# Patient Record
Sex: FEMALE | Race: WHITE | NOT HISPANIC OR LATINO | Employment: FULL TIME | URBAN - METROPOLITAN AREA
[De-identification: names, ages, dates, MRNs, and addresses within clinical notes are randomized per-mention and may not be internally consistent; named-entity substitution may affect disease eponyms.]

---

## 2017-12-19 ENCOUNTER — HOSPITAL ENCOUNTER (INPATIENT)
Facility: HOSPITAL | Age: 47
LOS: 5 days | Discharge: HOME | DRG: 101 | End: 2017-12-24
Attending: EMERGENCY MEDICINE | Admitting: EMERGENCY MEDICINE
Payer: COMMERCIAL

## 2017-12-19 DIAGNOSIS — R56.9 CONVULSIONS, UNSPECIFIED CONVULSION TYPE: ICD-10-CM

## 2017-12-19 DIAGNOSIS — G40.109 TEMPORAL LOBE EPILEPSY: Primary | ICD-10-CM

## 2017-12-19 DIAGNOSIS — G40.119 TEMPORAL LOBE EPILEPSY, INTRACTABLE: ICD-10-CM

## 2017-12-19 DIAGNOSIS — R56.9 SEIZURES: ICD-10-CM

## 2017-12-19 LAB
ALBUMIN SERPL BCP-MCNC: 3.4 G/DL
ALP SERPL-CCNC: 122 U/L
ALT SERPL W/O P-5'-P-CCNC: 34 U/L
AMPHET+METHAMPHET UR QL: NEGATIVE
ANION GAP SERPL CALC-SCNC: 7 MMOL/L
AST SERPL-CCNC: 28 U/L
BACTERIA #/AREA URNS AUTO: NORMAL /HPF
BARBITURATES UR QL SCN>200 NG/ML: NEGATIVE
BASOPHILS # BLD AUTO: 0.02 K/UL
BASOPHILS NFR BLD: 0.3 %
BENZODIAZ UR QL SCN>200 NG/ML: NEGATIVE
BILIRUB SERPL-MCNC: 0.3 MG/DL
BILIRUB UR QL STRIP: NEGATIVE
BUN SERPL-MCNC: 15 MG/DL
BZE UR QL SCN: NEGATIVE
CALCIUM SERPL-MCNC: 9.4 MG/DL
CANNABINOIDS UR QL SCN: NEGATIVE
CHLORIDE SERPL-SCNC: 106 MMOL/L
CLARITY UR REFRACT.AUTO: CLEAR
CO2 SERPL-SCNC: 27 MMOL/L
COLOR UR AUTO: YELLOW
CREAT SERPL-MCNC: 0.9 MG/DL
CREAT UR-MCNC: 110 MG/DL
DIFFERENTIAL METHOD: ABNORMAL
EOSINOPHIL # BLD AUTO: 0.1 K/UL
EOSINOPHIL NFR BLD: 1 %
ERYTHROCYTE [DISTWIDTH] IN BLOOD BY AUTOMATED COUNT: 14.6 %
EST. GFR  (AFRICAN AMERICAN): >60 ML/MIN/1.73 M^2
EST. GFR  (NON AFRICAN AMERICAN): >60 ML/MIN/1.73 M^2
GLUCOSE SERPL-MCNC: 87 MG/DL
GLUCOSE UR QL STRIP: NEGATIVE
HCT VFR BLD AUTO: 34.9 %
HGB BLD-MCNC: 11.3 G/DL
HGB UR QL STRIP: NEGATIVE
IMM GRANULOCYTES # BLD AUTO: 0.02 K/UL
IMM GRANULOCYTES NFR BLD AUTO: 0.3 %
KETONES UR QL STRIP: NEGATIVE
LEUKOCYTE ESTERASE UR QL STRIP: ABNORMAL
LYMPHOCYTES # BLD AUTO: 2.4 K/UL
LYMPHOCYTES NFR BLD: 35.1 %
MCH RBC QN AUTO: 25.1 PG
MCHC RBC AUTO-ENTMCNC: 32.4 G/DL
MCV RBC AUTO: 77 FL
METHADONE UR QL SCN>300 NG/ML: NEGATIVE
MICROSCOPIC COMMENT: NORMAL
MONOCYTES # BLD AUTO: 0.4 K/UL
MONOCYTES NFR BLD: 6 %
NEUTROPHILS # BLD AUTO: 3.9 K/UL
NEUTROPHILS NFR BLD: 57.3 %
NITRITE UR QL STRIP: NEGATIVE
NRBC BLD-RTO: 0 /100 WBC
OPIATES UR QL SCN: NEGATIVE
PCP UR QL SCN>25 NG/ML: NEGATIVE
PH UR STRIP: 7 [PH] (ref 5–8)
PLATELET # BLD AUTO: 206 K/UL
PMV BLD AUTO: 9.3 FL
POTASSIUM SERPL-SCNC: 4.4 MMOL/L
PROT SERPL-MCNC: 7.2 G/DL
PROT UR QL STRIP: NEGATIVE
RBC # BLD AUTO: 4.51 M/UL
RBC #/AREA URNS AUTO: 1 /HPF (ref 0–4)
SODIUM SERPL-SCNC: 140 MMOL/L
SP GR UR STRIP: 1.01 (ref 1–1.03)
SQUAMOUS #/AREA URNS AUTO: 3 /HPF
TOXICOLOGY INFORMATION: NORMAL
URN SPEC COLLECT METH UR: ABNORMAL
UROBILINOGEN UR STRIP-ACNC: NEGATIVE EU/DL
WBC # BLD AUTO: 6.72 K/UL
WBC #/AREA URNS AUTO: 4 /HPF (ref 0–5)

## 2017-12-19 PROCEDURE — 63600175 PHARM REV CODE 636 W HCPCS: Performed by: PHYSICIAN ASSISTANT

## 2017-12-19 PROCEDURE — 80175 DRUG SCREEN QUAN LAMOTRIGINE: CPT

## 2017-12-19 PROCEDURE — 81001 URINALYSIS AUTO W/SCOPE: CPT

## 2017-12-19 PROCEDURE — 85025 COMPLETE CBC W/AUTO DIFF WBC: CPT

## 2017-12-19 PROCEDURE — 96372 THER/PROPH/DIAG INJ SC/IM: CPT

## 2017-12-19 PROCEDURE — 95951 PR EEG MONITORING/VIDEORECORD: CPT | Mod: 26,,, | Performed by: PSYCHIATRY & NEUROLOGY

## 2017-12-19 PROCEDURE — 93010 ELECTROCARDIOGRAM REPORT: CPT | Mod: ,,, | Performed by: INTERNAL MEDICINE

## 2017-12-19 PROCEDURE — 36000 PLACE NEEDLE IN VEIN: CPT

## 2017-12-19 PROCEDURE — 99285 EMERGENCY DEPT VISIT HI MDM: CPT | Mod: ,,, | Performed by: EMERGENCY MEDICINE

## 2017-12-19 PROCEDURE — 99223 1ST HOSP IP/OBS HIGH 75: CPT | Mod: ,,, | Performed by: PSYCHIATRY & NEUROLOGY

## 2017-12-19 PROCEDURE — 95951 HC EEG MONITORING/VIDEO RECORD: CPT

## 2017-12-19 PROCEDURE — 93010 ELECTROCARDIOGRAM REPORT: CPT | Mod: 77,,, | Performed by: INTERNAL MEDICINE

## 2017-12-19 PROCEDURE — 93005 ELECTROCARDIOGRAM TRACING: CPT

## 2017-12-19 PROCEDURE — 20600001 HC STEP DOWN PRIVATE ROOM

## 2017-12-19 PROCEDURE — 80307 DRUG TEST PRSMV CHEM ANLYZR: CPT

## 2017-12-19 PROCEDURE — 80053 COMPREHEN METABOLIC PANEL: CPT

## 2017-12-19 PROCEDURE — 99285 EMERGENCY DEPT VISIT HI MDM: CPT | Mod: 25

## 2017-12-19 RX ORDER — DOCUSATE SODIUM 100 MG/1
100 CAPSULE, LIQUID FILLED ORAL 2 TIMES DAILY PRN
Status: DISCONTINUED | OUTPATIENT
Start: 2017-12-19 | End: 2017-12-24 | Stop reason: HOSPADM

## 2017-12-19 RX ORDER — ONDANSETRON 2 MG/ML
4 INJECTION INTRAMUSCULAR; INTRAVENOUS EVERY 8 HOURS PRN
Status: DISCONTINUED | OUTPATIENT
Start: 2017-12-19 | End: 2017-12-24 | Stop reason: HOSPADM

## 2017-12-19 RX ORDER — ACETAMINOPHEN 325 MG/1
650 TABLET ORAL EVERY 4 HOURS PRN
Status: DISCONTINUED | OUTPATIENT
Start: 2017-12-19 | End: 2017-12-24 | Stop reason: HOSPADM

## 2017-12-19 RX ORDER — SODIUM CHLORIDE 0.9 % (FLUSH) 0.9 %
3 SYRINGE (ML) INJECTION
Status: DISCONTINUED | OUTPATIENT
Start: 2017-12-19 | End: 2017-12-24 | Stop reason: HOSPADM

## 2017-12-19 RX ORDER — ONDANSETRON 8 MG/1
8 TABLET, ORALLY DISINTEGRATING ORAL EVERY 8 HOURS PRN
Status: DISCONTINUED | OUTPATIENT
Start: 2017-12-19 | End: 2017-12-24 | Stop reason: HOSPADM

## 2017-12-19 RX ORDER — HEPARIN SODIUM 5000 [USP'U]/ML
5000 INJECTION, SOLUTION INTRAVENOUS; SUBCUTANEOUS EVERY 8 HOURS
Status: DISCONTINUED | OUTPATIENT
Start: 2017-12-19 | End: 2017-12-24 | Stop reason: HOSPADM

## 2017-12-19 RX ADMIN — HEPARIN SODIUM 5000 UNITS: 5000 INJECTION, SOLUTION INTRAVENOUS; SUBCUTANEOUS at 02:12

## 2017-12-19 RX ADMIN — HEPARIN SODIUM 5000 UNITS: 5000 INJECTION, SOLUTION INTRAVENOUS; SUBCUTANEOUS at 10:12

## 2017-12-19 NOTE — ED NOTES
Patient changed into hospital gown, patient connected to cardiac monitor, pulse ox and automatic blood pressure cuff.

## 2017-12-19 NOTE — ASSESSMENT & PLAN NOTE
46 yo female with 30+ year history of seizures who presented to ED for evaluation for increased frequency of staring spells since started on Breviact as outpatient ~1 month ago. Prior EMU admission did not capture typical events, but did show some L>R temporal sharps.     - Start VEEG  - Hold lamictal and breviact  - Seizure precautions  - Activation procedures per protocol - none currently  - IV Ativan PRN for GTC greater than 5 min - hospital medicine to call epilepsy on call before administering

## 2017-12-19 NOTE — ED NOTES
Patient identifiers verified and correct for Liza Arrieta.    LOC: The patient is awake, alert and aware of environment with an appropriate affect, the patient is oriented x 3 and speaking appropriately.  APPEARANCE: Patient resting comfortably and in no acute distress, patient is clean and well groomed, patient's clothing is properly fastened.  SKIN: The skin is warm and dry, color consistent with ethnicity, patient has normal skin turgor and moist mucus membranes, skin intact, no breakdown or bruising noted.  MUSCULOSKELETAL: Patient moving all extremities spontaneously, no obvious swelling or deformities noted.  RESPIRATORY: Airway is open and patent, respirations are spontaneous, patient has a normal effort and rate, no accessory muscle use noted, clear bilateral breath sounds noted through out the chest  CARDIAC: Patient has a normal rate and regular rhythm, no periphreal edema noted, capillary refill < 3 seconds.  ABDOMEN: Soft and non tender to palpation, no distention noted, normoactive bowel sounds present in all four quadrants.  NEUROLOGIC:  eyes open spontaneously, behavior appropriate to situation, follows commands, facial expression symmetrical, bilateral hand grasp equal and even, purposeful motor response noted, normal sensation in all extremities when touched with a finger.

## 2017-12-19 NOTE — SUBJECTIVE & OBJECTIVE
Past Medical History:   Diagnosis Date    Seizures        Past Surgical History:   Procedure Laterality Date    APPENDECTOMY       SECTION      CHOLECYSTECTOMY      HYSTERECTOMY         Review of patient's allergies indicates:  No Known Allergies    No current facility-administered medications on file prior to encounter.      Current Outpatient Prescriptions on File Prior to Encounter   Medication Sig    lamotrigine (LAMICTAL) 200 MG tablet TAKE 1 AND 1/2 TABLETS BY MOUTH TWICE DAILY    lorazepam (ATIVAN) 1 MG tablet Take 1 tablet (1 mg total) by mouth once daily. And prn     Continuous Infusions:    Family History     None        Social History Main Topics    Smoking status: Never Smoker    Smokeless tobacco: Never Used    Alcohol use No    Drug use: No    Sexual activity: Not on file     Review of Systems   Constitutional: Negative for chills, fatigue and fever.   HENT: Negative for congestion, rhinorrhea and sore throat.    Respiratory: Negative for cough and shortness of breath.    Cardiovascular: Negative for chest pain, palpitations and leg swelling.   Gastrointestinal: Negative for abdominal pain, diarrhea, nausea and vomiting.   Genitourinary: Negative for dysuria and hematuria.   Musculoskeletal: Negative for back pain and gait problem.   Skin: Negative for pallor, rash and wound.   Neurological: Positive for seizures. Negative for dizziness, weakness, numbness and headaches.   Psychiatric/Behavioral: Negative for agitation and confusion.     Objective:     Vital Signs (Most Recent):  Temp: 98.8 °F (37.1 °C) (17 1457)  Pulse: 72 (17 1710)  Resp: 18 (17 171)  BP: (!) 107/55 (17)  SpO2: 98 % (17) Vital Signs (24h Range):  Temp:  [98.6 °F (37 °C)-98.8 °F (37.1 °C)] 98.8 °F (37.1 °C)  Pulse:  [57-77] 72  Resp:  [13-18] 18  SpO2:  [98 %-99 %] 98 %  BP: (107-148)/(55-73) 107/55     Weight: 105.2 kg (232 lb)  Body mass index is 37.45 kg/m².    Physical  Exam   Constitutional: She is oriented to person, place, and time. She appears well-developed and well-nourished. No distress.   HENT:   Head: Normocephalic and atraumatic.   Right Ear: External ear normal.   Left Ear: External ear normal.   Nose: Nose normal.   Mouth/Throat: Oropharynx is clear and moist.   Eyes: Conjunctivae and EOM are normal. Pupils are equal, round, and reactive to light.   Pulmonary/Chest: Effort normal. No respiratory distress.   Neurological: She is alert and oriented to person, place, and time. She has normal strength. She has a normal Finger-Nose-Finger Test.   Reflex Scores:       Brachioradialis reflexes are 2+ on the right side and 2+ on the left side.       Patellar reflexes are 2+ on the right side and 2+ on the left side.  Skin: Skin is warm and dry. She is not diaphoretic. No erythema.   Psychiatric: She has a normal mood and affect. Her speech is normal and behavior is normal. Judgment and thought content normal.       NEUROLOGICAL EXAMINATION:     MENTAL STATUS   Oriented to person, place, and time.   Attention: normal. Concentration: normal.   Speech: speech is normal   Level of consciousness: alert  Knowledge: good.     CRANIAL NERVES     CN III, IV, VI   Pupils are equal, round, and reactive to light.  Extraocular motions are normal.     CN V   Facial sensation intact.     CN VII   Facial expression full, symmetric.     CN VIII   CN VIII normal.     CN IX, X   CN IX normal.   CN X normal.     CN XI   CN XI normal.     CN XII   CN XII normal.     MOTOR EXAM   Muscle bulk: normal  Overall muscle tone: normal    Strength   Strength 5/5 throughout.     REFLEXES     Reflexes   Right brachioradialis: 2+  Left brachioradialis: 2+  Right patellar: 2+  Left patellar: 2+    SENSORY EXAM   Light touch normal.   Proprioception normal.     GAIT AND COORDINATION      Coordination   Finger to nose coordination: normal    Tremor   Resting tremor: absent  Intention tremor:  absent      Significant Labs: All pertinent lab results from the past 24 hours have been reviewed.    Significant Studies: I have reviewed all pertinent imaging results/findings within the past 24 hours.

## 2017-12-19 NOTE — ED PROVIDER NOTES
"Encounter Date: 2017    SCRIBE #1 NOTE: I, Senait Lozada, am scribing for, and in the presence of,  Dr. Donovan. I have scribed the entire note.       History     Chief Complaint   Patient presents with    Seizures     has been having more seizures, meds were changed but continues to have focal seizures., States she never has grand mal     The history is provided by the patient and medical records.      Time patient was seen by the provider: 11:46 AM      The patient is a 47 y.o. female with hx of: seizures that presents to the ED with a complaint of chronic seizures. The patient reports that she has been having more seizures than usual where she would have 2-3 seizures a week and now up to 7-10 a week. The patient describes her seizures as a "staring spell". She recently was prescribed a new medication by her neurologist and believes that this is causing the increase frequency of the staring spells. The patient denies headache, weakness, numbness, tingling in extremities, fevers, chills, neck stiffness, abdominal pain, nausea, vomiting, dysuria or flank pain.       Review of patient's allergies indicates:  No Known Allergies  Past Medical History:   Diagnosis Date    Seizures      Past Surgical History:   Procedure Laterality Date    APPENDECTOMY       SECTION      CHOLECYSTECTOMY      HYSTERECTOMY       No family history on file.  Social History   Substance Use Topics    Smoking status: Never Smoker    Smokeless tobacco: Never Used    Alcohol use No     Review of Systems   Constitutional: Negative for chills and fever.   Gastrointestinal: Negative for abdominal pain, nausea and vomiting.   Genitourinary: Negative for dysuria and flank pain.   Musculoskeletal: Negative for neck stiffness.   Neurological: Positive for seizures (staring spells). Negative for weakness, numbness and headaches.   All other systems reviewed and are negative.      Physical Exam     Initial Vitals [17 1042] "   BP Pulse Resp Temp SpO2   (!) 148/73 77 16 98.6 °F (37 °C) 99 %      MAP       98         Physical Exam   Gen/Constitutional: Interactive. No acute distress  Head: Normocephalic, Atraumatic  Neck: supple, no masses or LAD  Eyes: PERRLA, conjunctiva clear  Ears, Nose and Throat: No rhinorrhea or stridor.  Cardiac: Reg Rhythm, No murmur  Pulmonary: CTA Bilat, no wheezes, rhonchi, rales.  GI: Abdomen soft, non-tender, non-distended; no rebound or guarding  : No CVA tenderness.  Musculoskeletal: Extremities warm, well perfused, no erythema, no edema  Skin: No rashes  Neuro: Alert and Oriented x 3; No focal motor or sensory deficits.  No pronator drift in upper or lower extremities, no face droop, no dysarthria.  Normal finger to nose and heel to shin.  No ataxia.  Psych: Normal affect      ED Course   Procedures  Labs Reviewed   CBC W/ AUTO DIFFERENTIAL   COMPREHENSIVE METABOLIC PANEL   LAMOTRIGINE LEVEL     EKG Readings: (Independently Interpreted)   EKG: NSR, no SHERITA's or STD's, twave inversion in lead III and AVF, V3- V5 present on prior, no STEMI          Medical Decision Making:   History:   Old Medical Records: I decided to obtain old medical records.  Initial Assessment:        47 y.o. Female patient with chronic seizure disorder presents with increasing frequency of seizure-like activity that she describes as staring spells. She was recently placed on new medication by outpatient neurologist and she is still taking Lamictal. I plan to check labs and Lamictal levels.Will touch base with epilepsy regarding disposition.     12:05 PM  Spoke with Dr Manzo with epilepsy who states he will discuss with Dr. Ybarra and will get back to me with final disposition recommendations.    12:17 PM  Dr. Elam recommended admittance to the EMU for EEG monitoring and possible medication adjustment. Dr. Elam will be the admitting attending.         Clinical Tests:  Labs Test(s) were ordered and reviewed by me.  Medical  test(s) were ordered and reviewed by me.             Scribe Attestation:   Scribe #1: I performed the above scribed service and the documentation accurately describes the services I performed. I attest to the accuracy of the note.    I, Dr. Gt Donovan, personally performed the services described in this documentation. All medical record entries made by the scribe were at my direction and in my presence.  I have reviewed the chart and agree that the record reflects my personal performance and is accurate and complete. Gt Donovan MD.  1:14 PM 12/19/2017            ED Course      Clinical Impression:   The encounter diagnosis was Seizures.                           Gt Donovan MD  12/19/17 5126

## 2017-12-19 NOTE — H&P
"[facility]  Neurology-Epilepsy  History & Physical    Patient Name: Liza Arrieta  MRN: [id number]   Admission Date: 12/19/2017  Code Status: Full Code   Attending Provider: Gt Donovan MD   Primary Care Physician: Rach Nuñez PA-C  Principal Problem:Seizures    Subjective:     Chief Complaint:  seizures     HPI:   48 yo female with significant past medical history of seizure disorder presented to ED today for evaluation of increasing seizure frequency. Patient reports seizures initally diagnosed at age 19, at which time she had 3 GTC. She states she was well controlled on dilantin for approximately 15 years after that, but that events returned as staring spells and have persisted despite multiple medication trials. She endorses an aura of "not feeling good" prior to some of these events, and states the staring spell generally lasts 30 seconds to 1 minute. She reports that looking back on her childhood, she believes she had staring spells at that time as well which were never addressed. She has been following as an outpatient most recently with Dr. Huerta at [facility], and states that since she was started on Breviact 50 mg daily ~1 month ago she has noticed an increased frequency of her staring spells to 7-10 times per week from 2-3 times per week. She reports stress and lack of sleep as possible triggers. Neurology Epilepsy accepted patient for admission to EMU for further characterization of seizures and medication/treatment optimization.    Last clinic visit: Dr. Elam 3/1/16  INTERVAL HISTORY  SINCE EMU discharge last month     Doing better since D/C from EMU.  Feels much better in terms of everyday.    Still having sporadic brief dialeptic type seizures, but less than before.  She does not have calendar.  thinks 1-2 / week. Maybe a little more during periods.  Much less "drugged" feeling off PHT     11/17/2015  New Patient  Pt has been seeing Dr Huerta at [facility] for " ""complex partial sezures." First sz, in school, age 19. Was put on Dilantin and she did well for approx 15 years, until the seizures became active again. Thinks she may have had 2 classic "Grand Mal" seizures in her 20's, but since then, seizure types are those described below.     Currently, she is experiencing more than one event per week. Event type is described below. She has been failing her current treatment regimen as described below. May have tried other meds, but can't remember them now. Did not bring records yet.           Seizure Seminology  Seizure Type 1   Classification: Pass-out  Aura - Occasional auditory мария vu  Pt loses consciousness and falls or loses tone 1-2 in  Post-ictal  Brief  Age of onset 19  Current Seizure Frequency - Several per week        Seizure Type 2  Classification: Complex Partial  Wanders around. Doesn't remember after.   Post-ictal  Brief  Current Seizure Frequency - Several per week           Seizure Triggers  Sleep Deprivation - None  Other medications - None  Psych/stress - None  Photic stimulation - None  Hyperventilation - None  Medical Problems - None  Menses - 3 days before period they get worse  Sensory Stimulation (light, sound, etc) - None  Missed dose of meds - None        AED Treatments  Present regimen   mg BID  Breviact 50 mg BID     Prior treatments  VPA  Aptiom   BID  TPM 10ID     Not tried  acetazolamide (Diamox, AZM)  amantadine  carbamazepine (Tegretol, CBZ)  clobezam (Onfi or Frizium, CLB)  ethosuximide (Zarontin, ESM)  eslicarbazine (Aptiom, ESL)  felbamate (felbatol, FBM)  gabapentin (Neurontin, GPN)  lacosamide (Vimpat, LCS)   levetiracetam (Keppra, LEV)  methsuximide (Celontin, MSM)  methyphenytoin (Mesantion, MHT)  oxcarbazepine (Trileptal OXC)  perampanel (Fycompa, FCP)   phenobarbital (Pb)  pregabalin (Lyrica, PGB)  primidone (Mysoline, PRM)  retigabine (Potiga, RTG)  rufinamide (Banzel, RUF)  tiagabine (Gabatril, TGB)  viagabatrin, " (Sabril, VGB)  vagal nerve stimulator (VNS)  valproic acid (Depakote, VPA)  zonisamide (Zonegran, ZNA)  Benzodiazepines  diazepam - rectal (Diastatl)  diazepam - oral (Valium, DZ)  clonazepam (Klonopin, CZP)  clorazepate (Tranxene, CLZ)  Ativan  Brain Stimulation  Vagal Nerve Stimulation-n/a  DBS- n/a     Compliance method  Memory - yes  Mom or Spouse - Yes  Pill Box - no  Dewayne calendar - no  Turn over medication bottle - no  Phone alarm - no     Seizure Evaluation  EEG Routine - Dont have  MRI/MRA - In past- she doesn't know results  CT/CTA Scan -   PET Scan -   Neuropsychological evaluation -   DEXA Scan     Potential Epilepsy Risk Factors:   Pregnancy/Labor/Delivery - full term uncomplicated pregnancy labor and vaginal delivery  Febrile seizures - none  Head injury - none  CNS infection - none   Stroke - none  Family Hx of Sz - none       Past Medical History:   Diagnosis Date    Seizures        Past Surgical History:   Procedure Laterality Date    APPENDECTOMY       SECTION      CHOLECYSTECTOMY      HYSTERECTOMY         Review of patient's allergies indicates:  No Known Allergies    No current facility-administered medications on file prior to encounter.      Current Outpatient Prescriptions on File Prior to Encounter   Medication Sig    lamotrigine (LAMICTAL) 200 MG tablet TAKE 1 AND 1/2 TABLETS BY MOUTH TWICE DAILY    lorazepam (ATIVAN) 1 MG tablet Take 1 tablet (1 mg total) by mouth once daily. And prn     Continuous Infusions:    Family History     None        Social History Main Topics    Smoking status: Never Smoker    Smokeless tobacco: Never Used    Alcohol use No    Drug use: No    Sexual activity: Not on file     Review of Systems   Constitutional: Negative for chills, fatigue and fever.   HENT: Negative for congestion, rhinorrhea and sore throat.    Respiratory: Negative for cough and shortness of breath.    Cardiovascular: Negative for chest pain, palpitations and leg swelling.    Gastrointestinal: Negative for abdominal pain, diarrhea, nausea and vomiting.   Genitourinary: Negative for dysuria and hematuria.   Musculoskeletal: Negative for back pain and gait problem.   Skin: Negative for pallor, rash and wound.   Neurological: Positive for seizures. Negative for dizziness, weakness, numbness and headaches.   Psychiatric/Behavioral: Negative for agitation and confusion.     Objective:     Vital Signs (Most Recent):  Temp: 98.8 °F (37.1 °C) (12/19/17 1457)  Pulse: 72 (12/19/17 1710)  Resp: 18 (12/19/17 1710)  BP: (!) 107/55 (12/19/17 1710)  SpO2: 98 % (12/19/17 1710) Vital Signs (24h Range):  Temp:  [98.6 °F (37 °C)-98.8 °F (37.1 °C)] 98.8 °F (37.1 °C)  Pulse:  [57-77] 72  Resp:  [13-18] 18  SpO2:  [98 %-99 %] 98 %  BP: (107-148)/(55-73) 107/55     Weight: 105.2 kg (232 lb)  Body mass index is 37.45 kg/m².    Physical Exam   Constitutional: She is oriented to person, place, and time. She appears well-developed and well-nourished. No distress.   HENT:   Head: Normocephalic and atraumatic.   Right Ear: External ear normal.   Left Ear: External ear normal.   Nose: Nose normal.   Mouth/Throat: Oropharynx is clear and moist.   Eyes: Conjunctivae and EOM are normal. Pupils are equal, round, and reactive to light.   Pulmonary/Chest: Effort normal. No respiratory distress.   Neurological: She is alert and oriented to person, place, and time. She has normal strength. She has a normal Finger-Nose-Finger Test.   Reflex Scores:       Brachioradialis reflexes are 2+ on the right side and 2+ on the left side.       Patellar reflexes are 2+ on the right side and 2+ on the left side.  Skin: Skin is warm and dry. She is not diaphoretic. No erythema.   Psychiatric: She has a normal mood and affect. Her speech is normal and behavior is normal. Judgment and thought content normal.       NEUROLOGICAL EXAMINATION:     MENTAL STATUS   Oriented to person, place, and time.   Attention: normal. Concentration: normal.    Speech: speech is normal   Level of consciousness: alert  Knowledge: good.     CRANIAL NERVES     CN III, IV, VI   Pupils are equal, round, and reactive to light.  Extraocular motions are normal.     CN V   Facial sensation intact.     CN VII   Facial expression full, symmetric.     CN VIII   CN VIII normal.     CN IX, X   CN IX normal.   CN X normal.     CN XI   CN XI normal.     CN XII   CN XII normal.     MOTOR EXAM   Muscle bulk: normal  Overall muscle tone: normal    Strength   Strength 5/5 throughout.     REFLEXES     Reflexes   Right brachioradialis: 2+  Left brachioradialis: 2+  Right patellar: 2+  Left patellar: 2+    SENSORY EXAM   Light touch normal.   Proprioception normal.     GAIT AND COORDINATION      Coordination   Finger to nose coordination: normal    Tremor   Resting tremor: absent  Intention tremor: absent      Significant Labs: All pertinent lab results from the past 24 hours have been reviewed.    Significant Studies: I have reviewed all pertinent imaging results/findings within the past 24 hours.    Assessment and Plan:     * Seizures    48 yo female with 30+ year history of seizures who presented to ED for evaluation for increased frequency of staring spells since started on Breviact as outpatient ~1 month ago. Prior EMU admission did not capture typical events, but did show some L>R temporal sharps.     - Start VEEG  - Hold lamictal and breviact  - Seizure precautions  - Activation procedures per protocol - none currently  - IV Ativan PRN for GTC greater than 5 min - hospital medicine to call epilepsy on call before administering              VTE Risk Mitigation         Ordered     heparin (porcine) injection 5,000 Units  Every 8 hours     Route:  Subcutaneous        12/19/17 1340     Medium Risk of VTE  Once      12/19/17 1340          Philly Molina PA-C  Neurology-Epilepsy  [facility]  Staff: Dr. Elam

## 2017-12-19 NOTE — ED NOTES
Patient states that she is currently having a seizure. Patient is oriented and able to follow commands. Patient states that her seizure usually last 30 seconds to a minute.

## 2017-12-19 NOTE — HPI
"46 yo female with significant past medical history of seizure disorder presented to ED today for evaluation of increasing seizure frequency. Patient reports seizures initally diagnosed at age 19, at which time she had 3 GTC. She states she was well controlled on dilantin for approximately 15 years after that, but that events returned as staring spells and have persisted despite multiple medication trials. She endorses an aura of "not feeling good" prior to some of these events, and states the staring spell generally lasts 30 seconds to 1 minute. She reports that looking back on her childhood, she believes she had staring spells at that time as well which were never addressed. She has been following as an outpatient most recently with Dr. Huerta at [facility], and states that since she was started on Breviact 50 mg daily ~1 month ago she has noticed an increased frequency of her staring spells to 7-10 times per week from 2-3 times per week. She reports stress and lack of sleep as possible triggers. Neurology Epilepsy accepted patient for admission to EMU for further characterization of seizures and medication/treatment optimization.    Last clinic visit: Dr. Elam 3/1/16  INTERVAL HISTORY  SINCE EMU discharge last month     Doing better since D/C from EMU.  Feels much better in terms of everyday.    Still having sporadic brief dialeptic type seizures, but less than before.  She does not have calendar.  thinks 1-2 / week. Maybe a little more during periods.  Much less "drugged" feeling off PHT     11/17/2015  New Patient  Pt has been seeing Dr Huerta at [facility] for "complex partial sezures." First sz, in school, age 19. Was put on Dilantin and she did well for approx 15 years, until the seizures became active again. Thinks she may have had 2 classic "Grand Mal" seizures in her 20's, but since then, seizure types are those described below.     Currently, she is experiencing more than one event per week. Event type " is described below. She has been failing her current treatment regimen as described below. May have tried other meds, but can't remember them now. Did not bring records yet.           Seizure Seminology  Seizure Type 1   Classification: Pass-out  Aura - Occasional auditory мария vu  Pt loses consciousness and falls or loses tone 1-2 in  Post-ictal  Brief  Age of onset 19  Current Seizure Frequency - Several per week        Seizure Type 2  Classification: Complex Partial  Wanders around. Doesn't remember after.   Post-ictal  Brief  Current Seizure Frequency - Several per week           Seizure Triggers  Sleep Deprivation - None  Other medications - None  Psych/stress - None  Photic stimulation - None  Hyperventilation - None  Medical Problems - None  Menses - 3 days before period they get worse  Sensory Stimulation (light, sound, etc) - None  Missed dose of meds - None        AED Treatments  Present regimen   mg BID  Breviact 50 mg BID     Prior treatments  VPA  Aptiom   BID  TPM 10ID     Not tried  acetazolamide (Diamox, AZM)  amantadine  carbamazepine (Tegretol, CBZ)  clobezam (Onfi or Frizium, CLB)  ethosuximide (Zarontin, ESM)  eslicarbazine (Aptiom, ESL)  felbamate (felbatol, FBM)  gabapentin (Neurontin, GPN)  lacosamide (Vimpat, LCS)   levetiracetam (Keppra, LEV)  methsuximide (Celontin, MSM)  methyphenytoin (Mesantion, MHT)  oxcarbazepine (Trileptal OXC)  perampanel (Fycompa, FCP)   phenobarbital (Pb)  pregabalin (Lyrica, PGB)  primidone (Mysoline, PRM)  retigabine (Potiga, RTG)  rufinamide (Banzel, RUF)  tiagabine (Gabatril, TGB)  viagabatrin, (Sabril, VGB)  vagal nerve stimulator (VNS)  valproic acid (Depakote, VPA)  zonisamide (Zonegran, ZNA)  Benzodiazepines  diazepam - rectal (Diastatl)  diazepam - oral (Valium, DZ)  clonazepam (Klonopin, CZP)  clorazepate (Tranxene, CLZ)  Ativan  Brain Stimulation  Vagal Nerve Stimulation-n/a  DBS- n/a     Compliance method  Memory - yes  Mom or Spouse -  Yes  Pill Box - no  [place]ne calendar - no  Turn over medication bottle - no  Phone alarm - no     Seizure Evaluation  EEG Routine - Dont have  MRI/MRA - In past- she doesn't know results  CT/CTA Scan -   PET Scan -   Neuropsychological evaluation -   DEXA Scan     Potential Epilepsy Risk Factors:   Pregnancy/Labor/Delivery - full term uncomplicated pregnancy labor and vaginal delivery  Febrile seizures - none  Head injury - none  CNS infection - none   Stroke - none  Family Hx of Sz - none

## 2017-12-20 PROCEDURE — 95951 PR EEG MONITORING/VIDEORECORD: CPT | Mod: 26,,, | Performed by: PSYCHIATRY & NEUROLOGY

## 2017-12-20 PROCEDURE — 95951 HC EEG MONITORING/VIDEO RECORD: CPT

## 2017-12-20 PROCEDURE — 20600001 HC STEP DOWN PRIVATE ROOM

## 2017-12-20 PROCEDURE — 99233 SBSQ HOSP IP/OBS HIGH 50: CPT | Mod: ,,, | Performed by: PSYCHIATRY & NEUROLOGY

## 2017-12-20 NOTE — PROGRESS NOTES
"EEG tech entered room during hyperventilation to find pt staring and unresponsive to questions. Nurse to room. Oriented to self, time, and situation, but could not name [facility] as the place where she was located. This episode and these symptoms continued for 2-3 minutes. Pulse ox = 98%. At the end of episode, pt  Oriented X 4, never lost consciousness, and states that she is "tired".   "

## 2017-12-20 NOTE — ASSESSMENT & PLAN NOTE
48 yo female with 30+ year history of seizures who presented to ED for evaluation for increased frequency of staring spells since started on Breviact as outpatient ~1 month ago. Prior EMU admission did not capture typical events, but did show some L>R temporal sharps now with L anterior temporal spikes, most recorded at F7. None on other side.     - Start VEEG  - Hold lamictal and breviact  - Will require a 3T MRI once discharged.   - Seizure precautions  - Activation procedures per protocol - none currently  - IV Ativan PRN for GTC greater than 5 min - hospital medicine to call epilepsy on call before administering

## 2017-12-20 NOTE — HOSPITAL COURSE
12/19-12/20- Patient with no events overnight. EEG shows L anterior temporal spikes, most recorded at F7. None on the other side. At times, almost continuous L temporal interictal discharges at times.   12/20- 11:56:23- clinical seizure, starting from L side on EEG. No epileptiform discharges noted. L temporal slowing and spikes noted. Consisted of brief moment of unresponsiveness.   12/21- EEG showing L interical anterior temporal slowing with L temporal spikes. No clinical seizures since yesterday.   12/21-12/22- Event on 12/21 at 18:55 showing patient talking on the phone and suddenly stopping, unable to speak and confused. EEG shows there is a rhythmic buildup in the L temporal chains. Patient is confused and is drowsy following event. No tonic/clonic activity present.   12/22-12/23- Patient with numerous button pushes at night for jerking prior to falling asleep. EEG did not show any epileptic activity during these times. No additional events captured.   12/23-12/24- Patient with none of her typical events. Has some jerking prior to falling asleep. EEG continues to show L temporal spikes.

## 2017-12-20 NOTE — SUBJECTIVE & OBJECTIVE
Interval History: Patient denies any acute events overnight.      Current Facility-Administered Medications   Medication Dose Route Frequency Provider Last Rate Last Dose    acetaminophen tablet 650 mg  650 mg Oral Q4H PRN Philly Molina, PA-C        docusate sodium capsule 100 mg  100 mg Oral BID PRN Philly Barmaryanse, PA-C        heparin (porcine) injection 5,000 Units  5,000 Units Subcutaneous Q8H Philly Tylorse, PA-C   5,000 Units at 12/19/17 2216    ondansetron disintegrating tablet 8 mg  8 mg Oral Q8H PRN Philly Barmaryanse, PA-C        ondansetron injection 4 mg  4 mg Intravenous Q8H PRN Philly Barmaryanse, PA-C        sodium chloride 0.9% flush 3 mL  3 mL Intravenous PRN Philly Sniderse, PA-C         Continuous Infusions:    Review of Systems   Constitutional: Negative for chills, fatigue and fever.   HENT: Negative for congestion, rhinorrhea and sore throat.    Respiratory: Negative for cough and shortness of breath.    Cardiovascular: Negative for chest pain, palpitations and leg swelling.   Gastrointestinal: Negative for abdominal pain, diarrhea, nausea and vomiting.   Genitourinary: Negative for dysuria and hematuria.   Musculoskeletal: Negative for back pain and gait problem.   Skin: Negative for pallor, rash and wound.   Neurological: Positive for seizures. Negative for dizziness, weakness, numbness and headaches.   Psychiatric/Behavioral: Negative for agitation and confusion.     Objective:     Vital Signs (Most Recent):  Temp: 98.5 °F (36.9 °C) (12/20/17 1207)  Pulse: 68 (12/20/17 1207)  Resp: 18 (12/20/17 1207)  BP: 125/66 (12/20/17 1207)  SpO2: 98 % (12/20/17 1207) Vital Signs (24h Range):  Temp:  [98 °F (36.7 °C)-98.8 °F (37.1 °C)] 98.5 °F (36.9 °C)  Pulse:  [57-78] 68  Resp:  [13-20] 18  SpO2:  [93 %-99 %] 98 %  BP: (107-130)/(55-75) 125/66     Weight: 108.3 kg (238 lb 12.1 oz)  Body mass index is 38.54 kg/m².    Physical Exam        Physical Exam   Constitutional: She is oriented to person,  place, and time. She appears well-developed and well-nourished. No distress.   HENT:   Head: Normocephalic and atraumatic.   Right Ear: External ear normal.   Left Ear: External ear normal.   Nose: Nose normal.   Mouth/Throat: Oropharynx is clear and moist.   Eyes: Conjunctivae and EOM are normal. Pupils are equal, round, and reactive to light.   Pulmonary/Chest: Effort normal. No respiratory distress.   Neurological: She is alert and oriented to person, place, and time. She has normal strength. She has a normal Finger-Nose-Finger Test.   Reflex Scores:       Brachioradialis reflexes are 2+ on the right side and 2+ on the left side.       Patellar reflexes are 2+ on the right side and 2+ on the left side.  Skin: Skin is warm and dry. She is not diaphoretic. No erythema.   Psychiatric: She has a normal mood and affect. Her speech is normal and behavior is normal. Judgment and thought content normal.       NEUROLOGICAL EXAMINATION:     MENTAL STATUS   Oriented to person, place, and time.   Attention: normal. Concentration: normal.   Speech: speech is normal   Level of consciousness: alert  Knowledge: good.     CRANIAL NERVES     CN III, IV, VI   Pupils are equal, round, and reactive to light.  Extraocular motions are normal.     CN V   Facial sensation intact.     CN VII   Facial expression full, symmetric.     CN VIII   CN VIII normal.     CN IX, X   CN IX normal.   CN X normal.     CN XI   CN XI normal.     CN XII   CN XII normal.     MOTOR EXAM   Muscle bulk: normal  Overall muscle tone: normal    Strength   Strength 5/5 throughout.     REFLEXES     Reflexes   Right brachioradialis: 2+  Left brachioradialis: 2+  Right patellar: 2+  Left patellar: 2+    SENSORY EXAM   Light touch normal.   Proprioception normal.     GAIT AND COORDINATION      Coordination   Finger to nose coordination: normal    Tremor   Resting tremor: absent    Significant Labs:   Recent Lab Results     None        All pertinent lab results from  the past 24 hours have been reviewed.    Significant Studies: I have reviewed all pertinent imaging results/findings within the past 24 hours.

## 2017-12-20 NOTE — PROGRESS NOTES
[facility]  Neurology-Epilepsy  Progress Note    Patient Name: Liza Arrieta  MRN: [id number]  Admission Date: 12/19/2017  Hospital Length of Stay: 1 days  Code Status: Full Code   Attending Provider: LISA Elam MD  Primary Care Physician: Rach Nuñez PA-C   Principal Problem:Seizures    Subjective:     Hospital Course:   12/19-12/20- Patient with no events overnight. EEG shows L anterior temporal spikes, most recorded at F7. None on the other side. At times, almost continuous L temporal interictal discharges at times.       Interval History: Patient denies any acute events overnight.      Current Facility-Administered Medications   Medication Dose Route Frequency Provider Last Rate Last Dose    acetaminophen tablet 650 mg  650 mg Oral Q4H PRN Philly Barousse, PA-C        docusate sodium capsule 100 mg  100 mg Oral BID PRN Philly Barousse, PA-C        heparin (porcine) injection 5,000 Units  5,000 Units Subcutaneous Q8H Philly Barousse, PA-C   5,000 Units at 12/19/17 2216    ondansetron disintegrating tablet 8 mg  8 mg Oral Q8H PRN Philly Barousse, PA-C        ondansetron injection 4 mg  4 mg Intravenous Q8H PRN Philly Barousse, PA-C        sodium chloride 0.9% flush 3 mL  3 mL Intravenous PRN Philly Barousse, PA-C         Continuous Infusions:    Review of Systems   Constitutional: Negative for chills, fatigue and fever.   HENT: Negative for congestion, rhinorrhea and sore throat.    Respiratory: Negative for cough and shortness of breath.    Cardiovascular: Negative for chest pain, palpitations and leg swelling.   Gastrointestinal: Negative for abdominal pain, diarrhea, nausea and vomiting.   Genitourinary: Negative for dysuria and hematuria.   Musculoskeletal: Negative for back pain and gait problem.   Skin: Negative for pallor, rash and wound.   Neurological: Positive for seizures. Negative for dizziness, weakness, numbness and headaches.   Psychiatric/Behavioral:  Negative for agitation and confusion.     Objective:     Vital Signs (Most Recent):  Temp: 98.5 °F (36.9 °C) (12/20/17 1207)  Pulse: 68 (12/20/17 1207)  Resp: 18 (12/20/17 1207)  BP: 125/66 (12/20/17 1207)  SpO2: 98 % (12/20/17 1207) Vital Signs (24h Range):  Temp:  [98 °F (36.7 °C)-98.8 °F (37.1 °C)] 98.5 °F (36.9 °C)  Pulse:  [57-78] 68  Resp:  [13-20] 18  SpO2:  [93 %-99 %] 98 %  BP: (107-130)/(55-75) 125/66     Weight: 108.3 kg (238 lb 12.1 oz)  Body mass index is 38.54 kg/m².    Physical Exam        Physical Exam   Constitutional: She is oriented to person, place, and time. She appears well-developed and well-nourished. No distress.   HENT:   Head: Normocephalic and atraumatic.   Right Ear: External ear normal.   Left Ear: External ear normal.   Nose: Nose normal.   Mouth/Throat: Oropharynx is clear and moist.   Eyes: Conjunctivae and EOM are normal. Pupils are equal, round, and reactive to light.   Pulmonary/Chest: Effort normal. No respiratory distress.   Neurological: She is alert and oriented to person, place, and time. She has normal strength. She has a normal Finger-Nose-Finger Test.   Reflex Scores:       Brachioradialis reflexes are 2+ on the right side and 2+ on the left side.       Patellar reflexes are 2+ on the right side and 2+ on the left side.  Skin: Skin is warm and dry. She is not diaphoretic. No erythema.   Psychiatric: She has a normal mood and affect. Her speech is normal and behavior is normal. Judgment and thought content normal.       NEUROLOGICAL EXAMINATION:     MENTAL STATUS   Oriented to person, place, and time.   Attention: normal. Concentration: normal.   Speech: speech is normal   Level of consciousness: alert  Knowledge: good.     CRANIAL NERVES     CN III, IV, VI   Pupils are equal, round, and reactive to light.  Extraocular motions are normal.     CN V   Facial sensation intact.     CN VII   Facial expression full, symmetric.     CN VIII   CN VIII normal.     CN IX, X   CN IX  normal.   CN X normal.     CN XI   CN XI normal.     CN XII   CN XII normal.     MOTOR EXAM   Muscle bulk: normal  Overall muscle tone: normal    Strength   Strength 5/5 throughout.     REFLEXES     Reflexes   Right brachioradialis: 2+  Left brachioradialis: 2+  Right patellar: 2+  Left patellar: 2+    SENSORY EXAM   Light touch normal.   Proprioception normal.     GAIT AND COORDINATION      Coordination   Finger to nose coordination: normal    Tremor   Resting tremor: absent    Significant Labs:   Recent Lab Results     None        All pertinent lab results from the past 24 hours have been reviewed.    Significant Studies: I have reviewed all pertinent imaging results/findings within the past 24 hours.    Assessment and Plan:     * Seizures    46 yo female with 30+ year history of seizures who presented to ED for evaluation for increased frequency of staring spells since started on Breviact as outpatient ~1 month ago. Prior EMU admission did not capture typical events, but did show some L>R temporal sharps now with L anterior temporal spikes, most recorded at F7. None on other side.     - Start VEEG  - Hold lamictal and breviact  - Will require a 3T MRI once discharged.   - Seizure precautions  - Activation procedures per protocol - none currently  - IV Ativan PRN for GTC greater than 5 min - hospital medicine to call epilepsy on call before administering              VTE Risk Mitigation         Ordered     heparin (porcine) injection 5,000 Units  Every 8 hours     Route:  Subcutaneous        12/19/17 1340     Medium Risk of VTE  Once      12/19/17 1340          Sudha Fuentes MD  Neurology-Epilepsy  [facility]

## 2017-12-20 NOTE — PLAN OF CARE
Problem: Patient Care Overview  Goal: Plan of Care Review  Outcome: Ongoing (interventions implemented as appropriate)  NADN. At 2213, pt pushed event button, upon arriving to room pt states she had an episode of staring that lasted only a few seconds per pt. VSS. Pt remained alert oriented x4. No neuro changes. No other events through the night. Pt remained free from fall/injury.

## 2017-12-20 NOTE — PROGRESS NOTES
"Patient's family member pressed event monitor button. Nurse to room. Family member states that the "episode" is over, however, patient had a staring episode that lasted approximately 30 seconds. She was able to converse throughout the episode, was alert and oriented X 4. However, patient was confused and pulled out IV during episode. Nurse remained in room. Patient continued to be alert and oriented X 4, however she was confused as to how her IV had been removed. Pulse ox = 98%  "

## 2017-12-20 NOTE — PLAN OF CARE
PCP: patient has no PCP right now her former PCP passed away    Extended Emergency Contact Information  Primary Emergency Contact: Neymar Arrieta   [facility]  Home Phone: [phone]  Relation: Spouse    [facility] [id number] - [place] - [address]  [address]  [place] [address]  Phone: [phone] Fax: [phone]    Payor: [employer] / Plan: [facility] CHOICE PLUS / Product Type: Commercial /     Patient was independent living with her four kids (ages 22, 18, 15 and 12) and  in a house prior to hospitalization. Patient has no discharge needs at this time. Cm will continue to monitor.     12/20/17 0820   Discharge Assessment   Assessment Type Discharge Planning Assessment   Confirmed/corrected address and phone number on facesheet? Yes   Assessment information obtained from? Patient   Expected Length of Stay (days) 3   Communicated expected length of stay with patient/caregiver yes   Prior to hospitilization cognitive status: Alert/Oriented   Prior to hospitalization functional status: Independent   Current cognitive status: Alert/Oriented   Current Functional Status: Independent   Facility Arrived From: Home   Able to Return to Prior Arrangements yes   Is patient able to care for self after discharge? Yes   Who are your caregiver(s) and their phone number(s)? Neymar Arrieta () [phone]   Patient's perception of discharge disposition home or selfcare   Readmission Within The Last 30 Days no previous admission in last 30 days   Patient currently being followed by outpatient case management? No   Patient currently receives any other outside agency services? No   Equipment Currently Used at Home none   Do you have any problems affording any of your prescribed medications? No   Is the patient taking medications as prescribed? yes   Does the patient have transportation home? Yes   Transportation  Available car;family or friend will provide   Does the patient receive services at the [facility]? No   Discharge Plan A Home with family   Discharge Plan B Home with family   Patient/Family In Agreement With Plan yes

## 2017-12-21 LAB — LAMOTRIGINE SERPL-MCNC: 10.5 UG/ML (ref 2–15)

## 2017-12-21 PROCEDURE — 20600001 HC STEP DOWN PRIVATE ROOM

## 2017-12-21 PROCEDURE — 99233 SBSQ HOSP IP/OBS HIGH 50: CPT | Mod: ,,, | Performed by: PSYCHIATRY & NEUROLOGY

## 2017-12-21 PROCEDURE — 95951 HC EEG MONITORING/VIDEO RECORD: CPT

## 2017-12-21 PROCEDURE — 95951 PR EEG MONITORING/VIDEORECORD: CPT | Mod: 26,,, | Performed by: PSYCHIATRY & NEUROLOGY

## 2017-12-21 NOTE — SUBJECTIVE & OBJECTIVE
Interval History: Patient denies any acute events overnight.      Current Facility-Administered Medications   Medication Dose Route Frequency Provider Last Rate Last Dose    acetaminophen tablet 650 mg  650 mg Oral Q4H PRN Philly Molina, PA-C        docusate sodium capsule 100 mg  100 mg Oral BID PRN Philly Barmaryanse, PA-C        heparin (porcine) injection 5,000 Units  5,000 Units Subcutaneous Q8H Philly Tylorse, PA-C   5,000 Units at 12/19/17 2216    ondansetron disintegrating tablet 8 mg  8 mg Oral Q8H PRN Philly Barmaryanse, PA-C        ondansetron injection 4 mg  4 mg Intravenous Q8H PRN Philly Barmaryanse, PA-C        sodium chloride 0.9% flush 3 mL  3 mL Intravenous PRN Philly Barmaryanse, PA-C         Continuous Infusions:    Review of Systems   Constitutional: Negative for chills, fatigue and fever.   HENT: Negative for congestion, rhinorrhea and sore throat.    Respiratory: Negative for cough and shortness of breath.    Cardiovascular: Negative for chest pain, palpitations and leg swelling.   Gastrointestinal: Negative for abdominal pain, diarrhea, nausea and vomiting.   Genitourinary: Negative for dysuria and hematuria.   Musculoskeletal: Negative for back pain and gait problem.   Skin: Negative for pallor, rash and wound.   Neurological: Positive for seizures. Negative for dizziness, weakness, numbness and headaches.   Psychiatric/Behavioral: Negative for agitation and confusion.     Objective:     Vital Signs (Most Recent):  Temp: 98.2 °F (36.8 °C) (12/21/17 1558)  Pulse: 76 (12/21/17 1558)  Resp: 18 (12/21/17 1558)  BP: 122/69 (12/21/17 1558)  SpO2: (!) 91 % (12/21/17 1558) Vital Signs (24h Range):  Temp:  [98 °F (36.7 °C)-98.6 °F (37 °C)] 98.2 °F (36.8 °C)  Pulse:  [52-79] 76  Resp:  [16-18] 18  SpO2:  [91 %-95 %] 91 %  BP: (122-144)/(59-82) 122/69     Weight: 108.3 kg (238 lb 12.1 oz)  Body mass index is 38.54 kg/m².    Physical Exam        Physical Exam   Constitutional: She is oriented to person,  place, and time. She appears well-developed and well-nourished. No distress.   HENT:   Head: Normocephalic and atraumatic.   Right Ear: External ear normal.   Left Ear: External ear normal.   Nose: Nose normal.   Mouth/Throat: Oropharynx is clear and moist.   Eyes: Conjunctivae and EOM are normal. Pupils are equal, round, and reactive to light.   Pulmonary/Chest: Effort normal. No respiratory distress.   Neurological: She is alert and oriented to person, place, and time. She has normal strength. She has a normal Finger-Nose-Finger Test.   Reflex Scores:       Brachioradialis reflexes are 2+ on the right side and 2+ on the left side.       Patellar reflexes are 2+ on the right side and 2+ on the left side.  Skin: Skin is warm and dry. She is not diaphoretic. No erythema.   Psychiatric: She has a normal mood and affect. Her speech is normal and behavior is normal. Judgment and thought content normal.       NEUROLOGICAL EXAMINATION:     MENTAL STATUS   Oriented to person, place, and time.   Attention: normal. Concentration: normal.   Speech: speech is normal   Level of consciousness: alert  Knowledge: good.     CRANIAL NERVES     CN III, IV, VI   Pupils are equal, round, and reactive to light.  Extraocular motions are normal.     CN V   Facial sensation intact.     CN VII   Facial expression full, symmetric.     CN VIII   CN VIII normal.     CN IX, X   CN IX normal.   CN X normal.     CN XI   CN XI normal.     CN XII   CN XII normal.     MOTOR EXAM   Muscle bulk: normal  Overall muscle tone: normal    Strength   Strength 5/5 throughout.     REFLEXES     Reflexes   Right brachioradialis: 2+  Left brachioradialis: 2+  Right patellar: 2+  Left patellar: 2+    SENSORY EXAM   Light touch normal.   Proprioception normal.     GAIT AND COORDINATION      Coordination   Finger to nose coordination: normal    Tremor   Resting tremor: absent        Significant Labs:   Recent Lab Results     None        All pertinent lab results  from the past 24 hours have been reviewed.    Significant Studies: I have reviewed all pertinent imaging results/findings within the past 24 hours.

## 2017-12-21 NOTE — ASSESSMENT & PLAN NOTE
46 yo female with 30+ year history of seizures who presented to ED for evaluation for increased frequency of staring spells since started on Breviact as outpatient ~1 month ago. Prior EMU admission did not capture typical events, but did show some L>R temporal sharps now with L anterior temporal spikes, most recorded at F7. None on other side.     - Start VEEG  - Hold lamictal and breviact  - Will require a 3T MRI, PET scan and Neuropsych testing once discharged.   - Seizure precautions  - Activation procedures per protocol - none currently  - Sleep deprive tonight  - IV Ativan PRN for GTC greater than 5 min - hospital medicine to call epilepsy on call before administering

## 2017-12-21 NOTE — PLAN OF CARE
Problem: Patient Care Overview  Goal: Plan of Care Review  Outcome: Ongoing (interventions implemented as appropriate)  POC reviewed with pt, verbalizes understanding. NADN. VSS. No events this shift.

## 2017-12-21 NOTE — PLAN OF CARE
Problem: Patient Care Overview  Goal: Plan of Care Review  Outcome: Ongoing (interventions implemented as appropriate)  POC reviewed with pt. Pt remained free from falls, skin breakdown, and injury. Call light remained within reach and side rails up x3. Neuro checks and vital signs done every 4 hours. Refer to flowsheets for full assessment and VS info. Seizure precautions maintained. EEG monitoring in place. Pt with some jerking leg movements this shift-event button pushed. Pt to be sleep deprived until 2AM. NAD. Will continue to monitor.

## 2017-12-21 NOTE — PROGRESS NOTES
[facility]  Neurology-Epilepsy  Progress Note    Patient Name: Liza Arrieta  MRN: [id number]  Admission Date: 12/19/2017  Hospital Length of Stay: 2 days  Code Status: Full Code   Attending Provider: LISA Elam MD  Primary Care Physician: Rach Nuñez PA-C   Principal Problem:Seizures    Subjective:     Hospital Course:   12/19-12/20- Patient with no events overnight. EEG shows L anterior temporal spikes, most recorded at F7. None on the other side. At times, almost continuous L temporal interictal discharges at times.   12/20- 11:56:23- clinical seizure, starting from L side on EEG. No epileptiform discharges noted. L temporal slowing and spikes noted. Consisted of brief moment of unresponsiveness.   12/21- EEG showing L interical anterior temporal slowing with L temporal spikes. No clinical seizures since yesterday.     Interval History: Patient denies any acute events overnight.      Current Facility-Administered Medications   Medication Dose Route Frequency Provider Last Rate Last Dose    acetaminophen tablet 650 mg  650 mg Oral Q4H PRN Philly Barousse, PA-C        docusate sodium capsule 100 mg  100 mg Oral BID PRN Philly Barmaryanse, PA-C        heparin (porcine) injection 5,000 Units  5,000 Units Subcutaneous Q8H Philly Barousse, PA-C   5,000 Units at 12/19/17 2216    ondansetron disintegrating tablet 8 mg  8 mg Oral Q8H PRN Philly Barousse, PA-C        ondansetron injection 4 mg  4 mg Intravenous Q8H PRN Philly Barmaryanse, PA-C        sodium chloride 0.9% flush 3 mL  3 mL Intravenous PRN Philly Barmaryanse, PA-C         Continuous Infusions:    Review of Systems   Constitutional: Negative for chills, fatigue and fever.   HENT: Negative for congestion, rhinorrhea and sore throat.    Respiratory: Negative for cough and shortness of breath.    Cardiovascular: Negative for chest pain, palpitations and leg swelling.   Gastrointestinal: Negative for abdominal pain, diarrhea, nausea  and vomiting.   Genitourinary: Negative for dysuria and hematuria.   Musculoskeletal: Negative for back pain and gait problem.   Skin: Negative for pallor, rash and wound.   Neurological: Positive for seizures. Negative for dizziness, weakness, numbness and headaches.   Psychiatric/Behavioral: Negative for agitation and confusion.     Objective:     Vital Signs (Most Recent):  Temp: 98.2 °F (36.8 °C) (12/21/17 1558)  Pulse: 76 (12/21/17 1558)  Resp: 18 (12/21/17 1558)  BP: 122/69 (12/21/17 1558)  SpO2: (!) 91 % (12/21/17 1558) Vital Signs (24h Range):  Temp:  [98 °F (36.7 °C)-98.6 °F (37 °C)] 98.2 °F (36.8 °C)  Pulse:  [52-79] 76  Resp:  [16-18] 18  SpO2:  [91 %-95 %] 91 %  BP: (122-144)/(59-82) 122/69     Weight: 108.3 kg (238 lb 12.1 oz)  Body mass index is 38.54 kg/m².    Physical Exam        Physical Exam   Constitutional: She is oriented to person, place, and time. She appears well-developed and well-nourished. No distress.   HENT:   Head: Normocephalic and atraumatic.   Right Ear: External ear normal.   Left Ear: External ear normal.   Nose: Nose normal.   Mouth/Throat: Oropharynx is clear and moist.   Eyes: Conjunctivae and EOM are normal. Pupils are equal, round, and reactive to light.   Pulmonary/Chest: Effort normal. No respiratory distress.   Neurological: She is alert and oriented to person, place, and time. She has normal strength. She has a normal Finger-Nose-Finger Test.   Reflex Scores:       Brachioradialis reflexes are 2+ on the right side and 2+ on the left side.       Patellar reflexes are 2+ on the right side and 2+ on the left side.  Skin: Skin is warm and dry. She is not diaphoretic. No erythema.   Psychiatric: She has a normal mood and affect. Her speech is normal and behavior is normal. Judgment and thought content normal.       NEUROLOGICAL EXAMINATION:     MENTAL STATUS   Oriented to person, place, and time.   Attention: normal. Concentration: normal.   Speech: speech is normal   Level of  consciousness: alert  Knowledge: good.     CRANIAL NERVES     CN III, IV, VI   Pupils are equal, round, and reactive to light.  Extraocular motions are normal.     CN V   Facial sensation intact.     CN VII   Facial expression full, symmetric.     CN VIII   CN VIII normal.     CN IX, X   CN IX normal.   CN X normal.     CN XI   CN XI normal.     CN XII   CN XII normal.     MOTOR EXAM   Muscle bulk: normal  Overall muscle tone: normal    Strength   Strength 5/5 throughout.     REFLEXES     Reflexes   Right brachioradialis: 2+  Left brachioradialis: 2+  Right patellar: 2+  Left patellar: 2+    SENSORY EXAM   Light touch normal.   Proprioception normal.     GAIT AND COORDINATION      Coordination   Finger to nose coordination: normal    Tremor   Resting tremor: absent        Significant Labs:   Recent Lab Results     None        All pertinent lab results from the past 24 hours have been reviewed.    Significant Studies: I have reviewed all pertinent imaging results/findings within the past 24 hours.    Assessment and Plan:     * Seizures    46 yo female with 30+ year history of seizures who presented to ED for evaluation for increased frequency of staring spells since started on Breviact as outpatient ~1 month ago. Prior EMU admission did not capture typical events, but did show some L>R temporal sharps now with L anterior temporal spikes, most recorded at F7. None on other side.     - Start VEEG  - Hold lamictal and breviact  - Will require a 3T MRI, PET scan and Neuropsych testing once discharged.   - Seizure precautions  - Activation procedures per protocol - none currently  - Sleep deprive tonight  - IV Ativan PRN for GTC greater than 5 min - hospital medicine to call epilepsy on call before administering              VTE Risk Mitigation         Ordered     heparin (porcine) injection 5,000 Units  Every 8 hours     Route:  Subcutaneous        12/19/17 1340     Medium Risk of VTE  Once      12/19/17 1340           Sudha Fuentes MD  Neurology-Epilepsy  [facility]

## 2017-12-21 NOTE — PROGRESS NOTES
"Pt pressed event monitor. When this RN entered pt rm, pt was AAOx4. Said she had a "twitch" and this is why she pressed the button. VSS. NONA  "

## 2017-12-22 PROCEDURE — 95951 HC EEG MONITORING/VIDEO RECORD: CPT

## 2017-12-22 PROCEDURE — 95951 PR EEG MONITORING/VIDEORECORD: CPT | Mod: 26,,, | Performed by: PSYCHIATRY & NEUROLOGY

## 2017-12-22 PROCEDURE — 20600001 HC STEP DOWN PRIVATE ROOM

## 2017-12-22 PROCEDURE — 99233 SBSQ HOSP IP/OBS HIGH 50: CPT | Mod: ,,, | Performed by: PSYCHIATRY & NEUROLOGY

## 2017-12-22 NOTE — PROCEDURES
DATE OF PROCEDURE:  12/19/2017     EEG #:  [id number] and [id number].    REQUESTING PHYSICIAN:  Dr. Donovan.    LOCATION OF SERVICE:  [place] [address].    EPILEPSY MONITORING UNIT  EEG/VIDEO TELEMETRY REPORT      METHODOLOGY:  Electroencephalographic (EEG) is recorded with electrodes placed   according to the International 10-20 placement system.  Thirty Two (32) channels   of digital signal, including T1 and T2 electrodes, are simultaneously recorded   from the scalp and may also include EKG, EMG and/or eye movement monitors.    Recording band pass was 0.1 to 512 Hz.  Digital video recording of the patient   is simultaneously recorded with the EEG.  The patient is instructed to report   clinical symptoms which may occur during the recording session.  EEG and video   recording are stored and archived in digital format. Activation procedures,   which include photic stimulation, hyperventilation and instructing patients to   perform simple tasks, are done in selected patients.   The EEG is displayed on a monitor screen and can be reformatted into different   montages for evaluation.  The entire recoding is submitted for computer-assisted   analysis to detect spike and electrographic seizure activity.  The entire   recording is visually reviewed, and the times identified by computer analysis as   being spikes or seizures are reviewed again.  Compressesed spectral analysis   (CSA) is also performed on the activity recorded from each individual channel.    This is displayed as a power display of frequencies from 0 to 30 Hz over time.     The CSA analysis is done and displayed continuously.  This is reviewed for   asymmetries in power between homologous areas of the scalp and for presence of   changes in power which can be seen when seizures occur.  Sections of suspected   abnormalities on the CSA are then compared with the original EEG recording.     Data Stream CBOT software was also utilized in the review of this study.  This software    suite analyzes the EEG recording in multiple domains.  Coherence and rhythmicity   are computed to identify EEG sections which may contain organized seizures.    Each channel undergoes analysis to detect presence of spike and sharp waves   which have special and morphological characteristics of epileptic activity.  The   routine EEG recording is converted from special into frequency domain.  This is   then displayed comparing homologous areas to identify areas of significant   asymmetry.  Algorithm to identify non-cortically generated artifact is used to   separate artifact from the EEG.      RECORDING TIMES:  Start on 12/19/2017 at 15:38.  End on 12/20/2017 at 07:00.    A total of 14 hours and 40 minutes of EEG monitoring was obtained.    SEIZURES/EVENTS RECORDED:  Seizures:  None.    EVENTS:  None.    EEG FINDINGS:  Interictal:  In the waking state, the background was usually a low-voltage   irregular beta seen diffusely.  Intermittently, a low voltage posterior dominant   rhythm of 11 Hz was seen in the occipital, parietal and posterior temporal   regions bilaterally.  Intermittently, irregular theta was noted over the left   temporal region.  This is punctuated at times by sharp wave and spike wave   activity over the left temporal area with the electrodes with maximum amplitude   being the T1 and T3 electrodes.  With sleep, the expected stages and cycles were   noted.  Morphology of the sleep activity was unremarkable.  Throughout the   sleep, particularly in lighter stages, there was a persistence of the sharp wave   and spike wave activity over the left temporal area.    Activation procedures:  Not performed.    Ictal:  There were no clinical behavioral changes nor electrographic buildups   were recorded to indicate the presence of either clinical or electrographic   seizures.    Abnormal EEG.  There are normal background rhythms, but there is fairly frequent   slowing along with sharp waves and spikes  recorded from the left temporal area   with maximal area of involvement of the spikes being the T1 and T3 electrodes.      RR/HN  dd: 12/22/2017 12:06:29 (CST)  td: 12/22/2017 12:41:40 (CST)  Doc ID   #[id number]  Job ID #[id number]    CC:

## 2017-12-22 NOTE — PROCEDURES
DATE OF PROCEDURE:  12/20/2017    EEG NUMBER:  [id number].    EPILEPSY MONITORING UNIT  EEG AND VIDEO TELEMETRY REPORT    METHODOLOGY:  Electroencephalographic (EEG) is recorded with electrodes placed   according to the International 10-20 placement system.  Thirty Two (32) channels   of digital signal, including T1 and T2 electrodes, are simultaneously recorded   from the scalp and may also include EKG, EMG and/or eye movement monitors.    Recording band pass was 0.1 to 512 Hz.  Digital video recording of the patient   is simultaneously recorded with the EEG.  The patient is instructed to report   clinical symptoms which may occur during the recording session.  EEG and video   recording are stored and archived in digital format.  Activation procedures,   which include photic stimulation, hyperventilation and instructing patients to   perform simple tasks, are done in selected patients.    The EEG is displayed on a monitor screen and can be reformatted into different   montages for evaluation.  The entire recoding is submitted for computer-assisted   analysis to detect spike and electrographic seizure activity.  The entire   recording is visually reviewed, and the times identified by computer analysis as   being spikes or seizures are reviewed again.    Compressed spectral analysis (CSA) is also performed on the activity recorded   from each individual channel.  This is displayed as a power display of   frequencies from 0 to 30 Hz over time.  The CSA analysis is done and displayed   continuously.  This is reviewed for asymmetries in power between homologous   areas of the scalp and for presence of changes in power which can be seen when   seizures occur.  Sections of suspected abnormalities on the CSA are then   compared with the original EEG recording.    Abcam software was also utilized in the review of this study.  This software   suite analyzes the EEG recording in multiple domains.  Coherence and  rhythmicity   are computed to identify EEG sections which may contain organized seizures.    Each channel undergoes analysis to detect presence of spike and sharp waves   which have special and morphological characteristics of epileptic activity.  The   routine EEG recording is converted from special into frequency domain.  This is   then displayed comparing homologous areas to identify areas of significant   asymmetry.  Algorithm to identify non-cortically generated artifact is used to   separate artifact from the EEG.    RECORDING TIMES:  Start on 12/20/2017, at 07:00.  End on 12/21/2017, at 07:00.  A total of 24 hours of EEG monitoring was obtained.    SEIZURES AND EVENTS RECORDED:  Seizures:  Seizure 1 started on 12/20/2017, at 11:56:23 and ended at 11:57:42.  Events:  No nonepileptic behavioral events were recorded.    EEG FINDINGS:  Interictal:  In the waking state, the background consisted of a low voltage   mixture of irregular beta frequencies.  Occasionally, a rhythmic low amplitude   11 Hz was noted in the occipital region with some spread in the parietal   posterior temporal region.  The posterior dominant rhythm reacted to eye opening   and eye closure.  Intermittently, slowing was noted over the left   frontotemporal area.  This was punctuated at times with sharp waves as well as   spike wave activity in the same area.  There was also an occasional run of 3 Hz   to 4 Hz theta, which was notched and sharply contoured, also maximal in the   anterior temporal region.  During sleep, the expected stages and cycles were   noted.    ACTIVATION PROCEDURES:  Intermittent photic stimulation resulted in a mild   driving response without provoking pathological discharges.    Hyperventilation was conducted and 45 seconds into the procedure, the patient   had a lapse of awareness and an electrographic seizure was recorded.    Ictal:  A clinical and electrographic seizure was recorded, which began in the   early  portions of performing hyperventilation.  A rhythmic 4 Hz to 5 Hz theta   was seen initially in the left temporal leads with spread soon into the   parasagittal area.  The electrodes of maximal involvement were the T1 and T3   electrodes.  Then, the background flattened followed by a buildup of irregular   theta-delta frequencies seen bilaterally.  After the clinical seizure ceased and   the patient became aware, there persisted diffuse theta-delta over both   hemispheres for several minutes.    CLINICAL DESCRIPTION:  The patient was sitting in bed and was starting to   perform the hyperventilation activation procedure.  She ceased hyperventilating   and after several seconds raised both her hands and gently padded her tummy and   bringing her hands back to the side and then back on her stomach again.    Technologist entered the room and the patient was asked her name and to follow   simple instructions such as to touch her nose and the patient did not respond.    FINAL IMPRESSION:  Classification:  Complex partial seizure.  Lateralization:  Left.  Localization:  Anterior temporal.      RR/IN  dd: 12/22/2017 12:23:20 (CST)  td: 12/22/2017 13:02:12 (CST)  Doc ID   #[id number]  Job ID #[id number]    CC:

## 2017-12-22 NOTE — PROGRESS NOTES
[facility]  Neurology-Epilepsy  Progress Note    Patient Name: Liza Arrieta  MRN: [id number]  Admission Date: 12/19/2017  Hospital Length of Stay: 3 days  Code Status: Full Code   Attending Provider: LISA Elam MD  Primary Care Physician: Rach Nuñez PA-C   Principal Problem:Seizures    Subjective:     Hospital Course:   12/19-12/20- Patient with no events overnight. EEG shows L anterior temporal spikes, most recorded at F7. None on the other side. At times, almost continuous L temporal interictal discharges at times.   12/20- 11:56:23- clinical seizure, starting from L side on EEG. No epileptiform discharges noted. L temporal slowing and spikes noted. Consisted of brief moment of unresponsiveness.   12/21- EEG showing L interical anterior temporal slowing with L temporal spikes. No clinical seizures since yesterday.   12/21-12/22- Event on 12/21 at 18:55 showing patient talking on the phone and suddenly stopping, unable to speak and confused. EEG shows there is a rhythmic buildup in the L temporal chains. Patient is confused and is drowsy following event. No tonic/clonic activity present.     Interval History: 1 clinical seizure last night with EEG correlation localizing to L temporal chains. Patient was sleep deprived overnight.     Current Facility-Administered Medications   Medication Dose Route Frequency Provider Last Rate Last Dose    acetaminophen tablet 650 mg  650 mg Oral Q4H PRN MARCIO Martines-C        docusate sodium capsule 100 mg  100 mg Oral BID PRN MARCIO Martines-KEISHA        heparin (porcine) injection 5,000 Units  5,000 Units Subcutaneous Q8H MARCIO Martines-C   5,000 Units at 12/19/17 2216    ondansetron disintegrating tablet 8 mg  8 mg Oral Q8H PRN Philly Molina, PA-KEISHA        ondansetron injection 4 mg  4 mg Intravenous Q8H PRN Philly Molina, PA-C        sodium chloride 0.9% flush 3 mL  3 mL Intravenous PRN Philly Molina PA-C          Continuous Infusions:    Review of Systems   Constitutional: Negative for chills, fatigue and fever.   HENT: Negative for congestion, rhinorrhea and sore throat.    Respiratory: Negative for cough and shortness of breath.    Cardiovascular: Negative for chest pain, palpitations and leg swelling.   Gastrointestinal: Negative for abdominal pain, diarrhea, nausea and vomiting.   Genitourinary: Negative for dysuria and hematuria.   Musculoskeletal: Negative for back pain and gait problem.   Skin: Negative for pallor, rash and wound.   Neurological: Positive for seizures. Negative for dizziness, weakness, numbness and headaches.   Psychiatric/Behavioral: Negative for agitation and confusion.     Objective:     Vital Signs (Most Recent):  Temp: 97.7 °F (36.5 °C) (12/22/17 1151)  Pulse: 68 (12/22/17 1151)  Resp: 18 (12/22/17 1151)  BP: 126/72 (12/22/17 1151)  SpO2: (!) 94 % (12/22/17 1151) Vital Signs (24h Range):  Temp:  [97 °F (36.1 °C)-98.2 °F (36.8 °C)] 97.7 °F (36.5 °C)  Pulse:  [57-86] 68  Resp:  [16-18] 18  SpO2:  [91 %-95 %] 94 %  BP: (120-144)/(59-84) 126/72     Weight: 108.3 kg (238 lb 12.1 oz)  Body mass index is 38.54 kg/m².    Physical Exam  Constitutional  Well-developed, well-nourished, appears stated age   Ophthalmoscopic  No papilledema with no hemorrhages or exudates bilaterally   Cardiovascular  Radial pulses 2+ and symmetric, no LE edema bilaterally   Neurological    * Mental status      - Orientation  Oriented to person, place, time, and situation     - Memory   Intact recent and remote     - Attention/concentration  Attentive, vigilant during exam     - Language  Naming & repetition intact, +2-step commands     - Fund of knowledge  Aware of current events     - Executive  Well-organized thoughts     - Other     * Cranial nerves       - CN II  PERRL, visual fields full to confrontation     - CN III, IV, VI  Extraocular movements full, normal pursuits and saccades     - CN V  Sensation V1 - V3  intact     - CN VII  Face strong and symmetric bilaterally     - CN VIII  Hearing intact bilaterally     - CN IX, X  Palate raises midline and symmetric     - CN XI  SCM and trapezius 5/5 bilaterally     - CN XII  Tongue midline   * Motor  Muscle bulk normal, strength 5/5 throughout   * Sensory   Intact to temperature and vibration throughout   * Coordination  No dysmetria with finger-to-nose or heel-to-shin   * Gait  See below.   * Deep tendon reflexes  2+ and symmetric throughout   Babinski downgoing bilaterally            Significant Labs:   Recent Lab Results     None        All pertinent lab results from the past 24 hours have been reviewed.    Significant Studies: I have reviewed all pertinent imaging results/findings within the past 24 hours.    Assessment and Plan:     * Seizures    46 yo female with 30+ year history of seizures who presented to ED for evaluation for increased frequency of staring spells since started on Breviact as outpatient ~1 month ago. Prior EMU admission did not capture typical events, but did show some L>R temporal sharps now with L anterior temporal spikes, most recorded at F7. None on other side.     - Start VEEG  - Hold lamictal and breviact  - Will require a 3T MRI, PET scan and Neuropsych testing once discharged.   - Seizure precautions  - Activation procedures per protocol - none currently  - Sleep deprive tonight  - IV Ativan PRN for GTC greater than 5 min - hospital medicine to call epilepsy on call before administering              VTE Risk Mitigation         Ordered     heparin (porcine) injection 5,000 Units  Every 8 hours     Route:  Subcutaneous        12/19/17 1340     Medium Risk of VTE  Once      12/19/17 1340          Sudha Fuentes MD  Neurology-Epilepsy  [facility]

## 2017-12-22 NOTE — SUBJECTIVE & OBJECTIVE
Interval History: 1 clinical seizure last night with EEG correlation localizing to L temporal chains. Patient was sleep deprived overnight.     Current Facility-Administered Medications   Medication Dose Route Frequency Provider Last Rate Last Dose    acetaminophen tablet 650 mg  650 mg Oral Q4H PRN Philly Barousse, PA-C        docusate sodium capsule 100 mg  100 mg Oral BID PRN Philly Barousse, PA-C        heparin (porcine) injection 5,000 Units  5,000 Units Subcutaneous Q8H Philly Barousse, PA-C   5,000 Units at 12/19/17 2216    ondansetron disintegrating tablet 8 mg  8 mg Oral Q8H PRN Philly Barousse, PA-C        ondansetron injection 4 mg  4 mg Intravenous Q8H PRN Philly Barousse, PA-C        sodium chloride 0.9% flush 3 mL  3 mL Intravenous PRN Philly Barousse, PA-C         Continuous Infusions:    Review of Systems   Constitutional: Negative for chills, fatigue and fever.   HENT: Negative for congestion, rhinorrhea and sore throat.    Respiratory: Negative for cough and shortness of breath.    Cardiovascular: Negative for chest pain, palpitations and leg swelling.   Gastrointestinal: Negative for abdominal pain, diarrhea, nausea and vomiting.   Genitourinary: Negative for dysuria and hematuria.   Musculoskeletal: Negative for back pain and gait problem.   Skin: Negative for pallor, rash and wound.   Neurological: Positive for seizures. Negative for dizziness, weakness, numbness and headaches.   Psychiatric/Behavioral: Negative for agitation and confusion.     Objective:     Vital Signs (Most Recent):  Temp: 97.7 °F (36.5 °C) (12/22/17 1151)  Pulse: 68 (12/22/17 1151)  Resp: 18 (12/22/17 1151)  BP: 126/72 (12/22/17 1151)  SpO2: (!) 94 % (12/22/17 1151) Vital Signs (24h Range):  Temp:  [97 °F (36.1 °C)-98.2 °F (36.8 °C)] 97.7 °F (36.5 °C)  Pulse:  [57-86] 68  Resp:  [16-18] 18  SpO2:  [91 %-95 %] 94 %  BP: (120-144)/(59-84) 126/72     Weight: 108.3 kg (238 lb 12.1 oz)  Body mass index is 38.54  kg/m².    Physical Exam  Constitutional  Well-developed, well-nourished, appears stated age   Ophthalmoscopic  No papilledema with no hemorrhages or exudates bilaterally   Cardiovascular  Radial pulses 2+ and symmetric, no LE edema bilaterally   Neurological    * Mental status      - Orientation  Oriented to person, place, time, and situation     - Memory   Intact recent and remote     - Attention/concentration  Attentive, vigilant during exam     - Language  Naming & repetition intact, +2-step commands     - Fund of knowledge  Aware of current events     - Executive  Well-organized thoughts     - Other     * Cranial nerves       - CN II  PERRL, visual fields full to confrontation     - CN III, IV, VI  Extraocular movements full, normal pursuits and saccades     - CN V  Sensation V1 - V3 intact     - CN VII  Face strong and symmetric bilaterally     - CN VIII  Hearing intact bilaterally     - CN IX, X  Palate raises midline and symmetric     - CN XI  SCM and trapezius 5/5 bilaterally     - CN XII  Tongue midline   * Motor  Muscle bulk normal, strength 5/5 throughout   * Sensory   Intact to temperature and vibration throughout   * Coordination  No dysmetria with finger-to-nose or heel-to-shin   * Gait  See below.   * Deep tendon reflexes  2+ and symmetric throughout   Babinski downgoing bilaterally            Significant Labs:   Recent Lab Results     None        All pertinent lab results from the past 24 hours have been reviewed.    Significant Studies: I have reviewed all pertinent imaging results/findings within the past 24 hours.

## 2017-12-22 NOTE — PROCEDURES
DATE OF PROCEDURE:  12/21/2017    EEG #:  [id number].    LOCATION OF SERVICE:  [place] [address].    REQUESTING PHYSICIAN:  Dr. Donovan.    EPILEPSY MONITORING UNIT  EEG/VIDEO TELEMETRY REPORT      METHODOLOGY:   Electroencephalographic (EEG) is recorded with electrodes placed   according to the International 10-20 placement system.  Thirty Two (32) channels   of digital signal, including T1 and T2 electrodes, are simultaneously recorded   from the scalp and may also include EKG, EMG and/or eye movement monitors.    Recording band pass was 0.1 to 512 Hz.  Digital video recording of the patient   is simultaneously recorded with the EEG.  The patient is instructed to report   clinical symptoms which may occur during the recording session.  EEG and video   recording are stored and archived in digital format. Activation procedures,   which include photic stimulation, hyperventilation and instructing patients to   perform simple tasks, are done in selected patients.   The EEG is displayed on a monitor screen and can be reformatted into different   montages for evaluation.  The entire recoding is submitted for computer-assisted   analysis to detect spike and electrographic seizure activity.  The entire   recording is visually reviewed, and the times identified by computer analysis as   being spikes or seizures are reviewed again.  Compressesed spectral analysis   (CSA) is also performed on the activity recorded from each individual channel.    This is displayed as a power display of frequencies from 0 to 30 Hz over time.     The CSA analysis is done and displayed continuously.  This is reviewed for   asymmetries in power between homologous areas of the scalp and for presence of   changes in power which can be seen when seizures occur.  Sections of suspected   abnormalities on the CSA are then compared with the original EEG recording.     [employer] software was also utilized in the review of this study.  This software   suite analyzes  the EEG recording in multiple domains.  Coherence and rhythmicity   are computed to identify EEG sections which may contain organized seizures.    Each channel undergoes analysis to detect presence of spike and sharp waves   which have special and morphological characteristics of epileptic activity.  The   routine EEG recording is converted from special into frequency domain.  This is   then displayed comparing homologous areas to identify areas of significant   asymmetry.  Algorithm to identify non-cortically generated artifact is used to   separate artifact from the EEG.      RECORDING TIMES:  Start on 12/21/2017 at 07:00.  End on 12/22/2017 at 07:00.    A total of 24 hours of EEG monitoring was obtained.    SEIZURES/EVENTS RECORDED:  Seizure 1 on 12/21/2017, started at 18:55:00 and end at 18:58:36.    EEG FINDINGS:  Interictal:  In the waking state, the background in general consisted of a   low-voltage irregular beta mixture.  This was punctuated occasionally by a   fairly rhythmic 11 Hz posterior dominant rhythm seen in the occipital leads with   occasional spread into the parietal posterior temporal area.  Frequently   slowing into the theta and at times delta range was noted over the left temporal   region.  Runs of fairly rhythmic but notched 3 to 4 Hz theta were noted and   this was intermixed at times with well-formed spikes originating from the   anterior to mid temporal area.  During sleep, the expected stages and cycles   were noted.  The biology and cycles of sleep were unremarkable.  There are focal   changes noted in the left temporal region similar to what was seen on the   waking state.    ACTIVATION PROCEDURES:  Intermittent photic stimulation was carried out, which   did not significantly alter the recording.    HYPERVENTILATION:  3 minutes of hyperventilation was performed, which produced   some mild disorganization and slowing, which is little more evident over the   left hemisphere and this  normalized soon after overbreathing ceased.    Ictal:  One clinical and electrographic seizure was recorded.  This began with a   rhythmic buildup of 4 to 5 Hz theta over the left anterior to mid temporal   region.  It became higher amplitude and slowed and then the spread to involve   the entire hemisphere and then crossed over to the other side.  After the   clinical seizure ceased, there continued to be some generalized slowing, which   lasted for several minutes.    CLINICAL DESCRIPTION:  At the onset of the clinical seizure, the patient had   been talking to family and working on her phone.  She became silent and had a   motionless stare.  She apparently had been calling someone who then answered the   phone.  Her family said mom answered the phone, but the patient remained   motionless.  She did look around slightly to the left and then back to mid   position, but did not respond the family's questions and instructions.    FINAL IMPRESSION:  Classification:  Complex partial seizure.  Lateralization:  Left.  Localization:  Anterior temporal.      RR/HN  dd: 12/22/2017 12:57:24 (CST)  td: 12/22/2017 13:39:46 (CST)  Doc ID   #[id number]  Job ID #[id number]    CC:

## 2017-12-23 PROCEDURE — 20600001 HC STEP DOWN PRIVATE ROOM

## 2017-12-23 PROCEDURE — 95951 HC EEG MONITORING/VIDEO RECORD: CPT

## 2017-12-23 PROCEDURE — 95951 PR EEG MONITORING/VIDEORECORD: CPT | Mod: 26,,, | Performed by: PSYCHIATRY & NEUROLOGY

## 2017-12-23 PROCEDURE — 99233 SBSQ HOSP IP/OBS HIGH 50: CPT | Mod: ,,, | Performed by: PSYCHIATRY & NEUROLOGY

## 2017-12-23 NOTE — PLAN OF CARE
Problem: Patient Care Overview  Goal: Plan of Care Review  Outcome: Ongoing (interventions implemented as appropriate)  Plan of care reviewed with patient and family.  Verbalized understanding.  Patient is alert and oriented time 4.  No acute neuro changes noticed.  No episode of seizure.  VSS. Resting comfortably in bed.  Will continue to monitor.

## 2017-12-23 NOTE — PROGRESS NOTES
[facility]  Neurology-Epilepsy  Progress Note    Patient Name: Liza Arrieta  MRN: [id number]  Admission Date: 12/19/2017  Hospital Length of Stay: 4 days  Code Status: Full Code   Attending Provider: LISA Elam MD  Primary Care Physician: Rach Nuñez PA-C   Principal Problem:Seizures    Subjective:     Hospital Course:   12/19-12/20- Patient with no events overnight. EEG shows L anterior temporal spikes, most recorded at F7. None on the other side. At times, almost continuous L temporal interictal discharges at times.   12/20- 11:56:23- clinical seizure, starting from L side on EEG. No epileptiform discharges noted. L temporal slowing and spikes noted. Consisted of brief moment of unresponsiveness.   12/21- EEG showing L interical anterior temporal slowing with L temporal spikes. No clinical seizures since yesterday.   12/21-12/22- Event on 12/21 at 18:55 showing patient talking on the phone and suddenly stopping, unable to speak and confused. EEG shows there is a rhythmic buildup in the L temporal chains. Patient is confused and is drowsy following event. No tonic/clonic activity present.   12/22-12/23- Patient with numerous button pushes at night for jerking prior to falling asleep. EEG did not show any epileptic activity during these times. No additional events captured.     Interval History: Patient denies any acute events overnight.      Current Facility-Administered Medications   Medication Dose Route Frequency Provider Last Rate Last Dose    acetaminophen tablet 650 mg  650 mg Oral Q4H PRN Philly Molina PA-C        docusate sodium capsule 100 mg  100 mg Oral BID PRN Philly Molina PA-C        heparin (porcine) injection 5,000 Units  5,000 Units Subcutaneous Q8H Philly Molina PA-C   5,000 Units at 12/19/17 2216    ondansetron disintegrating tablet 8 mg  8 mg Oral Q8H PRN Philly Molina PA-C        ondansetron injection 4 mg  4 mg Intravenous Q8H PRN Philly  CHACHO Molina        sodium chloride 0.9% flush 3 mL  3 mL Intravenous PRN Philly CHACHO Molina         Continuous Infusions:    Review of Systems   Constitutional: Negative for chills, fatigue and fever.   HENT: Negative for congestion, rhinorrhea and sore throat.    Respiratory: Negative for cough and shortness of breath.    Cardiovascular: Negative for chest pain, palpitations and leg swelling.   Gastrointestinal: Negative for abdominal pain, diarrhea, nausea and vomiting.   Genitourinary: Negative for dysuria and hematuria.   Musculoskeletal: Negative for back pain and gait problem.   Skin: Negative for pallor, rash and wound.   Neurological: Positive for seizures. Negative for dizziness, weakness, numbness and headaches.   Psychiatric/Behavioral: Negative for agitation and confusion.     Objective:     Vital Signs (Most Recent):  Temp: 98.8 °F (37.1 °C) (12/23/17 1129)  Pulse: 69 (12/23/17 1500)  Resp: 18 (12/23/17 1129)  BP: 111/66 (12/23/17 1129)  SpO2: 98 % (12/23/17 1129) Vital Signs (24h Range):  Temp:  [97.2 °F (36.2 °C)-98.8 °F (37.1 °C)] 98.8 °F (37.1 °C)  Pulse:  [56-79] 69  Resp:  [16-20] 18  SpO2:  [94 %-98 %] 98 %  BP: (111-131)/(58-71) 111/66     Weight: 108.3 kg (238 lb 12.1 oz)  Body mass index is 38.54 kg/m².    Physical Exam        Physical Exam  Constitutional  Well-developed, well-nourished, appears stated age   Ophthalmoscopic  No papilledema with no hemorrhages or exudates bilaterally   Cardiovascular  Radial pulses 2+ and symmetric, no LE edema bilaterally   Neurological    * Mental status      - Orientation  Oriented to person, place, time, and situation     - Memory   Intact recent and remote     - Attention/concentration  Attentive, vigilant during exam     - Language  Naming & repetition intact, +2-step commands     - Fund of knowledge  Aware of current events     - Executive  Well-organized thoughts     - Other     * Cranial nerves       - CN II  PERRL, visual fields full to  confrontation     - CN III, IV, VI  Extraocular movements full, normal pursuits and saccades     - CN V  Sensation V1 - V3 intact     - CN VII  Face strong and symmetric bilaterally     - CN VIII  Hearing intact bilaterally     - CN IX, X  Palate raises midline and symmetric     - CN XI  SCM and trapezius 5/5 bilaterally     - CN XII  Tongue midline   * Motor  Muscle bulk normal, strength 5/5 throughout   * Sensory   Intact to temperature and vibration throughout   * Coordination  No dysmetria with finger-to-nose or heel-to-shin   * Gait  See below.   * Deep tendon reflexes  2+ and symmetric throughout   Babinski downgoing bilaterally         Significant Labs:   Recent Lab Results     None        All pertinent lab results from the past 24 hours have been reviewed.    Significant Studies: I have reviewed all pertinent imaging results/findings within the past 24 hours.    Assessment and Plan:     * Seizures    46 yo female with 30+ year history of seizures who presented to ED for evaluation for increased frequency of staring spells since started on Breviact as outpatient ~1 month ago. Prior EMU admission did not capture typical events, but did show some L>R temporal sharps now with L anterior temporal spikes, most recorded at F7. None on other side.     - Start VEEG  - Hold lamictal and breviact  - Will require a 3T MRI, PET scan and Neuropsych testing once discharged.   - Seizure precautions  - Activation procedures per protocol - none currently  - Starting Aptiom 1200 mg daily- bedside supply for 2 weeks delivered to patient. Patient to take 1.5 pills daily.   - Likely discharge tomorrow.   - IV Ativan PRN for GTC greater than 5 min - hospital medicine to call epilepsy on call before administering              VTE Risk Mitigation         Ordered     heparin (porcine) injection 5,000 Units  Every 8 hours     Route:  Subcutaneous        12/19/17 1340     Medium Risk of VTE  Once      12/19/17 1340          Sudha SIDDIQUI  MD Gina  Neurology-Epilepsy  [facility]

## 2017-12-23 NOTE — ASSESSMENT & PLAN NOTE
46 yo female with 30+ year history of seizures who presented to ED for evaluation for increased frequency of staring spells since started on Breviact as outpatient ~1 month ago. Prior EMU admission did not capture typical events, but did show some L>R temporal sharps now with L anterior temporal spikes, most recorded at F7. None on other side.     - Start VEEG  - Hold lamictal and breviact  - Will require a 3T MRI, PET scan and Neuropsych testing once discharged.   - Seizure precautions  - Activation procedures per protocol - none currently  - Starting Aptiom 1200 mg daily- bedside supply for 2 weeks delivered to patient. Patient to take 1.5 pills daily.   - Likely discharge tomorrow.   - IV Ativan PRN for GTC greater than 5 min - hospital medicine to call epilepsy on call before administering

## 2017-12-23 NOTE — SUBJECTIVE & OBJECTIVE
Interval History: Patient denies any acute events overnight.      Current Facility-Administered Medications   Medication Dose Route Frequency Provider Last Rate Last Dose    acetaminophen tablet 650 mg  650 mg Oral Q4H PRN Philly Molina, PA-C        docusate sodium capsule 100 mg  100 mg Oral BID PRN Philly Barmaryanse, PA-C        heparin (porcine) injection 5,000 Units  5,000 Units Subcutaneous Q8H Philly Tylorse, PA-C   5,000 Units at 12/19/17 2216    ondansetron disintegrating tablet 8 mg  8 mg Oral Q8H PRN Philly Barmaryanse, PA-C        ondansetron injection 4 mg  4 mg Intravenous Q8H PRN Philly Barmaryanse, PA-C        sodium chloride 0.9% flush 3 mL  3 mL Intravenous PRN Philly Sniderse, PA-C         Continuous Infusions:    Review of Systems   Constitutional: Negative for chills, fatigue and fever.   HENT: Negative for congestion, rhinorrhea and sore throat.    Respiratory: Negative for cough and shortness of breath.    Cardiovascular: Negative for chest pain, palpitations and leg swelling.   Gastrointestinal: Negative for abdominal pain, diarrhea, nausea and vomiting.   Genitourinary: Negative for dysuria and hematuria.   Musculoskeletal: Negative for back pain and gait problem.   Skin: Negative for pallor, rash and wound.   Neurological: Positive for seizures. Negative for dizziness, weakness, numbness and headaches.   Psychiatric/Behavioral: Negative for agitation and confusion.     Objective:     Vital Signs (Most Recent):  Temp: 98.8 °F (37.1 °C) (12/23/17 1129)  Pulse: 69 (12/23/17 1500)  Resp: 18 (12/23/17 1129)  BP: 111/66 (12/23/17 1129)  SpO2: 98 % (12/23/17 1129) Vital Signs (24h Range):  Temp:  [97.2 °F (36.2 °C)-98.8 °F (37.1 °C)] 98.8 °F (37.1 °C)  Pulse:  [56-79] 69  Resp:  [16-20] 18  SpO2:  [94 %-98 %] 98 %  BP: (111-131)/(58-71) 111/66     Weight: 108.3 kg (238 lb 12.1 oz)  Body mass index is 38.54 kg/m².    Physical Exam        Physical Exam  Constitutional  Well-developed,  well-nourished, appears stated age   Ophthalmoscopic  No papilledema with no hemorrhages or exudates bilaterally   Cardiovascular  Radial pulses 2+ and symmetric, no LE edema bilaterally   Neurological    * Mental status      - Orientation  Oriented to person, place, time, and situation     - Memory   Intact recent and remote     - Attention/concentration  Attentive, vigilant during exam     - Language  Naming & repetition intact, +2-step commands     - Fund of knowledge  Aware of current events     - Executive  Well-organized thoughts     - Other     * Cranial nerves       - CN II  PERRL, visual fields full to confrontation     - CN III, IV, VI  Extraocular movements full, normal pursuits and saccades     - CN V  Sensation V1 - V3 intact     - CN VII  Face strong and symmetric bilaterally     - CN VIII  Hearing intact bilaterally     - CN IX, X  Palate raises midline and symmetric     - CN XI  SCM and trapezius 5/5 bilaterally     - CN XII  Tongue midline   * Motor  Muscle bulk normal, strength 5/5 throughout   * Sensory   Intact to temperature and vibration throughout   * Coordination  No dysmetria with finger-to-nose or heel-to-shin   * Gait  See below.   * Deep tendon reflexes  2+ and symmetric throughout   Babinski downgoing bilaterally         Significant Labs:   Recent Lab Results     None        All pertinent lab results from the past 24 hours have been reviewed.    Significant Studies: I have reviewed all pertinent imaging results/findings within the past 24 hours.

## 2017-12-24 VITALS
OXYGEN SATURATION: 93 % | WEIGHT: 238.75 LBS | TEMPERATURE: 97 F | SYSTOLIC BLOOD PRESSURE: 122 MMHG | HEIGHT: 66 IN | DIASTOLIC BLOOD PRESSURE: 62 MMHG | BODY MASS INDEX: 38.37 KG/M2 | RESPIRATION RATE: 16 BRPM | HEART RATE: 71 BPM

## 2017-12-24 PROBLEM — G40.109 TEMPORAL LOBE EPILEPSY: Status: ACTIVE | Noted: 2017-12-19

## 2017-12-24 PROCEDURE — 95951 PR EEG MONITORING/VIDEORECORD: CPT | Mod: 26,,, | Performed by: PSYCHIATRY & NEUROLOGY

## 2017-12-24 PROCEDURE — 99233 SBSQ HOSP IP/OBS HIGH 50: CPT | Mod: ,,, | Performed by: PSYCHIATRY & NEUROLOGY

## 2017-12-24 PROCEDURE — 25000003 PHARM REV CODE 250: Performed by: PSYCHIATRY & NEUROLOGY

## 2017-12-24 RX ORDER — LAMOTRIGINE 150 MG/1
300 TABLET ORAL 2 TIMES DAILY
Status: DISCONTINUED | OUTPATIENT
Start: 2017-12-24 | End: 2017-12-24 | Stop reason: HOSPADM

## 2017-12-24 RX ORDER — LAMOTRIGINE 150 MG/1
300 TABLET ORAL 2 TIMES DAILY
Status: DISCONTINUED | OUTPATIENT
Start: 2017-12-24 | End: 2017-12-24

## 2017-12-24 RX ORDER — LAMOTRIGINE 200 MG/1
300 TABLET ORAL 2 TIMES DAILY
Qty: 270 TABLET | Refills: 11 | Status: SHIPPED | OUTPATIENT
Start: 2017-12-24 | End: 2018-01-29 | Stop reason: SDUPTHER

## 2017-12-24 RX ADMIN — LAMOTRIGINE 300 MG: 150 TABLET ORAL at 12:12

## 2017-12-24 NOTE — DISCHARGE SUMMARY
"[facility]  Neurology-Epilepsy  Discharge Summary      Patient Name: Liza Arrieta  MRN: [id number]  Admission Date: 12/19/2017  Hospital Length of Stay: 5 days  Discharge Date and Time:  12/24/2017 12:05 PM  Attending Physician: LISA Elam MD   Discharging Provider: Sudha Fuentes MD  Primary Care Physician: Rach Nuñez PA-C    HPI:   48 yo female with significant past medical history of seizure disorder presented to ED today for evaluation of increasing seizure frequency. Patient reports seizures initally diagnosed at age 19, at which time she had 3 GTC. She states she was well controlled on dilantin for approximately 15 years after that, but that events returned as staring spells and have persisted despite multiple medication trials. She endorses an aura of "not feeling good" prior to some of these events, and states the staring spell generally lasts 30 seconds to 1 minute. She reports that looking back on her childhood, she believes she had staring spells at that time as well which were never addressed. She has been following as an outpatient most recently with Dr. Huerta at [facility], and states that since she was started on Breviact 50 mg daily ~1 month ago she has noticed an increased frequency of her staring spells to 7-10 times per week from 2-3 times per week. She reports stress and lack of sleep as possible triggers. Neurology Epilepsy accepted patient for admission to EMU for further characterization of seizures and medication/treatment optimization.    Last clinic visit: Dr. Elam 3/1/16  INTERVAL HISTORY  SINCE EMU discharge last month     Doing better since D/C from EMU.  Feels much better in terms of everyday.    Still having sporadic brief dialeptic type seizures, but less than before.  She does not have calendar.  thinks 1-2 / week. Maybe a little more during periods.  Much less "drugged" feeling off PHT     11/17/2015  New Patient  Pt has been seeing " "Dr Huerta at [facility] for "complex partial sezures." First sz, in school, age 19. Was put on Dilantin and she did well for approx 15 years, until the seizures became active again. Thinks she may have had 2 classic "Grand Mal" seizures in her 20's, but since then, seizure types are those described below.     Currently, she is experiencing more than one event per week. Event type is described below. She has been failing her current treatment regimen as described below. May have tried other meds, but can't remember them now. Did not bring records yet.           Seizure Seminology  Seizure Type 1   Classification: Pass-out  Aura - Occasional auditory мария vu  Pt loses consciousness and falls or loses tone 1-2 in  Post-ictal  Brief  Age of onset 19  Current Seizure Frequency - Several per week        Seizure Type 2  Classification: Complex Partial  Wanders around. Doesn't remember after.   Post-ictal  Brief  Current Seizure Frequency - Several per week           Seizure Triggers  Sleep Deprivation - None  Other medications - None  Psych/stress - None  Photic stimulation - None  Hyperventilation - None  Medical Problems - None  Menses - 3 days before period they get worse  Sensory Stimulation (light, sound, etc) - None  Missed dose of meds - None        AED Treatments  Present regimen   mg BID  Breviact 50 mg BID     Prior treatments  VPA  Aptiom   BID  TPM 10ID     Not tried  acetazolamide (Diamox, AZM)  amantadine  carbamazepine (Tegretol, CBZ)  clobezam (Onfi or Frizium, CLB)  ethosuximide (Zarontin, ESM)  eslicarbazine (Aptiom, ESL)  felbamate (felbatol, FBM)  gabapentin (Neurontin, GPN)  lacosamide (Vimpat, LCS)   levetiracetam (Keppra, LEV)  methsuximide (Celontin, MSM)  methyphenytoin (Mesantion, MHT)  oxcarbazepine (Trileptal OXC)  perampanel (Fycompa, FCP)   phenobarbital (Pb)  pregabalin (Lyrica, PGB)  primidone (Mysoline, PRM)  retigabine (Potiga, RTG)  rufinamide (Banzel, RUF)  tiagabine " (Gabatril, TGB)  viagabatrin, (Sabril, VGB)  vagal nerve stimulator (VNS)  valproic acid (Depakote, VPA)  zonisamide (Zonegran, ZNA)  Benzodiazepines  diazepam - rectal (Diastatl)  diazepam - oral (Valium, DZ)  clonazepam (Klonopin, CZP)  clorazepate (Tranxene, CLZ)  Ativan  Brain Stimulation  Vagal Nerve Stimulation-n/a  DBS- n/a     Compliance method  Memory - yes  Mom or Spouse - Yes  Pill Box - no  [place]ne calendar - no  Turn over medication bottle - no  Phone alarm - no     Seizure Evaluation  EEG Routine - Dont have  MRI/MRA - In past- she doesn't know results  CT/CTA Scan -   PET Scan -   Neuropsychological evaluation -   DEXA Scan     Potential Epilepsy Risk Factors:   Pregnancy/Labor/Delivery - full term uncomplicated pregnancy labor and vaginal delivery  Febrile seizures - none  Head injury - none  CNS infection - none   Stroke - none  Family Hx of Sz - none       * No surgery found *     Indwelling Lines/Drains at time of discharge:   Lines/Drains/Airways          No matching active lines, drains, or airways        Hospital Course:   12/19-12/20- Patient with no events overnight. EEG shows L anterior temporal spikes, most recorded at F7. None on the other side. At times, almost continuous L temporal interictal discharges at times.   12/20- 11:56:23- clinical seizure, starting from L side on EEG. No epileptiform discharges noted. L temporal slowing and spikes noted. Consisted of brief moment of unresponsiveness.   12/21- EEG showing L interical anterior temporal slowing with L temporal spikes. No clinical seizures since yesterday.   12/21-12/22- Event on 12/21 at 18:55 showing patient talking on the phone and suddenly stopping, unable to speak and confused. EEG shows there is a rhythmic buildup in the L temporal chains. Patient is confused and is drowsy following event. No tonic/clonic activity present.   12/22-12/23- Patient with numerous button pushes at night for jerking prior to falling asleep. EEG did  not show any epileptic activity during these times. No additional events captured.   12/23-12/24- Patient with none of her typical events. Has some jerking prior to falling asleep. EEG continues to show L temporal spikes.     Consults:     Significant Labs:   Recent Lab Results     None        All pertinent lab results from the past 24 hours have been reviewed.    Significant Studies: I have reviewed all pertinent imaging results/findings within the past 24 hours.    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  Service: Epilepsy      Discharged Condition: good    Disposition: Home or Self Care    Follow Up:  Follow-up Information     R Luis Elam MD. Schedule an appointment as soon as possible for a visit in 4 weeks.    Specialty:  Neurology  Contact information:  [address] [place] [address] [id number] [phone]                 Patient Instructions:     Activity as tolerated     No driving until:   Order Comments: No driving until cleared by neurology         Medications:  Reconciled Home Medications:   Current Discharge Medication List      START taking these medications    Details   eslicarbazepine 800 mg Tab Take 1,600 mg by mouth once daily.  Qty: 60 tablet, Refills: 6         CONTINUE these medications which have NOT CHANGED    Details   lamotrigine (LAMICTAL) 200 MG tablet TAKE 1 AND 1/2 TABLETS BY MOUTH TWICE DAILY  Qty: 270 tablet, Refills: 11    Comments: **Patient requests 90 days supply**    Patient will take 1.5 pills (1 pill in AM and 1/2 pill in PM)- will leave room to titrate up.          STOP taking these medications       lorazepam (ATIVAN) 1 MG tablet Comments:    Reason for Stopping:             Time spent on the discharge of patient: 45 minutes    Sudha Fuentes MD  Neurology-Epilepsy  [facility]

## 2017-12-24 NOTE — PLAN OF CARE
"Problem: Patient Care Overview  Goal: Plan of Care Review  Outcome: Ongoing (interventions implemented as appropriate)  POC reviewed with patient. Patient began to have "twitching" as she was falling asleep, and would press the event button accordingly. Patient stated that this is normal for her and that it mainly happens when she begins to relax and fall asleep. EEG monitor alarmed during the night while the patient was asleep. AAOx4. VSS.       "

## 2017-12-24 NOTE — PROGRESS NOTES
[facility]  Neurology-Epilepsy  Progress Note    Patient Name: Liza Arrieta  MRN: [id number]  Admission Date: 12/19/2017  Hospital Length of Stay: 5 days  Code Status: Full Code   Attending Provider: LISA Elam MD  Primary Care Physician: Rach Nuñez PA-C   Principal Problem:Temporal lobe epilepsy    Subjective:     Hospital Course:   12/19-12/20- Patient with no events overnight. EEG shows L anterior temporal spikes, most recorded at F7. None on the other side. At times, almost continuous L temporal interictal discharges at times.   12/20- 11:56:23- clinical seizure, starting from L side on EEG. No epileptiform discharges noted. L temporal slowing and spikes noted. Consisted of brief moment of unresponsiveness.   12/21- EEG showing L interical anterior temporal slowing with L temporal spikes. No clinical seizures since yesterday.   12/21-12/22- Event on 12/21 at 18:55 showing patient talking on the phone and suddenly stopping, unable to speak and confused. EEG shows there is a rhythmic buildup in the L temporal chains. Patient is confused and is drowsy following event. No tonic/clonic activity present.   12/22-12/23- Patient with numerous button pushes at night for jerking prior to falling asleep. EEG did not show any epileptic activity during these times. No additional events captured.   12/23-12/24- Patient with none of her typical events. Has some jerking prior to falling asleep. EEG continues to show L temporal spikes.     Interval History: Patient denies any acute events overnight.      Current Facility-Administered Medications   Medication Dose Route Frequency Provider Last Rate Last Dose    acetaminophen tablet 650 mg  650 mg Oral Q4H PRN Philly Molina PA-C        docusate sodium capsule 100 mg  100 mg Oral BID PRN Philly Molina PA-C        heparin (porcine) injection 5,000 Units  5,000 Units Subcutaneous Q8H Philly Molina PA-C   5,000 Units at 12/19/17 4040     lamoTRIgine tablet 300 mg  300 mg Oral BID Sudha Fuentes MD        ondansetron disintegrating tablet 8 mg  8 mg Oral Q8H PRN Philly Molina PA-C        ondansetron injection 4 mg  4 mg Intravenous Q8H PRN Philly Molina PA-C        sodium chloride 0.9% flush 3 mL  3 mL Intravenous PRN Philly Molina PA-C         Continuous Infusions:    Review of Systems   Constitutional: Negative for chills, fatigue and fever.   HENT: Negative for congestion, rhinorrhea and sore throat.    Respiratory: Negative for cough and shortness of breath.    Cardiovascular: Negative for chest pain, palpitations and leg swelling.   Gastrointestinal: Negative for abdominal pain, diarrhea, nausea and vomiting.   Genitourinary: Negative for dysuria and hematuria.   Musculoskeletal: Negative for back pain and gait problem.   Skin: Negative for pallor, rash and wound.   Neurological: Positive for seizures. Negative for dizziness, weakness, numbness and headaches.   Psychiatric/Behavioral: Negative for agitation and confusion.     Objective:     Vital Signs (Most Recent):  Temp: 97.2 °F (36.2 °C) (12/24/17 1147)  Pulse: 71 (12/24/17 1147)  Resp: 16 (12/24/17 1147)  BP: 122/62 (12/24/17 1147)  SpO2: (!) 93 % (12/24/17 1147) Vital Signs (24h Range):  Temp:  [97 °F (36.1 °C)-98.1 °F (36.7 °C)] 97.2 °F (36.2 °C)  Pulse:  [60-83] 71  Resp:  [16-18] 16  SpO2:  [93 %-98 %] 93 %  BP: (112-124)/(62-73) 122/62     Weight: 108.3 kg (238 lb 12.1 oz)  Body mass index is 38.54 kg/m².    Physical Exam    Physical Exam  Constitutional  Well-developed, well-nourished, appears stated age   Ophthalmoscopic  No papilledema with no hemorrhages or exudates bilaterally   Cardiovascular  Radial pulses 2+ and symmetric, no LE edema bilaterally   Neurological    * Mental status      - Orientation  Oriented to person, place, time, and situation     - Memory   Intact recent and remote     - Attention/concentration  Attentive, vigilant during exam     -  Language  Naming & repetition intact, +2-step commands     - Fund of knowledge  Aware of current events     - Executive  Well-organized thoughts     - Other     * Cranial nerves       - CN II  PERRL, visual fields full to confrontation     - CN III, IV, VI  Extraocular movements full, normal pursuits and saccades     - CN V  Sensation V1 - V3 intact     - CN VII  Face strong and symmetric bilaterally     - CN VIII  Hearing intact bilaterally     - CN IX, X  Palate raises midline and symmetric     - CN XI  SCM and trapezius 5/5 bilaterally     - CN XII  Tongue midline   * Motor  Muscle bulk normal, strength 5/5 throughout   * Sensory   Intact to temperature and vibration throughout   * Coordination  No dysmetria with finger-to-nose or heel-to-shin   * Gait  See below.   * Deep tendon reflexes  2+ and symmetric throughout   Babinski downgoing bilaterally            Significant Labs:   Recent Lab Results     None        All pertinent lab results from the past 24 hours have been reviewed.    Significant Studies: I have reviewed all pertinent imaging results/findings within the past 24 hours.    Assessment and Plan:     * Temporal lobe epilepsy    48 yo female with 30+ year history of seizures who presented to ED for evaluation for increased frequency of staring spells since started on Breviact as outpatient ~1 month ago. Prior EMU admission did not capture typical events, but did show some L>R temporal sharps now with L anterior temporal spikes, most recorded at F7. None on other side. Medically refractory temporal lobe epilepsy.     - d/c EEG  - Will require a 3T MRI, PET scan and Neuropsych testing once discharged.   - Seizure precautions  - Activation procedures per protocol - none currently  - Starting Aptiom 1200 mg daily- bedside supply for 2 weeks delivered to patient. Patient to take 1.5 pills daily.   - aptiom 1200 mg (1.5 pills) right now prior to discharge.  - restart lamictal at home dose              VTE  Risk Mitigation         Ordered     heparin (porcine) injection 5,000 Units  Every 8 hours     Route:  Subcutaneous        12/19/17 1340     Medium Risk of VTE  Once      12/19/17 1340          Sudha Fuentes MD  Neurology-Epilepsy  [facility]

## 2017-12-24 NOTE — ASSESSMENT & PLAN NOTE
46 yo female with 30+ year history of seizures who presented to ED for evaluation for increased frequency of staring spells since started on Breviact as outpatient ~1 month ago. Prior EMU admission did not capture typical events, but did show some L>R temporal sharps now with L anterior temporal spikes, most recorded at F7. None on other side. Medically refractory temporal lobe epilepsy.     - d/c EEG  - Will require a 3T MRI, PET scan and Neuropsych testing once discharged.   - Seizure precautions  - Activation procedures per protocol - none currently  - Starting Aptiom 1200 mg daily- bedside supply for 2 weeks delivered to patient. Patient to take 1.5 pills daily.   - aptiom 1200 mg (1.5 pills) right now prior to discharge.  - restart lamictal at home dose

## 2017-12-24 NOTE — SUBJECTIVE & OBJECTIVE
Interval History: Patient denies any acute events overnight.      Current Facility-Administered Medications   Medication Dose Route Frequency Provider Last Rate Last Dose    acetaminophen tablet 650 mg  650 mg Oral Q4H PRN Philly Barousse, PA-C        docusate sodium capsule 100 mg  100 mg Oral BID PRN Philly Barousse, PA-C        heparin (porcine) injection 5,000 Units  5,000 Units Subcutaneous Q8H Philly Barousse, PA-C   5,000 Units at 12/19/17 2216    lamoTRIgine tablet 300 mg  300 mg Oral BID Sudha Fuentes MD        ondansetron disintegrating tablet 8 mg  8 mg Oral Q8H PRN Philly Barousse, PA-C        ondansetron injection 4 mg  4 mg Intravenous Q8H PRN Philly Barousse, PA-C        sodium chloride 0.9% flush 3 mL  3 mL Intravenous PRN Philly Barousse, PA-C         Continuous Infusions:    Review of Systems   Constitutional: Negative for chills, fatigue and fever.   HENT: Negative for congestion, rhinorrhea and sore throat.    Respiratory: Negative for cough and shortness of breath.    Cardiovascular: Negative for chest pain, palpitations and leg swelling.   Gastrointestinal: Negative for abdominal pain, diarrhea, nausea and vomiting.   Genitourinary: Negative for dysuria and hematuria.   Musculoskeletal: Negative for back pain and gait problem.   Skin: Negative for pallor, rash and wound.   Neurological: Positive for seizures. Negative for dizziness, weakness, numbness and headaches.   Psychiatric/Behavioral: Negative for agitation and confusion.     Objective:     Vital Signs (Most Recent):  Temp: 97.2 °F (36.2 °C) (12/24/17 1147)  Pulse: 71 (12/24/17 1147)  Resp: 16 (12/24/17 1147)  BP: 122/62 (12/24/17 1147)  SpO2: (!) 93 % (12/24/17 1147) Vital Signs (24h Range):  Temp:  [97 °F (36.1 °C)-98.1 °F (36.7 °C)] 97.2 °F (36.2 °C)  Pulse:  [60-83] 71  Resp:  [16-18] 16  SpO2:  [93 %-98 %] 93 %  BP: (112-124)/(62-73) 122/62     Weight: 108.3 kg (238 lb 12.1 oz)  Body mass index is 38.54  kg/m².    Physical Exam    Physical Exam  Constitutional  Well-developed, well-nourished, appears stated age   Ophthalmoscopic  No papilledema with no hemorrhages or exudates bilaterally   Cardiovascular  Radial pulses 2+ and symmetric, no LE edema bilaterally   Neurological    * Mental status      - Orientation  Oriented to person, place, time, and situation     - Memory   Intact recent and remote     - Attention/concentration  Attentive, vigilant during exam     - Language  Naming & repetition intact, +2-step commands     - Fund of knowledge  Aware of current events     - Executive  Well-organized thoughts     - Other     * Cranial nerves       - CN II  PERRL, visual fields full to confrontation     - CN III, IV, VI  Extraocular movements full, normal pursuits and saccades     - CN V  Sensation V1 - V3 intact     - CN VII  Face strong and symmetric bilaterally     - CN VIII  Hearing intact bilaterally     - CN IX, X  Palate raises midline and symmetric     - CN XI  SCM and trapezius 5/5 bilaterally     - CN XII  Tongue midline   * Motor  Muscle bulk normal, strength 5/5 throughout   * Sensory   Intact to temperature and vibration throughout   * Coordination  No dysmetria with finger-to-nose or heel-to-shin   * Gait  See below.   * Deep tendon reflexes  2+ and symmetric throughout   Babinski downgoing bilaterally            Significant Labs:   Recent Lab Results     None        All pertinent lab results from the past 24 hours have been reviewed.    Significant Studies: I have reviewed all pertinent imaging results/findings within the past 24 hours.

## 2017-12-26 ENCOUNTER — TELEPHONE (OUTPATIENT)
Dept: NEUROLOGY | Facility: CLINIC | Age: 47
End: 2017-12-26

## 2017-12-26 NOTE — PLAN OF CARE
12/26/17 0702   Final Note   Assessment Type Final Discharge Note   Discharge Disposition Home     Patient is discharged to home. Patient's family will provide transportation home. No discharge needs.

## 2017-12-26 NOTE — TELEPHONE ENCOUNTER
"----- Message from Sydney Norris sent at 12/26/2017 12:42 PM CST -----  Contact: PT  Pt called regarding reaction to med eslicarbazepine 800 mg Tab  Has problem urinating,upset stomach, unable to focus, pt stated feel "intoxicated"+  Call back [phone]  "

## 2017-12-26 NOTE — TELEPHONE ENCOUNTER
----- Message from [place] sent at 12/26/2017  9:04 AM CST -----  Contact: Patient @ [phone]  Caller is requesting a return call about a reaction to the medication (APTIOM ), she's experiencing  headaches, dizziness not focusing as normal, upset stomach, changes in urinating, pls call

## 2017-12-29 ENCOUNTER — TELEPHONE (OUTPATIENT)
Dept: PHARMACY | Facility: CLINIC | Age: 47
End: 2017-12-29

## 2018-01-29 ENCOUNTER — OFFICE VISIT (OUTPATIENT)
Dept: NEUROLOGY | Facility: CLINIC | Age: 48
End: 2018-01-29
Payer: COMMERCIAL

## 2018-01-29 ENCOUNTER — TELEPHONE (OUTPATIENT)
Dept: NEUROLOGY | Facility: CLINIC | Age: 48
End: 2018-01-29

## 2018-01-29 VITALS
SYSTOLIC BLOOD PRESSURE: 124 MMHG | HEART RATE: 76 BPM | HEIGHT: 66 IN | BODY MASS INDEX: 38.58 KG/M2 | DIASTOLIC BLOOD PRESSURE: 73 MMHG | WEIGHT: 240.06 LBS

## 2018-01-29 DIAGNOSIS — G40.109 TEMPORAL LOBE EPILEPSY: Primary | ICD-10-CM

## 2018-01-29 DIAGNOSIS — R56.9 SEIZURES: Primary | ICD-10-CM

## 2018-01-29 PROBLEM — G40.119 TEMPORAL LOBE EPILEPSY, INTRACTABLE: Status: ACTIVE | Noted: 2017-12-19

## 2018-01-29 PROCEDURE — 99999 PR PBB SHADOW E&M-EST. PATIENT-LVL II: CPT | Mod: PBBFAC,,, | Performed by: PSYCHIATRY & NEUROLOGY

## 2018-01-29 PROCEDURE — 99214 OFFICE O/P EST MOD 30 MIN: CPT | Mod: S$GLB,,, | Performed by: PSYCHIATRY & NEUROLOGY

## 2018-01-29 RX ORDER — LAMOTRIGINE 200 MG/1
300 TABLET ORAL 2 TIMES DAILY
Qty: 270 TABLET | Refills: 11 | Status: SHIPPED | OUTPATIENT
Start: 2018-01-29 | End: 2019-01-14 | Stop reason: SDUPTHER

## 2018-01-29 NOTE — PROGRESS NOTES
"Name: Liza Arrieta  MRN: [id number]   CSN: [id number]      Date: 01/29/2018    HISTORY OF PRESENT ILLNESS (HPI)  11/17/2015  The patient is a 47 y.o. yo RHWM   The patient was initially referred for consultation by Dr. Huerta.   The patient was unaccompanied today.     Clinic Visits  Interim History  2018/01/29  EMU evaluation was completed in Dec and L temporal interictal spikes were recorded and one electrographic seizure was recorded arising from the L anterior temporal area.  Besides frequent seizures her major complaint is progressive memory loss.      HISTORY OF PRESENT ILLNESS (HPI)  Date: The   New Patient  Pt has been seeing Dr Huerta at [facility] for "complex partial sezures." First sz, in school, age 19. Was put on Dilantin and she did well for approx 15 years, until the seizures became active again. Thinks she may have had 2 classic "Grand Mal" seizures in her 20's, but since then, seizure types are those described below.    Currently, she is experiencing more than one event per week. Event type is described below. She has been failing her current treatment regimen as described below. May have tried other meds, but can't remember them now. Did not bring records yet.        Seizure Seminology  Seizure Type 1   Classification: Pass-out  Aura - Occasional auditory мария vu  Pt loses consciousness and falls or loses tone 1-2 in  Post-ictal  Brief  Age of onset 19  Current Seizure Frequency - Several per week      Seizure Type 2  Classification: Complex Partial  Wanders around. Doesn't remember after.   Post-ictal  Brief  Current Seizure Frequency - Several per week    Seizure Triggers  Sleep Deprivation - None  Other medications - None  Psych/stress - None  Photic stimulation - None  Hyperventilation - None  Medical Problems - None  Menses - 3 days before period they get worse  Sensory Stimulation (light, sound, etc) - None  Missed dose of meds - None    AED Treatments  Present regimen   tabs 1 in AM and " 1½ in PM     Prior treatments  VPA  eslicarbazine (Aptiom, ESL) - seizure intensity worsened after 2 weeks Rx   BID  TPM 10ID    Not tried  acetazolamide (Diamox, AZM)  amantadine  carbamazepine (Tegretol, CBZ)  clobezam (Onfi or Frizium, CLB)  ethosuximide (Zarontin, ESM)  felbamate (felbatol, FBM)  gabapentin (Neurontin, GPN)  lacosamide (Vimpat, LCS)   levetiracetam (Keppra, LEV)  methsuximide (Celontin, MSM)  methyphenytoin (Mesantion, MHT)  oxcarbazepine (Trileptal OXC)  perampanel (Fycompa, FCP)   phenobarbital (Pb)  pregabalin (Lyrica, PGB)  primidone (Mysoline, PRM)  retigabine (Potiga, RTG)  rufinamide (Banzel, RUF)  tiagabine (Gabatril, TGB)  viagabatrin, (Sabril, VGB)  vagal nerve stimulator (VNS)  valproic acid (Depakote, VPA)  zonisamide (Zonegran, ZNA)  Benzodiazepines  diazepam - rectal (Diastatl)  diazepam - oral (Valium, DZ)  clonazepam (Klonopin, CZP)  clorazepate (Tranxene, CLZ)  Ativan  Brain Stimulation  Vagal Nerve Stimulation-n/a  DBS- n/a    Compliance method  Memory - yes  Mom or Spouse - Yes  Pill Box - no  Dewayne calendar - no  Turn over medication bottle - no  Phone alarm - no    Seizure Evaluation  EEG Routine - Dont have  MRI/MRA - In past- she doesn't know results  [facility] eval  2017/12/19-12/20- Patient with no events overnight. EEG shows L anterior temporal spikes, most recorded at F7. None on the other side. At times, almost continuous L temporal interictal discharges at times.   2017/12/20- 11:56:23- clinical seizure, starting from L side on EEG. No epileptiform discharges noted. L temporal slowing and spikes noted. Consisted of brief moment of unresponsiveness.   2017/12/21- EEG showing L interical anterior temporal slowing with L temporal spikes. No clinical seizures since yesterday.   2017/12/21-12/22- Event on 12/21 at 18:55 showing patient talking on the phone and suddenly stopping, unable to speak and confused. EEG shows there is a rhythmic buildup in the L temporal chains.  Patient is confused and is drowsy following event. No tonic/clonic activity present.     CT/CTA Scan -   PET Scan -   Neuropsychological evaluation -   DEXA Scan    Potential Epilepsy Risk Factors:   Pregnancy/Labor/Delivery - full term uncomplicated pregnancy labor and vaginal delivery  Febrile seizures - none  Head injury - none  CNS infection - none   Stroke - none  Family Hx of Sz - none    PAST MEDICAL HISTORY:   Active Ambulatory Problems     Diagnosis  Date Noted      No Active Ambulatory Problems    Resolved Ambulatory Problems     Diagnosis  Date Noted      No Resolved Ambulatory Problems    No Additional Past Medical History         PAST SURGICAL HISTORY: No past surgical history on file.     FAMILY HISTORY: No family history on file.      SOCIAL HISTORY:    Social History    History    Social History      Marital Status:        Spouse Name:  N/A      Number of Children:  N/A      Years of Education:  N/A    Occupational History      Not on file.    Social History Main Topics      Smoking status:  Never Smoker      Smokeless tobacco:  Not on file      Alcohol Use:  No      Drug Use:  No      Sexual Activity:  Not on file    Other Topics  Concern      Not on file    Social History Narrative      No narrative on file            SUBSTANCE USE:  Social History    Social History Main Topics      Smoking status:  Never Smoker      Smokeless tobacco:  Not on file      Alcohol Use:  No      Drug Use:  No      Sexual Activity:  Not on file       History    Substance Use Topics      Smoking status:  Never Smoker      Smokeless tobacco:  Not on file      Alcohol Use:  No         ALLERGIES: Review of patient's allergies indicates no known allergies.       Review of Systems   Constitutional: Negative for fever, chills, weight loss, malaise/fatigue and diaphoresis.   HENT: Negative for ear pain, hearing loss, nosebleeds and tinnitus.   Eyes: Negative for blurred vision, double vision,  "photophobia and pain.   Respiratory: Negative for cough, hemoptysis and shortness of breath.   Cardiovascular: Negative for chest pain, palpitations, orthopnea and leg swelling.   Gastrointestinal: Negative for heartburn, nausea, vomiting, abdominal pain, diarrhea, constipation and blood in stool.   Genitourinary: Negative for dysuria and hematuria.   Musculoskeletal: Negative for myalgias, joint pain and falls.   Skin: Negative for itching and rash.   Neurological: Positive for tingling, sensory change and seizures. Negative for dizziness, tremors, speech change, focal weakness, loss of consciousness, weakness and headaches.   Endo/Heme/Allergies: Negative for environmental allergies. Does not bruise/bleed easily.   Psychiatric/Behavioral: Positive for memory loss. Negative for depression, hallucinations and substance abuse. The patient has insomnia. The patient is not nervous/anxious.       /80 mmHg  Pulse 69  Ht 5' 6" (1.676 m)  Wt 110.1 kg (242 lb 11.6 oz)  BMI 39.20 kg/m2      Neurologic Exam      Higher Cortical Function:   Patient is a well developed, pleasant, well groomed individual appearing their stated age  Oriented - intact to person, place and time and followed two step instruction correctly.   Fund of knowledge was appropriate.   Language - Speech was fluent without evidence for an aphasia.    Throat: no aphthous ulcers noted in mouth, no erythema or enlargement of tonsils, pharynx is clear without inflammation   Lymph nodes: No enlargement of lymph nodes    Cranial Nerves II - XII:   EOMs were intact with normal smooth and no nystagmus.   PERRLA. D/C Funduscopic exam - disc were flat with normal A/V ratio and no exudates or hemorrhages. Visual fields were full to confrontation.   Motor - facial movement was symmetrical and normal.   Facial sensory - Light touch and pin prick sensations were normal.   Hearing was normal to finger rub.  Palate moved well and was symmetrical with normal " "palatal and oral sensation.   Tongue movement was full & the patient could say "la la la" and "Ka Ka Ka" without  difficulty. Patient repeated Protestant and Protestant without difficulty. Normal power and bulk was found in the massiter and rotator muscles of the neck.  Motor: Power, bulk and tone were normal in all extremities.  Sensory: Light touch, pin prick, vibration and position senses were normal in all extremities.   Coordination:   Rapid alternating movements and rapid finger tapping - normal.   Finger to nose - nl.   Arm roll - symmetrical.   Gait: Station, gait and tandem walking were done without difficulty and Romberg was negative.    Deep tendon reflexes:   Reflex  L  R    Bicpets  2+  2+    Tricepts  2+  2+    Brachio-radialis  2+  2+    Knee  2+  2+    Ankle  2+  2+    Babinski  No  No      Tremor: resting, postural, intentional - none    Pulses   Peripheral - strong and symmetrical   IMPRESSION  1.  Uncontrolled spells. Possibly complex partial seizures with secondary generalization versus non-epileptic phenomena.   2.  She has failed multiple medications and may be a candidate for epilepsy surgery if a focal onset is discovered.  3. Recent vEEG study revealed Left spikes but we were unable to record typical seizures  4. She feels better overall in terms of daily life and seizures are slightly less on less meds (Off PHT now)      DISPOSITION:   1. Continue LTG to 200mg 1 am and 1½ in pm  2.   MRI and PETT have been ordered.  They will be scheduled  3. Neuropsychological testing.    4. Start Zonisamide 100 mg and taper up to 500 mg QD   5. Readmit to EMU to record further seizures.     "

## 2018-01-30 ENCOUNTER — PATIENT MESSAGE (OUTPATIENT)
Dept: NEUROLOGY | Facility: CLINIC | Age: 48
End: 2018-01-30

## 2018-01-31 NOTE — PROCEDURES
DATE OF PROCEDURE:  12/23/2017    EEG #:  [id number].    REQUESTING PHYSICIAN:  Dr. Donovan.    LOCATION OF SERVICE:  [place] [address].    EPILEPSY MONITORING UNIT  EEG/VIDEO TELEMETRY REPORT      METHODOLOGY:   Electroencephalographic (EEG) is recorded with electrodes placed   according to the International 10-20 placement system.  Thirty Two (32) channels   of digital signal, including T1 and T2 electrodes, are simultaneously recorded   from the scalp and may also include EKG, EMG and/or eye movement monitors.    Recording band pass was 0.1 to 512 Hz.  Digital video recording of the patient   is simultaneously recorded with the EEG.  The patient is instructed to report   clinical symptoms which may occur during the recording session.  EEG and video   recording are stored and archived in digital format. Activation procedures,   which include photic stimulation, hyperventilation and instructing patients to   perform simple tasks, are done in selected patients.   The EEG is displayed on a monitor screen and can be reformatted into different   montages for evaluation.  The entire recoding is submitted for computer-assisted   analysis to detect spike and electrographic seizure activity.  The entire   recording is visually reviewed, and the times identified by computer analysis as   being spikes or seizures are reviewed again.  Compressesed spectral analysis   (CSA) is also performed on the activity recorded from each individual channel.    This is displayed as a power display of frequencies from 0 to 30 Hz over time.     The CSA analysis is done and displayed continuously.  This is reviewed for   asymmetries in power between homologous areas of the scalp and for presence of   changes in power which can be seen when seizures occur.  Sections of suspected   abnormalities on the CSA are then compared with the original EEG recording.     [employer] software was also utilized in the review of this study.  This software   suite  analyzes the EEG recording in multiple domains.  Coherence and rhythmicity   are computed to identify EEG sections which may contain organized seizures.    Each channel undergoes analysis to detect presence of spike and sharp waves   which have special and morphological characteristics of epileptic activity.  The   routine EEG recording is converted from special into frequency domain.  This is   then displayed comparing homologous areas to identify areas of significant   asymmetry.  Algorithm to identify non-cortically generated artifact is used to   separate artifact from the EEG.      RECORDING TIMES:  Start on 12/23/2017 at 07:00.  End on 12/24/2017 at 07:00.    A total of 24 hours of EEG monitoring was obtained.    EVENTS/SEIZURES RECORDED:  Seizures:  None.    Events:  None.    EEG FINDINGS:  Interictal:  In the waking state, there is low amplitude posterior dominant   rhythm of 9 to 10 Hz seen in the occipital, parietal and posterior temporal   regions bilaterally.  Irregular beta was present diffusely.  There was frequent   slowing predominantly into the mid theta range noted over the left   frontotemporal area.  There were runs of 2 to 5 seconds of notched 4 Hz theta   with the sharp component maximally in the anterior to mid temporal region.  At   other times, sharp waves or spikes were noted in the same distribution.  With   sleep, expected stages and cycles were noted.  The runs of notched theta along   with sharp waves were noted in the same region during sleep.  No rhythmic   buildups were recorded to indicate subclinical or clinical seizure.    ACTIVATION PROCEDURES:  1.  Intermittent photic stimulation:  Not performed.  2.  Hyperventilation:  Not performed.  3.  Ictal:  There were no behavioral or clinical symptoms to suggest the   occurrence of a clinical seizure.    FINAL IMPRESSION:  Posterior dominant rhythm is normal, but there is frequent   slowing noted over the left temporal region with the  sharp waves, spikes and   runs of notched theta noted in the same region.  This is only recorded on the   left and none from the right.  This indicates the location of an irritative   process in that area.      RR/HN  dd: 01/31/2018 14:33:22 (CST)  td: 01/31/2018 14:51:26 (CST)  Doc ID   #[id number]  Job ID #[id number]    CC:

## 2018-01-31 NOTE — PROCEDURES
DATE OF PROCEDURE:  12/24/2017     EEG #:  [id number].    REQUESTING PHYSICIAN:  Dr. Donovan.    LOCATION OF SERVICE:  [place] [address].    EPILEPSY MONITORING UNIT  EEG/VIDEO TELEMETRY REPORT      METHODOLOGY:   Electroencephalographic (EEG) is recorded with electrodes placed   according to the International 10-20 placement system.  Thirty Two (32) channels   of digital signal, including T1 and T2 electrodes, are simultaneously recorded   from the scalp and may also include EKG, EMG and/or eye movement monitors.    Recording band pass was 0.1 to 512 Hz.  Digital video recording of the patient   is simultaneously recorded with the EEG.  The patient is instructed to report   clinical symptoms which may occur during the recording session.  EEG and video   recording are stored and archived in digital format. Activation procedures,   which include photic stimulation, hyperventilation and instructing patients to   perform simple tasks, are done in selected patients.   The EEG is displayed on a monitor screen and can be reformatted into different   montages for evaluation.  The entire recoding is submitted for computer-assisted   analysis to detect spike and electrographic seizure activity.  The entire   recording is visually reviewed, and the times identified by computer analysis as   being spikes or seizures are reviewed again.  Compressesed spectral analysis   (CSA) is also performed on the activity recorded from each individual channel.    This is displayed as a power display of frequencies from 0 to 30 Hz over time.     The CSA analysis is done and displayed continuously.  This is reviewed for   asymmetries in power between homologous areas of the scalp and for presence of   changes in power which can be seen when seizures occur.  Sections of suspected   abnormalities on the CSA are then compared with the original EEG recording.     [employer] software was also utilized in the review of this study.  This software   suite  analyzes the EEG recording in multiple domains.  Coherence and rhythmicity   are computed to identify EEG sections which may contain organized seizures.    Each channel undergoes analysis to detect presence of spike and sharp waves   which have special and morphological characteristics of epileptic activity.  The   routine EEG recording is converted from special into frequency domain.  This is   then displayed comparing homologous areas to identify areas of significant   asymmetry.  Algorithm to identify non-cortically generated artifact is used to   separate artifact from the EEG.      RECORDING TIMES:  Start on 12/24/2017 at 07:00.  End on 12/24/2017 at 09:35.    A total of 2 hours and 35 minutes of EEG monitoring was obtained.    SEIZURES/EVENTS RECORDED:  None.    EEG FINDINGS:  Interictal:  The patient was asleep during the recording.  Background was a   diffuse mixture of theta and beta frequencies.  It was punctuated by occasional   bursts of higher amplitude vertex maximum delta.  It was also frequently   punctuated by runs of notched 4 to 5 Hz theta, which at times also had some 1 to   2 Hz delta intermixed and this was localized to the left hemisphere, maximum in   the anterior to mid temporal region.  Occasional sharp wave or spike was seen   in the same location.    ACTIVATION PROCEDURES:  1.  Hyperventilation:  Not performed.  2.  Intermittent photic stimulation:  Not performed.  3.  Ictal:  No clinical or electrographic changes were noted suggesting   recurrence of seizures.    IMPRESSION:  Abnormal EEG with normal background with slowing over the left   temporal region with intermixed runs of notched 4 Hz theta, sharp waves and   occasional spike wave indicative of a very active irritative process in the left   temporal region.      RR/HN  dd: 01/31/2018 14:43:59 (CST)  td: 01/31/2018 15:02:27 (CST)  Doc ID   #[id number]  Job ID #[id number]    CC:

## 2018-01-31 NOTE — PROCEDURES
DATE OF PROCEDURE:  12/22/2017    EEG #:  [id number]    REQUESTING PHYSICIAN:  Dr. Donovan    LOCATION OF SERVICE:  [place] [address]    EPILEPSY MONITORING UNIT  EEG/VIDEO TELEMETRY REPORT      METHODOLOGY:   Electroencephalographic (EEG) is recorded with electrodes placed   according to the International 10-20 placement system.  Thirty Two (32) channels   of digital signal, including T1 and T2 electrodes, are simultaneously recorded   from the scalp and may also include EKG, EMG and/or eye movement monitors.    Recording band pass was 0.1 to 512 Hz.  Digital video recording of the patient   is simultaneously recorded with the EEG.  The patient is instructed to report   clinical symptoms which may occur during the recording session.  EEG and video   recording are stored and archived in digital format. Activation procedures,   which include photic stimulation, hyperventilation and instructing patients to   perform simple tasks, are done in selected patients.   The EEG is displayed on a monitor screen and can be reformatted into different   montages for evaluation.  The entire recoding is submitted for computer-assisted   analysis to detect spike and electrographic seizure activity.  The entire   recording is visually reviewed, and the times identified by computer analysis as   being spikes or seizures are reviewed again.  Compressesed spectral analysis   (CSA) is also performed on the activity recorded from each individual channel.    This is displayed as a power display of frequencies from 0 to 30 Hz over time.     The CSA analysis is done and displayed continuously.  This is reviewed for   asymmetries in power between homologous areas of the scalp and for presence of   changes in power which can be seen when seizures occur.  Sections of suspected   abnormalities on the CSA are then compared with the original EEG recording.     [employer] software was also utilized in the review of this study.  This software   suite analyzes  the EEG recording in multiple domains.  Coherence and rhythmicity   are computed to identify EEG sections which may contain organized seizures.    Each channel undergoes analysis to detect presence of spike and sharp waves   which have special and morphological characteristics of epileptic activity.  The   routine EEG recording is converted from special into frequency domain.  This is   then displayed comparing homologous areas to identify areas of significant   asymmetry.  Algorithm to identify non-cortically generated artifact is used to   separate artifact from the EEG.      RECORDING TIMES:  Start on 01/22/2017 at 07:00.  End on 01/23/2017 at 07:00.    A total of 24 hours of EEG monitoring was obtained.    SEIZURES/EVENTS RECORDED:  Seizure 1:  On 12/22/2017, start at 10:39:47 and end at 10:40:29.    Events:  None.    EEG FINDINGS:  Interictal:  In the waking state, a fairly rhythmic, but low-amplitude 10 to 11   Hz posterior dominant rhythm was seen in the occipital, parietal and posterior   temporal regions bilaterally.  Irregular low-voltage beta was present diffusely.    A fairly frequently slowing in the mid theta range was noted in the left   frontal temporal area.  This was intermixed at times with sharp waves and   spikes, which were maximal in the anterior temporal region.  During sleep, the   expected stages and cycles were noted and the morphology of the sleep activity   was normal.  There was, however, some lateralized slowing noted in the left   frontotemporal area during light sleep.  Sharp waves and spikes are continued to   be recorded from the same region during sleep.    ACTIVATION PROCEDURES:  1.  Three minutes of hyperventilation was carried out, which did not   significantly alter the tracing.  2.  Intermittent photic stimulation  was carried out and produced a moderate   driving response without provoking pathological discharges.  3.  Ictal:  There is minimal electrographic changes noted  when the patient   appeared to be symptomatic.  There is a slight increase in the theta frequencies   noted over the left anterior temporal region.  There are no clear rhythmic   buildups or spread to other cortical areas.    CLINICAL MANIFESTATIONS:  The patient was talking to the EEG technologist.  She   began to have trouble forming her words and appeared to not understand what the   technologist was saying to her.  At the end of the clinical seizure, she then began responding normally.    IMPRESSION:  Abnormal EEG with interictal spikes and slowing noted in the left   anterior temporal area.  One episode of possible seizure in which there are   intermittent minimal electrographic changes with the patient having word-finding   difficulties.      RR/HN  dd: 01/31/2018 13:46:30 (CST)  td: 01/31/2018 14:31:32 (CST)  Doc ID   #[id number]  Job ID #[id number]    CC:

## 2018-02-02 ENCOUNTER — HOSPITAL ENCOUNTER (OUTPATIENT)
Dept: RADIOLOGY | Facility: HOSPITAL | Age: 48
Discharge: HOME | End: 2018-02-02
Attending: PSYCHIATRY & NEUROLOGY
Payer: COMMERCIAL

## 2018-02-02 DIAGNOSIS — G40.109 TEMPORAL LOBE EPILEPSY: ICD-10-CM

## 2018-02-02 PROCEDURE — 25500020 PHARM REV CODE 255: Performed by: PSYCHIATRY & NEUROLOGY

## 2018-02-02 PROCEDURE — 70553 MRI BRAIN STEM W/O & W/DYE: CPT | Mod: 26,,, | Performed by: RADIOLOGY

## 2018-02-02 PROCEDURE — 70553 MRI BRAIN STEM W/O & W/DYE: CPT | Mod: TC

## 2018-02-02 PROCEDURE — A9585 GADOBUTROL INJECTION: HCPCS | Performed by: PSYCHIATRY & NEUROLOGY

## 2018-02-02 RX ORDER — GADOBUTROL 604.72 MG/ML
10 INJECTION INTRAVENOUS
Status: COMPLETED | OUTPATIENT
Start: 2018-02-02 | End: 2018-02-02

## 2018-02-02 RX ADMIN — GADOBUTROL 10 ML: 604.72 INJECTION INTRAVENOUS at 08:02

## 2018-02-06 ENCOUNTER — HOSPITAL ENCOUNTER (OUTPATIENT)
Dept: RADIOLOGY | Facility: HOSPITAL | Age: 48
Discharge: HOME | DRG: 101 | End: 2018-02-06
Attending: PSYCHIATRY & NEUROLOGY
Payer: COMMERCIAL

## 2018-02-06 ENCOUNTER — PATIENT MESSAGE (OUTPATIENT)
Dept: NEUROLOGY | Facility: CLINIC | Age: 48
End: 2018-02-06

## 2018-02-06 DIAGNOSIS — G40.109 TEMPORAL LOBE EPILEPSY: ICD-10-CM

## 2018-02-06 LAB — POCT GLUCOSE: 90 MG/DL (ref 70–110)

## 2018-02-06 PROCEDURE — 78608 BRAIN IMAGING (PET): CPT | Mod: TC

## 2018-02-06 PROCEDURE — 78608 BRAIN IMAGING (PET): CPT | Mod: 26,PI,, | Performed by: RADIOLOGY

## 2018-02-08 ENCOUNTER — TELEPHONE (OUTPATIENT)
Dept: NEUROLOGY | Facility: CLINIC | Age: 48
End: 2018-02-08

## 2018-02-08 ENCOUNTER — HOSPITAL ENCOUNTER (INPATIENT)
Facility: HOSPITAL | Age: 48
LOS: 4 days | Discharge: HOME | DRG: 101 | End: 2018-02-12
Attending: PSYCHIATRY & NEUROLOGY | Admitting: PSYCHIATRY & NEUROLOGY
Payer: COMMERCIAL

## 2018-02-08 DIAGNOSIS — R56.9 SEIZURES: ICD-10-CM

## 2018-02-08 DIAGNOSIS — G40.119 TEMPORAL LOBE EPILEPSY, INTRACTABLE: Primary | ICD-10-CM

## 2018-02-08 DIAGNOSIS — R56.9 CONVULSIONS, UNSPECIFIED CONVULSION TYPE: ICD-10-CM

## 2018-02-08 DIAGNOSIS — G40.219 COMPLEX PARTIAL EPILEPSY WITH GENERALIZATION AND WITH INTRACTABLE EPILEPSY: ICD-10-CM

## 2018-02-08 LAB
ALBUMIN SERPL BCP-MCNC: 3.4 G/DL
ALP SERPL-CCNC: 129 U/L
ALT SERPL W/O P-5'-P-CCNC: 31 U/L
AMPHET+METHAMPHET UR QL: NEGATIVE
ANION GAP SERPL CALC-SCNC: 8 MMOL/L
AST SERPL-CCNC: 28 U/L
BACTERIA #/AREA URNS AUTO: ABNORMAL /HPF
BARBITURATES UR QL SCN>200 NG/ML: NEGATIVE
BASOPHILS # BLD AUTO: 0.01 K/UL
BASOPHILS NFR BLD: 0.1 %
BENZODIAZ UR QL SCN>200 NG/ML: NEGATIVE
BILIRUB SERPL-MCNC: 0.3 MG/DL
BILIRUB UR QL STRIP: NEGATIVE
BUN SERPL-MCNC: 12 MG/DL
BZE UR QL SCN: NEGATIVE
CALCIUM SERPL-MCNC: 9.7 MG/DL
CANNABINOIDS UR QL SCN: NEGATIVE
CHLORIDE SERPL-SCNC: 106 MMOL/L
CLARITY UR REFRACT.AUTO: ABNORMAL
CO2 SERPL-SCNC: 27 MMOL/L
COLOR UR AUTO: YELLOW
CREAT SERPL-MCNC: 0.9 MG/DL
CREAT UR-MCNC: 85 MG/DL
DIFFERENTIAL METHOD: ABNORMAL
EOSINOPHIL # BLD AUTO: 0.1 K/UL
EOSINOPHIL NFR BLD: 1.5 %
ERYTHROCYTE [DISTWIDTH] IN BLOOD BY AUTOMATED COUNT: 14.3 %
EST. GFR  (AFRICAN AMERICAN): >60 ML/MIN/1.73 M^2
EST. GFR  (NON AFRICAN AMERICAN): >60 ML/MIN/1.73 M^2
GLUCOSE SERPL-MCNC: 102 MG/DL
GLUCOSE UR QL STRIP: NEGATIVE
HCT VFR BLD AUTO: 37.2 %
HGB BLD-MCNC: 12 G/DL
HGB UR QL STRIP: NEGATIVE
IMM GRANULOCYTES # BLD AUTO: 0.02 K/UL
IMM GRANULOCYTES NFR BLD AUTO: 0.3 %
KETONES UR QL STRIP: NEGATIVE
LEUKOCYTE ESTERASE UR QL STRIP: ABNORMAL
LYMPHOCYTES # BLD AUTO: 2.1 K/UL
LYMPHOCYTES NFR BLD: 31.3 %
MCH RBC QN AUTO: 24.8 PG
MCHC RBC AUTO-ENTMCNC: 32.3 G/DL
MCV RBC AUTO: 77 FL
METHADONE UR QL SCN>300 NG/ML: NEGATIVE
MICROSCOPIC COMMENT: ABNORMAL
MONOCYTES # BLD AUTO: 0.4 K/UL
MONOCYTES NFR BLD: 6.4 %
NEUTROPHILS # BLD AUTO: 4.1 K/UL
NEUTROPHILS NFR BLD: 60.4 %
NITRITE UR QL STRIP: NEGATIVE
NRBC BLD-RTO: 0 /100 WBC
OPIATES UR QL SCN: NEGATIVE
PCP UR QL SCN>25 NG/ML: NEGATIVE
PH UR STRIP: 7 [PH] (ref 5–8)
PLATELET # BLD AUTO: 252 K/UL
PMV BLD AUTO: 9.3 FL
POTASSIUM SERPL-SCNC: 4.3 MMOL/L
PROT SERPL-MCNC: 7.5 G/DL
PROT UR QL STRIP: NEGATIVE
RBC # BLD AUTO: 4.84 M/UL
RBC #/AREA URNS AUTO: 1 /HPF (ref 0–4)
SODIUM SERPL-SCNC: 141 MMOL/L
SP GR UR STRIP: 1.01 (ref 1–1.03)
SQUAMOUS #/AREA URNS AUTO: 2 /HPF
TOXICOLOGY INFORMATION: NORMAL
URN SPEC COLLECT METH UR: ABNORMAL
UROBILINOGEN UR STRIP-ACNC: NEGATIVE EU/DL
WBC # BLD AUTO: 6.74 K/UL
WBC #/AREA URNS AUTO: 7 /HPF (ref 0–5)

## 2018-02-08 PROCEDURE — 80053 COMPREHEN METABOLIC PANEL: CPT

## 2018-02-08 PROCEDURE — 20600001 HC STEP DOWN PRIVATE ROOM

## 2018-02-08 PROCEDURE — 36415 COLL VENOUS BLD VENIPUNCTURE: CPT

## 2018-02-08 PROCEDURE — 99223 1ST HOSP IP/OBS HIGH 75: CPT | Mod: ,,, | Performed by: PSYCHIATRY & NEUROLOGY

## 2018-02-08 PROCEDURE — 80175 DRUG SCREEN QUAN LAMOTRIGINE: CPT

## 2018-02-08 PROCEDURE — 85025 COMPLETE CBC W/AUTO DIFF WBC: CPT

## 2018-02-08 PROCEDURE — 95951 HC EEG MONITORING/VIDEO RECORD: CPT

## 2018-02-08 PROCEDURE — 81001 URINALYSIS AUTO W/SCOPE: CPT

## 2018-02-08 PROCEDURE — 80307 DRUG TEST PRSMV CHEM ANLYZR: CPT

## 2018-02-08 RX ORDER — DIAZEPAM 5 MG/ML
5 INJECTION, SOLUTION INTRAMUSCULAR; INTRAVENOUS
Status: DISCONTINUED | OUTPATIENT
Start: 2018-02-08 | End: 2018-02-12 | Stop reason: HOSPADM

## 2018-02-08 RX ORDER — ONDANSETRON 8 MG/1
8 TABLET, ORALLY DISINTEGRATING ORAL EVERY 8 HOURS PRN
Status: DISCONTINUED | OUTPATIENT
Start: 2018-02-08 | End: 2018-02-12 | Stop reason: HOSPADM

## 2018-02-08 RX ORDER — DOCUSATE SODIUM 100 MG/1
100 CAPSULE, LIQUID FILLED ORAL 2 TIMES DAILY
Status: DISCONTINUED | OUTPATIENT
Start: 2018-02-08 | End: 2018-02-08

## 2018-02-08 RX ORDER — ENOXAPARIN SODIUM 100 MG/ML
40 INJECTION SUBCUTANEOUS EVERY 24 HOURS
Status: DISCONTINUED | OUTPATIENT
Start: 2018-02-09 | End: 2018-02-12 | Stop reason: HOSPADM

## 2018-02-08 RX ORDER — SODIUM CHLORIDE 0.9 % (FLUSH) 0.9 %
3 SYRINGE (ML) INJECTION
Status: DISCONTINUED | OUTPATIENT
Start: 2018-02-08 | End: 2018-02-12 | Stop reason: HOSPADM

## 2018-02-08 RX ORDER — ACETAMINOPHEN 325 MG/1
650 TABLET ORAL EVERY 4 HOURS PRN
Status: DISCONTINUED | OUTPATIENT
Start: 2018-02-08 | End: 2018-02-12 | Stop reason: HOSPADM

## 2018-02-08 NOTE — HOSPITAL COURSE
2/8/2018: EMU admission   2/9/2018: No events since admission. EEG showed TIRDA and left temporal sharps at multiple occasions   2/10/2018:  3 L temporal seizures, bilateral temporal sharps  2/11/2018:  6 L temporal seizures, bilateral temporal sharps  2/11/2018:  She reported 3-4 events since yesterday. On EEG: bilateral temporal sharps predominantly on left side.

## 2018-02-08 NOTE — TELEPHONE ENCOUNTER
I received a message from patient asking how much longer the bed wait would be. She messaged that she is having seizures. I called her and she was totally coherent, and she said there is no need to go to ER at this time. She said her  is with her and she has the mild seizures very often. I explained that there is a bed available but the current patient was getting discharged and the bed would need to be cleaned, taking at least another hour. She said she is comfortable in the lobby for now and her  would make sure she goes to ER if her episodes became a concern.

## 2018-02-08 NOTE — TELEPHONE ENCOUNTER
Pt is in clinic lobby with  and 2 children. He states she is having one of her typical staring and confusion seizures. She is standing at the registration desk. She was able to walk to chair and sit down. She again does not wish to go to ER for admit. Respirations are regular and gait is steady. EEG monitor tech took her to room for EEG placment. Moisés-charge nurse was notified of her location. Patient and family updated that bed would be available within 30 minutes.

## 2018-02-08 NOTE — ASSESSMENT & PLAN NOTE
Assessment   1.         Uncontrolled spells. Possibly complex partial seizures with secondary generalization versus non-epileptic phenomena.   2.         She has failed multiple medications and may be a candidate for epilepsy surgery if a focal onset is discovered.  3. EMU admission for seizure characterization     Plan  - Start VEEG  - Hold AEDs  - Activation procedures per protocol daily   - IV Valium PRN for GTC greater than 5 min - hospital medicine to call epilepsy on call before administering    - Continue home medications   - Correct lytes as needed / control infection / avoid hypoxia or hypercapnia   - Case discussed with Dr Prince

## 2018-02-08 NOTE — SUBJECTIVE & OBJECTIVE
Past Medical History:   Diagnosis Date    Seizures        Past Surgical History:   Procedure Laterality Date    APPENDECTOMY       SECTION      CHOLECYSTECTOMY      HYSTERECTOMY       Review of patient's allergies indicates:  No Known Allergies    No current facility-administered medications on file prior to encounter.      Current Outpatient Prescriptions on File Prior to Encounter   Medication Sig    lamoTRIgine (LAMICTAL) 200 MG tablet Take 1.5 tablets (300 mg total) by mouth 2 (two) times daily.     Continuous Infusions:    Family History     None        Social History Main Topics    Smoking status: Never Smoker    Smokeless tobacco: Never Used    Alcohol use No    Drug use: No    Sexual activity: Not on file     Review of Systems   Constitutional: Negative for activity change, appetite change, chills, fatigue and fever.   HENT: Negative for postnasal drip, sinus pain, sinus pressure and trouble swallowing.    Eyes: Negative for discharge and itching.   Respiratory: Negative for apnea, cough and shortness of breath.    Cardiovascular: Negative for chest pain and palpitations.   Gastrointestinal: Negative for abdominal pain, constipation, diarrhea and nausea.   Endocrine: Negative for cold intolerance and heat intolerance.   Genitourinary: Negative for flank pain and frequency.   Musculoskeletal: Negative for arthralgias, back pain, gait problem and neck pain.   Skin: Negative for color change and pallor.   Neurological: Positive for seizures. Negative for dizziness, facial asymmetry, speech difficulty, weakness and headaches.   Psychiatric/Behavioral: Negative for agitation, behavioral problems and confusion.     Objective:     Vital Signs (Most Recent):    Vital Signs (24h Range):        Weight: 108.9 kg (240 lb 1.3 oz)  Body mass index is 38.75 kg/m².    Physical Exam   Constitutional: She is oriented to person, place, and time. She appears well-developed and well-nourished.   HENT:   Head:  Normocephalic and atraumatic.   Eyes: EOM are normal. Pupils are equal, round, and reactive to light.   Neck: Normal range of motion. Neck supple.   Cardiovascular: Normal rate, regular rhythm and normal heart sounds.    Pulmonary/Chest: Effort normal and breath sounds normal.   Abdominal: Soft. Bowel sounds are normal.   Musculoskeletal: Normal range of motion.   Neurological: She is oriented to person, place, and time. She has a normal Finger-Nose-Finger Test and a normal Heel to Shin Test. Gait normal.   Reflex Scores:       Tricep reflexes are 2+ on the right side and 2+ on the left side.       Bicep reflexes are 2+ on the right side and 2+ on the left side.       Brachioradialis reflexes are 2+ on the right side and 2+ on the left side.       Patellar reflexes are 2+ on the right side and 2+ on the left side.       Achilles reflexes are 2+ on the right side and 2+ on the left side.  Skin: Skin is warm.   Psychiatric: She has a normal mood and affect. Her speech is normal and behavior is normal.       NEUROLOGICAL EXAMINATION:     MENTAL STATUS   Oriented to person, place, and time.   Attention: normal.   Speech: speech is normal   Level of consciousness: alert  Knowledge: good.     CRANIAL NERVES     CN II   Visual fields full to confrontation.     CN III, IV, VI   Pupils are equal, round, and reactive to light.  Extraocular motions are normal.   CN III: no CN III palsy  CN VI: no CN VI palsy  Nystagmus: none   Diplopia: none  Ophthalmoparesis: none    CN V   Facial sensation intact.     CN VII   Facial expression full, symmetric.     CN VIII   CN VIII normal.     CN IX, X   CN IX normal.   CN X normal.     CN XI   CN XI normal.     CN XII   CN XII normal.     MOTOR EXAM   Muscle bulk: normal  Overall muscle tone: normal    Strength   Right neck flexion: 5/5  Left neck flexion: 5/5  Right neck extension: 5/5  Left neck extension: 5/5  Right deltoid: 5/5  Left deltoid: 5/5  Right biceps: 5/5  Left biceps:  5/5  Right triceps: 5/5  Left triceps: 5/5  Right wrist flexion: 5/5  Left wrist flexion: 5/5  Right wrist extension: 5  Left wrist extension:   Right interossei:   Left interossei:   Right abdominals: 55  Left abdominals:   Right iliopsoas:   Left iliopsoas:   Right quadriceps:   Left quadriceps:   Right hamstrin/5  Left hamstrin/5  Right glutei:   Left glutei:   Right anterior tibial:   Left anterior tibial:   Right posterior tibial:   Left posterior tibial:   Right peroneal:   Left peroneal:   Right gastroc:   Left gastroc:     REFLEXES     Reflexes   Right brachioradialis: 2+  Left brachioradialis: 2+  Right biceps: 2+  Left biceps: 2+  Right triceps: 2+  Left triceps: 2+  Right patellar: 2+  Left patellar: 2+  Right achilles: 2+  Left achilles: 2+  Right plantar: normal  Left plantar: normal    SENSORY EXAM   Light touch normal.   Proprioception normal.     GAIT AND COORDINATION     Gait  Gait: normal     Coordination   Finger to nose coordination: normal  Heel to shin coordination: normal    Tremor   Resting tremor: absent      Significant Labs: CBC: No results for input(s): WBC, HGB, HCT, PLT in the last 48 hours.  CMP: No results for input(s): GLU, NA, K, CL, CO2, BUN, CREATININE, CALCIUM, MG, PROT, ALBUMIN, BILITOT, ALKPHOS, AST, ALT, ANIONGAP, EGFRNONAA in the last 48 hours.    Significant Studies: I have reviewed and interpreted all pertinent imaging results/findings within the past 24 hours.

## 2018-02-08 NOTE — HPI
"Interval History:   She reported having 2-4 events every day. She had one event while sitting in the lobby for EMU admission.    HISTORY OF PRESENT ILLNESS (HPI)  11/17/2015  The patient is a 47 y.o. yo RHWM   The patient was initially referred for consultation by Dr. Huerta.   The patient was unaccompanied today.      Clinic Visits  Interim History  2018/01/29  EMU evaluation was completed in Dec and L temporal interictal spikes were recorded and one electrographic seizure was recorded arising from the L anterior temporal area.  Besides frequent seizures her major complaint is progressive memory loss.       HISTORY OF PRESENT ILLNESS (HPI)  Date: The   New Patient  Pt has been seeing Dr Huerta at [facility] for "complex partial sezures." First sz, in school, age 19. Was put on Dilantin and she did well for approx 15 years, until the seizures became active again. Thinks she may have had 2 classic "Grand Mal" seizures in her 20's, but since then, seizure types are those described below.     Currently, she is experiencing more than one event per week. Event type is described below. She has been failing her current treatment regimen as described below. May have tried other meds, but can't remember them now. Did not bring records yet.           Seizure Seminology  Seizure Type 1   Classification: Pass-out  Aura - Occasional auditory мария vu  Pt loses consciousness and falls or loses tone 1-2 in  Post-ictal  Brief  Age of onset 19  Current Seizure Frequency - Several per week        Seizure Type 2  Classification: Complex Partial  Wanders around. Doesn't remember after.   Post-ictal  Brief  Current Seizure Frequency - Several per week     Seizure Triggers  Sleep Deprivation - None  Other medications - None  Psych/stress - None  Photic stimulation - None  Hyperventilation - None  Medical Problems - None  Menses - 3 days before period they get worse  Sensory Stimulation (light, sound, etc) - None  Missed dose of meds - " None     AED Treatments  Present regimen   tabs 1 in AM and 1½ in PM      Prior treatments  VPA  eslicarbazine (Aptiom, ESL) - seizure intensity worsened after 2 weeks Rx   BID  TPM 10ID     Not tried  acetazolamide (Diamox, AZM)  amantadine  carbamazepine (Tegretol, CBZ)  clobezam (Onfi or Frizium, CLB)  ethosuximide (Zarontin, ESM)  felbamate (felbatol, FBM)  gabapentin (Neurontin, GPN)  lacosamide (Vimpat, LCS)   levetiracetam (Keppra, LEV)  methsuximide (Celontin, MSM)  methyphenytoin (Mesantion, MHT)  oxcarbazepine (Trileptal OXC)  perampanel (Fycompa, FCP)   phenobarbital (Pb)  pregabalin (Lyrica, PGB)  primidone (Mysoline, PRM)  retigabine (Potiga, RTG)  rufinamide (Banzel, RUF)  tiagabine (Gabatril, TGB)  viagabatrin, (Sabril, VGB)  vagal nerve stimulator (VNS)  valproic acid (Depakote, VPA)  zonisamide (Zonegran, ZNA)  Benzodiazepines  diazepam - rectal (Diastatl)  diazepam - oral (Valium, DZ)  clonazepam (Klonopin, CZP)  clorazepate (Tranxene, CLZ)  Ativan  Brain Stimulation  Vagal Nerve Stimulation-n/a  DBS- n/a     Compliance method  Memory - yes  Mom or Spouse - Yes  Pill Box - no  Dewayne calendar - no  Turn over medication bottle - no  Phone alarm - no     Seizure Evaluation  EEG Routine - Dont have  MRI/MRA - In past- she doesn't know results  [facility]  2017/12/19-12/20- Patient with no events overnight. EEG shows L anterior temporal spikes, most recorded at F7. None on the other side. At times, almost continuous L temporal interictal discharges at times.   2017/12/20- 11:56:23- clinical seizure, starting from L side on EEG. No epileptiform discharges noted. L temporal slowing and spikes noted. Consisted of brief moment of unresponsiveness.   2017/12/21- EEG showing L interical anterior temporal slowing with L temporal spikes. No clinical seizures since yesterday.   2017/12/21-12/22- Event on 12/21 at 18:55 showing patient talking on the phone and suddenly stopping, unable to speak and  confused. EEG shows there is a rhythmic buildup in the L temporal chains. Patient is confused and is drowsy following event. No tonic/clonic activity present.      CT/CTA Scan -   PET Scan -   Neuropsychological evaluation -   DEXA Scan     Potential Epilepsy Risk Factors:   Pregnancy/Labor/Delivery - full term uncomplicated pregnancy labor and vaginal delivery  Febrile seizures - none  Head injury - none  CNS infection - none   Stroke - none  Family Hx of Sz - none

## 2018-02-08 NOTE — H&P
"[facility]  Neurology-Epilepsy  History & Physical    Patient Name: Liza Arrieta  MRN: [id number]   Admission Date: (Not on file)  Code Status: Full Code   Attending Provider: Scar Prince MD   Primary Care Physician: Primary Doctor No  Principal Problem:<principal problem not specified>    Subjective:     Chief Complaint:  Seizure      Interval History:   She reported having 2-4 events every day. She had one event while sitting in the lobby for EMU admission.    HISTORY OF PRESENT ILLNESS (HPI)  11/17/2015  The patient is a 47 y.o. yo RHWM   The patient was initially referred for consultation by Dr. Huerta.   The patient was unaccompanied today.      Clinic Visits  Interim History  2018/01/29  EMU evaluation was completed in Dec and L temporal interictal spikes were recorded and one electrographic seizure was recorded arising from the L anterior temporal area.  Besides frequent seizures her major complaint is progressive memory loss.       HISTORY OF PRESENT ILLNESS (HPI)  Date: The   New Patient  Pt has been seeing Dr Huerta at [facility] for "complex partial sezures." First sz, in school, age 19. Was put on Dilantin and she did well for approx 15 years, until the seizures became active again. Thinks she may have had 2 classic "Grand Mal" seizures in her 20's, but since then, seizure types are those described below.     Currently, she is experiencing more than one event per week. Event type is described below. She has been failing her current treatment regimen as described below. May have tried other meds, but can't remember them now. Did not bring records yet.           Seizure Seminology  Seizure Type 1   Classification: Pass-out  Aura - Occasional auditory мария vu  Pt loses consciousness and falls or loses tone 1-2 in  Post-ictal  Brief  Age of onset 19  Current Seizure Frequency - Several per week        Seizure Type 2  Classification: Complex Partial  Wanders around. Doesn't remember " after.   Post-ictal  Brief  Current Seizure Frequency - Several per week     Seizure Triggers  Sleep Deprivation - None  Other medications - None  Psych/stress - None  Photic stimulation - None  Hyperventilation - None  Medical Problems - None  Menses - 3 days before period they get worse  Sensory Stimulation (light, sound, etc) - None  Missed dose of meds - None     AED Treatments  Present regimen   tabs 1 in AM and 1½ in PM      Prior treatments  VPA  eslicarbazine (Aptiom, ESL) - seizure intensity worsened after 2 weeks Rx   BID  TPM 10ID     Not tried  acetazolamide (Diamox, AZM)  amantadine  carbamazepine (Tegretol, CBZ)  clobezam (Onfi or Frizium, CLB)  ethosuximide (Zarontin, ESM)  felbamate (felbatol, FBM)  gabapentin (Neurontin, GPN)  lacosamide (Vimpat, LCS)   levetiracetam (Keppra, LEV)  methsuximide (Celontin, MSM)  methyphenytoin (Mesantion, MHT)  oxcarbazepine (Trileptal OXC)  perampanel (Fycompa, FCP)   phenobarbital (Pb)  pregabalin (Lyrica, PGB)  primidone (Mysoline, PRM)  retigabine (Potiga, RTG)  rufinamide (Banzel, RUF)  tiagabine (Gabatril, TGB)  viagabatrin, (Sabril, VGB)  vagal nerve stimulator (VNS)  valproic acid (Depakote, VPA)  zonisamide (Zonegran, ZNA)  Benzodiazepines  diazepam - rectal (Diastatl)  diazepam - oral (Valium, DZ)  clonazepam (Klonopin, CZP)  clorazepate (Tranxene, CLZ)  Ativan  Brain Stimulation  Vagal Nerve Stimulation-n/a  DBS- n/a     Compliance method  Memory - yes  Mom or Spouse - Yes  Pill Box - no  Dewayne calendar - no  Turn over medication bottle - no  Phone alarm - no     Seizure Evaluation  EEG Routine - Dont have  MRI/MRA - In past- she doesn't know results  [facility]  2017/12/19-12/20- Patient with no events overnight. EEG shows L anterior temporal spikes, most recorded at F7. None on the other side. At times, almost continuous L temporal interictal discharges at times.   2017/12/20- 11:56:23- clinical seizure, starting from L side on EEG. No  epileptiform discharges noted. L temporal slowing and spikes noted. Consisted of brief moment of unresponsiveness.   - EEG showing L interical anterior temporal slowing with L temporal spikes. No clinical seizures since yesterday.   -- Event on  at 18:55 showing patient talking on the phone and suddenly stopping, unable to speak and confused. EEG shows there is a rhythmic buildup in the L temporal chains. Patient is confused and is drowsy following event. No tonic/clonic activity present.      CT/CTA Scan -   PET Scan -   Neuropsychological evaluation -   DEXA Scan     Potential Epilepsy Risk Factors:   Pregnancy/Labor/Delivery - full term uncomplicated pregnancy labor and vaginal delivery  Febrile seizures - none  Head injury - none  CNS infection - none   Stroke - none  Family Hx of Sz - none    Past Medical History:   Diagnosis Date    Seizures        Past Surgical History:   Procedure Laterality Date    APPENDECTOMY       SECTION      CHOLECYSTECTOMY      HYSTERECTOMY       Review of patient's allergies indicates:  No Known Allergies    No current facility-administered medications on file prior to encounter.      Current Outpatient Prescriptions on File Prior to Encounter   Medication Sig    lamoTRIgine (LAMICTAL) 200 MG tablet Take 1.5 tablets (300 mg total) by mouth 2 (two) times daily.     Continuous Infusions:    Family History     None        Social History Main Topics    Smoking status: Never Smoker    Smokeless tobacco: Never Used    Alcohol use No    Drug use: No    Sexual activity: Not on file     Review of Systems   Constitutional: Negative for activity change, appetite change, chills, fatigue and fever.   HENT: Negative for postnasal drip, sinus pain, sinus pressure and trouble swallowing.    Eyes: Negative for discharge and itching.   Respiratory: Negative for apnea, cough and shortness of breath.    Cardiovascular: Negative for chest pain and  palpitations.   Gastrointestinal: Negative for abdominal pain, constipation, diarrhea and nausea.   Endocrine: Negative for cold intolerance and heat intolerance.   Genitourinary: Negative for flank pain and frequency.   Musculoskeletal: Negative for arthralgias, back pain, gait problem and neck pain.   Skin: Negative for color change and pallor.   Neurological: Positive for seizures. Negative for dizziness, facial asymmetry, speech difficulty, weakness and headaches.   Psychiatric/Behavioral: Negative for agitation, behavioral problems and confusion.     Objective:     Vital Signs (Most Recent):    Vital Signs (24h Range):        Weight: 108.9 kg (240 lb 1.3 oz)  Body mass index is 38.75 kg/m².    Physical Exam   Constitutional: She is oriented to person, place, and time. She appears well-developed and well-nourished.   HENT:   Head: Normocephalic and atraumatic.   Eyes: EOM are normal. Pupils are equal, round, and reactive to light.   Neck: Normal range of motion. Neck supple.   Cardiovascular: Normal rate, regular rhythm and normal heart sounds.    Pulmonary/Chest: Effort normal and breath sounds normal.   Abdominal: Soft. Bowel sounds are normal.   Musculoskeletal: Normal range of motion.   Neurological: She is oriented to person, place, and time. She has a normal Finger-Nose-Finger Test and a normal Heel to Shin Test. Gait normal.   Reflex Scores:       Tricep reflexes are 2+ on the right side and 2+ on the left side.       Bicep reflexes are 2+ on the right side and 2+ on the left side.       Brachioradialis reflexes are 2+ on the right side and 2+ on the left side.       Patellar reflexes are 2+ on the right side and 2+ on the left side.       Achilles reflexes are 2+ on the right side and 2+ on the left side.  Skin: Skin is warm.   Psychiatric: She has a normal mood and affect. Her speech is normal and behavior is normal.       NEUROLOGICAL EXAMINATION:     MENTAL STATUS   Oriented to person, place, and  time.   Attention: normal.   Speech: speech is normal   Level of consciousness: alert  Knowledge: good.     CRANIAL NERVES     CN II   Visual fields full to confrontation.     CN III, IV, VI   Pupils are equal, round, and reactive to light.  Extraocular motions are normal.   CN III: no CN III palsy  CN VI: no CN VI palsy  Nystagmus: none   Diplopia: none  Ophthalmoparesis: none    CN V   Facial sensation intact.     CN VII   Facial expression full, symmetric.     CN VIII   CN VIII normal.     CN IX, X   CN IX normal.   CN X normal.     CN XI   CN XI normal.     CN XII   CN XII normal.     MOTOR EXAM   Muscle bulk: normal  Overall muscle tone: normal    Strength   Right neck flexion: 5/5  Left neck flexion: 5/5  Right neck extension: 5/5  Left neck extension: 5/5  Right deltoid: 5/5  Left deltoid: 5/5  Right biceps: 5/5  Left biceps: 5/5  Right triceps: 5/5  Left triceps: 5/5  Right wrist flexion: 5/5  Left wrist flexion: 5/5  Right wrist extension: 5/5  Left wrist extension: 5/5  Right interossei: 5/5  Left interossei: 5/5  Right abdominals: 5/5  Left abdominals: 5/5  Right iliopsoas: 5/5  Left iliopsoas: 5/5  Right quadriceps: 5/5  Left quadriceps: 5/5  Right hamstrin/5  Left hamstrin/5  Right glutei: 5/5  Left glutei: 5/5  Right anterior tibial: 5/5  Left anterior tibial: 5/5  Right posterior tibial: 5/5  Left posterior tibial: 5/5  Right peroneal: 5/5  Left peroneal: 5/5  Right gastroc: 5/5  Left gastroc: 5/5    REFLEXES     Reflexes   Right brachioradialis: 2+  Left brachioradialis: 2+  Right biceps: 2+  Left biceps: 2+  Right triceps: 2+  Left triceps: 2+  Right patellar: 2+  Left patellar: 2+  Right achilles: 2+  Left achilles: 2+  Right plantar: normal  Left plantar: normal    SENSORY EXAM   Light touch normal.   Proprioception normal.     GAIT AND COORDINATION     Gait  Gait: normal     Coordination   Finger to nose coordination: normal  Heel to shin coordination: normal    Tremor   Resting tremor:  absent      Significant Labs: CBC: No results for input(s): WBC, HGB, HCT, PLT in the last 48 hours.  CMP: No results for input(s): GLU, NA, K, CL, CO2, BUN, CREATININE, CALCIUM, MG, PROT, ALBUMIN, BILITOT, ALKPHOS, AST, ALT, ANIONGAP, EGFRNONAA in the last 48 hours.    Significant Studies: I have reviewed and interpreted all pertinent imaging results/findings within the past 24 hours.    Assessment and Plan:     Temporal lobe epilepsy    Assessment   1.         Uncontrolled spells. Possibly complex partial seizures with secondary generalization versus non-epileptic phenomena.   2.         She has failed multiple medications and may be a candidate for epilepsy surgery if a focal onset is discovered.  3. EMU admission for seizure characterization     Plan  - Start VEEG  - Hold AEDs  - Activation procedures per protocol daily   - IV Valium PRN for GTC greater than 5 min - hospital medicine to call epilepsy on call before administering    - Continue home medications   - Correct lytes as needed / control infection / avoid hypoxia or hypercapnia   - Case discussed with Dr Prince             VTE Risk Mitigation         Ordered     enoxaparin injection 40 mg  Daily     Route:  Subcutaneous        02/08/18 1641     Medium Risk of VTE  Once      02/08/18 1641          Sundar Denise II, MD  Neurology-Epilepsy  [facility]

## 2018-02-09 PROCEDURE — 95951 HC EEG MONITORING/VIDEO RECORD: CPT

## 2018-02-09 PROCEDURE — 20600001 HC STEP DOWN PRIVATE ROOM

## 2018-02-09 PROCEDURE — 99233 SBSQ HOSP IP/OBS HIGH 50: CPT | Mod: ,,, | Performed by: PSYCHIATRY & NEUROLOGY

## 2018-02-09 RX ORDER — TRAMADOL HYDROCHLORIDE 50 MG/1
50 TABLET ORAL ONCE
Status: COMPLETED | OUTPATIENT
Start: 2018-02-10 | End: 2018-02-10

## 2018-02-09 RX ORDER — DIPHENHYDRAMINE HCL 50 MG
50 CAPSULE ORAL ONCE
Status: COMPLETED | OUTPATIENT
Start: 2018-02-10 | End: 2018-02-10

## 2018-02-09 NOTE — PLAN OF CARE
Pt. admitted for seizure work up. Lives with spouse and children. Independent and active prior to admit. D/C plan: home with family support. No needs identified.   Payor: [employer] / Plan: [employer] CHOICE / Product Type: Commercial /      Primary Doctor No       [facility] - [place] - [address] AT [facility] [address]  [address]  [place] [address]  Phone: [phone] Fax: [phone]    [facility] - [place] - [address] AT [facility] [address]  [address]  [place] [address]  Phone: [phone] Fax: [phone]    [facility] - [place], [place] - [id number] [facility] [address]  Phone: [phone] Fax: [phone]      Extended Emergency Contact Information  Primary Emergency Contact: Neymar Arrieta   [facility]  Home Phone: [phone]  Relation: Spouse       02/09/18 0847   Discharge Assessment   Assessment Type Discharge Planning Assessment   Confirmed/corrected address and phone number on facesheet? Yes   Assessment information obtained from? Patient   Expected Length of Stay (days) 2   Communicated expected length of stay with patient/caregiver yes   Prior to hospitalization functional status: Independent   Current cognitive status: Alert/Oriented   Current Functional Status: Independent   Lives With child(duncan), dependent;spouse   Able to Return to Prior Arrangements yes   Is patient able to care for self after discharge? Yes   Who are your caregiver(s) and their phone number(s)? Spouse Neymar [phone]   Patient's perception of discharge disposition home or selfcare   Readmission Within The Last 30 Days no previous admission in last 30 days   Patient currently being followed by outpatient case management? No   Patient currently receives any other outside agency services? No    Equipment Currently Used at Home none   Is the patient taking medications as prescribed? yes   Does the patient have transportation home? Yes   Transportation Available family or friend will provide   Discharge Plan A Home;Home with family   Discharge Plan B Home   Patient/Family In Agreement With Plan yes

## 2018-02-09 NOTE — PLAN OF CARE
Problem: Patient Care Overview  Goal: Plan of Care Review  Outcome: Ongoing (interventions implemented as appropriate)  Plan of care discussed with pt. No distress noted at present time. Continuous EEG monitoring in place. One episode reported with pt being alert and oriented upon entering room Denies any pain. Instructed to call for standby assistance. Will cont to monitor.

## 2018-02-09 NOTE — SUBJECTIVE & OBJECTIVE
Past Medical History:   Diagnosis Date    Seizures        Past Surgical History:   Procedure Laterality Date    APPENDECTOMY       SECTION      CHOLECYSTECTOMY      HYSTERECTOMY       Review of patient's allergies indicates:  No Known Allergies    No current facility-administered medications on file prior to encounter.      Current Outpatient Prescriptions on File Prior to Encounter   Medication Sig    lamoTRIgine (LAMICTAL) 200 MG tablet Take 1.5 tablets (300 mg total) by mouth 2 (two) times daily.     Continuous Infusions:    Family History     None        Social History Main Topics    Smoking status: Never Smoker    Smokeless tobacco: Never Used    Alcohol use No    Drug use: No    Sexual activity: Not on file     Review of Systems   Constitutional: Negative for activity change, appetite change, chills, fatigue and fever.   HENT: Negative for postnasal drip, sinus pain, sinus pressure and trouble swallowing.    Eyes: Negative for discharge and itching.   Respiratory: Negative for apnea, cough and shortness of breath.    Cardiovascular: Negative for chest pain and palpitations.   Gastrointestinal: Negative for abdominal pain, constipation, diarrhea and nausea.   Endocrine: Negative for cold intolerance and heat intolerance.   Genitourinary: Negative for flank pain and frequency.   Musculoskeletal: Negative for arthralgias, back pain, gait problem and neck pain.   Skin: Negative for color change and pallor.   Neurological: Positive for seizures. Negative for dizziness, facial asymmetry, speech difficulty, weakness and headaches.   Psychiatric/Behavioral: Negative for agitation, behavioral problems and confusion.     Objective:     Vital Signs (Most Recent):  Temp: 98.1 °F (36.7 °C) (18 1300)  Pulse: 70 (18 1300)  Resp: 17 (18 1300)  BP: 125/70 (18 1300)  SpO2: 98 % (18 1300) Vital Signs (24h Range):  Temp:  [97.6 °F (36.4 °C)-98.6 °F (37 °C)] 98.1 °F (36.7  °C)  Pulse:  [53-77] 70  Resp:  [17-18] 17  SpO2:  [95 %-98 %] 98 %  BP: ()/(60-75) 125/70     Weight: 108 kg (238 lb)  Body mass index is 38.41 kg/m².    Physical Exam   Constitutional: She is oriented to person, place, and time. She appears well-developed and well-nourished.   HENT:   Head: Normocephalic and atraumatic.   Eyes: EOM are normal. Pupils are equal, round, and reactive to light.   Neck: Normal range of motion. Neck supple.   Cardiovascular: Normal rate, regular rhythm and normal heart sounds.    Pulmonary/Chest: Effort normal and breath sounds normal.   Abdominal: Soft. Bowel sounds are normal.   Musculoskeletal: Normal range of motion.   Neurological: She is oriented to person, place, and time. She has a normal Finger-Nose-Finger Test and a normal Heel to Shin Test. Gait normal.   Reflex Scores:       Tricep reflexes are 2+ on the right side and 2+ on the left side.       Bicep reflexes are 2+ on the right side and 2+ on the left side.       Brachioradialis reflexes are 2+ on the right side and 2+ on the left side.       Patellar reflexes are 2+ on the right side and 2+ on the left side.       Achilles reflexes are 2+ on the right side and 2+ on the left side.  Skin: Skin is warm.   Psychiatric: She has a normal mood and affect. Her speech is normal and behavior is normal.       NEUROLOGICAL EXAMINATION:     MENTAL STATUS   Oriented to person, place, and time.   Attention: normal.   Speech: speech is normal   Level of consciousness: alert  Knowledge: good.     CRANIAL NERVES     CN II   Visual fields full to confrontation.     CN III, IV, VI   Pupils are equal, round, and reactive to light.  Extraocular motions are normal.   CN III: no CN III palsy  CN VI: no CN VI palsy  Nystagmus: none   Diplopia: none  Ophthalmoparesis: none    CN V   Facial sensation intact.     CN VII   Facial expression full, symmetric.     CN VIII   CN VIII normal.     CN IX, X   CN IX normal.   CN X normal.     CN XI    CN XI normal.     CN XII   CN XII normal.     MOTOR EXAM   Muscle bulk: normal  Overall muscle tone: normal    Strength   Right neck flexion: 5/5  Left neck flexion: 5/5  Right neck extension: 5/5  Left neck extension: 5/5  Right deltoid: 5/5  Left deltoid: 5/5  Right biceps: 5/5  Left biceps: 5/5  Right triceps: 5/5  Left triceps: 5/5  Right wrist flexion: 5/5  Left wrist flexion: 5/5  Right wrist extension: 5/5  Left wrist extension: 5/5  Right interossei: 5/5  Left interossei: 5/5  Right abdominals: 5/5  Left abdominals: 5/5  Right iliopsoas: 5/5  Left iliopsoas: 5/5  Right quadriceps: 5/5  Left quadriceps: 5/5  Right hamstrin/5  Left hamstrin/5  Right glutei: 5/5  Left glutei: 5/5  Right anterior tibial: 55  Left anterior tibial: 5/5  Right posterior tibial: 5/5  Left posterior tibial: 5/5  Right peroneal: 5/5  Left peroneal: 5/5  Right gastroc: 5/5  Left gastroc: 5/5    REFLEXES     Reflexes   Right brachioradialis: 2+  Left brachioradialis: 2+  Right biceps: 2+  Left biceps: 2+  Right triceps: 2+  Left triceps: 2+  Right patellar: 2+  Left patellar: 2+  Right achilles: 2+  Left achilles: 2+  Right plantar: normal  Left plantar: normal    SENSORY EXAM   Light touch normal.   Proprioception normal.     GAIT AND COORDINATION     Gait  Gait: normal     Coordination   Finger to nose coordination: normal  Heel to shin coordination: normal    Tremor   Resting tremor: absent      Significant Labs: CBC:     Recent Labs  Lab 18  1812   WBC 6.74   HGB 12.0   HCT 37.2        CMP:     Recent Labs  Lab 18  1812         K 4.3      CO2 27   BUN 12   CREATININE 0.9   CALCIUM 9.7   PROT 7.5   ALBUMIN 3.4*   BILITOT 0.3   ALKPHOS 129   AST 28   ALT 31   ANIONGAP 8   EGFRNONAA >60.0       Significant Studies: I have reviewed and interpreted all pertinent imaging results/findings within the past 24 hours.

## 2018-02-09 NOTE — PROGRESS NOTES
[facility]  Neurology-Epilepsy  Progress Note    Patient Name: Liza Arrieta  MRN: [id number]  Admission Date: 2018  Hospital Length of Stay: 1 days  Code Status: Prior   Attending Provider: Scar Prince MD  Primary Care Physician: Primary Doctor No   Principal Problem:<principal problem not specified>    Subjective:     Hospital Course:   2018: EMU admission   2018: No events since admission. EEG showed TRIDA and left temporal sharps at multiple occasions     Past Medical History:   Diagnosis Date    Seizures        Past Surgical History:   Procedure Laterality Date    APPENDECTOMY       SECTION      CHOLECYSTECTOMY      HYSTERECTOMY       Review of patient's allergies indicates:  No Known Allergies    No current facility-administered medications on file prior to encounter.      Current Outpatient Prescriptions on File Prior to Encounter   Medication Sig    lamoTRIgine (LAMICTAL) 200 MG tablet Take 1.5 tablets (300 mg total) by mouth 2 (two) times daily.     Continuous Infusions:    Family History     None        Social History Main Topics    Smoking status: Never Smoker    Smokeless tobacco: Never Used    Alcohol use No    Drug use: No    Sexual activity: Not on file     Review of Systems   Constitutional: Negative for activity change, appetite change, chills, fatigue and fever.   HENT: Negative for postnasal drip, sinus pain, sinus pressure and trouble swallowing.    Eyes: Negative for discharge and itching.   Respiratory: Negative for apnea, cough and shortness of breath.    Cardiovascular: Negative for chest pain and palpitations.   Gastrointestinal: Negative for abdominal pain, constipation, diarrhea and nausea.   Endocrine: Negative for cold intolerance and heat intolerance.   Genitourinary: Negative for flank pain and frequency.   Musculoskeletal: Negative for arthralgias, back pain, gait problem and neck pain.   Skin: Negative for color change and  pallor.   Neurological: Positive for seizures. Negative for dizziness, facial asymmetry, speech difficulty, weakness and headaches.   Psychiatric/Behavioral: Negative for agitation, behavioral problems and confusion.     Objective:     Vital Signs (Most Recent):  Temp: 98.1 °F (36.7 °C) (02/09/18 1300)  Pulse: 70 (02/09/18 1300)  Resp: 17 (02/09/18 1300)  BP: 125/70 (02/09/18 1300)  SpO2: 98 % (02/09/18 1300) Vital Signs (24h Range):  Temp:  [97.6 °F (36.4 °C)-98.6 °F (37 °C)] 98.1 °F (36.7 °C)  Pulse:  [53-77] 70  Resp:  [17-18] 17  SpO2:  [95 %-98 %] 98 %  BP: ()/(60-75) 125/70     Weight: 108 kg (238 lb)  Body mass index is 38.41 kg/m².    Physical Exam   Constitutional: She is oriented to person, place, and time. She appears well-developed and well-nourished.   HENT:   Head: Normocephalic and atraumatic.   Eyes: EOM are normal. Pupils are equal, round, and reactive to light.   Neck: Normal range of motion. Neck supple.   Cardiovascular: Normal rate, regular rhythm and normal heart sounds.    Pulmonary/Chest: Effort normal and breath sounds normal.   Abdominal: Soft. Bowel sounds are normal.   Musculoskeletal: Normal range of motion.   Neurological: She is oriented to person, place, and time. She has a normal Finger-Nose-Finger Test and a normal Heel to Shin Test. Gait normal.   Reflex Scores:       Tricep reflexes are 2+ on the right side and 2+ on the left side.       Bicep reflexes are 2+ on the right side and 2+ on the left side.       Brachioradialis reflexes are 2+ on the right side and 2+ on the left side.       Patellar reflexes are 2+ on the right side and 2+ on the left side.       Achilles reflexes are 2+ on the right side and 2+ on the left side.  Skin: Skin is warm.   Psychiatric: She has a normal mood and affect. Her speech is normal and behavior is normal.       NEUROLOGICAL EXAMINATION:     MENTAL STATUS   Oriented to person, place, and time.   Attention: normal.   Speech: speech is normal    Level of consciousness: alert  Knowledge: good.     CRANIAL NERVES     CN II   Visual fields full to confrontation.     CN III, IV, VI   Pupils are equal, round, and reactive to light.  Extraocular motions are normal.   CN III: no CN III palsy  CN VI: no CN VI palsy  Nystagmus: none   Diplopia: none  Ophthalmoparesis: none    CN V   Facial sensation intact.     CN VII   Facial expression full, symmetric.     CN VIII   CN VIII normal.     CN IX, X   CN IX normal.   CN X normal.     CN XI   CN XI normal.     CN XII   CN XII normal.     MOTOR EXAM   Muscle bulk: normal  Overall muscle tone: normal    Strength   Right neck flexion: 5/5  Left neck flexion: 5/5  Right neck extension: 5/5  Left neck extension: 5/5  Right deltoid: 5/5  Left deltoid: 5/5  Right biceps: 5/5  Left biceps: 5/5  Right triceps: 5/5  Left triceps: 5/5  Right wrist flexion: 5/5  Left wrist flexion: 5/5  Right wrist extension: 5/5  Left wrist extension: 5/5  Right interossei: 5/5  Left interossei: 5/5  Right abdominals: 5/5  Left abdominals: 5/5  Right iliopsoas: 5/5  Left iliopsoas: 5/5  Right quadriceps: 5/5  Left quadriceps: 5/5  Right hamstrin/5  Left hamstrin/5  Right glutei: 5/5  Left glutei: 5/5  Right anterior tibial: 5/5  Left anterior tibial: 5/5  Right posterior tibial: 5/5  Left posterior tibial: 5/5  Right peroneal: 5/5  Left peroneal: 5/5  Right gastroc: 5/5  Left gastroc: 5/5    REFLEXES     Reflexes   Right brachioradialis: 2+  Left brachioradialis: 2+  Right biceps: 2+  Left biceps: 2+  Right triceps: 2+  Left triceps: 2+  Right patellar: 2+  Left patellar: 2+  Right achilles: 2+  Left achilles: 2+  Right plantar: normal  Left plantar: normal    SENSORY EXAM   Light touch normal.   Proprioception normal.     GAIT AND COORDINATION     Gait  Gait: normal     Coordination   Finger to nose coordination: normal  Heel to shin coordination: normal    Tremor   Resting tremor: absent      Significant Labs: CBC:     Recent Labs  Lab  02/08/18  1812   WBC 6.74   HGB 12.0   HCT 37.2        CMP:     Recent Labs  Lab 02/08/18  1812         K 4.3      CO2 27   BUN 12   CREATININE 0.9   CALCIUM 9.7   PROT 7.5   ALBUMIN 3.4*   BILITOT 0.3   ALKPHOS 129   AST 28   ALT 31   ANIONGAP 8   EGFRNONAA >60.0       Significant Studies: I have reviewed and interpreted all pertinent imaging results/findings within the past 24 hours.    Assessment and Plan:     Temporal lobe epilepsy, intractable    Assessment   1.         Uncontrolled spells. Possibly complex partial seizures with secondary generalization versus non-epileptic phenomena.   2.         She has failed multiple medications and may be a candidate for epilepsy surgery if a focal onset is discovered.  3. EMU admission for seizure characterization     Plan  - Start VEEG  - Hold AEDs  - Activation procedures per protocol daily   - IV Valium PRN for GTC greater than 5 min - hospital medicine to call epilepsy on call before administering    - Continue home medications   - Correct lytes as needed / control infection / avoid hypoxia or hypercapnia   - Case discussed with Dr Prince             VTE Risk Mitigation         Ordered     enoxaparin injection 40 mg  Daily     Route:  Subcutaneous        02/08/18 1641     Medium Risk of VTE  Once      02/08/18 1641          Sundar Denise II, MD  Neurology-Epilepsy  [facility]

## 2018-02-10 PROCEDURE — 20600001 HC STEP DOWN PRIVATE ROOM

## 2018-02-10 PROCEDURE — 25000003 PHARM REV CODE 250: Performed by: PSYCHIATRY & NEUROLOGY

## 2018-02-10 PROCEDURE — 99233 SBSQ HOSP IP/OBS HIGH 50: CPT | Mod: ,,, | Performed by: PSYCHIATRY & NEUROLOGY

## 2018-02-10 PROCEDURE — 95951 HC EEG MONITORING/VIDEO RECORD: CPT

## 2018-02-10 RX ADMIN — TRAMADOL HYDROCHLORIDE 50 MG: 50 TABLET, FILM COATED ORAL at 06:02

## 2018-02-10 RX ADMIN — DIPHENHYDRAMINE HYDROCHLORIDE 50 MG: 50 CAPSULE ORAL at 06:02

## 2018-02-10 NOTE — SUBJECTIVE & OBJECTIVE
Past Medical History:   Diagnosis Date    Seizures        Past Surgical History:   Procedure Laterality Date    APPENDECTOMY       SECTION      CHOLECYSTECTOMY      HYSTERECTOMY       Review of patient's allergies indicates:  No Known Allergies    No current facility-administered medications on file prior to encounter.      Current Outpatient Prescriptions on File Prior to Encounter   Medication Sig    lamoTRIgine (LAMICTAL) 200 MG tablet Take 1.5 tablets (300 mg total) by mouth 2 (two) times daily.     Continuous Infusions:    Family History     None        Social History Main Topics    Smoking status: Never Smoker    Smokeless tobacco: Never Used    Alcohol use No    Drug use: No    Sexual activity: Not on file     Review of Systems   Constitutional: Negative for activity change, appetite change, chills, fatigue and fever.   HENT: Negative for postnasal drip, sinus pain, sinus pressure and trouble swallowing.    Eyes: Negative for discharge and itching.   Respiratory: Negative for apnea, cough and shortness of breath.    Cardiovascular: Negative for chest pain and palpitations.   Gastrointestinal: Negative for abdominal pain, constipation, diarrhea and nausea.   Endocrine: Negative for cold intolerance and heat intolerance.   Genitourinary: Negative for flank pain and frequency.   Musculoskeletal: Negative for arthralgias, back pain, gait problem and neck pain.   Skin: Negative for color change and pallor.   Neurological: Positive for seizures. Negative for dizziness, facial asymmetry, speech difficulty, weakness and headaches.   Psychiatric/Behavioral: Negative for agitation, behavioral problems and confusion.     Objective:     Vital Signs (Most Recent):  Temp: 98.2 °F (36.8 °C) (02/10/18 0820)  Pulse: 74 (02/10/18 1100)  Resp: 18 (02/10/18 0820)  BP: 138/70 (02/10/18 0820)  SpO2: 96 % (02/10/18 08) Vital Signs (24h Range):  Temp:  [97.9 °F (36.6 °C)-99 °F (37.2 °C)] 98.2 °F (36.8  °C)  Pulse:  [58-77] 74  Resp:  [17-18] 18  SpO2:  [92 %-98 %] 96 %  BP: (119-138)/(61-81) 138/70     Weight: 108 kg (238 lb)  Body mass index is 38.41 kg/m².    Physical Exam   Constitutional: She is oriented to person, place, and time. She appears well-developed and well-nourished.   HENT:   Head: Normocephalic and atraumatic.   Eyes: EOM are normal. Pupils are equal, round, and reactive to light.   Neck: Normal range of motion. Neck supple.   Cardiovascular: Normal rate, regular rhythm and normal heart sounds.    Pulmonary/Chest: Effort normal and breath sounds normal.   Abdominal: Soft. Bowel sounds are normal.   Musculoskeletal: Normal range of motion.   Neurological: She is oriented to person, place, and time. She has a normal Finger-Nose-Finger Test and a normal Heel to Shin Test. Gait normal.   Reflex Scores:       Tricep reflexes are 2+ on the right side and 2+ on the left side.       Bicep reflexes are 2+ on the right side and 2+ on the left side.       Brachioradialis reflexes are 2+ on the right side and 2+ on the left side.       Patellar reflexes are 2+ on the right side and 2+ on the left side.       Achilles reflexes are 2+ on the right side and 2+ on the left side.  Skin: Skin is warm.   Psychiatric: She has a normal mood and affect. Her speech is normal and behavior is normal.       NEUROLOGICAL EXAMINATION:     MENTAL STATUS   Oriented to person, place, and time.   Attention: normal.   Speech: speech is normal   Level of consciousness: alert  Knowledge: good.     CRANIAL NERVES     CN II   Visual fields full to confrontation.     CN III, IV, VI   Pupils are equal, round, and reactive to light.  Extraocular motions are normal.   CN III: no CN III palsy  CN VI: no CN VI palsy  Nystagmus: none   Diplopia: none  Ophthalmoparesis: none    CN V   Facial sensation intact.     CN VII   Facial expression full, symmetric.     CN VIII   CN VIII normal.     CN IX, X   CN IX normal.   CN X normal.     CN XI    CN XI normal.     CN XII   CN XII normal.     MOTOR EXAM   Muscle bulk: normal  Overall muscle tone: normal    Strength   Right neck flexion: 5/5  Left neck flexion: 5/5  Right neck extension: 5/5  Left neck extension: 5/5  Right deltoid: 5/5  Left deltoid: 5/5  Right biceps: 5/5  Left biceps: 5/5  Right triceps: 5/5  Left triceps: 5/5  Right wrist flexion: 5/5  Left wrist flexion: 5/5  Right wrist extension: 5/5  Left wrist extension: 5/5  Right interossei: 5/5  Left interossei: 5/5  Right abdominals: 5/5  Left abdominals: 5/5  Right iliopsoas: 5/5  Left iliopsoas: 5/5  Right quadriceps: 5/5  Left quadriceps: 5/5  Right hamstrin/5  Left hamstrin/5  Right glutei: 5/5  Left glutei: 5/5  Right anterior tibial: 5/5  Left anterior tibial: 5/5  Right posterior tibial: 5/5  Left posterior tibial: 5/5  Right peroneal: 5/5  Left peroneal: 5/5  Right gastroc: 5/5  Left gastroc: 5/5    REFLEXES     Reflexes   Right brachioradialis: 2+  Left brachioradialis: 2+  Right biceps: 2+  Left biceps: 2+  Right triceps: 2+  Left triceps: 2+  Right patellar: 2+  Left patellar: 2+  Right achilles: 2+  Left achilles: 2+  Right plantar: normal  Left plantar: normal    SENSORY EXAM   Light touch normal.   Proprioception normal.     GAIT AND COORDINATION     Gait  Gait: normal     Coordination   Finger to nose coordination: normal  Heel to shin coordination: normal    Tremor   Resting tremor: absent      Significant Labs: CBC:     Recent Labs  Lab 18  1812   WBC 6.74   HGB 12.0   HCT 37.2        CMP:     Recent Labs  Lab 18  1812         K 4.3      CO2 27   BUN 12   CREATININE 0.9   CALCIUM 9.7   PROT 7.5   ALBUMIN 3.4*   BILITOT 0.3   ALKPHOS 129   AST 28   ALT 31   ANIONGAP 8   EGFRNONAA >60.0       Significant Studies: I have reviewed and interpreted all pertinent imaging results/findings within the past 24 hours.     INTERVAL HX:  3 seizures, no complaints

## 2018-02-10 NOTE — ASSESSMENT & PLAN NOTE
Assessment   1.         Uncontrolled spells. Possibly complex partial seizures with secondary generalization versus non-epileptic phenomena.   2.         She has failed multiple medications and may be a candidate for epilepsy surgery if a focal onset is discovered.  3.  EMU admission for seizure characterization     Plan  - Start VEEG  - Hold AEDs  - Activation procedures per protocol daily   - IV Valium PRN for GTC greater than 5 min - hospital medicine to call epilepsy on call before administering    - Continue home medications   - Correct lytes as needed / control infection / avoid hypoxia or hypercapnia

## 2018-02-10 NOTE — PROGRESS NOTES
[facility]  Neurology-Epilepsy  Progress Note    Patient Name: Liza Arrieta  MRN: [id number]  Admission Date: 2018  Hospital Length of Stay: 2 days  Code Status: Prior   Attending Provider: Scar Prince MD  Primary Care Physician: Primary Doctor No   Principal Problem:<principal problem not specified>    Subjective:     Hospital Course:   2018: EMU admission   2018: No events since admission. EEG showed TIRDA and left temporal sharps at multiple occasions   2/10/2018:  3 L temporal seizures, bilateral temporal sharps    Past Medical History:   Diagnosis Date    Seizures        Past Surgical History:   Procedure Laterality Date    APPENDECTOMY       SECTION      CHOLECYSTECTOMY      HYSTERECTOMY       Review of patient's allergies indicates:  No Known Allergies    No current facility-administered medications on file prior to encounter.      Current Outpatient Prescriptions on File Prior to Encounter   Medication Sig    lamoTRIgine (LAMICTAL) 200 MG tablet Take 1.5 tablets (300 mg total) by mouth 2 (two) times daily.     Continuous Infusions:    Family History     None        Social History Main Topics    Smoking status: Never Smoker    Smokeless tobacco: Never Used    Alcohol use No    Drug use: No    Sexual activity: Not on file     Review of Systems   Constitutional: Negative for activity change, appetite change, chills, fatigue and fever.   HENT: Negative for postnasal drip, sinus pain, sinus pressure and trouble swallowing.    Eyes: Negative for discharge and itching.   Respiratory: Negative for apnea, cough and shortness of breath.    Cardiovascular: Negative for chest pain and palpitations.   Gastrointestinal: Negative for abdominal pain, constipation, diarrhea and nausea.   Endocrine: Negative for cold intolerance and heat intolerance.   Genitourinary: Negative for flank pain and frequency.   Musculoskeletal: Negative for arthralgias, back pain, gait  problem and neck pain.   Skin: Negative for color change and pallor.   Neurological: Positive for seizures. Negative for dizziness, facial asymmetry, speech difficulty, weakness and headaches.   Psychiatric/Behavioral: Negative for agitation, behavioral problems and confusion.     Objective:     Vital Signs (Most Recent):  Temp: 98.2 °F (36.8 °C) (02/10/18 0820)  Pulse: 74 (02/10/18 1100)  Resp: 18 (02/10/18 0820)  BP: 138/70 (02/10/18 0820)  SpO2: 96 % (02/10/18 0820) Vital Signs (24h Range):  Temp:  [97.9 °F (36.6 °C)-99 °F (37.2 °C)] 98.2 °F (36.8 °C)  Pulse:  [58-77] 74  Resp:  [17-18] 18  SpO2:  [92 %-98 %] 96 %  BP: (119-138)/(61-81) 138/70     Weight: 108 kg (238 lb)  Body mass index is 38.41 kg/m².    Physical Exam   Constitutional: She is oriented to person, place, and time. She appears well-developed and well-nourished.   HENT:   Head: Normocephalic and atraumatic.   Eyes: EOM are normal. Pupils are equal, round, and reactive to light.   Neck: Normal range of motion. Neck supple.   Cardiovascular: Normal rate, regular rhythm and normal heart sounds.    Pulmonary/Chest: Effort normal and breath sounds normal.   Abdominal: Soft. Bowel sounds are normal.   Musculoskeletal: Normal range of motion.   Neurological: She is oriented to person, place, and time. She has a normal Finger-Nose-Finger Test and a normal Heel to Shin Test. Gait normal.   Reflex Scores:       Tricep reflexes are 2+ on the right side and 2+ on the left side.       Bicep reflexes are 2+ on the right side and 2+ on the left side.       Brachioradialis reflexes are 2+ on the right side and 2+ on the left side.       Patellar reflexes are 2+ on the right side and 2+ on the left side.       Achilles reflexes are 2+ on the right side and 2+ on the left side.  Skin: Skin is warm.   Psychiatric: She has a normal mood and affect. Her speech is normal and behavior is normal.       NEUROLOGICAL EXAMINATION:     MENTAL STATUS   Oriented to person,  place, and time.   Attention: normal.   Speech: speech is normal   Level of consciousness: alert  Knowledge: good.     CRANIAL NERVES     CN II   Visual fields full to confrontation.     CN III, IV, VI   Pupils are equal, round, and reactive to light.  Extraocular motions are normal.   CN III: no CN III palsy  CN VI: no CN VI palsy  Nystagmus: none   Diplopia: none  Ophthalmoparesis: none    CN V   Facial sensation intact.     CN VII   Facial expression full, symmetric.     CN VIII   CN VIII normal.     CN IX, X   CN IX normal.   CN X normal.     CN XI   CN XI normal.     CN XII   CN XII normal.     MOTOR EXAM   Muscle bulk: normal  Overall muscle tone: normal    Strength   Right neck flexion: 5/5  Left neck flexion: 5/5  Right neck extension: 5/5  Left neck extension: 5/5  Right deltoid: 5/5  Left deltoid: 5/5  Right biceps: 5/5  Left biceps: 5/5  Right triceps: 5/5  Left triceps: 5/5  Right wrist flexion: 5/5  Left wrist flexion: 5/5  Right wrist extension: 5/5  Left wrist extension: 5/5  Right interossei: 5/5  Left interossei: 5/5  Right abdominals: 5/5  Left abdominals: 5/5  Right iliopsoas: 5/5  Left iliopsoas: 5/5  Right quadriceps: 5/5  Left quadriceps: 5/5  Right hamstrin/5  Left hamstrin/5  Right glutei: 5/5  Left glutei: 5/5  Right anterior tibial: 5/5  Left anterior tibial: 5/5  Right posterior tibial: 5/5  Left posterior tibial: 5/5  Right peroneal: 5/5  Left peroneal: 5/5  Right gastroc: 5/5  Left gastroc: 5/5    REFLEXES     Reflexes   Right brachioradialis: 2+  Left brachioradialis: 2+  Right biceps: 2+  Left biceps: 2+  Right triceps: 2+  Left triceps: 2+  Right patellar: 2+  Left patellar: 2+  Right achilles: 2+  Left achilles: 2+  Right plantar: normal  Left plantar: normal    SENSORY EXAM   Light touch normal.   Proprioception normal.     GAIT AND COORDINATION     Gait  Gait: normal     Coordination   Finger to nose coordination: normal  Heel to shin coordination: normal    Tremor    Resting tremor: absent      Significant Labs: CBC:     Recent Labs  Lab 02/08/18  1812   WBC 6.74   HGB 12.0   HCT 37.2        CMP:     Recent Labs  Lab 02/08/18  1812         K 4.3      CO2 27   BUN 12   CREATININE 0.9   CALCIUM 9.7   PROT 7.5   ALBUMIN 3.4*   BILITOT 0.3   ALKPHOS 129   AST 28   ALT 31   ANIONGAP 8   EGFRNONAA >60.0       Significant Studies: I have reviewed and interpreted all pertinent imaging results/findings within the past 24 hours.     INTERVAL HX:  3 seizures, no complaints    Assessment and Plan:     Temporal lobe epilepsy, intractable    Assessment   1.         Uncontrolled spells. Possibly complex partial seizures with secondary generalization versus non-epileptic phenomena.   2.         She has failed multiple medications and may be a candidate for epilepsy surgery if a focal onset is discovered.  3.  EMU admission for seizure characterization     Plan  - Start VEEG  - Hold AEDs  - Activation procedures per protocol daily   - IV Valium PRN for GTC greater than 5 min - hospital medicine to call epilepsy on call before administering    - Continue home medications   - Correct lytes as needed / control infection / avoid hypoxia or hypercapnia             VTE Risk Mitigation         Ordered     enoxaparin injection 40 mg  Daily     Route:  Subcutaneous        02/08/18 1641     Medium Risk of VTE  Once      02/08/18 1641          Scar Prince MD  Neurology-Epilepsy  [facility]

## 2018-02-10 NOTE — PLAN OF CARE
Problem: Patient Care Overview  Goal: Plan of Care Review  Outcome: Ongoing (interventions implemented as appropriate)  Plan of care discussed with pt. No distress noted at present time. Continuous EEG monitoring in place. One seizure episode occurred this shift with event button pushed.  Denies any pain. Instructed to call for standby assistance. Will cont to monitor. Call light in reach.

## 2018-02-10 NOTE — NURSING
"In pt room talking to pt since 0045. Pt awake and alert for sleep deprivation. Approximately 0126, pt stop talking. Observed pt stiffen up with a blank stare for a few seconds then pt started blinking and pulling at leads and unable to respond to simple commands. V/S 148/84, hr 76, Oxygen sat 95. Approx 0129 pt AAOx4 states that she is "really tired". V? 166/77, hr 68, oxygen sat 97%. Pushed event button to domenico event occurrence.                   "

## 2018-02-10 NOTE — NURSING
Upon making rounds with day shift nurse, pt states having seizure activity. States unsure of time but between 1815 and 1915 she believes that it occurred because she does not remember just felt really tired during this time. Pt states that she did press the event button. At this time, pt is AAOx4. No distress noted. Speech clear. Pt is sleep deprived until 0400 2/10/2018. Will cont to monitor.

## 2018-02-11 PROCEDURE — 95951 HC EEG MONITORING/VIDEO RECORD: CPT

## 2018-02-11 PROCEDURE — 99233 SBSQ HOSP IP/OBS HIGH 50: CPT | Mod: ,,, | Performed by: PSYCHIATRY & NEUROLOGY

## 2018-02-11 PROCEDURE — 20600001 HC STEP DOWN PRIVATE ROOM

## 2018-02-11 PROCEDURE — 95951 PR EEG MONITORING/VIDEORECORD: CPT | Mod: 26,,, | Performed by: PSYCHIATRY & NEUROLOGY

## 2018-02-11 NOTE — PROCEDURES
EPILEPSY MONITORING UNIT  EEG/VIDEO TELEMETRY REPORT  DATE OF SERVICE: 2/8-11/2018  EEG NUMBER: EMU -1,2,3,4  REQUESTED BY:   LOCATION OF SERVICE:     METHODOLOGY      Electroencephalographic (EEG) is recorded with electrodes placed according to the International 10-20 placement system.  Thirty Two (32) channels of digital signal including the T1 and T2 electrodes are simultaneously recorded from the scalp and also including EKG, EMG  and/or eye movement monitors.  Recording band pass was 0.1 to 512 hz.  Digital video recording of the patient is simultaneously recorded with the EEG.  The patient is instructed report clinical symptoms which may occur during the recording session.  EEG and video recording is stored and archived in digital format. Activation procedures which include photic stimulation, hyperventilation and instructing patients to perform simple task are done in selected patients.       The EEG is displayed on a monitor screen and can be reformatted into different montages for evaluation.  The entire recoding is submitted for computer assisted analysis to detect spike and electrographic seizure activity.  The entire recording is visually reviewed and the times identified by computer analysis as being spikes or seizures are reviewed again.  Compresses spectral analysis (CSA) is also performed on the activity recorded from each individual channel.  This is displayed as a power display of frequencies from 0 to 30 Hz over time.   The CSA analysis is done and displayed continuously.  This is reviewed for asymmetries in power between homologous areas of the scalp and for presence of changes in power which can be seen when seizures occur.  Sections of suspected abnormalities on the CSA is then compared with the original EEG recording.      [employer] software was also utilized in the review of this study.  This software suite analyzes the EEG recording in multiple domains.  Coherence and rhythmicity is  computed to identify EEG sections which may contain organized seizures.  Each channel undergoes analysis to detect presence of spike and sharp waves which have special and morphological characteristic of epileptic activity.  The routine EEG recording is converted from spacial into frequency domain.  This is then displayed comparing homologous areas to identify areas of significant asymmetry.  Algorithm to identify non-cortically generated artifact is used to separate eye movement, EMG and other artifact from the EEG.      Recording Times  Start on 2/8/2018 at 17:06:25 stop on 2/8/2018 at 17:27:26  Start on 2/8/2018 at 17:51:37 stop on 2/9/2018 at 6:59:59  Start on 2/9/2018 at 07:00:28 stop on 2/10/2018 at 6:59:59  Start on 2/10/2018 at 07:00:37 stop on 2/11/2018 at 7:00:01    A total of 61:00:14 hours of EEG/Video telemetry was recorded.    Events/Seizures recorded  Seizure 1 - on 2/9/2018 start at 18:23:08 stopped 18:23:27  Seizure 2 - on 2/9/2018 start at 21:44:27 stopped 21:44:58  Seizure 3 - on 2/10/2018 start at 01:25:13 stopped 01:26:11  Seizure 4 - on 2/10/2018 start at 08:09:45 stopped 08:10:20  Seizure 5 - on 2/10/2018 start at 09:20:21 stopped 09:21:03  Seizure 6 - on 2/10/2018 start at 09:55:04 stopped 09:56:05  Seizure 7 - on 2/10/2018 start at 10:37:05 stopped 10:38:03  Seizure 8 - on 2/10/2018 start at 11:07:34 stopped 11:08:48  Seizure 9 - on 2/10/2018 start at 17:18:17 stopped 17:19:17    ELECTROENCEPHALOGRAM  INTERICTAL:  Background activity:   The background rhythm was characterized by alpha and anterior dominant beta activity with a 10-11 Hz posterior dominant alpha rhythm at 30-70 microvolts.     Symmetry and continuity: there is an intermittent increase in slower frequencies in the left temporal chains.     Sleep:   Normal sleep transients including sleep spindles, K complexes, vertex waves and POSTS were seen.    Activation procedures:   Hyperventilation was performed with no abnormalities  seen  Photic stimulation was performed with no abnormalities seen    Abnormal activity:   There are frequent bursts of left TIRDA as well as left anterior temporal sharp waves maximal at T1>F7 as well as rare left frontal sharp waves at Fp1.  There are occasional right anterior temporal sharp waves at T2>T8 as well as occasional right frontal sharp waves maximal at Fp2 and right frontotemporal polyspikes.        ICTAL:  Seizure 1-9:  There is a build up of rhythmic theta activity in the left temporal chains maximal at T1>F7, T3.  Discharge gradually loses organization and is followed by increased left temporal slowing.    CLINICAL DESCRIPTION OF EVENTS/SEIZURES:  Seizure 1:  At onset patient is talking on the phone with phone held in her left hand when she moves it away from her ear and looks around the room.  Several seconds after the discharge ends, she sets it down on the bedside table and closes her eyes.    Seizure 2:  At onset patient is lying in bed reading and begins looking around the room and turning the book around in her hands then goes back to reading at offset.  Seizure 3:  Patient is awake and talking at onset when she makes brief kicking movements with bilateral lower extremities then looks around the room and is mute and unresponsive to voice.    Seizure 4:  Patient awakens from sleep and looks around the room while rocking her legs side to side.  At offset she begins to  objects on the bedside table.  Seizure 5-7:   Patient opens her eyes from sleep and looks around the room without any associated movements of the extremities.  Seizure 8:  During hyperventilation, patient abruptly stops deep breathing and raises right then left arm and is unresponsive to voice.  She is able to speak in full sentences less than one minute after the end of the discharge.  Seizure 9:  Patient stops wiping her hands with a paper towel at onset and looks around the room with some manipulative behavior of the left  hand toward the end of the discharge.      FINAL SUMMARY  This abnormal three day video EEG recorded nine of the patients typical seizures consisting solely of starting with impaired responsiveness  All seizures originated in the left temporal area, however, there was also interictal activity in the right temporal and left frontal regions which, while rare, increased in frequency over the course of the study raising the possibility of additional epileptogenic foci.  For the continuation of this study, please see the report by Dr. Watts.    CLINICAL SEIZURE/EVENT   Classification:  Epileptic   Localization:  Temporal    Lateralization:  Left    Scar Prince M.D.

## 2018-02-11 NOTE — PROGRESS NOTES
[facility]  Neurology-Epilepsy  Progress Note    Patient Name: Liza Arrieta  MRN: [id number]  Admission Date: 2018  Hospital Length of Stay: 3 days  Code Status: Prior   Attending Provider: Scar Prince MD  Primary Care Physician: Primary Doctor No   Principal Problem:<principal problem not specified>    Subjective:     Hospital Course:   2018: EMU admission   2018: No events since admission. EEG showed TIRDA and left temporal sharps at multiple occasions   2/10/2018:  3 L temporal seizures, bilateral temporal sharps  2018:  6 L temporal seizures, bilateral temporal sharps    Past Medical History:   Diagnosis Date    Seizures        Past Surgical History:   Procedure Laterality Date    APPENDECTOMY       SECTION      CHOLECYSTECTOMY      HYSTERECTOMY       Review of patient's allergies indicates:  No Known Allergies    No current facility-administered medications on file prior to encounter.      Current Outpatient Prescriptions on File Prior to Encounter   Medication Sig    lamoTRIgine (LAMICTAL) 200 MG tablet Take 1.5 tablets (300 mg total) by mouth 2 (two) times daily.     Continuous Infusions:    Family History     None        Social History Main Topics    Smoking status: Never Smoker    Smokeless tobacco: Never Used    Alcohol use No    Drug use: No    Sexual activity: Not on file     Review of Systems   Constitutional: Negative for activity change, appetite change, chills, fatigue and fever.   HENT: Negative for postnasal drip, sinus pain, sinus pressure and trouble swallowing.    Eyes: Negative for discharge and itching.   Respiratory: Negative for apnea, cough and shortness of breath.    Cardiovascular: Negative for chest pain and palpitations.   Gastrointestinal: Negative for abdominal pain, constipation, diarrhea and nausea.   Endocrine: Negative for cold intolerance and heat intolerance.   Genitourinary: Negative for flank pain and frequency.    Musculoskeletal: Negative for arthralgias, back pain, gait problem and neck pain.   Skin: Negative for color change and pallor.   Neurological: Positive for seizures. Negative for dizziness, facial asymmetry, speech difficulty, weakness and headaches.   Psychiatric/Behavioral: Negative for agitation, behavioral problems and confusion.     Objective:     Vital Signs (Most Recent):  Temp: 98.5 °F (36.9 °C) (02/11/18 1108)  Pulse: 66 (02/11/18 1108)  Resp: 18 (02/11/18 1108)  BP: 137/68 (02/11/18 1108)  SpO2: 95 % (02/11/18 1108) Vital Signs (24h Range):  Temp:  [98 °F (36.7 °C)-98.7 °F (37.1 °C)] 98.5 °F (36.9 °C)  Pulse:  [52-93] 66  Resp:  [18] 18  SpO2:  [93 %-97 %] 95 %  BP: (100-137)/(54-90) 137/68     Weight: 108 kg (238 lb)  Body mass index is 38.41 kg/m².    Physical Exam   Constitutional: She is oriented to person, place, and time. She appears well-developed and well-nourished.   HENT:   Head: Normocephalic and atraumatic.   Eyes: EOM are normal. Pupils are equal, round, and reactive to light.   Neck: Normal range of motion. Neck supple.   Cardiovascular: Normal rate, regular rhythm and normal heart sounds.    Pulmonary/Chest: Effort normal and breath sounds normal.   Abdominal: Soft. Bowel sounds are normal.   Musculoskeletal: Normal range of motion.   Neurological: She is oriented to person, place, and time. She has a normal Finger-Nose-Finger Test and a normal Heel to Shin Test. Gait normal.   Reflex Scores:       Tricep reflexes are 2+ on the right side and 2+ on the left side.       Bicep reflexes are 2+ on the right side and 2+ on the left side.       Brachioradialis reflexes are 2+ on the right side and 2+ on the left side.       Patellar reflexes are 2+ on the right side and 2+ on the left side.       Achilles reflexes are 2+ on the right side and 2+ on the left side.  Skin: Skin is warm.   Psychiatric: She has a normal mood and affect. Her speech is normal and behavior is normal.       NEUROLOGICAL  EXAMINATION:     MENTAL STATUS   Oriented to person, place, and time.   Attention: normal.   Speech: speech is normal   Level of consciousness: alert  Knowledge: good.     CRANIAL NERVES     CN II   Visual fields full to confrontation.     CN III, IV, VI   Pupils are equal, round, and reactive to light.  Extraocular motions are normal.   CN III: no CN III palsy  CN VI: no CN VI palsy  Nystagmus: none   Diplopia: none  Ophthalmoparesis: none    CN V   Facial sensation intact.     CN VII   Facial expression full, symmetric.     CN VIII   CN VIII normal.     CN IX, X   CN IX normal.   CN X normal.     CN XI   CN XI normal.     CN XII   CN XII normal.     MOTOR EXAM   Muscle bulk: normal  Overall muscle tone: normal    Strength   Right neck flexion: 5/5  Left neck flexion: 5/5  Right neck extension: 5/5  Left neck extension: 5/5  Right deltoid: 5/5  Left deltoid: 5/5  Right biceps: 5/5  Left biceps: 5/5  Right triceps: 5/5  Left triceps: 5/5  Right wrist flexion: 5/5  Left wrist flexion: 5/5  Right wrist extension: 5/5  Left wrist extension: 5/5  Right interossei: 5/5  Left interossei: 5/5  Right abdominals: 5/5  Left abdominals: 5/5  Right iliopsoas: 5/5  Left iliopsoas: 5/5  Right quadriceps: 5/5  Left quadriceps: 5/5  Right hamstrin/5  Left hamstrin/5  Right glutei: 5/5  Left glutei: 5/5  Right anterior tibial: 5/5  Left anterior tibial: 5/5  Right posterior tibial: 5/5  Left posterior tibial: 5/5  Right peroneal: 5/5  Left peroneal: 5/5  Right gastroc: 5/5  Left gastroc: 5/5    REFLEXES     Reflexes   Right brachioradialis: 2+  Left brachioradialis: 2+  Right biceps: 2+  Left biceps: 2+  Right triceps: 2+  Left triceps: 2+  Right patellar: 2+  Left patellar: 2+  Right achilles: 2+  Left achilles: 2+  Right plantar: normal  Left plantar: normal    SENSORY EXAM   Light touch normal.   Proprioception normal.     GAIT AND COORDINATION     Gait  Gait: normal     Coordination   Finger to nose coordination:  normal  Heel to shin coordination: normal    Tremor   Resting tremor: absent      Significant Labs: CBC:   No results for input(s): WBC, HGB, HCT, PLT in the last 48 hours.  CMP:   No results for input(s): GLU, NA, K, CL, CO2, BUN, CREATININE, CALCIUM, MG, PROT, ALBUMIN, BILITOT, ALKPHOS, AST, ALT, ANIONGAP, EGFRNONAA in the last 48 hours.    Significant Studies: I have reviewed and interpreted all pertinent imaging results/findings within the past 24 hours.     INTERVAL HX:  6 seizures, no complaints, reports numerous episodes of 1-2 seconds loss of awareness    Assessment and Plan:     Temporal lobe epilepsy, intractable    Assessment   1.         Uncontrolled spells. Possibly complex partial seizures with secondary generalization versus non-epileptic phenomena.   2.         She has failed multiple medications and may be a candidate for epilepsy surgery if a focal onset is discovered.  3.  EMU admission for seizure characterization     Plan  - Start VEEG  - Hold AEDs  - Activation procedures per protocol daily   - IV Valium PRN for GTC greater than 5 min - hospital medicine to call epilepsy on call before administering    - Continue home medications   - Correct lytes as needed / control infection / avoid hypoxia or hypercapnia             VTE Risk Mitigation         Ordered     enoxaparin injection 40 mg  Daily     Route:  Subcutaneous        02/08/18 1641     Medium Risk of VTE  Once      02/08/18 1641          Scar Prince MD  Neurology-Epilepsy  [facility]

## 2018-02-11 NOTE — PLAN OF CARE
Problem: Patient Care Overview  Goal: Plan of Care Review  Outcome: Ongoing (interventions implemented as appropriate)  Plan of care discussed with pt. Questions and concerns addressed. Daughter at bedside.  No distress noted at present time. Continuous EEG monitoring in place. Denies any pain. Instructed to call for standby assistance. Will cont to monitor. Call light in reach.

## 2018-02-11 NOTE — SUBJECTIVE & OBJECTIVE
Past Medical History:   Diagnosis Date    Seizures        Past Surgical History:   Procedure Laterality Date    APPENDECTOMY       SECTION      CHOLECYSTECTOMY      HYSTERECTOMY       Review of patient's allergies indicates:  No Known Allergies    No current facility-administered medications on file prior to encounter.      Current Outpatient Prescriptions on File Prior to Encounter   Medication Sig    lamoTRIgine (LAMICTAL) 200 MG tablet Take 1.5 tablets (300 mg total) by mouth 2 (two) times daily.     Continuous Infusions:    Family History     None        Social History Main Topics    Smoking status: Never Smoker    Smokeless tobacco: Never Used    Alcohol use No    Drug use: No    Sexual activity: Not on file     Review of Systems   Constitutional: Negative for activity change, appetite change, chills, fatigue and fever.   HENT: Negative for postnasal drip, sinus pain, sinus pressure and trouble swallowing.    Eyes: Negative for discharge and itching.   Respiratory: Negative for apnea, cough and shortness of breath.    Cardiovascular: Negative for chest pain and palpitations.   Gastrointestinal: Negative for abdominal pain, constipation, diarrhea and nausea.   Endocrine: Negative for cold intolerance and heat intolerance.   Genitourinary: Negative for flank pain and frequency.   Musculoskeletal: Negative for arthralgias, back pain, gait problem and neck pain.   Skin: Negative for color change and pallor.   Neurological: Positive for seizures. Negative for dizziness, facial asymmetry, speech difficulty, weakness and headaches.   Psychiatric/Behavioral: Negative for agitation, behavioral problems and confusion.     Objective:     Vital Signs (Most Recent):  Temp: 98.5 °F (36.9 °C) (18 1108)  Pulse: 66 (18 1108)  Resp: 18 (18 1108)  BP: 137/68 (18 1108)  SpO2: 95 % (18 1108) Vital Signs (24h Range):  Temp:  [98 °F (36.7 °C)-98.7 °F (37.1 °C)] 98.5 °F (36.9  °C)  Pulse:  [52-93] 66  Resp:  [18] 18  SpO2:  [93 %-97 %] 95 %  BP: (100-137)/(54-90) 137/68     Weight: 108 kg (238 lb)  Body mass index is 38.41 kg/m².    Physical Exam   Constitutional: She is oriented to person, place, and time. She appears well-developed and well-nourished.   HENT:   Head: Normocephalic and atraumatic.   Eyes: EOM are normal. Pupils are equal, round, and reactive to light.   Neck: Normal range of motion. Neck supple.   Cardiovascular: Normal rate, regular rhythm and normal heart sounds.    Pulmonary/Chest: Effort normal and breath sounds normal.   Abdominal: Soft. Bowel sounds are normal.   Musculoskeletal: Normal range of motion.   Neurological: She is oriented to person, place, and time. She has a normal Finger-Nose-Finger Test and a normal Heel to Shin Test. Gait normal.   Reflex Scores:       Tricep reflexes are 2+ on the right side and 2+ on the left side.       Bicep reflexes are 2+ on the right side and 2+ on the left side.       Brachioradialis reflexes are 2+ on the right side and 2+ on the left side.       Patellar reflexes are 2+ on the right side and 2+ on the left side.       Achilles reflexes are 2+ on the right side and 2+ on the left side.  Skin: Skin is warm.   Psychiatric: She has a normal mood and affect. Her speech is normal and behavior is normal.       NEUROLOGICAL EXAMINATION:     MENTAL STATUS   Oriented to person, place, and time.   Attention: normal.   Speech: speech is normal   Level of consciousness: alert  Knowledge: good.     CRANIAL NERVES     CN II   Visual fields full to confrontation.     CN III, IV, VI   Pupils are equal, round, and reactive to light.  Extraocular motions are normal.   CN III: no CN III palsy  CN VI: no CN VI palsy  Nystagmus: none   Diplopia: none  Ophthalmoparesis: none    CN V   Facial sensation intact.     CN VII   Facial expression full, symmetric.     CN VIII   CN VIII normal.     CN IX, X   CN IX normal.   CN X normal.     CN XI   CN  XI normal.     CN XII   CN XII normal.     MOTOR EXAM   Muscle bulk: normal  Overall muscle tone: normal    Strength   Right neck flexion: 5/5  Left neck flexion: 5/5  Right neck extension: 5/5  Left neck extension: 5/5  Right deltoid: 5/5  Left deltoid: 5/5  Right biceps: 5/5  Left biceps: 5/5  Right triceps: 5/5  Left triceps: 5/5  Right wrist flexion: 5/5  Left wrist flexion: 5/5  Right wrist extension: 5/5  Left wrist extension: 5/5  Right interossei: 5/5  Left interossei: 5/5  Right abdominals: 5/5  Left abdominals: 5/5  Right iliopsoas: 5/5  Left iliopsoas: 5/5  Right quadriceps: 5/5  Left quadriceps: 5/5  Right hamstrin/5  Left hamstrin/5  Right glutei: 5/5  Left glutei: 5/5  Right anterior tibial: 5/5  Left anterior tibial: 5/5  Right posterior tibial: 5/5  Left posterior tibial: 5/5  Right peroneal: 5/5  Left peroneal: 5/5  Right gastroc: 5/5  Left gastroc: 5/5    REFLEXES     Reflexes   Right brachioradialis: 2+  Left brachioradialis: 2+  Right biceps: 2+  Left biceps: 2+  Right triceps: 2+  Left triceps: 2+  Right patellar: 2+  Left patellar: 2+  Right achilles: 2+  Left achilles: 2+  Right plantar: normal  Left plantar: normal    SENSORY EXAM   Light touch normal.   Proprioception normal.     GAIT AND COORDINATION     Gait  Gait: normal     Coordination   Finger to nose coordination: normal  Heel to shin coordination: normal    Tremor   Resting tremor: absent      Significant Labs: CBC:   No results for input(s): WBC, HGB, HCT, PLT in the last 48 hours.  CMP:   No results for input(s): GLU, NA, K, CL, CO2, BUN, CREATININE, CALCIUM, MG, PROT, ALBUMIN, BILITOT, ALKPHOS, AST, ALT, ANIONGAP, EGFRNONAA in the last 48 hours.    Significant Studies: I have reviewed and interpreted all pertinent imaging results/findings within the past 24 hours.     INTERVAL HX:  6 seizures, no complaints, reports numerous episodes of 1-2 seconds loss of awareness

## 2018-02-12 VITALS
SYSTOLIC BLOOD PRESSURE: 119 MMHG | HEIGHT: 66 IN | DIASTOLIC BLOOD PRESSURE: 71 MMHG | WEIGHT: 238 LBS | TEMPERATURE: 99 F | HEART RATE: 69 BPM | RESPIRATION RATE: 18 BRPM | BODY MASS INDEX: 38.25 KG/M2 | OXYGEN SATURATION: 95 %

## 2018-02-12 LAB — LAMOTRIGINE SERPL-MCNC: 7.8 UG/ML (ref 2–15)

## 2018-02-12 PROCEDURE — 99233 SBSQ HOSP IP/OBS HIGH 50: CPT | Mod: ,,, | Performed by: PSYCHIATRY & NEUROLOGY

## 2018-02-12 PROCEDURE — 25000003 PHARM REV CODE 250: Performed by: PSYCHIATRY & NEUROLOGY

## 2018-02-12 PROCEDURE — 95813 EEG EXTND MNTR 61-119 MIN: CPT | Mod: 26,,, | Performed by: PSYCHIATRY & NEUROLOGY

## 2018-02-12 RX ORDER — ZONISAMIDE 100 MG/1
100 CAPSULE ORAL ONCE
Status: COMPLETED | OUTPATIENT
Start: 2018-02-12 | End: 2018-02-12

## 2018-02-12 RX ORDER — ZONISAMIDE 100 MG/1
100 CAPSULE ORAL DAILY
Qty: 90 CAPSULE | Refills: 3 | Status: SHIPPED | OUTPATIENT
Start: 2018-02-12 | End: 2018-04-09

## 2018-02-12 RX ORDER — ZONISAMIDE 100 MG/1
100 CAPSULE ORAL DAILY
Qty: 90 CAPSULE | Refills: 3 | OUTPATIENT
Start: 2018-02-12 | End: 2018-02-12

## 2018-02-12 RX ADMIN — ZONISAMIDE 100 MG: 100 CAPSULE ORAL at 12:02

## 2018-02-12 NOTE — PROCEDURES
DATE OF PROCEDURE:  02/11/2018    EEG #:  EMU--5, XUK--6.    REFERRING PHYSICIAN:  Dr. Prince.    This EEG was performed to characterize the patient's events.    EPILEPSY MONITORING UNIT  EEG/VIDEO TELEMETRY REPORT      METHODOLOGY:   Electroencephalographic (EEG) is recorded with electrodes placed   according to the International 10-20 placement system.  Thirty Two (32) channels   of digital signal, including T1 and T2 electrodes, are simultaneously recorded   from the scalp and may also include EKG, EMG and/or eye movement monitors.    Recording band pass was 0.1 to 512 Hz.  Digital video recording of the patient   is simultaneously recorded with the EEG.  The patient is instructed to report   clinical symptoms which may occur during the recording session.  EEG and video   recording are stored and archived in digital format. Activation procedures,   which include photic stimulation, hyperventilation and instructing patients to   perform simple tasks, are done in selected patients.   The EEG is displayed on a monitor screen and can be reformatted into different   montages for evaluation.  The entire recoding is submitted for computer-assisted   analysis to detect spike and electrographic seizure activity.  The entire   recording is visually reviewed, and the times identified by computer analysis as   being spikes or seizures are reviewed again.  Compressesed spectral analysis   (CSA) is also performed on the activity recorded from each individual channel.    This is displayed as a power display of frequencies from 0 to 30 Hz over time.     The CSA analysis is done and displayed continuously.  This is reviewed for   asymmetries in power between homologous areas of the scalp and for presence of   changes in power which can be seen when seizures occur.  Sections of suspected   abnormalities on the CSA are then compared with the original EEG recording.     [facility] software was also utilized in the review  of this study.  This software   suite analyzes the EEG recording in multiple domains.  Coherence and rhythmicity   are computed to identify EEG sections which may contain organized seizures.    Each channel undergoes analysis to detect presence of spike and sharp waves   which have special and morphological characteristics of epileptic activity.  The   routine EEG recording is converted from special into frequency domain.  This is   then displayed comparing homologous areas to identify areas of significant   asymmetry.  Algorithm to identify non-cortically generated artifact is used to   separate artifact from the EEG.      RECORDING TIMES:  Start on 02/11/2018 at hour 7 minute 0 second 53.  End on 02/12/2018 at hour 6 minute 59 second 59.    Restart on 02/12/2018 at hour 7 minute 0 second 44.  End on 02/12/2018 at hour 11 minute 1 second 46.    Total time of video EEG recording for this study was 27 hours and 54 minutes.    EVENTS RECORDED:  During this study, none of the patient's typical events were   recorded.    ELECTROENCEPHALOGRAM:  Interictal:  The recording was obtained with a number of standard bipolar and   referential montages during wakefulness, drowsiness and sleep.  In the alert   state, the posterior background rhythm was a symmetric, well-modulated 10 to 11   Hz alpha rhythm, which reacted symmetrically to eye opening.  Activation   procedures were not performed.  During drowsiness, the background rhythm waxed   and waned and there were periods of slowing.  During stage II sleep, symmetric V   waves, K complexes and sleep spindles were noted.  Very frequent left   frontotemporal focal polymorphic delta and theta range slowing was noted.  Runs   of rhythmical delta range slowing were also noted in this region intermixed with   pseudoperiodic sharp waves, maximal at F7 and T3.  During sleep, high amplitude   pseudoperiodic sharp waves were also noted at F7, T3.  Rarely, these sharp   waves were  associated with a large field involving the right frontotemporal   region.  No evolving electrographic seizures were visualized.    The EKG channel revealed sinus rhythm.    Ictal:  During this recording, none of the patient's typical events were   recorded.    FINAL SUMMARY:    ELECTROENCEPHALOGRAM:  Interictal:  This is an abnormal EEG during wakefulness, drowsiness and sleep.    Left temporal intermittent focal slowing was noted.  Left temporal sharp waves   were noted maximally at F7, T3.  Rare field defect was noted involving the right   frontotemporal region.    Ictal:  During this study, none of the patient's typical seizures were recorded.    CLINICAL SEIZURE:  Not applicable.    CLINICAL CORRELATION:  The patient is a 47-year-old female with a history of   epilepsy whose antiepileptic medication was held during this study.  The patient   underwent activation procedures including photic stimulation, hyperventilation   and sleep deprivation followed by pharmacological activation with tramadol and   Benadryl.  This is an abnormal EEG during wakefulness, drowsiness and sleep.    The presence of focal slowing in the left frontotemporal region is suggestive of   focal neuronal dysfunction in this region.  The presence of focal sharp waves   in the left temporal region conferred an increased risk of focal seizures from   this region.  During this study, no seizures were recorded.      FAK/HAILEE  dd: 02/12/2018 13:54:34 (CST)  td: 02/12/2018 14:35:41 (CST)  Doc ID   #[id number]  Job ID #[id number]    CC:

## 2018-02-12 NOTE — DISCHARGE SUMMARY
"[facility]  Neurology-Epilepsy  Discharge Summary      Patient Name: Liza Arrieta  MRN: [id number]  Admission Date: 2/8/2018  Hospital Length of Stay: 4 days  Discharge Date and Time:  02/12/2018 10:26 AM  Attending Physician: Scar Prince MD   Discharging Provider: Sundar Denise II, MD  Primary Care Physician: Primary Doctor No    HPI:   Interval History:   She reported having 2-4 events every day. She had one event while sitting in the lobby for EMU admission.    HISTORY OF PRESENT ILLNESS (HPI)  11/17/2015  The patient is a 47 y.o. yo RHWM   The patient was initially referred for consultation by Dr. Huerta.   The patient was unaccompanied today.      Clinic Visits  Interim History  2018/01/29  EMU evaluation was completed in Dec and L temporal interictal spikes were recorded and one electrographic seizure was recorded arising from the L anterior temporal area.  Besides frequent seizures her major complaint is progressive memory loss.       HISTORY OF PRESENT ILLNESS (HPI)  Date: The   New Patient  Pt has been seeing Dr Huerta at [facility] for "complex partial sezures." First sz, in school, age 19. Was put on Dilantin and she did well for approx 15 years, until the seizures became active again. Thinks she may have had 2 classic "Grand Mal" seizures in her 20's, but since then, seizure types are those described below.     Currently, she is experiencing more than one event per week. Event type is described below. She has been failing her current treatment regimen as described below. May have tried other meds, but can't remember them now. Did not bring records yet.           Seizure Seminology  Seizure Type 1   Classification: Pass-out  Aura - Occasional auditory мария vu  Pt loses consciousness and falls or loses tone 1-2 in  Post-ictal  Brief  Age of onset 19  Current Seizure Frequency - Several per week        Seizure Type 2  Classification: Complex Partial  Wanders around. Doesn't " remember after.   Post-ictal  Brief  Current Seizure Frequency - Several per week     Seizure Triggers  Sleep Deprivation - None  Other medications - None  Psych/stress - None  Photic stimulation - None  Hyperventilation - None  Medical Problems - None  Menses - 3 days before period they get worse  Sensory Stimulation (light, sound, etc) - None  Missed dose of meds - None     AED Treatments  Present regimen   tabs 1 in AM and 1½ in PM      Prior treatments  VPA  eslicarbazine (Aptiom, ESL) - seizure intensity worsened after 2 weeks Rx   BID  TPM 10ID     Not tried  acetazolamide (Diamox, AZM)  amantadine  carbamazepine (Tegretol, CBZ)  clobezam (Onfi or Frizium, CLB)  ethosuximide (Zarontin, ESM)  felbamate (felbatol, FBM)  gabapentin (Neurontin, GPN)  lacosamide (Vimpat, LCS)   levetiracetam (Keppra, LEV)  methsuximide (Celontin, MSM)  methyphenytoin (Mesantion, MHT)  oxcarbazepine (Trileptal OXC)  perampanel (Fycompa, FCP)   phenobarbital (Pb)  pregabalin (Lyrica, PGB)  primidone (Mysoline, PRM)  retigabine (Potiga, RTG)  rufinamide (Banzel, RUF)  tiagabine (Gabatril, TGB)  viagabatrin, (Sabril, VGB)  vagal nerve stimulator (VNS)  valproic acid (Depakote, VPA)  zonisamide (Zonegran, ZNA)  Benzodiazepines  diazepam - rectal (Diastatl)  diazepam - oral (Valium, DZ)  clonazepam (Klonopin, CZP)  clorazepate (Tranxene, CLZ)  Ativan  Brain Stimulation  Vagal Nerve Stimulation-n/a  DBS- n/a     Compliance method  Memory - yes  Mom or Spouse - Yes  Pill Box - no  [place]ne calendar - no  Turn over medication bottle - no  Phone alarm - no     Seizure Evaluation  EEG Routine - Dont have  MRI/MRA - In past- she doesn't know results  [facility], [facility]  2017/12/19-12/20- Patient with no events overnight. EEG shows L anterior temporal spikes, most recorded at F7. None on the other side. At times, almost continuous L temporal interictal discharges at times.   2017/12/20- 11:56:23- clinical seizure, starting from L side on EEG.  No epileptiform discharges noted. L temporal slowing and spikes noted. Consisted of brief moment of unresponsiveness.   2017/12/21- EEG showing L interical anterior temporal slowing with L temporal spikes. No clinical seizures since yesterday.   2017/12/21-12/22- Event on 12/21 at 18:55 showing patient talking on the phone and suddenly stopping, unable to speak and confused. EEG shows there is a rhythmic buildup in the L temporal chains. Patient is confused and is drowsy following event. No tonic/clonic activity present.      CT/CTA Scan -   PET Scan -   Neuropsychological evaluation -   DEXA Scan     Potential Epilepsy Risk Factors:   Pregnancy/Labor/Delivery - full term uncomplicated pregnancy labor and vaginal delivery  Febrile seizures - none  Head injury - none  CNS infection - none   Stroke - none  Family Hx of Sz - none    * No surgery found *     Indwelling Lines/Drains at time of discharge:   Lines/Drains/Airways          No matching active lines, drains, or airways        Hospital Course:   2/8/2018: EMU admission   2/9/2018: No events since admission. EEG showed TIRDA and left temporal sharps at multiple occasions   2/10/2018:  3 L temporal seizures, bilateral temporal sharps  2/11/2018:  6 L temporal seizures, bilateral temporal sharps  2/11/2018:  She reported 3-4 events since yesterday. On EEG: bilateral temporal sharps predominantly on left side.     Events/Seizures recorded  Seizure 1 - on 2/9/2018 start at 18:23:08 stopped 18:23:27  Seizure 2 - on 2/9/2018 start at 21:44:27 stopped 21:44:58  Seizure 3 - on 2/10/2018 start at 01:25:13 stopped 01:26:11  Seizure 4 - on 2/10/2018 start at 08:09:45 stopped 08:10:20  Seizure 5 - on 2/10/2018 start at 09:20:21 stopped 09:21:03  Seizure 6 - on 2/10/2018 start at 09:55:04 stopped 09:56:05  Seizure 7 - on 2/10/2018 start at 10:37:05 stopped 10:38:03  Seizure 8 - on 2/10/2018 start at 11:07:34 stopped 11:08:48  Seizure 9 - on 2/10/2018 start at 17:18:17  stopped 17:19:17     Consults:     Significant Labs: CBC: No results for input(s): WBC, HGB, HCT, PLT in the last 48 hours.  CMP: No results for input(s): GLU, NA, K, CL, CO2, BUN, CREATININE, CALCIUM, MG, PROT, ALBUMIN, BILITOT, ALKPHOS, AST, ALT, ANIONGAP, EGFRNONAA in the last 48 hours.    Significant Studies: I have reviewed and interpreted all pertinent imaging results/findings within the past 24 hours.    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         Uncontrolled spells. Possibly complex partial seizures with secondary generalization versus non-epileptic phenomena.   2.         She has failed multiple medications and may be a candidate for epilepsy surgery if a focal onset is discovered.  3.         EMU admission for seizure characterization   4.         During this hospitalization captured multiple events and EEG showed epileptiform activity.      Plan  - Dc VEEG  - Restart home AEDs  - Will start on Zonisamide 100 mg daily for 2 weeks and titrate up to 400 mg daily   · Week 1: 1 cap daily (Total of 100 mg)  · Week 2: 2 cap daily (Total of 200 mg)  · Week 3: 3 cap daily (Total of 300 mg)  · Week 4: 4 cap daily (Total of 400 mg)  - Discharge today   - Case discussed with Dr Mark and Dr Ybarra      The following issues were   all discussed in detail with the patient and family/caregiver(s):     1. Diagnosis, plans, prognosis, medications and possible side-effects, risks and benefits of treatment, other alternatives to AEDs.  2. Risks related to continued seizures, status epilepticus, SUDEP, driving restrictions and seizure precautions ( no baths but showers are ok, no swimming unsupervised, no use of heavy machinery, no use of sharp moving objects, avoid heights).   3. Issues related to pregnancy, OCP and breast feeding as it relates to epilepsy.  4. The potential of teratogenicity and suicidal risks of anti-epileptic medications.  5.Avoid any activity that compromise patient safety related to seizures.      Questions and concerns raised by the patient and family/care-giver(s) were addressed and they indicated understanding of everything discussed and agreed to plans as above.          Discharged Condition: good    Disposition: Home or Self Care    Follow Up:  Follow-up Information     LISA Elam MD In 6 weeks.    Specialty:  Neurology  Contact information:  [address] [place] [id number] [phone]                 Patient Instructions:     Diet Adult Regular     Activity as tolerated         Medications:  Reconciled Home Medications:   Current Discharge Medication List      START taking these medications    Details   zonisamide (ZONEGRAN) 100 MG Cap Take 1 capsule (100 mg total) by mouth once daily. Week 1: 1 cap daily (Total of 100 mg)  Week 2: 2 cap daily (Total of 200 mg)  Week 3: 3 cap daily (Total of 300 mg)  Week 4: 4 cap daily (Total of 400 mg)  Qty: 90 capsule, Refills: 3         CONTINUE these medications which have NOT CHANGED    Details   lamoTRIgine (LAMICTAL) 200 MG tablet Take 1.5 tablets (300 mg total) by mouth 2 (two) times daily.  Qty: 270 tablet, Refills: 11    Comments: **Patient requests 90 days supply**           Time spent on the discharge of patient: 45 minutes    Sundar Denise II,  MD  Neurology-Epilepsy  [facility]

## 2018-02-12 NOTE — PROGRESS NOTES
Saline lock and telemetry monitor were dc'd. One time dose of Zonisamide was given per MD order.Pt was informed to stay 1 hour after dose was given.Pt awake,alert,verbally responsive.No distress noted,breathing unlabored.Denies any pain or discomfort at this time.Discharge instructions and new prescriptions were given to pt with understanding verbalized.Will continue to monitor.

## 2018-02-12 NOTE — ASSESSMENT & PLAN NOTE
Assessment   1.         Uncontrolled spells. Possibly complex partial seizures with secondary generalization versus non-epileptic phenomena.   2.         She has failed multiple medications and may be a candidate for epilepsy surgery if a focal onset is discovered.  3.  EMU admission for seizure characterization   4.  During this hospitalization captured multiple events and EEG showed epileptiform activity.     Plan  - Dc VEEG  - Restart home AEDs  - Will start on Zonisamide 100 mg daily for 2 weeks and titrate up to 400 mg daily   - Discharge today   - Case discussed with Dr Mark and Dr Ybarra     The following issues were  all discussed in detail with the patient and family/caregiver(s):    1. Diagnosis, plans, prognosis, medications and possible side-effects, risks and benefits of treatment, other alternatives to AEDs.  2. Risks related to continued seizures, status epilepticus, SUDEP, driving restrictions and seizure precautions ( no baths but showers are ok, no swimming unsupervised, no use of heavy machinery, no use of sharp moving objects, avoid heights).   3. Issues related to pregnancy, OCP and breast feeding as it relates to epilepsy.  4. The potential of teratogenicity and suicidal risks of anti-epileptic medications.  5.Avoid any activity that compromise patient safety related to seizures.     Questions and concerns raised by the patient and family/care-giver(s) were addressed and they indicated understanding of everything discussed and agreed to plans as above.

## 2018-02-12 NOTE — PROGRESS NOTES
[facility]  Neurology-Epilepsy  Progress Note    Patient Name: Liza Arrieta  MRN: [id number]  Admission Date: 2018  Hospital Length of Stay: 4 days  Code Status: Prior   Attending Provider: Scar Prince MD  Primary Care Physician: Primary Doctor No   Principal Problem:<principal problem not specified>    Subjective:     Hospital Course:   2018: EMU admission   2018: No events since admission. EEG showed TIRDA and left temporal sharps at multiple occasions   2/10/2018:  3 L temporal seizures, bilateral temporal sharps  2018:  6 L temporal seizures, bilateral temporal sharps  2018:  She reported 3-4 events since yesterday. On EEG: bilateral temporal sharps predominantly on left side.     Past Medical History:   Diagnosis Date    Seizures        Past Surgical History:   Procedure Laterality Date    APPENDECTOMY       SECTION      CHOLECYSTECTOMY      HYSTERECTOMY       Review of patient's allergies indicates:  No Known Allergies    No current facility-administered medications on file prior to encounter.      Current Outpatient Prescriptions on File Prior to Encounter   Medication Sig    lamoTRIgine (LAMICTAL) 200 MG tablet Take 1.5 tablets (300 mg total) by mouth 2 (two) times daily.     Continuous Infusions:    Family History     None        Social History Main Topics    Smoking status: Never Smoker    Smokeless tobacco: Never Used    Alcohol use No    Drug use: No    Sexual activity: Not on file     Review of Systems   Constitutional: Negative for activity change, appetite change, chills, fatigue and fever.   HENT: Negative for postnasal drip, sinus pain, sinus pressure and trouble swallowing.    Eyes: Negative for discharge and itching.   Respiratory: Negative for apnea, cough and shortness of breath.    Cardiovascular: Negative for chest pain and palpitations.   Gastrointestinal: Negative for abdominal pain, constipation, diarrhea and nausea.    Endocrine: Negative for cold intolerance and heat intolerance.   Genitourinary: Negative for flank pain and frequency.   Musculoskeletal: Negative for arthralgias, back pain, gait problem and neck pain.   Skin: Negative for color change and pallor.   Neurological: Positive for seizures. Negative for dizziness, facial asymmetry, speech difficulty, weakness and headaches.   Psychiatric/Behavioral: Negative for agitation, behavioral problems and confusion.     Objective:     Vital Signs (Most Recent):  Temp: 98.4 °F (36.9 °C) (02/12/18 0349)  Pulse: 65 (02/12/18 0700)  Resp: 18 (02/11/18 1927)  BP: 118/69 (02/12/18 0349)  SpO2: (!) 94 % (02/12/18 0349) Vital Signs (24h Range):  Temp:  [98.4 °F (36.9 °C)-98.8 °F (37.1 °C)] 98.4 °F (36.9 °C)  Pulse:  [61-70] 65  Resp:  [18] 18  SpO2:  [94 %-96 %] 94 %  BP: (118-143)/(68-86) 118/69     Weight: 108 kg (238 lb)  Body mass index is 38.41 kg/m².    Physical Exam   Constitutional: She is oriented to person, place, and time. She appears well-developed and well-nourished.   HENT:   Head: Normocephalic and atraumatic.   Eyes: EOM are normal. Pupils are equal, round, and reactive to light.   Neck: Normal range of motion. Neck supple.   Cardiovascular: Normal rate, regular rhythm and normal heart sounds.    Pulmonary/Chest: Effort normal and breath sounds normal.   Abdominal: Soft. Bowel sounds are normal.   Musculoskeletal: Normal range of motion.   Neurological: She is oriented to person, place, and time. She has a normal Finger-Nose-Finger Test and a normal Heel to Shin Test. Gait normal.   Reflex Scores:       Tricep reflexes are 2+ on the right side and 2+ on the left side.       Bicep reflexes are 2+ on the right side and 2+ on the left side.       Brachioradialis reflexes are 2+ on the right side and 2+ on the left side.       Patellar reflexes are 2+ on the right side and 2+ on the left side.       Achilles reflexes are 2+ on the right side and 2+ on the left side.  Skin:  Skin is warm.   Psychiatric: She has a normal mood and affect. Her speech is normal and behavior is normal.       NEUROLOGICAL EXAMINATION:     MENTAL STATUS   Oriented to person, place, and time.   Attention: normal.   Speech: speech is normal   Level of consciousness: alert  Knowledge: good.     CRANIAL NERVES     CN II   Visual fields full to confrontation.     CN III, IV, VI   Pupils are equal, round, and reactive to light.  Extraocular motions are normal.   CN III: no CN III palsy  CN VI: no CN VI palsy  Nystagmus: none   Diplopia: none  Ophthalmoparesis: none    CN V   Facial sensation intact.     CN VII   Facial expression full, symmetric.     CN VIII   CN VIII normal.     CN IX, X   CN IX normal.   CN X normal.     CN XI   CN XI normal.     CN XII   CN XII normal.     MOTOR EXAM   Muscle bulk: normal  Overall muscle tone: normal    Strength   Right neck flexion: 5/5  Left neck flexion: 5/5  Right neck extension: 5/5  Left neck extension: 5/5  Right deltoid: 5/5  Left deltoid: 5/5  Right biceps: 5/5  Left biceps: 5/5  Right triceps: 5/5  Left triceps: 5/5  Right wrist flexion: 5/5  Left wrist flexion: 5/5  Right wrist extension: 5/5  Left wrist extension: 5/5  Right interossei: 5/5  Left interossei: 5/5  Right abdominals: 5/5  Left abdominals: 5/5  Right iliopsoas: 5/5  Left iliopsoas: 5/5  Right quadriceps: 5/5  Left quadriceps: 5/5  Right hamstrin/5  Left hamstrin/5  Right glutei: 5/5  Left glutei: 5/5  Right anterior tibial: 5/5  Left anterior tibial: 5/5  Right posterior tibial: 5/5  Left posterior tibial: 5/5  Right peroneal: 5/5  Left peroneal: 5/5  Right gastroc: 5/5  Left gastroc: 5/5    REFLEXES     Reflexes   Right brachioradialis: 2+  Left brachioradialis: 2+  Right biceps: 2+  Left biceps: 2+  Right triceps: 2+  Left triceps: 2+  Right patellar: 2+  Left patellar: 2+  Right achilles: 2+  Left achilles: 2+  Right plantar: normal  Left plantar: normal    SENSORY EXAM   Light touch normal.    Proprioception normal.     GAIT AND COORDINATION     Gait  Gait: normal     Coordination   Finger to nose coordination: normal  Heel to shin coordination: normal    Tremor   Resting tremor: absent      Significant Labs: CBC:   No results for input(s): WBC, HGB, HCT, PLT in the last 48 hours.  CMP:   No results for input(s): GLU, NA, K, CL, CO2, BUN, CREATININE, CALCIUM, MG, PROT, ALBUMIN, BILITOT, ALKPHOS, AST, ALT, ANIONGAP, EGFRNONAA in the last 48 hours.    Significant Studies: I have reviewed and interpreted all pertinent imaging results/findings within the past 24 hours.       Assessment and Plan:     Temporal lobe epilepsy, intractable    Assessment   1.         Uncontrolled spells. Possibly complex partial seizures with secondary generalization versus non-epileptic phenomena.   2.         She has failed multiple medications and may be a candidate for epilepsy surgery if a focal onset is discovered.  3.  EMU admission for seizure characterization   4.  During this hospitalization captured multiple events and EEG showed epileptiform activity.     Plan  - Dc VEEG  - Restart home AEDs  - Will start on Zonisamide 100 mg daily for 2 weeks and titrate up to 400 mg daily   · Week 1: 1 cap daily (Total of 100 mg)  · Week 2: 2 cap daily (Total of 200 mg)  · Week 3: 3 cap daily (Total of 300 mg)  · Week 4: 4 cap daily (Total of 400 mg)  - Discharge today   - Case discussed with Dr Mark and Dr Ybarra     The following issues were  all discussed in detail with the patient and family/caregiver(s):    1. Diagnosis, plans, prognosis, medications and possible side-effects, risks and benefits of treatment, other alternatives to AEDs.  2. Risks related to continued seizures, status epilepticus, SUDEP, driving restrictions and seizure precautions ( no baths but showers are ok, no swimming unsupervised, no use of heavy machinery, no use of sharp moving objects, avoid heights).   3. Issues related to pregnancy, OCP and breast  feeding as it relates to epilepsy.  4. The potential of teratogenicity and suicidal risks of anti-epileptic medications.  5.Avoid any activity that compromise patient safety related to seizures.     Questions and concerns raised by the patient and family/care-giver(s) were addressed and they indicated understanding of everything discussed and agreed to plans as above.            VTE Risk Mitigation         Ordered     enoxaparin injection 40 mg  Daily     Route:  Subcutaneous        02/08/18 1641     Medium Risk of VTE  Once      02/08/18 1641          Sundar Denise II, MD  Neurology-Epilepsy  [facility]

## 2018-02-12 NOTE — SUBJECTIVE & OBJECTIVE
Past Medical History:   Diagnosis Date    Seizures        Past Surgical History:   Procedure Laterality Date    APPENDECTOMY       SECTION      CHOLECYSTECTOMY      HYSTERECTOMY       Review of patient's allergies indicates:  No Known Allergies    No current facility-administered medications on file prior to encounter.      Current Outpatient Prescriptions on File Prior to Encounter   Medication Sig    lamoTRIgine (LAMICTAL) 200 MG tablet Take 1.5 tablets (300 mg total) by mouth 2 (two) times daily.     Continuous Infusions:    Family History     None        Social History Main Topics    Smoking status: Never Smoker    Smokeless tobacco: Never Used    Alcohol use No    Drug use: No    Sexual activity: Not on file     Review of Systems   Constitutional: Negative for activity change, appetite change, chills, fatigue and fever.   HENT: Negative for postnasal drip, sinus pain, sinus pressure and trouble swallowing.    Eyes: Negative for discharge and itching.   Respiratory: Negative for apnea, cough and shortness of breath.    Cardiovascular: Negative for chest pain and palpitations.   Gastrointestinal: Negative for abdominal pain, constipation, diarrhea and nausea.   Endocrine: Negative for cold intolerance and heat intolerance.   Genitourinary: Negative for flank pain and frequency.   Musculoskeletal: Negative for arthralgias, back pain, gait problem and neck pain.   Skin: Negative for color change and pallor.   Neurological: Positive for seizures. Negative for dizziness, facial asymmetry, speech difficulty, weakness and headaches.   Psychiatric/Behavioral: Negative for agitation, behavioral problems and confusion.     Objective:     Vital Signs (Most Recent):  Temp: 98.4 °F (36.9 °C) (18)  Pulse: 65 (18 0700)  Resp: 18 (18 1927)  BP: 118/69 (18)  SpO2: (!) 94 % (18) Vital Signs (24h Range):  Temp:  [98.4 °F (36.9 °C)-98.8 °F (37.1 °C)] 98.4 °F (36.9  °C)  Pulse:  [61-70] 65  Resp:  [18] 18  SpO2:  [94 %-96 %] 94 %  BP: (118-143)/(68-86) 118/69     Weight: 108 kg (238 lb)  Body mass index is 38.41 kg/m².    Physical Exam   Constitutional: She is oriented to person, place, and time. She appears well-developed and well-nourished.   HENT:   Head: Normocephalic and atraumatic.   Eyes: EOM are normal. Pupils are equal, round, and reactive to light.   Neck: Normal range of motion. Neck supple.   Cardiovascular: Normal rate, regular rhythm and normal heart sounds.    Pulmonary/Chest: Effort normal and breath sounds normal.   Abdominal: Soft. Bowel sounds are normal.   Musculoskeletal: Normal range of motion.   Neurological: She is oriented to person, place, and time. She has a normal Finger-Nose-Finger Test and a normal Heel to Shin Test. Gait normal.   Reflex Scores:       Tricep reflexes are 2+ on the right side and 2+ on the left side.       Bicep reflexes are 2+ on the right side and 2+ on the left side.       Brachioradialis reflexes are 2+ on the right side and 2+ on the left side.       Patellar reflexes are 2+ on the right side and 2+ on the left side.       Achilles reflexes are 2+ on the right side and 2+ on the left side.  Skin: Skin is warm.   Psychiatric: She has a normal mood and affect. Her speech is normal and behavior is normal.       NEUROLOGICAL EXAMINATION:     MENTAL STATUS   Oriented to person, place, and time.   Attention: normal.   Speech: speech is normal   Level of consciousness: alert  Knowledge: good.     CRANIAL NERVES     CN II   Visual fields full to confrontation.     CN III, IV, VI   Pupils are equal, round, and reactive to light.  Extraocular motions are normal.   CN III: no CN III palsy  CN VI: no CN VI palsy  Nystagmus: none   Diplopia: none  Ophthalmoparesis: none    CN V   Facial sensation intact.     CN VII   Facial expression full, symmetric.     CN VIII   CN VIII normal.     CN IX, X   CN IX normal.   CN X normal.     CN XI   CN  XI normal.     CN XII   CN XII normal.     MOTOR EXAM   Muscle bulk: normal  Overall muscle tone: normal    Strength   Right neck flexion: 5/5  Left neck flexion: 5/5  Right neck extension: 5/5  Left neck extension: 5/5  Right deltoid: 5/5  Left deltoid: 5/5  Right biceps: 5/5  Left biceps: 5/5  Right triceps: 5/5  Left triceps: 5/5  Right wrist flexion: 5/5  Left wrist flexion: 5/5  Right wrist extension: 5/5  Left wrist extension: 5/5  Right interossei: 5/5  Left interossei: 5/5  Right abdominals: 5/5  Left abdominals: 5/5  Right iliopsoas: 5/5  Left iliopsoas: 5/5  Right quadriceps: 5/5  Left quadriceps: 5/5  Right hamstrin/5  Left hamstrin/5  Right glutei: 5/5  Left glutei: 5/5  Right anterior tibial: 5/5  Left anterior tibial: 5/5  Right posterior tibial: 5/5  Left posterior tibial: 5/5  Right peroneal: 5/5  Left peroneal: 5/5  Right gastroc: 5/5  Left gastroc: 5/5    REFLEXES     Reflexes   Right brachioradialis: 2+  Left brachioradialis: 2+  Right biceps: 2+  Left biceps: 2+  Right triceps: 2+  Left triceps: 2+  Right patellar: 2+  Left patellar: 2+  Right achilles: 2+  Left achilles: 2+  Right plantar: normal  Left plantar: normal    SENSORY EXAM   Light touch normal.   Proprioception normal.     GAIT AND COORDINATION     Gait  Gait: normal     Coordination   Finger to nose coordination: normal  Heel to shin coordination: normal    Tremor   Resting tremor: absent      Significant Labs: CBC:   No results for input(s): WBC, HGB, HCT, PLT in the last 48 hours.  CMP:   No results for input(s): GLU, NA, K, CL, CO2, BUN, CREATININE, CALCIUM, MG, PROT, ALBUMIN, BILITOT, ALKPHOS, AST, ALT, ANIONGAP, EGFRNONAA in the last 48 hours.    Significant Studies: I have reviewed and interpreted all pertinent imaging results/findings within the past 24 hours.

## 2018-02-12 NOTE — PLAN OF CARE
02/12/18 1124   Final Note   Assessment Type Final Discharge Note   Discharge Disposition Home     Patient is discharged to home today. Patient has no discharge needs. Patient's family will provide transportation home.

## 2018-04-09 ENCOUNTER — OFFICE VISIT (OUTPATIENT)
Dept: NEUROLOGY | Facility: CLINIC | Age: 48
End: 2018-04-09
Payer: COMMERCIAL

## 2018-04-09 DIAGNOSIS — G40.219 COMPLEX PARTIAL EPILEPSY WITH GENERALIZATION AND WITH INTRACTABLE EPILEPSY: ICD-10-CM

## 2018-04-09 DIAGNOSIS — G40.119 TEMPORAL LOBE EPILEPSY, INTRACTABLE: Primary | ICD-10-CM

## 2018-04-09 DIAGNOSIS — R56.9 SEIZURES: Primary | ICD-10-CM

## 2018-04-09 PROCEDURE — 99214 OFFICE O/P EST MOD 30 MIN: CPT | Mod: S$GLB,,, | Performed by: PSYCHIATRY & NEUROLOGY

## 2018-04-09 PROCEDURE — 99999 PR PBB SHADOW E&M-EST. PATIENT-LVL I: CPT | Mod: PBBFAC,,, | Performed by: PSYCHIATRY & NEUROLOGY

## 2018-04-09 RX ORDER — CLOBAZAM 20 MG/1
20 TABLET ORAL 2 TIMES DAILY
Qty: 60 TABLET | Refills: 5 | Status: SHIPPED | OUTPATIENT
Start: 2018-04-09 | End: 2019-01-14

## 2018-04-09 NOTE — PROGRESS NOTES
"Name: Gloria Gurrola  MRN: [id number]   CSN: [id number]      Date: 04/09/2018    HISTORY OF PRESENT ILLNESS (HPI)  11/17/2015  The patient is a 47 y.o. yo RHWM   The patient was initially referred for consultation by Dr. Huerta.   The patient was unaccompanied today.     Clinic Visits  Interim History  2018/04/09  The patient had 9 seizures recorded in last EMU visit all coming from L anterior temporal area.   MRI was normal but PET showed L mesial hypometabolism.  She is currently have almost daily seizures.  She has failed four AEDs is on Lamictal monotherapy.  She reports her verbal memory is terrible.  Review of labs show she was B12 deficient 4 yrs ago and she does not take any supplements.    Results for GLORIA GURROLA (MRN [id number]) as of 4/9/2018 16:23   Ref. Range 3/1/2016 14:45 12/19/2017 12:56 2/8/2018 18:12   Lamotrigine Lvl Latest Ref Range: 2.0 - 15.0 ug/mL 12.0 10.5 7.8     2018/01/29  EMU evaluation was completed in Dec and L temporal interictal spikes were recorded and one electrographic seizure was recorded arising from the L anterior temporal area.  Besides frequent seizures her major complaint is progressive memory loss.      HISTORY OF PRESENT ILLNESS (HPI)  Date: The   New Patient  Pt has been seeing Dr Huerta at [facility] for "complex partial sezures." First sz, in school, age 19. Was put on Dilantin and she did well for approx 15 years, until the seizures became active again. Thinks she may have had 2 classic "Grand Mal" seizures in her 20's, but since then, seizure types are those described below.    Currently, she is experiencing more than one event per week. Event type is described below. She has been failing her current treatment regimen as described below. May have tried other meds, but can't remember them now. Did not bring records yet.        Seizure Seminology  Seizure Type 1   Classification: Pass-out  Aura - Occasional auditory мария vu  Pt loses consciousness and falls or loses tone 1-2 " in  Post-ictal  Brief  Age of onset 19  Current Seizure Frequency - Several per week      Seizure Type 2  Classification: Complex Partial  Wanders around. Doesn't remember after.   Post-ictal  Brief  Current Seizure Frequency - Several per week    Seizure Triggers  Sleep Deprivation - None  Other medications - None  Psych/stress - None  Photic stimulation - None  Hyperventilation - None  Medical Problems - None  Menses - 3 days before period they get worse  Sensory Stimulation (light, sound, etc) - None  Missed dose of meds - None    AED Treatments  Present regimen   tabs 1 in AM and 1½ in PM     Prior treatments  VPA  eslicarbazine (Aptiom, ESL) - seizure intensity worsened after 2 weeks Rx   BID  TPM 10ID  zonisamide (Zonegran, ZNA)    Not tried  acetazolamide (Diamox, AZM)  amantadine  carbamazepine (Tegretol, CBZ)  clobezam (Onfi or Frizium, CLB)  ethosuximide (Zarontin, ESM)  felbamate (felbatol, FBM)  gabapentin (Neurontin, GPN)  lacosamide (Vimpat, LCS)   levetiracetam (Keppra, LEV)  methsuximide (Celontin, MSM)  methyphenytoin (Mesantion, MHT)  oxcarbazepine (Trileptal OXC)  perampanel (Fycompa, FCP)   phenobarbital (Pb)  pregabalin (Lyrica, PGB)  primidone (Mysoline, PRM)  retigabine (Potiga, RTG)  rufinamide (Banzel, RUF)  tiagabine (Gabatril, TGB)  viagabatrin, (Sabril, VGB)  vagal nerve stimulator (VNS)  valproic acid (Depakote, VPA)    Benzodiazepines  diazepam - rectal (Diastatl)  diazepam - oral (Valium, DZ)  clonazepam (Klonopin, CZP)  clorazepate (Tranxene, CLZ)  Ativan  Brain Stimulation  Vagal Nerve Stimulation-n/a  DBS- n/a    Compliance method  Memory - yes  Mom or Spouse - Yes  Pill Box - no  Dewayne calendar - no  Turn over medication bottle - no  Phone alarm - no    Seizure Evaluation  EEG Routine - Dont have  MRI/MRA - In past- she doesn't know results  [facility] geovanni  2017/12/19-12/20- Patient with no events overnight. EEG shows L anterior temporal spikes, most recorded at F7. None on  the other side. At times, almost continuous L temporal interictal discharges at times.   2017/12/20- 11:56:23- clinical seizure, starting from L side on EEG. No epileptiform discharges noted. L temporal slowing and spikes noted. Consisted of brief moment of unresponsiveness.   2017/12/21- EEG showing L interical anterior temporal slowing with L temporal spikes. No clinical seizures since yesterday.   2017/12/21-12/22- Event on 12/21 at 18:55 showing patient talking on the phone and suddenly stopping, unable to speak and confused. EEG shows there is a rhythmic buildup in the L temporal chains. Patient is confused and is drowsy following event. No tonic/clonic activity present.     CT/CTA Scan -   PET Scan -   Neuropsychological evaluation -   DEXA Scan    Potential Epilepsy Risk Factors:   Pregnancy/Labor/Delivery - full term uncomplicated pregnancy labor and vaginal delivery  Febrile seizures - none  Head injury - none  CNS infection - none   Stroke - none  Family Hx of Sz - none    PAST MEDICAL HISTORY:   Active Ambulatory Problems     Diagnosis  Date Noted      No Active Ambulatory Problems    Resolved Ambulatory Problems     Diagnosis  Date Noted      No Resolved Ambulatory Problems    No Additional Past Medical History         PAST SURGICAL HISTORY: No past surgical history on file.     FAMILY HISTORY: No family history on file.      SOCIAL HISTORY:    Social History    History    Social History      Marital Status:        Spouse Name:  N/A      Number of Children:  N/A      Years of Education:  N/A    Occupational History      Not on file.    Social History Main Topics      Smoking status:  Never Smoker      Smokeless tobacco:  Not on file      Alcohol Use:  No      Drug Use:  No      Sexual Activity:  Not on file    Other Topics  Concern      Not on file    Social History Narrative      No narrative on file            SUBSTANCE USE:  Social History    Social History Main Topics      Smoking  "status:  Never Smoker      Smokeless tobacco:  Not on file      Alcohol Use:  No      Drug Use:  No      Sexual Activity:  Not on file       History    Substance Use Topics      Smoking status:  Never Smoker      Smokeless tobacco:  Not on file      Alcohol Use:  No         ALLERGIES: Review of patient's allergies indicates no known allergies.       Review of Systems   Constitutional: Negative for fever, chills, weight loss, malaise/fatigue and diaphoresis.   HENT: Negative for ear pain, hearing loss, nosebleeds and tinnitus.   Eyes: Negative for blurred vision, double vision, photophobia and pain.   Respiratory: Negative for cough, hemoptysis and shortness of breath.   Cardiovascular: Negative for chest pain, palpitations, orthopnea and leg swelling.   Gastrointestinal: Negative for heartburn, nausea, vomiting, abdominal pain, diarrhea, constipation and blood in stool.   Genitourinary: Negative for dysuria and hematuria.   Musculoskeletal: Negative for myalgias, joint pain and falls.   Skin: Negative for itching and rash.   Neurological: Positive for tingling, sensory change and seizures. Negative for dizziness, tremors, speech change, focal weakness, loss of consciousness, weakness and headaches.   Endo/Heme/Allergies: Negative for environmental allergies. Does not bruise/bleed easily.   Psychiatric/Behavioral: Positive for memory loss. Negative for depression, hallucinations and substance abuse. The patient has insomnia. The patient is not nervous/anxious.       /80 mmHg  Pulse 69  Ht 5' 6" (1.676 m)  Wt 110.1 kg (242 lb 11.6 oz)  BMI 39.20 kg/m2      Neurologic Exam      Higher Cortical Function:   Patient is a well developed, pleasant, well groomed individual appearing their stated age  Oriented - intact to person, place and time and followed two step instruction correctly.   Fund of knowledge was appropriate.   Language - Speech was fluent without evidence for an aphasia.    Throat: no " "aphthous ulcers noted in mouth, no erythema or enlargement of tonsils, pharynx is clear without inflammation   Lymph nodes: No enlargement of lymph nodes    Cranial Nerves II - XII:   EOMs were intact with normal smooth and no nystagmus.   PERRLA. D/C Funduscopic exam - disc were flat with normal A/V ratio and no exudates or hemorrhages. Visual fields were full to confrontation.   Motor - facial movement was symmetrical and normal.   Facial sensory - Light touch and pin prick sensations were normal.   Hearing was normal to finger rub.  Palate moved well and was symmetrical with normal palatal and oral sensation.   Tongue movement was full & the patient could say "la la la" and "Ka Ka Ka" without  difficulty. Patient repeated Judaism and Confucianism without difficulty. Normal power and bulk was found in the massiter and rotator muscles of the neck.  Motor: Power, bulk and tone were normal in all extremities.  Sensory: Light touch, pin prick, vibration and position senses were normal in all extremities.   Coordination:   Rapid alternating movements and rapid finger tapping - normal.   Finger to nose - nl.   Arm roll - symmetrical.   Gait: Station, gait and tandem walking were done without difficulty and Romberg was negative.    Deep tendon reflexes:   Reflex  L  R    Bicpets  2+  2+    Tricepts  2+  2+    Brachio-radialis  2+  2+    Knee  2+  2+    Ankle  2+  2+    Babinski  No  No      Tremor: resting, postural, intentional - none    Pulses   Peripheral - strong and symmetrical   IMPRESSION  1.  Uncontrolled spells. Possibly complex partial seizures with secondary generalization versus non-epileptic phenomena.   2.  She has failed multiple medications and may be a candidate for epilepsy surgery if a focal onset is discovered.  3. Recent vEEG study revealed Left spikes but we were unable to record typical seizures  4. She feels better overall in terms of daily life and seizures are slightly less on less meds (Off PHT " now)      DISPOSITION:   1. Continue LTG to 200mg 1 am and 1½ in pm  2.   Start clobazam 20 mg tabs 2 tabs QD  3. Neuropsychological testing.    4. Schedule epilepsy surgery conference.

## 2018-04-10 ENCOUNTER — TELEPHONE (OUTPATIENT)
Dept: NEUROLOGY | Facility: CLINIC | Age: 48
End: 2018-04-10

## 2018-04-10 NOTE — TELEPHONE ENCOUNTER
Pt verbalized her appt date and time       ---- Message from Rena Jimenez RN sent at 4/9/2018  5:15 PM CDT -----  Hey, girly. Got another surgery candidate. Thanks!

## 2018-04-26 ENCOUNTER — INITIAL CONSULT (OUTPATIENT)
Dept: NEUROLOGY | Facility: CLINIC | Age: 48
End: 2018-04-26
Payer: COMMERCIAL

## 2018-04-26 DIAGNOSIS — G31.84 MILD NEUROCOGNITIVE DISORDER: ICD-10-CM

## 2018-04-26 DIAGNOSIS — G40.119 TEMPORAL LOBE EPILEPSY, INTRACTABLE: Primary | ICD-10-CM

## 2018-04-26 PROCEDURE — 90791 PSYCH DIAGNOSTIC EVALUATION: CPT | Mod: S$GLB,,, | Performed by: CLINICAL NEUROPSYCHOLOGIST

## 2018-04-26 PROCEDURE — 99499 UNLISTED E&M SERVICE: CPT | Mod: S$GLB,,, | Performed by: CLINICAL NEUROPSYCHOLOGIST

## 2018-04-26 PROCEDURE — 96118 PR NEUROPSYCH TESTING BY PSYCH/PHYS: CPT | Mod: S$GLB,,, | Performed by: CLINICAL NEUROPSYCHOLOGIST

## 2018-04-26 PROCEDURE — 96119 PR NEUROPSYCH TESTING BY TECHNICIAN: CPT | Mod: 59,S$GLB,, | Performed by: CLINICAL NEUROPSYCHOLOGIST

## 2018-04-26 NOTE — PROGRESS NOTES
Outpatient Neuropsychological Evaluation    Referral Information  Name: Liza Arrieta  MRN: [id number]  GILL: 2018  : 1970  Age: 47 y.o.  Handedness: Right  Race: White  Gender: female  Referring Provider: LISA Elam Md  [id number]  [place] [address]  Referral Reason/Medical Necessity: Cognitive difficulties that have worsened over time and current work up for possible epilepsy surgery evaluation. Neuropsychological evaluation ordered by Neurology to characterize cognitive status, differential diagnosis, and updated treatment recommendations.   Billing:Total licensed neuropsychologists professional time includes: clinical interview (43571: 60-minutes), test administration and interpretation of tests administered by the billing neuropsychologist, integration of test results and other clinical data, preparing the final report, and personally reporting results to the patient (66550)= 3 hours. Total technician time (62556) = 3 hours   Consent: The patient expressed an understanding of the purpose of the evaluation and consented to all procedures.    HPI   Current Symptoms  Cognitive Sxs:  · Attention:   · Per Pt: No major difficulties  · Per : Agreed with above  · Mental Speed: No reported difficulties  · Memory:   · Per Pt: No trouble with long term memory. However, she reports that short-term memory is more problematic for her. Examples: she forgets passwords frequently, needs to write information down more now, has trouble remembering non-routine/non-repetitive tasks, and has a much harder time remembering names of people when compared to the past. Onset was 20 years ago in the context of her seizure disorder. Course has been a progressive worsening over time.   · Per : Agreed with above  · Language:  · Per Pt: She has word finding difficulty and occasionally says the wrong word in conversation.   · Per : Agreed with above  · Visuospatial/Perceptual: No reported  difficulties  · Executive Functioning: No reported difficulties    [Onset/Course]: Onset of cognitive symptoms was 20 years ago in the context of her seizure disorder. Course has been a progressive worsening over time. No major factors worsen/improve cognition day to day. She is using compensatory strategies which is variably helpful.     Neuropsychiatric Sxs:  · Mood:   · Depression: Seizures have taken a toll on quality of life and life-role expectations that impact her mood.  describes mood as up/down.   · Anxiety: Denied  · Other:  Denied  · Neurovegetative:  · Sleep: Falls asleep fine, but wakes up 1-2x weekly if she does not exercise that day.  · Appetite: Adequate and trying to lose weight  · Energy: Good  · Behavioral Concerns: None  · Delusions/Hallucinations: None  · SI/HI: None    Physical Sxs:  · Motor: No symptoms reported  · Sensory: No symptoms reported  · Pain: No symptoms reported    Current Functioning (I/ADLs):  · ADLs: Independent  · IADLs: Independent    Family Neurologic History: Alzheimer's (Grandfather in his 70s)  Family Psychiatric History: Negative for heritable risk factors    Developmental/Academic Hx:  Developmental: No gestational or later developmental concerns.  Academic:  · Learning Difficulties: None  · Attention/Behavioral Difficulties: None  · Educational Attainment:  + [employer] (BA in [employer]) + Always a good student    Social/Occupational Hx:  Social:  · Current Relationship/Family Status:  for 23 years + 4 children + some marital and family stress often associated with limitations related to to epilepsy.   · Primary Source of Support:   · Current Hobbies: Enjoys exercising but time is spent raising her 4 children  · Stressors: Yes  · Epilepsy is taking a significant toll on her quality of life.     Occupational Hx:  · Occupational Status: Full Time  · Primary Occupation(s):  for [employer] and [employer]  HISTORY  Patient Active Problem List   Diagnosis    Convulsions/seizures    Convulsions    Temporal lobe epilepsy, intractable    Complex partial epilepsy with generalization and with intractable epilepsy     Past Medical History:   Diagnosis Date    Seizures      Past Surgical History:   Procedure Laterality Date    APPENDECTOMY       SECTION      CHOLECYSTECTOMY      HYSTERECTOMY       Neurologic History  · TBI: None  · Seizures: Yes  · Onset: 18yo    · Type: Described as left anterior temporal with generalization in medical record  · Tx: Fairly successful treatment with Dilantin for 15 years when diagnosed. Variable success afterward with a recent increase in the past 2+ years and more noticeable worsening in frequency in the past year. She has had EMU evaluations and the treatment team is considering surgery at the current time.    · Stroke: None  · Movement Disorder: None    Lab Results   Component Value Date    [id number] 345 2015     Lab Results   Component Value Date    RPR Non-Reactive 2004     No results found for: FOLATE  Lab Results   Component Value Date    TSH 1.657 2016     No results found for: LABA1C, HGBA1C  No results found for: HIV1X2, [id number]    Neurodiagnostics  MRI on 18  Minimal T2/flair hyperintensity in posterior periventricular distribution, nonspecific, may reflect chronic microvascular ischemic changes or less likely  demyelinating process.      No evident abnormality identified in the temporal lobes. No abnormal enhancing lesions.    PET on 18  Decreased activity medial left temporal lobe suggesting mesial temporal sclerosis.    vEEG  2018: No events since admission. EEG showed TIRDA and left temporal sharps at multiple occasions   2/10/2018:  3 L temporal seizures, bilateral temporal sharps  2018:  6 L temporal seizures, bilateral temporal sharps  2018:  She reported 3-4 events since yesterday. On EEG: bilateral temporal  "sharps predominantly on left side.     Current Outpatient Prescriptions:     cloBAZam (ONFI) 20 mg Tab, Take 1 tablet (20 mg total) by mouth 2 (two) times daily., Disp: 60 tablet, Rfl: 5    lamoTRIgine (LAMICTAL) 200 MG tablet, Take 1.5 tablets (300 mg total) by mouth 2 (two) times daily., Disp: 270 tablet, Rfl: 11    Psychiatric Hx:  · Childhood: None  · Adult: Yes  · Post-partum depression and treated by primary care for a period of time post-pregnancy. No recent treatment for depression.   · Reports family difficulties (sibling conflict, parent difficulties) and is interested in family therapy  · Substance Use: None    Social History     Social History Main Topics    Smoking status: Never Smoker    Smokeless tobacco: Never Used    Alcohol use No    Drug use: No    Sexual activity: Not on file       MENTAL STATUS AND OBSERVATIONS:  APPEARANCE: Casually dressed and adequate grooming/hygiene.   ALERTNESS/ORIENTATION: Attentive and alert. Fully oriented (x5) to time and place  GAIT: Unremarkable  MOTOR MOVEMENTS/MANNERISMS: Unremarkable  SPEECH/LANGUAGE: Normal in rate, rhythm, tone, and volume. No significant word finding difficulty noted. Expressive and receptive language was normal.  STATED MOOD/AFFECT: The patients stated mood was "up/down" Affect was dysphoric/flat mostly with some periods of affective brightening.   INTERPERSONAL BEHAVIOR: Rapport was quickly and easily established   SUICIDALITY/HOMICIDALITY: Denied  HALLUCINATIONS/DELUSIONS: None evidenced or endorsed  THOUGHT PROCESSES: Thoughts seemed logical and goal-directed.   TEST TAKING BEHAVIOR and VALIDITY: Freestanding and embedded performance validity measures and observation of effort were suggestive of adequate engagement. The current results, therefore, are likely a valid reflection of the patient's current functioning.     PROCEDURES/TESTS ADMINISTERED:  In addition to performing a review of pertinent medical records, reviewing limits to " confidentiality, conducting a clinical interview, and explaining procedures, the following measures were administered:   [employer] (ACS) Test of Pre-Morbid Functioning (TOPF), Green's MSVT, Wechsler Adult Intelligence Scale-Fourth Edition (WAIS-IV Core Subtests), Trail Making Test (TMT-A&B; Param et al., 2004), Verbal Fluency Test(FAS/Animals; Param et al., 2004), [place] Naming Test (BNT; Param et al., 2004), Adiel Complex Figure Copy Trial(Adiel CFT), [place] Verbal Learning Test-Second Edition (CVLT-2), Wechsler Memory Scale-Fourth Edition (WMS-IV) Logical Memory and Visual Reproduction subtests, Grooved Pegboard (GPT; Param, et al., 2004), and the GIANNA. Manual norms were used unless otherwise indicated.      TEST RESULTS  PERFORMANCE VALIDITY  GMSVT  IR..................................100 / Valid  DR..................................100 / Valid  CS..................................100 / Valid  PA..................................100 /   FR..................................80 /     RDS...................................8 / Valid  CVLT-FC..............................16 / Valid          Percentile Interpretation:        </=3rd......................................Abnormal        4th-9th.....................................Borderline Abnormal        10th-24th...................................Low Average        25th-74th...................................Average        75th-90th...................................High Average        91st-97th...................................Superior        >/=97th.....................................Very Superior           ESTIMATED FSIQ and READING LEVEL:      ACS-TOPF (Raw/SS/%ile)...............31 / 89 / 23rd  WAIS-IV Information (SS/%ile)........5 / 5th      INTELLECTUAL FUNCTIONING:    WAIS-IV (scaled score/percentile):    Similarities........................10 / 50th  Vocabulary..........................11 / 63rd  Information..........................5  / 5th  Block Design.........................6 / 9th  Matrix Reasoning.....................6 / 9th  Visual Puzzles.......................7 / 16th  Digit Span (6F, 4B, 5S)..............8 / 25th  Arithmetic...........................8 / 25th  Symbol Search.......................10 / 50th  Digit Symbol-Coding.................10 / 50th          Verbal Comprehension Index..........93 / 32nd  Perceptual Reasoning Index..........79 / 8th  Working Memory Index................89 / 23rd  Processing Speed Index.............100 / 50th  Full Scale IQ.......................86 / 18th  General Ability Index...............83 / 13th      AUDITORY ATTENTION AND WORKING MEMORY    HXHW-DB-Veihp Span        Forward raw..........................8 / 16th      Forward span.........................6 /       Backward raw.........................8 / 37th      Backward span........................4 /       Sequencing raw.......................7 / 25th      Sequencing span......................5 /       Overall (SS/percentile)..............8 / 25th          WAIS-IV (scaled score/percentile):    Arithmetic...........................8 / 25th  Working Memory Index................89 / 23rd    CVLT-2-Trial 1.......................5 / 16th       MOTOR AND ORAL PROCESSING SPEED     Trail Making Test (sec. to completion/percentile):        Part A .....................................27 / 42nd          Errors..................................0 /         MOTOR FUNCTIONS    Grooved Pegboard (sec. to completion/%ile)        Dominant (Right) Hand.......................130 / <1st      Non-dominant (Left) Hand....................107 / 1st      LANGUAGE FUNCTIONING    WORD PRODUCTIVITY    Verbal Fluency Tests (raw/percentiles):        FAS.........................................33 / 10th      Animals.....................................19 / 27th      CONFRONTATION NAMING    [place] Naming Test (raw/percentile)        Total Spontaneous (max. = 60)...............39  / <1st        CONSTRUCTIONAL PRAXIS     Adiel Complex Figure (raw score/percentile):        Copy (max. = 36)............................19 / <1st    NONVERBAL LEARNING/MEMORY        WMS-IV Visual Reproduction (SS/%ile)        VR-I.........................................6 / 9th      VR-II........................................5 / 5th      VR-Recognition...............................5 / 26-50th      VR-Copy.....................................42 / 26-50th        VERBAL LEARNING AND MEMORY OF A WORDLIST WITH INTERFERENCE    CVLT-2 (raw score/percentile):        Total Recall (5, 9, 10, 16, 16)..............56 / 79th      Short Delay Free Recall......................16 / 98th      Short Delay Cued Recall......................15 / 84th      Long Delay Free Recall ......................14 / 84th      Long Delay Cued Recall.......................15 / 84th      Recall from Primacy..........................30% / 69th      Recall from Middle...........................36% / 7th      Recall from Recency..........................34% / 84th      Sharp 1-5....................................2.9 / >99th       Sharp 1-2......................................4 / 69th      Sharp 2-5....................................2.7 / 98th       Recognition:             Hits.....................................16 / 69th          False-Positives..........................3 /           Total Recognition Discriminability.......3.2 / 69th      VERBAL LEARNING AND MEMORY OF PROSE PASSAGES    WMS-IV (raw score/percentile):        Logical Memory I.............................25 / 50th      Logical Memory II............................19 / 37th      Recognition..................................27 / >75th        EXECUTIVE FUNCTIONING          Trail Making Test (sec. to completion/%ile):        Part B ......................................274 / <1st          Errors...................................5 /   See WAIS-IV Above and other measures  above    SELF-REPORTED MOOD  GIANNA         T-Score / %ile  ICN............................................52 / 58th  INF............................................47 / 38th  NIM............................................59 / 82nd  PIM............................................59 / 82nd  ISABEL............................................59 / 82nd  ANX............................................46 / 34th  NEAL............................................49 / 46th  DEP............................................54 / 66th  MAN............................................35 / 7th  PAR............................................46 / 34th  SCZ............................................46 / 34th  BOR............................................45 / 31st  ANT............................................38 / 12th  ALC............................................47 / 38th  DRG............................................54 / 66th  AGG............................................48 / 42nd  GLADYS............................................43 / 24th  STR............................................46 / 34th  NON............................................53 / 62nd  RXR............................................55 / 69th  DOM............................................40 / 16th  [facility]............................................38 / 12th    OVERALL SUMMARY  Ms. Arrieta has active problems noted above in particular chronic epilepsy with likely left anterior temporal onset with secondary generalization. Her seizures have been increasingly difficult to control and she is referred for indication noted above. The patient's baseline or pre-morbid intellectual functioning was estimated to be in the low average to average range based on educational/occupational history and performance on a word reading measure. Results should be interpreted in that context.    Cognitive (Localization/Lateralization):   · Attention:  Basic attention and working memory  are normal. More complex attention (shifting attention) appears well below expectations. This may be a key  for her verbal memory symptoms in everyday life.   · Mental Speed: Normal across multiple measures  · Motor Speed: Fine motor speed was slow bilaterally, but much slower in her dominant right hand. This may suggest a trend toward greater left hemisphere involvement.   · Language:  Basic expressive/receptive language is normal. Object naming was impaired. Phonemic fluency was also borderline impaired relative to semantic fluency (low average).   · Visuocontruction: Basic construction/perception is normal range. More complex construction revealed significant trouble with visuo-motor integration, planning, and organization when copying a complex figure.  · Memory:  Verbal memory was average to superior across two measures. Verbal memory improved from average to above average when she had multiple repetition opportunities. Visual memory (partially, but not purely lateralized to the right hemisphere) was much lower in the borderline range. Recognition memory was normal regardless of visual/verbal modality.   · Executive Functions: These were variable. Verbal reasoning was normal. Visual-spatial reasoning was much lower in the borderline/low average range. Phonemic fluency was borderline impaired. Organization/planning was below expectations on a complex figure copy.     Overall, Ms. Wiley has  Mild Neurocognitive Disorder. Specific areas of weakness include: language (word finding, naming), visuospatial reasoning, visual memory, motor speed, and aspects of executive function (complex shifting attention, organization/planning). Assessment did not show a clear lateralizing profile. Aspects lateralized to the left hemisphere (greater than expected language dysfunction, much slower right hand dexterity), but other aspects (excellent verbal memory relative to lower visual memory and greater than expected  visuospatial deficits) suggested no definite lateralizing findings. Targeted compensatory strategies will be noted below and discussed in feedback. In particular, I will discuss how to enhance attention/organization to improve perceived memory difficulties.     Psychiatric: No significant mood disorder and no current evidence of PNEE. She does have some family difficulties and is interested in family therapy. Her quality of life is substantially reduced due to ongoing seizures.     Understanding of Procedure: Not assessed as she reported no current explanations from the treatment team.     Pre-surgical Considerations: There are several that warrant discussion:   · Ms. Arrieta is significantly low quality of life due to ongoing seizures and related limitations on her functioning (driving, functioning as a parent, socializing with friends, burden on her family). As a result, she is very motivated to have surgery hoping to stop seizure frequency. Given such significant quality of life issues and hope for seizure reduction, the treatment team should spend extra time (above and beyond normal discussions) discussing risks/benefits and likelihood of success to minimize any significant disappointment should surgery be stopped or not successful.  · Ms. Arrieta has excellent verbal memory. While there continues to be quite a bit of variability in studies regarding likelihood of memory/lanugage decline following anterior temporal lobectomy, Ms. Arrieta does have two important risk factors placing her at greater risk for memory decline post-surgery.This includes: older age of seizure onset and good pre-surgery memory functioning on testing.      Other Recommendations: None    Referral Dx:  R56.9 (ICD-10-CM) - Seizures     Consult Dx:  Mild Neurocognitive Disorder    NICK Giron II, Ph.D., ABPP-CN  Board Certified Clinical Neuropsychologist  Co-Director, Cognitive Disorders and Brain Health Program  Department of Neurology  and Neurosciences  [facility]    RECOMMENDATIONS/TREATMENT PLAN  Follow Up Recommendations:  · Neurology Follow-up: Continued Neurology follow-up as recommended by Ms. Franco neurologist.  · Mental Health Follow-up:   · Psychology/Therapy: Consultation with family therapy as recommended above.   · Neuropsychology: Neuropsychological reevaluation is recommended in 3-6 months following proposed surgery.     Recommendations for Ms. Arrieta and Caregivers/Family:   · Brain Health: Will provide our lengthy handout on improving vascular health and brain health. This includes recommendations for physical activity, healthy diet (e.g., Mediterranean Style Diet), social activity, and mental activity.   · Attention: Remember that inattention and lack of focus are major culprits to forgetting information so be sure and practice paying attention for adequate learning of information. If you rely on passive attention to remembering something (e.g., yeah, uh-huh approach), youll find you cannot recall it later. I recommend the following to improve attention, which may aid in later recall:   · Reduce distractions in the area as much as possible.  · Look at the person as they are speaking to you.   · Paraphrase as they are speaking  · Write down important pieces of information   · Ask them to repeat if you zone out.   · Have them simplify and reduce information that you need to attend to during conversation.   · Have visual cues to remind you if you need to do something later.  · Processing Speed:   · Using multiple modalities (e.g., listening, writing notes, asking questions, recording) to learn new information is likely to allow additional time for processing, thus improving memory for the material.   · Allowing sufficient time to complete tasks will reduce frustration and help to ensure completion.  · Executive Functioning:  · Dont attempt to multi-task.  Separate tasks so that each can be completed one at a  time.  · Consider using a calendar/day planner, as that may be effective to help you plan and stay on track.  Color-coding specific tasks by importance may add additional benefit to your planner.  · Break down large projects into smaller tasks and write down the steps to completing the task.  Taking notes while reading can help with recall.  · Storing Information: Use the below strategies to help you further enhance how information is stored.  · Rehearse - Immediately after seeing/hearing something, try to recall it.  Wait a few minutes, then check again.  Gradually lengthen the intervals between rehearsals.  · Repetition of learned material is critical to ensure storage of information to be learned. Self-test at home to ensure learning.  · Write down important information to improve your attention and focus and to have something to look back on when you need to recall it.  · Make sure the person doesnt rattle off, but presents in a clear, logical, and unhurried manner.   · Recalling Information:  · Jog your memory - Lose something?  Think back to when you last had it.  What did you do next?  And after that?  Mentally walk yourself through each activity that followed.  Prodding your memory this way may enable you to recall the location of the missing item.  · Use a cue - Symbolic reminders (the proverbial string around the finger) are helpful.  So too are memos, timers, calendar notes, etc.--keep them in visible, appropriate places.  · Get organized - Have fixed locations for all important papers, key phone numbers, medications, keys, wallet, glasses, tools, etc.  · Develop routines - Routines can anchor memories so they do not drift away.    · Practice good cognitive hygiene:  · Engage in regular exercise, which increases alertness and arousal and can improve attention and focus.  Consider lower impact exercises, such as yoga or light walking.  · Get a good nights sleep, as this can enhance alertness and  cognition.  · Eat healthy foods and balanced meals. It is notable that research indicates certain nutrients may aid in brain function, such as B vitamins (especially B6, B12, and folic acid), antioxidants (such as vitamins C and E, and beta carotene), and Omega-3 fatty acids. Talk with your physician or nutritionist about whats right for you.   · Keep your brain active. Find activities to stay mentally active, such as reading, games (cards, checkers), puzzles (crosswords, Sudoku, jig saw), crafts (models, woodworking), gardening, or participating in activities in the community.  · Stay socially engaged. Continue staying active with your family and friends.

## 2018-05-09 ENCOUNTER — PATIENT MESSAGE (OUTPATIENT)
Dept: NEUROLOGY | Facility: CLINIC | Age: 48
End: 2018-05-09

## 2018-05-09 ENCOUNTER — HOSPITAL ENCOUNTER (EMERGENCY)
Facility: HOSPITAL | Age: 48
Discharge: HOME | End: 2018-05-09
Attending: EMERGENCY MEDICINE
Payer: COMMERCIAL

## 2018-05-09 VITALS
TEMPERATURE: 99 F | RESPIRATION RATE: 16 BRPM | HEART RATE: 70 BPM | BODY MASS INDEX: 37.93 KG/M2 | WEIGHT: 236 LBS | OXYGEN SATURATION: 99 % | SYSTOLIC BLOOD PRESSURE: 120 MMHG | HEIGHT: 66 IN | DIASTOLIC BLOOD PRESSURE: 70 MMHG

## 2018-05-09 DIAGNOSIS — G40.109 TEMPORAL LOBE SEIZURE: Primary | ICD-10-CM

## 2018-05-09 DIAGNOSIS — R56.9 SEIZURE: ICD-10-CM

## 2018-05-09 LAB
ALBUMIN SERPL BCP-MCNC: 3.6 G/DL
ALP SERPL-CCNC: 126 U/L
ALT SERPL W/O P-5'-P-CCNC: 27 U/L
AMPHET+METHAMPHET UR QL: NEGATIVE
ANION GAP SERPL CALC-SCNC: 8 MMOL/L
AST SERPL-CCNC: 27 U/L
B-HCG UR QL: NEGATIVE
BARBITURATES UR QL SCN>200 NG/ML: NEGATIVE
BASOPHILS # BLD AUTO: 0.02 K/UL
BASOPHILS NFR BLD: 0.3 %
BENZODIAZ UR QL SCN>200 NG/ML: NORMAL
BILIRUB SERPL-MCNC: 0.2 MG/DL
BUN SERPL-MCNC: 13 MG/DL
BZE UR QL SCN: NEGATIVE
CALCIUM SERPL-MCNC: 9.2 MG/DL
CANNABINOIDS UR QL SCN: NEGATIVE
CHLORIDE SERPL-SCNC: 107 MMOL/L
CO2 SERPL-SCNC: 27 MMOL/L
CREAT SERPL-MCNC: 1 MG/DL
CREAT UR-MCNC: 164 MG/DL
CTP QC/QA: YES
DIFFERENTIAL METHOD: ABNORMAL
EOSINOPHIL # BLD AUTO: 0.1 K/UL
EOSINOPHIL NFR BLD: 1.3 %
ERYTHROCYTE [DISTWIDTH] IN BLOOD BY AUTOMATED COUNT: 16 %
EST. GFR  (AFRICAN AMERICAN): >60 ML/MIN/1.73 M^2
EST. GFR  (NON AFRICAN AMERICAN): >60 ML/MIN/1.73 M^2
GLUCOSE SERPL-MCNC: 100 MG/DL
HCT VFR BLD AUTO: 36.9 %
HGB BLD-MCNC: 11.5 G/DL
IMM GRANULOCYTES # BLD AUTO: 0.02 K/UL
IMM GRANULOCYTES NFR BLD AUTO: 0.3 %
LYMPHOCYTES # BLD AUTO: 2.2 K/UL
LYMPHOCYTES NFR BLD: 32.6 %
MCH RBC QN AUTO: 24.7 PG
MCHC RBC AUTO-ENTMCNC: 31.2 G/DL
MCV RBC AUTO: 79 FL
METHADONE UR QL SCN>300 NG/ML: NEGATIVE
MONOCYTES # BLD AUTO: 0.4 K/UL
MONOCYTES NFR BLD: 6.1 %
NEUTROPHILS # BLD AUTO: 4.1 K/UL
NEUTROPHILS NFR BLD: 59.4 %
NRBC BLD-RTO: 0 /100 WBC
OPIATES UR QL SCN: NEGATIVE
PCP UR QL SCN>25 NG/ML: NEGATIVE
PLATELET # BLD AUTO: 255 K/UL
PMV BLD AUTO: 9.7 FL
POCT GLUCOSE: 99 MG/DL (ref 70–110)
POTASSIUM SERPL-SCNC: 4 MMOL/L
PROT SERPL-MCNC: 7.5 G/DL
RBC # BLD AUTO: 4.65 M/UL
SODIUM SERPL-SCNC: 142 MMOL/L
TOXICOLOGY INFORMATION: NORMAL
WBC # BLD AUTO: 6.88 K/UL

## 2018-05-09 PROCEDURE — 95951 HC EEG MONITORING/VIDEO RECORD: CPT

## 2018-05-09 PROCEDURE — 80307 DRUG TEST PRSMV CHEM ANLYZR: CPT

## 2018-05-09 PROCEDURE — 85025 COMPLETE CBC W/AUTO DIFF WBC: CPT

## 2018-05-09 PROCEDURE — 82962 GLUCOSE BLOOD TEST: CPT

## 2018-05-09 PROCEDURE — 96366 THER/PROPH/DIAG IV INF ADDON: CPT

## 2018-05-09 PROCEDURE — 80053 COMPREHEN METABOLIC PANEL: CPT

## 2018-05-09 PROCEDURE — 99284 EMERGENCY DEPT VISIT MOD MDM: CPT | Mod: 25

## 2018-05-09 PROCEDURE — 81025 URINE PREGNANCY TEST: CPT | Performed by: NURSE PRACTITIONER

## 2018-05-09 PROCEDURE — 25000003 PHARM REV CODE 250: Performed by: NURSE PRACTITIONER

## 2018-05-09 PROCEDURE — 95813 EEG EXTND MNTR 61-119 MIN: CPT | Mod: 26,,, | Performed by: PSYCHIATRY & NEUROLOGY

## 2018-05-09 PROCEDURE — 80175 DRUG SCREEN QUAN LAMOTRIGINE: CPT

## 2018-05-09 PROCEDURE — 93005 ELECTROCARDIOGRAM TRACING: CPT

## 2018-05-09 PROCEDURE — 63600175 PHARM REV CODE 636 W HCPCS: Performed by: NURSE PRACTITIONER

## 2018-05-09 PROCEDURE — 93010 ELECTROCARDIOGRAM REPORT: CPT | Mod: ,,, | Performed by: INTERNAL MEDICINE

## 2018-05-09 PROCEDURE — 99285 EMERGENCY DEPT VISIT HI MDM: CPT | Mod: ,,, | Performed by: NURSE PRACTITIONER

## 2018-05-09 PROCEDURE — 96365 THER/PROPH/DIAG IV INF INIT: CPT

## 2018-05-09 PROCEDURE — C9254 INJECTION, LACOSAMIDE: HCPCS | Performed by: NURSE PRACTITIONER

## 2018-05-09 RX ORDER — LACOSAMIDE 200 MG/1
200 TABLET ORAL EVERY 12 HOURS
Qty: 60 TABLET | Refills: 11 | Status: SHIPPED | OUTPATIENT
Start: 2018-05-09 | End: 2018-10-22

## 2018-05-09 RX ADMIN — SODIUM CHLORIDE 600 MG: 9 INJECTION, SOLUTION INTRAVENOUS at 06:05

## 2018-05-09 NOTE — ED NOTES
Cat, PA requesting ED bed for pt. Pt currently having multiple staring sz. Charge nurse, Annia, notified.

## 2018-05-09 NOTE — ED TRIAGE NOTES
"Patient states she is having multiple seizures. States she was started on new medication 2-3 weeks ago, Onfi. States she was at work, had 3 seizures. Last seizure in December. While talking to patient she said " I am having a seizure right now" States her seizures involve staring, starting in left temporal lobe then moves to right temporal lobe and she becomes unconscious. While patient talking, states she is having mult seizures.   "

## 2018-05-09 NOTE — ED NOTES
"Pt AAO x 4. Resps appear even and unlabored. Pt speaking in full sentences. Pt states "I'm having a seizure right now."   "

## 2018-05-09 NOTE — ED NOTES
Patient identifiers verified and correct for MS Arrieta  C/C: Seizure activity  APPEARANCE: awake and alert in NAD.Patient staring, alert oriented, talking without difficulty.   SKIN: warm, dry and intact. No breakdown or bruising.  MUSCULOSKELETAL: Patient moving all extremities spontaneously, no obvious swelling or deformities noted. Ambulates independently.  RESPIRATORY: Denies shortness of breath.Respirations unlabored.   CARDIAC: Denies CP, 2+ distal pulses; no peripheral edema  ABDOMEN: S/ND/NT, Denies nausea  : voids spontaneously, denies difficulty  Neurologic: AAO x 4; follows commands equal strength in all extremities; denies numbness/tingling. Denies dizziness Denies weakness PERRL 4 mm

## 2018-05-09 NOTE — PROVIDER PROGRESS NOTES - EMERGENCY DEPT.
Encounter Date: 5/9/2018    ED Physician Progress Notes             Normal sinus rhythm.  Poor R-wave progression.  No acute findings

## 2018-05-09 NOTE — PROCEDURES
[facility]     PRELIMINARY C-EEG REPORT:  Review: 5/9/18, 16:48 (start) - 18:09     Symmetric, low amplitude alpha-beta background with posterior dominant rhythm of 10 Hz seen.   No epileptiform activity, electrographic seizures or push button events seen during the period of review.    Full report to follow.  Will continue to monitor.     Thank you for involving us in the care of this patient.    Catarina Watts MD, CAT(), FACNS, FARODRIGUEZ.  Neurology-Epilepsy.  [facility].

## 2018-05-09 NOTE — ED PROVIDER NOTES
"Encounter Date: 2018    SCRIBE #1 NOTE: I, Alina Ryan, am scribing for, and in the presence of,  Dr. Aguiar. I have scribed the following portions of the note - the APC attestation.       History     Chief Complaint   Patient presents with    "Staring Seizures"     Pt states "I'm having seizures right now. I can't stop staring."      Patient is a 47-year-old female with medical history of temporal lobe epilepsy presenting to the ED for seizure-like activity and 1400.  Patient has a he started staring and unable to stop which is a sign of her seizure activity.  Patient states she has been taking her Onfi as scheduled.  Pt denies any chest pain, abdominal pain, N/V/D, fever or chills.              Review of patient's allergies indicates:  No Known Allergies  Past Medical History:   Diagnosis Date    Convulsions     Epilepsy     Seizures      Past Surgical History:   Procedure Laterality Date    APPENDECTOMY       SECTION      CHOLECYSTECTOMY      HYSTERECTOMY       History reviewed. No pertinent family history.  Social History   Substance Use Topics    Smoking status: Never Smoker    Smokeless tobacco: Never Used    Alcohol use No     Review of Systems   Constitutional: Negative for activity change, appetite change, chills, fatigue and fever.   HENT: Negative for congestion, ear discharge, facial swelling, sinus pain, sinus pressure, sore throat and trouble swallowing.         Uncontrollable staring started at 1400.  Eyes watering.    Eyes: Negative for photophobia, pain and discharge.   Respiratory: Negative for apnea, cough, choking, chest tightness, shortness of breath, wheezing and stridor.    Cardiovascular: Negative for chest pain, palpitations and leg swelling.   Gastrointestinal: Negative for abdominal distention, abdominal pain, constipation, diarrhea, nausea and vomiting.   Endocrine: Negative.    Genitourinary: Negative for difficulty urinating, dysuria, frequency and urgency. "   Musculoskeletal: Negative for arthralgias, back pain, gait problem, joint swelling, myalgias, neck pain and neck stiffness.   Skin: Negative for pallor, rash and wound.   Allergic/Immunologic: Negative.    Neurological: Negative for dizziness, tremors, syncope, weakness, numbness and headaches.   Hematological: Negative for adenopathy.   Psychiatric/Behavioral: Negative.        Physical Exam     Initial Vitals [05/09/18 1506]   BP Pulse Resp Temp SpO2   (!) 165/78 69 16 98.6 °F (37 °C) 98 %      MAP       107         Physical Exam    Nursing note and vitals reviewed.  Constitutional: Vital signs are normal. She appears well-developed and well-nourished. She is cooperative. She is easily aroused.   HENT:   Head: Normocephalic and atraumatic.   Mouth/Throat: Uvula is midline, oropharynx is clear and moist and mucous membranes are normal.   Eyes: EOM and lids are normal. Pupils are equal, round, and reactive to light. Right eye exhibits no chemosis, no discharge, no exudate and no hordeolum. No foreign body present in the right eye. Left eye exhibits no chemosis, no discharge, no exudate and no hordeolum. No foreign body present in the left eye. Right conjunctiva is injected. Left conjunctiva is injected. No scleral icterus.   Pupils 4-3mm ERR.     Neck: Normal range of motion.   Cardiovascular: Normal rate, regular rhythm, normal heart sounds and intact distal pulses.   Pulses:       Radial pulses are 2+ on the right side, and 2+ on the left side.        Dorsalis pedis pulses are 2+ on the right side, and 2+ on the left side.   Pulmonary/Chest: Effort normal and breath sounds normal.   Abdominal: Soft. Normal appearance and bowel sounds are normal. There is no tenderness.   Musculoskeletal: Normal range of motion.   Neurological: She is alert, oriented to person, place, and time and easily aroused. She has normal strength and normal reflexes. She displays no tremor. No cranial nerve deficit or sensory deficit. She  "displays seizure activity ( as per pt record). GCS eye subscore is 4. GCS verbal subscore is 5. GCS motor subscore is 6.   Skin: Skin is warm, dry and intact. Capillary refill takes less than 2 seconds. No abrasion and no rash noted. No cyanosis. Nails show no clubbing.   Psychiatric: She has a normal mood and affect. Her speech is normal and behavior is normal. Judgment and thought content normal. Cognition and memory are normal.         ED Course   Procedures  Labs Reviewed   CBC W/ AUTO DIFFERENTIAL - Abnormal; Notable for the following:        Result Value    Hemoglobin 11.5 (*)     Hematocrit 36.9 (*)     MCV 79 (*)     MCH 24.7 (*)     MCHC 31.2 (*)     RDW 16.0 (*)     All other components within normal limits   COMPREHENSIVE METABOLIC PANEL   DRUG SCREEN PANEL, URINE EMERGENCY   LAMOTRIGINE LEVEL   LAMOTRIGINE LEVEL   POCT URINE PREGNANCY   POCT GLUCOSE             Medical Decision Making:   History:   Old Medical Records: I decided to obtain old medical records.  Clinical Tests:   Lab Tests: Ordered and Reviewed  Medical Tests: Ordered and Reviewed  Other:   I have discussed this case with another health care provider.       <> Summary of the Discussion: Neurology Epilepsy       APC / Resident Notes:   Emergent evaluation of a 46 yo female patient presenting to the ER with chief complaint of seizure like activity since 1400.  Pt states her seizures present as "staring uncontrollably."  Pt states she had an episode similiar in December that she was admitted to the hospital for.  Pt states that she recently saw her neurologist and is awaiting epilepsy surgery at [facility]. On exam, pt pupils 4-3mm ERR.  Pt A&Ox3.  I will get labs, EEG, contact Neurology and reassess.  Differential diagnoses include but are not limited to Epileptic seizure, Status epilepticus, Temporal lobe epilepsy, Intracranial hemorrhage, Intracranial mass. I discussed the care of this patient with my Supervising Physician.    1545- Spoke to " Neurology.  EEG ordered.  Will await results.   1615- vd call from Dr. Reyes team.  Pt not tolerating current medication.  Pt to be changed to Vimpat.  Loading dose ordered in ED.  Will change medication to 200mg BID as per team.  Awaiting EEG results.   1815- Pt refusing Vimpat.  At bedside to speak to pt about medication.  Pt concerned for side effects.  Advised pt that Neuro team is recommending change in medication due to not tolerating Onfi.  Advised pt of possible admission.  Pt states she is no longer seizing and would like to be discharged home.  Pt states that she has follow up scheduled with Neuro.  Advised pt we will wait for EEG results and discuss discharge with Neurology team.  Pt verbalized understand and will take medication.    2030- Reviewed the note from EEG.  No seizure activity seen.  Pt requesting to be discharged.  As per earlier recomendations, pt to be discharged on Vimpat.  Pt to follow up with Dr. Ybarra this week.  Reviewed strict return to ED precautions.       Patient is hemodynamically stable, vital signs are normal. Discharge instructions given. Prescription for Vimpat given and explained. Return to ED precautions discussed. Follow up as directed. Pt verbalized understanding of this plan. Pt is stable for discharge.              Scribe Attestation:   Scribe #1: I performed the above scribed service and the documentation accurately describes the services I performed. I attest to the accuracy of the note.    Attending Attestation:     Physician Attestation Statement for NP/PA:   I discussed this assessment and plan of this patient with the NP/PA, but I did not personally examine the patient. The face to face encounter was performed by the NP/PA.                     Clinical Impression:   The primary encounter diagnosis was Temporal lobe seizure. A diagnosis of Seizure was also pertinent to this visit.                           Tracie Miles NP  05/09/18 2041

## 2018-05-10 NOTE — PROCEDURES
EXTENDED  ELECTROENCEPHALOGRAM  REPORT    Liza Arrieta  [id number]  1970    DATE OF SERVICE: 5/9/18    DATE OF ADMISSION: 5/9/2018  3:14 PM    ADMITTING/REQUESTING PROVIDER: Tracie Miles NP    REASON FOR CONSULT: 46yo F with hx of TLE, presenting with seizures.    MEDICATIONS:   No current facility-administered medications for this encounter.      Current Outpatient Prescriptions   Medication Sig    cloBAZam (ONFI) 20 mg Tab Take 1 tablet (20 mg total) by mouth 2 (two) times daily.    lamoTRIgine (LAMICTAL) 200 MG tablet Take 1.5 tablets (300 mg total) by mouth 2 (two) times daily. (Patient taking differently: Take 200 mg by mouth 2 (two) times daily. Take 1 am, 11/2 at night)    lacosamide (VIMPAT) 200 mg Tab tablet Take 1 tablet (200 mg total) by mouth every 12 (twelve) hours.     METHODOLOGY   Electroencephalographic (EEG) recording is with electrodes placed according to the International 10-20 placement system.  Thirty two (32) channels of digital signal (sampling rate of 512/sec) including T1 and T2 was simultaneously recorded from the scalp and may include  EKG, EMG, and/or eye monitors.  Recording band pass was 0.1 to 512 hz.  Digital video recording of the patient is simultaneously recorded with the EEG.  The patient is instructed report clinical symptoms which may occur during the recording session.  EEG and video recording is stored and archived in digital format.  Activation procedures which include photic stimulation, hyperventilation and instructing patients to perform simple task are done in selected patients.   The EEG is displayed on a monitor screen and can be reviewed using different montages.  Computer assisted analysis is employed to detect spike and electrographic seizure activity.   The entire record is submitted for computer analysis.  The entire recording is visually reviewed and the times identified by computer analysis as being spikes or seizures are reviewed again.  Marilee  spectral analysis (CSA) is also performed on the activity recorded from each individual channel.  This is displayed as a power display of frequencies from 0 to 30 Hz over time.   The CSA is reviewed looking for asymmetries in power between homologous areas of the scalp and then compared with the original EEG recording.     [employer] software was also utilized in the review of this study.  This software suite analyzes the EEG recording in multiple domains.  Coherence and rhythmicity is computed to identify EEG sections which may contain organized seizures.  Each channel undergoes analysis to detect presence of spike and sharp waves which have special and morphological characteristic of epileptic activity.  The routine EEG recording is converted from spacial into frequency domain.  This is then displayed comparing homologous areas to identify areas of significant asymmetry.  Algorithm to identify non-cortically generated artifact is used to separate eye movement, EMG and other artifact from the EEG.      RECORDING TIMES  Start on 5/9/18, 16:48  Stop on 5/9/18, 20:38    A total of 3 hours and 50 minutes of EEG recording was obtained.    EEG FINGINGS  Background activity:   The background rhythm was characterized by alpha (8-10 Hz) activity with a 11 Hz posterior dominant alpha rhythm at 30-70 microvolts.   Symmetry and continuity: the background was continuous and symmetric    Sleep:   Normal sleep transients including sleep spindles, K complexes, vertex waves and POSTS were seen.    Activation procedures:   Hyperventilation and photic stimulation were not performed.  Responsive to verbal stimulation    Abnormal activity:   Occasional left sided, maximal temporal (T1) sharp waves are seen during sleep.              No periodic discharges, lateralized rhythmic delta activity or electrographic seizures were seen.    IMPRESSION:   This is an abnormal extended EEG due to the occasional epilepiform activity seen as described,  suggestive of underlying epileptiform potential.  No electrographic seizures seen.    CLINICAL CORRELATION IS RECOMMENDED    Catarina Watts MD, CAT(), MJ BURKS.  Neurology-Epilepsy.  [facility].

## 2018-05-11 LAB — LAMOTRIGINE SERPL-MCNC: 10.5 UG/ML (ref 2–15)

## 2018-05-15 ENCOUNTER — TELEPHONE (OUTPATIENT)
Dept: NEUROLOGY | Facility: CLINIC | Age: 48
End: 2018-05-15

## 2018-05-15 ENCOUNTER — PATIENT MESSAGE (OUTPATIENT)
Dept: NEUROLOGY | Facility: CLINIC | Age: 48
End: 2018-05-15

## 2018-05-16 ENCOUNTER — PATIENT MESSAGE (OUTPATIENT)
Dept: NEUROLOGY | Facility: CLINIC | Age: 48
End: 2018-05-16

## 2018-05-18 ENCOUNTER — OFFICE VISIT (OUTPATIENT)
Dept: NEUROLOGY | Facility: CLINIC | Age: 48
End: 2018-05-18
Payer: COMMERCIAL

## 2018-05-18 DIAGNOSIS — G31.84 MILD NEUROCOGNITIVE DISORDER: Primary | ICD-10-CM

## 2018-05-18 PROCEDURE — 99499 UNLISTED E&M SERVICE: CPT | Mod: S$GLB,,, | Performed by: CLINICAL NEUROPSYCHOLOGIST

## 2018-05-18 NOTE — PROGRESS NOTES
Neuropsychology Feedback Note    Date of Session: 05/18/2018  Session Content: Results and recommendations were discussed for 47-minutes. Review Neuropsychology Consult dated for details. No further neuropsychology feedback needed.

## 2018-05-29 ENCOUNTER — OFFICE VISIT (OUTPATIENT)
Dept: NEUROLOGY | Facility: CLINIC | Age: 48
End: 2018-05-29
Payer: COMMERCIAL

## 2018-05-29 VITALS
SYSTOLIC BLOOD PRESSURE: 125 MMHG | HEIGHT: 66 IN | WEIGHT: 238.13 LBS | DIASTOLIC BLOOD PRESSURE: 80 MMHG | HEART RATE: 70 BPM | BODY MASS INDEX: 38.27 KG/M2

## 2018-05-29 DIAGNOSIS — G40.219 COMPLEX PARTIAL EPILEPSY WITH GENERALIZATION AND WITH INTRACTABLE EPILEPSY: Primary | ICD-10-CM

## 2018-05-29 DIAGNOSIS — G40.119 TEMPORAL LOBE EPILEPSY, INTRACTABLE: ICD-10-CM

## 2018-05-29 DIAGNOSIS — G31.84 MILD NEUROCOGNITIVE DISORDER: ICD-10-CM

## 2018-05-29 PROCEDURE — 3008F BODY MASS INDEX DOCD: CPT | Mod: CPTII,S$GLB,, | Performed by: PSYCHIATRY & NEUROLOGY

## 2018-05-29 PROCEDURE — 99999 PR PBB SHADOW E&M-EST. PATIENT-LVL II: CPT | Mod: PBBFAC,,, | Performed by: PSYCHIATRY & NEUROLOGY

## 2018-05-29 PROCEDURE — 99214 OFFICE O/P EST MOD 30 MIN: CPT | Mod: S$GLB,,, | Performed by: PSYCHIATRY & NEUROLOGY

## 2018-05-29 NOTE — PROGRESS NOTES
"Name: Gloria Gurrola  MRN: [id number]   CSN: [id number]      Date: 05/29/2018    HISTORY OF PRESENT ILLNESS (HPI)  11/17/2015  The patient is a 47 y.o. yo RHWM   The patient was initially referred for consultation by Dr. Huerta.   The patient was unaccompanied today.     Clinic Visits  Interim History  2018/05/29  Patient had another day of almost continuous seizures which was on May 09,2018.  She has failed several AEDs mainly due to side effects.  She experienced pharmacodynamic interaction and toxicity with Lamictal - Lacosamide combo.  She is tolerating the lamictal well       2018/04/09  The patient had 9 seizures recorded in last EMU visit all coming from L anterior temporal area.   MRI was normal but PET showed L mesial hypometabolism.  She is currently have almost daily seizures.  She has failed four AEDs is on Lamictal monotherapy.  She reports her verbal memory is terrible.  Review of labs show she was B12 deficient 4 yrs ago and she does not take any supplements.    Results for GLORIA GURROLA (MRN [id number]) as of 4/9/2018 16:23   Ref. Range 3/1/2016 14:45 12/19/2017 12:56 2/8/2018 18:12   Lamotrigine Lvl Latest Ref Range: 2.0 - 15.0 ug/mL 12.0 10.5 7.8     2018/01/29  EMU evaluation was completed in Dec and L temporal interictal spikes were recorded and one electrographic seizure was recorded arising from the L anterior temporal area.  Besides frequent seizures her major complaint is progressive memory loss.      HISTORY OF PRESENT ILLNESS (HPI)  Date: The   New Patient  Pt has been seeing Dr Huerta at [facility] for "complex partial sezures." First sz, in school, age 19. Was put on Dilantin and she did well for approx 15 years, until the seizures became active again. Thinks she may have had 2 classic "Grand Mal" seizures in her 20's, but since then, seizure types are those described below.    Currently, she is experiencing more than one event per week. Event type is described below. She has been failing her " current treatment regimen as described below. May have tried other meds, but can't remember them now. Did not bring records yet.        Seizure Seminology  Seizure Type 1   Classification: Pass-out  Aura - Occasional auditory мария vu  Pt loses consciousness and falls or loses tone 1-2 in  Post-ictal  Brief  Age of onset 19  Current Seizure Frequency - Several per week      Seizure Type 2  Classification: Complex Partial  Wanders around. Doesn't remember after.   Post-ictal  Brief  Current Seizure Frequency - Several per week    Seizure Triggers  Sleep Deprivation - None  Other medications - None  Psych/stress - None  Photic stimulation - None  Hyperventilation - None  Medical Problems - None  Menses - 3 days before period they get worse  Sensory Stimulation (light, sound, etc) - None  Missed dose of meds - None    AED Treatments  Present regimen   tabs 1 in AM and 1½ in PM   perampanel (Fycompa, FCP)    Prior treatments  eslicarbazine (Aptiom, ESL) - seizure intensity worsened after 2 weeks Rx  lacosamide (Vimpat, LCS)   oxcarbazepine (Trileptal OXC)   BID  TPM 10ID  valproic acid (Depakote, VPA)   zonisamide (Zonegran, ZNA)    Not tried  acetazolamide (Diamox, AZM)  amantadine  carbamazepine (Tegretol, CBZ)  clobazam (Onfi or Frizium, CLB)  ethosuximide (Zarontin, ESM)  felbamate (felbatol, FBM)  gabapentin (Neurontin, GPN)  levetiracetam (Keppra, LEV)  methsuximide (Celontin, MSM)  methyphenytoin (Mesantion, MHT)  perampanel (Fycompa, FCP)    phenobarbital (Pb)  pregabalin (Lyrica, PGB)  primidone (Mysoline, PRM)  retigabine (Potiga, RTG)  rufinamide (Banzel, RUF)  tiagabine (Gabatril, TGB)  viagabatrin, (Sabril, VGB)  vagal nerve stimulator (VNS)    Benzodiazepines  diazepam - rectal (Diastatl)  diazepam - oral (Valium, DZ)  clonazepam (Klonopin, CZP)  clorazepate (Tranxene, CLZ)  Ativan  Brain Stimulation  Vagal Nerve Stimulation-n/a  DBS- n/a    Compliance method  Memory - yes  Mom or Spouse -  Yes  Pill Box - no  Dewayne calendar - no  Turn over medication bottle - no  Phone alarm - no    Seizure Evaluation  EEG Routine - Dont have  MRI/MRA - In past- she doesn't know results  EMU eval  2017/12/19-12/20- Patient with no events overnight. EEG shows L anterior temporal spikes, most recorded at F7. None on the other side. At times, almost continuous L temporal interictal discharges at times.   2017/12/20- 11:56:23- clinical seizure, starting from L side on EEG. No epileptiform discharges noted. L temporal slowing and spikes noted. Consisted of brief moment of unresponsiveness.   2017/12/21- EEG showing L interical anterior temporal slowing with L temporal spikes. No clinical seizures since yesterday.   2017/12/21-12/22- Event on 12/21 at 18:55 showing patient talking on the phone and suddenly stopping, unable to speak and confused. EEG shows there is a rhythmic buildup in the L temporal chains. Patient is confused and is drowsy following event. No tonic/clonic activity present.     CT/CTA Scan -   PET Scan -   Neuropsychological evaluation -   DEXA Scan    Potential Epilepsy Risk Factors:   Pregnancy/Labor/Delivery - full term uncomplicated pregnancy labor and vaginal delivery  Febrile seizures - none  Head injury - none  CNS infection - none   Stroke - none  Family Hx of Sz - none    PAST MEDICAL HISTORY:   Active Ambulatory Problems     Diagnosis  Date Noted      No Active Ambulatory Problems    Resolved Ambulatory Problems     Diagnosis  Date Noted      No Resolved Ambulatory Problems    No Additional Past Medical History         PAST SURGICAL HISTORY: No past surgical history on file.     FAMILY HISTORY: No family history on file.      SOCIAL HISTORY:    Social History    History    Social History      Marital Status:        Spouse Name:  N/A      Number of Children:  N/A      Years of Education:  N/A    Occupational History      Not on file.    Social History Main Topics      Smoking status:   "Never Smoker      Smokeless tobacco:  Not on file      Alcohol Use:  No      Drug Use:  No      Sexual Activity:  Not on file    Other Topics  Concern      Not on file    Social History Narrative      No narrative on file            SUBSTANCE USE:  Social History    Social History Main Topics      Smoking status:  Never Smoker      Smokeless tobacco:  Not on file      Alcohol Use:  No      Drug Use:  No      Sexual Activity:  Not on file       History    Substance Use Topics      Smoking status:  Never Smoker      Smokeless tobacco:  Not on file      Alcohol Use:  No         ALLERGIES: Review of patient's allergies indicates no known allergies.       Review of Systems   Constitutional: Negative for fever, chills, weight loss, malaise/fatigue and diaphoresis.   HENT: Negative for ear pain, hearing loss, nosebleeds and tinnitus.   Eyes: Negative for blurred vision, double vision, photophobia and pain.   Respiratory: Negative for cough, hemoptysis and shortness of breath.   Cardiovascular: Negative for chest pain, palpitations, orthopnea and leg swelling.   Gastrointestinal: Negative for heartburn, nausea, vomiting, abdominal pain, diarrhea, constipation and blood in stool.   Genitourinary: Negative for dysuria and hematuria.   Musculoskeletal: Negative for myalgias, joint pain and falls.   Skin: Negative for itching and rash.   Neurological: Positive for tingling, sensory change and seizures. Negative for dizziness, tremors, speech change, focal weakness, loss of consciousness, weakness and headaches.   Endo/Heme/Allergies: Negative for environmental allergies. Does not bruise/bleed easily.   Psychiatric/Behavioral: Positive for memory loss. Negative for depression, hallucinations and substance abuse. The patient has insomnia. The patient is not nervous/anxious.       /80 mmHg  Pulse 69  Ht 5' 6" (1.676 m)  Wt 110.1 kg (242 lb 11.6 oz)  BMI 39.20 kg/m2      Neurologic Exam      Higher Cortical " "Function:   Patient is a well developed, pleasant, well groomed individual appearing their stated age  Oriented - intact to person, place and time and followed two step instruction correctly.   Fund of knowledge was appropriate.   Language - Speech was fluent without evidence for an aphasia.    Throat: no aphthous ulcers noted in mouth, no erythema or enlargement of tonsils, pharynx is clear without inflammation   Lymph nodes: No enlargement of lymph nodes    Cranial Nerves II - XII:   EOMs were intact with normal smooth and no nystagmus.   PERRLA. D/C Funduscopic exam - disc were flat with normal A/V ratio and no exudates or hemorrhages. Visual fields were full to confrontation.   Motor - facial movement was symmetrical and normal.   Facial sensory - Light touch and pin prick sensations were normal.   Hearing was normal to finger rub.  Palate moved well and was symmetrical with normal palatal and oral sensation.   Tongue movement was full & the patient could say "la la la" and "Ka Ka Ka" without  difficulty. Patient repeated Presybeterian and Buddhism without difficulty. Normal power and bulk was found in the massiter and rotator muscles of the neck.  Motor: Power, bulk and tone were normal in all extremities.  Sensory: Light touch, pin prick, vibration and position senses were normal in all extremities.   Coordination:   Rapid alternating movements and rapid finger tapping - normal.   Finger to nose - nl.   Arm roll - symmetrical.   Gait: Station, gait and tandem walking were done without difficulty and Romberg was negative.    Deep tendon reflexes:   Reflex  L  R    Bicpets  2+  2+    Tricepts  2+  2+    Brachio-radialis  2+  2+    Knee  2+  2+    Ankle  2+  2+    Babinski  No  No      Tremor: resting, postural, intentional - none    Pulses   Peripheral - strong and symmetrical   IMPRESSION  1.  Uncontrolled spells. Possibly complex partial seizures with secondary generalization versus non-epileptic phenomena.   2. "  She has failed multiple medications and may be a candidate for epilepsy surgery if a focal onset is discovered.  3.  Recent vEEG study revealed Left spikes but we were unable to record typical seizures  4.  She feels better overall in terms of daily life and seizures are slightly less on less meds (Off PHT now)      DISPOSITION:   1. Continue LTG to 200mg 1 am and 1½ in pm  2.   Start perampanel 2 mg QD  3. Schedule patient for DAWOOD  4. Schedule epilepsy surgery conference.

## 2018-05-30 ENCOUNTER — TELEPHONE (OUTPATIENT)
Dept: PHARMACY | Facility: CLINIC | Age: 48
End: 2018-05-30

## 2018-06-19 ENCOUNTER — PATIENT MESSAGE (OUTPATIENT)
Dept: NEUROLOGY | Facility: CLINIC | Age: 48
End: 2018-06-19

## 2018-08-15 ENCOUNTER — PATIENT MESSAGE (OUTPATIENT)
Dept: NEUROLOGY | Facility: CLINIC | Age: 48
End: 2018-08-15

## 2018-08-15 ENCOUNTER — TELEPHONE (OUTPATIENT)
Dept: NEUROLOGY | Facility: CLINIC | Age: 48
End: 2018-08-15

## 2018-09-25 ENCOUNTER — OFFICE VISIT (OUTPATIENT)
Dept: NEUROSURGERY | Facility: CLINIC | Age: 48
End: 2018-09-25
Payer: COMMERCIAL

## 2018-09-25 VITALS
WEIGHT: 239.38 LBS | DIASTOLIC BLOOD PRESSURE: 64 MMHG | BODY MASS INDEX: 38.47 KG/M2 | SYSTOLIC BLOOD PRESSURE: 115 MMHG | HEIGHT: 66 IN | TEMPERATURE: 98 F | HEART RATE: 64 BPM

## 2018-09-25 DIAGNOSIS — R56.9 SEIZURE: Primary | ICD-10-CM

## 2018-09-25 DIAGNOSIS — G40.119 TEMPORAL LOBE EPILEPSY, INTRACTABLE: ICD-10-CM

## 2018-09-25 PROCEDURE — 99999 PR PBB SHADOW E&M-EST. PATIENT-LVL III: CPT | Mod: PBBFAC,,, | Performed by: NEUROLOGICAL SURGERY

## 2018-09-25 PROCEDURE — 99244 OFF/OP CNSLTJ NEW/EST MOD 40: CPT | Mod: S$GLB,,, | Performed by: NEUROLOGICAL SURGERY

## 2018-09-25 RX ORDER — CLONIDINE HYDROCHLORIDE 0.1 MG/1
TABLET ORAL
Refills: 0 | COMMUNITY
Start: 2018-07-09 | End: 2018-10-22

## 2018-09-25 RX ORDER — FLUCONAZOLE 150 MG/1
TABLET ORAL
COMMUNITY
Start: 2018-07-25 | End: 2018-10-22

## 2018-09-25 RX ORDER — CLOTRIMAZOLE AND BETAMETHASONE DIPROPIONATE 10; .64 MG/G; MG/G
CREAM TOPICAL
COMMUNITY
Start: 2018-07-25 | End: 2018-10-22

## 2018-09-25 NOTE — PROGRESS NOTES
"Subjective:    I, Lakshmi Alvarez, attest that this documentation has been prepared under the direction and in the presence of SUMIT Senior MD.     Patient ID: Liza Arrieta is a 48 y.o. female.    Chief Complaint: Consult    HPI   Pt is a 48 y.o. female who presents per referral by Dr. LISA Elam and the epilepsy team for evaluation for epilepsy surgery. Pt has history of partial intractable epilepsy and has failed at least 10 previous medications. Currently on dual therapy. EMU workup done in February localized pathology to left temporal lobe, with PET scan showing hypometabolism of mesial temporal lobe. DAWOOD scan also showed localization to left mesial temporal lobe. Pt states that she has endured seizures since 19 y.o. Pt currently on Lamictal. Pt states that she endures about 5 absence episodes per day with intermittent LOC. Pt notes one incident of "rolling" seizures for which she was brought to the ED.      Review of Systems   Constitutional: Negative for chills, fatigue and fever.   HENT: Negative for sinus pressure and trouble swallowing.    Eyes: Negative.  Negative for visual disturbance.   Respiratory: Negative.    Cardiovascular: Negative.    Gastrointestinal: Negative.  Negative for nausea and vomiting.   Endocrine: Negative.    Genitourinary: Negative.    Musculoskeletal: Negative.    Neurological: Positive for seizures. Negative for dizziness, syncope, speech difficulty, weakness and numbness.    Patient also endorsed intermittent word finding difficulty and problems with memory   Objective:      Physical Exam:  Nursing note and vitals reviewed.    Constitutional: She appears well-developed.     Eyes: Pupils are equal, round, and reactive to light. Conjunctivae and EOM are normal.     Cardiovascular: Normal rate, regular rhythm, normal pulses and intact distal pulses.     Abdominal: Soft.     Psych/Behavior: She is alert. She is oriented to person, place, and time. She has a normal mood and affect. "     Musculoskeletal: Gait is normal.        Neck: Range of motion is full. There is no tenderness. Muscle strength is 5/5. Tone is normal.        Back: Range of motion is full. There is no tenderness. Muscle strength is 5/5. Tone is normal.        Right Upper Extremities: Range of motion is full. There is no tenderness. Muscle strength is 5/5. Tone is normal.        Left Upper Extremities: Range of motion is full. There is no tenderness. Muscle strength is 5/5. Tone is normal.       Right Lower Extremities: Range of motion is full. There is no tenderness. Muscle strength is 5/5. Tone is normal.        Left Lower Extremities: Range of motion is full. There is no tenderness. Muscle strength is 5/5. Tone is normal.     Neurological:        Coordination: She has a normal Romberg Test, normal finger to nose coordination, normal heel to shin coordination and normal tandem walking coordination.        DTRs: DTRs are normal. Tricep reflexes are 2+ on the right side and 2+ on the left side. Bicep reflexes are 2+ on the right side and 2+ on the left side. Brachioradialis reflexes are 2+ on the right side and 2+ on the left side. Patellar reflexes are 2+ on the right side and 2+ on the left side. Achilles reflexes are 2+ on the right side and 2+ on the left side.        Cranial nerves: Cranial nerve(s) II, III, IV, V, VI, VII, VIII, IX, X, XI and XII are intact.       Pt has a non focal exam.   Currently no cranial nerve deficits.     Imaging:    PET scan, dated 2/6/2018, showed hypometabolism of mesial temporal lobe.     DAWOOD scan also showed localized to mesial temporal lobe.      NM PET Brain, 2/6/2018, shows hypometabolism of mesial temporal lobe.     DAWOOD scan also showed localization to left mesial temporal lobe.      SUMIT TIMMONS MD, personally reviewed the imaging and interpreted independent of the radiology report.    Assessment/Plan:   Pt with history of intractable partial epilepsy with secondary generalization that has  failed over 10 anticonvulsants. Has DAWOOD and EEG localization to temporal lobe with PET showing hypometabolism of left temporal lobe. Relatively normal MRI. I would agree with the epilepsy team that pt is a good surgical candidate. Since she is non-lesional we would plan for 2 stage operation with subdural grids and strip placement and placement of depth electrode along the length of the hipocampus. We will need updated MRI scan with 3T and neuro navigation.     I have discussed the risks/benefits, indications, and alternatives for the proposed procedure in detail. I have answered all of their questions and patient wishes to proceed with surgery. We will schedule patient.     I, SUMIT Senior MD, personally performed the services described in this documentation. All medical record entries made by the scribe, Lakshmi Alvarez, were at my direction and in my presence.  I have reviewed the chart and agree that the record reflects my personal performance and is accurate and complete.

## 2018-09-25 NOTE — LETTER
September 25, 2018      LISA Elam MD  [address]  [facility] [address]           [facility] - Neurosurgery 7th Fl  [address]  [facility] [address]  Phone: [phone]          Patient: Liza Arrieta   MR Number: [id number]   YOB: 1970   Date of Visit: 9/25/2018       Dear Dr. LISA Elam:    Thank you for referring Liza Arrieta to me for evaluation. Attached you will find relevant portions of my assessment and plan of care.    If you have questions, please do not hesitate to call me. I look forward to following Liza Arrieta along with you.    Sincerely,    Ruben Senior MD    Enclosure  CC:  No Recipients    If you would like to receive this communication electronically, please contact [email or web address] or [phone] to request more information on [employer] Link access.    For providers and/or their staff who would like to refer a patient to [facility], please contact us through our one-stop-shop provider referral line, [facility], at [phone].    If you feel you have received this communication in error or would no longer like to receive these types of communications, please e-mail [email or web address]

## 2018-09-26 ENCOUNTER — TELEPHONE (OUTPATIENT)
Dept: NEUROSURGERY | Facility: CLINIC | Age: 48
End: 2018-09-26

## 2018-09-26 DIAGNOSIS — G40.119 TEMPORAL LOBE EPILEPSY, INTRACTABLE: Primary | ICD-10-CM

## 2018-09-27 ENCOUNTER — TELEPHONE (OUTPATIENT)
Dept: PREADMISSION TESTING | Facility: HOSPITAL | Age: 48
End: 2018-09-27

## 2018-09-27 ENCOUNTER — ANESTHESIA EVENT (OUTPATIENT)
Dept: SURGERY | Facility: HOSPITAL | Age: 48
DRG: 027 | End: 2018-09-27
Payer: COMMERCIAL

## 2018-09-27 DIAGNOSIS — Z01.818 PREOPERATIVE TESTING: Primary | ICD-10-CM

## 2018-09-27 NOTE — ANESTHESIA PREPROCEDURE EVALUATION
[facility]  Anesthesia Pre-Operative Evaluation         Patient Name: Liza Arrieta  YOB: 1970  MRN: [id number]    SUBJECTIVE:     Pre-operative evaluation for Procedure(s) (LRB):  CRANIOTOMY for strip and grid (N/A)     2018    Liza Arrieta is a 48 y.o. female w/ a significant PMHx of intractable epilepsy-PET scan with localized pathology to left temporal lobe    Patient now presents for the above procedure(s).    Prev airway: None documented.      Patient Active Problem List   Diagnosis    Convulsions/seizures    Seizure    Temporal lobe epilepsy, intractable    Complex partial epilepsy with generalization and with intractable epilepsy    Mild neurocognitive disorder    Class 2 obesity with body mass index (BMI) of 38.0 to 38.9 in adult    Snoring    Abnormal EKG    Iron deficiency anemia       Review of patient's allergies indicates:  No Known Allergies    Current Inpatient Medications:      No current facility-administered medications on file prior to encounter.      Current Outpatient Medications on File Prior to Encounter   Medication Sig Dispense Refill    cloBAZam (ONFI) 20 mg Tab Take 1 tablet (20 mg total) by mouth 2 (two) times daily. 60 tablet 5    lamoTRIgine (LAMICTAL) 200 MG tablet Take 1.5 tablets (300 mg total) by mouth 2 (two) times daily. (Patient taking differently: Take 200 mg by mouth 2 (two) times daily. Take 1 in the morning and one and half  at night) 270 tablet 11       Past Surgical History:   Procedure Laterality Date    APPENDECTOMY       SECTION      4    CHOLECYSTECTOMY      HYSTERECTOMY      around 2016 for pain ful periods        Social History     Socioeconomic History    Marital status:      Spouse name: Not on file    Number of children: Not on file    Years of education: Not on file    Highest education level: Not on file   Social Needs    Financial resource strain: Not on file    Food insecurity - worry:  Not on file    Food insecurity - inability: Not on file    Transportation needs - medical: Not on file    Transportation needs - non-medical: Not on file   Occupational History    Not on file   Tobacco Use    Smoking status: Never Smoker    Smokeless tobacco: Never Used   Substance and Sexual Activity    Alcohol use: No     Alcohol/week: 0.0 oz    Drug use: No    Sexual activity: No   Other Topics Concern    Not on file   Social History Narrative    Not on file       OBJECTIVE:     Vital Signs Range (Last 24H):         Significant Labs:  Lab Results   Component Value Date    WBC 8.39 10/22/2018    HGB 11.9 (L) 10/22/2018    HCT 39.5 10/22/2018     10/22/2018    ALT 23 10/22/2018    AST 19 10/22/2018     10/22/2018    K 4.3 10/22/2018     10/22/2018    CREATININE 0.9 10/22/2018    BUN 13 10/22/2018    CO2 27 10/22/2018    TSH 1.657 01/14/2016    INR 1.0 10/22/2018    HGBA1C 5.3 10/22/2018       Diagnostic Studies: No relevant studies.    EKG:   EKG- I had independently reviewed the EKG from--5/9/2018   It was reported to be showing      Normal sinus rhythm  Cannot rule out Anterior infarct ,age undetermined  Nonspecific T wave abnormality  When compared with ECG of 19-DEC-2017 14:01,  No significant change was found        2D ECHO:  No results found for this or any previous visit.      ASSESSMENT/PLAN:                 Anesthesia Assessment: Preoperative EQUATION     Planned Procedure: Procedure(s) (LRB):  CRANIOTOMY for strip and grid (N/A)  Requested Anesthesia Type:General  Surgeon: Ruben Senior MD  Service: Neurosurgery  Known or anticipated Date of Surgery:11/5/2018     Surgeon notes: reviewed     Electronic QUestionnaire Assessment completed via nurse interview with patient.      NO AQ  Triage considerations:         Previous anesthesia records:No problems and Not available     Last PCP note: DOES NOT HAVE A PCP  Subspecialty notes: Neurology, Psychiatry, NEUROSURGERY     Other  important co-morbidities: PER Epic: SEIZURES(DAILY)     Tests already available:  Available tests,  3-6 months ago , within [facility] .5/9/2018 EKG                            Instructions given. (See in Nurse's note)     Optimization:  Anesthesia Preop Clinic Assessment  Indicated    Medical Opinion Indicated                   Plan:              Testing:  CBC, CMP, PT/INR and T&S   Pre-anesthesia  visit                                        Visit focus: concerns in complex and/or prolonged anesthesia                           Consultation:IM Perioperative Hospitalist                           Patient  has previously scheduled Medical Appointment:NONE     Navigation: Tests Scheduled.TBD                         Consults scheduled.TBD                        Results will be tracked by Preop Clinic.                                    Electronically signed by Marleny Lewis RN at 9/27/2018 12:10 PM       Pre-admit on 11/5/2018            Detailed Report                                                                                                              09/27/2018  Liza Arrieta is a 48 y.o., female.    Anesthesia Evaluation    I have reviewed the Patient Summary Reports.     I have reviewed the Medications.     Review of Systems  Anesthesia Hx:  No problems with previous Anesthesia  History of prior surgery of interest to airway management or planning: Previous anesthesia: General ANGEL: 2 years ago with general anesthesia.  Denies Family Hx of Anesthesia complications.   Denies Personal Hx of Anesthesia complications.   Social:  Patient's occupation is [employer]. Denies Tobacco Use. Denies Alcohol Use.   Hematology/Oncology:  Hematology Normal   Oncology Normal     EENT/Dental:EENT/Dental Normal   Cardiovascular:  Cardiovascular Normal Exercise tolerance: good   Functional Capacity good / => 4 METS, treadmill 2-3x weekly/weight/housework: denies CP/SOB  Denies Coronary Artery Disease.  Denies Deep Venous Thrombosis  (DVT)   Denies Hypertension.    Pulmonary:  Pulmonary Normal  Denies COPD.  Denies Asthma.  Denies Sleep Apnea.  Denies Asthma.  Denies Chronic Obstructive Pulmonary Disease (COPD).  Possible Obstructive Sleep Apnea , (STOP/BANG) Symptoms S - Snoring (loud)    Renal/:  Renal/ Normal     Hepatic/GI:  Hepatic/GI Normal    Musculoskeletal:  Musculoskeletal Normal    Neurological:   Denies CVA. Seizures  Seizure Disorder, Absence ([place]) (staring) , Most recent seizure occurred < 1 week ago , frequency is daily last seizure: 1 day ago; usually gets 4-5 a day with intermittent LOC.    Temporal lobe , intractable Denies CVA - Cerebrovasular Accident  Denies TIA - Transient Ischemic Attack    Endocrine:  Denies Diabetes  Denies Thyroid Disease  Metabolic Disorders, Obesity / BMI > 30      Physical Exam  General:  Obesity    Airway/Jaw/Neck:  Airway Findings: Mouth Opening: Normal General Airway Assessment: Adult  Mallampati: III  Improves to II with phonation.  TM Distance: Normal, at least 6 cm  Jaw/Neck Findings:  Neck ROM: Normal ROM      Dental:  Dental Findings: In tact        Mental Status:  Mental Status Findings:  Cooperative, Alert and Oriented         Anesthesia Plan  Type of Anesthesia, risks & benefits discussed:  Anesthesia Type:  general  Patient's Preference:   Intra-op Monitoring Plan: arterial line and standard ASA monitors  Intra-op Monitoring Plan Comments:   Post Op Pain Control Plan: per primary service following discharge from PACU and IV/PO Opioids PRN  Post Op Pain Control Plan Comments:   Induction:   IV  Beta Blocker:  Patient is not currently on a Beta-Blocker (No further documentation required).       Informed Consent: Patient understands risks and agrees with Anesthesia plan.  Questions answered. Anesthesia consent signed with patient.  ASA Score: 2     Day of Surgery Review of History & Physical:    H&P update referred to the surgeon.         Ready For Surgery From Anesthesia  Perspective.     The patient was seen by Perioperative Internal Medicine physician Dr. Dinero on 10/22/18 , please see recommendations.        Melvin Cordero RN

## 2018-09-27 NOTE — PRE-PROCEDURE INSTRUCTIONS
Patient stated has not had problems with anesthesia in the past. Will need medical clearance for this surgery. She does not have a PCP. She is willing to see  for optimization. Also will need poc appt, and labs.  Our  will call to set up these appts. Preop instructions given. Hold asa, asa containing products, nsaids, vitamins and supplements one week prior to surgery. Verbalizes understanding.

## 2018-09-27 NOTE — PRE ADMISSION SCREENING
Anesthesia Assessment: Preoperative EQUATION    Planned Procedure: Procedure(s) (LRB):  CRANIOTOMY for strip and grid (N/A)  Requested Anesthesia Type:General  Surgeon: Ruben Senior MD  Service: Neurosurgery  Known or anticipated Date of Surgery:11/5/2018    Surgeon notes: reviewed    Electronic QUestionnaire Assessment completed via nurse interview with patient.      NO AQ  Triage considerations:       Previous anesthesia records:No problems and Not available    Last PCP note: DOES NOT HAVE A PCP  Subspecialty notes: Neurology, Psychiatry, NEUROSURGERY    Other important co-morbidities: PER Epic: SEIZURES(DAILY)     Tests already available:  Available tests,  3-6 months ago , within [facility] .5/9/2018 EKG            Instructions given. (See in Nurse's note)    Optimization:  Anesthesia Preop Clinic Assessment  Indicated    Medical Opinion Indicated       Plan:    Testing:  CBC, CMP, PT/INR and T&S   Pre-anesthesia  visit       Visit focus: concerns in complex and/or prolonged anesthesia     Consultation:IM Perioperative Hospitalist     Patient  has previously scheduled Medical Appointment:NONE    Navigation: Tests Scheduled.TBD              Consults scheduled.TBD             Results will be tracked by Preop Clinic.

## 2018-10-04 ENCOUNTER — PATIENT MESSAGE (OUTPATIENT)
Dept: SURGERY | Facility: HOSPITAL | Age: 48
End: 2018-10-04

## 2018-10-21 NOTE — ASSESSMENT & PLAN NOTE
Pt has history of partial intractable epilepsy and has failed multiple medications.   seizures since age  19 y.o.  localization to  left temporal lobe.
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
## 2018-10-22 ENCOUNTER — HOSPITAL ENCOUNTER (OUTPATIENT)
Dept: PREADMISSION TESTING | Facility: HOSPITAL | Age: 48
Discharge: HOME | End: 2018-10-22
Attending: ANESTHESIOLOGY
Payer: COMMERCIAL

## 2018-10-22 ENCOUNTER — INITIAL CONSULT (OUTPATIENT)
Dept: INTERNAL MEDICINE | Facility: CLINIC | Age: 48
End: 2018-10-22
Payer: COMMERCIAL

## 2018-10-22 ENCOUNTER — PATIENT MESSAGE (OUTPATIENT)
Dept: SURGERY | Facility: HOSPITAL | Age: 48
End: 2018-10-22

## 2018-10-22 ENCOUNTER — HOSPITAL ENCOUNTER (OUTPATIENT)
Dept: RADIOLOGY | Facility: HOSPITAL | Age: 48
Discharge: HOME | End: 2018-10-22
Attending: NEUROLOGICAL SURGERY
Payer: COMMERCIAL

## 2018-10-22 VITALS
HEART RATE: 58 BPM | RESPIRATION RATE: 16 BRPM | BODY MASS INDEX: 39.87 KG/M2 | OXYGEN SATURATION: 98 % | TEMPERATURE: 98 F | HEIGHT: 65 IN | DIASTOLIC BLOOD PRESSURE: 70 MMHG | WEIGHT: 239.31 LBS | SYSTOLIC BLOOD PRESSURE: 108 MMHG

## 2018-10-22 DIAGNOSIS — D50.9 IRON DEFICIENCY ANEMIA, UNSPECIFIED IRON DEFICIENCY ANEMIA TYPE: ICD-10-CM

## 2018-10-22 DIAGNOSIS — R56.9 SEIZURE: ICD-10-CM

## 2018-10-22 DIAGNOSIS — G40.119 TEMPORAL LOBE EPILEPSY, INTRACTABLE: ICD-10-CM

## 2018-10-22 DIAGNOSIS — Z01.818 PREOP EXAMINATION: Primary | ICD-10-CM

## 2018-10-22 DIAGNOSIS — G31.84 MILD NEUROCOGNITIVE DISORDER: ICD-10-CM

## 2018-10-22 DIAGNOSIS — E66.9 CLASS 2 OBESITY WITH BODY MASS INDEX (BMI) OF 38.0 TO 38.9 IN ADULT, UNSPECIFIED OBESITY TYPE, UNSPECIFIED WHETHER SERIOUS COMORBIDITY PRESENT: ICD-10-CM

## 2018-10-22 DIAGNOSIS — R94.31 ABNORMAL EKG: ICD-10-CM

## 2018-10-22 DIAGNOSIS — R06.83 SNORING: ICD-10-CM

## 2018-10-22 PROBLEM — E66.812 CLASS 2 OBESITY WITH BODY MASS INDEX (BMI) OF 38.0 TO 38.9 IN ADULT: Status: ACTIVE | Noted: 2018-10-22

## 2018-10-22 PROCEDURE — A9585 GADOBUTROL INJECTION: HCPCS | Performed by: NEUROLOGICAL SURGERY

## 2018-10-22 PROCEDURE — 70553 MRI BRAIN STEM W/O & W/DYE: CPT | Mod: 26,,, | Performed by: RADIOLOGY

## 2018-10-22 PROCEDURE — 99999 PR PBB SHADOW E&M-EST. PATIENT-LVL I: CPT | Mod: PBBFAC,,, | Performed by: HOSPITALIST

## 2018-10-22 PROCEDURE — 99244 OFF/OP CNSLTJ NEW/EST MOD 40: CPT | Mod: S$GLB,,, | Performed by: HOSPITALIST

## 2018-10-22 PROCEDURE — 25500020 PHARM REV CODE 255: Performed by: NEUROLOGICAL SURGERY

## 2018-10-22 PROCEDURE — 70553 MRI BRAIN STEM W/O & W/DYE: CPT | Mod: TC

## 2018-10-22 RX ORDER — GADOBUTROL 604.72 MG/ML
10 INJECTION INTRAVENOUS
Status: COMPLETED | OUTPATIENT
Start: 2018-10-22 | End: 2018-10-22

## 2018-10-22 RX ADMIN — GADOBUTROL 10 ML: 604.72 INJECTION INTRAVENOUS at 12:10

## 2018-10-22 NOTE — ASSESSMENT & PLAN NOTE
Requested GI evaluation for evaluation of cause of Iron deficiency     GI evaluation scheduled for 11/29/2018

## 2018-10-22 NOTE — DISCHARGE INSTRUCTIONS
Anesthesia: General Anesthesia     You are watched continuously during your procedure by your anesthesia provider.     Youre due to have surgery. During surgery, youll be given medicine called anesthesia or anesthetic. This will keep you comfortable and pain-free. Your anesthesia provider will use general anesthesia.  What is general anesthesia?  General anesthesia puts you into a state like deep sleep. It goes into the bloodstream (IV anesthetics), into the lungs (gas anesthetics), or both. You feel nothing during the procedure. You will not remember it. During the procedure, the anesthesia provider monitors you continuously. He or she checks your heart rate and rhythm, blood pressure, breathing, and blood oxygen.  · IV anesthetics. IV anesthetics are given through an IV line in your arm. Theyre often given first. This is so you are asleep before a gas anesthetic is started. Some kinds of IV anesthetics relieve pain. Others relax you. Your doctor will decide which kind is best in your case.  · Gas anesthetics. Gas anesthetics are breathed into the lungs. They are often used to keep you asleep. They can be given through a facemask or a tube placed in your larynx or trachea (breathing tube).  ? If you have a facemask, your anesthesia provider will most likely place it over your nose and mouth while youre still awake. Youll breathe oxygen through the mask as your IV anesthetic is started. Gas anesthetic may be added through the mask.  ? If you have a tube in the larynx or trachea, it will be inserted into your throat after youre asleep.  Anesthesia tools and medicines  You will likely have:  · IV anesthetics. These are put into an IV line into your bloodstream.  · Gas anesthetics. You breathe these anesthetics into your lungs, where they pass into your bloodstream.  · Pulse oximeter. This is a small clip that is attached to the end of your finger. This measures your blood oxygen level.  · Electrocardiography  leads (electrodes). These are small sticky pads that are placed on your chest. They record your heart rate and rhythm.  · Blood pressure cuff. This reads your blood pressure.  Risks and possible complications  General anesthesia has some risks. These include:  · Breathing problems  · Nausea and vomiting  · Sore throat or hoarseness (usually temporary)  · Allergic reaction to the anesthetic  · Irregular heartbeat (rare)  · Cardiac arrest (rare)   Anesthesia safety  · Follow all instructions you are given for how long not to eat or drink before your procedure.  · Be sure your doctor knows what medicines and drugs you take. This includes over-the-counter medicines, herbs, supplements, alcohol or other drugs. You will be asked when those were last taken.  · Have an adult family member or friend drive you home after the procedure.  · For the first 24 hours after your surgery:  ? Do not drive or use heavy equipment.  ? Do not make important decisions or sign legal documents. If important decisions or signing legal documents is necessary during the first 24 hours after surgery, have a trusted family member or spouse act on your behalf.  ? Avoid alcohol.  ? Have a responsible adult stay with you. He or she can watch for problems and help keep you safe.  Date Last Reviewed: 12/1/2016  © 2920-8063 [employer]. [address] [place] [address], [place] [address]. All rights reserved. This information is not intended as a substitute for professional medical care. Always follow your healthcare professional's instructions

## 2018-10-22 NOTE — ASSESSMENT & PLAN NOTE
No suggestion of CAD with good functional capacity ( goes to gym, stays active )   Offered Echo/ Cardiology evaluation that she deferred at  this time , which is reasonable , given her functional  capacity

## 2018-10-22 NOTE — ASSESSMENT & PLAN NOTE
Not known to  have sleep apnea , diabetes   Reports loud snoring , but no apnea  Encouraged weight loss, sleep study
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
## 2018-10-22 NOTE — OUTPATIENT SUBJECTIVE & OBJECTIVE
Outpatient Subjective & Objective     Chief complaint-Preoperative evaluation, Perioperative Medical management, complication reduction plan     Active cardiac conditions- none    Revised cardiac risk index predictors- none    Functional capacity -Examples of physical activity, has 4 children , works out 2-3 times a week doing treadmill 1-2 miles , does weights ,   house work and can take 1 flight of stairs, works at a desk , , takes multiple flight of stairs ----- She can undertake all the above activities without  chest pain,chest tightness, Shortness of breath ,dizziness,lightheadedness making her exercise tolerance more,   than 4 Mets.       Review of Systems   Constitutional: Negative for chills and fever.        No unusual weight changes       HENT:        STOPBANG score 4 / 8    Loud Snoring   BMI> 35   Age over 50   Neck size over 40 CM     Eyes:        No new visual changes   Respiratory:        No cough , phlegm    No Hemoptysis   Cardiovascular:        As noted   Gastrointestinal:        No overt GI/ blood losses  Bowel movements- Regular    Endocrine:        Prednisone use > 20 mg daily for 3 weeks- None   Genitourinary: Negative for dysuria.        No urinary hesitancy    Musculoskeletal:          No unusual, muscle, joint pains   Skin: Negative for rash.   Neurological:        No unilateral weakness   Hematological:        Current use of Anticoagulants  Current use of Antiplatelet agents  None   Psychiatric/Behavioral:        No Depression,Anxiety       No vascular stenting   No past medical history pertinent negatives.  No family history on file.  Past Surgical History:   Procedure Laterality Date    APPENDECTOMY       SECTION      CHOLECYSTECTOMY      HYSTERECTOMY         No anesthesia, bleeding, cardiac , PONV problems with previous surgeries/procedures.  Medications and Allergies reviewed in epic.   FH- No anesthesia,bleeding / venous thrombosis ,  in family   Lives with  family , who can help     Physical Exam      Physical Exam  Constitutional- Vitals - There is no height or weight on file to calculate BMI., There were no vitals filed for this visit.  General appearance-Conscious,Coherent  Eyes- No conjunctival icterus,pupils contyact lenses ,  round  and reactive to light   ENT-Oral cavity- moist  , Hearing grossly normal   Neck- No thyromegaly ,Trachea -central, No jugular venous distension,   No Carotid Bruit   Cardiovascular -Heart Sounds- Normal  and  no murmur   , No gallop rhythm   Respiratory - Normal Respiratory Effort, Normal breath sounds,  no wheeze  and  no forced expiratory wheeze    Peripheral pitting pedal edema-- none , no calf pain   Gastrointestinal -Soft abdomen, No palpable masses, Non Tender,Liver,Spleen not palpable. No-- free fluid and shifting dullness  Musculoskeletal- No finger Clubbing. Strength grossly normal   Lymphatic-No Palpable cervical, axillary,Inguinal lymphadenopathy   Psychiatric - normal effect,Orientation  Rt Dorsalis pedis pulses-palpable    Lt Dorsalis pedis pulses- palpable   Rt Posterior tibial pulses -palpable   Left posterior tibial pulses -palpable   Miscellaneous -  no renal bruit    Investigations  Lab and Imaging have been reviewed in epic.    Review of Medicine tests    EKG- I had independently reviewed the EKG from--5/9/2018   It was reported to be showing     Normal sinus rhythm  Cannot rule out Anterior infarct ,age undetermined  Nonspecific T wave abnormality  When compared with ECG of 19-DEC-2017 14:01,  No significant change was found    Review of clinical lab tests:  Lab Results   Component Value Date    CREATININE 1.0 05/09/2018    HGB 11.5 (L) 05/09/2018     05/09/2018           Review of old records- Was done and information gathered regards to events leading to surgery and health conditions of significance in the perioperative period.    Outpatient Subjective & Objective

## 2018-10-22 NOTE — ASSESSMENT & PLAN NOTE
Possible sleep apnea- I suggest a sleep study and suggest caution with usage of medication that can cause respiratory suppression in the perioperative period  potential ramifications of untreated sleep apnea, which could include daytime sleepiness, hypertension, heart disease and stroke were discussed    Avoid supine sleep ( If able to ) , weight gain and alcoholic beverages discussed since all of these can worsen PEDRO

## 2018-10-22 NOTE — LETTER
October 22, 2018      Ruben Senior MD  [address]  [place] [address]           [facility] - Pre [facility] Consult  [address] [facility] [address]  Phone: [phone]          Patient: Liza Arrieta   MR Number: [id number]   YOB: 1970   Date of Visit: 10/22/2018       Dear Dr. Selene Delgado:    Thank you for referring Liza Arrieta to me for evaluation. Attached you will find relevant portions of my assessment and plan of care.    If you have questions, please do not hesitate to call me. I look forward to following Liza Arrieta along with you.    Sincerely,    Nathalia Dinero MD    Enclosure  CC:  Selene Delgado MD  R Luis Ealm MD    If you would like to receive this communication electronically, please contact [email or web address] or [phone] to request more information on American Board of Addiction [employer] (ABAM) Link access.    For providers and/or their staff who would like to refer a patient to [facility], please contact us through our one-stop-shop provider referral line, [facility], at [phone].    If you feel you have received this communication in error or would no longer like to receive these types of communications, please e-mail [email or web address]

## 2018-10-24 RX ORDER — FERROUS SULFATE 325(65) MG
325 TABLET ORAL 2 TIMES DAILY
Qty: 120 TABLET | Refills: 0 | Status: SHIPPED | OUTPATIENT
Start: 2018-10-24 | End: 2018-12-23

## 2018-10-25 ENCOUNTER — TELEPHONE (OUTPATIENT)
Dept: PREADMISSION TESTING | Facility: HOSPITAL | Age: 48
End: 2018-10-25

## 2018-10-27 ENCOUNTER — PATIENT MESSAGE (OUTPATIENT)
Dept: INTERNAL MEDICINE | Facility: CLINIC | Age: 48
End: 2018-10-27

## 2018-10-30 ENCOUNTER — TELEPHONE (OUTPATIENT)
Dept: NEUROLOGY | Facility: CLINIC | Age: 48
End: 2018-10-30

## 2018-10-30 NOTE — TELEPHONE ENCOUNTER
Called to verify the current medications of the patient per request of Dr. Elam. Lamictal 200 mg p.o in am, 300 mg p.o in pm. Dr. Elam aware.

## 2018-11-03 ENCOUNTER — PATIENT MESSAGE (OUTPATIENT)
Dept: SURGERY | Facility: HOSPITAL | Age: 48
End: 2018-11-03

## 2018-11-05 ENCOUNTER — HOSPITAL ENCOUNTER (INPATIENT)
Facility: HOSPITAL | Age: 48
LOS: 18 days | Discharge: HOME | DRG: 027 | End: 2018-11-23
Attending: NEUROLOGICAL SURGERY | Admitting: NEUROLOGICAL SURGERY
Payer: COMMERCIAL

## 2018-11-05 ENCOUNTER — ANESTHESIA (OUTPATIENT)
Dept: SURGERY | Facility: HOSPITAL | Age: 48
DRG: 027 | End: 2018-11-05
Payer: COMMERCIAL

## 2018-11-05 DIAGNOSIS — G40.119 TEMPORAL LOBE EPILEPSY, INTRACTABLE: Primary | ICD-10-CM

## 2018-11-05 DIAGNOSIS — G40.009 PARTIAL IDIOPATHIC EPILEPSY WITH SEIZURES OF LOCALIZED ONSET, NOT INTRACTABLE, WITHOUT STATUS EPILEPTICUS: ICD-10-CM

## 2018-11-05 DIAGNOSIS — R27.0 ATAXIA WITH OCULOMOTOR APRAXIA: ICD-10-CM

## 2018-11-05 DIAGNOSIS — H51.8 ATAXIA WITH OCULOMOTOR APRAXIA: ICD-10-CM

## 2018-11-05 DIAGNOSIS — G40.909 EPILEPSY: ICD-10-CM

## 2018-11-05 PROBLEM — I10 ESSENTIAL HYPERTENSION: Status: ACTIVE | Noted: 2018-11-05

## 2018-11-05 LAB
ABO + RH BLD: NORMAL
ANION GAP SERPL CALC-SCNC: 9 MMOL/L
BASOPHILS # BLD AUTO: 0.01 K/UL
BASOPHILS NFR BLD: 0.1 %
BLD GP AB SCN CELLS X3 SERPL QL: NORMAL
BUN SERPL-MCNC: 11 MG/DL
CALCIUM SERPL-MCNC: 8.6 MG/DL
CHLORIDE SERPL-SCNC: 109 MMOL/L
CHOLEST SERPL-MCNC: 182 MG/DL
CHOLEST/HDLC SERPL: 4.7 {RATIO}
CO2 SERPL-SCNC: 22 MMOL/L
CREAT SERPL-MCNC: 0.8 MG/DL
DIFFERENTIAL METHOD: ABNORMAL
EOSINOPHIL # BLD AUTO: 0 K/UL
EOSINOPHIL NFR BLD: 0.2 %
ERYTHROCYTE [DISTWIDTH] IN BLOOD BY AUTOMATED COUNT: 15.8 %
EST. GFR  (AFRICAN AMERICAN): >60 ML/MIN/1.73 M^2
EST. GFR  (NON AFRICAN AMERICAN): >60 ML/MIN/1.73 M^2
GLUCOSE SERPL-MCNC: 169 MG/DL
HCT VFR BLD AUTO: 33.2 %
HDLC SERPL-MCNC: 39 MG/DL
HDLC SERPL: 21.4 %
HGB BLD-MCNC: 10.6 G/DL
IMM GRANULOCYTES # BLD AUTO: 0.12 K/UL
IMM GRANULOCYTES NFR BLD AUTO: 1.2 %
LDLC SERPL CALC-MCNC: 120.2 MG/DL
LYMPHOCYTES # BLD AUTO: 1 K/UL
LYMPHOCYTES NFR BLD: 10 %
MCH RBC QN AUTO: 24.5 PG
MCHC RBC AUTO-ENTMCNC: 31.9 G/DL
MCV RBC AUTO: 77 FL
MONOCYTES # BLD AUTO: 0.1 K/UL
MONOCYTES NFR BLD: 1.1 %
NEUTROPHILS # BLD AUTO: 8.5 K/UL
NEUTROPHILS NFR BLD: 87.4 %
NONHDLC SERPL-MCNC: 143 MG/DL
NRBC BLD-RTO: 0 /100 WBC
PLATELET # BLD AUTO: 235 K/UL
PMV BLD AUTO: 9.6 FL
POTASSIUM SERPL-SCNC: 4.1 MMOL/L
RBC # BLD AUTO: 4.33 M/UL
SODIUM SERPL-SCNC: 140 MMOL/L
TRIGL SERPL-MCNC: 114 MG/DL
WBC # BLD AUTO: 9.78 K/UL

## 2018-11-05 PROCEDURE — 63600175 PHARM REV CODE 636 W HCPCS: Performed by: NEUROLOGICAL SURGERY

## 2018-11-05 PROCEDURE — 63600175 PHARM REV CODE 636 W HCPCS: Performed by: STUDENT IN AN ORGANIZED HEALTH CARE EDUCATION/TRAINING PROGRAM

## 2018-11-05 PROCEDURE — 86901 BLOOD TYPING SEROLOGIC RH(D): CPT

## 2018-11-05 PROCEDURE — 94761 N-INVAS EAR/PLS OXIMETRY MLT: CPT

## 2018-11-05 PROCEDURE — 25000003 PHARM REV CODE 250

## 2018-11-05 PROCEDURE — 99223 1ST HOSP IP/OBS HIGH 75: CPT | Mod: ,,, | Performed by: PSYCHIATRY & NEUROLOGY

## 2018-11-05 PROCEDURE — 25000003 PHARM REV CODE 250: Performed by: STUDENT IN AN ORGANIZED HEALTH CARE EDUCATION/TRAINING PROGRAM

## 2018-11-05 PROCEDURE — 61781 SCAN PROC CRANIAL INTRA: CPT | Mod: ,,, | Performed by: NEUROLOGICAL SURGERY

## 2018-11-05 PROCEDURE — 80048 BASIC METABOLIC PNL TOTAL CA: CPT

## 2018-11-05 PROCEDURE — 71000033 HC RECOVERY, INTIAL HOUR: Performed by: NEUROLOGICAL SURGERY

## 2018-11-05 PROCEDURE — 37000009 HC ANESTHESIA EA ADD 15 MINS: Performed by: NEUROLOGICAL SURGERY

## 2018-11-05 PROCEDURE — 27201423 OPTIME MED/SURG SUP & DEVICES STERILE SUPPLY: Performed by: NEUROLOGICAL SURGERY

## 2018-11-05 PROCEDURE — 25000003 PHARM REV CODE 250: Performed by: NURSE PRACTITIONER

## 2018-11-05 PROCEDURE — 37000008 HC ANESTHESIA 1ST 15 MINUTES: Performed by: NEUROLOGICAL SURGERY

## 2018-11-05 PROCEDURE — 27201037 HC PRESSURE MONITORING SET UP

## 2018-11-05 PROCEDURE — C9113 INJ PANTOPRAZOLE SODIUM, VIA: HCPCS | Performed by: NEUROLOGICAL SURGERY

## 2018-11-05 PROCEDURE — 93010 ELECTROCARDIOGRAM REPORT: CPT | Mod: ,,, | Performed by: INTERNAL MEDICINE

## 2018-11-05 PROCEDURE — C1729 CATH, DRAINAGE: HCPCS | Performed by: NEUROLOGICAL SURGERY

## 2018-11-05 PROCEDURE — 36620 INSERTION CATHETER ARTERY: CPT | Mod: 59,,, | Performed by: ANESTHESIOLOGY

## 2018-11-05 PROCEDURE — 95951 HC EEG MONITORING/VIDEO RECORD: CPT

## 2018-11-05 PROCEDURE — 20000000 HC ICU ROOM

## 2018-11-05 PROCEDURE — 25000003 PHARM REV CODE 250: Performed by: NEUROLOGICAL SURGERY

## 2018-11-05 PROCEDURE — 36000710: Performed by: NEUROLOGICAL SURGERY

## 2018-11-05 PROCEDURE — 86920 COMPATIBILITY TEST SPIN: CPT

## 2018-11-05 PROCEDURE — D9220A PRA ANESTHESIA: Mod: ,,, | Performed by: ANESTHESIOLOGY

## 2018-11-05 PROCEDURE — C1713 ANCHOR/SCREW BN/BN,TIS/BN: HCPCS | Performed by: NEUROLOGICAL SURGERY

## 2018-11-05 PROCEDURE — 27000221 HC OXYGEN, UP TO 24 HOURS

## 2018-11-05 PROCEDURE — 36415 COLL VENOUS BLD VENIPUNCTURE: CPT

## 2018-11-05 PROCEDURE — 71000039 HC RECOVERY, EACH ADD'L HOUR: Performed by: NEUROLOGICAL SURGERY

## 2018-11-05 PROCEDURE — 27800903 OPTIME MED/SURG SUP & DEVICES OTHER IMPLANTS: Performed by: NEUROLOGICAL SURGERY

## 2018-11-05 PROCEDURE — 95812 EEG 41-60 MINUTES: CPT | Mod: 26,,, | Performed by: PSYCHIATRY & NEUROLOGY

## 2018-11-05 PROCEDURE — 85025 COMPLETE CBC W/AUTO DIFF WBC: CPT

## 2018-11-05 PROCEDURE — 93005 ELECTROCARDIOGRAM TRACING: CPT

## 2018-11-05 PROCEDURE — 95951 PR EEG MONITORING/VIDEORECORD: CPT | Mod: 26,,, | Performed by: PSYCHIATRY & NEUROLOGY

## 2018-11-05 PROCEDURE — 80061 LIPID PANEL: CPT

## 2018-11-05 PROCEDURE — L8680 IMPLT NEUROSTIM ELCTR EACH: HCPCS | Performed by: NEUROLOGICAL SURGERY

## 2018-11-05 PROCEDURE — 99291 CRITICAL CARE FIRST HOUR: CPT | Mod: ,,, | Performed by: NURSE PRACTITIONER

## 2018-11-05 PROCEDURE — 8E09XBZ COMPUTER ASSISTED PROCEDURE OF HEAD AND NECK REGION: ICD-10-PCS | Performed by: NEUROLOGICAL SURGERY

## 2018-11-05 PROCEDURE — 61533 IMPLANT BRAIN ELECTRODES: CPT | Mod: ,,, | Performed by: NEUROLOGICAL SURGERY

## 2018-11-05 PROCEDURE — 00K70ZZ MAP CEREBRAL HEMISPHERE, OPEN APPROACH: ICD-10-PCS | Performed by: NEUROLOGICAL SURGERY

## 2018-11-05 PROCEDURE — 36000711: Performed by: NEUROLOGICAL SURGERY

## 2018-11-05 DEVICE — PLATE BONE 2X2 HOLE SM BOX: Type: IMPLANTABLE DEVICE | Site: CRANIAL | Status: FUNCTIONAL

## 2018-11-05 DEVICE — DURAMATRIX ONLAY PLUS 4X5: Type: IMPLANTABLE DEVICE | Site: CRANIAL | Status: FUNCTIONAL

## 2018-11-05 RX ORDER — HEPARIN SODIUM 5000 [USP'U]/ML
5000 INJECTION, SOLUTION INTRAVENOUS; SUBCUTANEOUS EVERY 8 HOURS
Status: DISPENSED | OUTPATIENT
Start: 2018-11-06 | End: 2018-11-18

## 2018-11-05 RX ORDER — ACETAMINOPHEN 10 MG/ML
INJECTION, SOLUTION INTRAVENOUS
Status: DISCONTINUED | OUTPATIENT
Start: 2018-11-05 | End: 2018-11-05

## 2018-11-05 RX ORDER — FENTANYL CITRATE 50 UG/ML
INJECTION, SOLUTION INTRAMUSCULAR; INTRAVENOUS
Status: DISCONTINUED | OUTPATIENT
Start: 2018-11-05 | End: 2018-11-05

## 2018-11-05 RX ORDER — SODIUM CHLORIDE 0.9 % (FLUSH) 0.9 %
3 SYRINGE (ML) INJECTION
Status: DISCONTINUED | OUTPATIENT
Start: 2018-11-05 | End: 2018-11-23 | Stop reason: HOSPADM

## 2018-11-05 RX ORDER — ONDANSETRON 2 MG/ML
4 INJECTION INTRAMUSCULAR; INTRAVENOUS ONCE AS NEEDED
Status: DISCONTINUED | OUTPATIENT
Start: 2018-11-05 | End: 2018-11-05 | Stop reason: HOSPADM

## 2018-11-05 RX ORDER — ACETAMINOPHEN 650 MG/1
650 SUPPOSITORY RECTAL EVERY 6 HOURS PRN
Status: DISCONTINUED | OUTPATIENT
Start: 2018-11-05 | End: 2018-11-10

## 2018-11-05 RX ORDER — SODIUM CHLORIDE 9 MG/ML
INJECTION, SOLUTION INTRAVENOUS CONTINUOUS
Status: DISCONTINUED | OUTPATIENT
Start: 2018-11-05 | End: 2018-11-06

## 2018-11-05 RX ORDER — VANCOMYCIN HYDROCHLORIDE 1 G/20ML
INJECTION, POWDER, LYOPHILIZED, FOR SOLUTION INTRAVENOUS
Status: DISCONTINUED | OUTPATIENT
Start: 2018-11-05 | End: 2018-11-05 | Stop reason: HOSPADM

## 2018-11-05 RX ORDER — GLUCAGON 1 MG
1 KIT INJECTION
Status: DISCONTINUED | OUTPATIENT
Start: 2018-11-05 | End: 2018-11-08

## 2018-11-05 RX ORDER — HYDROCODONE BITARTRATE AND ACETAMINOPHEN 5; 325 MG/1; MG/1
TABLET ORAL
Status: COMPLETED
Start: 2018-11-05 | End: 2018-11-05

## 2018-11-05 RX ORDER — ACETAMINOPHEN 325 MG/1
650 TABLET ORAL EVERY 6 HOURS PRN
Status: DISCONTINUED | OUTPATIENT
Start: 2018-11-05 | End: 2018-11-05

## 2018-11-05 RX ORDER — HYDROMORPHONE HYDROCHLORIDE 1 MG/ML
0.2 INJECTION, SOLUTION INTRAMUSCULAR; INTRAVENOUS; SUBCUTANEOUS EVERY 5 MIN PRN
Status: DISCONTINUED | OUTPATIENT
Start: 2018-11-05 | End: 2018-11-05 | Stop reason: HOSPADM

## 2018-11-05 RX ORDER — MUPIROCIN 20 MG/G
1 OINTMENT TOPICAL
Status: DISCONTINUED | OUTPATIENT
Start: 2018-11-05 | End: 2018-11-05 | Stop reason: HOSPADM

## 2018-11-05 RX ORDER — ACETAMINOPHEN 325 MG/1
650 TABLET ORAL EVERY 4 HOURS PRN
Status: DISCONTINUED | OUTPATIENT
Start: 2018-11-05 | End: 2018-11-05

## 2018-11-05 RX ORDER — MUPIROCIN 20 MG/G
1 OINTMENT TOPICAL 2 TIMES DAILY
Status: DISCONTINUED | OUTPATIENT
Start: 2018-11-05 | End: 2018-11-09

## 2018-11-05 RX ORDER — EPHEDRINE SULFATE 50 MG/ML
INJECTION, SOLUTION INTRAVENOUS
Status: DISCONTINUED | OUTPATIENT
Start: 2018-11-05 | End: 2018-11-05

## 2018-11-05 RX ORDER — VANCOMYCIN HCL IN 5 % DEXTROSE 1G/250ML
1000 PLASTIC BAG, INJECTION (ML) INTRAVENOUS
Status: DISCONTINUED | OUTPATIENT
Start: 2018-11-05 | End: 2018-11-06

## 2018-11-05 RX ORDER — KETAMINE HCL IN 0.9 % NACL 50 MG/5 ML
SYRINGE (ML) INTRAVENOUS
Status: DISCONTINUED | OUTPATIENT
Start: 2018-11-05 | End: 2018-11-05

## 2018-11-05 RX ORDER — HYDROMORPHONE HYDROCHLORIDE 1 MG/ML
0.5 INJECTION, SOLUTION INTRAMUSCULAR; INTRAVENOUS; SUBCUTANEOUS
Status: DISCONTINUED | OUTPATIENT
Start: 2018-11-05 | End: 2018-11-12

## 2018-11-05 RX ORDER — GLYCOPYRROLATE 0.2 MG/ML
INJECTION INTRAMUSCULAR; INTRAVENOUS
Status: DISCONTINUED | OUTPATIENT
Start: 2018-11-05 | End: 2018-11-05

## 2018-11-05 RX ORDER — LORAZEPAM 2 MG/ML
2 INJECTION INTRAMUSCULAR ONCE AS NEEDED
Status: ACTIVE | OUTPATIENT
Start: 2018-11-05 | End: 2018-11-05

## 2018-11-05 RX ORDER — INSULIN ASPART 100 [IU]/ML
1-10 INJECTION, SOLUTION INTRAVENOUS; SUBCUTANEOUS EVERY 6 HOURS PRN
Status: DISCONTINUED | OUTPATIENT
Start: 2018-11-05 | End: 2018-11-08

## 2018-11-05 RX ORDER — CEFAZOLIN SODIUM 1 G/3ML
2 INJECTION, POWDER, FOR SOLUTION INTRAMUSCULAR; INTRAVENOUS
Status: COMPLETED | OUTPATIENT
Start: 2018-11-05 | End: 2018-11-05

## 2018-11-05 RX ORDER — FENTANYL CITRATE 50 UG/ML
25 INJECTION, SOLUTION INTRAMUSCULAR; INTRAVENOUS EVERY 5 MIN PRN
Status: DISCONTINUED | OUTPATIENT
Start: 2018-11-05 | End: 2018-11-05 | Stop reason: HOSPADM

## 2018-11-05 RX ORDER — CEFAZOLIN SODIUM 1 G/3ML
INJECTION, POWDER, FOR SOLUTION INTRAMUSCULAR; INTRAVENOUS
Status: DISCONTINUED | OUTPATIENT
Start: 2018-11-05 | End: 2018-11-05

## 2018-11-05 RX ORDER — HYDROCODONE BITARTRATE AND ACETAMINOPHEN 5; 325 MG/1; MG/1
1 TABLET ORAL EVERY 4 HOURS PRN
Status: DISCONTINUED | OUTPATIENT
Start: 2018-11-05 | End: 2018-11-10

## 2018-11-05 RX ORDER — ROCURONIUM BROMIDE 10 MG/ML
INJECTION, SOLUTION INTRAVENOUS
Status: DISCONTINUED | OUTPATIENT
Start: 2018-11-05 | End: 2018-11-05

## 2018-11-05 RX ORDER — LIDOCAINE HYDROCHLORIDE AND EPINEPHRINE 10; 10 MG/ML; UG/ML
INJECTION, SOLUTION INFILTRATION; PERINEURAL
Status: DISCONTINUED | OUTPATIENT
Start: 2018-11-05 | End: 2018-11-05 | Stop reason: HOSPADM

## 2018-11-05 RX ORDER — BACITRACIN 50000 [IU]/1
INJECTION, POWDER, FOR SOLUTION INTRAMUSCULAR
Status: DISCONTINUED | OUTPATIENT
Start: 2018-11-05 | End: 2018-11-05 | Stop reason: HOSPADM

## 2018-11-05 RX ORDER — ONDANSETRON 2 MG/ML
4 INJECTION INTRAMUSCULAR; INTRAVENOUS DAILY PRN
Status: DISCONTINUED | OUTPATIENT
Start: 2018-11-05 | End: 2018-11-05 | Stop reason: HOSPADM

## 2018-11-05 RX ORDER — MUPIROCIN 20 MG/G
OINTMENT TOPICAL
Status: DISCONTINUED | OUTPATIENT
Start: 2018-11-05 | End: 2018-11-05 | Stop reason: HOSPADM

## 2018-11-05 RX ORDER — SODIUM CHLORIDE AND POTASSIUM CHLORIDE 150; 900 MG/100ML; MG/100ML
INJECTION, SOLUTION INTRAVENOUS CONTINUOUS
Status: DISCONTINUED | OUTPATIENT
Start: 2018-11-05 | End: 2018-11-05

## 2018-11-05 RX ORDER — PANTOPRAZOLE SODIUM 40 MG/10ML
40 INJECTION, POWDER, LYOPHILIZED, FOR SOLUTION INTRAVENOUS DAILY
Status: DISCONTINUED | OUTPATIENT
Start: 2018-11-05 | End: 2018-11-06

## 2018-11-05 RX ORDER — LAMOTRIGINE 25 MG/1
100 TABLET ORAL 2 TIMES DAILY
Status: DISCONTINUED | OUTPATIENT
Start: 2018-11-05 | End: 2018-11-10

## 2018-11-05 RX ORDER — ONDANSETRON 8 MG/1
8 TABLET, ORALLY DISINTEGRATING ORAL EVERY 6 HOURS PRN
Status: DISCONTINUED | OUTPATIENT
Start: 2018-11-05 | End: 2018-11-23 | Stop reason: HOSPADM

## 2018-11-05 RX ORDER — ACETAMINOPHEN 325 MG/1
650 TABLET ORAL EVERY 6 HOURS PRN
Status: DISCONTINUED | OUTPATIENT
Start: 2018-11-05 | End: 2018-11-10

## 2018-11-05 RX ORDER — ONDANSETRON 2 MG/ML
INJECTION INTRAMUSCULAR; INTRAVENOUS
Status: DISCONTINUED | OUTPATIENT
Start: 2018-11-05 | End: 2018-11-05

## 2018-11-05 RX ORDER — DEXAMETHASONE SODIUM PHOSPHATE 4 MG/ML
INJECTION, SOLUTION INTRA-ARTICULAR; INTRALESIONAL; INTRAMUSCULAR; INTRAVENOUS; SOFT TISSUE
Status: DISCONTINUED | OUTPATIENT
Start: 2018-11-05 | End: 2018-11-05

## 2018-11-05 RX ORDER — SODIUM CHLORIDE 9 MG/ML
INJECTION, SOLUTION INTRAVENOUS CONTINUOUS PRN
Status: DISCONTINUED | OUTPATIENT
Start: 2018-11-05 | End: 2018-11-05

## 2018-11-05 RX ORDER — NICARDIPINE HYDROCHLORIDE 0.2 MG/ML
2.5 INJECTION INTRAVENOUS CONTINUOUS
Status: DISCONTINUED | OUTPATIENT
Start: 2018-11-05 | End: 2018-11-06

## 2018-11-05 RX ORDER — NEOSTIGMINE METHYLSULFATE 1 MG/ML
INJECTION, SOLUTION INTRAVENOUS
Status: DISCONTINUED | OUTPATIENT
Start: 2018-11-05 | End: 2018-11-05

## 2018-11-05 RX ORDER — PHENYLEPHRINE HYDROCHLORIDE 10 MG/ML
INJECTION INTRAVENOUS
Status: DISCONTINUED | OUTPATIENT
Start: 2018-11-05 | End: 2018-11-05

## 2018-11-05 RX ORDER — PROPOFOL 10 MG/ML
VIAL (ML) INTRAVENOUS
Status: DISCONTINUED | OUTPATIENT
Start: 2018-11-05 | End: 2018-11-05

## 2018-11-05 RX ORDER — LABETALOL HYDROCHLORIDE 5 MG/ML
INJECTION, SOLUTION INTRAVENOUS
Status: DISCONTINUED | OUTPATIENT
Start: 2018-11-05 | End: 2018-11-05

## 2018-11-05 RX ORDER — SODIUM CHLORIDE 0.9 % (FLUSH) 0.9 %
3 SYRINGE (ML) INJECTION
Status: DISCONTINUED | OUTPATIENT
Start: 2018-11-05 | End: 2018-11-05 | Stop reason: HOSPADM

## 2018-11-05 RX ORDER — METOCLOPRAMIDE HYDROCHLORIDE 5 MG/ML
5 INJECTION INTRAMUSCULAR; INTRAVENOUS EVERY 6 HOURS PRN
Status: DISCONTINUED | OUTPATIENT
Start: 2018-11-05 | End: 2018-11-23 | Stop reason: HOSPADM

## 2018-11-05 RX ADMIN — SODIUM CHLORIDE 0.25 MCG/KG/MIN: 9 INJECTION, SOLUTION INTRAVENOUS at 09:11

## 2018-11-05 RX ADMIN — PROPOFOL 40 MG: 10 INJECTION, EMULSION INTRAVENOUS at 12:11

## 2018-11-05 RX ADMIN — SODIUM CHLORIDE: 0.9 INJECTION, SOLUTION INTRAVENOUS at 07:11

## 2018-11-05 RX ADMIN — ACETAMINOPHEN 1000 MG: 10 INJECTION, SOLUTION INTRAVENOUS at 08:11

## 2018-11-05 RX ADMIN — VANCOMYCIN HYDROCHLORIDE 1000 MG: 1 INJECTION, POWDER, LYOPHILIZED, FOR SOLUTION INTRAVENOUS at 11:11

## 2018-11-05 RX ADMIN — GLYCOPYRROLATE 0.6 MG: 0.2 INJECTION, SOLUTION INTRAMUSCULAR; INTRAVENOUS at 12:11

## 2018-11-05 RX ADMIN — ROCURONIUM BROMIDE 20 MG: 10 INJECTION, SOLUTION INTRAVENOUS at 10:11

## 2018-11-05 RX ADMIN — Medication 10 MG: at 09:11

## 2018-11-05 RX ADMIN — PHENYLEPHRINE HYDROCHLORIDE 100 MCG: 10 INJECTION INTRAVENOUS at 08:11

## 2018-11-05 RX ADMIN — HYDROCODONE BITARTRATE AND ACETAMINOPHEN 1 TABLET: 5; 325 TABLET ORAL at 01:11

## 2018-11-05 RX ADMIN — ONDANSETRON 4 MG: 2 INJECTION INTRAMUSCULAR; INTRAVENOUS at 11:11

## 2018-11-05 RX ADMIN — PROPOFOL 160 MG: 10 INJECTION, EMULSION INTRAVENOUS at 08:11

## 2018-11-05 RX ADMIN — ACETAMINOPHEN 650 MG: 325 TABLET ORAL at 03:11

## 2018-11-05 RX ADMIN — FENTANYL CITRATE 100 MCG: 50 INJECTION, SOLUTION INTRAMUSCULAR; INTRAVENOUS at 08:11

## 2018-11-05 RX ADMIN — POTASSIUM CHLORIDE AND SODIUM CHLORIDE: 900; 150 INJECTION, SOLUTION INTRAVENOUS at 01:11

## 2018-11-05 RX ADMIN — EPHEDRINE SULFATE 5 MG: 50 INJECTION, SOLUTION INTRAMUSCULAR; INTRAVENOUS; SUBCUTANEOUS at 09:11

## 2018-11-05 RX ADMIN — ONDANSETRON 4 MG: 2 INJECTION INTRAMUSCULAR; INTRAVENOUS at 01:11

## 2018-11-05 RX ADMIN — GLYCOPYRROLATE 0.2 MG: 0.2 INJECTION, SOLUTION INTRAMUSCULAR; INTRAVENOUS at 08:11

## 2018-11-05 RX ADMIN — DEXAMETHASONE SODIUM PHOSPHATE 8 MG: 4 INJECTION, SOLUTION INTRAMUSCULAR; INTRAVENOUS at 08:11

## 2018-11-05 RX ADMIN — SODIUM CHLORIDE, SODIUM GLUCONATE, SODIUM ACETATE, POTASSIUM CHLORIDE, MAGNESIUM CHLORIDE, SODIUM PHOSPHATE, DIBASIC, AND POTASSIUM PHOSPHATE: .53; .5; .37; .037; .03; .012; .00082 INJECTION, SOLUTION INTRAVENOUS at 08:11

## 2018-11-05 RX ADMIN — HYDROMORPHONE HYDROCHLORIDE 0.2 MG: 1 INJECTION, SOLUTION INTRAMUSCULAR; INTRAVENOUS; SUBCUTANEOUS at 01:11

## 2018-11-05 RX ADMIN — LABETALOL HYDROCHLORIDE 10 MG: 5 INJECTION, SOLUTION INTRAVENOUS at 12:11

## 2018-11-05 RX ADMIN — MUPIROCIN: 20 OINTMENT TOPICAL at 07:11

## 2018-11-05 RX ADMIN — PHENYLEPHRINE HYDROCHLORIDE 100 MCG: 10 INJECTION INTRAVENOUS at 09:11

## 2018-11-05 RX ADMIN — LAMOTRIGINE 100 MG: 25 TABLET ORAL at 09:11

## 2018-11-05 RX ADMIN — CEFAZOLIN 2 G: 330 INJECTION, POWDER, FOR SOLUTION INTRAMUSCULAR; INTRAVENOUS at 09:11

## 2018-11-05 RX ADMIN — ROCURONIUM BROMIDE 50 MG: 10 INJECTION, SOLUTION INTRAVENOUS at 08:11

## 2018-11-05 RX ADMIN — PROPOFOL 40 MG: 10 INJECTION, EMULSION INTRAVENOUS at 09:11

## 2018-11-05 RX ADMIN — REMIFENTANIL HYDROCHLORIDE 0.2 MCG/KG/MIN: 1 INJECTION, POWDER, LYOPHILIZED, FOR SOLUTION INTRAVENOUS at 08:11

## 2018-11-05 RX ADMIN — Medication 40 MG: at 08:11

## 2018-11-05 RX ADMIN — HYDROCODONE BITARTRATE AND ACETAMINOPHEN 1 TABLET: 5; 325 TABLET ORAL at 09:11

## 2018-11-05 RX ADMIN — ACETAMINOPHEN 650 MG: 325 TABLET ORAL at 01:11

## 2018-11-05 RX ADMIN — CEFTRIAXONE 2 G: 2 INJECTION, SOLUTION INTRAVENOUS at 03:11

## 2018-11-05 RX ADMIN — CEFAZOLIN 2 G: 1 INJECTION, POWDER, FOR SOLUTION INTRAMUSCULAR; INTRAVENOUS; PARENTERAL at 01:11

## 2018-11-05 RX ADMIN — PANTOPRAZOLE SODIUM 40 MG: 40 INJECTION, POWDER, FOR SOLUTION INTRAVENOUS at 01:11

## 2018-11-05 RX ADMIN — NEOSTIGMINE METHYLSULFATE 4 MG: 1 INJECTION INTRAVENOUS at 12:11

## 2018-11-05 RX ADMIN — MUPIROCIN 1 G: 20 OINTMENT TOPICAL at 09:11

## 2018-11-05 NOTE — SUBJECTIVE & OBJECTIVE
Past Medical History:   Diagnosis Date    Convulsions     Epilepsy     Seizures      Past Surgical History:   Procedure Laterality Date    APPENDECTOMY       SECTION      4    CHOLECYSTECTOMY      HYSTERECTOMY      around 2016 for pain ful periods       No current facility-administered medications on file prior to encounter.      Current Outpatient Medications on File Prior to Encounter   Medication Sig Dispense Refill    lamoTRIgine (LAMICTAL) 200 MG tablet Take 1.5 tablets (300 mg total) by mouth 2 (two) times daily. (Patient taking differently: Take 200 mg by mouth 2 (two) times daily. Take 1 in the morning and one and half  at night) 270 tablet 11    cloBAZam (ONFI) 20 mg Tab Take 1 tablet (20 mg total) by mouth 2 (two) times daily. 60 tablet 5      Allergies: Patient has no known allergies.    Family History   Problem Relation Age of Onset    Heart disease Father         over 50 years     Social History     Tobacco Use    Smoking status: Never Smoker    Smokeless tobacco: Never Used   Substance Use Topics    Alcohol use: No     Alcohol/week: 0.0 oz    Drug use: No     Review of Systems   Constitutional: Denies fevers, weight loss, chills, or weakness.  Eyes: Denies changes in vision.  ENT: Denies dysphagia, nasal discharge, ear pain or discharge.  Cardiovascular: Denies chest pain, palpitations, orthopnea, or claudication.  Respiratory: Denies shortness of breath, cough, hemoptysis, or wheezing.  GI: Denies nausea/vomitting, hematochezia, melena, abd pain, or changes in appetite.  : Denies dysuria, incontinence, or hematuria.  Musculoskeletal: Denies joint pain or myalgias.  Skin/breast: Denies rashes, lumps, lesions, or discharge.  Neurologic: Denies , dizziness, vertigo, or paresthesias. Complains of headache  Psychiatric: Denies changes in mood or hallucinations.  Endocrine: Denies polyuria, polydipsia, heat/cold intolerance.  Hematologic/Lymph: Denies lymphadenopathy, easy bruising  or easy bleeding.  Allergic/Immunologic: Denies rash, rhinitis.   Objective:     Vitals:    Temp: 98.4 °F (36.9 °C)  Pulse: (!) 59  Rhythm: sinus bradycardia  BP: 128/64  MAP (mmHg): 91  Resp: 13  SpO2: 96 %  O2 Device (Oxygen Therapy): nasal cannula    Temp  Min: 98.1 °F (36.7 °C)  Max: 98.5 °F (36.9 °C)  Pulse  Min: 50  Max: 71  BP  Min: 111/56  Max: 147/80  MAP (mmHg)  Min: 80  Max: 97  Resp  Min: 11  Max: 17  SpO2  Min: 96 %  Max: 100 %    No intake/output data recorded.           Physical Exam  GA: Alert,  no acute distress.   HEENT: No scleral icterus or JVD.   Pulmonary: Clear to auscultation A/L.   Cardiac: RRR S1 & S2 w/o rubs/murmurs/gallops.   Abdominal: Bowel sounds present x 4. No appreciable hepatosplenomegaly.  Skin: No jaundice, rashes, or visible lesions.  Neuro:  --GCS: E3 V5 M6  --Mental Status:  Sedated post op, opens eyes to voice and touch, orientedX4, follows commands,   --CN II-XII grossly intact.   --Pupils 2mm, PERRL.   --Corneal reflex, gag, cough intact.  --PUGA spontaneously  --Sensation to light touch intact    Today I personally reviewed pertinent medications, lines/drains/airways, imaging, laboratory results,

## 2018-11-05 NOTE — SUBJECTIVE & OBJECTIVE
Past Medical History:   Diagnosis Date    Convulsions     Epilepsy     Seizures        Past Surgical History:   Procedure Laterality Date    APPENDECTOMY       SECTION      4    CHOLECYSTECTOMY      HYSTERECTOMY      around 2016 for pain ful periods        Review of patient's allergies indicates:  No Known Allergies    No current facility-administered medications on file prior to encounter.      Current Outpatient Medications on File Prior to Encounter   Medication Sig    lamoTRIgine (LAMICTAL) 200 MG tablet Take 1.5 tablets (300 mg total) by mouth 2 (two) times daily. (Patient taking differently: Take 200 mg by mouth 2 (two) times daily. Take 1 in the morning and one and half  at night)    cloBAZam (ONFI) 20 mg Tab Take 1 tablet (20 mg total) by mouth 2 (two) times daily.     Continuous Infusions:   sodium chloride 0.9%      niCARdipine Stopped (18)       Family History     Problem Relation (Age of Onset)    Heart disease Father        Tobacco Use    Smoking status: Never Smoker    Smokeless tobacco: Never Used   Substance and Sexual Activity    Alcohol use: No     Alcohol/week: 0.0 oz    Drug use: No    Sexual activity: No     Review of Systems   Constitutional: Negative for chills and fever.   Eyes: Positive for visual disturbance (not wearing her glasses).   Respiratory: Negative for cough and shortness of breath.    Gastrointestinal: Negative for nausea and vomiting.   Skin: Negative for pallor and rash.   Neurological: Positive for seizures and headaches. Negative for facial asymmetry and speech difficulty.   Psychiatric/Behavioral: Negative for agitation and confusion.     Objective:     Vital Signs (Most Recent):  Temp: 98.3 °F (36.8 °C) (18 1345)  Pulse: (!) 58 (18 1600)  Resp: 14 (18)  BP: 121/60 (18 1600)  SpO2: 100 % (18) Vital Signs (24h Range):  Temp:  [98.1 °F (36.7 °C)-98.5 °F (36.9 °C)] 98.3 °F (36.8 °C)  Pulse:  [50-71]  58  Resp:  [11-17] 14  SpO2:  [96 %-100 %] 100 %  BP: (111-147)/(56-80) 121/60  Arterial Line BP: (124-129)/(57-59) 129/59     Weight: 106.6 kg (235 lb)  Body mass index is 39.11 kg/m².    Physical Exam   Constitutional: She is oriented to person, place, and time. She appears well-developed and well-nourished. No distress.   HENT:   Right Ear: External ear normal.   Left Ear: External ear normal.   S/p L temporal crani   Eyes: Conjunctivae and EOM are normal. Pupils are equal, round, and reactive to light.   Neck: Normal range of motion.   Pulmonary/Chest: Effort normal. No respiratory distress.   Musculoskeletal: Normal range of motion. She exhibits no deformity.   Neurological: She is alert and oriented to person, place, and time. No cranial nerve deficit.   Skin: Skin is warm and dry. She is not diaphoretic. No erythema.   Psychiatric: She has a normal mood and affect. Her speech is normal and behavior is normal. Judgment and thought content normal.       NEUROLOGICAL EXAMINATION:     MENTAL STATUS   Oriented to person, place, and time.   Attention: normal. Concentration: normal.   Speech: speech is normal   Level of consciousness: alert    CRANIAL NERVES     CN III, IV, VI   Pupils are equal, round, and reactive to light.  Extraocular motions are normal.     CN VII   Facial expression full, symmetric.     CN VIII   CN VIII normal.     MOTOR EXAM        Moves all extremities spontaneously, no focal deficit noted       Significant Labs: All pertinent lab results from the past 24 hours have been reviewed.    Significant Studies: I have reviewed all pertinent imaging results/findings within the past 24 hours.

## 2018-11-05 NOTE — ASSESSMENT & PLAN NOTE
-- POD0  Craniotomy for strip and grid placement  -- continous EEG  -- continue lamotrigine 100mg BID per epilepsy reccs  -- Neuro checks q 1 hr  -- appreciate epilepsy reccs  -- Neuro checks q 1 hour  --Seizure precautions  -- Ativan 2 mg PRN for seizures lasting>5 min

## 2018-11-05 NOTE — HPI
Liza Arrieta is a 47 yo female with PMHx of partial intractable epilepsy who is being admitted to [facility] after L crani with subdural grid placement. Per chart review, she had her first seizure at age 19. At that time, she was treated with dilantin and she did well for approximately 15 years. Reports that she typically has absence seizures (5-6/day), but states she may have had two grand mal seizures decades ago.  The patient was recently admitted to EMU on 2/9/2018 where seizures were captured on EEG.  She has failed multiple medications and is now being admitted for post op management, continuous EEG.

## 2018-11-05 NOTE — CONSULTS
[facility]  Neurology-Epilepsy  Consult Note    Patient Name: Gloria Gurrola  MRN: [id number]  Admission Date: 11/5/2018  Hospital Length of Stay: 0 days  Code Status: Full Code   Attending Provider: Ruben Senior MD   Consulting Provider: Philly Foy PA-C  Primary Care Physician: Primary Doctor No  Principal Problem:<principal problem not specified>    Consults   Subjective:     HPI:   Ms. Gurrola is a 49 yo female with history of partial intractable epilepsy who is admitted to NICU after L crani with subdural grid placement. She is currently on Lamotrigine 200 mg qAM and 300 mg qPM as outpatient. Recent EMU admission in February 2018 captured 9 seizures, each of which had localization on EEG to left temporal lobe. Rare interictal activity was noted as well in right temporal and left frontal regions. PET scan completed showing hypometabolism of mesial temporal lobe, DAWOOD scan also showed localization to left mesial temporal lobe. Patient reports she has had multiple seizures per day for the past 2 weeks, and believes most recent event was on day of admission while talking to nursing.     Epilepsy History  2018/05/29  Patient had another day of almost continuous seizures which was on May 09,2018.  She has failed several AEDs mainly due to side effects.  She experienced pharmacodynamic interaction and toxicity with Lamictal - Lacosamide combo.  She is tolerating the lamictal well      2018/04/09  The patient had 9 seizures recorded in last EMU visit all coming from L anterior temporal area.   MRI was normal but PET showed L mesial hypometabolism.  She is currently have almost daily seizures.  She has failed four AEDs is on Lamictal monotherapy.  She reports her verbal memory is terrible.  Review of labs show she was B12 deficient 4 yrs ago and she does not take any supplements.     Results for GLORIA GURROLA (MRN [id number]) as of 4/9/2018 16:23    Ref. Range 3/1/2016 14:45 12/19/2017 12:56 2/8/2018  "18:12   Lamotrigine Lvl Latest Ref Range: 2.0 - 15.0 ug/mL 12.0 10.5 7.8      2018/01/29  EMU evaluation was completed in Dec and L temporal interictal spikes were recorded and one electrographic seizure was recorded arising from the L anterior temporal area.  Besides frequent seizures her major complaint is progressive memory loss.       HISTORY OF PRESENT ILLNESS (HPI)  Date: The   New Patient  Pt has been seeing Dr Huerta at [facility] for "complex partial sezures." First sz, in school, age 19. Was put on Dilantin and she did well for approx 15 years, until the seizures became active again. Thinks she may have had 2 classic "Grand Mal" seizures in her 20's, but since then, seizure types are those described below.     Currently, she is experiencing more than one event per week. Event type is described below. She has been failing her current treatment regimen as described below. May have tried other meds, but can't remember them now. Did not bring records yet.           Seizure Seminology  Seizure Type 1   Classification: Pass-out  Aura - Occasional auditory мария vu  Pt loses consciousness and falls or loses tone 1-2 in  Post-ictal  Brief  Age of onset 19  Current Seizure Frequency - Several per week        Seizure Type 2  Classification: Complex Partial  Wanders around. Doesn't remember after.   Post-ictal  Brief  Current Seizure Frequency - Several per week     Seizure Triggers  Sleep Deprivation - None  Other medications - None  Psych/stress - None  Photic stimulation - None  Hyperventilation - None  Medical Problems - None  Menses - 3 days before period they get worse  Sensory Stimulation (light, sound, etc) - None  Missed dose of meds - None     AED Treatments  Present regimen   tabs 1 in AM and 1½ in PM   perampanel (Fycompa, FCP)     Prior treatments  eslicarbazine (Aptiom, ESL) - seizure intensity worsened after 2 weeks Rx  lacosamide (Vimpat, LCS)   oxcarbazepine (Trileptal OXC)   BID  TPM " 10ID  valproic acid (Depakote, VPA)   zonisamide (Zonegran, ZNA)     Not tried  acetazolamide (Diamox, AZM)  amantadine  carbamazepine (Tegretol, CBZ)  clobazam (Onfi or Frizium, CLB)  ethosuximide (Zarontin, ESM)  felbamate (felbatol, FBM)  gabapentin (Neurontin, GPN)  levetiracetam (Keppra, LEV)  methsuximide (Celontin, MSM)  methyphenytoin (Mesantion, MHT)  perampanel (Fycompa, FCP)    phenobarbital (Pb)  pregabalin (Lyrica, PGB)  primidone (Mysoline, PRM)  retigabine (Potiga, RTG)  rufinamide (Banzel, RUF)  tiagabine (Gabatril, TGB)  viagabatrin, (Sabril, VGB)  vagal nerve stimulator (VNS)     Benzodiazepines  diazepam - rectal (Diastatl)  diazepam - oral (Valium, DZ)  clonazepam (Klonopin, CZP)  clorazepate (Tranxene, CLZ)  Ativan  Brain Stimulation  Vagal Nerve Stimulation-n/a  DBS- n/a     Compliance method  Memory - yes  Mom or Spouse - Yes  Pill Box - no  Dewayne calendar - no  Turn over medication bottle - no  Phone alarm - no     Seizure Evaluation  EEG Routine - Dont have  MRI/MRA - In past- she doesn't know results  [facility]  2017/12/19-12/20- Patient with no events overnight. EEG shows L anterior temporal spikes, most recorded at F7. None on the other side. At times, almost continuous L temporal interictal discharges at times.   2017/12/20- 11:56:23- clinical seizure, starting from L side on EEG. No epileptiform discharges noted. L temporal slowing and spikes noted. Consisted of brief moment of unresponsiveness.   2017/12/21- EEG showing L interical anterior temporal slowing with L temporal spikes. No clinical seizures since yesterday.   2017/12/21-12/22- Event on 12/21 at 18:55 showing patient talking on the phone and suddenly stopping, unable to speak and confused. EEG shows there is a rhythmic buildup in the L temporal chains. Patient is confused and is drowsy following event. No tonic/clonic activity present.      CT/CTA Scan -   PET Scan -   Neuropsychological evaluation -   DEXA Scan     Potential  Epilepsy Risk Factors:   Pregnancy/Labor/Delivery - full term uncomplicated pregnancy labor and vaginal delivery  Febrile seizures - none  Head injury - none  CNS infection - none   Stroke - none  Family Hx of Sz - none    Hospital Course:   No notes on file     Past Medical History:   Diagnosis Date    Convulsions     Epilepsy     Seizures        Past Surgical History:   Procedure Laterality Date    APPENDECTOMY       SECTION      4    CHOLECYSTECTOMY      HYSTERECTOMY      around 2016 for pain ful periods        Review of patient's allergies indicates:  No Known Allergies    No current facility-administered medications on file prior to encounter.      Current Outpatient Medications on File Prior to Encounter   Medication Sig    lamoTRIgine (LAMICTAL) 200 MG tablet Take 1.5 tablets (300 mg total) by mouth 2 (two) times daily. (Patient taking differently: Take 200 mg by mouth 2 (two) times daily. Take 1 in the morning and one and half  at night)    cloBAZam (ONFI) 20 mg Tab Take 1 tablet (20 mg total) by mouth 2 (two) times daily.     Continuous Infusions:   sodium chloride 0.9%      niCARdipine Stopped (18 1345)       Family History     Problem Relation (Age of Onset)    Heart disease Father        Tobacco Use    Smoking status: Never Smoker    Smokeless tobacco: Never Used   Substance and Sexual Activity    Alcohol use: No     Alcohol/week: 0.0 oz    Drug use: No    Sexual activity: No     Review of Systems   Constitutional: Negative for chills and fever.   Eyes: Positive for visual disturbance (not wearing her glasses).   Respiratory: Negative for cough and shortness of breath.    Gastrointestinal: Negative for nausea and vomiting.   Skin: Negative for pallor and rash.   Neurological: Positive for seizures and headaches. Negative for facial asymmetry and speech difficulty.   Psychiatric/Behavioral: Negative for agitation and confusion.     Objective:     Vital Signs (Most  Recent):  Temp: 98.3 °F (36.8 °C) (11/05/18 1345)  Pulse: (!) 58 (11/05/18 1600)  Resp: 14 (11/05/18 1600)  BP: 121/60 (11/05/18 1600)  SpO2: 100 % (11/05/18 1600) Vital Signs (24h Range):  Temp:  [98.1 °F (36.7 °C)-98.5 °F (36.9 °C)] 98.3 °F (36.8 °C)  Pulse:  [50-71] 58  Resp:  [11-17] 14  SpO2:  [96 %-100 %] 100 %  BP: (111-147)/(56-80) 121/60  Arterial Line BP: (124-129)/(57-59) 129/59     Weight: 106.6 kg (235 lb)  Body mass index is 39.11 kg/m².    Physical Exam   Constitutional: She is oriented to person, place, and time. She appears well-developed and well-nourished. No distress.   HENT:   Right Ear: External ear normal.   Left Ear: External ear normal.   S/p L temporal crani   Eyes: Conjunctivae and EOM are normal. Pupils are equal, round, and reactive to light.   Neck: Normal range of motion.   Pulmonary/Chest: Effort normal. No respiratory distress.   Musculoskeletal: Normal range of motion. She exhibits no deformity.   Neurological: She is alert and oriented to person, place, and time. No cranial nerve deficit.   Skin: Skin is warm and dry. She is not diaphoretic. No erythema.   Psychiatric: She has a normal mood and affect. Her speech is normal and behavior is normal. Judgment and thought content normal.       NEUROLOGICAL EXAMINATION:     MENTAL STATUS   Oriented to person, place, and time.   Attention: normal. Concentration: normal.   Speech: speech is normal   Level of consciousness: alert    CRANIAL NERVES     CN III, IV, VI   Pupils are equal, round, and reactive to light.  Extraocular motions are normal.     CN VII   Facial expression full, symmetric.     CN VIII   CN VIII normal.     MOTOR EXAM        Moves all extremities spontaneously, no focal deficit noted       Significant Labs: All pertinent lab results from the past 24 hours have been reviewed.    Significant Studies: I have reviewed all pertinent imaging results/findings within the past 24 hours.    Assessment and Plan:     Temporal lobe  epilepsy, intractable    47 yo female with history of seizures since age 19, who presents to NICU s/p L temporal crani with subdural grid placement for further localization and possible resection.    Recommendations:  - Continuous vEEG monitoring with subdural grid electrodes in place  - Reduce Lamictal to 100 mg BID  - For any seizures: please push event button on EEG and call epileptologist on call prior to administration of abortive medication  - Recommend short acting opioids instead of long acting as needed for pain control  - Pain management per [facility]         VTE Risk Mitigation (From admission, onward)        Ordered     heparin (porcine) injection 5,000 Units  Every 8 hours      11/05/18 1238     Place sequential compression device  Until discontinued      11/05/18 1238     Place LAURA hose  Until discontinued      11/05/18 1238     IP VTE HIGH RISK PATIENT  Once      11/05/18 1238     Place sequential compression device  Until discontinued      11/05/18 0614     Place LAURA hose  Until discontinued      11/05/18 0614          Thank you for your consult. I will follow-up with patient. Please contact us if you have any additional questions.    Philly Foy PA-C  Neurology-Epilepsy  [facility]  Staff: Dr. Mark

## 2018-11-05 NOTE — ANESTHESIA POSTPROCEDURE EVALUATION
"Anesthesia Post Evaluation    Patient: Liza Arrieta    Procedure(s) Performed: Procedure(s) (LRB):  CRANIOTOMY for strip and grid (N/A)    Final Anesthesia Type: general  Patient location during evaluation: PACU  Patient participation: Yes- Able to Participate  Level of consciousness: awake and alert  Post-procedure vital signs: reviewed and stable  Pain management: adequate  Airway patency: patent  PONV status at discharge: No PONV  Anesthetic complications: no      Cardiovascular status: hemodynamically stable  Respiratory status: unassisted  Hydration status: euvolemic  Follow-up not needed.        Visit Vitals  /65   Pulse (!) 51   Temp 36.8 °C (98.3 °F) (Oral)   Resp 12   Ht 5' 5" (1.651 m)   Wt 106.6 kg (235 lb)   SpO2 100%   Breastfeeding? No   BMI 39.11 kg/m²       Pain/Allie Score: Pain Assessment Performed: Yes (11/5/2018  2:00 PM)  Presence of Pain: complains of pain/discomfort (11/5/2018  2:00 PM)  Pain Rating Prior to Med Admin: 6 (11/5/2018  1:30 PM)  Pain Rating Post Med Admin: 2 (11/5/2018  2:00 PM)  Allie Score: 9 (11/5/2018  2:00 PM)        "

## 2018-11-05 NOTE — ANESTHESIA PROCEDURE NOTES
Arterial    Diagnosis: Epilepsy    Patient location during procedure: done in OR  Procedure start time: 11/5/2018 8:15 AM  Timeout: 11/5/2018 8:15 AM  Procedure end time: 11/5/2018 8:18 AM  Staffing  Anesthesiologist: Nando Weeks MD  Resident/CRNA: Ifeanyi Sanchez MD  Performed: resident/CRNA   Anesthesiologist was present at the time of the procedure.  Preanesthetic Checklist  Completed: patient identified, site marked, surgical consent, pre-op evaluation, timeout performed, IV checked, risks and benefits discussed, monitors and equipment checked and anesthesia consent givenArterial  Skin Prep: chlorhexidine gluconate  Orientation: left  Location: radial  Catheter Size: 20 GInsertion Attempts: 1

## 2018-11-05 NOTE — TRANSFER OF CARE
"Anesthesia Transfer of Care Note    Patient: Liza Arrieta    Procedure(s) Performed: Procedure(s) (LRB):  CRANIOTOMY for strip and grid (N/A)    Patient location: PACU    Anesthesia Type: general    Transport from OR: Transported from OR on 2-3 L/min O2 by NC with adequate spontaneous ventilation    Post pain: adequate analgesia    Post assessment: no apparent anesthetic complications    Post vital signs: stable    Level of consciousness: awake and alert    Nausea/Vomiting: no nausea/vomiting    Complications: none    Transfer of care protocol was followed      Last vitals:   Visit Vitals  BP (!) 147/80 (BP Location: Left arm, Patient Position: Lying)   Pulse 71   Temp 36.9 °C (98.5 °F) (Oral)   Resp 16   Ht 5' 5" (1.651 m)   Wt 106.6 kg (235 lb)   SpO2 99%   Breastfeeding? No   BMI 39.11 kg/m²     "

## 2018-11-05 NOTE — HPI
"Ms. Gurrola is a 47 yo female with history of partial intractable epilepsy who is admitted to NICU after L crani with subdural grid placement. She is currently on Lamotrigine 200 mg qAM and 300 mg qPM. Recent EMU admission suggestive of left temporal lobe epilepsy. PET scan completed showing hypometabolism of mesial temporal lobe, DAWOOD scan also showed localization to left mesial temporal lobe. Patient reports she has had multiple seizures per day for the past 2 weeks, and believes most recent event was on day of admission while talking to nursing.     Epilepsy History  2018/05/29  Patient had another day of almost continuous seizures which was on May 09,2018.  She has failed several AEDs mainly due to side effects.  She experienced pharmacodynamic interaction and toxicity with Lamictal - Lacosamide combo.  She is tolerating the lamictal well      2018/04/09  The patient had 9 seizures recorded in last EMU visit all coming from L anterior temporal area.   MRI was normal but PET showed L mesial hypometabolism.  She is currently have almost daily seizures.  She has failed four AEDs is on Lamictal monotherapy.  She reports her verbal memory is terrible.  Review of labs show she was B12 deficient 4 yrs ago and she does not take any supplements.     Results for GLORIA GURROLA (MRN [id number]) as of 4/9/2018 16:23    Ref. Range 3/1/2016 14:45 12/19/2017 12:56 2/8/2018 18:12   Lamotrigine Lvl Latest Ref Range: 2.0 - 15.0 ug/mL 12.0 10.5 7.8      2018/01/29  EMU evaluation was completed in Dec and L temporal interictal spikes were recorded and one electrographic seizure was recorded arising from the L anterior temporal area.  Besides frequent seizures her major complaint is progressive memory loss.       HISTORY OF PRESENT ILLNESS (HPI)  Date: The   New Patient  Pt has been seeing Dr Huerta at [facility] for "complex partial sezures." First sz, in school, age 19. Was put on Dilantin and she did well for approx 15 years, until the " "seizures became active again. Thinks she may have had 2 classic "Grand Mal" seizures in her 20's, but since then, seizure types are those described below.     Currently, she is experiencing more than one event per week. Event type is described below. She has been failing her current treatment regimen as described below. May have tried other meds, but can't remember them now. Did not bring records yet.           Seizure Seminology  Seizure Type 1   Classification: Pass-out  Aura - Occasional auditory мария vu  Pt loses consciousness and falls or loses tone 1-2 in  Post-ictal  Brief  Age of onset 19  Current Seizure Frequency - Several per week        Seizure Type 2  Classification: Complex Partial  Wanders around. Doesn't remember after.   Post-ictal  Brief  Current Seizure Frequency - Several per week     Seizure Triggers  Sleep Deprivation - None  Other medications - None  Psych/stress - None  Photic stimulation - None  Hyperventilation - None  Medical Problems - None  Menses - 3 days before period they get worse  Sensory Stimulation (light, sound, etc) - None  Missed dose of meds - None     AED Treatments  Present regimen   tabs 1 in AM and 1½ in PM   perampanel (Fycompa, FCP)     Prior treatments  eslicarbazine (Aptiom, ESL) - seizure intensity worsened after 2 weeks Rx  lacosamide (Vimpat, LCS)   oxcarbazepine (Trileptal OXC)   BID  TPM 10ID  valproic acid (Depakote, VPA)   zonisamide (Zonegran, ZNA)     Not tried  acetazolamide (Diamox, AZM)  amantadine  carbamazepine (Tegretol, CBZ)  clobazam (Onfi or Frizium, CLB)  ethosuximide (Zarontin, ESM)  felbamate (felbatol, FBM)  gabapentin (Neurontin, GPN)  levetiracetam (Keppra, LEV)  methsuximide (Celontin, MSM)  methyphenytoin (Mesantion, MHT)  perampanel (Fycompa, FCP)    phenobarbital (Pb)  pregabalin (Lyrica, PGB)  primidone (Mysoline, PRM)  retigabine (Potiga, RTG)  rufinamide (Banzel, RUF)  tiagabine (Gabatril, TGB)  viagabatrin, (Sabril, " VGB)  vagal nerve stimulator (VNS)     Benzodiazepines  diazepam - rectal (Diastatl)  diazepam - oral (Valium, DZ)  clonazepam (Klonopin, CZP)  clorazepate (Tranxene, CLZ)  Ativan  Brain Stimulation  Vagal Nerve Stimulation-n/a  DBS- n/a     Compliance method  Memory - yes  Mom or Spouse - Yes  Pill Box - no  Dewayne calendar - no  Turn over medication bottle - no  Phone alarm - no     Seizure Evaluation  EEG Routine - Dont have  MRI/MRA - In past- she doesn't know results  EMU eval  2017/12/19-12/20- Patient with no events overnight. EEG shows L anterior temporal spikes, most recorded at F7. None on the other side. At times, almost continuous L temporal interictal discharges at times.   2017/12/20- 11:56:23- clinical seizure, starting from L side on EEG. No epileptiform discharges noted. L temporal slowing and spikes noted. Consisted of brief moment of unresponsiveness.   2017/12/21- EEG showing L interical anterior temporal slowing with L temporal spikes. No clinical seizures since yesterday.   2017/12/21-12/22- Event on 12/21 at 18:55 showing patient talking on the phone and suddenly stopping, unable to speak and confused. EEG shows there is a rhythmic buildup in the L temporal chains. Patient is confused and is drowsy following event. No tonic/clonic activity present.      CT/CTA Scan -   PET Scan -   Neuropsychological evaluation -   DEXA Scan     Potential Epilepsy Risk Factors:   Pregnancy/Labor/Delivery - full term uncomplicated pregnancy labor and vaginal delivery  Febrile seizures - none  Head injury - none  CNS infection - none   Stroke - none  Family Hx of Sz - none

## 2018-11-05 NOTE — H&P
"[facility]  Neurocritical Care  History & Physical    Admit Date: 2018  Service Date: 2018  Length of Stay: 0    Subjective:     Chief Complaint: Temporal lobe epilepsy, intractable    History of Present Illness: The patient is  a 49 yo female with PMHx of partial intractable epilepsy who is admitted to [facility] after L crani with subdural grid placement. Per chart review, she had her first seizure, in school at  age 19. Was put on Dilantin and she did well for approx 15 years, until the seizures became active again. Thinks she may have had 2 classic "Grand Mal" seizures in her 20's. The patient was recently admitted to EMU on 2018 where seizures were captured on EEG.She has failed multiple medications. Patient reports she has had multiple seizures per day for the past 2 weeks, and believes most recent event was on day of admission while talking to nursing. Patient  admitted to [facility] post-op for close monitoring and higher level of care.           Past Medical History:   Diagnosis Date    Convulsions     Epilepsy     Seizures      Past Surgical History:   Procedure Laterality Date    APPENDECTOMY       SECTION      4    CHOLECYSTECTOMY      HYSTERECTOMY      around 2016 for pain ful periods       No current facility-administered medications on file prior to encounter.      Current Outpatient Medications on File Prior to Encounter   Medication Sig Dispense Refill    lamoTRIgine (LAMICTAL) 200 MG tablet Take 1.5 tablets (300 mg total) by mouth 2 (two) times daily. (Patient taking differently: Take 200 mg by mouth 2 (two) times daily. Take 1 in the morning and one and half  at night) 270 tablet 11    cloBAZam (ONFI) 20 mg Tab Take 1 tablet (20 mg total) by mouth 2 (two) times daily. 60 tablet 5      Allergies: Patient has no known allergies.    Family History   Problem Relation Age of Onset    Heart disease Father         over 50 years     Social History     Tobacco Use    " Smoking status: Never Smoker    Smokeless tobacco: Never Used   Substance Use Topics    Alcohol use: No     Alcohol/week: 0.0 oz    Drug use: No     Review of Systems   Constitutional: Denies fevers, weight loss, chills, or weakness.  Eyes: Denies changes in vision.  ENT: Denies dysphagia, nasal discharge, ear pain or discharge.  Cardiovascular: Denies chest pain, palpitations, orthopnea, or claudication.  Respiratory: Denies shortness of breath, cough, hemoptysis, or wheezing.  GI: Denies nausea/vomitting, hematochezia, melena, abd pain, or changes in appetite.  : Denies dysuria, incontinence, or hematuria.  Musculoskeletal: Denies joint pain or myalgias.  Skin/breast: Denies rashes, lumps, lesions, or discharge.  Neurologic: Denies , dizziness, vertigo, or paresthesias. Complains of headache  Psychiatric: Denies changes in mood or hallucinations.  Endocrine: Denies polyuria, polydipsia, heat/cold intolerance.  Hematologic/Lymph: Denies lymphadenopathy, easy bruising or easy bleeding.  Allergic/Immunologic: Denies rash, rhinitis.   Objective:     Vitals:    Temp: 98.4 °F (36.9 °C)  Pulse: (!) 59  Rhythm: sinus bradycardia  BP: 128/64  MAP (mmHg): 91  Resp: 13  SpO2: 96 %  O2 Device (Oxygen Therapy): nasal cannula    Temp  Min: 98.1 °F (36.7 °C)  Max: 98.5 °F (36.9 °C)  Pulse  Min: 50  Max: 71  BP  Min: 111/56  Max: 147/80  MAP (mmHg)  Min: 80  Max: 97  Resp  Min: 11  Max: 17  SpO2  Min: 96 %  Max: 100 %    No intake/output data recorded.           Physical Exam  GA: Alert,  no acute distress.   HEENT: No scleral icterus or JVD.   Pulmonary: Clear to auscultation A/L.   Cardiac: RRR S1 & S2 w/o rubs/murmurs/gallops.   Abdominal: Bowel sounds present x 4. No appreciable hepatosplenomegaly.  Skin: No jaundice, rashes, or visible lesions.  Neuro:  --GCS: E3 V5 M6  --Mental Status:  Sedated post op, opens eyes to voice and touch, orientedX4, follows commands,   --CN II-XII grossly intact.   --Pupils 2mm, PERRL.    --Corneal reflex, gag, cough intact.  --PUGA spontaneously  --Sensation to light touch intact    Today I personally reviewed pertinent medications, lines/drains/airways, imaging, laboratory results,         Assessment/Plan:     Neuro   * Temporal lobe epilepsy, intractable    -- POD0 L Craniotomy for strip and grid placement  -- continous EEG  -- continue lamotrigine 100mg BID per epilepsy reccs  -- Neuro checks q 1 hr  -- appreciate epilepsy reccs  -- Neuro checks q 1 hour  --Seizure precautions  -- Ativan 2 mg PRN for seizures lasting>5 min  --NSGY following           Cardiac/Vascular   Essential hypertension    --SBP goal <140  --cardene gtt  --PRN hydralazyne  --Pending EKG  --Closely monitor BP and HR     Endocrine   Class 2 obesity with body mass index (BMI) of 38.0 to 38.9 in adult    Body mass index is 39.11 kg/m².             The patient is being Prophylaxed for:  Venous Thromboembolism with: Mechanical  Stress Ulcer with: Not Applicable   Ventilator Pneumonia with: not applicable    Full Code    Lisset Arboleda NP  Neurocritical Care  [facility]    Critical care time >35 min.

## 2018-11-05 NOTE — HOSPITAL COURSE
11/5: Pt admitted to [facility] s/p L crani with subdural grid placement, continuous EEG  11/6: cEEG  11/7: Pulled one of her leads today. Sent for stat CTH showed electrodes have been repositioned. posterior infratemporal strips are no longer in place  11/9: s/p repeat crani for grid placement.  One abscence seizure lasting 5 seconds. Epilepsy plans to due cortical mapping this afternoon.  11/12: Continuing to hold AEDs. PRNs per epilepsy.  11/13: Plan for cortical mapping on 11/14 11/14  One seizure overnight and 2 seizures noted today. remains on EEG. Epilepsy to do cortical mapping this aftrnoon  11/15 24h EEG continues no seizures over night and so far today. Cortical mapping today  11/16: PT/OT, restart home AED meds  11/17: CTH, cEEG  11/18: Plan for OR tomorrow  11/19: Or today(Grid and strip), CTH  11/20: Mri   11/21 No significant events over night. Hemodynamically stable. Will step down  To NSGY with epilepsy following

## 2018-11-05 NOTE — PROGRESS NOTES
NCC charge nurse notified that patient is in PACU. NCC charge nurse will contact all appropriate teams needed to come to PACU to set up EEG.  Pt resting comfortably. Vital signs stable. AAOx4. Will continue to monitor.

## 2018-11-05 NOTE — BRIEF OP NOTE
[facility]  Neurosurgery  Operative Note    SUMMARY      Date of Procedure: 11/5/2018     Procedure: Procedure(s) (LRB):  CRANIOTOMY for strip and grid (N/A)     Surgeon(s) and Role:     * Ruben Senior MD - Primary    Assisting Surgeon: Duane Joiner MD    Pre-Operative Diagnosis: Temporal lobe epilepsy, intractable [G40.219]    Post-Operative Diagnosis: Post-Op Diagnosis Codes:     * Temporal lobe epilepsy, intractable [G40.219]    Anesthesia: General    Technical Procedures Used: L crani subdural grid placement    Description of the Findings of the Procedure: see full op n ote    Complications: No    Estimated Blood Loss (EBL): 150 mL           Specimens:   Specimen (12h ago, onward)    None           Implants:   Implant Name Type Inv. Item Serial No.  Lot No. LRB No. Used   Auragen Grid Electrode     INTEGRA [id number] N/A 1   AURAGEN GRID ELECTRODE 2X4    INTEGRA [id number] N/A 1   AURAGEN STRIP ECLECTRODE 1X4    INTEGRA [id number] N/A 2   AURAGEN GRID ELECTRODE 8X8    INTEGRA [id number] N/A 1   DURAMATRIX ONLAY PLUS 4X5 - [id number]  DURAMATRIX ONLAY PLUS 4X5  [employer] [id number] N/A 1   1.5mm self drilling screw    KARIME NEURO  N/A 12   PLATE BONE 2X2 HOLE SM BOX - [id number]  PLATE BONE 2X2 HOLE SM BOX  KARIMEPatent Safari JEAN.  N/A 3              Condition: Good    Disposition: ICU - extubated and stable.

## 2018-11-05 NOTE — PROGRESS NOTES
Dr. Valdez with epilepsy paged about setting up EEG. MD states someone will be by shortly to set up EEG

## 2018-11-05 NOTE — ASSESSMENT & PLAN NOTE
49 yo female with history of seizures since age 19, who presents to NICU s/p L temporal crani with subdural grid placement for further localization and possible resection.    Recommendations:  - Continuous vEEG monitoring with subdural grid electrodes in place  - Reduce Lamictal to 100 mg BID  - For any seizures: please push event button on EEG and call epileptologist on call prior to administration of abortive medication  - Recommend short acting opioids instead of long acting as needed for pain control  - Pain management per NCC

## 2018-11-06 LAB
ALBUMIN SERPL BCP-MCNC: 2.9 G/DL
ALP SERPL-CCNC: 112 U/L
ALT SERPL W/O P-5'-P-CCNC: 19 U/L
ANION GAP SERPL CALC-SCNC: 10 MMOL/L
AST SERPL-CCNC: 17 U/L
BASOPHILS # BLD AUTO: 0.01 K/UL
BASOPHILS NFR BLD: 0.1 %
BILIRUB SERPL-MCNC: 0.2 MG/DL
BUN SERPL-MCNC: 10 MG/DL
CALCIUM SERPL-MCNC: 8.9 MG/DL
CHLORIDE SERPL-SCNC: 110 MMOL/L
CO2 SERPL-SCNC: 21 MMOL/L
CREAT SERPL-MCNC: 0.8 MG/DL
DIFFERENTIAL METHOD: ABNORMAL
EOSINOPHIL # BLD AUTO: 0 K/UL
EOSINOPHIL NFR BLD: 0 %
ERYTHROCYTE [DISTWIDTH] IN BLOOD BY AUTOMATED COUNT: 15.9 %
EST. GFR  (AFRICAN AMERICAN): >60 ML/MIN/1.73 M^2
EST. GFR  (NON AFRICAN AMERICAN): >60 ML/MIN/1.73 M^2
GLUCOSE SERPL-MCNC: 129 MG/DL
HCT VFR BLD AUTO: 33.1 %
HGB BLD-MCNC: 10.8 G/DL
IMM GRANULOCYTES # BLD AUTO: 0.06 K/UL
IMM GRANULOCYTES NFR BLD AUTO: 0.4 %
LYMPHOCYTES # BLD AUTO: 1.4 K/UL
LYMPHOCYTES NFR BLD: 9.2 %
MAGNESIUM SERPL-MCNC: 2 MG/DL
MCH RBC QN AUTO: 24.5 PG
MCHC RBC AUTO-ENTMCNC: 32.6 G/DL
MCV RBC AUTO: 75 FL
MONOCYTES # BLD AUTO: 0.6 K/UL
MONOCYTES NFR BLD: 4.1 %
NEUTROPHILS # BLD AUTO: 13.2 K/UL
NEUTROPHILS NFR BLD: 86.2 %
NRBC BLD-RTO: 0 /100 WBC
PHOSPHATE SERPL-MCNC: 2.7 MG/DL
PLATELET # BLD AUTO: 251 K/UL
PMV BLD AUTO: 9.5 FL
POCT GLUCOSE: 104 MG/DL (ref 70–110)
POCT GLUCOSE: 122 MG/DL (ref 70–110)
POCT GLUCOSE: 141 MG/DL (ref 70–110)
POTASSIUM SERPL-SCNC: 4.4 MMOL/L
PROT SERPL-MCNC: 6.4 G/DL
RBC # BLD AUTO: 4.4 M/UL
SODIUM SERPL-SCNC: 141 MMOL/L
WBC # BLD AUTO: 15.26 K/UL

## 2018-11-06 PROCEDURE — 25000003 PHARM REV CODE 250: Performed by: NEUROLOGICAL SURGERY

## 2018-11-06 PROCEDURE — C9113 INJ PANTOPRAZOLE SODIUM, VIA: HCPCS | Performed by: NEUROLOGICAL SURGERY

## 2018-11-06 PROCEDURE — 25000003 PHARM REV CODE 250: Performed by: NURSE PRACTITIONER

## 2018-11-06 PROCEDURE — 99232 SBSQ HOSP IP/OBS MODERATE 35: CPT | Mod: ,,, | Performed by: PSYCHIATRY & NEUROLOGY

## 2018-11-06 PROCEDURE — 83735 ASSAY OF MAGNESIUM: CPT

## 2018-11-06 PROCEDURE — 63600175 PHARM REV CODE 636 W HCPCS: Performed by: NEUROLOGICAL SURGERY

## 2018-11-06 PROCEDURE — 25000003 PHARM REV CODE 250: Performed by: PHYSICIAN ASSISTANT

## 2018-11-06 PROCEDURE — 95961 ELECTRODE STIMULATION BRAIN: CPT | Mod: 26,,, | Performed by: PSYCHIATRY & NEUROLOGY

## 2018-11-06 PROCEDURE — 92610 EVALUATE SWALLOWING FUNCTION: CPT

## 2018-11-06 PROCEDURE — 99291 CRITICAL CARE FIRST HOUR: CPT | Mod: ,,, | Performed by: PSYCHIATRY & NEUROLOGY

## 2018-11-06 PROCEDURE — 80053 COMPREHEN METABOLIC PANEL: CPT

## 2018-11-06 PROCEDURE — 20000000 HC ICU ROOM

## 2018-11-06 PROCEDURE — 85025 COMPLETE CBC W/AUTO DIFF WBC: CPT

## 2018-11-06 PROCEDURE — 63600175 PHARM REV CODE 636 W HCPCS: Performed by: PHYSICIAN ASSISTANT

## 2018-11-06 PROCEDURE — 84100 ASSAY OF PHOSPHORUS: CPT

## 2018-11-06 PROCEDURE — 95951 HC EEG MONITORING/VIDEO RECORD: CPT

## 2018-11-06 PROCEDURE — 95951 PR EEG MONITORING/VIDEORECORD: CPT | Mod: 26,,, | Performed by: PSYCHIATRY & NEUROLOGY

## 2018-11-06 RX ORDER — LORAZEPAM 2 MG/ML
INJECTION INTRAMUSCULAR
Status: DISPENSED
Start: 2018-11-06 | End: 2018-11-06

## 2018-11-06 RX ORDER — CEFAZOLIN SODIUM 1 G/3ML
2 INJECTION, POWDER, FOR SOLUTION INTRAMUSCULAR; INTRAVENOUS
Status: DISCONTINUED | OUTPATIENT
Start: 2018-11-06 | End: 2018-11-12

## 2018-11-06 RX ORDER — AMOXICILLIN 250 MG
1 CAPSULE ORAL DAILY
Status: DISCONTINUED | OUTPATIENT
Start: 2018-11-06 | End: 2018-11-12

## 2018-11-06 RX ADMIN — LAMOTRIGINE 100 MG: 25 TABLET ORAL at 09:11

## 2018-11-06 RX ADMIN — CEFTRIAXONE 2 G: 2 INJECTION, SOLUTION INTRAVENOUS at 03:11

## 2018-11-06 RX ADMIN — HYDROCODONE BITARTRATE AND ACETAMINOPHEN 1 TABLET: 5; 325 TABLET ORAL at 09:11

## 2018-11-06 RX ADMIN — PANTOPRAZOLE SODIUM 40 MG: 40 INJECTION, POWDER, FOR SOLUTION INTRAVENOUS at 08:11

## 2018-11-06 RX ADMIN — CEFAZOLIN 2 G: 1 INJECTION, POWDER, FOR SOLUTION INTRAMUSCULAR; INTRAVENOUS at 02:11

## 2018-11-06 RX ADMIN — LAMOTRIGINE 100 MG: 25 TABLET ORAL at 08:11

## 2018-11-06 RX ADMIN — HEPARIN SODIUM 5000 UNITS: 5000 INJECTION, SOLUTION INTRAVENOUS; SUBCUTANEOUS at 09:11

## 2018-11-06 RX ADMIN — HEPARIN SODIUM 5000 UNITS: 5000 INJECTION, SOLUTION INTRAVENOUS; SUBCUTANEOUS at 02:11

## 2018-11-06 RX ADMIN — ACETAMINOPHEN 650 MG: 325 TABLET ORAL at 07:11

## 2018-11-06 RX ADMIN — HYDROCODONE BITARTRATE AND ACETAMINOPHEN 1 TABLET: 5; 325 TABLET ORAL at 02:11

## 2018-11-06 RX ADMIN — HEPARIN SODIUM 5000 UNITS: 5000 INJECTION, SOLUTION INTRAVENOUS; SUBCUTANEOUS at 05:11

## 2018-11-06 RX ADMIN — MUPIROCIN 1 G: 20 OINTMENT TOPICAL at 08:11

## 2018-11-06 RX ADMIN — MUPIROCIN 1 G: 20 OINTMENT TOPICAL at 09:11

## 2018-11-06 RX ADMIN — CEFAZOLIN 2 G: 1 INJECTION, POWDER, FOR SOLUTION INTRAMUSCULAR; INTRAVENOUS at 10:11

## 2018-11-06 RX ADMIN — SENNOSIDES AND DOCUSATE SODIUM 1 TABLET: 8.6; 5 TABLET ORAL at 02:11

## 2018-11-06 NOTE — PLAN OF CARE
11/06/18 1527   Discharge Assessment   Assessment Type Discharge Planning Assessment   Confirmed/corrected address and phone number on facesheet? Yes   Assessment information obtained from? Patient   Expected Length of Stay (days) 5   Communicated expected length of stay with patient/caregiver yes   Prior to hospitilization cognitive status: Alert/Oriented   Prior to hospitalization functional status: Independent   Current cognitive status: Alert/Oriented   Current Functional Status: Independent   Facility Arrived From: home   Lives With spouse;child(duncan), dependent   Able to Return to Prior Arrangements yes   Is patient able to care for self after discharge? Yes   Who are your caregiver(s) and their phone number(s)? Neymar Arrieta ()  [phone]   Patient's perception of discharge disposition home or selfcare   Readmission Within The Last 30 Days no previous admission in last 30 days   Patient currently being followed by outpatient case management? No   Patient currently receives any other outside agency services? No   Equipment Currently Used at Home none   Do you have any problems affording any of your prescribed medications? No   Is the patient taking medications as prescribed? yes   Does the patient have transportation home? Yes   Transportation Available family or friend will provide   Does the patient receive services at the [facility]? No   Discharge Plan A Home with family   Discharge Plan B Home with family;Home Health   Patient/Family In Agreement With Plan yes   Does the patient have transportation to healthcare appointments? Yes         Discharge/ My Health Packet Folder Given to patient/family:      Yes        PCP:  LISA Elam MD        Pharmacy:    [facility] [address] [place] [address] AT [facility]  [address] [facility] [address]  Phone: [phone] Fax: [phone]    [facility]  [address] [address]  Phone: [phone] Fax: [phone]        Emergency Contacts:    Extended Emergency Contact Information  Primary Emergency Contact: Neymar Arrieta   [facility] Phone: [phone]  Relation: Spouse    Insurance:  Payor: UNITED HE[employer] / Plan: UNITED HE[employer] CHOICE / Product Type: Commercial /     Kenna Fong RN, CCRN-K, [facility]  Neuro-Critical Care   X [phone]

## 2018-11-06 NOTE — ASSESSMENT & PLAN NOTE
-POD 1 s/p craniotomy for strip and grid placement  -cEEG  -continue lamotrigine 100 mg BID   -ativan 2 mg for seizures lasting greater than 5 minutes  -notify epilepsy of clinical seizures > 5 minutes and administration of ativan

## 2018-11-06 NOTE — ASSESSMENT & PLAN NOTE
49 yo female with history of seizures since age 19, who presents to NICU s/p L temporal crani with subdural grid placement for further localization and possible resection.    Recommendations:  - Continuous vEEG monitoring with subdural grid electrodes in place  - Continue Lamictal to 100 mg BID  - Cortical mapping session completed today, plan for additional sessions this week  - For any seizures: please push event button on EEG and call epileptologist on call prior to administration of abortive medication  - Recommend short acting opioids instead of long acting as needed for pain control  - Pain management per NCC    Plan of care discussed with NCC team, family at bedside. Will continue to follow.

## 2018-11-06 NOTE — CARE UPDATE
Patient arrived to [facility] from PACU via stretcher with RN X2    Type of stroke/diagnosis:  Seizures/grid placement    Current symptoms: headache    Skin assessment done: Y    Wounds noted: incision to head    NCC notified: Rena GUTIERRES

## 2018-11-06 NOTE — PLAN OF CARE
Problem: Patient Care Overview  Goal: Plan of Care Review  Outcome: Ongoing (interventions implemented as appropriate)  POC reviewed with pt at 2100. Pt verbalized understanding. Questions and concerns addressed. No acute events overnight. Pt progressing toward goals. Will continue to monitor. See flowsheets for full assessment and VS info

## 2018-11-06 NOTE — PROGRESS NOTES
[facility]  Neurocritical Care  Progress Note    Admit Date: 11/5/2018  Service Date: 11/06/2018  Length of Stay: 1    Subjective:     Chief Complaint: Temporal lobe epilepsy, intractable    History of Present Illness: Liza Arrieta is a 47 yo female with PMHx of partial intractable epilepsy who is being admitted to [facility] after L crani with subdural grid placement. Per chart review, she had her first seizure at age 19. At that time, she was treated with dilantin and she did well for approximately 15 years. Reports that she typically has absence seizures (5-6/day), but states she may have had two grand mal seizures decades ago.  The patient was recently admitted to EMU on 2/9/2018 where seizures were captured on EEG.  She has failed multiple medications and is now being admitted for post op management, continuous EEG.             Hospital Course: 11/5: Pt admitted to [facility] s/p L crani with subdural grid placement, continuous EEG  11/6: cEEG      Interval History: Continue current plan.  Discussed with epilepsy team.  CTH pending.     Review of Systems: Review of Systems   Constitutional: Negative for chills and fever.   Respiratory: Negative for shortness of breath.    Cardiovascular: Negative for chest pain and palpitations.   Neurological: Positive for seizures. Negative for dizziness, focal weakness, loss of consciousness and headaches.         Vitals:   Temp: 98.6 °F (37 °C)  Pulse: (!) 52  Rhythm: normal sinus rhythm  BP: 126/67  MAP (mmHg): 92  Resp: 12  SpO2: 98 %  O2 Device (Oxygen Therapy): room air    Temp  Min: 98.4 °F (36.9 °C)  Max: 99.1 °F (37.3 °C)  Pulse  Min: 52  Max: 76  BP  Min: 101/56  Max: 146/85  MAP (mmHg)  Min: 76  Max: 108  Resp  Min: 12  Max: 21  SpO2  Min: 95 %  Max: 100 %    11/05 0701 - 11/06 0700  In: 2225 [I.V.:1925]  Out: 3595 [Urine:3275; Drains:170]         Examination:   Constitutional: Well-nourished and -developed. No apparent distress.   Eyes: Conjunctiva clear,  anicteric. Lids no lesions.  Head/Ears/Nose/Mouth/Throat/Neck: Moist mucous membranes. External ears, nose atraumatic.   Cardiovascular: Regular rhythm. No murmurs. No leg edema.  Respiratory: Comfortable respirations. Clear to auscultation.  Gastrointestinal: No hernia. Soft, nondistended, nontender. + bowel sounds.    Neurologic:  -GCS E4V5M6  -Alert. Oriented to person, place, and time. Speech fluent. Follows commands.  -Cranial nerves grossly intact   -Motor PUGA spontaneously, follows commands   -Sensation intact       Medications:   Continuous Scheduled  ceFAZolin (ANCEF) IVPB 2 g Q8H   heparin (porcine) 5,000 Units Q8H   lamoTRIgine 100 mg BID   lorazepam     mupirocin 1 g BID   senna-docusate 8.6-50 mg 1 tablet Daily   PRN  acetaminophen 650 mg Q6H PRN   acetaminophen 650 mg Q6H PRN   dextrose 50% 12.5 g PRN   glucagon (human recombinant) 1 mg PRN   HYDROcodone-acetaminophen 1 tablet Q4H PRN   HYDROmorphone 0.5 mg Q1H PRN   insulin aspart U-100 1-10 Units Q6H PRN   metoclopramide HCl 5 mg Q6H PRN   ondansetron 8 mg Q6H PRN   sodium chloride 0.9% 3 mL PRN      Today I independently reviewed pertinent medications, lines/drains/airways, imaging, cardiology results, laboratory results,     ISTAT: No results for input(s): PH, PCO2, PO2, POCSATURATED, HCO3, BE, POCNA, POCK, POCTCO2, POCGLU, POCICA, POCLAC, SAMPLE in the last 24 hours.   Chem: Recent Labs   Lab 11/06/18  0143      K 4.4      CO2 21*   *   BUN 10   CREATININE 0.8   ESTGFRAFRICA >60.0   EGFRNONAA >60.0   CALCIUM 8.9   MG 2.0   PHOS 2.7   ANIONGAP 10   PROT 6.4   ALBUMIN 2.9*   BILITOT 0.2   ALKPHOS 112   AST 17   ALT 19     Heme: Recent Labs   Lab 11/06/18  0143   WBC 15.26*   HGB 10.8*   HCT 33.1*        Endo:   Recent Labs   Lab 11/06/18  0136 11/06/18  1157   POCTGLUCOSE 141* 104          Assessment/Plan:     Neuro   * Temporal lobe epilepsy, intractable    -POD 1 s/p craniotomy for strip and grid  placement  -cEEG  -continue lamotrigine 100 mg BID   -ativan 2 mg for seizures lasting greater than 5 minutes  -notify epilepsy of clinical seizures > 5 minutes and administration of ativan                  The patient is being Prophylaxed for:  Venous Thromboembolism with: chemical   Stress Ulcer with: Not Applicable   Ventilator Pneumonia with: not applicable    Activity Orders          None        Full Code    Keysha Osobrne PA-C  Neurocritical Care  [facility]

## 2018-11-06 NOTE — HOSPITAL COURSE
Patient admitted to [facility] after subdural grid placement/L crani on 11/5. Home Lamotrigine decreased to 100 mg BID.  11/6: No seizures since admission. Cortical mapping session completed.  11/7: Cortical mapping during am, multiple clinical seizures during session. Clinical and electrographic seizure 11/7 afternoon, during which patient had automatisms of hands, confusion, pulled at EEG leads disrupting connection.  11/8: No additional seizures overnight. Back to OR today for replacement of intracranial grid/leads.  11/9: No seizures after revision of intracranial grids in OR yesterday.   11/10: No electrographic seizures, cortical mapping  11/11: No electrographic seizures  11/12: No electrographic seizures  11/13: Plan for benadryl x1 today, sleep deprive tonight and benadryl again tomorrow am  11/14: Episode of brief staring with retained consciousness, no EEG correlate  11/15: Cortical mapping with assistance of neuropsychology for language function  11/16: No seizures  11/17: No seizures  11/18: No seizures  11/19: No seizures. To OR today for grid removal and focectomy.   11/20: Off EEG, no reported seizures. POD#1 s/p grid/strip removal and focectomy. Add Ativan 1 mg BID as bridge while Lamictal level builds back up.  11/20-21: Off EEG; doing well; drain in-situ; on Lorazepam PRN    11/22/2018 Doing well, no acute events overnight. Drain in-situ  11/23/2018 Doing well, no acute events overnight. Drain removed yesterday

## 2018-11-06 NOTE — SUBJECTIVE & OBJECTIVE
Interval History: POD 1 s/p craniotomy for grids/strips placement . Stable overnight    Medications:  Continuous Infusions:  Scheduled Meds:   ceFAZolin (ANCEF) IVPB  2 g Intravenous Q8H    heparin (porcine)  5,000 Units Subcutaneous Q8H    lamoTRIgine  100 mg Oral BID    lorazepam        mupirocin  1 g Nasal BID    senna-docusate 8.6-50 mg  1 tablet Oral Daily     PRN Meds:acetaminophen, acetaminophen, dextrose 50%, glucagon (human recombinant), HYDROcodone-acetaminophen, HYDROmorphone, insulin aspart U-100, metoclopramide HCl, ondansetron, sodium chloride 0.9%     Review of Systems  Objective:     Weight: 106.6 kg (235 lb)  Body mass index is 39.11 kg/m².  Vital Signs (Most Recent):  Temp: 98.3 °F (36.8 °C) (11/06/18 1505)  Pulse: (!) 57 (11/06/18 1605)  Resp: 12 (11/06/18 1605)  BP: (!) 152/67 (11/06/18 1605)  SpO2: 98 % (11/06/18 1605) Vital Signs (24h Range):  Temp:  [98.3 °F (36.8 °C)-99.1 °F (37.3 °C)] 98.3 °F (36.8 °C)  Pulse:  [52-76] 57  Resp:  [12-21] 12  SpO2:  [95 %-99 %] 98 %  BP: (101-152)/(56-85) 152/67  Arterial Line BP: ()/(57-74) 92/70     Date 11/06/18 0700 - 11/07/18 0659   Shift 3849-3782 4713-3538 5693-1751 24 Hour Total   INTAKE   P.O. 200   200   I.V.(mL/kg) 436.3(4.1)   436.3(4.1)   Shift Total(mL/kg) 636.3(6)   636.3(6)   OUTPUT   Urine(mL/kg/hr) 1100(1.3) 175  1275   Drains 175   175   Shift Total(mL/kg) 1275(12) 175(1.6)  1450(13.6)   Weight (kg) 106.6 106.6 106.6 106.6                        Closed/Suction Drain 11/05/18 1128 Left Scalp Accordion 10 Fr. (Active)   Site Description Unable to view 11/6/2018  3:05 PM   Dressing Type Gauze 11/6/2018  3:05 PM   Dressing Status Clean;Dry;Intact 11/6/2018  3:05 PM   Status To bulb suction 11/6/2018  3:05 PM   Output (mL) 85 mL 11/6/2018  1:05 PM       Neurosurgery Physical Exam  -Alert and oriented x4  -PERRL, EOMI, face symmetrical, tongue midline, facial sensation symmetric, shoulder shrug full strength and symmetric  -Motor: 5/5  throughout the upper and lower extremites bilaterally  -sensation intact to light touch throughout        Significant Labs:  Recent Labs   Lab 11/05/18  1245 11/06/18  0143   * 129*    141   K 4.1 4.4    110   CO2 22* 21*   BUN 11 10   CREATININE 0.8 0.8   CALCIUM 8.6* 8.9   MG  --  2.0     Recent Labs   Lab 11/05/18  1245 11/06/18  0143   WBC 9.78 15.26*   HGB 10.6* 10.8*   HCT 33.2* 33.1*    251     No results for input(s): LABPT, INR, APTT in the last 48 hours.  Microbiology Results (last 7 days)     ** No results found for the last 168 hours. **        All pertinent labs from the last 24 hours have been reviewed.    Significant Diagnostics:  I have reviewed all pertinent imaging results/findings within the past 24 hours.

## 2018-11-06 NOTE — HOSPITAL COURSE
11/6: POD 1 s/p craniotomy for grids placement  11/7: NAEON, no seizures,   11/9: stable exam  11/10: NAEON  11/11: NAEON, stable exam overnight  11/13: leaking around a lead, will suture today. Plan for cortical mapping on 11/14 11/14  One seizure overnight and 2 seizures noted today. remains on EEG. Epilepsy to do cortical mapping this aftrnoon  11/15 24h EEG continues no seizures over night and so far today. Cortical mapping today  11/16: PT/OT, restart home AED meds  11/17: CTH, cEEG  11/18: Plan for OR tomorrow  11/19: Or today(Grid and strip), CTH  11/20: Post op Mri stable  11/21 No significant events overnight. Hemodynamically stable. Will step down  To NSGY with epilepsy following  11/23: Pt continues to improve post op.  No further seizure activity.  Tolerating diet.  Voiding.  Mild incisional pain. Cleared by PT for home. Neurologically stable. VSS.  DC home today.  F/u in 2 weeks for wound check.

## 2018-11-06 NOTE — HPI
"Pt is a 48 y.o. female who presents per referral by Dr. LISA Elam and the epilepsy team for evaluation for epilepsy surgery. Pt has history of partial intractable epilepsy and has failed at least 10 previous medications. Currently on dual therapy. EMU workup done in February localized pathology to left temporal lobe, with PET scan showing hypometabolism of mesial temporal lobe. DAWOOD scan also showed localization to left mesial temporal lobe. Pt states that she has endured seizures since 19 y.o. Pt currently on Lamictal. Pt states that she endures about 5 absence episodes per day with intermittent LOC. Pt notes one incident of "rolling" seizures for which she was brought to the ED.        "

## 2018-11-06 NOTE — PLAN OF CARE
Problem: SLP Goal  Goal: SLP Goal  Bedside swallow study completed. Recommend regular diet/thin liquids at this time  Sydney Quintana CCC-SLP  11/6/2018

## 2018-11-06 NOTE — NURSING TRANSFER
Nursing Transfer Note      11/5/2018     Transfer To: [id number]    Transfer via stretcher    Transfer with cardiac monitoring    Transported by RN x2    Medicines sent: IV fluids and EEG    Chart send with patient: Yes    Notified: spouse    Patient reassessed at: 11/5/18 se flowsheets

## 2018-11-06 NOTE — PROGRESS NOTES
Pt with planned CTH and grid in place. Spoke with CT tech who stated pt could not be scanned with grid in place. Dr Valdez with epilepsy notified and stated to hold off on CT for now. Would address in rounds later in morning. Will continue to monitor pt closely

## 2018-11-06 NOTE — PT/OT/SLP EVAL
Speech Language Pathology Evaluation  Bedside Swallow  Discharge    Patient Name:  Liza Arrieta   MRN:  [id number]  Admitting Diagnosis: Temporal lobe epilepsy, intractable    Recommendations:                 General Recommendations:  Follow-up not indicated  Diet recommendations:  Regular, Thin   Aspiration Precautions: Standard aspiration precautions   General Precautions: Standard, aspiration, fall, seizure  Communication strategies:  none    History:     Past Medical History:   Diagnosis Date    Convulsions     Epilepsy     Seizures        Past Surgical History:   Procedure Laterality Date    APPENDECTOMY       SECTION      4    CHOLECYSTECTOMY      HYSTERECTOMY      around 2016 for pain ful periods        Social History: Patient lives with spouse and children.    Chest X-Rays:  FINDINGS:  X-ray beam attenuation and scatter occur in generous overlying soft tissues.    Study is centered over the left hilum accounting for the asymmetric appearance of the chanda thoraces.  There is modest patchy heterogeneous opacity in the lower lung zones, left greater than right, compatible with atelectasis although aspiration or pneumonia could present in a similar fashion.    The lungs are otherwise clear.  I detect no pleural fluid, pneumothorax, pneumomediastinum, pneumoperitoneum, cardiac decompensation or significant osseous abnormality.    Prior diet: Regular/thin.      Subjective     Awake/alert    Pain/Comfort:  · Pain Rating 1: 0/10  · Pain Rating Post-Intervention 1: 0/10    Objective:     Oral Musculature Evaluation  · Oral Musculature: WFL  · Dentition: present and adequate  · Mucosal Quality: good  · Oral Labial Strength and Mobility: WFL  · Lingual Strength and Mobility: WFL    Bedside Swallow Eval:   Consistencies Assessed:  · Thin liquids x4 oz straw  · Puree x3   · Solids x1/2 adria cracker     Oral Phase:   · WFL     Pharyngeal Phase:   · no overt clinical signs/symptoms of  aspiration    Treatment: SLP reviewed swallow precautions and diet recs with pt and family    Assessment:     Liza Arrieta is a 48 y.o. female with oral/pharyngeal phases of swallow deemed WFL. Recommend regular diet/thin liquids minding seizure precautions.     Goals:   Multidisciplinary Problems     SLP Goals        Problem: SLP Goal    Goal Priority Disciplines Outcome   SLP Goal     SLP                    Plan:     · Plan of Care reviewed with:  patient   · SLP Follow-Up:  No       Discharge recommendations:    No further ST      Time Tracking:     SLP Treatment Date:   11/06/18  Speech Start Time:  0928  Speech Stop Time:  0936     Speech Total Time (min):  8 min    Billable Minutes: Eval Swallow and Oral Function 8    Sydney Quintana CCC-SLP  11/06/2018

## 2018-11-06 NOTE — PROGRESS NOTES
[facility]  Neurology-Epilepsy  Progress Note    Patient Name: Liza Arrieta  MRN: [id number]  Admission Date: 2018  Hospital Length of Stay: 1 days  Code Status: Full Code   Attending Provider: Ruben Senior MD  Primary Care Physician: Primary Doctor No   Principal Problem:Temporal lobe epilepsy, intractable    Subjective:   Interval History:  No acute events overnight, no seizures, reports headache fairly well controlled today. No focal weakness or deficits noted. Vision improved with glasses in place.    Hospital Course:   Patient admitted to [facility] after subdural grid placement/L crani on . Home Lamotrigine decreased to 100 mg BID.  : No seizures since admission. Cortical mapping session completed.    Past Medical History:   Diagnosis Date    Convulsions     Epilepsy     Seizures        Past Surgical History:   Procedure Laterality Date    APPENDECTOMY       SECTION      4    CHOLECYSTECTOMY      HYSTERECTOMY      around  for pain ful periods        Review of patient's allergies indicates:  No Known Allergies    No current facility-administered medications on file prior to encounter.      Current Outpatient Medications on File Prior to Encounter   Medication Sig    lamoTRIgine (LAMICTAL) 200 MG tablet Take 1.5 tablets (300 mg total) by mouth 2 (two) times daily. (Patient taking differently: Take 200 mg by mouth 2 (two) times daily. Take 1 in the morning and one and half  at night)    cloBAZam (ONFI) 20 mg Tab Take 1 tablet (20 mg total) by mouth 2 (two) times daily.     Continuous Infusions:      Family History     Problem Relation (Age of Onset)    Heart disease Father        Tobacco Use    Smoking status: Never Smoker    Smokeless tobacco: Never Used   Substance and Sexual Activity    Alcohol use: No     Alcohol/week: 0.0 oz    Drug use: No    Sexual activity: No     Review of Systems   Constitutional: Negative for chills and fever.   Eyes: Negative for  visual disturbance.   Respiratory: Negative for cough and shortness of breath.    Cardiovascular: Negative for chest pain and leg swelling.   Gastrointestinal: Negative for nausea and vomiting.   Musculoskeletal: Negative for back pain and joint swelling.   Skin: Negative for pallor and rash.   Neurological: Positive for seizures (none since admission) and headaches (improving). Negative for facial asymmetry, speech difficulty and weakness.   Psychiatric/Behavioral: Negative for agitation and confusion.     Objective:     Vital Signs (Most Recent):  Temp: 98.6 °F (37 °C) (11/06/18 1105)  Pulse: 60 (11/06/18 1205)  Resp: 19 (11/06/18 1205)  BP: (!) 146/85 (11/06/18 1205)  SpO2: 98 % (11/06/18 1205) Vital Signs (24h Range):  Temp:  [98.3 °F (36.8 °C)-99.1 °F (37.3 °C)] 98.6 °F (37 °C)  Pulse:  [50-76] 60  Resp:  [11-21] 19  SpO2:  [95 %-100 %] 98 %  BP: (101-146)/(56-85) 146/85  Arterial Line BP: (101-151)/(57-74) 134/64     Weight: 106.6 kg (235 lb)  Body mass index is 39.11 kg/m².    Physical Exam   Constitutional: She is oriented to person, place, and time. She appears well-developed and well-nourished. No distress.   HENT:   Right Ear: External ear normal.   Left Ear: External ear normal.   S/p L temporal crani   Eyes: Conjunctivae and EOM are normal. Pupils are equal, round, and reactive to light.   Neck: Normal range of motion.   Pulmonary/Chest: Effort normal. No respiratory distress.   Musculoskeletal: Normal range of motion. She exhibits no deformity.   Neurological: She is alert and oriented to person, place, and time. No cranial nerve deficit.   Skin: Skin is warm and dry. She is not diaphoretic. No erythema.   Psychiatric: She has a normal mood and affect. Her speech is normal and behavior is normal. Judgment and thought content normal.       NEUROLOGICAL EXAMINATION:     MENTAL STATUS   Oriented to person, place, and time.   Attention: normal. Concentration: normal.   Speech: speech is normal   Level of  consciousness: alert    CRANIAL NERVES     CN III, IV, VI   Pupils are equal, round, and reactive to light.  Extraocular motions are normal.     CN VII   Facial expression full, symmetric.     CN VIII   CN VIII normal.     CN IX, X   CN IX normal.   CN X normal.     CN XI   CN XI normal.     CN XII   CN XII normal.     MOTOR EXAM   Muscle bulk: normal  Overall muscle tone: normal       Moves all extremities spontaneously, no focal deficit noted       Significant Labs: All pertinent lab results from the past 24 hours have been reviewed.    Significant Studies: I have reviewed all pertinent imaging results/findings within the past 24 hours.    Assessment and Plan:     * Temporal lobe epilepsy, intractable    47 yo female with history of seizures since age 19, who presents to NICU s/p L temporal crani with subdural grid placement for further localization and possible resection.    Recommendations:  - Continuous vEEG monitoring with subdural grid electrodes in place  - Continue Lamictal to 100 mg BID  - Cortical mapping session completed today, plan for additional sessions this week  - For any seizures: please push event button on EEG and call epileptologist on call prior to administration of abortive medication  - Recommend short acting opioids instead of long acting as needed for pain control  - Pain management per NCC    Plan of care discussed with NCC team, family at bedside. Will continue to follow.      Essential hypertension    - Goal SBP <140  - Management per NCC          VTE Risk Mitigation (From admission, onward)        Ordered     heparin (porcine) injection 5,000 Units  Every 8 hours      11/05/18 1238     Place sequential compression device  Until discontinued      11/05/18 1238     Place LAURA hose  Until discontinued      11/05/18 1238     IP VTE HIGH RISK PATIENT  Once      11/05/18 1238     Place sequential compression device  Until discontinued      11/05/18 0614     Place LAURA hose  Until discontinued       11/05/18 0614          Philly Foy PA-C  Neurology-Epilepsy  [facility]  Staff: Dr. Mark

## 2018-11-06 NOTE — SUBJECTIVE & OBJECTIVE
Past Medical History:   Diagnosis Date    Convulsions     Epilepsy     Seizures        Past Surgical History:   Procedure Laterality Date    APPENDECTOMY       SECTION      4    CHOLECYSTECTOMY      HYSTERECTOMY      around 2016 for pain ful periods        Review of patient's allergies indicates:  No Known Allergies    No current facility-administered medications on file prior to encounter.      Current Outpatient Medications on File Prior to Encounter   Medication Sig    lamoTRIgine (LAMICTAL) 200 MG tablet Take 1.5 tablets (300 mg total) by mouth 2 (two) times daily. (Patient taking differently: Take 200 mg by mouth 2 (two) times daily. Take 1 in the morning and one and half  at night)    cloBAZam (ONFI) 20 mg Tab Take 1 tablet (20 mg total) by mouth 2 (two) times daily.     Continuous Infusions:      Family History     Problem Relation (Age of Onset)    Heart disease Father        Tobacco Use    Smoking status: Never Smoker    Smokeless tobacco: Never Used   Substance and Sexual Activity    Alcohol use: No     Alcohol/week: 0.0 oz    Drug use: No    Sexual activity: No     Review of Systems   Constitutional: Negative for chills and fever.   Eyes: Negative for visual disturbance.   Respiratory: Negative for cough and shortness of breath.    Cardiovascular: Negative for chest pain and leg swelling.   Gastrointestinal: Negative for nausea and vomiting.   Musculoskeletal: Negative for back pain and joint swelling.   Skin: Negative for pallor and rash.   Neurological: Positive for seizures (none since admission) and headaches (improving). Negative for facial asymmetry, speech difficulty and weakness.   Psychiatric/Behavioral: Negative for agitation and confusion.     Objective:     Vital Signs (Most Recent):  Temp: 98.6 °F (37 °C) (18 1105)  Pulse: 60 (18 1205)  Resp: 19 (18 1205)  BP: (!) 146/85 (18 1205)  SpO2: 98 % (18 1205) Vital Signs (24h Range):  Temp:  [98.3  °F (36.8 °C)-99.1 °F (37.3 °C)] 98.6 °F (37 °C)  Pulse:  [50-76] 60  Resp:  [11-21] 19  SpO2:  [95 %-100 %] 98 %  BP: (101-146)/(56-85) 146/85  Arterial Line BP: (101-151)/(57-74) 134/64     Weight: 106.6 kg (235 lb)  Body mass index is 39.11 kg/m².    Physical Exam   Constitutional: She is oriented to person, place, and time. She appears well-developed and well-nourished. No distress.   HENT:   Right Ear: External ear normal.   Left Ear: External ear normal.   S/p L temporal crani   Eyes: Conjunctivae and EOM are normal. Pupils are equal, round, and reactive to light.   Neck: Normal range of motion.   Pulmonary/Chest: Effort normal. No respiratory distress.   Musculoskeletal: Normal range of motion. She exhibits no deformity.   Neurological: She is alert and oriented to person, place, and time. No cranial nerve deficit.   Skin: Skin is warm and dry. She is not diaphoretic. No erythema.   Psychiatric: She has a normal mood and affect. Her speech is normal and behavior is normal. Judgment and thought content normal.       NEUROLOGICAL EXAMINATION:     MENTAL STATUS   Oriented to person, place, and time.   Attention: normal. Concentration: normal.   Speech: speech is normal   Level of consciousness: alert    CRANIAL NERVES     CN III, IV, VI   Pupils are equal, round, and reactive to light.  Extraocular motions are normal.     CN VII   Facial expression full, symmetric.     CN VIII   CN VIII normal.     CN IX, X   CN IX normal.   CN X normal.     CN XI   CN XI normal.     CN XII   CN XII normal.     MOTOR EXAM   Muscle bulk: normal  Overall muscle tone: normal       Moves all extremities spontaneously, no focal deficit noted       Significant Labs: All pertinent lab results from the past 24 hours have been reviewed.    Significant Studies: I have reviewed all pertinent imaging results/findings within the past 24 hours.

## 2018-11-07 ENCOUNTER — ANESTHESIA EVENT (OUTPATIENT)
Dept: SURGERY | Facility: HOSPITAL | Age: 48
DRG: 027 | End: 2018-11-07
Payer: COMMERCIAL

## 2018-11-07 LAB
ALBUMIN SERPL BCP-MCNC: 3.2 G/DL
ALP SERPL-CCNC: 100 U/L
ALT SERPL W/O P-5'-P-CCNC: 15 U/L
ANION GAP SERPL CALC-SCNC: 10 MMOL/L
AST SERPL-CCNC: 16 U/L
BASOPHILS # BLD AUTO: 0.02 K/UL
BASOPHILS NFR BLD: 0.2 %
BILIRUB SERPL-MCNC: 0.3 MG/DL
BUN SERPL-MCNC: 11 MG/DL
CALCIUM SERPL-MCNC: 8.9 MG/DL
CHLORIDE SERPL-SCNC: 106 MMOL/L
CO2 SERPL-SCNC: 24 MMOL/L
CREAT SERPL-MCNC: 0.8 MG/DL
DIFFERENTIAL METHOD: ABNORMAL
EOSINOPHIL # BLD AUTO: 0 K/UL
EOSINOPHIL NFR BLD: 0.4 %
ERYTHROCYTE [DISTWIDTH] IN BLOOD BY AUTOMATED COUNT: 16.1 %
EST. GFR  (AFRICAN AMERICAN): >60 ML/MIN/1.73 M^2
EST. GFR  (NON AFRICAN AMERICAN): >60 ML/MIN/1.73 M^2
GLUCOSE SERPL-MCNC: 106 MG/DL
HCT VFR BLD AUTO: 35.4 %
HGB BLD-MCNC: 10.7 G/DL
IMM GRANULOCYTES # BLD AUTO: 0.04 K/UL
IMM GRANULOCYTES NFR BLD AUTO: 0.4 %
LYMPHOCYTES # BLD AUTO: 3.5 K/UL
LYMPHOCYTES NFR BLD: 32.1 %
MAGNESIUM SERPL-MCNC: 1.8 MG/DL
MCH RBC QN AUTO: 23.8 PG
MCHC RBC AUTO-ENTMCNC: 30.2 G/DL
MCV RBC AUTO: 79 FL
MONOCYTES # BLD AUTO: 0.6 K/UL
MONOCYTES NFR BLD: 5.8 %
NEUTROPHILS # BLD AUTO: 6.6 K/UL
NEUTROPHILS NFR BLD: 61.1 %
NRBC BLD-RTO: 0 /100 WBC
PHOSPHATE SERPL-MCNC: 3.1 MG/DL
PLATELET # BLD AUTO: 242 K/UL
PMV BLD AUTO: 9.3 FL
POCT GLUCOSE: 131 MG/DL (ref 70–110)
POCT GLUCOSE: 145 MG/DL (ref 70–110)
POTASSIUM SERPL-SCNC: 4 MMOL/L
PROT SERPL-MCNC: 6.9 G/DL
RBC # BLD AUTO: 4.49 M/UL
SODIUM SERPL-SCNC: 140 MMOL/L
WBC # BLD AUTO: 10.77 K/UL

## 2018-11-07 PROCEDURE — 95961 ELECTRODE STIMULATION BRAIN: CPT | Mod: 26,,, | Performed by: PSYCHIATRY & NEUROLOGY

## 2018-11-07 PROCEDURE — 95951 HC EEG MONITORING/VIDEO RECORD: CPT

## 2018-11-07 PROCEDURE — 25000003 PHARM REV CODE 250: Performed by: PHYSICIAN ASSISTANT

## 2018-11-07 PROCEDURE — 63600175 PHARM REV CODE 636 W HCPCS: Performed by: NEUROLOGICAL SURGERY

## 2018-11-07 PROCEDURE — 99291 CRITICAL CARE FIRST HOUR: CPT | Mod: ,,, | Performed by: PSYCHIATRY & NEUROLOGY

## 2018-11-07 PROCEDURE — 94761 N-INVAS EAR/PLS OXIMETRY MLT: CPT

## 2018-11-07 PROCEDURE — 99232 SBSQ HOSP IP/OBS MODERATE 35: CPT | Mod: ,,, | Performed by: PSYCHIATRY & NEUROLOGY

## 2018-11-07 PROCEDURE — 25000003 PHARM REV CODE 250: Performed by: NURSE PRACTITIONER

## 2018-11-07 PROCEDURE — 83735 ASSAY OF MAGNESIUM: CPT

## 2018-11-07 PROCEDURE — 20000000 HC ICU ROOM

## 2018-11-07 PROCEDURE — 84100 ASSAY OF PHOSPHORUS: CPT

## 2018-11-07 PROCEDURE — 85025 COMPLETE CBC W/AUTO DIFF WBC: CPT

## 2018-11-07 PROCEDURE — 95951 PR EEG MONITORING/VIDEORECORD: CPT | Mod: 26,,, | Performed by: PSYCHIATRY & NEUROLOGY

## 2018-11-07 PROCEDURE — 80053 COMPREHEN METABOLIC PANEL: CPT

## 2018-11-07 PROCEDURE — 25000003 PHARM REV CODE 250: Performed by: NEUROLOGICAL SURGERY

## 2018-11-07 PROCEDURE — 63600175 PHARM REV CODE 636 W HCPCS: Performed by: PHYSICIAN ASSISTANT

## 2018-11-07 RX ORDER — LORAZEPAM 2 MG/ML
INJECTION INTRAMUSCULAR
Status: DISPENSED
Start: 2018-11-07 | End: 2018-11-07

## 2018-11-07 RX ADMIN — MUPIROCIN 1 G: 20 OINTMENT TOPICAL at 08:11

## 2018-11-07 RX ADMIN — LAMOTRIGINE 100 MG: 25 TABLET ORAL at 08:11

## 2018-11-07 RX ADMIN — CEFAZOLIN 2 G: 1 INJECTION, POWDER, FOR SOLUTION INTRAMUSCULAR; INTRAVENOUS at 06:11

## 2018-11-07 RX ADMIN — HYDROCODONE BITARTRATE AND ACETAMINOPHEN 1 TABLET: 5; 325 TABLET ORAL at 03:11

## 2018-11-07 RX ADMIN — HEPARIN SODIUM 5000 UNITS: 5000 INJECTION, SOLUTION INTRAVENOUS; SUBCUTANEOUS at 05:11

## 2018-11-07 RX ADMIN — HEPARIN SODIUM 5000 UNITS: 5000 INJECTION, SOLUTION INTRAVENOUS; SUBCUTANEOUS at 09:11

## 2018-11-07 RX ADMIN — SENNOSIDES AND DOCUSATE SODIUM 1 TABLET: 8.6; 5 TABLET ORAL at 09:11

## 2018-11-07 RX ADMIN — ONDANSETRON 8 MG: 8 TABLET, ORALLY DISINTEGRATING ORAL at 07:11

## 2018-11-07 RX ADMIN — LAMOTRIGINE 100 MG: 25 TABLET ORAL at 09:11

## 2018-11-07 RX ADMIN — ACETAMINOPHEN 650 MG: 325 TABLET ORAL at 06:11

## 2018-11-07 RX ADMIN — CEFAZOLIN 2 G: 1 INJECTION, POWDER, FOR SOLUTION INTRAMUSCULAR; INTRAVENOUS at 02:11

## 2018-11-07 RX ADMIN — HEPARIN SODIUM 5000 UNITS: 5000 INJECTION, SOLUTION INTRAVENOUS; SUBCUTANEOUS at 02:11

## 2018-11-07 RX ADMIN — HYDROCODONE BITARTRATE AND ACETAMINOPHEN 1 TABLET: 5; 325 TABLET ORAL at 07:11

## 2018-11-07 RX ADMIN — MUPIROCIN 1 G: 20 OINTMENT TOPICAL at 09:11

## 2018-11-07 RX ADMIN — HYDROMORPHONE HYDROCHLORIDE 0.5 MG: 1 INJECTION, SOLUTION INTRAMUSCULAR; INTRAVENOUS; SUBCUTANEOUS at 08:11

## 2018-11-07 RX ADMIN — CEFAZOLIN 2 G: 1 INJECTION, POWDER, FOR SOLUTION INTRAMUSCULAR; INTRAVENOUS at 10:11

## 2018-11-07 NOTE — PROGRESS NOTES
[facility]  Neurology-Epilepsy  Progress Note    Patient Name: Liza Arrieta  MRN: [id number]  Admission Date: 11/5/2018  Hospital Length of Stay: 2 days  Code Status: Full Code   Attending Provider: Ruben Senior MD  Primary Care Physician: LISA Elam MD   Principal Problem:Temporal lobe epilepsy, intractable    Subjective:     Hospital Course:   Patient admitted to [facility] after subdural grid placement/L crani on 11/5. Home Lamotrigine decreased to 100 mg BID.  11/6: No seizures since admission. Cortical mapping session completed.  11/7: Cortical mapping during am, multiple clinical seizures during session. Clinical and electrographic seizure 11/7 afternoon, during which patient had automatisms of hands, confusion, pulled at EEG leads disrupting connection.    Interval History: No acute events overnight. Multiple clinical events during cortical mapping today. Additional clinical and electrographic events this afternoon, after one of which patient pulled on electrode wires.     Current Facility-Administered Medications   Medication Dose Route Frequency Provider Last Rate Last Dose    acetaminophen suppository 650 mg  650 mg Rectal Q6H PRN Duane Joiner MD        acetaminophen tablet 650 mg  650 mg Oral Q6H PRN Lisset Arboleda NP   650 mg at 11/07/18 0628    ceFAZolin injection 2 g  2 g Intravenous Q8H Keysha Osborne PA-C   2 g at 11/07/18 1421    dextrose 50% injection 12.5 g  12.5 g Intravenous PRN Lisset Arboleda NP        glucagon (human recombinant) injection 1 mg  1 mg Intramuscular PRN Lisset Arboleda NP        heparin (porcine) injection 5,000 Units  5,000 Units Subcutaneous Q8H Duane Joiner MD   5,000 Units at 11/07/18 1421    HYDROcodone-acetaminophen 5-325 mg per tablet 1 tablet  1 tablet Oral Q4H PRN Duane Joiner MD   1 tablet at 11/07/18 0310    HYDROmorphone injection 0.5 mg  0.5 mg Intravenous Q1H PRN Duane Joiner MD        insulin aspart U-100 pen 1-10 Units  1-10 Units  Subcutaneous Q6H PRN Lissetchavez ArboledaBHAVKI        lamoTRIgine tablet 100 mg  100 mg Oral BID Lisset Arboleda NP   100 mg at 11/07/18 0918    lorazepam (ATIVAN) 2 mg/mL injection             metoclopramide HCl injection 5 mg  5 mg Intravenous Q6H PRN Duane Joiner MD        mupirocin 2 % ointment 1 g  1 g Nasal BID Duane Joiner MD   1 g at 11/07/18 0900    ondansetron disintegrating tablet 8 mg  8 mg Oral Q6H PRN Duane Joiner MD   8 mg at 11/07/18 0703    senna-docusate 8.6-50 mg per tablet 1 tablet  1 tablet Oral Daily Keysha Osborne PA-C   1 tablet at 11/07/18 0919    sodium chloride 0.9% flush 3 mL  3 mL Intravenous PRN Ifeanyi Sanchez MD         Continuous Infusions:    Review of Systems   Constitutional: Negative for chills and fever.   Eyes: Negative for visual disturbance.   Respiratory: Negative for cough and shortness of breath.    Cardiovascular: Negative for chest pain and leg swelling.   Gastrointestinal: Negative for nausea and vomiting.   Musculoskeletal: Negative for back pain and joint swelling.   Skin: Negative for pallor and rash.   Neurological: Positive for seizures and headaches (improving). Negative for facial asymmetry, speech difficulty and weakness.   Psychiatric/Behavioral: Negative for agitation and confusion.     Objective:     Vital Signs (Most Recent):  Temp: 98.4 °F (36.9 °C) (11/07/18 1105)  Pulse: 68 (11/07/18 1405)  Resp: 14 (11/07/18 1405)  BP: (!) 161/76 (11/07/18 1405)  SpO2: (!) 93 % (11/07/18 1405) Vital Signs (24h Range):  Temp:  [97.8 °F (36.6 °C)-98.4 °F (36.9 °C)] 98.4 °F (36.9 °C)  Pulse:  [56-75] 68  Resp:  [11-26] 14  SpO2:  [93 %-98 %] 93 %  BP: (131-166)/(62-77) 161/76     Weight: 106.6 kg (235 lb)  Body mass index is 39.11 kg/m².    Physical Exam   Constitutional: She is oriented to person, place, and time. She appears well-developed and well-nourished. No distress.   HENT:   Right Ear: External ear normal.   Left Ear: External ear normal.   S/p L  temporal crani   Eyes: Conjunctivae and EOM are normal. Pupils are equal, round, and reactive to light.   Neck: Normal range of motion.   Pulmonary/Chest: Effort normal. No respiratory distress.   Musculoskeletal: Normal range of motion. She exhibits no deformity.   Neurological: She is alert and oriented to person, place, and time. No cranial nerve deficit.   Skin: Skin is warm and dry. She is not diaphoretic. No erythema.   Psychiatric: She has a normal mood and affect. Her speech is normal and behavior is normal. Judgment and thought content normal.       NEUROLOGICAL EXAMINATION:     MENTAL STATUS   Oriented to person, place, and time.   Attention: normal. Concentration: normal.   Speech: speech is normal   Level of consciousness: alert    CRANIAL NERVES     CN III, IV, VI   Pupils are equal, round, and reactive to light.  Extraocular motions are normal.     CN VII   Facial expression full, symmetric.     CN VIII   CN VIII normal.     CN IX, X   CN IX normal.   CN X normal.     CN XI   CN XI normal.     CN XII   CN XII normal.     MOTOR EXAM   Muscle bulk: normal  Overall muscle tone: normal       Moves all extremities spontaneously, no focal deficit noted       Significant Labs: All pertinent lab results from the past 24 hours have been reviewed.    Significant Studies: I have reviewed all pertinent imaging results/findings within the past 24 hours.    Assessment and Plan:     * Temporal lobe epilepsy, intractable    47 yo female with history of seizures since age 19, who presents to NICU s/p L temporal crani with subdural grid placement for further localization and possible resection.    Recommendations:  - Continuous vEEG monitoring with subdural grid electrodes in place  - Scalp EEG hooked up 11/7 afternoon for additional monitoring  - Continue Lamictal to 100 mg BID  - Cortical mapping session completed again today, plan for additional sessions this week  - To OR tomorrow for replacement of strip/grid  - CT  pending per NSGY  - For any seizures: please push event button on EEG and call epileptologist on call prior to administration of abortive medication  - Recommend short acting opioids instead of long acting as needed for pain control  - Pain management per [facility]    Plan of care discussed with NCC team, family at bedside. Will continue to follow.      Essential hypertension    - Goal SBP <140  - Management per [facility]          VTE Risk Mitigation (From admission, onward)        Ordered     heparin (porcine) injection 5,000 Units  Every 8 hours      11/05/18 1238     Place sequential compression device  Until discontinued      11/05/18 1238     IP VTE HIGH RISK PATIENT  Once      11/05/18 1238          Philly Foy PA-C  Neurology-Epilepsy  [facility]  Staff: Dr. Mark

## 2018-11-07 NOTE — PROGRESS NOTES
1635- Pt brought to CT via bed, portable monitor, and ambubag. VSS. Pt returned to room 9080 @ 1705, attached to monitor, bed in lowest pos, wheels locked, call light in reach. Will continue to monitor.

## 2018-11-07 NOTE — SUBJECTIVE & OBJECTIVE
Interval History: NAEON, no seizures,    Medications:  Continuous Infusions:  Scheduled Meds:   ceFAZolin (ANCEF) IVPB  2 g Intravenous Q8H    heparin (porcine)  5,000 Units Subcutaneous Q8H    lamoTRIgine  100 mg Oral BID    lorazepam        mupirocin  1 g Nasal BID    senna-docusate 8.6-50 mg  1 tablet Oral Daily     PRN Meds:acetaminophen, acetaminophen, dextrose 50%, glucagon (human recombinant), HYDROcodone-acetaminophen, HYDROmorphone, insulin aspart U-100, metoclopramide HCl, ondansetron, sodium chloride 0.9%     Review of Systems    Objective:     Weight: 106.6 kg (235 lb)  Body mass index is 39.11 kg/m².  Vital Signs (Most Recent):  Temp: 98.4 °F (36.9 °C) (11/07/18 1105)  Pulse: 63 (11/07/18 1205)  Resp: 13 (11/07/18 1205)  BP: (!) 145/67 (11/07/18 1205)  SpO2: 96 % (11/07/18 1205) Vital Signs (24h Range):  Temp:  [97.8 °F (36.6 °C)-98.4 °F (36.9 °C)] 98.4 °F (36.9 °C)  Pulse:  [52-75] 63  Resp:  [11-26] 13  SpO2:  [94 %-99 %] 96 %  BP: (126-166)/(62-77) 145/67  Arterial Line BP: (92)/(70) 92/70     Date 11/07/18 0700 - 11/08/18 0659   Shift 9323-5011 5682-0601 5466-6072 24 Hour Total   INTAKE   P.O. 210   210   Shift Total(mL/kg) 210(2)   210(2)   OUTPUT   Urine(mL/kg/hr) 750   750   Drains 60   60   Shift Total(mL/kg) 810(7.6)   810(7.6)   Weight (kg) 106.6 106.6 106.6 106.6                        Closed/Suction Drain 11/05/18 1128 Left Scalp Accordion 10 Fr. (Active)   Site Description Unable to view 11/6/2018  3:05 PM   Dressing Type Gauze 11/6/2018  3:05 PM   Dressing Status Clean;Dry;Intact 11/6/2018  3:05 PM   Status To bulb suction 11/6/2018  3:05 PM   Output (mL) 85 mL 11/6/2018  1:05 PM       Neurosurgery Physical Exam    -Alert and oriented x4  -PERRL, EOMI, face symmetrical, tongue midline, facial sensation symmetric, shoulder shrug full strength and symmetric  -Motor: 5/5 throughout the upper and lower extremites bilaterally  -sensation intact to light touch throughout        Significant  Labs:  Recent Labs   Lab 11/06/18  0143 11/07/18  0303   * 106    140   K 4.4 4.0    106   CO2 21* 24   BUN 10 11   CREATININE 0.8 0.8   CALCIUM 8.9 8.9   MG 2.0 1.8     Recent Labs   Lab 11/06/18  0143 11/07/18  0303   WBC 15.26* 10.77   HGB 10.8* 10.7*   HCT 33.1* 35.4*    242     No results for input(s): LABPT, INR, APTT in the last 48 hours.  Microbiology Results (last 7 days)     ** No results found for the last 168 hours. **        All pertinent labs from the last 24 hours have been reviewed.    Significant Diagnostics:  I have reviewed all pertinent imaging results/findings within the past 24 hours.

## 2018-11-07 NOTE — PROGRESS NOTES
RN in pt room between 2321-0137 giving medications, pt alert and oriented at the time. Shortly after pt found to be post ictal and confused, pulled grid leads. Dr Mark at bedside, neurosurg notified, [facility] notified. All teams at bedside to make plan. Pt had another seizure while team at bedside. Neurosurg placed new dressings on pt head. EEG tech placed scalp electrodes to monitor cEEG. STAT CT ordered.

## 2018-11-07 NOTE — ASSESSMENT & PLAN NOTE
49 yo female with history of seizures since age 19, now s/p  crani with subdural grid placement for further localization.    -continue EEG monitoring  -monitoring/seizure management per epilepsy team  -will continue to follow closely  -skull Xrays and CTH satisfactory

## 2018-11-07 NOTE — SUBJECTIVE & OBJECTIVE
Interval History: No acute events overnight. Multiple clinical events during cortical mapping today. Additional clinical and electrographic events this afternoon, after one of which patient pulled on electrode wires.     Current Facility-Administered Medications   Medication Dose Route Frequency Provider Last Rate Last Dose    acetaminophen suppository 650 mg  650 mg Rectal Q6H PRN Duane Joiner MD        acetaminophen tablet 650 mg  650 mg Oral Q6H PRN Lisset Miinea, NP   650 mg at 11/07/18 0628    ceFAZolin injection 2 g  2 g Intravenous Q8H Keysha Osborne PA-C   2 g at 11/07/18 1421    dextrose 50% injection 12.5 g  12.5 g Intravenous PRN Lissetchavez Arboleda, NP        glucagon (human recombinant) injection 1 mg  1 mg Intramuscular PRN Lisset Arboleda NP        heparin (porcine) injection 5,000 Units  5,000 Units Subcutaneous Q8H Duane Joiner MD   5,000 Units at 11/07/18 1421    HYDROcodone-acetaminophen 5-325 mg per tablet 1 tablet  1 tablet Oral Q4H PRN Duane Joiner MD   1 tablet at 11/07/18 0310    HYDROmorphone injection 0.5 mg  0.5 mg Intravenous Q1H PRN Duane Joiner MD        insulin aspart U-100 pen 1-10 Units  1-10 Units Subcutaneous Q6H PRN Lisset Miinea, NP        lamoTRIgine tablet 100 mg  100 mg Oral BID Lisset Miinea, NP   100 mg at 11/07/18 0918    lorazepam (ATIVAN) 2 mg/mL injection             metoclopramide HCl injection 5 mg  5 mg Intravenous Q6H PRN Duane Joiner MD        mupirocin 2 % ointment 1 g  1 g Nasal BID Duane Joiner MD   1 g at 11/07/18 0900    ondansetron disintegrating tablet 8 mg  8 mg Oral Q6H PRN Duane Joiner MD   8 mg at 11/07/18 0703    senna-docusate 8.6-50 mg per tablet 1 tablet  1 tablet Oral Daily Keysha Osborne PA-C   1 tablet at 11/07/18 0919    sodium chloride 0.9% flush 3 mL  3 mL Intravenous PRN Ifeanyi Sanchez MD         Continuous Infusions:    Review of Systems   Constitutional: Negative for chills and fever.   Eyes: Negative for visual  disturbance.   Respiratory: Negative for cough and shortness of breath.    Cardiovascular: Negative for chest pain and leg swelling.   Gastrointestinal: Negative for nausea and vomiting.   Musculoskeletal: Negative for back pain and joint swelling.   Skin: Negative for pallor and rash.   Neurological: Positive for seizures and headaches (improving). Negative for facial asymmetry, speech difficulty and weakness.   Psychiatric/Behavioral: Negative for agitation and confusion.     Objective:     Vital Signs (Most Recent):  Temp: 98.4 °F (36.9 °C) (11/07/18 1105)  Pulse: 68 (11/07/18 1405)  Resp: 14 (11/07/18 1405)  BP: (!) 161/76 (11/07/18 1405)  SpO2: (!) 93 % (11/07/18 1405) Vital Signs (24h Range):  Temp:  [97.8 °F (36.6 °C)-98.4 °F (36.9 °C)] 98.4 °F (36.9 °C)  Pulse:  [56-75] 68  Resp:  [11-26] 14  SpO2:  [93 %-98 %] 93 %  BP: (131-166)/(62-77) 161/76     Weight: 106.6 kg (235 lb)  Body mass index is 39.11 kg/m².    Physical Exam   Constitutional: She is oriented to person, place, and time. She appears well-developed and well-nourished. No distress.   HENT:   Right Ear: External ear normal.   Left Ear: External ear normal.   S/p L temporal crani   Eyes: Conjunctivae and EOM are normal. Pupils are equal, round, and reactive to light.   Neck: Normal range of motion.   Pulmonary/Chest: Effort normal. No respiratory distress.   Musculoskeletal: Normal range of motion. She exhibits no deformity.   Neurological: She is alert and oriented to person, place, and time. No cranial nerve deficit.   Skin: Skin is warm and dry. She is not diaphoretic. No erythema.   Psychiatric: She has a normal mood and affect. Her speech is normal and behavior is normal. Judgment and thought content normal.       NEUROLOGICAL EXAMINATION:     MENTAL STATUS   Oriented to person, place, and time.   Attention: normal. Concentration: normal.   Speech: speech is normal   Level of consciousness: alert    CRANIAL NERVES     CN III, IV, VI   Pupils  are equal, round, and reactive to light.  Extraocular motions are normal.     CN VII   Facial expression full, symmetric.     CN VIII   CN VIII normal.     CN IX, X   CN IX normal.   CN X normal.     CN XI   CN XI normal.     CN XII   CN XII normal.     MOTOR EXAM   Muscle bulk: normal  Overall muscle tone: normal       Moves all extremities spontaneously, no focal deficit noted       Significant Labs: All pertinent lab results from the past 24 hours have been reviewed.    Significant Studies: I have reviewed all pertinent imaging results/findings within the past 24 hours.

## 2018-11-07 NOTE — PROGRESS NOTES
"[facility]  Neurosurgery  Progress Note    Subjective:     History of Present Illness: Pt is a 48 y.o. female who presents per referral by Dr. LISA Elam and the epilepsy team for evaluation for epilepsy surgery. Pt has history of partial intractable epilepsy and has failed at least 10 previous medications. Currently on dual therapy. EMU workup done in February localized pathology to left temporal lobe, with PET scan showing hypometabolism of mesial temporal lobe. DAWOOD scan also showed localization to left mesial temporal lobe. Pt states that she has endured seizures since 19 y.o. Pt currently on Lamictal. Pt states that she endures about 5 absence episodes per day with intermittent LOC. Pt notes one incident of "rolling" seizures for which she was brought to the ED.          Post-Op Info:  Procedure(s) (LRB):  CRANIOTOMY for strip and grid (N/A)   2 Days Post-Op     Interval History: NAEON, no seizures,    Medications:  Continuous Infusions:  Scheduled Meds:   ceFAZolin (ANCEF) IVPB  2 g Intravenous Q8H    heparin (porcine)  5,000 Units Subcutaneous Q8H    lamoTRIgine  100 mg Oral BID    lorazepam        mupirocin  1 g Nasal BID    senna-docusate 8.6-50 mg  1 tablet Oral Daily     PRN Meds:acetaminophen, acetaminophen, dextrose 50%, glucagon (human recombinant), HYDROcodone-acetaminophen, HYDROmorphone, insulin aspart U-100, metoclopramide HCl, ondansetron, sodium chloride 0.9%     Review of Systems    Objective:     Weight: 106.6 kg (235 lb)  Body mass index is 39.11 kg/m².  Vital Signs (Most Recent):  Temp: 98.4 °F (36.9 °C) (11/07/18 1105)  Pulse: 63 (11/07/18 1205)  Resp: 13 (11/07/18 1205)  BP: (!) 145/67 (11/07/18 1205)  SpO2: 96 % (11/07/18 1205) Vital Signs (24h Range):  Temp:  [97.8 °F (36.6 °C)-98.4 °F (36.9 °C)] 98.4 °F (36.9 °C)  Pulse:  [52-75] 63  Resp:  [11-26] 13  SpO2:  [94 %-99 %] 96 %  BP: (126-166)/(62-77) 145/67  Arterial Line BP: (92)/(70) 92/70     Date 11/07/18 " 0700 - 11/08/18 0659   Shift 2761-6784 7861-5178 4591-3981 24 Hour Total   INTAKE   P.O. 210   210   Shift Total(mL/kg) 210(2)   210(2)   OUTPUT   Urine(mL/kg/hr) 750   750   Drains 60   60   Shift Total(mL/kg) 810(7.6)   810(7.6)   Weight (kg) 106.6 106.6 106.6 106.6                        Closed/Suction Drain 11/05/18 1128 Left Scalp Accordion 10 Fr. (Active)   Site Description Unable to view 11/6/2018  3:05 PM   Dressing Type Gauze 11/6/2018  3:05 PM   Dressing Status Clean;Dry;Intact 11/6/2018  3:05 PM   Status To bulb suction 11/6/2018  3:05 PM   Output (mL) 85 mL 11/6/2018  1:05 PM       Neurosurgery Physical Exam    -Alert and oriented x4  -PERRL, EOMI, face symmetrical, tongue midline, facial sensation symmetric, shoulder shrug full strength and symmetric  -Motor: 5/5 throughout the upper and lower extremites bilaterally  -sensation intact to light touch throughout        Significant Labs:  Recent Labs   Lab 11/06/18  0143 11/07/18  0303   * 106    140   K 4.4 4.0    106   CO2 21* 24   BUN 10 11   CREATININE 0.8 0.8   CALCIUM 8.9 8.9   MG 2.0 1.8     Recent Labs   Lab 11/06/18  0143 11/07/18  0303   WBC 15.26* 10.77   HGB 10.8* 10.7*   HCT 33.1* 35.4*    242     No results for input(s): LABPT, INR, APTT in the last 48 hours.  Microbiology Results (last 7 days)     ** No results found for the last 168 hours. **        All pertinent labs from the last 24 hours have been reviewed.    Significant Diagnostics:  I have reviewed all pertinent imaging results/findings within the past 24 hours.    Assessment/Plan:     * Temporal lobe epilepsy, intractable    47 yo female with history of seizures since age 19, now s/p  crani with subdural grid placement for further localization.    -continue EEG monitoring  -monitoring/seizure management per epilepsy team  -will continue to follow closely  -skull Xrays and CTH satisfactory         Denis Covarrubias MD  Neurosurgery  [facility]  [place]

## 2018-11-07 NOTE — ASSESSMENT & PLAN NOTE
47 yo female with history of seizures since age 19, who presents to NICU s/p L temporal crani with subdural grid placement for further localization and possible resection.    Recommendations:  - Continuous vEEG monitoring with subdural grid electrodes in place  - Continue Lamictal to 100 mg BID  - Cortical mapping session completed again today, plan for additional sessions this week  - To OR tomorrow for replacement of strip/grid  - CT pending per NSGY  - For any seizures: please push event button on EEG and call epileptologist on call prior to administration of abortive medication  - Recommend short acting opioids instead of long acting as needed for pain control  - Pain management per NCC    Plan of care discussed with NCC team, family at bedside. Will continue to follow.

## 2018-11-07 NOTE — PLAN OF CARE
Problem: Patient Care Overview  Goal: Plan of Care Review  Outcome: Ongoing (interventions implemented as appropriate)  POC reviewed with pt at 1700. Pt verbalized understanding. Questions and concerns addressed.  Pt to be NPO at midnight for surgery in AM. Will continue to monitor. See flowsheets for full assessment and VS info.

## 2018-11-07 NOTE — PROGRESS NOTES
1140- Pt brought to CT via bed, portable monitor, ambu bag. Returned to room 9080 at 1155. VSS. Notified EEG tech of return to room. cEEG reattached. Will continue to monitor.

## 2018-11-07 NOTE — ANESTHESIA PREPROCEDURE EVALUATION
[facility]  Anesthesia Pre-Operative Evaluation         Patient Name: Liza Arrieta  YOB: 1970  MRN: [id number]    SUBJECTIVE:     Pre-operative evaluation for Procedure(s) (LRB):  CRANIOTOMY for strip and grid (N/A)     11/07/2018    Liza Arrieta is a 48 y.o. female w/ a significant PMHx of intractable epilepsy-PET scan with localized pathology to left temporal lobe. Now s/p  crani with subdural grid placement for further localization. Still seizing, CTH being repeated today. Patient dislocated leads and now repeat crani for grid placement planned.    Patient now presents for the above procedure(s).    Prev airway:   11/5/2018  Easy mask  Grade I view  7.0 ETT  1 attempt      Patient Active Problem List   Diagnosis    Convulsions/seizures    Seizure    Temporal lobe epilepsy, intractable    Complex partial epilepsy with generalization and with intractable epilepsy    Mild neurocognitive disorder    Class 2 obesity with body mass index (BMI) of 38.0 to 38.9 in adult    Snoring    Abnormal EKG    Iron deficiency anemia    Epilepsy    Essential hypertension       Review of patient's allergies indicates:  No Known Allergies    Current Inpatient Medications:      Current Facility-Administered Medications on File Prior to Visit   Medication Dose Route Frequency Provider Last Rate Last Dose    acetaminophen suppository 650 mg  650 mg Rectal Q6H PRN Duane Joiner MD        acetaminophen tablet 650 mg  650 mg Oral Q6H PRN Lisste Arboleda NP   650 mg at 11/07/18 0628    ceFAZolin injection 2 g  2 g Intravenous Q8H Keysha Osborne PA-C   2 g at 11/07/18 1421    dextrose 50% injection 12.5 g  12.5 g Intravenous PRN Lisset Arboleda NP        glucagon (human recombinant) injection 1 mg  1 mg Intramuscular PRN Lisset Arboleda NP        heparin (porcine) injection 5,000 Units  5,000 Units Subcutaneous Q8H Duane Joiner MD   5,000 Units at 11/07/18 1421    HYDROcodone-acetaminophen  5-325 mg per tablet 1 tablet  1 tablet Oral Q4H PRN Duane Joiner MD   1 tablet at 18 0310    HYDROmorphone injection 0.5 mg  0.5 mg Intravenous Q1H PRN Duane Joiner MD        insulin aspart U-100 pen 1-10 Units  1-10 Units Subcutaneous Q6H PRN Lisset Migarrett, NP        lamoTRIgine tablet 100 mg  100 mg Oral BID Lisset Miinea, NP   100 mg at 18 0918    lorazepam (ATIVAN) 2 mg/mL injection             metoclopramide HCl injection 5 mg  5 mg Intravenous Q6H PRN Duane Joiner MD        mupirocin 2 % ointment 1 g  1 g Nasal BID Duane Joiner MD   1 g at 18 0900    ondansetron disintegrating tablet 8 mg  8 mg Oral Q6H PRN Duane Joiner MD   8 mg at 18 0703    senna-docusate 8.6-50 mg per tablet 1 tablet  1 tablet Oral Daily Keysha Osborne PA-C   1 tablet at 18 0919    sodium chloride 0.9% flush 3 mL  3 mL Intravenous PRN Ifeanyi Sanchez MD         Current Outpatient Medications on File Prior to Visit   Medication Sig Dispense Refill    cloBAZam (ONFI) 20 mg Tab Take 1 tablet (20 mg total) by mouth 2 (two) times daily. 60 tablet 5    ferrous sulfate (FEOSOL) 325 mg (65 mg iron) Tab tablet Take 1 tablet (325 mg total) by mouth 2 (two) times daily. 120 tablet 0    lamoTRIgine (LAMICTAL) 200 MG tablet Take 1.5 tablets (300 mg total) by mouth 2 (two) times daily. (Patient taking differently: Take 200 mg by mouth 2 (two) times daily. Take 1 in the morning and one and half  at night) 270 tablet 11       Past Surgical History:   Procedure Laterality Date    APPENDECTOMY       SECTION      4    CHOLECYSTECTOMY      CRANIOTOMY N/A 2018    Procedure: CRANIOTOMY for strip and grid;  Surgeon: Ruben Senior MD;  Location: [facility] OR [address] [place] [address];  Service: Neurosurgery;  Laterality: N/A;  toronto II, asa 2, type and screen, regular bed, [place], supine     CRANIOTOMY for strip and grid N/A 2018    Performed by Ruben Senior MD at [facility] OR 2ND FLR    HYSTERECTOMY       around 2016 for pain ful periods        Social History     Socioeconomic History    Marital status:      Spouse name: Not on file    Number of children: Not on file    Years of education: Not on file    Highest education level: Not on file   Social Needs    Financial resource strain: Not on file    Food insecurity - worry: Not on file    Food insecurity - inability: Not on file    Transportation needs - medical: Not on file    Transportation needs - non-medical: Not on file   Occupational History    Not on file   Tobacco Use    Smoking status: Never Smoker    Smokeless tobacco: Never Used   Substance and Sexual Activity    Alcohol use: No     Alcohol/week: 0.0 oz    Drug use: No    Sexual activity: No   Other Topics Concern    Not on file   Social History Narrative    Not on file       OBJECTIVE:     Vital Signs Range (Last 24H):  Temp:  [36.6 °C (97.8 °F)-36.9 °C (98.4 °F)]   Pulse:  [56-75]   Resp:  [11-26]   BP: (131-166)/(62-77)   SpO2:  [93 %-98 %]       Significant Labs:  Lab Results   Component Value Date    WBC 10.77 11/07/2018    HGB 10.7 (L) 11/07/2018    HCT 35.4 (L) 11/07/2018     11/07/2018    CHOL 182 11/05/2018    TRIG 114 11/05/2018    HDL 39 (L) 11/05/2018    ALT 15 11/07/2018    AST 16 11/07/2018     11/07/2018    K 4.0 11/07/2018     11/07/2018    CREATININE 0.8 11/07/2018    BUN 11 11/07/2018    CO2 24 11/07/2018    TSH 1.657 01/14/2016    INR 1.0 10/22/2018    HGBA1C 5.3 10/22/2018       Diagnostic Studies: No relevant studies.    EKG:   Vent. Rate : 070 BPM     Atrial Rate : 070 BPM     P-R Int : 144 ms          QRS Dur : 084 ms      QT Int : 408 ms       P-R-T Axes : 027 000 -17 degrees     QTc Int : 440 ms    Normal sinus rhythm  T wave abnormality, consider anterior ischemia  Abnormal ECG  When compared with ECG of 09-MAY-2018 15:29,  No significant change was found  Confirmed by Amaya Raman MD ([id number]) on 11/6/2018 11:43:25 AM    2D ECHO:  No  results found for this or any previous visit.      ASSESSMENT/PLAN:                 Anesthesia Assessment: Preoperative EQUATION     Planned Procedure: Procedure(s) (LRB):  CRANIOTOMY for strip and grid (N/A)  Requested Anesthesia Type:General  Surgeon: Ruben Senior MD  Service: Neurosurgery  Known or anticipated Date of Surgery:11/5/2018     Surgeon notes: reviewed     Electronic QUestionnaire Assessment completed via nurse interview with patient.      NO AQ  Triage considerations:         Previous anesthesia records:No problems and Not available     Last PCP note: DOES NOT HAVE A PCP  Subspecialty notes: Neurology, Psychiatry, NEUROSURGERY     Other important co-morbidities: PER Epic: SEIZURES(DAILY)     Tests already available:  Available tests,  3-6 months ago , within [facility] .5/9/2018 EKG                            Instructions given. (See in Nurse's note)     Optimization:  Anesthesia Preop Clinic Assessment  Indicated    Medical Opinion Indicated                   Plan:              Testing:  CBC, CMP, PT/INR and T&S   Pre-anesthesia  visit                                        Visit focus: concerns in complex and/or prolonged anesthesia                           Consultation:IM Perioperative Hospitalist                           Patient  has previously scheduled Medical Appointment:NONE     Navigation: Tests Scheduled.TBD                         Consults scheduled.TBD                        Results will be tracked by Preop Clinic.                                    Electronically signed by Marleny Lewis RN at 9/27/2018 12:10 PM       Pre-admit on 11/5/2018            Detailed Report                                                                                                              11/07/2018  Lzia Arrieta is a 48 y.o., female.    Anesthesia Evaluation    I have reviewed the Patient Summary Reports.    I have reviewed the Nursing Notes.   I have reviewed the Medications.     Review of  Systems  Anesthesia Hx:  No problems with previous Anesthesia  History of prior surgery of interest to airway management or planning: Previous anesthesia: General ANGEL: 2 years ago with general anesthesia.  Denies Family Hx of Anesthesia complications.   Denies Personal Hx of Anesthesia complications.   Social:  Patient's occupation is . Denies Tobacco Use. Denies Alcohol Use.   Hematology/Oncology:  Hematology Normal   Oncology Normal     EENT/Dental:EENT/Dental Normal   Cardiovascular:   Exercise tolerance: good Hypertension  Functional Capacity good / => 4 METS, treadmill 2-3x weekly/weight/housework: denies CP/SOB  Denies Coronary Artery Disease.  Denies Deep Venous Thrombosis (DVT)   Denies Hypertension.    Pulmonary:  Pulmonary Normal  Denies COPD.  Denies Asthma.  Denies Sleep Apnea.  Denies Asthma.  Denies Chronic Obstructive Pulmonary Disease (COPD).  Possible Obstructive Sleep Apnea , (STOP/BANG) Symptoms S - Snoring (loud)    Renal/:  Renal/ Normal     Hepatic/GI:  Hepatic/GI Normal    Musculoskeletal:  Musculoskeletal Normal    Neurological:   Denies CVA. Seizures  Seizure Disorder, Absence ([place]) (staring) , Most recent seizure occurred < 1 week ago , frequency is daily last seizure: 1 day ago; usually gets 4-5 a day with intermittent LOC.    Temporal lobe , intractable Denies CVA - Cerebrovasular Accident  Denies TIA - Transient Ischemic Attack    Endocrine:  Denies Diabetes  Denies Thyroid Disease  Metabolic Disorders, Obesity / BMI > 30      Physical Exam  General:  Obesity    Airway/Jaw/Neck:  Airway Findings: Mouth Opening: Normal General Airway Assessment: Adult  Mallampati: III  Improves to II with phonation.  TM Distance: Normal, at least 6 cm  Jaw/Neck Findings:  Neck ROM: Normal ROM      Dental:  Dental Findings: In tact   Chest/Lungs:  Chest/Lungs Findings: Clear to auscultation     Heart/Vascular:  Heart Findings: Rate: Normal  Rhythm: Regular Rhythm       Skin:  Skin  Findings: Normal    Mental Status:  Mental Status Findings:  Cooperative, Alert and Oriented         Anesthesia Plan  Type of Anesthesia, risks & benefits discussed:  Anesthesia Type:  general  Patient's Preference:   Intra-op Monitoring Plan: arterial line and standard ASA monitors  Intra-op Monitoring Plan Comments:   Post Op Pain Control Plan: per primary service following discharge from PACU and IV/PO Opioids PRN  Post Op Pain Control Plan Comments:   Induction:   IV  Beta Blocker:  Patient is not currently on a Beta-Blocker (No further documentation required).       Informed Consent: Patient understands risks and agrees with Anesthesia plan.  Questions answered. Anesthesia consent signed with patient.  ASA Score: 2     Day of Surgery Review of History & Physical:    H&P update referred to the surgeon.         Ready For Surgery From Anesthesia Perspective.     The patient was seen by Perioperative Internal Medicine physician Dr. Dinero on 10/22/18 , please see recommendations.

## 2018-11-07 NOTE — PROGRESS NOTES
"[facility]  Neurosurgery  Progress Note    Subjective:     History of Present Illness: Pt is a 48 y.o. female who presents per referral by Dr. LISA Elam and the epilepsy team for evaluation for epilepsy surgery. Pt has history of partial intractable epilepsy and has failed at least 10 previous medications. Currently on dual therapy. EMU workup done in February localized pathology to left temporal lobe, with PET scan showing hypometabolism of mesial temporal lobe. DAWOOD scan also showed localization to left mesial temporal lobe. Pt states that she has endured seizures since 19 y.o. Pt currently on Lamictal. Pt states that she endures about 5 absence episodes per day with intermittent LOC. Pt notes one incident of "rolling" seizures for which she was brought to the ED.          Post-Op Info:  Procedure(s) (LRB):  CRANIOTOMY for strip and grid (N/A)   2 Days Post-Op     Interval History: POD 1 s/p craniotomy for grids/strips placement . Stable overnight    Medications:  Continuous Infusions:  Scheduled Meds:   ceFAZolin (ANCEF) IVPB  2 g Intravenous Q8H    heparin (porcine)  5,000 Units Subcutaneous Q8H    lamoTRIgine  100 mg Oral BID    lorazepam        mupirocin  1 g Nasal BID    senna-docusate 8.6-50 mg  1 tablet Oral Daily     PRN Meds:acetaminophen, acetaminophen, dextrose 50%, glucagon (human recombinant), HYDROcodone-acetaminophen, HYDROmorphone, insulin aspart U-100, metoclopramide HCl, ondansetron, sodium chloride 0.9%     Review of Systems  Objective:     Weight: 106.6 kg (235 lb)  Body mass index is 39.11 kg/m².  Vital Signs (Most Recent):  Temp: 98.3 °F (36.8 °C) (11/06/18 1505)  Pulse: (!) 57 (11/06/18 1605)  Resp: 12 (11/06/18 1605)  BP: (!) 152/67 (11/06/18 1605)  SpO2: 98 % (11/06/18 1605) Vital Signs (24h Range):  Temp:  [98.3 °F (36.8 °C)-99.1 °F (37.3 °C)] 98.3 °F (36.8 °C)  Pulse:  [52-76] 57  Resp:  [12-21] 12  SpO2:  [95 %-99 %] 98 %  BP: (101-152)/(56-85) " 152/67  Arterial Line BP: ()/(57-74) 92/70     Date 11/06/18 0700 - 11/07/18 0659   Shift 7049-6739 7758-7913 1165-6042 24 Hour Total   INTAKE   P.O. 200   200   I.V.(mL/kg) 436.3(4.1)   436.3(4.1)   Shift Total(mL/kg) 636.3(6)   636.3(6)   OUTPUT   Urine(mL/kg/hr) 1100(1.3) 175  1275   Drains 175   175   Shift Total(mL/kg) 1275(12) 175(1.6)  1450(13.6)   Weight (kg) 106.6 106.6 106.6 106.6                        Closed/Suction Drain 11/05/18 1128 Left Scalp Accordion 10 Fr. (Active)   Site Description Unable to view 11/6/2018  3:05 PM   Dressing Type Gauze 11/6/2018  3:05 PM   Dressing Status Clean;Dry;Intact 11/6/2018  3:05 PM   Status To bulb suction 11/6/2018  3:05 PM   Output (mL) 85 mL 11/6/2018  1:05 PM       Neurosurgery Physical Exam  -Alert and oriented x4  -PERRL, EOMI, face symmetrical, tongue midline, facial sensation symmetric, shoulder shrug full strength and symmetric  -Motor: 5/5 throughout the upper and lower extremites bilaterally  -sensation intact to light touch throughout        Significant Labs:  Recent Labs   Lab 11/05/18  1245 11/06/18  0143   * 129*    141   K 4.1 4.4    110   CO2 22* 21*   BUN 11 10   CREATININE 0.8 0.8   CALCIUM 8.6* 8.9   MG  --  2.0     Recent Labs   Lab 11/05/18  1245 11/06/18  0143   WBC 9.78 15.26*   HGB 10.6* 10.8*   HCT 33.2* 33.1*    251     No results for input(s): LABPT, INR, APTT in the last 48 hours.  Microbiology Results (last 7 days)     ** No results found for the last 168 hours. **        All pertinent labs from the last 24 hours have been reviewed.    Significant Diagnostics:  I have reviewed all pertinent imaging results/findings within the past 24 hours.    Assessment/Plan:     * Temporal lobe epilepsy, intractable    49 yo female with history of seizures since age 19, now s/p  crani with subdural grid placement for further localization.    -continue EEG monitoring  -monitoring/seizure management per epilepsy team  -will  continue to follow closely  -f/u skull Xrays         Denis Covarrubias MD  Neurosurgery  [facility]

## 2018-11-07 NOTE — ASSESSMENT & PLAN NOTE
49 yo female with history of seizures since age 19, now s/p  crani with subdural grid placement for further localization.    -continue EEG monitoring  -monitoring/seizure management per epilepsy team  -will continue to follow closely  -f/u skull Xrays

## 2018-11-07 NOTE — PLAN OF CARE
Problem: Patient Care Overview  Goal: Plan of Care Review  Outcome: Ongoing (interventions implemented as appropriate)  POC reviewed with pt at 0400. Pt verbalized understanding. Questions and concerns addressed. No acute events overnight. Pt complained of HA's throughout night, prn pain medication provided moderate relief. Per NCC, pt allowed to use bedside commode. Pt progressing toward goals. Will continue to monitor. See flowsheets for full assessment and VS info

## 2018-11-08 ENCOUNTER — ANESTHESIA (OUTPATIENT)
Dept: SURGERY | Facility: HOSPITAL | Age: 48
DRG: 027 | End: 2018-11-08
Payer: COMMERCIAL

## 2018-11-08 LAB
ABO + RH BLD: NORMAL
ALBUMIN SERPL BCP-MCNC: 3.1 G/DL
ALP SERPL-CCNC: 100 U/L
ALT SERPL W/O P-5'-P-CCNC: 11 U/L
ANION GAP SERPL CALC-SCNC: 10 MMOL/L
ANION GAP SERPL CALC-SCNC: 11 MMOL/L
APTT BLDCRRT: 22 SEC
AST SERPL-CCNC: 14 U/L
BASOPHILS # BLD AUTO: 0.01 K/UL
BASOPHILS # BLD AUTO: 0.03 K/UL
BASOPHILS NFR BLD: 0.1 %
BASOPHILS NFR BLD: 0.3 %
BILIRUB SERPL-MCNC: 0.3 MG/DL
BLD GP AB SCN CELLS X3 SERPL QL: NORMAL
BUN SERPL-MCNC: 10 MG/DL
BUN SERPL-MCNC: 10 MG/DL
CALCIUM SERPL-MCNC: 8.9 MG/DL
CALCIUM SERPL-MCNC: 9.2 MG/DL
CHLORIDE SERPL-SCNC: 105 MMOL/L
CHLORIDE SERPL-SCNC: 105 MMOL/L
CO2 SERPL-SCNC: 25 MMOL/L
CO2 SERPL-SCNC: 25 MMOL/L
CREAT SERPL-MCNC: 0.8 MG/DL
CREAT SERPL-MCNC: 0.8 MG/DL
DIFFERENTIAL METHOD: ABNORMAL
DIFFERENTIAL METHOD: ABNORMAL
EOSINOPHIL # BLD AUTO: 0.1 K/UL
EOSINOPHIL # BLD AUTO: 0.1 K/UL
EOSINOPHIL NFR BLD: 0.6 %
EOSINOPHIL NFR BLD: 0.8 %
ERYTHROCYTE [DISTWIDTH] IN BLOOD BY AUTOMATED COUNT: 15.6 %
ERYTHROCYTE [DISTWIDTH] IN BLOOD BY AUTOMATED COUNT: 15.7 %
EST. GFR  (AFRICAN AMERICAN): >60 ML/MIN/1.73 M^2
EST. GFR  (AFRICAN AMERICAN): >60 ML/MIN/1.73 M^2
EST. GFR  (NON AFRICAN AMERICAN): >60 ML/MIN/1.73 M^2
EST. GFR  (NON AFRICAN AMERICAN): >60 ML/MIN/1.73 M^2
GLUCOSE SERPL-MCNC: 108 MG/DL
GLUCOSE SERPL-MCNC: 125 MG/DL
HCT VFR BLD AUTO: 34.9 %
HCT VFR BLD AUTO: 35 %
HGB BLD-MCNC: 11.1 G/DL
HGB BLD-MCNC: 11.1 G/DL
IMM GRANULOCYTES # BLD AUTO: 0.04 K/UL
IMM GRANULOCYTES # BLD AUTO: 0.04 K/UL
IMM GRANULOCYTES NFR BLD AUTO: 0.3 %
IMM GRANULOCYTES NFR BLD AUTO: 0.4 %
INR PPP: 0.9
LYMPHOCYTES # BLD AUTO: 2.7 K/UL
LYMPHOCYTES # BLD AUTO: 2.9 K/UL
LYMPHOCYTES NFR BLD: 23.1 %
LYMPHOCYTES NFR BLD: 29 %
MAGNESIUM SERPL-MCNC: 1.8 MG/DL
MCH RBC QN AUTO: 24.4 PG
MCH RBC QN AUTO: 24.8 PG
MCHC RBC AUTO-ENTMCNC: 31.7 G/DL
MCHC RBC AUTO-ENTMCNC: 31.8 G/DL
MCV RBC AUTO: 77 FL
MCV RBC AUTO: 78 FL
MONOCYTES # BLD AUTO: 0.7 K/UL
MONOCYTES # BLD AUTO: 0.7 K/UL
MONOCYTES NFR BLD: 6.4 %
MONOCYTES NFR BLD: 7 %
NEUTROPHILS # BLD AUTO: 6.3 K/UL
NEUTROPHILS # BLD AUTO: 8 K/UL
NEUTROPHILS NFR BLD: 62.9 %
NEUTROPHILS NFR BLD: 69.1 %
NRBC BLD-RTO: 0 /100 WBC
NRBC BLD-RTO: 0 /100 WBC
PHOSPHATE SERPL-MCNC: 4.3 MG/DL
PLATELET # BLD AUTO: 236 K/UL
PLATELET # BLD AUTO: 236 K/UL
PMV BLD AUTO: 9.1 FL
PMV BLD AUTO: 9.3 FL
POCT GLUCOSE: 105 MG/DL (ref 70–110)
POCT GLUCOSE: 117 MG/DL (ref 70–110)
POTASSIUM SERPL-SCNC: 3.8 MMOL/L
POTASSIUM SERPL-SCNC: 4.1 MMOL/L
PROT SERPL-MCNC: 6.9 G/DL
PROTHROMBIN TIME: 9.9 SEC
RBC # BLD AUTO: 4.48 M/UL
RBC # BLD AUTO: 4.54 M/UL
SODIUM SERPL-SCNC: 140 MMOL/L
SODIUM SERPL-SCNC: 141 MMOL/L
WBC # BLD AUTO: 11.62 K/UL
WBC # BLD AUTO: 9.97 K/UL

## 2018-11-08 PROCEDURE — L8680 IMPLT NEUROSTIM ELCTR EACH: HCPCS | Performed by: NEUROLOGICAL SURGERY

## 2018-11-08 PROCEDURE — 61533 IMPLANT BRAIN ELECTRODES: CPT | Mod: 78,,, | Performed by: NEUROLOGICAL SURGERY

## 2018-11-08 PROCEDURE — D9220A PRA ANESTHESIA: Mod: CRNA,,, | Performed by: NURSE ANESTHETIST, CERTIFIED REGISTERED

## 2018-11-08 PROCEDURE — 63600175 PHARM REV CODE 636 W HCPCS: Performed by: NURSE ANESTHETIST, CERTIFIED REGISTERED

## 2018-11-08 PROCEDURE — 63600175 PHARM REV CODE 636 W HCPCS: Performed by: NEUROLOGICAL SURGERY

## 2018-11-08 PROCEDURE — D9220A PRA ANESTHESIA: Mod: ANES,,, | Performed by: ANESTHESIOLOGY

## 2018-11-08 PROCEDURE — 20000000 HC ICU ROOM

## 2018-11-08 PROCEDURE — 85025 COMPLETE CBC W/AUTO DIFF WBC: CPT

## 2018-11-08 PROCEDURE — 25000003 PHARM REV CODE 250: Performed by: STUDENT IN AN ORGANIZED HEALTH CARE EDUCATION/TRAINING PROGRAM

## 2018-11-08 PROCEDURE — 36620 INSERTION CATHETER ARTERY: CPT | Mod: 59,,, | Performed by: ANESTHESIOLOGY

## 2018-11-08 PROCEDURE — 61533 IMPLANT BRAIN ELECTRODES: CPT | Mod: 80,78,, | Performed by: NEUROLOGICAL SURGERY

## 2018-11-08 PROCEDURE — 86901 BLOOD TYPING SEROLOGIC RH(D): CPT

## 2018-11-08 PROCEDURE — 36415 COLL VENOUS BLD VENIPUNCTURE: CPT

## 2018-11-08 PROCEDURE — 25000003 PHARM REV CODE 250: Performed by: NEUROLOGICAL SURGERY

## 2018-11-08 PROCEDURE — 25000003 PHARM REV CODE 250: Performed by: NURSE PRACTITIONER

## 2018-11-08 PROCEDURE — 63600175 PHARM REV CODE 636 W HCPCS: Performed by: PHYSICIAN ASSISTANT

## 2018-11-08 PROCEDURE — 00P00MZ REMOVAL OF NEUROSTIMULATOR LEAD FROM BRAIN, OPEN APPROACH: ICD-10-PCS | Performed by: NEUROLOGICAL SURGERY

## 2018-11-08 PROCEDURE — 84100 ASSAY OF PHOSPHORUS: CPT

## 2018-11-08 PROCEDURE — C1713 ANCHOR/SCREW BN/BN,TIS/BN: HCPCS | Performed by: NEUROLOGICAL SURGERY

## 2018-11-08 PROCEDURE — 27201423 OPTIME MED/SURG SUP & DEVICES STERILE SUPPLY: Performed by: NEUROLOGICAL SURGERY

## 2018-11-08 PROCEDURE — 80048 BASIC METABOLIC PNL TOTAL CA: CPT

## 2018-11-08 PROCEDURE — 83735 ASSAY OF MAGNESIUM: CPT

## 2018-11-08 PROCEDURE — 25000003 PHARM REV CODE 250: Performed by: NURSE ANESTHETIST, CERTIFIED REGISTERED

## 2018-11-08 PROCEDURE — 36000711: Performed by: NEUROLOGICAL SURGERY

## 2018-11-08 PROCEDURE — 00K70ZZ MAP CEREBRAL HEMISPHERE, OPEN APPROACH: ICD-10-PCS | Performed by: NEUROLOGICAL SURGERY

## 2018-11-08 PROCEDURE — 36000710: Performed by: NEUROLOGICAL SURGERY

## 2018-11-08 PROCEDURE — 94761 N-INVAS EAR/PLS OXIMETRY MLT: CPT

## 2018-11-08 PROCEDURE — 27800903 OPTIME MED/SURG SUP & DEVICES OTHER IMPLANTS: Performed by: NEUROLOGICAL SURGERY

## 2018-11-08 PROCEDURE — 85610 PROTHROMBIN TIME: CPT

## 2018-11-08 PROCEDURE — 95951 HC EEG MONITORING/VIDEO RECORD: CPT

## 2018-11-08 PROCEDURE — 85730 THROMBOPLASTIN TIME PARTIAL: CPT

## 2018-11-08 PROCEDURE — 37000008 HC ANESTHESIA 1ST 15 MINUTES: Performed by: NEUROLOGICAL SURGERY

## 2018-11-08 PROCEDURE — 25000003 PHARM REV CODE 250: Performed by: PHYSICIAN ASSISTANT

## 2018-11-08 PROCEDURE — 27201037 HC PRESSURE MONITORING SET UP

## 2018-11-08 PROCEDURE — 99291 CRITICAL CARE FIRST HOUR: CPT | Mod: ,,, | Performed by: PSYCHIATRY & NEUROLOGY

## 2018-11-08 PROCEDURE — 99232 SBSQ HOSP IP/OBS MODERATE 35: CPT | Mod: ,,, | Performed by: PSYCHIATRY & NEUROLOGY

## 2018-11-08 PROCEDURE — 80053 COMPREHEN METABOLIC PANEL: CPT

## 2018-11-08 PROCEDURE — 37000009 HC ANESTHESIA EA ADD 15 MINS: Performed by: NEUROLOGICAL SURGERY

## 2018-11-08 PROCEDURE — 95951 PR EEG MONITORING/VIDEORECORD: CPT | Mod: 26,,, | Performed by: PSYCHIATRY & NEUROLOGY

## 2018-11-08 RX ORDER — LIDOCAINE HCL/PF 100 MG/5ML
SYRINGE (ML) INTRAVENOUS
Status: DISCONTINUED | OUTPATIENT
Start: 2018-11-08 | End: 2018-11-08

## 2018-11-08 RX ORDER — NEOSTIGMINE METHYLSULFATE 1 MG/ML
INJECTION, SOLUTION INTRAVENOUS
Status: DISCONTINUED | OUTPATIENT
Start: 2018-11-08 | End: 2018-11-08

## 2018-11-08 RX ORDER — FENTANYL CITRATE 50 UG/ML
25 INJECTION, SOLUTION INTRAMUSCULAR; INTRAVENOUS EVERY 5 MIN PRN
Status: CANCELLED | OUTPATIENT
Start: 2018-11-08

## 2018-11-08 RX ORDER — PHENYLEPHRINE HYDROCHLORIDE 10 MG/ML
INJECTION INTRAVENOUS
Status: DISCONTINUED | OUTPATIENT
Start: 2018-11-08 | End: 2018-11-08

## 2018-11-08 RX ORDER — VANCOMYCIN HYDROCHLORIDE 1 G/20ML
INJECTION, POWDER, LYOPHILIZED, FOR SOLUTION INTRAVENOUS
Status: DISCONTINUED | OUTPATIENT
Start: 2018-11-08 | End: 2018-11-08 | Stop reason: HOSPADM

## 2018-11-08 RX ORDER — ACETAMINOPHEN 325 MG/1
650 TABLET ORAL EVERY 6 HOURS PRN
Status: DISCONTINUED | OUTPATIENT
Start: 2018-11-08 | End: 2018-11-23 | Stop reason: HOSPADM

## 2018-11-08 RX ORDER — PROPOFOL 10 MG/ML
VIAL (ML) INTRAVENOUS
Status: DISCONTINUED | OUTPATIENT
Start: 2018-11-08 | End: 2018-11-08

## 2018-11-08 RX ORDER — GLYCOPYRROLATE 0.2 MG/ML
INJECTION INTRAMUSCULAR; INTRAVENOUS
Status: DISCONTINUED | OUTPATIENT
Start: 2018-11-08 | End: 2018-11-08

## 2018-11-08 RX ORDER — ACETAMINOPHEN 650 MG/1
650 SUPPOSITORY RECTAL EVERY 6 HOURS PRN
Status: DISCONTINUED | OUTPATIENT
Start: 2018-11-08 | End: 2018-11-23 | Stop reason: HOSPADM

## 2018-11-08 RX ORDER — HYDROMORPHONE HYDROCHLORIDE 1 MG/ML
0.2 INJECTION, SOLUTION INTRAMUSCULAR; INTRAVENOUS; SUBCUTANEOUS EVERY 5 MIN PRN
Status: CANCELLED | OUTPATIENT
Start: 2018-11-08

## 2018-11-08 RX ORDER — SODIUM CHLORIDE 9 MG/ML
INJECTION, SOLUTION INTRAVENOUS CONTINUOUS
Status: DISCONTINUED | OUTPATIENT
Start: 2018-11-08 | End: 2018-11-09

## 2018-11-08 RX ORDER — SODIUM CHLORIDE 0.9 % (FLUSH) 0.9 %
3 SYRINGE (ML) INJECTION
Status: CANCELLED | OUTPATIENT
Start: 2018-11-08

## 2018-11-08 RX ORDER — FENTANYL CITRATE 50 UG/ML
INJECTION, SOLUTION INTRAMUSCULAR; INTRAVENOUS
Status: DISCONTINUED | OUTPATIENT
Start: 2018-11-08 | End: 2018-11-08

## 2018-11-08 RX ORDER — MUPIROCIN 20 MG/G
1 OINTMENT TOPICAL 2 TIMES DAILY
Status: DISCONTINUED | OUTPATIENT
Start: 2018-11-08 | End: 2018-11-13

## 2018-11-08 RX ORDER — ROCURONIUM BROMIDE 10 MG/ML
INJECTION, SOLUTION INTRAVENOUS
Status: DISCONTINUED | OUTPATIENT
Start: 2018-11-08 | End: 2018-11-08

## 2018-11-08 RX ORDER — HYDROCODONE BITARTRATE AND ACETAMINOPHEN 5; 325 MG/1; MG/1
1 TABLET ORAL EVERY 4 HOURS PRN
Status: DISCONTINUED | OUTPATIENT
Start: 2018-11-08 | End: 2018-11-23 | Stop reason: HOSPADM

## 2018-11-08 RX ORDER — BACITRACIN 50000 [IU]/1
INJECTION, POWDER, FOR SOLUTION INTRAMUSCULAR
Status: DISCONTINUED | OUTPATIENT
Start: 2018-11-08 | End: 2018-11-08 | Stop reason: HOSPADM

## 2018-11-08 RX ORDER — PANTOPRAZOLE SODIUM 40 MG/10ML
40 INJECTION, POWDER, LYOPHILIZED, FOR SOLUTION INTRAVENOUS DAILY
Status: DISCONTINUED | OUTPATIENT
Start: 2018-11-09 | End: 2018-11-10

## 2018-11-08 RX ADMIN — SODIUM CHLORIDE, SODIUM GLUCONATE, SODIUM ACETATE, POTASSIUM CHLORIDE, MAGNESIUM CHLORIDE, SODIUM PHOSPHATE, DIBASIC, AND POTASSIUM PHOSPHATE: .53; .5; .37; .037; .03; .012; .00082 INJECTION, SOLUTION INTRAVENOUS at 07:11

## 2018-11-08 RX ADMIN — PHENYLEPHRINE HYDROCHLORIDE 100 MCG: 10 INJECTION INTRAVENOUS at 07:11

## 2018-11-08 RX ADMIN — LAMOTRIGINE 100 MG: 25 TABLET ORAL at 10:11

## 2018-11-08 RX ADMIN — HYDROMORPHONE HYDROCHLORIDE 0.5 MG: 1 INJECTION, SOLUTION INTRAMUSCULAR; INTRAVENOUS; SUBCUTANEOUS at 09:11

## 2018-11-08 RX ADMIN — HYDROCODONE BITARTRATE AND ACETAMINOPHEN 1 TABLET: 5; 325 TABLET ORAL at 01:11

## 2018-11-08 RX ADMIN — PROPOFOL 150 MG: 10 INJECTION, EMULSION INTRAVENOUS at 06:11

## 2018-11-08 RX ADMIN — CEFAZOLIN 2 G: 1 INJECTION, POWDER, FOR SOLUTION INTRAMUSCULAR; INTRAVENOUS at 10:11

## 2018-11-08 RX ADMIN — MUPIROCIN 1 G: 20 OINTMENT TOPICAL at 08:11

## 2018-11-08 RX ADMIN — GLYCOPYRROLATE 0.4 MG: 0.2 INJECTION, SOLUTION INTRAMUSCULAR; INTRAVENOUS at 08:11

## 2018-11-08 RX ADMIN — CEFAZOLIN 2 G: 1 INJECTION, POWDER, FOR SOLUTION INTRAMUSCULAR; INTRAVENOUS at 06:11

## 2018-11-08 RX ADMIN — SODIUM CHLORIDE: 0.9 INJECTION, SOLUTION INTRAVENOUS at 11:11

## 2018-11-08 RX ADMIN — PROPOFOL 50 MG: 10 INJECTION, EMULSION INTRAVENOUS at 06:11

## 2018-11-08 RX ADMIN — NEOSTIGMINE METHYLSULFATE 3 MG: 1 INJECTION INTRAVENOUS at 08:11

## 2018-11-08 RX ADMIN — FENTANYL CITRATE 50 MCG: 50 INJECTION, SOLUTION INTRAMUSCULAR; INTRAVENOUS at 07:11

## 2018-11-08 RX ADMIN — HYDROCODONE BITARTRATE AND ACETAMINOPHEN 1 TABLET: 5; 325 TABLET ORAL at 10:11

## 2018-11-08 RX ADMIN — HEPARIN SODIUM 5000 UNITS: 5000 INJECTION, SOLUTION INTRAVENOUS; SUBCUTANEOUS at 10:11

## 2018-11-08 RX ADMIN — LIDOCAINE HYDROCHLORIDE 100 MG: 20 INJECTION, SOLUTION INTRAVENOUS at 06:11

## 2018-11-08 RX ADMIN — FENTANYL CITRATE 50 MCG: 50 INJECTION, SOLUTION INTRAMUSCULAR; INTRAVENOUS at 06:11

## 2018-11-08 RX ADMIN — FENTANYL CITRATE 100 MCG: 50 INJECTION, SOLUTION INTRAMUSCULAR; INTRAVENOUS at 06:11

## 2018-11-08 RX ADMIN — SENNOSIDES AND DOCUSATE SODIUM 1 TABLET: 8.6; 5 TABLET ORAL at 08:11

## 2018-11-08 RX ADMIN — SODIUM CHLORIDE, SODIUM GLUCONATE, SODIUM ACETATE, POTASSIUM CHLORIDE, MAGNESIUM CHLORIDE, SODIUM PHOSPHATE, DIBASIC, AND POTASSIUM PHOSPHATE: .53; .5; .37; .037; .03; .012; .00082 INJECTION, SOLUTION INTRAVENOUS at 06:11

## 2018-11-08 RX ADMIN — ROCURONIUM BROMIDE 50 MG: 10 INJECTION, SOLUTION INTRAVENOUS at 06:11

## 2018-11-08 RX ADMIN — LAMOTRIGINE 100 MG: 25 TABLET ORAL at 08:11

## 2018-11-08 RX ADMIN — ROCURONIUM BROMIDE 30 MG: 10 INJECTION, SOLUTION INTRAVENOUS at 07:11

## 2018-11-08 RX ADMIN — MUPIROCIN 1 G: 20 OINTMENT TOPICAL at 09:11

## 2018-11-08 RX ADMIN — CEFAZOLIN 2 G: 1 INJECTION, POWDER, FOR SOLUTION INTRAMUSCULAR; INTRAVENOUS at 03:11

## 2018-11-08 RX ADMIN — ACETAMINOPHEN 650 MG: 325 TABLET ORAL at 12:11

## 2018-11-08 NOTE — ASSESSMENT & PLAN NOTE
-POD 2 s/p craniotomy for strip and grid placement  -cEEG  -continue lamotrigine 100 mg BID   -ativan 2 mg for seizures lasting greater than 5 minutes  -notify epilepsy of clinical seizures > 5 minutes and administration of ativan

## 2018-11-08 NOTE — NURSING
The dressing on the left side of patient's head is moist, light yellow in color. Amount of drainage is not enough to check for glucose. Patient asymptomatic.[facility] was notified and came to the bedside to assess the patient. No new orders at this time. Will continue to monitor.

## 2018-11-08 NOTE — PLAN OF CARE
11/08/18 1608   Discharge Reassessment   Assessment Type Discharge Planning Reassessment   Provided patient/caregiver education on the expected discharge date and the discharge plan No   Do you have any problems affording any of your prescribed medications? No   Discharge Plan A Home with family   Discharge Plan B Home with family;Home Health   Patient choice form signed by patient/caregiver N/A   Anticipated Discharge Disposition Home   Can the patient answer the patient profile reliably? Yes, cognitively intact   How does the patient rate their overall health at the present time? Good   Describe the patient's ability to walk at the present time. Minor restrictions or changes   How often would a person be available to care for the patient? Often   Number of comorbid conditions (as recorded on the chart) One   During the past month, has the patient often been bothered by feeling down, depressed or hopeless? No   During the past month, has the patient often been bothered by little interest or pleasure in doing things? No       Patient not medically stable for discharge.     Kenna Fong RN, CCRN-K, [facility]  Neuro-Critical Care   [phone]

## 2018-11-08 NOTE — PROGRESS NOTES
[facility]  Neurology-Epilepsy  Progress Note    Patient Name: Liza Arrieta  MRN: [id number]  Admission Date: 11/5/2018  Hospital Length of Stay: 3 days  Code Status: Full Code   Attending Provider: Any Ruiz MD  Primary Care Physician: LISA Elam MD   Principal Problem:Temporal lobe epilepsy, intractable    Subjective:     Hospital Course:   Patient admitted to [facility] after subdural grid placement/L crani on 11/5. Home Lamotrigine decreased to 100 mg BID.  11/6: No seizures since admission. Cortical mapping session completed.  11/7: Cortical mapping during am, multiple clinical seizures during session. Clinical and electrographic seizure 11/7 afternoon, during which patient had automatisms of hands, confusion, pulled at EEG leads disrupting connection.  11/8: No additional seizures overnight. Back to OR today for replacement of intracranial grid/leads.    Interval History: No further seizures overnight. Plan for OR this afternoon.    Current Facility-Administered Medications   Medication Dose Route Frequency Provider Last Rate Last Dose    0.9%  NaCl infusion   Intravenous Continuous Elsa George NP 50 mL/hr at 11/08/18 1201      acetaminophen suppository 650 mg  650 mg Rectal Q6H PRN Duane Joiner MD        acetaminophen tablet 650 mg  650 mg Oral Q6H PRN Lisset Arboleda NP   650 mg at 11/08/18 1246    ceFAZolin injection 2 g  2 g Intravenous Q8H Keysha Osborne PA-C   2 g at 11/08/18 0615    heparin (porcine) injection 5,000 Units  5,000 Units Subcutaneous Q8H Duane Joiner MD   Stopped at 11/08/18 0600    HYDROcodone-acetaminophen 5-325 mg per tablet 1 tablet  1 tablet Oral Q4H PRN Duane Joiner MD   1 tablet at 11/08/18 0159    HYDROmorphone injection 0.5 mg  0.5 mg Intravenous Q1H PRN Duane Joiner MD   0.5 mg at 11/07/18 2035    lamoTRIgine tablet 100 mg  100 mg Oral BID Lisset Miinea, NP   100 mg at 11/08/18 0830    metoclopramide HCl injection 5 mg  5 mg Intravenous  Q6H PRN Duane Joiner MD        mupirocin 2 % ointment 1 g  1 g Nasal BID Duane Joiner MD   1 g at 11/08/18 0830    ondansetron disintegrating tablet 8 mg  8 mg Oral Q6H PRN Duane Joiner MD   8 mg at 11/07/18 0703    senna-docusate 8.6-50 mg per tablet 1 tablet  1 tablet Oral Daily Keysha Osborne PA-C   1 tablet at 11/08/18 0830    sodium chloride 0.9% flush 3 mL  3 mL Intravenous PRN Ifeanyi Sanchez MD         Continuous Infusions:   sodium chloride 0.9% 50 mL/hr at 11/08/18 1201       Review of Systems   Constitutional: Negative for chills and fever.   Eyes: Negative for visual disturbance.   Respiratory: Negative for cough and shortness of breath.    Cardiovascular: Negative for chest pain and leg swelling.   Gastrointestinal: Negative for nausea and vomiting.   Musculoskeletal: Negative for back pain and joint swelling.   Skin: Negative for pallor and rash.   Neurological: Positive for seizures and headaches (improving). Negative for facial asymmetry, speech difficulty and weakness.   Psychiatric/Behavioral: Negative for agitation and confusion.     Objective:     Vital Signs (Most Recent):  Temp: 99.4 °F (37.4 °C) (11/08/18 1101)  Pulse: 74 (11/08/18 1201)  Resp: (!) 29 (11/08/18 1201)  BP: 123/81 (11/08/18 1201)  SpO2: (!) 93 % (11/08/18 1201) Vital Signs (24h Range):  Temp:  [98.7 °F (37.1 °C)-100 °F (37.8 °C)] 99.4 °F (37.4 °C)  Pulse:  [63-85] 74  Resp:  [10-29] 29  SpO2:  [91 %-96 %] 93 %  BP: (123-159)/(63-81) 123/81     Weight: 106.6 kg (235 lb)  Body mass index is 39.11 kg/m².    Physical Exam   Constitutional: She is oriented to person, place, and time. She appears well-developed and well-nourished. No distress.   HENT:   Right Ear: External ear normal.   Left Ear: External ear normal.   S/p L temporal crani   Eyes: Conjunctivae and EOM are normal. Pupils are equal, round, and reactive to light.   Neck: Normal range of motion.   Pulmonary/Chest: Effort normal. No respiratory  distress.   Musculoskeletal: Normal range of motion. She exhibits no deformity.   Neurological: She is alert and oriented to person, place, and time. No cranial nerve deficit.   Skin: Skin is warm and dry. She is not diaphoretic. No erythema.   Psychiatric: She has a normal mood and affect. Her speech is normal and behavior is normal. Judgment and thought content normal.       NEUROLOGICAL EXAMINATION:     MENTAL STATUS   Oriented to person, place, and time.   Attention: normal. Concentration: normal.   Speech: speech is normal   Level of consciousness: alert    CRANIAL NERVES     CN III, IV, VI   Pupils are equal, round, and reactive to light.  Extraocular motions are normal.     CN VII   Facial expression full, symmetric.     CN VIII   CN VIII normal.     CN IX, X   CN IX normal.   CN X normal.     CN XI   CN XI normal.     CN XII   CN XII normal.     MOTOR EXAM   Muscle bulk: normal  Overall muscle tone: normal       Moves all extremities spontaneously, no focal deficit noted       Significant Labs: All pertinent lab results from the past 24 hours have been reviewed.    Significant Studies: I have reviewed all pertinent imaging results/findings within the past 24 hours.    Assessment and Plan:     * Temporal lobe epilepsy, intractable    49 yo female with history of seizures since age 19, who presents to NICU s/p L temporal crani with subdural grid placement for further localization and possible resection.    Recommendations:  - Continuous vEEG monitoring with subdural grid electrodes in place  - Continue Lamictal to 100 mg BID  - To OR this afternoon for replacement of strip/grid  - For any seizures: please push event button on EEG and call epileptologist on call prior to administration of abortive medication  - Recommend short acting opioids instead of long acting as needed for pain control  - Pain management per NCC    Plan of care discussed with NCC team, family at bedside. Will continue to follow.       Essential hypertension    - Goal SBP <140  - Management per NCC          VTE Risk Mitigation (From admission, onward)        Ordered     heparin (porcine) injection 5,000 Units  Every 8 hours      11/05/18 1238     Place sequential compression device  Until discontinued      11/05/18 1238     IP VTE HIGH RISK PATIENT  Once      11/05/18 1238          Philly Foy PA-C  Neurology-Epilepsy  [facility]  Staff: Dr. Mark

## 2018-11-08 NOTE — SUBJECTIVE & OBJECTIVE
Interval History: No further seizures overnight. Plan for OR this afternoon.    Current Facility-Administered Medications   Medication Dose Route Frequency Provider Last Rate Last Dose    0.9%  NaCl infusion   Intravenous Continuous Elsa George NP 50 mL/hr at 11/08/18 1201      acetaminophen suppository 650 mg  650 mg Rectal Q6H PRN Duane Joiner MD        acetaminophen tablet 650 mg  650 mg Oral Q6H PRN Lisset Nkechi, NP   650 mg at 11/08/18 1246    ceFAZolin injection 2 g  2 g Intravenous Q8H Keysha Osborne PA-C   2 g at 11/08/18 0615    heparin (porcine) injection 5,000 Units  5,000 Units Subcutaneous Q8H Duane Joiner MD   Stopped at 11/08/18 0600    HYDROcodone-acetaminophen 5-325 mg per tablet 1 tablet  1 tablet Oral Q4H PRN Duane Joiner MD   1 tablet at 11/08/18 0159    HYDROmorphone injection 0.5 mg  0.5 mg Intravenous Q1H PRN Duane Joiner MD   0.5 mg at 11/07/18 2035    lamoTRIgine tablet 100 mg  100 mg Oral BID Lisset Nkechi, NP   100 mg at 11/08/18 0830    metoclopramide HCl injection 5 mg  5 mg Intravenous Q6H PRN Duane Joiner MD        mupirocin 2 % ointment 1 g  1 g Nasal BID Duane Joiner MD   1 g at 11/08/18 0830    ondansetron disintegrating tablet 8 mg  8 mg Oral Q6H PRN Duane Joiner MD   8 mg at 11/07/18 0703    senna-docusate 8.6-50 mg per tablet 1 tablet  1 tablet Oral Daily Keysha Osborne PA-C   1 tablet at 11/08/18 0830    sodium chloride 0.9% flush 3 mL  3 mL Intravenous PRN Ifeanyi Sanchez MD         Continuous Infusions:   sodium chloride 0.9% 50 mL/hr at 11/08/18 1201       Review of Systems   Constitutional: Negative for chills and fever.   Eyes: Negative for visual disturbance.   Respiratory: Negative for cough and shortness of breath.    Cardiovascular: Negative for chest pain and leg swelling.   Gastrointestinal: Negative for nausea and vomiting.   Musculoskeletal: Negative for back pain and joint swelling.   Skin: Negative for pallor and rash.    Neurological: Positive for seizures and headaches (improving). Negative for facial asymmetry, speech difficulty and weakness.   Psychiatric/Behavioral: Negative for agitation and confusion.     Objective:     Vital Signs (Most Recent):  Temp: 99.4 °F (37.4 °C) (11/08/18 1101)  Pulse: 74 (11/08/18 1201)  Resp: (!) 29 (11/08/18 1201)  BP: 123/81 (11/08/18 1201)  SpO2: (!) 93 % (11/08/18 1201) Vital Signs (24h Range):  Temp:  [98.7 °F (37.1 °C)-100 °F (37.8 °C)] 99.4 °F (37.4 °C)  Pulse:  [63-85] 74  Resp:  [10-29] 29  SpO2:  [91 %-96 %] 93 %  BP: (123-159)/(63-81) 123/81     Weight: 106.6 kg (235 lb)  Body mass index is 39.11 kg/m².    Physical Exam   Constitutional: She is oriented to person, place, and time. She appears well-developed and well-nourished. No distress.   HENT:   Right Ear: External ear normal.   Left Ear: External ear normal.   S/p L temporal crani   Eyes: Conjunctivae and EOM are normal. Pupils are equal, round, and reactive to light.   Neck: Normal range of motion.   Pulmonary/Chest: Effort normal. No respiratory distress.   Musculoskeletal: Normal range of motion. She exhibits no deformity.   Neurological: She is alert and oriented to person, place, and time. No cranial nerve deficit.   Skin: Skin is warm and dry. She is not diaphoretic. No erythema.   Psychiatric: She has a normal mood and affect. Her speech is normal and behavior is normal. Judgment and thought content normal.       NEUROLOGICAL EXAMINATION:     MENTAL STATUS   Oriented to person, place, and time.   Attention: normal. Concentration: normal.   Speech: speech is normal   Level of consciousness: alert    CRANIAL NERVES     CN III, IV, VI   Pupils are equal, round, and reactive to light.  Extraocular motions are normal.     CN VII   Facial expression full, symmetric.     CN VIII   CN VIII normal.     CN IX, X   CN IX normal.   CN X normal.     CN XI   CN XI normal.     CN XII   CN XII normal.     MOTOR EXAM   Muscle bulk:  normal  Overall muscle tone: normal       Moves all extremities spontaneously, no focal deficit noted       Significant Labs: All pertinent lab results from the past 24 hours have been reviewed.    Significant Studies: I have reviewed all pertinent imaging results/findings within the past 24 hours.

## 2018-11-08 NOTE — ASSESSMENT & PLAN NOTE
47 yo female with history of seizures since age 19, who presents to NICU s/p L temporal crani with subdural grid placement for further localization and possible resection.    Recommendations:  - Continuous vEEG monitoring with subdural grid electrodes in place  - Continue Lamictal to 100 mg BID  - To OR this afternoon for replacement of strip/grid  - For any seizures: please push event button on EEG and call epileptologist on call prior to administration of abortive medication  - Recommend short acting opioids instead of long acting as needed for pain control  - Pain management per NCC    Plan of care discussed with NCC team, family at bedside. Will continue to follow.

## 2018-11-08 NOTE — PROGRESS NOTES
[facility]  Neurocritical Care  Progress Note    Admit Date: 11/5/2018  Service Date: 11/07/2018  Length of Stay: 2    Subjective:     Chief Complaint: Temporal lobe epilepsy, intractable    History of Present Illness: Liza Arrieta is a 47 yo female with PMHx of partial intractable epilepsy who is being admitted to [facility] after L crani with subdural grid placement. Per chart review, she had her first seizure at age 19. At that time, she was treated with dilantin and she did well for approximately 15 years. Reports that she typically has absence seizures (5-6/day), but states she may have had two grand mal seizures decades ago.  The patient was recently admitted to EMU on 2/9/2018 where seizures were captured on EEG.  She has failed multiple medications and is now being admitted for post op management, continuous EEG.             Hospital Course: 11/5: Pt admitted to [facility] s/p L crani with subdural grid placement, continuous EEG  11/6: cEEG  11/7 cortical mapping per epilepsy. Seizures x3 non induced. Pulled one of her leads today. Sent for stat CTH showed electrodes have been repositioned. posterior infratemporal strips are no longer in place    Interval History:  cortical mapping per epilepsy. Seizures x3 non induced. Pulled one of her leads today. Sent for stat CTH showed electrodes have been repositioned. posterior infratemporal strips are no longer in place      Review of Systems:   Constitutional: Denies fevers or chills.  Pulmonary: Denies shortness of breath or cough.  Cardiology: Denies chest pain or palpitations.  GI: Denies abdominal pain or constipation.  Neurologic: Denies new weakness,  headache, or paresthesias.    Vitals:   Temp: 98.9 °F (37.2 °C)  Pulse: 79  Rhythm: normal sinus rhythm  BP: (!) 146/63  MAP (mmHg): 90  Resp: 20  SpO2: 96 %  O2 Device (Oxygen Therapy): room air    Temp  Min: 97.8 °F (36.6 °C)  Max: 98.9 °F (37.2 °C)  Pulse  Min: 56  Max: 85  BP  Min: 131/62  Max:  166/74  MAP (mmHg)  Min: 89  Max: 110  Resp  Min: 11  Max: 24  SpO2  Min: 91 %  Max: 98 %    11/06 0701 - 11/07 0700  In: 976.3 [P.O.:540; I.V.:436.3]  Out: 2420 [Urine:2125; Drains:295]   Unmeasured Output  Urine Occurrence: 1  Stool Occurrence: 0     Examination:   Constitutional: Well-nourished and -developed. No apparent distress.   Eyes: Conjunctiva clear, anicteric. Lids no lesions.  Head/Ears/Nose/Mouth/Throat/Neck: Moist mucous membranes. External ears, nose atraumatic.   Cardiovascular: Regular rhythm. No murmurs. No leg edema.  Respiratory: Comfortable respirations. Clear to auscultation.  Gastrointestinal: No hernia. Soft, nondistended, nontender. + bowel sounds.    Neurologic:  -GCS E4V6  -Alert. Oriented to person, place, and time. Speech fluent. Follows commands.  -Cranial nerves grossly intact   -Motor PUGA spontaneously 5/5 strength  -Sensation bilat intact    Medications:   Continuous Scheduled  ceFAZolin (ANCEF) IVPB 2 g Q8H   heparin (porcine) 5,000 Units Q8H   lamoTRIgine 100 mg BID   lorazepam     mupirocin 1 g BID   senna-docusate 8.6-50 mg 1 tablet Daily   PRN  acetaminophen 650 mg Q6H PRN   acetaminophen 650 mg Q6H PRN   dextrose 50% 12.5 g PRN   glucagon (human recombinant) 1 mg PRN   HYDROcodone-acetaminophen 1 tablet Q4H PRN   HYDROmorphone 0.5 mg Q1H PRN   insulin aspart U-100 1-10 Units Q6H PRN   metoclopramide HCl 5 mg Q6H PRN   ondansetron 8 mg Q6H PRN   sodium chloride 0.9% 3 mL PRN      Today I independently reviewed pertinent medications, lines/drains/airways, imaging, laboratory results, microbiology results, notably:     ISTAT: No results for input(s): PH, PCO2, PO2, POCSATURATED, HCO3, BE, POCNA, POCK, POCTCO2, POCGLU, POCICA, POCLAC, SAMPLE in the last 24 hours.   Chem: Recent Labs   Lab 11/07/18  0303      K 4.0      CO2 24      BUN 11   CREATININE 0.8   ESTGFRAFRICA >60.0   EGFRNONAA >60.0   CALCIUM 8.9   MG 1.8   PHOS 3.1   ANIONGAP 10   PROT 6.9   ALBUMIN  3.2*   BILITOT 0.3   ALKPHOS 100   AST 16   ALT 15     Heme: Recent Labs   Lab 11/07/18  0303   WBC 10.77   HGB 10.7*   HCT 35.4*        Endo:   Recent Labs   Lab 11/07/18  0635 11/07/18  1727   POCTGLUCOSE 131* 145*      Assessment/Plan:     Neuro   * Temporal lobe epilepsy, intractable    -POD 2 s/p craniotomy for strip and grid placement  -cEEG  -continue lamotrigine 100 mg BID   -ativan 2 mg for seizures lasting greater than 5 minutes  -notify epilepsy of clinical seizures > 5 minutes and administration of ativan            Cardiac/Vascular   Essential hypertension    --SBP goal <180   EKG  SR  --Closely monitor BP and HR     Endocrine   Class 2 obesity with body mass index (BMI) of 38.0 to 38.9 in adult    Body mass index is 39.11 kg/m².             The patient is being Prophylaxed for:  Venous Thromboembolism with: Mechanical or Chemical  Stress Ulcer with: Not Applicable   Ventilator Pneumonia with: not applicable    Activity Orders          None        Full Code     I have spent 35 min with this patient, with over 50% of this time spent coordinating care and speaking with the family    Elsa George NP  Neurocritical Care  [facility]

## 2018-11-08 NOTE — PLAN OF CARE
Problem: Patient Care Overview  Goal: Plan of Care Review  Outcome: Ongoing (interventions implemented as appropriate)  POC reviewed with pt at 0500. Pt verbalized understanding. Questions and concerns addressed. No acute events overnight. Pt progressing toward goals. Will continue to monitor. See flowsheets for full assessment and VS info     No events over night.

## 2018-11-09 LAB
ANION GAP SERPL CALC-SCNC: 8 MMOL/L
BASOPHILS # BLD AUTO: 0.01 K/UL
BASOPHILS NFR BLD: 0.1 %
BLD PROD TYP BPU: NORMAL
BLD PROD TYP BPU: NORMAL
BLOOD UNIT EXPIRATION DATE: NORMAL
BLOOD UNIT EXPIRATION DATE: NORMAL
BLOOD UNIT TYPE CODE: 9500
BLOOD UNIT TYPE CODE: 9500
BLOOD UNIT TYPE: NORMAL
BLOOD UNIT TYPE: NORMAL
BUN SERPL-MCNC: 10 MG/DL
CALCIUM SERPL-MCNC: 9.2 MG/DL
CHLORIDE SERPL-SCNC: 103 MMOL/L
CO2 SERPL-SCNC: 26 MMOL/L
CODING SYSTEM: NORMAL
CODING SYSTEM: NORMAL
CREAT SERPL-MCNC: 0.7 MG/DL
DIFFERENTIAL METHOD: ABNORMAL
DISPENSE STATUS: NORMAL
DISPENSE STATUS: NORMAL
EOSINOPHIL # BLD AUTO: 0 K/UL
EOSINOPHIL NFR BLD: 0.1 %
ERYTHROCYTE [DISTWIDTH] IN BLOOD BY AUTOMATED COUNT: 15.3 %
EST. GFR  (AFRICAN AMERICAN): >60 ML/MIN/1.73 M^2
EST. GFR  (NON AFRICAN AMERICAN): >60 ML/MIN/1.73 M^2
GLUCOSE SERPL-MCNC: 148 MG/DL
HCT VFR BLD AUTO: 34.7 %
HGB BLD-MCNC: 10.7 G/DL
IMM GRANULOCYTES # BLD AUTO: 0.03 K/UL
IMM GRANULOCYTES NFR BLD AUTO: 0.2 %
LYMPHOCYTES # BLD AUTO: 1.6 K/UL
LYMPHOCYTES NFR BLD: 13 %
MAGNESIUM SERPL-MCNC: 1.8 MG/DL
MCH RBC QN AUTO: 23.8 PG
MCHC RBC AUTO-ENTMCNC: 30.8 G/DL
MCV RBC AUTO: 77 FL
MONOCYTES # BLD AUTO: 0.5 K/UL
MONOCYTES NFR BLD: 4.1 %
NEUTROPHILS # BLD AUTO: 10.2 K/UL
NEUTROPHILS NFR BLD: 82.5 %
NRBC BLD-RTO: 0 /100 WBC
PHOSPHATE SERPL-MCNC: 3.4 MG/DL
PLATELET # BLD AUTO: 258 K/UL
PMV BLD AUTO: 9.2 FL
POTASSIUM SERPL-SCNC: 3.9 MMOL/L
RBC # BLD AUTO: 4.49 M/UL
SODIUM SERPL-SCNC: 137 MMOL/L
TRANS ERYTHROCYTES VOL PATIENT: NORMAL ML
TRANS ERYTHROCYTES VOL PATIENT: NORMAL ML
WBC # BLD AUTO: 12.34 K/UL

## 2018-11-09 PROCEDURE — 84100 ASSAY OF PHOSPHORUS: CPT

## 2018-11-09 PROCEDURE — 25000003 PHARM REV CODE 250: Performed by: NEUROLOGICAL SURGERY

## 2018-11-09 PROCEDURE — 20000000 HC ICU ROOM

## 2018-11-09 PROCEDURE — 95819 EEG AWAKE AND ASLEEP: CPT | Mod: 26,,, | Performed by: PSYCHIATRY & NEUROLOGY

## 2018-11-09 PROCEDURE — 95951 HC EEG MONITORING/VIDEO RECORD: CPT

## 2018-11-09 PROCEDURE — 99233 SBSQ HOSP IP/OBS HIGH 50: CPT | Mod: ,,, | Performed by: NURSE PRACTITIONER

## 2018-11-09 PROCEDURE — 63600175 PHARM REV CODE 636 W HCPCS: Performed by: NEUROLOGICAL SURGERY

## 2018-11-09 PROCEDURE — 80048 BASIC METABOLIC PNL TOTAL CA: CPT

## 2018-11-09 PROCEDURE — C9113 INJ PANTOPRAZOLE SODIUM, VIA: HCPCS | Performed by: STUDENT IN AN ORGANIZED HEALTH CARE EDUCATION/TRAINING PROGRAM

## 2018-11-09 PROCEDURE — 25000003 PHARM REV CODE 250: Performed by: PHYSICIAN ASSISTANT

## 2018-11-09 PROCEDURE — 83735 ASSAY OF MAGNESIUM: CPT

## 2018-11-09 PROCEDURE — 99232 SBSQ HOSP IP/OBS MODERATE 35: CPT | Mod: ,,, | Performed by: PSYCHIATRY & NEUROLOGY

## 2018-11-09 PROCEDURE — 99291 CRITICAL CARE FIRST HOUR: CPT | Mod: ,,, | Performed by: PSYCHIATRY & NEUROLOGY

## 2018-11-09 PROCEDURE — 63600175 PHARM REV CODE 636 W HCPCS: Performed by: STUDENT IN AN ORGANIZED HEALTH CARE EDUCATION/TRAINING PROGRAM

## 2018-11-09 PROCEDURE — 85025 COMPLETE CBC W/AUTO DIFF WBC: CPT

## 2018-11-09 PROCEDURE — 63600175 PHARM REV CODE 636 W HCPCS: Performed by: PHYSICIAN ASSISTANT

## 2018-11-09 PROCEDURE — 94761 N-INVAS EAR/PLS OXIMETRY MLT: CPT

## 2018-11-09 PROCEDURE — 95951 PR EEG MONITORING/VIDEORECORD: CPT | Mod: 26,,, | Performed by: PSYCHIATRY & NEUROLOGY

## 2018-11-09 PROCEDURE — 25000003 PHARM REV CODE 250: Performed by: NURSE PRACTITIONER

## 2018-11-09 RX ADMIN — CEFAZOLIN 2 G: 1 INJECTION, POWDER, FOR SOLUTION INTRAMUSCULAR; INTRAVENOUS at 02:11

## 2018-11-09 RX ADMIN — SENNOSIDES AND DOCUSATE SODIUM 1 TABLET: 8.6; 5 TABLET ORAL at 09:11

## 2018-11-09 RX ADMIN — ACETAMINOPHEN 650 MG: 325 TABLET ORAL at 11:11

## 2018-11-09 RX ADMIN — MUPIROCIN 1 G: 20 OINTMENT TOPICAL at 09:11

## 2018-11-09 RX ADMIN — HEPARIN SODIUM 5000 UNITS: 5000 INJECTION, SOLUTION INTRAVENOUS; SUBCUTANEOUS at 02:11

## 2018-11-09 RX ADMIN — CEFAZOLIN 2 G: 1 INJECTION, POWDER, FOR SOLUTION INTRAMUSCULAR; INTRAVENOUS at 06:11

## 2018-11-09 RX ADMIN — HEPARIN SODIUM 5000 UNITS: 5000 INJECTION, SOLUTION INTRAVENOUS; SUBCUTANEOUS at 09:11

## 2018-11-09 RX ADMIN — LAMOTRIGINE 100 MG: 25 TABLET ORAL at 09:11

## 2018-11-09 RX ADMIN — CEFAZOLIN 2 G: 1 INJECTION, POWDER, FOR SOLUTION INTRAMUSCULAR; INTRAVENOUS at 10:11

## 2018-11-09 RX ADMIN — ACETAMINOPHEN 650 MG: 325 TABLET ORAL at 10:11

## 2018-11-09 RX ADMIN — HEPARIN SODIUM 5000 UNITS: 5000 INJECTION, SOLUTION INTRAVENOUS; SUBCUTANEOUS at 06:11

## 2018-11-09 RX ADMIN — HYDROCODONE BITARTRATE AND ACETAMINOPHEN 1 TABLET: 5; 325 TABLET ORAL at 04:11

## 2018-11-09 RX ADMIN — PANTOPRAZOLE SODIUM 40 MG: 40 INJECTION, POWDER, FOR SOLUTION INTRAVENOUS at 09:11

## 2018-11-09 NOTE — ANESTHESIA POSTPROCEDURE EVALUATION
"Anesthesia Post Evaluation    Patient: Liza Arrieta    Procedure(s) Performed: Procedure(s) (LRB):  CRANIOTOMY for grids and strips (Left)    Final Anesthesia Type: general  Patient location during evaluation: PACU  Patient participation: Yes- Able to Participate  Level of consciousness: awake and alert and oriented  Post-procedure vital signs: reviewed and stable  Pain management: adequate  Airway patency: patent  PONV status at discharge: No PONV  Anesthetic complications: no      Cardiovascular status: blood pressure returned to baseline  Respiratory status: unassisted, room air and spontaneous ventilation  Hydration status: euvolemic  Follow-up not needed.        Visit Vitals  BP (!) 150/75   Pulse 62   Temp 36.7 °C (98.1 °F)   Resp 19   Ht 5' 5" (1.651 m)   Wt 106.6 kg (235 lb)   SpO2 100%   Breastfeeding? No   BMI 39.11 kg/m²       Pain/Allie Score: Pain Assessment Performed: Yes (11/8/2018  9:00 PM)  Presence of Pain: complains of pain/discomfort (11/8/2018  9:00 PM)  Pain Rating Prior to Med Admin: 6 (11/8/2018 10:23 PM)  Pain Rating Post Med Admin: 5 (11/8/2018  9:55 PM)        "

## 2018-11-09 NOTE — PROGRESS NOTES
[facility]  Neurocritical Care  Progress Note    Admit Date: 11/5/2018  Service Date: 11/08/2018  Length of Stay: 3    Subjective:     Chief Complaint: Temporal lobe epilepsy, intractable    History of Present Illness: Liza Arrieta is a 47 yo female with PMHx of partial intractable epilepsy who is being admitted to [facility] after L crani with subdural grid placement. Per chart review, she had her first seizure at age 19. At that time, she was treated with dilantin and she did well for approximately 15 years. Reports that she typically has absence seizures (5-6/day), but states she may have had two grand mal seizures decades ago.  The patient was recently admitted to EMU on 2/9/2018 where seizures were captured on EEG.  She has failed multiple medications and is now being admitted for post op management, continuous EEG.             Hospital Course: 11/5: Pt admitted to [facility] s/p L crani with subdural grid placement, continuous EEG  11/6: cEEG  11/7 cortical mapping per epilepsy. Seizures x3 non induced. Pulled one of her leads today. Sent for stat CTH showed electrodes have been repositioned. posterior infratemporal strips are no longer in place  11/8 NPO. Waiting to go to OR for repeat grid placement. No seizures over night    Interval History: NPO. Waiting to go to OR for repeat grid placement. No seizures over night  Review of Systems:   Constitutional: Denies fevers or chills.  Pulmonary: Denies shortness of breath or cough.  Cardiology: Denies chest pain or palpitations.  GI: Denies abdominal pain or constipation.  Neurologic: Denies new weakness,  headache, or paresthesias        Vitals:   Temp: 99 °F (37.2 °C)  Pulse: 73  Rhythm: normal sinus rhythm  BP: (!) 146/67  MAP (mmHg): 96  Resp: 14  SpO2: 97 %  O2 Device (Oxygen Therapy): room air    Temp  Min: 98.7 °F (37.1 °C)  Max: 100 °F (37.8 °C)  Pulse  Min: 63  Max: 82  BP  Min: 116/63  Max: 148/68  MAP (mmHg)  Min: 84  Max: 104  Resp  Min: 10   Max: 29  SpO2  Min: 79 %  Max: 98 %    11/07 0701 - 11/08 0700  In: 960 [P.O.:960]  Out: 2060 [Urine:2000; Drains:60]   Unmeasured Output  Urine Occurrence: 1  Stool Occurrence: 0     Examination:   Constitutional: Well-nourished and -developed. No apparent distress.   Eyes: Conjunctiva clear, anicteric. Lids no lesions.  Head/Ears/Nose/Mouth/Throat/Neck: Moist mucous membranes. External ears, nose atraumatic.   Cardiovascular: Regular rhythm. No murmurs. No leg edema.  Respiratory: Comfortable respirations. Clear to auscultation.  Gastrointestinal: No hernia. Soft, nondistended, nontender. + bowel sounds.     Neurologic:  -GCS E4V6  -Alert. Oriented to person, place, and time. Speech fluent. Follows commands.  -Cranial nerves grossly intact   -Motor PUGA spontaneously 5/5 strength  -Sensation bilat intact      Medications:   Continuous  sodium chloride 0.9% Last Rate: 50 mL/hr at 11/08/18 1801   Scheduled  ceFAZolin (ANCEF) IVPB 2 g Q8H   heparin (porcine) 5,000 Units Q8H   lamoTRIgine 100 mg BID   mupirocin 1 g BID   senna-docusate 8.6-50 mg 1 tablet Daily   PRN  acetaminophen 650 mg Q6H PRN   acetaminophen 650 mg Q6H PRN   bacitracin  PRN   HYDROcodone-acetaminophen 1 tablet Q4H PRN   HYDROmorphone 0.5 mg Q1H PRN   metoclopramide HCl 5 mg Q6H PRN   ondansetron 8 mg Q6H PRN   sodium chloride 0.9% 3 mL PRN   thrombin (bovine)  PRN   vancomycin  PRN      Today I independently reviewed pertinent medications, lines/drains/airways, imaging, laboratory results, microbiology results, notably:     ISTAT: No results for input(s): PH, PCO2, PO2, POCSATURATED, HCO3, BE, POCNA, POCK, POCTCO2, POCGLU, POCICA, POCLAC, SAMPLE in the last 24 hours.   Chem: Recent Labs   Lab 11/08/18  0150      K 4.1      CO2 25      BUN 10   CREATININE 0.8   ESTGFRAFRICA >60.0   EGFRNONAA >60.0   CALCIUM 9.2   MG 1.8   PHOS 4.3   ANIONGAP 11   PROT 6.9   ALBUMIN 3.1*   BILITOT 0.3   ALKPHOS 100   AST 14   ALT 11     Heme: Recent  Labs   Lab 11/08/18  0150 11/08/18  1744   WBC 9.97  --    HGB 11.1*  --    HCT 35.0*  --      --    INR  --  0.9     Endo:   Recent Labs   Lab 11/08/18  0123 11/08/18  0614   POCTGLUCOSE 105 117*      Assessment/Plan:     Neuro   * Temporal lobe epilepsy, intractable    -POD 2 s/p craniotomy for strip and grid placement  -cEEG  -continue lamotrigine 100 mg BID   -ativan 2 mg for seizures lasting greater than 5 minutes  -notify epilepsy of clinical seizures > 5 minutes and administration of ativan   - to OR for repeat grid replacement           Cardiac/Vascular   Essential hypertension    --SBP goal <180   EKG  SR  --Closely monitor BP and HR     Endocrine   Class 2 obesity with body mass index (BMI) of 38.0 to 38.9 in adult    Body mass index is 39.11 kg/m².             The patient is being Prophylaxed for:  Venous Thromboembolism with: Mechanical or Chemical  Stress Ulcer with: Not Applicable   Ventilator Pneumonia with: not applicable    Activity Orders          None        Full Code     I have spent 35 min with this patient, with over 50% of this time spent coordinating care and speaking with the family    Elsa George NP  Neurocritical Care  [facility]

## 2018-11-09 NOTE — PLAN OF CARE
Problem: Patient Care Overview  Goal: Plan of Care Review  Outcome: Ongoing (interventions implemented as appropriate)  POC reviewed with pt at 1500. Pt verbalized understanding. Questions and concerns addressed. No acute events today. Pt waiting to be transferred to OR for craniotomy to have grid put back in. Pt on NS at 50ml/hr and RA and tolerating well. Pt progressing toward goals. Will continue to monitor. See flowsheets for full assessment and VS info.

## 2018-11-09 NOTE — PROGRESS NOTES
"[facility]  Neurosurgery  Progress Note    Subjective:     History of Present Illness: Pt is a 48 y.o. female who presents per referral by Dr. LISA Elam and the epilepsy team for evaluation for epilepsy surgery. Pt has history of partial intractable epilepsy and has failed at least 10 previous medications. Currently on dual therapy. EMU workup done in February localized pathology to left temporal lobe, with PET scan showing hypometabolism of mesial temporal lobe. DAWOOD scan also showed localization to left mesial temporal lobe. Pt states that she has endured seizures since 19 y.o. Pt currently on Lamictal. Pt states that she endures about 5 absence episodes per day with intermittent LOC. Pt notes one incident of "rolling" seizures for which she was brought to the ED.          Post-Op Info:  Procedure(s) (LRB):  CRANIOTOMY for grids and strips (Left)   1 Day Post-Op     Interval History: NAEON, To OR yesterday for revision of grids/strips    Medications:  Continuous Infusions:   sodium chloride 0.9% 50 mL/hr at 11/09/18 0901     Scheduled Meds:   ceFAZolin (ANCEF) IVPB  2 g Intravenous Q8H    heparin (porcine)  5,000 Units Subcutaneous Q8H    lamoTRIgine  100 mg Oral BID    mupirocin  1 g Nasal BID    mupirocin  1 g Nasal BID    pantoprazole  40 mg Intravenous Daily    senna-docusate 8.6-50 mg  1 tablet Oral Daily     PRN Meds:acetaminophen, acetaminophen, acetaminophen, acetaminophen, HYDROcodone-acetaminophen, HYDROcodone-acetaminophen, HYDROmorphone, metoclopramide HCl, ondansetron, sodium chloride 0.9%     Review of Systems    Objective:     Weight: 106.6 kg (235 lb)  Body mass index is 39.11 kg/m².  Vital Signs (Most Recent):  Temp: 99.4 °F (37.4 °C) (11/09/18 0701)  Pulse: 63 (11/09/18 1001)  Resp: 18 (11/09/18 1001)  BP: (!) 144/66 (11/09/18 1001)  SpO2: 97 % (11/09/18 1001) Vital Signs (24h Range):  Temp:  [98.1 °F (36.7 °C)-99.4 °F (37.4 °C)] 99.4 °F (37.4 °C)  Pulse:  [62-90] " 63  Resp:  [13-29] 18  SpO2:  [79 %-100 %] 97 %  BP: ()/(58-81) 144/66  Arterial Line BP: (123-160)/(59-76) 137/61     Date 11/09/18 0700 - 11/10/18 0659   Shift 5368-1926 6851-9073 3126-4296 24 Hour Total   INTAKE   I.V.(mL/kg) 150(1.4)   150(1.4)   Shift Total(mL/kg) 150(1.4)   150(1.4)   OUTPUT   Urine(mL/kg/hr) 500   500   Shift Total(mL/kg) 500(4.7)   500(4.7)   Weight (kg) 106.6 106.6 106.6 106.6                        Closed/Suction Drain 11/05/18 1128 Left Scalp Accordion 10 Fr. (Active)   Site Description Unable to view 11/6/2018  3:05 PM   Dressing Type Gauze 11/6/2018  3:05 PM   Dressing Status Clean;Dry;Intact 11/6/2018  3:05 PM   Status To bulb suction 11/6/2018  3:05 PM   Output (mL) 85 mL 11/6/2018  1:05 PM       Neurosurgery Physical Exam    -Alert and oriented x4  -PERRL, EOMI, face symmetrical, tongue midline, facial sensation symmetric, shoulder shrug full strength and symmetric  -Motor: 5/5 throughout the upper and lower extremites bilaterally  -sensation intact to light touch throughout        Significant Labs:  Recent Labs   Lab 11/08/18 0150 11/08/18 2143 11/09/18  0304    125* 148*    140 137   K 4.1 3.8 3.9    105 103   CO2 25 25 26   BUN 10 10 10   CREATININE 0.8 0.8 0.7   CALCIUM 9.2 8.9 9.2   MG 1.8  --  1.8     Recent Labs   Lab 11/08/18  0150 11/08/18 2143 11/09/18  0304   WBC 9.97 11.62 12.34   HGB 11.1* 11.1* 10.7*   HCT 35.0* 34.9* 34.7*    236 258     Recent Labs   Lab 11/08/18  1744   INR 0.9   APTT 22.0     Microbiology Results (last 7 days)     ** No results found for the last 168 hours. **        All pertinent labs from the last 24 hours have been reviewed.    Significant Diagnostics:  I have reviewed all pertinent imaging results/findings within the past 24 hours.    Assessment/Plan:     * Temporal lobe epilepsy, intractable    49 yo female with history of seizures since age 19, now s/p  crani with subdural grid placement for further  localization.    -continue EEG monitoring  -continue HV drain  -monitoring/seizure management per epilepsy team  -will continue to follow closely           Denis Covarrubias MD  Neurosurgery  [facility]

## 2018-11-09 NOTE — PROGRESS NOTES
Pt had an absence seizure lasting 5-10 seconds; pt was able to report having the seizure at the time. Alarm on EEG machine pressed. Pt alert and oriented and followed commands; VSS. Will continue to closely monitor pt.

## 2018-11-09 NOTE — ASSESSMENT & PLAN NOTE
49 yo female with history of seizures since age 19, now s/p  crani with subdural grid placement for further localization.    -continue EEG monitoring  -continue HV drain  -monitoring/seizure management per epilepsy team  -will continue to follow closely

## 2018-11-09 NOTE — TRANSFER OF CARE
"Anesthesia Transfer of Care Note    Patient: Liza Arrieta    Procedure(s) Performed: Procedure(s) (LRB):  CRANIOTOMY for grids and strips (Left)    Patient location: ICU    Anesthesia Type: general    Transport from OR: Transported from OR on 6-10 L/min O2 by face mask with adequate spontaneous ventilation. Continuous ECG monitoring in transport. Continuous SpO2 monitoring in transport. Continuos invasive BP monitoring in transport    Post pain: adequate analgesia    Post assessment: no apparent anesthetic complications and tolerated procedure well    Post vital signs: stable    Level of consciousness: awake    Nausea/Vomiting: no nausea/vomiting    Complications: none    Transfer of care protocol was followed      Last vitals:   Visit Vitals  BP (!) 146/67 (BP Location: Left arm, Patient Position: Lying)   Pulse 73   Temp 37.2 °C (99 °F) (Oral)   Resp 14   Ht 5' 5" (1.651 m)   Wt 106.6 kg (235 lb)   SpO2 97%   Breastfeeding? No   BMI 39.11 kg/m²     "

## 2018-11-09 NOTE — SUBJECTIVE & OBJECTIVE
Interval History: NAEON, To OR yesterday for revision of grids/strips    Medications:  Continuous Infusions:   sodium chloride 0.9% 50 mL/hr at 11/09/18 0901     Scheduled Meds:   ceFAZolin (ANCEF) IVPB  2 g Intravenous Q8H    heparin (porcine)  5,000 Units Subcutaneous Q8H    lamoTRIgine  100 mg Oral BID    mupirocin  1 g Nasal BID    mupirocin  1 g Nasal BID    pantoprazole  40 mg Intravenous Daily    senna-docusate 8.6-50 mg  1 tablet Oral Daily     PRN Meds:acetaminophen, acetaminophen, acetaminophen, acetaminophen, HYDROcodone-acetaminophen, HYDROcodone-acetaminophen, HYDROmorphone, metoclopramide HCl, ondansetron, sodium chloride 0.9%     Review of Systems    Objective:     Weight: 106.6 kg (235 lb)  Body mass index is 39.11 kg/m².  Vital Signs (Most Recent):  Temp: 99.4 °F (37.4 °C) (11/09/18 0701)  Pulse: 63 (11/09/18 1001)  Resp: 18 (11/09/18 1001)  BP: (!) 144/66 (11/09/18 1001)  SpO2: 97 % (11/09/18 1001) Vital Signs (24h Range):  Temp:  [98.1 °F (36.7 °C)-99.4 °F (37.4 °C)] 99.4 °F (37.4 °C)  Pulse:  [62-90] 63  Resp:  [13-29] 18  SpO2:  [79 %-100 %] 97 %  BP: ()/(58-81) 144/66  Arterial Line BP: (123-160)/(59-76) 137/61     Date 11/09/18 0700 - 11/10/18 0659   Shift 5899-3010 1611-1070 2783-2368 24 Hour Total   INTAKE   I.V.(mL/kg) 150(1.4)   150(1.4)   Shift Total(mL/kg) 150(1.4)   150(1.4)   OUTPUT   Urine(mL/kg/hr) 500   500   Shift Total(mL/kg) 500(4.7)   500(4.7)   Weight (kg) 106.6 106.6 106.6 106.6                        Closed/Suction Drain 11/05/18 1128 Left Scalp Accordion 10 Fr. (Active)   Site Description Unable to view 11/6/2018  3:05 PM   Dressing Type Gauze 11/6/2018  3:05 PM   Dressing Status Clean;Dry;Intact 11/6/2018  3:05 PM   Status To bulb suction 11/6/2018  3:05 PM   Output (mL) 85 mL 11/6/2018  1:05 PM       Neurosurgery Physical Exam    -Alert and oriented x4  -PERRL, EOMI, face symmetrical, tongue midline, facial sensation symmetric, shoulder shrug full strength  and symmetric  -Motor: 5/5 throughout the upper and lower extremites bilaterally  -sensation intact to light touch throughout        Significant Labs:  Recent Labs   Lab 11/08/18  0150 11/08/18 2143 11/09/18  0304    125* 148*    140 137   K 4.1 3.8 3.9    105 103   CO2 25 25 26   BUN 10 10 10   CREATININE 0.8 0.8 0.7   CALCIUM 9.2 8.9 9.2   MG 1.8  --  1.8     Recent Labs   Lab 11/08/18  0150 11/08/18 2143 11/09/18  0304   WBC 9.97 11.62 12.34   HGB 11.1* 11.1* 10.7*   HCT 35.0* 34.9* 34.7*    236 258     Recent Labs   Lab 11/08/18  1744   INR 0.9   APTT 22.0     Microbiology Results (last 7 days)     ** No results found for the last 168 hours. **        All pertinent labs from the last 24 hours have been reviewed.    Significant Diagnostics:  I have reviewed all pertinent imaging results/findings within the past 24 hours.

## 2018-11-09 NOTE — ASSESSMENT & PLAN NOTE
47 yo female with history of seizures since age 19, who presents to NICU s/p L temporal crani with subdural grid placement for further localization and possible resection.    Recommendations:  - Continuous vEEG monitoring with subdural grid electrodes in place  - Continue Lamictal to 100 mg BID  - For any seizures: please push event button on EEG and call epileptologist on call prior to administration of abortive medication  - Recommend short acting opioids instead of long acting as needed for pain control  - Pain management per NCC    Plan of care discussed with NCC team, family at bedside. Will continue to follow.

## 2018-11-09 NOTE — SUBJECTIVE & OBJECTIVE
Interval History: Revision of grid/strip placement in OR yesterday, no seizures overnight. Ambulated with nurse to bedside commode. Intermittent headache, well controlled with medication.    Current Facility-Administered Medications   Medication Dose Route Frequency Provider Last Rate Last Dose    acetaminophen suppository 650 mg  650 mg Rectal Q6H PRN Duane Joiner MD        acetaminophen suppository 650 mg  650 mg Rectal Q6H PRN Marc Garrett MD        acetaminophen tablet 650 mg  650 mg Oral Q6H PRN Lisset Arboleda, NP   650 mg at 11/09/18 1126    acetaminophen tablet 650 mg  650 mg Oral Q6H PRN Marc Garrett MD        ceFAZolin injection 2 g  2 g Intravenous Q8H Keysha Osborne PA-C   2 g at 11/09/18 0605    heparin (porcine) injection 5,000 Units  5,000 Units Subcutaneous Q8H Duane Joiner MD   5,000 Units at 11/09/18 0605    HYDROcodone-acetaminophen 5-325 mg per tablet 1 tablet  1 tablet Oral Q4H PRN Duane Joiner MD   1 tablet at 11/09/18 0443    HYDROcodone-acetaminophen 5-325 mg per tablet 1 tablet  1 tablet Oral Q4H PRN Marc Garrett MD        HYDROmorphone injection 0.5 mg  0.5 mg Intravenous Q1H PRN Duane Joiner MD   0.5 mg at 11/08/18 2125    lamoTRIgine tablet 100 mg  100 mg Oral BID Lisset Migarrett, NP   100 mg at 11/09/18 0903    metoclopramide HCl injection 5 mg  5 mg Intravenous Q6H PRN Duane Joiner MD        mupirocin 2 % ointment 1 g  1 g Nasal BID Duane Joiner MD   1 g at 11/09/18 0904    mupirocin 2 % ointment 1 g  1 g Nasal BID Marc Garrett MD   1 g at 11/09/18 0904    ondansetron disintegrating tablet 8 mg  8 mg Oral Q6H PRN Duane Joiner MD   8 mg at 11/07/18 0703    pantoprazole injection 40 mg  40 mg Intravenous Daily Marc Garrett MD   40 mg at 11/09/18 0903    senna-docusate 8.6-50 mg per tablet 1 tablet  1 tablet Oral Daily Keysha Osborne PA-C   1 tablet at 11/09/18 0903    sodium chloride 0.9% flush 3 mL  3 mL Intravenous PRN Ifeanyi Sanchez MD          Continuous Infusions:    Review of Systems   Constitutional: Positive for fatigue. Negative for chills and fever.   Respiratory: Negative for cough and shortness of breath.    Cardiovascular: Negative for chest pain and leg swelling.   Gastrointestinal: Negative for nausea and vomiting.   Musculoskeletal: Negative for back pain and joint swelling.   Skin: Negative for pallor and rash.   Neurological: Positive for seizures and headaches (improving). Negative for facial asymmetry, speech difficulty and weakness.   Psychiatric/Behavioral: Negative for agitation and confusion.     Objective:     Vital Signs (Most Recent):  Temp: 98.7 °F (37.1 °C) (11/09/18 1126)  Pulse: 63 (11/09/18 1201)  Resp: 18 (11/09/18 1201)  BP: 131/66 (11/09/18 1201)  SpO2: 97 % (11/09/18 1201) Vital Signs (24h Range):  Temp:  [98.1 °F (36.7 °C)-99.4 °F (37.4 °C)] 98.7 °F (37.1 °C)  Pulse:  [62-90] 63  Resp:  [13-28] 18  SpO2:  [79 %-100 %] 97 %  BP: ()/(58-75) 131/66  Arterial Line BP: (123-160)/(59-83) 138/62     Weight: 106.6 kg (235 lb)  Body mass index is 39.11 kg/m².    Physical Exam   Constitutional: She is oriented to person, place, and time. She appears well-developed and well-nourished. No distress.   HENT:   Right Ear: External ear normal.   Left Ear: External ear normal.   S/p L temporal crani   Eyes: Conjunctivae and EOM are normal. Pupils are equal, round, and reactive to light.   Neck: Normal range of motion.   Pulmonary/Chest: Effort normal. No respiratory distress.   Musculoskeletal: Normal range of motion. She exhibits no deformity.   Neurological: She is alert and oriented to person, place, and time. No cranial nerve deficit.   Skin: Skin is warm and dry. She is not diaphoretic. No erythema.   Psychiatric: She has a normal mood and affect. Her speech is normal and behavior is normal. Judgment and thought content normal.       NEUROLOGICAL EXAMINATION:     MENTAL STATUS   Oriented to person, place, and time.    Attention: normal. Concentration: normal.   Speech: speech is normal   Level of consciousness: drowsy ,  arousable by verbal stimuli    CRANIAL NERVES     CN III, IV, VI   Pupils are equal, round, and reactive to light.  Extraocular motions are normal.     CN VII   Facial expression full, symmetric.     CN VIII   CN VIII normal.     CN IX, X   CN IX normal.   CN X normal.     CN XI   CN XI normal.     CN XII   CN XII normal.     MOTOR EXAM   Muscle bulk: normal  Overall muscle tone: normal       Moves all extremities spontaneously, no focal deficit noted       Significant Labs: All pertinent lab results from the past 24 hours have been reviewed.    Significant Studies: I have reviewed all pertinent imaging results/findings within the past 24 hours.

## 2018-11-09 NOTE — PROGRESS NOTES
Pt taken to 2nd Floor OR for craniotomy to have grid put back in. Pt was taken on a bed with a portable monitor; VSS.

## 2018-11-09 NOTE — ASSESSMENT & PLAN NOTE
-POD 2 s/p craniotomy for strip and grid placement  -cEEG  -continue lamotrigine 100 mg BID   -ativan 2 mg for seizures lasting greater than 5 minutes  -notify epilepsy of clinical seizures > 5 minutes and administration of ativan   - to OR for repeat grid replacement

## 2018-11-09 NOTE — ANESTHESIA PROCEDURE NOTES
Arterial    Diagnosis: seizures    Patient location during procedure: done in OR  Procedure start time: 11/8/2018 6:42 PM  Timeout: 11/8/2018 6:42 PM  Procedure end time: 11/8/2018 6:45 PM  Staffing  Anesthesiologist: Leno Spain MD  Performed: anesthesiologist   Anesthesiologist was present at the time of the procedure.  Preanesthetic Checklist  Completed: patient identified, site marked, surgical consent, pre-op evaluation, timeout performed, IV checked, risks and benefits discussed, monitors and equipment checked and anesthesia consent givenArterial  Skin Prep: chlorhexidine gluconate  Local Infiltration: none  Orientation: right  Location: radial  Catheter Size: 20 G  Catheter placement by Anatomical landmarks. Heme positive aspiration all ports.Insertion Attempts: 1  Assessment  Dressing: secured with tape and tegaderm  Patient: Tolerated well

## 2018-11-09 NOTE — PLAN OF CARE
Problem: Patient Care Overview  Goal: Plan of Care Review  Outcome: Ongoing (interventions implemented as appropriate)  POC reviewed with pt at 0500. Pt verbalized understanding. Questions and concerns addressed. No acute events overnight. Pt progressing toward goals. Will continue to monitor. Pt only complaint of headache overnight. See flowsheets for full assessment and VS info

## 2018-11-09 NOTE — PROGRESS NOTES
[facility]  Neurology-Epilepsy  Progress Note    Patient Name: Liza Arrieta  MRN: [id number]  Admission Date: 11/5/2018  Hospital Length of Stay: 4 days  Code Status: Full Code   Attending Provider: Any Ruiz MD  Primary Care Physician: LISA Elam MD   Principal Problem:Temporal lobe epilepsy, intractable    Subjective:     Hospital Course:   Patient admitted to [facility] after subdural grid placement/L crani on 11/5. Home Lamotrigine decreased to 100 mg BID.  11/6: No seizures since admission. Cortical mapping session completed.  11/7: Cortical mapping during am, multiple clinical seizures during session. Clinical and electrographic seizure 11/7 afternoon, during which patient had automatisms of hands, confusion, pulled at EEG leads disrupting connection.  11/8: No additional seizures overnight. Back to OR today for replacement of intracranial grid/leads.  11/9: No seizures after revision of intracranial grids in OR yesterday.     Interval History: Revision of grid/strip placement in OR yesterday, no seizures overnight. Ambulated with nurse to bedside commode. Intermittent headache, well controlled with medication.    Current Facility-Administered Medications   Medication Dose Route Frequency Provider Last Rate Last Dose    acetaminophen suppository 650 mg  650 mg Rectal Q6H PRN Duane Joiner MD        acetaminophen suppository 650 mg  650 mg Rectal Q6H PRN Marc Garrett MD        acetaminophen tablet 650 mg  650 mg Oral Q6H PRN Lisset Arboleda NP   650 mg at 11/09/18 1126    acetaminophen tablet 650 mg  650 mg Oral Q6H PRN Marc Garrett MD        ceFAZolin injection 2 g  2 g Intravenous Q8H Keysha Osborne PA-C   2 g at 11/09/18 0605    heparin (porcine) injection 5,000 Units  5,000 Units Subcutaneous Q8H Duane Joiner MD   5,000 Units at 11/09/18 0605    HYDROcodone-acetaminophen 5-325 mg per tablet 1 tablet  1 tablet Oral Q4H PRN Duane Joiner MD   1 tablet at 11/09/18 5468     HYDROcodone-acetaminophen 5-325 mg per tablet 1 tablet  1 tablet Oral Q4H PRN Marc Garrett MD        HYDROmorphone injection 0.5 mg  0.5 mg Intravenous Q1H PRN Duane Joiner MD   0.5 mg at 11/08/18 2125    lamoTRIgine tablet 100 mg  100 mg Oral BID Lisset Migarrett NP   100 mg at 11/09/18 0903    metoclopramide HCl injection 5 mg  5 mg Intravenous Q6H PRN Duane Joiner MD        mupirocin 2 % ointment 1 g  1 g Nasal BID Duane Joiner MD   1 g at 11/09/18 0904    mupirocin 2 % ointment 1 g  1 g Nasal BID Marc Garrett MD   1 g at 11/09/18 0904    ondansetron disintegrating tablet 8 mg  8 mg Oral Q6H PRN Duane Joiner MD   8 mg at 11/07/18 0703    pantoprazole injection 40 mg  40 mg Intravenous Daily Marc Garrett MD   40 mg at 11/09/18 0903    senna-docusate 8.6-50 mg per tablet 1 tablet  1 tablet Oral Daily Keysha Osborne PA-C   1 tablet at 11/09/18 0903    sodium chloride 0.9% flush 3 mL  3 mL Intravenous PRN Ifeanyi Sanchez MD         Continuous Infusions:    Review of Systems   Constitutional: Positive for fatigue. Negative for chills and fever.   Respiratory: Negative for cough and shortness of breath.    Cardiovascular: Negative for chest pain and leg swelling.   Gastrointestinal: Negative for nausea and vomiting.   Musculoskeletal: Negative for back pain and joint swelling.   Skin: Negative for pallor and rash.   Neurological: Positive for seizures and headaches (improving). Negative for facial asymmetry, speech difficulty and weakness.   Psychiatric/Behavioral: Negative for agitation and confusion.     Objective:     Vital Signs (Most Recent):  Temp: 98.7 °F (37.1 °C) (11/09/18 1126)  Pulse: 63 (11/09/18 1201)  Resp: 18 (11/09/18 1201)  BP: 131/66 (11/09/18 1201)  SpO2: 97 % (11/09/18 1201) Vital Signs (24h Range):  Temp:  [98.1 °F (36.7 °C)-99.4 °F (37.4 °C)] 98.7 °F (37.1 °C)  Pulse:  [62-90] 63  Resp:  [13-28] 18  SpO2:  [79 %-100 %] 97 %  BP: ()/(58-75) 131/66  Arterial Line  BP: (123-160)/(59-83) 138/62     Weight: 106.6 kg (235 lb)  Body mass index is 39.11 kg/m².    Physical Exam   Constitutional: She is oriented to person, place, and time. She appears well-developed and well-nourished. No distress.   HENT:   Right Ear: External ear normal.   Left Ear: External ear normal.   S/p L temporal crani   Eyes: Conjunctivae and EOM are normal. Pupils are equal, round, and reactive to light.   Neck: Normal range of motion.   Pulmonary/Chest: Effort normal. No respiratory distress.   Musculoskeletal: Normal range of motion. She exhibits no deformity.   Neurological: She is alert and oriented to person, place, and time. No cranial nerve deficit.   Skin: Skin is warm and dry. She is not diaphoretic. No erythema.   Psychiatric: She has a normal mood and affect. Her speech is normal and behavior is normal. Judgment and thought content normal.       NEUROLOGICAL EXAMINATION:     MENTAL STATUS   Oriented to person, place, and time.   Attention: normal. Concentration: normal.   Speech: speech is normal   Level of consciousness: drowsy ,  arousable by verbal stimuli    CRANIAL NERVES     CN III, IV, VI   Pupils are equal, round, and reactive to light.  Extraocular motions are normal.     CN VII   Facial expression full, symmetric.     CN VIII   CN VIII normal.     CN IX, X   CN IX normal.   CN X normal.     CN XI   CN XI normal.     CN XII   CN XII normal.     MOTOR EXAM   Muscle bulk: normal  Overall muscle tone: normal       Moves all extremities spontaneously, no focal deficit noted       Significant Labs: All pertinent lab results from the past 24 hours have been reviewed.    Significant Studies: I have reviewed all pertinent imaging results/findings within the past 24 hours.    Assessment and Plan:     * Temporal lobe epilepsy, intractable    47 yo female with history of seizures since age 19, who presents to NICU s/p L temporal crani with subdural grid placement for further localization and  possible resection.    Recommendations:  - Continuous vEEG monitoring with subdural grid electrodes in place  - Continue Lamictal to 100 mg BID  - For any seizures: please push event button on EEG and call epileptologist on call prior to administration of abortive medication  - Recommend short acting opioids instead of long acting as needed for pain control  - Pain management per [facility]    Plan of care discussed with [facility] team, family at bedside. Will continue to follow.      Essential hypertension    - Goal SBP <140  - Management per [facility]          VTE Risk Mitigation (From admission, onward)        Ordered     heparin (porcine) injection 5,000 Units  Every 8 hours      11/05/18 1238     Place sequential compression device  Until discontinued      11/05/18 1238     IP VTE HIGH RISK PATIENT  Once      11/05/18 1238          Philly Foy PA-C  Neurology-Epilepsy  [facility]  Staff: Dr. Mark

## 2018-11-09 NOTE — ASSESSMENT & PLAN NOTE
-POD# 1Repeat s/p craniotomy for strip and grid placement  -cEEG  -continue lamotrigine 100 mg BID   -ativan 2 mg for seizures lasting greater than 5 minutes  -notify epilepsy of clinical seizures > 5 minutes and administration of ativan

## 2018-11-09 NOTE — PROGRESS NOTES
Patient arrived back to [facility] room 9080 from OR. EEG techs at bedside setting up. Patient on 4L face mask, following commands. Patient still groggy from anesthesia, complaining of headache.     Type of stroke/diagnosis:  Replacement of grid    Skin assessment done: Y    Wounds noted: incision to left side of head    NCC notified: Frankie Georges NP

## 2018-11-10 LAB
ANION GAP SERPL CALC-SCNC: 10 MMOL/L
BUN SERPL-MCNC: 9 MG/DL
CALCIUM SERPL-MCNC: 9 MG/DL
CHLORIDE SERPL-SCNC: 108 MMOL/L
CO2 SERPL-SCNC: 23 MMOL/L
CREAT SERPL-MCNC: 0.7 MG/DL
EST. GFR  (AFRICAN AMERICAN): >60 ML/MIN/1.73 M^2
EST. GFR  (NON AFRICAN AMERICAN): >60 ML/MIN/1.73 M^2
GLUCOSE SERPL-MCNC: 113 MG/DL
MAGNESIUM SERPL-MCNC: 1.8 MG/DL
PHOSPHATE SERPL-MCNC: 2.9 MG/DL
POTASSIUM SERPL-SCNC: 4 MMOL/L
SODIUM SERPL-SCNC: 141 MMOL/L

## 2018-11-10 PROCEDURE — 25000003 PHARM REV CODE 250: Performed by: STUDENT IN AN ORGANIZED HEALTH CARE EDUCATION/TRAINING PROGRAM

## 2018-11-10 PROCEDURE — 84100 ASSAY OF PHOSPHORUS: CPT

## 2018-11-10 PROCEDURE — 63600175 PHARM REV CODE 636 W HCPCS: Performed by: PHYSICIAN ASSISTANT

## 2018-11-10 PROCEDURE — 99291 CRITICAL CARE FIRST HOUR: CPT | Mod: ,,, | Performed by: PSYCHIATRY & NEUROLOGY

## 2018-11-10 PROCEDURE — 63600175 PHARM REV CODE 636 W HCPCS: Performed by: NEUROLOGICAL SURGERY

## 2018-11-10 PROCEDURE — 80048 BASIC METABOLIC PNL TOTAL CA: CPT

## 2018-11-10 PROCEDURE — 95961 ELECTRODE STIMULATION BRAIN: CPT | Mod: 26,,, | Performed by: PSYCHIATRY & NEUROLOGY

## 2018-11-10 PROCEDURE — 95951 HC EEG MONITORING/VIDEO RECORD: CPT

## 2018-11-10 PROCEDURE — 20000000 HC ICU ROOM

## 2018-11-10 PROCEDURE — 94761 N-INVAS EAR/PLS OXIMETRY MLT: CPT

## 2018-11-10 PROCEDURE — 63600175 PHARM REV CODE 636 W HCPCS: Performed by: STUDENT IN AN ORGANIZED HEALTH CARE EDUCATION/TRAINING PROGRAM

## 2018-11-10 PROCEDURE — 83735 ASSAY OF MAGNESIUM: CPT

## 2018-11-10 PROCEDURE — 25000003 PHARM REV CODE 250: Performed by: NURSE PRACTITIONER

## 2018-11-10 PROCEDURE — 36415 COLL VENOUS BLD VENIPUNCTURE: CPT

## 2018-11-10 PROCEDURE — C9113 INJ PANTOPRAZOLE SODIUM, VIA: HCPCS | Performed by: STUDENT IN AN ORGANIZED HEALTH CARE EDUCATION/TRAINING PROGRAM

## 2018-11-10 PROCEDURE — 25000003 PHARM REV CODE 250: Performed by: PHYSICIAN ASSISTANT

## 2018-11-10 PROCEDURE — 95951 PR EEG MONITORING/VIDEORECORD: CPT | Mod: 26,,, | Performed by: PSYCHIATRY & NEUROLOGY

## 2018-11-10 RX ORDER — MIDAZOLAM HYDROCHLORIDE 1 MG/ML
INJECTION INTRAMUSCULAR; INTRAVENOUS
Status: DISCONTINUED
Start: 2018-11-10 | End: 2018-11-10 | Stop reason: WASHOUT

## 2018-11-10 RX ORDER — DIAZEPAM 5 MG/ML
5 INJECTION, SOLUTION INTRAMUSCULAR; INTRAVENOUS ONCE
Status: DISCONTINUED | OUTPATIENT
Start: 2018-11-10 | End: 2018-11-10

## 2018-11-10 RX ORDER — DIAZEPAM 5 MG/ML
5 INJECTION, SOLUTION INTRAMUSCULAR; INTRAVENOUS EVERY 4 HOURS PRN
Status: DISCONTINUED | OUTPATIENT
Start: 2018-11-10 | End: 2018-11-12

## 2018-11-10 RX ORDER — MIDAZOLAM HYDROCHLORIDE 1 MG/ML
2 INJECTION INTRAMUSCULAR; INTRAVENOUS EVERY 5 MIN PRN
Status: DISCONTINUED | OUTPATIENT
Start: 2018-11-10 | End: 2018-11-23 | Stop reason: HOSPADM

## 2018-11-10 RX ADMIN — HEPARIN SODIUM 5000 UNITS: 5000 INJECTION, SOLUTION INTRAVENOUS; SUBCUTANEOUS at 02:11

## 2018-11-10 RX ADMIN — MUPIROCIN 1 G: 20 OINTMENT TOPICAL at 09:11

## 2018-11-10 RX ADMIN — SENNOSIDES AND DOCUSATE SODIUM 1 TABLET: 8.6; 5 TABLET ORAL at 09:11

## 2018-11-10 RX ADMIN — ACETAMINOPHEN 650 MG: 325 TABLET, FILM COATED ORAL at 08:11

## 2018-11-10 RX ADMIN — CEFAZOLIN 2 G: 1 INJECTION, POWDER, FOR SOLUTION INTRAMUSCULAR; INTRAVENOUS at 06:11

## 2018-11-10 RX ADMIN — HEPARIN SODIUM 5000 UNITS: 5000 INJECTION, SOLUTION INTRAVENOUS; SUBCUTANEOUS at 09:11

## 2018-11-10 RX ADMIN — LAMOTRIGINE 100 MG: 25 TABLET ORAL at 09:11

## 2018-11-10 RX ADMIN — CEFAZOLIN 2 G: 1 INJECTION, POWDER, FOR SOLUTION INTRAMUSCULAR; INTRAVENOUS at 02:11

## 2018-11-10 RX ADMIN — PANTOPRAZOLE SODIUM 40 MG: 40 INJECTION, POWDER, FOR SOLUTION INTRAVENOUS at 09:11

## 2018-11-10 RX ADMIN — ACETAMINOPHEN 650 MG: 325 TABLET ORAL at 04:11

## 2018-11-10 RX ADMIN — HEPARIN SODIUM 5000 UNITS: 5000 INJECTION, SOLUTION INTRAVENOUS; SUBCUTANEOUS at 06:11

## 2018-11-10 RX ADMIN — HYDROCODONE BITARTRATE AND ACETAMINOPHEN 1 TABLET: 5; 325 TABLET ORAL at 02:11

## 2018-11-10 RX ADMIN — CEFAZOLIN 2 G: 1 INJECTION, POWDER, FOR SOLUTION INTRAMUSCULAR; INTRAVENOUS at 11:11

## 2018-11-10 NOTE — PLAN OF CARE
Problem: Patient Care Overview  Goal: Plan of Care Review  Outcome: Outcome(s) achieved Date Met: 11/10/18  POC reviewed with pt at 0500. Pt verbalized understanding. Questions and concerns addressed. No acute events overnight. Pt progressing toward goals. Will continue to monitor. See flowsheets for full assessment and VS info

## 2018-11-10 NOTE — PROGRESS NOTES
Patient reported to RN that she was having a seizure. RN pressed the recording button on the EEG. RN notified MD Courtney. Epilepsy service to come to bedside to assess. RN will continue to monitor.

## 2018-11-10 NOTE — PLAN OF CARE
Problem: Patient Care Overview  Goal: Plan of Care Review  Outcome: Ongoing (interventions implemented as appropriate)  POC reviewed with patient and family at 1330. Patient verbalized understanding. Questions and concerns addressed with the patient and family. Patient reported one seizure during the shift and RN notified Epilepsy and pressed the recording button on the EEG per orders. Epilepsy at bedside to perform mapping. Patient up to bedside commode with assist x 1. RN will continue to monitor. See flowsheets for full assessment and VS info.

## 2018-11-11 LAB
ANION GAP SERPL CALC-SCNC: 9 MMOL/L
BUN SERPL-MCNC: 10 MG/DL
CALCIUM SERPL-MCNC: 9 MG/DL
CHLORIDE SERPL-SCNC: 108 MMOL/L
CO2 SERPL-SCNC: 22 MMOL/L
CREAT SERPL-MCNC: 0.7 MG/DL
EST. GFR  (AFRICAN AMERICAN): >60 ML/MIN/1.73 M^2
EST. GFR  (NON AFRICAN AMERICAN): >60 ML/MIN/1.73 M^2
GLUCOSE SERPL-MCNC: 119 MG/DL
MAGNESIUM SERPL-MCNC: 2.1 MG/DL
PHOSPHATE SERPL-MCNC: 3.4 MG/DL
POTASSIUM SERPL-SCNC: 4.4 MMOL/L
SODIUM SERPL-SCNC: 139 MMOL/L

## 2018-11-11 PROCEDURE — 83735 ASSAY OF MAGNESIUM: CPT

## 2018-11-11 PROCEDURE — 36415 COLL VENOUS BLD VENIPUNCTURE: CPT

## 2018-11-11 PROCEDURE — 95951 PR EEG MONITORING/VIDEORECORD: CPT | Mod: 26,,, | Performed by: PSYCHIATRY & NEUROLOGY

## 2018-11-11 PROCEDURE — 63600175 PHARM REV CODE 636 W HCPCS: Performed by: NEUROLOGICAL SURGERY

## 2018-11-11 PROCEDURE — 63600175 PHARM REV CODE 636 W HCPCS: Performed by: PHYSICIAN ASSISTANT

## 2018-11-11 PROCEDURE — 95951 HC EEG MONITORING/VIDEO RECORD: CPT

## 2018-11-11 PROCEDURE — 94761 N-INVAS EAR/PLS OXIMETRY MLT: CPT

## 2018-11-11 PROCEDURE — 25000003 PHARM REV CODE 250: Performed by: PHYSICIAN ASSISTANT

## 2018-11-11 PROCEDURE — 84100 ASSAY OF PHOSPHORUS: CPT

## 2018-11-11 PROCEDURE — 25000003 PHARM REV CODE 250: Performed by: STUDENT IN AN ORGANIZED HEALTH CARE EDUCATION/TRAINING PROGRAM

## 2018-11-11 PROCEDURE — 20000000 HC ICU ROOM

## 2018-11-11 PROCEDURE — 99291 CRITICAL CARE FIRST HOUR: CPT | Mod: ,,, | Performed by: PSYCHIATRY & NEUROLOGY

## 2018-11-11 PROCEDURE — 80048 BASIC METABOLIC PNL TOTAL CA: CPT

## 2018-11-11 RX ADMIN — HEPARIN SODIUM 5000 UNITS: 5000 INJECTION, SOLUTION INTRAVENOUS; SUBCUTANEOUS at 06:11

## 2018-11-11 RX ADMIN — ACETAMINOPHEN 650 MG: 325 TABLET, FILM COATED ORAL at 09:11

## 2018-11-11 RX ADMIN — MUPIROCIN 1 G: 20 OINTMENT TOPICAL at 09:11

## 2018-11-11 RX ADMIN — HEPARIN SODIUM 5000 UNITS: 5000 INJECTION, SOLUTION INTRAVENOUS; SUBCUTANEOUS at 09:11

## 2018-11-11 RX ADMIN — SENNOSIDES AND DOCUSATE SODIUM 1 TABLET: 8.6; 5 TABLET ORAL at 08:11

## 2018-11-11 RX ADMIN — CEFAZOLIN 2 G: 1 INJECTION, POWDER, FOR SOLUTION INTRAMUSCULAR; INTRAVENOUS at 02:11

## 2018-11-11 RX ADMIN — CEFAZOLIN 2 G: 1 INJECTION, POWDER, FOR SOLUTION INTRAMUSCULAR; INTRAVENOUS at 06:11

## 2018-11-11 RX ADMIN — HEPARIN SODIUM 5000 UNITS: 5000 INJECTION, SOLUTION INTRAVENOUS; SUBCUTANEOUS at 02:11

## 2018-11-11 RX ADMIN — MUPIROCIN 1 G: 20 OINTMENT TOPICAL at 08:11

## 2018-11-11 NOTE — SUBJECTIVE & OBJECTIVE
Interval History: NAEON, stable since revision.     Medications:  Continuous Infusions:    Scheduled Meds:   ceFAZolin (ANCEF) IVPB  2 g Intravenous Q8H    heparin (porcine)  5,000 Units Subcutaneous Q8H    mupirocin  1 g Nasal BID    senna-docusate 8.6-50 mg  1 tablet Oral Daily     PRN Meds:acetaminophen, acetaminophen, diazePAM, HYDROcodone-acetaminophen, HYDROmorphone, metoclopramide HCl, midazolam, ondansetron, sodium chloride 0.9%     Review of Systems    Objective:     Weight: 106.6 kg (235 lb)  Body mass index is 39.11 kg/m².  Vital Signs (Most Recent):  Temp: 99 °F (37.2 °C) (11/10/18 1505)  Pulse: 67 (11/10/18 1805)  Resp: 13 (11/10/18 1805)  BP: 126/64 (11/10/18 1805)  SpO2: 95 % (11/10/18 1805) Vital Signs (24h Range):  Temp:  [97.9 °F (36.6 °C)-99 °F (37.2 °C)] 99 °F (37.2 °C)  Pulse:  [61-79] 67  Resp:  [11-22] 13  SpO2:  [94 %-98 %] 95 %  BP: (122-158)/(62-76) 126/64  Arterial Line BP: ()/(62-79) 90/63     Date 11/10/18 0700 - 11/11/18 0659   Shift 1615-8714 5262-1716 4276-9859 24 Hour Total   INTAKE   P.O. 60   60   Shift Total(mL/kg) 60(0.6)   60(0.6)   OUTPUT   Urine(mL/kg/hr) 50(0.1) 300  350   Drains 0 0  0   Shift Total(mL/kg) 50(0.5) 300(2.8)  350(3.3)   Weight (kg) 106.6 106.6 106.6 106.6                        Closed/Suction Drain 11/05/18 1128 Left Scalp Accordion 10 Fr. (Active)   Site Description Unable to view 11/6/2018  3:05 PM   Dressing Type Gauze 11/6/2018  3:05 PM   Dressing Status Clean;Dry;Intact 11/6/2018  3:05 PM   Status To bulb suction 11/6/2018  3:05 PM   Output (mL) 85 mL 11/6/2018  1:05 PM       Neurosurgery Physical Exam    -Alert and oriented x4  -PERRL, EOMI, face symmetrical, tongue midline, facial sensation symmetric, shoulder shrug full strength and symmetric  -Motor: 5/5 throughout the upper and lower extremites bilaterally  -sensation intact to light touch throughout        Significant Labs:  Recent Labs   Lab 11/08/18  2143 11/09/18  0304 11/10/18  0336    * 148* 113*    137 141   K 3.8 3.9 4.0    103 108   CO2 25 26 23   BUN 10 10 9   CREATININE 0.8 0.7 0.7   CALCIUM 8.9 9.2 9.0   MG  --  1.8 1.8     Recent Labs   Lab 11/08/18  2143 11/09/18  0304   WBC 11.62 12.34   HGB 11.1* 10.7*   HCT 34.9* 34.7*    258     No results for input(s): LABPT, INR, APTT in the last 48 hours.  Microbiology Results (last 7 days)     ** No results found for the last 168 hours. **        All pertinent labs from the last 24 hours have been reviewed.    Significant Diagnostics:  I have reviewed all pertinent imaging results/findings within the past 24 hours.

## 2018-11-11 NOTE — PROGRESS NOTES
[facility]  Neurocritical Care  Progress Note    Admit Date: 11/5/2018  Service Date: 11/11/2018  Length of Stay: 6    Subjective:     Chief Complaint: Temporal lobe epilepsy, intractable    History of Present Illness: Liza Arrieta is a 49 yo female with PMHx of partial intractable epilepsy who is being admitted to [facility] after L crani with subdural grid placement. Per chart review, she had her first seizure at age 19. At that time, she was treated with dilantin and she did well for approximately 15 years. Reports that she typically has absence seizures (5-6/day), but states she may have had two grand mal seizures decades ago.  The patient was recently admitted to EMU on 2/9/2018 where seizures were captured on EEG.  She has failed multiple medications and is now being admitted for post op management, continuous EEG.             Hospital Course: 11/5: Pt admitted to [facility] s/p L crani with subdural grid placement, continuous EEG  11/6: cEEG  11/7:  Pulled one of her leads today. Sent for stat CTH showed electrodes have been repositioned. posterior infratemporal strips are no longer in place  11/9: s/p repeat crani for grid placement.  One abscence seizure lasting 5 seconds. Epilepsy plans to due cortical mapping this afternoon.    Interval History: NAEON.  Continue current plan per epilepsy service.     Review of Systems: Review of Systems   Neurological: Positive for seizures. Negative for dizziness, sensory change, speech change, focal weakness, loss of consciousness and headaches.         Vitals:   Temp: 98.9 °F (37.2 °C)  Pulse: 74  Rhythm: normal sinus rhythm  BP: (!) 148/67  MAP (mmHg): 96  Resp: 13  SpO2: 97 %  O2 Device (Oxygen Therapy): room air    Temp  Min: 98.3 °F (36.8 °C)  Max: 99 °F (37.2 °C)  Pulse  Min: 61  Max: 78  BP  Min: 115/63  Max: 148/67  MAP (mmHg)  Min: 82  Max: 100  Resp  Min: 11  Max: 27  SpO2  Min: 93 %  Max: 98 %    11/10 0701 - 11/11 0700  In: 180 [P.O.:180]  Out: 855  [Urine:850; Drains:5]   Unmeasured Output  Urine Occurrence: 1  Stool Occurrence: 0     Examination:   Constitutional: Well-nourished and -developed. No apparent distress.   Eyes: Conjunctiva clear, anicteric. Lids no lesions.  Head/Ears/Nose/Mouth/Throat/Neck: Moist mucous membranes. External ears, nose atraumatic.   Cardiovascular: Regular rhythm. No murmurs. No leg edema.  Respiratory: Comfortable respirations. Clear to auscultation.  Gastrointestinal: No hernia. Soft, nondistended, nontender. + bowel sounds.    Neurologic:  -GCS [id number]  -Alert. Oriented to person, place, and time. Speech fluent. Follows commands.  -Cranial nerves grossly intact  -Motor PUGA   -Sensation intact  -PERRL    Medications:   Continuous Scheduled  ceFAZolin (ANCEF) IVPB 2 g Q8H   heparin (porcine) 5,000 Units Q8H   mupirocin 1 g BID   senna-docusate 8.6-50 mg 1 tablet Daily   PRN  acetaminophen 650 mg Q6H PRN   acetaminophen 650 mg Q6H PRN   diazePAM 5 mg Q4H PRN   HYDROcodone-acetaminophen 1 tablet Q4H PRN   HYDROmorphone 0.5 mg Q1H PRN   metoclopramide HCl 5 mg Q6H PRN   midazolam 2 mg Q5 Min PRN   ondansetron 8 mg Q6H PRN   sodium chloride 0.9% 3 mL PRN      Today I independently reviewed pertinent medications, lines/drains/airways, imaging, cardiology results, laboratory results, microbiology results,      ISTAT: No results for input(s): PH, PCO2, PO2, POCSATURATED, HCO3, BE, POCNA, POCK, POCTCO2, POCGLU, POCICA, POCLAC, SAMPLE in the last 24 hours.   Chem: Recent Labs   Lab 11/11/18  0307      K 4.4      CO2 22*   *   BUN 10   CREATININE 0.7   ESTGFRAFRICA >60.0   EGFRNONAA >60.0   CALCIUM 9.0   MG 2.1   PHOS 3.4   ANIONGAP 9     Heme: No results for input(s): WBC, HGB, HCT, PLT, PROCAL, PT, INR, PTT, FIBRINOGEN in the last 24 hours.  Endo: No results for input(s): POCTGLUCOSE in the last 24 hours.         Assessment/Plan:     Neuro   * Temporal lobe epilepsy, intractable    -s/p craniotomy  x2 for strip and grid  placement  -cEEG  -continue lamotrigine 100 mg BID   -ativan 2 mg for seizures lasting greater than 5 minutes  -notify epilepsy of clinical seizures > 5 minutes and administration of ativan                    The patient is being Prophylaxed for:  Venous Thromboembolism with: Chemical  Stress Ulcer with: Not Applicable   Ventilator Pneumonia with: not applicable    Activity Orders          None        Full Code    Keysha Osborne PA-C  Neurocritical Care  [facility]

## 2018-11-11 NOTE — ASSESSMENT & PLAN NOTE
-s/p craniotomy  x2 for strip and grid placement  -cEEG  -lamotrigine discontinued   -ativan 2 mg for seizures lasting greater than 5 minutes  -notify epilepsy of clinical seizures > 5 minutes and administration of ativan

## 2018-11-11 NOTE — PROGRESS NOTES
"[facility]  Neurosurgery  Progress Note    Subjective:     History of Present Illness: Pt is a 48 y.o. female who presents per referral by Dr. LISA Elam and the epilepsy team for evaluation for epilepsy surgery. Pt has history of partial intractable epilepsy and has failed at least 10 previous medications. Currently on dual therapy. EMU workup done in February localized pathology to left temporal lobe, with PET scan showing hypometabolism of mesial temporal lobe. DAWOOD scan also showed localization to left mesial temporal lobe. Pt states that she has endured seizures since 19 y.o. Pt currently on Lamictal. Pt states that she endures about 5 absence episodes per day with intermittent LOC. Pt notes one incident of "rolling" seizures for which she was brought to the ED.          Post-Op Info:  Procedure(s) (LRB):  CRANIOTOMY for grids and strips (Left)   2 Days Post-Op     Interval History: NAEON, stable since revision.     Medications:  Continuous Infusions:    Scheduled Meds:   ceFAZolin (ANCEF) IVPB  2 g Intravenous Q8H    heparin (porcine)  5,000 Units Subcutaneous Q8H    mupirocin  1 g Nasal BID    senna-docusate 8.6-50 mg  1 tablet Oral Daily     PRN Meds:acetaminophen, acetaminophen, diazePAM, HYDROcodone-acetaminophen, HYDROmorphone, metoclopramide HCl, midazolam, ondansetron, sodium chloride 0.9%     Review of Systems    Objective:     Weight: 106.6 kg (235 lb)  Body mass index is 39.11 kg/m².  Vital Signs (Most Recent):  Temp: 99 °F (37.2 °C) (11/10/18 1505)  Pulse: 67 (11/10/18 1805)  Resp: 13 (11/10/18 1805)  BP: 126/64 (11/10/18 1805)  SpO2: 95 % (11/10/18 1805) Vital Signs (24h Range):  Temp:  [97.9 °F (36.6 °C)-99 °F (37.2 °C)] 99 °F (37.2 °C)  Pulse:  [61-79] 67  Resp:  [11-22] 13  SpO2:  [94 %-98 %] 95 %  BP: (122-158)/(62-76) 126/64  Arterial Line BP: ()/(62-79) 90/63     Date 11/10/18 0700 - 11/11/18 0659   Shift 4341-9426 6257-8634 7226-7842 24 Hour Total   INTAKE "   P.O. 60   60   Shift Total(mL/kg) 60(0.6)   60(0.6)   OUTPUT   Urine(mL/kg/hr) 50(0.1) 300  350   Drains 0 0  0   Shift Total(mL/kg) 50(0.5) 300(2.8)  350(3.3)   Weight (kg) 106.6 106.6 106.6 106.6                        Closed/Suction Drain 11/05/18 1128 Left Scalp Accordion 10 Fr. (Active)   Site Description Unable to view 11/6/2018  3:05 PM   Dressing Type Gauze 11/6/2018  3:05 PM   Dressing Status Clean;Dry;Intact 11/6/2018  3:05 PM   Status To bulb suction 11/6/2018  3:05 PM   Output (mL) 85 mL 11/6/2018  1:05 PM       Neurosurgery Physical Exam    -Alert and oriented x4  -PERRL, EOMI, face symmetrical, tongue midline, facial sensation symmetric, shoulder shrug full strength and symmetric  -Motor: 5/5 throughout the upper and lower extremites bilaterally  -sensation intact to light touch throughout        Significant Labs:  Recent Labs   Lab 11/08/18 2143 11/09/18  0304 11/10/18  0336   * 148* 113*    137 141   K 3.8 3.9 4.0    103 108   CO2 25 26 23   BUN 10 10 9   CREATININE 0.8 0.7 0.7   CALCIUM 8.9 9.2 9.0   MG  --  1.8 1.8     Recent Labs   Lab 11/08/18 2143 11/09/18  0304   WBC 11.62 12.34   HGB 11.1* 10.7*   HCT 34.9* 34.7*    258     No results for input(s): LABPT, INR, APTT in the last 48 hours.  Microbiology Results (last 7 days)     ** No results found for the last 168 hours. **        All pertinent labs from the last 24 hours have been reviewed.    Significant Diagnostics:  I have reviewed all pertinent imaging results/findings within the past 24 hours.    Assessment/Plan:     * Temporal lobe epilepsy, intractable    47 yo female with history of seizures since age 19, now s/p  crani with subdural grid placement for further localization.    -continue EEG monitoring  -continue HV drain  -monitoring/seizure management per epilepsy team  -will continue to follow closely           Denis Covarrubias MD  Neurosurgery  [facility]

## 2018-11-11 NOTE — SUBJECTIVE & OBJECTIVE
Interval History:    naeon    Objective:     Vitals:  Temp: 99 °F (37.2 °C)  Pulse: 67  Rhythm: normal sinus rhythm  BP: 126/64  MAP (mmHg): 89  Resp: 13  SpO2: 95 %  O2 Device (Oxygen Therapy): room air    Temp  Min: 97.9 °F (36.6 °C)  Max: 99 °F (37.2 °C)  Pulse  Min: 61  Max: 79  BP  Min: 122/62  Max: 158/76  MAP (mmHg)  Min: 86  Max: 106  Resp  Min: 11  Max: 22  SpO2  Min: 94 %  Max: 98 %    11/09 0701 - 11/10 0700  In: 320 [P.O.:120; I.V.:200]  Out: 1965 [Urine:1950; Drains:15]   Unmeasured Output  Urine Occurrence: 0  Stool Occurrence: 0       Vital signs, lab studies, and imaging reviewed by me  Alert, oriented x3, cranial II-XII intact, sensation full, moves all extremities with full strength, no dysmetria  Opeartive site c/d/i  Warm and well perfused, regular rate and rhythm, no murmurs  No dependent edema  Breathing comfortably, breath sounds clear  Belly soft, nontender, no hepatosplenomegaly    Medications:  Continuous Scheduled  ceFAZolin (ANCEF) IVPB 2 g Q8H   heparin (porcine) 5,000 Units Q8H   mupirocin 1 g BID   senna-docusate 8.6-50 mg 1 tablet Daily   PRN  acetaminophen 650 mg Q6H PRN   acetaminophen 650 mg Q6H PRN   diazePAM 5 mg Q4H PRN   HYDROcodone-acetaminophen 1 tablet Q4H PRN   HYDROmorphone 0.5 mg Q1H PRN   metoclopramide HCl 5 mg Q6H PRN   midazolam 2 mg Q5 Min PRN   ondansetron 8 mg Q6H PRN   sodium chloride 0.9% 3 mL PRN     Diet  Diet Adult Regular (IDDSI Level 7)  Diet Adult Regular (IDDSI Level 7)    A/P  49 yo w intractable epilepsy admitted for invasive monitoring  Per epilepsy dc lamotrigine today  Versed for motor seizures lasting more than 5 min  Plan for additional mapping tomorrow

## 2018-11-11 NOTE — SUBJECTIVE & OBJECTIVE
Interval History: NAEON, stable exam    Medications:  Continuous Infusions:    Scheduled Meds:   ceFAZolin (ANCEF) IVPB  2 g Intravenous Q8H    heparin (porcine)  5,000 Units Subcutaneous Q8H    mupirocin  1 g Nasal BID    senna-docusate 8.6-50 mg  1 tablet Oral Daily     PRN Meds:acetaminophen, acetaminophen, diazePAM, HYDROcodone-acetaminophen, HYDROmorphone, metoclopramide HCl, midazolam, ondansetron, sodium chloride 0.9%     Review of Systems    Objective:     Weight: 106.6 kg (235 lb)  Body mass index is 39.11 kg/m².  Vital Signs (Most Recent):  Temp: 99 °F (37.2 °C) (11/11/18 1505)  Pulse: 84 (11/11/18 1605)  Resp: 20 (11/11/18 1605)  BP: 121/78 (11/11/18 1605)  SpO2: 99 % (11/11/18 1605) Vital Signs (24h Range):  Temp:  [98.3 °F (36.8 °C)-99 °F (37.2 °C)] 99 °F (37.2 °C)  Pulse:  [61-84] 84  Resp:  [11-27] 20  SpO2:  [93 %-99 %] 99 %  BP: (115-148)/(58-79) 121/78  Arterial Line BP: ()/(57-82) 118/70     Date 11/11/18 0700 - 11/12/18 0659   Shift 9642-2550 2915-5277 0045-6505 24 Hour Total   INTAKE   P.O. 50   50   Shift Total(mL/kg) 50(0.5)   50(0.5)   OUTPUT   Urine(mL/kg/hr) 750(0.9)   750   Shift Total(mL/kg) 750(7)   750(7)   Weight (kg) 106.6 106.6 106.6 106.6                        Closed/Suction Drain 11/05/18 1128 Left Scalp Accordion 10 Fr. (Active)   Site Description Unable to view 11/6/2018  3:05 PM   Dressing Type Gauze 11/6/2018  3:05 PM   Dressing Status Clean;Dry;Intact 11/6/2018  3:05 PM   Status To bulb suction 11/6/2018  3:05 PM   Output (mL) 85 mL 11/6/2018  1:05 PM       Neurosurgery Physical Exam    -Alert and oriented x4  -PERRL, EOMI, face symmetrical, tongue midline, facial sensation symmetric, shoulder shrug full strength and symmetric  -Motor: 5/5 throughout the upper and lower extremites bilaterally  -sensation intact to light touch throughout        Significant Labs:  Recent Labs   Lab 11/10/18  0336 11/11/18  0307   * 119*    139   K 4.0 4.4    108    CO2 23 22*   BUN 9 10   CREATININE 0.7 0.7   CALCIUM 9.0 9.0   MG 1.8 2.1     No results for input(s): WBC, HGB, HCT, PLT in the last 48 hours.  No results for input(s): LABPT, INR, APTT in the last 48 hours.  Microbiology Results (last 7 days)     ** No results found for the last 168 hours. **        All pertinent labs from the last 24 hours have been reviewed.    Significant Diagnostics:  I have reviewed all pertinent imaging results/findings within the past 24 hours.

## 2018-11-11 NOTE — PROGRESS NOTES
"[facility]  Neurosurgery  Progress Note    Subjective:     History of Present Illness: Pt is a 48 y.o. female who presents per referral by Dr. LISA Elam and the epilepsy team for evaluation for epilepsy surgery. Pt has history of partial intractable epilepsy and has failed at least 10 previous medications. Currently on dual therapy. EMU workup done in February localized pathology to left temporal lobe, with PET scan showing hypometabolism of mesial temporal lobe. DAWOOD scan also showed localization to left mesial temporal lobe. Pt states that she has endured seizures since 19 y.o. Pt currently on Lamictal. Pt states that she endures about 5 absence episodes per day with intermittent LOC. Pt notes one incident of "rolling" seizures for which she was brought to the ED.          Post-Op Info:  Procedure(s) (LRB):  CRANIOTOMY for grids and strips (Left)   3 Days Post-Op     Interval History: NAEON, stable exam    Medications:  Continuous Infusions:    Scheduled Meds:   ceFAZolin (ANCEF) IVPB  2 g Intravenous Q8H    heparin (porcine)  5,000 Units Subcutaneous Q8H    mupirocin  1 g Nasal BID    senna-docusate 8.6-50 mg  1 tablet Oral Daily     PRN Meds:acetaminophen, acetaminophen, diazePAM, HYDROcodone-acetaminophen, HYDROmorphone, metoclopramide HCl, midazolam, ondansetron, sodium chloride 0.9%     Review of Systems    Objective:     Weight: 106.6 kg (235 lb)  Body mass index is 39.11 kg/m².  Vital Signs (Most Recent):  Temp: 99 °F (37.2 °C) (11/11/18 1505)  Pulse: 84 (11/11/18 1605)  Resp: 20 (11/11/18 1605)  BP: 121/78 (11/11/18 1605)  SpO2: 99 % (11/11/18 1605) Vital Signs (24h Range):  Temp:  [98.3 °F (36.8 °C)-99 °F (37.2 °C)] 99 °F (37.2 °C)  Pulse:  [61-84] 84  Resp:  [11-27] 20  SpO2:  [93 %-99 %] 99 %  BP: (115-148)/(58-79) 121/78  Arterial Line BP: ()/(57-82) 118/70     Date 11/11/18 0700 - 11/12/18 0659   Shift 4390-9882 7812-5657 4157-3892 24 Hour Total   INTAKE   P.O. 50   " 50   Shift Total(mL/kg) 50(0.5)   50(0.5)   OUTPUT   Urine(mL/kg/hr) 750(0.9)   750   Shift Total(mL/kg) 750(7)   750(7)   Weight (kg) 106.6 106.6 106.6 106.6                        Closed/Suction Drain 11/05/18 1128 Left Scalp Accordion 10 Fr. (Active)   Site Description Unable to view 11/6/2018  3:05 PM   Dressing Type Gauze 11/6/2018  3:05 PM   Dressing Status Clean;Dry;Intact 11/6/2018  3:05 PM   Status To bulb suction 11/6/2018  3:05 PM   Output (mL) 85 mL 11/6/2018  1:05 PM       Neurosurgery Physical Exam    -Alert and oriented x4  -PERRL, EOMI, face symmetrical, tongue midline, facial sensation symmetric, shoulder shrug full strength and symmetric  -Motor: 5/5 throughout the upper and lower extremites bilaterally  -sensation intact to light touch throughout        Significant Labs:  Recent Labs   Lab 11/10/18  0336 11/11/18  0307   * 119*    139   K 4.0 4.4    108   CO2 23 22*   BUN 9 10   CREATININE 0.7 0.7   CALCIUM 9.0 9.0   MG 1.8 2.1     No results for input(s): WBC, HGB, HCT, PLT in the last 48 hours.  No results for input(s): LABPT, INR, APTT in the last 48 hours.  Microbiology Results (last 7 days)     ** No results found for the last 168 hours. **        All pertinent labs from the last 24 hours have been reviewed.    Significant Diagnostics:  I have reviewed all pertinent imaging results/findings within the past 24 hours.    Assessment/Plan:     * Temporal lobe epilepsy, intractable    49 yo female with history of seizures since age 19, now s/p  crani with subdural grid placement for further localization.    -continue EEG monitoring  -pulled HV  -monitoring/seizure management per epilepsy team  -will continue to follow closely           Denis Covarrubias MD  Neurosurgery  [facility]

## 2018-11-11 NOTE — ASSESSMENT & PLAN NOTE
47 yo female with history of seizures since age 19, now s/p  crani with subdural grid placement for further localization.    -continue EEG monitoring  -pulled HV  -monitoring/seizure management per epilepsy team  -will continue to follow closely

## 2018-11-12 LAB
ALBUMIN SERPL BCP-MCNC: 2.8 G/DL
ALP SERPL-CCNC: 100 U/L
ALT SERPL W/O P-5'-P-CCNC: 8 U/L
ANION GAP SERPL CALC-SCNC: 11 MMOL/L
AST SERPL-CCNC: 13 U/L
BASOPHILS # BLD AUTO: 0.02 K/UL
BASOPHILS NFR BLD: 0.2 %
BILIRUB SERPL-MCNC: 0.3 MG/DL
BUN SERPL-MCNC: 10 MG/DL
CALCIUM SERPL-MCNC: 9 MG/DL
CHLORIDE SERPL-SCNC: 108 MMOL/L
CO2 SERPL-SCNC: 21 MMOL/L
CREAT SERPL-MCNC: 0.7 MG/DL
DIFFERENTIAL METHOD: ABNORMAL
EOSINOPHIL # BLD AUTO: 0.2 K/UL
EOSINOPHIL NFR BLD: 1.8 %
ERYTHROCYTE [DISTWIDTH] IN BLOOD BY AUTOMATED COUNT: 15.9 %
EST. GFR  (AFRICAN AMERICAN): >60 ML/MIN/1.73 M^2
EST. GFR  (NON AFRICAN AMERICAN): >60 ML/MIN/1.73 M^2
GLUCOSE SERPL-MCNC: 117 MG/DL
HCT VFR BLD AUTO: 32.9 %
HGB BLD-MCNC: 10.6 G/DL
IMM GRANULOCYTES # BLD AUTO: 0.03 K/UL
IMM GRANULOCYTES NFR BLD AUTO: 0.3 %
LYMPHOCYTES # BLD AUTO: 2.8 K/UL
LYMPHOCYTES NFR BLD: 29.5 %
MAGNESIUM SERPL-MCNC: 1.8 MG/DL
MCH RBC QN AUTO: 24.4 PG
MCHC RBC AUTO-ENTMCNC: 32.2 G/DL
MCV RBC AUTO: 76 FL
MONOCYTES # BLD AUTO: 0.7 K/UL
MONOCYTES NFR BLD: 7.6 %
NEUTROPHILS # BLD AUTO: 5.8 K/UL
NEUTROPHILS NFR BLD: 60.6 %
NRBC BLD-RTO: 0 /100 WBC
PHOSPHATE SERPL-MCNC: 3.6 MG/DL
PLATELET # BLD AUTO: 256 K/UL
PMV BLD AUTO: 9.1 FL
POTASSIUM SERPL-SCNC: 4 MMOL/L
PROT SERPL-MCNC: 6.6 G/DL
RBC # BLD AUTO: 4.34 M/UL
SODIUM SERPL-SCNC: 140 MMOL/L
WBC # BLD AUTO: 9.63 K/UL

## 2018-11-12 PROCEDURE — 25000003 PHARM REV CODE 250: Performed by: STUDENT IN AN ORGANIZED HEALTH CARE EDUCATION/TRAINING PROGRAM

## 2018-11-12 PROCEDURE — 99233 SBSQ HOSP IP/OBS HIGH 50: CPT | Mod: ,,, | Performed by: PSYCHIATRY & NEUROLOGY

## 2018-11-12 PROCEDURE — 83735 ASSAY OF MAGNESIUM: CPT

## 2018-11-12 PROCEDURE — 95951 PR EEG MONITORING/VIDEORECORD: CPT | Mod: 26,,, | Performed by: PSYCHIATRY & NEUROLOGY

## 2018-11-12 PROCEDURE — 63600175 PHARM REV CODE 636 W HCPCS: Performed by: NEUROLOGICAL SURGERY

## 2018-11-12 PROCEDURE — 20000000 HC ICU ROOM

## 2018-11-12 PROCEDURE — 95951 HC EEG MONITORING/VIDEO RECORD: CPT

## 2018-11-12 PROCEDURE — 80053 COMPREHEN METABOLIC PANEL: CPT

## 2018-11-12 PROCEDURE — 25000003 PHARM REV CODE 250: Performed by: PHYSICIAN ASSISTANT

## 2018-11-12 PROCEDURE — 85025 COMPLETE CBC W/AUTO DIFF WBC: CPT

## 2018-11-12 PROCEDURE — 94761 N-INVAS EAR/PLS OXIMETRY MLT: CPT

## 2018-11-12 PROCEDURE — 63600175 PHARM REV CODE 636 W HCPCS: Performed by: NURSE PRACTITIONER

## 2018-11-12 PROCEDURE — 84100 ASSAY OF PHOSPHORUS: CPT

## 2018-11-12 PROCEDURE — 63600175 PHARM REV CODE 636 W HCPCS: Performed by: PHYSICIAN ASSISTANT

## 2018-11-12 RX ORDER — POLYETHYLENE GLYCOL 3350 17 G/17G
17 POWDER, FOR SOLUTION ORAL DAILY
Status: DISCONTINUED | OUTPATIENT
Start: 2018-11-12 | End: 2018-11-23 | Stop reason: HOSPADM

## 2018-11-12 RX ORDER — HYDROMORPHONE HYDROCHLORIDE 1 MG/ML
0.5 INJECTION, SOLUTION INTRAMUSCULAR; INTRAVENOUS; SUBCUTANEOUS EVERY 6 HOURS PRN
Status: DISCONTINUED | OUTPATIENT
Start: 2018-11-12 | End: 2018-11-23 | Stop reason: HOSPADM

## 2018-11-12 RX ORDER — AMOXICILLIN 250 MG
1 CAPSULE ORAL 2 TIMES DAILY
Status: DISCONTINUED | OUTPATIENT
Start: 2018-11-12 | End: 2018-11-23 | Stop reason: HOSPADM

## 2018-11-12 RX ORDER — CEFAZOLIN SODIUM 2 G/50ML
2 SOLUTION INTRAVENOUS
Status: DISCONTINUED | OUTPATIENT
Start: 2018-11-12 | End: 2018-11-13

## 2018-11-12 RX ADMIN — HYDROCODONE BITARTRATE AND ACETAMINOPHEN 1 TABLET: 5; 325 TABLET ORAL at 02:11

## 2018-11-12 RX ADMIN — CEFAZOLIN SODIUM 2 G: 2 SOLUTION INTRAVENOUS at 02:11

## 2018-11-12 RX ADMIN — SENNOSIDES AND DOCUSATE SODIUM 1 TABLET: 8.6; 5 TABLET ORAL at 08:11

## 2018-11-12 RX ADMIN — CEFAZOLIN SODIUM 2 G: 2 SOLUTION INTRAVENOUS at 10:11

## 2018-11-12 RX ADMIN — CEFAZOLIN SODIUM 2 G: 2 SOLUTION INTRAVENOUS at 05:11

## 2018-11-12 RX ADMIN — POLYETHYLENE GLYCOL 3350 17 G: 17 POWDER, FOR SOLUTION ORAL at 10:11

## 2018-11-12 RX ADMIN — HEPARIN SODIUM 5000 UNITS: 5000 INJECTION, SOLUTION INTRAVENOUS; SUBCUTANEOUS at 10:11

## 2018-11-12 RX ADMIN — MUPIROCIN 1 G: 20 OINTMENT TOPICAL at 08:11

## 2018-11-12 RX ADMIN — ACETAMINOPHEN 650 MG: 325 TABLET, FILM COATED ORAL at 08:11

## 2018-11-12 RX ADMIN — HEPARIN SODIUM 5000 UNITS: 5000 INJECTION, SOLUTION INTRAVENOUS; SUBCUTANEOUS at 01:11

## 2018-11-12 RX ADMIN — HEPARIN SODIUM 5000 UNITS: 5000 INJECTION, SOLUTION INTRAVENOUS; SUBCUTANEOUS at 06:11

## 2018-11-12 NOTE — PLAN OF CARE
Grid placement for epilepsy localization.   D/w epilepsy and neurosurgery  AEDs on hold  Bowel regimen  Afebrile, normal wbc  Tolerating po  SCD for DVT prophylaxis.

## 2018-11-12 NOTE — PLAN OF CARE
11/12/18 1600   Discharge Reassessment   Assessment Type Discharge Planning Reassessment   Provided patient/caregiver education on the expected discharge date and the discharge plan No   Do you have any problems affording any of your prescribed medications? No   Discharge Plan A Home with family   Discharge Plan B Home with family;Home Health   Patient choice form signed by patient/caregiver N/A   Anticipated Discharge Disposition Home   Can the patient answer the patient profile reliably? Yes, cognitively intact   How does the patient rate their overall health at the present time? Good   Describe the patient's ability to walk at the present time. Minor restrictions or changes   How often would a person be available to care for the patient? Often   Number of comorbid conditions (as recorded on the chart) One   During the past month, has the patient often been bothered by feeling down, depressed or hopeless? No   During the past month, has the patient often been bothered by little interest or pleasure in doing things? No       Patient not medically stable for discharge.     Kenna Fong RN, CCRN-K, [facility]  Neuro-Critical Care   [phone]

## 2018-11-12 NOTE — PROGRESS NOTES
"0715- pt AAOx4 follows commands, moves all extremities spontaneously, denies numbness and tingling in all extremities,remembers code word, no episodes of staring. will continue to monitor closely    0930- paged NSGY for head open to air, no drainage noted Verónica mann'alonso nursing to wrap head with kerlix, will carry out     0945- pt is agitated and does not want head wrap, pt states" my head has been open for 4 days, let me speak to a doctor". Pt educated about the safety of head wrap, and infection prevention and wrap is replaced every 72 hrs, Dr. Tate long and ok'alonso for head to remain unwrap, will notify NCC team and EEG techs on head wrap status.     1015- NSGY paged to wrap pt's head per protocol    1100- Verónica BUCKLEY nsgy returned call and stated someone from nsgy team will be at bedside to clean wires and rewrap pt's head. Will continue to monitor    1305- yellow drainage noted on blue pad under pt's head, pt denies headaches and blurred vision at this time, pt remains AAO X4, and moves all extremities, NSGY paged will continue to monitor closely     1332- Verónica BUCKLEY will be at bedside to assess pt, no new orders    14:21:18- pt event, pt states" I think I am about to have a seizure", pt staring for 5 seconds, remains oriented X4 and follows commands, denies numbness and tingling,full ROM in all extremities    14:21:41pt event, pt states" I think I am about to have a seizure", pt staring for 3 seconds, remains oriented X4 and follows commands, no numbness or tingling, full ROM and all extremities, pt given word "blue wall" to remember, will reassess memory    1423 Verónica BUCKLEY at bedside to assess dressing/drainage, staple placed X2 to incision for drainage, dressing changed    1437- pt does not remember word given after event. Will continue to monitor closely  "

## 2018-11-12 NOTE — PROGRESS NOTES
No events overnight    Vital signs, lab studies, and imaging reviewed by me  Alert, oriented x3, cranial II-XII intact, sensation full, moves all extremities with full strength, no dysmetria  Lead sites c/d/i, drain with serosanguinous output minimal  Warm and well perfused, regular rate and rhythm, no murmurs  No dependent edema  Breathing comfortably, breath sounds clear  Belly soft, nontender, no hepatosplenomegaly  Oleary in place with yellow urine    A/p  49 yo w intractable epilepsy s/p invasive monitor placement    Dc lamotrigine  Versed prn for motor sz lasting greater than 5 min  Mapping planned for later today  Monitor in icu  Mitts/restratins at all times    I spent 30 min of uninterrupted critical care time caring for this post operative patient at high risk of neurologic decline exclusive of procedures and teaching.

## 2018-11-12 NOTE — PROGRESS NOTES
[facility]  Neurology-Epilepsy  Progress Note    Patient Name: Liza Arrieta  MRN: [id number]  Admission Date: 11/5/2018  Hospital Length of Stay: 7 days  Code Status: Full Code   Attending Provider: Any Ruiz MD  Primary Care Physician: LISA Elam MD   Principal Problem:Temporal lobe epilepsy, intractable    Subjective:     Hospital Course:   Patient admitted to [facility] after subdural grid placement/L crani on 11/5. Home Lamotrigine decreased to 100 mg BID.  11/6: No seizures since admission. Cortical mapping session completed.  11/7: Cortical mapping during am, multiple clinical seizures during session. Clinical and electrographic seizure 11/7 afternoon, during which patient had automatisms of hands, confusion, pulled at EEG leads disrupting connection.  11/8: No additional seizures overnight. Back to OR today for replacement of intracranial grid/leads.  11/9: No seizures after revision of intracranial grids in OR yesterday.   11/10: No electrographic seizures, cortical mapping  11/11: No electrographic seizures  11/12: No electrographic seizures    Interval History: No acute events overnight. Had two very brief episodes today of staring without loss of consciousness, no electrographic correlate. Plan for sleep deprivation tonight, additional cortical mapping tomorrow.    Current Facility-Administered Medications   Medication Dose Route Frequency Provider Last Rate Last Dose    acetaminophen suppository 650 mg  650 mg Rectal Q6H PRN Marc Garrett MD        acetaminophen tablet 650 mg  650 mg Oral Q6H PRN Marc Garrett MD   650 mg at 11/11/18 2125    cefazolin (ANCEF) 2 gram in dextrose 5% 50 mL IVPB (premix)  2 g Intravenous Q8H Jayson Cordero  mL/hr at 11/12/18 1046 2 g at 11/12/18 1046    heparin (porcine) injection 5,000 Units  5,000 Units Subcutaneous Q8H Duane Joiner MD   5,000 Units at 11/12/18 1358    HYDROcodone-acetaminophen 5-325 mg per tablet 1 tablet  1 tablet  Oral Q4H PRN Marc Garrett MD   1 tablet at 11/12/18 0213    HYDROmorphone injection 0.5 mg  0.5 mg Intravenous Q1H PRN Duane Joiner MD   0.5 mg at 11/08/18 2125    metoclopramide HCl injection 5 mg  5 mg Intravenous Q6H PRN Duane Joiner MD        midazolam (VERSED) 1 mg/mL injection 2 mg  2 mg Intravenous Q5 Min PRN Frankie Dumont MD        mupirocin 2 % ointment 1 g  1 g Nasal BID Marc Garrett MD   1 g at 11/12/18 0828    ondansetron disintegrating tablet 8 mg  8 mg Oral Q6H PRN Duane Joiner MD   8 mg at 11/07/18 0703    polyethylene glycol packet 17 g  17 g Oral Daily Thomas Plummer MD   17 g at 11/12/18 1048    senna-docusate 8.6-50 mg per tablet 1 tablet  1 tablet Oral BID Thomas Plummer MD        sodium chloride 0.9% flush 3 mL  3 mL Intravenous PRN Ifeanyi Sanchez MD         Continuous Infusions:    Review of Systems   Constitutional: Positive for fatigue. Negative for chills and fever.   Respiratory: Negative for cough and shortness of breath.    Cardiovascular: Negative for chest pain and leg swelling.   Gastrointestinal: Negative for nausea and vomiting.   Musculoskeletal: Negative for back pain and joint swelling.   Skin: Negative for pallor and rash.   Neurological: Positive for seizures and headaches (improving). Negative for facial asymmetry, speech difficulty and weakness.   Psychiatric/Behavioral: Negative for agitation and confusion.     Objective:     Vital Signs (Most Recent):  Temp: 98.6 °F (37 °C) (11/12/18 1505)  Pulse: 79 (11/12/18 1605)  Resp: 17 (11/12/18 1605)  BP: 129/72 (11/12/18 1605)  SpO2: 96 % (11/12/18 1605) Vital Signs (24h Range):  Temp:  [98.6 °F (37 °C)-99.2 °F (37.3 °C)] 98.6 °F (37 °C)  Pulse:  [62-91] 79  Resp:  [11-37] 17  SpO2:  [94 %-99 %] 96 %  BP: (112-139)/(58-81) 129/72  Arterial Line BP: ()/(57-87) 114/69     Weight: 106.6 kg (235 lb)  Body mass index is 39.11 kg/m².    Physical Exam   Constitutional: She is oriented to  person, place, and time. She appears well-developed and well-nourished. No distress.   HENT:   Right Ear: External ear normal.   Left Ear: External ear normal.   S/p L temporal crani   Eyes: Conjunctivae and EOM are normal. Pupils are equal, round, and reactive to light.   Neck: Normal range of motion.   Pulmonary/Chest: Effort normal. No respiratory distress.   Musculoskeletal: Normal range of motion. She exhibits no deformity.   Neurological: She is alert and oriented to person, place, and time. No cranial nerve deficit.   Skin: Skin is warm and dry. She is not diaphoretic. No erythema.   Psychiatric: She has a normal mood and affect. Her speech is normal and behavior is normal. Judgment and thought content normal.       NEUROLOGICAL EXAMINATION:     MENTAL STATUS   Oriented to person, place, and time.   Attention: normal. Concentration: normal.   Speech: speech is normal   Level of consciousness: drowsy ,  arousable by verbal stimuli    CRANIAL NERVES     CN III, IV, VI   Pupils are equal, round, and reactive to light.  Extraocular motions are normal.     CN VII   Facial expression full, symmetric.     CN VIII   CN VIII normal.     CN IX, X   CN IX normal.   CN X normal.     CN XI   CN XI normal.     CN XII   CN XII normal.     MOTOR EXAM   Muscle bulk: normal  Overall muscle tone: normal       Moves all extremities spontaneously, no focal deficit noted  Assist in transfer to bedside commode without issue       Significant Labs: All pertinent lab results from the past 24 hours have been reviewed.    Significant Studies: I have reviewed all pertinent imaging results/findings within the past 24 hours.    Assessment and Plan:     * Temporal lobe epilepsy, intractable    47 yo female with history of seizures since age 19, who presents to NICU s/p L temporal crani with subdural grid placement for further localization and possible resection.    Recommendations:  - Continuous vEEG monitoring with subdural grid  electrodes in place  - Holding home Lamictal since 11/10 pm dose  - For any seizures: please push event button on EEG and call epileptologist on call prior to administration of abortive medication - Versed 2 mg IV PRN  - Recommend short acting opioids instead of long acting as needed for pain control  - Pain management per [facility]  - Sleep deprive patient until 0400 11/13  - Cortical mapping tomorrow    Plan of care discussed with [facility] team, family at bedside. Will continue to follow.      Essential hypertension    - Goal SBP <140  - Management per [facility]          VTE Risk Mitigation (From admission, onward)        Ordered     heparin (porcine) injection 5,000 Units  Every 8 hours      11/05/18 1238     Place sequential compression device  Until discontinued      11/05/18 1238     IP VTE HIGH RISK PATIENT  Once      11/05/18 1238          Philly Foy PA-C  Neurology-Epilepsy  [facility]  Staff: Dr. Elam

## 2018-11-12 NOTE — OP NOTE
DATE OF PROCEDURE:  11/05/2018.    PREOPERATIVE DIAGNOSIS:  Left temporal epilepsy.    POSTOPERATIVE DIAGNOSIS:  Left temporal epilepsy.    OPERATIVE PROCEDURE UNDERGONE:  Left frontotemporal craniotomy for placement of   subdural grids and strips electrode with intraoperative recording with use of   neuronavigation.    SURGEON:  Ruben Senior M.D.    :  Duane Joiner M.D. (RES).    ANESTHESIA:  General.    INDICATIONS FOR PROCEDURE:  This is a 48-year-old female with epilepsy,   questionable etiology and EMU recording all localized to the left; however, the   patient was non-lesional.  No radiographic evidence of mesial temporal   sclerosis.  Therefore, we felt the patient needed subdural recording.    OPERATIVE NOTE:  The patient was taken to Operating Room, anesthetized and   intubated by Anesthesia.  Preop antibiotics administered.  The patient was   placed in the supine position in a Smith head pin, head turned to the right.    Navigation system was registered using facial registration.  Once that was   done, we shaved the hair, prepped and draped the area, first I marked a   pterional skin incision, took down to temporalis as one myocutaneous flap,   exposed the keyhole, made a bur hole at the keyhole in the floor of temporal   fossa, turned a frontotemporal craniotomy.  With the drill down the sphenoid   wedge, flattened down the bone to the temporal floor.  The dura was very   adherent to the skull and was torn on the opening way in.  We extended the dural   opening flap down toward the sphenoid wing, sutured it out of the way, then we   placed the 2 x 4 electrodes subdurally into the left temporal tip.  We placed a   2 x 6 strip in the lateral temporal area around 4 cm back, prepped it underneath   ____ on the medial side.  Then we placed a 1 x 4 in between the 2 x 4 and 2 x   6.  Then we placed a flexible 8 x 8 to cover the rest of the exposed area.  We   placed a 1 x 4 ground electrode  on the skull.  Once we had all the ____ we   checked the recording, did intraoperative recording and found all the leads were   in good position.  We did cut out numbers 17 and 25 on the 8 x 8 grid.  The   grid leads were passed out laterally through the scalp.  Then DuraGen was laid   on top of the open leads.  The bone flap was refixated using titanium plates and   screws.  All the leads were hooked up to a permanent recording leads.  All the   numbers were recorded, colors were recorded, correct orientation was recorded   with Epilepsy Team.  Then we placed a subgaleal drain, closed the rest of wound   in layers, secured all of the leads at the scalp.  Sterile dressing placed.  The   patient was extubated and brought to ICU without any complication.  EBL was   around 250 mL.  No specimens were sent.      KENYATTA  dd: 11/11/2018 20:02:37 (CST)  td: 11/11/2018 23:49:31 (CST)  Doc ID   #[id number]  Job ID #[id number]    CC:

## 2018-11-12 NOTE — SUBJECTIVE & OBJECTIVE
Interval History:  No acute events overnight.  Pt asking about plan for mapping per NSGY.    Review of Systems   Constitutional: Positive for fatigue. Negative for chills and fever.   Respiratory: Negative for cough and shortness of breath.    Cardiovascular: Negative for chest pain and leg swelling.   Gastrointestinal: Negative for nausea and vomiting.   Musculoskeletal: Negative for back pain and joint swelling.   Skin: Negative for pallor and rash.   Neurological: Positive for seizures and headaches (improving). Negative for facial asymmetry, speech difficulty and weakness.   Psychiatric/Behavioral: Negative for agitation and confusion.     Objective:     Vitals:  Temp: 98.6 °F (37 °C)  Pulse: 79  Rhythm: normal sinus rhythm  BP: 129/72  MAP (mmHg): 93  Resp: 17  SpO2: 96 %  O2 Device (Oxygen Therapy): room air    Temp  Min: 98.6 °F (37 °C)  Max: 99.2 °F (37.3 °C)  Pulse  Min: 62  Max: 91  BP  Min: 112/69  Max: 139/81  MAP (mmHg)  Min: 83  Max: 101  Resp  Min: 11  Max: 37  SpO2  Min: 94 %  Max: 99 %    11/11 0701 - 11/12 0700  In: 100 [P.O.:50]  Out: 1300 [Urine:1300]   Unmeasured Output  Urine Occurrence: 1  Stool Occurrence: 1       Physical Exam   Constitutional: She is oriented to person, place, and time. She appears well-developed and well-nourished. No distress.   HENT:   Right Ear: External ear normal.   Left Ear: External ear normal.   S/p L temporal crani   Eyes: Conjunctivae and EOM are normal. Pupils are equal, round, and reactive to light.   Neck: Normal range of motion.   Pulmonary/Chest: Effort normal. No respiratory distress.   Musculoskeletal: Normal range of motion. She exhibits no deformity.   Neurological: She is alert and oriented to person, place, and time. No cranial nerve deficit.   Skin: Skin is warm and dry. She is not diaphoretic. No erythema.   Psychiatric: She has a normal mood and affect. Her speech is normal and behavior is normal. Judgment and thought content normal.        Medications:  Continuous Scheduled  ceFAZolin 2 g/50mL Dextrose IVPB 2 g Q8H   heparin (porcine) 5,000 Units Q8H   mupirocin 1 g BID   polyethylene glycol 17 g Daily   senna-docusate 8.6-50 mg 1 tablet BID   PRN  acetaminophen 650 mg Q6H PRN   acetaminophen 650 mg Q6H PRN   HYDROcodone-acetaminophen 1 tablet Q4H PRN   HYDROmorphone 0.5 mg Q6H PRN   metoclopramide HCl 5 mg Q6H PRN   midazolam 2 mg Q5 Min PRN   ondansetron 8 mg Q6H PRN   sodium chloride 0.9% 3 mL PRN     Today I personally reviewed pertinent medications, lines/drains/airways, laboratory results, notably:    Recent Labs   Lab 11/12/18  0203   WBC 9.63   RBC 4.34   HGB 10.6*   HCT 32.9*      MCV 76*   MCH 24.4*   MCHC 32.2     Recent Labs   Lab 11/12/18  0203   CALCIUM 9.0   PROT 6.6      K 4.0   CO2 21*      BUN 10   CREATININE 0.7   ALKPHOS 100   ALT 8*   AST 13   BILITOT 0.3     Diet  Diet Adult Regular (IDDSI Level 7)  Diet Adult Regular (IDDSI Level 7)

## 2018-11-12 NOTE — ASSESSMENT & PLAN NOTE
49 yo female with history of seizures since age 19, now s/p  crani with subdural grid placement for further localization (11/5) and repositioning following  Pt induced malpositioning (11/8).    --Continue care per primary team.  --Continue EEG monitoring per epilepsy.  --We will continue to follow closely, please contact us with any questions or concerns.

## 2018-11-12 NOTE — SUBJECTIVE & OBJECTIVE
Interval History: No acute events overnight. Had two very brief episodes today of staring without loss of consciousness, no electrographic correlate. Plan for sleep deprivation tonight, additional cortical mapping tomorrow.    Current Facility-Administered Medications   Medication Dose Route Frequency Provider Last Rate Last Dose    acetaminophen suppository 650 mg  650 mg Rectal Q6H PRN Marc Garrett MD        acetaminophen tablet 650 mg  650 mg Oral Q6H PRN Marc Garrett MD   650 mg at 11/11/18 2125    cefazolin (ANCEF) 2 gram in dextrose 5% 50 mL IVPB (premix)  2 g Intravenous Q8H Jayson Cordero  mL/hr at 11/12/18 1046 2 g at 11/12/18 1046    heparin (porcine) injection 5,000 Units  5,000 Units Subcutaneous Q8H Duane Joiner MD   5,000 Units at 11/12/18 1358    HYDROcodone-acetaminophen 5-325 mg per tablet 1 tablet  1 tablet Oral Q4H PRN Marc Garrett MD   1 tablet at 11/12/18 0213    HYDROmorphone injection 0.5 mg  0.5 mg Intravenous Q1H PRN Duane Joiner MD   0.5 mg at 11/08/18 2125    metoclopramide HCl injection 5 mg  5 mg Intravenous Q6H PRN Duane Joiner MD        midazolam (VERSED) 1 mg/mL injection 2 mg  2 mg Intravenous Q5 Min PRN Frankie Dumont MD        mupirocin 2 % ointment 1 g  1 g Nasal BID Marc Garrett MD   1 g at 11/12/18 0828    ondansetron disintegrating tablet 8 mg  8 mg Oral Q6H PRN Duane Joiner MD   8 mg at 11/07/18 0703    polyethylene glycol packet 17 g  17 g Oral Daily Thomas Plummer MD   17 g at 11/12/18 1048    senna-docusate 8.6-50 mg per tablet 1 tablet  1 tablet Oral BID Thomas Plummer MD        sodium chloride 0.9% flush 3 mL  3 mL Intravenous PRN Ifeanyi Sanchez MD         Continuous Infusions:    Review of Systems   Constitutional: Positive for fatigue. Negative for chills and fever.   Respiratory: Negative for cough and shortness of breath.    Cardiovascular: Negative for chest pain and leg swelling.   Gastrointestinal:  Negative for nausea and vomiting.   Musculoskeletal: Negative for back pain and joint swelling.   Skin: Negative for pallor and rash.   Neurological: Positive for seizures and headaches (improving). Negative for facial asymmetry, speech difficulty and weakness.   Psychiatric/Behavioral: Negative for agitation and confusion.     Objective:     Vital Signs (Most Recent):  Temp: 98.6 °F (37 °C) (11/12/18 1505)  Pulse: 79 (11/12/18 1605)  Resp: 17 (11/12/18 1605)  BP: 129/72 (11/12/18 1605)  SpO2: 96 % (11/12/18 1605) Vital Signs (24h Range):  Temp:  [98.6 °F (37 °C)-99.2 °F (37.3 °C)] 98.6 °F (37 °C)  Pulse:  [62-91] 79  Resp:  [11-37] 17  SpO2:  [94 %-99 %] 96 %  BP: (112-139)/(58-81) 129/72  Arterial Line BP: ()/(57-87) 114/69     Weight: 106.6 kg (235 lb)  Body mass index is 39.11 kg/m².    Physical Exam   Constitutional: She is oriented to person, place, and time. She appears well-developed and well-nourished. No distress.   HENT:   Right Ear: External ear normal.   Left Ear: External ear normal.   S/p L temporal crani   Eyes: Conjunctivae and EOM are normal. Pupils are equal, round, and reactive to light.   Neck: Normal range of motion.   Pulmonary/Chest: Effort normal. No respiratory distress.   Musculoskeletal: Normal range of motion. She exhibits no deformity.   Neurological: She is alert and oriented to person, place, and time. No cranial nerve deficit.   Skin: Skin is warm and dry. She is not diaphoretic. No erythema.   Psychiatric: She has a normal mood and affect. Her speech is normal and behavior is normal. Judgment and thought content normal.       NEUROLOGICAL EXAMINATION:     MENTAL STATUS   Oriented to person, place, and time.   Attention: normal. Concentration: normal.   Speech: speech is normal   Level of consciousness: drowsy ,  arousable by verbal stimuli    CRANIAL NERVES     CN III, IV, VI   Pupils are equal, round, and reactive to light.  Extraocular motions are normal.     CN VII   Facial  expression full, symmetric.     CN VIII   CN VIII normal.     CN IX, X   CN IX normal.   CN X normal.     CN XI   CN XI normal.     CN XII   CN XII normal.     MOTOR EXAM   Muscle bulk: normal  Overall muscle tone: normal       Moves all extremities spontaneously, no focal deficit noted  Assist in transfer to bedside commode without issue       Significant Labs: All pertinent lab results from the past 24 hours have been reviewed.    Significant Studies: I have reviewed all pertinent imaging results/findings within the past 24 hours.

## 2018-11-12 NOTE — PLAN OF CARE
Problem: Patient Care Overview  Goal: Plan of Care Review  Outcome: Ongoing (interventions implemented as appropriate)  POC reviewed with pt at 0200. Pt verbalized understanding. Questions and concerns addressed. Mild agitation overnight and trouble with medication compliance.  Patient complaints of pain addressed with team. Neuro status remained the same overnight with no witnessed seizures. See notes.  Pt progressing toward goals. Will continue to monitor. See flowsheets for full assessment and VS info.

## 2018-11-12 NOTE — PLAN OF CARE
Problem: Patient Care Overview  Goal: Plan of Care Review  Outcome: Ongoing (interventions implemented as appropriate)  POC reviewed with pt and family at 1400. Pt verbalized understanding. Questions and concerns addressed. No acute events today. Pt  Remains aao X4 throughout shift, eeg monitoring, grid wires remain intact, 100% meals consumed, dressing applied to head by NSGY, Pt progressing toward goals. Will continue to monitor. See flowsheets for full assessment and VS info.

## 2018-11-12 NOTE — ASSESSMENT & PLAN NOTE
49 yo female with history of seizures since age 19, who presents to NICU s/p L temporal crani with subdural grid placement for further localization and possible resection.    Recommendations:  - Continuous vEEG monitoring with subdural grid electrodes in place  - Holding home Lamictal  - For any seizures: please push event button on EEG and call epileptologist on call prior to administration of abortive medication - Versed 2 mg IV PRN  - Recommend short acting opioids instead of long acting as needed for pain control  - Pain management per NCC  - Sleep deprive patient until 0400 11/13  - Cortical mapping tomorrow    Plan of care discussed with NCC team, family at bedside. Will continue to follow.

## 2018-11-12 NOTE — CARE UPDATE
Patient complaints of pain when flushing both IVs and refusing IV antibiotic medication.  RN started New IV, and tried to push antibiotic through it and patient still complaining and refusing medication.  Patient agitated.  Notified NCC. NCC at bedside, spoke to pharmacy to order IVPB of antibiotic.  Patient agreed to IVPB drip instead of push.

## 2018-11-12 NOTE — PROGRESS NOTES
"[facility]  Neuorsurgery  Progress Note        Subjective:         HPI:  Pt is a 48 y.o. female who presents per referral by Dr. LISA Elam and the epilepsy team for evaluation for epilepsy surgery. Pt has history of partial intractable epilepsy and has failed at least 10 previous medications. Currently on dual therapy. EMU workup done in February localized pathology to left temporal lobe, with PET scan showing hypometabolism of mesial temporal lobe. DAWOOD scan also showed localization to left mesial temporal lobe. Pt states that she has endured seizures since 19 y.o. Pt currently on Lamictal. Pt states that she endures about 5 absence episodes per day with intermittent LOC. Pt notes one incident of "rolling" seizures for which she was brought to the ED.              Medications:  Continuous Infusions:    Scheduled Meds:   ceFAZolin 2 g/50mL Dextrose IVPB  2 g Intravenous Q8H    heparin (porcine)  5,000 Units Subcutaneous Q8H    mupirocin  1 g Nasal BID    senna-docusate 8.6-50 mg  1 tablet Oral Daily     PRN Meds:acetaminophen, acetaminophen, diazePAM, HYDROcodone-acetaminophen, HYDROmorphone, metoclopramide HCl, midazolam, ondansetron, sodium chloride 0.9%     Review of Systems    Objective:     Weight: 106.6 kg (235 lb)  Body mass index is 39.11 kg/m².  Vital Signs (Most Recent):  Temp: 98.7 °F (37.1 °C) (11/12/18 0705)  Pulse: 62 (11/12/18 0705)  Resp: 14 (11/12/18 0705)  BP: 128/63 (11/12/18 0705)  SpO2: 95 % (11/12/18 0705) Vital Signs (24h Range):  Temp:  [98.7 °F (37.1 °C)-99.2 °F (37.3 °C)] 98.7 °F (37.1 °C)  Pulse:  [62-91] 62  Resp:  [11-37] 14  SpO2:  [93 %-99 %] 95 %  BP: (114-148)/(58-79) 128/63  Arterial Line BP: ()/(57-87) 105/68                           Closed/Suction Drain 11/05/18 1128 Left Scalp Accordion 10 Fr. (Active)   Site Description Unable to view 11/6/2018  3:05 PM   Dressing Type Gauze 11/6/2018  3:05 PM   Dressing Status Clean;Dry;Intact 11/6/2018  3:05 " PM   Status To bulb suction 11/6/2018  3:05 PM   Output (mL) 85 mL 11/6/2018  1:05 PM       Neurosurgery Physical Exam    -Alert and oriented x4  -PERRL, EOMI, face symmetrical, tongue midline, facial sensation symmetric, shoulder shrug full strength and symmetric  -Motor: 5/5 throughout the upper and lower extremites bilaterally  -sensation intact to light touch throughout        Significant Labs:  Recent Labs   Lab 11/11/18  0307 11/12/18  0203   * 117*    140   K 4.4 4.0    108   CO2 22* 21*   BUN 10 10   CREATININE 0.7 0.7   CALCIUM 9.0 9.0   MG 2.1 1.8     Recent Labs   Lab 11/12/18  0203   WBC 9.63   HGB 10.6*   HCT 32.9*        No results for input(s): LABPT, INR, APTT in the last 48 hours.  Microbiology Results (last 7 days)     ** No results found for the last 168 hours. **        All pertinent labs from the last 24 hours have been reviewed.    Significant Diagnostics:  I have reviewed all pertinent imaging results/findings within the past 24 hours.    Assessment and Plan:     * Temporal lobe epilepsy, intractable    49 yo female with history of seizures since age 19, now s/p  crani with subdural grid placement for further localization (11/5) and repositioning following  Pt induced malpositioning (11/8).    --Continue care per primary team.  --Continue EEG monitoring per epilepsy.  --We will continue to follow closely, please contact us with any questions or concerns.           Marc Garrett MD  Neurosurgery  [facility]

## 2018-11-12 NOTE — SUBJECTIVE & OBJECTIVE
Medications:  Continuous Infusions:    Scheduled Meds:   ceFAZolin 2 g/50mL Dextrose IVPB  2 g Intravenous Q8H    heparin (porcine)  5,000 Units Subcutaneous Q8H    mupirocin  1 g Nasal BID    senna-docusate 8.6-50 mg  1 tablet Oral Daily     PRN Meds:acetaminophen, acetaminophen, diazePAM, HYDROcodone-acetaminophen, HYDROmorphone, metoclopramide HCl, midazolam, ondansetron, sodium chloride 0.9%     Review of Systems    Objective:     Weight: 106.6 kg (235 lb)  Body mass index is 39.11 kg/m².  Vital Signs (Most Recent):  Temp: 98.7 °F (37.1 °C) (11/12/18 0705)  Pulse: 62 (11/12/18 0705)  Resp: 14 (11/12/18 0705)  BP: 128/63 (11/12/18 0705)  SpO2: 95 % (11/12/18 0705) Vital Signs (24h Range):  Temp:  [98.7 °F (37.1 °C)-99.2 °F (37.3 °C)] 98.7 °F (37.1 °C)  Pulse:  [62-91] 62  Resp:  [11-37] 14  SpO2:  [93 %-99 %] 95 %  BP: (114-148)/(58-79) 128/63  Arterial Line BP: ()/(57-87) 105/68                           Closed/Suction Drain 11/05/18 1128 Left Scalp Accordion 10 Fr. (Active)   Site Description Unable to view 11/6/2018  3:05 PM   Dressing Type Gauze 11/6/2018  3:05 PM   Dressing Status Clean;Dry;Intact 11/6/2018  3:05 PM   Status To bulb suction 11/6/2018  3:05 PM   Output (mL) 85 mL 11/6/2018  1:05 PM       Neurosurgery Physical Exam    -Alert and oriented x4  -PERRL, EOMI, face symmetrical, tongue midline, facial sensation symmetric, shoulder shrug full strength and symmetric  -Motor: 5/5 throughout the upper and lower extremites bilaterally  -sensation intact to light touch throughout        Significant Labs:  Recent Labs   Lab 11/11/18  0307 11/12/18  0203   * 117*    140   K 4.4 4.0    108   CO2 22* 21*   BUN 10 10   CREATININE 0.7 0.7   CALCIUM 9.0 9.0   MG 2.1 1.8     Recent Labs   Lab 11/12/18  0203   WBC 9.63   HGB 10.6*   HCT 32.9*        No results for input(s): LABPT, INR, APTT in the last 48 hours.  Microbiology Results (last 7 days)     ** No results found for  the last 168 hours. **        All pertinent labs from the last 24 hours have been reviewed.    Significant Diagnostics:  I have reviewed all pertinent imaging results/findings within the past 24 hours.

## 2018-11-12 NOTE — PROGRESS NOTES
[facility]  Neurocritical Care  Progress Note    Admit Date: 11/5/2018  Service Date: 11/12/2018  Length of Stay: 7    Subjective:     Chief Complaint: Temporal lobe epilepsy, intractable    History of Present Illness: Liza Arrieta is a 49 yo female with PMHx of partial intractable epilepsy who is being admitted to [facility] after L crani with subdural grid placement. Per chart review, she had her first seizure at age 19. At that time, she was treated with dilantin and she did well for approximately 15 years. Reports that she typically has absence seizures (5-6/day), but states she may have had two grand mal seizures decades ago.  The patient was recently admitted to EMU on 2/9/2018 where seizures were captured on EEG.  She has failed multiple medications and is now being admitted for post op management, continuous EEG.             Hospital Course: 11/5: Pt admitted to [facility] s/p L crani with subdural grid placement, continuous EEG  11/6: cEEG  11/7: Pulled one of her leads today. Sent for stat CTH showed electrodes have been repositioned. posterior infratemporal strips are no longer in place  11/9: s/p repeat crani for grid placement.  One abscence seizure lasting 5 seconds. Epilepsy plans to due cortical mapping this afternoon.  11/12: Continuing to hold AEDs. PRNs per epilepsy.    Interval History:  No acute events overnight.  Pt asking about plan for mapping per NSGY.    Review of Systems   Constitutional: Positive for fatigue. Negative for chills and fever.   Respiratory: Negative for cough and shortness of breath.    Cardiovascular: Negative for chest pain and leg swelling.   Gastrointestinal: Negative for nausea and vomiting.   Musculoskeletal: Negative for back pain and joint swelling.   Skin: Negative for pallor and rash.   Neurological: Positive for seizures and headaches (improving). Negative for facial asymmetry, speech difficulty and weakness.   Psychiatric/Behavioral: Negative for agitation  and confusion.     Objective:     Vitals:  Temp: 98.6 °F (37 °C)  Pulse: 79  Rhythm: normal sinus rhythm  BP: 129/72  MAP (mmHg): 93  Resp: 17  SpO2: 96 %  O2 Device (Oxygen Therapy): room air    Temp  Min: 98.6 °F (37 °C)  Max: 99.2 °F (37.3 °C)  Pulse  Min: 62  Max: 91  BP  Min: 112/69  Max: 139/81  MAP (mmHg)  Min: 83  Max: 101  Resp  Min: 11  Max: 37  SpO2  Min: 94 %  Max: 99 %    11/11 0701 - 11/12 0700  In: 100 [P.O.:50]  Out: 1300 [Urine:1300]   Unmeasured Output  Urine Occurrence: 1  Stool Occurrence: 1       Physical Exam   Constitutional: She is oriented to person, place, and time. She appears well-developed and well-nourished. No distress.   HENT:   Right Ear: External ear normal.   Left Ear: External ear normal.   S/p L temporal crani   Eyes: Conjunctivae and EOM are normal. Pupils are equal, round, and reactive to light.   Neck: Normal range of motion.   Pulmonary/Chest: Effort normal. No respiratory distress.   Musculoskeletal: Normal range of motion. She exhibits no deformity.   Neurological: She is alert and oriented to person, place, and time. No cranial nerve deficit.   Skin: Skin is warm and dry. She is not diaphoretic. No erythema.   Psychiatric: She has a normal mood and affect. Her speech is normal and behavior is normal. Judgment and thought content normal.       Medications:  Continuous Scheduled  ceFAZolin 2 g/50mL Dextrose IVPB 2 g Q8H   heparin (porcine) 5,000 Units Q8H   mupirocin 1 g BID   polyethylene glycol 17 g Daily   senna-docusate 8.6-50 mg 1 tablet BID   PRN  acetaminophen 650 mg Q6H PRN   acetaminophen 650 mg Q6H PRN   HYDROcodone-acetaminophen 1 tablet Q4H PRN   HYDROmorphone 0.5 mg Q6H PRN   metoclopramide HCl 5 mg Q6H PRN   midazolam 2 mg Q5 Min PRN   ondansetron 8 mg Q6H PRN   sodium chloride 0.9% 3 mL PRN     Today I personally reviewed pertinent medications, lines/drains/airways, laboratory results, notably:    Recent Labs   Lab 11/12/18  0203   WBC 9.63   RBC 4.34   HGB  10.6*   HCT 32.9*      MCV 76*   MCH 24.4*   MCHC 32.2     Recent Labs   Lab 11/12/18  0203   CALCIUM 9.0   PROT 6.6      K 4.0   CO2 21*      BUN 10   CREATININE 0.7   ALKPHOS 100   ALT 8*   AST 13   BILITOT 0.3     Diet  Diet Adult Regular (IDDSI Level 7)  Diet Adult Regular (IDDSI Level 7)        Assessment/Plan:     Neuro   * Temporal lobe epilepsy, intractable    - s/p craniotomy x2 for strip and grid placement  - Continuous vEEG monitoring with subdural grid electrodes in place  - Home lamotrigine discontinued per Epilepsy  - For any seizures: please push event button on EEG and call epileptologist on call prior to administration of abortive medication  - Continue midazolam 2mg IV q5min for motor seizures lasting greater than 5 minutes  - Continue Ancef 2g IV q8h  - Sleep deprive patient until 0400 11/13  - Cortical mapping tomorrow  - NPO at midnight             Cardiac/Vascular   Essential hypertension    - EKG NSR  - SBP goal <140           The patient is being Prophylaxed for:  Venous Thromboembolism with: Chemical  Stress Ulcer with: Not Applicable   Ventilator Pneumonia with: not applicable    Activity Orders          None        Full Code    Thomas Plummer MD  Neurocritical Care  [facility]

## 2018-11-12 NOTE — ASSESSMENT & PLAN NOTE
- s/p craniotomy x2 for strip and grid placement  - Continuous vEEG monitoring with subdural grid electrodes in place  - Home lamotrigine discontinued per Epilepsy  - For any seizures: please push event button on EEG and call epileptologist on call prior to administration of abortive medication  - Continue midazolam 2mg IV q5min for motor seizures lasting greater than 5 minutes  - Continue Ancef 2g IV q8h  - Sleep deprive patient until 0400 11/13  - Cortical mapping tomorrow  - NPO at midnight

## 2018-11-13 LAB
ALBUMIN SERPL BCP-MCNC: 2.8 G/DL
ALP SERPL-CCNC: 106 U/L
ALT SERPL W/O P-5'-P-CCNC: 9 U/L
ANION GAP SERPL CALC-SCNC: 9 MMOL/L
AST SERPL-CCNC: 18 U/L
BASOPHILS # BLD AUTO: 0.02 K/UL
BASOPHILS NFR BLD: 0.2 %
BILIRUB SERPL-MCNC: 0.2 MG/DL
BUN SERPL-MCNC: 11 MG/DL
CALCIUM SERPL-MCNC: 9.2 MG/DL
CHLORIDE SERPL-SCNC: 107 MMOL/L
CO2 SERPL-SCNC: 23 MMOL/L
CREAT SERPL-MCNC: 0.7 MG/DL
DIFFERENTIAL METHOD: ABNORMAL
EOSINOPHIL # BLD AUTO: 0.2 K/UL
EOSINOPHIL NFR BLD: 1.8 %
ERYTHROCYTE [DISTWIDTH] IN BLOOD BY AUTOMATED COUNT: 16 %
EST. GFR  (AFRICAN AMERICAN): >60 ML/MIN/1.73 M^2
EST. GFR  (NON AFRICAN AMERICAN): >60 ML/MIN/1.73 M^2
GLUCOSE SERPL-MCNC: 120 MG/DL
HCT VFR BLD AUTO: 34 %
HGB BLD-MCNC: 10.8 G/DL
IMM GRANULOCYTES # BLD AUTO: 0.03 K/UL
IMM GRANULOCYTES NFR BLD AUTO: 0.3 %
LYMPHOCYTES # BLD AUTO: 3.1 K/UL
LYMPHOCYTES NFR BLD: 33.7 %
MAGNESIUM SERPL-MCNC: 2.1 MG/DL
MCH RBC QN AUTO: 25.1 PG
MCHC RBC AUTO-ENTMCNC: 31.8 G/DL
MCV RBC AUTO: 79 FL
MONOCYTES # BLD AUTO: 0.5 K/UL
MONOCYTES NFR BLD: 5.9 %
NEUTROPHILS # BLD AUTO: 5.3 K/UL
NEUTROPHILS NFR BLD: 58.1 %
NRBC BLD-RTO: 0 /100 WBC
PHOSPHATE SERPL-MCNC: 3.6 MG/DL
PLATELET # BLD AUTO: 277 K/UL
PMV BLD AUTO: 9.4 FL
POTASSIUM SERPL-SCNC: 4.3 MMOL/L
PROT SERPL-MCNC: 6.8 G/DL
RBC # BLD AUTO: 4.31 M/UL
SODIUM SERPL-SCNC: 139 MMOL/L
WBC # BLD AUTO: 9.13 K/UL

## 2018-11-13 PROCEDURE — 25000003 PHARM REV CODE 250: Performed by: STUDENT IN AN ORGANIZED HEALTH CARE EDUCATION/TRAINING PROGRAM

## 2018-11-13 PROCEDURE — 63600175 PHARM REV CODE 636 W HCPCS: Performed by: NEUROLOGICAL SURGERY

## 2018-11-13 PROCEDURE — 95951 PR EEG MONITORING/VIDEORECORD: CPT | Mod: 26,,, | Performed by: PSYCHIATRY & NEUROLOGY

## 2018-11-13 PROCEDURE — 83735 ASSAY OF MAGNESIUM: CPT

## 2018-11-13 PROCEDURE — 99233 SBSQ HOSP IP/OBS HIGH 50: CPT | Mod: ,,, | Performed by: PSYCHIATRY & NEUROLOGY

## 2018-11-13 PROCEDURE — 95951 HC EEG MONITORING/VIDEO RECORD: CPT

## 2018-11-13 PROCEDURE — 63600175 PHARM REV CODE 636 W HCPCS: Performed by: NURSE PRACTITIONER

## 2018-11-13 PROCEDURE — 25000003 PHARM REV CODE 250: Performed by: PSYCHIATRY & NEUROLOGY

## 2018-11-13 PROCEDURE — 80053 COMPREHEN METABOLIC PANEL: CPT

## 2018-11-13 PROCEDURE — 85025 COMPLETE CBC W/AUTO DIFF WBC: CPT

## 2018-11-13 PROCEDURE — 94761 N-INVAS EAR/PLS OXIMETRY MLT: CPT

## 2018-11-13 PROCEDURE — 84100 ASSAY OF PHOSPHORUS: CPT

## 2018-11-13 PROCEDURE — 63600175 PHARM REV CODE 636 W HCPCS: Performed by: STUDENT IN AN ORGANIZED HEALTH CARE EDUCATION/TRAINING PROGRAM

## 2018-11-13 PROCEDURE — 20000000 HC ICU ROOM

## 2018-11-13 RX ORDER — CEFAZOLIN SODIUM 1 G/3ML
2 INJECTION, POWDER, FOR SOLUTION INTRAMUSCULAR; INTRAVENOUS
Status: DISCONTINUED | OUTPATIENT
Start: 2018-11-13 | End: 2018-11-20

## 2018-11-13 RX ORDER — LIDOCAINE HYDROCHLORIDE 10 MG/ML
10 INJECTION INFILTRATION; PERINEURAL ONCE
Status: DISCONTINUED | OUTPATIENT
Start: 2018-11-13 | End: 2018-11-13

## 2018-11-13 RX ORDER — LIDOCAINE HYDROCHLORIDE 10 MG/ML
1 INJECTION, SOLUTION EPIDURAL; INFILTRATION; INTRACAUDAL; PERINEURAL ONCE
Status: COMPLETED | OUTPATIENT
Start: 2018-11-13 | End: 2018-11-13

## 2018-11-13 RX ORDER — DIPHENHYDRAMINE HYDROCHLORIDE 50 MG/ML
50 INJECTION INTRAMUSCULAR; INTRAVENOUS ONCE
Status: DISCONTINUED | OUTPATIENT
Start: 2018-11-13 | End: 2018-11-13

## 2018-11-13 RX ORDER — DIPHENHYDRAMINE HCL 25 MG
50 CAPSULE ORAL ONCE
Status: COMPLETED | OUTPATIENT
Start: 2018-11-13 | End: 2018-11-13

## 2018-11-13 RX ADMIN — LIDOCAINE HYDROCHLORIDE 10 MG: 10 INJECTION, SOLUTION EPIDURAL; INFILTRATION; INTRACAUDAL; PERINEURAL at 03:11

## 2018-11-13 RX ADMIN — HEPARIN SODIUM 5000 UNITS: 5000 INJECTION, SOLUTION INTRAVENOUS; SUBCUTANEOUS at 06:11

## 2018-11-13 RX ADMIN — DIPHENHYDRAMINE HYDROCHLORIDE 50 MG: 25 CAPSULE ORAL at 06:11

## 2018-11-13 RX ADMIN — HEPARIN SODIUM 5000 UNITS: 5000 INJECTION, SOLUTION INTRAVENOUS; SUBCUTANEOUS at 09:11

## 2018-11-13 RX ADMIN — HEPARIN SODIUM 5000 UNITS: 5000 INJECTION, SOLUTION INTRAVENOUS; SUBCUTANEOUS at 02:11

## 2018-11-13 RX ADMIN — CEFAZOLIN SODIUM 2 G: 2 SOLUTION INTRAVENOUS at 10:11

## 2018-11-13 RX ADMIN — SENNOSIDES AND DOCUSATE SODIUM 1 TABLET: 8.6; 5 TABLET ORAL at 09:11

## 2018-11-13 RX ADMIN — CEFAZOLIN SODIUM 2 G: 2 SOLUTION INTRAVENOUS at 02:11

## 2018-11-13 RX ADMIN — CEFAZOLIN 2 G: 1 INJECTION, POWDER, FOR SOLUTION INTRAMUSCULAR; INTRAVENOUS at 06:11

## 2018-11-13 RX ADMIN — ACETAMINOPHEN 650 MG: 325 TABLET, FILM COATED ORAL at 07:11

## 2018-11-13 NOTE — PROGRESS NOTES
Pt called RN into room with complaints of feeling wetness behind head. Upon assessment one grid insertion site noted to be leaking clear fluid. Neurosurgery Tami long MD gave orders to place dressing over site and he will be at bedside to assess. Dressing applied at 1945. Pt denies HA or dizziness. Will continue to monitor closely.

## 2018-11-13 NOTE — PROGRESS NOTES
MARCIO Stack with Neurosurgery notified of patient's dressing on grid sliding off of head. Informed that previous MD had ap[plied pressure dressing but now grid leads are beginning to show. PA states will come to bedside. Will continue to monitor.

## 2018-11-13 NOTE — PLAN OF CARE
Problem: Patient Care Overview  Goal: Plan of Care Review  Outcome: Ongoing (interventions implemented as appropriate)  POC reviewed with pt at 0500. Pt verbalized understanding. Questions and concerns addressed. Pt to be awake until 0400 for stress to induce seizures. No clinical seizure activity noted. Pt progressing toward goals. Will continue to monitor. See flowsheets for full assessment and VS info

## 2018-11-13 NOTE — PROGRESS NOTES
Neurosurgery paged to beside for drainage coming from grid insertion site. MD Tami states he will be at bedside to assess and redress. Pt neuro stable and remains alert and oriented. Will continue to monitor.

## 2018-11-13 NOTE — PROGRESS NOTES
MD Tami at bedside to assess grid site. States it does not look like it needs staples or a suture. MD to apply pressure dressing to site and reapply head wrap. Will continue top monitor closely.

## 2018-11-13 NOTE — PROGRESS NOTES
Neurosurgery paged to bedside for grid insertion site drainage. MD Tami states he will be at bedside to assess and redress. States he is finishing up in OR. Pt alert and oriented x4. Will continue to monitor.

## 2018-11-13 NOTE — PLAN OF CARE
No seizures overnight  Continue current care strategy  Bowel regimen  Afebrile, normal wbc  Npo for mapping today  SCD for DVT prophylaxis.

## 2018-11-13 NOTE — PROGRESS NOTES
"Neurosurgery paged multiple times and RN was told MD Tami to be at bedside multiple times and assess. Pt has not been seen at this time. Escalated to charge RN and charge RN paged second on call with Neurosurgery, MD Emily. [facility], MD Tato contacted MD Tami to come to bedside and he states "he is on his way and is 20 minutes away". Pt remains AOx4. Will continue to monitor.   "

## 2018-11-13 NOTE — PROGRESS NOTES
"[facility]  Neurosurgery  Progress Note    Subjective:     History of Present Illness: Pt is a 48 y.o. female who presents per referral by Dr. LISA Elam and the epilepsy team for evaluation for epilepsy surgery. Pt has history of partial intractable epilepsy and has failed at least 10 previous medications. Currently on dual therapy. EMU workup done in February localized pathology to left temporal lobe, with PET scan showing hypometabolism of mesial temporal lobe. DAWOOD scan also showed localization to left mesial temporal lobe. Pt states that she has endured seizures since 19 y.o. Pt currently on Lamictal. Pt states that she endures about 5 absence episodes per day with intermittent LOC. Pt notes one incident of "rolling" seizures for which she was brought to the ED.          Post-Op Info:  Procedure(s) (LRB):  CRANIOTOMY for grids and strips (Left)   5 Days Post-Op     Interval History: leaking around lead site, will suture today    Medications:  Continuous Infusions:    Scheduled Meds:   ceFAZolin (ANCEF) IVPB  2 g Intravenous Q8H    heparin (porcine)  5,000 Units Subcutaneous Q8H    lidocaine (PF) 10 mg/ml (1%)  1 mL Other Once    polyethylene glycol  17 g Oral Daily    senna-docusate 8.6-50 mg  1 tablet Oral BID     PRN Meds:acetaminophen, acetaminophen, HYDROcodone-acetaminophen, HYDROmorphone, metoclopramide HCl, midazolam, ondansetron, sodium chloride 0.9%     Review of Systems    Objective:     Weight: 106.6 kg (235 lb)  Body mass index is 39.11 kg/m².  Vital Signs (Most Recent):  Temp: 98.2 °F (36.8 °C) (11/13/18 1300)  Pulse: 75 (11/13/18 1500)  Resp: 17 (11/13/18 1500)  BP: 128/85 (11/13/18 1500)  SpO2: 97 % (11/13/18 1500) Vital Signs (24h Range):  Temp:  [98.2 °F (36.8 °C)-98.9 °F (37.2 °C)] 98.2 °F (36.8 °C)  Pulse:  [60-85] 75  Resp:  [12-26] 17  SpO2:  [94 %-98 %] 97 %  BP: (124-155)/(61-97) 128/85  Arterial Line BP: (131)/(83) 131/83     Date 11/13/18 0700 - 11/14/18 " 0659   Shift [id number] 8715-1383 24 Hour Total   INTAKE   P.O. 120   120   I.V.(mL/kg) 40(0.4) 5(0)  45(0.4)   IV Piggyback 50   50   Shift Total(mL/kg) 210(2) 5(0)  215(2)   OUTPUT   Urine(mL/kg/hr) 750(0.9)   750   Shift Total(mL/kg) 750(7)   750(7)   Weight (kg) 106.6 106.6 106.6 106.6                        Closed/Suction Drain 11/05/18 1128 Left Scalp Accordion 10 Fr. (Active)   Site Description Unable to view 11/6/2018  3:05 PM   Dressing Type Gauze 11/6/2018  3:05 PM   Dressing Status Clean;Dry;Intact 11/6/2018  3:05 PM   Status To bulb suction 11/6/2018  3:05 PM   Output (mL) 85 mL 11/6/2018  1:05 PM       Neurosurgery Physical Exam    -Alert and oriented x4  -PERRL, EOMI, face symmetrical, tongue midline, facial sensation symmetric, shoulder shrug full strength and symmetric  -Motor: 5/5 throughout the upper and lower extremites bilaterally  -sensation intact to light touch throughout        Significant Labs:  Recent Labs   Lab 11/12/18  0203 11/13/18  0212   * 120*    139   K 4.0 4.3    107   CO2 21* 23   BUN 10 11   CREATININE 0.7 0.7   CALCIUM 9.0 9.2   MG 1.8 2.1     Recent Labs   Lab 11/12/18  0203 11/13/18  0212   WBC 9.63 9.13   HGB 10.6* 10.8*   HCT 32.9* 34.0*    277     No results for input(s): LABPT, INR, APTT in the last 48 hours.  Microbiology Results (last 7 days)     ** No results found for the last 168 hours. **        All pertinent labs from the last 24 hours have been reviewed.    Significant Diagnostics:  I have reviewed all pertinent imaging results/findings within the past 24 hours.    Assessment/Plan:     * Temporal lobe epilepsy, intractable    49 yo female with history of seizures since age 19, now s/p  crani with subdural grid placement for further localization (11/5) and repositioning following  Pt induced malpositioning (11/8).    --Continue care per primary team.  --Will tentatively schedule removal on Monday 11/19  --Continue EEG monitoring  per epilepsy.  --We will continue to follow closely, please contact us with any questions or concerns.           Denis Covarrubias MD  Neurosurgery  [facility]

## 2018-11-13 NOTE — ASSESSMENT & PLAN NOTE
47 yo female with history of seizures since age 19, who presents to NICU s/p L temporal crani with subdural grid placement for further localization and possible resection.    Recommendations:  - Continuous vEEG monitoring with subdural grid electrodes in place  - Holding home Lamictal  - Benadryl x1 now, plan for sleep deprivation until 4 am with additional benadryl at 7 am tomorrow  - For any seizures: please push event button on EEG and call epileptologist on call prior to administration of abortive medication - Versed 2 mg IV PRN  - Recommend short acting opioids instead of long acting as needed for pain control  - Pain management per NCC  - Sleep deprive patient until 0400 11/13  - Cortical mapping tomorrow    Plan of care discussed with NCC team, family at bedside. Will continue to follow.

## 2018-11-13 NOTE — PLAN OF CARE
Problem: Patient Care Overview  Goal: Plan of Care Review  Outcome: Revised  POC reviewed with pt and family at 1400. Pt verbalized understanding. Questions and concerns addressed. No acute events today. Will given benadryl tonight and keep awake until 4am. Cortical mapping to occur tomorrow. Pt progressing toward goals. Will continue to monitor. See flowsheets for full assessment and VS info.

## 2018-11-13 NOTE — PROGRESS NOTES
Neurosurgery paged for leakage from grid insertion site. MD Tami states he is on the way to see patient. Supplies at bedside. Will continue to monitor.

## 2018-11-13 NOTE — ASSESSMENT & PLAN NOTE
- s/p craniotomy x2 for strip and grid placement  - Continuous vEEG monitoring with subdural grid electrodes in place  - Home lamotrigine discontinued per Epilepsy  - For any seizures: please push event button on EEG and call epileptologist on call prior to administration of abortive medication  - Continue midazolam 2mg IV q5min for motor seizures lasting greater than 5 minutes  - Continue Ancef 2g IV q8h  - Sleep deprive patient until 0400 11/14  - Benadryl 50mg po x1 this evening  - Cortical mapping tomorrow  - NPO at midnight

## 2018-11-13 NOTE — PROGRESS NOTES
[facility]  Neurocritical Care  Progress Note    Admit Date: 11/5/2018  Service Date: 11/13/2018  Length of Stay: 8    Subjective:     Chief Complaint: Temporal lobe epilepsy, intractable    History of Present Illness: Liza Arrieta is a 49 yo female with PMHx of partial intractable epilepsy who is being admitted to [facility] after L crani with subdural grid placement. Per chart review, she had her first seizure at age 19. At that time, she was treated with dilantin and she did well for approximately 15 years. Reports that she typically has absence seizures (5-6/day), but states she may have had two grand mal seizures decades ago.  The patient was recently admitted to EMU on 2/9/2018 where seizures were captured on EEG.  She has failed multiple medications and is now being admitted for post op management, continuous EEG.           Hospital Course: 11/5: Pt admitted to [facility] s/p L crani with subdural grid placement, continuous EEG  11/6: cEEG  11/7: Pulled one of her leads today. Sent for stat CTH showed electrodes have been repositioned. posterior infratemporal strips are no longer in place  11/9: s/p repeat crani for grid placement.  One abscence seizure lasting 5 seconds. Epilepsy plans to due cortical mapping this afternoon.  11/12: Continuing to hold AEDs. PRNs per epilepsy.  11/13: Plan for cortical mapping on 11/14    Interval History:  No acute events overnight.  Plan per epilepsy for cortical mapping tomorrow.      Review of Systems   Constitutional: Positive for fatigue. Negative for chills and fever.   Respiratory: Negative for cough and shortness of breath.    Cardiovascular: Negative for chest pain and leg swelling.   Gastrointestinal: Negative for nausea and vomiting.   Musculoskeletal: Negative for back pain and joint swelling.   Skin: Negative for pallor and rash.   Neurological: Positive for seizures and headaches (improving). Negative for facial asymmetry, speech difficulty and weakness.    Psychiatric/Behavioral: Negative for agitation and confusion.     Objective:     Vitals:  Temp: 98.2 °F (36.8 °C)  Pulse: 75  Rhythm: normal sinus rhythm  BP: 128/85  MAP (mmHg): 102  Resp: 17  SpO2: 97 %    Temp  Min: 98.2 °F (36.8 °C)  Max: 98.9 °F (37.2 °C)  Pulse  Min: 60  Max: 85  BP  Min: 124/97  Max: 155/67  MAP (mmHg)  Min: 88  Max: 112  Resp  Min: 12  Max: 26  SpO2  Min: 94 %  Max: 98 %    11/12 0701 - 11/13 0700  In: 660 [P.O.:510]  Out: 1450 [Urine:1450]   Unmeasured Output  Urine Occurrence: 1  Stool Occurrence: 0       Physical Exam   Constitutional: She is oriented to person, place, and time. She appears well-developed and well-nourished. No distress.   HENT:   S/p L temporal crani   Eyes: Conjunctivae and EOM are normal. Pupils are equal, round, and reactive to light.   Neck: Normal range of motion.   Cardiovascular: Normal rate, regular rhythm and normal heart sounds.   Pulmonary/Chest: Effort normal. No respiratory distress.   Abdominal: Soft. Bowel sounds are normal. There is no tenderness.   Musculoskeletal: Normal range of motion. She exhibits no deformity.   Neurological: She is alert and oriented to person, place, and time. No cranial nerve deficit.   Skin: Skin is warm and dry. She is not diaphoretic. No erythema.   Psychiatric: She has a normal mood and affect. Her speech is normal and behavior is normal. Judgment and thought content normal.       Medications:  Continuous Scheduled  ceFAZolin (ANCEF) IVPB 2 g Q8H   diphenhydrAMINE 50 mg Once   heparin (porcine) 5,000 Units Q8H   lidocaine (PF) 10 mg/ml (1%) 1 mL Once   polyethylene glycol 17 g Daily   senna-docusate 8.6-50 mg 1 tablet BID   PRN  acetaminophen 650 mg Q6H PRN   acetaminophen 650 mg Q6H PRN   HYDROcodone-acetaminophen 1 tablet Q4H PRN   HYDROmorphone 0.5 mg Q6H PRN   metoclopramide HCl 5 mg Q6H PRN   midazolam 2 mg Q5 Min PRN   ondansetron 8 mg Q6H PRN   sodium chloride 0.9% 3 mL PRN     Today I personally reviewed pertinent  medications, lines/drains/airways, laboratory results, notably:    Recent Labs   Lab 11/13/18  0212   WBC 9.13   RBC 4.31   HGB 10.8*   HCT 34.0*      MCV 79*   MCH 25.1*   MCHC 31.8*     Recent Labs   Lab 11/13/18  0212   CALCIUM 9.2   PROT 6.8      K 4.3   CO2 23      BUN 11   CREATININE 0.7   ALKPHOS 106   ALT 9*   AST 18   BILITOT 0.2       Diet  Diet Adult Regular (IDDSI Level 7)  Diet NPO Except for: Sips with Medication  Diet Adult Regular (IDDSI Level 7)  Diet NPO Except for: Sips with Medication          Assessment/Plan:     Neuro   * Temporal lobe epilepsy, intractable    - s/p craniotomy x2 for strip and grid placement  - Continuous vEEG monitoring with subdural grid electrodes in place  - Home lamotrigine discontinued per Epilepsy  - For any seizures: please push event button on EEG and call epileptologist on call prior to administration of abortive medication  - Continue midazolam 2mg IV q5min for motor seizures lasting greater than 5 minutes  - Continue Ancef 2g IV q8h  - Sleep deprive patient until 0400 11/14  - Benadryl 50mg po x1 this evening  - Cortical mapping tomorrow  - NPO at midnight             Cardiac/Vascular   Essential hypertension    - EKG NSR  - SBP goal <140           The patient is being Prophylaxed for:  Venous Thromboembolism with: Chemical  Stress Ulcer with: Not Applicable   Ventilator Pneumonia with: not applicable    Activity Orders          None        Full Code    Thomas Plummer MD  Neurocritical Care  [facility]

## 2018-11-13 NOTE — ASSESSMENT & PLAN NOTE
47 yo female with history of seizures since age 19, now s/p  crani with subdural grid placement for further localization (11/5) and repositioning following  Pt induced malpositioning (11/8).    --Continue care per primary team.  --Will tentatively schedule removal on Monday 11/19  --Continue EEG monitoring per epilepsy.  --We will continue to follow closely, please contact us with any questions or concerns.

## 2018-11-13 NOTE — SUBJECTIVE & OBJECTIVE
Interval History:  No acute events overnight.  Plan per epilepsy for cortical mapping tomorrow.      Review of Systems   Constitutional: Positive for fatigue. Negative for chills and fever.   Respiratory: Negative for cough and shortness of breath.    Cardiovascular: Negative for chest pain and leg swelling.   Gastrointestinal: Negative for nausea and vomiting.   Musculoskeletal: Negative for back pain and joint swelling.   Skin: Negative for pallor and rash.   Neurological: Positive for seizures and headaches (improving). Negative for facial asymmetry, speech difficulty and weakness.   Psychiatric/Behavioral: Negative for agitation and confusion.     Objective:     Vitals:  Temp: 98.2 °F (36.8 °C)  Pulse: 75  Rhythm: normal sinus rhythm  BP: 128/85  MAP (mmHg): 102  Resp: 17  SpO2: 97 %    Temp  Min: 98.2 °F (36.8 °C)  Max: 98.9 °F (37.2 °C)  Pulse  Min: 60  Max: 85  BP  Min: 124/97  Max: 155/67  MAP (mmHg)  Min: 88  Max: 112  Resp  Min: 12  Max: 26  SpO2  Min: 94 %  Max: 98 %    11/12 0701 - 11/13 0700  In: 660 [P.O.:510]  Out: 1450 [Urine:1450]   Unmeasured Output  Urine Occurrence: 1  Stool Occurrence: 0       Physical Exam   Constitutional: She is oriented to person, place, and time. She appears well-developed and well-nourished. No distress.   HENT:   S/p L temporal crani   Eyes: Conjunctivae and EOM are normal. Pupils are equal, round, and reactive to light.   Neck: Normal range of motion.   Cardiovascular: Normal rate, regular rhythm and normal heart sounds.   Pulmonary/Chest: Effort normal. No respiratory distress.   Abdominal: Soft. Bowel sounds are normal. There is no tenderness.   Musculoskeletal: Normal range of motion. She exhibits no deformity.   Neurological: She is alert and oriented to person, place, and time. No cranial nerve deficit.   Skin: Skin is warm and dry. She is not diaphoretic. No erythema.   Psychiatric: She has a normal mood and affect. Her speech is normal and behavior is normal.  Judgment and thought content normal.       Medications:  Continuous Scheduled  ceFAZolin (ANCEF) IVPB 2 g Q8H   diphenhydrAMINE 50 mg Once   heparin (porcine) 5,000 Units Q8H   lidocaine (PF) 10 mg/ml (1%) 1 mL Once   polyethylene glycol 17 g Daily   senna-docusate 8.6-50 mg 1 tablet BID   PRN  acetaminophen 650 mg Q6H PRN   acetaminophen 650 mg Q6H PRN   HYDROcodone-acetaminophen 1 tablet Q4H PRN   HYDROmorphone 0.5 mg Q6H PRN   metoclopramide HCl 5 mg Q6H PRN   midazolam 2 mg Q5 Min PRN   ondansetron 8 mg Q6H PRN   sodium chloride 0.9% 3 mL PRN     Today I personally reviewed pertinent medications, lines/drains/airways, laboratory results, notably:    Recent Labs   Lab 11/13/18 0212   WBC 9.13   RBC 4.31   HGB 10.8*   HCT 34.0*      MCV 79*   MCH 25.1*   MCHC 31.8*     Recent Labs   Lab 11/13/18 0212   CALCIUM 9.2   PROT 6.8      K 4.3   CO2 23      BUN 11   CREATININE 0.7   ALKPHOS 106   ALT 9*   AST 18   BILITOT 0.2       Diet  Diet Adult Regular (IDDSI Level 7)  Diet NPO Except for: Sips with Medication  Diet Adult Regular (IDDSI Level 7)  Diet NPO Except for: Sips with Medication

## 2018-11-13 NOTE — SUBJECTIVE & OBJECTIVE
Interval History: leaking around lead site, will suture today    Medications:  Continuous Infusions:    Scheduled Meds:   ceFAZolin (ANCEF) IVPB  2 g Intravenous Q8H    heparin (porcine)  5,000 Units Subcutaneous Q8H    lidocaine (PF) 10 mg/ml (1%)  1 mL Other Once    polyethylene glycol  17 g Oral Daily    senna-docusate 8.6-50 mg  1 tablet Oral BID     PRN Meds:acetaminophen, acetaminophen, HYDROcodone-acetaminophen, HYDROmorphone, metoclopramide HCl, midazolam, ondansetron, sodium chloride 0.9%     Review of Systems    Objective:     Weight: 106.6 kg (235 lb)  Body mass index is 39.11 kg/m².  Vital Signs (Most Recent):  Temp: 98.2 °F (36.8 °C) (11/13/18 1300)  Pulse: 75 (11/13/18 1500)  Resp: 17 (11/13/18 1500)  BP: 128/85 (11/13/18 1500)  SpO2: 97 % (11/13/18 1500) Vital Signs (24h Range):  Temp:  [98.2 °F (36.8 °C)-98.9 °F (37.2 °C)] 98.2 °F (36.8 °C)  Pulse:  [60-85] 75  Resp:  [12-26] 17  SpO2:  [94 %-98 %] 97 %  BP: (124-155)/(61-97) 128/85  Arterial Line BP: (131)/(83) 131/83     Date 11/13/18 0700 - 11/14/18 0659   Shift 1997-1689 [id number] 24 Hour Total   INTAKE   P.O. 120   120   I.V.(mL/kg) 40(0.4) 5(0)  45(0.4)   IV Piggyback 50   50   Shift Total(mL/kg) 210(2) 5(0)  215(2)   OUTPUT   Urine(mL/kg/hr) 750(0.9)   750   Shift Total(mL/kg) 750(7)   750(7)   Weight (kg) 106.6 106.6 106.6 106.6                        Closed/Suction Drain 11/05/18 1128 Left Scalp Accordion 10 Fr. (Active)   Site Description Unable to view 11/6/2018  3:05 PM   Dressing Type Gauze 11/6/2018  3:05 PM   Dressing Status Clean;Dry;Intact 11/6/2018  3:05 PM   Status To bulb suction 11/6/2018  3:05 PM   Output (mL) 85 mL 11/6/2018  1:05 PM       Neurosurgery Physical Exam    -Alert and oriented x4  -PERRL, EOMI, face symmetrical, tongue midline, facial sensation symmetric, shoulder shrug full strength and symmetric  -Motor: 5/5 throughout the upper and lower extremites bilaterally  -sensation intact to light touch  throughout        Significant Labs:  Recent Labs   Lab 11/12/18  0203 11/13/18  0212   * 120*    139   K 4.0 4.3    107   CO2 21* 23   BUN 10 11   CREATININE 0.7 0.7   CALCIUM 9.0 9.2   MG 1.8 2.1     Recent Labs   Lab 11/12/18  0203 11/13/18  0212   WBC 9.63 9.13   HGB 10.6* 10.8*   HCT 32.9* 34.0*    277     No results for input(s): LABPT, INR, APTT in the last 48 hours.  Microbiology Results (last 7 days)     ** No results found for the last 168 hours. **        All pertinent labs from the last 24 hours have been reviewed.    Significant Diagnostics:  I have reviewed all pertinent imaging results/findings within the past 24 hours.

## 2018-11-13 NOTE — PROGRESS NOTES
[facility]  Neurology-Epilepsy  Progress Note    Patient Name: Liza Arrieta  MRN: [id number]  Admission Date: 11/5/2018  Hospital Length of Stay: 8 days  Code Status: Full Code   Attending Provider: Any Ruiz MD  Primary Care Physician: LISA Elam MD   Principal Problem:Temporal lobe epilepsy, intractable    Subjective:     Hospital Course:   Patient admitted to [facility] after subdural grid placement/L crani on 11/5. Home Lamotrigine decreased to 100 mg BID.  11/6: No seizures since admission. Cortical mapping session completed.  11/7: Cortical mapping during am, multiple clinical seizures during session. Clinical and electrographic seizure 11/7 afternoon, during which patient had automatisms of hands, confusion, pulled at EEG leads disrupting connection.  11/8: No additional seizures overnight. Back to OR today for replacement of intracranial grid/leads.  11/9: No seizures after revision of intracranial grids in OR yesterday.   11/10: No electrographic seizures, cortical mapping  11/11: No electrographic seizures  11/12: No electrographic seizures  11/13: Plan for benadryl x1 today, sleep deprive tonight and benadryl again tomorrow am    Interval History: No clinical events overnight. Has not had seizure in several days. Plan for sleep deprivation + benadryl tonight, cortical mapping tomorrow. Leaking around lead site, neurosurgery planning to suture.    Current Facility-Administered Medications   Medication Dose Route Frequency Provider Last Rate Last Dose    acetaminophen suppository 650 mg  650 mg Rectal Q6H PRN Marc Garrett MD        acetaminophen tablet 650 mg  650 mg Oral Q6H PRN Marc Garrett MD   650 mg at 11/12/18 2041    ceFAZolin injection 2 g  2 g Intravenous Q8H Thomas Plummer MD        diphenhydrAMINE capsule 50 mg  50 mg Oral Once Magnolia Balderas MD        heparin (porcine) injection 5,000 Units  5,000 Units Subcutaneous Q8H Duane Joiner MD   5,000  Units at 11/13/18 1443    HYDROcodone-acetaminophen 5-325 mg per tablet 1 tablet  1 tablet Oral Q4H PRN Marc Garrett MD   1 tablet at 11/12/18 0213    HYDROmorphone injection 0.5 mg  0.5 mg Intravenous Q6H PRN Thomas Plummer MD        lidocaine (PF) 10 mg/ml (1%) injection 10 mg  1 mL Other Once Any Ruiz MD        metoclopramide HCl injection 5 mg  5 mg Intravenous Q6H PRN Duane Joiner MD        midazolam (VERSED) 1 mg/mL injection 2 mg  2 mg Intravenous Q5 Min PRN Frankie Dumont MD        ondansetron disintegrating tablet 8 mg  8 mg Oral Q6H PRN Duane Joiner MD   8 mg at 11/07/18 0703    polyethylene glycol packet 17 g  17 g Oral Daily Thomas Plummer MD   17 g at 11/12/18 1048    senna-docusate 8.6-50 mg per tablet 1 tablet  1 tablet Oral BID Thomas Plummer MD   1 tablet at 11/12/18 2042    sodium chloride 0.9% flush 3 mL  3 mL Intravenous PRN Ifeanyi Sanchez MD         Continuous Infusions:    Review of Systems   Constitutional: Positive for fatigue. Negative for chills and fever.   Respiratory: Negative for cough and shortness of breath.    Cardiovascular: Negative for chest pain and leg swelling.   Gastrointestinal: Negative for nausea and vomiting.   Musculoskeletal: Negative for back pain and joint swelling.   Skin: Negative for pallor and rash.   Neurological: Positive for seizures (none in several days). Negative for facial asymmetry, speech difficulty, weakness and headaches (improving).   Psychiatric/Behavioral: Negative for agitation and confusion.     Objective:     Vital Signs (Most Recent):  Temp: 98.2 °F (36.8 °C) (11/13/18 1300)  Pulse: 76 (11/13/18 1600)  Resp: (!) 25 (11/13/18 1600)  BP: (!) 150/76 (11/13/18 1600)  SpO2: 97 % (11/13/18 1600) Vital Signs (24h Range):  Temp:  [98.2 °F (36.8 °C)-98.9 °F (37.2 °C)] 98.2 °F (36.8 °C)  Pulse:  [60-85] 76  Resp:  [12-26] 25  SpO2:  [94 %-98 %] 97 %  BP: (124-155)/(61-97) 150/76  Arterial Line BP:  (131)/(83) 131/83     Weight: 106.6 kg (235 lb)  Body mass index is 39.11 kg/m².    Physical Exam   Constitutional: She is oriented to person, place, and time. She appears well-developed and well-nourished. No distress.   HENT:   Right Ear: External ear normal.   Left Ear: External ear normal.   S/p L temporal crani   Eyes: Conjunctivae and EOM are normal. Pupils are equal, round, and reactive to light.   Neck: Normal range of motion.   Pulmonary/Chest: Effort normal. No respiratory distress.   Musculoskeletal: Normal range of motion. She exhibits no deformity.   Neurological: She is alert and oriented to person, place, and time. No cranial nerve deficit.   Skin: Skin is warm and dry. She is not diaphoretic. No erythema.   Psychiatric: She has a normal mood and affect. Her speech is normal and behavior is normal. Judgment and thought content normal.       NEUROLOGICAL EXAMINATION:     MENTAL STATUS   Oriented to person, place, and time.   Attention: normal. Concentration: normal.   Speech: speech is normal   Level of consciousness: drowsy ,  arousable by verbal stimuli    CRANIAL NERVES     CN III, IV, VI   Pupils are equal, round, and reactive to light.  Extraocular motions are normal.     CN VII   Facial expression full, symmetric.     CN VIII   CN VIII normal.     CN IX, X   CN IX normal.   CN X normal.     CN XI   CN XI normal.     CN XII   CN XII normal.     MOTOR EXAM   Muscle bulk: normal  Overall muscle tone: normal       Moves all extremities spontaneously, no focal deficit noted  Assist in transfer to bedside commode without issue       Significant Labs: All pertinent lab results from the past 24 hours have been reviewed.    Significant Studies: I have reviewed all pertinent imaging results/findings within the past 24 hours.    Assessment and Plan:     * Temporal lobe epilepsy, intractable    47 yo female with history of seizures since age 19, who presents to NICU s/p L temporal crani with subdural grid  placement for further localization and possible resection.    Recommendations:  - Continuous vEEG monitoring with subdural grid electrodes in place  - Holding home Lamictal  - Benadryl x1 now, plan for sleep deprivation until 4 am with additional benadryl at 7 am tomorrow  - For any seizures: please push event button on EEG and call epileptologist on call prior to administration of abortive medication - Versed 2 mg IV PRN  - Recommend short acting opioids instead of long acting as needed for pain control  - Pain management per [facility]  - Sleep deprive patient until 0400 11/13  - Cortical mapping tomorrow    Plan of care discussed with NCC team, family at bedside. Will continue to follow.      Essential hypertension    - Goal SBP <140  - Management per [facility]          VTE Risk Mitigation (From admission, onward)        Ordered     heparin (porcine) injection 5,000 Units  Every 8 hours      11/05/18 1238     Place sequential compression device  Until discontinued      11/05/18 1238     IP VTE HIGH RISK PATIENT  Once      11/05/18 1238          Philly Foy PA-C  Neurology-Epilepsy  [facility]  Staff: Dr. Elam

## 2018-11-13 NOTE — SUBJECTIVE & OBJECTIVE
Interval History: No clinical events overnight. Has not had seizure in several days. Plan for sleep deprivation + benadryl tonight, cortical mapping tomorrow.    Current Facility-Administered Medications   Medication Dose Route Frequency Provider Last Rate Last Dose    acetaminophen suppository 650 mg  650 mg Rectal Q6H PRN Marc Garrett MD        acetaminophen tablet 650 mg  650 mg Oral Q6H PRN Marc Garrett MD   650 mg at 11/12/18 2041    ceFAZolin injection 2 g  2 g Intravenous Q8H Thomas Plummer MD        diphenhydrAMINE capsule 50 mg  50 mg Oral Once Magnolia Norma Balderas MD        heparin (porcine) injection 5,000 Units  5,000 Units Subcutaneous Q8H Duane Joiner MD   5,000 Units at 11/13/18 1443    HYDROcodone-acetaminophen 5-325 mg per tablet 1 tablet  1 tablet Oral Q4H PRN Marc Garrett MD   1 tablet at 11/12/18 0213    HYDROmorphone injection 0.5 mg  0.5 mg Intravenous Q6H PRN Thomas Plummer MD        lidocaine (PF) 10 mg/ml (1%) injection 10 mg  1 mL Other Once Any Ruiz MD        metoclopramide HCl injection 5 mg  5 mg Intravenous Q6H PRN Duane Joiner MD        midazolam (VERSED) 1 mg/mL injection 2 mg  2 mg Intravenous Q5 Min PRN Frankie Dumont MD        ondansetron disintegrating tablet 8 mg  8 mg Oral Q6H PRN Duane Joiner MD   8 mg at 11/07/18 0703    polyethylene glycol packet 17 g  17 g Oral Daily Thomas Plummer MD   17 g at 11/12/18 1048    senna-docusate 8.6-50 mg per tablet 1 tablet  1 tablet Oral BID Tohmas Plummer MD   1 tablet at 11/12/18 2042    sodium chloride 0.9% flush 3 mL  3 mL Intravenous PRN Ifeanyi Sanchez MD         Continuous Infusions:    Review of Systems   Constitutional: Positive for fatigue. Negative for chills and fever.   Respiratory: Negative for cough and shortness of breath.    Cardiovascular: Negative for chest pain and leg swelling.   Gastrointestinal: Negative for nausea and vomiting.   Musculoskeletal:  Negative for back pain and joint swelling.   Skin: Negative for pallor and rash.   Neurological: Positive for seizures (none in several days). Negative for facial asymmetry, speech difficulty, weakness and headaches (improving).   Psychiatric/Behavioral: Negative for agitation and confusion.     Objective:     Vital Signs (Most Recent):  Temp: 98.2 °F (36.8 °C) (11/13/18 1300)  Pulse: 76 (11/13/18 1600)  Resp: (!) 25 (11/13/18 1600)  BP: (!) 150/76 (11/13/18 1600)  SpO2: 97 % (11/13/18 1600) Vital Signs (24h Range):  Temp:  [98.2 °F (36.8 °C)-98.9 °F (37.2 °C)] 98.2 °F (36.8 °C)  Pulse:  [60-85] 76  Resp:  [12-26] 25  SpO2:  [94 %-98 %] 97 %  BP: (124-155)/(61-97) 150/76  Arterial Line BP: (131)/(83) 131/83     Weight: 106.6 kg (235 lb)  Body mass index is 39.11 kg/m².    Physical Exam   Constitutional: She is oriented to person, place, and time. She appears well-developed and well-nourished. No distress.   HENT:   Right Ear: External ear normal.   Left Ear: External ear normal.   S/p L temporal crani   Eyes: Conjunctivae and EOM are normal. Pupils are equal, round, and reactive to light.   Neck: Normal range of motion.   Pulmonary/Chest: Effort normal. No respiratory distress.   Musculoskeletal: Normal range of motion. She exhibits no deformity.   Neurological: She is alert and oriented to person, place, and time. No cranial nerve deficit.   Skin: Skin is warm and dry. She is not diaphoretic. No erythema.   Psychiatric: She has a normal mood and affect. Her speech is normal and behavior is normal. Judgment and thought content normal.       NEUROLOGICAL EXAMINATION:     MENTAL STATUS   Oriented to person, place, and time.   Attention: normal. Concentration: normal.   Speech: speech is normal   Level of consciousness: drowsy ,  arousable by verbal stimuli    CRANIAL NERVES     CN III, IV, VI   Pupils are equal, round, and reactive to light.  Extraocular motions are normal.     CN VII   Facial expression full,  symmetric.     CN VIII   CN VIII normal.     CN IX, X   CN IX normal.   CN X normal.     CN XI   CN XI normal.     CN XII   CN XII normal.     MOTOR EXAM   Muscle bulk: normal  Overall muscle tone: normal       Moves all extremities spontaneously, no focal deficit noted  Assist in transfer to bedside commode without issue       Significant Labs: All pertinent lab results from the past 24 hours have been reviewed.    Significant Studies: I have reviewed all pertinent imaging results/findings within the past 24 hours.

## 2018-11-14 LAB
ALBUMIN SERPL BCP-MCNC: 2.9 G/DL
ALP SERPL-CCNC: 111 U/L
ALT SERPL W/O P-5'-P-CCNC: 9 U/L
ANION GAP SERPL CALC-SCNC: 11 MMOL/L
AST SERPL-CCNC: 15 U/L
BASOPHILS # BLD AUTO: 0.02 K/UL
BASOPHILS NFR BLD: 0.2 %
BILIRUB SERPL-MCNC: 0.3 MG/DL
BUN SERPL-MCNC: 11 MG/DL
CALCIUM SERPL-MCNC: 9.6 MG/DL
CHLORIDE SERPL-SCNC: 105 MMOL/L
CO2 SERPL-SCNC: 23 MMOL/L
CREAT SERPL-MCNC: 0.7 MG/DL
DIFFERENTIAL METHOD: ABNORMAL
EOSINOPHIL # BLD AUTO: 0.1 K/UL
EOSINOPHIL NFR BLD: 1.4 %
ERYTHROCYTE [DISTWIDTH] IN BLOOD BY AUTOMATED COUNT: 15.9 %
EST. GFR  (AFRICAN AMERICAN): >60 ML/MIN/1.73 M^2
EST. GFR  (NON AFRICAN AMERICAN): >60 ML/MIN/1.73 M^2
GLUCOSE SERPL-MCNC: 112 MG/DL
HCT VFR BLD AUTO: 33.3 %
HGB BLD-MCNC: 10.7 G/DL
IMM GRANULOCYTES # BLD AUTO: 0.04 K/UL
IMM GRANULOCYTES NFR BLD AUTO: 0.4 %
LYMPHOCYTES # BLD AUTO: 2.9 K/UL
LYMPHOCYTES NFR BLD: 31.3 %
MAGNESIUM SERPL-MCNC: 2 MG/DL
MCH RBC QN AUTO: 24.7 PG
MCHC RBC AUTO-ENTMCNC: 32.1 G/DL
MCV RBC AUTO: 77 FL
MONOCYTES # BLD AUTO: 0.7 K/UL
MONOCYTES NFR BLD: 7.9 %
NEUTROPHILS # BLD AUTO: 5.4 K/UL
NEUTROPHILS NFR BLD: 58.8 %
NRBC BLD-RTO: 0 /100 WBC
PHOSPHATE SERPL-MCNC: 3.8 MG/DL
PLATELET # BLD AUTO: 285 K/UL
PMV BLD AUTO: 9.1 FL
POTASSIUM SERPL-SCNC: 4 MMOL/L
PROT SERPL-MCNC: 6.9 G/DL
RBC # BLD AUTO: 4.33 M/UL
SODIUM SERPL-SCNC: 139 MMOL/L
WBC # BLD AUTO: 9.2 K/UL

## 2018-11-14 PROCEDURE — 84100 ASSAY OF PHOSPHORUS: CPT

## 2018-11-14 PROCEDURE — 99233 SBSQ HOSP IP/OBS HIGH 50: CPT | Mod: ,,, | Performed by: PSYCHIATRY & NEUROLOGY

## 2018-11-14 PROCEDURE — 80053 COMPREHEN METABOLIC PANEL: CPT

## 2018-11-14 PROCEDURE — 25000003 PHARM REV CODE 250: Performed by: NURSE PRACTITIONER

## 2018-11-14 PROCEDURE — 25000003 PHARM REV CODE 250: Performed by: STUDENT IN AN ORGANIZED HEALTH CARE EDUCATION/TRAINING PROGRAM

## 2018-11-14 PROCEDURE — 63600175 PHARM REV CODE 636 W HCPCS: Performed by: STUDENT IN AN ORGANIZED HEALTH CARE EDUCATION/TRAINING PROGRAM

## 2018-11-14 PROCEDURE — 99233 SBSQ HOSP IP/OBS HIGH 50: CPT | Mod: ,,, | Performed by: NURSE PRACTITIONER

## 2018-11-14 PROCEDURE — C1751 CATH, INF, PER/CENT/MIDLINE: HCPCS

## 2018-11-14 PROCEDURE — 63600175 PHARM REV CODE 636 W HCPCS: Performed by: NEUROLOGICAL SURGERY

## 2018-11-14 PROCEDURE — 95951 PR EEG MONITORING/VIDEORECORD: CPT | Mod: 26,,, | Performed by: PSYCHIATRY & NEUROLOGY

## 2018-11-14 PROCEDURE — 94761 N-INVAS EAR/PLS OXIMETRY MLT: CPT

## 2018-11-14 PROCEDURE — 85025 COMPLETE CBC W/AUTO DIFF WBC: CPT

## 2018-11-14 PROCEDURE — 20000000 HC ICU ROOM

## 2018-11-14 PROCEDURE — 83735 ASSAY OF MAGNESIUM: CPT

## 2018-11-14 PROCEDURE — 76937 US GUIDE VASCULAR ACCESS: CPT

## 2018-11-14 PROCEDURE — 95951 HC EEG MONITORING/VIDEO RECORD: CPT

## 2018-11-14 PROCEDURE — 36569 INSJ PICC 5 YR+ W/O IMAGING: CPT

## 2018-11-14 RX ORDER — NICARDIPINE HYDROCHLORIDE 0.2 MG/ML
INJECTION INTRAVENOUS
Status: DISPENSED
Start: 2018-11-14 | End: 2018-11-15

## 2018-11-14 RX ORDER — DIPHENHYDRAMINE HCL 25 MG
50 CAPSULE ORAL ONCE
Status: COMPLETED | OUTPATIENT
Start: 2018-11-14 | End: 2018-11-14

## 2018-11-14 RX ADMIN — CEFAZOLIN 2 G: 1 INJECTION, POWDER, FOR SOLUTION INTRAMUSCULAR; INTRAVENOUS at 05:11

## 2018-11-14 RX ADMIN — CEFAZOLIN 2 G: 1 INJECTION, POWDER, FOR SOLUTION INTRAMUSCULAR; INTRAVENOUS at 01:11

## 2018-11-14 RX ADMIN — HEPARIN SODIUM 5000 UNITS: 5000 INJECTION, SOLUTION INTRAVENOUS; SUBCUTANEOUS at 09:11

## 2018-11-14 RX ADMIN — SENNOSIDES AND DOCUSATE SODIUM 1 TABLET: 8.6; 5 TABLET ORAL at 09:11

## 2018-11-14 RX ADMIN — DIPHENHYDRAMINE HYDROCHLORIDE 50 MG: 25 CAPSULE ORAL at 10:11

## 2018-11-14 RX ADMIN — ACETAMINOPHEN 650 MG: 325 TABLET, FILM COATED ORAL at 01:11

## 2018-11-14 RX ADMIN — HEPARIN SODIUM 5000 UNITS: 5000 INJECTION, SOLUTION INTRAVENOUS; SUBCUTANEOUS at 01:11

## 2018-11-14 RX ADMIN — CEFAZOLIN 2 G: 1 INJECTION, POWDER, FOR SOLUTION INTRAMUSCULAR; INTRAVENOUS at 09:11

## 2018-11-14 RX ADMIN — HEPARIN SODIUM 5000 UNITS: 5000 INJECTION, SOLUTION INTRAVENOUS; SUBCUTANEOUS at 06:11

## 2018-11-14 NOTE — PROGRESS NOTES
[facility]  Neurology-Epilepsy  Progress Note    Patient Name: Liza Arrieta  MRN: [id number]  Admission Date: 11/5/2018  Hospital Length of Stay: 9 days  Code Status: Full Code   Attending Provider: Yohannes De La Rosa MD  Primary Care Physician: LISA Elam MD   Principal Problem:Temporal lobe epilepsy, intractable    Subjective:     Hospital Course:   Patient admitted to [facility] after subdural grid placement/L crani on 11/5. Home Lamotrigine decreased to 100 mg BID.  11/6: No seizures since admission. Cortical mapping session completed.  11/7: Cortical mapping during am, multiple clinical seizures during session. Clinical and electrographic seizure 11/7 afternoon, during which patient had automatisms of hands, confusion, pulled at EEG leads disrupting connection.  11/8: No additional seizures overnight. Back to OR today for replacement of intracranial grid/leads.  11/9: No seizures after revision of intracranial grids in OR yesterday.   11/10: No electrographic seizures, cortical mapping  11/11: No electrographic seizures  11/12: No electrographic seizures  11/13: Plan for benadryl x1 today, sleep deprive tonight and benadryl again tomorrow am  11/14: Episode of brief staring with retained consciousness, no EEG correlate    Interval History: No acute events overnight. Plan for cortical/language mapping tomorrow.    Current Facility-Administered Medications   Medication Dose Route Frequency Provider Last Rate Last Dose    acetaminophen suppository 650 mg  650 mg Rectal Q6H PRN Marc Garrett MD        acetaminophen tablet 650 mg  650 mg Oral Q6H PRN Marc Garrett MD   650 mg at 11/14/18 0130    ceFAZolin injection 2 g  2 g Intravenous Q8H Thomas Plummer MD   2 g at 11/14/18 0911    heparin (porcine) injection 5,000 Units  5,000 Units Subcutaneous Q8H Duane Joiner MD   5,000 Units at 11/14/18 1334    HYDROcodone-acetaminophen 5-325 mg per tablet 1 tablet  1 tablet Oral Q4H PRN Marc  MD Tami   1 tablet at 11/12/18 0213    HYDROmorphone injection 0.5 mg  0.5 mg Intravenous Q6H PRN Thomas Plummer MD        metoclopramide HCl injection 5 mg  5 mg Intravenous Q6H PRN Duane Joiner MD        midazolam (VERSED) 1 mg/mL injection 2 mg  2 mg Intravenous Q5 Min PRN Frankie Dumont MD        ondansetron disintegrating tablet 8 mg  8 mg Oral Q6H PRN Duane Joiner MD   8 mg at 11/07/18 0703    polyethylene glycol packet 17 g  17 g Oral Daily Thomas Plummer MD   17 g at 11/12/18 1048    senna-docusate 8.6-50 mg per tablet 1 tablet  1 tablet Oral BID Thomas Plummer MD   1 tablet at 11/13/18 2129    sodium chloride 0.9% flush 3 mL  3 mL Intravenous PRN Ifeanyi Sanchez MD         Continuous Infusions:    Review of Systems   Constitutional: Positive for fatigue. Negative for chills and fever.   Respiratory: Negative for cough and shortness of breath.    Cardiovascular: Negative for chest pain and leg swelling.   Gastrointestinal: Negative for nausea and vomiting.   Musculoskeletal: Negative for back pain and joint swelling.   Skin: Negative for pallor and rash.   Neurological: Positive for seizures (none in several days). Negative for facial asymmetry, speech difficulty, weakness and headaches (improving).   Psychiatric/Behavioral: Negative for agitation and confusion.     Objective:     Vital Signs (Most Recent):  Temp: 99 °F (37.2 °C) (11/14/18 1500)  Pulse: 79 (11/14/18 1500)  Resp: 17 (11/14/18 1500)  BP: 114/69 (11/14/18 1500)  SpO2: 97 % (11/14/18 1500) Vital Signs (24h Range):  Temp:  [98.5 °F (36.9 °C)-99 °F (37.2 °C)] 99 °F (37.2 °C)  Pulse:  [60-95] 79  Resp:  [11-25] 17  SpO2:  [95 %-99 %] 97 %  BP: (112-154)/(61-92) 114/69  Arterial Line BP: ()/() 131/129     Weight: 106.6 kg (235 lb)  Body mass index is 39.11 kg/m².    Physical Exam   Constitutional: She is oriented to person, place, and time. She appears well-developed and well-nourished. No  distress.   HENT:   Right Ear: External ear normal.   Left Ear: External ear normal.   S/p L temporal crani   Eyes: Conjunctivae and EOM are normal. Pupils are equal, round, and reactive to light.   Neck: Normal range of motion.   Pulmonary/Chest: Effort normal. No respiratory distress.   Musculoskeletal: Normal range of motion. She exhibits no deformity.   Neurological: She is alert and oriented to person, place, and time. No cranial nerve deficit.   Skin: Skin is warm and dry. She is not diaphoretic. No erythema.   Psychiatric: She has a normal mood and affect. Her speech is normal and behavior is normal. Judgment and thought content normal.       NEUROLOGICAL EXAMINATION:     MENTAL STATUS   Oriented to person, place, and time.   Attention: normal. Concentration: normal.   Speech: speech is normal   Level of consciousness: drowsy ,  arousable by verbal stimuli    CRANIAL NERVES     CN III, IV, VI   Pupils are equal, round, and reactive to light.  Extraocular motions are normal.     CN VII   Facial expression full, symmetric.     CN VIII   CN VIII normal.     CN IX, X   CN IX normal.   CN X normal.     CN XI   CN XI normal.     CN XII   CN XII normal.     MOTOR EXAM   Muscle bulk: normal  Overall muscle tone: normal       Moves all extremities spontaneously, no focal deficit noted  Assist in transfer to bedside commode without issue       Significant Labs: All pertinent lab results from the past 24 hours have been reviewed.    Significant Studies: I have reviewed all pertinent imaging results/findings within the past 24 hours.    Assessment and Plan:     * Temporal lobe epilepsy, intractable    49 yo female with history of seizures since age 19, who presents to NICU s/p L temporal crani with subdural grid placement for further localization and possible resection.    Recommendations:  - Continuous vEEG monitoring with subdural grid electrodes in place  - Holding home Lamictal  - Sleep deprived last night, benadryl  x2. No noted seizures.   - For any seizures: please push event button on EEG and call epileptologist on call prior to administration of abortive medication - Versed 2 mg IV PRN  - Recommend short acting opioids instead of long acting as needed for pain control  - Pain management per NCC  - Cortical mapping with language delayed until tomorrow    Plan of care discussed with NCC team, family at bedside. Will continue to follow.      Essential hypertension    - Goal SBP <140  - Management per [facility]          VTE Risk Mitigation (From admission, onward)        Ordered     heparin (porcine) injection 5,000 Units  Every 8 hours      11/05/18 1238     Place sequential compression device  Until discontinued      11/05/18 1238     IP VTE HIGH RISK PATIENT  Once      11/05/18 1238          Philly Foy PA-C  Neurology-Epilepsy  [facility]  Staff: Dr. Elam

## 2018-11-14 NOTE — PROCEDURES
EPILEPSY MONITORING UNIT  EEG/VIDEO TELEMETRY REPORT    Liza Arrieta  [id number]  1970    DATE OF SERVICE: 11/8-9/18    DATE OF ADMISSION: 11/5/2018  6:04 AM    METHODOLOGY  Electroencephalographic (EEG) is recorded with intra-cranial electrodes placed according to the map below:    Old placement:        New placement on 11/8/18      The patient has had an 8 x 8 grid placed over the lateral surface of the left frontotemporal lobe along with a 2 x 4 strip curved anterior to the grid around the anterior temporal tip and a 1 x 4 strip placed anterior lateral to the grid and a 2 x 6 strip placed laterally to the grid and positioned underneath the inferior surface of the temporal lobe    86 channels of digital signal are simultaneously recorded and also including EKG, EMG  and/or eye movement monitors.  Recording band pass was 0.1 to 512 hz.  Digital video recording of the patient is simultaneously recorded with the EEG.  The patient is instructed report clinical symptoms which may occur during the recording session.  EEG and video recording is stored and archived in digital format. Activation procedures which include photic stimulation, hyperventilation and instructing patients to perform simple task are done in selected patients.         The EEG is displayed on a monitor screen and can be reformatted into different montages for evaluation.  The entire recoding is submitted for computer assisted analysis to detect spike and electrographic seizure activity.  The entire recording is visually reviewed and the times identified by computer analysis as being spikes or seizures are reviewed again.  Compresses spectral analysis (CSA) is also performed on the activity recorded from each individual channel.  This is displayed as a power display of frequencies from 0 to 30 Hz over time.   The CSA analysis is done and displayed continuously.  This is reviewed for asymmetries in power between homologous areas of the scalp and for  presence of changes in power which canbe seen when seizures occur.  Sections of suspected abnormalities on the CSA is then compared with the original EEG recording.      [employer] software was also utilized in the review of this study.  This software suite analyzes the EEG recording in multiple domains.  Coherence and rhythmicity is computed to identify EEG sections which may contain organized seizures.  Each channel undergoes analysis to detect presence of spike and sharp waves which have special and morphological characteristic of epileptic activity.  The routine EEG recording is converted from spacial into frequency domain.  This is then displayed comparing homologous areas to identify areas of significant asymmetry.  Algorithm to identify non-cortically generated artifact is used to separate eye movement, EMG and other artifact from the EEG.      ELECTROENCEPHALOGRAM  Recording Times  Start on 11/8/18, 07:00  Break: 13:19 - 20:59 = 7:40  Stop on 11/9/18, 07:00    A total of 16 hours and 20 minutes of EEG/Video telemetry was recorded.    FINDINGS:  Several leads were not recording during 11/8/18, 07:00 - 13:19 resulting from patient dislodging the electrodes during post-ictal phase.  Electrodes C33-48, 52-56 as well as 73-88 - of the old placement - were not recording during this period     The following are with new electrode placement:  INTERICTAL:  Frequent inter-ictal spikes were seen maximally in C19,20,21 as well as C10,11 and 12. In addition, frequent spikes were also noted in C36, 37 and C44,45. These were noted to be prolonged at times, lasting > 30 seconds at times.      Frequent to abundant spikes were also seen in C74,75,76 and 84-85.                 ICTAL: none    CLINICAL DESCRIPTION OF EVENTS/SEIZURES:  One push button event (?accidental) at 08:01:46 was not associated with any electrographic or clinical correlate. The patient appeared to be resting with her eyes closed.    FINAL  SUMMARY:  ELECTROENCEPHALOGRAM   Interictal: frequent to abundant spikes arising from the lateral and inferior temporal regions   Ictal: none    CLINICAL SEIZURE/EVENT: n/a     Final Impression:   This intra-cranial EEG recording during 11/8-9/18 revealed frequent to abundant inter-ictal activity arising from the left lateral and inferior temporal regions.  No seizures were recorded during this period.     Catarina Watts MD, CAT(), MJ BURKS.  Neurology-Epilepsy.  [facility].

## 2018-11-14 NOTE — SUBJECTIVE & OBJECTIVE
Medications:  Continuous Infusions:    Scheduled Meds:   ceFAZolin (ANCEF) IVPB  2 g Intravenous Q8H    heparin (porcine)  5,000 Units Subcutaneous Q8H    polyethylene glycol  17 g Oral Daily    senna-docusate 8.6-50 mg  1 tablet Oral BID     PRN Meds:acetaminophen, acetaminophen, HYDROcodone-acetaminophen, HYDROmorphone, metoclopramide HCl, midazolam, ondansetron, sodium chloride 0.9%     Review of Systems    Objective:     Weight: 106.6 kg (235 lb)  Body mass index is 39.11 kg/m².  Vital Signs (Most Recent):  Temp: 98.7 °F (37.1 °C) (11/14/18 1100)  Pulse: 66 (11/14/18 1100)  Resp: 19 (11/14/18 1100)  BP: 131/69 (11/14/18 1100)  SpO2: 98 % (11/14/18 1100) Vital Signs (24h Range):  Temp:  [98.2 °F (36.8 °C)-99 °F (37.2 °C)] 98.7 °F (37.1 °C)  Pulse:  [60-95] 66  Resp:  [11-25] 19  SpO2:  [95 %-99 %] 98 %  BP: (112-154)/(61-97) 131/69  Arterial Line BP: ()/(58-81) 122/76     Date 11/14/18 0700 - 11/15/18 0659   Shift 4103-9307 6024-1079 6856-6134 24 Hour Total   INTAKE   P.O. 200   200   Shift Total(mL/kg) 200(1.9)   200(1.9)   OUTPUT   Urine(mL/kg/hr) 375   375   Shift Total(mL/kg) 375(3.5)   375(3.5)   Weight (kg) 106.6 106.6 106.6 106.6              Resp Rate Total:  [11 br/min-20 br/min] 19 br/min         Closed/Suction Drain 11/05/18 1128 Left Scalp Accordion 10 Fr. (Active)   Site Description Unable to view 11/6/2018  3:05 PM   Dressing Type Gauze 11/6/2018  3:05 PM   Dressing Status Clean;Dry;Intact 11/6/2018  3:05 PM   Status To bulb suction 11/6/2018  3:05 PM   Output (mL) 85 mL 11/6/2018  1:05 PM       Neurosurgery Physical Exam    -Alert and oriented x4  -PERRL, EOMI, face symmetrical, tongue midline, facial sensation symmetric, shoulder shrug full strength and symmetric  -Motor: 5/5 throughout the upper and lower extremites bilaterally  -sensation intact to light touch throughout        Significant Labs:  Recent Labs   Lab 11/13/18  0212 11/14/18  0236   * 112*    139   K 4.3  4.0    105   CO2 23 23   BUN 11 11   CREATININE 0.7 0.7   CALCIUM 9.2 9.6   MG 2.1 2.0     Recent Labs   Lab 11/13/18  0212 11/14/18  0236   WBC 9.13 9.20   HGB 10.8* 10.7*   HCT 34.0* 33.3*    285     No results for input(s): LABPT, INR, APTT in the last 48 hours.  Microbiology Results (last 7 days)     ** No results found for the last 168 hours. **        All pertinent labs from the last 24 hours have been reviewed.    Significant Diagnostics:  I have reviewed all pertinent imaging results/findings within the past 24 hours.

## 2018-11-14 NOTE — PLAN OF CARE
"Problem: Patient Care Overview  Goal: Plan of Care Review  Outcome: Ongoing (interventions implemented as appropriate)  VS and assessment per flow sheet. Pt had multiple "spells" (3 witnessed) of staring off that would last approx 15-30 seconds. Event button pressed each time. Per MD Tate mapping planned for tomorrow morning. Per MD pt allowed to sleep regularly tonight and can remain on regular diet as ordered. Pt remains free from injury. Patient progressing towards goals as tolerated. Plan of care reviewed with patient and family. All questions and concerns addressed. Will continue to monitor.         "

## 2018-11-14 NOTE — PROGRESS NOTES
"[facility]  Neurosurgery  Progress Note    Subjective:     History of Present Illness: Pt is a 48 y.o. female who presents per referral by Dr. LISA Elam and the epilepsy team for evaluation for epilepsy surgery. Pt has history of partial intractable epilepsy and has failed at least 10 previous medications. Currently on dual therapy. EMU workup done in February localized pathology to left temporal lobe, with PET scan showing hypometabolism of mesial temporal lobe. DAWOOD scan also showed localization to left mesial temporal lobe. Pt states that she has endured seizures since 19 y.o. Pt currently on Lamictal. Pt states that she endures about 5 absence episodes per day with intermittent LOC. Pt notes one incident of "rolling" seizures for which she was brought to the ED.          Post-Op Info:  Procedure(s) (LRB):  CRANIOTOMY for grids and strips (Left)   6 Days Post-Op         Medications:  Continuous Infusions:    Scheduled Meds:   ceFAZolin (ANCEF) IVPB  2 g Intravenous Q8H    heparin (porcine)  5,000 Units Subcutaneous Q8H    polyethylene glycol  17 g Oral Daily    senna-docusate 8.6-50 mg  1 tablet Oral BID     PRN Meds:acetaminophen, acetaminophen, HYDROcodone-acetaminophen, HYDROmorphone, metoclopramide HCl, midazolam, ondansetron, sodium chloride 0.9%     Review of Systems    Objective:     Weight: 106.6 kg (235 lb)  Body mass index is 39.11 kg/m².  Vital Signs (Most Recent):  Temp: 98.7 °F (37.1 °C) (11/14/18 1100)  Pulse: 66 (11/14/18 1100)  Resp: 19 (11/14/18 1100)  BP: 131/69 (11/14/18 1100)  SpO2: 98 % (11/14/18 1100) Vital Signs (24h Range):  Temp:  [98.2 °F (36.8 °C)-99 °F (37.2 °C)] 98.7 °F (37.1 °C)  Pulse:  [60-95] 66  Resp:  [11-25] 19  SpO2:  [95 %-99 %] 98 %  BP: (112-154)/(61-97) 131/69  Arterial Line BP: ()/(58-81) 122/76     Date 11/14/18 0700 - 11/15/18 0659   Shift 8581-1200 5843-4607 7098-6482 24 Hour Total   INTAKE   P.O. 200   200   Shift Total(mL/kg) " 200(1.9)   200(1.9)   OUTPUT   Urine(mL/kg/hr) 375   375   Shift Total(mL/kg) 375(3.5)   375(3.5)   Weight (kg) 106.6 106.6 106.6 106.6              Resp Rate Total:  [11 br/min-20 br/min] 19 br/min         Closed/Suction Drain 11/05/18 1128 Left Scalp Accordion 10 Fr. (Active)   Site Description Unable to view 11/6/2018  3:05 PM   Dressing Type Gauze 11/6/2018  3:05 PM   Dressing Status Clean;Dry;Intact 11/6/2018  3:05 PM   Status To bulb suction 11/6/2018  3:05 PM   Output (mL) 85 mL 11/6/2018  1:05 PM       Neurosurgery Physical Exam    -Alert and oriented x4  -PERRL, EOMI, face symmetrical, tongue midline, facial sensation symmetric, shoulder shrug full strength and symmetric  -Motor: 5/5 throughout the upper and lower extremites bilaterally  -sensation intact to light touch throughout        Significant Labs:  Recent Labs   Lab 11/13/18 0212 11/14/18  0236   * 112*    139   K 4.3 4.0    105   CO2 23 23   BUN 11 11   CREATININE 0.7 0.7   CALCIUM 9.2 9.6   MG 2.1 2.0     Recent Labs   Lab 11/13/18 0212 11/14/18  0236   WBC 9.13 9.20   HGB 10.8* 10.7*   HCT 34.0* 33.3*    285     No results for input(s): LABPT, INR, APTT in the last 48 hours.  Microbiology Results (last 7 days)     ** No results found for the last 168 hours. **        All pertinent labs from the last 24 hours have been reviewed.    Significant Diagnostics:  I have reviewed all pertinent imaging results/findings within the past 24 hours.    Assessment/Plan:     * Temporal lobe epilepsy, intractable    47 yo female with history of seizures since age 19, now s/p  crani with subdural grid placement for further localization (11/5) and repositioning following pt induced malpositioning (11/8).    No acute events overnight.    --Continue care per primary team.  --Continue continuous subdural electrocorticography per epilepsy.  --Continue prophylactic antibiotics while subdural grids in place.  --Pt is tentatively booked for  removal of grids this Monday (11/19).  --Please keep pt NPO midnight Sunday night / Monday morning.  --Please hold chemical DVT prophylaxis Jass night / Monday morning.  --Will consent pt prior to procedure.  --We will continue to follow closely, please contact us with any questions or concerns.           Marc Garrett MD  Neurosurgery  [facility]

## 2018-11-14 NOTE — ASSESSMENT & PLAN NOTE
49 yo female with history of seizures since age 19, who presents to NICU s/p L temporal crani with subdural grid placement for further localization and possible resection.    Recommendations:  - Continuous vEEG monitoring with subdural grid electrodes in place  - Holding home Lamictal  - Sleep deprived last night, benadryl x2. No noted seizures.   - For any seizures: please push event button on EEG and call epileptologist on call prior to administration of abortive medication - Versed 2 mg IV PRN  - Recommend short acting opioids instead of long acting as needed for pain control  - Pain management per NCC  - Cortical mapping with language delayed until tomorrow    Plan of care discussed with NCC team, family at bedside. Will continue to follow.

## 2018-11-14 NOTE — ASSESSMENT & PLAN NOTE
- s/p craniotomy x2 for strip and grid placement  - Continuous vEEG monitoring with subdural grid electrodes in place  - Home lamotrigine discontinued per Epilepsy  - For any seizures: please push event button on EEG and call epileptologist on call prior to administration of abortive medication  - Continue midazolam 2mg IV q5min for motor seizures lasting greater than 5 minutes  - Continue Ancef 2g IV q8h  - Sleep deprive patient until 0400 11/14  - Benadryl 50mg po x1 this evening and in am 11/14  - Cortical mapping tomorrow  - Ok for patient to eat.

## 2018-11-14 NOTE — PROGRESS NOTES
Pt states she is having a seizure, RN observes fixed, blank stare only. Pt is still verbal and able to converse during episode. Red button on cEEG pressed, NCC team aware, lasting around 30 seconds. VSS. Will continue to monitor closely.

## 2018-11-14 NOTE — PROGRESS NOTES
"Pt had "spell" of starting off for 5-10 seconds at 1005. VSS throughout event. Event button pressed. MARCIO Fraga with Epilepsy aware of pt episode.  "

## 2018-11-14 NOTE — PROCEDURES
DATE OF PROCEDURE:  11/10/2018    EEG #:  [id number], [id number], [id number], and [id number].    REQUESTING PHYSICIAN:  Dr. Senior.    LOCATION OF SERVICE:  [facility].    EPILEPSY MONITORING UNIT  EEG/VIDEO TELEMETRY REPORT      METHODOLOGY:   Electroencephalographic (EEG) is recorded with electrodes placed   according to the International 10-20 placement system.  Thirty Two (32) channels   of digital signal, including T1 and T2 electrodes, are simultaneously recorded   from the scalp and may also include EKG, EMG and/or eye movement monitors.    Recording band pass was 0.1 to 512 Hz.  Digital video recording of the patient   is simultaneously recorded with the EEG.  The patient is instructed to report   clinical symptoms which may occur during the recording session.  EEG and video   recording are stored and archived in digital format. Activation procedures,   which include photic stimulation, hyperventilation and instructing patients to   perform simple tasks, are done in selected patients.   The EEG is displayed on a monitor screen and can be reformatted into different   montages for evaluation.  The entire recoding is submitted for computer-assisted   analysis to detect spike and electrographic seizure activity.  The entire   recording is visually reviewed, and the times identified by computer analysis as   being spikes or seizures are reviewed again.  Compressesed spectral analysis   (CSA) is also performed on the activity recorded from each individual channel.    This is displayed as a power display of frequencies from 0 to 30 Hz over time.     The CSA analysis is done and displayed continuously.  This is reviewed for   asymmetries in power between homologous areas of the scalp and for presence of   changes in power which can be seen when seizures occur.  Sections of suspected   abnormalities on the CSA are then compared with the original EEG recording.     [employer] software was also utilized in the review of this  study.  This software   suite analyzes the EEG recording in multiple domains.  Coherence and rhythmicity   are computed to identify EEG sections which may contain organized seizures.    Each channel undergoes analysis to detect presence of spike and sharp waves   which have special and morphological characteristics of epileptic activity.  The   routine EEG recording is converted from special into frequency domain.  This is   then displayed comparing homologous areas to identify areas of significant   asymmetry.  Algorithm to identify non-cortically generated artifact is used to   separate artifact from the EEG.      RECORDING TIMES:  Start on 11/10/2018 at 07:00.  End on 11/14/2018 at 07:00.    A total of 96 hours of EEG monitoring was obtained.    SEIZURES/EVENTS RECORDED:  None.    EEG FINDINGS:  Recording was obtained while the patient was in the Neuro-ICU.    She had implanted grids and strip electrodes.  A similar pattern of findings   over the four days of recording.  From the grid, there is predominantly a   mixture of fast and midrange beta activity seen in rows 1, 2, 3, 4 and 5.  No   epileptic activity was recorded from this area.  No electroclinical seizures   were recorded.  Epileptic activity was recorded throughout the recording.  There   was a pattern of polyspike/wave and was most prominent was from the 1 x 8 strip and the 2 x 4   strip electrode.  There were frequent bursts of polyspike slow wave noted   synchronously in those two electrodes.  Separate from that, there was an   active focus noted involving the 6, 7 and at times the 8 row in electrodes 17,   18, 9 and 10, 1 and 2.  At other times, there were low amplitude runs of spikes   from the inferior row of grid involving electrodes 7.  Occasionally, spike wave   burst involving the strips of preceding  run of spikes from the inferior portion   of the grid.  At other times, a spike wave discharge was noted from the   anterior by 2 x 4 strip  involving contact 69.  Synchronous with the   polyspikes and slow wave bursts were noted in the 1 x 8 strip and the 2 x 4   infratemporal strip.  Bursts of polyspikes were seen somewhat delayed and   present in the posterior portion of the 6, 7 and 8 of the grid.    IMPRESSION:  Markedly abnormal EEG with frequent discharges noted, most   prominently from the inferior temporal electrodes along with the 1 x 8 strip.  From the strips, 74, 75, 76 were most active and was adjacent to the 2 x 4 inferior temporal strip.  Sharp wave activity was also noted in the anterior   edge of the grid and rows 6, 7 and 8 as well in the posterior inferior portion   of the grid.  No ictal activity was recorded.        /ls [id number] blank(s)        RR/HN  dd: 11/14/2018 14:01:21 (CST)  td: 11/14/2018 14:54:11 (CST)  Doc ID   #[id number]  Job ID #[id number]    CC:

## 2018-11-14 NOTE — PLAN OF CARE
"Problem: Patient Care Overview  Goal: Plan of Care Review  Outcome: Ongoing (interventions implemented as appropriate)  POC reviewed with pt at 0500. Pt verbalized understanding. Questions and concerns addressed. No acute events overnight. Pt progressing toward goals. Patient remained sleep deprived until 0400. Patient had one "spell" of staring off for 15 seconds, event button pressed. Will continue to monitor. See flowsheets for full assessment and VS info       "

## 2018-11-14 NOTE — PROGRESS NOTES
[facility]  Neurocritical Care  Progress Note    Admit Date: 11/5/2018  Service Date: 11/14/2018  Length of Stay: 9    Subjective:     Chief Complaint: Temporal lobe epilepsy, intractable    History of Present Illness: Liza Arrieta is a 49 yo female with PMHx of partial intractable epilepsy who is being admitted to [facility] after L crani with subdural grid placement. Per chart review, she had her first seizure at age 19. At that time, she was treated with dilantin and she did well for approximately 15 years. Reports that she typically has absence seizures (5-6/day), but states she may have had two grand mal seizures decades ago.  The patient was recently admitted to EMU on 2/9/2018 where seizures were captured on EEG.  She has failed multiple medications and is now being admitted for post op management, continuous EEG.             Hospital Course: 11/5: Pt admitted to [facility] s/p L crani with subdural grid placement, continuous EEG  11/6: cEEG  11/7: Pulled one of her leads today. Sent for stat CTH showed electrodes have been repositioned. posterior infratemporal strips are no longer in place  11/9: s/p repeat crani for grid placement.  One abscence seizure lasting 5 seconds. Epilepsy plans to due cortical mapping this afternoon.  11/12: Continuing to hold AEDs. PRNs per epilepsy.  11/13: Plan for cortical mapping on 11/14 11/14  One seizure overnight and 2 seizures noted today. remains on EEG. Epilepsy to do cortical mapping this aftrnoon    Interval History: One seizure overnight and 2 seizures noted today. remains on EEG. Epilepsy to do cortical mapping this aftrnoon      Review of Systems: + absence seizures  Constitutional: Denies fevers or chills.  Pulmonary: Denies shortness of breath or cough.  Cardiology: Denies chest pain or palpitations.  GI: Denies abdominal pain or constipation.  Neurologic: Denies new weakness,  headache, or paresthesias.      Vitals:   Temp: 98.7 °F (37.1 °C)  Pulse:  72  Rhythm: normal sinus rhythm  BP: 117/65  MAP (mmHg): 86  Resp: 20  SpO2: 97 %  O2 Device (Oxygen Therapy): room air    Temp  Min: 98.5 °F (36.9 °C)  Max: 99 °F (37.2 °C)  Pulse  Min: 60  Max: 95  BP  Min: 112/63  Max: 154/92  MAP (mmHg)  Min: 82  Max: 114  Resp  Min: 11  Max: 25  SpO2  Min: 95 %  Max: 99 %    11/13 0701 - 11/14 0700  In: 495 [P.O.:360; I.V.:85]  Out: 1700 [Urine:1700]   Unmeasured Output  Urine Occurrence: 1  Stool Occurrence: 1     Examination:   Constitutional: Well-nourished and -developed. No apparent distress.   Eyes: Conjunctiva clear, anicteric. Lids no lesions.  Head/Ears/Nose/Mouth/Throat/Neck: Moist mucous membranes. External ears, nose atraumatic.   Cardiovascular: Regular rhythm. No murmurs. No leg edema.  Respiratory: Comfortable respirations. Clear to auscultation.  Gastrointestinal: No hernia. Soft, nondistended, nontender. + bowel sounds.    Neurologic:  -GCS [id number]  -Alert. Oriented to person, place, and time. Speech fluent. Follows commands.  -Cranial nerves II-XII intact PERRL 3+ EOMI.+ shoulder shrug. Facial symmetry. + + cough,gag  -Motor 5/5 all extremities moves spontaneously and to command  -Sensation bilat intact to all extremities      Medications:   Continuous Scheduled  ceFAZolin (ANCEF) IVPB 2 g Q8H   heparin (porcine) 5,000 Units Q8H   polyethylene glycol 17 g Daily   senna-docusate 8.6-50 mg 1 tablet BID   PRN  acetaminophen 650 mg Q6H PRN   acetaminophen 650 mg Q6H PRN   HYDROcodone-acetaminophen 1 tablet Q4H PRN   HYDROmorphone 0.5 mg Q6H PRN   metoclopramide HCl 5 mg Q6H PRN   midazolam 2 mg Q5 Min PRN   ondansetron 8 mg Q6H PRN   sodium chloride 0.9% 3 mL PRN      Today I independently reviewed pertinent medications, lines/drains/airways, imaging, laboratory results, microbiology results, notably:     ISTAT: No results for input(s): PH, PCO2, PO2, POCSATURATED, HCO3, BE, POCNA, POCK, POCTCO2, POCGLU, POCICA, POCLAC, SAMPLE in the last 24 hours.   Chem: Recent  Labs   Lab 11/14/18  0236      K 4.0      CO2 23   *   BUN 11   CREATININE 0.7   ESTGFRAFRICA >60.0   EGFRNONAA >60.0   CALCIUM 9.6   MG 2.0   PHOS 3.8   ANIONGAP 11   PROT 6.9   ALBUMIN 2.9*   BILITOT 0.3   ALKPHOS 111   AST 15   ALT 9*     Heme: Recent Labs   Lab 11/14/18  0236   WBC 9.20   HGB 10.7*   HCT 33.3*        Endo: No results for input(s): POCTGLUCOSE in the last 24 hours.   Assessment/Plan:     Neuro   * Temporal lobe epilepsy, intractable    - s/p craniotomy x2 for strip and grid placement  - Continuous vEEG monitoring with subdural grid electrodes in place  - Home lamotrigine discontinued per Epilepsy  - For any seizures: please push event button on EEG and call epileptologist on call prior to administration of abortive medication  - Continue midazolam 2mg IV q5min for motor seizures lasting greater than 5 minutes  - Continue Ancef 2g IV q8h  - Sleep deprive patient until 0400 11/14  - Benadryl 50mg po x1 this evening and in am 11/14  - Cortical mapping tomorrow  - Ok for patient to eat.             Cardiac/Vascular   Essential hypertension    - EKG NSR  - SBP goal <140       Endocrine   Class 2 obesity with body mass index (BMI) of 38.0 to 38.9 in adult    Body mass index is 39.11 kg/m².             The patient is being Prophylaxed for:  Venous Thromboembolism with: Mechanical or Chemical  Stress Ulcer with: None  Ventilator Pneumonia with: not applicable    Activity Orders          None        Full Code   I have spent 35 min with this patient, with over 50% of this time spent coordinating care and speaking with the family    Elsa George NP  Neurocritical Care  [facility]

## 2018-11-14 NOTE — PROCEDURES
EPILEPSY MONITORING UNIT  EEG/VIDEO TELEMETRY REPORT    Liza Arrieta  [id number]  1970    DATE OF SERVICE: 11/9-10/18    DATE OF ADMISSION: 11/5/2018  6:04 AM    METHODOLOGY  Electroencephalographic (EEG) is recorded with intra-cranial electrodes placed according to the map below:    Old placement:        New placement on 11/8/18      The patient has had an 8 x 8 grid placed over the lateral surface of the left frontotemporal lobe along with a 2 x 4 strip curved anterior to the grid around the anterior temporal tip and a 1 x 4 strip placed anterior lateral to the grid and a 2 x 6 strip placed laterally to the grid and positioned underneath the inferior surface of the temporal lobe    86 channels of digital signal are simultaneously recorded and also including EKG, EMG  and/or eye movement monitors.  Recording band pass was 0.1 to 512 hz.  Digital video recording of the patient is simultaneously recorded with the EEG.  The patient is instructed report clinical symptoms which may occur during the recording session.  EEG and video recording is stored and archived in digital format. Activation procedures which include photic stimulation, hyperventilation and instructing patients to perform simple task are done in selected patients.         The EEG is displayed on a monitor screen and can be reformatted into different montages for evaluation.  The entire recoding is submitted for computer assisted analysis to detect spike and electrographic seizure activity.  The entire recording is visually reviewed and the times identified by computer analysis as being spikes or seizures are reviewed again.  Compresses spectral analysis (CSA) is also performed on the activity recorded from each individual channel.  This is displayed as a power display of frequencies from 0 to 30 Hz over time.   The CSA analysis is done and displayed continuously.  This is reviewed for asymmetries in power between homologous areas of the scalp and  for presence of changes in power which canbe seen when seizures occur.  Sections of suspected abnormalities on the CSA is then compared with the original EEG recording.      [employer] software was also utilized in the review of this study.  This software suite analyzes the EEG recording in multiple domains.  Coherence and rhythmicity is computed to identify EEG sections which may contain organized seizures.  Each channel undergoes analysis to detect presence of spike and sharp waves which have special and morphological characteristic of epileptic activity.  The routine EEG recording is converted from spacial into frequency domain.  This is then displayed comparing homologous areas to identify areas of significant asymmetry.  Algorithm to identify non-cortically generated artifact is used to separate eye movement, EMG and other artifact from the EEG.      ELECTROENCEPHALOGRAM  Recording Times  Start on 11/9/18, 07:00  Stop on 11/10/18, 07:00    A total of 24 hours of EEG/Video telemetry was recorded.    FINDINGS:  INTERICTAL:  Frequent inter-ictal spikes were seen maximally in C19,20,21 as well as C10,11 and 12. In addition, frequent spikes were also noted in C36, 37 and C44,45. These were noted to be prolonged at times, lasting > 30 seconds at times.      Frequent to abundant spikes were also seen in C74,75,76 and 84-85.                    ICTAL: none    CLINICAL DESCRIPTION OF EVENTS/SEIZURES:  One push button event (?accidental) at 14:22:54 was not associated with any electrographic or clinical correlate. The patient appeared to be interacting appropriately with the nurse.    FINAL SUMMARY:  ELECTROENCEPHALOGRAM   Interictal: frequent spikes arising from the lateral and inferior temporal regions   Ictal: none    CLINICAL SEIZURE/EVENT: n/a     Final Impression:   This intra-cranial EEG recording during 11/9-10/18 revealed frequent inter-ictal activity arising from the left lateral and inferior temporal regions.  No  seizures were recorded during this period.     Catarina Watts MD, CAT(), MJ BURKS.  Neurology-Epilepsy.  [facility].

## 2018-11-14 NOTE — CONSULTS
"Placed 18g X 10cm midline in right basilic vein of RUE using realtime ultrasound guidance. Lot#[id number]. Remove on or before 12/13/2018. 20g 1 3/4" PIV placed to RFA using ultrasound guidance.  "

## 2018-11-14 NOTE — PROGRESS NOTES
MARCIO Pisano with neurosurgery notified of patient's dressing on grid coming off of pt head. Stated that they would address during morning rounds and reapply new dressing. Leads remain intact.

## 2018-11-14 NOTE — SUBJECTIVE & OBJECTIVE
Interval History: No acute events overnight. Plan for cortical/language mapping tomorrow.    Current Facility-Administered Medications   Medication Dose Route Frequency Provider Last Rate Last Dose    acetaminophen suppository 650 mg  650 mg Rectal Q6H PRN Marc Garrett MD        acetaminophen tablet 650 mg  650 mg Oral Q6H PRN Marc Garrett MD   650 mg at 11/14/18 0130    ceFAZolin injection 2 g  2 g Intravenous Q8H Thomas Plummer MD   2 g at 11/14/18 0911    heparin (porcine) injection 5,000 Units  5,000 Units Subcutaneous Q8H Duane Joiner MD   5,000 Units at 11/14/18 1334    HYDROcodone-acetaminophen 5-325 mg per tablet 1 tablet  1 tablet Oral Q4H PRN Marc Garrett MD   1 tablet at 11/12/18 0213    HYDROmorphone injection 0.5 mg  0.5 mg Intravenous Q6H PRN Thomas Plummer MD        metoclopramide HCl injection 5 mg  5 mg Intravenous Q6H PRN Duane Joiner MD        midazolam (VERSED) 1 mg/mL injection 2 mg  2 mg Intravenous Q5 Min PRN Frankie Dumont MD        ondansetron disintegrating tablet 8 mg  8 mg Oral Q6H PRN Duane Joiner MD   8 mg at 11/07/18 0703    polyethylene glycol packet 17 g  17 g Oral Daily Thomas Plummer MD   17 g at 11/12/18 1048    senna-docusate 8.6-50 mg per tablet 1 tablet  1 tablet Oral BID Thomas Plummer MD   1 tablet at 11/13/18 2129    sodium chloride 0.9% flush 3 mL  3 mL Intravenous PRN Ifeanyi Sanchez MD         Continuous Infusions:    Review of Systems   Constitutional: Positive for fatigue. Negative for chills and fever.   Respiratory: Negative for cough and shortness of breath.    Cardiovascular: Negative for chest pain and leg swelling.   Gastrointestinal: Negative for nausea and vomiting.   Musculoskeletal: Negative for back pain and joint swelling.   Skin: Negative for pallor and rash.   Neurological: Positive for seizures (none in several days). Negative for facial asymmetry, speech difficulty, weakness and headaches  (improving).   Psychiatric/Behavioral: Negative for agitation and confusion.     Objective:     Vital Signs (Most Recent):  Temp: 99 °F (37.2 °C) (11/14/18 1500)  Pulse: 79 (11/14/18 1500)  Resp: 17 (11/14/18 1500)  BP: 114/69 (11/14/18 1500)  SpO2: 97 % (11/14/18 1500) Vital Signs (24h Range):  Temp:  [98.5 °F (36.9 °C)-99 °F (37.2 °C)] 99 °F (37.2 °C)  Pulse:  [60-95] 79  Resp:  [11-25] 17  SpO2:  [95 %-99 %] 97 %  BP: (112-154)/(61-92) 114/69  Arterial Line BP: ()/() 131/129     Weight: 106.6 kg (235 lb)  Body mass index is 39.11 kg/m².    Physical Exam   Constitutional: She is oriented to person, place, and time. She appears well-developed and well-nourished. No distress.   HENT:   Right Ear: External ear normal.   Left Ear: External ear normal.   S/p L temporal crani   Eyes: Conjunctivae and EOM are normal. Pupils are equal, round, and reactive to light.   Neck: Normal range of motion.   Pulmonary/Chest: Effort normal. No respiratory distress.   Musculoskeletal: Normal range of motion. She exhibits no deformity.   Neurological: She is alert and oriented to person, place, and time. No cranial nerve deficit.   Skin: Skin is warm and dry. She is not diaphoretic. No erythema.   Psychiatric: She has a normal mood and affect. Her speech is normal and behavior is normal. Judgment and thought content normal.       NEUROLOGICAL EXAMINATION:     MENTAL STATUS   Oriented to person, place, and time.   Attention: normal. Concentration: normal.   Speech: speech is normal   Level of consciousness: drowsy ,  arousable by verbal stimuli    CRANIAL NERVES     CN III, IV, VI   Pupils are equal, round, and reactive to light.  Extraocular motions are normal.     CN VII   Facial expression full, symmetric.     CN VIII   CN VIII normal.     CN IX, X   CN IX normal.   CN X normal.     CN XI   CN XI normal.     CN XII   CN XII normal.     MOTOR EXAM   Muscle bulk: normal  Overall muscle tone: normal       Moves all  extremities spontaneously, no focal deficit noted  Assist in transfer to bedside commode without issue       Significant Labs: All pertinent lab results from the past 24 hours have been reviewed.    Significant Studies: I have reviewed all pertinent imaging results/findings within the past 24 hours.

## 2018-11-14 NOTE — PROCEDURES
DATE OF SERVICE:  11/05/2018    EEG NUMBER:  [id number].    LOCATION OF SERVICE:  OR-1    REQUESTING PHYSICIAN:  Ruben Senior M.D.    ICU EEG/VIDEO MONITORING REPORT    METHODOLOGY:  Electroencephalographic (EEG) is recorded with electrodes placed   according to the International 10-20 placement system.  Thirty two (32) channels   of digital signal (sampling rate of 512/sec), including T1 and T2, were   simultaneously recorded from the scalp and may include EKG, EMG, and/or eye   monitors.  Recording band pass was 0.1 to 512 Hz.  Digital video recording of   the patient is simultaneously recorded with the EEG.  The patient is instructed   to report clinical symptoms which may occur during the recording session.  EEG   and video recording are stored and archived in digital format.  Activation   procedures, which include photic stimulation, hyperventilation and instructing   patients to perform simple tasks, are done in selected patients.    The EEG is displayed on a monitor screen and can be reviewed using different   montages.  Computer-assisted analysis is employed to detect spike and   electrographic seizure activity.  The entire record is submitted for computer   analysis.  The entire recording is visually reviewed, and the times identified   by computer analysis as being spikes or seizures are reviewed again.    Compressed spectral analysis (CSA) is also performed on the activity recorded   from each individual channel.  This is displayed as a power display of   frequencies from 0 to 30 Hz over time.  The CSA is reviewed looking for   asymmetries in power between homologous areas of the scalp, then compared with   the original EEG recording.    [employer] software was also utilized in the review of this study.  This software   suite analyzes the EEG recording in multiple domains.  Coherence and rhythmicity   are computed to identify EEG sections which may contain organized seizures.    Each channel undergoes  analysis to detect the presence of spike and sharp waves   which have special and morphological characteristics of epileptic activity.  The   routine EEG recording is converted from special into frequency domain.  This is   then displayed comparing homologous areas to identify areas of significant   asymmetry.  Algorithm to identify non-cortically generated artifact is used to   separate artifact from the EEG.    RECORDING TIMES:  Start on 11/05/2018 at 11:14 and end at 11:57.  A total of 43 minutes of EEG recording was obtained.    EEG FINDINGS:  Recording was obtained in the Operating Room while the patient   was undergoing implantation of the grid and strip electrodes.  Through a   left-sided craniotomy, an 8 x 8 grid along with a 1 x 4 strip and a 2 x 6 strip   were implanted over the left hemisphere with the strips positioned over and   under the left temporal lobe.  The ground electrode was placed between the skull   and the scalp.  Montage was then created to record from all the electrodes   present.  The recording of good quality was finally attained.  In general, the   mid and upper range beta activity was seen diffusely and initially no epileptic   activity was recorded.  Recording was discontinued to subsequently transport the   patient to the recovery room.    IMPRESSION:  Grid and strip electrodes were positioned over the left hemisphere   in the integrity of the recording was established.      RR/IN  dd: 11/14/2018 15:01:44 (CST)  td: 11/14/2018 15:33:12 (CST)  Doc ID   #[id number]  Job ID #[id number]    CC:

## 2018-11-14 NOTE — PROCEDURES
DATE OF SERVICE:  11/07/2018    EEG #:  [id number]    REQUESTED BY:  Dr. Senior.    LOCATION OF SERVICE:  9080.    EPILEPSY MONITORING UNIT  EEG/VIDEO TELEMETRY REPORT    METHODOLOGY:  Electroencephalographic (EEG) is recorded with electrodes placed   according to the International 10-20 placement system.  Thirty Two (32) channels   of digital signal, including T1 and T2 electrodes, are simultaneously recorded   from the scalp and may also include EKG, EMG and/or eye movement monitors.    Recording band pass was 0.1 to 512 Hz.  Digital video recording of the patient   is simultaneously recorded with the EEG.  The patient is instructed to report   clinical symptoms which may occur during the recording session.  EEG and video   recording are stored and archived in digital format. Activation procedures,   which include photic stimulation, hyperventilation and instructing patients to   perform simple tasks, are done in selected patients.     The EEG is displayed on a monitor screen and can be reformatted into different   montages for evaluation.  The entire recoding is submitted for computer-assisted   analysis to detect spike and electrographic seizure activity.  The entire   recording is visually reviewed, and the times identified by computer analysis as   being spikes or seizures are reviewed again.  Compressed spectral analysis   (CSA) is also performed on the activity recorded from each individual channel.    This is displayed as a power display of frequencies from 0 to 30 Hz over time.     The CSA analysis is done and displayed continuously.  This is reviewed for   asymmetries in power between homologous areas of the scalp and for presence of   changes in power which can be seen when seizures occur.  Sections of suspected   abnormalities on the CSA are then compared with the original EEG recording.       [employer] software was also utilized in the review of this study.  This software   suite analyzes the EEG recording  in multiple domains.  Coherence and rhythmicity   are computed to identify EEG sections which may contain organized seizures.    Each channel undergoes analysis to detect presence of spike and sharp waves   which have special and morphological characteristics of epileptic activity.  The   routine EEG recording is converted from special into frequency domain.  This is   then displayed comparing homologous areas to identify areas of significant   asymmetry.  Algorithm to identify non-cortically generated artifact is used to   separate artifact from the EEG.      RECORDING TIMES:  Start on 11/07/2018 at 07:00.  End on 11/08/2018 at 07:00.  A total of 24 hours of EEG monitoring was obtained.    SEIZURES RECORDED:  Seizure #1:  Start on 11/07/2018 at 10:36:00 and end at 10:36:52.  Seizure #2:  On 11/07/2018, start at 13:09:16 and end at 13:10:12.  Seizure #3:  On 11/07/2018, start at 13:54:42 and end at 13:55:38.  Seizure #4:  On 07/11/2018, start at 14:34:25 and end at 14:35:26.  Seizure #5:  On 11/07/2018, start at 15:02:32 and end at 15:03:41.  Seizure #6:  On 11/07/2018, start at 15:28:49 and end at 15:30:08.    EEG FINDINGS:  Recording was obtained in the ICU.  The patient has had an 8 x 8   grid placed over the lateral surface of the left frontotemporal lobe along with   a 2 x 4 strip curved anterior to the grid around the anterior temporal tip and a   1 x 4 strip placed anterior lateral to the grid and a 2 x 6 strip placed   laterally to the grid and positioned underneath the inferior surface of the   temporal lobe.  Functional cortical mapping was performed during this session   and the results of which will be reported separately.  Intraictal activity was   recorded, but none from the top five rows of the grid.  Spikes were noted to   occur in runs involving the 7th and 8th row on the grid in positions 12, 13, 14,   4, 5 and 6.  There were also noted arising from the anterior 2 x 4 strips from   the contacts 1,  2, 5 and 6.  In sync, spikes were also recorded from the lateral   strip contacts 3, 4, 9 and 10.    The fix electrographic seizures had a similar onset.  They began with rhythmic   spiking seen in the 2 x 6 strip in contacts 78 and 79 along with beta was seen   from contacts 25, 26 and 27.  The best frequency buzz was also noted from the   grids originating from contact #6.  The activity lasted 4 seconds and subsided   and then rhythmic buildup of spike slow wave was noted from the three set of   electrode strips.  There was also a buildup noted over the lateral temporal   surface involving rows 7 and 8 and later spreading to row 6.  The patient was   lying quietly in bed and was awake.  She slowly started to look around as the   electrographic buildup started.  She started rubbing her thumb to the finger on   her left hand.  She continued to look around with a lack of emotional   expression.  She then started tumbling with both hands on the electrode cables   for the implanted electrodes.  Second, third and fourth seizure began while the   patient appeared to be asleep.  At the onset, she opened her eyes, looked   slightly to the left and then had some repetitive movements of her left hand.    We then started looking at her hands, looking from the front to the back and   then to the front again.  After the seizure was over, she removed the oxygen   saturation probe from her left hand and then started to manipulate the electrode   wires and started to pull on.  To help her, care providers were at the bedside   when seizure #5 started.  The patient was looking around and did not respond to   their inquiries and instructions.  Few minutes later, the patient had a 6th   seizure and again did not follow the instructions of the health care providers   who were at her bedside.    IMPRESSION:  Clinical and electrographic seizures were recorded beginning from   the lateral surface of the left temporal lobe.  The patient  had motionless   facial expression and tumbled with her left hand, typical of a temporal lobe   complex partial seizure.  The interictal and ictal activity was clustered around   the anterior lateral surface of the temporal lobe with activity also recorded   from the inferior surface.      RR/HN  dd: 11/14/2018 13:26:49 (CST)  td: 11/14/2018 14:03:12 (CST)  Doc ID   #[id number]  Job ID #[id number]    CC:

## 2018-11-14 NOTE — ASSESSMENT & PLAN NOTE
49 yo female with history of seizures since age 19, now s/p  crani with subdural grid placement for further localization (11/5) and repositioning following pt induced malpositioning (11/8).    No acute events overnight.    --Continue care per primary team.  --Continue continuous subdural electrocorticography per epilepsy.  --Continue prophylactic antibiotics while subdural grids in place.  --Pt is tentatively booked for removal of grids this Monday (11/19).  --Please keep pt NPO midnight Sunday night / Monday morning.  --Please hold chemical DVT prophylaxis Jass night / Monday morning.  --Will consent pt prior to procedure.  --We will continue to follow closely, please contact us with any questions or concerns.

## 2018-11-15 LAB
ALBUMIN SERPL BCP-MCNC: 3 G/DL
ALP SERPL-CCNC: 106 U/L
ALT SERPL W/O P-5'-P-CCNC: 8 U/L
ANION GAP SERPL CALC-SCNC: 9 MMOL/L
AST SERPL-CCNC: 13 U/L
BASOPHILS # BLD AUTO: 0.03 K/UL
BASOPHILS NFR BLD: 0.3 %
BILIRUB SERPL-MCNC: 0.3 MG/DL
BUN SERPL-MCNC: 14 MG/DL
CALCIUM SERPL-MCNC: 9.3 MG/DL
CHLORIDE SERPL-SCNC: 105 MMOL/L
CO2 SERPL-SCNC: 26 MMOL/L
CREAT SERPL-MCNC: 0.8 MG/DL
DIFFERENTIAL METHOD: ABNORMAL
EOSINOPHIL # BLD AUTO: 0.1 K/UL
EOSINOPHIL NFR BLD: 1.1 %
ERYTHROCYTE [DISTWIDTH] IN BLOOD BY AUTOMATED COUNT: 15.9 %
EST. GFR  (AFRICAN AMERICAN): >60 ML/MIN/1.73 M^2
EST. GFR  (NON AFRICAN AMERICAN): >60 ML/MIN/1.73 M^2
GLUCOSE SERPL-MCNC: 125 MG/DL
HCT VFR BLD AUTO: 35.5 %
HGB BLD-MCNC: 10.9 G/DL
IMM GRANULOCYTES # BLD AUTO: 0.06 K/UL
IMM GRANULOCYTES NFR BLD AUTO: 0.6 %
LYMPHOCYTES # BLD AUTO: 3.3 K/UL
LYMPHOCYTES NFR BLD: 30.6 %
MAGNESIUM SERPL-MCNC: 2.1 MG/DL
MCH RBC QN AUTO: 23.9 PG
MCHC RBC AUTO-ENTMCNC: 30.7 G/DL
MCV RBC AUTO: 78 FL
MONOCYTES # BLD AUTO: 0.7 K/UL
MONOCYTES NFR BLD: 6.8 %
NEUTROPHILS # BLD AUTO: 6.5 K/UL
NEUTROPHILS NFR BLD: 60.6 %
NRBC BLD-RTO: 0 /100 WBC
PHOSPHATE SERPL-MCNC: 3.7 MG/DL
PLATELET # BLD AUTO: 324 K/UL
PMV BLD AUTO: 9.5 FL
POTASSIUM SERPL-SCNC: 4.4 MMOL/L
PROT SERPL-MCNC: 7.1 G/DL
RBC # BLD AUTO: 4.57 M/UL
SODIUM SERPL-SCNC: 140 MMOL/L
WBC # BLD AUTO: 10.76 K/UL

## 2018-11-15 PROCEDURE — 94761 N-INVAS EAR/PLS OXIMETRY MLT: CPT

## 2018-11-15 PROCEDURE — 20000000 HC ICU ROOM

## 2018-11-15 PROCEDURE — 83735 ASSAY OF MAGNESIUM: CPT

## 2018-11-15 PROCEDURE — 95961 ELECTRODE STIMULATION BRAIN: CPT | Mod: 26,,, | Performed by: PSYCHIATRY & NEUROLOGY

## 2018-11-15 PROCEDURE — 85025 COMPLETE CBC W/AUTO DIFF WBC: CPT

## 2018-11-15 PROCEDURE — 99233 SBSQ HOSP IP/OBS HIGH 50: CPT | Mod: ,,, | Performed by: PSYCHIATRY & NEUROLOGY

## 2018-11-15 PROCEDURE — 80053 COMPREHEN METABOLIC PANEL: CPT

## 2018-11-15 PROCEDURE — 95951 PR EEG MONITORING/VIDEORECORD: CPT | Mod: 26,,, | Performed by: PSYCHIATRY & NEUROLOGY

## 2018-11-15 PROCEDURE — 25000003 PHARM REV CODE 250: Performed by: STUDENT IN AN ORGANIZED HEALTH CARE EDUCATION/TRAINING PROGRAM

## 2018-11-15 PROCEDURE — 95951 HC EEG MONITORING/VIDEO RECORD: CPT

## 2018-11-15 PROCEDURE — 63600175 PHARM REV CODE 636 W HCPCS: Performed by: STUDENT IN AN ORGANIZED HEALTH CARE EDUCATION/TRAINING PROGRAM

## 2018-11-15 PROCEDURE — 84100 ASSAY OF PHOSPHORUS: CPT

## 2018-11-15 PROCEDURE — 63600175 PHARM REV CODE 636 W HCPCS: Performed by: NEUROLOGICAL SURGERY

## 2018-11-15 PROCEDURE — 99233 SBSQ HOSP IP/OBS HIGH 50: CPT | Mod: ,,, | Performed by: NURSE PRACTITIONER

## 2018-11-15 RX ADMIN — HEPARIN SODIUM 5000 UNITS: 5000 INJECTION, SOLUTION INTRAVENOUS; SUBCUTANEOUS at 01:11

## 2018-11-15 RX ADMIN — CEFAZOLIN 2 G: 1 INJECTION, POWDER, FOR SOLUTION INTRAMUSCULAR; INTRAVENOUS at 05:11

## 2018-11-15 RX ADMIN — SENNOSIDES AND DOCUSATE SODIUM 1 TABLET: 8.6; 5 TABLET ORAL at 08:11

## 2018-11-15 RX ADMIN — HEPARIN SODIUM 5000 UNITS: 5000 INJECTION, SOLUTION INTRAVENOUS; SUBCUTANEOUS at 06:11

## 2018-11-15 RX ADMIN — CEFAZOLIN 2 G: 1 INJECTION, POWDER, FOR SOLUTION INTRAMUSCULAR; INTRAVENOUS at 02:11

## 2018-11-15 RX ADMIN — SENNOSIDES AND DOCUSATE SODIUM 1 TABLET: 8.6; 5 TABLET ORAL at 09:11

## 2018-11-15 RX ADMIN — CEFAZOLIN 2 G: 1 INJECTION, POWDER, FOR SOLUTION INTRAMUSCULAR; INTRAVENOUS at 09:11

## 2018-11-15 RX ADMIN — HEPARIN SODIUM 5000 UNITS: 5000 INJECTION, SOLUTION INTRAVENOUS; SUBCUTANEOUS at 09:11

## 2018-11-15 NOTE — PLAN OF CARE
Problem: Patient Care Overview  Goal: Plan of Care Review  VS and assessment per flow sheet. Cortical mapping (language) completed today per epilepsy team. Pt remains free from injury. Patient progressing towards goals as tolerated. Plan of care reviewed with patient. No family present. All patient questions and concerns addressed. Will continue to monitor.

## 2018-11-15 NOTE — PROGRESS NOTES
"1300- Upon 1300 assessment, moist yellow drainage noted to pt head incision site. Unable to determine amount of drainage due to dressing falling off over past few days (neurosurgery aware) and there already being dried yellow drainage present. Pt also states that their hair feels "moist" when it previously did not. Neurosurgery notified immediately. Spoke with MARCIO Stack with neurosurgery. Stated that she would report to bedside to examine pt. Will continue to monitor closely.    1335- MD Marci at bedside examining pt. Location of drainage unknown. New dressing placed. Dressing clean, dry and intact. Will continue to monitor.  "

## 2018-11-15 NOTE — PROGRESS NOTES
" [facility]  Adult Nutrition  Progress Note    SUMMARY       Recommendations    Recommendation/Intervention:   Continue Regular diet. Boost ONS not indicated at this time - pt consuming 100% of meals.     RD to monitor.    Goals: Pt to continue tolerating >75% of meals.  Nutrition Goal Status: new  Communication of RD Recs: reviewed with RN    Reason for Assessment    Reason for Assessment: length of stay  Diagnosis: seizures  Relevant Medical History: epilepsy  Interdisciplinary Rounds: attended  General Information Comments: Pt consuming 100% of meals. Pt's weight has been stable between 235-240 for multiple years. NFPE not indicated at this time- pt appears nourished.   Nutrition Discharge Planning: adequate po intake for optimal nutrition    Nutrition Risk Screen    Nutrition Risk Screen: no indicators present    Nutrition/Diet History    Do you have any cultural, spiritual, Yarsani conflicts, given your current situation?: none  Factors Affecting Nutritional Intake: None identified at this time    Anthropometrics    Temp: 99 °F (37.2 °C)  Height Method: Stated  Height: 5' 5" (165.1 cm)  Height (inches): 65 in  Weight Method: Bed Scale  Weight: 106.6 kg (235 lb)  Weight (lb): 235 lb  Ideal Body Weight (IBW), Female: 125 lb  % Ideal Body Weight, Female (lb): 188 lb  BMI (Calculated): 39.2  BMI Grade: 35 - 39.9 - obesity - grade II       Lab/Procedures/Meds    Pertinent Labs Reviewed: reviewed  Pertinent Medications Reviewed: reviewed    Physical Findings/Assessment    Overall Physical Appearance: nourished, obese  Skin: incision(s)    Estimated/Assessed Needs    Weight Used For Calorie Calculations: 106.6 kg (235 lb 0.2 oz)  Energy Calorie Requirements (kcal): 2107  Energy Need Method: Shaggy[place] Soto(PAL 1.25)  Protein Requirements: 107-128g(1.0-1.2g/kg)  Weight Used For Protein Calculations: 106.6 kg (235 lb 0.2 oz)  Fluid Requirements (mL): 1mL/kcal or per MD     RDA Method (mL): 2107   "       Nutrition Prescription Ordered    Current Diet Order: Regular    Evaluation of Received Nutrient/Fluid Intake    I/O: -I/O, good UOP, LBM 11/14  % Intake of Estimated Energy Needs: 75 - 100 %  % Meal Intake: 75 - 100 %    Nutrition Risk    Level of Risk/Frequency of Follow-up: (f/u 1x/week)     Assessment and Plan    No nutrition diagnosis at this time.    Monitor and Evaluation    Food and Nutrient Intake: energy intake, food and beverage intake  Food and Nutrient Adminstration: diet order  Anthropometric Measurements: weight, weight change, body mass index  Biochemical Data, Medical Tests and Procedures: electrolyte and renal panel, gastrointestinal profile, glucose/endocrine profile, inflammatory profile, lipid profile  Nutrition-Focused Physical Findings: overall appearance     Nutrition Follow-Up    RD Follow-up?: Yes

## 2018-11-15 NOTE — OP NOTE
DATE OF PROCEDURE:  11/08/2018    PREOPERATIVE DIAGNOSIS:  Intractable epilepsy with broken subdural electrodes.    POSTOPERATIVE DIAGNOSIS:  Intractable epilepsy with broken subdural electrodes.    OPERATIVE PROCEDURE UNDERGONE:  Redo left-sided craniotomy for replacement of   the subdural grids for epilepsy recording and monitoring.    SURGEON:  Ruben Senior M.D.    ASSISTANT:  Lynsey Mir M.D.  No resident physician was available to assist in   the operation.    ANESTHESIA:  General.    INDICATIONS FOR PROCEDURE:  This is a patient whom on 11/07/2018, we had done a   left-sided frontotemporal craniotomy for the placement of subdural electrode   grids and strips.  Unfortunately, the patient had a seizure, pulled on the grids   and strips, tore and ripped the leads on several of the grids and strips as   well as moved the leads themselves.  We felt the patient needed revision and   replacement of subdural grids and strips.    OPERATIVE NOTE IN DETAIL:  The patient was taken to the Operating Room,   anesthetized and intubated by Anesthesia.  Preoperative antibiotics were   administered.  The patient was placed in a supine position and the head turned   to the right.  The head was prepped and draped in a sterile fashion.  We cut all   the intact leads at the level of the skin.  Several leads were already   shredded.  We reopened the previous staples.  We opened the flap, took the flap   down and held it in place with fish hooks, took out the ground lead, removed the   bone flap using titanium plates and screws and placed the bone flap into   Betadine solution, removed the Duragen over the flap of the dura, reopened up   the dura, removed the 8 x 8 and 2 x 6s, 1 x 4, 2 x 6 was removed, all of them,   then irrigated underneath, took out any residual subdural hematoma.  We then   placed a new 2 x 6 curved T wrapped underneath the temporal lobe to try and get   an absolute medial edge.  We placed another 2 x 4 around  the temporal tip and   another 1 x 6 between these 2 leads, then placed a flexible 8 x 8 having to cut   out numbers 17 to 28.  We then re-supplemented the dural closure with Duragen.    We re-fixated the bone flap using titanium plates and screws, thoroughly   irrigated out the subgaleal space and brought out through small stab incisions   with a 14-gauge angiocatheter.  We then placed a subgaleal drain in place and   irrigated and closed the wound layers.  A sterile dressing was placed.  The   patient was taken back to the ICU without any problems or complications.  EBL   was 100 mL.  Specimen sent was old grids and strips.  There were no other   complications.      LYN/IN  dd: 11/15/2018 16:21:06 (CST)  td: 11/15/2018 17:42:35 (CST)  Doc ID   #[id number]  Job ID #[id number]    CC:

## 2018-11-15 NOTE — SUBJECTIVE & OBJECTIVE
No AE. AFVSS. No seizures identified on EEG overnight.     Medications:  Continuous Infusions:    Scheduled Meds:   ceFAZolin (ANCEF) IVPB  2 g Intravenous Q8H    heparin (porcine)  5,000 Units Subcutaneous Q8H    polyethylene glycol  17 g Oral Daily    senna-docusate 8.6-50 mg  1 tablet Oral BID     PRN Meds:acetaminophen, acetaminophen, HYDROcodone-acetaminophen, HYDROmorphone, metoclopramide HCl, midazolam, ondansetron, sodium chloride 0.9%       Objective:     Weight: 106.6 kg (235 lb)  Body mass index is 39.11 kg/m².  Vital Signs (Most Recent):  Temp: 99 °F (37.2 °C) (11/15/18 0700)  Pulse: 73 (11/15/18 0900)  Resp: 12 (11/15/18 0900)  BP: 129/67 (11/15/18 0900)  SpO2: 96 % (11/15/18 0900) Vital Signs (24h Range):  Temp:  [98.7 °F (37.1 °C)-99.5 °F (37.5 °C)] 99 °F (37.2 °C)  Pulse:  [66-97] 73  Resp:  [12-29] 12  SpO2:  [93 %-98 %] 96 %  BP: (106-138)/(61-75) 129/67  Arterial Line BP: (100-165)/() 108/65     Date 11/15/18 0700 - 11/16/18 0659   Shift 0992-8970 2304-4111 0158-2342 24 Hour Total   INTAKE   P.O. 200   200   Shift Total(mL/kg) 200(1.9)   200(1.9)   OUTPUT   Urine(mL/kg/hr) 150   150   Shift Total(mL/kg) 150(1.4)   150(1.4)   Weight (kg) 106.6 106.6 106.6 106.6              Resp Rate Total:  [12 br/min-20 br/min] 19 br/min         Closed/Suction Drain 11/05/18 1128 Left Scalp Accordion 10 Fr. (Active)   Site Description Unable to view 11/6/2018  3:05 PM   Dressing Type Gauze 11/6/2018  3:05 PM   Dressing Status Clean;Dry;Intact 11/6/2018  3:05 PM   Status To bulb suction 11/6/2018  3:05 PM   Output (mL) 85 mL 11/6/2018  1:05 PM       Physical Exam:  Nursing note and vitals reviewed.    Constitutional: She appears well-developed and well-nourished.     Abdominal: Soft.     -Alert and oriented x4  -PERRL, EOMI, face symmetrical, tongue midline, facial sensation symmetric, shoulder shrug full strength and symmetric  -Motor: 5/5 throughout the upper and lower extremites  bilaterally  -sensation intact to light touch throughout        Significant Labs:  Recent Labs   Lab 11/14/18  0236 11/15/18  0254   * 125*    140   K 4.0 4.4    105   CO2 23 26   BUN 11 14   CREATININE 0.7 0.8   CALCIUM 9.6 9.3   MG 2.0 2.1     Recent Labs   Lab 11/14/18  0236 11/15/18  0254   WBC 9.20 10.76   HGB 10.7* 10.9*   HCT 33.3* 35.5*    324     No results for input(s): LABPT, INR, APTT in the last 48 hours.  Microbiology Results (last 7 days)     ** No results found for the last 168 hours. **        All pertinent labs from the last 24 hours have been reviewed.    Significant Diagnostics:  I have reviewed all pertinent imaging results/findings within the past 24 hours.

## 2018-11-15 NOTE — PROGRESS NOTES
[facility]  Neurocritical Care  Progress Note    Admit Date: 11/5/2018  Service Date: 11/15/2018  Length of Stay: 10    Subjective:     Chief Complaint: Temporal lobe epilepsy, intractable    History of Present Illness: Liza Arrieta is a 47 yo female with PMHx of partial intractable epilepsy who is being admitted to [facility] after L crani with subdural grid placement. Per chart review, she had her first seizure at age 19. At that time, she was treated with dilantin and she did well for approximately 15 years. Reports that she typically has absence seizures (5-6/day), but states she may have had two grand mal seizures decades ago.  The patient was recently admitted to EMU on 2/9/2018 where seizures were captured on EEG.  She has failed multiple medications and is now being admitted for post op management, continuous EEG.             Hospital Course: 11/5: Pt admitted to [facility] s/p L crani with subdural grid placement, continuous EEG  11/6: cEEG  11/7: Pulled one of her leads today. Sent for stat CTH showed electrodes have been repositioned. posterior infratemporal strips are no longer in place  11/9: s/p repeat crani for grid placement.  One abscence seizure lasting 5 seconds. Epilepsy plans to due cortical mapping this afternoon.  11/12: Continuing to hold AEDs. PRNs per epilepsy.  11/13: Plan for cortical mapping on 11/14 11/14  One seizure overnight and 2 seizures noted today. remains on EEG. Epilepsy to do cortical mapping this aftrnoon  11/15 24h EEG continues no seizures over night and so far today. Cortical mapping today    Interval History: 24h EEG continues no seizures over night and so far today. Cortical mapping today    Review of Systems:   Constitutional: Denies fevers, weight loss, chills, or weakness.  Eyes: Denies changes in vision.  ENT: Denies dysphagia, nasal discharge, ear pain or discharge.  Cardiovascular: Denies chest pain, palpitations, orthopnea, or claudication.  Respiratory:  Denies shortness of breath, cough, hemoptysis, or wheezing.  GI: Denies nausea/vomitting, hematochezia, melena, abd pain, or changes in appetite.  : Denies dysuria, incontinence, or hematuria.  Musculoskeletal: Denies joint pain or myalgias.  Skin/breast: Denies rashes, lumps, lesions, or discharge.  Neurologic: + intermittent  10 sec abscence seizures Denies headache, dizziness, vertigo, or paresthesias.  Psychiatric: Denies changes in mood or hallucinations.  Endocrine: Denies polyuria, polydipsia, heat/cold intolerance.  Hematologic/Lymph: Denies lymphadenopathy, easy bruising or easy bleeding.  Allergic/Immunologic: Denies rash, rhinitis.     Vitals:   Temp: 98.9 °F (37.2 °C)  Pulse: 69  Rhythm: normal sinus rhythm  BP: 115/65  MAP (mmHg): 83  Resp: 13  SpO2: 96 %  O2 Device (Oxygen Therapy): room air    Temp  Min: 98.9 °F (37.2 °C)  Max: 99.5 °F (37.5 °C)  Pulse  Min: 68  Max: 97  BP  Min: 106/70  Max: 138/62  MAP (mmHg)  Min: 78  Max: 100  Resp  Min: 11  Max: 29  SpO2  Min: 93 %  Max: 98 %    11/14 0701 - 11/15 0700  In: 650 [P.O.:650]  Out: 1225 [Urine:1225]   Unmeasured Output  Urine Occurrence: 1  Stool Occurrence: 0     Examination:   Constitutional: AAOx3 obese. Well-nourished and -developed. No apparent distress.   Eyes: Conjunctiva clear, anicteric. Lids no lesions.  Head/Ears/Nose/Mouth/Throat/Neck: Moist mucous membranes. External ears, nose atraumatic.   Cardiovascular: Regular rhythm. No murmurs. No leg edema.  Respiratory: Comfortable respirations. Clear to auscultation.  Gastrointestinal: No hernia. Soft, nondistended, nontender. + bowel sounds.    Neurologic:  -GCS [id number]  -Alert. Oriented to person, place, and time. Speech fluent. Follows commands.  -Cranial nerves II-XII intact PERRL 3+ EOMI.+ shoulder shrug. Facial symmetry.  +cough,gag  -Motor 5/5 all extremities moves spontaneously and to command  -Sensation bilat intact to all extremities        Medications:   Continuous  Scheduled  ceFAZolin (ANCEF) IVPB 2 g Q8H   heparin (porcine) 5,000 Units Q8H   polyethylene glycol 17 g Daily   senna-docusate 8.6-50 mg 1 tablet BID   PRN  acetaminophen 650 mg Q6H PRN   acetaminophen 650 mg Q6H PRN   HYDROcodone-acetaminophen 1 tablet Q4H PRN   HYDROmorphone 0.5 mg Q6H PRN   metoclopramide HCl 5 mg Q6H PRN   midazolam 2 mg Q5 Min PRN   ondansetron 8 mg Q6H PRN   sodium chloride 0.9% 3 mL PRN      Today I independently reviewed pertinent medications, lines/drains/airways, imaging, laboratory results, microbiology results, notably:     ISTAT: No results for input(s): PH, PCO2, PO2, POCSATURATED, HCO3, BE, POCNA, POCK, POCTCO2, POCGLU, POCICA, POCLAC, SAMPLE in the last 24 hours.   Chem: Recent Labs   Lab 11/15/18  0254      K 4.4      CO2 26   *   BUN 14   CREATININE 0.8   ESTGFRAFRICA >60.0   EGFRNONAA >60.0   CALCIUM 9.3   MG 2.1   PHOS 3.7   ANIONGAP 9   PROT 7.1   ALBUMIN 3.0*   BILITOT 0.3   ALKPHOS 106   AST 13   ALT 8*     Heme: Recent Labs   Lab 11/15/18  0254   WBC 10.76   HGB 10.9*   HCT 35.5*        Endo: No results for input(s): POCTGLUCOSE in the last 24 hours.   Assessment/Plan:     Neuro   * Temporal lobe epilepsy, intractable    - s/p craniotomy x2 for strip and grid placement  - Continuous vEEG monitoring with subdural grid electrodes in place  - Home lamotrigine discontinued per Epilepsy  - For any seizures: please push event button on EEG and call epileptologist on call prior to administration of abortive medication  - Continue midazolam 2mg IV q5min for motor seizures lasting greater than 5 minutes  - Continue Ancef 2g IV q8h  - Sleep deprive patient until 0400 11/14  - Benadryl 50mg po x1 this evening and in am 11/14  - Cortical mapping today  - Ok for patient to eat.             Cardiac/Vascular   Essential hypertension    - EKG NSR  - SBP goal <140       Endocrine   Class 2 obesity with body mass index (BMI) of 38.0 to 38.9 in adult    Body mass  index is 39.11 kg/m².             The patient is being Prophylaxed for:  Venous Thromboembolism with: Mechanical or Chemical  Stress Ulcer with: None  Ventilator Pneumonia with: not applicable    Activity Orders          None        Full Code    Elsa George NP  Neurocritical Care  [facility]

## 2018-11-15 NOTE — PLAN OF CARE
Problem: Patient Care Overview  Goal: Plan of Care Review  Outcome: Ongoing (interventions implemented as appropriate)  Nutrition assessment completed. Please see RD note for details.    Recommendation/Intervention:   Continue Regular diet. Boost ONS not indicated at this time - pt consuming 100% of meals.     RD to monitor.    Goals: Pt to continue tolerating >75% of meals.  Nutrition Goal Status: new  Communication of RD Recs: reviewed with RN

## 2018-11-15 NOTE — PLAN OF CARE
11/15/18 1612   Discharge Reassessment   Assessment Type Discharge Planning Reassessment   Provided patient/caregiver education on the expected discharge date and the discharge plan No   Do you have any problems affording any of your prescribed medications? No   Discharge Plan A Home with family   Discharge Plan B Home with family;Home Health   Patient choice form signed by patient/caregiver N/A   Anticipated Discharge Disposition Home   Can the patient answer the patient profile reliably? Yes, cognitively intact   How does the patient rate their overall health at the present time? Good   Describe the patient's ability to walk at the present time. Minor restrictions or changes   How often would a person be available to care for the patient? Often   Number of comorbid conditions (as recorded on the chart) One   During the past month, has the patient often been bothered by feeling down, depressed or hopeless? No   During the past month, has the patient often been bothered by little interest or pleasure in doing things? No       Patient not medically stable for discharge.   Continuous vEEG monitoring with subdural grid electrodes in place    Kenna Fong RN, CCRN-K, [facility]  Neuro-Critical Care   [phone]

## 2018-11-15 NOTE — ASSESSMENT & PLAN NOTE
- s/p craniotomy x2 for strip and grid placement  - Continuous vEEG monitoring with subdural grid electrodes in place  - Home lamotrigine discontinued per Epilepsy  - For any seizures: please push event button on EEG and call epileptologist on call prior to administration of abortive medication  - Continue midazolam 2mg IV q5min for motor seizures lasting greater than 5 minutes  - Continue Ancef 2g IV q8h  - Sleep deprive patient until 0400 11/14  - Benadryl 50mg po x1 this evening and in am 11/14  - Cortical mapping today  - Ok for patient to eat.

## 2018-11-15 NOTE — ASSESSMENT & PLAN NOTE
49 yo female with history of seizures since age 19, who presents to NICU s/p L temporal crani with subdural grid placement for further localization and possible resection.    Recommendations:  - Continuous vEEG monitoring with subdural grid electrodes in place  - Holding home Lamictal  - Cortical mapping again 11/15  - For any seizures: please push event button on EEG and call epileptologist on call prior to administration of abortive medication - Versed 2 mg IV PRN  - Recommend short acting opioids instead of long acting as needed for pain control  - Pain management per NCC  - Tentatively scheduled for OR 11/19    Plan of care discussed with NCC team, family at bedside. Will continue to follow.

## 2018-11-15 NOTE — PROGRESS NOTES
"[facility]  Neurosurgery  Progress Note    Subjective:     History of Present Illness: Pt is a 48 y.o. female who presents per referral by Dr. LISA Elam and the epilepsy team for evaluation for epilepsy surgery. Pt has history of partial intractable epilepsy and has failed at least 10 previous medications. Currently on dual therapy. EMU workup done in February localized pathology to left temporal lobe, with PET scan showing hypometabolism of mesial temporal lobe. DAWOOD scan also showed localization to left mesial temporal lobe. Pt states that she has endured seizures since 19 y.o. Pt currently on Lamictal. Pt states that she endures about 5 absence episodes per day with intermittent LOC. Pt notes one incident of "rolling" seizures for which she was brought to the ED.          Post-Op Info:  Procedure(s) (LRB):  CRANIOTOMY for grids and strips (Left)   7 Days Post-Op     No AE. AFVSS. No seizures identified on EEG overnight.     Medications:  Continuous Infusions:    Scheduled Meds:   ceFAZolin (ANCEF) IVPB  2 g Intravenous Q8H    heparin (porcine)  5,000 Units Subcutaneous Q8H    polyethylene glycol  17 g Oral Daily    senna-docusate 8.6-50 mg  1 tablet Oral BID     PRN Meds:acetaminophen, acetaminophen, HYDROcodone-acetaminophen, HYDROmorphone, metoclopramide HCl, midazolam, ondansetron, sodium chloride 0.9%       Objective:     Weight: 106.6 kg (235 lb)  Body mass index is 39.11 kg/m².  Vital Signs (Most Recent):  Temp: 99 °F (37.2 °C) (11/15/18 0700)  Pulse: 73 (11/15/18 0900)  Resp: 12 (11/15/18 0900)  BP: 129/67 (11/15/18 0900)  SpO2: 96 % (11/15/18 0900) Vital Signs (24h Range):  Temp:  [98.7 °F (37.1 °C)-99.5 °F (37.5 °C)] 99 °F (37.2 °C)  Pulse:  [66-97] 73  Resp:  [12-29] 12  SpO2:  [93 %-98 %] 96 %  BP: (106-138)/(61-75) 129/67  Arterial Line BP: (100-165)/() 108/65     Date 11/15/18 0700 - 11/16/18 0659   Shift 4137-7373 6020-2205 9876-6519 24 Hour Total   INTAKE   P.O. " 200   200   Shift Total(mL/kg) 200(1.9)   200(1.9)   OUTPUT   Urine(mL/kg/hr) 150   150   Shift Total(mL/kg) 150(1.4)   150(1.4)   Weight (kg) 106.6 106.6 106.6 106.6              Resp Rate Total:  [12 br/min-20 br/min] 19 br/min         Closed/Suction Drain 11/05/18 1128 Left Scalp Accordion 10 Fr. (Active)   Site Description Unable to view 11/6/2018  3:05 PM   Dressing Type Gauze 11/6/2018  3:05 PM   Dressing Status Clean;Dry;Intact 11/6/2018  3:05 PM   Status To bulb suction 11/6/2018  3:05 PM   Output (mL) 85 mL 11/6/2018  1:05 PM       Physical Exam:  Nursing note and vitals reviewed.    Constitutional: She appears well-developed and well-nourished.     Abdominal: Soft.     -Alert and oriented x4  -PERRL, EOMI, face symmetrical, tongue midline, facial sensation symmetric, shoulder shrug full strength and symmetric  -Motor: 5/5 throughout the upper and lower extremites bilaterally  -sensation intact to light touch throughout        Significant Labs:  Recent Labs   Lab 11/14/18  0236 11/15/18  0254   * 125*    140   K 4.0 4.4    105   CO2 23 26   BUN 11 14   CREATININE 0.7 0.8   CALCIUM 9.6 9.3   MG 2.0 2.1     Recent Labs   Lab 11/14/18  0236 11/15/18  0254   WBC 9.20 10.76   HGB 10.7* 10.9*   HCT 33.3* 35.5*    324     No results for input(s): LABPT, INR, APTT in the last 48 hours.  Microbiology Results (last 7 days)     ** No results found for the last 168 hours. **        All pertinent labs from the last 24 hours have been reviewed.    Significant Diagnostics:  I have reviewed all pertinent imaging results/findings within the past 24 hours.    Assessment/Plan:     * Temporal lobe epilepsy, intractable    47 yo female with history of seizures since age 19, now s/p  crani with subdural grid placement for further localization (11/5) and repositioning following pt induced malpositioning (11/8).    No acute events overnight.    --Continue care per primary team.  --Continue continuous  subdural electrocorticography per epilepsy.  --Continue prophylactic antibiotics while subdural grids in place.  --Pt is tentatively booked for removal of grids this Monday (11/19).  To undergo mapping today.   --Please keep pt NPO midnight Sunday night / Monday morning.  --Please hold chemical DVT prophylaxis Jass night / Monday morning.  --Will consent pt prior to procedure.  --We will continue to follow closely, please contact us with any questions or concerns.  dispo- cont ICU care.          Duane Joiner MD  Neurosurgery  [facility]

## 2018-11-15 NOTE — PROGRESS NOTES
[facility]  Neurology-Epilepsy  Progress Note    Patient Name: Liza Arrieta  MRN: [id number]  Admission Date: 11/5/2018  Hospital Length of Stay: 10 days  Code Status: Full Code   Attending Provider: Yohannes De La Rosa MD  Primary Care Physician: LISA Elam MD   Principal Problem:Temporal lobe epilepsy, intractable    Subjective:     Hospital Course:   Patient admitted to [facility] after subdural grid placement/L crani on 11/5. Home Lamotrigine decreased to 100 mg BID.  11/6: No seizures since admission. Cortical mapping session completed.  11/7: Cortical mapping during am, multiple clinical seizures during session. Clinical and electrographic seizure 11/7 afternoon, during which patient had automatisms of hands, confusion, pulled at EEG leads disrupting connection.  11/8: No additional seizures overnight. Back to OR today for replacement of intracranial grid/leads.  11/9: No seizures after revision of intracranial grids in OR yesterday.   11/10: No electrographic seizures, cortical mapping  11/11: No electrographic seizures  11/12: No electrographic seizures  11/13: Plan for benadryl x1 today, sleep deprive tonight and benadryl again tomorrow am  11/14: Episode of brief staring with retained consciousness, no EEG correlate  11/15: Cortical mapping with assistance of neuropsychology for language function    Interval History: No acute events overnight. Cortical mapping with neuropsychology today.     Current Facility-Administered Medications   Medication Dose Route Frequency Provider Last Rate Last Dose    acetaminophen suppository 650 mg  650 mg Rectal Q6H PRN Marc Garrett MD        acetaminophen tablet 650 mg  650 mg Oral Q6H PRN Marc Garrett MD   650 mg at 11/14/18 0130    ceFAZolin injection 2 g  2 g Intravenous Q8H Thomas Plummer MD   2 g at 11/15/18 0909    heparin (porcine) injection 5,000 Units  5,000 Units Subcutaneous Q8H Duane Joiner MD   5,000 Units at 11/15/18 0631     HYDROcodone-acetaminophen 5-325 mg per tablet 1 tablet  1 tablet Oral Q4H PRN Marc Garrett MD   1 tablet at 11/12/18 0213    HYDROmorphone injection 0.5 mg  0.5 mg Intravenous Q6H PRN Thomas Plummer MD        metoclopramide HCl injection 5 mg  5 mg Intravenous Q6H PRN Duane Joiner MD        midazolam (VERSED) 1 mg/mL injection 2 mg  2 mg Intravenous Q5 Min PRN Frankie Dumont MD        ondansetron disintegrating tablet 8 mg  8 mg Oral Q6H PRN Duane Joiner MD   8 mg at 11/07/18 0703    polyethylene glycol packet 17 g  17 g Oral Daily Thomas Plummer MD   17 g at 11/12/18 1048    senna-docusate 8.6-50 mg per tablet 1 tablet  1 tablet Oral BID Thomas Plummer MD   1 tablet at 11/15/18 0836    sodium chloride 0.9% flush 3 mL  3 mL Intravenous PRN Ifeanyi Sanchez MD         Continuous Infusions:    Review of Systems   Constitutional: Positive for fatigue. Negative for chills and fever.   Respiratory: Negative for cough and shortness of breath.    Cardiovascular: Negative for chest pain and leg swelling.   Gastrointestinal: Negative for nausea and vomiting.   Musculoskeletal: Negative for back pain and joint swelling.   Skin: Negative for pallor and rash.   Neurological: Positive for seizures (none in several days). Negative for facial asymmetry, speech difficulty, weakness and headaches (improving).   Psychiatric/Behavioral: Negative for agitation and confusion.     Objective:     Vital Signs (Most Recent):  Temp: 98.9 °F (37.2 °C) (11/15/18 1100)  Pulse: 72 (11/15/18 1200)  Resp: 15 (11/15/18 1200)  BP: 114/66 (11/15/18 1200)  SpO2: 95 % (11/15/18 1200) Vital Signs (24h Range):  Temp:  [98.9 °F (37.2 °C)-99.5 °F (37.5 °C)] 98.9 °F (37.2 °C)  Pulse:  [68-97] 72  Resp:  [12-29] 15  SpO2:  [93 %-98 %] 95 %  BP: (106-138)/(61-77) 114/66  Arterial Line BP: (100-165)/() 106/72     Weight: 106.6 kg (235 lb)  Body mass index is 39.11 kg/m².    Physical Exam   Constitutional: She is  oriented to person, place, and time. She appears well-developed and well-nourished. No distress.   HENT:   Right Ear: External ear normal.   Left Ear: External ear normal.   S/p L temporal crani   Eyes: Conjunctivae and EOM are normal. Pupils are equal, round, and reactive to light.   Neck: Normal range of motion.   Pulmonary/Chest: Effort normal. No respiratory distress.   Musculoskeletal: Normal range of motion. She exhibits no deformity.   Neurological: She is alert and oriented to person, place, and time. No cranial nerve deficit.   Skin: Skin is warm and dry. She is not diaphoretic. No erythema.   Psychiatric: She has a normal mood and affect. Her speech is normal and behavior is normal. Judgment and thought content normal.       NEUROLOGICAL EXAMINATION:     MENTAL STATUS   Oriented to person, place, and time.   Attention: normal. Concentration: normal.   Speech: speech is normal   Level of consciousness: alert    CRANIAL NERVES     CN III, IV, VI   Pupils are equal, round, and reactive to light.  Extraocular motions are normal.     CN VII   Facial expression full, symmetric.     CN VIII   CN VIII normal.     CN IX, X   CN IX normal.   CN X normal.     CN XI   CN XI normal.     CN XII   CN XII normal.     MOTOR EXAM   Muscle bulk: normal  Overall muscle tone: normal       Moves all extremities spontaneously, no focal deficit noted  Assist in transfer to bedside commode without issue       Significant Labs: All pertinent lab results from the past 24 hours have been reviewed.    Significant Studies: I have reviewed all pertinent imaging results/findings within the past 24 hours.    Assessment and Plan:     * Temporal lobe epilepsy, intractable    49 yo female with history of seizures since age 19, who presents to NICU s/p L temporal crani with subdural grid placement for further localization and possible resection.    Recommendations:  - Continuous vEEG monitoring with subdural grid electrodes in place  -  Holding home Lamictal  - Cortical mapping again 11/15  - For any seizures: please push event button on EEG and call epileptologist on call prior to administration of abortive medication - Versed 2 mg IV PRN  - Recommend short acting opioids instead of long acting as needed for pain control  - Pain management per [facility]  - Tentatively scheduled for OR 11/19    Plan of care discussed with NCC team, family at bedside. Will continue to follow.      Essential hypertension    - Goal SBP <140  - Management per [facility]          VTE Risk Mitigation (From admission, onward)        Ordered     heparin (porcine) injection 5,000 Units  Every 8 hours      11/05/18 1238     Place sequential compression device  Until discontinued      11/05/18 1238     IP VTE HIGH RISK PATIENT  Once      11/05/18 1238          Philly Foy PA-C  Neurology-Epilepsy  [facility]

## 2018-11-15 NOTE — ASSESSMENT & PLAN NOTE
47 yo female with history of seizures since age 19, now s/p  crani with subdural grid placement for further localization (11/5) and repositioning following pt induced malpositioning (11/8).    No acute events overnight.    --Continue care per primary team.  --Continue continuous subdural electrocorticography per epilepsy.  --Continue prophylactic antibiotics while subdural grids in place.  --Pt is tentatively booked for removal of grids this Monday (11/19).  To undergo mapping today.   --Please keep pt NPO midnight Sunday night / Monday morning.  --Please hold chemical DVT prophylaxis Jass night / Monday morning.  --Will consent pt prior to procedure.  --We will continue to follow closely, please contact us with any questions or concerns.  dispo- cont ICU care.

## 2018-11-15 NOTE — SUBJECTIVE & OBJECTIVE
Interval History: No acute events overnight. Cortical mapping with neuropsychology today.     Current Facility-Administered Medications   Medication Dose Route Frequency Provider Last Rate Last Dose    acetaminophen suppository 650 mg  650 mg Rectal Q6H PRN Marc Garrett MD        acetaminophen tablet 650 mg  650 mg Oral Q6H PRN Marc Garrett MD   650 mg at 11/14/18 0130    ceFAZolin injection 2 g  2 g Intravenous Q8H Thomas Plummer MD   2 g at 11/15/18 0909    heparin (porcine) injection 5,000 Units  5,000 Units Subcutaneous Q8H Duane Joiner MD   5,000 Units at 11/15/18 0631    HYDROcodone-acetaminophen 5-325 mg per tablet 1 tablet  1 tablet Oral Q4H PRN Marc Garrett MD   1 tablet at 11/12/18 0213    HYDROmorphone injection 0.5 mg  0.5 mg Intravenous Q6H PRN Thomas Plummer MD        metoclopramide HCl injection 5 mg  5 mg Intravenous Q6H PRN Duane Joiner MD        midazolam (VERSED) 1 mg/mL injection 2 mg  2 mg Intravenous Q5 Min PRN Frankie Dumont MD        ondansetron disintegrating tablet 8 mg  8 mg Oral Q6H PRN Duane Joiner MD   8 mg at 11/07/18 0703    polyethylene glycol packet 17 g  17 g Oral Daily Thomas Plummer MD   17 g at 11/12/18 1048    senna-docusate 8.6-50 mg per tablet 1 tablet  1 tablet Oral BID Thomas Plummer MD   1 tablet at 11/15/18 0836    sodium chloride 0.9% flush 3 mL  3 mL Intravenous PRN Ifeanyi Sanchez MD         Continuous Infusions:    Review of Systems   Constitutional: Positive for fatigue. Negative for chills and fever.   Respiratory: Negative for cough and shortness of breath.    Cardiovascular: Negative for chest pain and leg swelling.   Gastrointestinal: Negative for nausea and vomiting.   Musculoskeletal: Negative for back pain and joint swelling.   Skin: Negative for pallor and rash.   Neurological: Positive for seizures (none in several days). Negative for facial asymmetry, speech difficulty, weakness and headaches  (improving).   Psychiatric/Behavioral: Negative for agitation and confusion.     Objective:     Vital Signs (Most Recent):  Temp: 98.9 °F (37.2 °C) (11/15/18 1100)  Pulse: 72 (11/15/18 1200)  Resp: 15 (11/15/18 1200)  BP: 114/66 (11/15/18 1200)  SpO2: 95 % (11/15/18 1200) Vital Signs (24h Range):  Temp:  [98.9 °F (37.2 °C)-99.5 °F (37.5 °C)] 98.9 °F (37.2 °C)  Pulse:  [68-97] 72  Resp:  [12-29] 15  SpO2:  [93 %-98 %] 95 %  BP: (106-138)/(61-77) 114/66  Arterial Line BP: (100-165)/() 106/72     Weight: 106.6 kg (235 lb)  Body mass index is 39.11 kg/m².    Physical Exam   Constitutional: She is oriented to person, place, and time. She appears well-developed and well-nourished. No distress.   HENT:   Right Ear: External ear normal.   Left Ear: External ear normal.   S/p L temporal crani   Eyes: Conjunctivae and EOM are normal. Pupils are equal, round, and reactive to light.   Neck: Normal range of motion.   Pulmonary/Chest: Effort normal. No respiratory distress.   Musculoskeletal: Normal range of motion. She exhibits no deformity.   Neurological: She is alert and oriented to person, place, and time. No cranial nerve deficit.   Skin: Skin is warm and dry. She is not diaphoretic. No erythema.   Psychiatric: She has a normal mood and affect. Her speech is normal and behavior is normal. Judgment and thought content normal.       NEUROLOGICAL EXAMINATION:     MENTAL STATUS   Oriented to person, place, and time.   Attention: normal. Concentration: normal.   Speech: speech is normal   Level of consciousness: alert    CRANIAL NERVES     CN III, IV, VI   Pupils are equal, round, and reactive to light.  Extraocular motions are normal.     CN VII   Facial expression full, symmetric.     CN VIII   CN VIII normal.     CN IX, X   CN IX normal.   CN X normal.     CN XI   CN XI normal.     CN XII   CN XII normal.     MOTOR EXAM   Muscle bulk: normal  Overall muscle tone: normal       Moves all extremities spontaneously, no  focal deficit noted  Assist in transfer to bedside commode without issue       Significant Labs: All pertinent lab results from the past 24 hours have been reviewed.    Significant Studies: I have reviewed all pertinent imaging results/findings within the past 24 hours.

## 2018-11-16 ENCOUNTER — ANESTHESIA EVENT (OUTPATIENT)
Dept: SURGERY | Facility: HOSPITAL | Age: 48
DRG: 027 | End: 2018-11-16
Payer: COMMERCIAL

## 2018-11-16 LAB
ALBUMIN SERPL BCP-MCNC: 3.1 G/DL
ALP SERPL-CCNC: 118 U/L
ALT SERPL W/O P-5'-P-CCNC: 9 U/L
ANION GAP SERPL CALC-SCNC: 11 MMOL/L
AST SERPL-CCNC: 16 U/L
BASOPHILS # BLD AUTO: 0.02 K/UL
BASOPHILS NFR BLD: 0.2 %
BILIRUB SERPL-MCNC: 0.2 MG/DL
BUN SERPL-MCNC: 15 MG/DL
CALCIUM SERPL-MCNC: 9.4 MG/DL
CHLORIDE SERPL-SCNC: 108 MMOL/L
CO2 SERPL-SCNC: 21 MMOL/L
CREAT SERPL-MCNC: 0.7 MG/DL
DIFFERENTIAL METHOD: ABNORMAL
EOSINOPHIL # BLD AUTO: 0.2 K/UL
EOSINOPHIL NFR BLD: 1.2 %
ERYTHROCYTE [DISTWIDTH] IN BLOOD BY AUTOMATED COUNT: 15.9 %
EST. GFR  (AFRICAN AMERICAN): >60 ML/MIN/1.73 M^2
EST. GFR  (NON AFRICAN AMERICAN): >60 ML/MIN/1.73 M^2
GLUCOSE SERPL-MCNC: 123 MG/DL
HCT VFR BLD AUTO: 38.3 %
HGB BLD-MCNC: 11.9 G/DL
IMM GRANULOCYTES # BLD AUTO: 0.05 K/UL
IMM GRANULOCYTES NFR BLD AUTO: 0.4 %
LYMPHOCYTES # BLD AUTO: 3.8 K/UL
LYMPHOCYTES NFR BLD: 30.1 %
MAGNESIUM SERPL-MCNC: 2.1 MG/DL
MAGNESIUM SERPL-MCNC: 2.1 MG/DL
MCH RBC QN AUTO: 24.4 PG
MCHC RBC AUTO-ENTMCNC: 31.1 G/DL
MCV RBC AUTO: 79 FL
MONOCYTES # BLD AUTO: 0.9 K/UL
MONOCYTES NFR BLD: 7.1 %
NEUTROPHILS # BLD AUTO: 7.6 K/UL
NEUTROPHILS NFR BLD: 61 %
NRBC BLD-RTO: 0 /100 WBC
PHOSPHATE SERPL-MCNC: 3.6 MG/DL
PHOSPHATE SERPL-MCNC: 3.6 MG/DL
PLATELET # BLD AUTO: 366 K/UL
PMV BLD AUTO: 9.4 FL
POTASSIUM SERPL-SCNC: 4.2 MMOL/L
PROT SERPL-MCNC: 7.4 G/DL
RBC # BLD AUTO: 4.88 M/UL
SODIUM SERPL-SCNC: 140 MMOL/L
WBC # BLD AUTO: 12.46 K/UL

## 2018-11-16 PROCEDURE — 80053 COMPREHEN METABOLIC PANEL: CPT

## 2018-11-16 PROCEDURE — 99233 SBSQ HOSP IP/OBS HIGH 50: CPT | Mod: ,,, | Performed by: PSYCHIATRY & NEUROLOGY

## 2018-11-16 PROCEDURE — 94761 N-INVAS EAR/PLS OXIMETRY MLT: CPT

## 2018-11-16 PROCEDURE — 84100 ASSAY OF PHOSPHORUS: CPT

## 2018-11-16 PROCEDURE — 63600175 PHARM REV CODE 636 W HCPCS: Performed by: STUDENT IN AN ORGANIZED HEALTH CARE EDUCATION/TRAINING PROGRAM

## 2018-11-16 PROCEDURE — 85025 COMPLETE CBC W/AUTO DIFF WBC: CPT

## 2018-11-16 PROCEDURE — 95951 PR EEG MONITORING/VIDEORECORD: CPT | Mod: 26,,, | Performed by: PSYCHIATRY & NEUROLOGY

## 2018-11-16 PROCEDURE — 99233 SBSQ HOSP IP/OBS HIGH 50: CPT | Mod: ,,, | Performed by: PHYSICIAN ASSISTANT

## 2018-11-16 PROCEDURE — 25000003 PHARM REV CODE 250: Performed by: STUDENT IN AN ORGANIZED HEALTH CARE EDUCATION/TRAINING PROGRAM

## 2018-11-16 PROCEDURE — 95951 HC EEG MONITORING/VIDEO RECORD: CPT

## 2018-11-16 PROCEDURE — 83735 ASSAY OF MAGNESIUM: CPT

## 2018-11-16 PROCEDURE — 63600175 PHARM REV CODE 636 W HCPCS: Performed by: NEUROLOGICAL SURGERY

## 2018-11-16 PROCEDURE — 20000000 HC ICU ROOM

## 2018-11-16 RX ADMIN — SENNOSIDES AND DOCUSATE SODIUM 1 TABLET: 8.6; 5 TABLET ORAL at 09:11

## 2018-11-16 RX ADMIN — CEFAZOLIN 2 G: 1 INJECTION, POWDER, FOR SOLUTION INTRAMUSCULAR; INTRAVENOUS at 01:11

## 2018-11-16 RX ADMIN — HEPARIN SODIUM 5000 UNITS: 5000 INJECTION, SOLUTION INTRAVENOUS; SUBCUTANEOUS at 05:11

## 2018-11-16 RX ADMIN — HEPARIN SODIUM 5000 UNITS: 5000 INJECTION, SOLUTION INTRAVENOUS; SUBCUTANEOUS at 01:11

## 2018-11-16 RX ADMIN — CEFAZOLIN 2 G: 1 INJECTION, POWDER, FOR SOLUTION INTRAMUSCULAR; INTRAVENOUS at 09:11

## 2018-11-16 RX ADMIN — CEFAZOLIN 2 G: 1 INJECTION, POWDER, FOR SOLUTION INTRAMUSCULAR; INTRAVENOUS at 05:11

## 2018-11-16 RX ADMIN — POLYETHYLENE GLYCOL 3350 17 G: 17 POWDER, FOR SOLUTION ORAL at 09:11

## 2018-11-16 RX ADMIN — HEPARIN SODIUM 5000 UNITS: 5000 INJECTION, SOLUTION INTRAVENOUS; SUBCUTANEOUS at 09:11

## 2018-11-16 NOTE — PROGRESS NOTES
[facility]  Neurocritical Care  Progress Note    Admit Date: 11/5/2018  Service Date: 11/16/2018  Length of Stay: 11    Subjective:     Chief Complaint: Temporal lobe epilepsy, intractable    History of Present Illness: Liza Arrieta is a 49 yo female with PMHx of partial intractable epilepsy who is being admitted to [facility] after L crani with subdural grid placement. Per chart review, she had her first seizure at age 19. At that time, she was treated with dilantin and she did well for approximately 15 years. Reports that she typically has absence seizures (5-6/day), but states she may have had two grand mal seizures decades ago.  The patient was recently admitted to EMU on 2/9/2018 where seizures were captured on EEG.  She has failed multiple medications and is now being admitted for post op management, continuous EEG.             Hospital Course: 11/5: Pt admitted to [facility] s/p L crani with subdural grid placement, continuous EEG  11/6: cEEG  11/7: Pulled one of her leads today. Sent for stat CTH showed electrodes have been repositioned. posterior infratemporal strips are no longer in place  11/9: s/p repeat crani for grid placement.  One abscence seizure lasting 5 seconds. Epilepsy plans to due cortical mapping this afternoon.  11/12: Continuing to hold AEDs. PRNs per epilepsy.  11/13: Plan for cortical mapping on 11/14 11/14  One seizure overnight and 2 seizures noted today. remains on EEG. Epilepsy to do cortical mapping this aftrnoon  11/15 24h EEG continues no seizures over night and so far today. Cortical mapping today  11/16: PT/OT, restart home AED meds    Interval History:  PT/OT, restart home AED meds    Review of Systems    Review of symptoms:   Constitutional: Denies fevers or chills.  Pulmonary: Denies shortness of breath or cough.  Cardiology: Denies chest pain or palpitations.  GI: Denies abdominal pain or constipation.  Neurologic: Denies new weakness,  headache, or  paresthesias.    Objective:     Vitals:  Temp: 98.9 °F (37.2 °C)  Pulse: 75  Rhythm: normal sinus rhythm  BP: 139/89  MAP (mmHg): 108  Resp: 15  SpO2: 97 %  O2 Device (Oxygen Therapy): room air    Temp  Min: 98.7 °F (37.1 °C)  Max: 99.7 °F (37.6 °C)  Pulse  Min: 66  Max: 91  BP  Min: 109/61  Max: 139/89  MAP (mmHg)  Min: 78  Max: 108  Resp  Min: 11  Max: 26  SpO2  Min: 95 %  Max: 98 %    11/15 0701 - 11/16 0700  In: 450 [P.O.:450]  Out: 950 [Urine:950]   Unmeasured Output  Urine Occurrence: 1  Stool Occurrence: 1       Physical Exam    Physical Exam:  GA: Alert, comfortable, no acute distress.   HEENT: No scleral icterus or JVD.   Pulmonary: Clear to auscultation Anteriorly. No wheezing, crackles, or rhonchi.  Cardiac: RRR S1 & S2 w/o rubs/murmurs/gallops.   Abdominal: Bowel sounds present x 4. No appreciable hepatosplenomegaly.  Skin: No jaundice, rashes, or visible lesions.  Neuro:  --GCS: E4 V5 M6  --Mental Status:  Awake, alert, oriented x4, follows commands  --Pupils 4mm, PERRL.   --moves all extremities spontaneously  Unable to test gait     Medications:  Continuous Scheduled  ceFAZolin (ANCEF) IVPB 2 g Q8H   heparin (porcine) 5,000 Units Q8H   polyethylene glycol 17 g Daily   senna-docusate 8.6-50 mg 1 tablet BID   PRN  acetaminophen 650 mg Q6H PRN   acetaminophen 650 mg Q6H PRN   HYDROcodone-acetaminophen 1 tablet Q4H PRN   HYDROmorphone 0.5 mg Q6H PRN   metoclopramide HCl 5 mg Q6H PRN   midazolam 2 mg Q5 Min PRN   ondansetron 8 mg Q6H PRN   sodium chloride 0.9% 3 mL PRN     Today I personally reviewed pertinent medications, lines/drains/airways, imaging, cardiology results, laboratory results, microbiology results,     Diet  Diet Adult Regular (IDDSI Level 7)  Diet Adult Regular (IDDSI Level 7)        Assessment/Plan:     Neuro   * Temporal lobe epilepsy, intractable    - s/p craniotomy x2 for strip and grid placement  - tentatively booked for OR on Monday for removal of grids and potential focectomy  -  keep NPO and hold SQH Sunday night  - Continuous vEEG monitoring with subdural grid electrodes in place  - Home lamotrigine and onfi restarted per Epilepsy  - For any seizures: please push event button on EEG and call epileptologist on call prior to administration of abortive medication  - Continue midazolam 2mg IV q5min for motor seizures lasting greater than 5 minutes  - Continue Ancef 2g IV q8h  - Sleep deprive patient until 0400 11/14  - Benadryl 50mg po x1 this evening and in am 11/14  - Cortical mapping today  - Ok for patient to eat.     Cardiac/Vascular   Essential hypertension    - EKG NSR  - SBP goal <160       Endocrine   Class 2 obesity with body mass index (BMI) of 38.0 to 38.9 in adult    Body mass index is 39.11 kg/m².             The patient is being Prophylaxed for:  Venous Thromboembolism with: Mechanical or Chemical  Stress Ulcer with: None  Ventilator Pneumonia with: not applicable    Activity Orders          None        Full Code    Rubi Levin PA-C  Neurocritical Care  [facility]

## 2018-11-16 NOTE — ANESTHESIA PREPROCEDURE EVALUATION
[facility]  Anesthesia Pre-Operative Evaluation         Patient Name: Liza Arrieta  YOB: 1970  MRN: [id number]    SUBJECTIVE:     Pre-operative evaluation for Procedure(s) (LRB):  CRANIOTOMY, FOR SUBDURAL GRID AND STRIP ELECTRODE LEAD Removal. (Left)     11/16/2018    Liza Arrieta is a 48 y.o. female w/ a significant PMHx of partial intractable epilepsy now s/p L crani with subdural grid placement on 11/05 and again on 11/08 after pt dislocated the leads, possible PEDRO (4/8 STOP-BANG criteria), and obesity.       Patient now presents for the above procedure(s).      LDA:        Peripheral IV - Single Lumen 11/14/18 1301 Right Forearm (Active)   Site Assessment Clean;Dry;Intact;No redness;No swelling 11/16/2018  7:01 AM   Line Status Flushed;Saline locked 11/16/2018  7:01 AM   Dressing Status Clean;Dry;Intact 11/16/2018  7:01 AM   Dressing Intervention Dressing reinforced 11/16/2018  7:01 AM   Dressing Change Due 11/18/18 11/16/2018  7:01 AM   Site Change Due 11/18/18 11/16/2018  7:01 AM   Reason Not Rotated Not due 11/16/2018  7:01 AM   Number of days: 1            Midline Catheter Insertion/Assessment  - Single Lumen 11/14/18 1320 Right basilic vein (medial side of arm) 18g x 10cm (Active)   Site Assessment Clean;Dry;Intact;No redness;No swelling 11/16/2018  7:01 AM   IV Device Securement catheter securement device 11/16/2018  7:01 AM   Line Status Blood return noted;Flushed;Saline locked 11/16/2018  7:01 AM   Dressing Status Biopatch in place;Clean;Dry;Intact 11/16/2018  7:01 AM   Dressing Intervention Dressing reinforced 11/16/2018  7:01 AM   Dressing Change Due 11/21/18 11/16/2018  7:01 AM   Site Change Due 12/13/18 11/16/2018  7:01 AM   Reason Not Rotated Not due 11/16/2018  7:01 AM   Number of days: 1       Prev airway:   Easy mask  DL with Moyer #2  Grade I view  7.0 ETT  1 attempt        Drips: None documented.      Patient Active Problem List   Diagnosis     Convulsions/seizures    Seizure    Temporal lobe epilepsy, intractable    Complex partial epilepsy with generalization and with intractable epilepsy    Mild neurocognitive disorder    Class 2 obesity with body mass index (BMI) of 38.0 to 38.9 in adult    Snoring    Abnormal EKG    Iron deficiency anemia    Epilepsy    Essential hypertension       Review of patient's allergies indicates:  No Known Allergies    Current Inpatient Medications:   ceFAZolin (ANCEF) IVPB  2 g Intravenous Q8H    heparin (porcine)  5,000 Units Subcutaneous Q8H    polyethylene glycol  17 g Oral Daily    senna-docusate 8.6-50 mg  1 tablet Oral BID       No current facility-administered medications on file prior to encounter.      Current Outpatient Medications on File Prior to Encounter   Medication Sig Dispense Refill    lamoTRIgine (LAMICTAL) 200 MG tablet Take 1.5 tablets (300 mg total) by mouth 2 (two) times daily. (Patient taking differently: Take 200 mg by mouth 2 (two) times daily. Take 1 in the morning and one and half  at night) 270 tablet 11    cloBAZam (ONFI) 20 mg Tab Take 1 tablet (20 mg total) by mouth 2 (two) times daily. 60 tablet 5       Past Surgical History:   Procedure Laterality Date    APPENDECTOMY       SECTION      4    CHOLECYSTECTOMY      CRANIOTOMY N/A 2018    Procedure: CRANIOTOMY for strip and grid;  Surgeon: Ruben Senior MD;  Location: [facility] OR [address] [place] [address];  Service: Neurosurgery;  Laterality: N/A;  toronto II, asa 2, type and screen, regular bed, [place], supine     CRANIOTOMY for strip and grid N/A 2018    Performed by Ruben Senior MD at [facility] OR [address] [place] [address]    HYSTERECTOMY      around  for pain ful periods        Social History     Socioeconomic History    Marital status:      Spouse name: Not on file    Number of children: Not on file    Years of education: Not on file    Highest education level: Not on file   Social Needs    Financial resource strain: Not on  file    Food insecurity - worry: Not on file    Food insecurity - inability: Not on file    Transportation needs - medical: Not on file    Transportation needs - non-medical: Not on file   Occupational History    Not on file   Tobacco Use    Smoking status: Never Smoker    Smokeless tobacco: Never Used   Substance and Sexual Activity    Alcohol use: No     Alcohol/week: 0.0 oz    Drug use: No    Sexual activity: No   Other Topics Concern    Not on file   Social History Narrative    Not on file       OBJECTIVE:     Vital Signs Range (Last 24H):  Temp:  [37.1 °C (98.8 °F)-37.6 °C (99.7 °F)]   Pulse:  [66-91]   Resp:  [11-26]   BP: (109-131)/(59-78)   SpO2:  [95 %-98 %]       Significant Labs:  Lab Results   Component Value Date    WBC 12.46 11/16/2018    HGB 11.9 (L) 11/16/2018    HCT 38.3 11/16/2018     (H) 11/16/2018    CHOL 182 11/05/2018    TRIG 114 11/05/2018    HDL 39 (L) 11/05/2018    ALT 9 (L) 11/16/2018    AST 16 11/16/2018     11/16/2018    K 4.2 11/16/2018     11/16/2018    CREATININE 0.7 11/16/2018    BUN 15 11/16/2018    CO2 21 (L) 11/16/2018    TSH 1.657 01/14/2016    INR 0.9 11/08/2018    HGBA1C 5.3 10/22/2018       Diagnostic Studies: No relevant studies.    EKG:   Vent. Rate : 070 BPM     Atrial Rate : 070 BPM     P-R Int : 144 ms          QRS Dur : 084 ms      QT Int : 408 ms       P-R-T Axes : 027 000 -17 degrees     QTc Int : 440 ms    Normal sinus rhythm  T wave abnormality, consider anterior ischemia  Abnormal ECG  When compared with ECG of 09-MAY-2018 15:29,  No significant change was found  Confirmed by Amaya Raman MD ([id number]) on 11/6/2018 11:43:25 AM      2D ECHO:  No results found for this or any previous visit.      ASSESSMENT/PLAN:         Anesthesia Evaluation    I have reviewed the Patient Summary Reports.     I have reviewed the Medications.     Review of Systems  Anesthesia Hx:  History of prior surgery of interest to airway management or planning: Previous  anesthesia: General   Neurological:   Seizures        Physical Exam  General:  Well nourished, Obesity    Airway/Jaw/Neck:  Airway Findings: Mouth Opening: Normal Tongue: Normal  General Airway Assessment: Adult  Mallampati: II  TM Distance: Normal, at least 6 cm  Jaw/Neck Findings:  Neck ROM: Normal ROM  Neck Findings: Normal    Eyes/Ears/Nose:  EYES/EARS/NOSE FINDINGS: Normal    Chest/Lungs:  Chest/Lungs Findings: Normal Respiratory Rate     Heart/Vascular:  Heart Findings: Rate: Normal  Rhythm: Regular Rhythm        Mental Status:  Mental Status Findings: Normal        Anesthesia Plan  Type of Anesthesia, risks & benefits discussed:  Anesthesia Type:  general  Patient's Preference:   Intra-op Monitoring Plan: standard ASA monitors  Intra-op Monitoring Plan Comments:   Post Op Pain Control Plan: multimodal analgesia, IV/PO Opioids PRN and per primary service following discharge from PACU  Post Op Pain Control Plan Comments:   Induction:   IV  Beta Blocker:  Patient is not currently on a Beta-Blocker (No further documentation required).       Informed Consent: Patient understands risks and agrees with Anesthesia plan.  Questions answered. Anesthesia consent signed with patient.  ASA Score: 3     Day of Surgery Review of History & Physical:    H&P update referred to the surgeon.         Ready For Surgery From Anesthesia Perspective.

## 2018-11-16 NOTE — PROGRESS NOTES
Notified MD Jn with neurosurgery of pt having increased yellow drainage to head dressing. Area marked. No new orders. Instructed to call if drainage increases.

## 2018-11-16 NOTE — PLAN OF CARE
Problem: Patient Care Overview  Goal: Plan of Care Review  Outcome: Ongoing (interventions implemented as appropriate)  POC reviewed with pt at 0500. Pt verbalized understanding. Questions and concerns addressed. No acute events overnight. NSGY aware of pt head dressing not intact. No new orders. Pt in no acute distress. No seizure activity noted.  Pt progressing toward goals. Will continue to monitor. See flowsheets for full assessment and VS info

## 2018-11-16 NOTE — PROGRESS NOTES
[facility]  Neurology-Epilepsy  Progress Note    Patient Name: Liza Arrieta  MRN: [id number]  Admission Date: 11/5/2018  Hospital Length of Stay: 11 days  Code Status: Full Code   Attending Provider: Yohannes De La Rosa MD  Primary Care Physician: LISA Elam MD   Principal Problem:Temporal lobe epilepsy, intractable    Subjective:     Hospital Course:   Patient admitted to [facility] after subdural grid placement/L crani on 11/5. Home Lamotrigine decreased to 100 mg BID.  11/6: No seizures since admission. Cortical mapping session completed.  11/7: Cortical mapping during am, multiple clinical seizures during session. Clinical and electrographic seizure 11/7 afternoon, during which patient had automatisms of hands, confusion, pulled at EEG leads disrupting connection.  11/8: No additional seizures overnight. Back to OR today for replacement of intracranial grid/leads.  11/9: No seizures after revision of intracranial grids in OR yesterday.   11/10: No electrographic seizures, cortical mapping  11/11: No electrographic seizures  11/12: No electrographic seizures  11/13: Plan for benadryl x1 today, sleep deprive tonight and benadryl again tomorrow am  11/14: Episode of brief staring with retained consciousness, no EEG correlate  11/15: Cortical mapping with assistance of neuropsychology for language function  11/16: No seizures    Interval History: No acute events overnight. Likely to OR Monday.    Current Facility-Administered Medications   Medication Dose Route Frequency Provider Last Rate Last Dose    acetaminophen suppository 650 mg  650 mg Rectal Q6H PRN Marc Garrett MD        acetaminophen tablet 650 mg  650 mg Oral Q6H PRN Marc Garrett MD   650 mg at 11/14/18 0130    ceFAZolin injection 2 g  2 g Intravenous Q8H Thomas Plummer MD   2 g at 11/16/18 0906    heparin (porcine) injection 5,000 Units  5,000 Units Subcutaneous Q8H Duane Joiner MD   5,000 Units at 11/16/18 1307     HYDROcodone-acetaminophen 5-325 mg per tablet 1 tablet  1 tablet Oral Q4H PRN Marc Garrett MD   1 tablet at 11/12/18 0213    HYDROmorphone injection 0.5 mg  0.5 mg Intravenous Q6H PRN Thomas Plummer MD        metoclopramide HCl injection 5 mg  5 mg Intravenous Q6H PRN Duane Joiner MD        midazolam (VERSED) 1 mg/mL injection 2 mg  2 mg Intravenous Q5 Min PRN Frankie Dumont MD        ondansetron disintegrating tablet 8 mg  8 mg Oral Q6H PRN Duane Joiner MD   8 mg at 11/07/18 0703    polyethylene glycol packet 17 g  17 g Oral Daily Thomas Plummer MD   17 g at 11/16/18 0905    senna-docusate 8.6-50 mg per tablet 1 tablet  1 tablet Oral BID Thomas Plummer MD   1 tablet at 11/16/18 0905    sodium chloride 0.9% flush 3 mL  3 mL Intravenous PRN fIeanyi Sanchez MD         Continuous Infusions:    Review of Systems   Constitutional: Negative for chills and fever.   Respiratory: Negative for cough and shortness of breath.    Cardiovascular: Negative for chest pain and leg swelling.   Gastrointestinal: Negative for nausea and vomiting.   Musculoskeletal: Negative for back pain and joint swelling.   Skin: Negative for pallor and rash.   Neurological: Positive for seizures (none in several days). Negative for facial asymmetry, speech difficulty, weakness and headaches.   Psychiatric/Behavioral: Negative for agitation and confusion.     Objective:     Vital Signs (Most Recent):  Temp: 98.9 °F (37.2 °C) (11/16/18 1500)  Pulse: 75 (11/16/18 1600)  Resp: 15 (11/16/18 1600)  BP: 139/89 (11/16/18 1600)  SpO2: 97 % (11/16/18 1600) Vital Signs (24h Range):  Temp:  [98.7 °F (37.1 °C)-99.7 °F (37.6 °C)] 98.9 °F (37.2 °C)  Pulse:  [66-91] 75  Resp:  [11-26] 15  SpO2:  [95 %-98 %] 97 %  BP: (109-139)/(59-89) 139/89     Weight: 106.6 kg (235 lb)  Body mass index is 39.11 kg/m².    Physical Exam   Constitutional: She is oriented to person, place, and time. She appears well-developed and  well-nourished. No distress.   HENT:   Right Ear: External ear normal.   Left Ear: External ear normal.   S/p L temporal crani   Eyes: Conjunctivae and EOM are normal. Pupils are equal, round, and reactive to light.   Neck: Normal range of motion.   Pulmonary/Chest: Effort normal. No respiratory distress.   Musculoskeletal: Normal range of motion. She exhibits no deformity.   Neurological: She is alert and oriented to person, place, and time. No cranial nerve deficit.   Skin: Skin is warm and dry. She is not diaphoretic. No erythema.   Psychiatric: She has a normal mood and affect. Her speech is normal and behavior is normal. Judgment and thought content normal.       NEUROLOGICAL EXAMINATION:     MENTAL STATUS   Oriented to person, place, and time.   Attention: normal. Concentration: normal.   Speech: speech is normal   Level of consciousness: alert    CRANIAL NERVES     CN III, IV, VI   Pupils are equal, round, and reactive to light.  Extraocular motions are normal.     CN VII   Facial expression full, symmetric.     CN VIII   CN VIII normal.     CN IX, X   CN IX normal.   CN X normal.     CN XI   CN XI normal.     CN XII   CN XII normal.     MOTOR EXAM   Muscle bulk: normal  Overall muscle tone: normal       Moves all extremities spontaneously, no focal deficit noted  Assist in transfer to bedside commode without issue       Significant Labs: All pertinent lab results from the past 24 hours have been reviewed.    Significant Studies: I have reviewed all pertinent imaging results/findings within the past 24 hours.    Assessment and Plan:     * Temporal lobe epilepsy, intractable    49 yo female with history of seizures since age 19, who presents to NICU s/p L temporal crani with subdural grid placement for further localization and possible resection.    Recommendations:  - Continuous vEEG monitoring with subdural grid electrodes in place  - Restart Lamictal 25 mg daily  - For any seizures: please push event button  on EEG and call epileptologist on call prior to administration of abortive medication - Versed 2 mg IV PRN  - Non urgent CT head ordered for tomorrow for potential 3D lab use  - Tentatively scheduled for OR 11/19    Plan of care discussed with [facility] team, family at bedside. Will continue to follow.      Essential hypertension    - Goal SBP <140  - Management per [facility]          VTE Risk Mitigation (From admission, onward)        Ordered     heparin (porcine) injection 5,000 Units  Every 8 hours      11/05/18 1238     Place sequential compression device  Until discontinued      11/05/18 1238     IP VTE HIGH RISK PATIENT  Once      11/05/18 1238          Philly Foy PA-C  Neurology-Epilepsy  [facility]  Staff: Dr. Elam

## 2018-11-16 NOTE — PLAN OF CARE
Problem: Patient Care Overview  Goal: Plan of Care Review  Outcome: Ongoing (interventions implemented as appropriate)  VS and assessment per flow sheet. CT head without contrast scheduled for tomorrow. Pt remains free from injury. Patient progressing towards goals as tolerated. Plan of care reviewed with patient. All questions and concerns addressed. Will continue to monitor.

## 2018-11-16 NOTE — SUBJECTIVE & OBJECTIVE
Interval History:  PT/OT, restart home AED meds    Review of Systems    Review of symptoms:   Constitutional: Denies fevers or chills.  Pulmonary: Denies shortness of breath or cough.  Cardiology: Denies chest pain or palpitations.  GI: Denies abdominal pain or constipation.  Neurologic: Denies new weakness,  headache, or paresthesias.    Objective:     Vitals:  Temp: 98.9 °F (37.2 °C)  Pulse: 75  Rhythm: normal sinus rhythm  BP: 139/89  MAP (mmHg): 108  Resp: 15  SpO2: 97 %  O2 Device (Oxygen Therapy): room air    Temp  Min: 98.7 °F (37.1 °C)  Max: 99.7 °F (37.6 °C)  Pulse  Min: 66  Max: 91  BP  Min: 109/61  Max: 139/89  MAP (mmHg)  Min: 78  Max: 108  Resp  Min: 11  Max: 26  SpO2  Min: 95 %  Max: 98 %    11/15 0701 - 11/16 0700  In: 450 [P.O.:450]  Out: 950 [Urine:950]   Unmeasured Output  Urine Occurrence: 1  Stool Occurrence: 1       Physical Exam    Physical Exam:  GA: Alert, comfortable, no acute distress.   HEENT: No scleral icterus or JVD.   Pulmonary: Clear to auscultation Anteriorly. No wheezing, crackles, or rhonchi.  Cardiac: RRR S1 & S2 w/o rubs/murmurs/gallops.   Abdominal: Bowel sounds present x 4. No appreciable hepatosplenomegaly.  Skin: No jaundice, rashes, or visible lesions.  Neuro:  --GCS: E4 V5 M6  --Mental Status:  Awake, alert, oriented x4, follows commands  --Pupils 4mm, PERRL.   --moves all extremities spontaneously  Unable to test gait     Medications:  Continuous Scheduled  ceFAZolin (ANCEF) IVPB 2 g Q8H   heparin (porcine) 5,000 Units Q8H   polyethylene glycol 17 g Daily   senna-docusate 8.6-50 mg 1 tablet BID   PRN  acetaminophen 650 mg Q6H PRN   acetaminophen 650 mg Q6H PRN   HYDROcodone-acetaminophen 1 tablet Q4H PRN   HYDROmorphone 0.5 mg Q6H PRN   metoclopramide HCl 5 mg Q6H PRN   midazolam 2 mg Q5 Min PRN   ondansetron 8 mg Q6H PRN   sodium chloride 0.9% 3 mL PRN     Today I personally reviewed pertinent medications, lines/drains/airways, imaging, cardiology results, laboratory  results, microbiology results,     Diet  Diet Adult Regular (IDDSI Level 7)  Diet Adult Regular (IDDSI Level 7)

## 2018-11-16 NOTE — SUBJECTIVE & OBJECTIVE
Interval History: No acute events overnight. Likely to OR Monday.    Current Facility-Administered Medications   Medication Dose Route Frequency Provider Last Rate Last Dose    acetaminophen suppository 650 mg  650 mg Rectal Q6H PRN Marc Garrett MD        acetaminophen tablet 650 mg  650 mg Oral Q6H PRN Marc Garrett MD   650 mg at 11/14/18 0130    ceFAZolin injection 2 g  2 g Intravenous Q8H Thomas Plummer MD   2 g at 11/16/18 0906    heparin (porcine) injection 5,000 Units  5,000 Units Subcutaneous Q8H Duane Joiner MD   5,000 Units at 11/16/18 1307    HYDROcodone-acetaminophen 5-325 mg per tablet 1 tablet  1 tablet Oral Q4H PRN Marc Garrett MD   1 tablet at 11/12/18 0213    HYDROmorphone injection 0.5 mg  0.5 mg Intravenous Q6H PRN Thomas Plummer MD        metoclopramide HCl injection 5 mg  5 mg Intravenous Q6H PRN Duane Joiner MD        midazolam (VERSED) 1 mg/mL injection 2 mg  2 mg Intravenous Q5 Min PRN Frankie Dumont MD        ondansetron disintegrating tablet 8 mg  8 mg Oral Q6H PRN Duane Joiner MD   8 mg at 11/07/18 0703    polyethylene glycol packet 17 g  17 g Oral Daily Thomas Plummer MD   17 g at 11/16/18 0905    senna-docusate 8.6-50 mg per tablet 1 tablet  1 tablet Oral BID Thomas Plummer MD   1 tablet at 11/16/18 0905    sodium chloride 0.9% flush 3 mL  3 mL Intravenous PRN Ifeanyi Sanchez MD         Continuous Infusions:    Review of Systems   Constitutional: Negative for chills and fever.   Respiratory: Negative for cough and shortness of breath.    Cardiovascular: Negative for chest pain and leg swelling.   Gastrointestinal: Negative for nausea and vomiting.   Musculoskeletal: Negative for back pain and joint swelling.   Skin: Negative for pallor and rash.   Neurological: Positive for seizures (none in several days). Negative for facial asymmetry, speech difficulty, weakness and headaches.   Psychiatric/Behavioral: Negative for agitation and  confusion.     Objective:     Vital Signs (Most Recent):  Temp: 98.9 °F (37.2 °C) (11/16/18 1500)  Pulse: 75 (11/16/18 1600)  Resp: 15 (11/16/18 1600)  BP: 139/89 (11/16/18 1600)  SpO2: 97 % (11/16/18 1600) Vital Signs (24h Range):  Temp:  [98.7 °F (37.1 °C)-99.7 °F (37.6 °C)] 98.9 °F (37.2 °C)  Pulse:  [66-91] 75  Resp:  [11-26] 15  SpO2:  [95 %-98 %] 97 %  BP: (109-139)/(59-89) 139/89     Weight: 106.6 kg (235 lb)  Body mass index is 39.11 kg/m².    Physical Exam   Constitutional: She is oriented to person, place, and time. She appears well-developed and well-nourished. No distress.   HENT:   Right Ear: External ear normal.   Left Ear: External ear normal.   S/p L temporal crani   Eyes: Conjunctivae and EOM are normal. Pupils are equal, round, and reactive to light.   Neck: Normal range of motion.   Pulmonary/Chest: Effort normal. No respiratory distress.   Musculoskeletal: Normal range of motion. She exhibits no deformity.   Neurological: She is alert and oriented to person, place, and time. No cranial nerve deficit.   Skin: Skin is warm and dry. She is not diaphoretic. No erythema.   Psychiatric: She has a normal mood and affect. Her speech is normal and behavior is normal. Judgment and thought content normal.       NEUROLOGICAL EXAMINATION:     MENTAL STATUS   Oriented to person, place, and time.   Attention: normal. Concentration: normal.   Speech: speech is normal   Level of consciousness: alert    CRANIAL NERVES     CN III, IV, VI   Pupils are equal, round, and reactive to light.  Extraocular motions are normal.     CN VII   Facial expression full, symmetric.     CN VIII   CN VIII normal.     CN IX, X   CN IX normal.   CN X normal.     CN XI   CN XI normal.     CN XII   CN XII normal.     MOTOR EXAM   Muscle bulk: normal  Overall muscle tone: normal       Moves all extremities spontaneously, no focal deficit noted  Assist in transfer to bedside commode without issue       Significant Labs: All pertinent lab  results from the past 24 hours have been reviewed.    Significant Studies: I have reviewed all pertinent imaging results/findings within the past 24 hours.

## 2018-11-16 NOTE — ASSESSMENT & PLAN NOTE
49 yo female with history of seizures since age 19, who presents to NICU s/p L temporal crani with subdural grid placement for further localization and possible resection.    Recommendations:  - Continuous vEEG monitoring with subdural grid electrodes in place  - Restart Lamictal 25 mg daily  - For any seizures: please push event button on EEG and call epileptologist on call prior to administration of abortive medication - Versed 2 mg IV PRN  - Non urgent CT head ordered for tomorrow for 3D mapping  - Tentatively scheduled for OR 11/19    Plan of care discussed with [facility] team, family at bedside. Will continue to follow.

## 2018-11-16 NOTE — PROGRESS NOTES
Pt head dressing not in place due to pt movement, no new neuro changes. No seizure-like activity noted. Neurosurgery resident on call notified and no new orders. Will continue to monitor.

## 2018-11-16 NOTE — SUBJECTIVE & OBJECTIVE
Medications:  Continuous Infusions:    Scheduled Meds:   ceFAZolin (ANCEF) IVPB  2 g Intravenous Q8H    heparin (porcine)  5,000 Units Subcutaneous Q8H    polyethylene glycol  17 g Oral Daily    senna-docusate 8.6-50 mg  1 tablet Oral BID     PRN Meds:acetaminophen, acetaminophen, HYDROcodone-acetaminophen, HYDROmorphone, metoclopramide HCl, midazolam, ondansetron, sodium chloride 0.9%       Objective:     Weight: 106.6 kg (235 lb)  Body mass index is 39.11 kg/m².  Vital Signs (Most Recent):  Temp: 98.7 °F (37.1 °C) (11/16/18 1100)  Pulse: 77 (11/16/18 1100)  Resp: 12 (11/16/18 1100)  BP: 121/81 (11/16/18 1100)  SpO2: 98 % (11/16/18 1100) Vital Signs (24h Range):  Temp:  [98.7 °F (37.1 °C)-99.7 °F (37.6 °C)] 98.7 °F (37.1 °C)  Pulse:  [66-91] 77  Resp:  [11-26] 12  SpO2:  [95 %-98 %] 98 %  BP: (109-131)/(59-81) 121/81     Date 11/16/18 0700 - 11/17/18 0659   Shift 4698-1751 3241-2490 2608-2271 24 Hour Total   INTAKE   P.O. 100   100   Shift Total(mL/kg) 100(0.9)   100(0.9)   OUTPUT   Urine(mL/kg/hr) 250   250   Shift Total(mL/kg) 250(2.3)   250(2.3)   Weight (kg) 106.6 106.6 106.6 106.6                        Closed/Suction Drain 11/05/18 1128 Left Scalp Accordion 10 Fr. (Active)   Site Description Unable to view 11/6/2018  3:05 PM   Dressing Type Gauze 11/6/2018  3:05 PM   Dressing Status Clean;Dry;Intact 11/6/2018  3:05 PM   Status To bulb suction 11/6/2018  3:05 PM   Output (mL) 85 mL 11/6/2018  1:05 PM       Physical Exam:  Nursing note and vitals reviewed.    Constitutional: She appears well-developed and well-nourished.     Abdominal: Soft.     -Alert and oriented x4  -PERRL, EOMI, face symmetrical, tongue midline, facial sensation symmetric, shoulder shrug full strength and symmetric  -Motor: 5/5 throughout the upper and lower extremites bilaterally  -sensation intact to light touch throughout        Significant Labs:  Recent Labs   Lab 11/15/18  0254 11/16/18  0203   * 123*    140   K  4.4 4.2    108   CO2 26 21*   BUN 14 15   CREATININE 0.8 0.7   CALCIUM 9.3 9.4   MG 2.1 2.1  2.1     Recent Labs   Lab 11/15/18  0254 11/16/18  0203   WBC 10.76 12.46   HGB 10.9* 11.9*   HCT 35.5* 38.3    366*     No results for input(s): LABPT, INR, APTT in the last 48 hours.  Microbiology Results (last 7 days)     ** No results found for the last 168 hours. **        All pertinent labs from the last 24 hours have been reviewed.    Significant Diagnostics:  I have reviewed all pertinent imaging results/findings within the past 24 hours.    Review of Systems

## 2018-11-16 NOTE — ASSESSMENT & PLAN NOTE
49 yo female with history of seizures since age 19, now s/p  crani with subdural grid placement for further localization (11/5) and repositioning following pt induced malpositioning (11/8).    No acute events overnight. Head wrap reapplied this morning with sterile technique.    --Continue care per primary team.  --Continue continuous subdural electrocorticography per epilepsy.  --Continue prophylactic antibiotics while subdural grids in place.  --Pt is tentatively booked for removal of grids and potential focectomy this Monday (11/19).  --Please keep pt NPO midnight Sunday night / Monday morning.  --Please hold chemical DVT prophylaxis Jass night / Monday morning.  --Will consent pt prior to procedure.  --We will continue to follow closely, please contact us with any questions or concerns.

## 2018-11-16 NOTE — PROGRESS NOTES
1524- Spoke with MARCIO Burrell with epilepsy regarding scheduled CT scan. RN informed that EEG tech must be present to disconnect grid. Per PA was informed that she would speak with EEG coordinator/techs to determine if pt can obtain CT today or if it needs to be completed tomorrow instead. PA stated that she would notify me if scan is able to be completed today. Will continue to monitor.    1545- Per MARCIO Foy was informed that EEG tech unavailable to disconnect pt today. Tech will be available tomorrow morning. CT to be completed tomorrow. Will pass on to next RN that EEG must be present to disconnect.

## 2018-11-16 NOTE — PROGRESS NOTES
"[facility]  Neurosurgery  Progress Note    Subjective:     History of Present Illness: Pt is a 48 y.o. female who presents per referral by Dr. LISA Elam and the epilepsy team for evaluation for epilepsy surgery. Pt has history of partial intractable epilepsy and has failed at least 10 previous medications. Currently on dual therapy. EMU workup done in February localized pathology to left temporal lobe, with PET scan showing hypometabolism of mesial temporal lobe. DAWOOD scan also showed localization to left mesial temporal lobe. Pt states that she has endured seizures since 19 y.o. Pt currently on Lamictal. Pt states that she endures about 5 absence episodes per day with intermittent LOC. Pt notes one incident of "rolling" seizures for which she was brought to the ED.          Post-Op Info:  Procedure(s) (LRB):  CRANIOTOMY for grids and strips (Left)   8 Days Post-Op         Medications:  Continuous Infusions:    Scheduled Meds:   ceFAZolin (ANCEF) IVPB  2 g Intravenous Q8H    heparin (porcine)  5,000 Units Subcutaneous Q8H    polyethylene glycol  17 g Oral Daily    senna-docusate 8.6-50 mg  1 tablet Oral BID     PRN Meds:acetaminophen, acetaminophen, HYDROcodone-acetaminophen, HYDROmorphone, metoclopramide HCl, midazolam, ondansetron, sodium chloride 0.9%       Objective:     Weight: 106.6 kg (235 lb)  Body mass index is 39.11 kg/m².  Vital Signs (Most Recent):  Temp: 98.7 °F (37.1 °C) (11/16/18 1100)  Pulse: 77 (11/16/18 1100)  Resp: 12 (11/16/18 1100)  BP: 121/81 (11/16/18 1100)  SpO2: 98 % (11/16/18 1100) Vital Signs (24h Range):  Temp:  [98.7 °F (37.1 °C)-99.7 °F (37.6 °C)] 98.7 °F (37.1 °C)  Pulse:  [66-91] 77  Resp:  [11-26] 12  SpO2:  [95 %-98 %] 98 %  BP: (109-131)/(59-81) 121/81     Date 11/16/18 0700 - 11/17/18 0659   Shift 8231-2206 1884-0257 1507-8030 24 Hour Total   INTAKE   P.O. 100   100   Shift Total(mL/kg) 100(0.9)   100(0.9)   OUTPUT   Urine(mL/kg/hr) 250   250   Shift " Total(mL/kg) 250(2.3)   250(2.3)   Weight (kg) 106.6 106.6 106.6 106.6                        Closed/Suction Drain 11/05/18 1128 Left Scalp Accordion 10 Fr. (Active)   Site Description Unable to view 11/6/2018  3:05 PM   Dressing Type Gauze 11/6/2018  3:05 PM   Dressing Status Clean;Dry;Intact 11/6/2018  3:05 PM   Status To bulb suction 11/6/2018  3:05 PM   Output (mL) 85 mL 11/6/2018  1:05 PM       Physical Exam:  Nursing note and vitals reviewed.    Constitutional: She appears well-developed and well-nourished.     Abdominal: Soft.     -Alert and oriented x4  -PERRL, EOMI, face symmetrical, tongue midline, facial sensation symmetric, shoulder shrug full strength and symmetric  -Motor: 5/5 throughout the upper and lower extremites bilaterally  -sensation intact to light touch throughout        Significant Labs:  Recent Labs   Lab 11/15/18  0254 11/16/18  0203   * 123*    140   K 4.4 4.2    108   CO2 26 21*   BUN 14 15   CREATININE 0.8 0.7   CALCIUM 9.3 9.4   MG 2.1 2.1  2.1     Recent Labs   Lab 11/15/18  0254 11/16/18  0203   WBC 10.76 12.46   HGB 10.9* 11.9*   HCT 35.5* 38.3    366*     No results for input(s): LABPT, INR, APTT in the last 48 hours.  Microbiology Results (last 7 days)     ** No results found for the last 168 hours. **        All pertinent labs from the last 24 hours have been reviewed.    Significant Diagnostics:  I have reviewed all pertinent imaging results/findings within the past 24 hours.    Review of Systems        Assessment/Plan:     * Temporal lobe epilepsy, intractable    49 yo female with history of seizures since age 19, now s/p  crani with subdural grid placement for further localization (11/5) and repositioning following pt induced malpositioning (11/8).    No acute events overnight. Head wrap reapplied this morning with sterile technique.    --Continue care per primary team.  --Continue continuous subdural electrocorticography per epilepsy.  --Continue  prophylactic antibiotics while subdural grids in place.  --Pt is tentatively booked for removal of grids and potential focectomy this Monday (11/19).  --Please keep pt NPO midnight Sunday night / Monday morning.  --Please hold chemical DVT prophylaxis Jass night / Monday morning.  --Will consent pt prior to procedure.  --We will continue to follow closely, please contact us with any questions or concerns.            Marc Garrett MD  Neurosurgery  [facility]

## 2018-11-17 LAB
ALBUMIN SERPL BCP-MCNC: 2.9 G/DL
ALP SERPL-CCNC: 107 U/L
ALT SERPL W/O P-5'-P-CCNC: 8 U/L
ANION GAP SERPL CALC-SCNC: 11 MMOL/L
AST SERPL-CCNC: 11 U/L
BASOPHILS # BLD AUTO: 0.02 K/UL
BASOPHILS NFR BLD: 0.2 %
BILIRUB SERPL-MCNC: 0.2 MG/DL
BUN SERPL-MCNC: 14 MG/DL
CALCIUM SERPL-MCNC: 9.1 MG/DL
CHLORIDE SERPL-SCNC: 104 MMOL/L
CO2 SERPL-SCNC: 25 MMOL/L
CREAT SERPL-MCNC: 1.4 MG/DL
DIFFERENTIAL METHOD: ABNORMAL
EOSINOPHIL # BLD AUTO: 0.2 K/UL
EOSINOPHIL NFR BLD: 2.3 %
ERYTHROCYTE [DISTWIDTH] IN BLOOD BY AUTOMATED COUNT: 15.9 %
EST. GFR  (AFRICAN AMERICAN): 50.5 ML/MIN/1.73 M^2
EST. GFR  (NON AFRICAN AMERICAN): 43.8 ML/MIN/1.73 M^2
GLUCOSE SERPL-MCNC: 113 MG/DL
HCT VFR BLD AUTO: 34.3 %
HGB BLD-MCNC: 10.9 G/DL
IMM GRANULOCYTES # BLD AUTO: 0.03 K/UL
IMM GRANULOCYTES NFR BLD AUTO: 0.3 %
LYMPHOCYTES # BLD AUTO: 3.1 K/UL
LYMPHOCYTES NFR BLD: 33.3 %
MAGNESIUM SERPL-MCNC: 2 MG/DL
MCH RBC QN AUTO: 24.7 PG
MCHC RBC AUTO-ENTMCNC: 31.8 G/DL
MCV RBC AUTO: 78 FL
MONOCYTES # BLD AUTO: 0.6 K/UL
MONOCYTES NFR BLD: 6.1 %
NEUTROPHILS # BLD AUTO: 5.4 K/UL
NEUTROPHILS NFR BLD: 57.8 %
NRBC BLD-RTO: 0 /100 WBC
PHOSPHATE SERPL-MCNC: 4.1 MG/DL
PLATELET # BLD AUTO: 301 K/UL
PMV BLD AUTO: 9.1 FL
POTASSIUM SERPL-SCNC: 4.1 MMOL/L
PROT SERPL-MCNC: 6.9 G/DL
RBC # BLD AUTO: 4.42 M/UL
SODIUM SERPL-SCNC: 140 MMOL/L
WBC # BLD AUTO: 9.3 K/UL

## 2018-11-17 PROCEDURE — 95951 HC EEG MONITORING/VIDEO RECORD: CPT

## 2018-11-17 PROCEDURE — 36415 COLL VENOUS BLD VENIPUNCTURE: CPT

## 2018-11-17 PROCEDURE — 63600175 PHARM REV CODE 636 W HCPCS: Performed by: STUDENT IN AN ORGANIZED HEALTH CARE EDUCATION/TRAINING PROGRAM

## 2018-11-17 PROCEDURE — 63600175 PHARM REV CODE 636 W HCPCS: Performed by: NEUROLOGICAL SURGERY

## 2018-11-17 PROCEDURE — 25000003 PHARM REV CODE 250: Performed by: STUDENT IN AN ORGANIZED HEALTH CARE EDUCATION/TRAINING PROGRAM

## 2018-11-17 PROCEDURE — 84100 ASSAY OF PHOSPHORUS: CPT

## 2018-11-17 PROCEDURE — 99233 SBSQ HOSP IP/OBS HIGH 50: CPT | Mod: ,,, | Performed by: NURSE PRACTITIONER

## 2018-11-17 PROCEDURE — 99233 SBSQ HOSP IP/OBS HIGH 50: CPT | Mod: ,,, | Performed by: PSYCHIATRY & NEUROLOGY

## 2018-11-17 PROCEDURE — 80053 COMPREHEN METABOLIC PANEL: CPT

## 2018-11-17 PROCEDURE — 20000000 HC ICU ROOM

## 2018-11-17 PROCEDURE — 95951 PR EEG MONITORING/VIDEORECORD: CPT | Mod: 26,,, | Performed by: PSYCHIATRY & NEUROLOGY

## 2018-11-17 PROCEDURE — 85025 COMPLETE CBC W/AUTO DIFF WBC: CPT

## 2018-11-17 PROCEDURE — 83735 ASSAY OF MAGNESIUM: CPT

## 2018-11-17 RX ADMIN — SENNOSIDES AND DOCUSATE SODIUM 1 TABLET: 8.6; 5 TABLET ORAL at 09:11

## 2018-11-17 RX ADMIN — POLYETHYLENE GLYCOL 3350 17 G: 17 POWDER, FOR SOLUTION ORAL at 09:11

## 2018-11-17 RX ADMIN — HEPARIN SODIUM 5000 UNITS: 5000 INJECTION, SOLUTION INTRAVENOUS; SUBCUTANEOUS at 09:11

## 2018-11-17 RX ADMIN — CEFAZOLIN 2 G: 1 INJECTION, POWDER, FOR SOLUTION INTRAMUSCULAR; INTRAVENOUS at 09:11

## 2018-11-17 RX ADMIN — CEFAZOLIN 2 G: 1 INJECTION, POWDER, FOR SOLUTION INTRAMUSCULAR; INTRAVENOUS at 02:11

## 2018-11-17 RX ADMIN — HEPARIN SODIUM 5000 UNITS: 5000 INJECTION, SOLUTION INTRAVENOUS; SUBCUTANEOUS at 01:11

## 2018-11-17 RX ADMIN — HEPARIN SODIUM 5000 UNITS: 5000 INJECTION, SOLUTION INTRAVENOUS; SUBCUTANEOUS at 06:11

## 2018-11-17 RX ADMIN — CEFAZOLIN 2 G: 1 INJECTION, POWDER, FOR SOLUTION INTRAMUSCULAR; INTRAVENOUS at 06:11

## 2018-11-17 NOTE — PROGRESS NOTES
Patient transported to CT via bed on portable CM. EGG was disconnected before the transfer. VSS and no signs of distress noted. When arriving back to room, Page with EEG came to reconnected patient to EEG monitor. Will continue to monitor patient.

## 2018-11-17 NOTE — ASSESSMENT & PLAN NOTE
47 yo female with history of seizures since age 19, who presents to NICU s/p L temporal crani with subdural grid placement for further localization and possible resection.    Recommendations:  - Continuous vEEG monitoring with subdural grid electrodes in place  - Restart Lamictal 25 mg daily  - For any seizures: please push event button on EEG and call epileptologist on call prior to administration of abortive medication - Versed 2 mg IV PRN  - Non urgent CT head ordered for today for 3D mapping  - Tentatively scheduled for OR 11/19    Plan of care discussed with NCC team, family at bedside. Will continue to follow.

## 2018-11-17 NOTE — PROGRESS NOTES
[facility]  Neurology-Epilepsy  Progress Note    Patient Name: Liza Arrieta  MRN: [id number]  Admission Date: 11/5/2018  Hospital Length of Stay: 12 days  Code Status: Full Code   Attending Provider: Yohannes De La Rosa MD  Primary Care Physician: LISA Elam MD   Principal Problem:Temporal lobe epilepsy, intractable    Subjective:     Hospital Course:   Patient admitted to [facility] after subdural grid placement/L crani on 11/5. Home Lamotrigine decreased to 100 mg BID.  11/6: No seizures since admission. Cortical mapping session completed.  11/7: Cortical mapping during am, multiple clinical seizures during session. Clinical and electrographic seizure 11/7 afternoon, during which patient had automatisms of hands, confusion, pulled at EEG leads disrupting connection.  11/8: No additional seizures overnight. Back to OR today for replacement of intracranial grid/leads.  11/9: No seizures after revision of intracranial grids in OR yesterday.   11/10: No electrographic seizures, cortical mapping  11/11: No electrographic seizures  11/12: No electrographic seizures  11/13: Plan for benadryl x1 today, sleep deprive tonight and benadryl again tomorrow am  11/14: Episode of brief staring with retained consciousness, no EEG correlate  11/15: Cortical mapping with assistance of neuropsychology for language function  11/16: No seizures  11/17: No seizures    Interval History: No acute events overnight. Likely to OR Monday.    Current Facility-Administered Medications   Medication Dose Route Frequency Provider Last Rate Last Dose    acetaminophen suppository 650 mg  650 mg Rectal Q6H PRN Marc Garrett MD        acetaminophen tablet 650 mg  650 mg Oral Q6H PRN Marc Garrett MD   650 mg at 11/14/18 0130    ceFAZolin injection 2 g  2 g Intravenous Q8H Thomas Plummer MD   2 g at 11/17/18 0908    heparin (porcine) injection 5,000 Units  5,000 Units Subcutaneous Q8H Duane Joiner MD   5,000 Units at  11/17/18 1330    HYDROcodone-acetaminophen 5-325 mg per tablet 1 tablet  1 tablet Oral Q4H PRN Marc Garrett MD   1 tablet at 11/12/18 0213    HYDROmorphone injection 0.5 mg  0.5 mg Intravenous Q6H PRN Thomas Plummer MD        metoclopramide HCl injection 5 mg  5 mg Intravenous Q6H PRN Duane Joiner MD        midazolam (VERSED) 1 mg/mL injection 2 mg  2 mg Intravenous Q5 Min PRN Frankie Dumont MD        ondansetron disintegrating tablet 8 mg  8 mg Oral Q6H PRN Duane Joiner MD   8 mg at 11/07/18 0703    polyethylene glycol packet 17 g  17 g Oral Daily Thomas Plummer MD   17 g at 11/17/18 0908    senna-docusate 8.6-50 mg per tablet 1 tablet  1 tablet Oral BID Thomas Plummer MD   1 tablet at 11/17/18 0908    sodium chloride 0.9% flush 3 mL  3 mL Intravenous PRN Ifeanyi Sanchez MD         Continuous Infusions:    Review of Systems   Constitutional: Negative for chills and fever.   Respiratory: Negative for cough and shortness of breath.    Cardiovascular: Negative for chest pain and leg swelling.   Gastrointestinal: Negative for nausea and vomiting.   Musculoskeletal: Negative for back pain and joint swelling.   Skin: Negative for pallor and rash.   Neurological: Positive for seizures (none in several days). Negative for facial asymmetry, speech difficulty, weakness and headaches.   Psychiatric/Behavioral: Negative for agitation and confusion.     Objective:     Vital Signs (Most Recent):  Temp: 98.8 °F (37.1 °C) (11/17/18 1100)  Pulse: 71 (11/17/18 1400)  Resp: 11 (11/17/18 1400)  BP: 97/63 (11/17/18 1400)  SpO2: 97 % (11/17/18 1400) Vital Signs (24h Range):  Temp:  [98.1 °F (36.7 °C)-98.8 °F (37.1 °C)] 98.8 °F (37.1 °C)  Pulse:  [60-80] 71  Resp:  [11-20] 11  SpO2:  [94 %-98 %] 97 %  BP: ()/(55-89) 97/63     Weight: 106.6 kg (235 lb)  Body mass index is 39.11 kg/m².    Physical Exam   Constitutional: She is oriented to person, place, and time. She appears well-developed  and well-nourished. No distress.   HENT:   Right Ear: External ear normal.   Left Ear: External ear normal.   S/p L temporal crani   Eyes: Conjunctivae and EOM are normal. Pupils are equal, round, and reactive to light.   Neck: Normal range of motion.   Pulmonary/Chest: Effort normal. No respiratory distress.   Musculoskeletal: Normal range of motion. She exhibits no deformity.   Neurological: She is alert and oriented to person, place, and time. No cranial nerve deficit.   Skin: Skin is warm and dry. She is not diaphoretic. No erythema.   Psychiatric: She has a normal mood and affect. Her speech is normal and behavior is normal. Judgment and thought content normal.       NEUROLOGICAL EXAMINATION:     MENTAL STATUS   Oriented to person, place, and time.   Attention: normal. Concentration: normal.   Speech: speech is normal   Level of consciousness: alert    CRANIAL NERVES     CN III, IV, VI   Pupils are equal, round, and reactive to light.  Extraocular motions are normal.     CN VII   Facial expression full, symmetric.     CN VIII   CN VIII normal.     CN IX, X   CN IX normal.   CN X normal.     CN XI   CN XI normal.     CN XII   CN XII normal.     MOTOR EXAM   Muscle bulk: normal  Overall muscle tone: normal       Moves all extremities spontaneously, no focal deficit noted  Assist in transfer to bedside commode without issue       Significant Labs: All pertinent lab results from the past 24 hours have been reviewed.    Significant Studies: I have reviewed all pertinent imaging results/findings within the past 24 hours.    Assessment and Plan:     * Temporal lobe epilepsy, intractable    49 yo female with history of seizures since age 19, who presents to NICU s/p L temporal crani with subdural grid placement for further localization and possible resection.    Recommendations:  - Continuous vEEG monitoring with subdural grid electrodes in place  - Restart Lamictal 25 mg daily  - For any seizures: please push event  button on EEG and call epileptologist on call prior to administration of abortive medication - Versed 2 mg IV PRN  - Non urgent CT head ordered for today for 3D mapping  - Tentatively scheduled for OR 11/19    Plan of care discussed with [facility] team, family at bedside. Will continue to follow.      Essential hypertension    - Goal SBP <140  - Management per [facility]          VTE Risk Mitigation (From admission, onward)        Ordered     heparin (porcine) injection 5,000 Units  Every 8 hours      11/05/18 1238     Place sequential compression device  Until discontinued      11/05/18 1238     IP VTE HIGH RISK PATIENT  Once      11/05/18 1238          Gumaro Manzo MD  Neurology-Epilepsy  [facility] [address]

## 2018-11-17 NOTE — ASSESSMENT & PLAN NOTE
- s/p craniotomy x2 for strip and grid placement  - tentatively booked for OR on Monday for removal of grids and potential focectomy  - keep NPO and hold SQH Sunday night  - Continuous vEEG monitoring with subdural grid electrodes in place  - Home lamotrigine and onfi restarted per Epilepsy  - For any seizures: please push event button on EEG and call epileptologist on call prior to administration of abortive medication  - Continue midazolam 2mg IV q5min for motor seizures lasting greater than 5 minutes  - Continue Ancef 2g IV q8h  - Sleep deprive patient until 0400 11/14  - Benadryl 50mg po x1 this evening and in am 11/14  - Cortical mapping today  - Ok for patient to eat.  - [facility]

## 2018-11-17 NOTE — PROGRESS NOTES
"[facility]  Neurosurgery  Progress Note    Subjective:     History of Present Illness: Pt is a 48 y.o. female who presents per referral by Dr. LISA Elam and the epilepsy team for evaluation for epilepsy surgery. Pt has history of partial intractable epilepsy and has failed at least 10 previous medications. Currently on dual therapy. EMU workup done in February localized pathology to left temporal lobe, with PET scan showing hypometabolism of mesial temporal lobe. DAWOOD scan also showed localization to left mesial temporal lobe. Pt states that she has endured seizures since 19 y.o. Pt currently on Lamictal. Pt states that she endures about 5 absence episodes per day with intermittent LOC. Pt notes one incident of "rolling" seizures for which she was brought to the ED.          Post-Op Info:  Procedure(s) (LRB):  CRANIOTOMY for grids and strips (Left)   9 Days Post-Op         Medications:  Continuous Infusions:    Scheduled Meds:   ceFAZolin (ANCEF) IVPB  2 g Intravenous Q8H    heparin (porcine)  5,000 Units Subcutaneous Q8H    polyethylene glycol  17 g Oral Daily    senna-docusate 8.6-50 mg  1 tablet Oral BID     PRN Meds:acetaminophen, acetaminophen, HYDROcodone-acetaminophen, HYDROmorphone, metoclopramide HCl, midazolam, ondansetron, sodium chloride 0.9%       Objective:     Weight: 106.6 kg (235 lb)  Body mass index is 39.11 kg/m².  Vital Signs (Most Recent):  Temp: 98.7 °F (37.1 °C) (11/17/18 0300)  Pulse: 67 (11/17/18 0300)  Resp: 14 (11/17/18 0300)  BP: (!) 101/58 (11/17/18 0300)  SpO2: 95 % (11/17/18 0300) Vital Signs (24h Range):  Temp:  [98.3 °F (36.8 °C)-98.9 °F (37.2 °C)] 98.7 °F (37.1 °C)  Pulse:  [66-81] 67  Resp:  [11-19] 14  SpO2:  [95 %-98 %] 95 %  BP: (101-139)/(58-89) 101/58            Closed/Suction Drain 11/05/18 1128 Left Scalp Accordion 10 Fr. (Active)   Site Description Unable to view 11/6/2018  3:05 PM   Dressing Type Gauze 11/6/2018  3:05 PM   Dressing Status " Clean;Dry;Intact 11/6/2018  3:05 PM   Status To bulb suction 11/6/2018  3:05 PM   Output (mL) 85 mL 11/6/2018  1:05 PM       Physical Exam:  Nursing note and vitals reviewed.    Constitutional: She appears well-developed and well-nourished.     Abdominal: Soft.     -Alert and oriented x4  -PERRL, EOMI, face symmetrical, tongue midline, facial sensation symmetric, shoulder shrug full strength and symmetric  -Motor: 5/5 throughout the upper and lower extremites bilaterally  -sensation intact to light touch throughout    Significant Labs:  Recent Labs   Lab 11/16/18  0203   *      K 4.2      CO2 21*   BUN 15   CREATININE 0.7   CALCIUM 9.4   MG 2.1  2.1     Recent Labs   Lab 11/16/18  0203   WBC 12.46   HGB 11.9*   HCT 38.3   *     No results for input(s): LABPT, INR, APTT in the last 48 hours.  Microbiology Results (last 7 days)     ** No results found for the last 168 hours. **        All pertinent labs from the last 24 hours have been reviewed.    Significant Diagnostics:  I have reviewed all pertinent imaging results/findings within the past 24 hours.    Review of Systems        Assessment/Plan:     * Temporal lobe epilepsy, intractable    47 yo female with history of seizures since age 19, now s/p  crani with subdural grid placement for further localization (11/5) and repositioning following pt induced malpositioning (11/8).    No acute events overnight.     --Continue care per primary team.  --Continue continuous subdural electrocorticography per epilepsy.  --Continue prophylactic antibiotics while subdural grids in place.  --Pt is tentatively booked for removal of grids and potential focectomy this Monday (11/19).  --Please keep pt NPO midnight Sunday night / Monday morning.  --Please hold chemical DVT prophylaxis Jass night / Monday morning.  --Will consent pt prior to procedure.  --We will continue to follow closely, please contact us with any questions or concerns.            Miller  MD Jn  Neurosurgery  [facility]

## 2018-11-17 NOTE — PLAN OF CARE
Problem: Patient Care Overview  Goal: Plan of Care Review  Outcome: Ongoing (interventions implemented as appropriate)  POC reviewed with patient and family at 1400. Plan for surgery on Monday. No seizures noted today. VSS throughout the shift. Patient verbalized understanding. Questions and concerns addressed. No acute events today. Progressing toward goals. Will continue to monitor. See flowsheets for full assessment and VS info.

## 2018-11-17 NOTE — SUBJECTIVE & OBJECTIVE
Interval History: No acute events overnight. Likely to OR Monday.    Current Facility-Administered Medications   Medication Dose Route Frequency Provider Last Rate Last Dose    acetaminophen suppository 650 mg  650 mg Rectal Q6H PRN Marc Garrett MD        acetaminophen tablet 650 mg  650 mg Oral Q6H PRN Marc Garrett MD   650 mg at 11/14/18 0130    ceFAZolin injection 2 g  2 g Intravenous Q8H Thomas Plummer MD   2 g at 11/17/18 0908    heparin (porcine) injection 5,000 Units  5,000 Units Subcutaneous Q8H Duane Joiner MD   5,000 Units at 11/17/18 1330    HYDROcodone-acetaminophen 5-325 mg per tablet 1 tablet  1 tablet Oral Q4H PRN Marc Garrett MD   1 tablet at 11/12/18 0213    HYDROmorphone injection 0.5 mg  0.5 mg Intravenous Q6H PRN Thomas Plummer MD        metoclopramide HCl injection 5 mg  5 mg Intravenous Q6H PRN Duane Joiner MD        midazolam (VERSED) 1 mg/mL injection 2 mg  2 mg Intravenous Q5 Min PRN Frankie Dumont MD        ondansetron disintegrating tablet 8 mg  8 mg Oral Q6H PRN Duane Joiner MD   8 mg at 11/07/18 0703    polyethylene glycol packet 17 g  17 g Oral Daily Thomas Plummer MD   17 g at 11/17/18 0908    senna-docusate 8.6-50 mg per tablet 1 tablet  1 tablet Oral BID Thomas Plummer MD   1 tablet at 11/17/18 0908    sodium chloride 0.9% flush 3 mL  3 mL Intravenous PRN Ifeanyi Sanchez MD         Continuous Infusions:    Review of Systems   Constitutional: Negative for chills and fever.   Respiratory: Negative for cough and shortness of breath.    Cardiovascular: Negative for chest pain and leg swelling.   Gastrointestinal: Negative for nausea and vomiting.   Musculoskeletal: Negative for back pain and joint swelling.   Skin: Negative for pallor and rash.   Neurological: Positive for seizures (none in several days). Negative for facial asymmetry, speech difficulty, weakness and headaches.   Psychiatric/Behavioral: Negative for agitation and  confusion.     Objective:     Vital Signs (Most Recent):  Temp: 98.8 °F (37.1 °C) (11/17/18 1100)  Pulse: 71 (11/17/18 1400)  Resp: 11 (11/17/18 1400)  BP: 97/63 (11/17/18 1400)  SpO2: 97 % (11/17/18 1400) Vital Signs (24h Range):  Temp:  [98.1 °F (36.7 °C)-98.8 °F (37.1 °C)] 98.8 °F (37.1 °C)  Pulse:  [60-80] 71  Resp:  [11-20] 11  SpO2:  [94 %-98 %] 97 %  BP: ()/(55-89) 97/63     Weight: 106.6 kg (235 lb)  Body mass index is 39.11 kg/m².    Physical Exam   Constitutional: She is oriented to person, place, and time. She appears well-developed and well-nourished. No distress.   HENT:   Right Ear: External ear normal.   Left Ear: External ear normal.   S/p L temporal crani   Eyes: Conjunctivae and EOM are normal. Pupils are equal, round, and reactive to light.   Neck: Normal range of motion.   Pulmonary/Chest: Effort normal. No respiratory distress.   Musculoskeletal: Normal range of motion. She exhibits no deformity.   Neurological: She is alert and oriented to person, place, and time. No cranial nerve deficit.   Skin: Skin is warm and dry. She is not diaphoretic. No erythema.   Psychiatric: She has a normal mood and affect. Her speech is normal and behavior is normal. Judgment and thought content normal.       NEUROLOGICAL EXAMINATION:     MENTAL STATUS   Oriented to person, place, and time.   Attention: normal. Concentration: normal.   Speech: speech is normal   Level of consciousness: alert    CRANIAL NERVES     CN III, IV, VI   Pupils are equal, round, and reactive to light.  Extraocular motions are normal.     CN VII   Facial expression full, symmetric.     CN VIII   CN VIII normal.     CN IX, X   CN IX normal.   CN X normal.     CN XI   CN XI normal.     CN XII   CN XII normal.     MOTOR EXAM   Muscle bulk: normal  Overall muscle tone: normal       Moves all extremities spontaneously, no focal deficit noted  Assist in transfer to bedside commode without issue       Significant Labs: All pertinent lab  results from the past 24 hours have been reviewed.    Significant Studies: I have reviewed all pertinent imaging results/findings within the past 24 hours.

## 2018-11-17 NOTE — ASSESSMENT & PLAN NOTE
47 yo female with history of seizures since age 19, now s/p  crani with subdural grid placement for further localization (11/5) and repositioning following pt induced malpositioning (11/8).    No acute events overnight.     --Continue care per primary team.  --Continue continuous subdural electrocorticography per epilepsy.  --Continue prophylactic antibiotics while subdural grids in place.  --Pt is tentatively booked for removal of grids and potential focectomy this Monday (11/19).  --Please keep pt NPO midnight Sunday night / Monday morning.  --Please hold chemical DVT prophylaxis Jass night / Monday morning.  --Will consent pt prior to procedure.  --We will continue to follow closely, please contact us with any questions or concerns.

## 2018-11-17 NOTE — PROGRESS NOTES
Called CT to see when patient could go down. Angel said she would call when they were ready. Will continue to monitor.

## 2018-11-17 NOTE — SUBJECTIVE & OBJECTIVE
Interval History:  PT/OT    Review of Systems      Review of symptoms:   Constitutional: Denies fevers or chills.  Pulmonary: Denies shortness of breath or cough.  Cardiology: Denies chest pain or palpitations.  GI: Denies abdominal pain or constipation.  Neurologic: Denies new weakness,  headache, or paresthesias.    Objective:     Vitals:  Temp: 98.1 °F (36.7 °C)  Pulse: 71  Rhythm: normal sinus rhythm  BP: 109/65  MAP (mmHg): 83  Resp: 18  SpO2: 98 %  O2 Device (Oxygen Therapy): room air    Temp  Min: 98.1 °F (36.7 °C)  Max: 98.9 °F (37.2 °C)  Pulse  Min: 60  Max: 81  BP  Min: 97/55  Max: 139/89  MAP (mmHg)  Min: 71  Max: 108  Resp  Min: 12  Max: 19  SpO2  Min: 94 %  Max: 98 %    11/16 0701 - 11/17 0700  In: 300 [P.O.:300]  Out: 425 [Urine:425]   Unmeasured Output  Urine Occurrence: 1  Stool Occurrence: 1       Physical Exam      Physical Exam:  GA: Alert, comfortable, no acute distress.   HEENT: No scleral icterus or JVD.   Pulmonary: Clear to auscultation Anteriorly. No wheezing, crackles, or rhonchi.  Cardiac: RRR S1 & S2 w/o rubs/murmurs/gallops.   Abdominal: Bowel sounds present x 4. No appreciable hepatosplenomegaly.  Skin: No jaundice, rashes, or visible lesions.  Neuro:  --GCS: E4 V5 M6  --Mental Status:  Awake, alert, oriented x4, follows commands  --Pupils 4mm, PERRL.   --moves all extremities spontaneously  Unable to test gait     Medications:  Continuous Scheduled    ceFAZolin (ANCEF) IVPB 2 g Q8H   heparin (porcine) 5,000 Units Q8H   polyethylene glycol 17 g Daily   senna-docusate 8.6-50 mg 1 tablet BID   PRN    acetaminophen 650 mg Q6H PRN   acetaminophen 650 mg Q6H PRN   HYDROcodone-acetaminophen 1 tablet Q4H PRN   HYDROmorphone 0.5 mg Q6H PRN   metoclopramide HCl 5 mg Q6H PRN   midazolam 2 mg Q5 Min PRN   ondansetron 8 mg Q6H PRN   sodium chloride 0.9% 3 mL PRN     Today I personally reviewed pertinent medications, lines/drains/airways, imaging, cardiology results, laboratory results, microbiology  results,     Diet  Diet Adult Regular (IDDSI Level 7)  Diet Adult Regular (IDDSI Level 7)

## 2018-11-17 NOTE — SUBJECTIVE & OBJECTIVE
Medications:  Continuous Infusions:    Scheduled Meds:   ceFAZolin (ANCEF) IVPB  2 g Intravenous Q8H    heparin (porcine)  5,000 Units Subcutaneous Q8H    polyethylene glycol  17 g Oral Daily    senna-docusate 8.6-50 mg  1 tablet Oral BID     PRN Meds:acetaminophen, acetaminophen, HYDROcodone-acetaminophen, HYDROmorphone, metoclopramide HCl, midazolam, ondansetron, sodium chloride 0.9%       Objective:     Weight: 106.6 kg (235 lb)  Body mass index is 39.11 kg/m².  Vital Signs (Most Recent):  Temp: 98.1 °F (36.7 °C) (11/17/18 0701)  Pulse: 63 (11/17/18 0900)  Resp: 14 (11/17/18 0900)  BP: 101/62 (11/17/18 0900)  SpO2: 96 % (11/17/18 0900) Vital Signs (24h Range):  Temp:  [98.1 °F (36.7 °C)-98.9 °F (37.2 °C)] 98.1 °F (36.7 °C)  Pulse:  [60-81] 63  Resp:  [11-19] 14  SpO2:  [94 %-98 %] 96 %  BP: ()/(55-89) 101/62     Date 11/17/18 0700 - 11/18/18 0659   Shift 1625-6725 9875-0710 5317-1454 24 Hour Total   INTAKE   Shift Total(mL/kg)       OUTPUT   Urine(mL/kg/hr) 375   375   Shift Total(mL/kg) 375(3.5)   375(3.5)   Weight (kg) 106.6 106.6 106.6 106.6                        Closed/Suction Drain 11/05/18 1128 Left Scalp Accordion 10 Fr. (Active)   Site Description Unable to view 11/6/2018  3:05 PM   Dressing Type Gauze 11/6/2018  3:05 PM   Dressing Status Clean;Dry;Intact 11/6/2018  3:05 PM   Status To bulb suction 11/6/2018  3:05 PM   Output (mL) 85 mL 11/6/2018  1:05 PM       Physical Exam:  Nursing note and vitals reviewed.    Constitutional: She appears well-developed and well-nourished.     Abdominal: Soft.     -Alert and oriented x4  -PERRL, EOMI, face symmetrical, tongue midline, facial sensation symmetric, shoulder shrug full strength and symmetric  -Motor: 5/5 throughout the upper and lower extremites bilaterally  -sensation intact to light touch throughout    Significant Labs:  Recent Labs   Lab 11/16/18  0203 11/17/18  0426   * 113*    140   K 4.2 4.1    104   CO2 21* 25   BUN  15 14   CREATININE 0.7 1.4   CALCIUM 9.4 9.1   MG 2.1  2.1 2.0     Recent Labs   Lab 11/16/18  0203 11/17/18  0426   WBC 12.46 9.30   HGB 11.9* 10.9*   HCT 38.3 34.3*   * 301     No results for input(s): LABPT, INR, APTT in the last 48 hours.  Microbiology Results (last 7 days)     ** No results found for the last 168 hours. **        All pertinent labs from the last 24 hours have been reviewed.    Significant Diagnostics:  I have reviewed all pertinent imaging results/findings within the past 24 hours.    Review of Systems        Medications:  Continuous Infusions:    Scheduled Meds:   ceFAZolin (ANCEF) IVPB  2 g Intravenous Q8H    heparin (porcine)  5,000 Units Subcutaneous Q8H    polyethylene glycol  17 g Oral Daily    senna-docusate 8.6-50 mg  1 tablet Oral BID     PRN Meds:acetaminophen, acetaminophen, HYDROcodone-acetaminophen, HYDROmorphone, metoclopramide HCl, midazolam, ondansetron, sodium chloride 0.9%       Objective:     Weight: 106.6 kg (235 lb)  Body mass index is 39.11 kg/m².  Vital Signs (Most Recent):  Temp: 98.7 °F (37.1 °C) (11/17/18 0300)  Pulse: 67 (11/17/18 0300)  Resp: 14 (11/17/18 0300)  BP: (!) 101/58 (11/17/18 0300)  SpO2: 95 % (11/17/18 0300) Vital Signs (24h Range):  Temp:  [98.3 °F (36.8 °C)-98.9 °F (37.2 °C)] 98.7 °F (37.1 °C)  Pulse:  [66-81] 67  Resp:  [11-19] 14  SpO2:  [95 %-98 %] 95 %  BP: (101-139)/(58-89) 101/58            Closed/Suction Drain 11/05/18 1128 Left Scalp Accordion 10 Fr. (Active)   Site Description Unable to view 11/6/2018  3:05 PM   Dressing Type Gauze 11/6/2018  3:05 PM   Dressing Status Clean;Dry;Intact 11/6/2018  3:05 PM   Status To bulb suction 11/6/2018  3:05 PM   Output (mL) 85 mL 11/6/2018  1:05 PM       Physical Exam:  Nursing note and vitals reviewed.    Constitutional: She appears well-developed and well-nourished.     Abdominal: Soft.     -Alert and oriented x4  -PERRL, EOMI, face symmetrical, tongue midline, facial sensation symmetric,  shoulder shrug full strength and symmetric  -Motor: 5/5 throughout the upper and lower extremites bilaterally  -sensation intact to light touch throughout    Significant Labs:  Recent Labs   Lab 11/16/18  0203   *      K 4.2      CO2 21*   BUN 15   CREATININE 0.7   CALCIUM 9.4   MG 2.1  2.1     Recent Labs   Lab 11/16/18  0203   WBC 12.46   HGB 11.9*   HCT 38.3   *     No results for input(s): LABPT, INR, APTT in the last 48 hours.  Microbiology Results (last 7 days)     ** No results found for the last 168 hours. **        All pertinent labs from the last 24 hours have been reviewed.    Significant Diagnostics:  I have reviewed all pertinent imaging results/findings within the past 24 hours.    Review of Systems

## 2018-11-17 NOTE — PROGRESS NOTES
[facility]  Neurocritical Care  Progress Note    Admit Date: 11/5/2018  Service Date: 11/17/2018  Length of Stay: 12    Subjective:     Chief Complaint: Temporal lobe epilepsy, intractable    History of Present Illness: Liza Arrieta is a 47 yo female with PMHx of partial intractable epilepsy who is being admitted to [facility] after L crani with subdural grid placement. Per chart review, she had her first seizure at age 19. At that time, she was treated with dilantin and she did well for approximately 15 years. Reports that she typically has absence seizures (5-6/day), but states she may have had two grand mal seizures decades ago.  The patient was recently admitted to EMU on 2/9/2018 where seizures were captured on EEG.  She has failed multiple medications and is now being admitted for post op management, continuous EEG.             Hospital Course: 11/5: Pt admitted to [facility] s/p L crani with subdural grid placement, continuous EEG  11/6: cEEG  11/7: Pulled one of her leads today. Sent for stat CTH showed electrodes have been repositioned. posterior infratemporal strips are no longer in place  11/9: s/p repeat crani for grid placement.  One abscence seizure lasting 5 seconds. Epilepsy plans to due cortical mapping this afternoon.  11/12: Continuing to hold AEDs. PRNs per epilepsy.  11/13: Plan for cortical mapping on 11/14 11/14  One seizure overnight and 2 seizures noted today. remains on EEG. Epilepsy to do cortical mapping this aftrnoon  11/15 24h EEG continues no seizures over night and so far today. Cortical mapping today  11/16: PT/OT, restart home AED meds  11/17: CTH, cEEG    Interval History:  PT/OT    Review of Systems      Review of symptoms:   Constitutional: Denies fevers or chills.  Pulmonary: Denies shortness of breath or cough.  Cardiology: Denies chest pain or palpitations.  GI: Denies abdominal pain or constipation.  Neurologic: Denies new weakness,  headache, or  paresthesias.    Objective:     Vitals:  Temp: 98.1 °F (36.7 °C)  Pulse: 71  Rhythm: normal sinus rhythm  BP: 109/65  MAP (mmHg): 83  Resp: 18  SpO2: 98 %  O2 Device (Oxygen Therapy): room air    Temp  Min: 98.1 °F (36.7 °C)  Max: 98.9 °F (37.2 °C)  Pulse  Min: 60  Max: 81  BP  Min: 97/55  Max: 139/89  MAP (mmHg)  Min: 71  Max: 108  Resp  Min: 12  Max: 19  SpO2  Min: 94 %  Max: 98 %    11/16 0701 - 11/17 0700  In: 300 [P.O.:300]  Out: 425 [Urine:425]   Unmeasured Output  Urine Occurrence: 1  Stool Occurrence: 1       Physical Exam      Physical Exam:  GA: Alert, comfortable, no acute distress.   HEENT: No scleral icterus or JVD.   Pulmonary: Clear to auscultation Anteriorly. No wheezing, crackles, or rhonchi.  Cardiac: RRR S1 & S2 w/o rubs/murmurs/gallops.   Abdominal: Bowel sounds present x 4. No appreciable hepatosplenomegaly.  Skin: No jaundice, rashes, or visible lesions.  Neuro:  --GCS: E4 V5 M6  --Mental Status:  Awake, alert, oriented x4, follows commands  --Pupils 4mm, PERRL.   --moves all extremities spontaneously  Unable to test gait     Medications:  Continuous Scheduled    ceFAZolin (ANCEF) IVPB 2 g Q8H   heparin (porcine) 5,000 Units Q8H   polyethylene glycol 17 g Daily   senna-docusate 8.6-50 mg 1 tablet BID   PRN    acetaminophen 650 mg Q6H PRN   acetaminophen 650 mg Q6H PRN   HYDROcodone-acetaminophen 1 tablet Q4H PRN   HYDROmorphone 0.5 mg Q6H PRN   metoclopramide HCl 5 mg Q6H PRN   midazolam 2 mg Q5 Min PRN   ondansetron 8 mg Q6H PRN   sodium chloride 0.9% 3 mL PRN     Today I personally reviewed pertinent medications, lines/drains/airways, imaging, cardiology results, laboratory results, microbiology results,     Diet  Diet Adult Regular (IDDSI Level 7)  Diet Adult Regular (IDDSI Level 7)        Assessment/Plan:     Neuro   * Temporal lobe epilepsy, intractable    - s/p craniotomy x2 for strip and grid placement  - tentatively booked for OR on Monday for removal of grids and potential focectomy  -  keep NPO and hold SQH Sunday night  - Continuous vEEG monitoring with subdural grid electrodes in place  - Home lamotrigine and onfi restarted per Epilepsy  - For any seizures: please push event button on EEG and call epileptologist on call prior to administration of abortive medication  - Continue midazolam 2mg IV q5min for motor seizures lasting greater than 5 minutes  - Continue Ancef 2g IV q8h  - Sleep deprive patient until 0400 11/14  - Benadryl 50mg po x1 this evening and in am 11/14  - Cortical mapping today  - Ok for patient to eat.  - CTH     Cardiac/Vascular   Essential hypertension    - EKG NSR  - SBP goal <160       Endocrine   Class 2 obesity with body mass index (BMI) of 38.0 to 38.9 in adult    Body mass index is 39.11 kg/m².             Activity Orders          None        Full Code    Isael South, BHAVIK  Neurocritical Care  [facility]

## 2018-11-18 LAB
ALBUMIN SERPL BCP-MCNC: 2.9 G/DL
ALP SERPL-CCNC: 113 U/L
ALT SERPL W/O P-5'-P-CCNC: 10 U/L
ANION GAP SERPL CALC-SCNC: 11 MMOL/L
AST SERPL-CCNC: 13 U/L
BASOPHILS # BLD AUTO: 0.02 K/UL
BASOPHILS NFR BLD: 0.2 %
BILIRUB SERPL-MCNC: 0.3 MG/DL
BUN SERPL-MCNC: 14 MG/DL
CALCIUM SERPL-MCNC: 9.3 MG/DL
CHLORIDE SERPL-SCNC: 105 MMOL/L
CO2 SERPL-SCNC: 23 MMOL/L
CREAT SERPL-MCNC: 0.7 MG/DL
DIFFERENTIAL METHOD: ABNORMAL
EOSINOPHIL # BLD AUTO: 0.2 K/UL
EOSINOPHIL NFR BLD: 1.9 %
ERYTHROCYTE [DISTWIDTH] IN BLOOD BY AUTOMATED COUNT: 15.4 %
EST. GFR  (AFRICAN AMERICAN): >60 ML/MIN/1.73 M^2
EST. GFR  (NON AFRICAN AMERICAN): >60 ML/MIN/1.73 M^2
GLUCOSE SERPL-MCNC: 124 MG/DL
HCT VFR BLD AUTO: 34.3 %
HGB BLD-MCNC: 11 G/DL
IMM GRANULOCYTES # BLD AUTO: 0.04 K/UL
IMM GRANULOCYTES NFR BLD AUTO: 0.4 %
LYMPHOCYTES # BLD AUTO: 2.6 K/UL
LYMPHOCYTES NFR BLD: 26.2 %
MAGNESIUM SERPL-MCNC: 1.8 MG/DL
MCH RBC QN AUTO: 24.2 PG
MCHC RBC AUTO-ENTMCNC: 32.1 G/DL
MCV RBC AUTO: 76 FL
MONOCYTES # BLD AUTO: 0.6 K/UL
MONOCYTES NFR BLD: 5.9 %
NEUTROPHILS # BLD AUTO: 6.4 K/UL
NEUTROPHILS NFR BLD: 65.4 %
NRBC BLD-RTO: 0 /100 WBC
PHOSPHATE SERPL-MCNC: 4.2 MG/DL
PLATELET # BLD AUTO: 290 K/UL
PMV BLD AUTO: 9 FL
POTASSIUM SERPL-SCNC: 4 MMOL/L
PROT SERPL-MCNC: 6.7 G/DL
RBC # BLD AUTO: 4.54 M/UL
SODIUM SERPL-SCNC: 139 MMOL/L
WBC # BLD AUTO: 9.84 K/UL

## 2018-11-18 PROCEDURE — 63600175 PHARM REV CODE 636 W HCPCS: Performed by: NEUROLOGICAL SURGERY

## 2018-11-18 PROCEDURE — 25000003 PHARM REV CODE 250: Performed by: STUDENT IN AN ORGANIZED HEALTH CARE EDUCATION/TRAINING PROGRAM

## 2018-11-18 PROCEDURE — 85025 COMPLETE CBC W/AUTO DIFF WBC: CPT

## 2018-11-18 PROCEDURE — 94761 N-INVAS EAR/PLS OXIMETRY MLT: CPT

## 2018-11-18 PROCEDURE — 99233 SBSQ HOSP IP/OBS HIGH 50: CPT | Mod: ,,, | Performed by: PSYCHIATRY & NEUROLOGY

## 2018-11-18 PROCEDURE — 95951 PR EEG MONITORING/VIDEORECORD: CPT | Mod: 26,,, | Performed by: PSYCHIATRY & NEUROLOGY

## 2018-11-18 PROCEDURE — 95951 HC EEG MONITORING/VIDEO RECORD: CPT

## 2018-11-18 PROCEDURE — 63600175 PHARM REV CODE 636 W HCPCS: Performed by: STUDENT IN AN ORGANIZED HEALTH CARE EDUCATION/TRAINING PROGRAM

## 2018-11-18 PROCEDURE — G8988 SELF CARE GOAL STATUS: HCPCS | Mod: CI

## 2018-11-18 PROCEDURE — 84100 ASSAY OF PHOSPHORUS: CPT

## 2018-11-18 PROCEDURE — 63600175 PHARM REV CODE 636 W HCPCS: Performed by: PHYSICIAN ASSISTANT

## 2018-11-18 PROCEDURE — 80053 COMPREHEN METABOLIC PANEL: CPT

## 2018-11-18 PROCEDURE — 97165 OT EVAL LOW COMPLEX 30 MIN: CPT

## 2018-11-18 PROCEDURE — 83735 ASSAY OF MAGNESIUM: CPT

## 2018-11-18 PROCEDURE — G8987 SELF CARE CURRENT STATUS: HCPCS | Mod: CL

## 2018-11-18 PROCEDURE — 20000000 HC ICU ROOM

## 2018-11-18 RX ORDER — LANOLIN ALCOHOL/MO/W.PET/CERES
800 CREAM (GRAM) TOPICAL
Status: DISCONTINUED | OUTPATIENT
Start: 2018-11-18 | End: 2018-11-23 | Stop reason: HOSPADM

## 2018-11-18 RX ADMIN — CEFAZOLIN 2 G: 1 INJECTION, POWDER, FOR SOLUTION INTRAMUSCULAR; INTRAVENOUS at 05:11

## 2018-11-18 RX ADMIN — HEPARIN SODIUM 5000 UNITS: 5000 INJECTION, SOLUTION INTRAVENOUS; SUBCUTANEOUS at 09:11

## 2018-11-18 RX ADMIN — HYDROCODONE BITARTRATE AND ACETAMINOPHEN 1 TABLET: 5; 325 TABLET ORAL at 11:11

## 2018-11-18 RX ADMIN — CEFAZOLIN 2 G: 1 INJECTION, POWDER, FOR SOLUTION INTRAMUSCULAR; INTRAVENOUS at 01:11

## 2018-11-18 RX ADMIN — MAGNESIUM OXIDE TAB 400 MG (241.3 MG ELEMENTAL MG) 800 MG: 400 (241.3 MG) TAB at 06:11

## 2018-11-18 RX ADMIN — CEFAZOLIN 2 G: 1 INJECTION, POWDER, FOR SOLUTION INTRAMUSCULAR; INTRAVENOUS at 09:11

## 2018-11-18 RX ADMIN — HEPARIN SODIUM 5000 UNITS: 5000 INJECTION, SOLUTION INTRAVENOUS; SUBCUTANEOUS at 01:11

## 2018-11-18 RX ADMIN — MAGNESIUM OXIDE TAB 400 MG (241.3 MG ELEMENTAL MG) 800 MG: 400 (241.3 MG) TAB at 01:11

## 2018-11-18 RX ADMIN — HEPARIN SODIUM 5000 UNITS: 5000 INJECTION, SOLUTION INTRAVENOUS; SUBCUTANEOUS at 06:11

## 2018-11-18 RX ADMIN — SENNOSIDES AND DOCUSATE SODIUM 1 TABLET: 8.6; 5 TABLET ORAL at 09:11

## 2018-11-18 NOTE — PLAN OF CARE
Problem: Occupational Therapy Goal  Goal: Occupational Therapy Goal  Goals to be met by: 11/28     Patient will increase functional independence with ADLs by performing:    Upper extremity exercise program x15 reps per handout, with independence.    Outcome: Ongoing (interventions implemented as appropriate)  OT eval completed.

## 2018-11-18 NOTE — PT/OT/SLP EVAL
Occupational Therapy   Evaluation    Name: Liza Arrieta  MRN: [id number]  Admitting Diagnosis:  Temporal lobe epilepsy, intractable 10 Days Post-Op    Recommendations:     Discharge Recommendations: (TBD)  Discharge Equipment Recommendations:  (TBD)  Barriers to discharge:  None    History:     Occupational Profile:  Living Environment: Pt resides with spouse & children ranging from 12-22 years old in [place] in 1 story house with no steps.  Pt was independent with all aDL's & ambulation.  Pt has a bathtub for bathing.  Pt does not drive.  Pt is a  & enjoys reading & making bracelets.  Equipment Used at Home:  none      Past Medical History:   Diagnosis Date    Convulsions     Epilepsy     Seizures        Past Surgical History:   Procedure Laterality Date    APPENDECTOMY       SECTION      4    CHOLECYSTECTOMY      CRANIOTOMY N/A 2018    Procedure: CRANIOTOMY for strip and grid;  Surgeon: Ruben Senior MD;  Location: [facility] OR [address] [place] [address];  Service: Neurosurgery;  Laterality: N/A;  toronto II, asa 2, type and screen, regular bed, [place], supine     CRANIOTOMY for strip and grid N/A 2018    Performed by Ruben Senior MD at [facility] OR [address] [place] [address]    HYSTERECTOMY      around 2016 for pain ful periods        Subjective     Chief Complaint: being in bed & getting weaker  Patient/Family Comments/goals: to get out of bed    Pain/Comfort:  · Pain Rating 1: 0/10  · Pain Rating Post-Intervention 1: 0/10    Patients cultural, spiritual, Quaker conflicts given the current situation: none stated    Objective:     Communicated with: RN prior to session.  Patient found with: family and pulse ox (continuous), telemetry, peripheral IV, blood pressure cuff(brain grids/strips connected to EEG) upon OT entry to room.    General Precautions: Standard, seizure(no OOB per MD until after grids removed however OK for in bed therex)     Occupational Performance:    Bed Mobility:        NT due to restrictions per  "MD for in bed only at this time.    Activities of Daily Living:  · Grooming: set-up (A) while supine      Cognitive/Visual Perceptual:  Cognitive/Psychosocial Skills:     -       Oriented to: Person, Place, Time and Situation   -       Follows Commands/attention:Follows multistep  commands  -       Communication: clear/fluent  -       Memory: No Deficits noted  -       Safety awareness/insight to disability: intact   -       Mood/Affect/Coping skills/emotional control: Appropriate to situation    Physical Exam:  Sensation:    -       Intact  Dominant hand:    -       right  Upper Extremity Range of Motion:     -       Right Upper Extremity: WFL  -       Left Upper Extremity: WFL  Upper Extremity Strength:    -       Right Upper Extremity: WFL  -       Left Upper Extremity: WFL   Strength:    -       Right Upper Extremity: WFL  -       Left Upper Extremity: WFL    AMPAC 6 Click ADL:  AMPAC Total Score: 12    Treatment & Education:  Provided education regarding role of OT, POC, & discharge recommendations with pt verbalizing understanding.  Provided education on therex (via verbal & demonstrated instructions) for BUE & BLE that pt could perform while supine with pt verbaling understanding. Pt had no further questions & when asked whether there were any concerns pt reported none.      Education:    Patient left supine with all lines intact, call button in reach, RN notified, family present and white board updated.    Assessment:     Liza Arrieta is a 48 y.o. female with a medical diagnosis of Temporal lobe epilepsy, intractable.  She presents with the following performance deficits affecting function: weakness.      Rehab Prognosis: Fair; patient would benefit from acute skilled OT services to address these deficits and reach maximum level of function.         Clinical Decision Makin.  OT Low:  "Pt evaluation falls under low complexity for evaluation coding due to performance deficits noted in 1-3 areas as " "stated above and 0 co-morbities affecting current functional status. Data obtained from problem focused assessments. No modifications or assistance was required for completion of evaluation. Only brief occupational profile and history review completed."     Plan:     Patient to be seen 2 x/week to address the above listed problems via therapeutic exercises  · Plan of Care Expires: 12/18/18  · Plan of Care Reviewed with: patient    This Plan of care has been discussed with the patient who was involved in its development and understands and is in agreement with the identified goals and treatment plan    GOALS:   Multidisciplinary Problems     Occupational Therapy Goals        Problem: Occupational Therapy Goal    Goal Priority Disciplines Outcome Interventions   Occupational Therapy Goal     OT, PT/OT Ongoing (interventions implemented as appropriate)    Description:  Goals to be met by: 11/28     Patient will increase functional independence with ADLs by performing:    Upper extremity exercise program x15 reps per handout, with independence.                      Time Tracking:     OT Date of Treatment: 11/18/18  OT Start Time: 1059  OT Stop Time: 1111  OT Total Time (min): 12 min    Billable Minutes:Evaluation 12    MARCIE Estrada  11/18/2018    "

## 2018-11-18 NOTE — SUBJECTIVE & OBJECTIVE
Interval History: NAEON. No seizures overnight.    Medications:  Continuous Infusions:  Scheduled Meds:   ceFAZolin (ANCEF) IVPB  2 g Intravenous Q8H    heparin (porcine)  5,000 Units Subcutaneous Q8H    polyethylene glycol  17 g Oral Daily    senna-docusate 8.6-50 mg  1 tablet Oral BID     PRN Meds:acetaminophen, acetaminophen, HYDROcodone-acetaminophen, HYDROmorphone, magnesium oxide, magnesium oxide, metoclopramide HCl, midazolam, ondansetron, sodium chloride 0.9%     Review of Systems  Objective:     Weight: 106.6 kg (235 lb)  Body mass index is 39.11 kg/m².  Vital Signs (Most Recent):  Temp: 98.7 °F (37.1 °C) (11/18/18 0701)  Pulse: (!) 59 (11/18/18 0701)  Resp: 10 (11/18/18 0701)  BP: 108/70 (11/18/18 0701)  SpO2: 96 % (11/18/18 0701) Vital Signs (24h Range):  Temp:  [98.5 °F (36.9 °C)-98.9 °F (37.2 °C)] 98.7 °F (37.1 °C)  Pulse:  [59-95] 59  Resp:  [10-20] 10  SpO2:  [95 %-99 %] 96 %  BP: ()/(55-75) 108/70     Neurosurgery Physical Exam    AAOx3, PERRL, EOMI, FS, TM, 5/5 throughout, SILT.    Significant Labs:  Recent Labs   Lab 11/17/18 0426 11/18/18  0135   * 124*    139   K 4.1 4.0    105   CO2 25 23   BUN 14 14   CREATININE 1.4 0.7   CALCIUM 9.1 9.3   MG 2.0 1.8     Recent Labs   Lab 11/17/18 0426 11/18/18  0135   WBC 9.30 9.84   HGB 10.9* 11.0*   HCT 34.3* 34.3*    290     No results for input(s): LABPT, INR, APTT in the last 48 hours.  Microbiology Results (last 7 days)     ** No results found for the last 168 hours. **        All pertinent labs from the last 24 hours have been reviewed.    Significant Diagnostics:  I have reviewed all pertinent imaging results/findings within the past 24 hours.

## 2018-11-18 NOTE — SUBJECTIVE & OBJECTIVE
Interval History: No acute events overnight. Likely to OR Monday.    Current Facility-Administered Medications   Medication Dose Route Frequency Provider Last Rate Last Dose    acetaminophen suppository 650 mg  650 mg Rectal Q6H PRN Marc Garrett MD        acetaminophen tablet 650 mg  650 mg Oral Q6H PRN Marc Garrett MD   650 mg at 11/14/18 0130    ceFAZolin injection 2 g  2 g Intravenous Q8H Thomas Plummer MD   2 g at 11/18/18 0947    heparin (porcine) injection 5,000 Units  5,000 Units Subcutaneous Q8H Duane Joiner MD   5,000 Units at 11/18/18 1341    HYDROcodone-acetaminophen 5-325 mg per tablet 1 tablet  1 tablet Oral Q4H PRN Marc Garrett MD   1 tablet at 11/12/18 0213    HYDROmorphone injection 0.5 mg  0.5 mg Intravenous Q6H PRN Thomas Plummer MD        magnesium oxide tablet 800 mg  800 mg Oral PRN Satya Arora MD   800 mg at 11/18/18 1345    magnesium oxide tablet 800 mg  800 mg Oral PRN Satya Arora MD        metoclopramide HCl injection 5 mg  5 mg Intravenous Q6H PRN Duane Joiner MD        midazolam (VERSED) 1 mg/mL injection 2 mg  2 mg Intravenous Q5 Min PRN Frankie Dumont MD        ondansetron disintegrating tablet 8 mg  8 mg Oral Q6H PRN Duane Joiner MD   8 mg at 11/07/18 0703    polyethylene glycol packet 17 g  17 g Oral Daily Thomas Plummer MD   17 g at 11/17/18 0908    senna-docusate 8.6-50 mg per tablet 1 tablet  1 tablet Oral BID Thomas Plummer MD   1 tablet at 11/17/18 2139    sodium chloride 0.9% flush 3 mL  3 mL Intravenous PRN Ifeanyi Sanchez MD         Continuous Infusions:    Review of Systems   Constitutional: Negative for chills and fever.   Respiratory: Negative for cough and shortness of breath.    Cardiovascular: Negative for chest pain and leg swelling.   Gastrointestinal: Negative for nausea and vomiting.   Musculoskeletal: Negative for back pain and joint swelling.   Skin: Negative for pallor and rash.    Neurological: Positive for seizures (none in several days). Negative for facial asymmetry, speech difficulty, weakness and headaches.   Psychiatric/Behavioral: Negative for agitation and confusion.     Objective:     Vital Signs (Most Recent):  Temp: 98.8 °F (37.1 °C) (11/18/18 1100)  Pulse: 75 (11/18/18 1300)  Resp: (!) 24 (11/18/18 1300)  BP: 103/71 (11/18/18 1300)  SpO2: 98 % (11/18/18 1300) Vital Signs (24h Range):  Temp:  [98.5 °F (36.9 °C)-98.9 °F (37.2 °C)] 98.8 °F (37.1 °C)  Pulse:  [59-95] 75  Resp:  [10-28] 24  SpO2:  [95 %-99 %] 98 %  BP: ()/(55-77) 103/71     Weight: 106.6 kg (235 lb)  Body mass index is 39.11 kg/m².    Physical Exam   Constitutional: She is oriented to person, place, and time. She appears well-developed and well-nourished. No distress.   HENT:   Right Ear: External ear normal.   Left Ear: External ear normal.   S/p L temporal crani   Eyes: Conjunctivae and EOM are normal. Pupils are equal, round, and reactive to light.   Neck: Normal range of motion.   Pulmonary/Chest: Effort normal. No respiratory distress.   Musculoskeletal: Normal range of motion. She exhibits no deformity.   Neurological: She is alert and oriented to person, place, and time. No cranial nerve deficit.   Skin: Skin is warm and dry. She is not diaphoretic. No erythema.   Psychiatric: She has a normal mood and affect. Her speech is normal and behavior is normal. Judgment and thought content normal.       NEUROLOGICAL EXAMINATION:     MENTAL STATUS   Oriented to person, place, and time.   Attention: normal. Concentration: normal.   Speech: speech is normal   Level of consciousness: alert    CRANIAL NERVES     CN III, IV, VI   Pupils are equal, round, and reactive to light.  Extraocular motions are normal.     CN VII   Facial expression full, symmetric.     CN VIII   CN VIII normal.     CN IX, X   CN IX normal.   CN X normal.     CN XI   CN XI normal.     CN XII   CN XII normal.     MOTOR EXAM   Muscle bulk:  normal  Overall muscle tone: normal       Moves all extremities spontaneously, no focal deficit noted  Assist in transfer to bedside commode without issue       Significant Labs: All pertinent lab results from the past 24 hours have been reviewed.    Significant Studies: I have reviewed all pertinent imaging results/findings within the past 24 hours.

## 2018-11-18 NOTE — PROGRESS NOTES
"[facility]  Neurosurgery  Progress Note    Subjective:     History of Present Illness: Pt is a 48 y.o. female who presents per referral by Dr. LISA Elam and the epilepsy team for evaluation for epilepsy surgery. Pt has history of partial intractable epilepsy and has failed at least 10 previous medications. Currently on dual therapy. EMU workup done in February localized pathology to left temporal lobe, with PET scan showing hypometabolism of mesial temporal lobe. DAWOOD scan also showed localization to left mesial temporal lobe. Pt states that she has endured seizures since 19 y.o. Pt currently on Lamictal. Pt states that she endures about 5 absence episodes per day with intermittent LOC. Pt notes one incident of "rolling" seizures for which she was brought to the ED.          Post-Op Info:  Procedure(s) (LRB):  CRANIOTOMY for grids and strips (Left)   10 Days Post-Op     Interval History: NAEON. No seizures overnight.    Medications:  Continuous Infusions:  Scheduled Meds:   ceFAZolin (ANCEF) IVPB  2 g Intravenous Q8H    heparin (porcine)  5,000 Units Subcutaneous Q8H    polyethylene glycol  17 g Oral Daily    senna-docusate 8.6-50 mg  1 tablet Oral BID     PRN Meds:acetaminophen, acetaminophen, HYDROcodone-acetaminophen, HYDROmorphone, magnesium oxide, magnesium oxide, metoclopramide HCl, midazolam, ondansetron, sodium chloride 0.9%     Review of Systems  Objective:     Weight: 106.6 kg (235 lb)  Body mass index is 39.11 kg/m².  Vital Signs (Most Recent):  Temp: 98.7 °F (37.1 °C) (11/18/18 0701)  Pulse: (!) 59 (11/18/18 0701)  Resp: 10 (11/18/18 0701)  BP: 108/70 (11/18/18 0701)  SpO2: 96 % (11/18/18 0701) Vital Signs (24h Range):  Temp:  [98.5 °F (36.9 °C)-98.9 °F (37.2 °C)] 98.7 °F (37.1 °C)  Pulse:  [59-95] 59  Resp:  [10-20] 10  SpO2:  [95 %-99 %] 96 %  BP: ()/(55-75) 108/70     Neurosurgery Physical Exam    AAOx3, PERRL, EOMI, FS, TM, 5/5 throughout, SILT.    Significant " Labs:  Recent Labs   Lab 11/17/18  0426 11/18/18  0135   * 124*    139   K 4.1 4.0    105   CO2 25 23   BUN 14 14   CREATININE 1.4 0.7   CALCIUM 9.1 9.3   MG 2.0 1.8     Recent Labs   Lab 11/17/18  0426 11/18/18  0135   WBC 9.30 9.84   HGB 10.9* 11.0*   HCT 34.3* 34.3*    290     No results for input(s): LABPT, INR, APTT in the last 48 hours.  Microbiology Results (last 7 days)     ** No results found for the last 168 hours. **        All pertinent labs from the last 24 hours have been reviewed.    Significant Diagnostics:  I have reviewed all pertinent imaging results/findings within the past 24 hours.    Assessment/Plan:     * Temporal lobe epilepsy, intractable    47 yo female with history of seizures since age 19, now s/p  crani with subdural grid placement for further localization (11/5) and repositioning following pt induced malpositioning (11/8).    No acute events overnight.     --Continue care per primary team.  --Continue continuous subdural electrocorticography per epilepsy.  --Continue prophylactic antibiotics while subdural grids in place.  --Pt is tentatively booked for removal of grids and potential focectomy this Monday (11/19).  --Please keep pt NPO midnight Sunday night / Monday morning.  --Please hold chemical DVT prophylaxis Jass night / Monday morning.  --Will consent pt prior to procedure.  --We will continue to follow closely, please contact us with any questions or concerns.            Miller Ragsdale MD  Neurosurgery  [facility]

## 2018-11-18 NOTE — PROGRESS NOTES
[facility]  Neurology-Epilepsy  Progress Note    Patient Name: Liza Arrieta  MRN: [id number]  Admission Date: 11/5/2018  Hospital Length of Stay: 13 days  Code Status: Full Code   Attending Provider: Yohannes De La Rosa MD  Primary Care Physician: LISA Elam MD   Principal Problem:Temporal lobe epilepsy, intractable    Subjective:     Hospital Course:   Patient admitted to [facility] after subdural grid placement/L crani on 11/5. Home Lamotrigine decreased to 100 mg BID.  11/6: No seizures since admission. Cortical mapping session completed.  11/7: Cortical mapping during am, multiple clinical seizures during session. Clinical and electrographic seizure 11/7 afternoon, during which patient had automatisms of hands, confusion, pulled at EEG leads disrupting connection.  11/8: No additional seizures overnight. Back to OR today for replacement of intracranial grid/leads.  11/9: No seizures after revision of intracranial grids in OR yesterday.   11/10: No electrographic seizures, cortical mapping  11/11: No electrographic seizures  11/12: No electrographic seizures  11/13: Plan for benadryl x1 today, sleep deprive tonight and benadryl again tomorrow am  11/14: Episode of brief staring with retained consciousness, no EEG correlate  11/15: Cortical mapping with assistance of neuropsychology for language function  11/16: No seizures  11/17: No seizures  11/18: No seizures    Interval History: No acute events overnight. Likely to OR Monday.    Current Facility-Administered Medications   Medication Dose Route Frequency Provider Last Rate Last Dose    acetaminophen suppository 650 mg  650 mg Rectal Q6H PRN Marc Garrett MD        acetaminophen tablet 650 mg  650 mg Oral Q6H PRN Marc Garrett MD   650 mg at 11/14/18 0130    ceFAZolin injection 2 g  2 g Intravenous Q8H Thomas Plummer MD   2 g at 11/18/18 0947    heparin (porcine) injection 5,000 Units  5,000 Units Subcutaneous Q8H Duane Joiner MD    5,000 Units at 11/18/18 1341    HYDROcodone-acetaminophen 5-325 mg per tablet 1 tablet  1 tablet Oral Q4H PRN Marc Garrett MD   1 tablet at 11/12/18 0213    HYDROmorphone injection 0.5 mg  0.5 mg Intravenous Q6H PRN Thomas Plummer MD        magnesium oxide tablet 800 mg  800 mg Oral PRN Satya Arora MD   800 mg at 11/18/18 1345    magnesium oxide tablet 800 mg  800 mg Oral PRN Satya Arora MD        metoclopramide HCl injection 5 mg  5 mg Intravenous Q6H PRN Duane Joiner MD        midazolam (VERSED) 1 mg/mL injection 2 mg  2 mg Intravenous Q5 Min PRN Frankie Dumont MD        ondansetron disintegrating tablet 8 mg  8 mg Oral Q6H PRN Duane Joiner MD   8 mg at 11/07/18 0703    polyethylene glycol packet 17 g  17 g Oral Daily Thomas Plummer MD   17 g at 11/17/18 0908    senna-docusate 8.6-50 mg per tablet 1 tablet  1 tablet Oral BID Thomas Plummer MD   1 tablet at 11/17/18 2139    sodium chloride 0.9% flush 3 mL  3 mL Intravenous PRN Ifeanyi Sanchez MD         Continuous Infusions:    Review of Systems   Constitutional: Negative for chills and fever.   Respiratory: Negative for cough and shortness of breath.    Cardiovascular: Negative for chest pain and leg swelling.   Gastrointestinal: Negative for nausea and vomiting.   Musculoskeletal: Negative for back pain and joint swelling.   Skin: Negative for pallor and rash.   Neurological: Positive for seizures (none in several days). Negative for facial asymmetry, speech difficulty, weakness and headaches.   Psychiatric/Behavioral: Negative for agitation and confusion.     Objective:     Vital Signs (Most Recent):  Temp: 98.8 °F (37.1 °C) (11/18/18 1100)  Pulse: 75 (11/18/18 1300)  Resp: (!) 24 (11/18/18 1300)  BP: 103/71 (11/18/18 1300)  SpO2: 98 % (11/18/18 1300) Vital Signs (24h Range):  Temp:  [98.5 °F (36.9 °C)-98.9 °F (37.2 °C)] 98.8 °F (37.1 °C)  Pulse:  [59-95] 75  Resp:  [10-28] 24  SpO2:  [95 %-99 %]  98 %  BP: ()/(55-77) 103/71     Weight: 106.6 kg (235 lb)  Body mass index is 39.11 kg/m².    Physical Exam   Constitutional: She is oriented to person, place, and time. She appears well-developed and well-nourished. No distress.   HENT:   Right Ear: External ear normal.   Left Ear: External ear normal.   S/p L temporal crani   Eyes: Conjunctivae and EOM are normal. Pupils are equal, round, and reactive to light.   Neck: Normal range of motion.   Pulmonary/Chest: Effort normal. No respiratory distress.   Musculoskeletal: Normal range of motion. She exhibits no deformity.   Neurological: She is alert and oriented to person, place, and time. No cranial nerve deficit.   Skin: Skin is warm and dry. She is not diaphoretic. No erythema.   Psychiatric: She has a normal mood and affect. Her speech is normal and behavior is normal. Judgment and thought content normal.       NEUROLOGICAL EXAMINATION:     MENTAL STATUS   Oriented to person, place, and time.   Attention: normal. Concentration: normal.   Speech: speech is normal   Level of consciousness: alert    CRANIAL NERVES     CN III, IV, VI   Pupils are equal, round, and reactive to light.  Extraocular motions are normal.     CN VII   Facial expression full, symmetric.     CN VIII   CN VIII normal.     CN IX, X   CN IX normal.   CN X normal.     CN XI   CN XI normal.     CN XII   CN XII normal.     MOTOR EXAM   Muscle bulk: normal  Overall muscle tone: normal       Moves all extremities spontaneously, no focal deficit noted  Assist in transfer to bedside commode without issue       Significant Labs: All pertinent lab results from the past 24 hours have been reviewed.    Significant Studies: I have reviewed all pertinent imaging results/findings within the past 24 hours.    Assessment and Plan:     * Temporal lobe epilepsy, intractable    49 yo female with history of seizures since age 19, who presents to NICU s/p L temporal crani with subdural grid placement for  further localization and possible resection.    Recommendations:  - Continuous vEEG monitoring with subdural grid electrodes in place  - Restart Lamictal 25 mg daily  - For any seizures: please push event button on EEG and call epileptologist on call prior to administration of abortive medication - Versed 2 mg IV PRN  - Non urgent CT head ordered for today for 3D mapping  - Tentatively scheduled for OR 11/19    Plan of care discussed with [facility] team, family at bedside. Will continue to follow.      Essential hypertension    - Goal SBP <140  - Management per [facility]          VTE Risk Mitigation (From admission, onward)        Ordered     heparin (porcine) injection 5,000 Units  Every 8 hours      11/05/18 1238     Place sequential compression device  Until discontinued      11/05/18 1238     IP VTE HIGH RISK PATIENT  Once      11/05/18 1238          Gumaro Manzo MD  Neurology-Epilepsy  [facility]

## 2018-11-18 NOTE — PLAN OF CARE
Problem: Patient Care Overview  Goal: Plan of Care Review  Outcome: Ongoing (interventions implemented as appropriate)  Surgery planned for Monday. EEG on. No seizures noted. POC reviewed with  Meenakshi at 1400. Patient verbalized understanding. Questions and concerns addressed. No acute events today. Progressing toward goals. Will continue to monitor. See flowsheets for full assessment and VS info.

## 2018-11-18 NOTE — PLAN OF CARE
Problem: Patient Care Overview  Goal: Plan of Care Review  Outcome: Ongoing (interventions implemented as appropriate)  POC reviewed with Ms. Liza Arrieta and family at 2100.  Patient and family both able to verbalize understanding; all questions and concerns addressed. No clinical seizures noted overnight. Surgery to take place Monday. Patient progressing toward goals as tolerated. VS and assessments per flowsheets; will continue to monitor.

## 2018-11-18 NOTE — ASSESSMENT & PLAN NOTE
49 yo female with history of seizures since age 19, who presents to NICU s/p L temporal crani with subdural grid placement for further localization and possible resection.    Recommendations:  - Continuous vEEG monitoring with subdural grid electrodes in place  - Restart Lamictal 25 mg daily  - For any seizures: please push event button on EEG and call epileptologist on call prior to administration of abortive medication - Versed 2 mg IV PRN  - Non urgent CT head ordered for today for 3D mapping  - Tentatively scheduled for OR 11/19    Plan of care discussed with NCC team, family at bedside. Will continue to follow.

## 2018-11-19 ENCOUNTER — ANESTHESIA (OUTPATIENT)
Dept: SURGERY | Facility: HOSPITAL | Age: 48
DRG: 027 | End: 2018-11-19
Payer: COMMERCIAL

## 2018-11-19 LAB
ABO + RH BLD: NORMAL
ALBUMIN SERPL BCP-MCNC: 2.9 G/DL
ALP SERPL-CCNC: 113 U/L
ALT SERPL W/O P-5'-P-CCNC: 10 U/L
ANION GAP SERPL CALC-SCNC: 11 MMOL/L
APTT BLDCRRT: 23.3 SEC
AST SERPL-CCNC: 13 U/L
BASOPHILS # BLD AUTO: 0.02 K/UL
BASOPHILS NFR BLD: 0.2 %
BILIRUB SERPL-MCNC: 0.2 MG/DL
BLD GP AB SCN CELLS X3 SERPL QL: NORMAL
BUN SERPL-MCNC: 15 MG/DL
CALCIUM SERPL-MCNC: 9.2 MG/DL
CHLORIDE SERPL-SCNC: 106 MMOL/L
CO2 SERPL-SCNC: 22 MMOL/L
CREAT SERPL-MCNC: 0.7 MG/DL
DIFFERENTIAL METHOD: ABNORMAL
EOSINOPHIL # BLD AUTO: 0.2 K/UL
EOSINOPHIL NFR BLD: 1.9 %
ERYTHROCYTE [DISTWIDTH] IN BLOOD BY AUTOMATED COUNT: 15.5 %
EST. GFR  (AFRICAN AMERICAN): >60 ML/MIN/1.73 M^2
EST. GFR  (NON AFRICAN AMERICAN): >60 ML/MIN/1.73 M^2
GLUCOSE SERPL-MCNC: 97 MG/DL
HCT VFR BLD AUTO: 36.7 %
HGB BLD-MCNC: 11.1 G/DL
IMM GRANULOCYTES # BLD AUTO: 0.02 K/UL
IMM GRANULOCYTES NFR BLD AUTO: 0.2 %
INR PPP: 1
LYMPHOCYTES # BLD AUTO: 3.1 K/UL
LYMPHOCYTES NFR BLD: 35.7 %
MAGNESIUM SERPL-MCNC: 2.1 MG/DL
MCH RBC QN AUTO: 23.5 PG
MCHC RBC AUTO-ENTMCNC: 30.2 G/DL
MCV RBC AUTO: 78 FL
MONOCYTES # BLD AUTO: 0.6 K/UL
MONOCYTES NFR BLD: 7.3 %
NEUTROPHILS # BLD AUTO: 4.8 K/UL
NEUTROPHILS NFR BLD: 54.7 %
NRBC BLD-RTO: 0 /100 WBC
PHOSPHATE SERPL-MCNC: 3.6 MG/DL
PLATELET # BLD AUTO: 320 K/UL
PMV BLD AUTO: 9.4 FL
POTASSIUM SERPL-SCNC: 4.3 MMOL/L
PROT SERPL-MCNC: 7 G/DL
PROTHROMBIN TIME: 10.2 SEC
RBC # BLD AUTO: 4.72 M/UL
SODIUM SERPL-SCNC: 139 MMOL/L
WBC # BLD AUTO: 8.75 K/UL

## 2018-11-19 PROCEDURE — 25000003 PHARM REV CODE 250: Performed by: NURSE ANESTHETIST, CERTIFIED REGISTERED

## 2018-11-19 PROCEDURE — 36000712 HC OR TIME LEV V 1ST 15 MIN: Performed by: NEUROLOGICAL SURGERY

## 2018-11-19 PROCEDURE — 69990 MICROSURGERY ADD-ON: CPT | Mod: 59,,, | Performed by: NEUROLOGICAL SURGERY

## 2018-11-19 PROCEDURE — 8E09XBZ COMPUTER ASSISTED PROCEDURE OF HEAD AND NECK REGION: ICD-10-PCS | Performed by: NEUROLOGICAL SURGERY

## 2018-11-19 PROCEDURE — 99233 SBSQ HOSP IP/OBS HIGH 50: CPT | Mod: ,,, | Performed by: PSYCHIATRY & NEUROLOGY

## 2018-11-19 PROCEDURE — C1713 ANCHOR/SCREW BN/BN,TIS/BN: HCPCS | Performed by: NEUROLOGICAL SURGERY

## 2018-11-19 PROCEDURE — 85025 COMPLETE CBC W/AUTO DIFF WBC: CPT

## 2018-11-19 PROCEDURE — 63600175 PHARM REV CODE 636 W HCPCS: Performed by: NURSE ANESTHETIST, CERTIFIED REGISTERED

## 2018-11-19 PROCEDURE — 61537 REMOVAL OF BRAIN TISSUE: CPT | Mod: 58,,, | Performed by: NEUROLOGICAL SURGERY

## 2018-11-19 PROCEDURE — 25000003 PHARM REV CODE 250: Performed by: NEUROLOGICAL SURGERY

## 2018-11-19 PROCEDURE — D9220A PRA ANESTHESIA: Mod: CRNA,,, | Performed by: NURSE ANESTHETIST, CERTIFIED REGISTERED

## 2018-11-19 PROCEDURE — 00P00MZ REMOVAL OF NEUROSTIMULATOR LEAD FROM BRAIN, OPEN APPROACH: ICD-10-PCS | Performed by: NEUROLOGICAL SURGERY

## 2018-11-19 PROCEDURE — 61781 SCAN PROC CRANIAL INTRA: CPT | Mod: ,,, | Performed by: NEUROLOGICAL SURGERY

## 2018-11-19 PROCEDURE — 27100025 HC TUBING, SET FLUID WARMER: Performed by: NURSE ANESTHETIST, CERTIFIED REGISTERED

## 2018-11-19 PROCEDURE — 20000000 HC ICU ROOM

## 2018-11-19 PROCEDURE — 63600175 PHARM REV CODE 636 W HCPCS: Performed by: NEUROLOGICAL SURGERY

## 2018-11-19 PROCEDURE — 27000221 HC OXYGEN, UP TO 24 HOURS

## 2018-11-19 PROCEDURE — 88342 IMHCHEM/IMCYTCHM 1ST ANTB: CPT | Performed by: PATHOLOGY

## 2018-11-19 PROCEDURE — D9220A PRA ANESTHESIA: Mod: ANES,,, | Performed by: ANESTHESIOLOGY

## 2018-11-19 PROCEDURE — 85610 PROTHROMBIN TIME: CPT

## 2018-11-19 PROCEDURE — C1762 CONN TISS, HUMAN(INC FASCIA): HCPCS | Performed by: NEUROLOGICAL SURGERY

## 2018-11-19 PROCEDURE — 63600175 PHARM REV CODE 636 W HCPCS: Performed by: STUDENT IN AN ORGANIZED HEALTH CARE EDUCATION/TRAINING PROGRAM

## 2018-11-19 PROCEDURE — C1729 CATH, DRAINAGE: HCPCS | Performed by: NEUROLOGICAL SURGERY

## 2018-11-19 PROCEDURE — 85730 THROMBOPLASTIN TIME PARTIAL: CPT

## 2018-11-19 PROCEDURE — 25000003 PHARM REV CODE 250: Performed by: NURSE PRACTITIONER

## 2018-11-19 PROCEDURE — 94761 N-INVAS EAR/PLS OXIMETRY MLT: CPT

## 2018-11-19 PROCEDURE — 37000008 HC ANESTHESIA 1ST 15 MINUTES: Performed by: NEUROLOGICAL SURGERY

## 2018-11-19 PROCEDURE — 88307 TISSUE EXAM BY PATHOLOGIST: CPT | Performed by: PATHOLOGY

## 2018-11-19 PROCEDURE — 00B70ZZ EXCISION OF CEREBRAL HEMISPHERE, OPEN APPROACH: ICD-10-PCS | Performed by: NEUROLOGICAL SURGERY

## 2018-11-19 PROCEDURE — 80053 COMPREHEN METABOLIC PANEL: CPT

## 2018-11-19 PROCEDURE — 83735 ASSAY OF MAGNESIUM: CPT

## 2018-11-19 PROCEDURE — 27201037 HC PRESSURE MONITORING SET UP

## 2018-11-19 PROCEDURE — 86901 BLOOD TYPING SEROLOGIC RH(D): CPT

## 2018-11-19 PROCEDURE — 37000009 HC ANESTHESIA EA ADD 15 MINS: Performed by: NEUROLOGICAL SURGERY

## 2018-11-19 PROCEDURE — 25000003 PHARM REV CODE 250: Performed by: STUDENT IN AN ORGANIZED HEALTH CARE EDUCATION/TRAINING PROGRAM

## 2018-11-19 PROCEDURE — 27201423 OPTIME MED/SURG SUP & DEVICES STERILE SUPPLY: Performed by: NEUROLOGICAL SURGERY

## 2018-11-19 PROCEDURE — 36620 INSERTION CATHETER ARTERY: CPT | Mod: 59,,, | Performed by: ANESTHESIOLOGY

## 2018-11-19 PROCEDURE — 84100 ASSAY OF PHOSPHORUS: CPT

## 2018-11-19 PROCEDURE — 88307 TISSUE EXAM BY PATHOLOGIST: CPT | Mod: 26,,, | Performed by: PATHOLOGY

## 2018-11-19 PROCEDURE — 86920 COMPATIBILITY TEST SPIN: CPT

## 2018-11-19 PROCEDURE — 36000713 HC OR TIME LEV V EA ADD 15 MIN: Performed by: NEUROLOGICAL SURGERY

## 2018-11-19 DEVICE — DURA MATRIX ONLAY PLUS 3X3: Type: IMPLANTABLE DEVICE | Site: BRAIN | Status: FUNCTIONAL

## 2018-11-19 RX ORDER — ACETAMINOPHEN 10 MG/ML
INJECTION, SOLUTION INTRAVENOUS
Status: DISCONTINUED | OUTPATIENT
Start: 2018-11-19 | End: 2018-11-19

## 2018-11-19 RX ORDER — PHENYLEPHRINE HYDROCHLORIDE 10 MG/ML
INJECTION INTRAVENOUS
Status: DISCONTINUED | OUTPATIENT
Start: 2018-11-19 | End: 2018-11-19

## 2018-11-19 RX ORDER — BACITRACIN ZINC 500 UNIT/G
OINTMENT (GRAM) TOPICAL
Status: DISCONTINUED | OUTPATIENT
Start: 2018-11-19 | End: 2018-11-19 | Stop reason: HOSPADM

## 2018-11-19 RX ORDER — LAMOTRIGINE 100 MG/1
300 TABLET ORAL 2 TIMES DAILY
Status: DISCONTINUED | OUTPATIENT
Start: 2018-11-19 | End: 2018-11-20

## 2018-11-19 RX ORDER — CLOBAZAM 10 MG/1
20 TABLET ORAL 2 TIMES DAILY
Status: DISCONTINUED | OUTPATIENT
Start: 2018-11-19 | End: 2018-11-19

## 2018-11-19 RX ORDER — CLOBAZAM 10 MG/1
20 TABLET ORAL 2 TIMES DAILY
Status: DISCONTINUED | OUTPATIENT
Start: 2018-11-19 | End: 2018-11-20

## 2018-11-19 RX ORDER — ONDANSETRON 2 MG/ML
INJECTION INTRAMUSCULAR; INTRAVENOUS
Status: DISCONTINUED | OUTPATIENT
Start: 2018-11-19 | End: 2018-11-19

## 2018-11-19 RX ORDER — MIDAZOLAM HYDROCHLORIDE 1 MG/ML
INJECTION, SOLUTION INTRAMUSCULAR; INTRAVENOUS
Status: DISCONTINUED | OUTPATIENT
Start: 2018-11-19 | End: 2018-11-19

## 2018-11-19 RX ORDER — NEOSTIGMINE METHYLSULFATE 1 MG/ML
INJECTION, SOLUTION INTRAVENOUS
Status: DISCONTINUED | OUTPATIENT
Start: 2018-11-19 | End: 2018-11-19

## 2018-11-19 RX ORDER — VANCOMYCIN HYDROCHLORIDE 1 G/20ML
INJECTION, POWDER, LYOPHILIZED, FOR SOLUTION INTRAVENOUS
Status: DISCONTINUED | OUTPATIENT
Start: 2018-11-19 | End: 2018-11-19 | Stop reason: HOSPADM

## 2018-11-19 RX ORDER — DEXAMETHASONE SODIUM PHOSPHATE 4 MG/ML
INJECTION, SOLUTION INTRA-ARTICULAR; INTRALESIONAL; INTRAMUSCULAR; INTRAVENOUS; SOFT TISSUE
Status: DISCONTINUED | OUTPATIENT
Start: 2018-11-19 | End: 2018-11-19

## 2018-11-19 RX ORDER — PROPOFOL 10 MG/ML
VIAL (ML) INTRAVENOUS
Status: DISCONTINUED | OUTPATIENT
Start: 2018-11-19 | End: 2018-11-19

## 2018-11-19 RX ORDER — GLYCOPYRROLATE 0.2 MG/ML
INJECTION INTRAMUSCULAR; INTRAVENOUS
Status: DISCONTINUED | OUTPATIENT
Start: 2018-11-19 | End: 2018-11-19

## 2018-11-19 RX ORDER — LIDOCAINE HYDROCHLORIDE AND EPINEPHRINE 10; 10 MG/ML; UG/ML
INJECTION, SOLUTION INFILTRATION; PERINEURAL
Status: DISCONTINUED | OUTPATIENT
Start: 2018-11-19 | End: 2018-11-19 | Stop reason: HOSPADM

## 2018-11-19 RX ORDER — BACITRACIN 50000 [IU]/1
INJECTION, POWDER, FOR SOLUTION INTRAMUSCULAR
Status: DISCONTINUED | OUTPATIENT
Start: 2018-11-19 | End: 2018-11-19 | Stop reason: HOSPADM

## 2018-11-19 RX ORDER — LAMOTRIGINE 150 MG/1
300 TABLET ORAL 2 TIMES DAILY
Status: DISCONTINUED | OUTPATIENT
Start: 2018-11-19 | End: 2018-11-19

## 2018-11-19 RX ORDER — LIDOCAINE HCL/PF 100 MG/5ML
SYRINGE (ML) INTRAVENOUS
Status: DISCONTINUED | OUTPATIENT
Start: 2018-11-19 | End: 2018-11-19

## 2018-11-19 RX ORDER — FENTANYL CITRATE 50 UG/ML
INJECTION, SOLUTION INTRAMUSCULAR; INTRAVENOUS
Status: DISCONTINUED | OUTPATIENT
Start: 2018-11-19 | End: 2018-11-19

## 2018-11-19 RX ORDER — ROCURONIUM BROMIDE 10 MG/ML
INJECTION, SOLUTION INTRAVENOUS
Status: DISCONTINUED | OUTPATIENT
Start: 2018-11-19 | End: 2018-11-19

## 2018-11-19 RX ORDER — HEPARIN SODIUM 5000 [USP'U]/ML
5000 INJECTION, SOLUTION INTRAVENOUS; SUBCUTANEOUS EVERY 8 HOURS
Status: DISCONTINUED | OUTPATIENT
Start: 2018-11-20 | End: 2018-11-23 | Stop reason: HOSPADM

## 2018-11-19 RX ADMIN — ROCURONIUM BROMIDE 30 MG: 10 INJECTION, SOLUTION INTRAVENOUS at 01:11

## 2018-11-19 RX ADMIN — LIDOCAINE HYDROCHLORIDE 100 MG: 20 INJECTION, SOLUTION INTRAVENOUS at 12:11

## 2018-11-19 RX ADMIN — CEFAZOLIN 2 G: 1 INJECTION, POWDER, FOR SOLUTION INTRAMUSCULAR; INTRAVENOUS at 05:11

## 2018-11-19 RX ADMIN — ACETAMINOPHEN 1000 MG: 10 INJECTION, SOLUTION INTRAVENOUS at 01:11

## 2018-11-19 RX ADMIN — HYDROCODONE BITARTRATE AND ACETAMINOPHEN 1 TABLET: 5; 325 TABLET ORAL at 10:11

## 2018-11-19 RX ADMIN — CEFAZOLIN 2 G: 1 INJECTION, POWDER, FOR SOLUTION INTRAMUSCULAR; INTRAVENOUS at 09:11

## 2018-11-19 RX ADMIN — DEXAMETHASONE SODIUM PHOSPHATE 8 MG: 4 INJECTION, SOLUTION INTRAMUSCULAR; INTRAVENOUS at 01:11

## 2018-11-19 RX ADMIN — ROCURONIUM BROMIDE 50 MG: 10 INJECTION, SOLUTION INTRAVENOUS at 12:11

## 2018-11-19 RX ADMIN — GLYCOPYRROLATE 0.2 MG: 0.2 INJECTION, SOLUTION INTRAMUSCULAR; INTRAVENOUS at 12:11

## 2018-11-19 RX ADMIN — ROCURONIUM BROMIDE 25 MG: 10 INJECTION, SOLUTION INTRAVENOUS at 03:11

## 2018-11-19 RX ADMIN — PROPOFOL 100 MG: 10 INJECTION, EMULSION INTRAVENOUS at 01:11

## 2018-11-19 RX ADMIN — CEFAZOLIN 2 G: 1 INJECTION, POWDER, FOR SOLUTION INTRAMUSCULAR; INTRAVENOUS at 01:11

## 2018-11-19 RX ADMIN — MIDAZOLAM HYDROCHLORIDE 2 MG: 1 INJECTION, SOLUTION INTRAMUSCULAR; INTRAVENOUS at 12:11

## 2018-11-19 RX ADMIN — ROCURONIUM BROMIDE 25 MG: 10 INJECTION, SOLUTION INTRAVENOUS at 04:11

## 2018-11-19 RX ADMIN — NEOSTIGMINE METHYLSULFATE 5 MG: 1 INJECTION INTRAVENOUS at 05:11

## 2018-11-19 RX ADMIN — SODIUM CHLORIDE, SODIUM GLUCONATE, SODIUM ACETATE, POTASSIUM CHLORIDE, MAGNESIUM CHLORIDE, SODIUM PHOSPHATE, DIBASIC, AND POTASSIUM PHOSPHATE: .53; .5; .37; .037; .03; .012; .00082 INJECTION, SOLUTION INTRAVENOUS at 01:11

## 2018-11-19 RX ADMIN — FENTANYL CITRATE 100 MCG: 50 INJECTION, SOLUTION INTRAMUSCULAR; INTRAVENOUS at 12:11

## 2018-11-19 RX ADMIN — CEFTRIAXONE 2 G: 2 INJECTION, SOLUTION INTRAVENOUS at 09:11

## 2018-11-19 RX ADMIN — GLYCOPYRROLATE 0.4 MG: 0.2 INJECTION, SOLUTION INTRAMUSCULAR; INTRAVENOUS at 05:11

## 2018-11-19 RX ADMIN — HYDROMORPHONE HYDROCHLORIDE 0.5 MG: 1 INJECTION, SOLUTION INTRAMUSCULAR; INTRAVENOUS; SUBCUTANEOUS at 06:11

## 2018-11-19 RX ADMIN — CEFAZOLIN 2 G: 1 INJECTION, POWDER, FOR SOLUTION INTRAMUSCULAR; INTRAVENOUS at 02:11

## 2018-11-19 RX ADMIN — FENTANYL CITRATE 100 MCG: 50 INJECTION, SOLUTION INTRAMUSCULAR; INTRAVENOUS at 01:11

## 2018-11-19 RX ADMIN — PHENYLEPHRINE HYDROCHLORIDE 200 MCG: 10 INJECTION INTRAVENOUS at 12:11

## 2018-11-19 RX ADMIN — SODIUM CHLORIDE, SODIUM GLUCONATE, SODIUM ACETATE, POTASSIUM CHLORIDE, MAGNESIUM CHLORIDE, SODIUM PHOSPHATE, DIBASIC, AND POTASSIUM PHOSPHATE: .53; .5; .37; .037; .03; .012; .00082 INJECTION, SOLUTION INTRAVENOUS at 12:11

## 2018-11-19 RX ADMIN — LAMOTRIGINE 300 MG: 100 TABLET ORAL at 09:11

## 2018-11-19 RX ADMIN — PROPOFOL 200 MG: 10 INJECTION, EMULSION INTRAVENOUS at 12:11

## 2018-11-19 RX ADMIN — SENNOSIDES AND DOCUSATE SODIUM 1 TABLET: 8.6; 5 TABLET ORAL at 09:11

## 2018-11-19 RX ADMIN — ROCURONIUM BROMIDE 20 MG: 10 INJECTION, SOLUTION INTRAVENOUS at 01:11

## 2018-11-19 RX ADMIN — ONDANSETRON 8 MG: 2 INJECTION INTRAMUSCULAR; INTRAVENOUS at 01:11

## 2018-11-19 RX ADMIN — CLOBAZAM 20 MG: 10 TABLET ORAL at 10:11

## 2018-11-19 NOTE — SUBJECTIVE & OBJECTIVE
Interval History:  No acute events overnight.  Pt scheduled for OR tomorrow.    Review of Systems   Constitutional: Negative for chills and fever.   Respiratory: Negative for cough and shortness of breath.    Cardiovascular: Negative for chest pain and leg swelling.   Gastrointestinal: Negative for nausea and vomiting.   Musculoskeletal: Negative for back pain and joint swelling.   Skin: Negative for pallor and rash.   Neurological: Positive for seizures (none in several days). Negative for facial asymmetry, speech difficulty, weakness and headaches.   Psychiatric/Behavioral: Negative for agitation and confusion.     Objective:     Vitals:  Temp: 98.8 °F (37.1 °C)  Pulse: 81  Rhythm: normal sinus rhythm  BP: 138/79  MAP (mmHg): 97  Resp: 20  SpO2: 99 %  O2 Device (Oxygen Therapy): room air    Temp  Min: 98.5 °F (36.9 °C)  Max: 98.9 °F (37.2 °C)  Pulse  Min: 59  Max: 81  BP  Min: 97/57  Max: 138/79  MAP (mmHg)  Min: 72  Max: 99  Resp  Min: 10  Max: 28  SpO2  Min: 95 %  Max: 100 %    11/17 0701 - 11/18 0700  In: 490 [P.O.:490]  Out: 925 [Urine:925]   Unmeasured Output  Urine Occurrence: 1  Stool Occurrence: 1       Physical Exam   Constitutional: She is oriented to person, place, and time. She appears well-developed and well-nourished. No distress.   HENT:   Right Ear: External ear normal.   Left Ear: External ear normal.   S/p L temporal crani   Eyes: Conjunctivae and EOM are normal. Pupils are equal, round, and reactive to light.   Neck: Normal range of motion.   Pulmonary/Chest: Effort normal. No respiratory distress.   Musculoskeletal: Normal range of motion. She exhibits no deformity.   Neurological: She is alert and oriented to person, place, and time. No cranial nerve deficit.   Skin: Skin is warm and dry. She is not diaphoretic. No erythema.   Psychiatric: She has a normal mood and affect. Her speech is normal and behavior is normal. Judgment and thought content normal.       Medications:  Continuous  Scheduled  ceFAZolin (ANCEF) IVPB 2 g Q8H   heparin (porcine) 5,000 Units Q8H   polyethylene glycol 17 g Daily   senna-docusate 8.6-50 mg 1 tablet BID   PRN  acetaminophen 650 mg Q6H PRN   acetaminophen 650 mg Q6H PRN   HYDROcodone-acetaminophen 1 tablet Q4H PRN   HYDROmorphone 0.5 mg Q6H PRN   magnesium oxide 800 mg PRN   magnesium oxide 800 mg PRN   metoclopramide HCl 5 mg Q6H PRN   midazolam 2 mg Q5 Min PRN   ondansetron 8 mg Q6H PRN   sodium chloride 0.9% 3 mL PRN     Today I personally reviewed pertinent medications, lines/drains/airways, imaging, laboratory results, notably:    Labs:  Recent Labs   Lab 11/18/18 0135   WBC 9.84   RBC 4.54   HGB 11.0*   HCT 34.3*      MCV 76*   MCH 24.2*   MCHC 32.1     Recent Labs   Lab 11/18/18 0135   CALCIUM 9.3   PROT 6.7      K 4.0   CO2 23      BUN 14   CREATININE 0.7   ALKPHOS 113   ALT 10   AST 13   BILITOT 0.3     Diet  Diet Adult Regular (IDDSI Level 7)  Diet NPO Except for: Sips with Medication  Diet Adult Regular (IDDSI Level 7)  Diet NPO Except for: Sips with Medication

## 2018-11-19 NOTE — ASSESSMENT & PLAN NOTE
- s/p craniotomy x2 for strip and grid placement  - Continuous vEEG monitoring with subdural grid electrodes in place  - Home lamotrigine and onfi restarted per Epilepsy  - For any seizures: please push event button on EEG and call epileptologist on call prior to administration of abortive medication  - Continue midazolam 2mg IV q5min for motor seizures lasting greater than 5 minutes  - Continue Ancef 2g IV q8h  - Sleep deprive patient until 0400 11/14  - Benadryl 50mg po x1 11/13 evening and in am 11/14  - Cortical mapping per Epilepsy  - [facility] 11/17 stable  - Plan for OR today- for removal of grids and potential focectomy

## 2018-11-19 NOTE — RESEARCH
Pt and family members were approached in [facility] regarding participation in protocol Biobank, project #[facility] IRB [id number]. Pt was agreeable.   The ICF was reviewed with pt. The discussion included:   - participation is voluntary;   - pt can change her mind about participating up until the samples are collected;  - if she changes his mind about participation after collection we cannot get that sample back from sponsor;   - samples that have been used prior to her notification will still be included in research;   - specimens collected include only those discussed with the patient at the time of consent and are indicated on the ICF;    - Brain tissue will be collected from Pathology after routine tests have been conducted;  - Dr. Senior will perform procedure per his usual protocol - participation in Biobank program will not change amount of tissue removed;   - specimens will be given a unique code that can only be linked to pt by [employer]obank staff;  - all medical information released to researchers will be stripped of identifiers;   - there is a small risk of loss of confidentiality, but we make every effort to ensure privacy;  - no other physical risks outside of those involved in standard of care procedure.      Pt was informed that participation in this study would not preclude her from participating in any drug study/other research if offered.   Pt did not have any questions. Pt willingly and independently signed ICF.    A copy of signed ICF was given to pt with instructions to call with any questions that may arise or if she should change her mind regarding participation in Biobank program.     Vi Darling

## 2018-11-19 NOTE — PLAN OF CARE
11/19/18 1613   Discharge Reassessment   Assessment Type Discharge Planning Reassessment   Provided patient/caregiver education on the expected discharge date and the discharge plan No   Do you have any problems affording any of your prescribed medications? No   Discharge Plan A Home with family   Discharge Plan B Home with family;Home Health   Patient choice form signed by patient/caregiver N/A   Anticipated Discharge Disposition Home   Can the patient answer the patient profile reliably? Yes, cognitively intact   How does the patient rate their overall health at the present time? Good   Describe the patient's ability to walk at the present time. Minor restrictions or changes   How often would a person be available to care for the patient? Often   Number of comorbid conditions (as recorded on the chart) One   During the past month, has the patient often been bothered by feeling down, depressed or hopeless? No   During the past month, has the patient often been bothered by little interest or pleasure in doing things? No       Per MD:   To OR today for grid removal and focectomy.   Not medically stable for discharge.     Kenna Fong RN, CCRN-K, [facility]  Neuro-Critical Care   [phone]

## 2018-11-19 NOTE — ASSESSMENT & PLAN NOTE
- s/p craniotomy x2 for strip and grid placement  - tentatively booked for OR on Monday for removal of grids and potential focectomy  - keep NPO and hold SQH Sunday night  - Continuous vEEG monitoring with subdural grid electrodes in place  - Home lamotrigine and onfi restarted per Epilepsy  - For any seizures: please push event button on EEG and call epileptologist on call prior to administration of abortive medication  - Continue midazolam 2mg IV q5min for motor seizures lasting greater than 5 minutes  - Continue Ancef 2g IV q8h  - Sleep deprive patient until 0400 11/14  - Benadryl 50mg po x1 11/13 evening and in am 11/14  - Cortical mapping per Epilepsy  - [facility] 11/17 stable  - Plan for OR tomorrow  - NPO at midnight  - Type & Screen, [place]ags tomorrow morning

## 2018-11-19 NOTE — PROGRESS NOTES
[facility]  Neurocritical Care  Progress Note    Admit Date: 11/5/2018  Service Date: 11/18/2018  Length of Stay: 13    Subjective:     Chief Complaint: Temporal lobe epilepsy, intractable    History of Present Illness: Liza Arrieta is a 49 yo female with PMHx of partial intractable epilepsy who is being admitted to [facility] after L crani with subdural grid placement. Per chart review, she had her first seizure at age 19. At that time, she was treated with dilantin and she did well for approximately 15 years. Reports that she typically has absence seizures (5-6/day), but states she may have had two grand mal seizures decades ago.  The patient was recently admitted to EMU on 2/9/2018 where seizures were captured on EEG.  She has failed multiple medications and is now being admitted for post op management, continuous EEG.         Hospital Course: 11/5: Pt admitted to [facility] s/p L crani with subdural grid placement, continuous EEG  11/6: cEEG  11/7: Pulled one of her leads today. Sent for stat CTH showed electrodes have been repositioned. posterior infratemporal strips are no longer in place  11/9: s/p repeat crani for grid placement.  One abscence seizure lasting 5 seconds. Epilepsy plans to due cortical mapping this afternoon.  11/12: Continuing to hold AEDs. PRNs per epilepsy.  11/13: Plan for cortical mapping on 11/14 11/14  One seizure overnight and 2 seizures noted today. remains on EEG. Epilepsy to do cortical mapping this aftrnoon  11/15 24h EEG continues no seizures over night and so far today. Cortical mapping today  11/16: PT/OT, restart home AED meds  11/17: CTH, cEEG  11/18: Plan for OR tomorrow    Interval History:  No acute events overnight.  Pt scheduled for OR tomorrow.    Review of Systems   Constitutional: Negative for chills and fever.   Respiratory: Negative for cough and shortness of breath.    Cardiovascular: Negative for chest pain and leg swelling.   Gastrointestinal: Negative for  nausea and vomiting.   Musculoskeletal: Negative for back pain and joint swelling.   Skin: Negative for pallor and rash.   Neurological: Positive for seizures (none in several days). Negative for facial asymmetry, speech difficulty, weakness and headaches.   Psychiatric/Behavioral: Negative for agitation and confusion.     Objective:     Vitals:  Temp: 98.8 °F (37.1 °C)  Pulse: 81  Rhythm: normal sinus rhythm  BP: 138/79  MAP (mmHg): 97  Resp: 20  SpO2: 99 %  O2 Device (Oxygen Therapy): room air    Temp  Min: 98.5 °F (36.9 °C)  Max: 98.9 °F (37.2 °C)  Pulse  Min: 59  Max: 81  BP  Min: 97/57  Max: 138/79  MAP (mmHg)  Min: 72  Max: 99  Resp  Min: 10  Max: 28  SpO2  Min: 95 %  Max: 100 %    11/17 0701 - 11/18 0700  In: 490 [P.O.:490]  Out: 925 [Urine:925]   Unmeasured Output  Urine Occurrence: 1  Stool Occurrence: 1       Physical Exam   Constitutional: She is oriented to person, place, and time. She appears well-developed and well-nourished. No distress.   HENT:   Right Ear: External ear normal.   Left Ear: External ear normal.   S/p L temporal crani   Eyes: Conjunctivae and EOM are normal. Pupils are equal, round, and reactive to light.   Neck: Normal range of motion.   Pulmonary/Chest: Effort normal. No respiratory distress.   Musculoskeletal: Normal range of motion. She exhibits no deformity.   Neurological: She is alert and oriented to person, place, and time. No cranial nerve deficit.   Skin: Skin is warm and dry. She is not diaphoretic. No erythema.   Psychiatric: She has a normal mood and affect. Her speech is normal and behavior is normal. Judgment and thought content normal.       Medications:  Continuous Scheduled  ceFAZolin (ANCEF) IVPB 2 g Q8H   heparin (porcine) 5,000 Units Q8H   polyethylene glycol 17 g Daily   senna-docusate 8.6-50 mg 1 tablet BID   PRN  acetaminophen 650 mg Q6H PRN   acetaminophen 650 mg Q6H PRN   HYDROcodone-acetaminophen 1 tablet Q4H PRN   HYDROmorphone 0.5 mg Q6H PRN   magnesium  oxide 800 mg PRN   magnesium oxide 800 mg PRN   metoclopramide HCl 5 mg Q6H PRN   midazolam 2 mg Q5 Min PRN   ondansetron 8 mg Q6H PRN   sodium chloride 0.9% 3 mL PRN     Today I personally reviewed pertinent medications, lines/drains/airways, imaging, laboratory results, notably:    Labs:  Recent Labs   Lab 11/18/18  0135   WBC 9.84   RBC 4.54   HGB 11.0*   HCT 34.3*      MCV 76*   MCH 24.2*   MCHC 32.1     Recent Labs   Lab 11/18/18  0135   CALCIUM 9.3   PROT 6.7      K 4.0   CO2 23      BUN 14   CREATININE 0.7   ALKPHOS 113   ALT 10   AST 13   BILITOT 0.3     Diet  Diet Adult Regular (IDDSI Level 7)  Diet NPO Except for: Sips with Medication  Diet Adult Regular (IDDSI Level 7)  Diet NPO Except for: Sips with Medication        Assessment/Plan:     Neuro   * Temporal lobe epilepsy, intractable    - s/p craniotomy x2 for strip and grid placement  - tentatively booked for OR on Monday for removal of grids and potential focectomy  - keep NPO and hold SQH Sunday night  - Continuous vEEG monitoring with subdural grid electrodes in place  - Home lamotrigine and onfi restarted per Epilepsy  - For any seizures: please push event button on EEG and call epileptologist on call prior to administration of abortive medication  - Continue midazolam 2mg IV q5min for motor seizures lasting greater than 5 minutes  - Continue Ancef 2g IV q8h  - Sleep deprive patient until 0400 11/14  - Benadryl 50mg po x1 11/13 evening and in am 11/14  - Cortical mapping per Epilepsy  - [facility] 11/17 stable  - Plan for OR tomorrow  - NPO at midnight  - Type & Screen, Coags tomorrow morning     Cardiac/Vascular   Essential hypertension    - EKG NSR  - SBP goal <160       Endocrine   Class 2 obesity with body mass index (BMI) of 38.0 to 38.9 in adult    Body mass index is 39.11 kg/m².             The patient is being Prophylaxed for:  Venous Thromboembolism with: Chemical  Stress Ulcer with: Not Applicable   Ventilator Pneumonia with:  not applicable    Activity Orders          None        Full Code    Thomas Plummer MD  Neurocritical Care  [facility]

## 2018-11-19 NOTE — TRANSFER OF CARE
"Anesthesia Transfer of Care Note    Patient: Liza Arrieta    Procedure(s) Performed: Procedure(s) (LRB):  CRANIOTOMY, FOR SUBDURAL GRID AND STRIP ELECTRODE LEAD Removal. (Left)  LOBECTOMY, BRAIN left temporal lobe. (Left)    Patient location: ICU    Anesthesia Type: general    Transport from OR: Continuous ECG monitoring in transport. Continuous SpO2 monitoring in transport. Transported from OR on 6-10 L/min O2 by face mask with adequate spontaneous ventilation    Post pain: adequate analgesia    Post assessment: no apparent anesthetic complications and tolerated procedure well    Post vital signs: stable    Level of consciousness: awake    Nausea/Vomiting: no nausea/vomiting    Complications: none    Transfer of care protocol was followed      Last vitals:   Visit Vitals  /62 (BP Location: Left arm, Patient Position: Lying)   Pulse 66   Temp 36.8 °C (98.2 °F) (Oral)   Resp 11   Ht 5' 5" (1.651 m)   Wt 106.6 kg (235 lb)   SpO2 98%   Breastfeeding? No   BMI 39.11 kg/m²     "

## 2018-11-19 NOTE — PROGRESS NOTES
[facility]  Neurology-Epilepsy  Progress Note    Patient Name: Liza Arrieta  MRN: [id number]  Admission Date: 11/5/2018  Hospital Length of Stay: 14 days  Code Status: Full Code   Attending Provider: Yohannes De La Rosa MD  Primary Care Physician: LISA Elam MD   Principal Problem:Temporal lobe epilepsy, intractable    Subjective:     Hospital Course:   Patient admitted to [facility] after subdural grid placement/L crani on 11/5. Home Lamotrigine decreased to 100 mg BID.  11/6: No seizures since admission. Cortical mapping session completed.  11/7: Cortical mapping during am, multiple clinical seizures during session. Clinical and electrographic seizure 11/7 afternoon, during which patient had automatisms of hands, confusion, pulled at EEG leads disrupting connection.  11/8: No additional seizures overnight. Back to OR today for replacement of intracranial grid/leads.  11/9: No seizures after revision of intracranial grids in OR yesterday.   11/10: No electrographic seizures, cortical mapping  11/11: No electrographic seizures  11/12: No electrographic seizures  11/13: Plan for benadryl x1 today, sleep deprive tonight and benadryl again tomorrow am  11/14: Episode of brief staring with retained consciousness, no EEG correlate  11/15: Cortical mapping with assistance of neuropsychology for language function  11/16: No seizures  11/17: No seizures  11/18: No seizures  11/19: No seizures. To OR today for grid removal and focectomy.     Interval History: No acute events overnight. To OR today with NSGY.    Current Facility-Administered Medications   Medication Dose Route Frequency Provider Last Rate Last Dose    acetaminophen suppository 650 mg  650 mg Rectal Q6H PRN Marc Garrett MD        acetaminophen tablet 650 mg  650 mg Oral Q6H PRN Marc Garrett MD   650 mg at 11/14/18 0130    ceFAZolin injection 2 g  2 g Intravenous Q8H Thomas Plummer MD   2 g at 11/19/18 1302     HYDROcodone-acetaminophen 5-325 mg per tablet 1 tablet  1 tablet Oral Q4H PRN Marc Garrett MD   1 tablet at 11/18/18 2321    HYDROmorphone injection 0.5 mg  0.5 mg Intravenous Q6H PRN Thomas Plummer MD        magnesium oxide tablet 800 mg  800 mg Oral PRN Satya Arora MD   800 mg at 11/18/18 1345    magnesium oxide tablet 800 mg  800 mg Oral PRN Satya Arora MD        metoclopramide HCl injection 5 mg  5 mg Intravenous Q6H PRN Duane Joiner MD        midazolam (VERSED) 1 mg/mL injection 2 mg  2 mg Intravenous Q5 Min PRN Frankie Dumont MD        ondansetron disintegrating tablet 8 mg  8 mg Oral Q6H PRN Duane Joiner MD   8 mg at 11/07/18 0703    polyethylene glycol packet 17 g  17 g Oral Daily Thomas Plummer MD   17 g at 11/17/18 0908    senna-docusate 8.6-50 mg per tablet 1 tablet  1 tablet Oral BID Thomas Plummer MD   1 tablet at 11/18/18 2105    sodium chloride 0.9% flush 3 mL  3 mL Intravenous PRN Ifeanyi Sanchez MD         Facility-Administered Medications Ordered in Other Encounters   Medication Dose Route Frequency Provider Last Rate Last Dose    acetaminophen (10 mg/mL) injection   Intravenous PRN Tera Collazo Jr., CRNA   1,000 mg at 11/19/18 1302    dexamethasone injection   Intravenous PRN Tera Collazo Jr., CRNA   8 mg at 11/19/18 1304    electrolyte-S (ISOLYTE)    Continuous PRN Tera Collazo Jr., CRNA        fentaNYL injection   Intravenous PRN Tera Collazo Jr., CRNA   100 mcg at 11/19/18 1338    glycopyrrolate injection   Intravenous PRN Tera Collazo Jr., CRNA   0.2 mg at 11/19/18 1207    lidocaine (cardiac) injection   Intravenous PRN Tera Collazo Jr., CRNA   100 mg at 11/19/18 1236    midazolam injection   Intravenous PRN Tera Collazo Jr., CRNA   2 mg at 11/19/18 1230    ondansetron injection   Intravenous PRN Tera Collazo Jr., CRNA   8 mg at 11/19/18 1304    propofol (DIPRIVAN) 10 mg/mL infusion   Intravenous PRN  Tera Collazo Jr., CRNA   100 mg at 11/19/18 1314    rocuronium injection   Intravenous PRN Tera Collazo Jr., CRNA   30 mg at 11/19/18 1342     Continuous Infusions:    Review of Systems   Constitutional: Negative for chills and fever.   Respiratory: Negative for cough and shortness of breath.    Cardiovascular: Negative for chest pain and leg swelling.   Gastrointestinal: Negative for nausea and vomiting.   Musculoskeletal: Negative for back pain and joint swelling.   Skin: Negative for pallor and rash.   Neurological: Positive for seizures (none in several days). Negative for facial asymmetry, speech difficulty, weakness and headaches.   Psychiatric/Behavioral: Negative for agitation and confusion.     Objective:     Vital Signs (Most Recent):  Temp: 98.2 °F (36.8 °C) (11/19/18 1100)  Pulse: 66 (11/19/18 1100)  Resp: 11 (11/19/18 1100)  BP: 110/62 (11/19/18 1100)  SpO2: 98 % (11/19/18 1100) Vital Signs (24h Range):  Temp:  [98.2 °F (36.8 °C)-98.8 °F (37.1 °C)] 98.2 °F (36.8 °C)  Pulse:  [58-91] 66  Resp:  [11-33] 11  SpO2:  [95 %-100 %] 98 %  BP: ()/(56-79) 110/62     Weight: 106.6 kg (235 lb)  Body mass index is 39.11 kg/m².    Physical Exam   Constitutional: She is oriented to person, place, and time. She appears well-developed and well-nourished. No distress.   HENT:   Right Ear: External ear normal.   Left Ear: External ear normal.   S/p L temporal crani   Eyes: Conjunctivae and EOM are normal. Pupils are equal, round, and reactive to light.   Neck: Normal range of motion.   Pulmonary/Chest: Effort normal. No respiratory distress.   Musculoskeletal: Normal range of motion. She exhibits no deformity.   Neurological: She is alert and oriented to person, place, and time. No cranial nerve deficit.   Skin: Skin is warm and dry. She is not diaphoretic. No erythema.   Psychiatric: She has a normal mood and affect. Her speech is normal and behavior is normal. Judgment and thought content normal.        NEUROLOGICAL EXAMINATION:     MENTAL STATUS   Oriented to person, place, and time.   Attention: normal. Concentration: normal.   Speech: speech is normal   Level of consciousness: alert    CRANIAL NERVES     CN III, IV, VI   Pupils are equal, round, and reactive to light.  Extraocular motions are normal.     CN VII   Facial expression full, symmetric.     CN VIII   CN VIII normal.     CN IX, X   CN IX normal.   CN X normal.     CN XI   CN XI normal.     CN XII   CN XII normal.     MOTOR EXAM   Muscle bulk: normal  Overall muscle tone: normal       Moves all extremities spontaneously, no focal deficit noted  Assist in transfer to bedside commode without issue       Significant Labs: All pertinent lab results from the past 24 hours have been reviewed.    Significant Studies: I have reviewed all pertinent imaging results/findings within the past 24 hours.    Assessment and Plan:     * Temporal lobe epilepsy, intractable    47 yo female with history of seizures since age 19, who presents to NICU s/p L temporal crani with subdural grid placement for further localization and possible resection.    Recommendations:  - Continuous vEEG monitoring - discontinued this morning  - To OR today with NSGY for grid removal and focectomy  - Restart home dose Lamictal - 200 mg qAM 300 mg qPM  - For any seizures: please call epileptologist on call, Versed 2 mg IV PRN    Plan of care discussed with NCC team, family at bedside. Will continue to follow.      Essential hypertension    - Goal SBP <140  - Management per [facility]          VTE Risk Mitigation (From admission, onward)        Ordered     heparin (porcine) injection 5,000 Units  Every 8 hours      11/05/18 1238     Place sequential compression device  Until discontinued      11/05/18 1238     IP VTE HIGH RISK PATIENT  Once      11/05/18 1238          Philly Foy PA-C  Neurology-Epilepsy  [facility]  Staff: Dr. Manzo

## 2018-11-19 NOTE — SUBJECTIVE & OBJECTIVE
Medications:  Continuous Infusions:  Scheduled Meds:   ceFAZolin (ANCEF) IVPB  2 g Intravenous Q8H    polyethylene glycol  17 g Oral Daily    senna-docusate 8.6-50 mg  1 tablet Oral BID     PRN Meds:acetaminophen, acetaminophen, HYDROcodone-acetaminophen, HYDROmorphone, magnesium oxide, magnesium oxide, metoclopramide HCl, midazolam, ondansetron, sodium chloride 0.9%     Review of Systems    Objective:     Weight: 106.6 kg (235 lb)  Body mass index is 39.11 kg/m².  Vital Signs (Most Recent):  Temp: 98.2 °F (36.8 °C) (11/19/18 0700)  Pulse: 64 (11/19/18 1000)  Resp: 12 (11/19/18 1000)  BP: 109/65 (11/19/18 1000)  SpO2: 98 % (11/19/18 1000) Vital Signs (24h Range):  Temp:  [98.2 °F (36.8 °C)-98.8 °F (37.1 °C)] 98.2 °F (36.8 °C)  Pulse:  [58-91] 64  Resp:  [11-33] 12  SpO2:  [95 %-100 %] 98 %  BP: ()/(56-79) 109/65     Neurosurgery Physical Exam      AAOx3, PERRL, EOMI, FS, TM, 5/5 throughout, SILT.    Significant Labs:  Recent Labs   Lab 11/18/18 0135 11/19/18  0524   * 97    139   K 4.0 4.3    106   CO2 23 22*   BUN 14 15   CREATININE 0.7 0.7   CALCIUM 9.3 9.2   MG 1.8 2.1     Recent Labs   Lab 11/18/18 0135 11/19/18  0524   WBC 9.84 8.75   HGB 11.0* 11.1*   HCT 34.3* 36.7*    320     Recent Labs   Lab 11/19/18  0524   INR 1.0   APTT 23.3     Microbiology Results (last 7 days)     ** No results found for the last 168 hours. **        All pertinent labs from the last 24 hours have been reviewed.    Significant Diagnostics:  I have reviewed all pertinent imaging results/findings within the past 24 hours.

## 2018-11-19 NOTE — SUBJECTIVE & OBJECTIVE
Interval History: No acute events overnight. To OR today with NSGY.    Current Facility-Administered Medications   Medication Dose Route Frequency Provider Last Rate Last Dose    acetaminophen suppository 650 mg  650 mg Rectal Q6H PRN Marc Garrett MD        acetaminophen tablet 650 mg  650 mg Oral Q6H PRN Marc Garrett MD   650 mg at 11/14/18 0130    ceFAZolin injection 2 g  2 g Intravenous Q8H Thomsa Plummer MD   2 g at 11/19/18 1302    HYDROcodone-acetaminophen 5-325 mg per tablet 1 tablet  1 tablet Oral Q4H PRN Marc Garrett MD   1 tablet at 11/18/18 2321    HYDROmorphone injection 0.5 mg  0.5 mg Intravenous Q6H PRN Thomas Plummer MD        magnesium oxide tablet 800 mg  800 mg Oral PRN Satya Arora MD   800 mg at 11/18/18 1345    magnesium oxide tablet 800 mg  800 mg Oral PRN Satya Arora MD        metoclopramide HCl injection 5 mg  5 mg Intravenous Q6H PRN Duane Joiner MD        midazolam (VERSED) 1 mg/mL injection 2 mg  2 mg Intravenous Q5 Min PRN Frankie Dumont MD        ondansetron disintegrating tablet 8 mg  8 mg Oral Q6H PRN Duane Joiner MD   8 mg at 11/07/18 0703    polyethylene glycol packet 17 g  17 g Oral Daily Thomas Plummer MD   17 g at 11/17/18 0908    senna-docusate 8.6-50 mg per tablet 1 tablet  1 tablet Oral BID Thomas Plummer MD   1 tablet at 11/18/18 2105    sodium chloride 0.9% flush 3 mL  3 mL Intravenous PRN Ifeanyi Sanchez MD         Facility-Administered Medications Ordered in Other Encounters   Medication Dose Route Frequency Provider Last Rate Last Dose    acetaminophen (10 mg/mL) injection   Intravenous PRN Tera Collazo Jr., CRNA   1,000 mg at 11/19/18 1302    dexamethasone injection   Intravenous PRN Tera Collazo Jr., CRNA   8 mg at 11/19/18 1304    electrolyte-S (ISOLYTE)    Continuous PRN Tera Collazo Jr., CRNA        fentaNYL injection   Intravenous PRN Tera Collazo Jr., CRNA   100 mcg at 11/19/18  1338    glycopyrrolate injection   Intravenous PRN Tera P. Zay Jr., CRNA   0.2 mg at 11/19/18 1207    lidocaine (cardiac) injection   Intravenous PRN Tera P. Zay Jr., CRNA   100 mg at 11/19/18 1236    midazolam injection   Intravenous PRN Tera P. Zay Jr., CRNA   2 mg at 11/19/18 1230    ondansetron injection   Intravenous PRN Tera P. Zay Jr., CRNA   8 mg at 11/19/18 1304    propofol (DIPRIVAN) 10 mg/mL infusion   Intravenous PRN Tera P. Zay Jr., CRNA   100 mg at 11/19/18 1314    rocuronium injection   Intravenous PRN Tera P. Zay Jr., CRNA   30 mg at 11/19/18 1342     Continuous Infusions:    Review of Systems   Constitutional: Negative for chills and fever.   Respiratory: Negative for cough and shortness of breath.    Cardiovascular: Negative for chest pain and leg swelling.   Gastrointestinal: Negative for nausea and vomiting.   Musculoskeletal: Negative for back pain and joint swelling.   Skin: Negative for pallor and rash.   Neurological: Positive for seizures (none in several days). Negative for facial asymmetry, speech difficulty, weakness and headaches.   Psychiatric/Behavioral: Negative for agitation and confusion.     Objective:     Vital Signs (Most Recent):  Temp: 98.2 °F (36.8 °C) (11/19/18 1100)  Pulse: 66 (11/19/18 1100)  Resp: 11 (11/19/18 1100)  BP: 110/62 (11/19/18 1100)  SpO2: 98 % (11/19/18 1100) Vital Signs (24h Range):  Temp:  [98.2 °F (36.8 °C)-98.8 °F (37.1 °C)] 98.2 °F (36.8 °C)  Pulse:  [58-91] 66  Resp:  [11-33] 11  SpO2:  [95 %-100 %] 98 %  BP: ()/(56-79) 110/62     Weight: 106.6 kg (235 lb)  Body mass index is 39.11 kg/m².    Physical Exam   Constitutional: She is oriented to person, place, and time. She appears well-developed and well-nourished. No distress.   HENT:   Right Ear: External ear normal.   Left Ear: External ear normal.   S/p L temporal crani   Eyes: Conjunctivae and EOM are normal. Pupils are equal, round, and reactive to light.   Neck: Normal  range of motion.   Pulmonary/Chest: Effort normal. No respiratory distress.   Musculoskeletal: Normal range of motion. She exhibits no deformity.   Neurological: She is alert and oriented to person, place, and time. No cranial nerve deficit.   Skin: Skin is warm and dry. She is not diaphoretic. No erythema.   Psychiatric: She has a normal mood and affect. Her speech is normal and behavior is normal. Judgment and thought content normal.       NEUROLOGICAL EXAMINATION:     MENTAL STATUS   Oriented to person, place, and time.   Attention: normal. Concentration: normal.   Speech: speech is normal   Level of consciousness: alert    CRANIAL NERVES     CN III, IV, VI   Pupils are equal, round, and reactive to light.  Extraocular motions are normal.     CN VII   Facial expression full, symmetric.     CN VIII   CN VIII normal.     CN IX, X   CN IX normal.   CN X normal.     CN XI   CN XI normal.     CN XII   CN XII normal.     MOTOR EXAM   Muscle bulk: normal  Overall muscle tone: normal       Moves all extremities spontaneously, no focal deficit noted  Assist in transfer to bedside commode without issue       Significant Labs: All pertinent lab results from the past 24 hours have been reviewed.    Significant Studies: I have reviewed all pertinent imaging results/findings within the past 24 hours.

## 2018-11-19 NOTE — ASSESSMENT & PLAN NOTE
49 yo female with history of seizures since age 19, now s/p  crani with subdural grid placement for further localization (11/5) and repositioning following pt induced malpositioning (11/8).    No acute events overnight. Pt has been NPO since mdnight.    --Continue care per primary team.  --Continue prophylactic antibiotics while subdural grids in place.  --To OR today for grid removal and focectomy.  --We will continue to follow closely, please contact us with any questions or concerns.

## 2018-11-19 NOTE — PROGRESS NOTES
[facility]  Neurocritical Care  Progress Note    Admit Date: 11/5/2018  Service Date: 11/19/2018  Length of Stay: 14    Subjective:     Chief Complaint: Temporal lobe epilepsy, intractable    History of Present Illness: Liza Arrieta is a 47 yo female with PMHx of partial intractable epilepsy who is being admitted to [facility] after L crani with subdural grid placement. Per chart review, she had her first seizure at age 19. At that time, she was treated with dilantin and she did well for approximately 15 years. Reports that she typically has absence seizures (5-6/day), but states she may have had two grand mal seizures decades ago.  The patient was recently admitted to EMU on 2/9/2018 where seizures were captured on EEG.  She has failed multiple medications and is now being admitted for post op management, continuous EEG.             Hospital Course: 11/5: Pt admitted to [facility] s/p L crani with subdural grid placement, continuous EEG  11/6: cEEG  11/7: Pulled one of her leads today. Sent for stat CTH showed electrodes have been repositioned. posterior infratemporal strips are no longer in place  11/9: s/p repeat crani for grid placement.  One abscence seizure lasting 5 seconds. Epilepsy plans to due cortical mapping this afternoon.  11/12: Continuing to hold AEDs. PRNs per epilepsy.  11/13: Plan for cortical mapping on 11/14 11/14  One seizure overnight and 2 seizures noted today. remains on EEG. Epilepsy to do cortical mapping this aftrnoon  11/15 24h EEG continues no seizures over night and so far today. Cortical mapping today  11/16: PT/OT, restart home AED meds  11/17: CTH, cEEG  11/18: Plan for OR tomorrow  11/19: Or today(Grid and strip)    Interval History:  OR    Review of Systems    Review of symptoms:   Constitutional: Denies fevers or chills.  Pulmonary: Denies shortness of breath or cough.  Cardiology: Denies chest pain or palpitations.  GI: Denies abdominal pain or constipation.  Neurologic:  Denies new weakness,  headache, or paresthesias.    Objective:     Vitals:  Temp: 98.2 °F (36.8 °C)  Pulse: 66  Rhythm: normal sinus rhythm  BP: 110/62  MAP (mmHg): 81  Resp: 11  SpO2: 98 %  O2 Device (Oxygen Therapy): room air    Temp  Min: 98.2 °F (36.8 °C)  Max: 98.7 °F (37.1 °C)  Pulse  Min: 58  Max: 91  BP  Min: 98/61  Max: 138/79  MAP (mmHg)  Min: 73  Max: 97  Resp  Min: 11  Max: 33  SpO2  Min: 95 %  Max: 99 %    11/18 0701 - 11/19 0700  In: 300 [P.O.:300]  Out: 375 [Urine:375]   Unmeasured Output  Urine Occurrence: 1  Stool Occurrence: 1       Physical Exam      Physical Exam:  GA: Alert, comfortable, no acute distress.   HEENT: No scleral icterus or JVD.   Pulmonary: Clear to auscultation Anteriorly. No wheezing, crackles, or rhonchi.  Cardiac: RRR S1 & S2 w/o rubs/murmurs/gallops.   Abdominal: Bowel sounds present x 4. No appreciable hepatosplenomegaly.  Skin: No jaundice, rashes, or visible lesions.  Neuro:  --GCS: E4 V5 M6  --Mental Status:  Awake, alert, oriented x4, follows commands  --Pupils 4mm, PERRL.   --moves all extremities spontaneously  Unable to test gait     Medications:  Continuous Scheduled    ceFAZolin (ANCEF) IVPB 2 g Q8H   polyethylene glycol 17 g Daily   senna-docusate 8.6-50 mg 1 tablet BID   PRN    acetaminophen 650 mg Q6H PRN   acetaminophen 650 mg Q6H PRN   bacitracin  PRN   gelatin adsorbable 100cm top sponge  PRN   HYDROcodone-acetaminophen 1 tablet Q4H PRN   HYDROmorphone 0.5 mg Q6H PRN   lidocaine-EPINEPHrine 1%-1:100,000  PRN   magnesium oxide 800 mg PRN   magnesium oxide 800 mg PRN   metoclopramide HCl 5 mg Q6H PRN   midazolam 2 mg Q5 Min PRN   ondansetron 8 mg Q6H PRN   sodium chloride 0.9% 3 mL PRN   thrombin (bovine)  PRN     Today I personally reviewed pertinent medications, lines/drains/airways, imaging, cardiology results, laboratory results, microbiology results,     Diet  Diet NPO Except for: Sips with Medication  Diet NPO Except for: Sips with  Medication        Assessment/Plan:     Neuro   * Temporal lobe epilepsy, intractable    - s/p craniotomy x2 for strip and grid placement  - Continuous vEEG monitoring with subdural grid electrodes in place  - Home lamotrigine and onfi restarted per Epilepsy  - For any seizures: please push event button on EEG and call epileptologist on call prior to administration of abortive medication  - Continue midazolam 2mg IV q5min for motor seizures lasting greater than 5 minutes  - Continue Ancef 2g IV q8h  - Sleep deprive patient until 0400 11/14  - Benadryl 50mg po x1 11/13 evening and in am 11/14  - Cortical mapping per Epilepsy  - [facility] 11/17 stable  - Plan for OR today- for removal of grids and potential focectomy     Cardiac/Vascular   Essential hypertension    - EKG NSR  - SBP goal <160       Endocrine   Class 2 obesity with body mass index (BMI) of 38.0 to 38.9 in adult    Body mass index is 39.11 kg/m².             Activity Orders          None        Full Code    Isael South NP  Neurocritical Care  [facility]

## 2018-11-19 NOTE — SUBJECTIVE & OBJECTIVE
Interval History:  OR    Review of Systems    Review of symptoms:   Constitutional: Denies fevers or chills.  Pulmonary: Denies shortness of breath or cough.  Cardiology: Denies chest pain or palpitations.  GI: Denies abdominal pain or constipation.  Neurologic: Denies new weakness,  headache, or paresthesias.    Objective:     Vitals:  Temp: 98.2 °F (36.8 °C)  Pulse: 66  Rhythm: normal sinus rhythm  BP: 110/62  MAP (mmHg): 81  Resp: 11  SpO2: 98 %  O2 Device (Oxygen Therapy): room air    Temp  Min: 98.2 °F (36.8 °C)  Max: 98.7 °F (37.1 °C)  Pulse  Min: 58  Max: 91  BP  Min: 98/61  Max: 138/79  MAP (mmHg)  Min: 73  Max: 97  Resp  Min: 11  Max: 33  SpO2  Min: 95 %  Max: 99 %    11/18 0701 - 11/19 0700  In: 300 [P.O.:300]  Out: 375 [Urine:375]   Unmeasured Output  Urine Occurrence: 1  Stool Occurrence: 1       Physical Exam      Physical Exam:  GA: Alert, comfortable, no acute distress.   HEENT: No scleral icterus or JVD.   Pulmonary: Clear to auscultation Anteriorly. No wheezing, crackles, or rhonchi.  Cardiac: RRR S1 & S2 w/o rubs/murmurs/gallops.   Abdominal: Bowel sounds present x 4. No appreciable hepatosplenomegaly.  Skin: No jaundice, rashes, or visible lesions.  Neuro:  --GCS: E4 V5 M6  --Mental Status:  Awake, alert, oriented x4, follows commands  --Pupils 4mm, PERRL.   --moves all extremities spontaneously  Unable to test gait     Medications:  Continuous Scheduled    ceFAZolin (ANCEF) IVPB 2 g Q8H   polyethylene glycol 17 g Daily   senna-docusate 8.6-50 mg 1 tablet BID   PRN    acetaminophen 650 mg Q6H PRN   acetaminophen 650 mg Q6H PRN   bacitracin  PRN   gelatin adsorbable 100cm top sponge  PRN   HYDROcodone-acetaminophen 1 tablet Q4H PRN   HYDROmorphone 0.5 mg Q6H PRN   lidocaine-EPINEPHrine 1%-1:100,000  PRN   magnesium oxide 800 mg PRN   magnesium oxide 800 mg PRN   metoclopramide HCl 5 mg Q6H PRN   midazolam 2 mg Q5 Min PRN   ondansetron 8 mg Q6H PRN   sodium chloride 0.9% 3 mL PRN   thrombin (bovine)   PRN     Today I personally reviewed pertinent medications, lines/drains/airways, imaging, cardiology results, laboratory results, microbiology results,     Diet  Diet NPO Except for: Sips with Medication  Diet NPO Except for: Sips with Medication

## 2018-11-19 NOTE — ASSESSMENT & PLAN NOTE
49 yo female with history of seizures since age 19, who presents to NICU s/p L temporal crani with subdural grid placement for further localization and possible resection.    Recommendations:  - Continuous vEEG monitoring - discontinued this morning  - To OR today with NSGY for grid removal and focectomy  - Restart home dose Lamictal - 200 mg qAM 300 mg qPM  - For any seizures: please call epileptologist on call, Versed 2 mg IV PRN    Plan of care discussed with NCC team, family at bedside. Will continue to follow.

## 2018-11-19 NOTE — PROGRESS NOTES
"[facility]  Neuorsurgery  Progress Notes      Subjective:         HPI:  Pt is a 48 y.o. female who presents per referral by Dr. LISA Elam and the epilepsy team for evaluation for epilepsy surgery. Pt has history of partial intractable epilepsy and has failed at least 10 previous medications. Currently on dual therapy. EMU workup done in February localized pathology to left temporal lobe, with PET scan showing hypometabolism of mesial temporal lobe. DAWOOD scan also showed localization to left mesial temporal lobe. Pt states that she has endured seizures since 19 y.o. Pt currently on Lamictal. Pt states that she endures about 5 absence episodes per day with intermittent LOC. Pt notes one incident of "rolling" seizures for which she was brought to the ED.              Medications:  Continuous Infusions:  Scheduled Meds:   ceFAZolin (ANCEF) IVPB  2 g Intravenous Q8H    polyethylene glycol  17 g Oral Daily    senna-docusate 8.6-50 mg  1 tablet Oral BID     PRN Meds:acetaminophen, acetaminophen, HYDROcodone-acetaminophen, HYDROmorphone, magnesium oxide, magnesium oxide, metoclopramide HCl, midazolam, ondansetron, sodium chloride 0.9%     Review of Systems    Objective:     Weight: 106.6 kg (235 lb)  Body mass index is 39.11 kg/m².  Vital Signs (Most Recent):  Temp: 98.2 °F (36.8 °C) (11/19/18 0700)  Pulse: 64 (11/19/18 1000)  Resp: 12 (11/19/18 1000)  BP: 109/65 (11/19/18 1000)  SpO2: 98 % (11/19/18 1000) Vital Signs (24h Range):  Temp:  [98.2 °F (36.8 °C)-98.8 °F (37.1 °C)] 98.2 °F (36.8 °C)  Pulse:  [58-91] 64  Resp:  [11-33] 12  SpO2:  [95 %-100 %] 98 %  BP: ()/(56-79) 109/65     Neurosurgery Physical Exam      AAOx3, PERRL, EOMI, FS, TM, 5/5 throughout, SILT.    Significant Labs:  Recent Labs   Lab 11/18/18  0135 11/19/18  0524   * 97    139   K 4.0 4.3    106   CO2 23 22*   BUN 14 15   CREATININE 0.7 0.7   CALCIUM 9.3 9.2   MG 1.8 2.1     Recent Labs   Lab " 11/18/18  0135 11/19/18  0524   WBC 9.84 8.75   HGB 11.0* 11.1*   HCT 34.3* 36.7*    320     Recent Labs   Lab 11/19/18  0524   INR 1.0   APTT 23.3     Microbiology Results (last 7 days)     ** No results found for the last 168 hours. **        All pertinent labs from the last 24 hours have been reviewed.    Significant Diagnostics:  I have reviewed all pertinent imaging results/findings within the past 24 hours.    Assessment and Plan:     * Temporal lobe epilepsy, intractable    49 yo female with history of seizures since age 19, now s/p  crani with subdural grid placement for further localization (11/5) and repositioning following pt induced malpositioning (11/8).    No acute events overnight. Pt has been NPO since mdnight.    --Continue care per primary team.  --Continue prophylactic antibiotics while subdural grids in place.  --To OR today for grid removal and focectomy.  --We will continue to follow closely, please contact us with any questions or concerns.            Marc Garrett MD  Neurosurgery  [facility]

## 2018-11-19 NOTE — OR NURSING
Two specimens picked up by research personnel Vi.   1) anterior temporal lobe  2) hippocampus    They were brought to pathology by research personnel Vi.

## 2018-11-19 NOTE — ANESTHESIA PROCEDURE NOTES
Arterial    Diagnosis: epilepsy    Patient location during procedure: done in OR  Procedure start time: 11/19/2018 12:50 PM  Timeout: 11/19/2018 12:50 PM  Procedure end time: 11/19/2018 12:55 PM  Staffing  Anesthesiologist: Keeley Saucedo MD  Resident/CRNA: Tera Collazo Jr. CRNA  Performed: resident/CRNA   Anesthesiologist was present at the time of the procedure.  Preanesthetic Checklist  Completed: patient identified, site marked, surgical consent, pre-op evaluation, timeout performed, IV checked, risks and benefits discussed, monitors and equipment checked and anesthesia consent givenArterial  Skin Prep: chlorhexidine gluconate  Local Infiltration: none  Orientation: right  Location: radial  Catheter Size: 18 G  Catheter placement by Anatomical landmarks. Heme positive aspiration all ports.Insertion Attempts: 1

## 2018-11-19 NOTE — PROGRESS NOTES
Pt arrived post OP per OR staff. Spouse updated at bedside per neurosurgery MD. VSS. Isael, NP with NCC team aware of pt arrival to unit. Will continue to monitor.

## 2018-11-20 LAB
ALBUMIN SERPL BCP-MCNC: 3.1 G/DL
ALP SERPL-CCNC: 113 U/L
ALT SERPL W/O P-5'-P-CCNC: 11 U/L
ANION GAP SERPL CALC-SCNC: 10 MMOL/L
AST SERPL-CCNC: 15 U/L
BASOPHILS # BLD AUTO: 0.02 K/UL
BASOPHILS NFR BLD: 0.1 %
BILIRUB SERPL-MCNC: 0.3 MG/DL
BUN SERPL-MCNC: 12 MG/DL
CALCIUM SERPL-MCNC: 9.5 MG/DL
CHLORIDE SERPL-SCNC: 104 MMOL/L
CO2 SERPL-SCNC: 24 MMOL/L
CREAT SERPL-MCNC: 0.8 MG/DL
DIFFERENTIAL METHOD: ABNORMAL
EOSINOPHIL # BLD AUTO: 0 K/UL
EOSINOPHIL NFR BLD: 0 %
ERYTHROCYTE [DISTWIDTH] IN BLOOD BY AUTOMATED COUNT: 15.5 %
EST. GFR  (AFRICAN AMERICAN): >60 ML/MIN/1.73 M^2
EST. GFR  (NON AFRICAN AMERICAN): >60 ML/MIN/1.73 M^2
GLUCOSE SERPL-MCNC: 145 MG/DL
HCT VFR BLD AUTO: 35.9 %
HGB BLD-MCNC: 11.3 G/DL
IMM GRANULOCYTES # BLD AUTO: 0.07 K/UL
IMM GRANULOCYTES NFR BLD AUTO: 0.4 %
LYMPHOCYTES # BLD AUTO: 1.3 K/UL
LYMPHOCYTES NFR BLD: 8.3 %
MAGNESIUM SERPL-MCNC: 2.2 MG/DL
MCH RBC QN AUTO: 24.4 PG
MCHC RBC AUTO-ENTMCNC: 31.5 G/DL
MCV RBC AUTO: 77 FL
MONOCYTES # BLD AUTO: 0.6 K/UL
MONOCYTES NFR BLD: 3.8 %
NEUTROPHILS # BLD AUTO: 13.8 K/UL
NEUTROPHILS NFR BLD: 87.4 %
NRBC BLD-RTO: 0 /100 WBC
PHOSPHATE SERPL-MCNC: 3.5 MG/DL
PLATELET # BLD AUTO: 374 K/UL
PMV BLD AUTO: 9.1 FL
POTASSIUM SERPL-SCNC: 4.6 MMOL/L
PROT SERPL-MCNC: 7.4 G/DL
RBC # BLD AUTO: 4.64 M/UL
SODIUM SERPL-SCNC: 138 MMOL/L
WBC # BLD AUTO: 15.77 K/UL

## 2018-11-20 PROCEDURE — A9585 GADOBUTROL INJECTION: HCPCS | Performed by: PSYCHIATRY & NEUROLOGY

## 2018-11-20 PROCEDURE — 99233 SBSQ HOSP IP/OBS HIGH 50: CPT | Mod: ,,, | Performed by: NURSE PRACTITIONER

## 2018-11-20 PROCEDURE — 94761 N-INVAS EAR/PLS OXIMETRY MLT: CPT

## 2018-11-20 PROCEDURE — 25000003 PHARM REV CODE 250: Performed by: STUDENT IN AN ORGANIZED HEALTH CARE EDUCATION/TRAINING PROGRAM

## 2018-11-20 PROCEDURE — 25000003 PHARM REV CODE 250: Performed by: PSYCHIATRY & NEUROLOGY

## 2018-11-20 PROCEDURE — 84100 ASSAY OF PHOSPHORUS: CPT

## 2018-11-20 PROCEDURE — 63600175 PHARM REV CODE 636 W HCPCS: Performed by: STUDENT IN AN ORGANIZED HEALTH CARE EDUCATION/TRAINING PROGRAM

## 2018-11-20 PROCEDURE — 85025 COMPLETE CBC W/AUTO DIFF WBC: CPT

## 2018-11-20 PROCEDURE — 99233 SBSQ HOSP IP/OBS HIGH 50: CPT | Mod: ,,, | Performed by: PSYCHIATRY & NEUROLOGY

## 2018-11-20 PROCEDURE — 25000003 PHARM REV CODE 250: Performed by: NURSE PRACTITIONER

## 2018-11-20 PROCEDURE — 25500020 PHARM REV CODE 255: Performed by: PSYCHIATRY & NEUROLOGY

## 2018-11-20 PROCEDURE — 80053 COMPREHEN METABOLIC PANEL: CPT

## 2018-11-20 PROCEDURE — 83735 ASSAY OF MAGNESIUM: CPT

## 2018-11-20 PROCEDURE — 20000000 HC ICU ROOM

## 2018-11-20 RX ORDER — LORAZEPAM 1 MG/1
1 TABLET ORAL 2 TIMES DAILY
Status: DISCONTINUED | OUTPATIENT
Start: 2018-11-20 | End: 2018-11-23 | Stop reason: HOSPADM

## 2018-11-20 RX ORDER — LAMOTRIGINE 100 MG/1
200 TABLET ORAL DAILY
Status: DISCONTINUED | OUTPATIENT
Start: 2018-11-20 | End: 2018-11-23 | Stop reason: HOSPADM

## 2018-11-20 RX ORDER — LAMOTRIGINE 150 MG/1
300 TABLET ORAL NIGHTLY
Status: DISCONTINUED | OUTPATIENT
Start: 2018-11-20 | End: 2018-11-23 | Stop reason: HOSPADM

## 2018-11-20 RX ORDER — GADOBUTROL 604.72 MG/ML
10 INJECTION INTRAVENOUS
Status: COMPLETED | OUTPATIENT
Start: 2018-11-20 | End: 2018-11-20

## 2018-11-20 RX ORDER — LORAZEPAM 1 MG/1
1 TABLET ORAL 2 TIMES DAILY
Status: DISCONTINUED | OUTPATIENT
Start: 2018-11-20 | End: 2018-11-20

## 2018-11-20 RX ADMIN — LAMOTRIGINE 200 MG: 100 TABLET ORAL at 10:11

## 2018-11-20 RX ADMIN — LAMOTRIGINE 300 MG: 150 TABLET ORAL at 08:11

## 2018-11-20 RX ADMIN — SENNOSIDES AND DOCUSATE SODIUM 1 TABLET: 8.6; 5 TABLET ORAL at 10:11

## 2018-11-20 RX ADMIN — HYDROCODONE BITARTRATE AND ACETAMINOPHEN 1 TABLET: 5; 325 TABLET ORAL at 07:11

## 2018-11-20 RX ADMIN — HEPARIN SODIUM 5000 UNITS: 5000 INJECTION, SOLUTION INTRAVENOUS; SUBCUTANEOUS at 02:11

## 2018-11-20 RX ADMIN — LORAZEPAM 1 MG: 1 TABLET ORAL at 08:11

## 2018-11-20 RX ADMIN — SENNOSIDES AND DOCUSATE SODIUM 1 TABLET: 8.6; 5 TABLET ORAL at 08:11

## 2018-11-20 RX ADMIN — HEPARIN SODIUM 5000 UNITS: 5000 INJECTION, SOLUTION INTRAVENOUS; SUBCUTANEOUS at 06:11

## 2018-11-20 RX ADMIN — CEFTRIAXONE 2 G: 2 INJECTION, SOLUTION INTRAVENOUS at 06:11

## 2018-11-20 RX ADMIN — HEPARIN SODIUM 5000 UNITS: 5000 INJECTION, SOLUTION INTRAVENOUS; SUBCUTANEOUS at 09:11

## 2018-11-20 RX ADMIN — HYDROCODONE BITARTRATE AND ACETAMINOPHEN 1 TABLET: 5; 325 TABLET ORAL at 03:11

## 2018-11-20 RX ADMIN — CEFAZOLIN 2 G: 1 INJECTION, POWDER, FOR SOLUTION INTRAMUSCULAR; INTRAVENOUS at 02:11

## 2018-11-20 RX ADMIN — CEFTRIAXONE 2 G: 2 INJECTION, SOLUTION INTRAVENOUS at 07:11

## 2018-11-20 RX ADMIN — GADOBUTROL 10 ML: 604.72 INJECTION INTRAVENOUS at 09:11

## 2018-11-20 NOTE — ASSESSMENT & PLAN NOTE
- s/p craniotomy x2 for strip and grid placement 11/5 and 11/9, removed 11/19  - s/p L temporal lobectomy, 11/19  - currently off EEG monitoring  -  lamotrigine 200mg and onfi restarted per Epilepsy  - Continue midazolam 2mg IV q5min for motor seizures lasting greater than 5 minutes  - Continue ceftriaxone 2g IV q8h  - CTH 11/17 stable  - 11/19:CTH revealed Interval postoperative change of left-sided craniotomy for anterior-inferior temp lobe resection and removal of the subdural electro grid. Air/fluid and trace blood products fill the resection cavity without significant mass effect.  No midline shift.  - 11/20: MRI showed postoperative appearance of left frontotemporal craniotomy for partial left anterior temporal lobe resection.  - Onfi dced, patient does not take at home

## 2018-11-20 NOTE — PLAN OF CARE
Problem: Patient Care Overview  Goal: Plan of Care Review  Outcome: Ongoing (interventions implemented as appropriate)  POC reviewed with pt and family at 1400. Pt verbalized understanding. Questions and concerns addressed. No acute events today. MRI completed. Pt progressing toward goals. Will continue to monitor. See flowsheets for full assessment and VS info.

## 2018-11-20 NOTE — PROGRESS NOTES
[facility]  Neurocritical Care  Progress Note    Admit Date: 11/5/2018  Service Date: 11/20/2018  Length of Stay: 15    Subjective:     Chief Complaint: Temporal lobe epilepsy, intractable    History of Present Illness: Liza Arrieta is a 47 yo female with PMHx of partial intractable epilepsy who is being admitted to [facility] after L crani with subdural grid placement. Per chart review, she had her first seizure at age 19. At that time, she was treated with dilantin and she did well for approximately 15 years. Reports that she typically has absence seizures (5-6/day), but states she may have had two grand mal seizures decades ago.  The patient was recently admitted to EMU on 2/9/2018 where seizures were captured on EEG.  She has failed multiple medications and is now being admitted for post op management, continuous EEG.             Hospital Course: 11/5: Pt admitted to [facility] s/p L crani with subdural grid placement, continuous EEG  11/6: cEEG  11/7: Pulled one of her leads today. Sent for stat CTH showed electrodes have been repositioned. posterior infratemporal strips are no longer in place  11/9: s/p repeat crani for grid placement.  One abscence seizure lasting 5 seconds. Epilepsy plans to due cortical mapping this afternoon.  11/12: Continuing to hold AEDs. PRNs per epilepsy.  11/13: Plan for cortical mapping on 11/14 11/14  One seizure overnight and 2 seizures noted today. remains on EEG. Epilepsy to do cortical mapping this aftrnoon  11/15 24h EEG continues no seizures over night and so far today. Cortical mapping today  11/16: PT/OT, restart home AED meds  11/17: CTH, cEEG  11/18: Plan for OR tomorrow  11/19: Or today(Grid and strip), CTH  11/20: Mri , Onfi dced    Interval History:  OR    Review of Systems    Review of symptoms:   Constitutional: Denies fevers or chills.  Pulmonary: Denies shortness of breath or cough.  Cardiology: Denies chest pain or palpitations.  GI: Denies abdominal pain  or constipation.  Neurologic: Denies new weakness,  headache, or paresthesias.    Objective:     Vitals:  Temp: 98.4 °F (36.9 °C)  Pulse: 65  Rhythm: normal sinus rhythm  BP: 121/71  MAP (mmHg): 91  Resp: 16  SpO2: 95 %  O2 Device (Oxygen Therapy): room air    Temp  Min: 98.2 °F (36.8 °C)  Max: 98.6 °F (37 °C)  Pulse  Min: 62  Max: 79  BP  Min: 109/65  Max: 146/66  MAP (mmHg)  Min: 81  Max: 97  Resp  Min: 11  Max: 21  SpO2  Min: 94 %  Max: 100 %  Oxygen Concentration (%)  Min: 28  Max: 28    11/19 0701 - 11/20 0700  In: 1900 [I.V.:1800]  Out: 1915 [Urine:1915]   Unmeasured Output  Urine Occurrence: 0  Stool Occurrence: 0       Physical Exam      Physical Exam:  GA: Alert, comfortable, no acute distress.   HEENT: No scleral icterus or JVD.   Pulmonary: Clear to auscultation Anteriorly. No wheezing, crackles, or rhonchi.  Cardiac: RRR S1 & S2 w/o rubs/murmurs/gallops.   Abdominal: Bowel sounds present x 4. No appreciable hepatosplenomegaly.  Skin: No jaundice, rashes, or visible lesions.  Neuro:  --GCS: E4 V5 M6  --Mental Status:  Awake, alert, oriented x4, follows commands  --Pupils 4mm, PERRL.   --moves all extremities spontaneously  Unable to test gait     Medications:  Continuous Scheduled    cefTRIAXone (ROCEPHIN) IVPB 2 g Q12H   cloBAZam 20 mg BID   heparin (porcine) 5,000 Units Q8H   lamoTRIgine 200 mg Daily   lamoTRIgine 300 mg QHS   polyethylene glycol 17 g Daily   senna-docusate 8.6-50 mg 1 tablet BID   PRN    acetaminophen 650 mg Q6H PRN   acetaminophen 650 mg Q6H PRN   HYDROcodone-acetaminophen 1 tablet Q4H PRN   HYDROmorphone 0.5 mg Q6H PRN   magnesium oxide 800 mg PRN   magnesium oxide 800 mg PRN   metoclopramide HCl 5 mg Q6H PRN   midazolam 2 mg Q5 Min PRN   ondansetron 8 mg Q6H PRN   sodium chloride 0.9% 3 mL PRN     Today I personally reviewed pertinent medications, lines/drains/airways, imaging, cardiology results, laboratory results, microbiology results,     Diet  Diet Adult Regular (IDDSI Level  7)  Diet Adult Regular (IDDSI Level 7)        Assessment/Plan:     Neuro   * Temporal lobe epilepsy, intractable    - s/p craniotomy x2 for strip and grid placement  - Continuous vEEG monitoring with subdural grid electrodes in place  - Home lamotrigine and onfi restarted per Epilepsy  - For any seizures: please push event button on EEG and call epileptologist on call prior to administration of abortive medication  - Continue midazolam 2mg IV q5min for motor seizures lasting greater than 5 minutes  - Continue Ancef 2g IV q8h  - Sleep deprive patient until 0400 11/14  - Benadryl 50mg po x1 11/13 evening and in am 11/14  - Cortical mapping per Epilepsy  - CTH 11/17 stable  - 11/19: Or for removal of grids and lobectomy, CTH revealed Interval postoperative change of left-sided craniotomy for anterior-inferior temp lobe resection and removal of the subdural electro grid. Air/fluid and trace blood products fill the resection cavity without significant mass effect.  No midline shift.  - 11/20: Mri pending  - Onfi dced, patient does not take at home   Cardiac/Vascular   Essential hypertension    - EKG NSR  - SBP goal <160       Endocrine   Class 2 obesity with body mass index (BMI) of 38.0 to 38.9 in adult    Body mass index is 39.11 kg/m².             Activity Orders          None        Full Code    Isael South NP  Neurocritical Care  [facility]

## 2018-11-20 NOTE — PLAN OF CARE
Problem: Patient Care Overview  Goal: Plan of Care Review  Outcome: Ongoing (interventions implemented as appropriate)  VS and assessment per flow sheet. Pt underwent crani with temporal resection per neurosurgery; pt tolerated well. Hemovac drain in place and ordered to keep to full suction per MD Marci. Also instructed per MD to keep pt SBP <140. Pt has [place] and arterial line in place post op. Patient progressing towards goals as tolerated. Plan of care reviewed with patient and family. All family questions and concerns addressed. Will continue to monitor.

## 2018-11-20 NOTE — PROGRESS NOTES
" [facility]  Adult Nutrition  Progress Note    SUMMARY       Recommendations    Recommendation/Intervention:   Continue Regular diet. Boost ONS not indicated at this time - pt consuming % of meals.     RD to monitor.    Goals: Pt to continue tolerating >75% of meals.  Nutrition Goal Status: new  Communication of RD Recs: reviewed with RN    Reason for Assessment    Reason for Assessment: RD follow-up  Diagnosis: seizures  Relevant Medical History: epilepsy  Interdisciplinary Rounds: attended  General Information Comments: Pt NPO yesterday for grid removal. Diet advanced s/p procedure. Tolerating 75% of meals. Pt's weight has been stable between 235-240 for multiple years. NFPE not indicated at this time- pt appears nourished.   Nutrition Discharge Planning: adequate po intake for optimal nutrition    Nutrition Risk Screen    Nutrition Risk Screen: no indicators present    Nutrition/Diet History    Do you have any cultural, spiritual, Shinto conflicts, given your current situation?: none stated  Factors Affecting Nutritional Intake: None identified at this time    Anthropometrics    Temp: 98 °F (36.7 °C)  Height Method: Stated  Height: 5' 5" (165.1 cm)  Height (inches): 65 in  Weight Method: Bed Scale  Weight: 106.6 kg (235 lb 0.2 oz)  Weight (lb): 235.01 lb  Ideal Body Weight (IBW), Female: 125 lb  % Ideal Body Weight, Female (lb): 188.01 lb  BMI (Calculated): 39.2  BMI Grade: 35 - 39.9 - obesity - grade II       Lab/Procedures/Meds    Pertinent Labs Reviewed: reviewed  Pertinent Medications Reviewed: reviewed    Physical Findings/Assessment    Overall Physical Appearance: nourished, obese  Skin: incision(s)    Estimated/Assessed Needs    Weight Used For Calorie Calculations: 106.6 kg (235 lb 0.2 oz)  Energy Calorie Requirements (kcal): 2107  Energy Need Method: [place]-St Jeor(PAL 1.25)  Protein Requirements: 107-128g(1.0-1.2g/kg)  Weight Used For Protein Calculations: 106.6 kg (235 lb " 0.2 oz)  Fluid Requirements (mL): 1mL/kcal or per MD     RDA Method (mL): 2107         Nutrition Prescription Ordered    Current Diet Order: Regular    Evaluation of Received Nutrient/Fluid Intake    I/O: -I/O, good UOP, LBM 11/20  % Intake of Estimated Energy Needs: 75 - 100 %  % Meal Intake: 75 - 100 %    Nutrition Risk    Level of Risk/Frequency of Follow-up: (f/u 1x/week)     Assessment and Plan    No nutrition diagnosis at this time.       Monitor and Evaluation    Food and Nutrient Intake: energy intake, food and beverage intake  Food and Nutrient Adminstration: diet order  Anthropometric Measurements: weight, weight change, body mass index  Biochemical Data, Medical Tests and Procedures: electrolyte and renal panel, gastrointestinal profile, glucose/endocrine profile, inflammatory profile, lipid profile  Nutrition-Focused Physical Findings: overall appearance     Nutrition Follow-Up    RD Follow-up?: Yes

## 2018-11-20 NOTE — SUBJECTIVE & OBJECTIVE
Interval History: S/p grid/strip removal and focectomy yesterday. CT head and MRI brain completed this morning, postoperative changes noted without significant mass effect and no midline shift.    Current Facility-Administered Medications   Medication Dose Route Frequency Provider Last Rate Last Dose    acetaminophen suppository 650 mg  650 mg Rectal Q6H PRN Marc Garrett MD        acetaminophen tablet 650 mg  650 mg Oral Q6H PRN Marc Garrett MD   650 mg at 11/14/18 0130    cefTRIAXone (ROCEPHIN) 2 g in dextrose 5 % 50 mL IVPB  2 g Intravenous Q12H Denis Covarrubias MD   2 g at 11/20/18 0649    heparin (porcine) injection 5,000 Units  5,000 Units Subcutaneous Q8H Denis Covarrubias MD   5,000 Units at 11/20/18 0626    HYDROcodone-acetaminophen 5-325 mg per tablet 1 tablet  1 tablet Oral Q4H PRN Marc Garrett MD   1 tablet at 11/20/18 0701    HYDROmorphone injection 0.5 mg  0.5 mg Intravenous Q6H PRN Thomas Plummer MD   0.5 mg at 11/19/18 1812    lamoTRIgine tablet 200 mg  200 mg Oral Daily Isael South NP   200 mg at 11/20/18 1012    lamoTRIgine tablet 300 mg  300 mg Oral QHS Isael South, BHAVIK        magnesium oxide tablet 800 mg  800 mg Oral PRN Satya Arora MD   800 mg at 11/18/18 1345    magnesium oxide tablet 800 mg  800 mg Oral PRN Satya Arora MD        metoclopramide HCl injection 5 mg  5 mg Intravenous Q6H PRN Duane Joiner MD        midazolam (VERSED) 1 mg/mL injection 2 mg  2 mg Intravenous Q5 Min PRN Frankie Dumont MD        ondansetron disintegrating tablet 8 mg  8 mg Oral Q6H PRN Duane Joiner MD   8 mg at 11/07/18 0703    polyethylene glycol packet 17 g  17 g Oral Daily Thomas Plummer MD   17 g at 11/17/18 0908    senna-docusate 8.6-50 mg per tablet 1 tablet  1 tablet Oral BID Thomas Plummer MD   1 tablet at 11/20/18 1000    sodium chloride 0.9% flush 3 mL  3 mL Intravenous PRN Ifeanyi Sanchez MD         Continuous  Infusions:    Review of Systems   Constitutional: Negative for chills and fever.   Respiratory: Negative for cough and shortness of breath.    Cardiovascular: Negative for chest pain and leg swelling.   Gastrointestinal: Negative for nausea and vomiting.   Musculoskeletal: Negative for back pain and joint swelling.   Skin: Negative for pallor and rash.   Neurological: Positive for seizures (none in several days). Negative for facial asymmetry, speech difficulty, weakness and headaches.   Psychiatric/Behavioral: Negative for agitation and confusion.     Objective:     Vital Signs (Most Recent):  Temp: 98 °F (36.7 °C) (11/20/18 1100)  Pulse: 62 (11/20/18 1200)  Resp: 12 (11/20/18 1200)  BP: 121/71 (11/20/18 0800)  SpO2: 99 % (11/20/18 1200) Vital Signs (24h Range):  Temp:  [98 °F (36.7 °C)-98.6 °F (37 °C)] 98 °F (36.7 °C)  Pulse:  [60-79] 62  Resp:  [11-21] 12  SpO2:  [94 %-100 %] 99 %  BP: (121-146)/(58-74) 121/71  Arterial Line BP: (105-142)/() 106/56     Weight: 106.6 kg (235 lb 0.2 oz)  Body mass index is 39.11 kg/m².    Physical Exam   Constitutional: She is oriented to person, place, and time. She appears well-developed and well-nourished. No distress.   HENT:   Right Ear: External ear normal.   Left Ear: External ear normal.   S/p L temporal crani   Eyes: Conjunctivae and EOM are normal. Pupils are equal, round, and reactive to light.   Neck: Normal range of motion.   Pulmonary/Chest: Effort normal. No respiratory distress.   Musculoskeletal: Normal range of motion. She exhibits no deformity.   Neurological: She is alert and oriented to person, place, and time. No cranial nerve deficit.   Skin: Skin is warm and dry. She is not diaphoretic. No erythema.   Psychiatric: She has a normal mood and affect. Her speech is normal and behavior is normal. Judgment and thought content normal.       NEUROLOGICAL EXAMINATION:     MENTAL STATUS   Oriented to person, place, and time.   Attention: normal. Concentration:  normal.   Speech: speech is normal   Level of consciousness: alert    CRANIAL NERVES     CN III, IV, VI   Pupils are equal, round, and reactive to light.  Extraocular motions are normal.     CN VII   Facial expression full, symmetric.     CN VIII   CN VIII normal.     CN IX, X   CN IX normal.   CN X normal.     CN XI   CN XI normal.     CN XII   CN XII normal.     MOTOR EXAM   Muscle bulk: normal  Overall muscle tone: normal       Moves all extremities spontaneously, no focal deficit noted       Significant Labs: All pertinent lab results from the past 24 hours have been reviewed.    Significant Studies: I have reviewed all pertinent imaging results/findings within the past 24 hours.

## 2018-11-20 NOTE — ASSESSMENT & PLAN NOTE
49 yo female with history of seizures since age 19, who presents to NICU s/p L temporal crani with subdural grid placement for further localization and possible resection.    Recommendations:  - POD #1 s/p grid/strip removal and focectomy - L anterior temporal  - Continue home dose Lamictal - 200 mg qAM 300 mg qPM  - Add Ativan 1 mg BID as bridge while Lamictal level builds back up in system  - For any seizures: please call epileptologist on call, Versed or Ativan 2 mg IV PRN    Plan of care discussed with NCC team, family at bedside. Will continue to follow.

## 2018-11-20 NOTE — PROGRESS NOTES
[facility]  Neurology-Epilepsy  Progress Note    Patient Name: Liza Arrieta  MRN: [id number]  Admission Date: 11/5/2018  Hospital Length of Stay: 15 days  Code Status: Full Code   Attending Provider: Yohannes De La Rosa MD  Primary Care Physician: LISA Elam MD   Principal Problem:Temporal lobe epilepsy, intractable    Subjective:     Hospital Course:   Patient admitted to [facility] after subdural grid placement/L crani on 11/5. Home Lamotrigine decreased to 100 mg BID.  11/6: No seizures since admission. Cortical mapping session completed.  11/7: Cortical mapping during am, multiple clinical seizures during session. Clinical and electrographic seizure 11/7 afternoon, during which patient had automatisms of hands, confusion, pulled at EEG leads disrupting connection.  11/8: No additional seizures overnight. Back to OR today for replacement of intracranial grid/leads.  11/9: No seizures after revision of intracranial grids in OR yesterday.   11/10: No electrographic seizures, cortical mapping  11/11: No electrographic seizures  11/12: No electrographic seizures  11/13: Plan for benadryl x1 today, sleep deprive tonight and benadryl again tomorrow am  11/14: Episode of brief staring with retained consciousness, no EEG correlate  11/15: Cortical mapping with assistance of neuropsychology for language function  11/16: No seizures  11/17: No seizures  11/18: No seizures  11/19: No seizures. To OR today for grid removal and focectomy.   11/20: Off EEG, no reported seizures. POD#1 s/p grid/strip removal and focectomy. Add Ativan 1 mg BID as bridge while Lamictal level builds back up.    Interval History: S/p grid/strip removal and focectomy yesterday. CT head and MRI brain completed this morning, postoperative changes noted without significant mass effect and no midline shift.    Current Facility-Administered Medications   Medication Dose Route Frequency Provider Last Rate Last Dose    acetaminophen  suppository 650 mg  650 mg Rectal Q6H PRN Marc Garrett MD        acetaminophen tablet 650 mg  650 mg Oral Q6H PRN Marc Garrett MD   650 mg at 11/14/18 0130    cefTRIAXone (ROCEPHIN) 2 g in dextrose 5 % 50 mL IVPB  2 g Intravenous Q12H Denis Covarrubias MD   2 g at 11/20/18 0649    heparin (porcine) injection 5,000 Units  5,000 Units Subcutaneous Q8H Denis Covarrubias MD   5,000 Units at 11/20/18 0626    HYDROcodone-acetaminophen 5-325 mg per tablet 1 tablet  1 tablet Oral Q4H PRN Marc Garrett MD   1 tablet at 11/20/18 0701    HYDROmorphone injection 0.5 mg  0.5 mg Intravenous Q6H PRN Thomas Plummer MD   0.5 mg at 11/19/18 1812    lamoTRIgine tablet 200 mg  200 mg Oral Daily Isael South NP   200 mg at 11/20/18 1012    lamoTRIgine tablet 300 mg  300 mg Oral QHS Isael South, BHAVIK        magnesium oxide tablet 800 mg  800 mg Oral PRN Satya Arora MD   800 mg at 11/18/18 1345    magnesium oxide tablet 800 mg  800 mg Oral PRN Satya Arora MD        metoclopramide HCl injection 5 mg  5 mg Intravenous Q6H PRN Duane Joiner MD        midazolam (VERSED) 1 mg/mL injection 2 mg  2 mg Intravenous Q5 Min PRN Frankie Dumont MD        ondansetron disintegrating tablet 8 mg  8 mg Oral Q6H PRN Duane Joiner MD   8 mg at 11/07/18 0703    polyethylene glycol packet 17 g  17 g Oral Daily Thomas Plummer MD   17 g at 11/17/18 0908    senna-docusate 8.6-50 mg per tablet 1 tablet  1 tablet Oral BID Thomas Plummer MD   1 tablet at 11/20/18 1000    sodium chloride 0.9% flush 3 mL  3 mL Intravenous PRN Ifeanyi Sanchez MD         Continuous Infusions:    Review of Systems   Constitutional: Negative for chills and fever.   Respiratory: Negative for cough and shortness of breath.    Cardiovascular: Negative for chest pain and leg swelling.   Gastrointestinal: Negative for nausea and vomiting.   Musculoskeletal: Negative for back pain and joint swelling.    Skin: Negative for pallor and rash.   Neurological: Positive for seizures (none in several days). Negative for facial asymmetry, speech difficulty, weakness and headaches.   Psychiatric/Behavioral: Negative for agitation and confusion.     Objective:     Vital Signs (Most Recent):  Temp: 98 °F (36.7 °C) (11/20/18 1100)  Pulse: 62 (11/20/18 1200)  Resp: 12 (11/20/18 1200)  BP: 121/71 (11/20/18 0800)  SpO2: 99 % (11/20/18 1200) Vital Signs (24h Range):  Temp:  [98 °F (36.7 °C)-98.6 °F (37 °C)] 98 °F (36.7 °C)  Pulse:  [60-79] 62  Resp:  [11-21] 12  SpO2:  [94 %-100 %] 99 %  BP: (121-146)/(58-74) 121/71  Arterial Line BP: (105-142)/() 106/56     Weight: 106.6 kg (235 lb 0.2 oz)  Body mass index is 39.11 kg/m².    Physical Exam   Constitutional: She is oriented to person, place, and time. She appears well-developed and well-nourished. No distress.   HENT:   Right Ear: External ear normal.   Left Ear: External ear normal.   S/p L temporal crani   Eyes: Conjunctivae and EOM are normal. Pupils are equal, round, and reactive to light.   Neck: Normal range of motion.   Pulmonary/Chest: Effort normal. No respiratory distress.   Musculoskeletal: Normal range of motion. She exhibits no deformity.   Neurological: She is alert and oriented to person, place, and time. No cranial nerve deficit.   Skin: Skin is warm and dry. She is not diaphoretic. No erythema.   Psychiatric: She has a normal mood and affect. Her speech is normal and behavior is normal. Judgment and thought content normal.       NEUROLOGICAL EXAMINATION:     MENTAL STATUS   Oriented to person, place, and time.   Attention: normal. Concentration: normal.   Speech: speech is normal   Level of consciousness: alert    CRANIAL NERVES     CN III, IV, VI   Pupils are equal, round, and reactive to light.  Extraocular motions are normal.     CN VII   Facial expression full, symmetric.     CN VIII   CN VIII normal.     CN IX, X   CN IX normal.   CN X normal.     CN XI    CN XI normal.     CN XII   CN XII normal.     MOTOR EXAM   Muscle bulk: normal  Overall muscle tone: normal       Moves all extremities spontaneously, no focal deficit noted       Significant Labs: All pertinent lab results from the past 24 hours have been reviewed.    Significant Studies: I have reviewed all pertinent imaging results/findings within the past 24 hours.    Assessment and Plan:     * Temporal lobe epilepsy, intractable    47 yo female with history of seizures since age 19, who presents to NICU s/p L temporal crani with subdural grid placement for further localization and possible resection.    Recommendations:  - POD #1 s/p grid/strip removal and focectomy - L anterior temporal  - Continue home dose Lamictal - 200 mg qAM 300 mg qPM  - Add Ativan 1 mg BID as bridge while Lamictal level builds back up in system  - For any seizures: please call epileptologist on call, Versed or Ativan 2 mg IV PRN    Plan of care discussed with NCC team, family at bedside. Will continue to follow.      Essential hypertension    - Goal SBP <140  - Management per NCC      Ataxia with oculomotor apraxia    - None reported in several days, patient to keep track of any additional events          VTE Risk Mitigation (From admission, onward)        Ordered     heparin (porcine) injection 5,000 Units  Every 8 hours      11/19/18 1844     IP VTE HIGH RISK PATIENT  Once      11/19/18 1844     heparin (porcine) injection 5,000 Units  Every 8 hours      11/05/18 1238     Place sequential compression device  Until discontinued      11/05/18 1238          Philly Foy PA-C  Neurology-Epilepsy  [facility]  Staff: Dr. Manzo

## 2018-11-20 NOTE — SUBJECTIVE & OBJECTIVE
Interval History:  OR    Review of Systems    Review of symptoms:   Constitutional: Denies fevers or chills.  Pulmonary: Denies shortness of breath or cough.  Cardiology: Denies chest pain or palpitations.  GI: Denies abdominal pain or constipation.  Neurologic: Denies new weakness,  headache, or paresthesias.    Objective:     Vitals:  Temp: 98.4 °F (36.9 °C)  Pulse: 65  Rhythm: normal sinus rhythm  BP: 121/71  MAP (mmHg): 91  Resp: 16  SpO2: 95 %  O2 Device (Oxygen Therapy): room air    Temp  Min: 98.2 °F (36.8 °C)  Max: 98.6 °F (37 °C)  Pulse  Min: 62  Max: 79  BP  Min: 109/65  Max: 146/66  MAP (mmHg)  Min: 81  Max: 97  Resp  Min: 11  Max: 21  SpO2  Min: 94 %  Max: 100 %  Oxygen Concentration (%)  Min: 28  Max: 28    11/19 0701 - 11/20 0700  In: 1900 [I.V.:1800]  Out: 1915 [Urine:1915]   Unmeasured Output  Urine Occurrence: 0  Stool Occurrence: 0       Physical Exam      Physical Exam:  GA: Alert, comfortable, no acute distress.   HEENT: No scleral icterus or JVD.   Pulmonary: Clear to auscultation Anteriorly. No wheezing, crackles, or rhonchi.  Cardiac: RRR S1 & S2 w/o rubs/murmurs/gallops.   Abdominal: Bowel sounds present x 4. No appreciable hepatosplenomegaly.  Skin: No jaundice, rashes, or visible lesions.  Neuro:  --GCS: E4 V5 M6  --Mental Status:  Awake, alert, oriented x4, follows commands  --Pupils 4mm, PERRL.   --moves all extremities spontaneously  Unable to test gait     Medications:  Continuous Scheduled    cefTRIAXone (ROCEPHIN) IVPB 2 g Q12H   cloBAZam 20 mg BID   heparin (porcine) 5,000 Units Q8H   lamoTRIgine 200 mg Daily   lamoTRIgine 300 mg QHS   polyethylene glycol 17 g Daily   senna-docusate 8.6-50 mg 1 tablet BID   PRN    acetaminophen 650 mg Q6H PRN   acetaminophen 650 mg Q6H PRN   HYDROcodone-acetaminophen 1 tablet Q4H PRN   HYDROmorphone 0.5 mg Q6H PRN   magnesium oxide 800 mg PRN   magnesium oxide 800 mg PRN   metoclopramide HCl 5 mg Q6H PRN   midazolam 2 mg Q5 Min PRN   ondansetron 8 mg  Q6H PRN   sodium chloride 0.9% 3 mL PRN     Today I personally reviewed pertinent medications, lines/drains/airways, imaging, cardiology results, laboratory results, microbiology results,     Diet  Diet Adult Regular (IDDSI Level 7)  Diet Adult Regular (IDDSI Level 7)

## 2018-11-20 NOTE — ANESTHESIA POSTPROCEDURE EVALUATION
"Anesthesia Post Evaluation    Patient: Liza Arrieta    Procedure(s) Performed: Procedure(s) (LRB):  CRANIOTOMY, FOR SUBDURAL GRID AND STRIP ELECTRODE LEAD Removal. (Left)  LOBECTOMY, BRAIN left temporal lobe. (Left)    Final Anesthesia Type: general  Patient location during evaluation: ICU  Patient participation: Yes- Able to Participate  Level of consciousness: obtunded/minimal responses  Post-procedure vital signs: reviewed and stable  Pain management: adequate  Airway patency: patent  PONV status at discharge: No PONV  Anesthetic complications: no      Cardiovascular status: blood pressure returned to baseline  Respiratory status: face mask  Hydration status: euvolemic  Follow-up not needed.        Visit Vitals  /71   Pulse 65   Temp 36.9 °C (98.4 °F) (Oral)   Resp 16   Ht 5' 5" (1.651 m)   Wt 106.6 kg (235 lb 0.2 oz)   SpO2 95%   Breastfeeding? No   BMI 39.11 kg/m²       Pain/Allie Score: Pain Assessment Performed: Yes (11/20/2018  9:00 AM)  Presence of Pain: denies (11/20/2018  9:00 AM)  Pain Rating Prior to Med Admin: 4 (11/20/2018  7:01 AM)  Pain Rating Post Med Admin: 0 (11/20/2018  8:00 AM)        "

## 2018-11-20 NOTE — PLAN OF CARE
Problem: Patient Care Overview  Goal: Plan of Care Review  Outcome: Ongoing (interventions implemented as appropriate)  POC reviewed with pt at 0500. Pt verbalized understanding. Questions and concerns addressed. [facility] team notified of WBCNo acute events overnight. Pt progressing toward goals. Will continue to monitor. See flowsheets for full assessment and VS info

## 2018-11-20 NOTE — PROCEDURES
DATE OF STUDY:  11/05/2018    EEG NUMBERS:  SX--3 and QI--4.    LOCATION OF SERVICE:  [facility].    REQUESTING PHYSICIAN:  Ruben Senior M.D.    EPILEPSY MONITORING UNIT  EEG AND VIDEO TELEMETRY REPORT    METHODOLOGY:  Electroencephalographic (EEG) is recorded with electrodes placed   according to the International 10-20 placement system.  Thirty Two (32) channels   of digital signal, including T1 and T2 electrodes, are simultaneously recorded   from the scalp and may also include EKG, EMG and/or eye movement monitors.    Recording band pass was 0.1 to 512 Hz.  Digital video recording of the patient   is simultaneously recorded with the EEG.  The patient is instructed to report   clinical symptoms which may occur during the recording session.  EEG and video   recording are stored and archived in digital format.  Activation procedures,   which include photic stimulation, hyperventilation and instructing patients to   perform simple tasks, are done in selected patients.    The EEG is displayed on a monitor screen and can be reformatted into different   montages for evaluation.  The entire recoding is submitted for computer-assisted   analysis to detect spike and electrographic seizure activity.  The entire   recording is visually reviewed, and the times identified by computer analysis as   being spikes or seizures are reviewed again.    Compressed spectral analysis (CSA) is also performed on the activity recorded   from each individual channel.  This is displayed as a power display of   frequencies from 0 to 30 Hz over time.  The CSA analysis is done and displayed   continuously.  This is reviewed for asymmetries in power between homologous   areas of the scalp and for presence of changes in power which can be seen when   seizures occur.  Sections of suspected abnormalities on the CSA are then   compared with the original EEG recording.    [employer] software was also utilized in the review of this study.  This  software   suite analyzes the EEG recording in multiple domains.  Coherence and rhythmicity   are computed to identify EEG sections which may contain organized seizures.    Each channel undergoes analysis to detect presence of spike and sharp waves   which have special and morphological characteristics of epileptic activity.  The   routine EEG recording is converted from special into frequency domain.  This is   then displayed comparing homologous areas to identify areas of significant   asymmetry.  Algorithm to identify non-cortically generated artifact is used to   separate artifact from the EEG.    RECORDING TIMES:  Start on 11/05/2018, at 15:59.  End on 11/07/2018, at 07:00.  A total of 38 hours of EEG monitoring was recorded.    SEIZURES AND EVENTS RECORDED:  None.    EEG FINDINGS:  The recording was obtained at the patient's bedside and in the   ICU.  The recording was made from electrodes, which had been surgically   implanted over the left hemisphere.  This included an 8 x 8 grid, position over   the lateral surface of the frontal and temporal lobe along with a 2 x 4 strip in   the anterior tip of the temporal lobe along with a 1 x 4 strip in the   inferolateral portion of the temporal lobe and then a 2 x 6 strip positioned   laterally with the distal portion positioned underneath the medial aspects of   the inferotemporal cortex.  Good quality recording was obtained from all leads.    The main rhythms consisted of a mixture of low and mid frequency beta with some   theta frequencies intermixed.  Over the frontal cortex, theta activity was more   prominent.  Interictal spikes were recorded predominantly from the most lateral   3 rows or the grid along with some of the inferior portion of the 3 strip   electrodes.  No clinical or electrographic seizures were recorded.  A functional   cortical mapping was performed, which will be reported separately.  Some   interictal spiking was also seen from the mid  posterior portion of the 7th and   8th row on the grid.  Most active interictal spiking was recorded from the   middle strip and most from the 2 x 6 strip positioned laterally under the   temporal cortex.  No ictal recording was obtained.    IMPRESSION:  Abnormal EEG with interictal spiking noted throughout originating   from the lateral and predominantly from the inferotemporal cortex on the left.      RR/IN  dd: 11/20/2018 09:48:50 (CST)  td: 11/20/2018 10:16:00 (CST)  Doc ID   #[id number]  Job ID #[id number]    CC:

## 2018-11-20 NOTE — ASSESSMENT & PLAN NOTE
- s/p craniotomy x2 for strip and grid placement  - Continuous vEEG monitoring with subdural grid electrodes in place  - Home lamotrigine and onfi restarted per Epilepsy  - For any seizures: please push event button on EEG and call epileptologist on call prior to administration of abortive medication  - Continue midazolam 2mg IV q5min for motor seizures lasting greater than 5 minutes  - Continue Ancef 2g IV q8h  - Sleep deprive patient until 0400 11/14  - Benadryl 50mg po x1 11/13 evening and in am 11/14  - Cortical mapping per Epilepsy  - CTH 11/17 stable  - 11/19: Or for removal of grids and lobectomy, CTH revealed Interval postoperative change of left-sided craniotomy for anterior-inferior temp lobe resection and removal of the subdural electro grid. Air/fluid and trace blood products fill the resection cavity without significant mass effect.  No midline shift.  - 11/20: Mri pending

## 2018-11-20 NOTE — PLAN OF CARE
Problem: Patient Care Overview  Goal: Plan of Care Review  Outcome: Ongoing (interventions implemented as appropriate)  POC reviewed with pt at 0500. Pt verbalized understanding. Questions and concerns addressed. No acute events overnight. [facility] team notified of elevated WBC count. Pt taken for CT scan without incident. MRI still pending. Pt progressing toward goals. Will continue to monitor. See flowsheets for full assessment and VS info

## 2018-11-21 LAB
ALBUMIN SERPL BCP-MCNC: 3 G/DL
ALP SERPL-CCNC: 113 U/L
ALT SERPL W/O P-5'-P-CCNC: 9 U/L
ANION GAP SERPL CALC-SCNC: 13 MMOL/L
AST SERPL-CCNC: 13 U/L
BASOPHILS # BLD AUTO: 0.02 K/UL
BASOPHILS NFR BLD: 0.2 %
BILIRUB SERPL-MCNC: 0.2 MG/DL
BLD PROD TYP BPU: NORMAL
BLD PROD TYP BPU: NORMAL
BLOOD UNIT EXPIRATION DATE: NORMAL
BLOOD UNIT EXPIRATION DATE: NORMAL
BLOOD UNIT TYPE CODE: 9500
BLOOD UNIT TYPE CODE: 9500
BLOOD UNIT TYPE: NORMAL
BLOOD UNIT TYPE: NORMAL
BUN SERPL-MCNC: 16 MG/DL
CALCIUM SERPL-MCNC: 9.1 MG/DL
CHLORIDE SERPL-SCNC: 105 MMOL/L
CO2 SERPL-SCNC: 24 MMOL/L
CODING SYSTEM: NORMAL
CODING SYSTEM: NORMAL
CREAT SERPL-MCNC: 1 MG/DL
DIFFERENTIAL METHOD: ABNORMAL
DISPENSE STATUS: NORMAL
DISPENSE STATUS: NORMAL
EOSINOPHIL # BLD AUTO: 0.2 K/UL
EOSINOPHIL NFR BLD: 1.5 %
ERYTHROCYTE [DISTWIDTH] IN BLOOD BY AUTOMATED COUNT: 15.9 %
EST. GFR  (AFRICAN AMERICAN): >60 ML/MIN/1.73 M^2
EST. GFR  (NON AFRICAN AMERICAN): >60 ML/MIN/1.73 M^2
GLUCOSE SERPL-MCNC: 142 MG/DL
HCT VFR BLD AUTO: 35.2 %
HGB BLD-MCNC: 10.9 G/DL
IMM GRANULOCYTES # BLD AUTO: 0.02 K/UL
IMM GRANULOCYTES NFR BLD AUTO: 0.2 %
LYMPHOCYTES # BLD AUTO: 3 K/UL
LYMPHOCYTES NFR BLD: 29.9 %
MAGNESIUM SERPL-MCNC: 1.9 MG/DL
MCH RBC QN AUTO: 24.3 PG
MCHC RBC AUTO-ENTMCNC: 31 G/DL
MCV RBC AUTO: 79 FL
MONOCYTES # BLD AUTO: 0.6 K/UL
MONOCYTES NFR BLD: 5.9 %
NEUTROPHILS # BLD AUTO: 6.2 K/UL
NEUTROPHILS NFR BLD: 62.3 %
NRBC BLD-RTO: 0 /100 WBC
PHOSPHATE SERPL-MCNC: 3.2 MG/DL
PLATELET # BLD AUTO: 342 K/UL
PMV BLD AUTO: 9.1 FL
POTASSIUM SERPL-SCNC: 3.8 MMOL/L
PROT SERPL-MCNC: 7.1 G/DL
RBC # BLD AUTO: 4.48 M/UL
SODIUM SERPL-SCNC: 142 MMOL/L
TRANS ERYTHROCYTES VOL PATIENT: NORMAL ML
TRANS ERYTHROCYTES VOL PATIENT: NORMAL ML
WBC # BLD AUTO: 9.96 K/UL

## 2018-11-21 PROCEDURE — 25000003 PHARM REV CODE 250: Performed by: NURSE PRACTITIONER

## 2018-11-21 PROCEDURE — 25000003 PHARM REV CODE 250: Performed by: PSYCHIATRY & NEUROLOGY

## 2018-11-21 PROCEDURE — 97161 PT EVAL LOW COMPLEX 20 MIN: CPT

## 2018-11-21 PROCEDURE — 25000003 PHARM REV CODE 250: Performed by: STUDENT IN AN ORGANIZED HEALTH CARE EDUCATION/TRAINING PROGRAM

## 2018-11-21 PROCEDURE — 20600001 HC STEP DOWN PRIVATE ROOM

## 2018-11-21 PROCEDURE — 80053 COMPREHEN METABOLIC PANEL: CPT

## 2018-11-21 PROCEDURE — 85025 COMPLETE CBC W/AUTO DIFF WBC: CPT

## 2018-11-21 PROCEDURE — 94761 N-INVAS EAR/PLS OXIMETRY MLT: CPT

## 2018-11-21 PROCEDURE — 63600175 PHARM REV CODE 636 W HCPCS: Performed by: STUDENT IN AN ORGANIZED HEALTH CARE EDUCATION/TRAINING PROGRAM

## 2018-11-21 PROCEDURE — 84100 ASSAY OF PHOSPHORUS: CPT

## 2018-11-21 PROCEDURE — 83735 ASSAY OF MAGNESIUM: CPT

## 2018-11-21 PROCEDURE — 99233 SBSQ HOSP IP/OBS HIGH 50: CPT | Mod: ,,, | Performed by: NURSE PRACTITIONER

## 2018-11-21 PROCEDURE — 99233 SBSQ HOSP IP/OBS HIGH 50: CPT | Mod: ,,, | Performed by: PSYCHIATRY & NEUROLOGY

## 2018-11-21 RX ADMIN — LORAZEPAM 1 MG: 1 TABLET ORAL at 08:11

## 2018-11-21 RX ADMIN — POLYETHYLENE GLYCOL 3350 17 G: 17 POWDER, FOR SOLUTION ORAL at 08:11

## 2018-11-21 RX ADMIN — LAMOTRIGINE 300 MG: 150 TABLET ORAL at 09:11

## 2018-11-21 RX ADMIN — HEPARIN SODIUM 5000 UNITS: 5000 INJECTION, SOLUTION INTRAVENOUS; SUBCUTANEOUS at 05:11

## 2018-11-21 RX ADMIN — HYDROMORPHONE HYDROCHLORIDE 0.5 MG: 1 INJECTION, SOLUTION INTRAMUSCULAR; INTRAVENOUS; SUBCUTANEOUS at 02:11

## 2018-11-21 RX ADMIN — HYDROCODONE BITARTRATE AND ACETAMINOPHEN 1 TABLET: 5; 325 TABLET ORAL at 05:11

## 2018-11-21 RX ADMIN — LAMOTRIGINE 200 MG: 100 TABLET ORAL at 08:11

## 2018-11-21 RX ADMIN — HEPARIN SODIUM 5000 UNITS: 5000 INJECTION, SOLUTION INTRAVENOUS; SUBCUTANEOUS at 09:11

## 2018-11-21 RX ADMIN — SENNOSIDES AND DOCUSATE SODIUM 1 TABLET: 8.6; 5 TABLET ORAL at 09:11

## 2018-11-21 RX ADMIN — HEPARIN SODIUM 5000 UNITS: 5000 INJECTION, SOLUTION INTRAVENOUS; SUBCUTANEOUS at 01:11

## 2018-11-21 RX ADMIN — LORAZEPAM 1 MG: 1 TABLET ORAL at 09:11

## 2018-11-21 RX ADMIN — SENNOSIDES AND DOCUSATE SODIUM 1 TABLET: 8.6; 5 TABLET ORAL at 08:11

## 2018-11-21 RX ADMIN — CEFTRIAXONE 2 G: 2 INJECTION, SOLUTION INTRAVENOUS at 08:11

## 2018-11-21 RX ADMIN — HYDROCODONE BITARTRATE AND ACETAMINOPHEN 1 TABLET: 5; 325 TABLET ORAL at 01:11

## 2018-11-21 NOTE — NURSING TRANSFER
Nursing Transfer Note      11/21/2018     Transfer from Neuro ICU to room 702    Transfer via: wheelchair    Transfer with patient belongings  telemetry    Transported by Zach RN    Medicines sent: NA    Chart send with patient: yes    Notified: family at bedside    Patient reassessed at: upon arrival,     Upon arrival to floor: VS taken, telemetry initiated, no changes from report received.

## 2018-11-21 NOTE — PT/OT/SLP EVAL
"Physical Therapy Evaluation    Patient Name:  Liza Arrieta   MRN:  [id number]    Recommendations:     Discharge Recommendations:  home   Discharge Equipment Recommendations: none   Barriers to discharge: None    Assessment:     Liza Arrieta is a 48 y.o. female admitted 11/05/18 with a medical diagnosis of Temporal lobe epilepsy, intractable. Pt is s/p craniotomy for subdural grid and strip removal and left temporal lobectomy. She presents with the following impairments/functional limitations:  impaired endurance, impaired functional mobilty. Pt tolerated initiation of OOB activity well, with no reports of dizziness or pain; ambulated ~100 ft with no AD and SBA. Anticipating pt to progress well with mobility and remain safe to discharge home once medically stable. Pt would benefit from skilled PT intervention to address listed functional deficits, reduce fall risk, and maximize (I) and safety with functional mobility.     Rehab Prognosis:  good; patient would benefit from acute skilled PT services to address these deficits and reach maximum level of function.      Recent Surgery: Procedure(s) (LRB):  CRANIOTOMY, FOR SUBDURAL GRID AND STRIP ELECTRODE LEAD Removal. (Left)  LOBECTOMY, BRAIN left temporal lobe. (Left) 2 Days Post-Op    Plan:     During this hospitalization, patient to be seen 3 x/week to address the above listed problems via gait training, therapeutic activities, therapeutic exercises, neuromuscular re-education  · Plan of Care Expires:  12/21/18   Plan of Care Reviewed with: patient    Subjective     Communicated with RN and NP prior to session. Per NP (Doris), pt cleared for OOB activity and preparing for step-down to the floor. RN preparing pt for step-down upon PT arrival.  Patient found supine upon PT entry to room. Pt alert and agreeable to evaluation.      Chief Complaint: decreased endurance   Patient comments/goals: "I had to stay in bed for a long time, it feels good to get up" "   Pain/Comfort:  · Pain Rating 1: 0/10  · Pain Rating Post-Intervention 1: 0/10    Patients cultural, spiritual, [employer] conflicts given the current situation: no conflicts    Patient History:     Living Environment: Pt lives with spouse and 3 children in [facility] with 2-3 SHERITA.    Prior Level of Function: independent with mobility and ADLs; active, enjoys exercising.   DME owned: none  Caregiver Assistance: assistance available from spouse and children     Additional roles/responsibilities/hobbies:   · Driving: yes  · Occpuation: working full-time (desk job)     Objective:     Patient found with: pulse ox (continuous), telemetry, hemovac     General Precautions: Standard, fall, seizure   Orthopedic Precautions:N/A   Braces: N/A     Exams:  · Cognitive Exam:  Patient is oriented to Person, Place, Time and Situation  · Gross Motor Coordination:  WFL  · Postural Exam:  Patient presented with the following abnormalities:    · -       Rounded shoulders  · Sensation:    · -       Intact  · RLE ROM: WFL  · RLE Strength: 5/5  · LLE ROM: WFL  · LLE Strength: 5/5    Functional Mobility:    Bed Mobility:  · Supine > Sit: modified independence   · Sit > Supine: N/T     Transfers:   · Sit <> Stand Transfer: stand by assistance from EOB with no AD x 2 trials     Standing Balance:   · Assistance level: stand by assistance with no AD                  Gait:  · Distance: ~100 ft   · Assistance level: stand by assistance  · Assistive Device: no AD   · Gait Deviation(s): decreased step length, decreased gait speed, mild instability noted; no overt LOB        AM-PAC 6 CLICK MOBILITY  Total Score:20       Therapeutic Activities and Exercises:  Pt educated on:  - Role of PT and POC/goals for therapy   - Safety with mobility  - activity recommendations   - Instructed to call nursing staff for assistance with mobility as needed 2/2 fall risk   - Pt safe to ambulate with nursing staff during acute hospital stay to prevent deconditioning. No AD  needed.     Pt verbalized understanding and expressed no further concerns/questions.     Patient left up in chair with all lines intact, call button in reach and RN present.    GOALS:   Multidisciplinary Problems     Physical Therapy Goals        Problem: Physical Therapy Goal    Goal Priority Disciplines Outcome Goal Variances Interventions   Physical Therapy Goal     PT, PT/OT Ongoing (interventions implemented as appropriate)     Description:  Goals to be met by: 2018    Patient will increase functional independence with mobility by performin. Sit to stand transfer with Modified [place]  2. Gait  x 200 feet with Supervision without AD.   3. Ascend/descend 3 stairs with right Handrails Supervision  4. Stand for 3 minutes with Supervision while performing dynamic balance activities to reduce fall risk   5. Lower extremity exercise program x15 reps per handout, with supervision                      History:     Past Medical History:   Diagnosis Date    Convulsions     Epilepsy     Seizures        Past Surgical History:   Procedure Laterality Date    APPENDECTOMY       SECTION      4    CHOLECYSTECTOMY      CRANIOTOMY N/A 2018    Procedure: CRANIOTOMY for strip and grid;  Surgeon: Ruben Senior MD;  Location: [facility] OR [address] [place] [address];  Service: Neurosurgery;  Laterality: N/A;  toronto II, asa 2, type and screen, regular bed, [place], supine     CRANIOTOMY Left 2018    Procedure: CRANIOTOMY for grids and strips;  Surgeon: Ruben Senior MD;  Location: [facility] OR [address] [place] [address];  Service: Neurosurgery;  Laterality: Left;    CRANIOTOMY for grids and strips Left 2018    Performed by Ruben Senior MD at [facility] OR [address] [place] [address]    CRANIOTOMY for strip and grid N/A 2018    Performed by Ruben Senior MD at [facility] OR [address] [place] [address]    HYSTERECTOMY      around  for pain ful periods        Clinical Decision Making:     Low complexity evaluation:   · Stable clinical presentation   · 1-2 personal  factors/comorbidities affecting treatment   · Examining and addressing 2-3 functional deficits, activity limitations, and participation restrictions    Time Tracking:     PT Received On: 11/21/18  PT Start Time: 1343     PT Stop Time: 1356  PT Total Time (min): 13 min     Billable Minutes: Evaluation 13 min      Luana Mario PT, DPT   11/21/2018  Pager: [phone]

## 2018-11-21 NOTE — PROGRESS NOTES
[facility]  Neurology-Epilepsy  Progress Note    Patient Name: Gloria Gurrola  MRN: [id number]  Admission Date: 11/5/2018  Hospital Length of Stay: 16 days  Code Status: Full Code   Attending Provider: Yohannes De La Rosa MD  Primary Care Physician: LISA Elam MD   Principal Problem:Temporal lobe epilepsy, intractable    HPI:   Ms. Gurrola is a 47 yo female with history of partial intractable epilepsy who is admitted to NICU after L crani with subdural grid placement; now s/p removal of epileptic foci  She is currently on Lamotrigine 200 mg qAM and 300 mg qPM. Recent EMU admission suggestive of left temporal lobe epilepsy. PET scan completed showing hypometabolism of mesial temporal lobe, DAWOOD scan also showed localization to left mesial temporal lobe. Patient reports she has had multiple seizures per day for the past 2 weeks, and believes most recent event was on day of admission while talking to nursing.     Epilepsy History  2018/05/29  Patient had another day of almost continuous seizures which was on May 09,2018.  She has failed several AEDs mainly due to side effects.  She experienced pharmacodynamic interaction and toxicity with Lamictal - Lacosamide combo.  She is tolerating the lamictal well      2018/04/09  The patient had 9 seizures recorded in last EMU visit all coming from L anterior temporal area.   MRI was normal but PET showed L mesial hypometabolism.  She is currently have almost daily seizures.  She has failed four AEDs is on Lamictal monotherapy.  She reports her verbal memory is terrible.  Review of labs show she was B12 deficient 4 yrs ago and she does not take any supplements.     Results for GLORIA GURROLA (MRN [id number]) as of 4/9/2018 16:23    Ref. Range 3/1/2016 14:45 12/19/2017 12:56 2/8/2018 18:12   Lamotrigine Lvl Latest Ref Range: 2.0 - 15.0 ug/mL 12.0 10.5 7.8      2018/01/29  EMU evaluation was completed in Dec and L temporal interictal spikes were recorded and  "one electrographic seizure was recorded arising from the L anterior temporal area.  Besides frequent seizures her major complaint is progressive memory loss.       HISTORY OF PRESENT ILLNESS (HPI)  Date: The   New Patient  Pt has been seeing Dr Huerta at [facility] for "complex partial sezures." First sz, in school, age 19. Was put on Dilantin and she did well for approx 15 years, until the seizures became active again. Thinks she may have had 2 classic "[place] Mal" seizures in her 20's, but since then, seizure types are those described below.     Currently, she is experiencing more than one event per week. Event type is described below. She has been failing her current treatment regimen as described below. May have tried other meds, but can't remember them now. Did not bring records yet.           Seizure Seminology  Seizure Type 1   Classification: Pass-out  Aura - Occasional auditory мария vu  Pt loses consciousness and falls or loses tone 1-2 in  Post-ictal  Brief  Age of onset 19  Current Seizure Frequency - Several per week        Seizure Type 2  Classification: Complex Partial  Wanders around. Doesn't remember after.   Post-ictal  Brief  Current Seizure Frequency - Several per week     Seizure Triggers  Sleep Deprivation - None  Other medications - None  Psych/stress - None  Photic stimulation - None  Hyperventilation - None  Medical Problems - None  Menses - 3 days before period they get worse  Sensory Stimulation (light, sound, etc) - None  Missed dose of meds - None     AED Treatments  Present regimen   tabs 1 in AM and 1½ in PM   perampanel (Fycompa, FCP)     Prior treatments  eslicarbazine (Aptiom, ESL) - seizure intensity worsened after 2 weeks Rx  lacosamide (Vimpat, LCS)   oxcarbazepine (Trileptal OXC)   BID  TPM 10ID  valproic acid (Depakote, VPA)   zonisamide (Zonegran, ZNA)     Not tried  acetazolamide (Diamox, AZM)  amantadine  carbamazepine (Tegretol, CBZ)  clobazam (Onfi or Frizium, " CLB)  ethosuximide (Zarontin, ESM)  felbamate (felbatol, FBM)  gabapentin (Neurontin, GPN)  levetiracetam (Keppra, LEV)  methsuximide (Celontin, MSM)  methyphenytoin (Mesantion, MHT)  perampanel (Fycompa, FCP)    phenobarbital (Pb)  pregabalin (Lyrica, PGB)  primidone (Mysoline, PRM)  retigabine (Potiga, RTG)  rufinamide (Banzel, RUF)  tiagabine (Gabatril, TGB)  viagabatrin, (Sabril, VGB)  vagal nerve stimulator (VNS)     Benzodiazepines  diazepam - rectal (Diastatl)  diazepam - oral (Valium, DZ)  clonazepam (Klonopin, CZP)  clorazepate (Tranxene, CLZ)  Ativan  Brain Stimulation  Vagal Nerve Stimulation-n/a  DBS- n/a     Compliance method  Memory - yes  Mom or Spouse - Yes  Pill Box - no  Dewayne calendar - no  Turn over medication bottle - no  Phone alarm - no     Seizure Evaluation  EEG Routine - Dont have  MRI/MRA - In past- she doesn't know results  [facility]  2017/12/19-12/20- Patient with no events overnight. EEG shows L anterior temporal spikes, most recorded at F7. None on the other side. At times, almost continuous L temporal interictal discharges at times.   2017/12/20- 11:56:23- clinical seizure, starting from L side on EEG. No epileptiform discharges noted. L temporal slowing and spikes noted. Consisted of brief moment of unresponsiveness.   2017/12/21- EEG showing L interical anterior temporal slowing with L temporal spikes. No clinical seizures since yesterday.   2017/12/21-12/22- Event on 12/21 at 18:55 showing patient talking on the phone and suddenly stopping, unable to speak and confused. EEG shows there is a rhythmic buildup in the L temporal chains. Patient is confused and is drowsy following event. No tonic/clonic activity present.      CT/CTA Scan -   PET Scan -   Neuropsychological evaluation -   DEXA Scan     Potential Epilepsy Risk Factors:   Pregnancy/Labor/Delivery - full term uncomplicated pregnancy labor and vaginal delivery  Febrile seizures - none  Head injury - none  CNS infection - none    Stroke - none  Family Hx of Sz - none    Hospital Course:     Patient admitted to [facility] after subdural grid placement/L crani on 11/5. Home Lamotrigine decreased to 100 mg BID.  11/6: No seizures since admission. Cortical mapping session completed.  11/7: Cortical mapping during am, multiple clinical seizures during session. Clinical and electrographic seizure 11/7 afternoon, during which patient had automatisms of hands, confusion, pulled at EEG leads disrupting connection.  11/8: No additional seizures overnight. Back to OR today for replacement of intracranial grid/leads.  11/9: No seizures after revision of intracranial grids in OR yesterday.   11/10: No electrographic seizures, cortical mapping  11/11: No electrographic seizures  11/12: No electrographic seizures  11/13: Plan for benadryl x1 today, sleep deprive tonight and benadryl again tomorrow am  11/14: Episode of brief staring with retained consciousness, no EEG correlate  11/15: Cortical mapping with assistance of neuropsychology for language function  11/16: No seizures  11/17: No seizures  11/18: No seizures  11/19: No seizures. To OR today for grid removal and focectomy.   11/20: Off EEG, no reported seizures. POD#1 s/p grid/strip removal and focectomy. Add Ativan 1 mg BID as bridge while Lamictal level builds back up.  11/20-21: Off EEG; doing well; drain in-situ; on Lorazepam PRN      Interval History: No complaints; doing well    Current Facility-Administered Medications   Medication Dose Route Frequency Provider Last Rate Last Dose    acetaminophen suppository 650 mg  650 mg Rectal Q6H PRN Marc Garrett MD        acetaminophen tablet 650 mg  650 mg Oral Q6H PRN Marc Garrett MD   650 mg at 11/14/18 0130    heparin (porcine) injection 5,000 Units  5,000 Units Subcutaneous Q8H Denis Covarrubias MD   5,000 Units at 11/21/18 1357    HYDROcodone-acetaminophen 5-325 mg per tablet 1 tablet  1 tablet Oral Q4H PRN Marc Garrett MD   1 tablet  at 11/21/18 0138    HYDROmorphone injection 0.5 mg  0.5 mg Intravenous Q6H PRN Thomas Plummer MD   0.5 mg at 11/21/18 0232    lamoTRIgine tablet 200 mg  200 mg Oral Daily Isael P. Andras, NP   200 mg at 11/21/18 0833    lamoTRIgine tablet 300 mg  300 mg Oral QHS Isael P. Andras, NP   300 mg at 11/20/18 2028    LORazepam tablet 1 mg  1 mg Oral BID Yohannes De La Rosa MD   1 mg at 11/21/18 0834    magnesium oxide tablet 800 mg  800 mg Oral PRN Satya Arora MD   800 mg at 11/18/18 1345    magnesium oxide tablet 800 mg  800 mg Oral PRN Satya Arora MD        metoclopramide HCl injection 5 mg  5 mg Intravenous Q6H PRN Duane Joiner MD        midazolam (VERSED) 1 mg/mL injection 2 mg  2 mg Intravenous Q5 Min PRN Frankie Dumont MD        ondansetron disintegrating tablet 8 mg  8 mg Oral Q6H PRN Duane Joiner MD   8 mg at 11/07/18 0703    polyethylene glycol packet 17 g  17 g Oral Daily Thomas Plummer MD   17 g at 11/21/18 0834    senna-docusate 8.6-50 mg per tablet 1 tablet  1 tablet Oral BID Thomas Plummer MD   1 tablet at 11/21/18 0834    sodium chloride 0.9% flush 3 mL  3 mL Intravenous PRN Ifeanyi Sanchez MD         Continuous Infusions:    Review of Systems  Objective:     Vital Signs (Most Recent):  Temp: 98.5 °F (36.9 °C) (11/21/18 1101)  Pulse: 73 (11/21/18 1301)  Resp: 15 (11/21/18 1301)  BP: 123/76 (11/21/18 1301)  SpO2: 97 % (11/21/18 1301) Vital Signs (24h Range):  Temp:  [98.4 °F (36.9 °C)-99 °F (37.2 °C)] 98.5 °F (36.9 °C)  Pulse:  [64-88] 73  Resp:  [11-20] 15  SpO2:  [93 %-100 %] 97 %  BP: (100-132)/(55-84) 123/76     Weight: 106.6 kg (235 lb 0.2 oz)  Body mass index is 39.11 kg/m².    Physical Exam   Constitutional: She is oriented to person, place, and time. She appears well-developed and well-nourished.   HENT:   Head: Normocephalic.   Eyes: EOM are normal. Pupils are equal, round, and reactive to light.   Neurological: She is oriented to  person, place, and time. She has normal strength.       NEUROLOGICAL EXAMINATION:     MENTAL STATUS   Oriented to person, place, and time.   Level of consciousness: alert    CRANIAL NERVES     CN III, IV, VI   Pupils are equal, round, and reactive to light.  Extraocular motions are normal.   Nystagmus: none     CN VII   Facial expression full, symmetric.     CN VIII   Hearing: intact    CN IX, X   CN IX normal.     CN XI   CN XI normal.     CN XII   Tongue: not atrophic  Fasciculations: absent  Tongue deviation: none       Edema over left side of forehead, left eyelids and extending to upper portions of left cheek     MOTOR EXAM     Strength   Strength 5/5 throughout.     REFLEXES        Not assessed     SENSORY EXAM        Not assessed     GAIT AND COORDINATION        Not assessed       Significant Labs: All pertinent lab results from the past 24 hours have been reviewed.    Significant Studies: I have reviewed all pertinent imaging results/findings within the past 24 hours.    Assessment and Plan:     * Temporal lobe epilepsy, intractable    47 yo female with history of seizures since age 19 years    - POD #2 s/p grid/strip removal and focectomy - L anterior temporal  - Continue home dose Lamictal - 200 mg qAM 300 mg qPM  - Continue Ativan 1 mg BID as bridge while Lamictal level builds back up in system  - For any seizures: please call epileptologist on call, Versed or Ativan 2 mg IV PRN    Plan of care discussed with NCC team and patient.  Will continue to follow.      Ataxia with oculomotor apraxia    - None reported in several days, patient to keep track of any additional events      Essential hypertension    - Goal SBP <140  - Management per NCC          VTE Risk Mitigation (From admission, onward)        Ordered     heparin (porcine) injection 5,000 Units  Every 8 hours      11/19/18 1844     IP VTE HIGH RISK PATIENT  Once      11/19/18 1844     heparin (porcine) injection 5,000 Units  Every 8 hours       11/05/18 1238     Place sequential compression device  Until discontinued      11/05/18 1238        Thank you for involving us in the care of this patient.    Catarina Watts MD, CAT(), VITALIY, FARODRIGUEZ.  Neurology-Epilepsy.  [facility].

## 2018-11-21 NOTE — NURSING TRANSFER
Nursing Transfer Note      11/21/2018   1321H- Report was given to Fely RN    Transfer from Rm 9080 to Rm 702    Transfer via wheelchair    Transfer with cardiac monitoring    Transported by 1RN,1PCT together with patient's daughter    Medicines sent: 7th floor nurses station    Chart send with patient: Yes    Notified: daughter    Patient reassessed at: 11/21/2018 1420    Upon arrival to floor: cardiac monitor applied, patient oriented to room, call bell in reach and bed in lowest position

## 2018-11-21 NOTE — SUBJECTIVE & OBJECTIVE
Interval History: No complaints; doing well    Current Facility-Administered Medications   Medication Dose Route Frequency Provider Last Rate Last Dose    acetaminophen suppository 650 mg  650 mg Rectal Q6H PRN Marc Garrett MD        acetaminophen tablet 650 mg  650 mg Oral Q6H PRN Marc Garrett MD   650 mg at 11/14/18 0130    heparin (porcine) injection 5,000 Units  5,000 Units Subcutaneous Q8H Denis Covarrubias MD   5,000 Units at 11/21/18 1357    HYDROcodone-acetaminophen 5-325 mg per tablet 1 tablet  1 tablet Oral Q4H PRN Marc Garrett MD   1 tablet at 11/21/18 0138    HYDROmorphone injection 0.5 mg  0.5 mg Intravenous Q6H PRN Thomas Plummer MD   0.5 mg at 11/21/18 0232    lamoTRIgine tablet 200 mg  200 mg Oral Daily Isael South, NP   200 mg at 11/21/18 0833    lamoTRIgine tablet 300 mg  300 mg Oral QHS Isael South NP   300 mg at 11/20/18 2028    LORazepam tablet 1 mg  1 mg Oral BID Yohannes De La Rosa MD   1 mg at 11/21/18 0834    magnesium oxide tablet 800 mg  800 mg Oral PRN Satya Arora MD   800 mg at 11/18/18 1345    magnesium oxide tablet 800 mg  800 mg Oral PRN Satya Arora MD        metoclopramide HCl injection 5 mg  5 mg Intravenous Q6H PRN Duane Joiner MD        midazolam (VERSED) 1 mg/mL injection 2 mg  2 mg Intravenous Q5 Min PRN Frankie Dumont MD        ondansetron disintegrating tablet 8 mg  8 mg Oral Q6H PRN Duane Joiner MD   8 mg at 11/07/18 0703    polyethylene glycol packet 17 g  17 g Oral Daily Thomas Plummer MD   17 g at 11/21/18 0834    senna-docusate 8.6-50 mg per tablet 1 tablet  1 tablet Oral BID Thomas Plummer MD   1 tablet at 11/21/18 0834    sodium chloride 0.9% flush 3 mL  3 mL Intravenous PRN Ifeanyi Sanchez MD         Continuous Infusions:    Review of Systems  Objective:     Vital Signs (Most Recent):  Temp: 98.5 °F (36.9 °C) (11/21/18 1101)  Pulse: 73 (11/21/18 1301)  Resp: 15 (11/21/18  1301)  BP: 123/76 (11/21/18 1301)  SpO2: 97 % (11/21/18 1301) Vital Signs (24h Range):  Temp:  [98.4 °F (36.9 °C)-99 °F (37.2 °C)] 98.5 °F (36.9 °C)  Pulse:  [64-88] 73  Resp:  [11-20] 15  SpO2:  [93 %-100 %] 97 %  BP: (100-132)/(55-84) 123/76     Weight: 106.6 kg (235 lb 0.2 oz)  Body mass index is 39.11 kg/m².    Physical Exam   Constitutional: She is oriented to person, place, and time. She appears well-developed and well-nourished.   HENT:   Head: Normocephalic.   Eyes: EOM are normal. Pupils are equal, round, and reactive to light.   Neurological: She is oriented to person, place, and time. She has normal strength.       NEUROLOGICAL EXAMINATION:     MENTAL STATUS   Oriented to person, place, and time.   Level of consciousness: alert    CRANIAL NERVES     CN III, IV, VI   Pupils are equal, round, and reactive to light.  Extraocular motions are normal.   Nystagmus: none     CN VII   Facial expression full, symmetric.     CN VIII   Hearing: intact    CN IX, X   CN IX normal.     CN XI   CN XI normal.     CN XII   Tongue: not atrophic  Fasciculations: absent  Tongue deviation: none       Edema over left side of forehead, left eyelids and extending to upper portions of left cheek     MOTOR EXAM     Strength   Strength 5/5 throughout.     REFLEXES        Not assessed     SENSORY EXAM        Not assessed     GAIT AND COORDINATION        Not assessed       Significant Labs: All pertinent lab results from the past 24 hours have been reviewed.    Significant Studies: I have reviewed all pertinent imaging results/findings within the past 24 hours.

## 2018-11-21 NOTE — PLAN OF CARE
Problem: Patient Care Overview  Goal: Plan of Care Review  Outcome: Ongoing (interventions implemented as appropriate)  Patient remains free from injury or fall. No neuro changes noted from initial assessment. Will continue to monitor.

## 2018-11-21 NOTE — ASSESSMENT & PLAN NOTE
49 yo female with history of seizures since age 19 years    - POD #2 s/p grid/strip removal and focectomy - L anterior temporal  - Continue home dose Lamictal - 200 mg qAM 300 mg qPM  - Continue Ativan 1 mg BID as bridge while Lamictal level builds back up in system  - For any seizures: please call epileptologist on call, Versed or Ativan 2 mg IV PRN    Plan of care discussed with NCC team and patient.  Will continue to follow.

## 2018-11-21 NOTE — PROGRESS NOTES
[facility]  Neurocritical Care  Progress Note    Admit Date: 11/5/2018  Service Date: 11/21/2018  Length of Stay: 16    Subjective:     Chief Complaint: Temporal lobe epilepsy, intractable    History of Present Illness: Liza Arrieta is a 47 yo female with PMHx of partial intractable epilepsy who is being admitted to [facility] after L crani with subdural grid placement. Per chart review, she had her first seizure at age 19. At that time, she was treated with dilantin and she did well for approximately 15 years. Reports that she typically has absence seizures (5-6/day), but states she may have had two grand mal seizures decades ago.  The patient was recently admitted to EMU on 2/9/2018 where seizures were captured on EEG.  She has failed multiple medications and is now being admitted for post op management, continuous EEG.             Hospital Course: 11/5: Pt admitted to [facility] s/p L crani with subdural grid placement, continuous EEG  11/6: cEEG  11/7: Pulled one of her leads today. Sent for stat CTH showed electrodes have been repositioned. posterior infratemporal strips are no longer in place  11/9: s/p repeat crani for grid placement.  One abscence seizure lasting 5 seconds. Epilepsy plans to due cortical mapping this afternoon.  11/12: Continuing to hold AEDs. PRNs per epilepsy.  11/13: Plan for cortical mapping on 11/14 11/14  One seizure overnight and 2 seizures noted today. remains on EEG. Epilepsy to do cortical mapping this aftrnoon  11/15 24h EEG continues no seizures over night and so far today. Cortical mapping today  11/16: PT/OT, restart home AED meds  11/17: CTH, cEEG  11/18: Plan for OR tomorrow  11/19: Or today(Grid and strip), CTH  11/20: Mri   11/21 No significant events over night. Hemodynamically stable. Will step down  To NSGY with epilepsy following    Interval History:No significant events over night. Hemodynamically stable. Will step down  To NSGY with epilepsy  following      Review of Systems: + fatigue  Constitutional: Denies fevers or chills.  Pulmonary: Denies shortness of breath or cough.  Cardiology: Denies chest pain or palpitations.  GI: Denies abdominal pain or constipation.  Neurologic: Denies new weakness,  headache, or paresthesias.  Vitals:   Temp: 98.6 °F (37 °C)  Pulse: 86  Rhythm: normal sinus rhythm  BP: 108/68  MAP (mmHg): 93  Resp: 18  SpO2: 98 %  O2 Device (Oxygen Therapy): room air    Temp  Min: 98.4 °F (36.9 °C)  Max: 99 °F (37.2 °C)  Pulse  Min: 64  Max: 88  BP  Min: 100/56  Max: 132/83  MAP (mmHg)  Min: 73  Max: 101  Resp  Min: 11  Max: 20  SpO2  Min: 93 %  Max: 100 %    11/20 0701 - 11/21 0700  In: 1225 [P.O.:1060; I.V.:115]  Out: 1310 [Urine:1270; Drains:40]   Unmeasured Output  Urine Occurrence: 0  Stool Occurrence: 0     Examination:   Constitutional: Well-nourished and -developed. No apparent distress.   Eyes: Conjunctiva clear, anicteric. Lids no lesions.  Head/Ears/Nose/Mouth/Throat/Neck: Moist mucous membranes. External ears, nose atraumatic.   Cardiovascular: Regular rhythm. No murmurs. No leg edema.  Respiratory: Comfortable respirations. Clear to auscultation.  Gastrointestinal: No hernia. Soft, nondistended, nontender. + bowel sounds.    Neurologic:  -GCS [id number]  -Alert. Oriented to person, place, and time. Speech fluent. Follows commands.  -Cranial nerves II-XII intact,PERRL 3+, + cough, gag.  facial symmetry, +_ shoulder shrug  -Motor 5/5 all extremities. PUGA spontaneously   -Sensation bilat intact to all extremiteis    Medications:   Continuous Scheduled  heparin (porcine) 5,000 Units Q8H   lamoTRIgine 200 mg Daily   lamoTRIgine 300 mg QHS   LORazepam 1 mg BID   polyethylene glycol 17 g Daily   senna-docusate 8.6-50 mg 1 tablet BID   PRN  acetaminophen 650 mg Q6H PRN   acetaminophen 650 mg Q6H PRN   HYDROcodone-acetaminophen 1 tablet Q4H PRN   HYDROmorphone 0.5 mg Q6H PRN   magnesium oxide 800 mg PRN   magnesium oxide 800 mg PRN    metoclopramide HCl 5 mg Q6H PRN   midazolam 2 mg Q5 Min PRN   ondansetron 8 mg Q6H PRN   sodium chloride 0.9% 3 mL PRN      Today I independently reviewed pertinent medications, lines/drains/airways, imaging, laboratory results, microbiology results, notably:     ISTAT: No results for input(s): PH, PCO2, PO2, POCSATURATED, HCO3, BE, POCNA, POCK, POCTCO2, POCGLU, POCICA, POCLAC, SAMPLE in the last 24 hours.   Chem: Recent Labs   Lab 11/21/18  0243      K 3.8      CO2 24   *   BUN 16   CREATININE 1.0   ESTGFRAFRICA >60.0   EGFRNONAA >60.0   CALCIUM 9.1   MG 1.9   PHOS 3.2   ANIONGAP 13   PROT 7.1   ALBUMIN 3.0*   BILITOT 0.2   ALKPHOS 113   AST 13   ALT 9*     Heme: Recent Labs   Lab 11/21/18  0243   WBC 9.96   HGB 10.9*   HCT 35.2*        Endo: No results for input(s): POCTGLUCOSE in the last 24 hours.   Assessment/Plan:     Neuro   * Temporal lobe epilepsy, intractable    - s/p craniotomy x2 for strip and grid placement 11/5 and 11/9, removed 11/19  - s/p L temporal lobectomy, 11/19  - currently off EEG monitoring  -  lamotrigine 200mg and onfi restarted per Epilepsy  - Continue midazolam 2mg IV q5min for motor seizures lasting greater than 5 minutes  - Continue ceftriaxone 2g IV q8h  - CTH 11/17 stable  - 11/19:CTH revealed Interval postoperative change of left-sided craniotomy for anterior-inferior temp lobe resection and removal of the subdural electro grid. Air/fluid and trace blood products fill the resection cavity without significant mass effect.  No midline shift.  - 11/20: MRI showed postoperative appearance of left frontotemporal craniotomy for partial left anterior temporal lobe resection.  - Onfi dced, patient does not take at home     Cardiac/Vascular   Essential hypertension    - EKG NSR  - SBP goal <160       Endocrine   Class 2 obesity with body mass index (BMI) of 38.0 to 38.9 in adult    Body mass index is 39.11 kg/m².             The patient is being Prophylaxed  for:  Venous Thromboembolism with: Mechanical or Chemical  Stress Ulcer with: None  Ventilator Pneumonia with: none    Activity Orders          None        Full Code     I have spent 35 min with this patient, with over 50% of this time spent coordinating care and speaking with the family    Elsa George NP  Neurocritical Care  [facility]

## 2018-11-21 NOTE — PLAN OF CARE
Problem: Patient Care Overview  Goal: Plan of Care Review  Outcome: Ongoing (interventions implemented as appropriate)  POC reviewed with pt and family at 1400. Pt verbalized understanding. Questions and concerns addressed. No acute events today. Pt progressing toward goals. Pt. With transfer order. Will continue to monitor. See flowsheets for full assessment and VS info.

## 2018-11-21 NOTE — PLAN OF CARE
Problem: Physical Therapy Goal  Goal: Physical Therapy Goal  Goals to be met by: 2018    Patient will increase functional independence with mobility by performin. Sit to stand transfer with Modified [place]  2. Gait  x 200 feet with Supervision without AD.   3. Ascend/descend 3 stairs with right Handrails Supervision  4. Stand for 3 minutes with Supervision while performing dynamic balance activities to reduce fall risk   5. Lower extremity exercise program x15 reps per handout, with supervision    Outcome: Ongoing (interventions implemented as appropriate)  PT evaluation completed- see note for details. Goals established and POC initiated.     Luana Mario, PT, DPT   2018  Pager: [phone]

## 2018-11-21 NOTE — SUBJECTIVE & OBJECTIVE
Medications:  Continuous Infusions:  Scheduled Meds:   heparin (porcine)  5,000 Units Subcutaneous Q8H    lamoTRIgine  200 mg Oral Daily    lamoTRIgine  300 mg Oral QHS    LORazepam  1 mg Oral BID    polyethylene glycol  17 g Oral Daily    senna-docusate 8.6-50 mg  1 tablet Oral BID     PRN Meds:acetaminophen, acetaminophen, HYDROcodone-acetaminophen, HYDROmorphone, magnesium oxide, magnesium oxide, metoclopramide HCl, midazolam, ondansetron, sodium chloride 0.9%     Review of Systems    Objective:     Weight: 106.6 kg (235 lb 0.2 oz)  Body mass index is 39.11 kg/m².  Vital Signs (Most Recent):  Temp: 98.8 °F (37.1 °C) (11/22/18 1126)  Pulse: 81 (11/22/18 1126)  Resp: 18 (11/22/18 0800)  BP: (!) 113/57 (11/22/18 1126)  SpO2: (!) 94 % (11/22/18 1126) Vital Signs (24h Range):  Temp:  [97.1 °F (36.2 °C)-98.8 °F (37.1 °C)] 98.8 °F (37.1 °C)  Pulse:  [77-95] 81  Resp:  [18] 18  SpO2:  [92 %-98 %] 94 %  BP: (104-137)/(57-74) 113/57     Neurosurgery Physical Exam      AAOx3, PERRL, EOMI, FS, TM, 5/5 throughout, SILT.    Significant Labs:  Recent Labs   Lab 11/21/18 0243 11/22/18  0404   * 106    140   K 3.8 4.4    104   CO2 24 27   BUN 16 13   CREATININE 1.0 0.8   CALCIUM 9.1 9.3   MG 1.9 2.0     Recent Labs   Lab 11/21/18 0243 11/22/18  0404   WBC 9.96 8.14   HGB 10.9* 10.0*   HCT 35.2* 32.2*    325     No results for input(s): LABPT, INR, APTT in the last 48 hours.  Microbiology Results (last 7 days)     ** No results found for the last 168 hours. **        All pertinent labs from the last 24 hours have been reviewed.    Significant Diagnostics:  I have reviewed all pertinent imaging results/findings within the past 24 hours.    Medications:  Continuous Infusions:  Scheduled Meds:   heparin (porcine)  5,000 Units Subcutaneous Q8H    lamoTRIgine  200 mg Oral Daily    lamoTRIgine  300 mg Oral QHS    LORazepam  1 mg Oral BID    polyethylene glycol  17 g Oral Daily     senna-docusate 8.6-50 mg  1 tablet Oral BID     PRN Meds:acetaminophen, acetaminophen, HYDROcodone-acetaminophen, HYDROmorphone, magnesium oxide, magnesium oxide, metoclopramide HCl, midazolam, ondansetron, sodium chloride 0.9%     Review of Systems    Objective:     Weight: 106.6 kg (235 lb 0.2 oz)  Body mass index is 39.11 kg/m².  Vital Signs (Most Recent):  Temp: 99 °F (37.2 °C) (11/21/18 0701)  Pulse: 88 (11/21/18 1001)  Resp: 16 (11/21/18 1001)  BP: 116/64 (11/21/18 1001)  SpO2: 97 % (11/21/18 1001) Vital Signs (24h Range):  Temp:  [98.4 °F (36.9 °C)-99 °F (37.2 °C)] 99 °F (37.2 °C)  Pulse:  [60-88] 88  Resp:  [11-22] 16  SpO2:  [93 %-100 %] 97 %  BP: (100-135)/(55-84) 116/64  Arterial Line BP: (106)/(56) 106/56     Neurosurgery Physical Exam      AAOx3, PERRL, EOMI, FS, TM, 5/5 throughout, SILT.    Significant Labs:  Recent Labs   Lab 11/20/18  0246 11/21/18  0243   * 142*    142   K 4.6 3.8    105   CO2 24 24   BUN 12 16   CREATININE 0.8 1.0   CALCIUM 9.5 9.1   MG 2.2 1.9     Recent Labs   Lab 11/20/18  0246 11/21/18  0243   WBC 15.77* 9.96   HGB 11.3* 10.9*   HCT 35.9* 35.2*   * 342     No results for input(s): LABPT, INR, APTT in the last 48 hours.  Microbiology Results (last 7 days)     ** No results found for the last 168 hours. **        All pertinent labs from the last 24 hours have been reviewed.    Significant Diagnostics:  I have reviewed all pertinent imaging results/findings within the past 24 hours.

## 2018-11-21 NOTE — PROCEDURES
"Liza Arrieta is a 48 y.o. female patient.    Temp: 98.5 °F (36.9 °C) (11/21/18 1101)  Pulse: 81 (11/21/18 1201)  Resp: 15 (11/21/18 1201)  BP: 131/83 (11/21/18 1201)  SpO2: 98 % (11/21/18 1201)  Weight: 106.6 kg (235 lb 0.2 oz) (11/20/18 0305)  Height: 5' 5" (165.1 cm) (11/20/18 0305)       Functional Cortical Mapping  Date/Time: 11/21/2018 12:58 PM  Performed by: LISA Elam MD  Authorized by: LISA Elam MD       FUNCTIONAL CORTICAL MAPPING  Patient Name  Liza Bryant  MRN   [id number]  Date of Service  Nov 6, 7, 10, 15  Duration of Testing  4 hrs  Locations of Service [facility] [address]  Requesting Physician Dr SUMIT Senior    Methodology  Functional cortical mapping is an invasive procedure that is used to identify the location of eloquent cortex as well to identify cortical areas containing hyperexcitable neurons.  Elequent cortical areas are responsible for motor, somatosensory, visual, auditory and language function.  Electrodes are usually made of stainless steel or platinum-iridium discs which are 2-3 mm in diameter and are embedded in a flat sheet made of soft silacon material.  Grids vary in size and configuration (individual, row or in grid arrays).  The configuration of the grid or strip electrodes vary between patients in order to record from the cortical area of interest for the individual patient.      Electrical current is delivered across selected pairs of electrodes.  This results in a reversible inhibitory or excitatory effect on the brain under the electrodes.  This tests for the specific function for which that side in the brain is for.      The Current Density (the amount of current applied to a defined area of the brain) must be sufficient to stimulate neurons effectively for several seconds, yet low enough to prevent damage to brain tissue.  Currents dare present in short bursts (usually 2-8 sec).  Current intensity used ranges from 2 to 10 mA.  Responses documented include clinical (motor or " sensory effect) and electrographic changes (after discharges which are electrographic seizure discharges that continue after the stimulation is stopped) are documented.  Map of motor, sensory and language changes and the area of hyper-excitability are mapped in relationship to the implanged grid and strip electrodes.     MAP of Grid and Strip   Grid and strip electrode placement  Nov 6, 2018   Nov 10, 2018      Summary of FCM:  Motor sensory responses were elicited from the grid in the top 5 rows of electrodes.  Language deficits were found with stimulation of grid/strips over temporal lobe only associated with after discharges (AD) spreading to lateral temporal cortex.  No deficits were appreciated with stimulation over temporal cortex even when AD occurred but remained focal to electrodes stimulated.        Table summarizing result of cortical mapping.   Stimulus Parameters  Probe - biphasic  Pulse freq - 50 hz  Pulse duration 500 mSec  Train duration 2-8 sec  [place] current - 2-8 mA  G1 G2 mAmp Clinical Response Length of stim EEG Task   6-Nov         64 56 2 neg 4       4 neg 4       6 R perioccular twitching 4       5 same 4     48 40 2 neg 4       4 R hemiface clonic 4       3 same 4     32 34 1 neg 4       2 neg 4       3 neg 4       4 neg 4       6 swollowing sensation R side in moutn 4     8 16 2 neg immed - delayed AD 4       4 neg 4       6 neg 4      neg 8 neg 4     63 55 2 neg 4       4 neg 4       6 neg 4       8 pmvyper lip & tongue  4     47 39 2 neg 4       4 Tonic mouth/tongue to R   felt like she needed to say something 4       5 same with tonic hand/finger mvt 4     31 23 2 neg 4       4 Tongue mvt to R 4       5 Clonic tongue & R face  4     15 7 2 neg 4       4 neg 4       6 neg 4       8 neg 4         4     7-Nov    4     54 62 2 neg 4       4 neg 4       6 neg 4       8 neg 4       10 neg 4     38 46 2 neg 4       4 neg 4       6 Felling her brain wants to say something but can not 4       6  "Counting & stopped at 4 mA 4       6 Tonic R arm/hand and speech arrest 6 Counting 10 > 1      6 Stopped counting - clonic R side of mouth/face 6 AD lasting 20 sec - counted 10>1 Counting 10 > 1     6 Speech arrest 4  Recalled words - ball & shoe     6 Speech arrest + facial& arm clonic 6 AD lasting 30+ sec did not spontaneously recall but did with cueing   22 30 2 neg 4 neg Count - speech arest when stim started     4 neg immed - delayed AD 4 neg Count - speech arest when stim started   6 14 2 neg 4 neg      4 neg 4 neg      6 neg 4 neg      8 neg 4 neg    53 61 0 reported spontaneous "staring"  none Neg Followed 1 step command     0 reported spontaneous "staring"  none Neg Followed 1 step command - instructed to look at MEREDITH to her L - moved head but eye remained fixed at original point of fixation     2 neg 4 neg      4 neg 4 neg      6 neg 4 neg      8 neg 4 AD brief    37 45 2 neg 4 neg      4 neg 4 neg      6 delay in response 4 neg delay in responding - letters were out of sequence     8 neg 4 AD 11 sec    21 29 2 neg 4 neg      4 neg 4 neg      6 neg 4 neg      8 felt something R side of moutn 4 neg      8 neg clin 6 neg Count - speech arest when stim started   5 13 2 neg 4 AD contact 13      4 neg 4 AD contact 5 & 13 last > 1 min count correctly during AD     6 neg 4 neg - No AD      8 neg 4 Contact 5 & 13 flattened                      ##### Mapping after Grid and Strips replaced          54 62 2 neg        4 neg        6 Twitching tongue and R perioccul and mouth      38 46 2 neg        4 Twitching R corner of mouth      23 30 2 neg        4 neg        6 Tingling in roof of mouth and tongue      8 16 2 neg        4 neg        6 neg        8 neg      37 38 2 neg        4 tingling L corner of mouth        6 Tongue & mouth to R      35 36 2 neg        4 pressure R side face        6 same        8 mouth/lip mvt to R      33 34 2 neg        4 neg        6 neg        8 neg      31 32 2 neg        4 neg        6 " "neg        8 neg      29 30 2 neg        4 Tongue rippling      27 28 2 neg      neg  4 tongue sensation        6 lower & upper mouth sensation      25 26 2 neg        4 neg        6 neg        8 neg      17 24 2 neg        4 neg        6 neg        8 neg             #####         17 24 4 all correct 5       6 all correct 5       8 all correct 5     19 26 6 all correct 5       8 all correct 5     21 28 6 unable to name, motor activity around mouth, memory intact 5     14 21 6 all correct 5       8 all correct 5     16 23 6 1 hesitation with naming 5       8 initial hesitation with naming 5     1 9 6 1 naming, 0/2 reading, difficulty comprehension, difficulty with fluency 5 after discharge      4 all correct, small hesitation at begininng 5       6 hesitation, error with comprehension  after discharge    8 16 6 all correct 5 neg      8 all correct 5     6 14 6 all correct 5 after discharge      8 all correct 5 after discharge    4 12 6 all correct 5       8 all correct  5 after discharge in area of stim    3 11 6 all correct 5 after discharge at site of stimulus      8 all correct  5     1 9 6 all correct 5 after discharge at site of stimulus        8 hesitation with naming, fluency difficulty, naming fruits and veggies  after discharge at stimulus    66 70 6 hestitation with naming but more response to scenario 5       8 all correct 5     80 81 4 difficulty naming and comprehension, 1 correct reading  5 after discharge - long             1 9 2 all correct 5 no after discharge      4 all correct  5 after discharge at site of stim    16 23 2 all correct  neg      4 all correct 5 neg      6 all correct but "feeling theres something there" 5 neg    15 16 4 all correct  5 neg      6 all correct 5 mini after discharge    22 23 2 all correct 5 neg      4 all correct 5 neg    2 10 4 all correct 5 after discharge at site of stim    77 78 2 all correct 5       4 all correct 5       6 all correct 5     74 75 2 all correct but " hestitation with one naming  after discharge at site of stim      4 hesitation with naming, reading and comprehension intact  after discharge      3 speaking softer, paused with naming 5     84 85 2 all correct 5       4 all correct, tired 5 sustained after discharge - deficits during after discharge          LISA Elam  11/21/2018

## 2018-11-21 NOTE — PLAN OF CARE
Problem: Patient Care Overview  Goal: Plan of Care Review  Outcome: Ongoing (interventions implemented as appropriate)  POC reviewed with pt at 0230. Pt verbalized understanding. Questions and concerns addressed. No acute events overnight. Pt progressing toward goals. Will continue to monitor. See flowsheets for full assessment and VS info.

## 2018-11-22 LAB
ALBUMIN SERPL BCP-MCNC: 2.9 G/DL
ALP SERPL-CCNC: 98 U/L
ALT SERPL W/O P-5'-P-CCNC: 8 U/L
ANION GAP SERPL CALC-SCNC: 9 MMOL/L
AST SERPL-CCNC: 11 U/L
BASOPHILS # BLD AUTO: 0.03 K/UL
BASOPHILS NFR BLD: 0.4 %
BILIRUB SERPL-MCNC: 0.3 MG/DL
BUN SERPL-MCNC: 13 MG/DL
CALCIUM SERPL-MCNC: 9.3 MG/DL
CHLORIDE SERPL-SCNC: 104 MMOL/L
CO2 SERPL-SCNC: 27 MMOL/L
CREAT SERPL-MCNC: 0.8 MG/DL
DIFFERENTIAL METHOD: ABNORMAL
EOSINOPHIL # BLD AUTO: 0.2 K/UL
EOSINOPHIL NFR BLD: 2.2 %
ERYTHROCYTE [DISTWIDTH] IN BLOOD BY AUTOMATED COUNT: 15.4 %
EST. GFR  (AFRICAN AMERICAN): >60 ML/MIN/1.73 M^2
EST. GFR  (NON AFRICAN AMERICAN): >60 ML/MIN/1.73 M^2
GLUCOSE SERPL-MCNC: 106 MG/DL
HCT VFR BLD AUTO: 32.2 %
HGB BLD-MCNC: 10 G/DL
IMM GRANULOCYTES # BLD AUTO: 0.02 K/UL
IMM GRANULOCYTES NFR BLD AUTO: 0.2 %
LYMPHOCYTES # BLD AUTO: 3.4 K/UL
LYMPHOCYTES NFR BLD: 42.1 %
MAGNESIUM SERPL-MCNC: 2 MG/DL
MCH RBC QN AUTO: 24.5 PG
MCHC RBC AUTO-ENTMCNC: 31.1 G/DL
MCV RBC AUTO: 79 FL
MONOCYTES # BLD AUTO: 0.6 K/UL
MONOCYTES NFR BLD: 7 %
NEUTROPHILS # BLD AUTO: 3.9 K/UL
NEUTROPHILS NFR BLD: 48.1 %
NRBC BLD-RTO: 0 /100 WBC
PHOSPHATE SERPL-MCNC: 4.5 MG/DL
PLATELET # BLD AUTO: 325 K/UL
PMV BLD AUTO: 9.3 FL
POTASSIUM SERPL-SCNC: 4.4 MMOL/L
PROT SERPL-MCNC: 6.7 G/DL
RBC # BLD AUTO: 4.08 M/UL
SODIUM SERPL-SCNC: 140 MMOL/L
WBC # BLD AUTO: 8.14 K/UL

## 2018-11-22 PROCEDURE — 83735 ASSAY OF MAGNESIUM: CPT

## 2018-11-22 PROCEDURE — 20600001 HC STEP DOWN PRIVATE ROOM

## 2018-11-22 PROCEDURE — 99233 SBSQ HOSP IP/OBS HIGH 50: CPT | Mod: ,,, | Performed by: PSYCHIATRY & NEUROLOGY

## 2018-11-22 PROCEDURE — 85025 COMPLETE CBC W/AUTO DIFF WBC: CPT

## 2018-11-22 PROCEDURE — 36415 COLL VENOUS BLD VENIPUNCTURE: CPT

## 2018-11-22 PROCEDURE — 25000003 PHARM REV CODE 250: Performed by: PSYCHIATRY & NEUROLOGY

## 2018-11-22 PROCEDURE — 25000003 PHARM REV CODE 250: Performed by: NURSE PRACTITIONER

## 2018-11-22 PROCEDURE — 25000003 PHARM REV CODE 250: Performed by: STUDENT IN AN ORGANIZED HEALTH CARE EDUCATION/TRAINING PROGRAM

## 2018-11-22 PROCEDURE — 84100 ASSAY OF PHOSPHORUS: CPT

## 2018-11-22 PROCEDURE — 80053 COMPREHEN METABOLIC PANEL: CPT

## 2018-11-22 PROCEDURE — 63600175 PHARM REV CODE 636 W HCPCS: Performed by: STUDENT IN AN ORGANIZED HEALTH CARE EDUCATION/TRAINING PROGRAM

## 2018-11-22 RX ADMIN — LAMOTRIGINE 200 MG: 100 TABLET ORAL at 09:11

## 2018-11-22 RX ADMIN — SENNOSIDES AND DOCUSATE SODIUM 1 TABLET: 8.6; 5 TABLET ORAL at 09:11

## 2018-11-22 RX ADMIN — HYDROCODONE BITARTRATE AND ACETAMINOPHEN 1 TABLET: 5; 325 TABLET ORAL at 03:11

## 2018-11-22 RX ADMIN — LORAZEPAM 1 MG: 1 TABLET ORAL at 09:11

## 2018-11-22 RX ADMIN — POLYETHYLENE GLYCOL 3350 17 G: 17 POWDER, FOR SOLUTION ORAL at 09:11

## 2018-11-22 RX ADMIN — HEPARIN SODIUM 5000 UNITS: 5000 INJECTION, SOLUTION INTRAVENOUS; SUBCUTANEOUS at 09:11

## 2018-11-22 RX ADMIN — HEPARIN SODIUM 5000 UNITS: 5000 INJECTION, SOLUTION INTRAVENOUS; SUBCUTANEOUS at 05:11

## 2018-11-22 RX ADMIN — LAMOTRIGINE 300 MG: 150 TABLET ORAL at 09:11

## 2018-11-22 RX ADMIN — HEPARIN SODIUM 5000 UNITS: 5000 INJECTION, SOLUTION INTRAVENOUS; SUBCUTANEOUS at 02:11

## 2018-11-22 NOTE — ASSESSMENT & PLAN NOTE
47 yo female with history of seizures since age 19 years    - POD #3 s/p grid/strip removal and focectomy - L anterior temporal  - Continue home dose Lamictal - 200 mg qAM 300 mg qPM  - Continue Ativan 1 mg BID as bridge while Lamictal level builds back up in system  - For any seizures: please call epileptologist on call, Versed or Ativan 2 mg IV PRN    Plan of care discussed with NCC team and patient.  Will continue to follow.

## 2018-11-22 NOTE — OP NOTE
DATE OF PROCEDURE:  11/19/2018    PREOPERATIVE DIAGNOSIS:  Intractable epilepsy.    POSTOPERATIVE DIAGNOSIS:  Intractable epilepsy.    OPERATIVE PROCEDURES UNDERGONE:  1.  Left redo frontotemporal craniotomy for removal of subdural grids and   strips.  2.  Left anterior temporal lobectomy and amygdalohippocampectomy.  3.  Use of neuronavigation.  4.  Use of microsurgical technique.    SURGEON:  Ruben Senior M.D.    :  Denis Covarrubias M.D. (RES)    ANESTHESIA:  General.    INDICATIONS FOR PROCEDURE:  This is a 48-year-old female with intractable   epilepsy, who we would place subdural grids and strips in place for epilepsy   coding.  It was confirmed that the epilepsy appeared to be coming from the left   temporal lobe.  We felt the patient would benefit from an anterior temple   lobectomy.  The Epilepsy Team concurred.    OPERATIVE NOTE:  The patient was taken to the Operating Room and anesthetized   and intubated by Anesthesia.  Preop antibiotics were administered.  The patient   was placed in a Smith head pin, head turned to the right.  Navigation system   was registered using facial registration.  The head was then re-shaved, prepped   and draped in a sterile fashion.  The EEG leads external to the skin were cut.    The leads and the wires were then re-processed.  We reopened the previous   frontotemporal incision and reopened the temporalis incision, placed   self-retaining hooks.  Then, reopened the titanium plates and screws of the bone   flap because this was the third time back in.  We did remove all the old   titanium hardware.  We soaked the bone flap in a bath of antibiotic irrigation   and Betadine solution.  The dura was reopened.  We saw the subdural grids and   strips and we wanted to make sure they were still in the right position and that   the epilepsy recording matched up to what we saw anatomically.  We then removed   the subdural grids and strips and then we measured back  from the anterior   temporal tip at 4 cm.  It corresponded well with where we found the epilepsy   focus.  Then, we brought in the microscope.  Under microsurgical technique, we   began the anterior temporal lobectomy using a combination of microsurgical   technique and use of neuronavigation aiming toward the anterior temporal tip.    We then did a corticectomy at the 4 cm domenico at the middle temporal gyrus going   from middle temporal gyrus down to the floor and then middle temporal gyrus   anteriorly all the way up to the tip.  Using microsurgical technique, we were   able to do corticectomy and then followed that until we got to the anterior   frontal horn.  We then unroofed the frontal warm.  I then resected the anterior   temporal lobe all the way down to the floor and all the way anteriorly and was   able to take out the anterior lateral aspect of the anterior temporal lobe.    Once that was done, we then placed a couple of self-retaining retractors in   place and then used microsurgical technique to resect the amygdala.  We found   the hippocampus and the choroid plexus placing the retractor just below the   choroid plexus and protected everything above  We then used a combination of   microsurgical technique, both sharp and blunt dissection to take the hippocampus   almost en bloc all the way back until we got to the tail behind the peduncle.    The rest we did a subpial resection and was able to get the hippocampus out on   the majority as en bloc.  We sent both the anterior temporal lobe and the   hippocampus down to Pathology.  We were able to identify that we were subpial   and staying behind the arachnoid plane.  We were able to identify the third   nerve, the posterior communicating artery, the peduncle, the brainstem and all   that was well protected.  We obtained hemostasis with Surgicel, confirmed using   navigation, the resection we wanted to get, and we were happy with the resection   and the  anatomy.  We then filled up the cavity with lactate Ringer,   reapproximated the dura, which was pretty much impossible, given how obstructive   it was but then we supplemented that with Duragen.  We re-fixated the bone flap   using new titanium plates and screws and placed a new subgaleal drain in place,   irrigated out the wound very thoroughly again, placed vancomycin powder on top   of it, and closed the wound in layers.  A sterile dressing was put in place.    The patient was extubated and brought up to the ICU without any problems or   complications.  EBL was 200 mL.  Specimen sent was anterior temporal lobe and   hippocampus.      LYN/HAILEE  dd: 11/21/2018 18:52:11 (CST)  td: 11/21/2018 19:41:07 (CST)  Doc ID   #[id number]  Job ID #[id number]    CC:

## 2018-11-22 NOTE — PROGRESS NOTES
"[facility]  Neurosurgery  Progress Note    Subjective:     History of Present Illness: Pt is a 48 y.o. female who presents per referral by Dr. LISA Elam and the epilepsy team for evaluation for epilepsy surgery. Pt has history of partial intractable epilepsy and has failed at least 10 previous medications. Currently on dual therapy. EMU workup done in February localized pathology to left temporal lobe, with PET scan showing hypometabolism of mesial temporal lobe. DAWOOD scan also showed localization to left mesial temporal lobe. Pt states that she has endured seizures since 19 y.o. Pt currently on Lamictal. Pt states that she endures about 5 absence episodes per day with intermittent LOC. Pt notes one incident of "rolling" seizures for which she was brought to the ED.          Post-Op Info:  Procedure(s) (LRB):  CRANIOTOMY, FOR SUBDURAL GRID AND STRIP ELECTRODE LEAD Removal. (Left)  LOBECTOMY, BRAIN left temporal lobe. (Left)   3 Days Post-Op         Medications:  Continuous Infusions:  Scheduled Meds:   heparin (porcine)  5,000 Units Subcutaneous Q8H    lamoTRIgine  200 mg Oral Daily    lamoTRIgine  300 mg Oral QHS    LORazepam  1 mg Oral BID    polyethylene glycol  17 g Oral Daily    senna-docusate 8.6-50 mg  1 tablet Oral BID     PRN Meds:acetaminophen, acetaminophen, HYDROcodone-acetaminophen, HYDROmorphone, magnesium oxide, magnesium oxide, metoclopramide HCl, midazolam, ondansetron, sodium chloride 0.9%     Review of Systems    Objective:     Weight: 106.6 kg (235 lb 0.2 oz)  Body mass index is 39.11 kg/m².  Vital Signs (Most Recent):  Temp: 98.8 °F (37.1 °C) (11/22/18 1126)  Pulse: 81 (11/22/18 1126)  Resp: 18 (11/22/18 0800)  BP: (!) 113/57 (11/22/18 1126)  SpO2: (!) 94 % (11/22/18 1126) Vital Signs (24h Range):  Temp:  [97.1 °F (36.2 °C)-98.8 °F (37.1 °C)] 98.8 °F (37.1 °C)  Pulse:  [77-95] 81  Resp:  [18] 18  SpO2:  [92 %-98 %] 94 %  BP: (104-137)/(57-74) 113/57 "     Neurosurgery Physical Exam      AAOx3, PERRL, EOMI, FS, TM, 5/5 throughout, SILT.    Significant Labs:  Recent Labs   Lab 11/21/18  0243 11/22/18  0404   * 106    140   K 3.8 4.4    104   CO2 24 27   BUN 16 13   CREATININE 1.0 0.8   CALCIUM 9.1 9.3   MG 1.9 2.0     Recent Labs   Lab 11/21/18  0243 11/22/18  0404   WBC 9.96 8.14   HGB 10.9* 10.0*   HCT 35.2* 32.2*    325     No results for input(s): LABPT, INR, APTT in the last 48 hours.  Microbiology Results (last 7 days)     ** No results found for the last 168 hours. **        All pertinent labs from the last 24 hours have been reviewed.    Significant Diagnostics:  I have reviewed all pertinent imaging results/findings within the past 24 hours.    Medications:  Continuous Infusions:  Scheduled Meds:   heparin (porcine)  5,000 Units Subcutaneous Q8H    lamoTRIgine  200 mg Oral Daily    lamoTRIgine  300 mg Oral QHS    LORazepam  1 mg Oral BID    polyethylene glycol  17 g Oral Daily    senna-docusate 8.6-50 mg  1 tablet Oral BID     PRN Meds:acetaminophen, acetaminophen, HYDROcodone-acetaminophen, HYDROmorphone, magnesium oxide, magnesium oxide, metoclopramide HCl, midazolam, ondansetron, sodium chloride 0.9%     Review of Systems    Objective:     Weight: 106.6 kg (235 lb 0.2 oz)  Body mass index is 39.11 kg/m².  Vital Signs (Most Recent):  Temp: 99 °F (37.2 °C) (11/21/18 0701)  Pulse: 88 (11/21/18 1001)  Resp: 16 (11/21/18 1001)  BP: 116/64 (11/21/18 1001)  SpO2: 97 % (11/21/18 1001) Vital Signs (24h Range):  Temp:  [98.4 °F (36.9 °C)-99 °F (37.2 °C)] 99 °F (37.2 °C)  Pulse:  [60-88] 88  Resp:  [11-22] 16  SpO2:  [93 %-100 %] 97 %  BP: (100-135)/(55-84) 116/64  Arterial Line BP: (106)/(56) 106/56     Neurosurgery Physical Exam      AAOx3, PERRL, EOMI, FS, TM, 5/5 throughout, SILT.    Significant Labs:  Recent Labs   Lab 11/20/18  0246 11/21/18  0243   * 142*    142   K 4.6 3.8    105   CO2 24 24   BUN 12 16    CREATININE 0.8 1.0   CALCIUM 9.5 9.1   MG 2.2 1.9     Recent Labs   Lab 11/20/18  0246 11/21/18  0243   WBC 15.77* 9.96   HGB 11.3* 10.9*   HCT 35.9* 35.2*   * 342     No results for input(s): LABPT, INR, APTT in the last 48 hours.  Microbiology Results (last 7 days)     ** No results found for the last 168 hours. **        All pertinent labs from the last 24 hours have been reviewed.    Significant Diagnostics:  I have reviewed all pertinent imaging results/findings within the past 24 hours.    Assessment/Plan:     * Temporal lobe epilepsy, intractable    49 yo female with history of seizures since age 19, now s/p  crani with subdural grid placement for further localization (11/5) and repositioning following pt induced malpositioning (11/8). Pt s/p crani for grid removal and focectomy (11/19).    No acute events overnight. Interval discontinuation of hemovac drain.    --Neurologically stable on exam.  --Continue to encourage ambulation and daily evaluation and treatment with PTOT.  --We will continue to follow closely, please contact us with any questions or concerns.            Marc Garrett MD  Neurosurgery  [facility]

## 2018-11-22 NOTE — PROGRESS NOTES
[facility]  Neurology-Epilepsy  Progress Note    Patient Name: Liza Arrieta  MRN: [id number]  Admission Date: 11/5/2018  Hospital Length of Stay: 17 days  Code Status: Full Code   Attending Provider: Yohannes De La Rosa MD  Primary Care Physician: LISA Elam MD   Principal Problem:Temporal lobe epilepsy, intractable    Subjective:     Hospital Course:   Patient admitted to [facility] after subdural grid placement/L crani on 11/5. Home Lamotrigine decreased to 100 mg BID.  11/6: No seizures since admission. Cortical mapping session completed.  11/7: Cortical mapping during am, multiple clinical seizures during session. Clinical and electrographic seizure 11/7 afternoon, during which patient had automatisms of hands, confusion, pulled at EEG leads disrupting connection.  11/8: No additional seizures overnight. Back to OR today for replacement of intracranial grid/leads.  11/9: No seizures after revision of intracranial grids in OR yesterday.   11/10: No electrographic seizures, cortical mapping  11/11: No electrographic seizures  11/12: No electrographic seizures  11/13: Plan for benadryl x1 today, sleep deprive tonight and benadryl again tomorrow am  11/14: Episode of brief staring with retained consciousness, no EEG correlate  11/15: Cortical mapping with assistance of neuropsychology for language function  11/16: No seizures  11/17: No seizures  11/18: No seizures  11/19: No seizures. To OR today for grid removal and focectomy.   11/20: Off EEG, no reported seizures. POD#1 s/p grid/strip removal and focectomy. Add Ativan 1 mg BID as bridge while Lamictal level builds back up.  11/20-21: Off EEG; doing well; drain in-situ; on Lorazepam PRN    11/22/2018 Doing well, no acute events overnight. Drain in-situ      Interval History: No acute events overnight. Vitals stable.    Current Facility-Administered Medications   Medication Dose Route Frequency Provider Last Rate Last Dose    acetaminophen  suppository 650 mg  650 mg Rectal Q6H PRN Marc Garrett MD        acetaminophen tablet 650 mg  650 mg Oral Q6H PRN Marc Garrett MD   650 mg at 11/14/18 0130    heparin (porcine) injection 5,000 Units  5,000 Units Subcutaneous Q8H Denis Covarrubias MD   5,000 Units at 11/22/18 0500    HYDROcodone-acetaminophen 5-325 mg per tablet 1 tablet  1 tablet Oral Q4H PRN Marc Garrett MD   1 tablet at 11/21/18 1714    HYDROmorphone injection 0.5 mg  0.5 mg Intravenous Q6H PRN Thomas Plummer MD   0.5 mg at 11/21/18 0232    lamoTRIgine tablet 200 mg  200 mg Oral Daily Isael P. Andras, NP   200 mg at 11/22/18 0950    lamoTRIgine tablet 300 mg  300 mg Oral QHS Isael P. Andras, NP   300 mg at 11/21/18 2110    LORazepam tablet 1 mg  1 mg Oral BID Yohannes De La Rosa MD   1 mg at 11/22/18 0950    magnesium oxide tablet 800 mg  800 mg Oral PRN Satya Arora MD   800 mg at 11/18/18 1345    magnesium oxide tablet 800 mg  800 mg Oral PRN Satya Arora MD        metoclopramide HCl injection 5 mg  5 mg Intravenous Q6H PRN Duane Joiner MD        midazolam (VERSED) 1 mg/mL injection 2 mg  2 mg Intravenous Q5 Min PRN Frankie Dumont MD        ondansetron disintegrating tablet 8 mg  8 mg Oral Q6H PRN Duane Joiner MD   8 mg at 11/07/18 0703    polyethylene glycol packet 17 g  17 g Oral Daily Thomas Plummer MD   17 g at 11/22/18 0949    senna-docusate 8.6-50 mg per tablet 1 tablet  1 tablet Oral BID Thomas Plummer MD   1 tablet at 11/22/18 0950    sodium chloride 0.9% flush 3 mL  3 mL Intravenous PRN Ifeanyi Sanchez MD         Continuous Infusions:    Review of Systems   Constitutional: Negative for fever.   Respiratory: Negative for shortness of breath.    Cardiovascular: Negative for chest pain.   Gastrointestinal: Negative for abdominal pain.   Neurological: Negative for speech difficulty, weakness and headaches.     Objective:     Vital Signs (Most Recent):  Temp:  98.2 °F (36.8 °C) (11/22/18 0800)  Pulse: 77 (11/22/18 0800)  Resp: 18 (11/22/18 0800)  BP: 111/66 (11/22/18 0800)  SpO2: 95 % (11/22/18 0800) Vital Signs (24h Range):  Temp:  [97.1 °F (36.2 °C)-98.6 °F (37 °C)] 98.2 °F (36.8 °C)  Pulse:  [73-95] 77  Resp:  [15-18] 18  SpO2:  [92 %-98 %] 95 %  BP: (104-137)/(63-83) 111/66     Weight: 106.6 kg (235 lb 0.2 oz)  Body mass index is 39.11 kg/m².    Physical Exam   Constitutional: She is oriented to person, place, and time. No distress.   Edema over left side of forehead, left eyelids and extending to upper portions of left cheek    Neurological: She is alert and oriented to person, place, and time.   Psychiatric: Her speech is normal.   Nursing note and vitals reviewed.      NEUROLOGICAL EXAMINATION:     MENTAL STATUS   Oriented to person, place, and time.   Oriented to person.   Oriented to place.   Oriented to time.   Speech: speech is normal   Level of consciousness: alert    CRANIAL NERVES        Left side ptosis, likely 2/2 to post-op edema.     MOTOR EXAM        Strength 5/5 in all four extremities.     REFLEXES        Not assessed.     SENSORY EXAM        Not assessed.     GAIT AND COORDINATION     Tremor   Resting tremor: absent      Significant Labs:   Recent Lab Results       11/22/18  0404        Immature Granulocytes 0.2     Immature Grans (Abs) 0.02  Comment:  Mild elevation in immature granulocytes is non specific and   can be seen in a variety of conditions including stress response,   acute inflammation, trauma and pregnancy. Correlation with other   laboratory and clinical findings is essential.       Albumin 2.9     Alkaline Phosphatase 98     ALT 8     Anion Gap 9     AST 11     Baso # 0.03     Basophil% 0.4     Total Bilirubin 0.3  Comment:  For infants and newborns, interpretation of results should be based  on gestational age, weight and in agreement with clinical  observations.  Premature Infant recommended reference ranges:  Up to 24  hours.............<8.0 mg/dL  Up to 48 hours............<12.0 mg/dL  3-5 days..................<15.0 mg/dL  6-29 days.................<15.0 mg/dL       BUN, Bld 13     Calcium 9.3     Chloride 104     CO2 27     Creatinine 0.8     Differential Method Automated     eGFR if African American >60.0     eGFR if non  >60.0  Comment:  Calculation used to obtain the estimated glomerular filtration  rate (eGFR) is the CKD-EPI equation.        Eos # 0.2     Eosinophil% 2.2     Glucose 106     Gran # (ANC) 3.9     Gran% 48.1     Hematocrit 32.2     Hemoglobin 10.0     Lymph # 3.4     Lymph% 42.1     Magnesium 2.0     MCH 24.5     MCHC 31.1     MCV 79     Mono # 0.6     Mono% 7.0     MPV 9.3     nRBC 0     Phosphorus 4.5     Platelets 325     Potassium 4.4     Total Protein 6.7     RBC 4.08     RDW 15.4     Sodium 140     WBC 8.14           Significant Studies: I have reviewed all pertinent imaging results/findings within the past 24 hours.    Assessment and Plan:     * Temporal lobe epilepsy, intractable    47 yo female with history of seizures since age 19 years    - POD #3 s/p grid/strip removal and focectomy - L anterior temporal  - Continue home dose Lamictal - 200 mg qAM 300 mg qPM  - Continue Ativan 1 mg BID as bridge while Lamictal level builds back up in system  - For any seizures: please call epileptologist on call, Versed or Ativan 2 mg IV PRN    Plan of care discussed with NCC team and patient.  Will continue to follow.      Ataxia with oculomotor apraxia    - None reported in several days, patient to keep track of any additional events      Essential hypertension    - Goal SBP <140  - Management per NCC          VTE Risk Mitigation (From admission, onward)        Ordered     heparin (porcine) injection 5,000 Units  Every 8 hours      11/19/18 1844     IP VTE HIGH RISK PATIENT  Once      11/19/18 1844     heparin (porcine) injection 5,000 Units  Every 8 hours      11/05/18 1238     Place sequential  compression device  Until discontinued      11/05/18 8098          Osiel Zuleta MD  Neurology-Epilepsy  [facility]

## 2018-11-22 NOTE — PLAN OF CARE
Problem: Patient Care Overview  Goal: Plan of Care Review  Outcome: Ongoing (interventions implemented as appropriate)  POC reviewed with patient, patient demonstrated understanding. Fall precautions remain in place. O2 and suction at bedside, bed alarm at bedside, bed in lowest position, call bell in reach. WCTM.

## 2018-11-22 NOTE — PLAN OF CARE
Problem: Patient Care Overview  Goal: Plan of Care Review  Outcome: Ongoing (interventions implemented as appropriate)  Patient remains free from injury or fall. Drain DC per MD. Site is dry and intact. No neuro changes noted or reported from initial assessment. Tolerating PO. Will continue to monitor.

## 2018-11-22 NOTE — SUBJECTIVE & OBJECTIVE
Interval History: No acute events overnight. Vitals stable.    Current Facility-Administered Medications   Medication Dose Route Frequency Provider Last Rate Last Dose    acetaminophen suppository 650 mg  650 mg Rectal Q6H PRN Marc Garrett MD        acetaminophen tablet 650 mg  650 mg Oral Q6H PRN Marc Garrett MD   650 mg at 11/14/18 0130    heparin (porcine) injection 5,000 Units  5,000 Units Subcutaneous Q8H Denis Covarrubias MD   5,000 Units at 11/22/18 0500    HYDROcodone-acetaminophen 5-325 mg per tablet 1 tablet  1 tablet Oral Q4H PRN Marc Garrett MD   1 tablet at 11/21/18 1714    HYDROmorphone injection 0.5 mg  0.5 mg Intravenous Q6H PRN Thomas Plummer MD   0.5 mg at 11/21/18 0232    lamoTRIgine tablet 200 mg  200 mg Oral Daily Isael P. Andras, NP   200 mg at 11/22/18 0950    lamoTRIgine tablet 300 mg  300 mg Oral QHS Isael P. Andras, NP   300 mg at 11/21/18 2110    LORazepam tablet 1 mg  1 mg Oral BID Yohannes De La Rosa MD   1 mg at 11/22/18 0950    magnesium oxide tablet 800 mg  800 mg Oral PRN Satya Arora MD   800 mg at 11/18/18 1345    magnesium oxide tablet 800 mg  800 mg Oral PRN Satya Arora MD        metoclopramide HCl injection 5 mg  5 mg Intravenous Q6H PRN Duane Joiner MD        midazolam (VERSED) 1 mg/mL injection 2 mg  2 mg Intravenous Q5 Min PRN Frankie Dumont MD        ondansetron disintegrating tablet 8 mg  8 mg Oral Q6H PRN Duane Joiner MD   8 mg at 11/07/18 0703    polyethylene glycol packet 17 g  17 g Oral Daily Thomas Plummer MD   17 g at 11/22/18 0949    senna-docusate 8.6-50 mg per tablet 1 tablet  1 tablet Oral BID Thomas Plummer MD   1 tablet at 11/22/18 0950    sodium chloride 0.9% flush 3 mL  3 mL Intravenous PRN Ifeanyi Sanchez MD         Continuous Infusions:    Review of Systems   Constitutional: Negative for fever.   Respiratory: Negative for shortness of breath.    Cardiovascular: Negative for  chest pain.   Gastrointestinal: Negative for abdominal pain.   Neurological: Negative for speech difficulty, weakness and headaches.     Objective:     Vital Signs (Most Recent):  Temp: 98.2 °F (36.8 °C) (11/22/18 0800)  Pulse: 77 (11/22/18 0800)  Resp: 18 (11/22/18 0800)  BP: 111/66 (11/22/18 0800)  SpO2: 95 % (11/22/18 0800) Vital Signs (24h Range):  Temp:  [97.1 °F (36.2 °C)-98.6 °F (37 °C)] 98.2 °F (36.8 °C)  Pulse:  [73-95] 77  Resp:  [15-18] 18  SpO2:  [92 %-98 %] 95 %  BP: (104-137)/(63-83) 111/66     Weight: 106.6 kg (235 lb 0.2 oz)  Body mass index is 39.11 kg/m².    Physical Exam   Constitutional: She is oriented to person, place, and time. No distress.   Edema over left side of forehead, left eyelids and extending to upper portions of left cheek    Neurological: She is alert and oriented to person, place, and time.   Psychiatric: Her speech is normal.   Nursing note and vitals reviewed.      NEUROLOGICAL EXAMINATION:     MENTAL STATUS   Oriented to person, place, and time.   Oriented to person.   Oriented to place.   Oriented to time.   Speech: speech is normal   Level of consciousness: alert    CRANIAL NERVES        Left side ptosis, likely 2/2 to post-op edema.     MOTOR EXAM        Strength 5/5 in all four extremities.     REFLEXES        Not assessed.     SENSORY EXAM        Not assessed.     GAIT AND COORDINATION     Tremor   Resting tremor: absent      Significant Labs:   Recent Lab Results       11/22/18  0404        Immature Granulocytes 0.2     Immature Grans (Abs) 0.02  Comment:  Mild elevation in immature granulocytes is non specific and   can be seen in a variety of conditions including stress response,   acute inflammation, trauma and pregnancy. Correlation with other   laboratory and clinical findings is essential.       Albumin 2.9     Alkaline Phosphatase 98     ALT 8     Anion Gap 9     AST 11     Baso # 0.03     Basophil% 0.4     Total Bilirubin 0.3  Comment:  For infants and newborns,  interpretation of results should be based  on gestational age, weight and in agreement with clinical  observations.  Premature Infant recommended reference ranges:  Up to 24 hours.............<8.0 mg/dL  Up to 48 hours............<12.0 mg/dL  3-5 days..................<15.0 mg/dL  6-29 days.................<15.0 mg/dL       BUN, Bld 13     Calcium 9.3     Chloride 104     CO2 27     Creatinine 0.8     Differential Method Automated     eGFR if African American >60.0     eGFR if non  >60.0  Comment:  Calculation used to obtain the estimated glomerular filtration  rate (eGFR) is the CKD-EPI equation.        Eos # 0.2     Eosinophil% 2.2     Glucose 106     Gran # (ANC) 3.9     Gran% 48.1     Hematocrit 32.2     Hemoglobin 10.0     Lymph # 3.4     Lymph% 42.1     Magnesium 2.0     MCH 24.5     MCHC 31.1     MCV 79     Mono # 0.6     Mono% 7.0     MPV 9.3     nRBC 0     Phosphorus 4.5     Platelets 325     Potassium 4.4     Total Protein 6.7     RBC 4.08     RDW 15.4     Sodium 140     WBC 8.14           Significant Studies: I have reviewed all pertinent imaging results/findings within the past 24 hours.

## 2018-11-22 NOTE — ASSESSMENT & PLAN NOTE
47 yo female with history of seizures since age 19, now s/p  crani with subdural grid placement for further localization (11/5) and repositioning following pt induced malpositioning (11/8). Pt s/p crani for grid removal and focectomy (11/19).    No acute events overnight. Interval discontinuation of hemovac drain.    --Neurologically stable on exam.  --Continue to encourage ambulation and daily evaluation and treatment with PTOT.  --We will continue to follow closely, please contact us with any questions or concerns.

## 2018-11-23 VITALS
OXYGEN SATURATION: 94 % | SYSTOLIC BLOOD PRESSURE: 110 MMHG | TEMPERATURE: 98 F | RESPIRATION RATE: 18 BRPM | DIASTOLIC BLOOD PRESSURE: 58 MMHG | HEIGHT: 65 IN | HEART RATE: 73 BPM | WEIGHT: 235 LBS | BODY MASS INDEX: 39.15 KG/M2

## 2018-11-23 LAB
ALBUMIN SERPL BCP-MCNC: 2.8 G/DL
ALP SERPL-CCNC: 100 U/L
ALT SERPL W/O P-5'-P-CCNC: 9 U/L
ANION GAP SERPL CALC-SCNC: 9 MMOL/L
AST SERPL-CCNC: 12 U/L
BASOPHILS # BLD AUTO: 0.03 K/UL
BASOPHILS NFR BLD: 0.4 %
BILIRUB SERPL-MCNC: 0.3 MG/DL
BUN SERPL-MCNC: 14 MG/DL
CALCIUM SERPL-MCNC: 9.3 MG/DL
CHLORIDE SERPL-SCNC: 106 MMOL/L
CO2 SERPL-SCNC: 26 MMOL/L
CREAT SERPL-MCNC: 0.8 MG/DL
DIFFERENTIAL METHOD: ABNORMAL
EOSINOPHIL # BLD AUTO: 0.2 K/UL
EOSINOPHIL NFR BLD: 2.4 %
ERYTHROCYTE [DISTWIDTH] IN BLOOD BY AUTOMATED COUNT: 15.2 %
EST. GFR  (AFRICAN AMERICAN): >60 ML/MIN/1.73 M^2
EST. GFR  (NON AFRICAN AMERICAN): >60 ML/MIN/1.73 M^2
GLUCOSE SERPL-MCNC: 108 MG/DL
HCT VFR BLD AUTO: 30.8 %
HGB BLD-MCNC: 9.4 G/DL
IMM GRANULOCYTES # BLD AUTO: 0.02 K/UL
IMM GRANULOCYTES NFR BLD AUTO: 0.3 %
LYMPHOCYTES # BLD AUTO: 2.9 K/UL
LYMPHOCYTES NFR BLD: 41.4 %
MAGNESIUM SERPL-MCNC: 2.1 MG/DL
MCH RBC QN AUTO: 23.9 PG
MCHC RBC AUTO-ENTMCNC: 30.5 G/DL
MCV RBC AUTO: 78 FL
MONOCYTES # BLD AUTO: 0.5 K/UL
MONOCYTES NFR BLD: 7.6 %
NEUTROPHILS # BLD AUTO: 3.4 K/UL
NEUTROPHILS NFR BLD: 47.9 %
NRBC BLD-RTO: 0 /100 WBC
PHOSPHATE SERPL-MCNC: 3.9 MG/DL
PLATELET # BLD AUTO: 312 K/UL
PMV BLD AUTO: 9.5 FL
POTASSIUM SERPL-SCNC: 4.5 MMOL/L
PROT SERPL-MCNC: 6.4 G/DL
RBC # BLD AUTO: 3.93 M/UL
SODIUM SERPL-SCNC: 141 MMOL/L
WBC # BLD AUTO: 7.01 K/UL

## 2018-11-23 PROCEDURE — 97535 SELF CARE MNGMENT TRAINING: CPT

## 2018-11-23 PROCEDURE — 84100 ASSAY OF PHOSPHORUS: CPT

## 2018-11-23 PROCEDURE — 25000003 PHARM REV CODE 250: Performed by: PSYCHIATRY & NEUROLOGY

## 2018-11-23 PROCEDURE — 83735 ASSAY OF MAGNESIUM: CPT

## 2018-11-23 PROCEDURE — 25000003 PHARM REV CODE 250: Performed by: NURSE PRACTITIONER

## 2018-11-23 PROCEDURE — 80053 COMPREHEN METABOLIC PANEL: CPT

## 2018-11-23 PROCEDURE — 99233 SBSQ HOSP IP/OBS HIGH 50: CPT | Mod: ,,, | Performed by: PSYCHIATRY & NEUROLOGY

## 2018-11-23 PROCEDURE — 36415 COLL VENOUS BLD VENIPUNCTURE: CPT

## 2018-11-23 PROCEDURE — 63600175 PHARM REV CODE 636 W HCPCS: Performed by: STUDENT IN AN ORGANIZED HEALTH CARE EDUCATION/TRAINING PROGRAM

## 2018-11-23 PROCEDURE — 25000003 PHARM REV CODE 250: Performed by: STUDENT IN AN ORGANIZED HEALTH CARE EDUCATION/TRAINING PROGRAM

## 2018-11-23 PROCEDURE — 85025 COMPLETE CBC W/AUTO DIFF WBC: CPT

## 2018-11-23 RX ORDER — HYDROCODONE BITARTRATE AND ACETAMINOPHEN 5; 325 MG/1; MG/1
1 TABLET ORAL EVERY 4 HOURS PRN
Qty: 60 TABLET | Refills: 0 | Status: SHIPPED | OUTPATIENT
Start: 2018-11-23 | End: 2019-03-26

## 2018-11-23 RX ORDER — LORAZEPAM 1 MG/1
1 TABLET ORAL DAILY
Qty: 3 TABLET | Refills: 0 | Status: SHIPPED | OUTPATIENT
Start: 2018-11-23 | End: 2019-07-15

## 2018-11-23 RX ADMIN — LAMOTRIGINE 200 MG: 100 TABLET ORAL at 09:11

## 2018-11-23 RX ADMIN — SENNOSIDES AND DOCUSATE SODIUM 1 TABLET: 8.6; 5 TABLET ORAL at 09:11

## 2018-11-23 RX ADMIN — HEPARIN SODIUM 5000 UNITS: 5000 INJECTION, SOLUTION INTRAVENOUS; SUBCUTANEOUS at 02:11

## 2018-11-23 RX ADMIN — LORAZEPAM 1 MG: 1 TABLET ORAL at 09:11

## 2018-11-23 RX ADMIN — POLYETHYLENE GLYCOL 3350 17 G: 17 POWDER, FOR SOLUTION ORAL at 09:11

## 2018-11-23 RX ADMIN — HEPARIN SODIUM 5000 UNITS: 5000 INJECTION, SOLUTION INTRAVENOUS; SUBCUTANEOUS at 05:11

## 2018-11-23 NOTE — DISCHARGE INSTRUCTIONS
Please follow ONLY the instructions that are checked below.    Activity Restrictions:  [x]  Return to work will be determined on an individual basis.  [x]  No lifting greater than 10 pounds.  [x]  No driving or operating machinery:  [x]  until cleared by your surgeon.  [x]  while taking narcotic pain medications or muscle relaxants.  [x]  Walk on paved surfaces only. It is okay to walk up and down stairs while holding onto a side rail.  [x]  No sexual activity for 2-3 weeks.    Discharge Medication/Follow-up:  [x]  Please refer to discharge medication reconciliation form.  [x]  Do not take ANY non-steroidal anti-inflammatory drugs (NSAIDS), including the following: ibuprofen, naprosyn, Aleve, Advil, Indocin, Mobic, or Celebrex for:  [x]  4 weeks  []  8 weeks  []  6 months  [x]  Prescriptions for appropriate medication will be given upon discharge.  [x]  Take docusate (Colace 100 mg): take one capsule a day as needed for constipation. You can get this over the counter.  [x]  Follow-up appointment:  [x]  10-14 days post-op for wound check by physician assistant/nurse  [x]  4-6 weeks with MD:  [x]  with  MRI  []  without CT / MRI  [x]  An appointment will be mailed to you.    Wound Care:  []  Remove dressing or bandaid in    days.  [x]  No bandage required. Keep your incision open to the air.  [x]  You may shower on the 2nd day after your surgery. Have the force of water hit you opposite from the incision. Pat the incision dry after your shower; do not scrub the incision.  [x]  Apply bacitracin to incision twice a day     Call your doctor or go to the Emergency Room for any signs of infection, including: increased redness, drainage, pain, or fever (temperature ?101.5 for 24 hours). Call your doctor or go to the Emergency Room if there are any localized neurological changes; problems with speech, vision, numbness, tingling, weakness, or severe headache; or for other concerns.    Special Instructions:  [x]  No use of  tobacco products.  [x]  Diet: Please eat a regular diet as tolerated.  []  Other diet:              Specific physician instructions:           Physicians need 3 days' notice for pain medicine to be refilled. Pain medicine will only be refilled between 8 AM and 5 PM, Monday through Friday, due to Food and Drug Administration regulation of documentation.    If you have any questions about this form, please call [phone].    Form No. 04044 (Revised 10/31/2013)

## 2018-11-23 NOTE — SUBJECTIVE & OBJECTIVE
Interval History:  NAEON.   Denies HAs, N/V, visual changes, weakness, or seizures.  Denies F/C/CP/SOB.  Tolerating diet.  Voiding.       Medications:  Continuous Infusions:  Scheduled Meds:   heparin (porcine)  5,000 Units Subcutaneous Q8H    lamoTRIgine  200 mg Oral Daily    lamoTRIgine  300 mg Oral QHS    LORazepam  1 mg Oral BID    polyethylene glycol  17 g Oral Daily    senna-docusate 8.6-50 mg  1 tablet Oral BID     PRN Meds:acetaminophen, acetaminophen, HYDROcodone-acetaminophen, HYDROmorphone, magnesium oxide, magnesium oxide, metoclopramide HCl, midazolam, ondansetron, sodium chloride 0.9%     Review of Systems  Objective:     Weight: 106.6 kg (235 lb 0.2 oz)  Body mass index is 39.11 kg/m².  Vital Signs (Most Recent):  Temp: 98.2 °F (36.8 °C) (11/23/18 1150)  Pulse: 73 (11/23/18 1150)  Resp: 18 (11/23/18 0805)  BP: (!) 110/58 (11/23/18 1150)  SpO2: (!) 94 % (11/23/18 1150) Vital Signs (24h Range):  Temp:  [97.4 °F (36.3 °C)-98.9 °F (37.2 °C)] 98.2 °F (36.8 °C)  Pulse:  [73-95] 73  Resp:  [18] 18  SpO2:  [94 %-96 %] 94 %  BP: ()/(54-71) 110/58                           Neurosurgery Physical Exam      General: no distress  Neurologic: Alert and oriented. Thought content appropriate.  Head: normocephalic  GCS: Motor: 6/Verbal: 5/Eyes: 4 GCS Total: 15  Cranial nerves: face symmetric, tongue midline, pupils equal, round, reactive to light with accomodation, extraocular muscles intact  Sensory: response to light touch throughout  Motor Strength:full strength upper and lower extremities  Pronator Drift: no drift noted  Finger to nose normal  No focal numbness or weakness  Lungs:  normal respiratory effort  Abdomen: soft, non-tender   Extremities: no cyanosis or edema, or clubbing  Surgical incision is c/d/i       Significant Labs:  Recent Labs   Lab 11/22/18  0404 11/23/18  0515    108    141   K 4.4 4.5    106   CO2 27 26   BUN 13 14   CREATININE 0.8 0.8   CALCIUM 9.3 9.3   MG 2.0  2.1     Recent Labs   Lab 11/22/18  0404 11/23/18  0515   WBC 8.14 7.01   HGB 10.0* 9.4*   HCT 32.2* 30.8*    312     No results for input(s): LABPT, INR, APTT in the last 48 hours.  Microbiology Results (last 7 days)     ** No results found for the last 168 hours. **

## 2018-11-23 NOTE — PROGRESS NOTES
Saline lock was dc'd. Discharge instructions and new prescriptions were given to pt with understanding verbalized.Pt awake,alert,verbally responsive,breathing unlabored.Denies any pain or discomfort at this time.Pt escort was requested.Daughters present at bedside.Will continue to monitor.

## 2018-11-23 NOTE — PROGRESS NOTES
[facility]  Neurology-Epilepsy  Progress Note    Patient Name: Liza Arrieta  MRN: [id number]  Admission Date: 11/5/2018  Hospital Length of Stay: 18 days  Code Status: Full Code   Attending Provider: Yohannes De La Rosa MD  Primary Care Physician: LISA Elam MD   Principal Problem:Temporal lobe epilepsy, intractable    Subjective:     Hospital Course:   Patient admitted to [facility] after subdural grid placement/L crani on 11/5. Home Lamotrigine decreased to 100 mg BID.  11/6: No seizures since admission. Cortical mapping session completed.  11/7: Cortical mapping during am, multiple clinical seizures during session. Clinical and electrographic seizure 11/7 afternoon, during which patient had automatisms of hands, confusion, pulled at EEG leads disrupting connection.  11/8: No additional seizures overnight. Back to OR today for replacement of intracranial grid/leads.  11/9: No seizures after revision of intracranial grids in OR yesterday.   11/10: No electrographic seizures, cortical mapping  11/11: No electrographic seizures  11/12: No electrographic seizures  11/13: Plan for benadryl x1 today, sleep deprive tonight and benadryl again tomorrow am  11/14: Episode of brief staring with retained consciousness, no EEG correlate  11/15: Cortical mapping with assistance of neuropsychology for language function  11/16: No seizures  11/17: No seizures  11/18: No seizures  11/19: No seizures. To OR today for grid removal and focectomy.   11/20: Off EEG, no reported seizures. POD#1 s/p grid/strip removal and focectomy. Add Ativan 1 mg BID as bridge while Lamictal level builds back up.  11/20-21: Off EEG; doing well; drain in-situ; on Lorazepam PRN    11/22/2018 Doing well, no acute events overnight. Drain in-situ  11/23/2018 Doing well, no acute events overnight. Drain removed yesterday    Interval History: No acute events overnight. Vitals stable.    Current Facility-Administered Medications   Medication  Dose Route Frequency Provider Last Rate Last Dose    acetaminophen suppository 650 mg  650 mg Rectal Q6H PRN Marc Garrett MD        acetaminophen tablet 650 mg  650 mg Oral Q6H PRN Marc Garrett MD   650 mg at 11/14/18 0130    heparin (porcine) injection 5,000 Units  5,000 Units Subcutaneous Q8H Denis Covarrubias MD   5,000 Units at 11/23/18 0531    HYDROcodone-acetaminophen 5-325 mg per tablet 1 tablet  1 tablet Oral Q4H PRN Marc Garrett MD   1 tablet at 11/22/18 1505    HYDROmorphone injection 0.5 mg  0.5 mg Intravenous Q6H PRN Thomas Plummer MD   0.5 mg at 11/21/18 0232    lamoTRIgine tablet 200 mg  200 mg Oral Daily Isael MARQUEZ Andras, NP   200 mg at 11/22/18 0950    lamoTRIgine tablet 300 mg  300 mg Oral QHS Isael P. Andras, NP   300 mg at 11/22/18 2123    LORazepam tablet 1 mg  1 mg Oral BID Yohannes De La Rosa MD   1 mg at 11/22/18 2122    magnesium oxide tablet 800 mg  800 mg Oral PRN Satya Arora MD   800 mg at 11/18/18 1345    magnesium oxide tablet 800 mg  800 mg Oral PRN Satya Arora MD        metoclopramide HCl injection 5 mg  5 mg Intravenous Q6H PRN Duane Joiner MD        midazolam (VERSED) 1 mg/mL injection 2 mg  2 mg Intravenous Q5 Min PRN Frankie Dumont MD        ondansetron disintegrating tablet 8 mg  8 mg Oral Q6H PRN Duane Joiner MD   8 mg at 11/07/18 0703    polyethylene glycol packet 17 g  17 g Oral Daily Thomas Plummer MD   17 g at 11/22/18 0949    senna-docusate 8.6-50 mg per tablet 1 tablet  1 tablet Oral BID Thomas Plummer MD   1 tablet at 11/22/18 2122    sodium chloride 0.9% flush 3 mL  3 mL Intravenous PRN Ifeanyi Sanchez MD         Continuous Infusions:    Review of Systems   Constitutional: Negative for chills and fever.   Respiratory: Negative for shortness of breath.    Cardiovascular: Negative for chest pain.   Gastrointestinal: Negative for abdominal pain.   Neurological: Negative for seizures and  headaches.   Psychiatric/Behavioral: Negative for agitation and confusion.     Objective:     Vital Signs (Most Recent):  Temp: 97.4 °F (36.3 °C) (11/23/18 0426)  Pulse: 81 (11/23/18 0426)  Resp: 18 (11/22/18 1918)  BP: (!) 93/54 (11/23/18 0426)  SpO2: (!) 94 % (11/23/18 0426) Vital Signs (24h Range):  Temp:  [97.4 °F (36.3 °C)-98.9 °F (37.2 °C)] 97.4 °F (36.3 °C)  Pulse:  [81-95] 81  Resp:  [18] 18  SpO2:  [94 %-96 %] 94 %  BP: ()/(54-71) 93/54     Weight: 106.6 kg (235 lb 0.2 oz)  Body mass index is 39.11 kg/m².    Physical Exam   Constitutional: She is oriented to person, place, and time. No distress.   Eyes: EOM are normal.   Neurological: She is alert and oriented to person, place, and time.   Psychiatric: Her speech is normal.   Nursing note and vitals reviewed.      NEUROLOGICAL EXAMINATION:     MENTAL STATUS   Oriented to person, place, and time.   Oriented to person.   Oriented to place.   Oriented to time.   Speech: speech is normal   Level of consciousness: alert       Surgical scar over left temporal region with clips in place.     CRANIAL NERVES     CN III, IV, VI   Extraocular motions are normal.   CN III: no CN III palsy  Nystagmus: none     MOTOR EXAM        Strength 5/5 in all four extremities.     REFLEXES        Not assessed.     SENSORY EXAM        Not assessed.     GAIT AND COORDINATION     Tremor   Resting tremor: absent  Action tremor: absent      Significant Labs:   Recent Lab Results       11/23/18  0515        Immature Granulocytes 0.3     Immature Grans (Abs) 0.02  Comment:  Mild elevation in immature granulocytes is non specific and   can be seen in a variety of conditions including stress response,   acute inflammation, trauma and pregnancy. Correlation with other   laboratory and clinical findings is essential.       Albumin 2.8     Alkaline Phosphatase 100     ALT 9     Anion Gap 9     AST 12     Baso # 0.03     Basophil% 0.4     Total Bilirubin 0.3  Comment:  For infants and  newborns, interpretation of results should be based  on gestational age, weight and in agreement with clinical  observations.  Premature Infant recommended reference ranges:  Up to 24 hours.............<8.0 mg/dL  Up to 48 hours............<12.0 mg/dL  3-5 days..................<15.0 mg/dL  6-29 days.................<15.0 mg/dL       BUN, Bld 14     Calcium 9.3     Chloride 106     CO2 26     Creatinine 0.8     Differential Method Automated     eGFR if African American >60.0     eGFR if non  >60.0  Comment:  Calculation used to obtain the estimated glomerular filtration  rate (eGFR) is the CKD-EPI equation.        Eos # 0.2     Eosinophil% 2.4     Glucose 108     Gran # (ANC) 3.4     Gran% 47.9     Hematocrit 30.8     Hemoglobin 9.4     Lymph # 2.9     Lymph% 41.4     Magnesium 2.1     MCH 23.9     MCHC 30.5     MCV 78     Mono # 0.5     Mono% 7.6     MPV 9.5     nRBC 0     Phosphorus 3.9     Platelets 312     Potassium 4.5     Total Protein 6.4     RBC 3.93     RDW 15.2     Sodium 141     WBC 7.01           Significant Studies: I have reviewed all pertinent imaging results/findings within the past 24 hours.    Assessment and Plan:     * Temporal lobe epilepsy, intractable    47 yo female with history of seizures since age 19 years    - POD #4 s/p grid/strip removal and focectomy - L anterior temporal  - Planned for discharge today.   - Continue home dose Lamictal - 200 mg qAM 300 mg qPM. Ativan 1mg qd x3 days then stop    Plan of care discussed with NCC team and patient.  Follow up with Dr Elam     Ataxia with oculomotor apraxia    - None reported in several days, patient to keep track of any additional events      Essential hypertension    - Goal SBP <140  - Management per NCC          VTE Risk Mitigation (From admission, onward)        Ordered     heparin (porcine) injection 5,000 Units  Every 8 hours      11/19/18 1844     IP VTE HIGH RISK PATIENT  Once      11/19/18 1844     heparin (porcine)  injection 5,000 Units  Every 8 hours      11/05/18 1238     Place sequential compression device  Until discontinued      11/05/18 1238        Discussed with Dr Watts.  Epilepsy will sign off. Please call with any questions    Osiel Zuleta MD  Neurology-Epilepsy  [facility]

## 2018-11-23 NOTE — PLAN OF CARE
Patient to be discharged home.  The patient does not have any home needs.  A shower chair was ordered for the patient.  Family to provide transportation home.  Neurosurgery clinic to schedule follow up appointment.    Future Appointments   Date Time Provider Department Center   11/29/2018  1:00 PM Irene Ann MD [facility] GASTRO [facility]   12/18/2018  8:30 AM Ruben Senior MD [facility] NEUROS7 [facility]        11/23/18 1243   Final Note   Assessment Type Final Discharge Note   Anticipated Discharge Disposition Home   Hospital Follow Up  Appt(s) scheduled? (Neurosrugery clinic to schedule follow up appointment.)   Discharge plans and expectations educations in teach back method with documentation complete? Yes

## 2018-11-23 NOTE — SUBJECTIVE & OBJECTIVE
Interval History: No acute events overnight. Vitals stable.    Current Facility-Administered Medications   Medication Dose Route Frequency Provider Last Rate Last Dose    acetaminophen suppository 650 mg  650 mg Rectal Q6H PRN Marc Garrett MD        acetaminophen tablet 650 mg  650 mg Oral Q6H PRN Marc Garrett MD   650 mg at 11/14/18 0130    heparin (porcine) injection 5,000 Units  5,000 Units Subcutaneous Q8H Denis Covarrubias MD   5,000 Units at 11/23/18 0531    HYDROcodone-acetaminophen 5-325 mg per tablet 1 tablet  1 tablet Oral Q4H PRN Marc Garrett MD   1 tablet at 11/22/18 1505    HYDROmorphone injection 0.5 mg  0.5 mg Intravenous Q6H PRN Thomas Plummer MD   0.5 mg at 11/21/18 0232    lamoTRIgine tablet 200 mg  200 mg Oral Daily Isael P. Andras, NP   200 mg at 11/22/18 0950    lamoTRIgine tablet 300 mg  300 mg Oral QHS Isael P. Andras, NP   300 mg at 11/22/18 2123    LORazepam tablet 1 mg  1 mg Oral BID Yohannes De La Rosa MD   1 mg at 11/22/18 2122    magnesium oxide tablet 800 mg  800 mg Oral PRN Satya Arora MD   800 mg at 11/18/18 1345    magnesium oxide tablet 800 mg  800 mg Oral PRN Satya Arora MD        metoclopramide HCl injection 5 mg  5 mg Intravenous Q6H PRN Duane Joiner MD        midazolam (VERSED) 1 mg/mL injection 2 mg  2 mg Intravenous Q5 Min PRN Frankie Dumont MD        ondansetron disintegrating tablet 8 mg  8 mg Oral Q6H PRN Duane Joiner MD   8 mg at 11/07/18 0703    polyethylene glycol packet 17 g  17 g Oral Daily Thomas Plummer MD   17 g at 11/22/18 0949    senna-docusate 8.6-50 mg per tablet 1 tablet  1 tablet Oral BID Thomas Plummer MD   1 tablet at 11/22/18 2122    sodium chloride 0.9% flush 3 mL  3 mL Intravenous PRN Ifeanyi Sanchez MD         Continuous Infusions:    Review of Systems   Constitutional: Negative for chills and fever.   Respiratory: Negative for shortness of breath.    Cardiovascular:  Negative for chest pain.   Gastrointestinal: Negative for abdominal pain.   Neurological: Negative for seizures and headaches.   Psychiatric/Behavioral: Negative for agitation and confusion.     Objective:     Vital Signs (Most Recent):  Temp: 97.4 °F (36.3 °C) (11/23/18 0426)  Pulse: 81 (11/23/18 0426)  Resp: 18 (11/22/18 1918)  BP: (!) 93/54 (11/23/18 0426)  SpO2: (!) 94 % (11/23/18 0426) Vital Signs (24h Range):  Temp:  [97.4 °F (36.3 °C)-98.9 °F (37.2 °C)] 97.4 °F (36.3 °C)  Pulse:  [81-95] 81  Resp:  [18] 18  SpO2:  [94 %-96 %] 94 %  BP: ()/(54-71) 93/54     Weight: 106.6 kg (235 lb 0.2 oz)  Body mass index is 39.11 kg/m².    Physical Exam   Constitutional: She is oriented to person, place, and time. No distress.   Eyes: EOM are normal.   Neurological: She is alert and oriented to person, place, and time.   Psychiatric: Her speech is normal.   Nursing note and vitals reviewed.      NEUROLOGICAL EXAMINATION:     MENTAL STATUS   Oriented to person, place, and time.   Oriented to person.   Oriented to place.   Oriented to time.   Speech: speech is normal   Level of consciousness: alert       Surgical scar over left temporal region with clips in place.     CRANIAL NERVES     CN III, IV, VI   Extraocular motions are normal.   CN III: no CN III palsy  Nystagmus: none     MOTOR EXAM        Strength 5/5 in all four extremities.     REFLEXES        Not assessed.     SENSORY EXAM        Not assessed.     GAIT AND COORDINATION     Tremor   Resting tremor: absent  Action tremor: absent      Significant Labs:   Recent Lab Results       11/23/18  0515        Immature Granulocytes 0.3     Immature Grans (Abs) 0.02  Comment:  Mild elevation in immature granulocytes is non specific and   can be seen in a variety of conditions including stress response,   acute inflammation, trauma and pregnancy. Correlation with other   laboratory and clinical findings is essential.       Albumin 2.8     Alkaline Phosphatase 100     ALT 9      Anion Gap 9     AST 12     Baso # 0.03     Basophil% 0.4     Total Bilirubin 0.3  Comment:  For infants and newborns, interpretation of results should be based  on gestational age, weight and in agreement with clinical  observations.  Premature Infant recommended reference ranges:  Up to 24 hours.............<8.0 mg/dL  Up to 48 hours............<12.0 mg/dL  3-5 days..................<15.0 mg/dL  6-29 days.................<15.0 mg/dL       BUN, Bld 14     Calcium 9.3     Chloride 106     CO2 26     Creatinine 0.8     Differential Method Automated     eGFR if African American >60.0     eGFR if non  >60.0  Comment:  Calculation used to obtain the estimated glomerular filtration  rate (eGFR) is the CKD-EPI equation.        Eos # 0.2     Eosinophil% 2.4     Glucose 108     Gran # (ANC) 3.4     Gran% 47.9     Hematocrit 30.8     Hemoglobin 9.4     Lymph # 2.9     Lymph% 41.4     Magnesium 2.1     MCH 23.9     MCHC 30.5     MCV 78     Mono # 0.5     Mono% 7.6     MPV 9.5     nRBC 0     Phosphorus 3.9     Platelets 312     Potassium 4.5     Total Protein 6.4     RBC 3.93     RDW 15.2     Sodium 141     WBC 7.01           Significant Studies: I have reviewed all pertinent imaging results/findings within the past 24 hours.

## 2018-11-23 NOTE — ASSESSMENT & PLAN NOTE
49 yo female with history of seizures since age 19, now s/p  crani with subdural grid placement for further localization (11/5) and repositioning following pt induced malpositioning (11/8). Pt s/p crani for grid removal and focectomy (11/19).  - Neurologically stable without further seizure episodes post op  - Post op MRI shows expected post op findings.   - Cleared for home by PT  - Appreciate Epilepsy rec's, continue home does of Lamictal, continue po ativan 1 mg daily x 3 more days then DC.   - DC home today, follow up in 2 weeks for wound check   - Discussed with Dr. Senior

## 2018-11-23 NOTE — PT/OT/SLP PROGRESS
Occupational Therapy   Treatment & Discharge    Name: Liza Arrieta  MRN: [id number]  Admitting Diagnosis:  Temporal lobe epilepsy, intractable  4 Days Post-Op    Recommendations:     Discharge Recommendations: home with home health  Discharge Equipment Recommendations:  shower chair  Barriers to discharge:  Decreased caregiver support    Subjective     Pain/Comfort:  · Pain Rating 1: 0/10  · Pain Rating Post-Intervention 1: 0/10    Objective:     Communicated with: RN prior to session.  Patient found supine in bed with peripheral IV upon OT entry to room. Pt's 2 daughters at bedside.     General Precautions: Standard, fall, seizure   Orthopedic Precautions:N/A   Braces: N/A     Occupational Performance:    Bed Mobility:    · Patient completed Rolling/Turning to Right with modified independence and with side rail  · Patient completed Supine to Sit with modified independence and with side rail  · Patient completed Sit to Supine with modified independence     Functional Mobility/Transfers:  · Patient completed Sit <> Stand Transfer with modified independence  with  no assistive device   · Patient completed Bed <> Chair Transfer using Step Transfer technique with modified independence with no assistive device  · Functional Mobility: ~100 ft with Supervision; occasional use of side rail in hallway    Activities of Daily Living:  · Pt/family reported that she completed toileting and dressing (in personal PJs) this morning with set up and supervision  · Declined further ADLs    AMPA 6 Click ADL: 23     Body mass index is 39.11 kg/m².    Vitals:    11/23/18 0805   BP: 105/64   Pulse: 85   Resp: 18   Temp: 98 °F (36.7 °C)       Treatment & Education:  -Pt alert with eyes open throughout; agreeable to therapy session; reported orientation x4  -Communication board updated; questions/concerns addressed within OT scope of practice  -Discussed home return and modifications for best safety; discussed importance of mobility for  phases of healing and endurance building with verbalized understanding   -Discussed completing mobility with RN/PCT while in acute setting     Patient left HOB elevated with all lines intact, call button in reach and family present  Education:    Assessment:     Liza Arrieta is a 48 y.o. female with a medical diagnosis of Temporal lobe epilepsy, intractable.  She presents s/p grid placement. She demo overall decline in her functional endurance 2/2 prolonged activity in bed. She would greatly benefit from nursing mobility protocol-- current orders in place to ambulate 2x/d and up to chair for nursing staff present.    Rehab Prognosis:  Good    Plan:     Patient with no further skilled OT needs. She would benefit from nursing mobility protocol while in acute setting.     Time Tracking:     OT Date of Treatment: 11/23/18  OT Start Time: 1035  OT Stop Time: 1049  OT Total Time (min): 14 min    Billable Minutes:Self Care/Home Management 14    MARCIE Graf  11/23/2018

## 2018-11-23 NOTE — ASSESSMENT & PLAN NOTE
47 yo female with history of seizures since age 19 years    - POD #4 s/p grid/strip removal and focectomy - L anterior temporal  - Planned for discharge today.   - Continue home dose Lamictal - 200 mg qAM 300 mg qPM. Ativan 1mg qd x3 days then stop    Plan of care discussed with NCC team and patient.  Follow up with Dr Elam

## 2018-11-23 NOTE — PROGRESS NOTES
"[facility]  Neurosurgery  Progress Note    Subjective:     History of Present Illness: Pt is a 48 y.o. female who presents per referral by Dr. LISA Elam and the epilepsy team for evaluation for epilepsy surgery. Pt has history of partial intractable epilepsy and has failed at least 10 previous medications. Currently on dual therapy. EMU workup done in February localized pathology to left temporal lobe, with PET scan showing hypometabolism of mesial temporal lobe. DAWOOD scan also showed localization to left mesial temporal lobe. Pt states that she has endured seizures since 19 y.o. Pt currently on Lamictal. Pt states that she endures about 5 absence episodes per day with intermittent LOC. Pt notes one incident of "rolling" seizures for which she was brought to the ED.          Post-Op Info:  Procedure(s) (LRB):  CRANIOTOMY, FOR SUBDURAL GRID AND STRIP ELECTRODE LEAD Removal. (Left)  LOBECTOMY, BRAIN left temporal lobe. (Left)   4 Days Post-Op     Interval History:  NAEON.   Denies HAs, N/V, visual changes, weakness, or seizures.  Denies F/C/CP/SOB.  Tolerating diet.  Voiding.       Medications:  Continuous Infusions:  Scheduled Meds:   heparin (porcine)  5,000 Units Subcutaneous Q8H    lamoTRIgine  200 mg Oral Daily    lamoTRIgine  300 mg Oral QHS    LORazepam  1 mg Oral BID    polyethylene glycol  17 g Oral Daily    senna-docusate 8.6-50 mg  1 tablet Oral BID     PRN Meds:acetaminophen, acetaminophen, HYDROcodone-acetaminophen, HYDROmorphone, magnesium oxide, magnesium oxide, metoclopramide HCl, midazolam, ondansetron, sodium chloride 0.9%     Review of Systems  Objective:     Weight: 106.6 kg (235 lb 0.2 oz)  Body mass index is 39.11 kg/m².  Vital Signs (Most Recent):  Temp: 98.2 °F (36.8 °C) (11/23/18 1150)  Pulse: 73 (11/23/18 1150)  Resp: 18 (11/23/18 0805)  BP: (!) 110/58 (11/23/18 1150)  SpO2: (!) 94 % (11/23/18 1150) Vital Signs (24h Range):  Temp:  [97.4 °F (36.3 °C)-98.9 °F " (37.2 °C)] 98.2 °F (36.8 °C)  Pulse:  [73-95] 73  Resp:  [18] 18  SpO2:  [94 %-96 %] 94 %  BP: ()/(54-71) 110/58                           Neurosurgery Physical Exam      General: no distress  Neurologic: Alert and oriented. Thought content appropriate.  Head: normocephalic  GCS: Motor: 6/Verbal: 5/Eyes: 4 GCS Total: 15  Cranial nerves: face symmetric, tongue midline, pupils equal, round, reactive to light with accomodation, extraocular muscles intact  Sensory: response to light touch throughout  Motor Strength:full strength upper and lower extremities  Pronator Drift: no drift noted  Finger to nose normal  No focal numbness or weakness  Lungs:  normal respiratory effort  Abdomen: soft, non-tender   Extremities: no cyanosis or edema, or clubbing  Surgical incision is c/d/i       Significant Labs:  Recent Labs   Lab 11/22/18  0404 11/23/18  0515    108    141   K 4.4 4.5    106   CO2 27 26   BUN 13 14   CREATININE 0.8 0.8   CALCIUM 9.3 9.3   MG 2.0 2.1     Recent Labs   Lab 11/22/18  0404 11/23/18  0515   WBC 8.14 7.01   HGB 10.0* 9.4*   HCT 32.2* 30.8*    312     No results for input(s): LABPT, INR, APTT in the last 48 hours.  Microbiology Results (last 7 days)     ** No results found for the last 168 hours. **            Assessment/Plan:     * Temporal lobe epilepsy, intractable    47 yo female with history of seizures since age 19, now s/p  crani with subdural grid placement for further localization (11/5) and repositioning following pt induced malpositioning (11/8). Pt s/p crani for grid removal and focectomy (11/19).  - Neurologically stable without further seizure episodes post op  - Post op MRI shows expected post op findings.   - Cleared for home by PT  - Appreciate Epilepsy rec's, continue home does of Lamictal, continue po ativan 1 mg daily x 3 more days then DC.   - DC home today, follow up in 2 weeks for wound check   - Discussed with Dr. Parrish Cintron,  PA  Neurosurgery  [facility]

## 2018-11-23 NOTE — PLAN OF CARE
Problem: Occupational Therapy Goal  Goal: Occupational Therapy Goal       Outcome: Outcome(s) achieved Date Met: 11/23/18  OT session completed. Rec  for d/c planning. DME need: shower chair-- discussed with case mgmt following session. Pt with no skilled OT needs in acute setting- she is safe to be up with nursing staff supervision. Would encourage mobility in hallway with assistance x1 for endurance mgmt. MARCIE Graf 11/23/2018

## 2018-11-23 NOTE — PROCEDURES
DATE OF SERVICE:  11/09/2018    EEG #:  [id number]    LOCATION OF SERVICE:  OR-1    ELECTROENCEPHALOGRAM REPORT    METHODOLOGY:  Electroencephalographic (EEG) recording is recorded with   electrodes placed according to the International 10-20 placement system.  Thirty   two (32) channels of digital signal (sampling rate of 512/sec), including T1   and T2, were simultaneously recorded from the scalp and may include EKG, EMG,   and/or eye monitors.  Recording band pass was 0.1 to 512 Hz.  Digital video   recording of the patient is simultaneously recorded with the EEG.  The patient   is instructed to report clinical symptoms which may occur during the recording   session.  EEG and video recording are stored and archived in digital format.    Activation procedures, which include photic stimulation, hyperventilation and   instructing patients to perform simple tasks, are done in selected patients.    The EEG is displayed on a monitor screen and can be reviewed using different   montages.  Computer assisted-analysis is employed to detect spike and   electrographic seizure activity.   The entire record is submitted for computer   analysis.  The entire recording is visually reviewed, and the times identified   by computer analysis as being spikes or seizures are reviewed again.      Compressed spectral analysis (CSA) is also performed on the activity recorded   from each individual channel.  This is displayed as a power display of   frequencies from 0 to 30 Hz over time.   The CSA is reviewed looking for   asymmetries in power between homologous areas of the scalp, then compared with   the original EEG recording.      [employer] software was also utilized in the review of this study.  This software   suite analyzes the EEG recording in multiple domains.  Coherence and rhythmicity   are computed to identify EEG sections which may contain organized seizures.    Each channel undergoes analysis to detect the presence of spike  and sharp waves   which have special and morphological characteristics of epileptic activity.  The   routine EEG recording is converted from special into frequency domain.  This is   then displayed comparing homologous areas to identify areas of significant   asymmetry.  Algorithm to identify non-cortically generated artifact is used to   separate artifact from the EEG.      EEG FINDINGS:  The patient was recorded from the Operating Room.  The grid and   strip electrodes had to be replaced.  After 8 x 8 grid and three strip   electrodes of 4 x 2, 1 x 8 and 2 x 4 had been positioned, the EEG was restarted.    The system showed good electrocortical activity on all leads.  At this   juncture, the procedure was discontinued.    IMPRESSION:  The intregrety of the replacement grids and strips are verified with recording of good quality electrocortical activity while in the Operating Room.      TITO  dd: 11/20/2018 16:17:25 (CST)  td: 11/20/2018 16:28:22 (CST)  Doc ID   #[id number]  Job ID #[id number]    CC:

## 2018-11-23 NOTE — DISCHARGE SUMMARY
"[facility]  Neurosurgery  Discharge Summary      Patient Name: Liza Arrieta  MRN: [id number]  Admission Date: 11/5/2018  Hospital Length of Stay: 18 days  Discharge Date: 11/23/118  Attending Physician: Ruben Senior M.D.   Discharging Provider: MARCIO Perry  Primary Care Provider: LISA Elam MD    HPI:   Pt is a 48 y.o. female who presents per referral by Dr. LISA Elam and the epilepsy team for evaluation for epilepsy surgery. Pt has history of partial intractable epilepsy and has failed at least 10 previous medications. Currently on dual therapy. EMU workup done in February localized pathology to left temporal lobe, with PET scan showing hypometabolism of mesial temporal lobe. DAWOOD scan also showed localization to left mesial temporal lobe. Pt states that she has endured seizures since 19 y.o. Pt currently on Lamictal. Pt states that she endures about 5 absence episodes per day with intermittent LOC. Pt notes one incident of "rolling" seizures for which she was brought to the ED.          Procedure(s) (LRB):  CRANIOTOMY, FOR SUBDURAL GRID AND STRIP ELECTRODE LEAD Removal. (Left)  LOBECTOMY, BRAIN left temporal lobe. (Left)     Hospital Course: 11/6: POD 1 s/p craniotomy for grids placement  11/7: NAEON, no seizures,   11/9: stable exam  11/10: NAEON  11/11: NAEON, stable exam overnight  11/13: leaking around a lead, will suture today. Plan for cortical mapping on 11/14 11/14  One seizure overnight and 2 seizures noted today. remains on EEG. Epilepsy to do cortical mapping this aftrnoon  11/15 24h EEG continues no seizures over night and so far today. Cortical mapping today  11/16: PT/OT, restart home AED meds  11/17: CTH, cEEG  11/18: Plan for OR tomorrow  11/19: Or today(Grid and strip), CTH  11/20: Post op Mri stable  11/21 No significant events overnight. Hemodynamically stable. Will step down  To NSGY with epilepsy following  11/23: Pt continues to improve post op.  No " "further seizure activity.  Tolerating diet.  Voiding.  Mild incisional pain. Cleared by PT for home. Neurologically stable. VSS.  DC home today.  F/u in 2 weeks for wound check.         Consults:   Consults (From admission, onward)        Status Ordering Provider     Inpatient consult to Midline team  Once     Provider:  (Not yet assigned)    Completed BOB HARPER     Inpatient consult to PICC team (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    Specialty:  Neurosurgery  Why:  For wound re-check  Contact information:  Shania Patel  [facility] [address] [phone]  Additional information:  7th Floor               Patient Instructions:      BATH/SHOWER CHAIR FOR HOME USE     Order Specific Question Answer Comments   Height: 5' 5" (1.651 m)    Weight: 106.6 kg (235 lb 0.2 oz)    Does patient have medical equipment at home? none    Length of need (1-99 months): 99    Type: With back      Notify your health care provider if you experience any of the following:  temperature >100.4     Notify your health care provider if you experience any of the following:  persistent nausea and vomiting or " diarrhea     Notify your health care provider if you experience any of the following:  severe uncontrolled pain     Notify your health care provider if you experience any of the following:  redness, tenderness, or signs of infection (pain, swelling, redness, odor or green/yellow discharge around incision site)     Notify your health care provider if you experience any of the following:  difficulty breathing or increased cough     Notify your health care provider if you experience any of the following:  severe persistent headache     Notify your health care provider if you experience any of the following:  worsening rash     Notify your health care provider if you experience any of the following:  persistent dizziness, light-headedness, or visual disturbances     Notify your health care provider if you experience any of the following:  increased confusion or weakness     Medications:  Reconciled Home Medications:      Medication List      START taking these medications    HYDROcodone-acetaminophen 5-325 mg per tablet  Commonly known as:  NORCO  Take 1 tablet by mouth every 4 (four) hours as needed for Pain.     LORazepam 1 MG tablet  Commonly known as:  ATIVAN  Take 1 tablet (1 mg total) by mouth once daily. for 3 days        CHANGE how you take these medications    lamoTRIgine 200 MG tablet  Commonly known as:  LAMICTAL  Take 1.5 tablets (300 mg total) by mouth 2 (two) times daily.  What changed:    · how much to take  · additional instructions           ferrous sulfate 325 mg (65 mg iron) Tab tablet  Commonly known as:  FEOSOL  Take 1 tablet (325 mg total) by mouth 2 (two) times daily.              MARCIO Perry  Neurosurgery  [facility]

## 2018-11-26 NOTE — PT/OT/SLP DISCHARGE
Physical Therapy Discharge Summary    Name: Liza Arrieta  MRN: [id number]   Principal Problem: Temporal lobe epilepsy, intractable     Patient Discharged from acute Physical Therapy on 18.  Please refer to prior PT noted date on 18 for functional status.     Assessment:     Patient has not met goals.    Objective:     GOALS:   Multidisciplinary Problems     Physical Therapy Goals     Not on file          Multidisciplinary Problems (Resolved)        Problem: Physical Therapy Goal    Goal Priority Disciplines Outcome Goal Variances Interventions   Physical Therapy Goal   (Resolved)     PT, PT/OT Outcome(s) achieved     Description:  Goals to be met by: 2018    Patient will increase functional independence with mobility by performin. Sit to stand transfer with Modified [place]  2. Gait  x 200 feet with Supervision without AD.   3. Ascend/descend 3 stairs with right Handrails Supervision  4. Stand for 3 minutes with Supervision while performing dynamic balance activities to reduce fall risk   5. Lower extremity exercise program x15 reps per handout, with supervision                      Reasons for Discontinuation of Therapy Services  Pt discharged home.     Plan:     Patient Discharged to: Home no PT services needed    Luana Mario, PT  2018

## 2018-12-03 ENCOUNTER — PATIENT MESSAGE (OUTPATIENT)
Dept: NEUROSURGERY | Facility: CLINIC | Age: 48
End: 2018-12-03

## 2018-12-06 NOTE — PHYSICIAN QUERY
PT Name: Liza Arrieta  MR #: [id number]    Physician Query Form - Consultant Diagnosis Clarification     CDS/: Perla Pearce               Contact information: [phone]  This form is a permanent document in the medical record.     Query Date: December 6, 2018      By submitting this query, we are merely seeking further clarification of documentation.  Please utilize your independent clinical judgment when addressing the question(s) below.      The Medical record contains the following:   Diagnosis Supporting Clinical Information Location in Medical Record   FINAL PATHOLOGIC DIAGNOSIS  [facility] DIAGNOSIS:  BRAIN, LEFT ANTERIOR TEMPORAL LOBE, EXCISION ([id number]; [facility] [place], [place]; COLLECTED 11/19/2018):  -Cortex with moderate cortical and subpial gliosis, and scattered thrombosed vessels and leptomeningeal mixed  Inflammation.    female who presents per referral by Dr. LISA Elam and the epilepsy team for evaluation for epilepsy surgery    PRINCIPAL PROBLEM:  Temporal lobe epilepsy, intractable     Pathology 11/19                            D/C summary 11/23 (Saida)         Do you agree with the Consultants diagnosis of ____Cortex with moderate cortical and subpial gliosis_______________________________?    [X  ] Yes   [  ] Yes, but it resolved prior to my assessment of the patient   [  ] No   [  ] Clinically insignificant   [  ] Other/Clarification of findings:   [  ] Clinically undetermined

## 2018-12-07 ENCOUNTER — PATIENT MESSAGE (OUTPATIENT)
Dept: NEUROLOGY | Facility: CLINIC | Age: 48
End: 2018-12-07

## 2018-12-07 ENCOUNTER — PATIENT MESSAGE (OUTPATIENT)
Dept: NEUROSURGERY | Facility: CLINIC | Age: 48
End: 2018-12-07

## 2018-12-11 ENCOUNTER — PATIENT MESSAGE (OUTPATIENT)
Dept: NEUROSURGERY | Facility: CLINIC | Age: 48
End: 2018-12-11

## 2018-12-11 ENCOUNTER — PATIENT MESSAGE (OUTPATIENT)
Dept: NEUROLOGY | Facility: CLINIC | Age: 48
End: 2018-12-11

## 2018-12-12 ENCOUNTER — PATIENT MESSAGE (OUTPATIENT)
Dept: NEUROLOGY | Facility: CLINIC | Age: 48
End: 2018-12-12

## 2018-12-14 ENCOUNTER — LAB VISIT (OUTPATIENT)
Dept: LAB | Facility: HOSPITAL | Age: 48
End: 2018-12-14
Attending: PSYCHIATRY & NEUROLOGY
Payer: COMMERCIAL

## 2018-12-14 ENCOUNTER — OFFICE VISIT (OUTPATIENT)
Dept: NEUROLOGY | Facility: CLINIC | Age: 48
End: 2018-12-14
Payer: COMMERCIAL

## 2018-12-14 DIAGNOSIS — F41.9 ANXIETY DISORDER, UNSPECIFIED TYPE: ICD-10-CM

## 2018-12-14 DIAGNOSIS — R56.9 SEIZURES: Primary | ICD-10-CM

## 2018-12-14 DIAGNOSIS — R56.9 SEIZURES: ICD-10-CM

## 2018-12-14 PROCEDURE — 90834 PSYTX W PT 45 MINUTES: CPT | Mod: S$GLB,,, | Performed by: CLINICAL NEUROPSYCHOLOGIST

## 2018-12-14 PROCEDURE — 36415 COLL VENOUS BLD VENIPUNCTURE: CPT

## 2018-12-14 PROCEDURE — 80339 ANTIEPILEPTICS NOS 1-3: CPT

## 2018-12-14 PROCEDURE — 99499 UNLISTED E&M SERVICE: CPT | Mod: S$GLB,,, | Performed by: CLINICAL NEUROPSYCHOLOGIST

## 2018-12-14 PROCEDURE — 80175 DRUG SCREEN QUAN LAMOTRIGINE: CPT

## 2018-12-14 NOTE — PROGRESS NOTES
PSYCHOTHERAPY PROGRESS NOTE  CONFIDENTIAL  Name: Liza Arrieta  MRN: [id number]  DOS: 2018  : 1970  Age: 48 y.o.  Referral Reason/Medical Necessity: Anxiety and elevated/subclinical manic sxs s/p neurosurgery  [facility] (CPT: 10473) Individual Therapy  Consent: The patient was provided informed consent regarding psychotherapy services, understood limits to confidentiality, and consented to all procedures.     SUBJECTIVE/SESSION CONTENT:   Patient presented on time for psychotherapy session to address the followin-discuss her adjustment post surgery  2-new onset symptoms of possible subclinical jhonatan and anxiety  --she reports talking a lot more frequently and more rapid speech; frequent shifts in topic when talking; more goal-directed behavior; slight increase in libido; faster thought process; she denied any actual full manic type symptoms;   --she added that she has intermittent and very brief random worry about suicide; again this appears more anxiety driven as she has no depression, no intent or plan for suicide; instead, she just seems more worried about the thought popping into her mind  --her symptoms appear to be a mix of anxiety and possible elevated mood; her presentation appeared more anxious than hypomanic  --she reported that she is just generally good mood because she is no longer having any seizures and is looking forward to being able to do all the things that she was prohibited from doing before.   corroborated this and denied any significant symptoms associated with jhonatan.  His symptom observations were also more consistent with just significant anxiety along with possible hyperactivity  --we discussed the reasons for these symptoms, which may include:  Elevated mood post surgery, possible emotional changes post surgery better temporary, medication effects (her concern), or a worsening of her longstanding anxiety disorder  3-symptom management  --we discussed  "behavioral ways to manage her anxiety including mind fullness, relaxation exercises, thought stopping, physical exercise, use of a self-help book  --she practiced a few strategies in session  --we practiced thought stopping particularly in the context of her worry about her atypical self-harm thoughts (review above); this includes, minimizing unusual thoughts, normalizing the fact we all have unusual thoughts at times, and ignoring them or distracting 1 self    OBJECTIVE/BEHAVIORAL OBSERVATIONS:  APPEARANCE: Casually dressed and adequate grooming/hygiene.   ALERTNESS/ORIENTATION: Attentive and alert. Fully oriented (x5) to time and place  GAIT/MOTOR: Unremarkable  SPEECH/LANGUAGE: Normal in rate, rhythm, tone, and volume. No significant word finding difficulty noted. Expressive and receptive language was normal.  STATED MOOD/AFFECT: The patients stated mood was "anxious." Affect was congruent with stated mood.   INTERPERSONAL BEHAVIOR: Rapport was quickly and easily established   SUICIDALITY/HOMICIDALITY: Denied  HALLUCINATIONS/DELUSIONS: None evidenced or endorsed  THOUGHT PROCESSES: Thoughts seemed largely logical and goal-directed.  She was quite scattered in need redirection.      THERAPEUTIC APPROACH: CBT    ASSESSMENT/PLAN    Anxiety Disorder - recent increase in symptoms  --she will use the various strategies we discussed  --if not successful, she is going to follow-up with Dr. Ybarra about addressing her current medication regimen along with possible initiation of SSRI  --additionally, medication is not an option then she is also going to look into psychotherapy near her; unfortunately, I can't provide regular therapy  --we will follow up with me jcarlos Giron II, Ph.D.  Clinical Neuropsychologist  [facility] - Department of Neurology    "

## 2018-12-17 LAB — LAMOTRIGINE SERPL-MCNC: 13 UG/ML (ref 2–15)

## 2018-12-18 ENCOUNTER — PATIENT MESSAGE (OUTPATIENT)
Dept: NEUROSURGERY | Facility: CLINIC | Age: 48
End: 2018-12-18

## 2018-12-18 ENCOUNTER — OFFICE VISIT (OUTPATIENT)
Dept: NEUROSURGERY | Facility: CLINIC | Age: 48
End: 2018-12-18
Payer: COMMERCIAL

## 2018-12-18 VITALS
BODY MASS INDEX: 39.15 KG/M2 | TEMPERATURE: 99 F | DIASTOLIC BLOOD PRESSURE: 60 MMHG | HEIGHT: 65 IN | SYSTOLIC BLOOD PRESSURE: 121 MMHG | HEART RATE: 87 BPM | WEIGHT: 235 LBS

## 2018-12-18 DIAGNOSIS — G40.119 TEMPORAL LOBE EPILEPSY, INTRACTABLE: Primary | ICD-10-CM

## 2018-12-18 PROCEDURE — 3008F BODY MASS INDEX DOCD: CPT | Mod: CPTII,S$GLB,, | Performed by: NEUROLOGICAL SURGERY

## 2018-12-18 PROCEDURE — 99024 POSTOP FOLLOW-UP VISIT: CPT | Mod: S$GLB,,, | Performed by: NEUROLOGICAL SURGERY

## 2018-12-18 PROCEDURE — 99999 PR PBB SHADOW E&M-EST. PATIENT-LVL III: CPT | Mod: PBBFAC,,, | Performed by: NEUROLOGICAL SURGERY

## 2018-12-18 NOTE — PROGRESS NOTES
Subjective:    I, Lakshmi Alvarez, attest that this documentation has been prepared under the direction and in the presence of SUMIT Senior MD.     Patient ID: Liza Arrieta is a 48 y.o. female.    Chief Complaint: No chief complaint on file.    HPI   Pt is a 48 y.o. female who presents s/p left temporal lobectomy, dated 11/19/2018. Pt states that she has received significant relief of seizures post-op, but she states that she has endured 1 absence seizure post-op with LOC. Pt does note word finding difficulty. Currently being followed by Dr. Elam.     Review of Systems   Constitutional: Negative for chills, fatigue and fever.   HENT: Negative for sinus pressure and trouble swallowing.    Eyes: Negative.  Negative for visual disturbance.   Respiratory: Negative.    Cardiovascular: Negative.    Gastrointestinal: Negative.  Negative for nausea and vomiting.   Endocrine: Negative.    Genitourinary: Negative.    Musculoskeletal: Negative.         Positive for word finding difficulties.    Neurological: Positive for seizures. Negative for dizziness, syncope, speech difficulty, weakness and numbness.       Objective:      Physical Exam:  Nursing note and vitals reviewed.    Constitutional: She appears well-developed.     Eyes: Pupils are equal, round, and reactive to light. Conjunctivae and EOM are normal.     Cardiovascular: Normal rate, regular rhythm, normal pulses and intact distal pulses.     Abdominal: Soft.     Psych/Behavior: She is alert. She is oriented to person, place, and time. She has a normal mood and affect.     Musculoskeletal: Gait is normal.        Neck: Range of motion is full. There is no tenderness. Muscle strength is 5/5. Tone is normal.        Back: Range of motion is full. There is no tenderness. Muscle strength is 5/5. Tone is normal.        Right Upper Extremities: Range of motion is full. There is no tenderness. Muscle strength is 5/5. Tone is normal.        Left Upper Extremities: Range of motion  is full. There is no tenderness. Muscle strength is 5/5. Tone is normal.       Right Lower Extremities: Range of motion is full. There is no tenderness. Muscle strength is 5/5. Tone is normal.        Left Lower Extremities: Range of motion is full. There is no tenderness. Muscle strength is 5/5. Tone is normal.     Neurological:        Coordination: She has a normal Romberg Test, normal finger to nose coordination, normal heel to shin coordination and normal tandem walking coordination.        DTRs: DTRs are normal. Tricep reflexes are 2+ on the right side and 2+ on the left side. Bicep reflexes are 2+ on the right side and 2+ on the left side. Brachioradialis reflexes are 2+ on the right side and 2+ on the left side. Patellar reflexes are 2+ on the right side and 2+ on the left side. Achilles reflexes are 2+ on the right side and 2+ on the left side.        Cranial nerves: Cranial nerve(s) II, III, IV, V, VI, VII, VIII, IX, X, XI and XII are intact.       Pt has done well post-op. Only had one seizure post-op. Pt endured 4-5 seizures daily pre-op. Pt only complains of word finding difficulties, but thinks that this is improving.     Pt is awake, alert, and appropriate.  Visual fields full to confrontation.   Memory appears to be reasonably intact, but she does have slight word finding difficulty.  Staples are in place.  Wound well healed.      Imaging:   No imaging reviewed.     Assessment/Plan:   Pt s/p left temporal lobectomy. I think her seizures have improved. Have her f/u with the epilepsy team. I think her word finding difficulty has improved. We will get f/u MRI scan in 6 months.     I, SUMIT Senior MD, personally performed the services described in this documentation. All medical record entries made by the scribe, Lakshmi Alvarez, were at my direction and in my presence.  I have reviewed the chart and agree that the record reflects my personal performance and is accurate and complete.

## 2018-12-19 LAB
CLOBAZAM: <10 NG/ML (ref 30–300)
DESMETHYLCLOBAZAM: <50 NG/ML (ref 300–3000)

## 2019-01-05 ENCOUNTER — PATIENT MESSAGE (OUTPATIENT)
Dept: NEUROLOGY | Facility: CLINIC | Age: 49
End: 2019-01-05

## 2019-01-05 ENCOUNTER — PATIENT MESSAGE (OUTPATIENT)
Dept: NEUROSURGERY | Facility: CLINIC | Age: 49
End: 2019-01-05

## 2019-01-14 ENCOUNTER — OFFICE VISIT (OUTPATIENT)
Dept: NEUROLOGY | Facility: CLINIC | Age: 49
End: 2019-01-14
Payer: COMMERCIAL

## 2019-01-14 VITALS
DIASTOLIC BLOOD PRESSURE: 83 MMHG | SYSTOLIC BLOOD PRESSURE: 121 MMHG | HEART RATE: 70 BPM | WEIGHT: 234.81 LBS | BODY MASS INDEX: 39.12 KG/M2 | HEIGHT: 65 IN

## 2019-01-14 DIAGNOSIS — G40.219 COMPLEX PARTIAL EPILEPSY WITH GENERALIZATION AND WITH INTRACTABLE EPILEPSY: Primary | ICD-10-CM

## 2019-01-14 DIAGNOSIS — E66.01 CLASS 2 SEVERE OBESITY DUE TO EXCESS CALORIES WITH SERIOUS COMORBIDITY AND BODY MASS INDEX (BMI) OF 38.0 TO 38.9 IN ADULT: ICD-10-CM

## 2019-01-14 PROCEDURE — 99214 PR OFFICE/OUTPT VISIT, EST, LEVL IV, 30-39 MIN: ICD-10-PCS | Mod: S$GLB,,, | Performed by: PSYCHIATRY & NEUROLOGY

## 2019-01-14 PROCEDURE — 3008F PR BODY MASS INDEX (BMI) DOCUMENTED: ICD-10-PCS | Mod: CPTII,S$GLB,, | Performed by: PSYCHIATRY & NEUROLOGY

## 2019-01-14 PROCEDURE — 99999 PR PBB SHADOW E&M-EST. PATIENT-LVL II: CPT | Mod: PBBFAC,,, | Performed by: PSYCHIATRY & NEUROLOGY

## 2019-01-14 PROCEDURE — 99999 PR PBB SHADOW E&M-EST. PATIENT-LVL II: ICD-10-PCS | Mod: PBBFAC,,, | Performed by: PSYCHIATRY & NEUROLOGY

## 2019-01-14 PROCEDURE — 3008F BODY MASS INDEX DOCD: CPT | Mod: CPTII,S$GLB,, | Performed by: PSYCHIATRY & NEUROLOGY

## 2019-01-14 PROCEDURE — 99214 OFFICE O/P EST MOD 30 MIN: CPT | Mod: S$GLB,,, | Performed by: PSYCHIATRY & NEUROLOGY

## 2019-01-14 RX ORDER — LAMOTRIGINE 200 MG/1
500 TABLET ORAL DAILY
Qty: 225 TABLET | Refills: 3 | Status: SHIPPED | OUTPATIENT
Start: 2019-01-14 | End: 2019-11-22 | Stop reason: SDUPTHER

## 2019-01-14 NOTE — PROGRESS NOTES
Name: Gloria Gurrola  MRN: [id number]   CSN: [id number]      Date: 01/14/2019    HISTORY OF PRESENT ILLNESS (HPI)  11/17/2015  The patient is a 48 y.o. yo RHWM   The patient was initially referred for consultation by Dr. Huerta.   The patient was unaccompanied today.     Clinic Visits  Interim History  2019/01/14  The patient underwent a L temporal lobectomy 2 months ago.  She has experience none of her typical seizures.  She did experience Judi Vu twice since surgery.  In the past she had reported Judi Vu as an aura.  She had not experienced any episodes of visual motor apraxia (unable to move eyes in any direction). She takes lamictal 200 mg in the AM and 300 mg in the PM.  She will be converted to once a day dosing of 500 mg which she prefers to do in the evening.      She feels so much better now.  She reports feeling as if she is 19 yo.      2018/05/29  Patient had another day of almost continuous seizures which was on May 09,2018.  She has failed several AEDs mainly due to side effects.  She experienced pharmacodynamic interaction and toxicity with Lamictal - Lacosamide combo.  She is tolerating the lamictal well       2018/04/09  The patient had 9 seizures recorded in last EMU visit all coming from L anterior temporal area.   MRI was normal but PET showed L mesial hypometabolism.  She is currently have almost daily seizures.  She has failed four AEDs is on Lamictal monotherapy.  She reports her verbal memory is terrible.  Review of labs show she was B12 deficient 4 yrs ago and she does not take any supplements.    Results for GLORIA GURROLA (MRN [id number]) as of 4/9/2018 16:23   Ref. Range 3/1/2016 14:45 12/19/2017 12:56 2/8/2018 18:12   Lamotrigine Lvl Latest Ref Range: 2.0 - 15.0 ug/mL 12.0 10.5 7.8     2018/01/29  EMU evaluation was completed in Dec and L temporal interictal spikes were recorded and one electrographic seizure was recorded arising from the L anterior temporal area.  Besides frequent seizures  "her major complaint is progressive memory loss.      HISTORY OF PRESENT ILLNESS (HPI)  Date: The   New Patient  Pt has been seeing Dr Huerta at [facility] for "complex partial sezures." First sz, in school, age 19. Was put on Dilantin and she did well for approx 15 years, until the seizures became active again. Thinks she may have had 2 classic "Grand Mal" seizures in her 20's, but since then, seizure types are those described below.    Currently, she is experiencing more than one event per week. Event type is described below. She has been failing her current treatment regimen as described below. May have tried other meds, but can't remember them now. Did not bring records yet.        Seizure Seminology  Seizure Type 1   Classification: Pass-out  Aura - Occasional auditory мария vu  Pt loses consciousness and falls or loses tone 1-2 in  Post-ictal  Brief  Age of onset 19  Current Seizure Frequency - Several per week      Seizure Type 2  Classification: Complex Partial  Wanders around. Doesn't remember after.   Post-ictal  Brief  Current Seizure Frequency - Several per week    Seizure Triggers  Sleep Deprivation - None  Other medications - None  Psych/stress - None  Photic stimulation - None  Hyperventilation - None  Medical Problems - None  Menses - 3 days before period they get worse  Sensory Stimulation (light, sound, etc) - None  Missed dose of meds - None    AED Treatments  Present regimen   tabs 1 in AM and 1½ in PM   perampanel (Fycompa, FCP)    Prior treatments  eslicarbazine (Aptiom, ESL) - seizure intensity worsened after 2 weeks Rx  lacosamide (Vimpat, LCS)   oxcarbazepine (Trileptal OXC)   BID  TPM 10ID  valproic acid (Depakote, VPA)   zonisamide (Zonegran, ZNA)    Not tried  acetazolamide (Diamox, AZM)  amantadine  carbamazepine (Tegretol, CBZ)  clobazam (Onfi or Frizium, CLB)  ethosuximide (Zarontin, ESM)  felbamate (felbatol, FBM)  gabapentin (Neurontin, GPN)  levetiracetam (Keppra, " LEV)  methsuximide (Celontin, MSM)  methyphenytoin (Mesantion, MHT)  perampanel (Fycompa, FCP)    phenobarbital (Pb)  pregabalin (Lyrica, PGB)  primidone (Mysoline, PRM)  retigabine (Potiga, RTG)  rufinamide (Banzel, RUF)  tiagabine (Gabatril, TGB)  viagabatrin, (Sabril, VGB)  vagal nerve stimulator (VNS)    Benzodiazepines  diazepam - rectal (Diastatl)  diazepam - oral (Valium, DZ)  clonazepam (Klonopin, CZP)  clorazepate (Tranxene, CLZ)  Ativan  Brain Stimulation  Vagal Nerve Stimulation-n/a  DBS- n/a    Compliance method  Memory - yes  Mom or Spouse - Yes  Pill Box - no  Dewayne calendar - no  Turn over medication bottle - no  Phone alarm - no    Seizure Evaluation  EEG Routine - Dont have  MRI/MRA - In past- she doesn't know results  [facility]  2017/12/19-12/20- Patient with no events overnight. EEG shows L anterior temporal spikes, most recorded at F7. None on the other side. At times, almost continuous L temporal interictal discharges at times.   2017/12/20- 11:56:23- clinical seizure, starting from L side on EEG. No epileptiform discharges noted. L temporal slowing and spikes noted. Consisted of brief moment of unresponsiveness.   2017/12/21- EEG showing L interical anterior temporal slowing with L temporal spikes. No clinical seizures since yesterday.   2017/12/21-12/22- Event on 12/21 at 18:55 showing patient talking on the phone and suddenly stopping, unable to speak and confused. EEG shows there is a rhythmic buildup in the L temporal chains. Patient is confused and is drowsy following event. No tonic/clonic activity present.     CT/CTA Scan -   PET Scan -   Neuropsychological evaluation -   DEXA Scan    Potential Epilepsy Risk Factors:   Pregnancy/Labor/Delivery - full term uncomplicated pregnancy labor and vaginal delivery  Febrile seizures - none  Head injury - none  CNS infection - none   Stroke - none  Family Hx of Sz - none    PAST MEDICAL HISTORY:   Active Ambulatory Problems     Diagnosis  Date Noted       No Active Ambulatory Problems    Resolved Ambulatory Problems     Diagnosis  Date Noted      No Resolved Ambulatory Problems    No Additional Past Medical History         PAST SURGICAL HISTORY: No past surgical history on file.     FAMILY HISTORY: No family history on file.      SOCIAL HISTORY:    Social History    History    Social History      Marital Status:        Spouse Name:  N/A      Number of Children:  N/A      Years of Education:  N/A    Occupational History      Not on file.    Social History Main Topics      Smoking status:  Never Smoker      Smokeless tobacco:  Not on file      Alcohol Use:  No      Drug Use:  No      Sexual Activity:  Not on file    Other Topics  Concern      Not on file    Social History Narrative      No narrative on file            SUBSTANCE USE:  Social History    Social History Main Topics      Smoking status:  Never Smoker      Smokeless tobacco:  Not on file      Alcohol Use:  No      Drug Use:  No      Sexual Activity:  Not on file       History    Substance Use Topics      Smoking status:  Never Smoker      Smokeless tobacco:  Not on file      Alcohol Use:  No         ALLERGIES: Review of patient's allergies indicates no known allergies.       Review of Systems   Constitutional: Negative for fever, chills, weight loss, malaise/fatigue and diaphoresis.   HENT: Negative for ear pain, hearing loss, nosebleeds and tinnitus.   Eyes: Negative for blurred vision, double vision, photophobia and pain.   Respiratory: Negative for cough, hemoptysis and shortness of breath.   Cardiovascular: Negative for chest pain, palpitations, orthopnea and leg swelling.   Gastrointestinal: Negative for heartburn, nausea, vomiting, abdominal pain, diarrhea, constipation and blood in stool.   Genitourinary: Negative for dysuria and hematuria.   Musculoskeletal: Negative for myalgias, joint pain and falls.   Skin: Negative for itching and rash.   Neurological: Positive for  "tingling, sensory change and seizures. Negative for dizziness, tremors, speech change, focal weakness, loss of consciousness, weakness and headaches.   Endo/Heme/Allergies: Negative for environmental allergies. Does not bruise/bleed easily.   Psychiatric/Behavioral: Positive for memory loss. Negative for depression, hallucinations and substance abuse. The patient has insomnia. The patient is not nervous/anxious.       /80 mmHg  Pulse 69  Ht 5' 6" (1.676 m)  Wt 110.1 kg (242 lb 11.6 oz)  BMI 39.20 kg/m2      Neurologic Exam      Higher Cortical Function:   Patient is a well developed, pleasant, well groomed individual appearing their stated age  Oriented - intact to person, place and time and followed two step instruction correctly.   Fund of knowledge was appropriate.   Language - Speech was fluent without evidence for an aphasia.    Throat: no aphthous ulcers noted in mouth, no erythema or enlargement of tonsils, pharynx is clear without inflammation   Lymph nodes: No enlargement of lymph nodes    Cranial Nerves II - XII:   EOMs were intact with normal smooth and no nystagmus.   PERRLA. D/C Funduscopic exam - disc were flat with normal A/V ratio and no exudates or hemorrhages. Visual fields were full to confrontation.   Motor - facial movement was symmetrical and normal.   Facial sensory - Light touch and pin prick sensations were normal.   Hearing was normal to finger rub.  Palate moved well and was symmetrical with normal palatal and oral sensation.   Tongue movement was full & the patient could say "la la la" and "Ka Ka Ka" without  difficulty. Patient repeated Moravian and Scientology without difficulty. Normal power and bulk was found in the massiter and rotator muscles of the neck.  Motor: Power, bulk and tone were normal in all extremities.  Sensory: Light touch, pin prick, vibration and position senses were normal in all extremities.   Coordination:   Rapid alternating movements and rapid finger " tapping - normal.   Finger to nose - nl.   Arm roll - symmetrical.   Gait: Station, gait and tandem walking were done without difficulty and Romberg was negative.    Deep tendon reflexes:   Reflex  L  R    Bicpets  2+  2+    Tricepts  2+  2+    Brachio-radialis  2+  2+    Knee  2+  2+    Ankle  2+  2+    Babinski  No  No      Tremor: resting, postural, intentional - none    Pulses   Peripheral - strong and symmetrical   IMPRESSION  1.  Uncontrolled spells. Possibly complex partial seizures with secondary generalization versus non-epileptic phenomena.   2.  She has failed multiple medications and may be a candidate for epilepsy surgery if a focal onset is discovered.  3.  Recent vEEG study revealed Left spikes but we were unable to record typical seizures  4.  She feels better overall in terms of daily life and seizures are slightly less on less meds (Off PHT now)      DISPOSITION:   1. Continue LTG 200mg but switch to 2½ tabs HS  2.   Lamictal level today  3. RTC 4 mo

## 2019-02-08 ENCOUNTER — OFFICE VISIT (OUTPATIENT)
Dept: URGENT CARE | Facility: CLINIC | Age: 49
End: 2019-02-08
Payer: COMMERCIAL

## 2019-02-08 VITALS
WEIGHT: 234 LBS | TEMPERATURE: 97 F | HEART RATE: 75 BPM | BODY MASS INDEX: 38.99 KG/M2 | DIASTOLIC BLOOD PRESSURE: 85 MMHG | HEIGHT: 65 IN | OXYGEN SATURATION: 98 % | RESPIRATION RATE: 16 BRPM | SYSTOLIC BLOOD PRESSURE: 144 MMHG

## 2019-02-08 DIAGNOSIS — N30.00 ACUTE CYSTITIS WITHOUT HEMATURIA: Primary | ICD-10-CM

## 2019-02-08 DIAGNOSIS — R30.0 DYSURIA: ICD-10-CM

## 2019-02-08 LAB
BILIRUB UR QL STRIP: NEGATIVE
GLUCOSE UR QL STRIP: NEGATIVE
KETONES UR QL STRIP: NEGATIVE
LEUKOCYTE ESTERASE UR QL STRIP: POSITIVE
PH, POC UA: 6
POC BLOOD, URINE: NEGATIVE
POC NITRATES, URINE: NEGATIVE
PROT UR QL STRIP: NEGATIVE
SP GR UR STRIP: 1.01 (ref 1–1.03)
UROBILINOGEN UR STRIP-ACNC: NORMAL (ref 0.1–1.1)

## 2019-02-08 PROCEDURE — 81003 URINALYSIS AUTO W/O SCOPE: CPT | Mod: QW,S$GLB,, | Performed by: PHYSICIAN ASSISTANT

## 2019-02-08 PROCEDURE — 81003 POCT URINALYSIS, DIPSTICK, AUTOMATED, W/O SCOPE: ICD-10-PCS | Mod: QW,S$GLB,, | Performed by: PHYSICIAN ASSISTANT

## 2019-02-08 PROCEDURE — 99203 OFFICE O/P NEW LOW 30 MIN: CPT | Mod: 25,S$GLB,, | Performed by: PHYSICIAN ASSISTANT

## 2019-02-08 PROCEDURE — 3008F PR BODY MASS INDEX (BMI) DOCUMENTED: ICD-10-PCS | Mod: CPTII,S$GLB,, | Performed by: PHYSICIAN ASSISTANT

## 2019-02-08 PROCEDURE — 3008F BODY MASS INDEX DOCD: CPT | Mod: CPTII,S$GLB,, | Performed by: PHYSICIAN ASSISTANT

## 2019-02-08 PROCEDURE — 99203 PR OFFICE/OUTPT VISIT, NEW, LEVL III, 30-44 MIN: ICD-10-PCS | Mod: 25,S$GLB,, | Performed by: PHYSICIAN ASSISTANT

## 2019-02-08 RX ORDER — NITROFURANTOIN 25; 75 MG/1; MG/1
100 CAPSULE ORAL 2 TIMES DAILY
Qty: 14 CAPSULE | Refills: 0 | Status: SHIPPED | OUTPATIENT
Start: 2019-02-08 | End: 2019-02-15

## 2019-02-08 NOTE — PROGRESS NOTES
"Subjective:       Patient ID: Liza Arrieta is a 48 y.o. female.    Vitals:  height is 5' 5" (1.651 m) and weight is 106.1 kg (234 lb). Her temperature is 96.7 °F (35.9 °C). Her blood pressure is 144/85 (abnormal) and her pulse is 75. Her respiration is 16 and oxygen saturation is 98%.     Chief Complaint: Urinary Tract Infection    Pt states for the last three days she has had dysuria in the morning and the feeling of urgency to urinate but cannot.      Urinary Tract Infection    This is a new problem. The current episode started in the past 7 days. The problem has been gradually worsening. The quality of the pain is described as burning. The pain is at a severity of 4/10. The pain is mild. She is sexually active. There is no history of pyelonephritis. Associated symptoms include urgency. Pertinent negatives include no chills, hematuria, nausea, vomiting or rash. She has tried nothing for the symptoms. The treatment provided no relief.       Constitution: Negative for chills, sweating, fatigue and fever.   HENT: Negative for ear pain, congestion, sinus pain, sinus pressure, sore throat and voice change.    Neck: Negative for painful lymph nodes.   Eyes: Negative for eye redness.   Respiratory: Negative for chest tightness, sputum production, bloody sputum, COPD, shortness of breath, stridor, wheezing and asthma.    Gastrointestinal: Negative for abdominal pain, nausea and vomiting.   Genitourinary: Positive for dysuria, urgency and pelvic pain. Negative for urine decreased, hematuria, history of kidney stones, painful menstruation, irregular menstruation, missed menses, heavy menstrual bleeding, ovarian cysts, genital trauma, vaginal pain, vaginal discharge, vaginal bleeding, vaginal odor, painful intercourse, genital sore and painful ejaculation.   Musculoskeletal: Negative for back pain and muscle ache.   Skin: Negative for rash and lesion.   Allergic/Immunologic: Negative for seasonal allergies and asthma. "   Hematologic/Lymphatic: Negative for swollen lymph nodes.       Objective:      Physical Exam   Constitutional: She is oriented to person, place, and time. She appears well-developed and well-nourished.   HENT:   Head: Normocephalic and atraumatic.   Right Ear: External ear normal.   Left Ear: External ear normal.   Nose: Nose normal.   Mouth/Throat: Mucous membranes are normal.   Eyes: Conjunctivae and lids are normal.   Neck: Trachea normal and full passive range of motion without pain. Neck supple.   Cardiovascular: Normal rate, regular rhythm and normal heart sounds.   Pulmonary/Chest: Effort normal and breath sounds normal. No respiratory distress.   Abdominal: Soft. Normal appearance and bowel sounds are normal. She exhibits no distension, no abdominal bruit, no pulsatile midline mass and no mass. There is no tenderness.   Musculoskeletal: Normal range of motion. She exhibits no edema.   Neurological: She is alert and oriented to person, place, and time. She has normal strength.   Skin: Skin is warm, dry and intact. She is not diaphoretic. No pallor.   Psychiatric: She has a normal mood and affect. Her speech is normal and behavior is normal. Judgment and thought content normal. Cognition and memory are normal.   Nursing note and vitals reviewed.      Assessment:       1. Acute cystitis without hematuria    2. Dysuria        Plan:         Acute cystitis without hematuria  -     Urine culture  -     nitrofurantoin, macrocrystal-monohydrate, (MACROBID) 100 MG capsule; Take 1 capsule (100 mg total) by mouth 2 (two) times daily. for 7 days  Dispense: 14 capsule; Refill: 0    Dysuria  -     POCT Urinalysis, Dipstick, Automated, W/O Scope      Bladder Infection, Female (Adult)    Urine is normally doesn't have any bacteria in it. But bacteria can get into the urinary tract from the skin around the rectum. Or they can travel in the blood from elsewhere in the body. Once they are in your urinary tract, they can cause  "infection in the urethra (urethritis), the bladder (cystitis), or the kidneys (pyelonephritis).  The most common place for an infection is in the bladder. This is called a bladder infection. This is one of the most common infections in women. Most bladder infections are easily treated. They are not serious unless the infection spreads to the kidney.  The phrases "bladder infection," "UTI," and "cystitis" are often used to describe the same thing. But they are not always the same. Cystitis is an inflammation of the bladder. The most common cause of cystitis is an infection.  Symptoms  The infection causes inflammation in the urethra and bladder. This causes many of the symptoms. The most common symptoms of a bladder infection are:  · Pain or burning when urinating  · Having to urinate more often than usual  · Urgent need to urinate  · Only a small amount of urine comes out  · Blood in urine  · Abdominal discomfort. This is usually in the lower abdomen above the pubic bone.  · Cloudy urine  · Strong- or bad-smelling urine  · Unable to urinate (urinary retention)  · Unable to hold urine in (urinary incontinence)  · Fever  · Loss of appetite  · Confusion (in older adults)  Causes  Bladder infections are not contagious. You can't get one from someone else, from a toilet seat, or from sharing a bath.  The most common cause of bladder infections is bacteria from the bowels. The bacteria get onto the skin around the opening of the urethra. From there, they can get into the urine and travel up to the bladder, causing inflammation and infection. This usually happens because of:  · Wiping improperly after urinating. Always wipe from front to back.  · Bowel incontinence  · Pregnancy  · Procedures such as having a catheter inserted  · Older age  · Not emptying your bladder. This can allow bacteria a chance to grow in your urine.  · Dehydration  · Constipation  · Sex  · Use of a diaphragm for birth control   Treatment  Bladder " infections are diagnosed by a urine test. They are treated with antibiotics and usually clear up quickly without complications. Treatment helps prevent a more serious kidney infection.  Medicines  Medicines can help in the treatment of a bladder infection:  · Take antibiotics until they are used up, even if you feel better. It is important to finish them to make sure the infection has cleared.  · You can use acetaminophen or ibuprofen for pain, fever, or discomfort, unless another medicine was prescribed. If you have chronic liver or kidney disease, talk with your healthcare provider before using these medicines. Also talk with your provider if you've ever had a stomach ulcer or gastrointestinal bleeding, or are taking blood-thinner medicines.  · If you are given phenazopydridine to reduce burning with urination, it will cause your urine to become a bright orange color. This can stain clothing.  Care and prevention  These self-care steps can help prevent future infections:  · Drink plenty of fluids to prevent dehydration and flush out your bladder. Do this unless you must restrict fluids for other health reasons, or your doctor told you not to.  · Proper cleaning after going to the bathroom is important. Wipe from front to back after using the toilet to prevent the spread of bacteria.  · Urinate more often. Don't try to hold urine in for a long time.  · Wear loose-fitting clothes and cotton underwear. Avoid tight-fitting pants.  · Improve your diet and prevent constipation. Eat more fresh fruit and vegetables, and fiber, and less junk and fatty foods.  · Avoid sex until your symptoms are gone.  · Avoid caffeine, alcohol, and spicy foods. These can irritate your bladder.  · Urinate right after intercourse to flush out your bladder.  · If you use birth control pills and have frequent bladder infections, discuss it with your doctor.  Follow-up care  Call your healthcare provider if all symptoms are not gone after 3  days of treatment. This is especially important if you have repeat infections.  If a culture was done, you will be told if your treatment needs to be changed. If directed, you can call to find out the results.  If X-rays were done, you will be told if the results will affect your treatment.  Call 911  Call 911 if any of the following occur:  · Trouble breathing  · Hard to wake up or confusion  · Fainting or loss of consciousness  · Rapid heart rate  When to seek medical advice  Call your healthcare provider right away if any of these occur:  · Fever of 100.4ºF (38.0ºC) or higher, or as directed by your healthcare provider  · Symptoms are not better by the third day of treatment  · Back or belly (abdominal) pain that gets worse  · Repeated vomiting, or unable to keep medicine down  · Weakness or dizziness  · Vaginal discharge  · Pain, redness, or swelling in the outer vaginal area (labia)  Date Last Reviewed: 10/1/2016  © 5392-0506 [employer]. [address] [place] [address], [place] [address]. All rights reserved. This information is not intended as a substitute for professional medical care. Always follow your healthcare professional's instructions.      Please follow up with your Primary care provider within 2-5 days if your signs and symptoms have not resolved or worsen.     If your condition worsens or fails to improve we recommend that you receive another evaluation at the emergency room immediately or contact your primary medical clinic to discuss your concerns.   You must understand that you have received an Urgent Care treatment only and that you may be released before all of your medical problems are known or treated. You, the patient, will arrange for follow up care as instructed.     RED FLAGS/WARNING SYMPTOMS DISCUSSED WITH PATIENT THAT WOULD WARRANT EMERGENT MEDICAL ATTENTION. PATIENT VERBALIZED UNDERSTANDING.

## 2019-02-08 NOTE — PATIENT INSTRUCTIONS

## 2019-02-11 LAB
BACTERIA UR CULT: ABNORMAL

## 2019-02-12 ENCOUNTER — TELEPHONE (OUTPATIENT)
Dept: URGENT CARE | Facility: CLINIC | Age: 49
End: 2019-02-12

## 2019-02-12 DIAGNOSIS — N30.90 CYSTITIS: Primary | ICD-10-CM

## 2019-02-12 RX ORDER — AMOXICILLIN AND CLAVULANATE POTASSIUM 875; 125 MG/1; MG/1
1 TABLET, FILM COATED ORAL 2 TIMES DAILY
Qty: 20 TABLET | Refills: 0 | Status: SHIPPED | OUTPATIENT
Start: 2019-02-12 | End: 2019-02-22

## 2019-02-12 NOTE — TELEPHONE ENCOUNTER
Discussed urine culture results with patient.  She states symptoms are improving.  Discussed that penicillin antibiotic would be more appropriate.  Called in Augmentin antibiotics.  All her questions were answered and she verbalized understanding.

## 2019-02-22 ENCOUNTER — PATIENT MESSAGE (OUTPATIENT)
Dept: NEUROLOGY | Facility: CLINIC | Age: 49
End: 2019-02-22

## 2019-02-25 RX ORDER — LAMOTRIGINE 200 MG/1
TABLET ORAL
Qty: 270 TABLET | Refills: 0 | Status: SHIPPED | OUTPATIENT
Start: 2019-02-25 | End: 2019-07-15 | Stop reason: SDUPTHER

## 2019-03-15 ENCOUNTER — PATIENT MESSAGE (OUTPATIENT)
Dept: NEUROLOGY | Facility: CLINIC | Age: 49
End: 2019-03-15

## 2019-03-26 ENCOUNTER — OFFICE VISIT (OUTPATIENT)
Dept: NEUROLOGY | Facility: CLINIC | Age: 49
End: 2019-03-26
Payer: COMMERCIAL

## 2019-03-26 ENCOUNTER — LAB VISIT (OUTPATIENT)
Dept: LAB | Facility: HOSPITAL | Age: 49
End: 2019-03-26
Payer: COMMERCIAL

## 2019-03-26 VITALS
HEIGHT: 66 IN | DIASTOLIC BLOOD PRESSURE: 84 MMHG | WEIGHT: 221.31 LBS | BODY MASS INDEX: 35.57 KG/M2 | SYSTOLIC BLOOD PRESSURE: 144 MMHG | HEART RATE: 67 BPM

## 2019-03-26 DIAGNOSIS — G40.119 TEMPORAL LOBE EPILEPSY, INTRACTABLE: Primary | ICD-10-CM

## 2019-03-26 DIAGNOSIS — G40.119 TEMPORAL LOBE EPILEPSY, INTRACTABLE: ICD-10-CM

## 2019-03-26 PROCEDURE — 99213 PR OFFICE/OUTPT VISIT, EST, LEVL III, 20-29 MIN: ICD-10-PCS | Mod: S$GLB,,, | Performed by: PSYCHIATRY & NEUROLOGY

## 2019-03-26 PROCEDURE — 36415 COLL VENOUS BLD VENIPUNCTURE: CPT

## 2019-03-26 PROCEDURE — 99999 PR PBB SHADOW E&M-EST. PATIENT-LVL III: CPT | Mod: PBBFAC,,, | Performed by: PSYCHIATRY & NEUROLOGY

## 2019-03-26 PROCEDURE — 80175 DRUG SCREEN QUAN LAMOTRIGINE: CPT

## 2019-03-26 PROCEDURE — 99999 PR PBB SHADOW E&M-EST. PATIENT-LVL III: ICD-10-PCS | Mod: PBBFAC,,, | Performed by: PSYCHIATRY & NEUROLOGY

## 2019-03-26 PROCEDURE — 3008F PR BODY MASS INDEX (BMI) DOCUMENTED: ICD-10-PCS | Mod: CPTII,S$GLB,, | Performed by: PSYCHIATRY & NEUROLOGY

## 2019-03-26 PROCEDURE — 3008F BODY MASS INDEX DOCD: CPT | Mod: CPTII,S$GLB,, | Performed by: PSYCHIATRY & NEUROLOGY

## 2019-03-26 PROCEDURE — 99213 OFFICE O/P EST LOW 20 MIN: CPT | Mod: S$GLB,,, | Performed by: PSYCHIATRY & NEUROLOGY

## 2019-03-26 NOTE — PROGRESS NOTES
"Name: Liza Arrieta  MRN: [id number]   CSN: [id number]      Date: 03/26/2019    HISTORY OF PRESENT ILLNESS (HPI)  11/17/2015  The patient is a 48 y.o. yo RHWM   The patient was initially referred for consultation by Dr. Huerta.   The patient was unaccompanied today.     Clinic Visits  Interim History  2019/03/26  In February and March, she has had four episodes that she is concerned represent seizure. These are different than any prior episodes or sensation that she has experienced. She describes a feeling of intense fear, during which she tells herself "you're just having a seizure".  reports lasting about 15 seconds, during the events he reports patient seems to be staring off in a daydream. She will respond to questions, but slowly. Typically occurring in the evening, prior to taking Lamictal. She recalls these episodes afterwards. Currently taking Lamictal 500 mg qHS, reports she noticed these episodes after switching from BID to daily lamictal dosing. Lamictal level at prior clinic visit 15.7. Denies any episodes of visual motor apraxia or any of her prior typical seizures.     2019/01/14  The patient underwent a L temporal lobectomy 2 months ago.  She has experience none of her typical seizures.  She did experience Judi Vu twice since surgery.  In the past she had reported Judi Vu as an aura.  She had not experienced any episodes of visual motor apraxia (unable to move eyes in any direction). She takes lamictal 200 mg in the AM and 300 mg in the PM.  She will be converted to once a day dosing of 500 mg which she prefers to do in the evening.       She feels so much better now.  She reports feeling as if she is 17 yo.    2018/05/29  Patient had another day of almost continuous seizures which was on May 09,2018.  She has failed several AEDs mainly due to side effects.  She experienced pharmacodynamic interaction and toxicity with Lamictal - Lacosamide combo.  She is tolerating the lamictal well " "      2018/04/09  The patient had 9 seizures recorded in last EMU visit all coming from L anterior temporal area.   MRI was normal but PET showed L mesial hypometabolism.  She is currently have almost daily seizures.  She has failed four AEDs is on Lamictal monotherapy.  She reports her verbal memory is terrible.  Review of labs show she was B12 deficient 4 yrs ago and she does not take any supplements.    Results for GLORIA GURROLA (MRN [id number]) as of 4/9/2018 16:23   Ref. Range 3/1/2016 14:45 12/19/2017 12:56 2/8/2018 18:12   Lamotrigine Lvl Latest Ref Range: 2.0 - 15.0 ug/mL 12.0 10.5 7.8     2018/01/29  EMU evaluation was completed in Dec and L temporal interictal spikes were recorded and one electrographic seizure was recorded arising from the L anterior temporal area.  Besides frequent seizures her major complaint is progressive memory loss.      HISTORY OF PRESENT ILLNESS (HPI)  Date: The   New Patient  Pt has been seeing Dr Huerta at [facility] for "complex partial sezures." First sz, in school, age 19. Was put on Dilantin and she did well for approx 15 years, until the seizures became active again. Thinks she may have had 2 classic "Grand Mal" seizures in her 20's, but since then, seizure types are those described below.    Currently, she is experiencing more than one event per week. Event type is described below. She has been failing her current treatment regimen as described below. May have tried other meds, but can't remember them now. Did not bring records yet.        Seizure Seminology  Seizure Type 1   Classification: Pass-out  Aura - Occasional auditory мария vu  Pt loses consciousness and falls or loses tone 1-2 in  Post-ictal  Brief  Age of onset 19  Current Seizure Frequency - Several per week      Seizure Type 2  Classification: Complex Partial  Wanders around. Doesn't remember after.   Post-ictal  Brief  Current Seizure Frequency - Several per week    Seizure Triggers  Sleep Deprivation - " None  Other medications - None  Psych/stress - None  Photic stimulation - None  Hyperventilation - None  Medical Problems - None  Menses - 3 days before period they get worse  Sensory Stimulation (light, sound, etc) - None  Missed dose of meds - None    AED Treatments  Present regimen   tabs 1 in AM and 1½ in PM   perampanel (Fycompa, FCP)    Prior treatments  eslicarbazine (Aptiom, ESL) - seizure intensity worsened after 2 weeks Rx  lacosamide (Vimpat, LCS)   oxcarbazepine (Trileptal OXC)   BID  TPM 10ID  valproic acid (Depakote, VPA)   zonisamide (Zonegran, ZNA)    Not tried  acetazolamide (Diamox, AZM)  amantadine  carbamazepine (Tegretol, CBZ)  clobazam (Onfi or Frizium, CLB)  ethosuximide (Zarontin, ESM)  felbamate (felbatol, FBM)  gabapentin (Neurontin, GPN)  levetiracetam (Keppra, LEV)  methsuximide (Celontin, MSM)  methyphenytoin (Mesantion, MHT)  perampanel (Fycompa, FCP)    phenobarbital (Pb)  pregabalin (Lyrica, PGB)  primidone (Mysoline, PRM)  retigabine (Potiga, RTG)  rufinamide (Banzel, RUF)  tiagabine (Gabatril, TGB)  viagabatrin, (Sabril, VGB)  vagal nerve stimulator (VNS)    Benzodiazepines  diazepam - rectal (Diastatl)  diazepam - oral (Valium, DZ)  clonazepam (Klonopin, CZP)  clorazepate (Tranxene, CLZ)  Ativan  Brain Stimulation  Vagal Nerve Stimulation-n/a  DBS- n/a    Compliance method  Memory - yes  Mom or Spouse - Yes  Pill Box - no  Dewayne calendar - no  Turn over medication bottle - no  Phone alarm - no    Seizure Evaluation  EEG Routine - Dont have  MRI/MRA - In past- she doesn't know results  U eval  2017/12/19-12/20- Patient with no events overnight. EEG shows L anterior temporal spikes, most recorded at F7. None on the other side. At times, almost continuous L temporal interictal discharges at times.   2017/12/20- 11:56:23- clinical seizure, starting from L side on EEG. No epileptiform discharges noted. L temporal slowing and spikes noted. Consisted of brief moment of  unresponsiveness.   2017/12/21- EEG showing L interical anterior temporal slowing with L temporal spikes. No clinical seizures since yesterday.   2017/12/21-12/22- Event on 12/21 at 18:55 showing patient talking on the phone and suddenly stopping, unable to speak and confused. EEG shows there is a rhythmic buildup in the L temporal chains. Patient is confused and is drowsy following event. No tonic/clonic activity present.     CT/CTA Scan -   PET Scan -   Neuropsychological evaluation -   DEXA Scan    Potential Epilepsy Risk Factors:   Pregnancy/Labor/Delivery - full term uncomplicated pregnancy labor and vaginal delivery  Febrile seizures - none  Head injury - none  CNS infection - none   Stroke - none  Family Hx of Sz - none    PAST MEDICAL HISTORY:   Active Ambulatory Problems     Diagnosis  Date Noted      No Active Ambulatory Problems    Resolved Ambulatory Problems     Diagnosis  Date Noted      No Resolved Ambulatory Problems    No Additional Past Medical History         PAST SURGICAL HISTORY: No past surgical history on file.     FAMILY HISTORY: No family history on file.      SOCIAL HISTORY:    Social History    History    Social History      Marital Status:        Spouse Name:  N/A      Number of Children:  N/A      Years of Education:  N/A    Occupational History      Not on file.    Social History Main Topics      Smoking status:  Never Smoker      Smokeless tobacco:  Not on file      Alcohol Use:  No      Drug Use:  No      Sexual Activity:  Not on file    Other Topics  Concern      Not on file    Social History Narrative      No narrative on file            SUBSTANCE USE:  Social History    Social History Main Topics      Smoking status:  Never Smoker      Smokeless tobacco:  Not on file      Alcohol Use:  No      Drug Use:  No      Sexual Activity:  Not on file       History    Substance Use Topics      Smoking status:  Never Smoker      Smokeless tobacco:  Not on file       "Alcohol Use:  No         ALLERGIES: Review of patient's allergies indicates no known allergies.       Review of Systems   Constitutional: Negative for fever, chills, weight loss, malaise/fatigue and diaphoresis.   HENT: Negative for ear pain, hearing loss, nosebleeds and tinnitus.   Eyes: Negative for blurred vision, double vision, photophobia and pain.   Respiratory: Negative for cough, hemoptysis and shortness of breath.   Cardiovascular: Negative for chest pain, palpitations, orthopnea and leg swelling.   Gastrointestinal: Negative for heartburn, nausea, vomiting, abdominal pain, diarrhea, constipation and blood in stool.   Genitourinary: Negative for dysuria and hematuria.   Musculoskeletal: Negative for myalgias, joint pain and falls.   Skin: Negative for itching and rash.   Neurological: Positive for tingling, sensory change and seizures. Negative for dizziness, tremors, speech change, focal weakness, loss of consciousness, weakness and headaches.   Endo/Heme/Allergies: Negative for environmental allergies. Does not bruise/bleed easily.   Psychiatric/Behavioral: Positive for memory loss. Negative for depression, hallucinations and substance abuse. The patient has insomnia. The patient is not nervous/anxious.       /80 mmHg  Pulse 69  Ht 5' 6" (1.676 m)  Wt 110.1 kg (242 lb 11.6 oz)  BMI 39.20 kg/m2      Neurologic Exam      Higher Cortical Function:   Patient is a well developed, pleasant, well groomed individual appearing their stated age  Oriented - intact to person, place and time and followed two step instruction correctly.   Fund of knowledge was appropriate.   Language - Speech was fluent without evidence for an aphasia.    Throat: no aphthous ulcers noted in mouth, no erythema or enlargement of tonsils, pharynx is clear without inflammation   Lymph nodes: No enlargement of lymph nodes    Cranial Nerves II - XII:   EOMs were intact with normal smooth and no nystagmus.   PERRLA. D/C Funduscopic " "exam - disc were flat with normal A/V ratio and no exudates or hemorrhages. Visual fields were full to confrontation.   Motor - facial movement was symmetrical and normal.   Facial sensory - Light touch and pin prick sensations were normal.   Hearing was normal to finger rub.  Palate moved well and was symmetrical with normal palatal and oral sensation.   Tongue movement was full & the patient could say "la la la" and "Ka Ka Ka" without  difficulty. Patient repeated Baptist and Mandaen without difficulty. Normal power and bulk was found in the massiter and rotator muscles of the neck.  Motor: Power, bulk and tone were normal in all extremities.  Sensory: Light touch, pin prick, vibration and position senses were normal in all extremities.   Coordination:   Rapid alternating movements and rapid finger tapping - normal.   Finger to nose - nl.   Arm roll - symmetrical.   Gait: Station, gait and tandem walking were done without difficulty and Romberg was negative.    Deep tendon reflexes:   Reflex  L  R    Bicpets  2+  2+    Tricepts  2+  2+    Brachio-radialis  2+  2+    Knee  2+  2+    Ankle  2+  2+    Babinski  No  No      Tremor: resting, postural, intentional - none    Pulses   Peripheral - strong and symmetrical   IMPRESSION  1.  Uncontrolled spells. Possibly complex partial seizures with secondary generalization versus non-epileptic phenomena.   2.  S/p L temporal lobectomy 11/2018      DISPOSITION:   1. Continue LTG - change dosing back to 200mg 1 am and 1½ in pm  2.   Ambulatory EEG scheduled to begin 4/12/19  3. Check Lamictal level today, repeat in 1 month after back on twice daily dosing  4. Follow up with Dr. Elam in 2-3 months    Dr. Elam was available for this encounter.    "

## 2019-03-29 LAB
LAMOTRIGINE SERPL-MCNC: 19.3 UG/ML (ref 2–15)
LAMOTRIGINE SERPL-MCNC: 19.3 UG/ML (ref 2–15)

## 2019-04-06 ENCOUNTER — PATIENT MESSAGE (OUTPATIENT)
Dept: NEUROLOGY | Facility: CLINIC | Age: 49
End: 2019-04-06

## 2019-04-12 ENCOUNTER — HOSPITAL ENCOUNTER (OUTPATIENT)
Dept: NEUROLOGY | Facility: CLINIC | Age: 49
Discharge: HOME | End: 2019-04-12
Payer: COMMERCIAL

## 2019-04-12 DIAGNOSIS — G40.119 TEMPORAL LOBE EPILEPSY, INTRACTABLE: ICD-10-CM

## 2019-04-12 DIAGNOSIS — G40.219 COMPLEX PARTIAL EPILEPSY WITH GENERALIZATION AND WITH INTRACTABLE EPILEPSY: ICD-10-CM

## 2019-04-12 PROCEDURE — 95953 PR EEG MONITORING/COMPUTER, EA 24 HOURS, UNATTENDED: ICD-10-PCS | Mod: S$GLB,,, | Performed by: PSYCHIATRY & NEUROLOGY

## 2019-04-12 PROCEDURE — 95953 PR EEG MONITORING/COMPUTER, EA 24 HOURS, UNATTENDED: CPT | Mod: S$GLB,,, | Performed by: PSYCHIATRY & NEUROLOGY

## 2019-04-13 PROCEDURE — 95953 PR EEG MONITORING/COMPUTER, EA 24 HOURS, UNATTENDED: CPT | Mod: S$GLB,,, | Performed by: PSYCHIATRY & NEUROLOGY

## 2019-04-13 PROCEDURE — 95953 PR EEG MONITORING/COMPUTER, EA 24 HOURS, UNATTENDED: ICD-10-PCS | Mod: S$GLB,,, | Performed by: PSYCHIATRY & NEUROLOGY

## 2019-04-14 PROCEDURE — 95953 PR EEG MONITORING/COMPUTER, EA 24 HOURS, UNATTENDED: CPT | Mod: S$GLB,,, | Performed by: PSYCHIATRY & NEUROLOGY

## 2019-04-14 PROCEDURE — 95953 PR EEG MONITORING/COMPUTER, EA 24 HOURS, UNATTENDED: ICD-10-PCS | Mod: S$GLB,,, | Performed by: PSYCHIATRY & NEUROLOGY

## 2019-04-15 ENCOUNTER — HOSPITAL ENCOUNTER (OUTPATIENT)
Dept: NEUROLOGY | Facility: CLINIC | Age: 49
Discharge: HOME | End: 2019-04-15
Payer: COMMERCIAL

## 2019-04-15 DIAGNOSIS — G40.119 TEMPORAL LOBE EPILEPSY, INTRACTABLE: ICD-10-CM

## 2019-04-24 ENCOUNTER — OFFICE VISIT (OUTPATIENT)
Dept: UROLOGY | Facility: CLINIC | Age: 49
End: 2019-04-24
Payer: COMMERCIAL

## 2019-04-24 VITALS — HEART RATE: 70 BPM | SYSTOLIC BLOOD PRESSURE: 111 MMHG | DIASTOLIC BLOOD PRESSURE: 76 MMHG

## 2019-04-24 DIAGNOSIS — N39.0 URINARY TRACT INFECTION WITHOUT HEMATURIA, SITE UNSPECIFIED: Primary | ICD-10-CM

## 2019-04-24 PROCEDURE — 51701 PR INSERTION OF NON-INDWELLING BLADDER CATHETERIZATION FOR RESIDUAL UR: ICD-10-PCS | Mod: S$GLB,,, | Performed by: NURSE PRACTITIONER

## 2019-04-24 PROCEDURE — 99203 OFFICE O/P NEW LOW 30 MIN: CPT | Mod: 25,S$GLB,, | Performed by: NURSE PRACTITIONER

## 2019-04-24 PROCEDURE — 81001 PR  URINALYSIS, AUTO, W/SCOPE: ICD-10-PCS | Mod: S$GLB,,, | Performed by: NURSE PRACTITIONER

## 2019-04-24 PROCEDURE — 87086 URINE CULTURE/COLONY COUNT: CPT

## 2019-04-24 PROCEDURE — 99203 PR OFFICE/OUTPT VISIT, NEW, LEVL III, 30-44 MIN: ICD-10-PCS | Mod: 25,S$GLB,, | Performed by: NURSE PRACTITIONER

## 2019-04-24 PROCEDURE — 99999 PR PBB SHADOW E&M-EST. PATIENT-LVL III: ICD-10-PCS | Mod: PBBFAC,,, | Performed by: NURSE PRACTITIONER

## 2019-04-24 PROCEDURE — 51701 INSERT BLADDER CATHETER: CPT | Mod: S$GLB,,, | Performed by: NURSE PRACTITIONER

## 2019-04-24 PROCEDURE — 99999 PR PBB SHADOW E&M-EST. PATIENT-LVL III: CPT | Mod: PBBFAC,,, | Performed by: NURSE PRACTITIONER

## 2019-04-24 PROCEDURE — 81001 URINALYSIS AUTO W/SCOPE: CPT | Mod: S$GLB,,, | Performed by: NURSE PRACTITIONER

## 2019-04-24 NOTE — PATIENT INSTRUCTIONS
UTI precautions:  Wipe front to back and avoid constipation.  Avoid caffeine.  Drink lots of water  Void every 3-4 hrs.  Expel urine from vagina post void  No dryer sheets or harsh detergents with the undergarments  No bubble baths  Void before and after intercourse  Avoid hot tub use  Void soon after urge arises  Avoid tight fitting clothes and panty hose  Cranberry supplement- need 36mg of proanthocyanidins (PAC) in supplement- helps to block bacteria from attaching to bladder wall.  D-mannose- 2 grams daily- blocks bacteria receptors    UTI PREVENTION: (from [employer] Continence)  Urinary Tract Infection (UTI)    More than 4 million doctor office visits per year in the [place] are for urinary tract infections (UTI). About 12% of men and 50% of women will have a UTI during his or her lifetime. Most UTIs arise from bacteria that normally live in the colon and rectum and are present in bowel movements. These bacteria cling to the opening of the urethra, begin to multiply, & travel up to the bladder. Urine flow from the bladder usually washes bacteria out of the body. However, because women have a shorter urethra than men, bacteria can reach the bladder more easily and settle into the bladder wall. This is why women are more likely to develop UTIs than men. This risk factor is exacerbated by the greater likelihood that women introduce bacteria from fecal matter, following a bowel movement, into the urinary tract. Less often, bacteria can spread to the kidney from the bloodstream. A recurrent UTI is classified as three or more a year.    Risk Factors for UTI  Several factors can contribute to the risk of UTI. Sexual intercourse, use of contraceptive spermicide, low levels of estrogen, catheterization, diabetes, pregnancy, and immune suppression increase susceptibility to UTI.  Naturally occurring estrogen helps prevent recurrent UTI in women. After menopause, estrogen levels drop along with the  number of vaginal lactobacilli, the good bacteria which prevent growth of intestinal bacteria in the vagina. This makes postmenopausal women especially susceptible to UTI.  Catheters also present a risk of recurring UTI. Catheters are associated with colonization of bacteria and increased risks of clinical infection. Using the techniques described previously can help keep catheters clean and prevent recurrent UTI. While single-use of sterile catheters reduces the risks, it does not prevent UTI from occurring. It is therefore important to maintain proper care and use of catheters at all times while remaining alert to symptoms of UTI.    Common Symptoms of UTI   Painful urination   Frequency   Urgency   Lower abdominal or pelvic pain or pressure   Blood in the urine   Milky, cloudy, or pink/red urine   Fever   Strong smelling urine  In the elderly populations, symptoms of a urinary tract infection, can be easily overlooked, causing a delay in diagnosis. Elderly people with a UTI are more likely than younger people not to be diagnosed until the complication of sepsis occurs. The elderly often do not experience or report obvious symptoms that younger people have, such as difficult urination, or frequent urination. Instead they may exhibit far more vague symptoms that are similiar to many disease or may be assumed to be due to the aging process. These symptoms include: confusion, feelings of general discomfort, disorientation, fatigue, weakness, behavior changes, falling, and/or a new, acute incontinence. In addition, because incontinence is common in the elderly, they may not be aware of the symptom of frequency. If you experience any of these symptoms, see your healthcare professional.    Types of UTIs   Cystitis - an inflammation of the bladder and the most common UTI. Almost always, it occurs in women. The infection typically affects only the surface of the bladder and is brief & acute.   Pyelonephritis -  more commonly known as a kidney infection and usually occurs when a lower UTI spreads to the kidneys. Occasionally, bacteria will spread to the kidney from the bloodstresam. Kidney infections are more serious and less common than bladder infections. Symptoms include a fever, pain in the back or side below the ribs, nausea, or vomiting.   Urethritis - is an inflammation of the urethra. Men commonly contract urethritis through sexual intercourse with partners infected with sexually transmitted infections. Urethritis may also result from trauma to the area or from the catheterization process.    Prevention of UTI  There are several ways to prevent bladder infections.  Bathroom Behavior:Periodically emptying the bladder by trying to urinate every two to three hours.  Diet:The use of cranberry products seems to decrease the ability of bacteria to adhere to the lining of the urethra and bladder. As cranberry juice can have a high amount of sugar, cranberry extract can be taken in capsule or pill form instead. Increasing water intake by one or two glasses per day may help limit the length of time that you have symptoms and reduce the infections.  Hygiene: Proper hygiene in caring for the urethral area is another way to limit the amount or type of bacteria that can be drawn into the urethra. This is especially true in people who have decreased sensation in the perineal region such as those with MS or who experience any amount of fecal incontinence. Using soap & water or commercially available cleansing wipes several times per day & frequent changing of incontinence pads as they become wet can minimize the amount of bacteria in the urethral area. Women should always wipe from the front of the vagina to the back of the anus after urination or a bowel movement and wear cotton underwear. It is also reported that wearing thong undergarments may increase one's risk of developing an infection.  Sexual Activity: Can be the  source of UTIs in women. Insuring proper lubrication to the vagina and voiding before and after intercourse are tactics to help prevent infection. Using diaphragms and spermicidal jelly and/or foam may increase the risk of infection, so it is important to evaluate what type of birth control you use. Some physicians encourage women who have a history of recurrent infections to take an prophylactic antibiotic after intercourse, as it reduces risk of recurrent UTI by about 85%. At a minimum, restrict your number of sexual partners and urinate immediately after sexual intimacy to lower the risk of recurrent UTIs.  Vaginal Estrogen: reduces risk of recurrent UTI by repopulating the normal vaginal lactobacilli that keep bacteria from the rectum from multiplying and causing a bladder infection. Forms of vaginal estrogen are available at very low dosages that have minimal systemic absorption.      Treatment of UTI  Depending on the severity of infection, UTI can be treated with oral antibiotics. A three-day course of antibiotics can treat a simple UTI. However, some infections may need to be treated for several days or weeks. Length of antibiotic treatment also depends on the type of antibiotic prescribed.  Even if a few doses of medication relieve some symptoms, you should still complete the full course of medication prescribed by your doctor. Taking aspirin, Tylenol, or non-steroidal, anti-inflammatory medications may reduce symptoms during this episode, as they may be a result of increased body temperature.

## 2019-04-25 LAB — BACTERIA UR CULT: NO GROWTH

## 2019-04-25 NOTE — PROGRESS NOTES
CHIEF COMPLAINT:    Mrs Arrieta is a 48 y.o. female presenting for UTI.  PRESENTING ILLNESS:    Liza Arrieta is a 48 y.o. female who presents for UTI. This is her initial clinic visit.    Pt reports she was seen at urgent care 19 and diagnosed with UTI. Urine culture resulted Beta hemolytic Streptococcus, group B. She was started on macrobid and then changed to Augmentin. Her symptoms included dysuria, cloudy urine and feeling of urgency but not being able to void. She then f/u with PCP at the end of March and repeat urine culture was positive for infection per patient. Pt is unsure of organism on culture. PCP is not with [facility]. She was treated again with antibiotics. She f/u again with PCP 2 weeks ago and was treated with antibiotics again.     Today patient reports she continues to have cloudy urine at times and urgency. Denies dysuria, hematuria or flank pain. Denies fever or chills. Denies history of recurrent UTI. Denies urinary issues as baseline. She only voids 2-3 times per day. She has occasional constipation which she tries to control with eating vegetables. Denies diarrhea. She is unsure if UTI correlated with intercourse. She does not drink much water during the day.       REVIEW OF SYSTEMS:    Review of Systems    Constitutional: Negative for fever and chills.   HENT: Negative for hearing loss.   Eyes: Negative for visual disturbance.   Respiratory: Negative for shortness of breath.   Cardiovascular: Negative for chest pain.   Gastrointestinal: Positive constipation. Negative for nausea, vomiting.   Genitourinary:  See above  Neurological: Negative for dizziness.   Hematological: Does not bruise/bleed easily.   Psychiatric/Behavioral: Negative for confusion.     PATIENT HISTORY:    Past Medical History:   Diagnosis Date    Convulsions     Epilepsy     Seizures        Past Surgical History:   Procedure Laterality Date    APPENDECTOMY       SECTION      4    CHOLECYSTECTOMY       CRANIOTOMY for grids and strips Left 11/8/2018    Performed by Ruben Senior MD at [facility] OR [address] [place] [address]    CRANIOTOMY for strip and grid N/A 11/5/2018    Performed by Ruben Senior MD at [facility] OR [address] [place] [address]    CRANIOTOMY, FOR SUBDURAL GRID AND STRIP ELECTRODE LEAD Removal. Left 11/19/2018    Performed by Ruben Senior MD at [facility] OR [address] [place] [address]    HYSTERECTOMY      around 2016 for pain ful periods     LOBECTOMY, BRAIN left temporal lobe. Left 11/19/2018    Performed by Ruben Sneior MD at [facility] OR [address] [place] [address]       Family History   Problem Relation Age of Onset    Heart disease Father         over 50 years       Social History     Socioeconomic History    Marital status:      Spouse name: Not on file    Number of children: Not on file    Years of education: Not on file    Highest education level: Not on file   Occupational History    Not on file   Social Needs    Financial resource strain: Not on file    Food insecurity:     Worry: Not on file     Inability: Not on file    Transportation needs:     Medical: Not on file     Non-medical: Not on file   Tobacco Use    Smoking status: Never Smoker    Smokeless tobacco: Never Used   Substance and Sexual Activity    Alcohol use: No     Alcohol/week: 0.0 oz    Drug use: No    Sexual activity: Never   Lifestyle    Physical activity:     Days per week: Not on file     Minutes per session: Not on file    Stress: Not on file   Relationships    Social connections:     Talks on phone: Not on file     Gets together: Not on file     Attends L[employer]-day service: Not on file     Active member of club or organization: Not on file     Attends meetings of clubs or organizations: Not on file     Relationship status: Not on file   Other Topics Concern    Not on file   Social History Narrative    Not on file       Allergies:  Patient has no known allergies.    Medications:    Current Outpatient Medications:     lamoTRIgine (LAMICTAL) 200 MG tablet, Take 2.5 tablets (500 mg total)  by mouth once daily., Disp: 225 tablet, Rfl: 3    lamoTRIgine (LAMICTAL) 200 MG tablet, TAKE 1 AND 1/2 TABLETS BY MOUTH TWICE DAILY, Disp: 270 tablet, Rfl: 0    LORazepam (ATIVAN) 1 MG tablet, Take 1 tablet (1 mg total) by mouth once daily. for 3 days, Disp: 3 tablet, Rfl: 0    PHYSICAL EXAMINATION:    Constitutional: She is oriented to person, place, and time. She appears well-developed and well-nourished.  She is in no apparent distress.    Neck: Normal ROM.     Cardiovascular: Normal rate.      Pulmonary/Chest: Effort normal. No respiratory distress.     Abdominal:  She exhibits no distension.  There is no CVA tenderness.     Lymphadenopathy:          Right: No supraclavicular adenopathy present.        Left: No supraclavicular adenopathy present.     Neurological: She is alert and oriented to person, place, and time.     Skin: Skin is warm and dry.     Extremities: Normal ROM    Psych: Cooperative with normal affect.    Genitourinary:  Normal external female genitalia  Urethral meatus is normal  Urethra and bladder are nontender to bimanual exam      Physical Exam      LABS:    U/a: sp grav 1.015, pH 5, negative results    PVR: Consent verbally obtained.  Betadine prep was applied to the urethral meatus. An in and out cath was performed after voiding.  The PVR was 40 ml.      IMPRESSION:    Encounter Diagnoses   Name Primary?    Urinary tract infection without hematuria, site unspecified Yes       PLAN:  -Catheterized urine specimen sent for urine culture  -Pt will have PCP fax over urine culture results  -Discussed recurrent UTI work up (cysto and renal ultrasound) in detail. Will proceed with work up if patient develops 2 culture proven UTIs in 6 months or 3 culture proven UTIs in 12 months.  -UTI precautions:  Wipe front to back   Avoid constipation.  Avoid caffeine.  Drink lots of water - 2-3 liters per day  Void every 3 hrs regardless of urge  Expel urine from vagina post void  No dryer sheets or harsh  detergents with the undergarments  No bubble baths  Void before and after intercourse  Avoid hot tub use  Void soon after urge arises  Avoid tight fitting clothes and panty hose  Cranberry supplement- need 36mg of proanthocyanidins (PAC) in supplement- helps to block bacteria from attaching to bladder wall.  D-mannose- 2 grams daily- blocks bacteria receptors  Probiotic  -Avoid Bladder Irritants: Tea, coffee, caffeine, alcohol, artificial sweeteners, citrus, spicy foods, acidic foods,chocolate, tomato-based foods, smoking  -If patient determines UTI correlates with intercourse, will consider post coital prophylaxis.   -RTC 4-6 weeks to reassess symptoms        I spent 35 minutes with the patient of which more than half was spent in coordinating the patient's care as well as in direct consultation with the patient in regards to our treatment and plan.

## 2019-04-26 ENCOUNTER — TELEPHONE (OUTPATIENT)
Dept: PEDIATRIC UROLOGY | Facility: CLINIC | Age: 49
End: 2019-04-26

## 2019-04-26 NOTE — TELEPHONE ENCOUNTER
Notified pt of negative urine cx. Pt voiced understanding          ----- Message from Gi Magallanes NP sent at 4/26/2019  8:03 AM CDT -----  Please notify patient her urine culture was negative for infection.

## 2019-06-10 NOTE — PROCEDURES
DATE OF PROCEDURE:  04/12/2019.    EEG NUMBER:  [id number].    REQUESTING PHYSICIAN:  Dr. Luis Elam.    AMBULATORY EEG REPORT    METHODOLOGY:  Extended electroencephalographic recording is made while the   patient is ambulatory and continuing normal daily activities.  Electrodes are   placed according to the International 10-20 placement system and include T1 and   T2 electrode placement.  Twenty four (24) channels of digital signal (sampling   rate of 512/sec) were simultaneously recorded from the scalp, including EKG and   eye monitors.  Recording band pass was 0.1 to 100 Hz, and all data were stored   digitally on the recorder.  The patient is instructed to press an event button   when clinical symptoms occur and write the symptoms in a diary. Activation   procedures, which include photic stimulation, hyperventilation and instructing   patients to perform simple tasks, are done in selected patients.    The EEG is displayed on a monitor screen and can be reformatted into different   montages for evaluation.  The entire recoding is submitted for computer-assisted   analysis to detect spike and electrographic seizure activity.  The entire   recording is visually reviewed, and the times identified by computer analysis as   being spikes or seizures are reviewed again.  Compressed spectral analysis   (CSA) is also performed on the activity recorded from each individual channel.    This is displayed as a power display of frequencies from 0 to 30 Hz over time.     The CSA analysis is done and displayed continuously.  This is reviewed for   asymmetries in power between homologous areas of the scalp, and for presence of   changes in power which can be seen when seizures occur.  Sections of suspected   abnormalities on the CSA are then compared with the original EEG recording.    [employer] software was also utilized in the review of this study.  This software   suite analyzes the EEG recording in multiple domains.   Coherence and rhythmicity   are computed to identify EEG sections which may contain organized seizures.    Each channel undergoes analysis to detect the presence of spike and sharp waves   which have special and morphological characteristics of epileptic activity.  The   routine EEG recording is converted from special into frequency domain.  This is   then displayed comparing homologous areas to identify areas of significant   asymmetry.  Algorithm to identify non-cortically generated artifact is used to   separate artifact from the EEG.    RECORDING TIMES:  Start on 04/12/2019 at 11:16.  End on 04/15/2019 at 09:05.  A total of 69 hours and 39 minutes of EEG monitoring was recorded in an   ambulatory fashion.    EEG FINDINGS:  In the waking state, a fairly rhythmic 9 to 10 Hz posterior   dominant rhythm was seen in the occipital, parietal and posterior temporal   regions intermittently.  Low voltage mid and lower range beta was seen   diffusely.  With sleep, stage I and stage II was seen, but slow-wave sleep was   not noted.  The waking and sleeping background was symmetrical; however, there   is focal slowing noted frequently over the left frontotemporal region in both   the waking and sleeping state.  Occasionally, there was a low amplitude sharp   wave associated with the slow wave activity.  There were no rhythmic buildups to   indicate the presence of either a subclinical or clinical electrographic   seizure.  No clinical seizures were reported.    Activation procedures were carried out.  Intermittent photic stimulation   produced a mild driving response without provoking abnormal discharges.    Hyperventilation was performed for 3 minutes and produced some mild   disorganization and little bit more over the left side without activating   pathological discharges.  At times, the patient was asked questions and   correctly responded with her location, the date, the name of the president and   she was able to  count sequentially starting at 1.    IMPRESSION:  Abnormal ambulatory EEG.  There is slowing noted over the left   temporal region.  Throughout the recording, both the waking and sleeping state   indicating lateralized structural process.  This is in the area that she   underwent a temporal lobectomy procedure.  There was also an occasional sharp   wave in the same area indicating the persistence of irritative process; however,   the sharp waves are seen relatively infrequent.      RR/HN  dd: 06/09/2019 11:16:10 (CDT)  td: 06/09/2019 11:34:07 (CDT)  Doc ID   #[id number]  Job ID #[id number]    CC:

## 2019-07-07 ENCOUNTER — PATIENT MESSAGE (OUTPATIENT)
Dept: UROLOGY | Facility: CLINIC | Age: 49
End: 2019-07-07

## 2019-07-08 ENCOUNTER — PATIENT MESSAGE (OUTPATIENT)
Dept: NEUROLOGY | Facility: CLINIC | Age: 49
End: 2019-07-08

## 2019-07-09 ENCOUNTER — TELEPHONE (OUTPATIENT)
Dept: NEUROLOGY | Facility: CLINIC | Age: 49
End: 2019-07-09

## 2019-07-10 ENCOUNTER — TELEPHONE (OUTPATIENT)
Dept: NEUROLOGY | Facility: CLINIC | Age: 49
End: 2019-07-10

## 2019-07-15 ENCOUNTER — OFFICE VISIT (OUTPATIENT)
Dept: URGENT CARE | Facility: CLINIC | Age: 49
End: 2019-07-15
Payer: COMMERCIAL

## 2019-07-15 VITALS
OXYGEN SATURATION: 97 % | HEIGHT: 66 IN | DIASTOLIC BLOOD PRESSURE: 80 MMHG | RESPIRATION RATE: 16 BRPM | SYSTOLIC BLOOD PRESSURE: 146 MMHG | TEMPERATURE: 99 F | HEART RATE: 63 BPM | BODY MASS INDEX: 33.43 KG/M2 | WEIGHT: 208 LBS

## 2019-07-15 DIAGNOSIS — R30.0 DYSURIA: ICD-10-CM

## 2019-07-15 DIAGNOSIS — N30.00 ACUTE CYSTITIS WITHOUT HEMATURIA: Primary | ICD-10-CM

## 2019-07-15 LAB
BILIRUB UR QL STRIP: NEGATIVE
GLUCOSE UR QL STRIP: NEGATIVE
KETONES UR QL STRIP: NEGATIVE
LEUKOCYTE ESTERASE UR QL STRIP: POSITIVE
PH, POC UA: 5.5 (ref 5–8)
POC BLOOD, URINE: POSITIVE
POC NITRATES, URINE: NEGATIVE
PROT UR QL STRIP: NEGATIVE
SP GR UR STRIP: 1.01 (ref 1–1.03)
UROBILINOGEN UR STRIP-ACNC: NORMAL (ref 0.1–1.1)

## 2019-07-15 PROCEDURE — 87186 SC STD MICRODIL/AGAR DIL: CPT

## 2019-07-15 PROCEDURE — 99214 PR OFFICE/OUTPT VISIT, EST, LEVL IV, 30-39 MIN: ICD-10-PCS | Mod: S$GLB,,, | Performed by: PHYSICIAN ASSISTANT

## 2019-07-15 PROCEDURE — 81003 POCT URINALYSIS, DIPSTICK, AUTOMATED, W/O SCOPE: ICD-10-PCS | Mod: QW,S$GLB,, | Performed by: PHYSICIAN ASSISTANT

## 2019-07-15 PROCEDURE — 87086 URINE CULTURE/COLONY COUNT: CPT

## 2019-07-15 PROCEDURE — 87088 URINE BACTERIA CULTURE: CPT

## 2019-07-15 PROCEDURE — 99214 OFFICE O/P EST MOD 30 MIN: CPT | Mod: S$GLB,,, | Performed by: PHYSICIAN ASSISTANT

## 2019-07-15 PROCEDURE — 87077 CULTURE AEROBIC IDENTIFY: CPT

## 2019-07-15 PROCEDURE — 3008F PR BODY MASS INDEX (BMI) DOCUMENTED: ICD-10-PCS | Mod: CPTII,S$GLB,, | Performed by: PHYSICIAN ASSISTANT

## 2019-07-15 PROCEDURE — 3008F BODY MASS INDEX DOCD: CPT | Mod: CPTII,S$GLB,, | Performed by: PHYSICIAN ASSISTANT

## 2019-07-15 PROCEDURE — 81003 URINALYSIS AUTO W/O SCOPE: CPT | Mod: QW,S$GLB,, | Performed by: PHYSICIAN ASSISTANT

## 2019-07-15 RX ORDER — CEPHALEXIN 500 MG/1
500 CAPSULE ORAL EVERY 12 HOURS
Qty: 14 CAPSULE | Refills: 0 | Status: SHIPPED | OUTPATIENT
Start: 2019-07-15 | End: 2019-07-22

## 2019-07-15 NOTE — PATIENT INSTRUCTIONS

## 2019-07-15 NOTE — PROGRESS NOTES
"Subjective:       Patient ID: Liza Arrieta is a 48 y.o. female.    Vitals:  height is 5' 6" (1.676 m) and weight is 94.3 kg (208 lb). Her temperature is 98.6 °F (37 °C). Her blood pressure is 146/80 (abnormal) and her pulse is 63. Her respiration is 16 and oxygen saturation is 97%.     Chief Complaint: Dysuria and Back Pain    Pt is c/o lower back pain that started today and intermittent dysuria/frequency that started 2 weeks ago, pt made appt with pcp but they cancelled her appt bc of weather and she cant wait. Pt has been taking d mannose.    Dysuria    This is a recurrent problem. The current episode started 1 to 4 weeks ago. The problem occurs intermittently. The problem has been unchanged. The quality of the pain is described as burning. The pain is at a severity of 5/10. The pain is mild. There has been no fever. She is not sexually active. There is no history of pyelonephritis. Associated symptoms include flank pain, frequency and urgency. Pertinent negatives include no chills, hematuria, nausea, vomiting or rash. She has tried nothing for the symptoms. The treatment provided no relief.   Back Pain   This is a new problem. The current episode started today. The pain is at a severity of 6/10. The pain is mild. The pain is the same all the time. Stiffness is present all day. Associated symptoms include dysuria. Pertinent negatives include no abdominal pain, fever or pelvic pain. She has tried nothing for the symptoms. The treatment provided no relief.       Constitution: Negative for chills and fever.   Neck: Negative for painful lymph nodes.   Gastrointestinal: Negative for abdominal pain, nausea and vomiting.   Genitourinary: Positive for dysuria, frequency, urgency and flank pain. Negative for urine decreased, hematuria, history of kidney stones, painful menstruation, irregular menstruation, missed menses, heavy menstrual bleeding, ovarian cysts, genital trauma, vaginal pain, vaginal discharge, vaginal " bleeding, vaginal odor, painful intercourse, genital sore, painful ejaculation and pelvic pain.   Musculoskeletal: Positive for pain and back pain.   Skin: Negative for rash and lesion.   Hematologic/Lymphatic: Negative for swollen lymph nodes.       Objective:      Physical Exam   Constitutional: She is oriented to person, place, and time. She appears well-developed and well-nourished.   HENT:   Head: Normocephalic and atraumatic.   Right Ear: External ear normal.   Left Ear: External ear normal.   Nose: Nose normal.   Mouth/Throat: Mucous membranes are normal.   Eyes: Conjunctivae and lids are normal.   Neck: Trachea normal and full passive range of motion without pain. Neck supple.   Cardiovascular: Normal rate, regular rhythm and normal heart sounds.   Pulmonary/Chest: Effort normal and breath sounds normal. No respiratory distress.   Abdominal: Soft. Normal appearance and bowel sounds are normal. She exhibits no distension, no abdominal bruit, no pulsatile midline mass and no mass. There is no hepatosplenomegaly. There is no tenderness. There is no rigidity, no rebound, no guarding, no CVA tenderness, no tenderness at McBurney's point and negative Gautam's sign.   Musculoskeletal: Normal range of motion. She exhibits no edema.   Neurological: She is alert and oriented to person, place, and time. She has normal strength.   Skin: Skin is warm, dry and intact. She is not diaphoretic. No pallor.   Psychiatric: She has a normal mood and affect. Her speech is normal and behavior is normal. Judgment and thought content normal. Cognition and memory are normal.   Nursing note and vitals reviewed.      Assessment:       1. Acute cystitis without hematuria    2. Dysuria        Plan:         Acute cystitis without hematuria  -     cephALEXin (KEFLEX) 500 MG capsule; Take 1 capsule (500 mg total) by mouth every 12 (twelve) hours. for 7 days  Dispense: 14 capsule; Refill: 0    Dysuria  -     POCT Urinalysis, Dipstick,  "Automated, W/O Scope  -     Urine culture          Bladder Infection, Female (Adult)    Urine is normally doesn't have any bacteria in it. But bacteria can get into the urinary tract from the skin around the rectum. Or they can travel in the blood from elsewhere in the body. Once they are in your urinary tract, they can cause infection in the urethra (urethritis), the bladder (cystitis), or the kidneys (pyelonephritis).  The most common place for an infection is in the bladder. This is called a bladder infection. This is one of the most common infections in women. Most bladder infections are easily treated. They are not serious unless the infection spreads to the kidney.  The phrases "bladder infection," "UTI," and "cystitis" are often used to describe the same thing. But they are not always the same. Cystitis is an inflammation of the bladder. The most common cause of cystitis is an infection.  Symptoms  The infection causes inflammation in the urethra and bladder. This causes many of the symptoms. The most common symptoms of a bladder infection are:  · Pain or burning when urinating  · Having to urinate more often than usual  · Urgent need to urinate  · Only a small amount of urine comes out  · Blood in urine  · Abdominal discomfort. This is usually in the lower abdomen above the pubic bone.  · Cloudy urine  · Strong- or bad-smelling urine  · Unable to urinate (urinary retention)  · Unable to hold urine in (urinary incontinence)  · Fever  · Loss of appetite  · Confusion (in older adults)  Causes  Bladder infections are not contagious. You can't get one from someone else, from a toilet seat, or from sharing a bath.  The most common cause of bladder infections is bacteria from the bowels. The bacteria get onto the skin around the opening of the urethra. From there, they can get into the urine and travel up to the bladder, causing inflammation and infection. This usually happens because of:  · Wiping improperly after " urinating. Always wipe from front to back.  · Bowel incontinence  · Pregnancy  · Procedures such as having a catheter inserted  · Older age  · Not emptying your bladder. This can allow bacteria a chance to grow in your urine.  · Dehydration  · Constipation  · Sex  · Use of a diaphragm for birth control   Treatment  Bladder infections are diagnosed by a urine test. They are treated with antibiotics and usually clear up quickly without complications. Treatment helps prevent a more serious kidney infection.  Medicines  Medicines can help in the treatment of a bladder infection:  · Take antibiotics until they are used up, even if you feel better. It is important to finish them to make sure the infection has cleared.  · You can use acetaminophen or ibuprofen for pain, fever, or discomfort, unless another medicine was prescribed. If you have chronic liver or kidney disease, talk with your healthcare provider before using these medicines. Also talk with your provider if you've ever had a stomach ulcer or gastrointestinal bleeding, or are taking blood-thinner medicines.  · If you are given phenazopydridine to reduce burning with urination, it will cause your urine to become a bright orange color. This can stain clothing.  Care and prevention  These self-care steps can help prevent future infections:  · Drink plenty of fluids to prevent dehydration and flush out your bladder. Do this unless you must restrict fluids for other health reasons, or your doctor told you not to.  · Proper cleaning after going to the bathroom is important. Wipe from front to back after using the toilet to prevent the spread of bacteria.  · Urinate more often. Don't try to hold urine in for a long time.  · Wear loose-fitting clothes and cotton underwear. Avoid tight-fitting pants.  · Improve your diet and prevent constipation. Eat more fresh fruit and vegetables, and fiber, and less junk and fatty foods.  · Avoid sex until your symptoms are  gone.  · Avoid caffeine, alcohol, and spicy foods. These can irritate your bladder.  · Urinate right after intercourse to flush out your bladder.  · If you use birth control pills and have frequent bladder infections, discuss it with your doctor.  Follow-up care  Call your healthcare provider if all symptoms are not gone after 3 days of treatment. This is especially important if you have repeat infections.  If a culture was done, you will be told if your treatment needs to be changed. If directed, you can call to find out the results.  If X-rays were done, you will be told if the results will affect your treatment.  Call 911  Call 911 if any of the following occur:  · Trouble breathing  · Hard to wake up or confusion  · Fainting or loss of consciousness  · Rapid heart rate  When to seek medical advice  Call your healthcare provider right away if any of these occur:  · Fever of 100.4ºF (38.0ºC) or higher, or as directed by your healthcare provider  · Symptoms are not better by the third day of treatment  · Back or belly (abdominal) pain that gets worse  · Repeated vomiting, or unable to keep medicine down  · Weakness or dizziness  · Vaginal discharge  · Pain, redness, or swelling in the outer vaginal area (labia)  Date Last Reviewed: 10/1/2016  © 2845-3967 [employer]. [address] [place] [address], [place] [address]. All rights reserved. This information is not intended as a substitute for professional medical care. Always follow your healthcare professional's instructions.      Keep follow-up with your urologist as scheduled.  Please follow up with your Primary care provider within 2-5 days if your signs and symptoms have not resolved or worsen.     If your condition worsens or fails to improve we recommend that you receive another evaluation at the emergency room immediately or contact your primary medical clinic to discuss your concerns.   You must understand that you have received an Urgent Care treatment  only and that you may be released before all of your medical problems are known or treated. You, the patient, will arrange for follow up care as instructed.     RED FLAGS/WARNING SYMPTOMS DISCUSSED WITH PATIENT THAT WOULD WARRANT EMERGENT MEDICAL ATTENTION. PATIENT VERBALIZED UNDERSTANDING.

## 2019-07-17 ENCOUNTER — TELEPHONE (OUTPATIENT)
Dept: UROLOGY | Facility: CLINIC | Age: 49
End: 2019-07-17

## 2019-07-17 NOTE — TELEPHONE ENCOUNTER
Spoke with pt regarding this evening's appt with Gi. Since her urine cx is pending and is taking bactrim, pt will reschedule after abx tx. Encouraged to call for any questions or concerns. Pt voiced understanding

## 2019-07-18 LAB — BACTERIA UR CULT: ABNORMAL

## 2019-07-19 ENCOUNTER — TELEPHONE (OUTPATIENT)
Dept: URGENT CARE | Facility: CLINIC | Age: 49
End: 2019-07-19

## 2019-07-19 NOTE — TELEPHONE ENCOUNTER
Patient returned call about lab results. Informed patient of abnormal lab results, and that she was treated appropriately. Patient verbalized understanding.

## 2019-08-07 ENCOUNTER — OFFICE VISIT (OUTPATIENT)
Dept: NEUROLOGY | Facility: CLINIC | Age: 49
End: 2019-08-07
Payer: COMMERCIAL

## 2019-08-07 VITALS
WEIGHT: 204.81 LBS | HEART RATE: 57 BPM | HEIGHT: 66 IN | DIASTOLIC BLOOD PRESSURE: 103 MMHG | SYSTOLIC BLOOD PRESSURE: 135 MMHG | BODY MASS INDEX: 32.92 KG/M2

## 2019-08-07 DIAGNOSIS — G40.219 COMPLEX PARTIAL EPILEPSY WITH GENERALIZATION AND WITH INTRACTABLE EPILEPSY: ICD-10-CM

## 2019-08-07 DIAGNOSIS — G40.119 TEMPORAL LOBE EPILEPSY, INTRACTABLE: Primary | ICD-10-CM

## 2019-08-07 PROCEDURE — 3008F BODY MASS INDEX DOCD: CPT | Mod: CPTII,S$GLB,, | Performed by: PSYCHIATRY & NEUROLOGY

## 2019-08-07 PROCEDURE — 99999 PR PBB SHADOW E&M-EST. PATIENT-LVL II: ICD-10-PCS | Mod: PBBFAC,,, | Performed by: PSYCHIATRY & NEUROLOGY

## 2019-08-07 PROCEDURE — 3008F PR BODY MASS INDEX (BMI) DOCUMENTED: ICD-10-PCS | Mod: CPTII,S$GLB,, | Performed by: PSYCHIATRY & NEUROLOGY

## 2019-08-07 PROCEDURE — 99214 OFFICE O/P EST MOD 30 MIN: CPT | Mod: S$GLB,,, | Performed by: PSYCHIATRY & NEUROLOGY

## 2019-08-07 PROCEDURE — 99214 PR OFFICE/OUTPT VISIT, EST, LEVL IV, 30-39 MIN: ICD-10-PCS | Mod: S$GLB,,, | Performed by: PSYCHIATRY & NEUROLOGY

## 2019-08-07 PROCEDURE — 99999 PR PBB SHADOW E&M-EST. PATIENT-LVL II: CPT | Mod: PBBFAC,,, | Performed by: PSYCHIATRY & NEUROLOGY

## 2019-08-07 RX ORDER — LAMOTRIGINE 200 MG/1
TABLET ORAL
Qty: 270 TABLET | Refills: 0 | Status: SHIPPED | OUTPATIENT
Start: 2019-08-07 | End: 2019-08-07

## 2019-08-07 RX ORDER — CLOBAZAM 20 MG/1
20 TABLET ORAL DAILY
Qty: 90 TABLET | Refills: 1 | Status: SHIPPED | OUTPATIENT
Start: 2019-08-07 | End: 2020-08-25

## 2019-08-07 NOTE — PROGRESS NOTES
"Name: Gloria Gurrola  MRN: [id number]   CSN: [id number]      Date: 08/07/2019    HISTORY OF PRESENT ILLNESS (HPI)  11/17/2015  The patient is a 48 y.o. yo RHWM   The patient was initially referred for consultation by Dr. Huerta.   The patient was unaccompanied today.     Clinic Visits  Interim History      2019/08/07  The patient is doing well but has an occasional aura which now a feeling of fear which lasts 30 sec.  Last was on Aug 1.  She is averaging 3 per months.  Surgery was Nov 4, 2018.  The first aura occurred on Dec 19, 2018.  Number by month were Jan - 4, Feb - 2, Mar - 2, Apr - 2, May - 4, Jun - 5, Jul - 3.      Results for GLORIA GURROLA (MRN [id number]) as of 8/7/2019 15:38   Ref. Range 12/14/2018 12:10 1/14/2019 14:58 3/26/2019 13:30   Lamotrigine Lvl Latest Ref Range: 2.0 - 15.0 ug/mL 13.0 15.7 (H) 19.3 (H)       2019/03/26  In February and March, she has had four episodes that she is concerned represent seizure. These are different than any prior episodes or sensation that she has experienced. She describes a feeling of intense fear, during which she tells herself "you're just having a seizure".  reports lasting about 15 seconds, during the events he reports patient seems to be staring off in a daydream. She will respond to questions, but slowly. Typically occurring in the evening, prior to taking Lamictal. She recalls these episodes afterwards. Currently taking Lamictal 500 mg qHS, reports she noticed these episodes after switching from BID to daily lamictal dosing. Lamictal level at prior clinic visit 15.7. Denies any episodes of visual motor apraxia or any of her prior typical seizures.     2019/01/14  The patient underwent a L temporal lobectomy 2 months ago.  She has experience none of her typical seizures.  She did experience Judi Vu twice since surgery.  In the past she had reported Judi Vu as an aura.  She had not experienced any episodes of visual motor apraxia (unable to move eyes in any " "direction). She takes lamictal 200 mg in the AM and 300 mg in the PM.  She will be converted to once a day dosing of 500 mg which she prefers to do in the evening.       She feels so much better now.  She reports feeling as if she is 19 yo.    2018/05/29  Patient had another day of almost continuous seizures which was on May 09,2018.  She has failed several AEDs mainly due to side effects.  She experienced pharmacodynamic interaction and toxicity with Lamictal - Lacosamide combo.  She is tolerating the lamictal well       2018/04/09  The patient had 9 seizures recorded in last EMU visit all coming from L anterior temporal area.   MRI was normal but PET showed L mesial hypometabolism.  She is currently have almost daily seizures.  She has failed four AEDs is on Lamictal monotherapy.  She reports her verbal memory is terrible.  Review of labs show she was B12 deficient 4 yrs ago and she does not take any supplements.    Results for GLORIA GURROLA (MRN [id number]) as of 4/9/2018 16:23   Ref. Range 3/1/2016 14:45 12/19/2017 12:56 2/8/2018 18:12   Lamotrigine Lvl Latest Ref Range: 2.0 - 15.0 ug/mL 12.0 10.5 7.8     2018/01/29  EMU evaluation was completed in Dec and L temporal interictal spikes were recorded and one electrographic seizure was recorded arising from the L anterior temporal area.  Besides frequent seizures her major complaint is progressive memory loss.      HISTORY OF PRESENT ILLNESS (HPI)  Date: The   New Patient  Pt has been seeing Dr Huerta at [facility] for "complex partial sezures." First sz, in school, age 19. Was put on Dilantin and she did well for approx 15 years, until the seizures became active again. Thinks she may have had 2 classic "Grand Mal" seizures in her 20's, but since then, seizure types are those described below.    Currently, she is experiencing more than one event per week. Event type is described below. She has been failing her current treatment regimen as described below. May have tried " other meds, but can't remember them now. Did not bring records yet.        Seizure Seminology  Seizure Type 1   Classification: Pass-out  Aura - Occasional auditory мария vu  Pt loses consciousness and falls or loses tone 1-2 in  Post-ictal  Brief  Age of onset 19  Current Seizure Frequency - Several per week      Seizure Type 2  Classification: Complex Partial  Wanders around. Doesn't remember after.   Post-ictal  Brief  Current Seizure Frequency - Several per week    Seizure Triggers  Sleep Deprivation - None  Other medications - None  Psych/stress - None  Photic stimulation - None  Hyperventilation - None  Medical Problems - None  Menses - 3 days before period they get worse  Sensory Stimulation (light, sound, etc) - None  Missed dose of meds - None    AED Treatments  Present regimen   tabs 1 in AM and 1½ in PM   perampanel (Fycompa, FCP)    Prior treatments  eslicarbazine (Aptiom, ESL) - seizure intensity worsened after 2 weeks Rx  lacosamide (Vimpat, LCS)   oxcarbazepine (Trileptal OXC)   BID  TPM 10ID  valproic acid (Depakote, VPA)   zonisamide (Zonegran, ZNA)    Not tried  acetazolamide (Diamox, AZM)  amantadine  carbamazepine (Tegretol, CBZ)  clobazam (Onfi or Frizium, CLB)  ethosuximide (Zarontin, ESM)  felbamate (felbatol, FBM)  gabapentin (Neurontin, GPN)  levetiracetam (Keppra, LEV)  methsuximide (Celontin, MSM)  methyphenytoin (Mesantion, MHT)  perampanel (Fycompa, FCP)    phenobarbital (Pb)  pregabalin (Lyrica, PGB)  primidone (Mysoline, PRM)  retigabine (Potiga, RTG)  rufinamide (Banzel, RUF)  tiagabine (Gabatril, TGB)  viagabatrin, (Sabril, VGB)  vagal nerve stimulator (VNS)    Benzodiazepines  diazepam - rectal (Diastatl)  diazepam - oral (Valium, DZ)  clonazepam (Klonopin, CZP)  clorazepate (Tranxene, CLZ)  Ativan  Brain Stimulation  Vagal Nerve Stimulation-n/a  DBS- n/a    Compliance method  Memory - yes  Mom or Spouse - Yes  Pill Box - no  Dewayne calendar - no  Turn over medication  bottle - no  Phone alarm - no    Seizure Evaluation  EEG Routine - Dont have  MRI/MRA - In past- she doesn't know results  EMU eval  2017/12/19-12/20- Patient with no events overnight. EEG shows L anterior temporal spikes, most recorded at F7. None on the other side. At times, almost continuous L temporal interictal discharges at times.   2017/12/20- 11:56:23- clinical seizure, starting from L side on EEG. No epileptiform discharges noted. L temporal slowing and spikes noted. Consisted of brief moment of unresponsiveness.   2017/12/21- EEG showing L interical anterior temporal slowing with L temporal spikes. No clinical seizures since yesterday.   2017/12/21-12/22- Event on 12/21 at 18:55 showing patient talking on the phone and suddenly stopping, unable to speak and confused. EEG shows there is a rhythmic buildup in the L temporal chains. Patient is confused and is drowsy following event. No tonic/clonic activity present.     CT/CTA Scan -   PET Scan -   Neuropsychological evaluation -   DEXA Scan    Potential Epilepsy Risk Factors:   Pregnancy/Labor/Delivery - full term uncomplicated pregnancy labor and vaginal delivery  Febrile seizures - none  Head injury - none  CNS infection - none   Stroke - none  Family Hx of Sz - none    PAST MEDICAL HISTORY:   Active Ambulatory Problems     Diagnosis  Date Noted      No Active Ambulatory Problems    Resolved Ambulatory Problems     Diagnosis  Date Noted      No Resolved Ambulatory Problems    No Additional Past Medical History         PAST SURGICAL HISTORY: No past surgical history on file.     FAMILY HISTORY: No family history on file.      SOCIAL HISTORY:    Social History    History    Social History      Marital Status:        Spouse Name:  N/A      Number of Children:  N/A      Years of Education:  N/A    Occupational History      Not on file.    Social History Main Topics      Smoking status:  Never Smoker      Smokeless tobacco:  Not on file       "Alcohol Use:  No      Drug Use:  No      Sexual Activity:  Not on file    Other Topics  Concern      Not on file    Social History Narrative      No narrative on file            SUBSTANCE USE:  Social History    Social History Main Topics      Smoking status:  Never Smoker      Smokeless tobacco:  Not on file      Alcohol Use:  No      Drug Use:  No      Sexual Activity:  Not on file       History    Substance Use Topics      Smoking status:  Never Smoker      Smokeless tobacco:  Not on file      Alcohol Use:  No         ALLERGIES: Review of patient's allergies indicates no known allergies.       Review of Systems   Constitutional: Negative for fever, chills, weight loss, malaise/fatigue and diaphoresis.   HENT: Negative for ear pain, hearing loss, nosebleeds and tinnitus.   Eyes: Negative for blurred vision, double vision, photophobia and pain.   Respiratory: Negative for cough, hemoptysis and shortness of breath.   Cardiovascular: Negative for chest pain, palpitations, orthopnea and leg swelling.   Gastrointestinal: Negative for heartburn, nausea, vomiting, abdominal pain, diarrhea, constipation and blood in stool.   Genitourinary: Negative for dysuria and hematuria.   Musculoskeletal: Negative for myalgias, joint pain and falls.   Skin: Negative for itching and rash.   Neurological: Positive for tingling, sensory change and seizures. Negative for dizziness, tremors, speech change, focal weakness, loss of consciousness, weakness and headaches.   Endo/Heme/Allergies: Negative for environmental allergies. Does not bruise/bleed easily.   Psychiatric/Behavioral: Positive for memory loss. Negative for depression, hallucinations and substance abuse. The patient has insomnia. The patient is not nervous/anxious.       /80 mmHg  Pulse 69  Ht 5' 6" (1.676 m)  Wt 110.1 kg (242 lb 11.6 oz)  BMI 39.20 kg/m2      Neurologic Exam      Higher Cortical Function:   Patient is a well developed, pleasant, well " "groomed individual appearing their stated age  Oriented - intact to person, place and time and followed two step instruction correctly.   Fund of knowledge was appropriate.   Language - Speech was fluent without evidence for an aphasia.    Throat: no aphthous ulcers noted in mouth, no erythema or enlargement of tonsils, pharynx is clear without inflammation   Lymph nodes: No enlargement of lymph nodes    Cranial Nerves II - XII:   EOMs were intact with normal smooth and no nystagmus.   PERRLA. D/C Funduscopic exam - disc were flat with normal A/V ratio and no exudates or hemorrhages. Visual fields were full to confrontation.   Motor - facial movement was symmetrical and normal.   Facial sensory - Light touch and pin prick sensations were normal.   Hearing was normal to finger rub.  Palate moved well and was symmetrical with normal palatal and oral sensation.   Tongue movement was full & the patient could say "la la la" and "Ka Ka Ka" without  difficulty. Patient repeated Buddhist and Christianity without difficulty. Normal power and bulk was found in the massiter and rotator muscles of the neck.  Motor: Power, bulk and tone were normal in all extremities.  Sensory: Light touch, pin prick, vibration and position senses were normal in all extremities.   Coordination:   Rapid alternating movements and rapid finger tapping - normal.   Finger to nose - nl.   Arm roll - symmetrical.   Gait: Station, gait and tandem walking were done without difficulty and Romberg was negative.    Deep tendon reflexes:   Reflex  L  R    Bicpets  2+  2+    Tricepts  2+  2+    Brachio-radialis  2+  2+    Knee  2+  2+    Ankle  2+  2+    Babinski  No  No      Tremor: resting, postural, intentional - none    Pulses   Peripheral - strong and symmetrical   IMPRESSION  1.  Aura - feeling of fear.    2.  S/p L temporal lobectomy 11/2018  3. Obesity - BMI 33.06    DISPOSITION:   1. Continue LTG 200mg 1 am and 1½ in pm  2.   Start clobazam 20 mg per " day  3. Lamictal and clobazam level in 2 months  4. Follow up 3 months

## 2019-08-25 ENCOUNTER — OFFICE VISIT (OUTPATIENT)
Dept: URGENT CARE | Facility: CLINIC | Age: 49
End: 2019-08-25
Payer: COMMERCIAL

## 2019-08-25 VITALS
TEMPERATURE: 99 F | SYSTOLIC BLOOD PRESSURE: 130 MMHG | WEIGHT: 199 LBS | HEIGHT: 66 IN | BODY MASS INDEX: 31.98 KG/M2 | DIASTOLIC BLOOD PRESSURE: 72 MMHG | OXYGEN SATURATION: 100 % | HEART RATE: 60 BPM

## 2019-08-25 DIAGNOSIS — R30.0 DYSURIA: Primary | ICD-10-CM

## 2019-08-25 DIAGNOSIS — N39.0 URINARY TRACT INFECTION WITH HEMATURIA, SITE UNSPECIFIED: ICD-10-CM

## 2019-08-25 DIAGNOSIS — R31.9 URINARY TRACT INFECTION WITH HEMATURIA, SITE UNSPECIFIED: ICD-10-CM

## 2019-08-25 LAB
BILIRUB UR QL STRIP: NEGATIVE
GLUCOSE UR QL STRIP: NEGATIVE
KETONES UR QL STRIP: NEGATIVE
LEUKOCYTE ESTERASE UR QL STRIP: POSITIVE
PH, POC UA: 5.5 (ref 5–8)
POC BLOOD, URINE: POSITIVE
POC NITRATES, URINE: POSITIVE
PROT UR QL STRIP: POSITIVE
SP GR UR STRIP: 1.03 (ref 1–1.03)
UROBILINOGEN UR STRIP-ACNC: ABNORMAL (ref 0.1–1.1)

## 2019-08-25 PROCEDURE — 3008F BODY MASS INDEX DOCD: CPT | Mod: CPTII,S$GLB,, | Performed by: NURSE PRACTITIONER

## 2019-08-25 PROCEDURE — 3008F PR BODY MASS INDEX (BMI) DOCUMENTED: ICD-10-PCS | Mod: CPTII,S$GLB,, | Performed by: NURSE PRACTITIONER

## 2019-08-25 PROCEDURE — 81003 URINALYSIS AUTO W/O SCOPE: CPT | Mod: QW,S$GLB,, | Performed by: NURSE PRACTITIONER

## 2019-08-25 PROCEDURE — 87186 SC STD MICRODIL/AGAR DIL: CPT

## 2019-08-25 PROCEDURE — 99214 PR OFFICE/OUTPT VISIT, EST, LEVL IV, 30-39 MIN: ICD-10-PCS | Mod: S$GLB,,, | Performed by: NURSE PRACTITIONER

## 2019-08-25 PROCEDURE — 99214 OFFICE O/P EST MOD 30 MIN: CPT | Mod: S$GLB,,, | Performed by: NURSE PRACTITIONER

## 2019-08-25 PROCEDURE — 87077 CULTURE AEROBIC IDENTIFY: CPT

## 2019-08-25 PROCEDURE — 87088 URINE BACTERIA CULTURE: CPT

## 2019-08-25 PROCEDURE — 87086 URINE CULTURE/COLONY COUNT: CPT

## 2019-08-25 PROCEDURE — 81003 POCT URINALYSIS, DIPSTICK, AUTOMATED, W/O SCOPE: ICD-10-PCS | Mod: QW,S$GLB,, | Performed by: NURSE PRACTITIONER

## 2019-08-25 RX ORDER — NITROFURANTOIN (MACROCRYSTALS) 100 MG/1
100 CAPSULE ORAL EVERY 6 HOURS
Qty: 20 CAPSULE | Refills: 0 | Status: SHIPPED | OUTPATIENT
Start: 2019-08-25 | End: 2019-08-30

## 2019-08-25 NOTE — PROGRESS NOTES
"Subjective:       Patient ID: Liza Arrieta is a 48 y.o. female.    Vitals:  height is 5' 6" (1.676 m) and weight is 90.3 kg (199 lb). Her temperature is 98.5 °F (36.9 °C). Her blood pressure is 130/72 and her pulse is 60. Her oxygen saturation is 100%.     Chief Complaint: Dysuria    48 year old female presents today with urinary symptoms that started on yesterday. She reports frequency and urgency. She denies fever, chills, back pain, N/V/D. No chest pain or shortness of breath. She has not taken anything to relieve her symptoms. History of recurrent UTIs        Dysuria    This is a new problem. The current episode started yesterday. The problem has been gradually worsening. The quality of the pain is described as burning. The pain is at a severity of 3/10. The pain is mild. There has been no fever. Associated symptoms include frequency and urgency. Pertinent negatives include no chills, flank pain, hematuria, nausea, vomiting or rash. Treatments tried: cranberry pills and juice, d-mannose. The treatment provided moderate relief. Her past medical history is significant for recurrent UTIs.       Constitution: Negative for chills and fever.   Neck: Negative for painful lymph nodes.   Gastrointestinal: Negative for abdominal pain, nausea and vomiting.   Genitourinary: Positive for dysuria, frequency, urgency and urine decreased. Negative for flank pain, hematuria, history of kidney stones, painful menstruation, irregular menstruation, missed menses, heavy menstrual bleeding, ovarian cysts, genital trauma, vaginal pain, vaginal discharge, vaginal bleeding, vaginal odor, painful intercourse, genital sore, painful ejaculation and pelvic pain.   Musculoskeletal: Negative for back pain.   Skin: Negative for rash and lesion.   Hematologic/Lymphatic: Negative for swollen lymph nodes.       Objective:      Physical Exam   Constitutional: She is oriented to person, place, and time. Vital signs are normal. She appears " well-developed and well-nourished.  Non-toxic appearance. She does not have a sickly appearance. She does not appear ill. No distress.   HENT:   Head: Normocephalic.   Right Ear: Hearing, tympanic membrane, external ear and ear canal normal.   Left Ear: Hearing, tympanic membrane, external ear and ear canal normal.   Nose: Nose normal.   Mouth/Throat: Uvula is midline, oropharynx is clear and moist and mucous membranes are normal. No oropharyngeal exudate.   Neck: Trachea normal.   Cardiovascular: Normal rate, regular rhythm and normal heart sounds.   Pulmonary/Chest: Effort normal and breath sounds normal.   Abdominal: Soft. Normal appearance. There is no tenderness.   Musculoskeletal: Normal range of motion.   Neurological: She is alert and oriented to person, place, and time.   Skin: Skin is warm. Capillary refill takes less than 2 seconds.   Psychiatric: She has a normal mood and affect. Her behavior is normal.   Nursing note and vitals reviewed.      Results for orders placed or performed in visit on 08/25/19   POCT Urinalysis, Dipstick, Automated, W/O Scope   Result Value Ref Range    POC Blood, Urine Positive (A) Negative    POC Bilirubin, Urine Negative Negative    POC Urobilinogen, Urine norm 0.1 - 1.1    POC Ketones, Urine Negative Negative    POC Protein, Urine Positive (A) Negative    POC Nitrates, Urine Positive (A) Negative    POC Glucose, Urine Negative Negative    pH, UA 5.5 5 - 8    POC Specific Gravity, Urine 1.030 (A) 1.003 - 1.029    POC Leukocytes, Urine Positive (A) Negative     Assessment:       1. Dysuria    2. Urinary tract infection with hematuria, site unspecified        Plan:         Dysuria  -     POCT Urinalysis, Dipstick, Automated, W/O Scope    Urinary tract infection with hematuria, site unspecified  -     nitrofurantoin (MACRODANTIN) 100 MG capsule; Take 1 capsule (100 mg total) by mouth every 6 (six) hours. for 5 days  Dispense: 20 capsule; Refill: 0  -     Culture, Urine

## 2019-08-25 NOTE — PATIENT INSTRUCTIONS

## 2019-08-27 ENCOUNTER — TELEPHONE (OUTPATIENT)
Dept: URGENT CARE | Facility: CLINIC | Age: 49
End: 2019-08-27

## 2019-08-27 LAB — BACTERIA UR CULT: ABNORMAL

## 2019-08-27 RX ORDER — CIPROFLOXACIN 500 MG/1
500 TABLET ORAL 2 TIMES DAILY
Qty: 20 TABLET | Refills: 0 | Status: SHIPPED | OUTPATIENT
Start: 2019-08-27 | End: 2019-09-06

## 2019-08-27 NOTE — TELEPHONE ENCOUNTER
States feeling much better , and Sx are improving, Since Cx shows intermediate response, will change to Cipro 500mg one po bid

## 2019-09-24 ENCOUNTER — PATIENT MESSAGE (OUTPATIENT)
Dept: NEUROLOGY | Facility: CLINIC | Age: 49
End: 2019-09-24

## 2019-10-22 ENCOUNTER — PATIENT MESSAGE (OUTPATIENT)
Dept: NEUROLOGY | Facility: CLINIC | Age: 49
End: 2019-10-22

## 2019-10-23 RX ORDER — LAMOTRIGINE 200 MG/1
TABLET ORAL
Qty: 270 TABLET | Refills: 0 | Status: SHIPPED | OUTPATIENT
Start: 2019-10-23 | End: 2019-11-22

## 2019-11-02 ENCOUNTER — PATIENT MESSAGE (OUTPATIENT)
Dept: NEUROLOGY | Facility: CLINIC | Age: 49
End: 2019-11-02

## 2019-11-02 ENCOUNTER — OFFICE VISIT (OUTPATIENT)
Dept: URGENT CARE | Facility: CLINIC | Age: 49
End: 2019-11-02
Payer: COMMERCIAL

## 2019-11-02 VITALS
SYSTOLIC BLOOD PRESSURE: 146 MMHG | HEIGHT: 66 IN | TEMPERATURE: 98 F | OXYGEN SATURATION: 99 % | RESPIRATION RATE: 16 BRPM | HEART RATE: 60 BPM | WEIGHT: 197 LBS | DIASTOLIC BLOOD PRESSURE: 74 MMHG | BODY MASS INDEX: 31.66 KG/M2

## 2019-11-02 DIAGNOSIS — R30.0 DYSURIA: ICD-10-CM

## 2019-11-02 DIAGNOSIS — N30.01 ACUTE CYSTITIS WITH HEMATURIA: Primary | ICD-10-CM

## 2019-11-02 LAB
BILIRUB UR QL STRIP: NEGATIVE
GLUCOSE UR QL STRIP: NEGATIVE
KETONES UR QL STRIP: NEGATIVE
LEUKOCYTE ESTERASE UR QL STRIP: POSITIVE
PH, POC UA: 8 (ref 5–8)
POC BLOOD, URINE: POSITIVE
POC NITRATES, URINE: NEGATIVE
PROT UR QL STRIP: NEGATIVE
SP GR UR STRIP: 1.01 (ref 1–1.03)
UROBILINOGEN UR STRIP-ACNC: ABNORMAL (ref 0.1–1.1)

## 2019-11-02 PROCEDURE — 87086 URINE CULTURE/COLONY COUNT: CPT

## 2019-11-02 PROCEDURE — 3008F PR BODY MASS INDEX (BMI) DOCUMENTED: ICD-10-PCS | Mod: CPTII,S$GLB,, | Performed by: PHYSICIAN ASSISTANT

## 2019-11-02 PROCEDURE — 87088 URINE BACTERIA CULTURE: CPT

## 2019-11-02 PROCEDURE — 81003 URINALYSIS AUTO W/O SCOPE: CPT | Mod: QW,S$GLB,, | Performed by: PHYSICIAN ASSISTANT

## 2019-11-02 PROCEDURE — 87077 CULTURE AEROBIC IDENTIFY: CPT

## 2019-11-02 PROCEDURE — 99214 PR OFFICE/OUTPT VISIT, EST, LEVL IV, 30-39 MIN: ICD-10-PCS | Mod: S$GLB,,, | Performed by: PHYSICIAN ASSISTANT

## 2019-11-02 PROCEDURE — 81003 POCT URINALYSIS, DIPSTICK, AUTOMATED, W/O SCOPE: ICD-10-PCS | Mod: QW,S$GLB,, | Performed by: PHYSICIAN ASSISTANT

## 2019-11-02 PROCEDURE — 87186 SC STD MICRODIL/AGAR DIL: CPT

## 2019-11-02 PROCEDURE — 3008F BODY MASS INDEX DOCD: CPT | Mod: CPTII,S$GLB,, | Performed by: PHYSICIAN ASSISTANT

## 2019-11-02 PROCEDURE — 99214 OFFICE O/P EST MOD 30 MIN: CPT | Mod: S$GLB,,, | Performed by: PHYSICIAN ASSISTANT

## 2019-11-02 RX ORDER — NITROFURANTOIN 25; 75 MG/1; MG/1
100 CAPSULE ORAL 2 TIMES DAILY
Qty: 14 CAPSULE | Refills: 0 | Status: SHIPPED | OUTPATIENT
Start: 2019-11-02 | End: 2019-11-04 | Stop reason: ALTCHOICE

## 2019-11-02 NOTE — PATIENT INSTRUCTIONS
When Your Child Has a Urinary Tract Infection (UTI)   A urinary tract infection (UTI) is a bacterial infection in the urinary tract. The urinary tract is made up of the kidneys, ureters, bladder, and urethra. Children often get UTIs that affect the bladder. UTIs can be uncomfortable and painful. But with treatment, most children recover with no lasting effects.  What is the urinary tract?  The following body parts make up the urinary tract:     A urinary tract infection is caused by bacteria that enter the urinary tract.    · Kidneys filter waste from the blood and make urine.  · Ureters carry urine from the kidneys to the bladder.  · The bladder stores urine.  · The urethra carries urine from the bladder to the outside of the body.  What causes a urinary tract infection?  Most UTIs are caused by bacteria that enter the urinary tract through the urethra. The urinary tracts of boys and girls are slightly different. The urethra is shorter in girls. This makes it easier for bacteria to enter. As a result, girls are more likely than boys to get UTIs.  What are the symptoms of a urinary tract infection?  · If your child has a UTI affecting the bladder (cystitis), symptoms can include:  ¨ Painful urination  ¨ Frequent urination  ¨ Urgent need to urinate  ¨ Blood in the urine  ¨ Daytime wetting or nighttime bedwetting when previously continent  · If your child has a UTI affecting the kidneys (pyelonephritis), symptoms are similar to those of a bladder infection. They can also include:  ¨ Fever  ¨ Abdominal pain  ¨ Nausea and vomiting  ¨ Cloudy urine  ¨ Foul-smelling urine  How is a urinary tract infection diagnosed?  · The doctor asks about your childs symptoms and health history. Your child is examined.  · A lab test, such as a urinalysis, is done. For this test, a urine sample is needed to check for bacteria and other signs of infection. The urine is also sent for a culture, a test that identifies what bacteria is  growing in the urine. It can take 1 to 3 days to get results of a urine culture. If a UTI is suspected, the doctor will likely start treatment even before lab results come back.  · If your child has severe symptoms, other tests may be done. Youll be told more about this, if needed.  How is a urinary tract infection treated?  · Symptoms of a UTI generally go away within 24 to 72 hours of starting treatment.  · The doctor will prescribe antibiotics for your child. Make sure your child takes ALL of the medication even if he or she starts feeling better.   · You can do the following at home to relieve your childs symptoms:  ¨ Give your child over-the-counter (OTC) medications, such as ibuprofen or acetaminophen, to manage pain and fever. Do not give ibuprofen to an infant who is less than 6 months of age, or to a child who is dehydrated or constantly vomiting. Do not give aspirin to a child with a fever. This can put your child at risk of a serious illness called Reyes syndrome.  ¨ Ask your doctor about other medications that can be prescribed to relieve painful urination.  ¨ Give your child plenty of fluids to drink. Cranberry juice may help relieve some pain symptoms.  When you should call your healthcare provider  Call the doctor if your child has any of the following:  · Symptoms that do not improve within 48 hours of starting treatment  · Fever (see Fever and children, below)  · A fever that goes away but returns after starting treatment  · Increased abdominal or back pain  · Signs of dehydration (very dark or little urine, excessive thirst, dry mouth, dizziness)  · Vomiting or inability to tolerate prescribed antibiotics  · Child begins acting sicker  · If a urine culture was done, make sure to get the results from the healthcare provider. Make an appointment to follow up about a week after your child has finished antibiotics.  Fever and children  Always use a digital thermometer to check your childs  temperature. Never use a mercury thermometer.  For infants and toddlers, be sure to use a rectal thermometer correctly. A rectal thermometer may accidentally poke a hole in (perforate) the rectum. It may also pass on germs from the stool. Always follow the product makers directions for proper use. If you dont feel comfortable taking a rectal temperature, use another method. When you talk to your childs healthcare provider, tell him or her which method you used to take your childs temperature.  Here are guidelines for fever temperature. Ear temperatures arent accurate before 6 months of age. Dont take an oral temperature until your child is at least 4 years old.  Infant under 3 months old:  · Ask your childs healthcare provider how you should take the temperature.  · Rectal or forehead (temporal artery) temperature of 100.4°F (38°C) or higher, or as directed by the provider  · Armpit temperature of 99°F (37.2°C) or higher, or as directed by the provider  Child age 3 to 36 months:  · Rectal, forehead (temporal artery), or ear temperature of 102°F (38.9°C) or higher, or as directed by the provider  · Armpit temperature of 101°F (38.3°C) or higher, or as directed by the provider  Child of any age:  · Repeated temperature of 104°F (40°C) or higher, or as directed by the provider  · Fever that lasts more than 24 hours in a child under 2 years old. Or a fever that lasts for 3 days in a child 2 years or older.      How is a urinary tract infection prevented?  · Encourage your child to drink plenty of fluids.  · Encourage your child to empty the bladder all the way when urinating.  · Teach girls to wipe from the front to back when using the bathroom.  · Don't use bubble bath.  · Don't allow your child to become constipated.  · If your child has a UTI, he or she may need ultrasound imaging of the kidneys and bladder. This helps the doctor rule out possible anatomical problems that could cause a UTI. If problems are  found, or if your child has recurrent UTIs, additional imaging tests may be helpful.  Date Last Reviewed: 1/1/2017 © 2000-2017 [employer], "[employer] [address] [place] [address], [place] [address]. All rights reserved. This information is not intended as a substitute for professional medical care. Always follow your healthcare professional's instructions.      PLEASE READ YOUR DISCHARGE INSTRUCTIONS ENTIRELY AS IT CONTAINS IMPORTANT INFORMATION.      Take the antibiotics to completion.     Drink plenty of fluids, wipe front to back, take showers not baths, no scented soaps, wear breathable cotton underwear, urinate after sexual intercourse.     A urine culture was sent. You will be contacted once it results and appropriate action will be taken if needed.     Take the pyridium three times a day with meals. It will turn your urine orange. You do not need to take the whole prescription you can stop this once the pain is better and finish out the antibiotics    Please go to the ER for worsening symptoms including fever, worsening flank pain, vomiting, etc.       Please return or see your primary care doctor if you develop new or worsening symptoms.     Please arrange follow up with your primary medical clinic as soon as possible. You must understand that you've received an Urgent Care treatment only and that you may be released before all of your medical problems are known or treated. You, the patient, will arrange for follow up as instructed. If your symptoms worsen or fail to improve you should go to the Emergency Room.  WE CANNOT RULE OUT ALL POSSIBLE CAUSES OF YOUR SYMPTOMS IN THE URGENT CARE SETTING PLEASE GO TO THE ER IF YOU FEELS YOUR CONDITION IS WORSENING OR YOU WOULD LIKE EMERGENT EVALUATION.    Please follow up with your Primary care provider within 2-5 days if your signs and symptoms have not resolved or worsen.     If your condition worsens or fails to improve we recommend that you receive another  evaluation at the emergency room immediately or contact your primary medical clinic to discuss your concerns.   You must understand that you have received an Urgent Care treatment only and that you may be released before all of your medical problems are known or treated. You, the patient, will arrange for follow up care as instructed.     RED FLAGS/WARNING SYMPTOMS DISCUSSED WITH PATIENT THAT WOULD WARRANT EMERGENT MEDICAL ATTENTION. PATIENT VERBALIZED UNDERSTANDING.

## 2019-11-02 NOTE — PROGRESS NOTES
"Subjective:       Patient ID: Liza Arrieta is a 49 y.o. female.    Vitals:  height is 5' 6" (1.676 m) and weight is 89.4 kg (197 lb). Her oral temperature is 97.6 °F (36.4 °C). Her blood pressure is 146/74 (abnormal) and her pulse is 60. Her respiration is 16 and oxygen saturation is 99%.     Chief Complaint: Urinary Tract Infection    Patient has dysuria frequency urgency for the past 2 days.  She notices her urine was cloudy.  Denies fever or back pain.    Urinary Tract Infection    This is a new problem. The current episode started in the past 7 days. The problem occurs every urination. The problem has been unchanged. The quality of the pain is described as aching. The pain is at a severity of 8/10. The pain is moderate. There is a history of pyelonephritis. Associated symptoms include frequency, hematuria and urgency. Pertinent negatives include no chills, nausea, vomiting or rash. She has tried nothing for the symptoms.       Constitution: Negative for chills and fever.   Neck: Negative for painful lymph nodes.   Gastrointestinal: Negative for abdominal pain, nausea and vomiting.   Genitourinary: Positive for dysuria, frequency, urgency, urine decreased and hematuria. Negative for history of kidney stones, painful menstruation, irregular menstruation, missed menses, heavy menstrual bleeding, ovarian cysts, genital trauma, vaginal pain, vaginal discharge, vaginal bleeding, vaginal odor, painful intercourse, genital sore, painful ejaculation and pelvic pain.   Musculoskeletal: Negative for back pain.   Skin: Negative for rash and lesion.   Hematologic/Lymphatic: Negative for swollen lymph nodes.       Objective:      Physical Exam   Constitutional: She is oriented to person, place, and time. She appears well-developed and well-nourished.   HENT:   Head: Normocephalic and atraumatic.   Right Ear: External ear normal.   Left Ear: External ear normal.   Nose: Nose normal. No nasal deformity. No epistaxis. "   Mouth/Throat: Oropharynx is clear and moist and mucous membranes are normal.   Eyes: Lids are normal.   Neck: Trachea normal, normal range of motion and phonation normal. Neck supple.   Cardiovascular: Normal rate, regular rhythm, normal heart sounds and normal pulses. Exam reveals no gallop and no friction rub.   No murmur heard.  Pulmonary/Chest: Effort normal. No stridor. No respiratory distress. She has no wheezes.   Abdominal: Soft. Normal appearance and bowel sounds are normal. She exhibits no distension and no mass. There is no hepatosplenomegaly. There is no tenderness. There is no rebound, no guarding, no CVA tenderness, no tenderness at McBurney's point and negative Gautam's sign. No hernia.   Genitourinary:   Genitourinary Comments: Patient deferred   Neurological: She is alert and oriented to person, place, and time.   Skin: Skin is warm, dry and intact.   Psychiatric: She has a normal mood and affect. Her speech is normal and behavior is normal. Cognition and memory are normal.   Nursing note and vitals reviewed.        Results for orders placed or performed in visit on 11/02/19   POCT Urinalysis, Dipstick, Automated, W/O Scope   Result Value Ref Range    POC Blood, Urine Positive (A) Negative    POC Bilirubin, Urine Negative Negative    POC Urobilinogen, Urine norm 0.1 - 1.1    POC Ketones, Urine Negative Negative    POC Protein, Urine Negative Negative    POC Nitrates, Urine Negative Negative    POC Glucose, Urine Negative Negative    pH, UA 8.0 5 - 8    POC Specific Gravity, Urine 1.010 1.003 - 1.029    POC Leukocytes, Urine Positive (A) Negative       Assessment:       1. Acute cystitis with hematuria    2. Dysuria        Plan:       Urine culture placed and patient will be called within a few days with results.  Acute cystitis with hematuria  -     Culture, Urine  -     nitrofurantoin, macrocrystal-monohydrate, (MACROBID) 100 MG capsule; Take 1 capsule (100 mg total) by mouth 2 (two) times daily.  for 7 days  Dispense: 14 capsule; Refill: 0    Dysuria  -     POCT Urinalysis, Dipstick, Automated, W/O Scope  -     Culture, Urine      Patient Instructions       When Your Child Has a Urinary Tract Infection (UTI)   A urinary tract infection (UTI) is a bacterial infection in the urinary tract. The urinary tract is made up of the kidneys, ureters, bladder, and urethra. Children often get UTIs that affect the bladder. UTIs can be uncomfortable and painful. But with treatment, most children recover with no lasting effects.  What is the urinary tract?  The following body parts make up the urinary tract:     A urinary tract infection is caused by bacteria that enter the urinary tract.    · Kidneys filter waste from the blood and make urine.  · Ureters carry urine from the kidneys to the bladder.  · The bladder stores urine.  · The urethra carries urine from the bladder to the outside of the body.  What causes a urinary tract infection?  Most UTIs are caused by bacteria that enter the urinary tract through the urethra. The urinary tracts of boys and girls are slightly different. The urethra is shorter in girls. This makes it easier for bacteria to enter. As a result, girls are more likely than boys to get UTIs.  What are the symptoms of a urinary tract infection?  · If your child has a UTI affecting the bladder (cystitis), symptoms can include:  ¨ Painful urination  ¨ Frequent urination  ¨ Urgent need to urinate  ¨ Blood in the urine  ¨ Daytime wetting or nighttime bedwetting when previously continent  · If your child has a UTI affecting the kidneys (pyelonephritis), symptoms are similar to those of a bladder infection. They can also include:  ¨ Fever  ¨ Abdominal pain  ¨ Nausea and vomiting  ¨ Cloudy urine  ¨ Foul-smelling urine  How is a urinary tract infection diagnosed?  · The doctor asks about your childs symptoms and health history. Your child is examined.  · A lab test, such as a urinalysis, is done. For this  test, a urine sample is needed to check for bacteria and other signs of infection. The urine is also sent for a culture, a test that identifies what bacteria is growing in the urine. It can take 1 to 3 days to get results of a urine culture. If a UTI is suspected, the doctor will likely start treatment even before lab results come back.  · If your child has severe symptoms, other tests may be done. Youll be told more about this, if needed.  How is a urinary tract infection treated?  · Symptoms of a UTI generally go away within 24 to 72 hours of starting treatment.  · The doctor will prescribe antibiotics for your child. Make sure your child takes ALL of the medication even if he or she starts feeling better.   · You can do the following at home to relieve your childs symptoms:  ¨ Give your child over-the-counter (OTC) medications, such as ibuprofen or acetaminophen, to manage pain and fever. Do not give ibuprofen to an infant who is less than 6 months of age, or to a child who is dehydrated or constantly vomiting. Do not give aspirin to a child with a fever. This can put your child at risk of a serious illness called Reyes syndrome.  ¨ Ask your doctor about other medications that can be prescribed to relieve painful urination.  ¨ Give your child plenty of fluids to drink. Cranberry juice may help relieve some pain symptoms.  When you should call your healthcare provider  Call the doctor if your child has any of the following:  · Symptoms that do not improve within 48 hours of starting treatment  · Fever (see Fever and children, below)  · A fever that goes away but returns after starting treatment  · Increased abdominal or back pain  · Signs of dehydration (very dark or little urine, excessive thirst, dry mouth, dizziness)  · Vomiting or inability to tolerate prescribed antibiotics  · Child begins acting sicker  · If a urine culture was done, make sure to get the results from the healthcare provider. Make an  appointment to follow up about a week after your child has finished antibiotics.  Fever and children  Always use a digital thermometer to check your childs temperature. Never use a mercury thermometer.  For infants and toddlers, be sure to use a rectal thermometer correctly. A rectal thermometer may accidentally poke a hole in (perforate) the rectum. It may also pass on germs from the stool. Always follow the product makers directions for proper use. If you dont feel comfortable taking a rectal temperature, use another method. When you talk to your childs healthcare provider, tell him or her which method you used to take your childs temperature.  Here are guidelines for fever temperature. Ear temperatures arent accurate before 6 months of age. Dont take an oral temperature until your child is at least 4 years old.  Infant under 3 months old:  · Ask your childs healthcare provider how you should take the temperature.  · Rectal or forehead (temporal artery) temperature of 100.4°F (38°C) or higher, or as directed by the provider  · Armpit temperature of 99°F (37.2°C) or higher, or as directed by the provider  Child age 3 to 36 months:  · Rectal, forehead (temporal artery), or ear temperature of 102°F (38.9°C) or higher, or as directed by the provider  · Armpit temperature of 101°F (38.3°C) or higher, or as directed by the provider  Child of any age:  · Repeated temperature of 104°F (40°C) or higher, or as directed by the provider  · Fever that lasts more than 24 hours in a child under 2 years old. Or a fever that lasts for 3 days in a child 2 years or older.      How is a urinary tract infection prevented?  · Encourage your child to drink plenty of fluids.  · Encourage your child to empty the bladder all the way when urinating.  · Teach girls to wipe from the front to back when using the bathroom.  · Don't use bubble bath.  · Don't allow your child to become constipated.  · If your child has a UTI, he or she  may need ultrasound imaging of the kidneys and bladder. This helps the doctor rule out possible anatomical problems that could cause a UTI. If problems are found, or if your child has recurrent UTIs, additional imaging tests may be helpful.  Date Last Reviewed: 1/1/2017 © 2000-2017 [employer]. [address] [place] [id number]. All rights reserved. This information is not intended as a substitute for professional medical care. Always follow your healthcare professional's instructions.      PLEASE READ YOUR DISCHARGE INSTRUCTIONS ENTIRELY AS IT CONTAINS IMPORTANT INFORMATION.      Take the antibiotics to completion.     Drink plenty of fluids, wipe front to back, take showers not baths, no scented soaps, wear breathable cotton underwear, urinate after sexual intercourse.     A urine culture was sent. You will be contacted once it results and appropriate action will be taken if needed.     Take the pyridium three times a day with meals. It will turn your urine orange. You do not need to take the whole prescription you can stop this once the pain is better and finish out the antibiotics    Please go to the ER for worsening symptoms including fever, worsening flank pain, vomiting, etc.       Please return or see your primary care doctor if you develop new or worsening symptoms.     Please arrange follow up with your primary medical clinic as soon as possible. You must understand that you've received an Urgent Care treatment only and that you may be released before all of your medical problems are known or treated. You, the patient, will arrange for follow up as instructed. If your symptoms worsen or fail to improve you should go to the Emergency Room.  WE CANNOT RULE OUT ALL POSSIBLE CAUSES OF YOUR SYMPTOMS IN THE URGENT CARE SETTING PLEASE GO TO THE ER IF YOU FEELS YOUR CONDITION IS WORSENING OR YOU WOULD LIKE EMERGENT EVALUATION.    Please follow up with your Primary care provider within 2-5  days if your signs and symptoms have not resolved or worsen.     If your condition worsens or fails to improve we recommend that you receive another evaluation at the emergency room immediately or contact your primary medical clinic to discuss your concerns.   You must understand that you have received an Urgent Care treatment only and that you may be released before all of your medical problems are known or treated. You, the patient, will arrange for follow up care as instructed.     RED FLAGS/WARNING SYMPTOMS DISCUSSED WITH PATIENT THAT WOULD WARRANT EMERGENT MEDICAL ATTENTION. PATIENT VERBALIZED UNDERSTANDING.

## 2019-11-04 ENCOUNTER — TELEPHONE (OUTPATIENT)
Dept: URGENT CARE | Facility: CLINIC | Age: 49
End: 2019-11-04

## 2019-11-04 DIAGNOSIS — N30.01 ACUTE CYSTITIS WITH HEMATURIA: Primary | ICD-10-CM

## 2019-11-04 LAB — BACTERIA UR CULT: ABNORMAL

## 2019-11-04 RX ORDER — CIPROFLOXACIN 250 MG/1
250 TABLET, FILM COATED ORAL 2 TIMES DAILY
Qty: 6 TABLET | Refills: 0 | Status: SHIPPED | OUTPATIENT
Start: 2019-11-04 | End: 2019-11-07

## 2019-11-22 ENCOUNTER — OFFICE VISIT (OUTPATIENT)
Dept: NEUROLOGY | Facility: CLINIC | Age: 49
End: 2019-11-22
Payer: COMMERCIAL

## 2019-11-22 VITALS
DIASTOLIC BLOOD PRESSURE: 79 MMHG | SYSTOLIC BLOOD PRESSURE: 130 MMHG | BODY MASS INDEX: 31.85 KG/M2 | HEIGHT: 66 IN | WEIGHT: 198.19 LBS | HEART RATE: 52 BPM

## 2019-11-22 DIAGNOSIS — G40.119 TEMPORAL LOBE EPILEPSY, INTRACTABLE: ICD-10-CM

## 2019-11-22 DIAGNOSIS — G40.219 COMPLEX PARTIAL EPILEPSY WITH GENERALIZATION AND WITH INTRACTABLE EPILEPSY: Primary | ICD-10-CM

## 2019-11-22 PROCEDURE — 3008F BODY MASS INDEX DOCD: CPT | Mod: CPTII,S$GLB,, | Performed by: PSYCHIATRY & NEUROLOGY

## 2019-11-22 PROCEDURE — 99999 PR PBB SHADOW E&M-EST. PATIENT-LVL II: CPT | Mod: PBBFAC,,, | Performed by: PSYCHIATRY & NEUROLOGY

## 2019-11-22 PROCEDURE — 99214 PR OFFICE/OUTPT VISIT, EST, LEVL IV, 30-39 MIN: ICD-10-PCS | Mod: S$GLB,,, | Performed by: PSYCHIATRY & NEUROLOGY

## 2019-11-22 PROCEDURE — 3008F PR BODY MASS INDEX (BMI) DOCUMENTED: ICD-10-PCS | Mod: CPTII,S$GLB,, | Performed by: PSYCHIATRY & NEUROLOGY

## 2019-11-22 PROCEDURE — 99214 OFFICE O/P EST MOD 30 MIN: CPT | Mod: S$GLB,,, | Performed by: PSYCHIATRY & NEUROLOGY

## 2019-11-22 PROCEDURE — 99999 PR PBB SHADOW E&M-EST. PATIENT-LVL II: ICD-10-PCS | Mod: PBBFAC,,, | Performed by: PSYCHIATRY & NEUROLOGY

## 2019-11-22 RX ORDER — LAMOTRIGINE 200 MG/1
500 TABLET ORAL DAILY
Qty: 405 TABLET | Refills: 3 | Status: SHIPPED | OUTPATIENT
Start: 2019-11-22 | End: 2020-11-27 | Stop reason: SDUPTHER

## 2019-11-22 NOTE — PROGRESS NOTES
"Name: Glorai Gurrola  MRN: [id number]   CSN: [id number]      Date: 11/22/2019    HISTORY OF PRESENT ILLNESS (HPI)  11/17/2015  The patient is a 49 y.o. yo RHWM   The patient was initially referred for consultation by Dr. Huerta.   The patient was unaccompanied today.     Clinic Visits  Interim History      2019/11/22  The patient continues to do well.  However she does have brief sensation of fear but nothing else.  She does not have     2019/08/07  The patient is doing well but has an occasional aura which now a feeling of fear which lasts 30 sec.  Last was on Aug 1.  She is averaging 3 per months.  Surgery was Nov 4, 2018.  The first aura occurred on Dec 19, 2018.  Number by month were Jan - 4, Feb - 2, Mar - 2, Apr - 2, May - 4, Jun - 5, Jul - 3.      Results for GLORIA GURROLA (MRN [id number]) as of 8/7/2019 15:38   Ref. Range 12/14/2018 12:10 1/14/2019 14:58 3/26/2019 13:30   Lamotrigine Lvl Latest Ref Range: 2.0 - 15.0 ug/mL 13.0 15.7 (H) 19.3 (H)       2019/03/26  In February and March, she has had four episodes that she is concerned represent seizure. These are different than any prior episodes or sensation that she has experienced. She describes a feeling of intense fear, during which she tells herself "you're just having a seizure".  reports lasting about 15 seconds, during the events he reports patient seems to be staring off in a daydream. She will respond to questions, but slowly. Typically occurring in the evening, prior to taking Lamictal. She recalls these episodes afterwards. Currently taking Lamictal 500 mg qHS, reports she noticed these episodes after switching from BID to daily lamictal dosing. Lamictal level at prior clinic visit 15.7. Denies any episodes of visual motor apraxia or any of her prior typical seizures.     2019/01/14  The patient underwent a L temporal lobectomy 2 months ago.  She has experience none of her typical seizures.  She did experience Judi Vu twice since surgery.  In " "the past she had reported Judi Vu as an aura.  She had not experienced any episodes of visual motor apraxia (unable to move eyes in any direction). She takes lamictal 200 mg in the AM and 300 mg in the PM.  She will be converted to once a day dosing of 500 mg which she prefers to do in the evening.       She feels so much better now.  She reports feeling as if she is 17 yo.    2018/05/29  Patient had another day of almost continuous seizures which was on May 09,2018.  She has failed several AEDs mainly due to side effects.  She experienced pharmacodynamic interaction and toxicity with Lamictal - Lacosamide combo.  She is tolerating the lamictal well       2018/04/09  The patient had 9 seizures recorded in last EMU visit all coming from L anterior temporal area.   MRI was normal but PET showed L mesial hypometabolism.  She is currently have almost daily seizures.  She has failed four AEDs is on Lamictal monotherapy.  She reports her verbal memory is terrible.  Review of labs show she was B12 deficient 4 yrs ago and she does not take any supplements.    Results for GLORIA GURROLA (MRN [id number]) as of 4/9/2018 16:23   Ref. Range 3/1/2016 14:45 12/19/2017 12:56 2/8/2018 18:12   Lamotrigine Lvl Latest Ref Range: 2.0 - 15.0 ug/mL 12.0 10.5 7.8     2018/01/29  EMU evaluation was completed in Dec and L temporal interictal spikes were recorded and one electrographic seizure was recorded arising from the L anterior temporal area.  Besides frequent seizures her major complaint is progressive memory loss.      HISTORY OF PRESENT ILLNESS (HPI)  Date: The   New Patient  Pt has been seeing Dr Huerta at [facility] for "complex partial sezures." First sz, in school, age 19. Was put on Dilantin and she did well for approx 15 years, until the seizures became active again. Thinks she may have had 2 classic "Grand Mal" seizures in her 20's, but since then, seizure types are those described below.    Currently, she is experiencing more than " one event per week. Event type is described below. She has been failing her current treatment regimen as described below. May have tried other meds, but can't remember them now. Did not bring records yet.        Seizure Seminology  Seizure Type 1   Classification: Pass-out  Aura - Occasional auditory мария vu  Pt loses consciousness and falls or loses tone 1-2 in  Post-ictal  Brief  Age of onset 19  Current Seizure Frequency - Several per week      Seizure Type 2  Classification: Complex Partial  Wanders around. Doesn't remember after.   Post-ictal  Brief  Current Seizure Frequency - Several per week    Seizure Triggers  Sleep Deprivation - None  Other medications - None  Psych/stress - None  Photic stimulation - None  Hyperventilation - None  Medical Problems - None  Menses - 3 days before period they get worse  Sensory Stimulation (light, sound, etc) - None  Missed dose of meds - None    AED Treatments  Present regimen  clobazam (Onfi or Frizium, CLB) 20 mg QD   tabs 1 in AM and 1½ in PM     Prior treatments  eslicarbazine (Aptiom, ESL) - seizure intensity worsened after 2 weeks Rx  lacosamide (Vimpat, LCS)   oxcarbazepine (Trileptal OXC)  perampanel (Fycompa, FCP)   BID  TPM 10ID  valproic acid (Depakote, VPA)   zonisamide (Zonegran, ZNA)    Not tried  acetazolamide (Diamox, AZM)  amantadine  carbamazepine (Tegretol, CBZ)  ethosuximide (Zarontin, ESM)  felbamate (felbatol, FBM)  gabapentin (Neurontin, GPN)  levetiracetam (Keppra, LEV)  methsuximide (Celontin, MSM)  perampanel (Fycompa, FCP)    phenobarbital (Pb)  pregabalin (Lyrica, PGB)  primidone (Mysoline, PRM)  retigabine (Potiga, RTG)  rufinamide (Banzel, RUF)  tiagabine (Gabatril, TGB)  viagabatrin, (Sabril, VGB)  vagal nerve stimulator (VNS)    Benzodiazepines  diazepam - rectal (Diastatl)  diazepam - oral (Valium, DZ)  clonazepam (Klonopin, CZP)  clorazepate (Tranxene, CLZ)  Ativan  Brain Stimulation  Vagal Nerve Stimulation-n/a  DBS-  n/a    Compliance method  Memory - yes  Mom or Spouse - Yes  Pill Box - no  Dewayne calendar - no  Turn over medication bottle - no  Phone alarm - no    Seizure Evaluation  EEG Routine - Dont have  MRI/MRA - In past- she doesn't know results  [facility]  2017/12/19-12/20- Patient with no events overnight. EEG shows L anterior temporal spikes, most recorded at F7. None on the other side. At times, almost continuous L temporal interictal discharges at times.   2017/12/20- 11:56:23- clinical seizure, starting from L side on EEG. No epileptiform discharges noted. L temporal slowing and spikes noted. Consisted of brief moment of unresponsiveness.   2017/12/21- EEG showing L interical anterior temporal slowing with L temporal spikes. No clinical seizures since yesterday.   2017/12/21-12/22- Event on 12/21 at 18:55 showing patient talking on the phone and suddenly stopping, unable to speak and confused. EEG shows there is a rhythmic buildup in the L temporal chains. Patient is confused and is drowsy following event. No tonic/clonic activity present.     CT/CTA Scan -   PET Scan -   Neuropsychological evaluation -   DEXA Scan    Potential Epilepsy Risk Factors:   Pregnancy/Labor/Delivery - full term uncomplicated pregnancy labor and vaginal delivery  Febrile seizures - none  Head injury - none  CNS infection - none   Stroke - none  Family Hx of Sz - none    PAST MEDICAL HISTORY:   Active Ambulatory Problems     Diagnosis  Date Noted      No Active Ambulatory Problems    Resolved Ambulatory Problems     Diagnosis  Date Noted      No Resolved Ambulatory Problems    No Additional Past Medical History         PAST SURGICAL HISTORY: No past surgical history on file.     FAMILY HISTORY: No family history on file.      SOCIAL HISTORY:    Social History    History    Social History      Marital Status:        Spouse Name:  N/A      Number of Children:  N/A      Years of Education:  N/A    Occupational History      Not on  "file.    Social History Main Topics      Smoking status:  Never Smoker      Smokeless tobacco:  Not on file      Alcohol Use:  No      Drug Use:  No      Sexual Activity:  Not on file    Other Topics  Concern      Not on file    Social History Narrative      No narrative on file            SUBSTANCE USE:  Social History    Social History Main Topics      Smoking status:  Never Smoker      Smokeless tobacco:  Not on file      Alcohol Use:  No      Drug Use:  No      Sexual Activity:  Not on file       History    Substance Use Topics      Smoking status:  Never Smoker      Smokeless tobacco:  Not on file      Alcohol Use:  No         ALLERGIES: Review of patient's allergies indicates no known allergies.       Review of Systems   Constitutional: Negative for fever, chills, weight loss, malaise/fatigue and diaphoresis.   HENT: Negative for ear pain, hearing loss, nosebleeds and tinnitus.   Eyes: Negative for blurred vision, double vision, photophobia and pain.   Respiratory: Negative for cough, hemoptysis and shortness of breath.   Cardiovascular: Negative for chest pain, palpitations, orthopnea and leg swelling.   Gastrointestinal: Negative for heartburn, nausea, vomiting, abdominal pain, diarrhea, constipation and blood in stool.   Genitourinary: Negative for dysuria and hematuria.   Musculoskeletal: Negative for myalgias, joint pain and falls.   Skin: Negative for itching and rash.   Neurological: Positive for tingling, sensory change and seizures. Negative for dizziness, tremors, speech change, focal weakness, loss of consciousness, weakness and headaches.   Endo/Heme/Allergies: Negative for environmental allergies. Does not bruise/bleed easily.   Psychiatric/Behavioral: Positive for memory loss. Negative for depression, hallucinations and substance abuse. The patient has insomnia. The patient is not nervous/anxious.       /80 mmHg  Pulse 69  Ht 5' 6" (1.676 m)  Wt 110.1 kg (242 lb 11.6 oz)  " "BMI 39.20 kg/m2      Neurologic Exam      Higher Cortical Function:   Patient is a well developed, pleasant, well groomed individual appearing their stated age  Oriented - intact to person, place and time and followed two step instruction correctly.   Fund of knowledge was appropriate.   Language - Speech was fluent without evidence for an aphasia.    Throat: no aphthous ulcers noted in mouth, no erythema or enlargement of tonsils, pharynx is clear without inflammation   Lymph nodes: No enlargement of lymph nodes    Cranial Nerves II - XII:   EOMs were intact with normal smooth and no nystagmus.   PERRLA. D/C Funduscopic exam - disc were flat with normal A/V ratio and no exudates or hemorrhages. Visual fields were full to confrontation.   Motor - facial movement was symmetrical and normal.   Facial sensory - Light touch and pin prick sensations were normal.   Hearing was normal to finger rub.  Palate moved well and was symmetrical with normal palatal and oral sensation.   Tongue movement was full & the patient could say "la la la" and "Ka Ka Ka" without  difficulty. Patient repeated Congregation and Moravian without difficulty. Normal power and bulk was found in the massiter and rotator muscles of the neck.  Motor: Power, bulk and tone were normal in all extremities.  Sensory: Light touch, pin prick, vibration and position senses were normal in all extremities.   Coordination:   Rapid alternating movements and rapid finger tapping - normal.   Finger to nose - nl.   Arm roll - symmetrical.   Gait: Station, gait and tandem walking were done without difficulty and Romberg was negative.    Deep tendon reflexes:   Reflex  L  R    Bicpets  2+  2+    Tricepts  2+  2+    Brachio-radialis  2+  2+    Knee  2+  2+    Ankle  2+  2+    Babinski  No  No      Tremor: resting, postural, intentional - none    Pulses   Peripheral - strong and symmetrical   IMPRESSION  1.  Aura - feeling of fear.    2.  S/p L temporal lobectomy " 11/2018  3. Obesity - BMI 33.06    DISPOSITION:   1. Continue LTG 200mg 1 am and 1½ in pm  2.   Stop  clobazam 20 mg per day  3. Start perampanel 2 mg QD  4. Follow up 4 months

## 2019-11-24 ENCOUNTER — OFFICE VISIT (OUTPATIENT)
Dept: URGENT CARE | Facility: CLINIC | Age: 49
End: 2019-11-24
Payer: COMMERCIAL

## 2019-11-24 VITALS
DIASTOLIC BLOOD PRESSURE: 49 MMHG | TEMPERATURE: 99 F | SYSTOLIC BLOOD PRESSURE: 114 MMHG | WEIGHT: 198 LBS | BODY MASS INDEX: 31.82 KG/M2 | RESPIRATION RATE: 17 BRPM | HEART RATE: 61 BPM | HEIGHT: 66 IN | OXYGEN SATURATION: 98 %

## 2019-11-24 DIAGNOSIS — N30.00 ACUTE CYSTITIS WITHOUT HEMATURIA: ICD-10-CM

## 2019-11-24 DIAGNOSIS — R30.0 DYSURIA: Primary | ICD-10-CM

## 2019-11-24 LAB
BILIRUB UR QL STRIP: NEGATIVE
GLUCOSE UR QL STRIP: NEGATIVE
KETONES UR QL STRIP: NEGATIVE
LEUKOCYTE ESTERASE UR QL STRIP: POSITIVE
PH, POC UA: 5.5 (ref 5–8)
POC BLOOD, URINE: NEGATIVE
POC NITRATES, URINE: NEGATIVE
PROT UR QL STRIP: NEGATIVE
SP GR UR STRIP: 1.02 (ref 1–1.03)
UROBILINOGEN UR STRIP-ACNC: NORMAL (ref 0.1–1.1)

## 2019-11-24 PROCEDURE — 3008F PR BODY MASS INDEX (BMI) DOCUMENTED: ICD-10-PCS | Mod: CPTII,S$GLB,, | Performed by: FAMILY MEDICINE

## 2019-11-24 PROCEDURE — 99213 PR OFFICE/OUTPT VISIT, EST, LEVL III, 20-29 MIN: ICD-10-PCS | Mod: S$GLB,,, | Performed by: FAMILY MEDICINE

## 2019-11-24 PROCEDURE — 99213 OFFICE O/P EST LOW 20 MIN: CPT | Mod: S$GLB,,, | Performed by: FAMILY MEDICINE

## 2019-11-24 PROCEDURE — 81003 URINALYSIS AUTO W/O SCOPE: CPT | Mod: QW,S$GLB,, | Performed by: FAMILY MEDICINE

## 2019-11-24 PROCEDURE — 81003 POCT URINALYSIS, DIPSTICK, AUTOMATED, W/O SCOPE: ICD-10-PCS | Mod: QW,S$GLB,, | Performed by: FAMILY MEDICINE

## 2019-11-24 PROCEDURE — 3008F BODY MASS INDEX DOCD: CPT | Mod: CPTII,S$GLB,, | Performed by: FAMILY MEDICINE

## 2019-11-24 RX ORDER — NITROFURANTOIN 25; 75 MG/1; MG/1
100 CAPSULE ORAL 2 TIMES DAILY
Qty: 14 CAPSULE | Refills: 0 | Status: SHIPPED | OUTPATIENT
Start: 2019-11-24 | End: 2019-12-01

## 2019-11-24 NOTE — PATIENT INSTRUCTIONS

## 2019-11-24 NOTE — PROGRESS NOTES
"Subjective:       Patient ID: Liza Arrieta is a 49 y.o. female.    Vitals:  height is 5' 6" (1.676 m) and weight is 89.8 kg (198 lb). Her oral temperature is 98.5 °F (36.9 °C). Her blood pressure is 114/49 (abnormal) and her pulse is 61. Her respiration is 17 and oxygen saturation is 98%.     Chief Complaint: Urinary Tract Infection    Hx of hysterectomy 18-24 months ago and recurrent UTIs ever since. Recent onset of diarrhea Wednesday that has resolved.  Pt complains of cloudy urine as well as LLQ discomfort when urinating around that time.  Mild dysuria is noted.  Pt has a urologist she follows up with.  Pain the patient denies any fever or chills pain is more so to the left lower quadrant and noticed cloudy urine    Urinary Tract Infection    This is a recurrent problem. The current episode started in the past 7 days. The problem occurs every urination. The problem has been gradually worsening. The quality of the pain is described as burning. The pain is at a severity of 0/10. The patient is experiencing no pain. There has been no fever. She is not sexually active. There is no history of pyelonephritis. Associated symptoms include hematuria. Pertinent negatives include no behavior changes, chills, discharge, flank pain, frequency, hesitancy, nausea, possible pregnancy, sweats, urgency, vomiting, weight loss, bubble bath use, constipation, rash or withholding. Treatments tried: D-Mannose, Cranberry, Vitamin C. The treatment provided no relief. Her past medical history is significant for recurrent UTIs. There is no history of catheterization, diabetes insipidus, diabetes mellitus, genitourinary reflux, hypertension, kidney stones, a single kidney, STD, urinary stasis or a urological procedure.       Constitution: Negative for chills and fever.   Neck: Negative for painful lymph nodes.   Gastrointestinal: Negative for abdominal pain, nausea, vomiting and constipation.   Genitourinary: Positive for dysuria and " hematuria. Negative for frequency, urgency, urine decreased, flank pain, history of kidney stones, painful menstruation, irregular menstruation, missed menses, heavy menstrual bleeding, ovarian cysts, genital trauma, vaginal pain, vaginal discharge, vaginal bleeding, vaginal odor, painful intercourse, genital sore, painful ejaculation and pelvic pain.   Musculoskeletal: Negative for back pain.   Skin: Negative for rash and lesion.   Hematologic/Lymphatic: Negative for swollen lymph nodes.       Objective:      Physical Exam   Constitutional: She is oriented to person, place, and time. She appears well-developed and well-nourished. She is cooperative.  Non-toxic appearance. She does not appear ill. No distress.   HENT:   Head: Normocephalic and atraumatic.   Right Ear: Hearing, tympanic membrane, external ear and ear canal normal.   Left Ear: Hearing, tympanic membrane, external ear and ear canal normal.   Nose: Nose normal. No mucosal edema, rhinorrhea or nasal deformity. No epistaxis. Right sinus exhibits no maxillary sinus tenderness and no frontal sinus tenderness. Left sinus exhibits no maxillary sinus tenderness and no frontal sinus tenderness.   Mouth/Throat: Uvula is midline, oropharynx is clear and moist and mucous membranes are normal. No trismus in the jaw. Normal dentition. No uvula swelling. No oropharyngeal exudate or posterior oropharyngeal erythema.   Eyes: Conjunctivae and lids are normal. Right eye exhibits no discharge. Left eye exhibits no discharge. No scleral icterus.   Neck: Trachea normal, normal range of motion, full passive range of motion without pain and phonation normal. Neck supple. No JVD present. No tracheal deviation present.   Cardiovascular: Normal rate, regular rhythm, normal heart sounds, intact distal pulses and normal pulses. Exam reveals no gallop and no friction rub.   No murmur heard.  Pulmonary/Chest: Effort normal and breath sounds normal. No stridor. No respiratory  distress.   Abdominal: Soft. Normal appearance and bowel sounds are normal. She exhibits no distension, no pulsatile midline mass and no mass. There is no tenderness. There is no rebound and no guarding. No hernia.   Musculoskeletal: Normal range of motion. She exhibits no edema or deformity.   Lymphadenopathy:     She has no cervical adenopathy.   Neurological: She is alert and oriented to person, place, and time. She exhibits normal muscle tone. Coordination normal.   Skin: Skin is warm, dry, intact, not diaphoretic and not pale.   Psychiatric: She has a normal mood and affect. Her speech is normal and behavior is normal. Judgment and thought content normal. Cognition and memory are normal.   Nursing note and vitals reviewed.        Assessment:       1. Dysuria    2. Acute cystitis without hematuria        Plan:         Dysuria    Acute cystitis without hematuria    Other orders  -     nitrofurantoin, macrocrystal-monohydrate, (MACROBID) 100 MG capsule; Take 1 capsule (100 mg total) by mouth 2 (two) times daily. for 7 days  Dispense: 14 capsule; Refill: 0        Force fluid    Results for orders placed or performed in visit on 11/02/19   Culture, Urine   Result Value Ref Range    Urine Culture, Routine ENTEROBACTER CLOACAE  50,000 - 99,999 cfu/ml   (A)        Susceptibility    Enterobacter cloacae - CULTURE, URINE     Ceftriaxone <=1 Sensitive mcg/mL     Ciprofloxacin <=1 Sensitive mcg/mL     Cefepime <=2 Sensitive mcg/mL     Ertapenem <=0.5 Sensitive mcg/mL     Nitrofurantoin >64 Resistant mcg/mL     Gentamicin <=4 Sensitive mcg/mL     Levofloxacin <=2 Sensitive mcg/mL     Meropenem <=1 Sensitive mcg/mL     Piperacillin/Tazo <=16 Sensitive mcg/mL     Trimeth/Sulfa <=2/38 Sensitive mcg/mL     Tetracycline <=4 Sensitive mcg/mL     Tobramycin <=4 Sensitive mcg/mL   POCT Urinalysis, Dipstick, Automated, W/O Scope   Result Value Ref Range    POC Blood, Urine Positive (A) Negative    POC Bilirubin, Urine Negative  Negative    POC Urobilinogen, Urine norm 0.1 - 1.1    POC Ketones, Urine Negative Negative    POC Protein, Urine Negative Negative    POC Nitrates, Urine Negative Negative    POC Glucose, Urine Negative Negative    pH, UA 8.0 5 - 8    POC Specific Gravity, Urine 1.010 1.003 - 1.029    POC Leukocytes, Urine Positive (A) Negative    vi

## 2019-12-02 ENCOUNTER — TELEPHONE (OUTPATIENT)
Dept: NEUROLOGY | Facility: CLINIC | Age: 49
End: 2019-12-02

## 2019-12-12 ENCOUNTER — OFFICE VISIT (OUTPATIENT)
Dept: UROLOGY | Facility: CLINIC | Age: 49
End: 2019-12-12
Payer: COMMERCIAL

## 2019-12-12 VITALS
SYSTOLIC BLOOD PRESSURE: 117 MMHG | HEART RATE: 66 BPM | DIASTOLIC BLOOD PRESSURE: 76 MMHG | BODY MASS INDEX: 30.82 KG/M2 | WEIGHT: 191.81 LBS | HEIGHT: 66 IN

## 2019-12-12 DIAGNOSIS — N39.0 RECURRENT UTI: Primary | ICD-10-CM

## 2019-12-12 PROCEDURE — 99999 PR PBB SHADOW E&M-EST. PATIENT-LVL III: CPT | Mod: PBBFAC,,, | Performed by: NURSE PRACTITIONER

## 2019-12-12 PROCEDURE — 87086 URINE CULTURE/COLONY COUNT: CPT

## 2019-12-12 PROCEDURE — 3008F BODY MASS INDEX DOCD: CPT | Mod: CPTII,S$GLB,, | Performed by: NURSE PRACTITIONER

## 2019-12-12 PROCEDURE — 81002 PR URINALYSIS NONAUTO W/O SCOPE: ICD-10-PCS | Mod: S$GLB,,, | Performed by: NURSE PRACTITIONER

## 2019-12-12 PROCEDURE — 99999 PR PBB SHADOW E&M-EST. PATIENT-LVL III: ICD-10-PCS | Mod: PBBFAC,,, | Performed by: NURSE PRACTITIONER

## 2019-12-12 PROCEDURE — 99214 OFFICE O/P EST MOD 30 MIN: CPT | Mod: 25,S$GLB,, | Performed by: NURSE PRACTITIONER

## 2019-12-12 PROCEDURE — 81002 URINALYSIS NONAUTO W/O SCOPE: CPT | Mod: S$GLB,,, | Performed by: NURSE PRACTITIONER

## 2019-12-12 PROCEDURE — 3008F PR BODY MASS INDEX (BMI) DOCUMENTED: ICD-10-PCS | Mod: CPTII,S$GLB,, | Performed by: NURSE PRACTITIONER

## 2019-12-12 PROCEDURE — 99214 PR OFFICE/OUTPT VISIT, EST, LEVL IV, 30-39 MIN: ICD-10-PCS | Mod: 25,S$GLB,, | Performed by: NURSE PRACTITIONER

## 2019-12-12 RX ORDER — LIDOCAINE HYDROCHLORIDE 20 MG/ML
JELLY TOPICAL ONCE
Status: CANCELLED | OUTPATIENT
Start: 2019-12-12 | End: 2019-12-12

## 2019-12-12 RX ORDER — CIPROFLOXACIN 250 MG/1
500 TABLET, FILM COATED ORAL ONCE
Status: CANCELLED | OUTPATIENT
Start: 2019-12-12 | End: 2019-12-12

## 2019-12-12 NOTE — H&P (VIEW-ONLY)
CHIEF COMPLAINT:    Mrs Arrieta is a 49 y.o. female presenting for recurrent UTI.  PRESENTING ILLNESS:    Liza Arireta is a 49 y.o. female who presents for recurrent UTI. Her last clinic visit was 19.    Hx of epilepsy.  S/p craniotomy 2018    Today patient presents to clinic for recurrent UTI. Pt had 3 positive urine cultures since 7/15/19. UTI symptoms include dysuria, odor and dark yellow color to urine. Denies hematuria or flank pain. Denies fever or chills. Today in clinic she denies any UTI symptoms. She completed antibiotics 2 weeks ago for most recent UTI.   Denies urinary issues as baseline.  Since last visit, she has been emptying her bladder more often during the daytime rather than holding.  She does have issues with constipation because she takes an iron pill. She normally has a bowel movement every other day of soft consistency when she has good fiber intake.  She is trying to lose weight with decreasing calorie intake, increasing vegetables and exercising. She has increased water intake to 1 gallon per day. She only allows herself 1 12-oz beverage with caffeine per day.   She is taking a cranberry pill and probiotic.   Prior to this year, no hx of recurrent UTI.    REVIEW OF SYSTEMS:    Review of Systems    Constitutional: Negative for fever and chills.   HENT: Negative for hearing loss.   Eyes: Wears glasses. Negative for eye discharge.   Respiratory: Negative for shortness of breath.   Cardiovascular: Negative for chest pain.   Gastrointestinal: Occasional constipation. Negative for nausea, vomiting.   Genitourinary:  See HPI  Neurological: Negative for dizziness.   Hematological: Does not bruise/bleed easily.   Psychiatric/Behavioral: Negative for confusion.     PATIENT HISTORY:    Past Medical History:   Diagnosis Date    Convulsions     Epilepsy     Seizures        Past Surgical History:   Procedure Laterality Date    APPENDECTOMY       SECTION      4    CHOLECYSTECTOMY       CRANIOTOMY N/A 11/5/2018    Procedure: CRANIOTOMY for strip and grid;  Surgeon: Ruben Senior MD;  [facility];  Service: Neurosurgery;  Laterality: N/A;  toronto II, asa 2, type and screen, regular bed, muñoz, supine     CRANIOTOMY Left 11/8/2018    Procedure: CRANIOTOMY for grids and strips;  Surgeon: Ruben Senior MD;  [facility];  Service: Neurosurgery;  Laterality: Left;    HYSTERECTOMY      around 2016 for pain ful periods     INSERTION OF SUBDURAL GRID AND STRIP ELECTRODES BY CRANIOTOMY Left 11/19/2018    Procedure: CRANIOTOMY, FOR SUBDURAL GRID AND STRIP ELECTRODE LEAD Removal.;  Surgeon: Ruben Senior MD;  Location: [facility] OR [facility];  Service: Neurosurgery;  Laterality: Left;  needs microscope and stealth-cg    SURGICAL REMOVAL OF LOBE OF BRAIN Left 11/19/2018    Procedure: LOBECTOMY, BRAIN left temporal lobe.;  Surgeon: Ruben Senior MD;  Location: [facility];  Service: Neurosurgery;  Laterality: Left;       Family History   Problem Relation Age of Onset    Heart disease Father         over 50 years    No Known Problems Mother        Social History     Socioeconomic History    Marital status:      Spouse name: Not on file    Number of children: Not on file    Years of education: Not on file    Highest education level: Not on file   Occupational History    Not on file   Social Needs    Financial resource strain: Not on file    Food insecurity:     Worry: Not on file     Inability: Not on file    Transportation needs:     Medical: Not on file     Non-medical: Not on file   Tobacco Use    Smoking status: Never Smoker    Smokeless tobacco: Never Used   Substance and Sexual Activity    Alcohol use: No     Alcohol/week: 0.0 standard drinks    Drug use: No    Sexual activity: Never   Lifestyle    Physical activity:     Days per week: Not on file     Minutes per session: Not on file    Stress: Not on file   Relationships    Social connections:      Talks on phone: Not on file     Gets together: Not on file     Attends [employer] service: Not on file     Active member of club or organization: Not on file     Attends meetings of clubs or organizations: Not on file     Relationship status: Not on file   Other Topics Concern    Not on file   Social History Narrative    Not on file       Allergies:  Patient has no known allergies.    Medications:    Current Outpatient Medications:     perampanel (FYCOMPA) 2 mg Tab, Take 1 tablet (2 mg total) by mouth once daily., Disp: 90 tablet, Rfl: 1    cloBAZam (ONFI) 20 mg Tab, Take 1 tablet (20 mg total) by mouth once daily., Disp: 90 tablet, Rfl: 1    lamoTRIgine (LAMICTAL) 200 MG tablet, Take 2.5 tablets (500 mg total) by mouth once daily., Disp: 405 tablet, Rfl: 3    PHYSICAL EXAMINATION:    Constitutional: She is oriented to person, place, and time. She appears well-developed and well-nourished.  She is in no apparent distress.    Neck: Normal ROM.     Cardiovascular: Normal rate.      Pulmonary/Chest: Effort normal. No respiratory distress.     Abdominal:  She exhibits no distension.  There is no CVA tenderness.     Lymphadenopathy:          Right: No supraclavicular adenopathy present.        Left: No supraclavicular adenopathy present.     Neurological: She is alert and oriented to person, place, and time.     Skin: Skin is warm and dry.     Extremities: Normal ROM    Psych: Cooperative with normal affect.      Physical Exam      LABS:    U/a: sp grav 1.020, pH 5, negative    Lab Results   Component Value Date    CREATININE 0.8 11/23/2018       IMPRESSION:    Encounter Diagnoses   Name Primary?    Recurrent UTI Yes       PLAN:  -Renal US  -Cysto   -Urine culture  -UTI prevention discussed with patient.  -RTC as scheduled for cysto      I spent 25 minutes with the patient of which more than half was spent in coordinating the patient's care as well as in direct consultation with the patient in regards to our  treatment and plan.

## 2019-12-12 NOTE — PROGRESS NOTES
CHIEF COMPLAINT:    Mrs Arrieta is a 49 y.o. female presenting for recurrent UTI.  PRESENTING ILLNESS:    Liza Arrieta is a 49 y.o. female who presents for recurrent UTI. Her last clinic visit was 19.    Hx of epilepsy.  S/p craniotomy 2018    Today patient presents to clinic for recurrent UTI. Pt had 3 positive urine cultures since 7/15/19. UTI symptoms include dysuria, odor and dark yellow color to urine. Denies hematuria or flank pain. Denies fever or chills. Today in clinic she denies any UTI symptoms. She completed antibiotics 2 weeks ago for most recent UTI.   Denies urinary issues as baseline.  Since last visit, she has been emptying her bladder more often during the daytime rather than holding.  She does have issues with constipation because she takes an iron pill. She normally has a bowel movement every other day of soft consistency when she has good fiber intake.  She is trying to lose weight with decreasing calorie intake, increasing vegetables and exercising. She has increased water intake to 1 gallon per day. She only allows herself 1 12-oz beverage with caffeine per day.   She is taking a cranberry pill and probiotic.   Prior to this year, no hx of recurrent UTI.    REVIEW OF SYSTEMS:    Review of Systems    Constitutional: Negative for fever and chills.   HENT: Negative for hearing loss.   Eyes: Wears glasses. Negative for eye discharge.   Respiratory: Negative for shortness of breath.   Cardiovascular: Negative for chest pain.   Gastrointestinal: Occasional constipation. Negative for nausea, vomiting.   Genitourinary:  See HPI  Neurological: Negative for dizziness.   Hematological: Does not bruise/bleed easily.   Psychiatric/Behavioral: Negative for confusion.     PATIENT HISTORY:    Past Medical History:   Diagnosis Date    Convulsions     Epilepsy     Seizures        Past Surgical History:   Procedure Laterality Date    APPENDECTOMY       SECTION      4    CHOLECYSTECTOMY       CRANIOTOMY N/A 11/5/2018    Procedure: CRANIOTOMY for strip and grid;  Surgeon: Ruben Senior MD;  [facility];  Service: Neurosurgery;  Laterality: N/A;  toronto II, asa 2, type and screen, regular bed, muñoz, supine     CRANIOTOMY Left 11/8/2018    Procedure: CRANIOTOMY for grids and strips;  Surgeon: Ruben Senior MD;  [facility];  Service: Neurosurgery;  Laterality: Left;    HYSTERECTOMY      around 2016 for pain ful periods     INSERTION OF SUBDURAL GRID AND STRIP ELECTRODES BY CRANIOTOMY Left 11/19/2018    Procedure: CRANIOTOMY, FOR SUBDURAL GRID AND STRIP ELECTRODE LEAD Removal.;  Surgeon: Ruben Senior MD;  Location: [facility] OR [facility];  Service: Neurosurgery;  Laterality: Left;  needs microscope and stealth-cg    SURGICAL REMOVAL OF LOBE OF BRAIN Left 11/19/2018    Procedure: LOBECTOMY, BRAIN left temporal lobe.;  Surgeon: Ruben Senior MD;  Location: [facility] OR [facility];  Service: Neurosurgery;  Laterality: Left;       Family History   Problem Relation Age of Onset    Heart disease Father         over 50 years    No Known Problems Mother        Social History     Socioeconomic History    Marital status:      Spouse name: Not on file    Number of children: Not on file    Years of education: Not on file    Highest education level: Not on file   Occupational History    Not on file   Social Needs    Financial resource strain: Not on file    Food insecurity:     Worry: Not on file     Inability: Not on file    Transportation needs:     Medical: Not on file     Non-medical: Not on file   Tobacco Use    Smoking status: Never Smoker    Smokeless tobacco: Never Used   Substance and Sexual Activity    Alcohol use: No     Alcohol/week: 0.0 standard drinks    Drug use: No    Sexual activity: Never   Lifestyle    Physical activity:     Days per week: Not on file     Minutes per session: Not on file    Stress: Not on file   Relationships    Social connections:      Talks on phone: Not on file     Gets together: Not on file     Attends Buddhist service: Not on file     Active member of club or organization: Not on file     Attends meetings of clubs or organizations: Not on file     Relationship status: Not on file   Other Topics Concern    Not on file   Social History Narrative    Not on file       Allergies:  Patient has no known allergies.    Medications:    Current Outpatient Medications:     perampanel (FYCOMPA) 2 mg Tab, Take 1 tablet (2 mg total) by mouth once daily., Disp: 90 tablet, Rfl: 1    cloBAZam (ONFI) 20 mg Tab, Take 1 tablet (20 mg total) by mouth once daily., Disp: 90 tablet, Rfl: 1    lamoTRIgine (LAMICTAL) 200 MG tablet, Take 2.5 tablets (500 mg total) by mouth once daily., Disp: 405 tablet, Rfl: 3    PHYSICAL EXAMINATION:    Constitutional: She is oriented to person, place, and time. She appears well-developed and well-nourished.  She is in no apparent distress.    Neck: Normal ROM.     Cardiovascular: Normal rate.      Pulmonary/Chest: Effort normal. No respiratory distress.     Abdominal:  She exhibits no distension.  There is no CVA tenderness.     Lymphadenopathy:          Right: No supraclavicular adenopathy present.        Left: No supraclavicular adenopathy present.     Neurological: She is alert and oriented to person, place, and time.     Skin: Skin is warm and dry.     Extremities: Normal ROM    Psych: Cooperative with normal affect.      Physical Exam      LABS:    U/a: sp grav 1.020, pH 5, negative    Lab Results   Component Value Date    CREATININE 0.8 11/23/2018       IMPRESSION:    Encounter Diagnoses   Name Primary?    Recurrent UTI Yes       PLAN:  -Renal US  -Cysto   -Urine culture  -UTI prevention discussed with patient.  -RTC as scheduled for cysto      I spent 25 minutes with the patient of which more than half was spent in coordinating the patient's care as well as in direct consultation with the patient in regards to our  treatment and plan.

## 2019-12-12 NOTE — PATIENT INSTRUCTIONS
Cystoscopy    Cystoscopy is a procedure that lets your doctor look directly inside your urethra and bladder. It can be used to:  · Help diagnose a problem with your urethra, bladder, or kidneys.  · Take a sample (biopsy) of bladder or urethral tissue.  · Treat certain problems (such as removing kidney stones).  · Place a stent to bypass an obstruction.  · Take special X-rays of the kidneys.  Based on the findings, your doctor may recommend other tests or treatments.  What is a cystoscope?  A cystoscope is a telescope-like instrument that contains lenses and fiberoptics (small glass wires that make bright light). The cystoscope may be straight and rigid, or flexible to bend around curves in the urethra. The doctor may look directly into the cystoscope, or project the image onto a monitor.  Getting ready  · Ask your doctor if you should stop taking any medicines before the procedure.  · Ask whether you should avoid eating or drinking anything after midnight before the procedure.  · Follow any other instructions your doctor gives you.  Tell your doctor before the exam if you:  · Take any medicines, such as aspirin or blood thinners  · Have allergies to any medicines  · Are pregnant   The procedure  Cystoscopy is done in the doctors office, surgery center, or hospital. The doctor and a nurse are present during the procedure. It takes only a few minutes, longer if a biopsy, X-ray, or treatment needs to be done.  During the procedure:  · You lie on an exam table on your back, knees bent and legs apart. You are covered with a drape.  · Your urethra and the area around it are washed. Anesthetic jelly may be applied to numb the urethra. Other pain medicine is usually not needed. In some cases, you may be offered a mild sedative to help you relax. If a more extensive procedure is to be done, such as a biopsy or kidney stone removal, general anesthesia may be needed.  · The cystoscope is inserted. A sterile fluid is put  into the bladder to expand it. You may feel pressure from this fluid.  · When the procedure is done, the cystoscope is removed.  After the procedure  If you had a sedative, general anesthesia, or spinal anesthesia, you must have someone drive you home. Once youre home:  · Drink plenty of fluids.  · You may have burning or light bleeding when you urinate--this is normal.  · Medicines may be prescribed to ease any discomfort or prevent infection. Take these as directed.  · Call your doctor if you have heavy bleeding or blood clots, burning that lasts more than a day, a fever over 100°F  (38° C), or trouble urinating.  Date Last Reviewed: 1/1/2017 © 2000-2017 [employer]. [address] [place] [address], [place] [address]. All rights reserved. This information is not intended as a substitute for professional medical care. Always follow your healthcare professional's instructions.      UTI precautions:  Wipe front to back and avoid constipation.  Avoid caffeine.  Drink lots of water  Void every 2-3 hrs.  Expel urine from vagina post void  No dryer sheets or harsh detergents with the undergarments  No bubble baths  Void before and after intercourse  Avoid hot tub use  Void soon after urge arises  Avoid tight fitting clothes and panty hose    UTI PREVENTION: (from [employer] Continence)  Urinary Tract Infection (UTI)    More than 4 million doctor office visits per year in the [place] are for urinary tract infections (UTI). About 12% of men and 50% of women will have a UTI during his or her lifetime. Most UTIs arise from bacteria that normally live in the colon and rectum and are present in bowel movements. These bacteria cling to the opening of the urethra, begin to multiply, & travel up to the bladder. Urine flow from the bladder usually washes bacteria out of the body. However, because women have a shorter urethra than men, bacteria can reach the bladder more easily and settle into the bladder  wall. This is why women are more likely to develop UTIs than men. This risk factor is exacerbated by the greater likelihood that women introduce bacteria from fecal matter, following a bowel movement, into the urinary tract. Less often, bacteria can spread to the kidney from the bloodstream. A recurrent UTI is classified as three or more a year.    Risk Factors for UTI  Several factors can contribute to the risk of UTI. Sexual intercourse, use of contraceptive spermicide, low levels of estrogen, catheterization, diabetes, pregnancy, and immune suppression increase susceptibility to UTI.  Naturally occurring estrogen helps prevent recurrent UTI in women. After menopause, estrogen levels drop along with the number of vaginal lactobacilli, the good bacteria which prevent growth of intestinal bacteria in the vagina. This makes postmenopausal women especially susceptible to UTI.  Catheters also present a risk of recurring UTI. Catheters are associated with colonization of bacteria and increased risks of clinical infection. Using the techniques described previously can help keep catheters clean and prevent recurrent UTI. While single-use of sterile catheters reduces the risks, it does not prevent UTI from occurring. It is therefore important to maintain proper care and use of catheters at all times while remaining alert to symptoms of UTI.    Common Symptoms of UTI   Painful urination   Frequency   Urgency   Lower abdominal or pelvic pain or pressure   Blood in the urine   Milky, cloudy, or pink/red urine   Fever   Strong smelling urine  In the elderly populations, symptoms of a urinary tract infection, can be easily overlooked, causing a delay in diagnosis. Elderly people with a UTI are more likely than younger people not to be diagnosed until the complication of sepsis occurs. The elderly often do not experience or report obvious symptoms that younger people have, such as difficult urination, or frequent  urination. Instead they may exhibit far more vague symptoms that are similiar to many disease or may be assumed to be due to the aging process. These symptoms include: confusion, feelings of general discomfort, disorientation, fatigue, weakness, behavior changes, falling, and/or a new, acute incontinence. In addition, because incontinence is common in the elderly, they may not be aware of the symptom of frequency. If you experience any of these symptoms, see your healthcare professional.    Types of UTIs   Cystitis - an inflammation of the bladder and the most common UTI. Almost always, it occurs in women. The infection typically affects only the surface of the bladder and is brief & acute.   Pyelonephritis - more commonly known as a kidney infection and usually occurs when a lower UTI spreads to the kidneys. Occasionally, bacteria will spread to the kidney from the bloodstresam. Kidney infections are more serious and less common than bladder infections. Symptoms include a fever, pain in the back or side below the ribs, nausea, or vomiting.   Urethritis - is an inflammation of the urethra. Men commonly contract urethritis through sexual intercourse with partners infected with sexually transmitted infections. Urethritis may also result from trauma to the area or from the catheterization process.    Prevention of UTI  There are several ways to prevent bladder infections.  Bathroom Behavior:Periodically emptying the bladder by trying to urinate every two to three hours.  Diet:The use of cranberry products seems to decrease the ability of bacteria to adhere to the lining of the urethra and bladder. As cranberry juice can have a high amount of sugar, cranberry extract can be taken in capsule or pill form instead. Increasing water intake by one or two glasses per day may help limit the length of time that you have symptoms and reduce the infections.  Hygiene: Proper hygiene in caring for the urethral area is another  way to limit the amount or type of bacteria that can be drawn into the urethra. This is especially true in people who have decreased sensation in the perineal region such as those with MS or who experience any amount of fecal incontinence. Using soap & water or commercially available cleansing wipes several times per day & frequent changing of incontinence pads as they become wet can minimize the amount of bacteria in the urethral area. Women should always wipe from the front of the vagina to the back of the anus after urination or a bowel movement and wear cotton underwear. It is also reported that wearing thong undergarments may increase one's risk of developing an infection.  Sexual Activity: Can be the source of UTIs in women. Insuring proper lubrication to the vagina and voiding before and after intercourse are tactics to help prevent infection. Using diaphragms and spermicidal jelly and/or foam may increase the risk of infection, so it is important to evaluate what type of birth control you use. Some physicians encourage women who have a history of recurrent infections to take an prophylactic antibiotic after intercourse, as it reduces risk of recurrent UTI by about 85%. At a minimum, restrict your number of sexual partners and urinate immediately after sexual intimacy to lower the risk of recurrent UTIs.  Vaginal Estrogen: reduces risk of recurrent UTI by repopulating the normal vaginal lactobacilli that keep bacteria from the rectum from multiplying and causing a bladder infection. Forms of vaginal estrogen are available at very low dosages that have minimal systemic absorption.      Treatment of UTI  Depending on the severity of infection, UTI can be treated with oral antibiotics. A three-day course of antibiotics can treat a simple UTI. However, some infections may need to be treated for several days or weeks. Length of antibiotic treatment also depends on the type of antibiotic prescribed.  Even if a  few doses of medication relieve some symptoms, you should still complete the full course of medication prescribed by your doctor. Taking aspirin, Tylenol, or non-steroidal, anti-inflammatory medications may reduce symptoms during this episode, as they may be a result of increased body temperature.

## 2019-12-13 LAB
BACTERIA UR CULT: NORMAL
BACTERIA UR CULT: NORMAL

## 2019-12-16 ENCOUNTER — TELEPHONE (OUTPATIENT)
Dept: PEDIATRIC UROLOGY | Facility: CLINIC | Age: 49
End: 2019-12-16

## 2019-12-16 ENCOUNTER — HOSPITAL ENCOUNTER (OUTPATIENT)
Dept: RADIOLOGY | Facility: HOSPITAL | Age: 49
Discharge: HOME | End: 2019-12-16
Attending: NURSE PRACTITIONER
Payer: COMMERCIAL

## 2019-12-16 DIAGNOSIS — N39.0 RECURRENT UTI: ICD-10-CM

## 2019-12-16 PROCEDURE — 76770 US RETROPERITONEAL COMPLETE: ICD-10-PCS | Mod: 26,,, | Performed by: INTERNAL MEDICINE

## 2019-12-16 PROCEDURE — 76770 US EXAM ABDO BACK WALL COMP: CPT | Mod: TC

## 2019-12-16 PROCEDURE — 76770 US EXAM ABDO BACK WALL COMP: CPT | Mod: 26,,, | Performed by: INTERNAL MEDICINE

## 2019-12-16 NOTE — TELEPHONE ENCOUNTER
No answer when attempting to reach pt. Result letter mailed.      ----- Message from Gi Magallanes NP sent at 12/16/2019 11:26 AM CST -----  Please notify patient her renal US was normal. Proceed with cysto to evaluate bladder for recurrent UTI.

## 2019-12-16 NOTE — TELEPHONE ENCOUNTER
Unable to reach pt due to voicemail full. Result letter mailed      ----- Message from Gi Magallanes NP sent at 12/16/2019  8:00 AM CST -----  Please notify patient her urine culture was negative. If she has UTI symptoms prior to her cysto, please notify us in clinic so home collect can be obtained.

## 2019-12-19 ENCOUNTER — PATIENT MESSAGE (OUTPATIENT)
Dept: NEUROLOGY | Facility: CLINIC | Age: 49
End: 2019-12-19

## 2020-01-06 ENCOUNTER — HOSPITAL ENCOUNTER (OUTPATIENT)
Dept: UROLOGY | Facility: HOSPITAL | Age: 50
Discharge: HOME | End: 2020-01-06
Attending: UROLOGY
Payer: COMMERCIAL

## 2020-01-06 VITALS
WEIGHT: 190.94 LBS | RESPIRATION RATE: 18 BRPM | BODY MASS INDEX: 30.69 KG/M2 | HEART RATE: 72 BPM | HEIGHT: 66 IN | DIASTOLIC BLOOD PRESSURE: 83 MMHG | TEMPERATURE: 97 F | SYSTOLIC BLOOD PRESSURE: 150 MMHG

## 2020-01-06 DIAGNOSIS — N39.0 RECURRENT UTI: ICD-10-CM

## 2020-01-06 PROCEDURE — 52000 CYSTOURETHROSCOPY: CPT

## 2020-01-06 PROCEDURE — 52000 CYSTOURETHROSCOPY: CPT | Mod: ,,, | Performed by: UROLOGY

## 2020-01-06 PROCEDURE — 52000 PR CYSTOURETHROSCOPY: ICD-10-PCS | Mod: ,,, | Performed by: UROLOGY

## 2020-01-06 RX ORDER — LIDOCAINE HYDROCHLORIDE 20 MG/ML
JELLY TOPICAL ONCE
Status: COMPLETED | OUTPATIENT
Start: 2020-01-06 | End: 2020-01-06

## 2020-01-06 RX ORDER — CIPROFLOXACIN 500 MG/1
500 TABLET ORAL ONCE
Status: COMPLETED | OUTPATIENT
Start: 2020-01-06 | End: 2020-01-06

## 2020-01-06 RX ADMIN — CIPROFLOXACIN 500 MG: 500 TABLET ORAL at 10:01

## 2020-01-06 RX ADMIN — LIDOCAINE HYDROCHLORIDE: 20 JELLY TOPICAL at 10:01

## 2020-01-06 NOTE — PROCEDURES
CYSTOSCOPY REPORT    Pre Procedure Diagnosis:  Recurrent UTI    Post Procedure Diagnosis:  Normal lower urinary tract    Anesthesia: 10 cc 2% lidocaine jelly applied per urethra.    14 FR Flexible Olympus cystoscope used.    FINDINGS:  Dome, anterior, posterior, lateral walls and bladder base free of urothelial abnormalities. Right and left ureteral orifices in the normal postion and configuration, both effluxed clear urine.  Bladder neck and urethra were normal.    Specimen:  none    The patient was taken to the cystoscopy suite and placed in dorsal lithotomy position.  The genitalia was prepped and draped  in the usual sterile fashion.  Two percent lidocaine jelly was inserted in the urethra.  After sufficent time had passed to allow good local anesthesia, the cystoscope was inserted in the urethra and passed into the bladder visualizing the urethra along its entire course.  The dome, anterior, posterior and lateral walls were examined systematically.  The ureteral orifices were in their usual position and configuration.  The cystoscope was turned upon itself 180 degrees to visualize the bladder neck.  The cystoscope was then brought to the level of the bladder neck, the water was turned on and the urethra was visualized.  The cystoscope was removed and the patient was instructed to urinate prior to leaving the office.     Post procedure medication:  Cipro 500 mg x 1     ASSESSMENT/PLAN:  49 year old woman status post flexible cystoscopy.  1. Push fluids for 24 hours.  2. May see blood in the urine, this should gradually improve over the next 2-3 days.  3. The patient was instructed to return to the office or go to the emergency should fever, chills, cloudy urine, or inability to urinate develop.  4. Follow up as needed.      Note:  The patient states she has to bend over to urinate and defecate.  She did not have any trigger points on vaginal exam.  She has lost about 40 lbs and is taking a stool softener but has  The types of intraocular lenses were reviewed with the patient along with a discussion of their various strengths and weaknesses. constipation issues.  Recommended a Squatty Potty to help with the angle of the pelvic floor and for her to continue exercise as this has helped with her constipation.  Discussed that the rationale for the workup is to make sure a patient does not have an issue that needs to be addressed surgically like a stone.  She expressed understanding.

## 2020-01-06 NOTE — INTERVAL H&P NOTE
The patient has been examined and the H&P has been reviewed:    I concur with the findings and no changes have occurred since H&P was written.    Renal ultrasound from 12/16/2019 was normal.  Proceed with planned cystoscopy for recurrent UTI.    There are no hospital problems to display for this patient.

## 2020-01-06 NOTE — PATIENT INSTRUCTIONS
What to Expect After a Cystoscopy  For the next 24-48 hours, you may feel a mild burning when you urinate. This burning is normal and expected. Drink plenty of water to dilute the urine to help relieve the burning sensation. You may also see a small amount of blood in your urine after the procedure.    Unless you are already taking antibiotics, you may be given an antibiotic after the test to prevent infection.    Signs and Symptoms to Report  Call the [facility] at [phone] if you develop any of the following:  · Fever of 101 degrees or higher  · Chills or persistent bleeding  · Inability to urinate

## 2020-03-11 ENCOUNTER — OFFICE VISIT (OUTPATIENT)
Dept: URGENT CARE | Facility: CLINIC | Age: 50
End: 2020-03-11
Payer: COMMERCIAL

## 2020-03-11 VITALS
TEMPERATURE: 98 F | WEIGHT: 184 LBS | HEART RATE: 55 BPM | SYSTOLIC BLOOD PRESSURE: 115 MMHG | BODY MASS INDEX: 29.57 KG/M2 | DIASTOLIC BLOOD PRESSURE: 44 MMHG | RESPIRATION RATE: 18 BRPM | OXYGEN SATURATION: 98 % | HEIGHT: 66 IN

## 2020-03-11 DIAGNOSIS — N30.01 ACUTE CYSTITIS WITH HEMATURIA: Primary | ICD-10-CM

## 2020-03-11 DIAGNOSIS — R30.0 DYSURIA: ICD-10-CM

## 2020-03-11 LAB
BILIRUB UR QL STRIP: NEGATIVE
GLUCOSE UR QL STRIP: NEGATIVE
KETONES UR QL STRIP: NEGATIVE
LEUKOCYTE ESTERASE UR QL STRIP: POSITIVE
PH, POC UA: 5.5 (ref 5–8)
POC BLOOD, URINE: POSITIVE
POC NITRATES, URINE: NEGATIVE
PROT UR QL STRIP: NEGATIVE
SP GR UR STRIP: 1.01 (ref 1–1.03)
UROBILINOGEN UR STRIP-ACNC: ABNORMAL (ref 0.1–1.1)

## 2020-03-11 PROCEDURE — 87086 URINE CULTURE/COLONY COUNT: CPT

## 2020-03-11 PROCEDURE — 87077 CULTURE AEROBIC IDENTIFY: CPT

## 2020-03-11 PROCEDURE — 81003 URINALYSIS AUTO W/O SCOPE: CPT | Mod: QW,S$GLB,, | Performed by: NURSE PRACTITIONER

## 2020-03-11 PROCEDURE — 99214 OFFICE O/P EST MOD 30 MIN: CPT | Mod: 25,S$GLB,, | Performed by: NURSE PRACTITIONER

## 2020-03-11 PROCEDURE — 87186 SC STD MICRODIL/AGAR DIL: CPT

## 2020-03-11 PROCEDURE — 81003 POCT URINALYSIS, DIPSTICK, AUTOMATED, W/O SCOPE: ICD-10-PCS | Mod: QW,S$GLB,, | Performed by: NURSE PRACTITIONER

## 2020-03-11 PROCEDURE — 87088 URINE BACTERIA CULTURE: CPT

## 2020-03-11 PROCEDURE — 99214 PR OFFICE/OUTPT VISIT, EST, LEVL IV, 30-39 MIN: ICD-10-PCS | Mod: 25,S$GLB,, | Performed by: NURSE PRACTITIONER

## 2020-03-11 RX ORDER — CIPROFLOXACIN 500 MG/1
500 TABLET ORAL 2 TIMES DAILY
Qty: 14 TABLET | Refills: 0 | Status: SHIPPED | OUTPATIENT
Start: 2020-03-11 | End: 2020-03-18

## 2020-03-11 NOTE — PROGRESS NOTES
"Subjective:       Patient ID: Liza Arrieta is a 49 y.o. female.    Vitals:  height is 5' 6" (1.676 m) and weight is 83.5 kg (184 lb). Her temperature is 98.4 °F (36.9 °C). Her blood pressure is 115/44 (abnormal) and her pulse is 55 (abnormal). Her respiration is 18 and oxygen saturation is 98%.     Chief Complaint: Urinary Tract Infection    Urinary Tract Infection    This is a new problem. The current episode started in the past 7 days (2 days). The problem has been unchanged. The quality of the pain is described as burning. The pain is at a severity of 2/10. She is not sexually active. There is no history of pyelonephritis. Associated symptoms include frequency and urgency. Pertinent negatives include no chills, hematuria, nausea, vomiting or rash. She has tried nothing for the symptoms.       Constitution: Negative for chills and fever.   Neck: Negative for painful lymph nodes.   Gastrointestinal: Negative for abdominal pain, nausea and vomiting.   Genitourinary: Positive for dysuria, frequency and urgency. Negative for urine decreased, hematuria, history of kidney stones, painful menstruation, irregular menstruation, missed menses, heavy menstrual bleeding, ovarian cysts, genital trauma, vaginal pain, vaginal discharge, vaginal bleeding, vaginal odor, painful intercourse, genital sore, painful ejaculation and pelvic pain.   Musculoskeletal: Negative for back pain.   Skin: Negative for rash and lesion.   Hematologic/Lymphatic: Negative for swollen lymph nodes.       Objective:      Physical Exam   Constitutional: She is oriented to person, place, and time. She appears well-developed and well-nourished.   HENT:   Head: Normocephalic and atraumatic.   Right Ear: External ear normal.   Left Ear: External ear normal.   Nose: Nose normal. No nasal deformity. No epistaxis.   Mouth/Throat: Oropharynx is clear and moist and mucous membranes are normal.   Eyes: Lids are normal.   Neck: Trachea normal, normal range of " motion and phonation normal. Neck supple.   Cardiovascular: Normal pulses.   Pulmonary/Chest: Effort normal.   Abdominal: Soft. Normal appearance and bowel sounds are normal. She exhibits no distension. There is no tenderness. There is no rigidity, no rebound, no guarding, no CVA tenderness, no tenderness at McBurney's point and negative Gautam's sign.   No CVA tenderness   Neurological: She is alert and oriented to person, place, and time.   Skin: Skin is warm, dry and intact.   Psychiatric: She has a normal mood and affect. Her speech is normal and behavior is normal. Cognition and memory are normal.   Nursing note and vitals reviewed.        Results for orders placed or performed in visit on 03/11/20   POCT Urinalysis, Dipstick, Automated, W/O Scope   Result Value Ref Range    POC Blood, Urine Positive (A) Negative    POC Bilirubin, Urine Negative Negative    POC Urobilinogen, Urine norm 0.1 - 1.1    POC Ketones, Urine Negative Negative    POC Protein, Urine Negative Negative    POC Nitrates, Urine Negative Negative    POC Glucose, Urine Negative Negative    pH, UA 5.5 5 - 8    POC Specific Gravity, Urine 1.010 1.003 - 1.029    POC Leukocytes, Urine Positive (A) Negative     Discussed results/diagnosis/plan with patient in clinic.   Strict precautions given to patient to monitor for worsening signs and symptoms. Advised to follow up with primary.All questions answered. Strict ER precautions given. If your symptoms worsens of fail to improve you should go to the Emergency Room. Patient voiced understanding and in agreement with current treatment plan.        Assessment:       1. Acute cystitis with hematuria    2. Dysuria        Plan:         Acute cystitis with hematuria  -     POCT Urinalysis, Dipstick, Automated, W/O Scope  -     Culture, Urine  -     ciprofloxacin HCl (CIPRO) 500 MG tablet; Take 1 tablet (500 mg total) by mouth 2 (two) times daily. for 7 days  Dispense: 14 tablet; Refill: 0    Dysuria  -      POCT Urinalysis, Dipstick, Automated, W/O Scope  -     Culture, Urine      Patient Instructions     PLEASE READ YOUR DISCHARGE INSTRUCTIONS ENTIRELY AS IT CONTAINS IMPORTANT INFORMATION.      Take the antibiotics to completion.     Drink plenty of fluids, wipe front to back, take showers not baths, no scented soaps, wear breathable cotton underwear, urinate after sexual intercourse.     A urine culture was sent. You will be contacted once it results and appropriate action will be taken if needed.       Please go to the ER for worsening symptoms including fever, worsening flank pain, vomiting, etc.       Please return or see your primary care doctor if you develop new or worsening symptoms.     Please arrange follow up with your primary medical clinic as soon as possible. You must understand that you've received an Urgent Care treatment only and that you may be released before all of your medical problems are known or treated. You, the patient, will arrange for follow up as instructed. If your symptoms worsen or fail to improve you should go to the Emergency Room.  WE CANNOT RULE OUT ALL POSSIBLE CAUSES OF YOUR SYMPTOMS IN THE URGENT CARE SETTING PLEASE GO TO THE ER IF YOU FEELS YOUR CONDITION IS WORSENING OR YOU WOULD LIKE EMERGENT EVALUATION.      Understanding Urinary Tract Infections (UTIs)  Most UTIs are caused by bacteria, although they may also be caused by viruses or fungi. Bacteria from the bowel are the most common source of infection. The infection may start because of any of the following:  · Sexual activity. During sex, bacteria can travel from the penis, vagina, or rectum into the urethra.   · Bacteria on the skin outside the rectum may travel into the urethra. This is more common in women since the rectum and urethra are closer to each other than in men. Wiping from front to back after using the toilet and keeping the area clean can help prevent germs from getting to the urethra.  · Blockage of urine  flow through the urinary tract. If urine sits too long, germs may start to grow out of control.      Parts of the urinary tract  The infection can occur in any part of the urinary tract.  · The kidneys collect and store urine.  · The ureters carry urine from the kidneys to the bladder.  · The bladder holds urine until you are ready to let it out.  · The urethra carries urine from the bladder out of the body. It is shorter in women, so bacteria can move through it more easily. The urethra is longer in men, so a UTI is less likely to reach the bladder or kidneys in men.  Date Last Reviewed: 1/1/2017 © 2000-2017 [employer]. [address] [place] [address], [place] [address]. All rights reserved. This information is not intended as a substitute for professional medical care. Always follow your healthcare professional's instructions.

## 2020-03-11 NOTE — PATIENT INSTRUCTIONS
PLEASE READ YOUR DISCHARGE INSTRUCTIONS ENTIRELY AS IT CONTAINS IMPORTANT INFORMATION.      Take the antibiotics to completion.     Drink plenty of fluids, wipe front to back, take showers not baths, no scented soaps, wear breathable cotton underwear, urinate after sexual intercourse.     A urine culture was sent. You will be contacted once it results and appropriate action will be taken if needed.       Please go to the ER for worsening symptoms including fever, worsening flank pain, vomiting, etc.       Please return or see your primary care doctor if you develop new or worsening symptoms.     Please arrange follow up with your primary medical clinic as soon as possible. You must understand that you've received an Urgent Care treatment only and that you may be released before all of your medical problems are known or treated. You, the patient, will arrange for follow up as instructed. If your symptoms worsen or fail to improve you should go to the Emergency Room.  WE CANNOT RULE OUT ALL POSSIBLE CAUSES OF YOUR SYMPTOMS IN THE URGENT CARE SETTING PLEASE GO TO THE ER IF YOU FEELS YOUR CONDITION IS WORSENING OR YOU WOULD LIKE EMERGENT EVALUATION.      Understanding Urinary Tract Infections (UTIs)  Most UTIs are caused by bacteria, although they may also be caused by viruses or fungi. Bacteria from the bowel are the most common source of infection. The infection may start because of any of the following:  · Sexual activity. During sex, bacteria can travel from the penis, vagina, or rectum into the urethra.   · Bacteria on the skin outside the rectum may travel into the urethra. This is more common in women since the rectum and urethra are closer to each other than in men. Wiping from front to back after using the toilet and keeping the area clean can help prevent germs from getting to the urethra.  · Blockage of urine flow through the urinary tract. If urine sits too long, germs may start to grow out of control.       Parts of the urinary tract  The infection can occur in any part of the urinary tract.  · The kidneys collect and store urine.  · The ureters carry urine from the kidneys to the bladder.  · The bladder holds urine until you are ready to let it out.  · The urethra carries urine from the bladder out of the body. It is shorter in women, so bacteria can move through it more easily. The urethra is longer in men, so a UTI is less likely to reach the bladder or kidneys in men.  Date Last Reviewed: 1/1/2017 © 2000-2017 [employer]. [address] [place] [id number]. All rights reserved. This information is not intended as a substitute for professional medical care. Always follow your healthcare professional's instructions.

## 2020-03-13 LAB — BACTERIA UR CULT: ABNORMAL

## 2020-03-14 ENCOUNTER — TELEPHONE (OUTPATIENT)
Dept: URGENT CARE | Facility: CLINIC | Age: 50
End: 2020-03-14

## 2020-03-14 NOTE — TELEPHONE ENCOUNTER
Called patient to discuss + urine culture, Klebsiella.  She was treated appropriately with cipro.  No answer & no ability to leave VM.  Will try again later.

## 2020-03-15 ENCOUNTER — TELEPHONE (OUTPATIENT)
Dept: URGENT CARE | Facility: CLINIC | Age: 50
End: 2020-03-15

## 2020-03-15 NOTE — TELEPHONE ENCOUNTER
Called patient to discuss + urine culture, Klebsiella.  She was treated appropriately with cipro.  Pt states she is feeling better.

## 2020-03-27 ENCOUNTER — PATIENT MESSAGE (OUTPATIENT)
Dept: NEUROLOGY | Facility: CLINIC | Age: 50
End: 2020-03-27

## 2020-04-07 ENCOUNTER — OFFICE VISIT (OUTPATIENT)
Dept: NEUROLOGY | Facility: CLINIC | Age: 50
End: 2020-04-07
Payer: COMMERCIAL

## 2020-04-07 DIAGNOSIS — G40.119 TEMPORAL LOBE EPILEPSY, INTRACTABLE: ICD-10-CM

## 2020-04-07 DIAGNOSIS — G40.011 PARTIAL IDIOPATHIC EPILEPSY WITH SEIZURES OF LOCALIZED ONSET, INTRACTABLE, WITH STATUS EPILEPTICUS: Primary | ICD-10-CM

## 2020-04-07 DIAGNOSIS — T42.75XA ADVERSE EFFECT OF ANTIEPILEPTIC, INITIAL ENCOUNTER: ICD-10-CM

## 2020-04-07 PROCEDURE — 99213 OFFICE O/P EST LOW 20 MIN: CPT | Mod: GT,,, | Performed by: PSYCHIATRY & NEUROLOGY

## 2020-04-07 PROCEDURE — 99213 PR OFFICE/OUTPT VISIT, EST, LEVL III, 20-29 MIN: ICD-10-PCS | Mod: GT,,, | Performed by: PSYCHIATRY & NEUROLOGY

## 2020-04-07 NOTE — PROGRESS NOTES
"Name: Gloria Gurrola  MRN: [id number]   CSN: [id number]      Date: 04/07/2020    HISTORY OF PRESENT ILLNESS (HPI)  11/17/2015  The patient is a 49 y.o. yo RHWM   The patient was initially referred for consultation by Dr. Huerta.   The patient was unaccompanied today.     Clinic Visits  Interim History    2020/04/07  Since starting perampanel she has been emotion with crying spells.  Emotional outburst did not occur in the 1st 2-3 weeks after starting pyramidal.  She is on 2 mg per day.  The ER becoming more intense and is somewhat disruptive to the family.  Seizures are much better.  She will often have a very brief sensation and actually question whether she had a seizure or something else.  Clinically that is a good response but the side effects are not acceptable.  She has had no other significant interim medical problems.  She continues to take lamotrigine and the doses not been changed.    2019/11/22  The patient continues to do well.  However she does have brief sensation of fear but nothing else.  She does not have     2019/08/07  The patient is doing well but has an occasional aura which now a feeling of fear which lasts 30 sec.  Last was on Aug 1.  She is averaging 3 per months.  Surgery was Nov 4, 2018.  The first aura occurred on Dec 19, 2018.  Number by month were Jan - 4, Feb - 2, Mar - 2, Apr - 2, May - 4, Jun - 5, Jul - 3.      Results for GLORIA GURROLA (MRN [id number]) as of 8/7/2019 15:38   Ref. Range 12/14/2018 12:10 1/14/2019 14:58 3/26/2019 13:30   Lamotrigine Lvl Latest Ref Range: 2.0 - 15.0 ug/mL 13.0 15.7 (H) 19.3 (H)       2019/03/26  In February and March, she has had four episodes that she is concerned represent seizure. These are different than any prior episodes or sensation that she has experienced. She describes a feeling of intense fear, during which she tells herself "you're just having a seizure".  reports lasting about 15 seconds, during the events he reports patient seems to be " staring off in a daydream. She will respond to questions, but slowly. Typically occurring in the evening, prior to taking Lamictal. She recalls these episodes afterwards. Currently taking Lamictal 500 mg qHS, reports she noticed these episodes after switching from BID to daily lamictal dosing. Lamictal level at prior clinic visit 15.7. Denies any episodes of visual motor apraxia or any of her prior typical seizures.     2019/01/14  The patient underwent a L temporal lobectomy 2 months ago.  She has experience none of her typical seizures.  She did experience Judi Vu twice since surgery.  In the past she had reported Judi Vu as an aura.  She had not experienced any episodes of visual motor apraxia (unable to move eyes in any direction). She takes lamictal 200 mg in the AM and 300 mg in the PM.  She will be converted to once a day dosing of 500 mg which she prefers to do in the evening.       She feels so much better now.  She reports feeling as if she is 19 yo.    2018/05/29  Patient had another day of almost continuous seizures which was on May 09,2018.  She has failed several AEDs mainly due to side effects.  She experienced pharmacodynamic interaction and toxicity with Lamictal - Lacosamide combo.  She is tolerating the lamictal well       2018/04/09  The patient had 9 seizures recorded in last EMU visit all coming from L anterior temporal area.   MRI was normal but PET showed L mesial hypometabolism.  She is currently have almost daily seizures.  She has failed four AEDs is on Lamictal monotherapy.  She reports her verbal memory is terrible.  Review of labs show she was B12 deficient 4 yrs ago and she does not take any supplements.    Results for GLORIA GURROLA (MRN [id number]) as of 4/9/2018 16:23   Ref. Range 3/1/2016 14:45 12/19/2017 12:56 2/8/2018 18:12   Lamotrigine Lvl Latest Ref Range: 2.0 - 15.0 ug/mL 12.0 10.5 7.8     2018/01/29  EMU evaluation was completed in Dec and L temporal interictal spikes were  "recorded and one electrographic seizure was recorded arising from the L anterior temporal area.  Besides frequent seizures her major complaint is progressive memory loss.      HISTORY OF PRESENT ILLNESS (HPI)  Date: The   New Patient  Pt has been seeing Dr Huerta at [facility] for "complex partial sezures." First sz, in school, age 19. Was put on Dilantin and she did well for approx 15 years, until the seizures became active again. Thinks she may have had 2 classic "Grand Mal" seizures in her 20's, but since then, seizure types are those described below.    Currently, she is experiencing more than one event per week. Event type is described below. She has been failing her current treatment regimen as described below. May have tried other meds, but can't remember them now. Did not bring records yet.        Seizure Seminology  Seizure Type 1   Classification: Pass-out  Aura - Occasional auditory мария vu  Pt loses consciousness and falls or loses tone 1-2 in  Post-ictal  Brief  Age of onset 19  Current Seizure Frequency - Several per week      Seizure Type 2  Classification: Complex Partial  Wanders around. Doesn't remember after.   Post-ictal  Brief  Current Seizure Frequency - Several per week    Seizure Triggers  Sleep Deprivation - None  Other medications - None  Psych/stress - None  Photic stimulation - None  Hyperventilation - None  Medical Problems - None  Menses - 3 days before period they get worse  Sensory Stimulation (light, sound, etc) - None  Missed dose of meds - None    AED Treatments  Present regimen   tabs 1 in AM and 1½ in PM   perampanel (Fycompa, FCP) 2 mg per day    Prior treatments  clobazam (Onfi or Frizium, CLB) 20 mg QD  eslicarbazine (Aptiom, ESL) - seizure intensity worsened after 2 weeks Rx  lacosamide (Vimpat, LCS)   oxcarbazepine (Trileptal OXC)  perampanel (Fycompa, FCP)   BID  TPM 10ID  valproic acid (Depakote, VPA)   zonisamide (Zonegran, ZNA)    Not tried  acetazolamide " (Diamox, AZM)  amantadine  carbamazepine (Tegretol, CBZ)  ethosuximide (Zarontin, ESM)  felbamate (felbatol, FBM)  gabapentin (Neurontin, GPN)  levetiracetam (Keppra, LEV)  methsuximide (Celontin, MSM)   phenobarbital (Pb)  pregabalin (Lyrica, PGB)  primidone (Mysoline, PRM)  retigabine (Potiga, RTG)  rufinamide (Banzel, RUF)  tiagabine (Gabatril, TGB)  viagabatrin, (Sabril, VGB)  vagal nerve stimulator (VNS)    Benzodiazepines  diazepam - rectal (Diastatl)  diazepam - oral (Valium, DZ)  clonazepam (Klonopin, CZP)  clorazepate (Tranxene, CLZ)  Ativan  Brain Stimulation  Vagal Nerve Stimulation-n/a  DBS- n/a    Compliance method  Memory - yes  Mom or Spouse - Yes  Pill Box - no  [place]ew[place]ne calendar - no  Turn over medication bottle - no  Phone alarm - no    Seizure Evaluation  EEG Routine - Dont have  MRI/MRA - In past- she doesn't know results  [facility]  2017/12/19-12/20- Patient with no events overnight. EEG shows L anterior temporal spikes, most recorded at F7. None on the other side. At times, almost continuous L temporal interictal discharges at times.   2017/12/20- 11:56:23- clinical seizure, starting from L side on EEG. No epileptiform discharges noted. L temporal slowing and spikes noted. Consisted of brief moment of unresponsiveness.   2017/12/21- EEG showing L interical anterior temporal slowing with L temporal spikes. No clinical seizures since yesterday.   2017/12/21-12/22- Event on 12/21 at 18:55 showing patient talking on the phone and suddenly stopping, unable to speak and confused. EEG shows there is a rhythmic buildup in the L temporal chains. Patient is confused and is drowsy following event. No tonic/clonic activity present.     CT/CTA Scan -   PET Scan -   Neuropsychological evaluation -   DEXA Scan    Potential Epilepsy Risk Factors:   Pregnancy/Labor/Delivery - full term uncomplicated pregnancy labor and vaginal delivery  Febrile seizures - none  Head injury - none  CNS infection - none   Stroke -  none  Family Hx of Sz - none    PAST MEDICAL HISTORY:   Active Ambulatory Problems     Diagnosis  Date Noted      No Active Ambulatory Problems    Resolved Ambulatory Problems     Diagnosis  Date Noted      No Resolved Ambulatory Problems    No Additional Past Medical History         PAST SURGICAL HISTORY: No past surgical history on file.     FAMILY HISTORY: No family history on file.      SOCIAL HISTORY:    Social History    History    Social History      Marital Status:        Spouse Name:  N/A      Number of Children:  N/A      Years of Education:  N/A    Occupational History      Not on file.    Social History Main Topics      Smoking status:  Never Smoker      Smokeless tobacco:  Not on file      Alcohol Use:  No      Drug Use:  No      Sexual Activity:  Not on file    Other Topics  Concern      Not on file    Social History Narrative      No narrative on file            SUBSTANCE USE:  Social History    Social History Main Topics      Smoking status:  Never Smoker      Smokeless tobacco:  Not on file      Alcohol Use:  No      Drug Use:  No      Sexual Activity:  Not on file       History    Substance Use Topics      Smoking status:  Never Smoker      Smokeless tobacco:  Not on file      Alcohol Use:  No         ALLERGIES: Review of patient's allergies indicates no known allergies.       Review of Systems   Constitutional: Negative for fever, chills, weight loss, malaise/fatigue and diaphoresis.   HENT: Negative for ear pain, hearing loss, nosebleeds and tinnitus.   Eyes: Negative for blurred vision, double vision, photophobia and pain.   Respiratory: Negative for cough, hemoptysis and shortness of breath.   Cardiovascular: Negative for chest pain, palpitations, orthopnea and leg swelling.   Gastrointestinal: Negative for heartburn, nausea, vomiting, abdominal pain, diarrhea, constipation and blood in stool.   Genitourinary: Negative for dysuria and hematuria.   Musculoskeletal:  "Negative for myalgias, joint pain and falls.   Skin: Negative for itching and rash.   Neurological: Positive for tingling, sensory change and seizures. Negative for dizziness, tremors, speech change, focal weakness, loss of consciousness, weakness and headaches.   Endo/Heme/Allergies: Negative for environmental allergies. Does not bruise/bleed easily.   Psychiatric/Behavioral: Positive for memory loss. Negative for depression, hallucinations and substance abuse. The patient has insomnia. The patient is not nervous/anxious.       /80 mmHg  Pulse 69  Ht 5' 6" (1.676 m)  Wt 110.1 kg (242 lb 11.6 oz)  BMI 39.20 kg/m2      Neurologic Exam      Higher Cortical Function:   Patient is a well developed, pleasant, well groomed individual appearing their stated age  Oriented - intact to person, place and time and followed two step instruction correctly.   Fund of knowledge was appropriate.   Language - Speech was fluent without evidence for an aphasia.    Throat: no aphthous ulcers noted in mouth, no erythema or enlargement of tonsils, pharynx is clear without inflammation   Lymph nodes: No enlargement of lymph nodes    Cranial Nerves II - XII:   EOMs were intact with normal smooth and no nystagmus.   PERRLA. D/C Funduscopic exam - disc were flat with normal A/V ratio and no exudates or hemorrhages. Visual fields were full to confrontation.   Motor - facial movement was symmetrical and normal.   Facial sensory - Light touch and pin prick sensations were normal.   Hearing was normal to finger rub.  Palate moved well and was symmetrical with normal palatal and oral sensation.   Tongue movement was full & the patient could say "la la la" and "Ka Ka Ka" without  difficulty. Patient repeated Temple and [employer] without difficulty. Normal power and bulk was found in the massiter and rotator muscles of the neck.  Motor: Power, bulk and tone were normal in all extremities.  Sensory: Light touch, pin prick, vibration and " position senses were normal in all extremities.   Coordination:   Rapid alternating movements and rapid finger tapping - normal.   Finger to nose - nl.   Arm roll - symmetrical.   Gait: Station, gait and tandem walking were done without difficulty and Romberg was negative.    Deep tendon reflexes:   Reflex  L  R    Bicpets  2+  2+    Tricepts  2+  2+    Brachio-radialis  2+  2+    Knee  2+  2+    Ankle  2+  2+    Babinski  No  No      Tremor: resting, postural, intentional - none    Pulses   Peripheral - strong and symmetrical   IMPRESSION  1.  Aura - feeling of fear.    2.  S/p L temporal lobectomy 11/2018  3. Obesity - BMI 33.06  4. Emotional lability due to starting pyramidal.  Initial lower doses at the onset of treatment did not produce is side effect.  DISPOSITION:   1. Continue LTG 200mg 1 am and 1½ in pm  2.   Hold perampanel for the next week.  Then start taking 2 mg Monday Wednesday and Friday.  Plan is to achieve a lower dose that is not produce the emotional lability.  There has been a clear response with reduced presentation of the seizures.  Later the dose can likely be increased with better seizure control without recurrence of the emotional lability.  3. The patient will call with followup in 4 weeks   4. Planned follow-up visit in 8-10 weeks.  The  4. Follow up 4 months

## 2020-05-06 ENCOUNTER — PATIENT MESSAGE (OUTPATIENT)
Dept: NEUROLOGY | Facility: CLINIC | Age: 50
End: 2020-05-06

## 2020-05-31 DIAGNOSIS — G40.219 COMPLEX PARTIAL EPILEPSY WITH GENERALIZATION AND WITH INTRACTABLE EPILEPSY: ICD-10-CM

## 2020-06-02 RX ORDER — PERAMPANEL 2 MG/1
TABLET ORAL
Qty: 90 TABLET | Refills: 0 | Status: SHIPPED | OUTPATIENT
Start: 2020-06-02 | End: 2020-11-27

## 2020-07-13 ENCOUNTER — OFFICE VISIT (OUTPATIENT)
Dept: UROLOGY | Facility: CLINIC | Age: 50
End: 2020-07-13
Payer: COMMERCIAL

## 2020-07-13 VITALS
HEIGHT: 66 IN | HEART RATE: 59 BPM | SYSTOLIC BLOOD PRESSURE: 118 MMHG | WEIGHT: 177 LBS | DIASTOLIC BLOOD PRESSURE: 71 MMHG | BODY MASS INDEX: 28.45 KG/M2

## 2020-07-13 DIAGNOSIS — N39.0 RECURRENT UTI: Primary | ICD-10-CM

## 2020-07-13 PROCEDURE — 99214 OFFICE O/P EST MOD 30 MIN: CPT | Mod: S$GLB,,, | Performed by: PHYSICIAN ASSISTANT

## 2020-07-13 PROCEDURE — 99214 PR OFFICE/OUTPT VISIT, EST, LEVL IV, 30-39 MIN: ICD-10-PCS | Mod: S$GLB,,, | Performed by: PHYSICIAN ASSISTANT

## 2020-07-13 PROCEDURE — 3008F PR BODY MASS INDEX (BMI) DOCUMENTED: ICD-10-PCS | Mod: CPTII,S$GLB,, | Performed by: PHYSICIAN ASSISTANT

## 2020-07-13 PROCEDURE — 99999 PR PBB SHADOW E&M-EST. PATIENT-LVL III: CPT | Mod: PBBFAC,,, | Performed by: PHYSICIAN ASSISTANT

## 2020-07-13 PROCEDURE — 99999 PR PBB SHADOW E&M-EST. PATIENT-LVL III: ICD-10-PCS | Mod: PBBFAC,,, | Performed by: PHYSICIAN ASSISTANT

## 2020-07-13 PROCEDURE — 3008F BODY MASS INDEX DOCD: CPT | Mod: CPTII,S$GLB,, | Performed by: PHYSICIAN ASSISTANT

## 2020-07-13 NOTE — PROGRESS NOTES
CHIEF COMPLAINT:    Mrs. Arrieta is a 49 y.o. female presenting for recurrent UTI.  PRESENTING ILLNESS:    Liza Arrieta is a 49 y.o. female with a PMH of recurrent UTI who presents for recurrent UTI.     She just completed bactrim for an uti last night.  It was a 7 day course.  Her UTI symptoms included bladder pain, dysuria, cloudy, urgency.  She reports UTI every 2-3 months.  No culture was done for this last UTI.    She drinks at least 8 cups of water to day.  One caffeinated drink a day.  She is wiping from front to back.  She does not take bubble baths.  She uses plain soap without fragrance.  She doesn't always feel the urge to void and just goes.  She has been using the squatty potty.  Sometimes she bends over to ensure she empties and there is a good amount that is voided at that time.  She takes a iron pill and sometimes that causes constipation.  Drinking a lot of water helps keep her bowels moving.  She has a BM every 2 days.  They are not hard.    She is taking vitamin c, dmannose, probiotics, cranberry supplement.     Had a recurrent UTI work up recently.   Cysto 1/2020 was normal.  Renal ultrasound showed no mass, no renal stone, no hydronephrosis bilaterally.      Hx of epilepsy.  S/p craniotomy 11/2018  Hysterectomy 14 years ago due to endometriosis.    She reports recently starting to experience pre menopausal symptoms.      Urine cultures:   Component      Latest Ref Rng & Units 3/11/2020             Urine Culture, Routine       KLEBSIELLA PNEUMONIAE (A) . . .     Component      Latest Ref Rng & Units 12/12/2019          10:58 AM   Urine Culture, Routine       Multiple organisms isolated. None in predominance.       Component      Latest Ref Rng & Units 11/2/2019             Urine Culture, Routine       ENTEROBACTER CLOACAE (A) . . .     Component      Latest Ref Rng & Units 8/25/2019             Urine Culture, Routine       KLEBSIELLA PNEUMONIAE (A) . . .     Component      Latest Ref Rng & Units  7/15/2019 2019              Urine Culture, Routine       KLEBSIELLA PNEUMONIAE (A) . . . No growth     Component      Latest Ref Rng & Units 2019             Urine Culture, Routine       Beta hemolytic Streptococcus, group B   10,000-25,000 colony forming units per mL        REVIEW OF SYSTEMS:      Constitutional: Negative for fever and chills.   HENT: Negative for hearing loss.   Eyes: Negative for visual disturbance.   Respiratory: Negative for shortness of breath.   Cardiovascular: Negative for chest pain.   Gastrointestinal: Negative for vomiting, and constipation.   Genitourinary:  See HPI  Neurological: Negative for dizziness.   Hematological: Does not bruise/bleed easily.   Psychiatric/Behavioral: Negative for confusion.     PATIENT HISTORY:    Past Medical History:   Diagnosis Date    Convulsions     Epilepsy     Seizures        Past Surgical History:   Procedure Laterality Date    APPENDECTOMY       SECTION      4    CHOLECYSTECTOMY      CRANIOTOMY N/A 2018    Procedure: CRANIOTOMY for strip and grid;  Surgeon: Ruben Senior MD;  Location: [facility] OR [address] [place] [address];  Service: Neurosurgery;  Laterality: N/A;  toronto II, asa 2, type and screen, regular bed, [place], supine     CRANIOTOMY Left 2018    Procedure: CRANIOTOMY for grids and strips;  Surgeon: Ruben Senior MD;  Location: [facility] OR [address] [place] [address];  Service: Neurosurgery;  Laterality: Left;    HYSTERECTOMY      around  for pain ful periods     INSERTION OF SUBDURAL GRID AND STRIP ELECTRODES BY CRANIOTOMY Left 2018    Procedure: CRANIOTOMY, FOR SUBDURAL GRID AND STRIP ELECTRODE LEAD Removal.;  Surgeon: Ruben Senior MD;  Location: [facility] OR [address] [place] [address];  Service: Neurosurgery;  Laterality: Left;  needs microscope and stealth-cg    SURGICAL REMOVAL OF LOBE OF BRAIN Left 2018    Procedure: LOBECTOMY, BRAIN left temporal lobe.;  Surgeon: Ruben Senior MD;  Location: [facility] OR [address] [place] [address];  Service: Neurosurgery;  Laterality:  Left;       Family History   Problem Relation Age of Onset    Heart disease Father         over 50 years    No Known Problems Mother        Social History     Socioeconomic History    Marital status:      Spouse name: Not on file    Number of children: Not on file    Years of education: Not on file    Highest education level: Not on file   Occupational History    Not on file   Social Needs    Financial resource strain: Not on file    Food insecurity     Worry: Not on file     Inability: Not on file    Transportation needs     Medical: Not on file     Non-medical: Not on file   Tobacco Use    Smoking status: Never Smoker    Smokeless tobacco: Never Used   Substance and Sexual Activity    Alcohol use: No     Alcohol/week: 0.0 standard drinks    Drug use: No    Sexual activity: Never   Lifestyle    Physical activity     Days per week: Not on file     Minutes per session: Not on file    Stress: Not on file   Relationships    Social connections     Talks on phone: Not on file     Gets together: Not on file     Attends Religion service: Not on file     Active member of club or organization: Not on file     Attends meetings of clubs or organizations: Not on file     Relationship status: Not on file   Other Topics Concern    Not on file   Social History Narrative    Not on file       Allergies:  Patient has no allergy information on record.    Medications:    Current Outpatient Medications:     cloBAZam (ONFI) 20 mg Tab, Take 1 tablet (20 mg total) by mouth once daily., Disp: 90 tablet, Rfl: 1    FYCOMPA 2 mg tablet, TAKE ONE TABLET BY MOUTH ONCE DAILY., Disp: 90 tablet, Rfl: 0    lamoTRIgine (LAMICTAL) 200 MG tablet, Take 2.5 tablets (500 mg total) by mouth once daily., Disp: 405 tablet, Rfl: 3    PHYSICAL EXAMINATION:    Constitutional: She appears well-developed and well-nourished.  She is in no apparent distress.    Eyes: No scleral icterus noted bilaterally. No discharge  bilaterally.    Cardiovascular: Normal rate.      Pulmonary/Chest: Effort normal. No respiratory distress.     Abdominal:  She exhibits no distension.      Neurological: She is alert and oriented to person, place, and time.     Skin: Skin is warm and dry.     Psych: Cooperative with normal affect.    Physical Exam      LABS:    U/a:1.005, pH5 negative    IMPRESSION:    Encounter Diagnoses   Name Primary?    Recurrent UTI Yes       PLAN:    Recommend she continue d mannose.  Will try ellura  cranberry supplement given its PAC.  She recently bought a new probiotic that had more CFU.  Also discussed the importance of a multi strain probiotic.   Continue good fluid intake, wiping from front to back, managing constipation, bathing with unscented soaps, showers.    Follow up in 3 months.      I spent 25 minutes with the patient of which more than half was spent in direct consultation with the patient in regards to our treatment and plan.

## 2020-08-25 ENCOUNTER — OFFICE VISIT (OUTPATIENT)
Dept: NEUROLOGY | Facility: CLINIC | Age: 50
End: 2020-08-25
Payer: COMMERCIAL

## 2020-08-25 VITALS
HEART RATE: 48 BPM | HEIGHT: 66 IN | WEIGHT: 184 LBS | BODY MASS INDEX: 29.57 KG/M2 | SYSTOLIC BLOOD PRESSURE: 115 MMHG | DIASTOLIC BLOOD PRESSURE: 74 MMHG

## 2020-08-25 DIAGNOSIS — G40.219 COMPLEX PARTIAL EPILEPSY WITH GENERALIZATION AND WITH INTRACTABLE EPILEPSY: Primary | ICD-10-CM

## 2020-08-25 PROCEDURE — 99999 PR PBB SHADOW E&M-EST. PATIENT-LVL III: ICD-10-PCS | Mod: PBBFAC,,, | Performed by: PSYCHIATRY & NEUROLOGY

## 2020-08-25 PROCEDURE — 99213 PR OFFICE/OUTPT VISIT, EST, LEVL III, 20-29 MIN: ICD-10-PCS | Mod: S$GLB,,, | Performed by: PSYCHIATRY & NEUROLOGY

## 2020-08-25 PROCEDURE — 3008F BODY MASS INDEX DOCD: CPT | Mod: CPTII,S$GLB,, | Performed by: PSYCHIATRY & NEUROLOGY

## 2020-08-25 PROCEDURE — 99999 PR PBB SHADOW E&M-EST. PATIENT-LVL III: CPT | Mod: PBBFAC,,, | Performed by: PSYCHIATRY & NEUROLOGY

## 2020-08-25 PROCEDURE — 99213 OFFICE O/P EST LOW 20 MIN: CPT | Mod: S$GLB,,, | Performed by: PSYCHIATRY & NEUROLOGY

## 2020-08-25 PROCEDURE — 3008F PR BODY MASS INDEX (BMI) DOCUMENTED: ICD-10-PCS | Mod: CPTII,S$GLB,, | Performed by: PSYCHIATRY & NEUROLOGY

## 2020-08-25 NOTE — PROGRESS NOTES
Name: Gloria Gurrola  MRN: [id number]   CSN: [id number]      Date: 08/25/2020    HISTORY OF PRESENT ILLNESS (HPI)  11/17/2015  The patient is a 49 y.o. yo RHWM   The patient was initially referred for consultation by Dr. Huerta.   The patient was unaccompanied today.     Clinic Visits  Interim History    2020/08/25  The plan last visit was to hold perampanel for one week and the reduce dosing at one half the previous dose.  The emotionality has improved but still is present.  She may of had only one sz since the last visit.    2020/04/07  Since starting perampanel she has been emotion with crying spells.  Emotional outburst did not occur in the 1st 2-3 weeks after starting pyramidal.  She is on 2 mg per day.  The ER becoming more intense and is somewhat disruptive to the family.  Seizures are much better.  She will often have a very brief sensation and actually question whether she had a seizure or something else.  Clinically that is a good response but the side effects are not acceptable.  She has had no other significant interim medical problems.  She continues to take lamotrigine and the doses not been changed.    2019/11/22  The patient continues to do well.  However she does have brief sensation of fear but nothing else.  She does not have     2019/08/07  The patient is doing well but has an occasional aura which now a feeling of fear which lasts 30 sec.  Last was on Aug 1.  She is averaging 3 per months.  Surgery was Nov 4, 2018.  The first aura occurred on Dec 19, 2018.  Number by month were Jan - 4, Feb - 2, Mar - 2, Apr - 2, May - 4, Jun - 5, Jul - 3.      Results for GLORIA GURROLA (MRN [id number]) as of 8/7/2019 15:38   Ref. Range 12/14/2018 12:10 1/14/2019 14:58 3/26/2019 13:30   Lamotrigine Lvl Latest Ref Range: 2.0 - 15.0 ug/mL 13.0 15.7 (H) 19.3 (H)       2019/03/26  In February and March, she has had four episodes that she is concerned represent seizure. These are different than any prior episodes or  "sensation that she has experienced. She describes a feeling of intense fear, during which she tells herself "you're just having a seizure".  reports lasting about 15 seconds, during the events he reports patient seems to be staring off in a daydream. She will respond to questions, but slowly. Typically occurring in the evening, prior to taking Lamictal. She recalls these episodes afterwards. Currently taking Lamictal 500 mg qHS, reports she noticed these episodes after switching from BID to daily lamictal dosing. Lamictal level at prior clinic visit 15.7. Denies any episodes of visual motor apraxia or any of her prior typical seizures.     2019/01/14  The patient underwent a L temporal lobectomy 2 months ago.  She has experience none of her typical seizures.  She did experience Judi Vu twice since surgery.  In the past she had reported Judi Vu as an aura.  She had not experienced any episodes of visual motor apraxia (unable to move eyes in any direction). She takes lamictal 200 mg in the AM and 300 mg in the PM.  She will be converted to once a day dosing of 500 mg which she prefers to do in the evening.       She feels so much better now.  She reports feeling as if she is 17 yo.    2018/05/29  Patient had another day of almost continuous seizures which was on May 09,2018.  She has failed several AEDs mainly due to side effects.  She experienced pharmacodynamic interaction and toxicity with Lamictal - Lacosamide combo.  She is tolerating the lamictal well       2018/04/09  The patient had 9 seizures recorded in last EMU visit all coming from L anterior temporal area.   MRI was normal but PET showed L mesial hypometabolism.  She is currently have almost daily seizures.  She has failed four AEDs is on Lamictal monotherapy.  She reports her verbal memory is terrible.  Review of labs show she was B12 deficient 4 yrs ago and she does not take any supplements.    Results for GLORIA GURROLA (MRN [id number]) as of " "4/9/2018 16:23   Ref. Range 3/1/2016 14:45 12/19/2017 12:56 2/8/2018 18:12   Lamotrigine Lvl Latest Ref Range: 2.0 - 15.0 ug/mL 12.0 10.5 7.8     2018/01/29  EMU evaluation was completed in Dec and L temporal interictal spikes were recorded and one electrographic seizure was recorded arising from the L anterior temporal area.  Besides frequent seizures her major complaint is progressive memory loss.      HISTORY OF PRESENT ILLNESS (HPI)  Date: The   New Patient  Pt has been seeing Dr Huerta at [facility] for "complex partial sezures." First sz, in school, age 19. Was put on Dilantin and she did well for approx 15 years, until the seizures became active again. Thinks she may have had 2 classic "Grand Mal" seizures in her 20's, but since then, seizure types are those described below.    Currently, she is experiencing more than one event per week. Event type is described below. She has been failing her current treatment regimen as described below. May have tried other meds, but can't remember them now. Did not bring records yet.        Seizure Seminology  Seizure Type 1   Classification: Pass-out  Aura - Occasional auditory мария vu  Pt loses consciousness and falls or loses tone 1-2 in  Post-ictal  Brief  Age of onset 19  Current Seizure Frequency - Several per week      Seizure Type 2  Classification: Complex Partial  Wanders around. Doesn't remember after.   Post-ictal  Brief  Current Seizure Frequency - Several per week    Seizure Triggers  Sleep Deprivation - None  Other medications - None  Psych/stress - None  Photic stimulation - None  Hyperventilation - None  Medical Problems - None  Menses - 3 days before period they get worse  Sensory Stimulation (light, sound, etc) - None  Missed dose of meds - None    AED Treatments  Present regimen   tabs 1 in AM and 1½ in PM   perampanel (Fycompa, FCP) 2 mg per day    Prior treatments  clobazam (Onfi or Frizium, CLB) 20 mg QD  eslicarbazine (Aptiom, ESL) - seizure " intensity worsened after 2 weeks Rx  lacosamide (Vimpat, LCS)   oxcarbazepine (Trileptal OXC)  perampanel (Fycompa, FCP)   BID  TPM 10ID  valproic acid (Depakote, VPA)   zonisamide (Zonegran, ZNA)    Not tried  acetazolamide (Diamox, AZM)  amantadine  carbamazepine (Tegretol, CBZ)  ethosuximide (Zarontin, ESM)  felbamate (felbatol, FBM)  gabapentin (Neurontin, GPN)  levetiracetam (Keppra, LEV)  methsuximide (Celontin, MSM)   phenobarbital (Pb)  pregabalin (Lyrica, PGB)  primidone (Mysoline, PRM)  retigabine (Potiga, RTG)  rufinamide (Banzel, RUF)  tiagabine (Gabatril, TGB)  viagabatrin, (Sabril, VGB)  vagal nerve stimulator (VNS)    Benzodiazepines  diazepam - rectal (Diastatl)  diazepam - oral (Valium, DZ)  clonazepam (Klonopin, CZP)  clorazepate (Tranxene, CLZ)  Ativan  Brain Stimulation  Vagal Nerve Stimulation-n/a  DBS- n/a    Compliance method  Memory - yes  Mom or Spouse - Yes  Pill Box - no  Dewayne calendar - no  Turn over medication bottle - no  Phone alarm - no    Seizure Evaluation  EEG Routine - Dont have  MRI/MRA - In past- she doesn't know results  [facility]  2017/12/19-12/20- Patient with no events overnight. EEG shows L anterior temporal spikes, most recorded at F7. None on the other side. At times, almost continuous L temporal interictal discharges at times.   2017/12/20- 11:56:23- clinical seizure, starting from L side on EEG. No epileptiform discharges noted. L temporal slowing and spikes noted. Consisted of brief moment of unresponsiveness.   2017/12/21- EEG showing L interical anterior temporal slowing with L temporal spikes. No clinical seizures since yesterday.   2017/12/21-12/22- Event on 12/21 at 18:55 showing patient talking on the phone and suddenly stopping, unable to speak and confused. EEG shows there is a rhythmic buildup in the L temporal chains. Patient is confused and is drowsy following event. No tonic/clonic activity present.     CT/CTA Scan -   PET Scan -   Neuropsychological  evaluation -   DEXA Scan    Potential Epilepsy Risk Factors:   Pregnancy/Labor/Delivery - full term uncomplicated pregnancy labor and vaginal delivery  Febrile seizures - none  Head injury - none  CNS infection - none   Stroke - none  Family Hx of Sz - none    PAST MEDICAL HISTORY:   Active Ambulatory Problems     Diagnosis  Date Noted      No Active Ambulatory Problems    Resolved Ambulatory Problems     Diagnosis  Date Noted      No Resolved Ambulatory Problems    No Additional Past Medical History         PAST SURGICAL HISTORY: No past surgical history on file.     FAMILY HISTORY: No family history on file.      SOCIAL HISTORY:    Social History    History    Social History      Marital Status:        Spouse Name:  N/A      Number of Children:  N/A      Years of Education:  N/A    Occupational History      Not on file.    Social History Main Topics      Smoking status:  Never Smoker      Smokeless tobacco:  Not on file      Alcohol Use:  No      Drug Use:  No      Sexual Activity:  Not on file    Other Topics  Concern      Not on file    Social History Narrative      No narrative on file            SUBSTANCE USE:  Social History    Social History Main Topics      Smoking status:  Never Smoker      Smokeless tobacco:  Not on file      Alcohol Use:  No      Drug Use:  No      Sexual Activity:  Not on file       History    Substance Use Topics      Smoking status:  Never Smoker      Smokeless tobacco:  Not on file      Alcohol Use:  No         ALLERGIES: Review of patient's allergies indicates no known allergies.       Review of Systems   Constitutional: Negative for fever, chills, weight loss, malaise/fatigue and diaphoresis.   HENT: Negative for ear pain, hearing loss, nosebleeds and tinnitus.   Eyes: Negative for blurred vision, double vision, photophobia and pain.   Respiratory: Negative for cough, hemoptysis and shortness of breath.   Cardiovascular: Negative for chest pain,  "palpitations, orthopnea and leg swelling.   Gastrointestinal: Negative for heartburn, nausea, vomiting, abdominal pain, diarrhea, constipation and blood in stool.   Genitourinary: Negative for dysuria and hematuria.   Musculoskeletal: Negative for myalgias, joint pain and falls.   Skin: Negative for itching and rash.   Neurological: Positive for tingling, sensory change and seizures. Negative for dizziness, tremors, speech change, focal weakness, loss of consciousness, weakness and headaches.   Endo/Heme/Allergies: Negative for environmental allergies. Does not bruise/bleed easily.   Psychiatric/Behavioral: Positive for memory loss. Negative for depression, hallucinations and substance abuse. The patient has insomnia. The patient is not nervous/anxious.       /80 mmHg  Pulse 69  Ht 5' 6" (1.676 m)  Wt 110.1 kg (242 lb 11.6 oz)  BMI 39.20 kg/m2      Neurologic Exam      Higher Cortical Function:   Patient is a well developed, pleasant, well groomed individual appearing their stated age  Oriented - intact to person, place and time and followed two step instruction correctly.   Fund of knowledge was appropriate.   Language - Speech was fluent without evidence for an aphasia.    Throat: no aphthous ulcers noted in mouth, no erythema or enlargement of tonsils, pharynx is clear without inflammation   Lymph nodes: No enlargement of lymph nodes    Cranial Nerves II - XII:   EOMs were intact with normal smooth and no nystagmus.   PERRLA. D/C Funduscopic exam - disc were flat with normal A/V ratio and no exudates or hemorrhages. Visual fields were full to confrontation.   Motor - facial movement was symmetrical and normal.   Facial sensory - Light touch and pin prick sensations were normal.   Hearing was normal to finger rub.  Palate moved well and was symmetrical with normal palatal and oral sensation.   Tongue movement was full & the patient could say "la la la" and "Ka Ka Ka" without  difficulty. Patient repeated " [employer] and [employer] without difficulty. Normal power and bulk was found in the massiter and rotator muscles of the neck.  Motor: Power, bulk and tone were normal in all extremities.  Sensory: Light touch, pin prick, vibration and position senses were normal in all extremities.   Coordination:   Rapid alternating movements and rapid finger tapping - normal.   Finger to nose - nl.   Arm roll - symmetrical.   Gait: Station, gait and tandem walking were done without difficulty and Romberg was negative.    Deep tendon reflexes:   Reflex  L  R    Bicpets  2+  2+    Tricepts  2+  2+    Brachio-radialis  2+  2+    Knee  2+  2+    Ankle  2+  2+    Babinski  No  No      Tremor: resting, postural, intentional - none    Pulses   Peripheral - strong and symmetrical   IMPRESSION  1.  Aura - feeling of fear.    2.  S/p L temporal lobectomy 11/2018  3. Obesity - BMI 33.06  4. Emotional lability due to starting perampanel which is still present in very low doses    DISPOSITION:   1. Continue LTG 200mg 1 am and 1½ in pm  2.   Continue perampanel att the present dose for the moment.  3. Start Briviact 100 mg BID  4. Stop perampaned after the Friday dose this week.  5. If this does control seizures she will consider VNS - she is a very good candidate for VNS    Follow up 4 months

## 2020-08-27 ENCOUNTER — TELEPHONE (OUTPATIENT)
Dept: NEUROLOGY | Facility: CLINIC | Age: 50
End: 2020-08-27

## 2020-10-26 ENCOUNTER — PATIENT MESSAGE (OUTPATIENT)
Dept: NEUROLOGY | Facility: CLINIC | Age: 50
End: 2020-10-26

## 2020-10-27 ENCOUNTER — PATIENT MESSAGE (OUTPATIENT)
Dept: NEUROLOGY | Facility: CLINIC | Age: 50
End: 2020-10-27

## 2020-11-06 ENCOUNTER — PATIENT MESSAGE (OUTPATIENT)
Dept: NEUROLOGY | Facility: CLINIC | Age: 50
End: 2020-11-06

## 2020-11-07 ENCOUNTER — PATIENT MESSAGE (OUTPATIENT)
Dept: NEUROLOGY | Facility: CLINIC | Age: 50
End: 2020-11-07

## 2020-11-09 ENCOUNTER — PATIENT MESSAGE (OUTPATIENT)
Dept: NEUROLOGY | Facility: CLINIC | Age: 50
End: 2020-11-09

## 2020-11-10 ENCOUNTER — PATIENT MESSAGE (OUTPATIENT)
Dept: NEUROLOGY | Facility: CLINIC | Age: 50
End: 2020-11-10

## 2020-11-17 ENCOUNTER — PATIENT MESSAGE (OUTPATIENT)
Dept: NEUROLOGY | Facility: CLINIC | Age: 50
End: 2020-11-17

## 2020-11-20 ENCOUNTER — PATIENT MESSAGE (OUTPATIENT)
Dept: NEUROLOGY | Facility: CLINIC | Age: 50
End: 2020-11-20

## 2020-11-20 ENCOUNTER — TELEPHONE (OUTPATIENT)
Dept: NEUROLOGY | Facility: CLINIC | Age: 50
End: 2020-11-20
Payer: COMMERCIAL

## 2020-11-20 NOTE — TELEPHONE ENCOUNTER
----- Message from Annel Sarkar sent at 11/20/2020  2:31 PM CST -----  Appointment  Request      Who called:Patient  Reason for visit:discuss medications  New or Existing Patient:existing - normally sees Dr Papa Estrada or [facility] response:olesya  Best contact number:[phone]  Additional information:

## 2020-11-22 ENCOUNTER — PATIENT MESSAGE (OUTPATIENT)
Dept: NEUROLOGY | Facility: CLINIC | Age: 50
End: 2020-11-22

## 2020-11-23 ENCOUNTER — PATIENT MESSAGE (OUTPATIENT)
Dept: NEUROLOGY | Facility: CLINIC | Age: 50
End: 2020-11-23

## 2020-11-25 ENCOUNTER — OFFICE VISIT (OUTPATIENT)
Dept: NEUROLOGY | Facility: CLINIC | Age: 50
End: 2020-11-25
Payer: COMMERCIAL

## 2020-11-25 DIAGNOSIS — G40.119 TEMPORAL LOBE EPILEPSY, INTRACTABLE: Primary | ICD-10-CM

## 2020-11-25 PROCEDURE — 99213 PR OFFICE/OUTPT VISIT, EST, LEVL III, 20-29 MIN: ICD-10-PCS | Mod: 95,,, | Performed by: PSYCHIATRY & NEUROLOGY

## 2020-11-25 PROCEDURE — 99213 OFFICE O/P EST LOW 20 MIN: CPT | Mod: 95,,, | Performed by: PSYCHIATRY & NEUROLOGY

## 2020-11-25 RX ORDER — LACOSAMIDE 100 MG/1
100 TABLET, FILM COATED ORAL EVERY 12 HOURS
Qty: 60 TABLET | Refills: 4 | Status: SHIPPED | OUTPATIENT
Start: 2020-11-25 | End: 2020-12-21 | Stop reason: SDUPTHER

## 2020-11-25 NOTE — PROGRESS NOTES
"Follow up:   3 month Angry outburst, crying spells - breviact   Last sz - last week   semiology - weird feeling of fear'   Previous sz - loss of awareness     Pt of Dr. Elam      2020/08/25  The plan last visit was to hold perampanel for one week and the reduce dosing at one half the previous dose.  The emotionality has improved but still is present.  She may of had only one sz since the last visit.     2020/04/07  Since starting perampanel she has been emotion with crying spells.  Emotional outburst did not occur in the 1st 2-3 weeks after starting pyramidal.  She is on 2 mg per day.  The ER becoming more intense and is somewhat disruptive to the family.  Seizures are much better.  She will often have a very brief sensation and actually question whether she had a seizure or something else.  Clinically that is a good response but the side effects are not acceptable.  She has had no other significant interim medical problems.  She continues to take lamotrigine and the doses not been changed.     2019/11/22  The patient continues to do well.  However she does have brief sensation of fear but nothing else.  She does not have      2019/08/07  The patient is doing well but has an occasional aura which now a feeling of fear which lasts 30 sec.  Last was on Aug 1.  She is averaging 3 per months.  Surgery was Nov 4, 2018.  The first aura occurred on Dec 19, 2018.  Number by month were Jan - 4, Feb - 2, Mar - 2, Apr - 2, May - 4, Jun - 5, Jul - 3.       Results for GLORIA GURRLOA (MRN [id number]) as of 8/7/2019 15:38    Ref. Range 12/14/2018 12:10 1/14/2019 14:58 3/26/2019 13:30   Lamotrigine Lvl Latest Ref Range: 2.0 - 15.0 ug/mL 13.0 15.7 (H) 19.3 (H)         2019/03/26  In February and March, she has had four episodes that she is concerned represent seizure. These are different than any prior episodes or sensation that she has experienced. She describes a feeling of intense fear, during which she tells herself "you're just " "having a seizure".  reports lasting about 15 seconds, during the events he reports patient seems to be staring off in a daydream. She will respond to questions, but slowly. Typically occurring in the evening, prior to taking Lamictal. She recalls these episodes afterwards. Currently taking Lamictal 500 mg qHS, reports she noticed these episodes after switching from BID to daily lamictal dosing. Lamictal level at prior clinic visit 15.7. Denies any episodes of visual motor apraxia or any of her prior typical seizures.      2019/01/14  The patient underwent a L temporal lobectomy 2 months ago.  She has experience none of her typical seizures.  She did experience Judi Vu twice since surgery.  In the past she had reported Judi Vu as an aura.  She had not experienced any episodes of visual motor apraxia (unable to move eyes in any direction). She takes lamictal 200 mg in the AM and 300 mg in the PM.  She will be converted to once a day dosing of 500 mg which she prefers to do in the evening.       She feels so much better now.  She reports feeling as if she is 17 yo.     2018/05/29  Patient had another day of almost continuous seizures which was on May 09,2018.  She has failed several AEDs mainly due to side effects.  She experienced pharmacodynamic interaction and toxicity with Lamictal - Lacosamide combo.  She is tolerating the lamictal well         2018/04/09  The patient had 9 seizures recorded in last EMU visit all coming from L anterior temporal area.   MRI was normal but PET showed L mesial hypometabolism.  She is currently have almost daily seizures.  She has failed four AEDs is on Lamictal monotherapy.  She reports her verbal memory is terrible.  Review of labs show she was B12 deficient 4 yrs ago and she does not take any supplements.     Results for GLORIA GURROLA (MRN [id number]) as of 4/9/2018 16:23    Ref. Range 3/1/2016 14:45 12/19/2017 12:56 2/8/2018 18:12   Lamotrigine Lvl Latest Ref Range: 2.0 - " "15.0 ug/mL 12.0 10.5 7.8      2018/01/29  EMU evaluation was completed in Dec and L temporal interictal spikes were recorded and one electrographic seizure was recorded arising from the L anterior temporal area.  Besides frequent seizures her major complaint is progressive memory loss.       HISTORY OF PRESENT ILLNESS (HPI)  Date: The   New Patient  Pt has been seeing Dr Huerta at [facility] for "complex partial sezures." First sz, in school, age 19. Was put on Dilantin and she did well for approx 15 years, until the seizures became active again. Thinks she may have had 2 classic "Grand Mal" seizures in her 20's, but since then, seizure types are those described below.     Currently, she is experiencing more than one event per week. Event type is described below. She has been failing her current treatment regimen as described below. May have tried other meds, but can't remember them now. Did not bring records yet.      Seizure Seminology  Seizure Type 1   Classification: Pass-out  Aura - Occasional auditory мария vu  Pt loses consciousness and falls or loses tone 1-2 in  Post-ictal  Brief  Age of onset 19  Current Seizure Frequency - Several per week      Seizure Type 2  Classification: Complex Partial  Wanders around. Doesn't remember after.   Post-ictal  Brief  Current Seizure Frequency - Several per week     Seizure Triggers  Sleep Deprivation - None  Other medications - None  Psych/stress - None  Photic stimulation - None  Hyperventilation - None  Medical Problems - None  Menses - 3 days before period they get worse  Sensory Stimulation (light, sound, etc) - None  Missed dose of meds - None     AED Treatments  Present regimen   tabs 1 in AM and 1½ in PM      Prior treatments  clobazam (Onfi or Frizium, CLB) 20 mg QD  eslicarbazine (Aptiom, ESL) - seizure intensity worsened after 2 weeks Rx  lacosamide (Vimpat, LCS)   oxcarbazepine (Trileptal OXC)  3 month Angry outburst, crying spells - breviact    crying " spells - Fycompa    BID  TPM 10ID  valproic acid (Depakote, VPA)   zonisamide (Zonegran, ZNA)     Not tried  acetazolamide (Diamox, AZM)  amantadine  carbamazepine (Tegretol, CBZ)  ethosuximide (Zarontin, ESM)  felbamate (felbatol, FBM)  gabapentin (Neurontin, GPN)  levetiracetam (Keppra, LEV)  methsuximide (Celontin, MSM)   phenobarbital (Pb)  pregabalin (Lyrica, PGB)  primidone (Mysoline, PRM)  retigabine (Potiga, RTG)  rufinamide (Banzel, RUF)  tiagabine (Gabatril, TGB)  viagabatrin, (Sabril, VGB)  vagal nerve stimulator (VNS)     Benzodiazepines  diazepam - rectal (Diastatl)  diazepam - oral (Valium, DZ)  clonazepam (Klonopin, CZP)  clorazepate (Tranxene, CLZ)  Ativan  Brain Stimulation  Vagal Nerve Stimulation-n/a  DBS- n/a     Compliance method  Memory - yes  Mom or Spouse - Yes  Pill Box - no  Dewayne calendar - no  Turn over medication bottle - no  Phone alarm - no     Seizure Evaluation  EEG Routine - Dont have  MRI/MRA - In past- she doesn't know results  [facility]  2017/12/19-12/20- Patient with no events overnight. EEG shows L anterior temporal spikes, most recorded at F7. None on the other side. At times, almost continuous L temporal interictal discharges at times.   2017/12/20- 11:56:23- clinical seizure, starting from L side on EEG. No epileptiform discharges noted. L temporal slowing and spikes noted. Consisted of brief moment of unresponsiveness.   2017/12/21- EEG showing L interical anterior temporal slowing with L temporal spikes. No clinical seizures since yesterday.   2017/12/21-12/22- Event on 12/21 at 18:55 showing patient talking on the phone and suddenly stopping, unable to speak and confused. EEG shows there is a rhythmic buildup in the L temporal chains. Patient is confused and is drowsy following event. No tonic/clonic activity present.      CT/CTA Scan -   PET Scan -   Neuropsychological evaluation -   DEXA Scan     Potential Epilepsy Risk Factors:   Pregnancy/Labor/Delivery - full  term uncomplicated pregnancy labor and vaginal delivery  Febrile seizures - none  Head injury - none  CNS infection - none   Stroke - none  Family Hx of Sz - none     PAST MEDICAL HISTORY:         Active Ambulatory Problems       Diagnosis  Date Noted       No Active Ambulatory Problems     Resolved Ambulatory Problems       Diagnosis  Date Noted       No Resolved Ambulatory Problems     No Additional Past Medical History          PAST SURGICAL HISTORY: No past surgical history on file.      FAMILY HISTORY: No family history on file.       SOCIAL HISTORY:          Social History     History    Social History      Marital Status:          Spouse Name:  N/A        Number of Children:  N/A      Years of Education:  N/A    Occupational History       Not on file.     Social History Main Topics      Smoking status:  Never Smoker      Smokeless tobacco:  Not on file      Alcohol Use:  No      Drug Use:  No      Sexual Activity:  Not on file    Other Topics  Concern        Not on file      Social History Narrative      No narrative on file             SUBSTANCE USE:       Social History    Social History Main Topics      Smoking status:  Never Smoker      Smokeless tobacco:  Not on file      Alcohol Use:  No      Drug Use:  No      Sexual Activity:  Not on file            History    Substance Use Topics      Smoking status:  Never Smoker      Smokeless tobacco:  Not on file      Alcohol Use:  No          ALLERGIES: Review of patient's allergies indicates no known allergies.         Review of Systems   Constitutional: Negative for fever, chills, weight loss, malaise/fatigue and diaphoresis.   HENT: Negative for ear pain, hearing loss, nosebleeds and tinnitus.   Eyes: Negative for blurred vision, double vision, photophobia and pain.   Respiratory: Negative for cough, hemoptysis and shortness of breath.   Cardiovascular: Negative for chest pain, palpitations, orthopnea and leg swelling.    Gastrointestinal: Negative for heartburn, nausea, vomiting, abdominal pain, diarrhea, constipation and blood in stool.   Genitourinary: Negative for dysuria and hematuria.   Musculoskeletal: Negative for myalgias, joint pain and falls.   Skin: Negative for itching and rash.   Neurological: Positive for tingling, sensory change and seizures. Negative for dizziness, tremors, speech change, focal weakness, loss of consciousness, weakness and headaches.   Endo/Heme/Allergies: Negative for environmental allergies. Does not bruise/bleed easily.   Psychiatric/Behavioral: Positive for memory loss. Negative for depression, hallucinations and substance abuse. The patient has insomnia. The patient is not nervous/anxious.      Neurologic Exam   Higher Cortical Function:   Patient is a well developed, pleasant, well groomed individual appearing their stated age  Oriented - intact to person, place and time and followed two step instruction correctly.   Fund of knowledge was appropriate.   Language - Speech was fluent without evidence for an aphasia.    IMPRESSION  1.         Aura - feeling of fear.    2.         S/p L temporal lobectomy 11/2018, sz free for 3 months only   3.         Obesity    DISPOSITION:   1. Continue LTG 200mg 1 am and 1½ in pm  2, d/c Briviact 100 mg BID, after 1 week of taper   3, Start Vimpat 100 mg BID, discussed side effects in detail   4, See Ob/Gyn for estrogen suppl  5, EEG if she has cluster of sz     The patient location is: home   The chief complaint leading to consultation is: seizures     Visit type: audiovisual    Face to Face time with patient: 30 min   30 minutes of total time spent on the encounter, which includes face to face time and non-face to face time preparing to see the patient (eg, review of tests), Obtaining and/or reviewing separately obtained history, Documenting clinical information in the electronic or other health record, Independently interpreting results (not separately  reported) and communicating results to the patient/family/caregiver, or Care coordination (not separately reported).     Each patient to whom he or she provides medical services by telemedicine is:  (1) informed of the relationship between the physician and patient and the respective role of any other health care provider with respect to management of the patient; and (2) notified that he or she may decline to receive medical services by telemedicine and may withdraw from such care at any time.

## 2020-11-30 RX ORDER — LAMOTRIGINE 200 MG/1
500 TABLET ORAL DAILY
Qty: 405 TABLET | Refills: 3 | Status: SHIPPED | OUTPATIENT
Start: 2020-11-30 | End: 2022-02-10 | Stop reason: SDUPTHER

## 2020-12-02 ENCOUNTER — PATIENT MESSAGE (OUTPATIENT)
Dept: NEUROLOGY | Facility: CLINIC | Age: 50
End: 2020-12-02

## 2020-12-03 ENCOUNTER — OFFICE VISIT (OUTPATIENT)
Dept: NEUROLOGY | Facility: CLINIC | Age: 50
End: 2020-12-03
Payer: COMMERCIAL

## 2020-12-03 DIAGNOSIS — Z79.899 ENCOUNTER FOR MONITORING ANTICONVULSANT THERAPY: Primary | ICD-10-CM

## 2020-12-03 DIAGNOSIS — Z51.81 ENCOUNTER FOR MONITORING ANTICONVULSANT THERAPY: Primary | ICD-10-CM

## 2020-12-03 PROCEDURE — 99213 OFFICE O/P EST LOW 20 MIN: CPT | Mod: 95,,, | Performed by: PSYCHIATRY & NEUROLOGY

## 2020-12-03 PROCEDURE — 99213 PR OFFICE/OUTPT VISIT, EST, LEVL III, 20-29 MIN: ICD-10-PCS | Mod: 95,,, | Performed by: PSYCHIATRY & NEUROLOGY

## 2020-12-03 NOTE — TELEPHONE ENCOUNTER
Called patient to discuss medication side effects. Was planning on discussing a titration dose that  instructed me to give. She stated she took her last dose of Briviact last night and does not want to continue taking it. She also stated that she does not want to continue taking the vimpat either. She stated she would take the dose this am. She wanted to discuss different options of medications. Virtual set up for today with .

## 2020-12-03 NOTE — PROGRESS NOTES
Follow up:   Last dose of briviact - last night   One hour later an hour episode of fear and whole body shaking   vimpat 100 mg BID x 7 days   No further sz     Prior note:   3 month Angry outburst, crying spells - breviact   Last sz - last week   semiology - weird feeling of fear'   Previous sz - loss of awareness      Pt of Dr. Elam      2020/08/25  The plan last visit was to hold perampanel for one week and the reduce dosing at one half the previous dose.  The emotionality has improved but still is present.  She may of had only one sz since the last visit.     2020/04/07  Since starting perampanel she has been emotion with crying spells.  Emotional outburst did not occur in the 1st 2-3 weeks after starting pyramidal.  She is on 2 mg per day.  The ER becoming more intense and is somewhat disruptive to the family.  Seizures are much better.  She will often have a very brief sensation and actually question whether she had a seizure or something else.  Clinically that is a good response but the side effects are not acceptable.  She has had no other significant interim medical problems.  She continues to take lamotrigine and the doses not been changed.     2019/11/22  The patient continues to do well.  However she does have brief sensation of fear but nothing else.  She does not have      2019/08/07  The patient is doing well but has an occasional aura which now a feeling of fear which lasts 30 sec.  Last was on Aug 1.  She is averaging 3 per months.  Surgery was Nov 4, 2018.  The first aura occurred on Dec 19, 2018.  Number by month were Jan - 4, Feb - 2, Mar - 2, Apr - 2, May - 4, Jun - 5, Jul - 3.       Results for GLORIA GURROLA (MRN [id number]) as of 8/7/2019 15:38    Ref. Range 12/14/2018 12:10 1/14/2019 14:58 3/26/2019 13:30   Lamotrigine Lvl Latest Ref Range: 2.0 - 15.0 ug/mL 13.0 15.7 (H) 19.3 (H)         2019/03/26  In February and March, she has had four episodes that she is concerned represent seizure.  "These are different than any prior episodes or sensation that she has experienced. She describes a feeling of intense fear, during which she tells herself "you're just having a seizure".  reports lasting about 15 seconds, during the events he reports patient seems to be staring off in a daydream. She will respond to questions, but slowly. Typically occurring in the evening, prior to taking Lamictal. She recalls these episodes afterwards. Currently taking Lamictal 500 mg qHS, reports she noticed these episodes after switching from BID to daily lamictal dosing. Lamictal level at prior clinic visit 15.7. Denies any episodes of visual motor apraxia or any of her prior typical seizures.      2019/01/14  The patient underwent a L temporal lobectomy 2 months ago.  She has experience none of her typical seizures.  She did experience Judi Vu twice since surgery.  In the past she had reported Judi Vu as an aura.  She had not experienced any episodes of visual motor apraxia (unable to move eyes in any direction). She takes lamictal 200 mg in the AM and 300 mg in the PM.  She will be converted to once a day dosing of 500 mg which she prefers to do in the evening.       She feels so much better now.  She reports feeling as if she is 17 yo.     2018/05/29  Patient had another day of almost continuous seizures which was on May 09,2018.  She has failed several AEDs mainly due to side effects.  She experienced pharmacodynamic interaction and toxicity with Lamictal - Lacosamide combo.  She is tolerating the lamictal well         2018/04/09  The patient had 9 seizures recorded in last EMU visit all coming from L anterior temporal area.   MRI was normal but PET showed L mesial hypometabolism.  She is currently have almost daily seizures.  She has failed four AEDs is on Lamictal monotherapy.  She reports her verbal memory is terrible.  Review of labs show she was B12 deficient 4 yrs ago and she does not take any " "supplements.     Results for GLORIA GURROLA (MRN [id number]) as of 4/9/2018 16:23    Ref. Range 3/1/2016 14:45 12/19/2017 12:56 2/8/2018 18:12   Lamotrigine Lvl Latest Ref Range: 2.0 - 15.0 ug/mL 12.0 10.5 7.8      2018/01/29  EMU evaluation was completed in Dec and L temporal interictal spikes were recorded and one electrographic seizure was recorded arising from the L anterior temporal area.  Besides frequent seizures her major complaint is progressive memory loss.       HISTORY OF PRESENT ILLNESS (HPI)  Date: The   New Patient  Pt has been seeing Dr Huerta at [facility] for "complex partial sezures." First sz, in school, age 19. Was put on Dilantin and she did well for approx 15 years, until the seizures became active again. Thinks she may have had 2 classic "Grand Mal" seizures in her 20's, but since then, seizure types are those described below.     Currently, she is experiencing more than one event per week. Event type is described below. She has been failing her current treatment regimen as described below. May have tried other meds, but can't remember them now. Did not bring records yet.      Seizure Seminology  Seizure Type 1   Classification: Pass-out  Aura - Occasional auditory мария vu  Pt loses consciousness and falls or loses tone 1-2 in  Post-ictal  Brief  Age of onset 19  Current Seizure Frequency - Several per week      Seizure Type 2  Classification: Complex Partial  Wanders around. Doesn't remember after.   Post-ictal  Brief  Current Seizure Frequency - Several per week     Seizure Triggers  Sleep Deprivation - None  Other medications - None  Psych/stress - None  Photic stimulation - None  Hyperventilation - None  Medical Problems - None  Menses - 3 days before period they get worse  Sensory Stimulation (light, sound, etc) - None  Missed dose of meds - None     AED Treatments  Present regimen   tabs 1 in AM and 1½ in PM      Prior treatments  clobazam (Onfi or Frizium, CLB) 20 mg QD  eslicarbazine " (Aptiom, ESL) - seizure intensity worsened after 2 weeks Rx  lacosamide (Vimpat, LCS)   oxcarbazepine (Trileptal OXC)  3 month Angry outburst, crying spells - breviact    crying spells - Fycompa    BID  TPM 10ID  valproic acid (Depakote, VPA)   zonisamide (Zonegran, ZNA)     Not tried  acetazolamide (Diamox, AZM)  amantadine  carbamazepine (Tegretol, CBZ)  ethosuximide (Zarontin, ESM)  felbamate (felbatol, FBM)  gabapentin (Neurontin, GPN)  levetiracetam (Keppra, LEV)  methsuximide (Celontin, MSM)   phenobarbital (Pb)  pregabalin (Lyrica, PGB)  primidone (Mysoline, PRM)  retigabine (Potiga, RTG)  rufinamide (Banzel, RUF)  tiagabine (Gabatril, TGB)  viagabatrin, (Sabril, VGB)  vagal nerve stimulator (VNS)     Benzodiazepines  diazepam - rectal (Diastatl)  diazepam - oral (Valium, DZ)  clonazepam (Klonopin, CZP)  clorazepate (Tranxene, CLZ)  Ativan  Brain Stimulation  Vagal Nerve Stimulation-n/a  DBS- n/a     Compliance method  Memory - yes  Mom or Spouse - Yes  Pill Box - no  Dewayne calendar - no  Turn over medication bottle - no  Phone alarm - no     Seizure Evaluation  EEG Routine - Dont have  MRI/MRA - In past- she doesn't know results  [facility]  2017/12/19-12/20- Patient with no events overnight. EEG shows L anterior temporal spikes, most recorded at F7. None on the other side. At times, almost continuous L temporal interictal discharges at times.   2017/12/20- 11:56:23- clinical seizure, starting from L side on EEG. No epileptiform discharges noted. L temporal slowing and spikes noted. Consisted of brief moment of unresponsiveness.   2017/12/21- EEG showing L interical anterior temporal slowing with L temporal spikes. No clinical seizures since yesterday.   2017/12/21-12/22- Event on 12/21 at 18:55 showing patient talking on the phone and suddenly stopping, unable to speak and confused. EEG shows there is a rhythmic buildup in the L temporal chains. Patient is confused and is drowsy following event. No  tonic/clonic activity present.      CT/CTA Scan -   PET Scan -   Neuropsychological evaluation -   DEXA Scan     Potential Epilepsy Risk Factors:   Pregnancy/Labor/Delivery - full term uncomplicated pregnancy labor and vaginal delivery  Febrile seizures - none  Head injury - none  CNS infection - none   Stroke - none  Family Hx of Sz - none     PAST MEDICAL HISTORY:             Active Ambulatory Problems        Diagnosis  Date Noted        No Active Ambulatory Problems      Resolved Ambulatory Problems        Diagnosis  Date Noted        No Resolved Ambulatory Problems      No Additional Past Medical History          PAST SURGICAL HISTORY: No past surgical history on file.      FAMILY HISTORY: No family history on file.       SOCIAL HISTORY:             Social History                History    Social History      Marital Status:          Spouse Name:  N/A        Number of Children:  N/A      Years of Education:  N/A    Occupational History        Not on file.      Social History Main Topics      Smoking status:  Never Smoker      Smokeless tobacco:  Not on file      Alcohol Use:  No      Drug Use:  No      Sexual Activity:  Not on file    Other Topics  Concern          Not on file        Social History Narrative      No narrative on file             SUBSTANCE USE:          Social History    Social History Main Topics      Smoking status:  Never Smoker      Smokeless tobacco:  Not on file      Alcohol Use:  No      Drug Use:  No      Sexual Activity:  Not on file               History    Substance Use Topics      Smoking status:  Never Smoker      Smokeless tobacco:  Not on file      Alcohol Use:  No          ALLERGIES: Review of patient's allergies indicates no known allergies.         Review of Systems   Constitutional: Negative for fever, chills, weight loss, malaise/fatigue and diaphoresis.   HENT: Negative for ear pain, hearing loss, nosebleeds and tinnitus.   Eyes: Negative for  blurred vision, double vision, photophobia and pain.   Respiratory: Negative for cough, hemoptysis and shortness of breath.   Cardiovascular: Negative for chest pain, palpitations, orthopnea and leg swelling.   Gastrointestinal: Negative for heartburn, nausea, vomiting, abdominal pain, diarrhea, constipation and blood in stool.   Genitourinary: Negative for dysuria and hematuria.   Musculoskeletal: Negative for myalgias, joint pain and falls.   Skin: Negative for itching and rash.   Neurological: Positive for tingling, sensory change and seizures. Negative for dizziness, tremors, speech change, focal weakness, loss of consciousness, weakness and headaches.   Endo/Heme/Allergies: Negative for environmental allergies. Does not bruise/bleed easily.   Psychiatric/Behavioral: Positive for memory loss. Negative for depression, hallucinations and substance abuse. The patient has insomnia. The patient is not nervous/anxious.      Neurologic Exam   Higher Cortical Function:   Patient is a well developed, pleasant, well groomed individual appearing their stated age  Oriented - intact to person, place and time and followed two step instruction correctly.   Fund of knowledge was appropriate.   Language - Speech was fluent without evidence for an aphasia.     IMPRESSION  1.         Aura - feeling of fear.    2.         S/p L temporal lobectomy 11/2018, sz free for 3 months only   3.         Obesity     DISPOSITION:   1. Continue LTG 200mg 1 am and 1½ in pm  2, cont Vimpat 100 mg BID, discussed side effects in detail (if symptoms recur will consider 50 mg BID)  3, See Ob/Gyn for estrogen suppl  4, EEG if she has cluster of sz      The patient location is: home   The chief complaint leading to consultation is: seizures      Visit type: audiovisual     Face to Face time with patient: 20 min   25 minutes of total time spent on the encounter, which includes face to face time and non-face to face time preparing to see the patient (eg,  review of tests), Obtaining and/or reviewing separately obtained history, Documenting clinical information in the electronic or other health record, Independently interpreting results (not separately reported) and communicating results to the patient/family/caregiver, or Care coordination (not separately reported).      Each patient to whom he or she provides medical services by telemedicine is:  (1) informed of the relationship between the physician and patient and the respective role of any other health care provider with respect to management of the patient; and (2) notified that he or she may decline to receive medical services by telemedicine and may withdraw from such care at any time.

## 2020-12-14 ENCOUNTER — PATIENT MESSAGE (OUTPATIENT)
Dept: NEUROLOGY | Facility: CLINIC | Age: 50
End: 2020-12-14

## 2020-12-19 ENCOUNTER — PATIENT MESSAGE (OUTPATIENT)
Dept: NEUROLOGY | Facility: CLINIC | Age: 50
End: 2020-12-19

## 2020-12-19 DIAGNOSIS — G40.119 TEMPORAL LOBE EPILEPSY, INTRACTABLE: ICD-10-CM

## 2020-12-21 RX ORDER — LACOSAMIDE 100 MG/1
100 TABLET, FILM COATED ORAL EVERY 12 HOURS
Qty: 60 TABLET | Refills: 4 | Status: SHIPPED | OUTPATIENT
Start: 2020-12-21 | End: 2020-12-29

## 2020-12-23 ENCOUNTER — PATIENT MESSAGE (OUTPATIENT)
Dept: NEUROLOGY | Facility: CLINIC | Age: 50
End: 2020-12-23

## 2021-01-10 ENCOUNTER — PATIENT MESSAGE (OUTPATIENT)
Dept: NEUROLOGY | Facility: CLINIC | Age: 51
End: 2021-01-10

## 2021-01-26 ENCOUNTER — PATIENT MESSAGE (OUTPATIENT)
Dept: NEUROLOGY | Facility: CLINIC | Age: 51
End: 2021-01-26

## 2021-04-12 ENCOUNTER — PATIENT MESSAGE (OUTPATIENT)
Dept: NEUROLOGY | Facility: CLINIC | Age: 51
End: 2021-04-12

## 2021-04-28 ENCOUNTER — PATIENT MESSAGE (OUTPATIENT)
Dept: NEUROLOGY | Facility: CLINIC | Age: 51
End: 2021-04-28

## 2021-06-04 ENCOUNTER — OFFICE VISIT (OUTPATIENT)
Dept: NEUROLOGY | Facility: CLINIC | Age: 51
End: 2021-06-04
Payer: COMMERCIAL

## 2021-06-04 VITALS
WEIGHT: 183 LBS | HEIGHT: 66 IN | SYSTOLIC BLOOD PRESSURE: 129 MMHG | HEART RATE: 60 BPM | BODY MASS INDEX: 29.41 KG/M2 | DIASTOLIC BLOOD PRESSURE: 75 MMHG

## 2021-06-04 DIAGNOSIS — G40.119 TEMPORAL LOBE EPILEPSY, INTRACTABLE: ICD-10-CM

## 2021-06-04 DIAGNOSIS — F41.9 ANXIETY: Primary | ICD-10-CM

## 2021-06-04 PROCEDURE — 1126F AMNT PAIN NOTED NONE PRSNT: CPT | Mod: S$GLB,,, | Performed by: PSYCHIATRY & NEUROLOGY

## 2021-06-04 PROCEDURE — 99999 PR PBB SHADOW E&M-EST. PATIENT-LVL III: CPT | Mod: PBBFAC,,, | Performed by: PSYCHIATRY & NEUROLOGY

## 2021-06-04 PROCEDURE — 3008F BODY MASS INDEX DOCD: CPT | Mod: CPTII,S$GLB,, | Performed by: PSYCHIATRY & NEUROLOGY

## 2021-06-04 PROCEDURE — 99214 PR OFFICE/OUTPT VISIT, EST, LEVL IV, 30-39 MIN: ICD-10-PCS | Mod: S$GLB,,, | Performed by: PSYCHIATRY & NEUROLOGY

## 2021-06-04 PROCEDURE — 3008F PR BODY MASS INDEX (BMI) DOCUMENTED: ICD-10-PCS | Mod: CPTII,S$GLB,, | Performed by: PSYCHIATRY & NEUROLOGY

## 2021-06-04 PROCEDURE — 1126F PR PAIN SEVERITY QUANTIFIED, NO PAIN PRESENT: ICD-10-PCS | Mod: S$GLB,,, | Performed by: PSYCHIATRY & NEUROLOGY

## 2021-06-04 PROCEDURE — 99999 PR PBB SHADOW E&M-EST. PATIENT-LVL III: ICD-10-PCS | Mod: PBBFAC,,, | Performed by: PSYCHIATRY & NEUROLOGY

## 2021-06-04 PROCEDURE — 99214 OFFICE O/P EST MOD 30 MIN: CPT | Mod: S$GLB,,, | Performed by: PSYCHIATRY & NEUROLOGY

## 2021-06-04 RX ORDER — SERTRALINE HYDROCHLORIDE 25 MG/1
25 TABLET, FILM COATED ORAL DAILY
Qty: 30 TABLET | Refills: 11 | Status: SHIPPED | OUTPATIENT
Start: 2021-06-04 | End: 2022-02-18 | Stop reason: SDUPTHER

## 2021-06-04 RX ORDER — LACOSAMIDE 200 MG/1
200 TABLET ORAL EVERY 12 HOURS
Qty: 60 TABLET | Refills: 4 | Status: SHIPPED | OUTPATIENT
Start: 2021-06-04 | End: 2021-07-06

## 2021-06-21 ENCOUNTER — TELEPHONE (OUTPATIENT)
Dept: NEUROLOGY | Facility: CLINIC | Age: 51
End: 2021-06-21

## 2021-06-21 ENCOUNTER — PATIENT MESSAGE (OUTPATIENT)
Dept: NEUROLOGY | Facility: CLINIC | Age: 51
End: 2021-06-21

## 2021-06-30 ENCOUNTER — PATIENT MESSAGE (OUTPATIENT)
Dept: NEUROLOGY | Facility: CLINIC | Age: 51
End: 2021-06-30

## 2021-06-30 DIAGNOSIS — G40.119 TEMPORAL LOBE EPILEPSY, INTRACTABLE: Primary | ICD-10-CM

## 2021-07-07 RX ORDER — LACOSAMIDE 150 MG/1
150 TABLET ORAL EVERY 12 HOURS
Qty: 60 TABLET | Refills: 4 | Status: SHIPPED | OUTPATIENT
Start: 2021-07-07 | End: 2021-10-28

## 2021-08-20 ENCOUNTER — PATIENT MESSAGE (OUTPATIENT)
Dept: NEUROLOGY | Facility: CLINIC | Age: 51
End: 2021-08-20

## 2021-08-20 DIAGNOSIS — G40.219 COMPLEX PARTIAL EPILEPSY WITH GENERALIZATION AND WITH INTRACTABLE EPILEPSY: Primary | ICD-10-CM

## 2021-08-23 RX ORDER — CLONAZEPAM 1 MG/1
1 TABLET ORAL NIGHTLY
Qty: 6 TABLET | Refills: 0 | Status: SHIPPED | OUTPATIENT
Start: 2021-08-23 | End: 2021-10-28

## 2021-09-04 ENCOUNTER — PATIENT MESSAGE (OUTPATIENT)
Dept: NEUROLOGY | Facility: CLINIC | Age: 51
End: 2021-09-04

## 2021-09-23 ENCOUNTER — PATIENT MESSAGE (OUTPATIENT)
Dept: NEUROLOGY | Facility: CLINIC | Age: 51
End: 2021-09-23

## 2021-10-28 ENCOUNTER — TELEPHONE (OUTPATIENT)
Dept: PHARMACY | Facility: CLINIC | Age: 51
End: 2021-10-28
Payer: COMMERCIAL

## 2021-10-28 ENCOUNTER — OFFICE VISIT (OUTPATIENT)
Dept: NEUROLOGY | Facility: CLINIC | Age: 51
End: 2021-10-28
Payer: COMMERCIAL

## 2021-10-28 VITALS
SYSTOLIC BLOOD PRESSURE: 132 MMHG | HEIGHT: 66 IN | DIASTOLIC BLOOD PRESSURE: 76 MMHG | BODY MASS INDEX: 31.02 KG/M2 | WEIGHT: 193 LBS | HEART RATE: 59 BPM

## 2021-10-28 DIAGNOSIS — G40.219 COMPLEX PARTIAL EPILEPSY WITH GENERALIZATION AND WITH INTRACTABLE EPILEPSY: Primary | ICD-10-CM

## 2021-10-28 PROCEDURE — 3078F DIAST BP <80 MM HG: CPT | Mod: CPTII,S$GLB,, | Performed by: PSYCHIATRY & NEUROLOGY

## 2021-10-28 PROCEDURE — 3078F PR MOST RECENT DIASTOLIC BLOOD PRESSURE < 80 MM HG: ICD-10-PCS | Mod: CPTII,S$GLB,, | Performed by: PSYCHIATRY & NEUROLOGY

## 2021-10-28 PROCEDURE — 99999 PR PBB SHADOW E&M-EST. PATIENT-LVL III: CPT | Mod: PBBFAC,,, | Performed by: PSYCHIATRY & NEUROLOGY

## 2021-10-28 PROCEDURE — 1159F PR MEDICATION LIST DOCUMENTED IN MEDICAL RECORD: ICD-10-PCS | Mod: CPTII,S$GLB,, | Performed by: PSYCHIATRY & NEUROLOGY

## 2021-10-28 PROCEDURE — 1159F MED LIST DOCD IN RCRD: CPT | Mod: CPTII,S$GLB,, | Performed by: PSYCHIATRY & NEUROLOGY

## 2021-10-28 PROCEDURE — 3008F PR BODY MASS INDEX (BMI) DOCUMENTED: ICD-10-PCS | Mod: CPTII,S$GLB,, | Performed by: PSYCHIATRY & NEUROLOGY

## 2021-10-28 PROCEDURE — 99999 PR PBB SHADOW E&M-EST. PATIENT-LVL III: ICD-10-PCS | Mod: PBBFAC,,, | Performed by: PSYCHIATRY & NEUROLOGY

## 2021-10-28 PROCEDURE — 3075F SYST BP GE 130 - 139MM HG: CPT | Mod: CPTII,S$GLB,, | Performed by: PSYCHIATRY & NEUROLOGY

## 2021-10-28 PROCEDURE — 99214 OFFICE O/P EST MOD 30 MIN: CPT | Mod: S$GLB,,, | Performed by: PSYCHIATRY & NEUROLOGY

## 2021-10-28 PROCEDURE — 1160F RVW MEDS BY RX/DR IN RCRD: CPT | Mod: CPTII,S$GLB,, | Performed by: PSYCHIATRY & NEUROLOGY

## 2021-10-28 PROCEDURE — 1160F PR REVIEW ALL MEDS BY PRESCRIBER/CLIN PHARMACIST DOCUMENTED: ICD-10-PCS | Mod: CPTII,S$GLB,, | Performed by: PSYCHIATRY & NEUROLOGY

## 2021-10-28 PROCEDURE — 99214 PR OFFICE/OUTPT VISIT, EST, LEVL IV, 30-39 MIN: ICD-10-PCS | Mod: S$GLB,,, | Performed by: PSYCHIATRY & NEUROLOGY

## 2021-10-28 PROCEDURE — 3008F BODY MASS INDEX DOCD: CPT | Mod: CPTII,S$GLB,, | Performed by: PSYCHIATRY & NEUROLOGY

## 2021-10-28 PROCEDURE — 3075F PR MOST RECENT SYSTOLIC BLOOD PRESS GE 130-139MM HG: ICD-10-PCS | Mod: CPTII,S$GLB,, | Performed by: PSYCHIATRY & NEUROLOGY

## 2021-10-28 RX ORDER — CENOBAMATE 12.5-25MG
KIT ORAL
Qty: 28 TABLET | Refills: 0 | Status: SHIPPED | OUTPATIENT
Start: 2021-10-28 | End: 2022-04-01

## 2021-10-28 RX ORDER — CENOBAMATE 50MG-100MG
KIT ORAL
Qty: 28 TABLET | Refills: 0 | Status: SHIPPED | OUTPATIENT
Start: 2021-10-28 | End: 2022-04-01

## 2021-11-04 ENCOUNTER — PATIENT MESSAGE (OUTPATIENT)
Dept: NEUROLOGY | Facility: CLINIC | Age: 51
End: 2021-11-04
Payer: COMMERCIAL

## 2021-11-19 ENCOUNTER — PATIENT MESSAGE (OUTPATIENT)
Dept: NEUROLOGY | Facility: CLINIC | Age: 51
End: 2021-11-19
Payer: COMMERCIAL

## 2021-12-18 ENCOUNTER — PATIENT MESSAGE (OUTPATIENT)
Dept: NEUROLOGY | Facility: CLINIC | Age: 51
End: 2021-12-18
Payer: COMMERCIAL

## 2021-12-21 ENCOUNTER — PATIENT MESSAGE (OUTPATIENT)
Dept: NEUROLOGY | Facility: CLINIC | Age: 51
End: 2021-12-21
Payer: COMMERCIAL

## 2021-12-21 DIAGNOSIS — G40.119 TEMPORAL LOBE EPILEPSY, INTRACTABLE: Primary | ICD-10-CM

## 2021-12-22 DIAGNOSIS — G40.219 COMPLEX PARTIAL EPILEPSY WITH GENERALIZATION AND WITH INTRACTABLE EPILEPSY: Primary | ICD-10-CM

## 2021-12-22 RX ORDER — CENOBAMATE 100 MG/1
100 TABLET, FILM COATED ORAL DAILY
Qty: 30 TABLET | Refills: 5 | Status: SHIPPED | OUTPATIENT
Start: 2021-12-22 | End: 2022-04-01

## 2021-12-23 ENCOUNTER — PATIENT MESSAGE (OUTPATIENT)
Dept: NEUROLOGY | Facility: CLINIC | Age: 51
End: 2021-12-23
Payer: COMMERCIAL

## 2022-02-10 RX ORDER — LAMOTRIGINE 200 MG/1
500 TABLET ORAL DAILY
Qty: 405 TABLET | Refills: 3 | Status: SHIPPED | OUTPATIENT
Start: 2022-02-10 | End: 2023-03-31 | Stop reason: SDUPTHER

## 2022-02-18 ENCOUNTER — OFFICE VISIT (OUTPATIENT)
Dept: NEUROLOGY | Facility: CLINIC | Age: 52
End: 2022-02-18
Payer: COMMERCIAL

## 2022-02-18 ENCOUNTER — PATIENT MESSAGE (OUTPATIENT)
Dept: NEUROLOGY | Facility: CLINIC | Age: 52
End: 2022-02-18

## 2022-02-18 DIAGNOSIS — G40.119 TEMPORAL LOBE EPILEPSY, INTRACTABLE: Primary | ICD-10-CM

## 2022-02-18 DIAGNOSIS — T42.75XA ADVERSE EFFECT OF ANTIEPILEPTIC, INITIAL ENCOUNTER: Primary | ICD-10-CM

## 2022-02-18 DIAGNOSIS — F41.9 ANXIETY: ICD-10-CM

## 2022-02-18 PROCEDURE — 1159F MED LIST DOCD IN RCRD: CPT | Mod: CPTII,S$GLB,, | Performed by: PSYCHIATRY & NEUROLOGY

## 2022-02-18 PROCEDURE — 1159F PR MEDICATION LIST DOCUMENTED IN MEDICAL RECORD: ICD-10-PCS | Mod: CPTII,S$GLB,, | Performed by: PSYCHIATRY & NEUROLOGY

## 2022-02-18 PROCEDURE — 99999 PR PBB SHADOW E&M-EST. PATIENT-LVL I: CPT | Mod: PBBFAC,,, | Performed by: PSYCHIATRY & NEUROLOGY

## 2022-02-18 PROCEDURE — 99213 OFFICE O/P EST LOW 20 MIN: CPT | Mod: S$GLB,,, | Performed by: PSYCHIATRY & NEUROLOGY

## 2022-02-18 PROCEDURE — 99213 PR OFFICE/OUTPT VISIT, EST, LEVL III, 20-29 MIN: ICD-10-PCS | Mod: S$GLB,,, | Performed by: PSYCHIATRY & NEUROLOGY

## 2022-02-18 PROCEDURE — 1160F PR REVIEW ALL MEDS BY PRESCRIBER/CLIN PHARMACIST DOCUMENTED: ICD-10-PCS | Mod: CPTII,S$GLB,, | Performed by: PSYCHIATRY & NEUROLOGY

## 2022-02-18 PROCEDURE — 1160F RVW MEDS BY RX/DR IN RCRD: CPT | Mod: CPTII,S$GLB,, | Performed by: PSYCHIATRY & NEUROLOGY

## 2022-02-18 PROCEDURE — 99999 PR PBB SHADOW E&M-EST. PATIENT-LVL I: ICD-10-PCS | Mod: PBBFAC,,, | Performed by: PSYCHIATRY & NEUROLOGY

## 2022-02-18 RX ORDER — LAMOTRIGINE 200 MG/1
TABLET ORAL
Qty: 405 TABLET | Refills: 3 | Status: SHIPPED | OUTPATIENT
Start: 2022-02-18 | End: 2022-04-01

## 2022-02-18 RX ORDER — SERTRALINE HYDROCHLORIDE 25 MG/1
25 TABLET, FILM COATED ORAL DAILY
Qty: 30 TABLET | Refills: 11 | Status: SHIPPED | OUTPATIENT
Start: 2022-02-18 | End: 2023-02-13

## 2022-02-18 RX ORDER — CENOBAMATE 150 MG/1
150 TABLET, FILM COATED ORAL DAILY
Qty: 30 TABLET | Refills: 4 | Status: SHIPPED | OUTPATIENT
Start: 2022-02-18 | End: 2022-05-25 | Stop reason: SDUPTHER

## 2022-02-18 NOTE — PROGRESS NOTES
Follow up:   Episodes of loosing time   No aura   Triggered by stress and physical work out (jogging)    sz Hz - can go 3 wks sz free, up to 1 per day for 3 days     Semiology prior to surgery: fear -> strange feeling -> no MARAH     Prior note:   3-4 sz/week   Triggered by stress and physical work out (jogging)     Prior note:   Sz 2/month -> 4-8 per week (since last 2 months)   Aura - dejavu x 10 sec   10 min later - uncomfortable feeling x 30 sec      Prior note:   Last dose of briviact - last night   One hour later an hour episode of fear and whole body shaking   vimpat 100 mg BID x 7 days   No further sz      Prior note:   3 month Angry outburst, crying spells - breviact   Last sz - last week   semiology - weird feeling of fear'   Previous sz - loss of awareness      Pt of Dr. Elam      2020/08/25  The plan last visit was to hold perampanel for one week and the reduce dosing at one half the previous dose.  The emotionality has improved but still is present.  She may of had only one sz since the last visit.     2020/04/07  Since starting perampanel she has been emotion with crying spells.  Emotional outburst did not occur in the 1st 2-3 weeks after starting pyramidal.  She is on 2 mg per day.  The ER becoming more intense and is somewhat disruptive to the family.  Seizures are much better.  She will often have a very brief sensation and actually question whether she had a seizure or something else.  Clinically that is a good response but the side effects are not acceptable.  She has had no other significant interim medical problems.  She continues to take lamotrigine and the doses not been changed.     2019/11/22  The patient continues to do well.  However she does have brief sensation of fear but nothing else.  She does not have      2019/08/07  The patient is doing well but has an occasional aura which now a feeling of fear which lasts 30 sec.  Last was on Aug 1.  She is averaging 3 per months.  Surgery was  "Nov 4, 2018.  The first aura occurred on Dec 19, 2018.  Number by month were Jan - 4, Feb - 2, Mar - 2, Apr - 2, May - 4, Jun - 5, Jul - 3.       Results for GLORIA GURROLA (MRN [id number]) as of 8/7/2019 15:38    Ref. Range 12/14/2018 12:10 1/14/2019 14:58 3/26/2019 13:30   Lamotrigine Lvl Latest Ref Range: 2.0 - 15.0 ug/mL 13.0 15.7 (H) 19.3 (H)         2019/03/26  In February and March, she has had four episodes that she is concerned represent seizure. These are different than any prior episodes or sensation that she has experienced. She describes a feeling of intense fear, during which she tells herself "you're just having a seizure".  reports lasting about 15 seconds, during the events he reports patient seems to be staring off in a daydream. She will respond to questions, but slowly. Typically occurring in the evening, prior to taking Lamictal. She recalls these episodes afterwards. Currently taking Lamictal 500 mg qHS, reports she noticed these episodes after switching from BID to daily lamictal dosing. Lamictal level at prior clinic visit 15.7. Denies any episodes of visual motor apraxia or any of her prior typical seizures.      2019/01/14  The patient underwent a L temporal lobectomy 2 months ago.  She has experience none of her typical seizures.  She did experience Judi Vu twice since surgery.  In the past she had reported Judi Vu as an aura.  She had not experienced any episodes of visual motor apraxia (unable to move eyes in any direction). She takes lamictal 200 mg in the AM and 300 mg in the PM.  She will be converted to once a day dosing of 500 mg which she prefers to do in the evening.       She feels so much better now.  She reports feeling as if she is 19 yo.     2018/05/29  Patient had another day of almost continuous seizures which was on May 09,2018.  She has failed several AEDs mainly due to side effects.  She experienced pharmacodynamic interaction and toxicity with Lamictal - Lacosamide " "combo.  She is tolerating the lamictal well         2018/04/09  The patient had 9 seizures recorded in last EMU visit all coming from L anterior temporal area.   MRI was normal but PET showed L mesial hypometabolism.  She is currently have almost daily seizures.  She has failed four AEDs is on Lamictal monotherapy.  She reports her verbal memory is terrible.  Review of labs show she was B12 deficient 4 yrs ago and she does not take any supplements.     Results for GLORIA GURROLA (MRN [id number]) as of 4/9/2018 16:23    Ref. Range 3/1/2016 14:45 12/19/2017 12:56 2/8/2018 18:12   Lamotrigine Lvl Latest Ref Range: 2.0 - 15.0 ug/mL 12.0 10.5 7.8      2018/01/29  EMU evaluation was completed in Dec and L temporal interictal spikes were recorded and one electrographic seizure was recorded arising from the L anterior temporal area.  Besides frequent seizures her major complaint is progressive memory loss.       HISTORY OF PRESENT ILLNESS (HPI)  Date: The   New Patient  Pt has been seeing Dr Huerta at [facility] for "complex partial sezures." First sz, in school, age 19. Was put on Dilantin and she did well for approx 15 years, until the seizures became active again. Thinks she may have had 2 classic "Grand Mal" seizures in her 20's, but since then, seizure types are those described below.     Currently, she is experiencing more than one event per week. Event type is described below. She has been failing her current treatment regimen as described below. May have tried other meds, but can't remember them now. Did not bring records yet.      Seizure Seminology  Seizure Type 1   Classification: Pass-out  Aura - Occasional auditory мария vu  Pt loses consciousness and falls or loses tone 1-2 in  Post-ictal  Brief  Age of onset 19  Current Seizure Frequency - Several per week      Seizure Type 2  Classification: Complex Partial  Wanders around. Doesn't remember after.   Post-ictal  Brief  Current Seizure Frequency - Several per " week     Seizure Triggers  Sleep Deprivation - None  Other medications - None  Psych/stress - None  Photic stimulation - None  Hyperventilation - None  Medical Problems - None  Menses - 3 days before period they get worse  Sensory Stimulation (light, sound, etc) - None  Missed dose of meds - None     AED Treatments  Present regimen   tabs 1 in AM and 1½ in PM      Prior treatments  clobazam (Onfi or Frizium, CLB) 20 mg QD  eslicarbazine (Aptiom, ESL) - seizure intensity worsened after 2 weeks Rx  lacosamide (Vimpat, LCS)   oxcarbazepine (Trileptal OXC)  3 month Angry outburst, crying spells - breviact    crying spells - Fycompa    BID  TPM 10ID  valproic acid (Depakote, VPA)   zonisamide (Zonegran, ZNA)  Vimpat  200 mg BID - kaplan      Not tried  acetazolamide (Diamox, AZM)  amantadine  carbamazepine (Tegretol, CBZ)  ethosuximide (Zarontin, ESM)  felbamate (felbatol, FBM)  gabapentin (Neurontin, GPN)  levetiracetam (Keppra, LEV)  methsuximide (Celontin, MSM)   phenobarbital (Pb)  pregabalin (Lyrica, PGB)  primidone (Mysoline, PRM)  retigabine (Potiga, RTG)  rufinamide (Banzel, RUF)  tiagabine (Gabatril, TGB)  viagabatrin, (Sabril, VGB)  vagal nerve stimulator (VNS)     Benzodiazepines  diazepam - rectal (Diastatl)  diazepam - oral (Valium, DZ)  clonazepam (Klonopin, CZP)  clorazepate (Tranxene, CLZ)  Ativan  Brain Stimulation  Vagal Nerve Stimulation-n/a  DBS- n/a     Compliance method  Memory - yes  Mom or Spouse - Yes  Pill Box - no  Dewayne calendar - no  Turn over medication bottle - no  Phone alarm - no     Seizure Evaluation  EEG Routine - Dont have  MRI/MRA - In past- she doesn't know results  [facility]  2017/12/19-12/20- Patient with no events overnight. EEG shows L anterior temporal spikes, most recorded at F7. None on the other side. At times, almost continuous L temporal interictal discharges at times.   2017/12/20- 11:56:23- clinical seizure, starting from L side on EEG. No epileptiform  discharges noted. L temporal slowing and spikes noted. Consisted of brief moment of unresponsiveness.   2017/12/21- EEG showing L interical anterior temporal slowing with L temporal spikes. No clinical seizures since yesterday.   2017/12/21-12/22- Event on 12/21 at 18:55 showing patient talking on the phone and suddenly stopping, unable to speak and confused. EEG shows there is a rhythmic buildup in the L temporal chains. Patient is confused and is drowsy following event. No tonic/clonic activity present.      CT/CTA Scan -   PET Scan -   Neuropsychological evaluation -   DEXA Scan     Potential Epilepsy Risk Factors:   Pregnancy/Labor/Delivery - full term uncomplicated pregnancy labor and vaginal delivery  Febrile seizures - none  Head injury - none  CNS infection - none   Stroke - none  Family Hx of Sz - none     PAST MEDICAL HISTORY:             Active Ambulatory Problems        Diagnosis  Date Noted        No Active Ambulatory Problems      Resolved Ambulatory Problems        Diagnosis  Date Noted        No Resolved Ambulatory Problems      No Additional Past Medical History          PAST SURGICAL HISTORY: No past surgical history on file.      FAMILY HISTORY: No family history on file.       SOCIAL HISTORY:                                  Social History                           History    Social History      Marital Status:          Spouse Name:  N/A        Number of Children:  N/A      Years of Education:  N/A    Occupational History        Not on file.      Social History Main Topics      Smoking status:  Never Smoker      Smokeless tobacco:  Not on file      Alcohol Use:  No      Drug Use:  No      Sexual Activity:  Not on file    Other Topics  Concern          Not on file        Social History Narrative      No narrative on file             SUBSTANCE USE:          Social History    Social History Main Topics      Smoking status:  Never Smoker      Smokeless tobacco:  Not on file       Alcohol Use:  No      Drug Use:  No      Sexual Activity:  Not on file               History    Substance Use Topics      Smoking status:  Never Smoker      Smokeless tobacco:  Not on file      Alcohol Use:  No          ALLERGIES: Review of patient's allergies indicates no known allergies.         Review of Systems   Constitutional: Negative for fever, chills, weight loss, malaise/fatigue and diaphoresis.   HENT: Negative for ear pain, hearing loss, nosebleeds and tinnitus.   Eyes: Negative for blurred vision, double vision, photophobia and pain.   Respiratory: Negative for cough, hemoptysis and shortness of breath.   Cardiovascular: Negative for chest pain, palpitations, orthopnea and leg swelling.   Gastrointestinal: Negative for heartburn, nausea, vomiting, abdominal pain, diarrhea, constipation and blood in stool.   Genitourinary: Negative for dysuria and hematuria.   Musculoskeletal: Negative for myalgias, joint pain and falls.   Skin: Negative for itching and rash.   Neurological: Positive for  sensory change and seizures. Negative for dizziness, tremors, speech change, focal weakness, loss of consciousness, weakness and headaches.   Endo/Heme/Allergies: Negative for environmental allergies. Does not bruise/bleed easily.   Psychiatric/Behavioral: Positive for memory loss. Negative for depression, hallucinations and substance abuse.  The patient is not nervous/anxious.      Neurologic Exam   Higher Cortical Function:   Patient is a well developed, pleasant, well groomed individual appearing their stated age  Oriented - intact to person, place and time and followed two step instruction correctly.   Fund of knowledge was appropriate.   Language - Speech was fluent without evidence for an aphasia.     IMPRESSION  1.         Emotional lability - AED vs refractory drugs   Impacts works   Works as    2.         S/p L temporal lobectomy 11/2018, sz free for 3 months only   Presurgery - semiology -  staring spells   Post surgery Aura - dejavu x 10 sec; 10 min later - uncomfortable feeling x 30 sec   [facility] DIAGNOSIS:  BRAIN, LEFT ANTERIOR TEMPORAL LOBE, EXCISION ([id number]; [facility], [place], [place]; COLLECTED 11/19/2018):-Cortex with moderate cortical and subpial gliosis, and scattered thrombosed vessels and leptomeningeal mixed  inflammation.  BRAIN, LEFT HIPPOCAMPUS, EXCISION ([id number]; [facility], [place], [place];  COLLECTED 11/19/2018):-Fragments of hippocampus with focal neuronal loss and gliosis.  3.         Obesity     DISPOSITION:   1. Continue LTG 200mg 1 am and 1½ in pm  2,Xcopri 100-> 150 mg, discussed side effects   4, EEG if she has cluster of sz  5, cont zoloft 25 mg QHS      Discussed surgical options in detail - EMU, DAWOOD, T3 MRI, NP testing work up.

## 2022-02-21 ENCOUNTER — PATIENT MESSAGE (OUTPATIENT)
Dept: NEUROLOGY | Facility: CLINIC | Age: 52
End: 2022-02-21
Payer: COMMERCIAL

## 2022-03-09 ENCOUNTER — HOSPITAL ENCOUNTER (OUTPATIENT)
Dept: CARDIOLOGY | Facility: CLINIC | Age: 52
Discharge: HOME | End: 2022-03-09
Payer: COMMERCIAL

## 2022-03-09 DIAGNOSIS — T42.75XA ADVERSE EFFECT OF ANTIEPILEPTIC, INITIAL ENCOUNTER: ICD-10-CM

## 2022-03-09 PROCEDURE — 93010 EKG 12-LEAD: ICD-10-PCS | Mod: S$GLB,,, | Performed by: INTERNAL MEDICINE

## 2022-03-09 PROCEDURE — 93005 EKG 12-LEAD: ICD-10-PCS | Mod: S$GLB,,, | Performed by: PSYCHIATRY & NEUROLOGY

## 2022-03-09 PROCEDURE — 93010 ELECTROCARDIOGRAM REPORT: CPT | Mod: S$GLB,,, | Performed by: INTERNAL MEDICINE

## 2022-03-09 PROCEDURE — 93005 ELECTROCARDIOGRAM TRACING: CPT | Mod: S$GLB,,, | Performed by: PSYCHIATRY & NEUROLOGY

## 2022-03-19 ENCOUNTER — PATIENT MESSAGE (OUTPATIENT)
Dept: NEUROLOGY | Facility: CLINIC | Age: 52
End: 2022-03-19
Payer: COMMERCIAL

## 2022-03-19 DIAGNOSIS — G40.119 TEMPORAL LOBE EPILEPSY, INTRACTABLE: ICD-10-CM

## 2022-03-26 ENCOUNTER — OFFICE VISIT (OUTPATIENT)
Dept: URGENT CARE | Facility: CLINIC | Age: 52
End: 2022-03-26
Payer: COMMERCIAL

## 2022-03-26 VITALS
TEMPERATURE: 98 F | WEIGHT: 200 LBS | RESPIRATION RATE: 16 BRPM | BODY MASS INDEX: 32.14 KG/M2 | HEIGHT: 66 IN | HEART RATE: 64 BPM | DIASTOLIC BLOOD PRESSURE: 74 MMHG | SYSTOLIC BLOOD PRESSURE: 118 MMHG | OXYGEN SATURATION: 96 %

## 2022-03-26 DIAGNOSIS — R31.9 URINARY TRACT INFECTION WITH HEMATURIA, SITE UNSPECIFIED: Primary | ICD-10-CM

## 2022-03-26 DIAGNOSIS — N39.0 URINARY TRACT INFECTION WITH HEMATURIA, SITE UNSPECIFIED: Primary | ICD-10-CM

## 2022-03-26 LAB
BILIRUB UR QL STRIP: NEGATIVE
GLUCOSE UR QL STRIP: NEGATIVE
KETONES UR QL STRIP: NEGATIVE
LEUKOCYTE ESTERASE UR QL STRIP: POSITIVE
PH, POC UA: 5.5
POC BLOOD, URINE: POSITIVE
POC NITRATES, URINE: NEGATIVE
PROT UR QL STRIP: POSITIVE
SP GR UR STRIP: 1.01 (ref 1–1.03)
UROBILINOGEN UR STRIP-ACNC: ABNORMAL (ref 0.1–1.1)

## 2022-03-26 PROCEDURE — 87088 URINE BACTERIA CULTURE: CPT | Performed by: PHYSICIAN ASSISTANT

## 2022-03-26 PROCEDURE — 3008F PR BODY MASS INDEX (BMI) DOCUMENTED: ICD-10-PCS | Mod: CPTII,S$GLB,, | Performed by: PHYSICIAN ASSISTANT

## 2022-03-26 PROCEDURE — 87086 URINE CULTURE/COLONY COUNT: CPT | Performed by: PHYSICIAN ASSISTANT

## 2022-03-26 PROCEDURE — 1160F PR REVIEW ALL MEDS BY PRESCRIBER/CLIN PHARMACIST DOCUMENTED: ICD-10-PCS | Mod: CPTII,S$GLB,, | Performed by: PHYSICIAN ASSISTANT

## 2022-03-26 PROCEDURE — 3074F SYST BP LT 130 MM HG: CPT | Mod: CPTII,S$GLB,, | Performed by: PHYSICIAN ASSISTANT

## 2022-03-26 PROCEDURE — 3078F PR MOST RECENT DIASTOLIC BLOOD PRESSURE < 80 MM HG: ICD-10-PCS | Mod: CPTII,S$GLB,, | Performed by: PHYSICIAN ASSISTANT

## 2022-03-26 PROCEDURE — 87077 CULTURE AEROBIC IDENTIFY: CPT | Performed by: PHYSICIAN ASSISTANT

## 2022-03-26 PROCEDURE — 87186 SC STD MICRODIL/AGAR DIL: CPT | Performed by: PHYSICIAN ASSISTANT

## 2022-03-26 PROCEDURE — 99214 PR OFFICE/OUTPT VISIT, EST, LEVL IV, 30-39 MIN: ICD-10-PCS | Mod: S$GLB,,, | Performed by: PHYSICIAN ASSISTANT

## 2022-03-26 PROCEDURE — 1159F PR MEDICATION LIST DOCUMENTED IN MEDICAL RECORD: ICD-10-PCS | Mod: CPTII,S$GLB,, | Performed by: PHYSICIAN ASSISTANT

## 2022-03-26 PROCEDURE — 1159F MED LIST DOCD IN RCRD: CPT | Mod: CPTII,S$GLB,, | Performed by: PHYSICIAN ASSISTANT

## 2022-03-26 PROCEDURE — 81003 URINALYSIS AUTO W/O SCOPE: CPT | Mod: QW,S$GLB,, | Performed by: PHYSICIAN ASSISTANT

## 2022-03-26 PROCEDURE — 99214 OFFICE O/P EST MOD 30 MIN: CPT | Mod: S$GLB,,, | Performed by: PHYSICIAN ASSISTANT

## 2022-03-26 PROCEDURE — 3074F PR MOST RECENT SYSTOLIC BLOOD PRESSURE < 130 MM HG: ICD-10-PCS | Mod: CPTII,S$GLB,, | Performed by: PHYSICIAN ASSISTANT

## 2022-03-26 PROCEDURE — 1160F RVW MEDS BY RX/DR IN RCRD: CPT | Mod: CPTII,S$GLB,, | Performed by: PHYSICIAN ASSISTANT

## 2022-03-26 PROCEDURE — 3078F DIAST BP <80 MM HG: CPT | Mod: CPTII,S$GLB,, | Performed by: PHYSICIAN ASSISTANT

## 2022-03-26 PROCEDURE — 3008F BODY MASS INDEX DOCD: CPT | Mod: CPTII,S$GLB,, | Performed by: PHYSICIAN ASSISTANT

## 2022-03-26 PROCEDURE — 81003 POCT URINALYSIS, DIPSTICK, AUTOMATED, W/O SCOPE: ICD-10-PCS | Mod: QW,S$GLB,, | Performed by: PHYSICIAN ASSISTANT

## 2022-03-26 RX ORDER — AMOXICILLIN AND CLAVULANATE POTASSIUM 875; 125 MG/1; MG/1
1 TABLET, FILM COATED ORAL EVERY 12 HOURS
Qty: 14 TABLET | Refills: 0 | Status: SHIPPED | OUTPATIENT
Start: 2022-03-26 | End: 2022-04-02

## 2022-03-26 NOTE — PATIENT INSTRUCTIONS
You must understand that you've received an Urgent Care treatment only and that you may be released before all your medical problems are known or treated. You, the patient, will arrange for follow up care as instructed.      Follow up with your PCP or specialty clinic as instructed in the next 2-3 days if not improved or as needed. You can call [phone] to schedule an appointment with appropriate provider.      If you condition worsens, we recommend that you receive another evaluation at the emergency room immediately or contact your primary medical clinic's after hours call service to discuss your concerns.      Please return here or go to the Emergency Department for any concerns or worsening condition.

## 2022-03-26 NOTE — PROGRESS NOTES
"Subjective:       Patient ID: Liza Arrieta is a 51 y.o. female.    Vitals:  height is 5' 6" (1.676 m) and weight is 90.7 kg (200 lb). Her oral temperature is 97.8 °F (36.6 °C). Her blood pressure is 118/74 and her pulse is 64. Her respiration is 16 and oxygen saturation is 96%.     Chief Complaint: Urinary Tract Infection    Patient provider note starts here:  Patient presents to the clinic with reviewed reports dysuria, cloudy urine in addition to lower abdominal pain.  Reports that the symptoms presented about a month ago and admitted intermittently relieved with different antibiotics since.  Of note, patient has had 2 telemedicine visits over the past month for similar symptoms.  First she was prescribed Macrobid and then Bactrim DS.  Reports that she has had frequent UTIs in the past 1-2 years due to having brain surgery and getting off of several medications which she feels has caused increased libido. Denies fevers, flank pain, N/V. She scheduled an appointment this morning for a follow-up with GYN as a new patient.     Dysuria   This is a new problem. The current episode started acute onset. The problem occurs every urination. The problem has been unchanged. The quality of the pain is described as aching and burning. The pain is at a severity of 6/10. The pain is mild. There has been no fever. She is sexually active. There is a history of pyelonephritis. Associated symptoms include chills, frequency and urgency. Pertinent negatives include no flank pain, hematuria or vomiting. She has tried nothing for the symptoms. The treatment provided no relief.       Constitution: Positive for chills. Negative for fever.   Neck: Negative for neck pain.   Cardiovascular: Negative for chest pain, palpitations and sob on exertion.   Respiratory: Negative for chest tightness and wheezing.    Gastrointestinal: Negative for abdominal pain, vomiting and diarrhea.   Genitourinary: Positive for dysuria, frequency, urgency and " pelvic pain. Negative for flank pain and hematuria.   Skin: Negative for color change and wound.   Neurological: Negative for numbness and tingling.       Objective:      Physical Exam   Constitutional: She is oriented to person, place, and time. She appears well-developed.   HENT:   Head: Normocephalic and atraumatic.   Ears:   Right Ear: External ear normal.   Left Ear: External ear normal.   Nose: Nose normal. No nasal deformity. No epistaxis.   Mouth/Throat: Oropharynx is clear and moist and mucous membranes are normal.   Eyes: Lids are normal.   Neck: Trachea normal and phonation normal. Neck supple.   Cardiovascular: Normal pulses.   Pulmonary/Chest: Effort normal.   Abdominal: Normal appearance. Soft. There is no abdominal tenderness. There is no left CVA tenderness and no right CVA tenderness.   Neurological: She is alert and oriented to person, place, and time.   Skin: Skin is warm, dry and intact.   Psychiatric: Her speech is normal and behavior is normal.   Nursing note and vitals reviewed.        Assessment:       1. Urinary tract infection with hematuria, site unspecified        Results for orders placed or performed in visit on 03/26/22   POCT Urinalysis, Dipstick, Automated, W/O Scope   Result Value Ref Range    POC Blood, Urine Positive (A) Negative    POC Bilirubin, Urine Negative Negative    POC Urobilinogen, Urine Norm 0.1 - 1.1    POC Ketones, Urine Negative Negative    POC Protein, Urine Positive (A) Negative    POC Nitrates, Urine Negative Negative    POC Glucose, Urine Negative Negative    pH, UA 5.5     POC Specific Gravity, Urine 1.015 1.003 - 1.029    POC Leukocytes, Urine Positive (A) Negative       Plan:         Urinary tract infection with hematuria, site unspecified  -     POCT Urinalysis, Dipstick, Automated, W/O Scope  -     Culture, Urine  -     amoxicillin-clavulanate 875-125mg (AUGMENTIN) 875-125 mg per tablet; Take 1 tablet by mouth every 12 (twelve) hours. for 7 days  Dispense:  14 tablet; Refill: 0           Medical Decision Making:   History:   Old Medical Records: I decided to obtain old medical records.  Old Records Summarized: records from clinic visits.       <> Summary of Records: Telemedicine visits in the past month for UTI like symptoms and prescribed Macrobid and then Bactrim DS  Differential Diagnosis:   Differential Diagnosis includes, but is not limited to:  Vaginal infection such as yeast infection, vaginitis, topical irritant, bacterial vaginosis, STD such as gonorrhea, chlamydia, UTI, discomfort of pregnancy, ectopic pregnancy, ovarian cysts, ovarian torsion, constipation, colitis, diverticulitis    Clinical Tests:   Lab Tests: Ordered and Reviewed       <> Summary of Lab: UA with blood, protein and leukocytes  Urgent Care Management:  Patient presents with complaints of UTI like symptoms.  On exam, she is afebrile and nontoxic appearing.  She has been treated recently with Macrobid as well as Bactrim DS.  According to past urine cultures, patient is grown numerous different organisms causing UTIs.  All of the cultures show intermediate resistance to Macrobid but pansensitive otherwise.  Will prescribe Augmentin and send for urine culture.  Advised close follow-up with her gyn which she has an upcoming appointment for.  Also encouraged good personal hygiene and urinating after intercourse. ED precautions discussed.  She verbalized understanding and agreed with plan.        Patient Instructions   You must understand that you've received an Urgent Care treatment only and that you may be released before all your medical problems are known or treated. You, the patient, will arrange for follow up care as instructed.      Follow up with your PCP or specialty clinic as instructed in the next 2-3 days if not improved or as needed. You can call [phone] to schedule an appointment with appropriate provider.      If you condition worsens, we recommend that you receive another  evaluation at the emergency room immediately or contact your primary medical clinic's after hours call service to discuss your concerns.      Please return here or go to the Emergency Department for any concerns or worsening condition.

## 2022-03-29 LAB — BACTERIA UR CULT: ABNORMAL

## 2022-04-01 ENCOUNTER — OFFICE VISIT (OUTPATIENT)
Dept: OBSTETRICS AND GYNECOLOGY | Facility: CLINIC | Age: 52
End: 2022-04-01
Payer: COMMERCIAL

## 2022-04-01 VITALS — BODY MASS INDEX: 32.33 KG/M2 | WEIGHT: 200.31 LBS

## 2022-04-01 DIAGNOSIS — Z01.419 ENCOUNTER FOR ANNUAL ROUTINE GYNECOLOGICAL EXAMINATION: Primary | ICD-10-CM

## 2022-04-01 DIAGNOSIS — Z12.11 COLON CANCER SCREENING: ICD-10-CM

## 2022-04-01 DIAGNOSIS — Z12.31 SCREENING MAMMOGRAM, ENCOUNTER FOR: ICD-10-CM

## 2022-04-01 PROCEDURE — 3008F BODY MASS INDEX DOCD: CPT | Mod: CPTII,S$GLB,, | Performed by: OBSTETRICS & GYNECOLOGY

## 2022-04-01 PROCEDURE — 87481 CANDIDA DNA AMP PROBE: CPT | Mod: 59 | Performed by: OBSTETRICS & GYNECOLOGY

## 2022-04-01 PROCEDURE — 87591 N.GONORRHOEAE DNA AMP PROB: CPT | Performed by: OBSTETRICS & GYNECOLOGY

## 2022-04-01 PROCEDURE — 99386 PREV VISIT NEW AGE 40-64: CPT | Mod: S$GLB,,, | Performed by: OBSTETRICS & GYNECOLOGY

## 2022-04-01 PROCEDURE — 3008F PR BODY MASS INDEX (BMI) DOCUMENTED: ICD-10-PCS | Mod: CPTII,S$GLB,, | Performed by: OBSTETRICS & GYNECOLOGY

## 2022-04-01 PROCEDURE — 99999 PR PBB SHADOW E&M-EST. PATIENT-LVL III: CPT | Mod: PBBFAC,,, | Performed by: OBSTETRICS & GYNECOLOGY

## 2022-04-01 PROCEDURE — 99999 PR PBB SHADOW E&M-EST. PATIENT-LVL III: ICD-10-PCS | Mod: PBBFAC,,, | Performed by: OBSTETRICS & GYNECOLOGY

## 2022-04-01 PROCEDURE — 1159F PR MEDICATION LIST DOCUMENTED IN MEDICAL RECORD: ICD-10-PCS | Mod: CPTII,S$GLB,, | Performed by: OBSTETRICS & GYNECOLOGY

## 2022-04-01 PROCEDURE — 1159F MED LIST DOCD IN RCRD: CPT | Mod: CPTII,S$GLB,, | Performed by: OBSTETRICS & GYNECOLOGY

## 2022-04-01 PROCEDURE — 87491 CHLMYD TRACH DNA AMP PROBE: CPT | Performed by: OBSTETRICS & GYNECOLOGY

## 2022-04-01 PROCEDURE — 99386 PR PREVENTIVE VISIT,NEW,40-64: ICD-10-PCS | Mod: S$GLB,,, | Performed by: OBSTETRICS & GYNECOLOGY

## 2022-04-01 RX ORDER — CEPHALEXIN 250 MG/1
250 CAPSULE ORAL
Qty: 30 CAPSULE | Refills: 3 | Status: SHIPPED | OUTPATIENT
Start: 2022-04-01 | End: 2022-04-11

## 2022-04-01 NOTE — PROGRESS NOTES
Chief Complaint   Patient presents with    Urinary Urgency       HISTORY OF PRESENT ILLNESS:   Liza Arrieta is a 51 y.o. female s/p ANGEL/BSO 2/2 endo who presents for well woman exam.  No LMP recorded. Patient has had a hysterectomy.. Menopause after hysterectomy around .  She has complains of recurrent UTIs, once a month, noticed that they seem to come after intercourse. She does notice some dryness during intercourse.   Declines STD testing.      Past Medical History:   Diagnosis Date    Convulsions     Epilepsy     Seizures           Past Surgical History:   Procedure Laterality Date    APPENDECTOMY       SECTION      4    CHOLECYSTECTOMY      CRANIOTOMY N/A 2018    Procedure: CRANIOTOMY for strip and grid;  Surgeon: Ruben Senior MD;  Location: [facility] OR [address] [place] [address];  Service: Neurosurgery;  Laterality: N/A;  toronto II, asa 2, type and screen, regular bed, [place], supine     CRANIOTOMY Left 2018    Procedure: CRANIOTOMY for grids and strips;  Surgeon: Ruben Senior MD;  Location: [facility] OR [address] [place] [address];  Service: Neurosurgery;  Laterality: Left;    HYSTERECTOMY      around  for pain ful periods     INSERTION OF SUBDURAL GRID AND STRIP ELECTRODES BY CRANIOTOMY Left 2018    Procedure: CRANIOTOMY, FOR SUBDURAL GRID AND STRIP ELECTRODE LEAD Removal.;  Surgeon: Ruben Senior MD;  Location: [facility] OR [address] [place] [address];  Service: Neurosurgery;  Laterality: Left;  needs microscope and stealth-cg    SURGICAL REMOVAL OF LOBE OF BRAIN Left 2018    Procedure: LOBECTOMY, BRAIN left temporal lobe.;  Surgeon: Ruben Senior MD;  Location: [facility] OR [address] [place] [address];  Service: Neurosurgery;  Laterality: Left;         Social History     Socioeconomic History    Marital status:    Tobacco Use    Smoking status: Never Smoker    Smokeless tobacco: Never Used   Substance and Sexual Activity    Alcohol use: No     Alcohol/week: 0.0 standard drinks    Drug use: No    Sexual activity: Never       Family  History   Problem Relation Age of Onset    Heart disease Father         over 50 years    No Known Problems Mother          OB History   No obstetric history on file.       COMPREHENSIVE GYN HISTORY:  PAP History: Denies abnormal Paps  Infection History: Denies STDs. Denies PID.  Benign History: Denies uterine fibroids. Denies ovarian cysts. Denies endometriosis Denies other conditions.  Cancer History: Denies cervical cancer. Denies uterine cancer or hyperplasia. Denies ovarian cancer. Denies vulvar cancer or pre-cancer. Denies vaginal cancer or pre-cancer. Denies breast cancer. Denies colon cancer.  Cycle: mon/lory  Had ANGEL/BSO in 2000 due to endometriosis, never did HRT     ROS:  GENERAL: Denies weight gain or weight loss. Feeling well overall.   SKIN: Denies rash or lesions.   HEAD: Denies headache.   NODES: Denies enlarged lymph nodes.   CHEST: Denies shortness of breath.   ABDOMEN: No abdominal pain, constipation, diarrhea, nausea, vomiting or rectal bleeding.   URINARY: No frequency, dysuria, hematuria, or burning on urination.  REPRODUCTIVE: See HPI.   BREASTS: The patient denies pain, lumps, or nipple discharge.       Wt 90.9 kg (200 lb 4.6 oz)   BMI 32.33 kg/m²     APPEARANCE: Well nourished, well developed, in no acute distress.  NECK: Neck symmetric   ABDOMEN: Soft. No tenderness or masses. No hernias. No hepatosplenomegaly.  BREASTS: Symmetrical, no skin changes or visible lesions. No palpable masses, nipple discharge or adenopathy bilaterally.  PELVIC:   VULVA: No lesions. Normal female genitalia.  URETHRAL MEATUS: Normal size and location, no lesions, no prolapse.  URETHRA: No masses, tenderness, prolapse or scarring.  VAGINA: Moist and well rugated, no discharge, no significant cystocele or rectocele.  CERVIX: No lesions and discharge.  UTERUS: Normal size, regular shape, mobile, non-tender, bladder base nontender.  ADNEXA: No masses or tenderness.  PERINEUM: Normal, mo masses    Data Reviewed:      Lipid profile: Date:   Lab Results   Component Value Date    CHOL 182 11/05/2018     Lab Results   Component Value Date    HDL 39 (L) 11/05/2018     Lab Results   Component Value Date    LDLCALC 120.2 11/05/2018     Lab Results   Component Value Date    TRIG 114 11/05/2018     Lab Results   Component Value Date    CHOLHDL 21.4 11/05/2018     TSH:   Lab Results   Component Value Date    TSH 1.657 01/14/2016     Colonoscopy Date: has never had one     1. Encounter for annual routine gynecological examination    2. Screening mammogram, encounter for    3. Colon cancer screening        A/P 1. Routine gyn annual exam. s/p normal breast exam and MMG ordered.  Pap with HPV cotesting not indicated. STD testing: GC/CT/trich, syphilis, HBV/HCV and HIV declined. Lipid Profile, needed every 5 years, up to date. Fasting glucose, needed every 3 years, up to date.   TSH, needed every 5 years, up to date.   Colonoscopy referral sent.   2. Discussed recommendations to try to prevent UTIs caused by intercourse. Discussed vaginal estrogen which she declines for now. Would like to do antibiotics after intercourse as prevention. Discussed if she gets another UTI then should have it screened so to let us know.     F/u in 1 yr or PRN

## 2022-04-03 ENCOUNTER — PATIENT MESSAGE (OUTPATIENT)
Dept: NEUROLOGY | Facility: CLINIC | Age: 52
End: 2022-04-03
Payer: COMMERCIAL

## 2022-04-04 ENCOUNTER — TELEPHONE (OUTPATIENT)
Dept: ADMINISTRATIVE | Facility: OTHER | Age: 52
End: 2022-04-04
Payer: COMMERCIAL

## 2022-04-04 DIAGNOSIS — G40.119 TEMPORAL LOBE EPILEPSY, INTRACTABLE: Primary | ICD-10-CM

## 2022-04-04 LAB
BACTERIAL VAGINOSIS DNA: NEGATIVE
C TRACH DNA SPEC QL NAA+PROBE: NOT DETECTED
CANDIDA GLABRATA DNA: NEGATIVE
CANDIDA KRUSEI DNA: NEGATIVE
CANDIDA RRNA VAG QL PROBE: NEGATIVE
N GONORRHOEA DNA SPEC QL NAA+PROBE: NOT DETECTED
T VAGINALIS RRNA GENITAL QL PROBE: NEGATIVE

## 2022-04-04 RX ORDER — CENOBAMATE 100 MG/1
100 TABLET, FILM COATED ORAL DAILY
Qty: 30 TABLET | Refills: 5 | Status: ON HOLD | OUTPATIENT
Start: 2022-04-04 | End: 2022-07-10 | Stop reason: HOSPADM

## 2022-04-05 ENCOUNTER — PATIENT MESSAGE (OUTPATIENT)
Dept: OBSTETRICS AND GYNECOLOGY | Facility: CLINIC | Age: 52
End: 2022-04-05
Payer: COMMERCIAL

## 2022-05-16 ENCOUNTER — PATIENT MESSAGE (OUTPATIENT)
Dept: NEUROLOGY | Facility: CLINIC | Age: 52
End: 2022-05-16
Payer: COMMERCIAL

## 2022-05-17 ENCOUNTER — TELEPHONE (OUTPATIENT)
Dept: NEUROLOGY | Facility: CLINIC | Age: 52
End: 2022-05-17
Payer: COMMERCIAL

## 2022-05-17 ENCOUNTER — PATIENT MESSAGE (OUTPATIENT)
Dept: NEUROLOGY | Facility: CLINIC | Age: 52
End: 2022-05-17
Payer: COMMERCIAL

## 2022-05-17 NOTE — TELEPHONE ENCOUNTER
Returned call, spoke with Andreia. She explained this patient is trying to get her daughter home as she is currently in the  and and the only way she can be granted leave is for the MD to let them know if this patient's current condition is life threatening, prognosis, and life expectancy. I relayed patient's current diagnosis, but explained I cannot answer those other questions as it is not my scope to determine prognosis or life expectancy. I also explained Dr. Mark will be out of the office until tomorrow.

## 2022-05-17 NOTE — TELEPHONE ENCOUNTER
"----- Message from Isaiah Issa RN sent at 5/17/2022  8:37 AM CDT -----    ----- Message -----  From: Ligia Ragsdale  Sent: 5/17/2022   8:35 AM CDT  To: [facility] Staff    Name Of Caller: Evelyn Hoenhne with [facility]    Contact Preference?: [phone] Ref [id number]    What is the nature of the call?: requesting a call back in regards to pt's diagnosis, prognosis, current condition of the patient and life expectancy and if presence of  is recommended           Additional Notes:  "Thank you for all that you do for our patients'"       "

## 2022-05-17 NOTE — TELEPHONE ENCOUNTER
----- Message from Liseth Gonzalez sent at 5/17/2022  2:39 PM CDT -----  Who Called: [facility]    What is the reqeust in detail: Requesting call back to receive the listed pieces of information to aid in a  family member to come home.    1. Diagnosis  2. Prognosis  3. Any reasons for concern over patient's life expectancy  4. If the service members presence is recommended    Can the clinic reply by [facility]? No    Best Call Back Number: [phone] reference case number [id number]    Additional Information:

## 2022-05-18 ENCOUNTER — PATIENT MESSAGE (OUTPATIENT)
Dept: NEUROLOGY | Facility: CLINIC | Age: 52
End: 2022-05-18
Payer: COMMERCIAL

## 2022-05-18 NOTE — TELEPHONE ENCOUNTER
Spoke with patient and she says her daughter is being discharged for medical reasons, but is being forced to stay where she is for an extended period of time unless she has a family member with an illness. Liza says no need to call [place] now b/c she has already handled everything.

## 2022-05-21 DIAGNOSIS — G40.119 TEMPORAL LOBE EPILEPSY, INTRACTABLE: ICD-10-CM

## 2022-05-21 RX ORDER — CENOBAMATE 100 MG/1
100 TABLET, FILM COATED ORAL DAILY
Qty: 30 TABLET | Refills: 5 | Status: CANCELLED | OUTPATIENT
Start: 2022-05-21

## 2022-05-25 ENCOUNTER — OFFICE VISIT (OUTPATIENT)
Dept: NEUROLOGY | Facility: CLINIC | Age: 52
End: 2022-05-25
Payer: COMMERCIAL

## 2022-05-25 VITALS
WEIGHT: 206.88 LBS | SYSTOLIC BLOOD PRESSURE: 126 MMHG | BODY MASS INDEX: 33.25 KG/M2 | DIASTOLIC BLOOD PRESSURE: 75 MMHG | HEIGHT: 66 IN | HEART RATE: 53 BPM

## 2022-05-25 DIAGNOSIS — G40.219 COMPLEX PARTIAL EPILEPSY WITH GENERALIZATION AND WITH INTRACTABLE EPILEPSY: Primary | ICD-10-CM

## 2022-05-25 DIAGNOSIS — G40.119 TEMPORAL LOBE EPILEPSY, INTRACTABLE: ICD-10-CM

## 2022-05-25 PROCEDURE — 3074F SYST BP LT 130 MM HG: CPT | Mod: CPTII,S$GLB,, | Performed by: PSYCHIATRY & NEUROLOGY

## 2022-05-25 PROCEDURE — 99999 PR PBB SHADOW E&M-EST. PATIENT-LVL III: CPT | Mod: PBBFAC,,, | Performed by: PSYCHIATRY & NEUROLOGY

## 2022-05-25 PROCEDURE — 3008F PR BODY MASS INDEX (BMI) DOCUMENTED: ICD-10-PCS | Mod: CPTII,S$GLB,, | Performed by: PSYCHIATRY & NEUROLOGY

## 2022-05-25 PROCEDURE — 1159F MED LIST DOCD IN RCRD: CPT | Mod: CPTII,S$GLB,, | Performed by: PSYCHIATRY & NEUROLOGY

## 2022-05-25 PROCEDURE — 3078F PR MOST RECENT DIASTOLIC BLOOD PRESSURE < 80 MM HG: ICD-10-PCS | Mod: CPTII,S$GLB,, | Performed by: PSYCHIATRY & NEUROLOGY

## 2022-05-25 PROCEDURE — 99999 PR PBB SHADOW E&M-EST. PATIENT-LVL III: ICD-10-PCS | Mod: PBBFAC,,, | Performed by: PSYCHIATRY & NEUROLOGY

## 2022-05-25 PROCEDURE — 1160F PR REVIEW ALL MEDS BY PRESCRIBER/CLIN PHARMACIST DOCUMENTED: ICD-10-PCS | Mod: CPTII,S$GLB,, | Performed by: PSYCHIATRY & NEUROLOGY

## 2022-05-25 PROCEDURE — 3078F DIAST BP <80 MM HG: CPT | Mod: CPTII,S$GLB,, | Performed by: PSYCHIATRY & NEUROLOGY

## 2022-05-25 PROCEDURE — 3008F BODY MASS INDEX DOCD: CPT | Mod: CPTII,S$GLB,, | Performed by: PSYCHIATRY & NEUROLOGY

## 2022-05-25 PROCEDURE — 3074F PR MOST RECENT SYSTOLIC BLOOD PRESSURE < 130 MM HG: ICD-10-PCS | Mod: CPTII,S$GLB,, | Performed by: PSYCHIATRY & NEUROLOGY

## 2022-05-25 PROCEDURE — 1160F RVW MEDS BY RX/DR IN RCRD: CPT | Mod: CPTII,S$GLB,, | Performed by: PSYCHIATRY & NEUROLOGY

## 2022-05-25 PROCEDURE — 99214 PR OFFICE/OUTPT VISIT, EST, LEVL IV, 30-39 MIN: ICD-10-PCS | Mod: S$GLB,,, | Performed by: PSYCHIATRY & NEUROLOGY

## 2022-05-25 PROCEDURE — 1159F PR MEDICATION LIST DOCUMENTED IN MEDICAL RECORD: ICD-10-PCS | Mod: CPTII,S$GLB,, | Performed by: PSYCHIATRY & NEUROLOGY

## 2022-05-25 PROCEDURE — 99214 OFFICE O/P EST MOD 30 MIN: CPT | Mod: S$GLB,,, | Performed by: PSYCHIATRY & NEUROLOGY

## 2022-05-25 NOTE — PROGRESS NOTES
Follow up:   Still has recurrent seizures   Semiology - loosing time   She is very frustrated   May - had 5 seizures   Trigger - emotional distress     Prior note:   Episodes of loosing time   No aura   Triggered by stress and physical work out (jogging)     sz Hz - can go 3 wks sz free, up to 1 per day for 3 days      Semiology prior to surgery: fear -> strange feeling -> no MARAH      Prior note:   3-4 sz/week   Triggered by stress and physical work out (jogging)     Prior note:   Sz 2/month -> 4-8 per week (since last 2 months)   Aura - dejavu x 10 sec   10 min later - uncomfortable feeling x 30 sec      Prior note:   Last dose of briviact - last night   One hour later an hour episode of fear and whole body shaking   vimpat 100 mg BID x 7 days   No further sz      Prior note:   3 month Angry outburst, crying spells - breviact   Last sz - last week   semiology - weird feeling of fear'   Previous sz - loss of awareness      Pt of Dr. Elam      2020/08/25  The plan last visit was to hold perampanel for one week and the reduce dosing at one half the previous dose.  The emotionality has improved but still is present.  She may of had only one sz since the last visit.     2020/04/07  Since starting perampanel she has been emotion with crying spells.  Emotional outburst did not occur in the 1st 2-3 weeks after starting pyramidal.  She is on 2 mg per day.  The ER becoming more intense and is somewhat disruptive to the family.  Seizures are much better.  She will often have a very brief sensation and actually question whether she had a seizure or something else.  Clinically that is a good response but the side effects are not acceptable.  She has had no other significant interim medical problems.  She continues to take lamotrigine and the doses not been changed.     2019/11/22  The patient continues to do well.  However she does have brief sensation of fear but nothing else.  She does not have      2019/08/07  The patient  "is doing well but has an occasional aura which now a feeling of fear which lasts 30 sec.  Last was on Aug 1.  She is averaging 3 per months.  Surgery was Nov 4, 2018.  The first aura occurred on Dec 19, 2018.  Number by month were Jan - 4, Feb - 2, Mar - 2, Apr - 2, May - 4, Jun - 5, Jul - 3.       Results for GLORIA GURROLA (MRN [id number]) as of 8/7/2019 15:38    Ref. Range 12/14/2018 12:10 1/14/2019 14:58 3/26/2019 13:30   Lamotrigine Lvl Latest Ref Range: 2.0 - 15.0 ug/mL 13.0 15.7 (H) 19.3 (H)         2019/03/26  In February and March, she has had four episodes that she is concerned represent seizure. These are different than any prior episodes or sensation that she has experienced. She describes a feeling of intense fear, during which she tells herself "you're just having a seizure".  reports lasting about 15 seconds, during the events he reports patient seems to be staring off in a daydream. She will respond to questions, but slowly. Typically occurring in the evening, prior to taking Lamictal. She recalls these episodes afterwards. Currently taking Lamictal 500 mg qHS, reports she noticed these episodes after switching from BID to daily lamictal dosing. Lamictal level at prior clinic visit 15.7. Denies any episodes of visual motor apraxia or any of her prior typical seizures.      2019/01/14  The patient underwent a L temporal lobectomy 2 months ago.  She has experience none of her typical seizures.  She did experience Judi Vu twice since surgery.  In the past she had reported Judi Vu as an aura.  She had not experienced any episodes of visual motor apraxia (unable to move eyes in any direction). She takes lamictal 200 mg in the AM and 300 mg in the PM.  She will be converted to once a day dosing of 500 mg which she prefers to do in the evening.       She feels so much better now.  She reports feeling as if she is 17 yo.     2018/05/29  Patient had another day of almost continuous seizures which was on " "May 09,2018.  She has failed several AEDs mainly due to side effects.  She experienced pharmacodynamic interaction and toxicity with Lamictal - Lacosamide combo.  She is tolerating the lamictal well         2018/04/09  The patient had 9 seizures recorded in last EMU visit all coming from L anterior temporal area.   MRI was normal but PET showed L mesial hypometabolism.  She is currently have almost daily seizures.  She has failed four AEDs is on Lamictal monotherapy.  She reports her verbal memory is terrible.  Review of labs show she was B12 deficient 4 yrs ago and she does not take any supplements.     Results for GLORIA GURROLA (MRN [id number]) as of 4/9/2018 16:23    Ref. Range 3/1/2016 14:45 12/19/2017 12:56 2/8/2018 18:12   Lamotrigine Lvl Latest Ref Range: 2.0 - 15.0 ug/mL 12.0 10.5 7.8      2018/01/29  EMU evaluation was completed in Dec and L temporal interictal spikes were recorded and one electrographic seizure was recorded arising from the L anterior temporal area.  Besides frequent seizures her major complaint is progressive memory loss.       HISTORY OF PRESENT ILLNESS (HPI)  Date: The   New Patient  Pt has [facility] "complex partial [facility]zures." First sz, in school, age 19. Was put on Dilantin and she did well for approx 15 years, until the seizures became active again. Thinks she may have had 2 classic "Grand Mal" seizures in her 20's, but since then, seizure types are those described below.     Currently, she is experiencing more than one event per week. Event type is described below. She has been failing her current treatment regimen as described below. May have tried other meds, but can't remember them now. Did not bring records yet.      Seizure Seminology  Seizure Type 1   Classification: Pass-out  Aura - Occasional auditory мария vu  Pt loses consciousness and falls or loses tone 1-2 in  Post-ictal  Brief  Age of onset 19  Current Seizure Frequency - Several per " week      Seizure Type 2  Classification: Complex Partial  Wanders around. Doesn't remember after.   Post-ictal  Brief  Current Seizure Frequency - Several per week     Seizure Triggers  Sleep Deprivation - None  Other medications - None  Psych/stress - None  Photic stimulation - None  Hyperventilation - None  Medical Problems - None  Menses - 3 days before period they get worse  Sensory Stimulation (light, sound, etc) - None  Missed dose of meds - None     AED Treatments  Present regimen   tabs 1 in AM and 1½ in PM      Prior treatments  clobazam (Onfi or Frizium, CLB) 20 mg QD  eslicarbazine (Aptiom, ESL) - seizure intensity worsened after 2 weeks Rx  lacosamide (Vimpat, LCS)   oxcarbazepine (Trileptal OXC)  3 month Angry outburst, crying spells - breviact    crying spells - Fycompa    BID  TPM 10ID  valproic acid (Depakote, VPA)   zonisamide (Zonegran, ZNA)  Vimpat  200 mg BID - kaplan      Not tried  acetazolamide (Diamox, AZM)  amantadine  carbamazepine (Tegretol, CBZ)  ethosuximide (Zarontin, ESM)  felbamate (felbatol, FBM)  gabapentin (Neurontin, GPN)  levetiracetam (Keppra, LEV)  methsuximide (Celontin, MSM)   phenobarbital (Pb)  pregabalin (Lyrica, PGB)  primidone (Mysoline, PRM)  retigabine (Potiga, RTG)  rufinamide (Banzel, RUF)  tiagabine (Gabatril, TGB)  viagabatrin, (Sabril, VGB)  vagal nerve stimulator (VNS)     Benzodiazepines  diazepam - rectal (Diastatl)  diazepam - oral (Valium, DZ)  clonazepam (Klonopin, CZP)  clorazepate (Tranxene, CLZ)  Ativan  Brain Stimulation  Vagal Nerve Stimulation-n/a  DBS- n/a     Compliance method  Memory - yes  Mom or Spouse - Yes  Pill Box - no  D[place]ne calendar - no  Turn over medication bottle - no  Phone alarm - no     Seizure Evaluation  EEG Routine - Dont have  MRI/MRA - In past- she doesn't know results  [facility] geovanni  2017/12/19-12/20- Patient with no events overnight. EEG shows L anterior temporal spikes, most recorded at F7. None on the other side. At  times, almost continuous L temporal interictal discharges at times.   2017/12/20- 11:56:23- clinical seizure, starting from L side on EEG. No epileptiform discharges noted. L temporal slowing and spikes noted. Consisted of brief moment of unresponsiveness.   2017/12/21- EEG showing L interical anterior temporal slowing with L temporal spikes. No clinical seizures since yesterday.   2017/12/21-12/22- Event on 12/21 at 18:55 showing patient talking on the phone and suddenly stopping, unable to speak and confused. EEG shows there is a rhythmic buildup in the L temporal chains. Patient is confused and is drowsy following event. No tonic/clonic activity present.      CT/CTA Scan -   PET Scan -   Neuropsychological evaluation -   DEXA Scan     Potential Epilepsy Risk Factors:   Pregnancy/Labor/Delivery - full term uncomplicated pregnancy labor and vaginal delivery  Febrile seizures - none  Head injury - none  CNS infection - none   Stroke - none  Family Hx of Sz - none     PAST MEDICAL HISTORY:             Active Ambulatory Problems        Diagnosis  Date Noted        No Active Ambulatory Problems      Resolved Ambulatory Problems        Diagnosis  Date Noted        No Resolved Ambulatory Problems      No Additional Past Medical History          PAST SURGICAL HISTORY: No past surgical history on file.      FAMILY HISTORY: No family history on file.       SOCIAL HISTORY:                                                    Social History                             History    Social History      Marital Status:          Spouse Name:  N/A        Number of Children:  N/A      Years of Education:  N/A    Occupational History        Not on file.      Social History Main Topics      Smoking status:  Never Smoker      Smokeless tobacco:  Not on file      Alcohol Use:  No      Drug Use:  No      Sexual Activity:  Not on file    Other Topics  Concern          Not on file        Social History Narrative      No  narrative on file             SUBSTANCE USE:          Social History    Social History Main Topics      Smoking status:  Never Smoker      Smokeless tobacco:  Not on file      Alcohol Use:  No      Drug Use:  No      Sexual Activity:  Not on file               History    Substance Use Topics      Smoking status:  Never Smoker      Smokeless tobacco:  Not on file      Alcohol Use:  No          ALLERGIES: Review of patient's allergies indicates no known allergies.         Review of Systems   Constitutional: Negative for fever, chills, weight loss, malaise/fatigue and diaphoresis.   HENT: Negative for ear pain, hearing loss, nosebleeds and tinnitus.   Eyes: Negative for blurred vision, double vision, photophobia and pain.   Respiratory: Negative for cough, hemoptysis and shortness of breath.   Cardiovascular: Negative for chest pain, palpitations, orthopnea and leg swelling.   Gastrointestinal: Negative for heartburn, nausea, vomiting, abdominal pain, diarrhea, constipation and blood in stool.   Genitourinary: Negative for dysuria and hematuria.   Musculoskeletal: Negative for myalgias, joint pain and falls.   Skin: Negative for itching and rash.   Neurological: Positive for  sensory change and seizures. Negative for dizziness, tremors, speech change, focal weakness, loss of consciousness, weakness and headaches.   Endo/Heme/Allergies: Negative for environmental allergies. Does not bruise/bleed easily.   Psychiatric/Behavioral: Positive for memory loss. Negative for depression, hallucinations and substance abuse.  The patient is not nervous/anxious.      Neurologic Exam   Higher Cortical Function:   Patient is a well developed, pleasant, well groomed individual appearing their stated age  Oriented - intact to person, place and time and followed two step instruction correctly.   Fund of knowledge was appropriate.   Language - Speech was fluent without evidence for an aphasia.     IMPRESSION  1.         Emotional  lability - AED vs refractory drugs   Impacts works   Works as    2.         S/p L temporal lobectomy 11/2018, sz free for 3 months only   Presurgery - semiology - staring spells   Post surgery Aura - dejavu x 10 sec; 10 min later - uncomfortable feeling x 30 sec   [facility] DIAGNOSIS:  BRAIN, LEFT ANTERIOR TEMPORAL LOBE, EXCISION ([id number]; [facility], [place], [place]; COLLECTED 11/19/2018):-Cortex with moderate cortical and subpial gliosis, and scattered thrombosed vessels and leptomeningeal mixed  inflammation.  BRAIN, LEFT HIPPOCAMPUS, EXCISION ([id number]; [facility], [place], [place];  COLLECTED 11/19/2018):-Fragments of hippocampus with focal neuronal loss and gliosis.  3.         Obesity  4,         F/h MS, ET     DISPOSITION:   1. Continue LTG 200mg 1 am and 1½ in pm  2,Xcopri 100-> 125 mg (150 mg - sedation)  Options - Lyrica   4, EEG if she has cluster of sz  5, cont zoloft 25 mg QHS      Discussed surgical options in detail - EMU, DAWOOD, T3 MRI, NP testing work up.

## 2022-05-25 NOTE — TELEPHONE ENCOUNTER
Explained to patient Xcopri does not make 25mg Rxs, those pills are only available in titration packs, the step up from 100mg is 150mg. She is willing to try this dose one more time.

## 2022-05-26 ENCOUNTER — TELEPHONE (OUTPATIENT)
Dept: NEUROLOGY | Facility: CLINIC | Age: 52
End: 2022-05-26
Payer: COMMERCIAL

## 2022-05-26 RX ORDER — CENOBAMATE 150 MG/1
150 TABLET, FILM COATED ORAL DAILY
Qty: 30 TABLET | Refills: 5 | Status: SHIPPED | OUTPATIENT
Start: 2022-05-26 | End: 2023-01-26 | Stop reason: SDUPTHER

## 2022-05-30 ENCOUNTER — HOSPITAL ENCOUNTER (OUTPATIENT)
Dept: NEUROLOGY | Facility: CLINIC | Age: 52
Discharge: HOME | End: 2022-05-30
Payer: COMMERCIAL

## 2022-05-30 DIAGNOSIS — G40.219 COMPLEX PARTIAL EPILEPSY WITH GENERALIZATION AND WITH INTRACTABLE EPILEPSY: ICD-10-CM

## 2022-05-30 PROCEDURE — 95816 PR EEG,W/AWAKE & DROWSY RECORD: ICD-10-PCS | Mod: S$GLB,,, | Performed by: PSYCHIATRY & NEUROLOGY

## 2022-05-30 PROCEDURE — 95816 EEG AWAKE AND DROWSY: CPT | Mod: S$GLB,,, | Performed by: PSYCHIATRY & NEUROLOGY

## 2022-05-31 NOTE — PROCEDURES
Procedures     EEG REPORT      Liza Arrieta  [id number]  1970    DATE OF SERVICE: 5/30/2022    [facility]     METHODOLOGY      Extended electroencephalographic recording is made while the patient is ambulatory and continuing normal daily activities.  Electrodes are placed according to the International 10-20 placement system and included T1 and T2 electrode placement.  Twenty four (24) channels of digital signal (sampling rate of 512/sec) was simultaneously recorded from the scalp including EKG and eye monitors.  Recording band pass was 0.1 to 100 hz and all data was stored digitally on the recorder.  The patient is instructed to press an event button when clinical symptoms occur and write the symptoms into a diary. Activation procedures which include photic stimulation, hyperventilation and instructing patients to perform simple task are done in selected patients.        The EEG is displayed on a monitor screen and can be reformatted into different montages for evaluation.  The entire recoding is submitted for computer assisted analysis to detect spike and electrographic seizure activity.  The entire recording is visually reviewed and the times identified by computer analysis as being spikes or seizures are reviewed again.  Compresses spectral analysis (CSA) is also performed on the activity recorded from each individual channel.  This is displayed as a power display of frequencies from 0 to 30 Hz over time.   The CSA analysis is done and displayed continuously.  This is reviewed for asymmetries in power between homologous areas of the scalp and for presence of changes in power which canbe seen when seizures occur.  Sections of suspected abnormalities on the CSA is then compared with the original EEG recording.  .     EyeEm software was also utilized in the review of this study.  This software suite analyzes the EEG recording in multiple domains.  Coherence and rhythmicity is computed to identify EEG sections  which may contain organized seizures.  Each channel undergoes analysis to detect presence of spike and sharp waves which have special and morphological characteristic of epileptic activity.  The routine EEG recording is converted from spacial into frequency domain.  This is then displayed comparing homologous areas to identify areas of significant asymmetry.  Algorithm to identify non-cortically generated artifact is used to separate eye movement, EMG and other artifact from the EEG     Recording Times    A total of 00:30:01 hours of EEG was recorded.      EEG FINDINGS:  Background activity:   The background rhythm was characterized by alpha and anterior dominant beta activity with a 10Hz posterior dominant alpha rhythm at 30-70 microvolts.   Symmetry and continuity: there is intermittent independent left and right temporal slowing     Sleep:   No sleep transients were seen.    Activation procedures:   Photic stimulation was performed with no abnormalities seen    Abnormal activity:   Right temporal slowing is more sharply contoured suggestive of partially suppressed sharp waves.    IMPRESSION:   Abnormal EEG due to mild regional cortical or subcortical dysfunction in the bilateral temporal lobes with possible seizure focus in the right anterior temporal region.  Would recommend EMU admit for prolonged recording off medication for more definitive characterization.      Scar Prince MD  Neurology-Epilepsy.  [facility].

## 2022-06-01 ENCOUNTER — PATIENT MESSAGE (OUTPATIENT)
Dept: NEUROLOGY | Facility: CLINIC | Age: 52
End: 2022-06-01
Payer: COMMERCIAL

## 2022-06-01 ENCOUNTER — TELEPHONE (OUTPATIENT)
Dept: NEUROLOGY | Facility: CLINIC | Age: 52
End: 2022-06-01
Payer: COMMERCIAL

## 2022-06-06 ENCOUNTER — TELEPHONE (OUTPATIENT)
Dept: NEUROLOGY | Facility: CLINIC | Age: 52
End: 2022-06-06
Payer: COMMERCIAL

## 2022-06-06 DIAGNOSIS — G40.219 COMPLEX PARTIAL EPILEPSY WITH GENERALIZATION AND WITH INTRACTABLE EPILEPSY: Primary | ICD-10-CM

## 2022-06-06 DIAGNOSIS — G40.219 COMPLEX PARTIAL EPILEPSY, INTRACTABLE: ICD-10-CM

## 2022-06-06 NOTE — TELEPHONE ENCOUNTER
"Scheduled EMU 7/5/22, 11am. Instructions thoroughly discusses, aware she needs an adult to stay with her for the duration, will send picture of her vaccine card in the portal.     In regards to an EMU admission:    Can you tolerate being connected to monitoring equipment/lines to your head, arms, and chest for a possible 7+ days? yes    Can you tolerate being tethered to the hospital room 24/7 during your EMU stay (meaning you cannot leave the room)? yes       Please describe your typical events and the behaviors you demonstrate during and after the event: Loses consciousness, stares, does not remember anything, "loses time", nonconvulsive        Have you ever had to be restrained, sedated, PEC'd, or require 1:1 observation in any past hospitalizations? no        "

## 2022-06-06 NOTE — TELEPHONE ENCOUNTER
Spoke with patient about scheduling EMU, she needs to speak with her  about days that work, she is aware she needs an adult to accompany her for the duration of this admission. I gave her my direct line as POC.

## 2022-06-07 ENCOUNTER — PATIENT MESSAGE (OUTPATIENT)
Dept: NEUROLOGY | Facility: CLINIC | Age: 52
End: 2022-06-07
Payer: COMMERCIAL

## 2022-07-05 ENCOUNTER — HOSPITAL ENCOUNTER (INPATIENT)
Facility: HOSPITAL | Age: 52
LOS: 5 days | Discharge: HOME | DRG: 101 | End: 2022-07-10
Attending: PSYCHIATRY & NEUROLOGY | Admitting: PSYCHIATRY & NEUROLOGY
Payer: COMMERCIAL

## 2022-07-05 DIAGNOSIS — G40.219 COMPLEX PARTIAL EPILEPSY, INTRACTABLE: ICD-10-CM

## 2022-07-05 DIAGNOSIS — R56.9 SEIZURE: ICD-10-CM

## 2022-07-05 DIAGNOSIS — G40.219 COMPLEX PARTIAL EPILEPSY WITH GENERALIZATION AND WITH INTRACTABLE EPILEPSY: ICD-10-CM

## 2022-07-05 DIAGNOSIS — G40.119 TEMPORAL LOBE EPILEPSY, INTRACTABLE: ICD-10-CM

## 2022-07-05 LAB
ALBUMIN SERPL BCP-MCNC: 3.5 G/DL (ref 3.5–5.2)
ALP SERPL-CCNC: 101 U/L (ref 55–135)
ALT SERPL W/O P-5'-P-CCNC: 20 U/L (ref 10–44)
ANION GAP SERPL CALC-SCNC: 7 MMOL/L (ref 8–16)
AST SERPL-CCNC: 17 U/L (ref 10–40)
BASOPHILS # BLD AUTO: 0.02 K/UL (ref 0–0.2)
BASOPHILS NFR BLD: 0.3 % (ref 0–1.9)
BILIRUB SERPL-MCNC: 0.2 MG/DL (ref 0.1–1)
BUN SERPL-MCNC: 16 MG/DL (ref 6–20)
CALCIUM SERPL-MCNC: 9.4 MG/DL (ref 8.7–10.5)
CHLORIDE SERPL-SCNC: 108 MMOL/L (ref 95–110)
CO2 SERPL-SCNC: 25 MMOL/L (ref 23–29)
CREAT SERPL-MCNC: 0.8 MG/DL (ref 0.5–1.4)
DIFFERENTIAL METHOD: ABNORMAL
EOSINOPHIL # BLD AUTO: 0.1 K/UL (ref 0–0.5)
EOSINOPHIL NFR BLD: 1.4 % (ref 0–8)
ERYTHROCYTE [DISTWIDTH] IN BLOOD BY AUTOMATED COUNT: 11.7 % (ref 11.5–14.5)
EST. GFR  (AFRICAN AMERICAN): >60 ML/MIN/1.73 M^2
EST. GFR  (NON AFRICAN AMERICAN): >60 ML/MIN/1.73 M^2
GLUCOSE SERPL-MCNC: 97 MG/DL (ref 70–110)
HCT VFR BLD AUTO: 42.6 % (ref 37–48.5)
HGB BLD-MCNC: 14.4 G/DL (ref 12–16)
IMM GRANULOCYTES # BLD AUTO: 0.01 K/UL (ref 0–0.04)
IMM GRANULOCYTES NFR BLD AUTO: 0.2 % (ref 0–0.5)
LYMPHOCYTES # BLD AUTO: 1.7 K/UL (ref 1–4.8)
LYMPHOCYTES NFR BLD: 29.4 % (ref 18–48)
MCH RBC QN AUTO: 31.6 PG (ref 27–31)
MCHC RBC AUTO-ENTMCNC: 33.8 G/DL (ref 32–36)
MCV RBC AUTO: 93 FL (ref 82–98)
MONOCYTES # BLD AUTO: 0.4 K/UL (ref 0.3–1)
MONOCYTES NFR BLD: 6.1 % (ref 4–15)
NEUTROPHILS # BLD AUTO: 3.7 K/UL (ref 1.8–7.7)
NEUTROPHILS NFR BLD: 62.6 % (ref 38–73)
NRBC BLD-RTO: 0 /100 WBC
PLATELET # BLD AUTO: 175 K/UL (ref 150–450)
PMV BLD AUTO: 9.8 FL (ref 9.2–12.9)
POTASSIUM SERPL-SCNC: 4.6 MMOL/L (ref 3.5–5.1)
PROT SERPL-MCNC: 6.7 G/DL (ref 6–8.4)
RBC # BLD AUTO: 4.56 M/UL (ref 4–5.4)
SODIUM SERPL-SCNC: 140 MMOL/L (ref 136–145)
WBC # BLD AUTO: 5.88 K/UL (ref 3.9–12.7)

## 2022-07-05 PROCEDURE — 93010 ELECTROCARDIOGRAM REPORT: CPT | Mod: ,,, | Performed by: INTERNAL MEDICINE

## 2022-07-05 PROCEDURE — 95720 PR EEG, W/VIDEO, CONT RECORD, I&R, >12<26 HRS: ICD-10-PCS | Mod: ,,, | Performed by: PSYCHIATRY & NEUROLOGY

## 2022-07-05 PROCEDURE — 95714 VEEG EA 12-26 HR UNMNTR: CPT

## 2022-07-05 PROCEDURE — 93010 EKG 12-LEAD: ICD-10-PCS | Mod: ,,, | Performed by: INTERNAL MEDICINE

## 2022-07-05 PROCEDURE — 99223 1ST HOSP IP/OBS HIGH 75: CPT | Mod: ,,, | Performed by: PSYCHIATRY & NEUROLOGY

## 2022-07-05 PROCEDURE — 95700 EEG CONT REC W/VID EEG TECH: CPT

## 2022-07-05 PROCEDURE — 11000001 HC ACUTE MED/SURG PRIVATE ROOM

## 2022-07-05 PROCEDURE — 80053 COMPREHEN METABOLIC PANEL: CPT | Performed by: PHYSICIAN ASSISTANT

## 2022-07-05 PROCEDURE — 93005 ELECTROCARDIOGRAM TRACING: CPT

## 2022-07-05 PROCEDURE — 85025 COMPLETE CBC W/AUTO DIFF WBC: CPT | Performed by: PHYSICIAN ASSISTANT

## 2022-07-05 PROCEDURE — 94761 N-INVAS EAR/PLS OXIMETRY MLT: CPT

## 2022-07-05 PROCEDURE — 95720 EEG PHY/QHP EA INCR W/VEEG: CPT | Mod: ,,, | Performed by: PSYCHIATRY & NEUROLOGY

## 2022-07-05 PROCEDURE — 36415 COLL VENOUS BLD VENIPUNCTURE: CPT | Performed by: PHYSICIAN ASSISTANT

## 2022-07-05 PROCEDURE — 80175 DRUG SCREEN QUAN LAMOTRIGINE: CPT | Performed by: PHYSICIAN ASSISTANT

## 2022-07-05 PROCEDURE — 99223 PR INITIAL HOSPITAL CARE,LEVL III: ICD-10-PCS | Mod: ,,, | Performed by: PSYCHIATRY & NEUROLOGY

## 2022-07-05 RX ORDER — SODIUM CHLORIDE 0.9 % (FLUSH) 0.9 %
10 SYRINGE (ML) INJECTION
Status: DISCONTINUED | OUTPATIENT
Start: 2022-07-05 | End: 2022-07-10 | Stop reason: HOSPADM

## 2022-07-05 RX ORDER — SERTRALINE HYDROCHLORIDE 25 MG/1
25 TABLET, FILM COATED ORAL DAILY
Status: DISCONTINUED | OUTPATIENT
Start: 2022-07-06 | End: 2022-07-10 | Stop reason: HOSPADM

## 2022-07-05 RX ORDER — ONDANSETRON 8 MG/1
8 TABLET, ORALLY DISINTEGRATING ORAL EVERY 8 HOURS PRN
Status: DISCONTINUED | OUTPATIENT
Start: 2022-07-05 | End: 2022-07-10 | Stop reason: HOSPADM

## 2022-07-05 RX ORDER — LORAZEPAM 2 MG/ML
2 INJECTION INTRAMUSCULAR EVERY 10 MIN PRN
Status: DISCONTINUED | OUTPATIENT
Start: 2022-07-05 | End: 2022-07-10 | Stop reason: HOSPADM

## 2022-07-05 RX ORDER — DOCUSATE SODIUM 100 MG/1
100 CAPSULE, LIQUID FILLED ORAL 2 TIMES DAILY PRN
Status: DISCONTINUED | OUTPATIENT
Start: 2022-07-05 | End: 2022-07-10 | Stop reason: HOSPADM

## 2022-07-05 RX ORDER — ACETAMINOPHEN 325 MG/1
650 TABLET ORAL EVERY 4 HOURS PRN
Status: DISCONTINUED | OUTPATIENT
Start: 2022-07-05 | End: 2022-07-10 | Stop reason: HOSPADM

## 2022-07-05 NOTE — SUBJECTIVE & OBJECTIVE
Past Medical History:   Diagnosis Date    Convulsions     Epilepsy     Seizures        Past Surgical History:   Procedure Laterality Date    APPENDECTOMY       SECTION      4    CHOLECYSTECTOMY      CRANIOTOMY N/A 2018    Procedure: CRANIOTOMY for strip and grid;  Surgeon: Ruben Senior MD;  Location: [facility] OR [address] [place] [address];  Service: Neurosurgery;  Laterality: N/A;  toronto II, asa 2, type and screen, regular bed, [place], supine     CRANIOTOMY Left 2018    Procedure: CRANIOTOMY for grids and strips;  Surgeon: Ruben Senior MD;  Location: [facility] OR [address] [place] [address];  Service: Neurosurgery;  Laterality: Left;    HYSTERECTOMY      around  for pain ful periods     INSERTION OF SUBDURAL GRID AND STRIP ELECTRODES BY CRANIOTOMY Left 2018    Procedure: CRANIOTOMY, FOR SUBDURAL GRID AND STRIP ELECTRODE LEAD Removal.;  Surgeon: Ruben Senior MD;  Location: [facility] OR [address] [place] [address];  Service: Neurosurgery;  Laterality: Left;  needs microscope and stealth-cg    SURGICAL REMOVAL OF LOBE OF BRAIN Left 2018    Procedure: LOBECTOMY, BRAIN left temporal lobe.;  Surgeon: Ruben Senior MD;  Location: [facility] OR [address] [place] [address];  Service: Neurosurgery;  Laterality: Left;       Review of patient's allergies indicates:  No Known Allergies    No current facility-administered medications on file prior to encounter.     Current Outpatient Medications on File Prior to Encounter   Medication Sig    cenobamate (XCOPRI) 100 mg Tab Take 1 tablet (100 mg total) by mouth once daily at 6am.    cenobamate (XCOPRI) 150 mg Tab Take one tablet by mouth once daily.    lamoTRIgine (LAMICTAL) 200 MG tablet Take 2.5 tablets (500 mg total) by mouth once daily.    sertraline (ZOLOFT) 25 MG tablet Take 1 tablet (25 mg total) by mouth once daily.     Continuous Infusions:    Family History       Problem Relation (Age of Onset)    Heart disease Father    No Known Problems Mother          Tobacco Use    Smoking status: Never Smoker    Smokeless tobacco: Never  Used   Substance and Sexual Activity    Alcohol use: No     Alcohol/week: 0.0 standard drinks    Drug use: No    Sexual activity: Never     Review of Systems   Constitutional:  Negative for chills, fatigue and fever.   HENT:  Negative for trouble swallowing and voice change.    Eyes:  Negative for photophobia and visual disturbance.   Respiratory:  Negative for cough and shortness of breath.    Cardiovascular:  Negative for chest pain and leg swelling.   Gastrointestinal:  Negative for diarrhea, nausea and vomiting.   Musculoskeletal:  Negative for gait problem, neck pain and neck stiffness.   Skin:  Negative for pallor and rash.   Neurological:  Positive for seizures. Negative for facial asymmetry, speech difficulty, weakness and headaches.   Psychiatric/Behavioral:  Negative for agitation, confusion and hallucinations.    Objective:     Vital Signs (Most Recent):  Temp: 98.2 °F (36.8 °C) (07/05/22 1316)  Pulse: (!) 44 (07/05/22 1316)  BP: 119/88 (07/05/22 1316)  SpO2: 96 % (07/05/22 1316)   Vital Signs (24h Range):  Temp:  [98.2 °F (36.8 °C)] 98.2 °F (36.8 °C)  Pulse:  [44] 44  SpO2:  [96 %] 96 %  BP: (119)/(88) 119/88        There is no height or weight on file to calculate BMI.    Physical Exam  Vitals and nursing note reviewed.   Constitutional:       General: She is not in acute distress.     Appearance: She is not diaphoretic.   HENT:      Head: Normocephalic and atraumatic.   Eyes:      General: No scleral icterus.     Extraocular Movements: EOM normal.      Pupils: Pupils are equal, round, and reactive to light.   Pulmonary:      Effort: Pulmonary effort is normal. No respiratory distress.   Abdominal:      General: There is no distension.      Palpations: Abdomen is soft.   Musculoskeletal:         General: No deformity or signs of injury.      Cervical back: Normal range of motion. No rigidity.   Skin:     General: Skin is warm and dry.   Neurological:      Mental Status: She is alert and oriented to  person, place, and time.      Motor: Motor strength is normal.      Coordination: Finger-Nose-Finger Test normal.   Psychiatric:         Mood and Affect: Mood normal.         Speech: Speech normal.         Behavior: Behavior normal.         Thought Content: Thought content normal.       NEUROLOGICAL EXAMINATION:     MENTAL STATUS   Oriented to person, place, and time.   Attention: normal. Concentration: normal.   Speech: speech is normal   Level of consciousness: alert    CRANIAL NERVES     CN II   Visual fields full to confrontation.     CN III, IV, VI   Pupils are equal, round, and reactive to light.  Extraocular motions are normal.     CN VII   Facial expression full, symmetric.     CN VIII   Hearing: intact    CN XI   CN XI normal.     CN XII   CN XII normal.     MOTOR EXAM   Muscle bulk: normal  Overall muscle tone: normal    Strength   Strength 5/5 throughout.     SENSORY EXAM   Light touch normal.   Proprioception normal.     GAIT AND COORDINATION      Coordination   Finger to nose coordination: normal    Significant Labs: All pertinent lab results from the past 24 hours have been reviewed.    Significant Studies: I have reviewed all pertinent imaging results/findings within the past 24 hours.

## 2022-07-05 NOTE — ASSESSMENT & PLAN NOTE
Ms. Arrieta is a 52 yo female with intractable epilepsy since age 18, s/p partial left anterior temporal lobe resection in 11/2018 with recurrence of seizures approximately 3 months after admitted to EMU for seizure capture and localization in discussion of repeat surgical workup. Patient is currently having approximately 4-5 events per month of loss of awareness and staring. Currently maintained on Lamotrigine 200 mg qAM/300 mg qPM, Cenobamate 150 mg daily.     - Continuous vEEG   - Hold home Lamotrigine, Cenobamate  - Lamotrigine level drawn on admission   - Activation procedures per protocol - medication adjustment as above  - IV Ativan PRN for GTC greater than 5 min - please call epilepsy on call before administering  - Seizure precautions, suction and oxygen at bedside  - Fall precautions, side rail padding in place  - Visi monitoring with continuous pulse oximetry   - Telemetry

## 2022-07-05 NOTE — HPI
Ms. Arrieta is a 52 yo female with intractable epilepsy since age 18, s/p partial left anterior temporal lobectomy in 11/2018 admitted to EMU for further seizure localization as part of repeat surgical workup. After epilepsy surgery, patient reports of brief period of seizure freedom, before beginning to have seizures again approximately 3 months afterwards. She reports current seizures are different than her typical semiology prior to surgery. She describes current seizures as loss of awareness, and staring off. After her events, she is quickly back to baseline and is able to report that she had a seizure. No associated tongue biting, bowel/bladder incontinence. She endorses 6 seizures in March, 8 in April, 5 in May, and 4 in June. She is currently maintained on Lamotrigine 200 mg qAM/300 mg qPM and Cenobamate 150 mg daily. She feels that seizure frequency has slightly decreased since Cenobamate was increased to current dose. Unable to identify triggers for seizures other than missing medication. MRI brain completed 2018 showed evidence of left frontotemporal crani for partial left anterior temporal resection. EEG completed 5/30/22 noted mild regional or subcortical dysfunction in bilateral temporal lobes with possible seizure focus in right anterior temporal region. Admit to EMU for seizure capture and localization in consideration of repeat surgical options.

## 2022-07-05 NOTE — H&P
[facility] - [facility]  Neurology-Epilepsy  History & Physical    Patient Name: Liza Arrieta  MRN: [id number]   Admission Date: 2022  Code Status: Full Code   Attending Provider: Scar Prince MD   Primary Care Physician: Primary Doctor No  Principal Problem:Temporal lobe epilepsy, intractable    Subjective:     Chief Complaint:  seizures     HPI:   Ms. Arrieta is a 52 yo female with intractable epilepsy since age 18, s/p partial left anterior temporal lobectomy in 2018 admitted to EMU for further seizure localization as part of repeat surgical workup. After epilepsy surgery, patient reports of brief period of seizure freedom, before beginning to have seizures again approximately 3 months afterwards. She reports current seizures are different than her typical semiology prior to surgery. She describes current seizures as loss of awareness, and staring off. After her events, she is quickly back to baseline and is able to report that she had a seizure. No associated tongue biting, bowel/bladder incontinence. She endorses 6 seizures in March, 8 in April, 5 in May, and 4 in . She is currently maintained on Lamotrigine 200 mg qAM/300 mg qPM and Cenobamate 150 mg daily. She feels that seizure frequency has slightly decreased since Cenobamate was increased to current dose. Unable to identify triggers for seizures other than missing medication. MRI brain completed  showed evidence of left frontotemporal crani for partial left anterior temporal resection. EEG completed 22 noted mild regional or subcortical dysfunction in bilateral temporal lobes with possible seizure focus in right anterior temporal region. Admit to EMU for seizure capture and localization in consideration of repeat surgical options.       Past Medical History:   Diagnosis Date    Convulsions     Epilepsy     Seizures        Past Surgical History:   Procedure Laterality Date    APPENDECTOMY       SECTION      4    CHOLECYSTECTOMY       CRANIOTOMY N/A 11/5/2018    Procedure: CRANIOTOMY for strip and grid;  Surgeon: Ruben Senior MD;  [facility];  Service: Neurosurgery;  Laterality: N/A;  toronto II, asa 2, type and screen, regular bed, [place], supine     CRANIOTOMY Left 11/8/2018    Procedure: CRANIOTOMY for grids and strips;  Surgeon: Ruben Senior MD;  Location[facility];  Service: Neurosurgery;  Laterality: Left;    HYSTERECTOMY      around 2016 for pain ful periods     INSERTION OF SUBDURAL GRID AND STRIP ELECTRODES BY CRANIOTOMY Left 11/19/2018    Procedure: CRANIOTOMY, FOR SUBDURAL GRID AND STRIP ELECTRODE LEAD Removal.;  Surgeon: Ruben Senior MD;  Location: [facility] OR 2ND FLR;  Service: Neurosurgery;  Laterality: Left;  needs microscope and stealth-cg    SURGICAL REMOVAL OF LOBE OF BRAIN Left 11/19/2018    Procedure: LOBECTOMY, BRAIN left temporal lobe.;  Surgeon: Ruben Senior MD;  [facility];  Service: Neurosurgery;  Laterality: Left;       Review of patient's allergies indicates:  No Known Allergies    No current facility-administered medications on file prior to encounter.     Current Outpatient Medications on File Prior to Encounter   Medication Sig    cenobamate (XCOPRI) 100 mg Tab Take 1 tablet (100 mg total) by mouth once daily at 6am.    cenobamate (XCOPRI) 150 mg Tab Take one tablet by mouth once daily.    lamoTRIgine (LAMICTAL) 200 MG tablet Take 2.5 tablets (500 mg total) by mouth once daily.    sertraline (ZOLOFT) 25 MG tablet Take 1 tablet (25 mg total) by mouth once daily.     Continuous Infusions:    Family History       Problem Relation (Age of Onset)    Heart disease Father    No Known Problems Mother          Tobacco Use    Smoking status: Never Smoker    Smokeless tobacco: Never Used   Substance and Sexual Activity    Alcohol use: No     Alcohol/week: 0.0 standard drinks    Drug use: No    Sexual activity: Never     Review of Systems   Constitutional:  Negative for  chills, fatigue and fever.   HENT:  Negative for trouble swallowing and voice change.    Eyes:  Negative for photophobia and visual disturbance.   Respiratory:  Negative for cough and shortness of breath.    Cardiovascular:  Negative for chest pain and leg swelling.   Gastrointestinal:  Negative for diarrhea, nausea and vomiting.   Musculoskeletal:  Negative for gait problem, neck pain and neck stiffness.   Skin:  Negative for pallor and rash.   Neurological:  Positive for seizures. Negative for facial asymmetry, speech difficulty, weakness and headaches.   Psychiatric/Behavioral:  Negative for agitation, confusion and hallucinations.    Objective:     Vital Signs (Most Recent):  Temp: 98.2 °F (36.8 °C) (07/05/22 1316)  Pulse: (!) 44 (07/05/22 1316)  BP: 119/88 (07/05/22 1316)  SpO2: 96 % (07/05/22 1316)   Vital Signs (24h Range):  Temp:  [98.2 °F (36.8 °C)] 98.2 °F (36.8 °C)  Pulse:  [44] 44  SpO2:  [96 %] 96 %  BP: (119)/(88) 119/88        There is no height or weight on file to calculate BMI.    Physical Exam  Vitals and nursing note reviewed.   Constitutional:       General: She is not in acute distress.     Appearance: She is not diaphoretic.   HENT:      Head: Normocephalic and atraumatic.   Eyes:      General: No scleral icterus.     Extraocular Movements: EOM normal.      Pupils: Pupils are equal, round, and reactive to light.   Pulmonary:      Effort: Pulmonary effort is normal. No respiratory distress.   Abdominal:      General: There is no distension.      Palpations: Abdomen is soft.   Musculoskeletal:         General: No deformity or signs of injury.      Cervical back: Normal range of motion. No rigidity.   Skin:     General: Skin is warm and dry.   Neurological:      Mental Status: She is alert and oriented to person, place, and time.      Motor: Motor strength is normal.      Coordination: Finger-Nose-Finger Test normal.   Psychiatric:         Mood and Affect: Mood normal.         Speech: Speech  normal.         Behavior: Behavior normal.         Thought Content: Thought content normal.       NEUROLOGICAL EXAMINATION:     MENTAL STATUS   Oriented to person, place, and time.   Attention: normal. Concentration: normal.   Speech: speech is normal   Level of consciousness: alert    CRANIAL NERVES     CN II   Visual fields full to confrontation.     CN III, IV, VI   Pupils are equal, round, and reactive to light.  Extraocular motions are normal.     CN VII   Facial expression full, symmetric.     CN VIII   Hearing: intact    CN XI   CN XI normal.     CN XII   CN XII normal.     MOTOR EXAM   Muscle bulk: normal  Overall muscle tone: normal    Strength   Strength 5/5 throughout.     SENSORY EXAM   Light touch normal.   Proprioception normal.     GAIT AND COORDINATION      Coordination   Finger to nose coordination: normal    Significant Labs: All pertinent lab results from the past 24 hours have been reviewed.    Significant Studies: I have reviewed all pertinent imaging results/findings within the past 24 hours.    Assessment and Plan:     * Temporal lobe epilepsy, intractable  Ms. Arrieta is a 50 yo female with intractable epilepsy since age 18, s/p partial left anterior temporal lobe resection in 11/2018 with recurrence of seizures approximately 3 months after admitted to EMU for seizure capture and localization in discussion of repeat surgical workup. Patient is currently having approximately 4-5 events per month of loss of awareness and staring. Currently maintained on Lamotrigine 200 mg qAM/300 mg qPM, Cenobamate 150 mg daily.     - Continuous vEEG   - Hold home Lamotrigine, Cenobamate  - Lamotrigine level drawn on admission   - Activation procedures per protocol - medication adjustment as above  - IV Ativan PRN for GTC greater than 5 min - please call epilepsy on call before administering  - Seizure precautions, suction and oxygen at bedside  - Fall precautions, side rail padding in place  - Visi monitoring  with continuous pulse oximetry   - Telemetry        VTE Risk Mitigation (From admission, onward)         Ordered     Place sequential compression device  Until discontinued         07/05/22 1244     Place LAURA hose  Until discontinued         07/05/22 1244     IP VTE LOW RISK PATIENT  Once         07/05/22 1244                Philly Foy PA-C  Neurology-Epilepsy  [facility] - EMU  Staff: Dr. Prince

## 2022-07-05 NOTE — NURSING
EMU NURSING INTERVIEW  Pt admitted to EMU room -911, EMU team notified.  Onset-When did your seizures start? Senior in college. Seizures in class  Aura-Do you experience an aura? No   Symptoms-What symptoms do you experience? Staring for about 30secs to 1min. Grand mal about 30 years ago   Triggers-Do you have any Triggers? Possibly stress on body (such as activity/jogging)   Do you bite your tongue? No   Do become incontinent of bladder or bowels? Once with grand mal   Have you been told your BP or oxygen drops during an event? Once with grand mal     Bed locked, bed alarm on and call light in reach. Educated to call staff before ambulating. ducated to use event button when patient feels like they will have an event or when an event is suspected. Pt and family verbalize understanding.

## 2022-07-06 PROCEDURE — 11000001 HC ACUTE MED/SURG PRIVATE ROOM

## 2022-07-06 PROCEDURE — 95714 VEEG EA 12-26 HR UNMNTR: CPT

## 2022-07-06 PROCEDURE — 95720 PR EEG, W/VIDEO, CONT RECORD, I&R, >12<26 HRS: ICD-10-PCS | Mod: ,,, | Performed by: PSYCHIATRY & NEUROLOGY

## 2022-07-06 PROCEDURE — 99233 SBSQ HOSP IP/OBS HIGH 50: CPT | Mod: ,,, | Performed by: PSYCHIATRY & NEUROLOGY

## 2022-07-06 PROCEDURE — 95720 EEG PHY/QHP EA INCR W/VEEG: CPT | Mod: ,,, | Performed by: PSYCHIATRY & NEUROLOGY

## 2022-07-06 PROCEDURE — 25000003 PHARM REV CODE 250: Performed by: PHYSICIAN ASSISTANT

## 2022-07-06 PROCEDURE — 99233 PR SUBSEQUENT HOSPITAL CARE,LEVL III: ICD-10-PCS | Mod: ,,, | Performed by: PSYCHIATRY & NEUROLOGY

## 2022-07-06 RX ADMIN — SERTRALINE HYDROCHLORIDE 25 MG: 25 TABLET ORAL at 09:07

## 2022-07-06 NOTE — ASSESSMENT & PLAN NOTE
Ms. Arrieta is a 52 yo female with intractable epilepsy since age 18, s/p partial left anterior temporal lobe resection in 11/2018 with recurrence of seizures approximately 3 months after admitted to EMU for seizure capture and localization in discussion of repeat surgical workup. Patient is currently having approximately 4-5 events per month of loss of awareness and staring. Currently maintained on Lamotrigine 200 mg qAM/300 mg qPM, Cenobamate 150 mg daily.     - Continuous vEEG - 1 typical event captured 7/6 am, plan to capture additional events for seizure localization/pre-surgical workup  - Holding home Lamotrigine, Cenobamate since admission  - Lamotrigine level drawn on admission   - Activation procedures per protocol - medication adjustment as above  - IV Ativan PRN for GTC greater than 5 min - please call epilepsy on call before administering  - Seizure precautions, suction and oxygen at bedside  - Fall precautions, side rail padding in place  - Visi monitoring with continuous pulse oximetry   - Telemetry

## 2022-07-06 NOTE — PLAN OF CARE
Problem: Adult Inpatient Plan of Care  Goal: Plan of Care Review  Outcome: Ongoing, Progressing  Goal: Absence of Hospital-Acquired Illness or Injury  Outcome: Ongoing, Progressing  Goal: Optimal Comfort and Wellbeing  Outcome: Ongoing, Progressing  Goal: Readiness for Transition of Care  Outcome: Ongoing, Progressing     POC reviewed with pt. Pt verbalized understanding. VSS. Full assessment in flowsheets. Bed locked in lowest position. Bed alarm set. Call light within reach. Seizure precautions initiated. DAMIR

## 2022-07-06 NOTE — PROCEDURES
EEG REPORT      Liza Arrieta  [id number]  1970    DATE OF SERVICE: 7/5/2022    [facility] -1    METHODOLOGY      Extended electroencephalographic recording is made while the patient is ambulatory and continuing normal daily activities.  Electrodes are placed according to the International 10-20 placement system and included T1 and T2 electrode placement.  Twenty four (24) channels of digital signal (sampling rate of 512/sec) was simultaneously recorded from the scalp including EKG and eye monitors.  Recording band pass was 0.1 to 100 hz and all data was stored digitally on the recorder.  The patient is instructed to press an event button when clinical symptoms occur and write the symptoms into a diary. Activation procedures which include photic stimulation, hyperventilation and instructing patients to perform simple task are done in selected patients.        The EEG is displayed on a monitor screen and can be reformatted into different montages for evaluation.  The entire recoding is submitted for computer assisted analysis to detect spike and electrographic seizure activity.  The entire recording is visually reviewed and the times identified by computer analysis as being spikes or seizures are reviewed again.  Compresses spectral analysis (CSA) is also performed on the activity recorded from each individual channel.  This is displayed as a power display of frequencies from 0 to 30 Hz over time.   The CSA analysis is done and displayed continuously.  This is reviewed for asymmetries in power between homologous areas of the scalp and for presence of changes in power which canbe seen when seizures occur.  Sections of suspected abnormalities on the CSA is then compared with the original EEG recording.     [employer] software was also utilized in the review of this study.  This software suite analyzes the EEG recording in multiple domains.  Coherence and rhythmicity is computed to identify EEG sections which may contain  organized seizures.  Each channel undergoes analysis to detect presence of spike and sharp waves which have special and morphological characteristic of epileptic activity.  The routine EEG recording is converted from spacial into frequency domain.  This is then displayed comparing homologous areas to identify areas of significant asymmetry.  Algorithm to identify non-cortically generated artifact is used to separate eye movement, EMG and other artifact from the EEG     Recording Times  Start on 7/5/2022  Stop on 7/6/2022    A total of 16:23:52 hours of EEG was recorded.      EEG FINDINGS:  Background activity:   The background rhythm was characterized by alpha and anterior dominant beta activity with a 10Hz posterior dominant alpha rhythm at 30-70 microvolts.   Symmetry and continuity: there is near continuous left temporal slowing as well as intermittent sharply contoured left temporal theta activity     Sleep:   Normal sleep transients including sleep spindles, K complexes, vertex waves were seen.    Activation procedures:   NA    Abnormal activity:   There are frequent right more than left temporal sharp waves    IMPRESSION:   Abnormal EEG due to regional cortical dysfunction in the left temporal region as well as bilateral independent seizure foci in the left and right temporal lobes.      Scar Prince MD  Neurology-Epilepsy.  [facility].

## 2022-07-06 NOTE — PLAN OF CARE
[facility] - Neurosurgery (Hospital)  Initial Discharge Assessment       Primary Care Provider: Rc Rodriguez MD    Admission Diagnosis: Complex partial epilepsy, intractable [G40.219]    Admission Date: 7/5/2022  Expected Discharge Date: 7/11/2022    Discharge Barriers Identified: (P) None    Payor: [employer] / Plan: [employer] CHO[employer] / Product Type: Commercial /     Extended Emergency Contact Information  Primary Emergency Contact: Neymar Arrieta Jr   [facility]  Home Phone: [phone]  Relation: Spouse  Secondary Emergency Contact: Valentín Arrieta  Address: [address] [place] [address] [id number] [place] [address]  Home Phone: [phone]  Mobile Phone: [phone]  Relation: Son    Discharge Plan A: (P) Home with family  Discharge Plan B: (P) Home Health      [id number] - [place], [place] - [address] OF [facility] [address] [facility]  [address] [address]  Phone: [phone] Fax: [phone]    [facility]  [address]  [place] [address]  Phone: [phone] Fax: [phone]      Initial Assessment (most recent)       Adult Discharge Assessment - 07/06/22 1410          Discharge Assessment    Assessment Type Discharge Planning Assessment (P)      Confirmed/corrected address, phone number and insurance Yes (P)      Confirmed Demographics Correct on Facesheet (P)      Source of Information patient (P)      When was your last doctors appointment? -- (P)    6 months ago with PCP    Communicated RYAN with patient/caregiver Date not available/Unable to determine (P)      Reason For Admission See admit diagnosis (P)      Lives With child(duncan), dependent;spouse (P)      Do you expect to return to your current living situation? Yes (P)      Do you have help at home or someone to help you manage your care at home? Yes (P)      Who are your caregiver(s) and their phone number(s)?  Neymar Arrieta - spouse - [phone] (P)      Prior to hospitilization cognitive status: Alert/Oriented (P)      Current cognitive status: Alert/Oriented (P)      Walking or Climbing Stairs Difficulty none (P)      Dressing/Bathing Difficulty none (P)      Equipment Currently Used at Home none (P)      Readmission within 30 days? No (P)      Patient currently being followed by outpatient case management? No (P)      Do you take prescription medications? Yes (P)      Do you have prescription coverage? Yes (P)      Coverage [facility] (P)      Do you have any problems affording any of your prescribed medications? No (P)      Who is going to help you get home at discharge? Family (P)      How do you get to doctors appointments? family or friend will provide (P)      Are you on dialysis? No (P)      Do you take coumadin? No (P)      Discharge Plan A Home with family (P)      Discharge Plan B Home Health (P)      DME Needed Upon Discharge  other (see comments) (P)    TBD    Discharge Plan discussed with: Patient (P)      Discharge Barriers Identified None (P)         Relationship/Environment    Name(s) of Who Lives With Patient Neymar Arrieta - spouse - [phone] (P)                       SW met with the pt to complete her assessment.  Pt's daughter and  were also in the room.  Pt lives with her  and children.  She does not use HH or DME.  SHe is not on HD and not Coumadin.  Pt's PCP is Dr. Rc Rodriguez.  She sometimes sees him at the [facility] location.  He also has a [facility] location.  ARMIN will f/u as needed to assist with dc planning.    Poonam Murguia LCSW   PRN

## 2022-07-06 NOTE — PROGRESS NOTES
[facility] - EMU  Neurology-Epilepsy  Progress Note    Patient Name: Liza Arrieta  MRN: [id number]  Admission Date: 7/5/2022  Hospital Length of Stay: 1 days  Code Status: Full Code   Attending Provider: Scar Prince MD  Primary Care Physician: Primary Doctor No   Principal Problem:Temporal lobe epilepsy, intractable    Subjective:     Hospital Course:   52 yo female with intractable epilepsy since age 18, s/p partial left anterior temporal lobe resection in 11/2018 with recurrence of seizures approximately 3 months after admitted to EMU for seizure capture and localization in discussion of repeat surgical workup. Patient is currently having approximately 4-5 events per month of loss of awareness and staring. Currently maintained on Lamotrigine 200 mg qAM/300 mg qPM, Cenobamate 150 mg daily.   7/5>7/6: Home Lamotrigine and Cenobamate held on admission. EEG with regional cortical dysfunction in the left temporal region as well as bilateral independent seizure foci in the left and right temporal lobes. Typical event 7/6 approximately 0957,  reported he noted patient with change in speech/confusion similar to after an event, no clear behavioral change during event.       Interval History: Event this morning,  reports he noted wife to be confused on why she was admitted to the hospital, change in speech that is typical after an event. Off all home AEDs. At neurologic baseline shortly after event.     Current Facility-Administered Medications   Medication Dose Route Frequency Provider Last Rate Last Admin    acetaminophen tablet 650 mg  650 mg Oral Q4H PRN Philly Foy PA-C        docusate sodium capsule 100 mg  100 mg Oral BID PRN Philly Foy PA-C        lorazepam injection 2 mg  2 mg Intravenous Q10 Min PRN Philly Foy PA-C        ondansetron disintegrating tablet 8 mg  8 mg Oral Q8H PRN Philly Foy PA-C        sertraline tablet 25 mg  25 mg Oral Daily Philly Foy PA-C   25 mg at 07/06/22 0924     sodium chloride 0.9% flush 10 mL  10 mL Intravenous PRN Philly Foy PA-C         Continuous Infusions:    Review of Systems   Constitutional:  Negative for chills, fatigue and fever.   HENT:  Negative for trouble swallowing and voice change.    Eyes:  Negative for photophobia and visual disturbance.   Respiratory:  Negative for cough and shortness of breath.    Cardiovascular:  Negative for chest pain and leg swelling.   Gastrointestinal:  Negative for diarrhea, nausea and vomiting.   Musculoskeletal:  Negative for gait problem, neck pain and neck stiffness.   Skin:  Negative for pallor and rash.   Neurological:  Positive for seizures. Negative for facial asymmetry, speech difficulty, weakness and headaches.   Psychiatric/Behavioral:  Negative for agitation, confusion and hallucinations.    Objective:     Vital Signs (Most Recent):  Temp: 97.9 °F (36.6 °C) (07/06/22 0812)  Pulse: (!) 49 (07/06/22 0812)  Resp: 18 (07/06/22 0812)  BP: (!) 93/55 (07/06/22 0812)  SpO2: 96 % (07/06/22 0812)   Vital Signs (24h Range):  Temp:  [97.9 °F (36.6 °C)-98.5 °F (36.9 °C)] 97.9 °F (36.6 °C)  Pulse:  [43-82] 49  Resp:  [13-18] 18  SpO2:  [95 %-96 %] 96 %  BP: ()/(55-88) 93/55     Weight: 93 kg (205 lb)  Body mass index is 33.59 kg/m².    Physical Exam  Vitals and nursing note reviewed.   Constitutional:       General: She is not in acute distress.     Appearance: She is not diaphoretic.   HENT:      Head: Normocephalic and atraumatic.   Eyes:      General: No scleral icterus.     Extraocular Movements: EOM normal.      Pupils: Pupils are equal, round, and reactive to light.   Pulmonary:      Effort: Pulmonary effort is normal. No respiratory distress.   Abdominal:      General: There is no distension.      Palpations: Abdomen is soft.   Musculoskeletal:         General: No deformity or signs of injury.      Cervical back: Normal range of motion. No rigidity.   Skin:     General: Skin is warm and dry.   Neurological:       Mental Status: She is alert and oriented to person, place, and time.      Motor: Motor strength is normal.      Coordination: Finger-Nose-Finger Test normal.   Psychiatric:         Mood and Affect: Mood normal.         Speech: Speech normal.         Behavior: Behavior normal.         Thought Content: Thought content normal.       NEUROLOGICAL EXAMINATION:     MENTAL STATUS   Oriented to person, place, and time.   Attention: normal. Concentration: normal.   Speech: speech is normal   Level of consciousness: alert    CRANIAL NERVES     CN II   Visual fields full to confrontation.     CN III, IV, VI   Pupils are equal, round, and reactive to light.  Extraocular motions are normal.     CN VII   Facial expression full, symmetric.     CN VIII   Hearing: intact    CN XI   CN XI normal.     CN XII   CN XII normal.     MOTOR EXAM   Muscle bulk: normal  Overall muscle tone: normal    Strength   Strength 5/5 throughout.     SENSORY EXAM   Light touch normal.   Proprioception normal.     GAIT AND COORDINATION      Coordination   Finger to nose coordination: normal    Significant Labs: All pertinent lab results from the past 24 hours have been reviewed.    Significant Studies: I have reviewed all pertinent imaging results/findings within the past 24 hours.    Assessment and Plan:     * Temporal lobe epilepsy, intractable  Ms. Arrieta is a 50 yo female with intractable epilepsy since age 18, s/p partial left anterior temporal lobe resection in 11/2018 with recurrence of seizures approximately 3 months after admitted to EMU for seizure capture and localization in discussion of repeat surgical workup. Patient is currently having approximately 4-5 events per month of loss of awareness and staring. Currently maintained on Lamotrigine 200 mg qAM/300 mg qPM, Cenobamate 150 mg daily.     - Continuous vEEG - 1 typical event captured 7/6 am, plan to capture additional events for seizure localization/pre-surgical workup  - Holding home  Lamotrigine, Cenobamate since admission  - Lamotrigine level drawn on admission   - Activation procedures per protocol - medication adjustment as above  - IV Ativan PRN for GTC greater than 5 min - please call epilepsy on call before administering  - Seizure precautions, suction and oxygen at bedside  - Fall precautions, side rail padding in place  - Visi monitoring with continuous pulse oximetry   - Telemetry        VTE Risk Mitigation (From admission, onward)         Ordered     Place sequential compression device  Until discontinued         07/05/22 1244     Place LAURA hose  Until discontinued         07/05/22 1244     IP VTE LOW RISK PATIENT  Once         07/05/22 1244                Philly Foy PA-C  Neurology-Epilepsy  [facility] - EMU  Staff: Dr. Prince

## 2022-07-06 NOTE — HOSPITAL COURSE
50 yo female with intractable epilepsy since age 18, s/p partial left anterior temporal lobe resection in 11/2018 with recurrence of seizures approximately 3 months after admitted to EMU for seizure capture and localization in discussion of repeat surgical workup. Patient is currently having approximately 4-5 events per month of loss of awareness and staring. Currently maintained on Lamotrigine 200 mg qAM/300 mg qPM, Cenobamate 150 mg daily.   7/5>7/6: Home Lamotrigine and Cenobamate held on admission. EEG with regional cortical dysfunction in the left temporal region as well as bilateral independent seizure foci in the left and right temporal lobes. Typical event 7/6 approximately 0957,  reported he noted patient with change in speech/confusion similar to after an event, no clear behavioral change during event.   7/6>7/7: EEG with regional cortical dysfunction in the left temporal region as well as bilateral independent seizure foci in the left and right temporal lobes. Three right temporal lobe seizures at 09:57, 13:55, and 20:21 on 7/6. Continue close vEEG monitoring and attempt to capture additional seizures to confirm localization for pre-surgical planning.  7/7>7/8: EEG with regional cortical dysfunction left temporal region, bilateral independent seizure foci in the left and right temporal lobes. No discrete seizures. Continue vEEG off all AEDs and attempt to capture additional seizures for pre-surgical planning. Plan for sleep deprivation 7/8>7/9 with provoking medications tomorrow morning.  7/8>7/9: She was sleep deprived and received benadryl and tramadol. EEG continues to show bilateral independent epileptiform activity in the right and left temporal lobes. A right onset seizure was captured at 14:04. The only clinical manifestation was confusion and the button was pressed about 30 minutes after the seizure occurred. Patient and  state they need to leave tomorrow for transportation reasons.    7/9>7/10: No further seizures. Patient was restarted on home medications. We discussed increasing cenobamate but the pharmacy did not have it in stock and patient would like to discuss with Dr. Mark before increasing outpatient (and she just got the 150 mg dose filled).

## 2022-07-06 NOTE — PLAN OF CARE
Problem: Adult Inpatient Plan of Care  Goal: Plan of Care Review  Outcome: Ongoing, Progressing  Goal: Absence of Hospital-Acquired Illness or Injury  Outcome: Ongoing, Progressing  Goal: Optimal Comfort and Wellbeing  Outcome: Ongoing, Progressing     Problem: Seizure, Active Management  Goal: Absence of Seizure/Seizure-Related Injury  Outcome: Ongoing, Progressing     POC reviewed with pt. Pt verbalized understanding. VSS. Full assessment in flowsheets. Bed locked in lowest position. Bed alarm set. Call light within reach. Seizure precautions maintained. DAMIR

## 2022-07-06 NOTE — PLAN OF CARE
Problem: Adult Inpatient Plan of Care  Goal: Plan of Care Review  Outcome: Ongoing, Progressing  Flowsheets (Taken 7/6/2022 0431)  Plan of Care Reviewed With:   patient   spouse  Goal: Optimal Comfort and Wellbeing  Outcome: Ongoing, Progressing       POC reviewed and updated with the patient/caregiver. Questions regarding POC were encouraged and addressed with the patient/caregiver.  VSS, see flow-sheets. Tele and VISI maintained. Patient is AO X 4 at this time. Fall/safety precautions maintained, no signs of injury noted during shift. Seizure precautions maintained, no events noted during shift. Patient was repositioned for comfort, bed locked in low position with side rails X 4, bed alarm refused, with call light within reach. Instructed patient to call staff for mobility, verbalized understanding.  No acute signs of distress noted at this time.

## 2022-07-06 NOTE — SUBJECTIVE & OBJECTIVE
Interval History: Event this morning,  reports he noted wife to be confused on why she was admitted to the hospital, change in speech that is typical after an event. Off all home AEDs. At neurologic baseline shortly after event.     Current Facility-Administered Medications   Medication Dose Route Frequency Provider Last Rate Last Admin    acetaminophen tablet 650 mg  650 mg Oral Q4H PRN Philly Foy, PA-C        docusate sodium capsule 100 mg  100 mg Oral BID PRN Philly Fine, PA-C        lorazepam injection 2 mg  2 mg Intravenous Q10 Min PRN Philly Fine, PA-C        ondansetron disintegrating tablet 8 mg  8 mg Oral Q8H PRN Philly Fine, PA-C        sertraline tablet 25 mg  25 mg Oral Daily Philly Foy PA-C   25 mg at 07/06/22 0924    sodium chloride 0.9% flush 10 mL  10 mL Intravenous PRN Philly Foy, PA-C         Continuous Infusions:    Review of Systems   Constitutional:  Negative for chills, fatigue and fever.   HENT:  Negative for trouble swallowing and voice change.    Eyes:  Negative for photophobia and visual disturbance.   Respiratory:  Negative for cough and shortness of breath.    Cardiovascular:  Negative for chest pain and leg swelling.   Gastrointestinal:  Negative for diarrhea, nausea and vomiting.   Musculoskeletal:  Negative for gait problem, neck pain and neck stiffness.   Skin:  Negative for pallor and rash.   Neurological:  Positive for seizures. Negative for facial asymmetry, speech difficulty, weakness and headaches.   Psychiatric/Behavioral:  Negative for agitation, confusion and hallucinations.    Objective:     Vital Signs (Most Recent):  Temp: 97.9 °F (36.6 °C) (07/06/22 0812)  Pulse: (!) 49 (07/06/22 0812)  Resp: 18 (07/06/22 0812)  BP: (!) 93/55 (07/06/22 0812)  SpO2: 96 % (07/06/22 0812)   Vital Signs (24h Range):  Temp:  [97.9 °F (36.6 °C)-98.5 °F (36.9 °C)] 97.9 °F (36.6 °C)  Pulse:  [43-82] 49  Resp:  [13-18] 18  SpO2:  [95 %-96 %] 96 %  BP: ()/(55-88) 93/55     Weight:  93 kg (205 lb)  Body mass index is 33.59 kg/m².    Physical Exam  Vitals and nursing note reviewed.   Constitutional:       General: She is not in acute distress.     Appearance: She is not diaphoretic.   HENT:      Head: Normocephalic and atraumatic.   Eyes:      General: No scleral icterus.     Extraocular Movements: EOM normal.      Pupils: Pupils are equal, round, and reactive to light.   Pulmonary:      Effort: Pulmonary effort is normal. No respiratory distress.   Abdominal:      General: There is no distension.      Palpations: Abdomen is soft.   Musculoskeletal:         General: No deformity or signs of injury.      Cervical back: Normal range of motion. No rigidity.   Skin:     General: Skin is warm and dry.   Neurological:      Mental Status: She is alert and oriented to person, place, and time.      Motor: Motor strength is normal.      Coordination: Finger-Nose-Finger Test normal.   Psychiatric:         Mood and Affect: Mood normal.         Speech: Speech normal.         Behavior: Behavior normal.         Thought Content: Thought content normal.       NEUROLOGICAL EXAMINATION:     MENTAL STATUS   Oriented to person, place, and time.   Attention: normal. Concentration: normal.   Speech: speech is normal   Level of consciousness: alert    CRANIAL NERVES     CN II   Visual fields full to confrontation.     CN III, IV, VI   Pupils are equal, round, and reactive to light.  Extraocular motions are normal.     CN VII   Facial expression full, symmetric.     CN VIII   Hearing: intact    CN XI   CN XI normal.     CN XII   CN XII normal.     MOTOR EXAM   Muscle bulk: normal  Overall muscle tone: normal    Strength   Strength 5/5 throughout.     SENSORY EXAM   Light touch normal.   Proprioception normal.     GAIT AND COORDINATION      Coordination   Finger to nose coordination: normal    Significant Labs: All pertinent lab results from the past 24 hours have been reviewed.    Significant Studies: I have reviewed  all pertinent imaging results/findings within the past 24 hours.

## 2022-07-06 NOTE — NURSING
EMU SEIZURE EVENT NOTE  Time event started: around 1002  Duration: less than a min  Describe symptoms during event and post-ictal symptoms: staring, following by confusion  Who notified: MARCIO Burrell   Intervention: VSS, Notified EMU team  Patient response: Neurological assessment back at baseline, VSS at this time.

## 2022-07-07 PROCEDURE — 11000001 HC ACUTE MED/SURG PRIVATE ROOM

## 2022-07-07 PROCEDURE — 95720 EEG PHY/QHP EA INCR W/VEEG: CPT | Mod: ,,, | Performed by: PSYCHIATRY & NEUROLOGY

## 2022-07-07 PROCEDURE — 25000003 PHARM REV CODE 250: Performed by: PHYSICIAN ASSISTANT

## 2022-07-07 PROCEDURE — 99233 SBSQ HOSP IP/OBS HIGH 50: CPT | Mod: ,,, | Performed by: PSYCHIATRY & NEUROLOGY

## 2022-07-07 PROCEDURE — 95714 VEEG EA 12-26 HR UNMNTR: CPT

## 2022-07-07 PROCEDURE — 99233 PR SUBSEQUENT HOSPITAL CARE,LEVL III: ICD-10-PCS | Mod: ,,, | Performed by: PSYCHIATRY & NEUROLOGY

## 2022-07-07 PROCEDURE — 95720 PR EEG, W/VIDEO, CONT RECORD, I&R, >12<26 HRS: ICD-10-PCS | Mod: ,,, | Performed by: PSYCHIATRY & NEUROLOGY

## 2022-07-07 RX ADMIN — SERTRALINE HYDROCHLORIDE 25 MG: 25 TABLET ORAL at 08:07

## 2022-07-07 NOTE — SIGNIFICANT EVENT
EMU SEIZURE EVENT NOTE  Time event started: 1812  Duration: 1 min  Describe symptoms during event and post-ictal symptoms: pt noticed she was starting to stare off, upon walking into room, pt was staring off the left, alert and able to tell me she was stuck in a stare. VSS. Pt came out of stare within seconds. Neuro asx at baseline. VSS.     Intervention: Vital signs taken q5min during event and post VS taken. Standardized neurological assessment completed.   Patient response: Neurological assessment back at baseline, VSS at this time. See flowsheets for more event information.

## 2022-07-07 NOTE — PLAN OF CARE
Problem: Adult Inpatient Plan of Care  Goal: Plan of Care Review  Outcome: Ongoing, Progressing  Flowsheets (Taken 7/7/2022 0603)  Plan of Care Reviewed With:   patient   spouse     Problem: Seizure, Active Management  Goal: Absence of Seizure/Seizure-Related Injury  Outcome: Ongoing, Progressing           POC reviewed and updated with the patient/caregiver. Questions regarding POC were encouraged and addressed with the patient/caregiver.  VSS, see flow-sheets. Tele and VISI maintained. Patient is AO X 4 at this time. Fall/safety precautions maintained, no signs of injury noted during shift.  Seizure precautions maintained, one event reported by patient's  during shift. Patient was repositioned for comfort, bed locked in low position with side rails X 4, bed alarm refused, with call light within reach. Instructed patient to call staff for mobility, verbalized understanding.  No acute signs of distress noted at this time.

## 2022-07-07 NOTE — NURSING
Patient had one of her typical events at 2026. Patient's  stated it lasted approximately 15 seconds followed by a minute of confusion. Upon arrival to the bedside, patient AOx4 and able to follow simple commands. VSS, see flowsheet. Seizure precautions maintained. No acute signs of distress noted.

## 2022-07-07 NOTE — PROGRESS NOTES
[facility]  Neurology-Epilepsy  Progress Note    Patient Name: Liza Arrieta  MRN: [id number]  Admission Date: 7/5/2022  Hospital Length of Stay: 2 days  Code Status: Full Code   Attending Provider: Scar Prince MD  Primary Care Physician: Rc Rodriguez MD   Principal Problem:Temporal lobe epilepsy, intractable    Subjective:     Hospital Course:   50 yo female with intractable epilepsy since age 18, s/p partial left anterior temporal lobe resection in 11/2018 with recurrence of seizures approximately 3 months after admitted to EMU for seizure capture and localization in discussion of repeat surgical workup. Patient is currently having approximately 4-5 events per month of loss of awareness and staring. Currently maintained on Lamotrigine 200 mg qAM/300 mg qPM, Cenobamate 150 mg daily.   7/5>7/6: Home Lamotrigine and Cenobamate held on admission. EEG with regional cortical dysfunction in the left temporal region as well as bilateral independent seizure foci in the left and right temporal lobes. Typical event 7/6 approximately 0957,  reported he noted patient with change in speech/confusion similar to after an event, no clear behavioral change during event.   7/6>7/7: EEG with regional cortical dysfunction in the left temporal region as well as bilateral independent seizure foci in the left and right temporal lobes. Three right temporal lobe seizures at 09:57, 13:55, and 20:21 on 7/6. Continue close vEEG monitoring and attempt to capture additional seizures to confirm localization for pre-surgical planning.      Interval History: Three seizures 7/6, two noted by family with event push buttons. Patient at baseline this morning, discussed plan of care in detail with patient, , daughter at bedside. Plan to continue vEEG and attempt to capture additional seizures for further localization confirmation.    Current Facility-Administered Medications   Medication Dose Route Frequency Provider Last Rate  Last Admin    acetaminophen tablet 650 mg  650 mg Oral Q4H PRN MARCIO Burrell-C        docusate sodium capsule 100 mg  100 mg Oral BID PRN MARCIO Burrell-KEISHA        lorazepam injection 2 mg  2 mg Intravenous Q10 Min PRN MARCIO Burrell-C        ondansetron disintegrating tablet 8 mg  8 mg Oral Q8H PRN MARCIO Brurell-C        sertraline tablet 25 mg  25 mg Oral Daily MARCIO Burrell-C   25 mg at 07/07/22 0821    sodium chloride 0.9% flush 10 mL  10 mL Intravenous PRN MARCIO Burrell-C         Continuous Infusions:    Review of Systems   Constitutional:  Negative for chills, fatigue and fever.   HENT:  Negative for trouble swallowing and voice change.    Eyes:  Negative for photophobia and visual disturbance.   Respiratory:  Negative for cough and shortness of breath.    Cardiovascular:  Negative for chest pain and leg swelling.   Gastrointestinal:  Negative for diarrhea, nausea and vomiting.   Musculoskeletal:  Negative for gait problem, neck pain and neck stiffness.   Skin:  Negative for pallor and rash.   Neurological:  Positive for seizures. Negative for facial asymmetry, speech difficulty, weakness and headaches.   Psychiatric/Behavioral:  Negative for agitation, confusion and hallucinations.    Objective:     Vital Signs (Most Recent):  Temp: 98.5 °F (36.9 °C) (07/07/22 0832)  Pulse: (!) 50 (07/07/22 1210)  Resp: 11 (07/07/22 1210)  BP: 99/69 (07/07/22 1210)  SpO2: 95 % (07/07/22 1210)   Vital Signs (24h Range):  Temp:  [96.5 °F (35.8 °C)-98.8 °F (37.1 °C)] 98.5 °F (36.9 °C)  Pulse:  [44-60] 50  Resp:  [11-18] 11  SpO2:  [93 %-98 %] 95 %  BP: ()/(53-69) 99/69     Weight: 93 kg (205 lb)  Body mass index is 33.59 kg/m².    Physical Exam  Vitals and nursing note reviewed.   Constitutional:       General: She is not in acute distress.     Appearance: She is not diaphoretic.   HENT:      Head: Normocephalic and atraumatic.   Eyes:      General: No scleral icterus.     Extraocular Movements: EOM normal.       Pupils: Pupils are equal, round, and reactive to light.   Pulmonary:      Effort: Pulmonary effort is normal. No respiratory distress.   Abdominal:      General: There is no distension.      Palpations: Abdomen is soft.   Musculoskeletal:         General: No deformity or signs of injury.      Cervical back: Normal range of motion. No rigidity.   Skin:     General: Skin is warm and dry.   Neurological:      Mental Status: She is alert and oriented to person, place, and time.      Motor: Motor strength is normal.      Coordination: Finger-Nose-Finger Test normal.   Psychiatric:         Mood and Affect: Mood normal.         Speech: Speech normal.         Behavior: Behavior normal.         Thought Content: Thought content normal.       NEUROLOGICAL EXAMINATION:     MENTAL STATUS   Oriented to person, place, and time.   Attention: normal. Concentration: normal.   Speech: speech is normal   Level of consciousness: alert    CRANIAL NERVES     CN II   Visual fields full to confrontation.     CN III, IV, VI   Pupils are equal, round, and reactive to light.  Extraocular motions are normal.     CN VII   Facial expression full, symmetric.     CN VIII   Hearing: intact    CN XI   CN XI normal.     CN XII   CN XII normal.     MOTOR EXAM   Muscle bulk: normal  Overall muscle tone: normal    Strength   Strength 5/5 throughout.     SENSORY EXAM   Light touch normal.   Proprioception normal.     GAIT AND COORDINATION      Coordination   Finger to nose coordination: normal    Significant Labs: All pertinent lab results from the past 24 hours have been reviewed.    Significant Studies: I have reviewed all pertinent imaging results/findings within the past 24 hours.    Assessment and Plan:     * Temporal lobe epilepsy, intractable  Ms. Arrieta is a 50 yo female with intractable epilepsy since age 18, s/p partial left anterior temporal lobe resection in 11/2018 with recurrence of seizures approximately 3 months after admitted to EMU  for seizure capture and localization in discussion of repeat surgical workup. Patient is currently having approximately 4-5 events per month of loss of awareness and staring. Currently maintained on Lamotrigine 200 mg qAM/300 mg qPM, Cenobamate 150 mg daily.     - Continuous vEEG - 3 seizures 7/6 with right temporal onset. Continue vEEG, attempt to capture additional seizures for further localization information as part of pre-surgical workup  - Holding home Lamotrigine, Cenobamate since admission  - Lamotrigine level drawn on admission   - Activation procedures per protocol - medication adjustment as above  - IV Ativan PRN for GTC greater than 5 min - please call epilepsy on call before administering  - Seizure precautions, suction and oxygen at bedside  - Fall precautions, side rail padding in place  - Visi monitoring with continuous pulse oximetry   - Telemetry        VTE Risk Mitigation (From admission, onward)         Ordered     Place sequential compression device  Until discontinued         07/05/22 1244     Place LAURA hose  Until discontinued         07/05/22 1244     IP VTE LOW RISK PATIENT  Once         07/05/22 1244                Philly Foy PA-C  Neurology-Epilepsy  [facility]  Staff: Dr. Prince

## 2022-07-07 NOTE — PROCEDURES
EEG REPORT      Liza Arrieta  [id number]  1970    DATE OF SERVICE: 7/6/2022    [facility] -2    METHODOLOGY      Extended electroencephalographic recording is made while the patient is ambulatory and continuing normal daily activities.  Electrodes are placed according to the International 10-20 placement system and included T1 and T2 electrode placement.  Twenty four (24) channels of digital signal (sampling rate of 512/sec) was simultaneously recorded from the scalp including EKG and eye monitors.  Recording band pass was 0.1 to 100 hz and all data was stored digitally on the recorder.  The patient is instructed to press an event button when clinical symptoms occur and write the symptoms into a diary. Activation procedures which include photic stimulation, hyperventilation and instructing patients to perform simple task are done in selected patients.        The EEG is displayed on a monitor screen and can be reformatted into different montages for evaluation.  The entire recoding is submitted for computer assisted analysis to detect spike and electrographic seizure activity.  The entire recording is visually reviewed and the times identified by computer analysis as being spikes or seizures are reviewed again.  Compresses spectral analysis (CSA) is also performed on the activity recorded from each individual channel.  This is displayed as a power display of frequencies from 0 to 30 Hz over time.   The CSA analysis is done and displayed continuously.  This is reviewed for asymmetries in power between homologous areas of the scalp and for presence of changes in power which canbe seen when seizures occur.  Sections of suspected abnormalities on the CSA is then compared with the original EEG recording.     [employer] software was also utilized in the review of this study.  This software suite analyzes the EEG recording in multiple domains.  Coherence and rhythmicity is computed to identify EEG sections which may contain  organized seizures.  Each channel undergoes analysis to detect presence of spike and sharp waves which have special and morphological characteristic of epileptic activity.  The routine EEG recording is converted from spacial into frequency domain.  This is then displayed comparing homologous areas to identify areas of significant asymmetry.  Algorithm to identify non-cortically generated artifact is used to separate eye movement, EMG and other artifact from the EEG     Recording Times  Start on 7/6/2022  Stop on 7/7/2022    A total of 23:58:17 hours of EEG was recorded.      EEG FINDINGS:  Background activity:   The background rhythm was characterized by alpha and anterior dominant beta activity with a 10Hz posterior dominant alpha rhythm at 30-70 microvolts.     Symmetry and continuity: there is near continuous left temporal slowing as well as intermittent sharply contoured left temporal theta activity     Sleep:   Normal sleep transients including sleep spindles, K complexes, vertex waves were seen.    Activation procedures:   NA    Abnormal activity:   There are frequent right more than left temporal sharp waves and occasional left temporal rhythmic delta    Seizure 1 at 09:57 - There is onset of evolving right temporal rhythmic delta for approximately 20 seconds with no clinical correlate during discharge.  Approximately five minutes after offset the  notes patient to be confused regarding place and purpose and presses event button.    Seizure 2 at 13:55 - Electrographically identical to seizure one.  There is no clinical correlate and no event domenico.    Seizure 3 at 20:21 -  Electrographically identical to seizure one. Following offset, patient tries to get out of bed, pulls at wires, and does not respond appropriately to voice.    IMPRESSION:   Abnormal EEG due to regional cortical dysfunction in the left temporal region as well as bilateral independent seizure foci in the left and right temporal lobes  with three right temporal seizures as described above.      Scar Prince MD  Neurology-Epilepsy.  [facility].

## 2022-07-07 NOTE — SUBJECTIVE & OBJECTIVE
Interval History: Three seizures 7/6, two noted by family with event push buttons. Patient at baseline this morning, discussed plan of care in detail with patient, , daughter at bedside. Plan to continue vEEG and attempt to capture additional seizures for further localization confirmation.    Current Facility-Administered Medications   Medication Dose Route Frequency Provider Last Rate Last Admin    acetaminophen tablet 650 mg  650 mg Oral Q4H PRN Philly Law, PA-C        docusate sodium capsule 100 mg  100 mg Oral BID PRN Philly Fine, PA-C        lorazepam injection 2 mg  2 mg Intravenous Q10 Min PRN Philly Fine, PA-C        ondansetron disintegrating tablet 8 mg  8 mg Oral Q8H PRN Philly Fine, PA-C        sertraline tablet 25 mg  25 mg Oral Daily Philly Law, PA-C   25 mg at 07/07/22 0821    sodium chloride 0.9% flush 10 mL  10 mL Intravenous PRN Philly Fine, PA-C         Continuous Infusions:    Review of Systems   Constitutional:  Negative for chills, fatigue and fever.   HENT:  Negative for trouble swallowing and voice change.    Eyes:  Negative for photophobia and visual disturbance.   Respiratory:  Negative for cough and shortness of breath.    Cardiovascular:  Negative for chest pain and leg swelling.   Gastrointestinal:  Negative for diarrhea, nausea and vomiting.   Musculoskeletal:  Negative for gait problem, neck pain and neck stiffness.   Skin:  Negative for pallor and rash.   Neurological:  Positive for seizures. Negative for facial asymmetry, speech difficulty, weakness and headaches.   Psychiatric/Behavioral:  Negative for agitation, confusion and hallucinations.    Objective:     Vital Signs (Most Recent):  Temp: 98.5 °F (36.9 °C) (07/07/22 0832)  Pulse: (!) 50 (07/07/22 1210)  Resp: 11 (07/07/22 1210)  BP: 99/69 (07/07/22 1210)  SpO2: 95 % (07/07/22 1210)   Vital Signs (24h Range):  Temp:  [96.5 °F (35.8 °C)-98.8 °F (37.1 °C)] 98.5 °F (36.9 °C)  Pulse:  [44-60] 50  Resp:  [11-18] 11  SpO2:   [93 %-98 %] 95 %  BP: ()/(53-69) 99/69     Weight: 93 kg (205 lb)  Body mass index is 33.59 kg/m².    Physical Exam  Vitals and nursing note reviewed.   Constitutional:       General: She is not in acute distress.     Appearance: She is not diaphoretic.   HENT:      Head: Normocephalic and atraumatic.   Eyes:      General: No scleral icterus.     Extraocular Movements: EOM normal.      Pupils: Pupils are equal, round, and reactive to light.   Pulmonary:      Effort: Pulmonary effort is normal. No respiratory distress.   Abdominal:      General: There is no distension.      Palpations: Abdomen is soft.   Musculoskeletal:         General: No deformity or signs of injury.      Cervical back: Normal range of motion. No rigidity.   Skin:     General: Skin is warm and dry.   Neurological:      Mental Status: She is alert and oriented to person, place, and time.      Motor: Motor strength is normal.      Coordination: Finger-Nose-Finger Test normal.   Psychiatric:         Mood and Affect: Mood normal.         Speech: Speech normal.         Behavior: Behavior normal.         Thought Content: Thought content normal.       NEUROLOGICAL EXAMINATION:     MENTAL STATUS   Oriented to person, place, and time.   Attention: normal. Concentration: normal.   Speech: speech is normal   Level of consciousness: alert    CRANIAL NERVES     CN II   Visual fields full to confrontation.     CN III, IV, VI   Pupils are equal, round, and reactive to light.  Extraocular motions are normal.     CN VII   Facial expression full, symmetric.     CN VIII   Hearing: intact    CN XI   CN XI normal.     CN XII   CN XII normal.     MOTOR EXAM   Muscle bulk: normal  Overall muscle tone: normal    Strength   Strength 5/5 throughout.     SENSORY EXAM   Light touch normal.   Proprioception normal.     GAIT AND COORDINATION      Coordination   Finger to nose coordination: normal    Significant Labs: All pertinent lab results from the past 24 hours have  been reviewed.    Significant Studies: I have reviewed all pertinent imaging results/findings within the past 24 hours.

## 2022-07-07 NOTE — ASSESSMENT & PLAN NOTE
Ms. Arrieta is a 50 yo female with intractable epilepsy since age 18, s/p partial left anterior temporal lobe resection in 11/2018 with recurrence of seizures approximately 3 months after admitted to EMU for seizure capture and localization in discussion of repeat surgical workup. Patient is currently having approximately 4-5 events per month of loss of awareness and staring. Currently maintained on Lamotrigine 200 mg qAM/300 mg qPM, Cenobamate 150 mg daily.     - Continuous vEEG - 3 seizures 7/6 with right temporal onset. Continue vEEG, attempt to capture additional seizures for further localization information as part of pre-surgical workup  - Holding home Lamotrigine, Cenobamate since admission  - Lamotrigine level drawn on admission   - Activation procedures per protocol - medication adjustment as above  - IV Ativan PRN for GTC greater than 5 min - please call epilepsy on call before administering  - Seizure precautions, suction and oxygen at bedside  - Fall precautions, side rail padding in place  - Visi monitoring with continuous pulse oximetry   - Telemetry

## 2022-07-07 NOTE — PLAN OF CARE
Problem: Adult Inpatient Plan of Care  Goal: Plan of Care Review  Outcome: Ongoing, Progressing  Flowsheets (Taken 7/7/2022 1621)  Plan of Care Reviewed With:   patient   spouse  Goal: Patient-Specific Goal (Individualized)  Outcome: Ongoing, Progressing  Flowsheets (Taken 7/7/2022 1621)  Individualized Care Needs: family at bedside  Patient-Specific Goals (Include Timeframe): have a couple more events captured this stay for complete surgical workup     Problem: Seizure, Active Management  Goal: Absence of Seizure/Seizure-Related Injury  Outcome: Ongoing, Progressing  Intervention: Prevent Seizure-Related Injury  Flowsheets (Taken 7/7/2022 1621)  Seizure Precautions:   activity supervised   side rails padded   soft boundaries provided   clutter-free environment maintained   emergency equipment at bedside   enclosure bed used       POC reviewed with the patient/spouse and they verbalized understanding. All comments and concerns addressed. Bed locked in lowest position with bed alarm set, call light within reach. Safety precautions maintained -- seizure precautions. EEG monitoring in place. Visimobile/telemetry in place -- VSS, see flowsheets. No events noted this shift. Will continue to monitor for changes to POC and clinical condition.

## 2022-07-08 LAB — LAMOTRIGINE SERPL-MCNC: 6.6 UG/ML (ref 2–15)

## 2022-07-08 PROCEDURE — 25000003 PHARM REV CODE 250: Performed by: PHYSICIAN ASSISTANT

## 2022-07-08 PROCEDURE — 99233 SBSQ HOSP IP/OBS HIGH 50: CPT | Mod: ,,, | Performed by: PSYCHIATRY & NEUROLOGY

## 2022-07-08 PROCEDURE — 11000001 HC ACUTE MED/SURG PRIVATE ROOM

## 2022-07-08 PROCEDURE — 95720 EEG PHY/QHP EA INCR W/VEEG: CPT | Mod: ,,, | Performed by: PSYCHIATRY & NEUROLOGY

## 2022-07-08 PROCEDURE — 99233 PR SUBSEQUENT HOSPITAL CARE,LEVL III: ICD-10-PCS | Mod: ,,, | Performed by: PSYCHIATRY & NEUROLOGY

## 2022-07-08 PROCEDURE — 95720 PR EEG, W/VIDEO, CONT RECORD, I&R, >12<26 HRS: ICD-10-PCS | Mod: ,,, | Performed by: PSYCHIATRY & NEUROLOGY

## 2022-07-08 PROCEDURE — 95714 VEEG EA 12-26 HR UNMNTR: CPT

## 2022-07-08 RX ORDER — DIPHENHYDRAMINE HCL 50 MG
50 CAPSULE ORAL ONCE
Status: COMPLETED | OUTPATIENT
Start: 2022-07-09 | End: 2022-07-09

## 2022-07-08 RX ORDER — TRAMADOL HYDROCHLORIDE 50 MG/1
50 TABLET ORAL ONCE
Status: COMPLETED | OUTPATIENT
Start: 2022-07-09 | End: 2022-07-09

## 2022-07-08 RX ADMIN — SERTRALINE HYDROCHLORIDE 25 MG: 25 TABLET ORAL at 08:07

## 2022-07-08 NOTE — ASSESSMENT & PLAN NOTE
Ms. Arrieta is a 50 yo female with intractable epilepsy since age 18, s/p partial left anterior temporal lobe resection in 11/2018 with recurrence of seizures approximately 3 months after admitted to EMU for seizure capture and localization in discussion of repeat surgical workup. Patient is currently having approximately 4-5 events per month of loss of awareness and staring. Currently maintained on Lamotrigine 200 mg qAM/300 mg qPM, Cenobamate 150 mg daily.     - Continuous vEEG - 3 seizures 7/6 with right temporal onset. Continue vEEG, attempt to capture additional seizures for further localization information as part of pre-surgical workup  - Holding home Lamotrigine, Cenobamate since admission  - Lamotrigine level drawn on admission   - Activation procedures per protocol - medication adjustment as above, plan for sleep deprivation 7/8>7/9 with provoking medications 7/9 am  - IV Ativan PRN for GTC greater than 5 min - please call epilepsy on call before administering  - Seizure precautions, suction and oxygen at bedside  - Fall precautions, side rail padding in place  - Visi monitoring with continuous pulse oximetry   - Telemetry

## 2022-07-08 NOTE — PROGRESS NOTES
[facility]  Neurology-Epilepsy  Progress Note    Patient Name: Liza Arrieta  MRN: [id number]  Admission Date: 7/5/2022  Hospital Length of Stay: 3 days  Code Status: Full Code   Attending Provider: Scar Prince MD  Primary Care Physician: Rc Rodriguez MD   Principal Problem:Temporal lobe epilepsy, intractable    Subjective:     Hospital Course:   52 yo female with intractable epilepsy since age 18, s/p partial left anterior temporal lobe resection in 11/2018 with recurrence of seizures approximately 3 months after admitted to EMU for seizure capture and localization in discussion of repeat surgical workup. Patient is currently having approximately 4-5 events per month of loss of awareness and staring. Currently maintained on Lamotrigine 200 mg qAM/300 mg qPM, Cenobamate 150 mg daily.   7/5>7/6: Home Lamotrigine and Cenobamate held on admission. EEG with regional cortical dysfunction in the left temporal region as well as bilateral independent seizure foci in the left and right temporal lobes. Typical event 7/6 approximately 0957,  reported he noted patient with change in speech/confusion similar to after an event, no clear behavioral change during event.   7/6>7/7: EEG with regional cortical dysfunction in the left temporal region as well as bilateral independent seizure foci in the left and right temporal lobes. Three right temporal lobe seizures at 09:57, 13:55, and 20:21 on 7/6. Continue close vEEG monitoring and attempt to capture additional seizures to confirm localization for pre-surgical planning.  7/7>7/8: EEG with regional cortical dysfunction left temporal region, bilateral independent seizure foci in the left and right temporal lobes. No discrete seizures. Continue vEEG off all AEDs and attempt to capture additional seizures for pre-surgical planning. Plan for sleep deprivation 7/8>7/9 with provoking medications tomorrow morning.      Interval History: One event 7/8 at 1612 of weird  feeling, EEG without discrete seizure. Continue vEEG off all AEDs, attempt to capture additional seizures for further localization.    Current Facility-Administered Medications   Medication Dose Route Frequency Provider Last Rate Last Admin    acetaminophen tablet 650 mg  650 mg Oral Q4H PRN MARCIO Burrell-KEISHA        docusate sodium capsule 100 mg  100 mg Oral BID PRN Philly Fine, PA-C        lorazepam injection 2 mg  2 mg Intravenous Q10 Min PRN Philly Foy, PA-C        ondansetron disintegrating tablet 8 mg  8 mg Oral Q8H PRN Philly Fine, PA-C        sertraline tablet 25 mg  25 mg Oral Daily MARCIO Burrell-C   25 mg at 07/08/22 0830    sodium chloride 0.9% flush 10 mL  10 mL Intravenous PRN MARCIO Burrell-KEISHA         Continuous Infusions:    Review of Systems   Constitutional:  Negative for chills, fatigue and fever.   HENT:  Negative for trouble swallowing and voice change.    Eyes:  Negative for photophobia and visual disturbance.   Respiratory:  Negative for cough and shortness of breath.    Cardiovascular:  Negative for chest pain and leg swelling.   Gastrointestinal:  Negative for diarrhea, nausea and vomiting.   Musculoskeletal:  Negative for gait problem, neck pain and neck stiffness.   Skin:  Negative for pallor and rash.   Neurological:  Positive for seizures. Negative for facial asymmetry, speech difficulty, weakness and headaches.   Psychiatric/Behavioral:  Negative for agitation, confusion and hallucinations.    Objective:     Vital Signs (Most Recent):  Temp: 98.2 °F (36.8 °C) (07/08/22 0344)  Pulse: (!) 45 (07/08/22 0344)  Resp: 14 (07/08/22 0344)  BP: 112/78 (07/08/22 0344)  SpO2: (!) 94 % (07/08/22 0344)   Vital Signs (24h Range):  Temp:  [97.5 °F (36.4 °C)-98.8 °F (37.1 °C)] 98.2 °F (36.8 °C)  Pulse:  [45-60] 45  Resp:  [10-18] 14  SpO2:  [93 %-95 %] 94 %  BP: ()/(56-84) 112/78     Weight: 93 kg (205 lb)  Body mass index is 33.59 kg/m².    Physical Exam  Vitals and nursing note  reviewed.   Constitutional:       General: She is not in acute distress.     Appearance: She is not diaphoretic.   HENT:      Head: Normocephalic and atraumatic.   Eyes:      General: No scleral icterus.     Extraocular Movements: EOM normal.      Pupils: Pupils are equal, round, and reactive to light.   Pulmonary:      Effort: Pulmonary effort is normal. No respiratory distress.   Abdominal:      General: There is no distension.      Palpations: Abdomen is soft.   Musculoskeletal:         General: No deformity or signs of injury.      Cervical back: Normal range of motion. No rigidity.   Skin:     General: Skin is warm and dry.   Neurological:      Mental Status: She is alert and oriented to person, place, and time.      Motor: Motor strength is normal.      Coordination: Finger-Nose-Finger Test normal.   Psychiatric:         Mood and Affect: Mood normal.         Speech: Speech normal.         Behavior: Behavior normal.         Thought Content: Thought content normal.       NEUROLOGICAL EXAMINATION:     MENTAL STATUS   Oriented to person, place, and time.   Attention: normal. Concentration: normal.   Speech: speech is normal   Level of consciousness: alert    CRANIAL NERVES     CN II   Visual fields full to confrontation.     CN III, IV, VI   Pupils are equal, round, and reactive to light.  Extraocular motions are normal.     CN VII   Facial expression full, symmetric.     CN VIII   Hearing: intact    CN XI   CN XI normal.     CN XII   CN XII normal.     MOTOR EXAM   Muscle bulk: normal  Overall muscle tone: normal    Strength   Strength 5/5 throughout.     SENSORY EXAM   Light touch normal.   Proprioception normal.     GAIT AND COORDINATION      Coordination   Finger to nose coordination: normal    Significant Labs: All pertinent lab results from the past 24 hours have been reviewed.    Significant Studies: I have reviewed all pertinent imaging results/findings within the past 24 hours.    Assessment and Plan:      * Temporal lobe epilepsy, intractable  Ms. Arrieta is a 50 yo female with intractable epilepsy since age 18, s/p partial left anterior temporal lobe resection in 11/2018 with recurrence of seizures approximately 3 months after admitted to EMU for seizure capture and localization in discussion of repeat surgical workup. Patient is currently having approximately 4-5 events per month of loss of awareness and staring. Currently maintained on Lamotrigine 200 mg qAM/300 mg qPM, Cenobamate 150 mg daily.     - Continuous vEEG - 3 seizures 7/6 with right temporal onset. Continue vEEG, attempt to capture additional seizures for further localization information as part of pre-surgical workup  - Holding home Lamotrigine, Cenobamate since admission  - Lamotrigine level drawn on admission   - Activation procedures per protocol - medication adjustment as above, plan for sleep deprivation 7/8>7/9 with provoking medications 7/9 am  - IV Ativan PRN for GTC greater than 5 min - please call epilepsy on call before administering  - Seizure precautions, suction and oxygen at bedside  - Fall precautions, side rail padding in place  - Visi monitoring with continuous pulse oximetry   - Telemetry        VTE Risk Mitigation (From admission, onward)         Ordered     Place sequential compression device  Until discontinued         07/05/22 1244     Place LAURA hose  Until discontinued         07/05/22 1244     IP VTE LOW RISK PATIENT  Once         07/05/22 1244                Philly Foy PA-C  Neurology-Epilepsy  [facility]  Staff: Dr. Prince

## 2022-07-08 NOTE — PLAN OF CARE
"  Problem: Adult Inpatient Plan of Care  Goal: Plan of Care Review  Outcome: Ongoing, Progressing     Problem: Adult Inpatient Plan of Care  Goal: Optimal Comfort and Wellbeing  Outcome: Ongoing, Progressing     Problem: Seizure, Active Management  Goal: Absence of Seizure/Seizure-Related Injury  Outcome: Ongoing, Progressing     Patient has had no seizure activity noted thus far this shift. Has slept well,  remains at the bedside. Refuses bed alarm, up independently to bathroom. EEG Cap remains in place. Bed in lowest position, SR"s up times 4 and padded for safety purposes. Call light in reach. VSS flow sheets completed, see for assessments.   "

## 2022-07-08 NOTE — SUBJECTIVE & OBJECTIVE
Interval History: One event 7/8 at 1612 of weird feeling, EEG without discrete seizure. Continue vEEG off all AEDs, attempt to capture additional seizures for further localization.    Current Facility-Administered Medications   Medication Dose Route Frequency Provider Last Rate Last Admin    acetaminophen tablet 650 mg  650 mg Oral Q4H PRN Philly Foy, PA-C        docusate sodium capsule 100 mg  100 mg Oral BID PRN Philly Fine, PA-C        lorazepam injection 2 mg  2 mg Intravenous Q10 Min PRN Philly Fine, PA-C        ondansetron disintegrating tablet 8 mg  8 mg Oral Q8H PRN Philly Fine, PA-C        sertraline tablet 25 mg  25 mg Oral Daily Philly Foy PA-C   25 mg at 07/08/22 0830    sodium chloride 0.9% flush 10 mL  10 mL Intravenous PRN Philly Foy PA-C         Continuous Infusions:    Review of Systems   Constitutional:  Negative for chills, fatigue and fever.   HENT:  Negative for trouble swallowing and voice change.    Eyes:  Negative for photophobia and visual disturbance.   Respiratory:  Negative for cough and shortness of breath.    Cardiovascular:  Negative for chest pain and leg swelling.   Gastrointestinal:  Negative for diarrhea, nausea and vomiting.   Musculoskeletal:  Negative for gait problem, neck pain and neck stiffness.   Skin:  Negative for pallor and rash.   Neurological:  Positive for seizures. Negative for facial asymmetry, speech difficulty, weakness and headaches.   Psychiatric/Behavioral:  Negative for agitation, confusion and hallucinations.    Objective:     Vital Signs (Most Recent):  Temp: 98.2 °F (36.8 °C) (07/08/22 0344)  Pulse: (!) 45 (07/08/22 0344)  Resp: 14 (07/08/22 0344)  BP: 112/78 (07/08/22 0344)  SpO2: (!) 94 % (07/08/22 0344)   Vital Signs (24h Range):  Temp:  [97.5 °F (36.4 °C)-98.8 °F (37.1 °C)] 98.2 °F (36.8 °C)  Pulse:  [45-60] 45  Resp:  [10-18] 14  SpO2:  [93 %-95 %] 94 %  BP: ()/(56-84) 112/78     Weight: 93 kg (205 lb)  Body mass index is 33.59  kg/m².    Physical Exam  Vitals and nursing note reviewed.   Constitutional:       General: She is not in acute distress.     Appearance: She is not diaphoretic.   HENT:      Head: Normocephalic and atraumatic.   Eyes:      General: No scleral icterus.     Extraocular Movements: EOM normal.      Pupils: Pupils are equal, round, and reactive to light.   Pulmonary:      Effort: Pulmonary effort is normal. No respiratory distress.   Abdominal:      General: There is no distension.      Palpations: Abdomen is soft.   Musculoskeletal:         General: No deformity or signs of injury.      Cervical back: Normal range of motion. No rigidity.   Skin:     General: Skin is warm and dry.   Neurological:      Mental Status: She is alert and oriented to person, place, and time.      Motor: Motor strength is normal.      Coordination: Finger-Nose-Finger Test normal.   Psychiatric:         Mood and Affect: Mood normal.         Speech: Speech normal.         Behavior: Behavior normal.         Thought Content: Thought content normal.       NEUROLOGICAL EXAMINATION:     MENTAL STATUS   Oriented to person, place, and time.   Attention: normal. Concentration: normal.   Speech: speech is normal   Level of consciousness: alert    CRANIAL NERVES     CN II   Visual fields full to confrontation.     CN III, IV, VI   Pupils are equal, round, and reactive to light.  Extraocular motions are normal.     CN VII   Facial expression full, symmetric.     CN VIII   Hearing: intact    CN XI   CN XI normal.     CN XII   CN XII normal.     MOTOR EXAM   Muscle bulk: normal  Overall muscle tone: normal    Strength   Strength 5/5 throughout.     SENSORY EXAM   Light touch normal.   Proprioception normal.     GAIT AND COORDINATION      Coordination   Finger to nose coordination: normal    Significant Labs: All pertinent lab results from the past 24 hours have been reviewed.    Significant Studies: I have reviewed all pertinent imaging results/findings  within the past 24 hours.

## 2022-07-08 NOTE — PROCEDURES
EEG REPORT      Liza Arrieta  [id number]  1970    DATE OF SERVICE: 7/7/2022    [facility] -3    METHODOLOGY      Extended electroencephalographic recording is made while the patient is ambulatory and continuing normal daily activities.  Electrodes are placed according to the International 10-20 placement system and included T1 and T2 electrode placement.  Twenty four (24) channels of digital signal (sampling rate of 512/sec) was simultaneously recorded from the scalp including EKG and eye monitors.  Recording band pass was 0.1 to 100 hz and all data was stored digitally on the recorder.  The patient is instructed to press an event button when clinical symptoms occur and write the symptoms into a diary. Activation procedures which include photic stimulation, hyperventilation and instructing patients to perform simple task are done in selected patients.        The EEG is displayed on a monitor screen and can be reformatted into different montages for evaluation.  The entire recoding is submitted for computer assisted analysis to detect spike and electrographic seizure activity.  The entire recording is visually reviewed and the times identified by computer analysis as being spikes or seizures are reviewed again.  Compresses spectral analysis (CSA) is also performed on the activity recorded from each individual channel.  This is displayed as a power display of frequencies from 0 to 30 Hz over time.   The CSA analysis is done and displayed continuously.  This is reviewed for asymmetries in power between homologous areas of the scalp and for presence of changes in power which canbe seen when seizures occur.  Sections of suspected abnormalities on the CSA is then compared with the original EEG recording.     Cobrain software was also utilized in the review of this study.  This software suite analyzes the EEG recording in multiple domains.  Coherence and rhythmicity is computed to identify EEG sections which may contain  organized seizures.  Each channel undergoes analysis to detect presence of spike and sharp waves which have special and morphological characteristic of epileptic activity.  The routine EEG recording is converted from spacial into frequency domain.  This is then displayed comparing homologous areas to identify areas of significant asymmetry.  Algorithm to identify non-cortically generated artifact is used to separate eye movement, EMG and other artifact from the EEG     Recording Times  Start on 7/7/2022  Stop on 7/8/2022    A total of 23:57:16 hours of EEG was recorded.      EEG FINDINGS:  Background activity:   The background rhythm was characterized by alpha and anterior dominant beta activity with a 10Hz posterior dominant alpha rhythm at 30-70 microvolts.     Symmetry and continuity: there is near continuous left temporal slowing as well as intermittent sharply contoured left temporal theta activity     Sleep:   Normal sleep transients including sleep spindles, K complexes, vertex waves were seen.    Activation procedures:   NA    Abnormal activity:   There are frequent right more than left temporal sharp waves and occasional left temporal rhythmic delta    IMPRESSION:   Abnormal EEG due to regional cortical dysfunction in the left temporal region as well as bilateral independent seizure foci in the left and right temporal lobes.      Scar Prince MD  Neurology-Epilepsy.  [facility].

## 2022-07-08 NOTE — PLAN OF CARE
POC reviewed with the patient and they verbalized understanding. All comments and concerns addressed. Bed locked in lowest position with bed alarm set, call light within reach. Safety precautions maintained. VSS, see flowsheets. No events this shift. Will continue to monitor for changes to POC and clinical condition.    Problem: Adult Inpatient Plan of Care  Goal: Plan of Care Review  Outcome: Ongoing, Progressing  Goal: Patient-Specific Goal (Individualized)  Outcome: Ongoing, Progressing  Goal: Absence of Hospital-Acquired Illness or Injury  Outcome: Ongoing, Progressing  Goal: Optimal Comfort and Wellbeing  Outcome: Ongoing, Progressing     Problem: Seizure, Active Management  Goal: Absence of Seizure/Seizure-Related Injury  Outcome: Ongoing, Progressing

## 2022-07-09 PROCEDURE — 95720 EEG PHY/QHP EA INCR W/VEEG: CPT | Mod: ,,, | Performed by: PSYCHIATRY & NEUROLOGY

## 2022-07-09 PROCEDURE — 95720 PR EEG, W/VIDEO, CONT RECORD, I&R, >12<26 HRS: ICD-10-PCS | Mod: ,,, | Performed by: PSYCHIATRY & NEUROLOGY

## 2022-07-09 PROCEDURE — 25000003 PHARM REV CODE 250: Performed by: PSYCHIATRY & NEUROLOGY

## 2022-07-09 PROCEDURE — 99233 PR SUBSEQUENT HOSPITAL CARE,LEVL III: ICD-10-PCS | Mod: ,,, | Performed by: PSYCHIATRY & NEUROLOGY

## 2022-07-09 PROCEDURE — 25000003 PHARM REV CODE 250: Performed by: PHYSICIAN ASSISTANT

## 2022-07-09 PROCEDURE — 95714 VEEG EA 12-26 HR UNMNTR: CPT

## 2022-07-09 PROCEDURE — 99233 SBSQ HOSP IP/OBS HIGH 50: CPT | Mod: ,,, | Performed by: PSYCHIATRY & NEUROLOGY

## 2022-07-09 PROCEDURE — 11000001 HC ACUTE MED/SURG PRIVATE ROOM

## 2022-07-09 PROCEDURE — 94760 N-INVAS EAR/PLS OXIMETRY 1: CPT

## 2022-07-09 RX ORDER — LAMOTRIGINE 100 MG/1
200 TABLET ORAL DAILY
Status: DISCONTINUED | OUTPATIENT
Start: 2022-07-09 | End: 2022-07-10 | Stop reason: HOSPADM

## 2022-07-09 RX ORDER — LAMOTRIGINE 150 MG/1
300 TABLET ORAL NIGHTLY
Status: DISCONTINUED | OUTPATIENT
Start: 2022-07-09 | End: 2022-07-10 | Stop reason: HOSPADM

## 2022-07-09 RX ADMIN — DIPHENHYDRAMINE HYDROCHLORIDE 50 MG: 50 CAPSULE ORAL at 07:07

## 2022-07-09 RX ADMIN — LAMOTRIGINE 300 MG: 150 TABLET ORAL at 09:07

## 2022-07-09 RX ADMIN — LAMOTRIGINE 200 MG: 100 TABLET ORAL at 10:07

## 2022-07-09 RX ADMIN — SERTRALINE HYDROCHLORIDE 25 MG: 25 TABLET ORAL at 08:07

## 2022-07-09 RX ADMIN — TRAMADOL HYDROCHLORIDE 50 MG: 50 TABLET, COATED ORAL at 07:07

## 2022-07-09 NOTE — PLAN OF CARE
POC reviewed and updated with the patient/caregiver. Questions regarding POC were encouraged and addressed with the patient/caregiver.  VSS, see flow-sheets. Tele and VISI maintained. Patient is AO X 4 at this time. Fall/safety precautions maintained, no signs of injury noted during shift. Successful sleep deprivation per provider order. Tramadol and Benadryl administered. Seizure precautions maintained, no events noted during shift. Patient was repositioned for comfort, bed locked in low position with side rails X 4, bed alarm set, with call light within reach. Instructed patient to call staff for mobility, verbalized understanding.  No acute signs of distress noted at this time.       Problem: Adult Inpatient Plan of Care  Goal: Plan of Care Review  Outcome: Ongoing, Progressing  Goal: Patient-Specific Goal (Individualized)  Outcome: Ongoing, Progressing  Goal: Absence of Hospital-Acquired Illness or Injury  Outcome: Ongoing, Progressing  Goal: Optimal Comfort and Wellbeing  Outcome: Ongoing, Progressing  Goal: Readiness for Transition of Care  Outcome: Ongoing, Progressing     Problem: Seizure, Active Management  Goal: Absence of Seizure/Seizure-Related Injury  Outcome: Ongoing, Progressing

## 2022-07-09 NOTE — PROGRESS NOTES
[facility] ([facility])  Neurology-Epilepsy  Progress Note    Patient Name: Liza Arrieta  MRN: [id number]  Admission Date: 7/5/2022  Hospital Length of Stay: 4 days  Code Status: Full Code   Attending Provider: Scar Prince MD  Primary Care Physician: Rc Rodriguez MD   Principal Problem:Temporal lobe epilepsy, intractable    Subjective:     Hospital Course:   52 yo female with intractable epilepsy since age 18, s/p partial left anterior temporal lobe resection in 11/2018 with recurrence of seizures approximately 3 months after admitted to EMU for seizure capture and localization in discussion of repeat surgical workup. Patient is currently having approximately 4-5 events per month of loss of awareness and staring. Currently maintained on Lamotrigine 200 mg qAM/300 mg qPM, Cenobamate 150 mg daily.   7/5>7/6: Home Lamotrigine and Cenobamate held on admission. EEG with regional cortical dysfunction in the left temporal region as well as bilateral independent seizure foci in the left and right temporal lobes. Typical event 7/6 approximately 0957,  reported he noted patient with change in speech/confusion similar to after an event, no clear behavioral change during event.   7/6>7/7: EEG with regional cortical dysfunction in the left temporal region as well as bilateral independent seizure foci in the left and right temporal lobes. Three right temporal lobe seizures at 09:57, 13:55, and 20:21 on 7/6. Continue close vEEG monitoring and attempt to capture additional seizures to confirm localization for pre-surgical planning.  7/7>7/8: EEG with regional cortical dysfunction left temporal region, bilateral independent seizure foci in the left and right temporal lobes. No discrete seizures. Continue vEEG off all AEDs and attempt to capture additional seizures for pre-surgical planning. Plan for sleep deprivation 7/8>7/9 with provoking medications tomorrow morning.  7/8>7/9: She was sleep deprived and  received benadryl and tramadol. EEG continues to show bilateral independent epileptiform activity in the right and left temporal lobes. A right onset seizure was captured at 14:04. The only clinical manifestation was confusion and the button was pressed about 30 minutes after the seizure occurred. Patient and  state they need to leave tomorrow for transportation reasons.       Interval History: as above    Current Facility-Administered Medications   Medication Dose Route Frequency Provider Last Rate Last Admin    acetaminophen tablet 650 mg  650 mg Oral Q4H PRN Philly Fine, PA-C        docusate sodium capsule 100 mg  100 mg Oral BID PRN Philly Fine, PA-C        lamoTRIgine tablet 200 mg  200 mg Oral Daily Izzy Torres MD        lamoTRIgine tablet 300 mg  300 mg Oral QHS Izzy Torres MD        lorazepam injection 2 mg  2 mg Intravenous Q10 Min PRN Philly Fine, PA-C        NON FORMULARY MEDICATION 150 mg  150 mg Oral Daily Izzy Torres MD        ondansetron disintegrating tablet 8 mg  8 mg Oral Q8H PRN Philly Fine, PA-C        sertraline tablet 25 mg  25 mg Oral Daily Philly Fine, PA-C   25 mg at 07/09/22 0856    sodium chloride 0.9% flush 10 mL  10 mL Intravenous PRN Philly Fine, PA-C         Continuous Infusions:    Review of Systems   Constitutional:  Negative for fever.   Neurological:  Positive for seizures.   Objective:     Vital Signs (Most Recent):  Temp: 97.7 °F (36.5 °C) (07/09/22 0747)  Pulse: (!) 57 (07/09/22 0747)  Resp: 18 (07/09/22 0747)  BP: 105/63 (07/09/22 0747)  SpO2: (!) 94 % (07/09/22 0747)   Vital Signs (24h Range):  Temp:  [97.7 °F (36.5 °C)-98.5 °F (36.9 °C)] 97.7 °F (36.5 °C)  Pulse:  [46-57] 57  Resp:  [12-18] 18  SpO2:  [93 %-96 %] 94 %  BP: ()/(63-81) 105/63     Weight: 93 kg (205 lb)  Body mass index is 33.59 kg/m².    Physical Exam     Awake, alert, speech normal. FNF normal. No ataxia. STARLA of BUE normal. No pronator drift.     Significant Labs: Lamictal 6.6. CMP  normal.   All pertinent lab results from the past 24 hours have been reviewed.    Significant Studies: MRI showed left temporal resection cavity. I have reviewed and interpreted all pertinent imaging results/findings within the past 24 hours.    Assessment and Plan:     * Temporal lobe epilepsy, intractable  Ms. Arrieta is a 50 yo female with intractable epilepsy since age 18, s/p partial left anterior temporal lobe resection in 11/2018 with recurrence of seizures approximately 3 months after admitted to EMU for seizure capture and localization in discussion of repeat surgical workup. Patient is currently having approximately 4-5 events per month of loss of awareness and staring. Currently maintained on Lamotrigine 200 mg qAM/300 mg qPM, Cenobamate 150 mg daily.     - Continuous vEEG - 3 seizures 7/6 with right temporal onset, 1 on 7/8 with right temporal onset. Continue vEEG, attempt to capture additional seizures for further localization information as part of pre-surgical workup. Patient and  state they need to go tomorrow. Will restart meds and plan for discharge tomorrow  - Will restart lamictal 200mg/300mg and xcopri 150 mg daily (will increase to 200 mg daily outpatient but pharmacy does not have in stock so cannot give inpatient). OK for patient to use her own medications  - Lamotrigine level drawn on admission   - No further activation procedures but she was sleep deprived last night  - IV Ativan PRN for GTC greater than 5 min - please call epilepsy on call before administering  - Seizure precautions, suction and oxygen at bedside  - Fall precautions, side rail padding in place  - Visi monitoring with continuous pulse oximetry   - Telemetry      The patient's AED regimen has been held and she was sleep deprived in an effort to capture clinical events, making inpatient admission medically necessary for patient safety.  This patient is felt to be high risk due to the likelihood of seizures during this  admission.     Documentation reviewed and verified and revised as appropriate.     VTE Risk Mitigation (From admission, onward)         Ordered     Place sequential compression device  Until discontinued         07/05/22 1244     Place LAURA hose  Until discontinued         07/05/22 1244     IP VTE LOW RISK PATIENT  Once         07/05/22 1244                Izzy Torres MD  Neurology-Epilepsy  [facility] - Neurosurgery ([facility])

## 2022-07-09 NOTE — ASSESSMENT & PLAN NOTE
Ms. Arrieta is a 52 yo female with intractable epilepsy since age 18, s/p partial left anterior temporal lobe resection in 11/2018 with recurrence of seizures approximately 3 months after admitted to EMU for seizure capture and localization in discussion of repeat surgical workup. Patient is currently having approximately 4-5 events per month of loss of awareness and staring. Currently maintained on Lamotrigine 200 mg qAM/300 mg qPM, Cenobamate 150 mg daily.     - Continuous vEEG - 3 seizures 7/6 with right temporal onset, 1 on 7/8 with right temporal onset. Continue vEEG, attempt to capture additional seizures for further localization information as part of pre-surgical workup. Patient and  state they need to go tomorrow. Will restart meds and plan for discharge tomorrow  - Will restart lamictal 200mg/300mg and xcopri 150 mg daily (will increase to 200 mg daily outpatient but pharmacy does not have in stock so cannot give inpatient). OK for patient to use her own medications  - Lamotrigine level drawn on admission   - No further activation procedures but she was sleep deprived last night  - IV Ativan PRN for GTC greater than 5 min - please call epilepsy on call before administering  - Seizure precautions, suction and oxygen at bedside  - Fall precautions, side rail padding in place  - Visi monitoring with continuous pulse oximetry   - Telemetry

## 2022-07-09 NOTE — SUBJECTIVE & OBJECTIVE
Interval History: as above    Current Facility-Administered Medications   Medication Dose Route Frequency Provider Last Rate Last Admin    acetaminophen tablet 650 mg  650 mg Oral Q4H PRN Philly Foy PA-C        docusate sodium capsule 100 mg  100 mg Oral BID PRN Philly Foy PA-C        lamoTRIgine tablet 200 mg  200 mg Oral Daily Izzy Torres MD        lamoTRIgine tablet 300 mg  300 mg Oral QHS Izzy Torres MD        lorazepam injection 2 mg  2 mg Intravenous Q10 Min PRN Philly Foy PA-C        NON FORMULARY MEDICATION 150 mg  150 mg Oral Daily Izzy Torres MD        ondansetron disintegrating tablet 8 mg  8 mg Oral Q8H PRN MARCIO Burrell-KEISHA        sertraline tablet 25 mg  25 mg Oral Daily MARCIO Burrell-C   25 mg at 07/09/22 0856    sodium chloride 0.9% flush 10 mL  10 mL Intravenous PRN MARCIO Burrell-KEISHA         Continuous Infusions:    Review of Systems   Constitutional:  Negative for fever.   Neurological:  Positive for seizures.   Objective:     Vital Signs (Most Recent):  Temp: 97.7 °F (36.5 °C) (07/09/22 0747)  Pulse: (!) 57 (07/09/22 0747)  Resp: 18 (07/09/22 0747)  BP: 105/63 (07/09/22 0747)  SpO2: (!) 94 % (07/09/22 0747)   Vital Signs (24h Range):  Temp:  [97.7 °F (36.5 °C)-98.5 °F (36.9 °C)] 97.7 °F (36.5 °C)  Pulse:  [46-57] 57  Resp:  [12-18] 18  SpO2:  [93 %-96 %] 94 %  BP: ()/(63-81) 105/63     Weight: 93 kg (205 lb)  Body mass index is 33.59 kg/m².    Physical Exam     Awake, alert, speech normal. FNF normal. No ataxia. STARLA of BUE normal. No pronator drift.     Significant Labs: Lamictal 6.6. CMP normal.   All pertinent lab results from the past 24 hours have been reviewed.    Significant Studies: MRI showed left temporal resection cavity. I have reviewed and interpreted all pertinent imaging results/findings within the past 24 hours.

## 2022-07-09 NOTE — PLAN OF CARE
Problem: Adult Inpatient Plan of Care  Goal: Plan of Care Review  Outcome: Ongoing, Progressing  Goal: Patient-Specific Goal (Individualized)  Outcome: Ongoing, Progressing  Goal: Absence of Hospital-Acquired Illness or Injury  Outcome: Ongoing, Progressing  Goal: Optimal Comfort and Wellbeing  Outcome: Ongoing, Progressing  Goal: Readiness for Transition of Care  Outcome: Ongoing, Progressing     Problem: Seizure, Active Management  Goal: Absence of Seizure/Seizure-Related Injury  Outcome: Ongoing, Progressing    POC reviewed with the patient and they verbalized understanding. All comments and concerns addressed. Bed locked in lowest position with bed alarm set, call light within reach. Safety precautions maintained. VSS, see flowsheets. Patient restarted on AEDs. No seizure events this shift. Will continue to monitor for changes to POC and clinical condition.

## 2022-07-10 VITALS
HEIGHT: 66 IN | WEIGHT: 205 LBS | DIASTOLIC BLOOD PRESSURE: 56 MMHG | BODY MASS INDEX: 32.95 KG/M2 | HEART RATE: 54 BPM | OXYGEN SATURATION: 95 % | SYSTOLIC BLOOD PRESSURE: 104 MMHG | TEMPERATURE: 99 F | RESPIRATION RATE: 18 BRPM

## 2022-07-10 PROCEDURE — 25000003 PHARM REV CODE 250: Performed by: PSYCHIATRY & NEUROLOGY

## 2022-07-10 PROCEDURE — 25000003 PHARM REV CODE 250: Performed by: PHYSICIAN ASSISTANT

## 2022-07-10 PROCEDURE — 99239 HOSP IP/OBS DSCHRG MGMT >30: CPT | Mod: ,,, | Performed by: PSYCHIATRY & NEUROLOGY

## 2022-07-10 PROCEDURE — 99239 PR HOSPITAL DISCHARGE DAY,>30 MIN: ICD-10-PCS | Mod: ,,, | Performed by: PSYCHIATRY & NEUROLOGY

## 2022-07-10 RX ADMIN — LAMOTRIGINE 200 MG: 100 TABLET ORAL at 09:07

## 2022-07-10 RX ADMIN — SERTRALINE HYDROCHLORIDE 25 MG: 25 TABLET ORAL at 09:07

## 2022-07-10 NOTE — PLAN OF CARE
POC reviewed and updated with the patient/caregiver. Questions regarding POC were encouraged and addressed with the patient/caregiver.  VSS, see flow-sheets. Tele and VISI maintained. Patient is AO X 4 at this time. Fall/safety precautions maintained, no signs of injury noted during shift. Seizure precautions maintained, no events noted during shift. Patient was repositioned for comfort, bed locked in low position with side rails X 4, bed alarm set, with call light within reach. Instructed patient to call staff for mobility, verbalized understanding.  No acute signs of distress noted at this time.        Problem: Adult Inpatient Plan of Care  Goal: Plan of Care Review  Outcome: Ongoing, Progressing  Goal: Patient-Specific Goal (Individualized)  Outcome: Ongoing, Progressing  Goal: Absence of Hospital-Acquired Illness or Injury  Outcome: Ongoing, Progressing  Goal: Optimal Comfort and Wellbeing  Outcome: Ongoing, Progressing  Goal: Readiness for Transition of Care  Outcome: Ongoing, Progressing     Problem: Seizure, Active Management  Goal: Absence of Seizure/Seizure-Related Injury  Outcome: Ongoing, Progressing

## 2022-07-10 NOTE — ASSESSMENT & PLAN NOTE
Ms. Arrieta is a 52 yo female with intractable epilepsy since age 18, s/p partial left anterior temporal lobe resection in 11/2018 with recurrence of seizures approximately 3 months after admitted to EMU for seizure capture and localization in discussion of repeat surgical workup. Patient is currently having approximately 4-5 events per month of loss of awareness and staring. Currently maintained on Lamotrigine 200 mg qAM/300 mg qPM, Cenobamate 150 mg daily.     - Continuous vEEG - 3 seizures 7/6 with right temporal onset, 1 on 7/8 with right temporal onset. Can DC EEG today and discharge as has been > 24 hours seizure-free  - Will continue lamictal 200mg/300mg and xcopri 150 mg daily (discussed increasing to 200 mg daily outpatient but patient would like to discuss with Dr. Mark before doing so). OK for patient to use her own medications since our inpatient pharmacy does not stock cenobamate.   - Lamotrigine level drawn on admission 6.6  - No further activation procedures   - IV Ativan PRN for GTC greater than 5 min - please call epilepsy on call before administering  - Seizure precautions, suction and oxygen at bedside  - Fall precautions, side rail padding in place  - Visi monitoring with continuous pulse oximetry   - Telemetry

## 2022-07-10 NOTE — PROCEDURES
EPILEPSY MONITORING UNIT VIDEO EEG REPORT     Date of service: 7/9/22-7/10/22  EEG Numbers: [facility] -5, 6     Introduction  Electroencephalographic (EEG) is recorded with electrodes placed   according to the International 10-20 placement system.  Thirty Two (32) channels   of digital signal, including T1 and T2 electrodes, are simultaneously recorded   from the scalp and may also include EKG, EMG and/or eye movement monitors.    Recording band pass was 0.1 to 512 Hz.  Digital video recording of the patient   is simultaneously recorded with the EEG.  The patient is instructed to report   clinical symptoms which may occur during the recording session.  EEG and video   recording are stored and archived in digital format.  Activation procedures,   which include photic stimulation, hyperventilation and instructing patients to   perform simple tasks, are done in selected patients.     The EEG is displayed on a monitor screen and can be reformatted into different   montages for evaluation.  The entire recoding is submitted for computer-assisted   analysis to detect spike and electrographic seizure activity.  The entire   recording is visually reviewed, and the times identified by computer analysis as   being spikes or seizures are reviewed again.     Compressed spectral analysis (CSA) is also performed on the activity recorded   from each individual channel.  This is displayed as a power display of   frequencies from 0 to 30 Hz over time.  The CSA analysis is done and displayed   continuously.  This is reviewed for asymmetries in power between homologous   areas of the scalp and for presence of changes in power which can be seen when   seizures occur.  Sections of suspected abnormalities on the CSA are then   compared with the original EEG recording.     [employer] software was also utilized in the review of this study.  This software   suite analyzes the EEG recording in multiple domains.  Coherence and rhythmicity   are  computed to identify EEG sections which may contain organized seizures.    Each channel undergoes analysis to detect presence of spike and sharp waves   which have special and morphological characteristics of epileptic activity.  The   routine EEG recording is converted from special into frequency domain.  This is   then displayed comparing homologous areas to identify areas of significant   asymmetry.  Algorithm to identify non-cortically generated artifact is used to   separate artifact from the EEG.     Recording Times  7/9/22 07:01-7/10/22 07:00 (23:55)  7/10/22 07:01-09:42 (2:40)  A total of 26 hours, 35 minutes was recorded.     Findings  BACKGROUND: The patient's background consists of a posteriorly dominant  10 Hz alpha rhythm, which is well-formed, well-modulated, and abolishes with eye opening.  There is asymmetry with increased amplitude waveforms over the left temporal region and focal delta to theta continuous slowing over the left temporal region.      During the course of the recording, the patient is noted to be awake, and subsequently becomes drowsy, and falls asleep with normal, symmetric sleep architecture present.      Hyperventilation and photic stimulation were not performed.      INTERICTAL: There are frequent bilateral independent right and left temporal sharp waves during wakefulness and sleep.      ICTAL: None during this monitoring session.     The patient pressed the event button on 7/10/22. She stated she did not know if she fell asleep or had a seizure. The EEG did not show any seizure activity but the patient had fallen asleep prior to the button push.      EKG demonstrates regular rhythm.     Interpretation  This is an abnormal EEG due to bilateral independent epileptiform discharges in the left and right temporal lobes and left temporal breach rhythm with asymmetry and focal slowing, consistent with her prior area of resection.      Please see prior and subsequent EEG reports for  details of the other days of EMU stay. In summary, the other EEGs showed similar background findings as above and also captured right temporal onset focal seizures.

## 2022-07-10 NOTE — DISCHARGE SUMMARY
[facility] - Neurosurgery ([facility])  Neurology-Epilepsy  Discharge Summary      Patient Name: Liza Arrieta  MRN: [id number]  Admission Date: 7/5/2022  Hospital Length of Stay: 5 days  Discharge Date and Time:  07/10/2022 10:01 AM  Attending Physician: Scar Prince MD   Discharging Provider: Izzy Torres MD  Primary Care Physician: Rc Rodriguez MD    HPI:   Ms. Arrieta is a 50 yo female with intractable epilepsy since age 18, s/p partial left anterior temporal lobectomy in 11/2018 admitted to EMU for further seizure localization as part of repeat surgical workup. After epilepsy surgery, patient reports of brief period of seizure freedom, before beginning to have seizures again approximately 3 months afterwards. She reports current seizures are different than her typical semiology prior to surgery. She describes current seizures as loss of awareness, and staring off. After her events, she is quickly back to baseline and is able to report that she had a seizure. No associated tongue biting, bowel/bladder incontinence. She endorses 6 seizures in March, 8 in April, 5 in May, and 4 in June. She is currently maintained on Lamotrigine 200 mg qAM/300 mg qPM and Cenobamate 150 mg daily. She feels that seizure frequency has slightly decreased since Cenobamate was increased to current dose. Unable to identify triggers for seizures other than missing medication. MRI brain completed 2018 showed evidence of left frontotemporal crani for partial left anterior temporal resection. EEG completed 5/30/22 noted mild regional or subcortical dysfunction in bilateral temporal lobes with possible seizure focus in right anterior temporal region. Admit to EMU for seizure capture and localization in consideration of repeat surgical options.       * No surgery found *     Indwelling Lines/Drains at time of discharge:   Lines/Drains/Airways     None               Hospital Course:   50 yo female with intractable epilepsy since age 18, s/p  partial left anterior temporal lobe resection in 11/2018 with recurrence of seizures approximately 3 months after admitted to EMU for seizure capture and localization in discussion of repeat surgical workup. Patient is currently having approximately 4-5 events per month of loss of awareness and staring. Currently maintained on Lamotrigine 200 mg qAM/300 mg qPM, Cenobamate 150 mg daily.   7/5>7/6: Home Lamotrigine and Cenobamate held on admission. EEG with regional cortical dysfunction in the left temporal region as well as bilateral independent seizure foci in the left and right temporal lobes. Typical event 7/6 approximately 0957,  reported he noted patient with change in speech/confusion similar to after an event, no clear behavioral change during event.   7/6>7/7: EEG with regional cortical dysfunction in the left temporal region as well as bilateral independent seizure foci in the left and right temporal lobes. Three right temporal lobe seizures at 09:57, 13:55, and 20:21 on 7/6. Continue close vEEG monitoring and attempt to capture additional seizures to confirm localization for pre-surgical planning.  7/7>7/8: EEG with regional cortical dysfunction left temporal region, bilateral independent seizure foci in the left and right temporal lobes. No discrete seizures. Continue vEEG off all AEDs and attempt to capture additional seizures for pre-surgical planning. Plan for sleep deprivation 7/8>7/9 with provoking medications tomorrow morning.  7/8>7/9: She was sleep deprived and received benadryl and tramadol. EEG continues to show bilateral independent epileptiform activity in the right and left temporal lobes. A right onset seizure was captured at 14:04. The only clinical manifestation was confusion and the button was pressed about 30 minutes after the seizure occurred. Patient and  state they need to leave tomorrow for transportation reasons.   7/9>7/10: No further seizures. Patient was restarted on  home medications. We discussed increasing cenobamate but the pharmacy did not have it in stock and patient would like to discuss with Dr. Mark before increasing outpatient (and she just got the 150 mg dose filled).       Goals of Care Treatment Preferences:  Code Status: Full Code      Consults:     Significant Labs: lamictal 6.6  All pertinent lab results from the past 24 hours have been reviewed.    Significant Studies: I have reviewed and interpreted all pertinent imaging results/findings within the past 24 hours. MRI brain shows left temporal resection cavity.     Pending Diagnostic Studies:     None        Final Active Diagnoses:    Diagnosis Date Noted POA    PRINCIPAL PROBLEM:  Temporal lobe epilepsy, intractable [G40.119] 12/19/2017 Yes      Problems Resolved During this Admission:       * Temporal lobe epilepsy, intractable  Ms. Arrieta is a 50 yo female with intractable epilepsy since age 18, s/p partial left anterior temporal lobe resection in 11/2018 with recurrence of seizures approximately 3 months after admitted to EMU for seizure capture and localization in discussion of repeat surgical workup. Patient is currently having approximately 4-5 events per month of loss of awareness and staring. Currently maintained on Lamotrigine 200 mg qAM/300 mg qPM, Cenobamate 150 mg daily.     - Continuous vEEG - 3 seizures 7/6 with right temporal onset, 1 on 7/8 with right temporal onset. Can DC EEG today and discharge as has been > 24 hours seizure-free  - Will continue lamictal 200mg/300mg and xcopri 150 mg daily (discussed increasing to 200 mg daily outpatient but patient would like to discuss with Dr. Mark before doing so). OK for patient to use her own medications since our inpatient pharmacy does not stock cenobamate.   - Lamotrigine level drawn on admission 6.6  - No further activation procedures   - IV Ativan PRN for GTC greater than 5 min - please call epilepsy on call before administering  - Seizure  precautions, suction and oxygen at bedside  - Fall precautions, side rail padding in place  - Visi monitoring with continuous pulse oximetry   - Telemetry        Discharged Condition: good    Disposition: Home or Self Care    Follow Up: With Dr. Mark    Patient Instructions:   Call outpatient neurologist with seizures.     Medications:  Reconciled Home Medications:      Medication List      CHANGE how you take these medications    XCOPRI 150 mg Tab  Generic drug: cenobamate  Take one tablet by mouth once daily.  What changed: Another medication with the same name was removed. Continue taking this medication, and follow the directions you see here.        CONTINUE taking these medications    lamoTRIgine 200 MG tablet  Commonly known as: LAMICTAL  Take 2.5 tablets (500 mg total) by mouth once daily.     sertraline 25 MG tablet  Commonly known as: ZOLOFT  Take 1 tablet (25 mg total) by mouth once daily.          Time spent on the discharge of patient: 40 minutes    Izzy Torres MD  Neurology-Epilepsy  [facility] - Neurosurgery ([facility])

## 2022-08-16 ENCOUNTER — OFFICE VISIT (OUTPATIENT)
Dept: NEUROLOGY | Facility: CLINIC | Age: 52
End: 2022-08-16
Payer: COMMERCIAL

## 2022-08-16 VITALS
SYSTOLIC BLOOD PRESSURE: 134 MMHG | BODY MASS INDEX: 34.11 KG/M2 | HEART RATE: 62 BPM | DIASTOLIC BLOOD PRESSURE: 80 MMHG | HEIGHT: 65 IN

## 2022-08-16 DIAGNOSIS — G40.119 TEMPORAL LOBE EPILEPSY, INTRACTABLE: Primary | ICD-10-CM

## 2022-08-16 PROCEDURE — 3079F DIAST BP 80-89 MM HG: CPT | Mod: CPTII,S$GLB,, | Performed by: PSYCHIATRY & NEUROLOGY

## 2022-08-16 PROCEDURE — 1160F PR REVIEW ALL MEDS BY PRESCRIBER/CLIN PHARMACIST DOCUMENTED: ICD-10-PCS | Mod: CPTII,S$GLB,, | Performed by: PSYCHIATRY & NEUROLOGY

## 2022-08-16 PROCEDURE — 99999 PR PBB SHADOW E&M-EST. PATIENT-LVL III: CPT | Mod: PBBFAC,,, | Performed by: PSYCHIATRY & NEUROLOGY

## 2022-08-16 PROCEDURE — 1160F RVW MEDS BY RX/DR IN RCRD: CPT | Mod: CPTII,S$GLB,, | Performed by: PSYCHIATRY & NEUROLOGY

## 2022-08-16 PROCEDURE — 3008F BODY MASS INDEX DOCD: CPT | Mod: CPTII,S$GLB,, | Performed by: PSYCHIATRY & NEUROLOGY

## 2022-08-16 PROCEDURE — 99214 PR OFFICE/OUTPT VISIT, EST, LEVL IV, 30-39 MIN: ICD-10-PCS | Mod: S$GLB,,, | Performed by: PSYCHIATRY & NEUROLOGY

## 2022-08-16 PROCEDURE — 3075F SYST BP GE 130 - 139MM HG: CPT | Mod: CPTII,S$GLB,, | Performed by: PSYCHIATRY & NEUROLOGY

## 2022-08-16 PROCEDURE — 99999 PR PBB SHADOW E&M-EST. PATIENT-LVL III: ICD-10-PCS | Mod: PBBFAC,,, | Performed by: PSYCHIATRY & NEUROLOGY

## 2022-08-16 PROCEDURE — 1159F PR MEDICATION LIST DOCUMENTED IN MEDICAL RECORD: ICD-10-PCS | Mod: CPTII,S$GLB,, | Performed by: PSYCHIATRY & NEUROLOGY

## 2022-08-16 PROCEDURE — 3075F PR MOST RECENT SYSTOLIC BLOOD PRESS GE 130-139MM HG: ICD-10-PCS | Mod: CPTII,S$GLB,, | Performed by: PSYCHIATRY & NEUROLOGY

## 2022-08-16 PROCEDURE — 3079F PR MOST RECENT DIASTOLIC BLOOD PRESSURE 80-89 MM HG: ICD-10-PCS | Mod: CPTII,S$GLB,, | Performed by: PSYCHIATRY & NEUROLOGY

## 2022-08-16 PROCEDURE — 3008F PR BODY MASS INDEX (BMI) DOCUMENTED: ICD-10-PCS | Mod: CPTII,S$GLB,, | Performed by: PSYCHIATRY & NEUROLOGY

## 2022-08-16 PROCEDURE — 1159F MED LIST DOCD IN RCRD: CPT | Mod: CPTII,S$GLB,, | Performed by: PSYCHIATRY & NEUROLOGY

## 2022-08-16 PROCEDURE — 99214 OFFICE O/P EST MOD 30 MIN: CPT | Mod: S$GLB,,, | Performed by: PSYCHIATRY & NEUROLOGY

## 2022-08-16 RX ORDER — CENOBAMATE 200 MG/1
200 TABLET, FILM COATED ORAL DAILY
Qty: 30 TABLET | Refills: 5 | Status: ON HOLD | OUTPATIENT
Start: 2022-08-16 | End: 2022-12-21 | Stop reason: HOSPADM

## 2022-08-16 NOTE — PROGRESS NOTES
Follow up:   Trigger - jogging   Semiology - loosing time   July - 8 sz   Aug - 6 sz     Prior note:   Still has recurrent seizures   Semiology - loosing time   She is very frustrated   May - had 5 seizures   Trigger - emotional distress     Prior note:   Episodes of loosing time   No aura   Triggered by stress and physical work out (jogging)     sz Hz - can go 3 wks sz free, up to 1 per day for 3 days      Semiology prior to surgery: fear -> strange feeling -> no MARAH      Prior note:   3-4 sz/week   Triggered by stress and physical work out (jogging)     Prior note:   Sz 2/month -> 4-8 per week (since last 2 months)   Aura - dejavu x 10 sec   10 min later - uncomfortable feeling x 30 sec      Prior note:   Last dose of briviact - last night   One hour later an hour episode of fear and whole body shaking   vimpat 100 mg BID x 7 days   No further sz      Prior note:   3 month Angry outburst, crying spells - breviact   Last sz - last week   semiology - weird feeling of fear'   Previous sz - loss of awareness      Pt of Dr. Elam      2020/08/25  The plan last visit was to hold perampanel for one week and the reduce dosing at one half the previous dose.  The emotionality has improved but still is present.  She may of had only one sz since the last visit.     2020/04/07  Since starting perampanel she has been emotion with crying spells.  Emotional outburst did not occur in the 1st 2-3 weeks after starting pyramidal.  She is on 2 mg per day.  The ER becoming more intense and is somewhat disruptive to the family.  Seizures are much better.  She will often have a very brief sensation and actually question whether she had a seizure or something else.  Clinically that is a good response but the side effects are not acceptable.  She has had no other significant interim medical problems.  She continues to take lamotrigine and the doses not been changed.     2019/11/22  The patient continues to do well.  However she does have  "brief sensation of fear but nothing else.  She does not have      2019/08/07  The patient is doing well but has an occasional aura which now a feeling of fear which lasts 30 sec.  Last was on Aug 1.  She is averaging 3 per months.  Surgery was Nov 4, 2018.  The first aura occurred on Dec 19, 2018.  Number by month were Jan - 4, Feb - 2, Mar - 2, Apr - 2, May - 4, Jun - 5, Jul - 3.       Results for GLORIA GURROLA (MRN [id number]) as of 8/7/2019 15:38    Ref. Range 12/14/2018 12:10 1/14/2019 14:58 3/26/2019 13:30   Lamotrigine Lvl Latest Ref Range: 2.0 - 15.0 ug/mL 13.0 15.7 (H) 19.3 (H)         2019/03/26  In February and March, she has had four episodes that she is concerned represent seizure. These are different than any prior episodes or sensation that she has experienced. She describes a feeling of intense fear, during which she tells herself "you're just having a seizure".  reports lasting about 15 seconds, during the events he reports patient seems to be staring off in a daydream. She will respond to questions, but slowly. Typically occurring in the evening, prior to taking Lamictal. She recalls these episodes afterwards. Currently taking Lamictal 500 mg qHS, reports she noticed these episodes after switching from BID to daily lamictal dosing. Lamictal level at prior clinic visit 15.7. Denies any episodes of visual motor apraxia or any of her prior typical seizures.      2019/01/14  The patient underwent a L temporal lobectomy 2 months ago.  She has experience none of her typical seizures.  She did experience Judi Vu twice since surgery.  In the past she had reported Judi Vu as an aura.  She had not experienced any episodes of visual motor apraxia (unable to move eyes in any direction). She takes lamictal 200 mg in the AM and 300 mg in the PM.  She will be converted to once a day dosing of 500 mg which she prefers to do in the evening.       She feels so much better now.  She reports feeling as if she is " "19 yo.     2018/05/29  Patient had another day of almost continuous seizures which was on May 09,2018.  She has failed several AEDs mainly due to side effects.  She experienced pharmacodynamic interaction and toxicity with Lamictal - Lacosamide combo.  She is tolerating the lamictal well         2018/04/09  The patient had 9 seizures recorded in last EMU visit all coming from L anterior temporal area.   MRI was normal but PET showed L mesial hypometabolism.  She is currently have almost daily seizures.  She has failed four AEDs is on Lamictal monotherapy.  She reports her verbal memory is terrible.  Review of labs show she was B12 deficient 4 yrs ago and she does not take any supplements.     Results for GLORIA GURROLA (MRN [id number]) as of 4/9/2018 16:23    Ref. Range 3/1/2016 14:45 12/19/2017 12:56 2/8/2018 18:12   Lamotrigine Lvl Latest Ref Range: 2.0 - 15.0 ug/mL 12.0 10.5 7.8      2018/01/29  EMU evaluation was completed in Dec and L temporal interictal spikes were recorded and one electrographic seizure was recorded arising from the L anterior temporal area.  Besides frequent seizures her major complaint is progressive memory loss.       HISTORY OF PRESENT ILLNESS (HPI)  Date: The   New Patient  Pt has been seeing [facility] for "complex partial sezures." First sz, in school, age 19. Was put on Dilantin and she did well for approx 15 years, until the seizures became active again. Thinks she may have had 2 classic "Grand Mal" seizures in her 20's, but since then, seizure types are those described below.     Currently, she is experiencing more than one event per week. Event type is described below. She has been failing her current treatment regimen as described below. May have tried other meds, but can't remember them now. Did not bring records yet.      Seizure Seminology  Seizure Type 1   Classification: Pass-out  Aura - Occasional auditory мария vu  Pt loses consciousness and falls or loses tone 1-2 " in  Post-ictal  Brief  Age of onset 19  Current Seizure Frequency - Several per week      Seizure Type 2  Classification: Complex Partial  Wanders around. Doesn't remember after.   Post-ictal  Brief  Current Seizure Frequency - Several per week     Seizure Triggers  Sleep Deprivation - None  Other medications - None  Psych/stress - None  Photic stimulation - None  Hyperventilation - None  Medical Problems - None  Menses - 3 days before period they get worse  Sensory Stimulation (light, sound, etc) - None  Missed dose of meds - None     AED Treatments  Present regimen   tabs 1 in AM and 1½ in PM      Prior treatments  clobazam (Onfi or Frizium, CLB) 20 mg QD  eslicarbazine (Aptiom, ESL) - seizure intensity worsened after 2 weeks Rx  lacosamide (Vimpat, LCS)   oxcarbazepine (Trileptal OXC)  3 month Angry outburst, crying spells - breviact    crying spells - Fycompa    BID  TPM 10ID  valproic acid (Depakote, VPA)   zonisamide (Zonegran, ZNA)  Vimpat  200 mg BID - kaplan      Not tried  acetazolamide (Diamox, AZM)  amantadine  carbamazepine (Tegretol, CBZ)  ethosuximide (Zarontin, ESM)  felbamate (felbatol, FBM)  gabapentin (Neurontin, GPN)  levetiracetam (Keppra, LEV)  methsuximide (Celontin, MSM)   phenobarbital (Pb)  pregabalin (Lyrica, PGB)  primidone (Mysoline, PRM)  retigabine (Potiga, RTG)  rufinamide (Banzel, RUF)  tiagabine (Gabatril, TGB)  viagabatrin, (Sabril, VGB)  vagal nerve stimulator (VNS)     Benzodiazepines  diazepam - rectal (Diastatl)  diazepam - oral (Valium, DZ)  clonazepam (Klonopin, CZP)  clorazepate (Tranxene, CLZ)  Ativan  Brain Stimulation  Vagal Nerve Stimulation-n/a  DBS- n/a     Compliance method  Memory - yes  Mom or Spouse - Yes  Pill Box - no  Dewayne calendar - no  Turn over medication bottle - no  Phone alarm - no     Seizure Evaluation  EEG Routine - Dont have  MRI/MRA - In past- she doesn't know results  EMU eval  2017/12/19-12/20- Patient with no events overnight. EEG  shows L anterior temporal spikes, most recorded at F7. None on the other side. At times, almost continuous L temporal interictal discharges at times.   2017/12/20- 11:56:23- clinical seizure, starting from L side on EEG. No epileptiform discharges noted. L temporal slowing and spikes noted. Consisted of brief moment of unresponsiveness.   2017/12/21- EEG showing L interical anterior temporal slowing with L temporal spikes. No clinical seizures since yesterday.   2017/12/21-12/22- Event on 12/21 at 18:55 showing patient talking on the phone and suddenly stopping, unable to speak and confused. EEG shows there is a rhythmic buildup in the L temporal chains. Patient is confused and is drowsy following event. No tonic/clonic activity present.      Admission Date: 7/5/2022  Hospital Length of Stay: 5 days  Discharge Date and Time:  07/10/2022 10:01 AM  HPI:   Ms. Arrieta is a 50 yo female with intractable epilepsy since age 18, s/p partial left anterior temporal lobectomy in 11/2018 admitted to EMU for further seizure localization as part of repeat surgical workup. After epilepsy surgery, patient reports of brief period of seizure freedom, before beginning to have seizures again approximately 3 months afterwards. She reports current seizures are different than her typical semiology prior to surgery. She describes current seizures as loss of awareness, and staring off. After her events, she is quickly back to baseline and is able to report that she had a seizure. No associated tongue biting, bowel/bladder incontinence. She endorses 6 seizures in March, 8 in April, 5 in May, and 4 in June. She is currently maintained on Lamotrigine 200 mg qAM/300 mg qPM and Cenobamate 150 mg daily.  Hospital Course:   50 yo female with intractable epilepsy since age 18, s/p partial left anterior temporal lobe resection in 11/2018 with recurrence of seizures approximately 3 months after admitted to EMU for seizure capture and localization in  discussion of repeat surgical workup. Patient is currently having approximately 4-5 events per month of loss of awareness and staring. Currently maintained on Lamotrigine 200 mg qAM/300 mg qPM, Cenobamate 150 mg daily.   7/5>7/6: Home Lamotrigine and Cenobamate held on admission. EEG with regional cortical dysfunction in the left temporal region as well as bilateral independent seizure foci in the left and right temporal lobes. Typical event 7/6 approximately 0957,  reported he noted patient with change in speech/confusion similar to after an event, no clear behavioral change during event.   7/6>7/7: EEG with regional cortical dysfunction in the left temporal region as well as bilateral independent seizure foci in the left and right temporal lobes. Three right temporal lobe seizures at 09:57, 13:55, and 20:21 on 7/6. Continue close vEEG monitoring and attempt to capture additional seizures to confirm localization for pre-surgical planning.  7/7>7/8: EEG with regional cortical dysfunction left temporal region, bilateral independent seizure foci in the left and right temporal lobes. No discrete seizures. Continue vEEG off all AEDs and attempt to capture additional seizures for pre-surgical planning. Plan for sleep deprivation 7/8>7/9 with provoking medications tomorrow morning.  7/8>7/9: She was sleep deprived and received benadryl and tramadol. EEG continues to show bilateral independent epileptiform activity in the right and left temporal lobes. A right onset seizure was captured at 14:04. The only clinical manifestation was confusion and the button was pressed about 30 minutes after the seizure occurred. Patient and  state they need to leave tomorrow for transportation reasons.   7/9>7/10: No further seizures. Patient was restarted on home medications. We discussed increasing cenobamate but the pharmacy did not have it in stock and patient would like to discuss with Dr. Mark before increasing  outpatient (and she just got the 150 mg dose filled).        CT/CTA Scan -   PET Scan -   Neuropsychological evaluation -   DEXA Scan     Potential Epilepsy Risk Factors:   Pregnancy/Labor/Delivery - full term uncomplicated pregnancy labor and vaginal delivery  Febrile seizures - none  Head injury - none  CNS infection - none   Stroke - none  Family Hx of Sz - none     PAST MEDICAL HISTORY:             Active Ambulatory Problems        Diagnosis  Date Noted        No Active Ambulatory Problems      Resolved Ambulatory Problems        Diagnosis  Date Noted        No Resolved Ambulatory Problems      No Additional Past Medical History          PAST SURGICAL HISTORY: No past surgical history on file.      FAMILY HISTORY: No family history on file.       SOCIAL HISTORY:                                                    Social History                             History    Social History      Marital Status:          Spouse Name:  N/A        Number of Children:  N/A      Years of Education:  N/A    Occupational History        Not on file.      Social History Main Topics      Smoking status:  Never Smoker      Smokeless tobacco:  Not on file      Alcohol Use:  No      Drug Use:  No      Sexual Activity:  Not on file    Other Topics  Concern          Not on file        Social History Narrative      No narrative on file             SUBSTANCE USE:          Social History    Social History Main Topics      Smoking status:  Never Smoker      Smokeless tobacco:  Not on file      Alcohol Use:  No      Drug Use:  No      Sexual Activity:  Not on file               History    Substance Use Topics      Smoking status:  Never Smoker      Smokeless tobacco:  Not on file      Alcohol Use:  No          ALLERGIES: Review of patient's allergies indicates no known allergies.         Review of Systems   Constitutional: Negative for fever, chills, weight loss, malaise/fatigue and diaphoresis.   HENT: Negative for  ear pain, hearing loss, nosebleeds and tinnitus.   Eyes: Negative for blurred vision, double vision, photophobia and pain.   Respiratory: Negative for cough, hemoptysis and shortness of breath.   Cardiovascular: Negative for chest pain, palpitations, orthopnea and leg swelling.   Gastrointestinal: Negative for heartburn, nausea, vomiting, abdominal pain, diarrhea, constipation and blood in stool.   Genitourinary: Negative for dysuria and hematuria.   Musculoskeletal: Negative for myalgias, joint pain and falls.   Skin: Negative for itching and rash.   Neurological: Positive for  sensory change and seizures. Negative for dizziness, tremors, speech change, focal weakness, loss of consciousness, weakness and headaches.   Endo/Heme/Allergies: Negative for environmental allergies. Does not bruise/bleed easily.   Psychiatric/Behavioral: Positive for memory loss. Negative for depression, hallucinations and substance abuse.  The patient is not nervous/anxious.      Neurologic Exam   Higher Cortical Function:   Patient is a well developed, pleasant, well groomed individual appearing their stated age  Oriented - intact to person, place and time and followed two step instruction correctly.   Fund of knowledge was appropriate.   Language - Speech was fluent without evidence for an aphasia.     IMPRESSION  1.         Emotional lability - AED vs refractory drugs   Impacts works   Works as [employer]   2.         S/p L temporal lobectomy 11/2018, sz free for 3 months only   Presurgery - semiology - staring spells   Post surgery Aura - dejavu x 10 sec; 10 min later - uncomfortable feeling x 30 sec   [facility] DIAGNOSIS:  BRAIN, LEFT ANTERIOR TEMPORAL LOBE, EXCISION ([id number]; [facility], [place], [place]; COLLECTED 11/19/2018):-Cortex with moderate cortical and subpial gliosis, and scattered thrombosed vessels and leptomeningeal mixed  inflammation.  BRAIN, LEFT HIPPOCAMPUS, EXCISION ([id number];  [facility], [place], [place];  COLLECTED 11/19/2018):-Fragments of hippocampus with focal neuronal loss and gliosis.  3.         Obesity  4,         F/h MS, ET     DISPOSITION:   1. Continue LTG 200mg 1 am and 1½ in pm  2,Xcopri 150 -> 200 mg - sedation  Options - Lyrica   3, cont zoloft 25 mg QHS      Discussed surgical options in detail - see Dr. Valdez,  T3 MRI, NP work up.

## 2022-08-17 ENCOUNTER — PATIENT MESSAGE (OUTPATIENT)
Dept: NEUROLOGY | Facility: CLINIC | Age: 52
End: 2022-08-17
Payer: COMMERCIAL

## 2022-08-19 ENCOUNTER — TELEPHONE (OUTPATIENT)
Dept: NEUROLOGY | Facility: CLINIC | Age: 52
End: 2022-08-19

## 2022-08-19 NOTE — TELEPHONE ENCOUNTER
----- Message from Miesha Traylor MA sent at 8/19/2022 11:14 AM CDT -----  Contact: [phone]  Patient is calling to verify her appt, it doesn't appear in [facility] and patient is concerned,, please and advise. Patient states please send message via [facility].

## 2022-08-23 DIAGNOSIS — R56.9 CONVULSIONS, UNSPECIFIED CONVULSION TYPE: ICD-10-CM

## 2022-08-23 DIAGNOSIS — G40.119 TEMPORAL LOBE EPILEPSY, INTRACTABLE: Primary | ICD-10-CM

## 2022-08-25 ENCOUNTER — OFFICE VISIT (OUTPATIENT)
Dept: NEUROSURGERY | Facility: CLINIC | Age: 52
End: 2022-08-25
Payer: COMMERCIAL

## 2022-08-25 VITALS
DIASTOLIC BLOOD PRESSURE: 63 MMHG | BODY MASS INDEX: 34.76 KG/M2 | WEIGHT: 208.88 LBS | SYSTOLIC BLOOD PRESSURE: 109 MMHG | HEART RATE: 49 BPM

## 2022-08-25 DIAGNOSIS — G40.119 TEMPORAL LOBE EPILEPSY, INTRACTABLE: ICD-10-CM

## 2022-08-25 PROCEDURE — 99999 PR PBB SHADOW E&M-EST. PATIENT-LVL III: ICD-10-PCS | Mod: PBBFAC,,, | Performed by: NEUROLOGICAL SURGERY

## 2022-08-25 PROCEDURE — 3074F SYST BP LT 130 MM HG: CPT | Mod: CPTII,S$GLB,, | Performed by: NEUROLOGICAL SURGERY

## 2022-08-25 PROCEDURE — 3078F PR MOST RECENT DIASTOLIC BLOOD PRESSURE < 80 MM HG: ICD-10-PCS | Mod: CPTII,S$GLB,, | Performed by: NEUROLOGICAL SURGERY

## 2022-08-25 PROCEDURE — 3008F BODY MASS INDEX DOCD: CPT | Mod: CPTII,S$GLB,, | Performed by: NEUROLOGICAL SURGERY

## 2022-08-25 PROCEDURE — 99205 PR OFFICE/OUTPT VISIT, NEW, LEVL V, 60-74 MIN: ICD-10-PCS | Mod: S$GLB,,, | Performed by: NEUROLOGICAL SURGERY

## 2022-08-25 PROCEDURE — 3078F DIAST BP <80 MM HG: CPT | Mod: CPTII,S$GLB,, | Performed by: NEUROLOGICAL SURGERY

## 2022-08-25 PROCEDURE — 99999 PR PBB SHADOW E&M-EST. PATIENT-LVL III: CPT | Mod: PBBFAC,,, | Performed by: NEUROLOGICAL SURGERY

## 2022-08-25 PROCEDURE — 1159F PR MEDICATION LIST DOCUMENTED IN MEDICAL RECORD: ICD-10-PCS | Mod: CPTII,S$GLB,, | Performed by: NEUROLOGICAL SURGERY

## 2022-08-25 PROCEDURE — 3074F PR MOST RECENT SYSTOLIC BLOOD PRESSURE < 130 MM HG: ICD-10-PCS | Mod: CPTII,S$GLB,, | Performed by: NEUROLOGICAL SURGERY

## 2022-08-25 PROCEDURE — 3008F PR BODY MASS INDEX (BMI) DOCUMENTED: ICD-10-PCS | Mod: CPTII,S$GLB,, | Performed by: NEUROLOGICAL SURGERY

## 2022-08-25 PROCEDURE — 1159F MED LIST DOCD IN RCRD: CPT | Mod: CPTII,S$GLB,, | Performed by: NEUROLOGICAL SURGERY

## 2022-08-25 PROCEDURE — 99205 OFFICE O/P NEW HI 60 MIN: CPT | Mod: S$GLB,,, | Performed by: NEUROLOGICAL SURGERY

## 2022-08-25 NOTE — PROGRESS NOTES
"Neurosurgery  History & Physical    SUBJECTIVE:     Chief Complaint: intractable epilepsy     History of Present Illness:  Liza Arrieta is a 51 y.o. right-handed female referred by Dr. Mark for evaluation of intractable epilepsy. The patient underwent left anterior temporal lobectomy after subdural strip and grid monitoring on 11/19/18. After surgery, she reports that her personality improved. She is "more open" and "got some of her life back." Petit mal seizures resolved postoperatively, but she began having complex partial seizures (self-resolved after 30 seconds-1 minute) while jogging about 3-4 months ago.     Neymar, her  who accompanies her today, states that she has always had seizures despite the surgery. He states that she had the typical auras and small focal events; lately, the seizure frequency and severity has been increasing (now complex partial). She keeps track of her seizures on an sam; triggers include exercise and stress.      She works as a . She graduated college and has her . She lives with her  and 3 of her 4 children: Harshal, Jackie, and Amy.     The patient's seizures began when she was 19. She has previously been a patient of [facility] and [facility].     She has previously failed: phenobarbital, dilantin, topamax, Keppra, among others.     The patient denies any bleeding, infectious, or anesthetic complications with any previous surgery. No AC/AP agents; she does take over-the-counter iron supplementation.     Review of patient's allergies indicates:  No Known Allergies    Current Outpatient Medications   Medication Sig Dispense Refill    cenobamate (XCOPRI) 150 mg Tab Take one tablet by mouth once daily. 30 tablet 5    cenobamate (XCOPRI) 200 mg Tab Take one tablet by mouth once daily at 6am. 30 tablet 5    lamoTRIgine (LAMICTAL) 200 MG tablet Take 2.5 tablets (500 mg total) by mouth once daily. 405 tablet 3    sertraline (ZOLOFT) 25 MG tablet " Take 1 tablet (25 mg total) by mouth once daily. 30 tablet 11     No current facility-administered medications for this visit.       Past Medical History:   Diagnosis Date    Convulsions     Epilepsy     Seizures      Past Surgical History:   Procedure Laterality Date    APPENDECTOMY       SECTION      4    CHOLECYSTECTOMY      CRANIOTOMY N/A 2018    Procedure: CRANIOTOMY for strip and grid;  Surgeon: Ruben Senior MD;  Location: [facility] OR [address] [place] [address];  Service: Neurosurgery;  Laterality: N/A;  toronto II, asa 2, type and screen, regular bed, Ellerbe, supine     CRANIOTOMY Left 2018    Procedure: CRANIOTOMY for grids and strips;  Surgeon: Ruben Senior MD;  Location: [facility] OR [address] [place] [address];  Service: Neurosurgery;  Laterality: Left;    HYSTERECTOMY      around  for pain ful periods     INSERTION OF SUBDURAL GRID AND STRIP ELECTRODES BY CRANIOTOMY Left 2018    Procedure: CRANIOTOMY, FOR SUBDURAL GRID AND STRIP ELECTRODE LEAD Removal.;  Surgeon: Ruben Senior MD;  Location: [facility] OR [address] [place] [address];  Service: Neurosurgery;  Laterality: Left;  needs microscope and stealth-cg    SURGICAL REMOVAL OF LOBE OF BRAIN Left 2018    Procedure: LOBECTOMY, BRAIN left temporal lobe.;  Surgeon: Ruben Senior MD;  Location: [facility] OR [address] [place] [address];  Service: Neurosurgery;  Laterality: Left;     Family History     Problem Relation (Age of Onset)    Heart disease Father    No Known Problems Mother        Social History     Socioeconomic History    Marital status:    Tobacco Use    Smoking status: Never Smoker    Smokeless tobacco: Never Used   Substance and Sexual Activity    Alcohol use: No     Alcohol/week: 0.0 standard drinks    Drug use: No    Sexual activity: Never     Social Determinants of Health     Financial Resource Strain: Unknown    Difficulty of Paying Living Expenses: Patient refused   Food Insecurity: Unknown    Worried About Running Out of Food in the Last Year: Patient refused    Ran  Out of Food in the Last Year: Patient refused   Transportation Needs: Unknown    Lack of Transportation (Medical): Patient refused    Lack of Transportation (Non-Medical): Patient refused   Physical Activity: Sufficiently Active    Days of Exercise per Week: 4 days    Minutes of Exercise per Session: 90 min   Stress: No Stress Concern Present    Feeling of Stress : Not at all   Social Connections: Unknown    Frequency of Communication with Friends and Family: More than three times a week    Frequency of Social Gatherings with Friends and Family: Once a week    Active Member of Clubs or Organizations: No    Attends Club or Organization Meetings: Patient refused    Marital Status:    Housing Stability: Unknown    Unable to Pay for Housing in the Last Year: Patient refused    Number of Places Lived in the Last Year: 1    Unstable Housing in the Last Year: Patient refused       Review of Systems   Constitutional: Negative for fever.   HENT: Negative for nosebleeds.    Eyes: Negative for visual disturbance.   Respiratory: Negative for shortness of breath.    Cardiovascular: Negative for chest pain.   Gastrointestinal: Negative for vomiting.   Endocrine: Negative for cold intolerance.   Genitourinary: Negative for difficulty urinating.   Musculoskeletal: Negative for neck pain.   Skin: Negative for color change.   Neurological: Positive for seizures.   Hematological: Does not bruise/bleed easily.   Psychiatric/Behavioral: Positive for dysphoric mood.       OBJECTIVE:     Vital Signs     There is no height or weight on file to calculate BMI.      Physical Exam:    Constitutional: She appears well-developed and well-nourished. No distress.     Eyes: EOM are normal.     Abdominal: Soft.     Skin: Skin displays no rash on extremities. Skin displays no lesions on extremities.   Well healed left temporal incision   Some temporalis atrophy      Psych/Behavior: She is alert. She is oriented to person, place,  and time.     Musculoskeletal:      Comments: No focal weakness     Neurological:        Coordination: She has normal finger to nose coordination.     Pulmonary: nonlabored respirations     Hematologic: no bruising noted     Diagnostic Results:  MRI personally reviewed     ASSESSMENT/PLAN:     Liza Arrieta is a 51 y.o. female who presents as a referral from Dr. Mark to discuss possible intracranial seizure surgery. She has already had left temporal lobectomy. She will need neuropsychological evaluation and MRI with contrast STEALTH protocol for operative planning purposes.      She requests that we postpone sEEG until after the Epilepsy Walk she organizes in the first weekend in November      I had a lengthy, detailed discussion with the patient and her  about the risks, benefits, and alternatives to intracranial localization for seizures.      We discussed that monitoring typically takes 1-2 weeks but may be longer or shorter depending on how long it takes for her to have concordant seizures.  We discussed that she will not be able to get out of bed while the electrodes are in her head, and that she will remain in the ICU during that time. She may require mittens (she pulled out her grids in 2018 while postictal). We also discussed that this is a diagnostic, not therapeutic, procedure, and that the definitive surgery would potentially be performed 4-8 weeks after the time of sEEG localization.      We discussed that there is approximately 20% rate of a symptomatic hemorrhage and approximately 1% rate of symptomatic hemorrhage from placement of sEEG electrode per review of the international literature.  They expressed understanding of this.      We also discussed the specific risks of the localization surgery. Risks include, but are not limited to, bleeding, pain, infection, scarring, need for further/repeat procedure, death, paralysis, stroke (including venous infarct)/damage to major blood vessels, leak of  cerebrospinal fluid, and hardware-related complications. There is a chance that we would fail to localize the seizures with the initial electrode plan, which would require placement of additional electrodes, or may not lead to definitive surgery. Informed consent was obtained of the patient with her  present.      She will need MRI stealth for operative planning purposes.      We discussed that further recommendations will depend on where, exactly, her seizure onset zone or zones are found to be. They expressed understanding.       I have encouraged them to contact the clinic in interim with any questions, concerns, or adverse clinical change.     I spent over an hour on this encounter, more than half in direct consultation.

## 2022-08-26 ENCOUNTER — PATIENT MESSAGE (OUTPATIENT)
Dept: NEUROSURGERY | Facility: CLINIC | Age: 52
End: 2022-08-26
Payer: COMMERCIAL

## 2022-08-26 ENCOUNTER — TELEPHONE (OUTPATIENT)
Dept: NEUROSURGERY | Facility: CLINIC | Age: 52
End: 2022-08-26
Payer: COMMERCIAL

## 2022-08-26 DIAGNOSIS — G40.219 COMPLEX PARTIAL EPILEPSY, INTRACTABLE: Primary | ICD-10-CM

## 2022-08-26 NOTE — PATIENT INSTRUCTIONS
Please use the Bactroban ointment twice daily in your nose for 5 days leading up to surgery. (You may use a Q-tip to apply a little bit of ointment inside each nostril.)    Please use the Hibiclens soap every other day in the shower for the 5 days before your surgery. For example, if surgery is on Monday, use the Hibiclens when you shower on Thursday, Saturday, and Monday morning.     We are asking you to do this to help reduce the chance of having an infection associated with surgery. Thank you!     Please do not take any blood thinners (including ibuprofen) the week before surgery

## 2022-08-29 ENCOUNTER — PATIENT MESSAGE (OUTPATIENT)
Dept: NEUROLOGY | Facility: CLINIC | Age: 52
End: 2022-08-29
Payer: COMMERCIAL

## 2022-08-29 ENCOUNTER — HOSPITAL ENCOUNTER (OUTPATIENT)
Dept: RADIOLOGY | Facility: HOSPITAL | Age: 52
Discharge: HOME | End: 2022-08-29
Attending: PSYCHIATRY & NEUROLOGY
Payer: COMMERCIAL

## 2022-08-29 ENCOUNTER — PATIENT MESSAGE (OUTPATIENT)
Dept: SURGERY | Facility: HOSPITAL | Age: 52
End: 2022-08-29
Payer: COMMERCIAL

## 2022-08-29 DIAGNOSIS — R56.9 CONVULSIONS, UNSPECIFIED CONVULSION TYPE: ICD-10-CM

## 2022-08-29 PROCEDURE — 70450 CT HEAD/BRAIN W/O DYE: CPT | Mod: TC

## 2022-08-29 PROCEDURE — 70450 CT HEAD WITHOUT CONTRAST: ICD-10-PCS | Mod: 26,,, | Performed by: RADIOLOGY

## 2022-08-29 PROCEDURE — 70450 CT HEAD/BRAIN W/O DYE: CPT | Mod: 26,,, | Performed by: RADIOLOGY

## 2022-08-30 ENCOUNTER — SOCIAL WORK (OUTPATIENT)
Dept: NEUROLOGY | Facility: CLINIC | Age: 52
End: 2022-08-30
Payer: COMMERCIAL

## 2022-08-30 NOTE — PROGRESS NOTES
"ARMIN placed call to patient [phone]     She has been having different kinds of seizures.     She had initial surgery 3 years ago.     My emotions are going up and down.       [employer] president will be there, and companies and donations.     She knows people who have had a couple surgeries in regards to epilepsy.     I am starting to get upset since after surgery I wasn't having seizures and they are going back up again.     She had driven for awhile and she stopped driving now and it feels as if she "loses her freedom."     She is considering RNS.     ARMIN asked what is the role of the walk.     It is to raise awareness, education, and finances for epilepsy surgery.     She realizes that after the walk people talked and people need a place to talk.     She has a non profit tax ID , tshirts.     "

## 2022-09-13 ENCOUNTER — PATIENT MESSAGE (OUTPATIENT)
Dept: NEUROLOGY | Facility: CLINIC | Age: 52
End: 2022-09-13
Payer: COMMERCIAL

## 2022-09-15 DIAGNOSIS — G40.219 COMPLEX PARTIAL EPILEPSY, INTRACTABLE: Primary | ICD-10-CM

## 2022-09-16 ENCOUNTER — TELEPHONE (OUTPATIENT)
Dept: NEUROLOGY | Facility: CLINIC | Age: 52
End: 2022-09-16
Payer: COMMERCIAL

## 2022-09-18 NOTE — PROGRESS NOTES
NEUROPSYCHOLOGY CONSULT (TELEHEALTH)  Referral Information  Name: Liza Arrieta  MRN: [id number]  : 1970  Age: 52 y.o.  Race: White  Gender: female  REFERRAL SOURCE: Seth Mark MD  DATE CONDUCTED: 2022  Billin - 60 minutes  Telemedicine:   The patient location is: Home  The provider location is: Home  The chief complaint/medical necessity leading to consultation/medical necessity is: cognitive decline in setting of epilepsy  Visit type: Virtual visit with synchronous audio and video  Total time spent with patient: 35 minutes  Each patient to whom he or she provides medical services by telemedicine is:  (1) informed of the relationship between the physician and patient and the respective role of any other health care provider with respect to management of the patient; and (2) notified that he or she may decline to receive medical services by telemedicine and may withdraw from such care at any time.  Consent/Emergency Plan: The patient expressed an understanding of the purpose of the evaluation and consented to all procedures. I informed the patient of limits to confidentiality and discussed an emergency plan.    NEUROPSYCHOLOGICAL EVALUATION - CONFIDENTIAL    SOURCES OF INFORMATION:  The following was gathered from a clinical interview with Ms. Liza Arrieta and review of the available medical records. Ms. Arrieta expressed an understanding of the purpose of the evaluation and consented to all procedures. Total licensed billing psychologists professional time including clinical interview, test administration and interpretation of tests administered by the billing psychologist, integration of test results and other clinical data, preparing the final report, and personally reporting results to the patient     SUMMARY/TREATMENT PLAN   Ms. Arrieta is a 52 year old female with epilepsy (onset at 19 years old). She underwent pre-surgical neuropsychological testing (Dr. NICK Giron) in 2018 and  "was diagnosed with mild neurocognitive disorder (verbal learning/memory was intact). She underwent left anterior temporal lobectomy in 11/2018. She followed-up with Dr. Giron a month after surgery as she was hyperverbal and tangential. These symptoms seem to have persisted since surgery based on this examiner's observation during the intake as well as her 's report of increased verbal disinhibition. Of note, Ms. Arrieta tends to frames this as a positive, feeling as though her emotions "woke up" following surgery. She is also perseverative per her , which was observed during this intake. She tends to fixate on how much surgery improved her life, frequently repeating herself to this examiner and others in her life.     Ms. Arrieta was referred for an updated neuropsychological evaluation due to a recent increase in seizure frequency. She was admitted to the [facility] in 7/2022 and had multiple seizures with right temporal onset. She is again undergoing work-up for surgical candidacy. She indicated that RNS is more likely than resective surgery at this time. Consistent with her very enthusiastic response to the previous surgery, Ms. Arrieta is also somewhat unusually optimistic about RNS, noting that she has even contacted the company to inform them that she would be interested in promoting their product (she runs an epilepsy support group). Her  indicated that this is consistent with her baseline personality, at least to some degree, as she was also very optimistic that the 2018 resective surgery would change her life ever before it happened. He does feel that she is more appreciative of this risks this time around, suspecting that she may have underestimated them during the first procedure.     Ms. Arrieta reports word finding and memory dysfunction, but is unsure if these symptoms are worse post-surgically. Mr. Arrieta has not observed much post-surgical cognitive decline. There are times " when he notices her struggling to formulate her thoughts, but feels this is most noticeable following a seizure. She is scheduled for neuropsychological testing on 10/11 for further evaluation of cognitive strengths/weaknesses. Full impressions to follow.       Diagnoses  Problem List Items Addressed This Visit          Neuro    Temporal lobe epilepsy, intractable    Mild neurocognitive disorder due to another medical condition - Primary    Current Assessment & Plan     Consent: No concerns about Ms. Wiley ability to consent to the proposed procedure, although it may be helpful for her providers to again review risks and possible outcomes as she seems overly optimistic at this time.    Driving: Recommend that she completely discontinue driving in the setting of epilepsy per state law.      Follow-up: Testing on 10/11.            Ms. Arrieta will be provided the results of the evaluation.     Thank you for allowing me to participate in Ms. Franco care.  If you have any questions, please contact me at [phone].    Clem Loza Psy.D., ABPP  Board Certified in Clinical Neuropsychology  Department of Neurology    HISTORY OF PRESENT ILLNESS: Ms. Liza Arrieta is a 52 y.o., right-handed, female with 16 years of education who was referred for a neuropsychological evaluation in the setting of intractable epilepsy with seizure onset at 19 years old s/p left anterior temporal lobectomy (11/2018).     4/2018 NEUROPSYCH RESULTS (VILLAGRAN): Overall, Ms. Wiley has  Mild Neurocognitive Disorder. Specific areas of weakness include: language (word finding, naming), visuospatial reasoning, visual memory, motor speed, and aspects of executive function (complex shifting attention, organization/planning). Assessment did not show a clear lateralizing profile. Aspects lateralized to the left hemisphere (greater than expected language dysfunction, much slower right hand dexterity), but other aspects (excellent verbal memory  "relative to lower visual memory and greater than expected visuospatial deficits) suggested no definite lateralizing findings.     12/2018 FOLLOW-UP (TYSHAWN):   --she reports talking a lot more frequently and more rapid speech; frequent shifts in topic when talking; more goal-directed behavior; slight increase in libido; faster thought process; she denied any actual full manic type symptoms;   --she added that she has intermittent and very brief random worry about suicide; again this appears more anxiety driven as she has no depression, no intent or plan for suicide; instead, she just seems more worried about the thought popping into her mind  --her symptoms appear to be a mix of anxiety and possible elevated mood; her presentation appeared more anxious than hypomanic  --she reported that she is just generally good mood because she is no longer having any seizures and is looking forward to being able to do all the things that she was prohibited from doing before.   corroborated this and denied any significant symptoms associated with jhonatan.  His symptom observations were also more consistent with just significant anxiety along with possible hyperactivity  --we discussed the reasons for these symptoms, which may include:  Elevated mood post surgery, possible emotional changes post surgery better temporary, medication effects (her concern), or a worsening of her longstanding anxiety disorder    9/2022 INTERVAL HISTORY:  -Per neurosurgery (Dr. Valdez) 8/2022: "The patient underwent left anterior temporal lobectomy after subdural strip and grid monitoring on 11/19/18. After surgery, she reports that her personality improved. She is "more open" and "got some of her life back." Petit mal seizures resolved postoperatively, but she began having complex partial seizures (self-resolved after 30 seconds-1 minute) while jogging about 3-4 months ago. Neymar, her  who accompanies her today, states that she has always " "had seizures despite the surgery. He states that she had the typical auras and small focal events; lately, the seizure frequency and severity has been increasing (now complex partial). She keeps track of her seizures on an sam; triggers include exercise and stress." She indicated that her seizure type change post-surgery as she now has brief periods of memory loss where she is still functional, but she has no memory of at least a several minute period of time.   -She was admitted to the [facility] in 7/2022. Per medical records from that admission: "Patient is currently having approximately 4-5 events per month of loss of awareness and staring. Currently maintained on Lamotrigine 200 mg qAM/300 mg qPM, Cenobamate 150 mg daily. Continuous vEEG - 3 seizures 7/6 with right temporal onset, 1 on 7/8 with right temporal onset"  -Ms. Arrieta is again a candidate for neurosurgical intervention. She indicated that RNS is the likely option at this point. 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 She referenced multiple times how much the first surgery improved her life, although she did not specify how other than saying she is happier.     -Regarding cognition, Ms. Arrieta is aware of memory and language difficulties, but she is unsure if she has experienced much post-surgical decline. She has trouble tracking her adult children's schedule, noting that she will repeatedly ask if they will be home for dinner. Her  has helped create a schedule so she can reference when she is wondering if they will be home.   -She has not noticed much change, if any, in her job performance. She is a  for the [employer] and indicated that her job is highly routinized. She also benefits from the use of multiple compensatory strategies, " "but does notice times when she can be error prone. For instance, one of her strategies is to complete a task as soon as she receives it in her email. However, there are times when she does the task too quickly in order to get it done, rather than taking her time to get it done well. Still, if anything, she feels as though her employer is giving her more, not less, responsibility.     -Ms. Arrieta is aware that she is more talkative and emotionally expressive post-surgery, but sees this as a positive, stating her emotions "woke up." Her  indicated that she does seem more confident since surgery, indicating that she was much more conscientious prior. For instance, she would be nervous about taking her children to the park of out fear she would have a seizure and someone would witness it. She is less nervous about these type of situations post-surgery and generally seems "less reserved." He finds that she is more likely to tell people "whatever is on her mind." She is very interested in learning new information about memory, epilepsy treatments, etc post surgery and frequently tells others about what she's learned, even when they don't ask. She either forgets what she has already told people or perseverates as she will repeat herself.   -She initially experienced periods of fear/"paranoia" following the first procedure, but these have resolved.   -Mr. Arrieta has not noticed much cognitive change post-surgically. Rather, he notices more transient memory and language difficulties following a seizure.     IADLS/DAILY FUNCTIONING:   Support System: Resides with  and 3 of 4 children.   Appointment Management: independent, she is hypervigilant about updating and checking her calendar. She doesn't forget meetings/appointments as a result.   Medication Compliance: independent, she keeps her medications organized and reported strong adherence.   Financial Management: Her  has been managing since her " "surgery. She thinks this was mainly due to transitioning the responsibility rather than cognitive decline.   Cooking: independent. She continues to cook regularly, primarily using a slow cooker.   Driving: She started driving after surgery to work and back (3 miles each way). She indicated that she "knows I shouldn't be doing it."    MEDICAL HISTORY: Ms. Arrieta  has a past medical history of Convulsions, Epilepsy, and Seizures. She feels more somnolent after a recent medication increase, noting that she quickly falls asleep after taking her evening medications.     NEUROIMAGIN2022 Head CT: "Evolution of previous remote operative change with craniotomy and left temporal lobectomy. Large encephalomalacia left temporal lobe with compensatory enlargement left temporal horn lateral ventricle. No evidence for acute intracranial hemorrhage or sulcal effacement to suggest large territory recent infarction"    2018 Brain MRI: "Ventricles and sulci are normal in size for age without evidence of hydrocephalus. Interval postsurgical change of left frontotemporal craniotomy for partial left anterior temporal lobe resection.  There is linear intrinsic high T1 signal lining the resection cavity compatible with postoperative blood products.  Scattered areas of FLAIR hyperintensity are seen within the sulci in the bilateral frontal lobes (axial series 8, images 19 and 22) which may reflect subarachnoid hemorrhage.  No acute hemorrhage, edema, or infarct.  No abnormal enhancement."    2018 Brain MRI: "Minimal T2/flair hyperintensity in posterior periventricular distribution, nonspecific, may reflect chronic microvascular ischemic changes or less likely  demyelinating process. No evident abnormality identified in the temporal lobes. No abnormal enhancing lesions."    2018 PET: "Decreased activity medial left temporal lobe suggesting mesial temporal sclerosis."    SUBSTANCE USE: Ms. Arrieta  reports that she has never " smoked. She has never used smokeless tobacco. She reports that she does not drink alcohol and does not use drugs.    CURRENT MEDICATIONS:    Current Outpatient Medications:     cenobamate (XCOPRI) 200 mg Tab, Take one tablet by mouth once daily at 6am., Disp: 30 tablet, Rfl: 5    lamoTRIgine (LAMICTAL) 200 MG tablet, Take 2.5 tablets (500 mg total) by mouth once daily., Disp: 405 tablet, Rfl: 3    sertraline (ZOLOFT) 25 MG tablet, Take 1 tablet (25 mg total) by mouth once daily., Disp: 30 tablet, Rfl: 11     PSYCHIATRIC HISTORY: History of post-partum depression previously treated with medication by PCP. She has never been in therapy/counseling. She denied anxiety. There are times when she does feel sad, especially at the idea of needing surgery again. However, she is optimistic about the outcome when thinking about how much better her life is after the first procedure. She also spoke with someone who's had 3 procedures, which helped her put it into context.     SUICIDAL IDEATION:  History: Denied  Active Suicidal Ideation:Denied  Plan/Intent: Denied    FAMILY HISTORY: family history includes Heart disease in her father; No Known Problems in her mother.    PSYCHOSOCIAL HISTORY:   Education:   Level Attained: Bachelor's degree from [employer].    Learning Difficulties: Denied. In fact, she was a straight A student.      Vocation:   Highest Attained:     Relationship Status:   : yes   Children: 4    MENTAL STATUS AND OBSERVATIONS:  APPEARANCE: Appropriately dressed/groomed.  ALERTNESS/ORIENTATION: Attentive and alert. Tangential and circumlocutious historian. She had a tendency to direct the conversation towards how much better she feels her life is post-surgery. She would also educate the provider on what she has learned about memory, including transition from short to long term memory, hippocampal functioning, memory strategies, etc.   GAIT/MOTOR: Unable to assess  SENSORY:  Corrective lenses.   SPEECH/LANGUAGE: Normal in rate, rhythm, tone, and volume. Expressive and receptive language were grossly intact.  STATED MOOD/AFFECT: Mood was euthymic.   INTERPERSONAL BEHAVIOR: Rapport was quickly and easily established   THOUGHT PROCESSES: Thoughts seemed logical and goal-directed.

## 2022-09-19 ENCOUNTER — OFFICE VISIT (OUTPATIENT)
Dept: NEUROLOGY | Facility: CLINIC | Age: 52
End: 2022-09-19
Payer: COMMERCIAL

## 2022-09-19 DIAGNOSIS — G40.119 TEMPORAL LOBE EPILEPSY, INTRACTABLE: ICD-10-CM

## 2022-09-19 DIAGNOSIS — F06.70 MILD NEUROCOGNITIVE DISORDER DUE TO ANOTHER MEDICAL CONDITION: Primary | ICD-10-CM

## 2022-09-19 PROCEDURE — 96116 NUBHVL XM PHYS/QHP 1ST HR: CPT | Mod: 95,,, | Performed by: PSYCHIATRY & NEUROLOGY

## 2022-09-19 PROCEDURE — 99499 NO LOS: ICD-10-PCS | Mod: 95,,, | Performed by: PSYCHIATRY & NEUROLOGY

## 2022-09-19 PROCEDURE — 96116 PR NEUROBEHAVIORAL STATUS EXAM BY PSYCH/PHYS: ICD-10-PCS | Mod: 95,,, | Performed by: PSYCHIATRY & NEUROLOGY

## 2022-09-19 PROCEDURE — 99499 UNLISTED E&M SERVICE: CPT | Mod: 95,,, | Performed by: PSYCHIATRY & NEUROLOGY

## 2022-09-20 ENCOUNTER — PATIENT MESSAGE (OUTPATIENT)
Dept: NEUROLOGY | Facility: CLINIC | Age: 52
End: 2022-09-20
Payer: COMMERCIAL

## 2022-09-20 ENCOUNTER — PATIENT MESSAGE (OUTPATIENT)
Dept: NEUROSURGERY | Facility: CLINIC | Age: 52
End: 2022-09-20
Payer: COMMERCIAL

## 2022-09-20 PROBLEM — F06.70 MILD NEUROCOGNITIVE DISORDER DUE TO ANOTHER MEDICAL CONDITION: Status: ACTIVE | Noted: 2018-04-26

## 2022-09-20 NOTE — ASSESSMENT & PLAN NOTE
Consent: No concerns about Ms. Arrieta' ability to consent to the proposed procedure, although it may be helpful for her providers to again review risks and possible outcomes as she seems overly optimistic at this time.    Driving: Recommend that she completely discontinue driving in the setting of epilepsy per state law.      Follow-up: Testing on 10/11.

## 2022-09-21 ENCOUNTER — PATIENT MESSAGE (OUTPATIENT)
Dept: NEUROLOGY | Facility: CLINIC | Age: 52
End: 2022-09-21
Payer: COMMERCIAL

## 2022-09-21 ENCOUNTER — PATIENT MESSAGE (OUTPATIENT)
Dept: SURGERY | Facility: HOSPITAL | Age: 52
End: 2022-09-21
Payer: COMMERCIAL

## 2022-10-03 ENCOUNTER — HOSPITAL ENCOUNTER (OUTPATIENT)
Dept: RADIOLOGY | Facility: HOSPITAL | Age: 52
Discharge: HOME | End: 2022-10-03
Attending: NEUROLOGICAL SURGERY
Payer: COMMERCIAL

## 2022-10-03 DIAGNOSIS — G40.119 TEMPORAL LOBE EPILEPSY, INTRACTABLE: ICD-10-CM

## 2022-10-03 PROCEDURE — 25500020 PHARM REV CODE 255: Performed by: NEUROLOGICAL SURGERY

## 2022-10-03 PROCEDURE — 70552 MRI BRAIN STEALTH W/O FIDUCIALS WITH CONTRAST: ICD-10-PCS | Mod: 26,,, | Performed by: RADIOLOGY

## 2022-10-03 PROCEDURE — A9585 GADOBUTROL INJECTION: HCPCS | Performed by: NEUROLOGICAL SURGERY

## 2022-10-03 PROCEDURE — 70552 MRI BRAIN STEM W/DYE: CPT | Mod: 26,,, | Performed by: RADIOLOGY

## 2022-10-03 PROCEDURE — 70552 MRI BRAIN STEM W/DYE: CPT | Mod: TC

## 2022-10-03 RX ORDER — GADOBUTROL 604.72 MG/ML
10 INJECTION INTRAVENOUS
Status: COMPLETED | OUTPATIENT
Start: 2022-10-03 | End: 2022-10-03

## 2022-10-03 RX ADMIN — GADOBUTROL 10 ML: 604.72 INJECTION INTRAVENOUS at 02:10

## 2022-10-10 NOTE — PROGRESS NOTES
"NEUROPSYCHOLOGY CONSULT  Referral Information  Name: Liza Arrieta  MRN: [id number]  : 1970  Age: 52 y.o.  Race: White  Gender: female  REFERRAL SOURCE: Seth Mark MD  DATE CONDUCTED: 10/11/2022  [place]illin - 60 minutes (2022), [id number] - 240 minutes (10/11/2022), [id number] - 357 minutes (10/11/2022)    NEUROPSYCHOLOGICAL EVALUATION - CONFIDENTIAL    SOURCES OF INFORMATION:  The following was gathered from a clinical interview with Ms. Liza Arrieta and review of the available medical records. Ms. Arrieta expressed an understanding of the purpose of the evaluation and consented to all procedures. Total licensed billing psychologists professional time including clinical interview, test administration and interpretation of tests administered by the billing psychologist, integration of test results and other clinical data, preparing the final report, and personally reporting results to the patient     SUMMARY/TREATMENT PLAN   Ms. Arrieta is a 52 year old female with epilepsy (onset at 19 years old). She underwent pre-surgical neuropsychological testing (Dr. NICK Giron) in 2018 and was diagnosed with mild neurocognitive disorder (verbal learning/memory was intact). She underwent left anterior temporal lobectomy in 2018. She followed-up with Dr. Giron a month after surgery as she was hyperverbal and tangential. These symptoms seem to have persisted since surgery based on this examiner's observation during the intake as well as her 's report of increased verbal disinhibition. Of note, Ms. Arrieta tends to frames this as a positive, feeling as though her emotions "woke up" following surgery. She is also perseverative per her , which was observed at multiple points during this evaluation process. She tends to fixate on how much surgery improved her life, despite having difficulty characterizing how it has improved.     Ms. Arrieta was referred for an updated " neuropsychological evaluation due to a recent increase in seizure frequency. She was admitted to the [facility] in 7/2022 and had multiple seizures with right temporal onset. She is again undergoing work-up for surgical candidacy. Multiple surgical options are being considered at this time with another pre-surgical DAWOOD as an additional option. Consistent with her very enthusiastic response to the previous surgery, Ms. Arrieta is also somewhat unusually optimistic about RNS, noting that she has even contacted the company to inform them that she would be interested in promoting their product (she runs an epilepsy support group). Her  indicated that this is consistent with her baseline personality, at least to some degree, as she was also very optimistic that the 2018 resective surgery would change her life ever before it happened. He does feel that she is more appreciative of this risks this time around, suspecting that she may have underestimated them during the first procedure.     Ms. Arrieta reports word finding and memory dysfunction, but is unsure if these symptoms are worse post-surgically. Mr. Arrieta has not observed much post-surgical cognitive decline. There are times when he notices her struggling to formulate her thoughts, but feels this is most noticeable following a seizure.    INTERVAL CHANGE:   -No apparent post-surgical cognitive decline when comparing 2018 and 2022 neuropsychological test results. In fact, most scores trended towards an improvement rather than decline, particularly in executive functioning and fine motor abilities.     LATERALIZATION: Possible left hemisphere dysfunction, as evidenced by reduced confrontation naming, vocabulary, and fund of information. No clear right hemisphere dysfunction.   -Confrontation naming in the exceptionally low range, but consistent with 2018 evaluation.   -Verbal learning/memory WNL, consistent with prior testing. 1 delayed visual memory score  was below expectation (consistent with prior testing), but other learning/memory scores WNL.   -Left = Right fine motor abilities  -Verbal reasoning/comprehension < Visual reasoning/comprehension (reversed split compared to 2018 evaluation)  -Intact visuospatial abilities     LOCALIZATION: Possible left temporal dysfunction, with no apparent change since 2018 surgery. 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 These symptoms are more behaviorally rather than cognitively apparent as she does not have clear executive dysfunction.     PSYCHIATRIC: No contraindications to the proposed procedure from a mental health perspective.     PRE-SURGICAL CONSIDERATIONS/RISKS:  -She may be at an increased risk for post-surgical memory decline depending on the procedure given her largely intact memory.   -Continue to reiterate the potential risks/outcomes associated with the proposed procedures as Ms. Arrieta may be overly optimistic at this time.     CONSENT: No concerns regarding Ms. Arrieta' ability to consent to the proposed procedure.    Diagnoses  Problem List Items Addressed This Visit          Neuro    Complex partial epilepsy with generalization and with intractable epilepsy    Mild neurocognitive disorder due to another medical condition - Primary    Current Assessment & Plan     Driving: Recommend that she completely discontinue driving in the setting of epilepsy per state law.      Surgical Team: Will review findings at surgical case conference.      Follow-up: 6-12 months following surgical intervention.           Other Visit Diagnoses       Complex partial epilepsy, intractable               Ms. Arrieta will be provided the results of the evaluation.     Thank you for allowing me to participate in Ms. Franco care.  If you have any questions, please contact me at [phone].    Clem Loza Psy.D., ABPP  Board Certified in Clinical  Neuropsychology  Department of Neurology    HISTORY OF PRESENT ILLNESS: Ms. Liza Arrieta is a 52 y.o., right-handed, female with 16 years of education who was referred for a neuropsychological evaluation in the setting of intractable epilepsy with seizure onset at 19 years old s/p left anterior temporal lobectomy (11/2018).     4/2018 NEUROPSYCH RESULTS (TYSHAWN): Overall, Ms. Arrieta' has  Mild Neurocognitive Disorder. Specific areas of weakness include: language (word finding, naming), visuospatial reasoning, visual memory, motor speed, and aspects of executive function (complex shifting attention, organization/planning). Assessment did not show a clear lateralizing profile. Aspects lateralized to the left hemisphere (greater than expected language dysfunction, much slower right hand dexterity), but other aspects (excellent verbal memory relative to lower visual memory and greater than expected visuospatial deficits) suggested no definite lateralizing findings.     12/2018 FOLLOW-UP (TYSHAWN):   --she reports talking a lot more frequently and more rapid speech; frequent shifts in topic when talking; more goal-directed behavior; slight increase in libido; faster thought process; she denied any actual full manic type symptoms;   --she added that she has intermittent and very brief random worry about suicide; again this appears more anxiety driven as she has no depression, no intent or plan for suicide; instead, she just seems more worried about the thought popping into her mind  --her symptoms appear to be a mix of anxiety and possible elevated mood; her presentation appeared more anxious than hypomanic  --she reported that she is just generally good mood because she is no longer having any seizures and is looking forward to being able to do all the things that she was prohibited from doing before.   corroborated this and denied any significant symptoms associated with jhonatan.  His symptom observations were  "also more consistent with just significant anxiety along with possible hyperactivity  --we discussed the reasons for these symptoms, which may include:  Elevated mood post surgery, possible emotional changes post surgery better temporary, medication effects (her concern), or a worsening of her longstanding anxiety disorder    9/2022 INTERVAL HISTORY:  -Per neurosurgery (Dr. Valdez) 8/2022: "The patient underwent left anterior temporal lobectomy after subdural strip and grid monitoring on 11/19/18. After surgery, she reports that her personality improved. She is "more open" and "got some of her life back." Petit mal seizures resolved postoperatively, but she began having complex partial seizures (self-resolved after 30 seconds-1 minute) while jogging about 3-4 months ago. Neymar, her  who accompanies her today, states that she has always had seizures despite the surgery. He states that she had the typical auras and small focal events; lately, the seizure frequency and severity has been increasing (now complex partial). She keeps track of her seizures on an sam; triggers include exercise and stress." She indicated that her seizure type change post-surgery as she now has brief periods of memory loss where she is still functional, but she has no memory of at least a several minute period of time.   -She was admitted to the [facility] in 7/2022. Per medical records from that admission: "Patient is currently having approximately 4-5 events per month of loss of awareness and staring. Currently maintained on Lamotrigine 200 mg qAM/300 mg qPM, Cenobamate 150 mg daily. Continuous vEEG - 3 seizures 7/6 with right temporal onset, 1 on 7/8 with right temporal onset"  -Ms. Arrieta is again a candidate for neurosurgical intervention. She indicated that RNS is the likely option at this point. 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 She referenced multiple times how much the first surgery improved her life, although she did not specify how beyond saying she is happier.     -Regarding cognition, Ms. Arrieta is aware of memory and language difficulties, but she is unsure if she has experienced much post-surgical decline. She has trouble tracking her adult children's schedule, noting that she will repeatedly ask if they will be home for dinner. Her  has helped create a schedule so she can reference when she is wondering if they will be home.   -She has not noticed much change, if any, in her job performance. She is a  for the [employer] and indicated that her job is highly routinized. She also benefits from the use of multiple compensatory strategies, but does notice times when she can be error prone. For instance, one of her strategies is to complete a task as soon as she receives it in her email. However, there are times when she does the task too quickly in order to get it done, rather than taking her time to get it done well. Still, if anything, she feels as though her employer is giving her more, not less, responsibility.     -Ms. Arrieta is aware that she is more talkative and emotionally expressive post-surgery, but sees this as a positive, stating her emotions "woke up." Her  indicated that she does seem more confident since surgery, stating that she was much more conscientious prior. For instance, she would be nervous about taking her children to the park of out fear she would have a seizure and someone would witness it. She is less nervous about these type of situations post-surgery and generally seems "less reserved." He finds that she is more likely to tell people "whatever is on her mind." She is very interested in learning new information about memory, epilepsy treatments, etc post surgery and frequently " "tells others about what she's learned, even when they don't ask. She either forgets what she has already told people or perseverates as she will repeat herself.   -She initially experienced periods of fear/"paranoia" following the first procedure, but these have resolved.   -Mr. Arrieta has not noticed much cognitive change post-surgically. Rather, he notices more transient memory and language difficulties following a seizure.     -Surgical considerations discussed during epilepsy team conference: "RNS of R mesial w/wo contct with L post resection cavity or other region, Laser ablation of post resection cavity or other region, Laser ablation of R non mesial structure, bryan CM RNS "    IADLS/DAILY FUNCTIONING:   Support System: Resides with  and 3 of 4 children.   Appointment Management: independent, she is hypervigilant about updating and checking her calendar. She doesn't forget meetings/appointments as a result.   Medication Compliance: independent, she keeps her medications organized and reported strong adherence.   Financial Management: Her  has been managing since her surgery. She thinks this was mainly due to transitioning the responsibility rather than cognitive decline.   Cooking: independent. She continues to cook regularly, primarily using a slow cooker.   Driving: She started driving after surgery to work and back (3 miles each way). She indicated that she "knows I shouldn't be doing it."    MEDICAL HISTORY: Ms. Arrieta  has a past medical history of Convulsions, Epilepsy, and Seizures. She feels more somnolent after a recent medication increase, noting that she quickly falls asleep after taking her evening medications.     NEUROIMAGING:  10/2022 Brain MRI: "Evolution of operative change from left temporoparietal craniotomy with left temporal partial lobectomy. No evidence for acute infarction or recent hemorrhage. Interval development of susceptibility in the right superior frontal sulcus " "compatible with component of superficial siderosis clinical correlation advised. Scattered T2 FLAIR signal hyperintensities supratentorial white matter most pronounced in the periatrial regions stable from remote priors which remain nonspecific and sequela of prior vascular insult remains in differential."    8/2022 Head CT: "Evolution of previous remote operative change with craniotomy and left temporal lobectomy. Large encephalomalacia left temporal lobe with compensatory enlargement left temporal horn lateral ventricle. No evidence for acute intracranial hemorrhage or sulcal effacement to suggest large territory recent infarction"    11/2018 Brain MRI: "Ventricles and sulci are normal in size for age without evidence of hydrocephalus. Interval postsurgical change of left frontotemporal craniotomy for partial left anterior temporal lobe resection.  There is linear intrinsic high T1 signal lining the resection cavity compatible with postoperative blood products.  Scattered areas of FLAIR hyperintensity are seen within the sulci in the bilateral frontal lobes (axial series 8, images 19 and 22) which may reflect subarachnoid hemorrhage.  No acute hemorrhage, edema, or infarct.  No abnormal enhancement."    2/2018 Brain MRI: "Minimal T2/flair hyperintensity in posterior periventricular distribution, nonspecific, may reflect chronic microvascular ischemic changes or less likely  demyelinating process. No evident abnormality identified in the temporal lobes. No abnormal enhancing lesions."    2/2018 PET: "Decreased activity medial left temporal lobe suggesting mesial temporal sclerosis."    SUBSTANCE USE: Ms. Arrieta  reports that she has never smoked. She has never used smokeless tobacco. She reports that she does not drink alcohol and does not use drugs.    CURRENT MEDICATIONS:    Current Outpatient Medications:     cenobamate (XCOPRI) 200 mg Tab, Take one tablet by mouth once daily at 6am., Disp: 30 tablet, Rfl: 5    " lamoTRIgine (LAMICTAL) 200 MG tablet, Take 2.5 tablets (500 mg total) by mouth once daily., Disp: 405 tablet, Rfl: 3    sertraline (ZOLOFT) 25 MG tablet, Take 1 tablet (25 mg total) by mouth once daily., Disp: 30 tablet, Rfl: 11     PSYCHIATRIC HISTORY: History of post-partum depression previously treated with medication by PCP. She has never been in therapy/counseling. She denied anxiety. There are times when she does feel sad, especially at the idea of needing surgery again. However, she is optimistic about the outcome when thinking about how much better her life is after the first procedure. She also spoke with someone who's had 3 procedures, which helped her put it into context.     SUICIDAL IDEATION:  History: Denied  Active Suicidal Ideation:Denied  Plan/Intent: Denied    FAMILY HISTORY: family history includes Heart disease in her father; No Known Problems in her mother.    PSYCHOSOCIAL HISTORY:   Education:   Level Attained: Bachelor's degree from [employer].    Learning Difficulties: Denied. In fact, she was a straight A student.      Vocation:   Highest Attained:     Relationship Status:   : yes   Children: 4    MENTAL STATUS AND OBSERVATIONS:  APPEARANCE: Appropriately dressed/groomed.  ALERTNESS/ORIENTATION: Attentive and alert. Tangential and circumlocutious historian. She had a tendency to direct the conversation towards how much better she feels her life is post-surgery. She would also educate the provider on what she has learned about memory, including transition from short to long term memory, hippocampal functioning, memory strategies, etc.   GAIT/MOTOR: Ambulated independently.   SENSORY: Unremarkable.   SPEECH/LANGUAGE: Normal in rate, rhythm, tone, and volume. Expressive and receptive language were grossly intact.  STATED MOOD/AFFECT: Mood was euthymic.   INTERPERSONAL BEHAVIOR: Rapport was quickly and easily established   THOUGHT PROCESSES: Thoughts seemed  logical and goal-directed.    BEHAVIORAL OBSERVATIONS: Scores on standalone and embedded PVTs were WNL. She had a tendency to become frustrated/upset on tests of learning/memory. She indicated that she is aware of memory issues, which she attributes to resective surgery. She occasionally became tearful during these tests. She required occasional redirection due to tangentiality. She tended to repeat the same stories and then apologize when the psychometrist redirected her to testing. She indicated that being more talkative was a post-surgical change.      APPENDIX/TEST RESULTS:  TESTS ADMINISTERED:  Clinical Interview and Review of Records, MSVT, [place] Cognitive Assessment (MoCA), Wechsler Adult Intelligence Scale - 4th edition (WAIS-IV, [facility] Demographically Adjusted Norms), Adiel Auditory Verbal Learning Test (RAVLT, Dolores Norms), Logical Memory and Visual Reproduction subtests from the WMS-IV ([facility] Demographically Adjusted Norms), Faces subtest from [employer] ([facility]), Naming subtest from the [facility], Robinson Naming Test ([facility] Norms), Controlled Oral Word Association Test ([facility] Norms), Animal Fluency ([facility] Norms), Trailmaking Test ([facility] Norms), Adiel Complex Figure (Copy Trial), [place] Card Sorting Test - 64 card version (WCST-64), Grooved Pegboard Test ([facility] Norms), Quality of Life in Epilepsy - 31 (QOLIE-31), Generalized Anxiety Disorder - 7 (BOZENA-7), and the Baez Depression Scale - 2nd edition (BDI-2).     Score Label T-Score Standard Score Z-Score Scaled Score %ile Rank   Exceptionally High > 70 > 130 > 2.0  > 16 > 98   Above Average 64-69 120-129 1.4-1.9 15 91-97   High Average 57-63 110-119 0.7-1.3 12-14 75-90   Average 44-56  0.6 to -0.6 8-11 25-74   Low Average 37-43 80-89 -1.3 to -0.7 6-7 9-24   Below Average 30-36 70-79 -2.0 to -1.4 4-5 2-8   Exceptionally Low < 30 < 70  < -2.0 < 4 < 2      Mental Status: Fully oriented to time and place.   10/2022 MoCA = 24/30      Pre-morbid/Baseline: Educational and vocational attainment suggestive of at least average range baseline abilities. Single word reading was in the low average range in 2018. Fund of information relative to her level of education was exceptionally low. Her ability to express the meaning of vocabulary words relative to her level fo education was low average.      Language: Average letter and semantic verbal fluency. Exceptionally low performance on a test of confrontation naming (BNT-60). Her performance was remarkable for semantic paraphasic errors. She was able to accurately describe most of the items and benefitted from phonemic cueing. Exceptionally low performance on a briefer confrontation naming test (NAB). She again offered several semantic paraphasic errors and benefitted from cueing.      Visuospatial: She accurately pelon a clock face. The clock hands were correctly placed, but were not the correct length. Below average performance on a block design test. Average performance on a visual puzzles test. Copy of a complex figure was WNL with no evidence of visuspatial dysfunction.      Learning/Memory: Overall encoding of a supraspan word list was average as she recalled 9, 11, 10, 14, and 12 of 15 words across the learning trials. She then encoded 7/15 words from a second, distracter list (high average). She freely recalled 10 words following exposure to the distracter list (average). Retention was intact following a long delay as she freely recalled 11 words (average). Recognition was low average as she correctly identified 14/15 words with 2 false positives. She encoded 5/5 words after 2 trials on the MoCA, freely recalling 1/5 following a brief delay. She recalled 3/4 words with categorical cueing and correctly identified the remaining word with multiple choice cueing. Overall encoding of two short stories was average. She retained 13/15 previously encoded details from the first story following a long  delay and 8/10 from the second story. Her overall recall was average. Responses to yes/no questions pertaining to the stories was high average.     Overall encoding of figures was average. She freely recalled only 2/5 figures following a long delay and her overall recall was below average. Recognition was average. Overall encoding of unfamiliar faces and their location on a grid was average. Retention was intact following a long delay and her overall recall was average.      Executive Functioning: One trial learning/encoding was . She provided 4/5 correct responses on a serial 7 subtraction task. Average performance on tests of processing speed and working memory. Below average performance on a test of conceptual/abstract reasoning. Average performance on a test of pattern identification. High average performance on a test requiring her to maintain a complex set. Performance on a card sorting test was below expectation due to an elevated number of perseverative errors. She quickly solved the first 3 categories, but then perseverated on the 3 category for the majority of the remaining test.     Motor: Fine motor abilities were low average, bilaterally.      Mood: Responses on a self-reported inventory were indicative of a mild degree of clinical anxiety. She did not endorse clinical depression, but noted that she cries more than in the past.     Ms. Arrieta complete a self-report inventory designed to assess quality of life in individuals with epilepsy. She reported a significant impact on her social and cognitive functioning. She also worries about medication effects.       Raw Score Type of Standardized Score Standardized Score Percentile/CP   MSVT  - - -   MSVT  - - -   MSVT Cons 100 - - -   MSVT PA 90 - - -   MSVT FR 65 - - -   ACS LM II Rec 27 - - -   ACS VR II Rec 6 - - -   ACS RDS 10 - - -   INTELLECTUAL FUNCTIONING Raw Score Type of Standardized Score Standardized Score Percentile/CP   WAIS-IV        VCI - T 30    JAYLEEN - T 41    WMI - T 45    PSI - T 47    FSIQ - T 37    GAI - T 33    Similarities 20 T 30    Vocabulary 34 T 38    Information 7 T 29    Block Design 24 T 31    Matrix Reasoning 19 T 48    Visual Puzzles 14 T 49    Digit Span 27 T 46          DS Forward 8 ss 7 16         DS Backward 11 ss 13 84         DS Sequence 8 ss 9 37         Longest Digit Forward 5 - - -         Longest Digit Backward 6 - - -         Longest Digit Sequence 6 - - -   Arithmetic 15 T 46    Symbol Search 29 T 42    Coding 81 T 53    COGNITIVE SCREENING Raw Score Type of Standardized Score Standardized Score Percentile/CP   MoCA 24 - - -   Orientation - Place 2/2 - - -   Orientation - Date 4/4 - - -   LANGUAGE FUNCTIONING Raw Score Type of Standardized Score Standardized Score Percentile/CP   WAIS-IV VCI - SS 85 16   WAIS-IV Similarities 20 ss 7 16   WAIS-IV Vocabulary 34 ss 9 37   WAIS-IV Information 7 ss 6 9   NAB Naming 26 Tscore 21 0.2   NAB Naming Percent Correct After Semantic Cuing 40 - - 61   NAB Naming Percent Correct After Phonemic Cuing 67 - - 54   BNT-60 41 Tscore 23 0.3   FAS 48 Tscore 51 54   Letter F  16 zscore FALSE 50   Animal Naming 24 Tscore 53 62   VISUOSPATIAL FUNCTIONING Raw Score Type of Standardized Score Standardized Score Percentile/CP   WAIS-IV JAYLEEN - SS 94 34   WAIS-IV Block Design 24 ss 6 9   WAIS-IV Matrix Reasoning 19 ss 11 63   WAIS-IV Visual Puzzles 14 ss 10 50   RCFT Copy 34 - - >16   RCFT Time to Copy 372 - - 2-5   VR Copy 42 - - 26-50   LEARNING & MEMORY Raw Score Type of Standardized Score Standardized Score Percentile/CP   RAVLT       Trial 1 9 Tscore 64 92   Trial 2 11 Tscore 59 82   Trial 3 10 Tscore 43 25   Trial 4 14 Tscore 59 82   Trial 5 12 Tscore 43 25   Trials 1-5 Total 56 Tscore 54 66   List B 7 Tscore 57 76   Trial 6 (short delay) 10 Tscore 44 27   30-min Recall (long delay) 11 Tscore 48 42   Recognition Hits 14 Tscore - -   Recognition FP Errors 2 Tscore - -   Recognition  Percentage Correct - Tscore 40 16   WMS-IV Subtests       LM I 25 T 45    LM II 21 T 46    LM Recognition 27 - - >75   VR I 35 T 46    VR II 12 T 34    VR II Recognition 6 - - 51-75   VR Copy 42 - - 26-50   [facility] Social Cognition       Faces I 96 ss 11 63   Faces II 26 ss 10 50   Faces Content 43 ss 11 63   Faces Spatial 44 ss 10 50   ATTENTION/WORKING MEMORY Raw Score Type of Standardized Score Standardized Score Percentile/CP   WAIS-IV WMI -  50   WAIS-IV Digit Span 27 ss 10 50         DS Forward 8 ss 7 16         DS Backward 11 ss 13 84         DS Sequence 8 ss 9 37         Longest Digit Forward 5 - - -         Longest Digit Backward 6 - - -         Longest Digit Sequence 6 - - -   WAIS-IV Arithmetic 15 ss 10 50   MENTAL PROCESSING SPEED Raw Score Type of Standardized Score Standardized Score Percentile/CP   WAIS-IV PSI -  63   WAIS-IV Symbol Search 29 ss 9 37   WAIS-IV Coding 81 ss 13 84   TMT A  28 Tscore 49 46   TMT A errors 1 - - -   EXECUTIVE FUNCTIONING Raw Score Type of Standardized Score Standardized Score Percentile/CP   TMT B 46 Tscore 58 79   TMT B errors 2 - - -   WCST-64   N/A    Total Correct 42 - - -   Total Errors 22 SS 85 16   Perseverative Resp. 17 SS 79 8   Perseverative Err. 14 SS 77 6   Nonperseverative Err. 8 SS 89 23   Concept. Level Response 42 SS 88 21   Categories Completed 3 - - >16   FMS 1 - - N/A   Learning to Learn -14.87 - - 6-10   WAIS-IV Similarities 20 ss 7 16   WAIS-IV Matrix Reasoning 19 ss 11 63   FRONTOMOTOR  Raw Score Type of Standardized Score Standardized Score Percentile/CP   GPT DH 80 Tscore 37 9   GPD NDH 86 Tscore 39 14   MOOD & PERSONALITY Raw Score Type of Standardized Score Standardized Score Percentile/CP   BDI-2 6 - - -   BOZENA-7 5 - - -   QOLIE-31       Seizure Worry 53.34 Tscore 48 42   Overall Quality of Life 77.5 Tscore 56 71   Emotional Well-Being 72 Tscore 52 60   Energy/Fatigue 70 Tscore 57 76   Cognitive 30.00 Tscore 37 9   Medication Effects 0.00  Tscore 32 3   Social Function 18 Tscore 32 3   Overall Score - Tscore 40 15

## 2022-10-11 ENCOUNTER — OFFICE VISIT (OUTPATIENT)
Dept: NEUROLOGY | Facility: CLINIC | Age: 52
End: 2022-10-11
Payer: COMMERCIAL

## 2022-10-11 DIAGNOSIS — G40.219 COMPLEX PARTIAL EPILEPSY WITH GENERALIZATION AND WITH INTRACTABLE EPILEPSY: ICD-10-CM

## 2022-10-11 DIAGNOSIS — F06.70 MILD NEUROCOGNITIVE DISORDER DUE TO ANOTHER MEDICAL CONDITION: Primary | ICD-10-CM

## 2022-10-11 DIAGNOSIS — G40.219 COMPLEX PARTIAL EPILEPSY, INTRACTABLE: ICD-10-CM

## 2022-10-11 PROCEDURE — 96139 PR PSYCH/NEUROPSYCH TEST ADMIN/SCORING, BY TECH, 2+ TESTS, EA ADDTL 30 MIN: ICD-10-PCS | Mod: S$GLB,,, | Performed by: PSYCHIATRY & NEUROLOGY

## 2022-10-11 PROCEDURE — 96132 NRPSYC TST EVAL PHYS/QHP 1ST: CPT | Mod: S$GLB,,, | Performed by: PSYCHIATRY & NEUROLOGY

## 2022-10-11 PROCEDURE — 99999 PR PBB SHADOW E&M-EST. PATIENT-LVL I: ICD-10-PCS | Mod: PBBFAC,,,

## 2022-10-11 PROCEDURE — 96133 PR NEUROPSYCHOLOGIC TEST EVAL SVCS, EA ADDTL HR: ICD-10-PCS | Mod: S$GLB,,, | Performed by: PSYCHIATRY & NEUROLOGY

## 2022-10-11 PROCEDURE — 99499 UNLISTED E&M SERVICE: CPT | Mod: S$GLB,,, | Performed by: PSYCHIATRY & NEUROLOGY

## 2022-10-11 PROCEDURE — 96138 PSYCL/NRPSYC TECH 1ST: CPT | Mod: S$GLB,,, | Performed by: PSYCHIATRY & NEUROLOGY

## 2022-10-11 PROCEDURE — 96138 PR PSYCH/NEUROPSYCH TEST ADMIN/SCORING, BY TECH, 2+ TESTS, 1ST 30 MIN: ICD-10-PCS | Mod: S$GLB,,, | Performed by: PSYCHIATRY & NEUROLOGY

## 2022-10-11 PROCEDURE — 99499 NO LOS: ICD-10-PCS | Mod: S$GLB,,, | Performed by: PSYCHIATRY & NEUROLOGY

## 2022-10-11 PROCEDURE — 96132 PR NEUROPSYCHOLOGIC TEST EVAL SVCS, 1ST HR: ICD-10-PCS | Mod: S$GLB,,, | Performed by: PSYCHIATRY & NEUROLOGY

## 2022-10-11 PROCEDURE — 96133 NRPSYC TST EVAL PHYS/QHP EA: CPT | Mod: S$GLB,,, | Performed by: PSYCHIATRY & NEUROLOGY

## 2022-10-11 PROCEDURE — 99999 PR PBB SHADOW E&M-EST. PATIENT-LVL I: CPT | Mod: PBBFAC,,,

## 2022-10-11 PROCEDURE — 96139 PSYCL/NRPSYC TST TECH EA: CPT | Mod: S$GLB,,, | Performed by: PSYCHIATRY & NEUROLOGY

## 2022-10-13 ENCOUNTER — TELEPHONE (OUTPATIENT)
Dept: PREADMISSION TESTING | Facility: HOSPITAL | Age: 52
End: 2022-10-13
Payer: COMMERCIAL

## 2022-10-13 DIAGNOSIS — Z01.818 PREOPERATIVE TESTING: Primary | ICD-10-CM

## 2022-10-13 NOTE — PRE-PROCEDURE INSTRUCTIONS
Patient stated has not had any problem with anesthesia in the past. Will need medical optimization by Dr. Dinero per Dr. Valdez, labs and ua.  Our  will call to set up these appts.   Preop instructions given. Hold aspirin, aspirin containing products, nsaids(aleve, advil, motrin, ibuprofen, naprosyn, naproxen, voltaren, diclofenac), vitamins and supplements one week prior to surgery.     May take Tylenol.  Medication instructions given:       Marleny Lewis RN 10/13/2022  1:34 PM   TAKE THE NIGHT BEFORE SURGERY.              cenobamate (XCOPRI) 200 mg Tab 30 tablet 5 8/16/2022     Sig: Take one tablet by mouth once daily at 6am.   Route: Oral   Prior authorization: Canceled - Other (No pa needed)             Marleny Lewis RN 10/13/2022  1:34 PM   Not currently taking.            lamoTRIgine (LAMICTAL) 200 MG tablet 405 tablet 3 2/10/2022 5/25/2022   Sig: Take 2.5 tablets (500 mg total) by mouth once daily.   Route: Oral             Marleny Lewis RN 10/13/2022  1:36 PM   TAKE IN AM OF SURGERY.              sertraline (ZOLOFT) 25 MG tablet 30 tablet 11 2/18/2022 2/18/2023   Sig: Take 1 tablet (25 mg total) by mouth once daily.   Route: Oral             Marleny Lewis RN 10/13/2022  1:36 PM   TAKE IN AM OF SURGERY.                 Your surgery has been scheduled for:_Monday 11/7/2022_________________________________________  Periop Center( [facility]) [phone]  You should report to:  ____[facility], located on the [place] side of the first floor of the           [facility] ([phone])  ___x_The Second Floor Surgery Center, located on the [facility] side of the            Second floor of the [facility] ([phone])  ____3rd Floor [facility] located on the [address] side of the [facility] [phone]  Please Note   Tell your doctor if you take Aspirin, products containing Aspirin, herbal medications  or blood thinners, such as Coumadin,  Ticlid, or Plavix.  (Consult your provider regarding holding or stopping before surgery).  Arrange for someone to drive you home following surgery.  You will not be allowed to leave the surgical facility alone or drive yourself home following sedation and anesthesia.  Before Surgery  Stop taking all vitamins/ herbal medications 14days prior to surgery  No Motrin/Advil (Ibuprofen) 7 days before surgery  No Aleve (Naproxen) 7 days before surgery  Stop Taking Asprin, products containing Asprin _7____days before surgery  Stop taking blood thinners_______days before surgery  No Goody's/BC  Powder 7 days before surgery  Refrain from drinking alcoholic beverages for 24hours before and after surgery  Stop or limit smoking _________days before surgery( nonsmoker)  You may take Tylenol for pain  Night before Surgery  Nothing to eat or drink after midnight.  Take a shower or bath (shower is recommended).  Bathe with Hibiclens soap or an antibacterial soap from the neck down.  If not supplied by your surgeon, hibiclens soap will need to be purchased over the counter in pharmacy.  Rinse soap off thoroughly.  Shampoo your hair with your regular shampoo  The Day of Surgery  NOTHING TO  DRINK 2 hours before arrival time. If you are told to take medication on the morning of surgery, it may be taken with a sip of water.   Take another bath or shower with hibiclens or any antibacterial soap, to reduce the chance of infection.  Take heart and blood pressure medications with a small sip of water, as advised by the perioperative team.  Do not take fluid pills  You may brush your teeth and rinse your mouth, but do not swall any additional water.   Do not apply perfumes, powder, body lotions or deodorant on the day of surgery.  Nail polish should be removed.  Do not wear makeup or moisturizer  Wear comfortable clothes, such as a button front shirt and loose fitting pants.  Leave all jewelry, including body piercings, and valuables at home.     Bring any devices you will neeed after surgery such as crutches or canes.  If you have sleep apnea, please bring your CPAP machine  In the event that your physical condition changes including the onset of a cold or respiratory illness, or if you have to delay or cancel your surgery, please notify your surgeon.   Directions given to the surgery center. Verbalizes understanding. Sent preop and med instructions to [facility] portal.

## 2022-10-13 NOTE — ANESTHESIA PAT ROS NOTE
10/13/2022  Liza Arrieta is a 52 y.o., female.      Pre-op Assessment          Review of Systems  Anesthesia Hx:  No problems with previous Anesthesia             Denies Family Hx of Anesthesia complications.    Denies Personal Hx of Anesthesia complications.                    Social:  Non-Smoker, Social Alcohol Use       Hematology/Oncology:    Oncology Normal    -- Anemia (H/O IRON DEF ANEMIA PER EPIC):                                  EENT/Dental:  EENT/Dental Normal           Cardiovascular:            Denies Angina.          Functional Capacity 4 METS, ABLE TO CLIMB 2 FLIGHTS OF STAIRS                   Hypertension (PATIENT DENIES HTN)  , Recent typical clinic B/P of 109/63       Pulmonary:  Pulmonary Normal      Denies Shortness of breath.  Denies Recent URI.                 Renal/:  Renal/ Normal                 Hepatic/GI:  Hepatic/GI Normal                 Musculoskeletal:   Musculoskeletal General/Symptoms:  Functional capacity is ambulatory without assistance.            Neurological:         Seizure Disorder (LAST SEIZURE ABOUT ONE WEEK AGO, FREQUENCY: 6-7 PER MONTH), Chronic Epilepsy                          Endocrine:        Metabolic Disorders, Obesity / BMI > 30  Dermatological:  Skin Normal    Psych:  Psychiatric Normal                       Anesthesia Assessment: Preoperative EQUATION    Planned Procedure: Procedure(s) (LRB):  1.) PLACEMENT OF THE FIDUCIAL SCREWS 2.) PLACEMENT OF THE BILATERAL SEEG ELECTRODES WITH BRANDEE ROBOT (Bilateral)  Requested Anesthesia Type:General  Surgeon: Ava Valdez MD  Service: Neurosurgery  Known or anticipated Date of Surgery:11/7/2022    Surgeon notes: reviewed    Electronic QUestionnaire Assessment completed via nurse interview with patient.        Triage considerations:     The patient has no apparent active cardiac condition (No unstable coronary  Syndrome such as severe unstable angina or recent [<1 month] myocardial infarction, decompensated CHF, severe valvular   disease or significant arrhythmia)    Previous anesthesia records:GETA and No problems  11/19/2018   CRANIOTOMY, FOR SUBDURAL GRID AND STRIP ELECTRODE LEAD Removal. (Left)   LOBECTOMY, BRAIN left temporal lobe. (Left: Head)   Airway/Jaw/Neck:  Airway Findings: Mouth Opening: Normal Tongue: Normal  General Airway Assessment: Adult  Mallampati: II  TM Distance: Normal, at least 6 cm  Jaw/Neck Findings:  Neck ROM: Normal ROM  Neck Findings: Normal    Airway Present Prior to Hospital Arrival?: No Placement Date: 11/19/18 Placement Time: 1241 Method of Intubation: Direct laryngoscopy Inserted by: CRNA Airway Device: Endotracheal Tube Mask Ventilation: Easy Intubated: Postinduction Blade: Cong #3 Airway Device Size: 7.5 Style: Cuffed Cuff Inflation: Minimal occlusive pressure Inflation Amount (mL): 5 Placement Verified By: Auscultation;Capnometry;ETT Condensation Grade: Grade I Complicating Factors: None Findings Post-Intubation: Positive EtCO2;Bilateral breath sounds;Atraumatic/Condition of teeth unchanged Depth of Insertion (cm): 20 Secured at: Teeth Complications: None Breath Sounds: Equal Bilateral Insertion attempts (enter comment if more than 2 attempts): 1 Removal Date: 11/19/18 Removal Time: 1737     Last PCP note: 3-6 months ago , outside [facility]   Subspecialty notes: Neurology, Neurosurgery, Psychiatry, OB/GYN    Other important co-morbidities: Obesity and EPILEPSY/SEIZURES       Tests already available:  Available tests,  within 3 months , within [facility] .   10/3/2022 MRI BRAIN STEALTH W/O FIDUCIALS, 8/29/2022 CT HEAD W/O CONTRAST          Instructions given. (See in Nurse's note)    Optimization:  Anesthesia Preop Clinic Assessment -not Indicated for this surgery    Medical Opinion Indicated          Plan:    Testing:  BMP, Hematology Profile, PT/INR, PTT, T&S, and UA         Consultation: Perioperative Hospitalist, Dr. Dinero per Dr. Valdez     Patient  has previously scheduled Medical Appointment:10/15 MRI    Navigation: Tests Scheduled. TBD             Consults scheduled.TBD             Results will be tracked by Preop Clinic.  11/3 Labs resulted and noted by Dr. Ann Thomas. Medical optimization by  on 11/12.  11/4 UA resulted and noted by Dr. Ann Thomas.  Marleny Lewis RN BSN

## 2022-10-14 ENCOUNTER — PATIENT OUTREACH (OUTPATIENT)
Dept: NEUROLOGY | Facility: CLINIC | Age: 52
End: 2022-10-14
Payer: COMMERCIAL

## 2022-10-14 DIAGNOSIS — G40.119 TEMPORAL LOBE EPILEPSY, INTRACTABLE: Primary | ICD-10-CM

## 2022-10-14 PROCEDURE — 99358 PR PROLONGED SERV,NO CONTACT,1ST HR: ICD-10-PCS | Mod: S$GLB,,, | Performed by: PSYCHIATRY & NEUROLOGY

## 2022-10-14 PROCEDURE — 99358 PROLONG SERVICE W/O CONTACT: CPT | Mod: S$GLB,,, | Performed by: PSYCHIATRY & NEUROLOGY

## 2022-10-14 NOTE — PROGRESS NOTES
Epilepsy Surgery Conference    Attendee physicians:  WILLIS Barnes MD PhD  P Kendra WATERMAN PhD  MD NAY Harris MD Meghan Finnegan, RN  MARCIO Chew PA K Spenser, PA      Date  10/14/22   MRN [id number]   Name  Liza Arrieta   Gender  Female   Age  52   Allergies NKDA   Physical Exam Unremarkable    BMI  34.1   PMH  s/p partial left anterior temporal lobe resection in 11/2018 with recurrence of seizures approximately 1 month after  Path: Cortex with moderate cortical and subpial gliosis, and scattered thrombosed vessels and leptomeningeal mixed inflammation.  Fragments of hippocampus with focal neuronal loss and gliosis   AEDs  Lamotrigine, Cenobamate    Failed AEDs Briviact  Fycompa   eslicarbazine (Aptiom, ESL) - seizure intensity worsened after 2 weeks Rx  lacosamide (Vimpat, LCS)   oxcarbazepine (Trileptal OXC)   BID  TPM 10ID  valproic acid (Depakote, VPA)   zonisamide (Zonegran, ZNA)   Compliance  Good    Age of onset  19 yrs   Clinical symptoms   Loss of awarenesss, sometimes weird feeling of fear    Prior to resection - strange feeling    Frequency  6-8 / month      EMU Evaluation                    EMU   2017/12/19-12/20- Patient with no events overnight. EEG shows L anterior temporal spikes, most recorded at F7. None on the other side. At times, almost continuous L temporal interictal discharges at times.   2017/12/20- 11:56:23- clinical seizure, starting from L side on EEG. No epileptiform discharges noted. L temporal slowing and spikes noted. Consisted of brief moment of unresponsiveness.   2017/12/21- EEG showing L interical anterior temporal slowing with L temporal spikes. No clinical seizures since yesterday.   2017/12/21-12/22- Event on 12/21 at 18:55 showing patient talking on the phone and suddenly stopping, unable to speak and confused. EEG shows there is a rhythmic buildup in the L temporal chains. Patient is confused and is drowsy following event. No  tonic/clonic activity present.       EMU  Admission Date: 7/5/2022- 07/10/2022   7/5>7/6: Home Lamotrigine and Cenobamate held on admission. EEG with regional cortical dysfunction in the left temporal region as well as bilateral independent seizure foci in the left and right temporal lobes. Typical event 7/6 approximately 0957,  reported he noted patient with change in speech/confusion similar to after an event, no clear behavioral change during event.   7/6>7/7: EEG with regional cortical dysfunction in the left temporal region as well as bilateral independent seizure foci in the left and right temporal lobes. Three right temporal lobe seizures at 09:57, 13:55, and 20:21 on 7/6.   7/7>7/8: EEG with regional cortical dysfunction left temporal region, bilateral independent seizure foci in the left and right temporal lobes. No discrete seizures.   7/8>7/9: She was sleep deprived and received benadryl and tramadol. EEG continues to show bilateral independent epileptiform activity in the right and left temporal lobes. A right onset seizure was captured at 14:04. The only clinical manifestation was confusion and the button was pressed about 30 minutes after the seizure occurred. 7/9>7/10: No further seizures. Patient was restarted on home medications.    MRI  10/3/22 - Interval postsurgical change of left frontotemporal craniotomy for partial left anterior temporal lobe resection.  There is linear intrinsic high T1 signal lining the resection cavity compatible with postoperative blood products.  Scattered areas of FLAIR hyperintensity are seen within the sulci in the bilateral frontal lobes (axial series 8, images 19 and 22) which may reflect subarachnoid hemorrhage.    DAWOOD  7/2018 bilateral support for receptive language      Source localization revealed scattered sources over the left basal temporal region, both laterally as well as mesio-basal.           PET 2/2018 - Decreased activity medial left temporal lobe  suggesting mesial temporal sclerosis.        Neuropsych testing  neuropsychological testing on 10/11 for further evaluation of cognitive strengths/weaknesses.   How would a successful surgery change your life? Be able to work, more independent    Personal Info     Occupation         Family support         Impression: underwent left anterior temporal lobectomy after subdural strip and grid monitoring on 11/19/18  Suspect - L TLE+ and possible R TLE    More tests:   The group debated about obtaining a repeat DAWOOD.  The data may be beneficial in dieting the placement of electrodes to sample targets in the right hemisphere.  This was proposed by 1 of the group members.  Other group members were interested in reviewing LAURA;  if this shows a valid cluster in the right hemisphere then there would be no need for an additional DAWOOD.  On the other hand if no clear cluster is identified we can present this option to the patient.  The advantage would be to use less electrodes in the right hemisphere based on DAWOOD  findings.  However given that DAWOOD  captures interictal epileptiform discharges over a septal.  It is still best practice to cover broader areas in the hemisphere of interest.    Plan:    SEEG bryan coverage  Cortical stimulation     Potential surgery:   RNS of R mesial w/wo contct with L post resection cavity or other region, Laser ablation of post resection cavity or other region, Laser ablation of R non mesial structure, bryan CM RNS       Total time 50 min

## 2022-10-15 ENCOUNTER — PATIENT MESSAGE (OUTPATIENT)
Dept: SURGERY | Facility: HOSPITAL | Age: 52
End: 2022-10-15
Payer: COMMERCIAL

## 2022-10-15 ENCOUNTER — HOSPITAL ENCOUNTER (OUTPATIENT)
Dept: RADIOLOGY | Facility: HOSPITAL | Age: 52
Discharge: HOME | End: 2022-10-15
Attending: PSYCHIATRY & NEUROLOGY
Payer: COMMERCIAL

## 2022-10-15 DIAGNOSIS — G40.119 TEMPORAL LOBE EPILEPSY, INTRACTABLE: ICD-10-CM

## 2022-10-15 PROCEDURE — 70551 MRI BRAIN STEM W/O DYE: CPT | Mod: TC

## 2022-10-15 PROCEDURE — 70551 MRI BRAIN STEM W/O DYE: CPT | Mod: 26,,, | Performed by: RADIOLOGY

## 2022-10-15 PROCEDURE — 70551 MRI BRAIN EPILEPSY WITHOUT CONTRAST: ICD-10-PCS | Mod: 26,,, | Performed by: RADIOLOGY

## 2022-10-17 PROCEDURE — 95957 EEG DIGITAL ANALYSIS: CPT | Mod: S$GLB,,, | Performed by: PSYCHIATRY & NEUROLOGY

## 2022-10-17 PROCEDURE — 95957 PR EEG DIGITAL ANALYSIS: ICD-10-PCS | Mod: S$GLB,,, | Performed by: PSYCHIATRY & NEUROLOGY

## 2022-10-19 NOTE — ASSESSMENT & PLAN NOTE
Driving: Recommend that she completely discontinue driving in the setting of epilepsy per state law.      Surgical Team: Will review findings at surgical case conference.      Follow-up: 6-12 months following surgical intervention.

## 2022-10-21 ENCOUNTER — OFFICE VISIT (OUTPATIENT)
Dept: NEUROLOGY | Facility: CLINIC | Age: 52
End: 2022-10-21
Payer: COMMERCIAL

## 2022-10-21 ENCOUNTER — PATIENT MESSAGE (OUTPATIENT)
Dept: NEUROLOGY | Facility: CLINIC | Age: 52
End: 2022-10-21
Payer: COMMERCIAL

## 2022-10-21 DIAGNOSIS — F06.70 MILD NEUROCOGNITIVE DISORDER DUE TO ANOTHER MEDICAL CONDITION: Primary | ICD-10-CM

## 2022-10-21 DIAGNOSIS — G40.219 COMPLEX PARTIAL EPILEPSY WITH GENERALIZATION AND WITH INTRACTABLE EPILEPSY: ICD-10-CM

## 2022-10-21 PROCEDURE — 99499 UNLISTED E&M SERVICE: CPT | Mod: S$GLB,,, | Performed by: PSYCHIATRY & NEUROLOGY

## 2022-10-21 PROCEDURE — 99499 NO LOS: ICD-10-PCS | Mod: S$GLB,,, | Performed by: PSYCHIATRY & NEUROLOGY

## 2022-10-21 NOTE — PROGRESS NOTES
NEUROPSYCHOLOGICAL EVALUATION - CONFIDENTIAL  FEEDBACK NOTE    On 10/21/2022, I provided Ms. Liza Arrieta and her  the neuropsychological evaluation results. Please see the full report for a comprehensive overview of the findings. Ms. Arrieta was provided a copy of the report and invited to call with additional questions.      Clem Loza Psy.D., ABPP  Board Certified in Clinical Neuropsychology  [facility] - Department of Neurology

## 2022-10-23 ENCOUNTER — PATIENT OUTREACH (OUTPATIENT)
Dept: NEUROLOGY | Facility: CLINIC | Age: 52
End: 2022-10-23
Payer: COMMERCIAL

## 2022-10-23 DIAGNOSIS — G40.119 TEMPORAL LOBE EPILEPSY, INTRACTABLE: Primary | ICD-10-CM

## 2022-10-23 NOTE — PROGRESS NOTES
EEG Source Imaging Report (CPT 12401)  ___________________________________________________________________________________________________________________    Patient: Liza Arrieta    MR No.: [id number]    Date: 10/17/2022  ___________________________________________________________________________________________________________________    DIGITAL ANALYSIS OF EEG    Analysis software: [employer] ([place], [place])    EEG Recording: EEG was recorded using the [employer] EEG software with a sampling frequency of 256 Hz.The system acquires raw data at 10 kHz and then applies a software second-order Infinite Impulse Response Monse low-pass filter at 40 % of the sampling frequency.      EEG Review: Electrodes with high impedance or prominent contamination were not used for review and subsequent analysis. EEG data were reviewed with a filter band 1-70 Hz and a 60 Hz notch filter. EEG was displayed in a common average reference montage. Segments of EEG containing typical spike and seizure patterns were identified and visually checked for artifacts.      Imaging: 3D BEM head model and pial brain surface were reconstructed from a T1-weighted volumetric MRI of the patient's head. EEG data were spatially co-registered with the image data.     Seizure Onset Analysis: Visual identification of the initial focal ictal rhythmic burst associated with clinical event. Filters were applied to narrow the band containing the frequency of the rhythmic burst such that the signal-to-noise ratio (SNR) was increased without altering the morphology of the rhythm. In order to increase the SNR, peaks of the rhythmic activity were checked for artifact contamination and averaged, or Independent Component Analysis was used to extract the defining voltage topography, either of which was then subjected to dipole source analysis.    Chato Analysis: A filter band of 1-30 Hz was used. If necessary, spikes of the same type were  checked for artifact contamination and averaged in order to increase the SNR. Source localization was performed at the earliest point where the SNR was at least 10 and the confidence ellipsoid of a fitted dipole was not larger than five cm3 within a sublobar region. Cortical current density analysis using orientational and source extension constraints was performed at the exact time of the calculated dipole source and values larger than 80 % of the maximum were displayed.  ___________________________________________________________________________________________________________________    RESULTS    EEG source of focal rhythmic burst at seizure onset: not performed     EEG source of interictal spikes: bee below     Dates of EEG studies utilized in this analysis: 22        Description of dipoles: Generated based on signal data from both electrodes distributions: (a) standard scalp 10/20 (b) subtemporal chain F10/FT10/T10/ on right and F9/FT9/T9/ on left.   The dipoles were projected on the patient's brain MRI (total #32)    Heat map(s) were generated from the same signal data     Sample images:   Individual spikes in various colors                     INTERPRETATION  The heat maps and the 3-dimensional spike source source data of the epileptiform activity on the right suggests involvement of orbifrontal, anterior insular, and ant temporal region. The heat maps and the 3-dimensional spike source source data of the epileptiform activity on the left suggests involvement of posterior resection margin of the temporal lobe involving predominately midddle and inferior temporal gyrus and adjacent sulcus.   Other distinct clusters of ED were noted. These include dipoles in the right insular, temporal pole, middle temporal gyrus, inferior temporal gyrus; and in the left insular cortex, ant fusifirm gyrus.     This study was performed to localize the spike onset for presurgical planning.   I spent 60 minutes  reviewing the individual spikes and images rendered from the analysis with the technologist/.      Seth Mark M.D.  Epilepsy Division

## 2022-10-24 ENCOUNTER — TELEPHONE (OUTPATIENT)
Dept: NEUROSURGERY | Facility: CLINIC | Age: 52
End: 2022-10-24
Payer: COMMERCIAL

## 2022-10-24 DIAGNOSIS — G40.219 COMPLEX PARTIAL EPILEPSY WITH GENERALIZATION AND WITH INTRACTABLE EPILEPSY: ICD-10-CM

## 2022-10-24 DIAGNOSIS — G40.219 COMPLEX PARTIAL EPILEPSY, INTRACTABLE: Primary | ICD-10-CM

## 2022-11-02 ENCOUNTER — OFFICE VISIT (OUTPATIENT)
Dept: INTERNAL MEDICINE | Facility: CLINIC | Age: 52
End: 2022-11-02
Payer: COMMERCIAL

## 2022-11-02 ENCOUNTER — HOSPITAL ENCOUNTER (OUTPATIENT)
Dept: RADIOLOGY | Facility: HOSPITAL | Age: 52
Discharge: HOME | End: 2022-11-02
Attending: NEUROLOGICAL SURGERY
Payer: COMMERCIAL

## 2022-11-02 VITALS
DIASTOLIC BLOOD PRESSURE: 76 MMHG | SYSTOLIC BLOOD PRESSURE: 122 MMHG | WEIGHT: 210.5 LBS | HEART RATE: 68 BPM | BODY MASS INDEX: 33.83 KG/M2 | HEIGHT: 66 IN | OXYGEN SATURATION: 97 % | TEMPERATURE: 98 F

## 2022-11-02 DIAGNOSIS — G40.219 COMPLEX PARTIAL EPILEPSY WITH GENERALIZATION AND WITH INTRACTABLE EPILEPSY: ICD-10-CM

## 2022-11-02 DIAGNOSIS — R06.83 SNORING: ICD-10-CM

## 2022-11-02 DIAGNOSIS — I10 ESSENTIAL HYPERTENSION: ICD-10-CM

## 2022-11-02 DIAGNOSIS — F32.A DEPRESSION, UNSPECIFIED DEPRESSION TYPE: ICD-10-CM

## 2022-11-02 DIAGNOSIS — D50.9 IRON DEFICIENCY ANEMIA, UNSPECIFIED IRON DEFICIENCY ANEMIA TYPE: ICD-10-CM

## 2022-11-02 DIAGNOSIS — R60.9 EDEMA, UNSPECIFIED TYPE: ICD-10-CM

## 2022-11-02 DIAGNOSIS — G40.219 COMPLEX PARTIAL EPILEPSY, INTRACTABLE: ICD-10-CM

## 2022-11-02 DIAGNOSIS — E66.9 OBESITY, UNSPECIFIED CLASSIFICATION, UNSPECIFIED OBESITY TYPE, UNSPECIFIED WHETHER SERIOUS COMORBIDITY PRESENT: ICD-10-CM

## 2022-11-02 DIAGNOSIS — R94.31 ABNORMAL EKG: ICD-10-CM

## 2022-11-02 DIAGNOSIS — F06.70 MILD NEUROCOGNITIVE DISORDER DUE TO ANOTHER MEDICAL CONDITION: ICD-10-CM

## 2022-11-02 PROCEDURE — 70450 CT HEAD/BRAIN W/O DYE: CPT | Mod: 26,,, | Performed by: RADIOLOGY

## 2022-11-02 PROCEDURE — 99214 PR OFFICE/OUTPT VISIT, EST, LEVL IV, 30-39 MIN: ICD-10-PCS | Mod: S$GLB,ICN,, | Performed by: HOSPITALIST

## 2022-11-02 PROCEDURE — 99999 PR PBB SHADOW E&M-EST. PATIENT-LVL III: ICD-10-PCS | Mod: PBBFAC,,, | Performed by: HOSPITALIST

## 2022-11-02 PROCEDURE — 1159F MED LIST DOCD IN RCRD: CPT | Mod: CPTII,S$GLB,ICN, | Performed by: HOSPITALIST

## 2022-11-02 PROCEDURE — 3008F PR BODY MASS INDEX (BMI) DOCUMENTED: ICD-10-PCS | Mod: CPTII,S$GLB,ICN, | Performed by: HOSPITALIST

## 2022-11-02 PROCEDURE — 70450 CT HEAD/BRAIN W/O DYE: CPT | Mod: TC

## 2022-11-02 PROCEDURE — 3078F DIAST BP <80 MM HG: CPT | Mod: CPTII,S$GLB,ICN, | Performed by: HOSPITALIST

## 2022-11-02 PROCEDURE — 1159F PR MEDICATION LIST DOCUMENTED IN MEDICAL RECORD: ICD-10-PCS | Mod: CPTII,S$GLB,ICN, | Performed by: HOSPITALIST

## 2022-11-02 PROCEDURE — 3078F PR MOST RECENT DIASTOLIC BLOOD PRESSURE < 80 MM HG: ICD-10-PCS | Mod: CPTII,S$GLB,ICN, | Performed by: HOSPITALIST

## 2022-11-02 PROCEDURE — 1160F PR REVIEW ALL MEDS BY PRESCRIBER/CLIN PHARMACIST DOCUMENTED: ICD-10-PCS | Mod: CPTII,S$GLB,ICN, | Performed by: HOSPITALIST

## 2022-11-02 PROCEDURE — 3008F BODY MASS INDEX DOCD: CPT | Mod: CPTII,S$GLB,ICN, | Performed by: HOSPITALIST

## 2022-11-02 PROCEDURE — 3074F PR MOST RECENT SYSTOLIC BLOOD PRESSURE < 130 MM HG: ICD-10-PCS | Mod: CPTII,S$GLB,ICN, | Performed by: HOSPITALIST

## 2022-11-02 PROCEDURE — 1160F RVW MEDS BY RX/DR IN RCRD: CPT | Mod: CPTII,S$GLB,ICN, | Performed by: HOSPITALIST

## 2022-11-02 PROCEDURE — 3074F SYST BP LT 130 MM HG: CPT | Mod: CPTII,S$GLB,ICN, | Performed by: HOSPITALIST

## 2022-11-02 PROCEDURE — 70450 CT HEAD STEALTH W/O CONTRAST: ICD-10-PCS | Mod: 26,,, | Performed by: RADIOLOGY

## 2022-11-02 PROCEDURE — 99999 PR PBB SHADOW E&M-EST. PATIENT-LVL III: CPT | Mod: PBBFAC,,, | Performed by: HOSPITALIST

## 2022-11-02 PROCEDURE — 99214 OFFICE O/P EST MOD 30 MIN: CPT | Mod: S$GLB,ICN,, | Performed by: HOSPITALIST

## 2022-11-02 RX ORDER — FERROUS SULFATE 325(65) MG
325 TABLET ORAL EVERY MORNING
COMMUNITY

## 2022-11-02 NOTE — ASSESSMENT & PLAN NOTE
LMP- 48 Y  Age -48   Her history of the reason for hysterectomy suggests endometriosis    Surgical approach- abdominal/ vaginal  Ovaries retained / not retained   No HRT     No overt GI/ blood losses  No also history of stomach upset history  No chronic NSAID medication use    She has no history of celiac disease or gluten sensitivity   She has no history suggestive of PICA    She has history no history suggestive koilonychia    I suggested follow up    She is tolerating oral iron and she is not constipated

## 2022-11-02 NOTE — OUTPATIENT SUBJECTIVE & OBJECTIVE
Outpatient Subjective & Objective     Chief complaint-Preoperative evaluation, Perioperative Medical management, complication reduction plan     Active cardiac conditions- none  No history of rheumatic fever    Revised cardiac risk index predictors- none    Functional capacity -Examples of physical activity , active ,  can take 1 flight of stairs----- She can undertake all the above activities without  chest pain,chest tightness, Shortness of breath ,dizziness,lightheadedness making her exercise tolerance more,   than 4 Mets.       Review of Systems   Constitutional:  Negative for chills and fever.   HENT:          STOPBANG score  3/ 8    Loud Snoring   Age over 50   Neck size over 40 CM     Eyes:         No new visual changes   Respiratory:          No cough , phlegm    No Hemoptysis   Cardiovascular:         As noted   Gastrointestinal:         No overt GI/ blood losses  Bowel movements- Regular    Endocrine:        Prednisone use > 20 mg daily for 3 weeks- None   Genitourinary:  Negative for dysuria.        No urinary hesitancy   She had recurrent urinary tract infection in the past when she was not hydrating as much as she is doing lately   She has not been have any problem with urinary tract infection lately   Musculoskeletal:           No unusual, muscle, joint pains   Skin:  Negative for rash.   Neurological:  Negative for syncope.        No unilateral weakness   Hematological:         Current use of Anticoagulants  None   I suggested holding anti-inflammatory medication 1 week before surgery which she is not taking anyway   Psychiatric/Behavioral:            Depression, - Controlled     No SI/HI   No vascular stenting history of DVT or pulmonary embolism history           No anesthesia, bleeding, cardiac , PONV problems with previous surgeries/procedures.  Medications and Allergies reviewed in epic.     FH- No anesthesia,bleeding / venous thrombosis , problems in family      Physical Exam  Blood pressure  "122/76, pulse 68, temperature 97.9 °F (36.6 °C), height 5' 6" (1.676 m), weight 95.5 kg (210 lb 8 oz), SpO2 97 %.  Examined with female Medical student     Physical Exam  Constitutional- Vitals - Body mass index is 33.98 kg/m².,   Vitals:    11/02/22 1531   BP: 122/76   Pulse: 68   Temp: 97.9 °F (36.6 °C)     General appearance-Conscious,Coherent  Eyes- No conjunctival icterus,pupils  round  and reactive to light   ENT-Oral cavity- moist    , Hearing grossly normal   Neck- No thyromegaly ,Trachea -central, No jugular venous distension,   No Carotid Bruit   Cardiovascular -Heart Sounds- Normal  and  no murmur   , No gallop rhythm   Respiratory - Normal Respiratory Effort, Normal breath sounds,  no wheeze , and  no forced expiratory wheeze    Peripheral pitting pedal edema-- mild, no calf pain   Gastrointestinal -Soft abdomen, No palpable masses, Non Tender,Liver,Spleen not palpable. No-- free fluid and shifting dullness  Musculoskeletal- No finger Clubbing. Strength grossly normal   Lymphatic-No Palpable cervical, axillary,Inguinal lymphadenopathy   Psychiatric - normal effect,Orientation  Rt Dorsalis pedis pulses-palpable    Lt Dorsalis pedis pulses- palpable   Rt Posterior tibial pulses -palpable   Left posterior tibial pulses -palpable   Miscellaneous -  Surgical scarRLQ ,  no renal bruit, and  Tattoo   Investigations  Lab and Imaging have been reviewed in [facility]    Review of Medicine tests    EKG- I had independently reviewed the EKG from--7/5/2022  It was reported to be showing     Sinus bradycardia   Cannot rule out Anterior infarct ,age undetermined   Abnormal ECG   When compared with ECG of 09-MAR-2022 16:22,   No significant change was found     He has no clinical suggestions of hypothyroidism    Review of clinical lab tests:  Lab Results   Component Value Date    CREATININE 0.8 07/05/2022    HGB 14.4 07/05/2022     07/05/2022         Review of old records- Was done and information gathered regards to " events leading to surgery and health conditions of significance in the perioperative period.    Outpatient Subjective & Objective

## 2022-11-02 NOTE — ASSESSMENT & PLAN NOTE
Edema- I suggested avoidance of added salt,avoidance of NSAID's, unless advised or ordered  and suggested Limb elevation and derik hose use   Patient returned call to clinic and left the following message, \"down to size of quarter, not as sore but still solid.\"    Writer returned call to patient who did verify that she is seeing some improvement.  Patient can eat and drink with no problems.  Patient commented, \"its going down, I am pleased.\"    Writer reminded patient if this flares up again, she can call our office to request another appointment.  patient has no further questions.     Routed to Dr. Carmita RASCON.

## 2022-11-02 NOTE — PROGRESS NOTES
[facility] 2nd Fl  Progress Note    Patient Name: Liza Arrieta  MRN: [id number]  Date of Evaluation- 11/02/2022  PCP- Rc Rodriguez MD    Future cases for Liza Arrieta [[id number]]       Case ID Status Date Time Jonathan Procedure Provider Location    [id number] [facility] 11/7/2022  7:00  1.) PLACEMENT OF THE FIDUCIAL SCREWS 2.) PLACEMENT OF THE BILATERAL SEEG ELECTRODES WITH BRANDEE ROBOT Ava Valdez MD [[id number]] [facility] OR 2ND FLR            HPI:  History of present illness- I had the pleasure of meeting this pleasant 52 y.o. lady in the pre op clinic prior to her elective  Neuro  surgery. The patient is known  to me . Liza was accompanied by  Neymar   .    I have obtained the history by speaking to the patient and by reviewing the electronic health records.    Events leading up to surgery / History of presenting illness -    Intractable epilepsy     Her seizures started when she was age 18   She tried medication for that   She has 4 pregnancies and 4 children    The patient underwent left anterior temporal lobectomy after subdural strip and grid monitoring on 11/19/18  She had some relief from seizures for a few months after the surgery but her seizures have increased ever since    Lately she is having pain in mouth seizures where she would have brief, 30-60 second loss of awareness of the surroundings but she still functions from a motor standpoint     We discussed driving safety and suggested not to drive    She works as a  for [employer] since [place] 's office   She lives with her  and 3 children and 1 dog in a single-level house  She has help available after surgery    She has supervision during cook   She shovers versus bathing    She does not lock the bathroom does bat    The patient underwent left anterior temporal lobectomy after subdural strip and grid monitoring on 11/19/18    Relevant health conditions of significance for the perioperative period/ History of presenting illness  -    Subjectively describes health as good     Health conditions of significance for the perioperative period         Weight gain   Snoring   Depression    She has 2 steps to get into her house               Subjective/ Objective:     Chief complaint-Preoperative evaluation, Perioperative Medical management, complication reduction plan     Active cardiac conditions- none  No history of rheumatic fever    Revised cardiac risk index predictors- none    Functional capacity -Examples of physical activity , active ,  can take 1 flight of stairs----- She can undertake all the above activities without  chest pain,chest tightness, Shortness of breath ,dizziness,lightheadedness making her exercise tolerance more,   than 4 Mets.       Review of Systems   Constitutional:  Negative for chills and fever.   HENT:          STOPBANG score  3/ 8    Loud Snoring   Age over 50   Neck size over 40 CM     Eyes:         No new visual changes   Respiratory:          No cough , phlegm    No Hemoptysis   Cardiovascular:         As noted   Gastrointestinal:         No overt GI/ blood losses  Bowel movements- Regular    Endocrine:        Prednisone use > 20 mg daily for 3 weeks- None   Genitourinary:  Negative for dysuria.        No urinary hesitancy   She had recurrent urinary tract infection in the past when she was not hydrating as much as she is doing lately   She has not been have any problem with urinary tract infection lately   Musculoskeletal:           No unusual, muscle, joint pains   Skin:  Negative for rash.   Neurological:  Negative for syncope.        No unilateral weakness   Hematological:         Current use of Anticoagulants  None   I suggested holding anti-inflammatory medication 1 week before surgery which she is not taking anyway   Psychiatric/Behavioral:            Depression, - Controlled     No SI/HI   No vascular stenting history of DVT or pulmonary embolism history           No anesthesia, bleeding, cardiac , PONV  "problems with previous surgeries/procedures.  Medications and Allergies reviewed in epic.     FH- No anesthesia,bleeding / venous thrombosis , problems in family      Physical Exam  Blood pressure 122/76, pulse 68, temperature 97.9 °F (36.6 °C), height 5' 6" (1.676 m), weight 95.5 kg (210 lb 8 oz), SpO2 97 %.  Examined with female Medical student     Physical Exam  Constitutional- Vitals - Body mass index is 33.98 kg/m².,   Vitals:    11/02/22 1531   BP: 122/76   Pulse: 68   Temp: 97.9 °F (36.6 °C)     General appearance-Conscious,Coherent  Eyes- No conjunctival icterus,pupils  round  and reactive to light   ENT-Oral cavity- moist    , Hearing grossly normal   Neck- No thyromegaly ,Trachea -central, No jugular venous distension,   No Carotid Bruit   Cardiovascular -Heart Sounds- Normal  and  no murmur   , No gallop rhythm   Respiratory - Normal Respiratory Effort, Normal breath sounds,  no wheeze , and  no forced expiratory wheeze    Peripheral pitting pedal edema-- mild, no calf pain   Gastrointestinal -Soft abdomen, No palpable masses, Non Tender,Liver,Spleen not palpable. No-- free fluid and shifting dullness  Musculoskeletal- No finger Clubbing. Strength grossly normal   Lymphatic-No Palpable cervical, axillary,Inguinal lymphadenopathy   Psychiatric - normal effect,Orientation  Rt Dorsalis pedis pulses-palpable    Lt Dorsalis pedis pulses- palpable   Rt Posterior tibial pulses -palpable   Left posterior tibial pulses -palpable   Miscellaneous -  Surgical scarRLQ ,  no renal bruit, and  Tattoo   Investigations  Lab and Imaging have been reviewed in [facility]    Review of Medicine tests    EKG- I had independently reviewed the EKG from--7/5/2022  It was reported to be showing     Sinus bradycardia   Cannot rule out Anterior infarct ,age undetermined   Abnormal ECG   When compared with ECG of 09-MAR-2022 16:22,   No significant change was found     He has no clinical suggestions of hypothyroidism    Review of clinical " lab tests:  Lab Results   Component Value Date    CREATININE 0.8 07/05/2022    HGB 14.4 07/05/2022     07/05/2022         Review of old records- Was done and information gathered regards to events leading to surgery and health conditions of significance in the perioperative period.      Preoperative cardiac risk assessment-  The patient does not have any active cardiac conditions . Revised cardiac risk index predictors- 0---.Functional capacity is more than 4 Mets. She will be undergoing a Neuro procedure that carries a Moderate Risk risk     Risk of a major Cardiac event ( Defined as death, myocardial infarction, or cardiac arrest at 30 days after noncardiac surgery), based on RCRI score     3.9%         No further cardiac work up is indicated prior to proceeding with the surgery          American Society of Anesthesiologists Physical status classification ( ASA ) class: 3     Postoperative pulmonary complication risk assessment:         Huy Respiratory failure index- percentage risk of respiratory failure: 1.8 %    Assessment/Plan:     Mild neurocognitive disorder due to another medical condition  She is not known to have dementia    She notices that she has had some memory problems since she had her left temporal lobectomy   Her  manages her finances   She is able to hold a high functioning job being a  for [employer]        Abnormal EKG  She has no history of coronary artery disease, heart failure, arrhythmia or valve problem    She stays physically active   She does workouts   She can run for a mi she does weight she does stairs and she does rowing machine    She does not have any he has history of exertional chest pain chest tightness short of breath dizziness lightheaded  She has no suggestions of heart failure namely no swelling in the legs or orthopnea or PND  She has no history of palpitations, passing out from heart reason  No stroke or TIA        Essential  hypertension  She does not recollect taking blood pressure medicine on a longstanding basis   No headaches with regards to hypertension   There  is room with salt reduction in her diet     Home BP readings -none  Recent BP readings in the record-  Vitals - 1 value per visit 7/9/2022 7/9/2022 7/9/2022 7/10/2022 7/10/2022   SYSTOLIC 102 114  109 119   DIASTOLIC 62 78  75 81     Vitals - 1 value per visit 7/10/2022 8/16/2022 8/16/2022 8/25/2022   SYSTOLIC 104  134    DIASTOLIC 56  80      Vitals - 1 value per visit 8/25/2022 11/2/2022   SYSTOLIC 109 122   DIASTOLIC 63 76     Hypertension-  Blood pressure is acceptable .   I suggest  blood pressure,monitoring .I suggest addressing pain control as uncontrolled pain can increased blood pressure     She wonders if her blood pressure is better now that she is exercising which very well might be the case    Her blood pressure is doing good without medication    No dizziness or lightheadedness          Iron deficiency anemia  LMP- 48 Y  Age -48   Her history of the reason for hysterectomy suggests endometriosis    Surgical approach- abdominal/ vaginal  Ovaries retained / not retained   No HRT     No overt GI/ blood losses  No also history of stomach upset history  No chronic NSAID medication use    She has no history of celiac disease or gluten sensitivity   She has no history suggestive of PICA    She has history no history suggestive koilonychia    I suggested follow up    She is tolerating oral iron and she is not constipated       Obesity  She gained weight in the past 3 months from feeling low     Weight related conditions     Known to have     HTN  Snoring     Not troubled with / Not known to have     Type 2 Diabetes   Hyperlipidemia   Acid reflux   Fatty liver   Osteoarthritis    Encouraged weight loss      Snoring    Possible sleep apnea- I suggest a sleep study and suggest caution with usage of medication that can cause respiratory suppression in the perioperative  period  potential ramifications of untreated sleep apnea, which could include daytime sleepiness, hypertension, heart disease and stroke were discussed    Avoidance of  supine sleep, weight gain , alcoholic beverages , care with , sedative , CNS depressant use indicated  since all of these can worsen PEDRO     I suggested care with sedating medication    Depression  She feels depressed because her of her inability to drive and has to depend on other people to drive around 2 simple things like grocery store   She was commenced on Zoloft which she found helpful   It is likely that she may feel better from her depression once her seizures are under better control    I suggest monitoring the sodium as SIADH from Zoloft   use and hypersecretion of ADH associated with surgery can reduce sodium in the perioperative period    Edema      Edema- I suggested avoidance of added salt,avoidance of NSAID's, unless advised or ordered  and suggested Limb elevation and derik hose use        Preventive perioperative care    Thromboembolic prophylaxis:  Her risk factors for thrombosis include surgical procedure and age.I suggest  thromboembolic prophylaxis ( mechanical/pharmacological, weighing the risk benefits of pharmacological agent use considering [facility] procedural bleeding )  during the perioperative period.I suggested being active in the post operative period.      Postoperative pulmonary complication prophylaxis-Risk factors for post operative pulmonary complications include ASA class >2- I suggest incentive spirometry use, early ambulation, and end tidal carbon dioxide monitoring  , oral care , head end of bed elevation      Renal complication prophylaxis- . I suggest keeping her well hydrated   in the perioperative period.I suggested drinking 2 litre's of water a day      Surgical site Infection Prophylaxis-I  suggest appropriate antibiotic for Prophylaxis against Surgical site infections  No reported Staph infection  Skin  antibacterial discussed         This visit was focused on Preoperative evaluation, Perioperative Medical management, complication reduction plans. I suggest that the patient follows up with primary care or relevant sub specialists for ongoing health care.    I appreciate the opportunity to be involved in this patients care. Please feel free to contact me if there were any questions about this consultation.    Patient is optimized    Patient/ care giver/ Family member was instructed to call and update me about any changes to health,  medication, office visits ,testing out side of the [facility] , hospitalizations between now and surgery      Nathalia Dinero MD  Internal Medicine  [facility]   Cell Phone- [phone]    History of COVID -  no   COVID vaccination status -2    COVID screening     No fever   No cough   No SOB  No sore throat   No loss of taste or smell   No muscle aches   No nausea, vomiting , diarrhea -

## 2022-11-02 NOTE — ASSESSMENT & PLAN NOTE
She does not recollect taking blood pressure medicine on a longstanding basis   No headaches with regards to hypertension   There  is room with salt reduction in her diet     Home BP readings -none  Recent BP readings in the record-  Vitals - 1 value per visit 7/9/2022 7/9/2022 7/9/2022 7/10/2022 7/10/2022   SYSTOLIC 102 114  109 119   DIASTOLIC 62 78  75 81     Vitals - 1 value per visit 7/10/2022 8/16/2022 8/16/2022 8/25/2022   SYSTOLIC 104  134    DIASTOLIC 56  80      Vitals - 1 value per visit 8/25/2022 11/2/2022   SYSTOLIC 109 122   DIASTOLIC 63 76     Hypertension-  Blood pressure is acceptable .   I suggest  blood pressure,monitoring .I suggest addressing pain control as uncontrolled pain can increased blood pressure     She wonders if her blood pressure is better now that she is exercising which very well might be the case    Her blood pressure is doing good without medication    No dizziness or lightheadedness

## 2022-11-02 NOTE — ASSESSMENT & PLAN NOTE
She is not known to have dementia    She notices that she has had some memory problems since she had her left temporal lobectomy   Her  manages her finances   She is able to hold a high functioning job being a  for [employer]

## 2022-11-02 NOTE — ASSESSMENT & PLAN NOTE
She gained weight in the past 3 months from feeling low     Weight related conditions     Known to have     HTN  Snoring     Not troubled with / Not known to have     Type 2 Diabetes   Hyperlipidemia   Acid reflux   Fatty liver   Osteoarthritis    Encouraged weight loss

## 2022-11-02 NOTE — ASSESSMENT & PLAN NOTE
She feels depressed because her of her inability to drive and has to depend on other people to drive around 2 simple things like grocery store   She was commenced on Zoloft which she found helpful   It is likely that she may feel better from her depression once her seizures are under better control    I suggest monitoring the sodium as SIADH from Zoloft   use and hypersecretion of ADH associated with surgery can reduce sodium in the perioperative period

## 2022-11-02 NOTE — ASSESSMENT & PLAN NOTE
Possible sleep apnea- I suggest a sleep study and suggest caution with usage of medication that can cause respiratory suppression in the perioperative period  potential ramifications of untreated sleep apnea, which could include daytime sleepiness, hypertension, heart disease and stroke were discussed    Avoidance of  supine sleep, weight gain , alcoholic beverages , care with , sedative , CNS depressant use indicated  since all of these can worsen PEDRO     I suggested care with sedating medication

## 2022-11-02 NOTE — ASSESSMENT & PLAN NOTE
She has no history of coronary artery disease, heart failure, arrhythmia or valve problem    She stays physically active   She does workouts   She can run for a mi she does weight she does stairs and she does rowing machine    She does not have any he has history of exertional chest pain chest tightness short of breath dizziness lightheaded  She has no suggestions of heart failure namely no swelling in the legs or orthopnea or PND  She has no history of palpitations, passing out from heart reason  No stroke or TIA

## 2022-11-02 NOTE — HPI
History of present illness- I had the pleasure of meeting this pleasant 52 y.o. lady in the pre op clinic prior to her elective  Neuro  surgery. The patient is known  to me . Liza was accompanied by  Neymar   .    I have obtained the history by speaking to the patient and by reviewing the electronic health records.    Events leading up to surgery / History of presenting illness -    Intractable epilepsy     Her seizures started when she was age 18   She tried medication for that   She has 4 pregnancies and 4 children    The patient underwent left anterior temporal lobectomy after subdural strip and grid monitoring on 11/19/18  She had some relief from seizures for a few months after the surgery but her seizures have increased ever since    Lately she is having pain in mouth seizures where she would have brief, 30-60 second loss of awareness of the surroundings but she still functions from a motor standpoint     We discussed driving safety and suggested not to drive    She works as a  for [employer] since [place] 's office   She lives with her  and 3 children and 1 dog in a single-level house  She has help available after surgery    She has supervision during cook   She shovers versus bathing    She does not lock the bathroom does bat    The patient underwent left anterior temporal lobectomy after subdural strip and grid monitoring on 11/19/18    Relevant health conditions of significance for the perioperative period/ History of presenting illness -    Subjectively describes health as good     Health conditions of significance for the perioperative period         Weight gain   Snoring   Depression    She has 2 steps to get into her house

## 2022-11-03 ENCOUNTER — PATIENT MESSAGE (OUTPATIENT)
Dept: SURGERY | Facility: HOSPITAL | Age: 52
End: 2022-11-03
Payer: COMMERCIAL

## 2022-11-03 ENCOUNTER — LAB VISIT (OUTPATIENT)
Dept: LAB | Facility: HOSPITAL | Age: 52
End: 2022-11-03
Attending: ANESTHESIOLOGY
Payer: COMMERCIAL

## 2022-11-03 DIAGNOSIS — Z01.818 PREOPERATIVE TESTING: ICD-10-CM

## 2022-11-03 LAB
BACTERIA #/AREA URNS AUTO: ABNORMAL /HPF
BILIRUB UR QL STRIP: NEGATIVE
CLARITY UR REFRACT.AUTO: CLEAR
COLOR UR AUTO: YELLOW
GLUCOSE UR QL STRIP: NEGATIVE
HGB UR QL STRIP: NEGATIVE
KETONES UR QL STRIP: NEGATIVE
LEUKOCYTE ESTERASE UR QL STRIP: ABNORMAL
MICROSCOPIC COMMENT: ABNORMAL
NITRITE UR QL STRIP: NEGATIVE
PH UR STRIP: 6 [PH] (ref 5–8)
PROT UR QL STRIP: NEGATIVE
RBC #/AREA URNS AUTO: 1 /HPF (ref 0–4)
SP GR UR STRIP: 1.02 (ref 1–1.03)
SQUAMOUS #/AREA URNS AUTO: 1 /HPF
URN SPEC COLLECT METH UR: ABNORMAL
WBC #/AREA URNS AUTO: 8 /HPF (ref 0–5)

## 2022-11-03 PROCEDURE — 81001 URINALYSIS AUTO W/SCOPE: CPT | Performed by: ANESTHESIOLOGY

## 2022-11-04 ENCOUNTER — PATIENT MESSAGE (OUTPATIENT)
Dept: NEUROLOGY | Facility: CLINIC | Age: 52
End: 2022-11-04
Payer: COMMERCIAL

## 2022-11-04 ENCOUNTER — PATIENT MESSAGE (OUTPATIENT)
Dept: SURGERY | Facility: HOSPITAL | Age: 52
End: 2022-11-04
Payer: COMMERCIAL

## 2022-11-04 ENCOUNTER — TELEPHONE (OUTPATIENT)
Dept: NEUROSURGERY | Facility: CLINIC | Age: 52
End: 2022-11-04
Payer: COMMERCIAL

## 2022-11-04 NOTE — TELEPHONE ENCOUNTER
Called to give surgery time/arrival time/night before instructions    Surgery at 7   - check in by 5    Nothing to eat and drink past midnight. Medication per anesthesia directions. Wear comfortable clothes. don't wear jewelry, makeup, lotion, perfume, or deodorant. Shower the night before and the morning of with hibiclens or dial soap.

## 2022-11-06 ENCOUNTER — ANESTHESIA EVENT (OUTPATIENT)
Dept: SURGERY | Facility: HOSPITAL | Age: 52
DRG: 027 | End: 2022-11-06
Payer: COMMERCIAL

## 2022-11-07 ENCOUNTER — HOSPITAL ENCOUNTER (INPATIENT)
Facility: HOSPITAL | Age: 52
LOS: 10 days | Discharge: HOME | DRG: 027 | End: 2022-11-17
Attending: NEUROLOGICAL SURGERY | Admitting: PSYCHIATRY & NEUROLOGY
Payer: COMMERCIAL

## 2022-11-07 ENCOUNTER — ANESTHESIA (OUTPATIENT)
Dept: SURGERY | Facility: HOSPITAL | Age: 52
DRG: 027 | End: 2022-11-07
Payer: COMMERCIAL

## 2022-11-07 DIAGNOSIS — G40.119 TEMPORAL LOBE EPILEPSY, INTRACTABLE: Primary | ICD-10-CM

## 2022-11-07 DIAGNOSIS — R56.9 SEIZURE: ICD-10-CM

## 2022-11-07 DIAGNOSIS — G40.919 INTRACTABLE EPILEPSY: ICD-10-CM

## 2022-11-07 LAB
ALBUMIN SERPL BCP-MCNC: 3.2 G/DL (ref 3.5–5.2)
ALP SERPL-CCNC: 106 U/L (ref 55–135)
ALT SERPL W/O P-5'-P-CCNC: 17 U/L (ref 10–44)
ANION GAP SERPL CALC-SCNC: 9 MMOL/L (ref 8–16)
AST SERPL-CCNC: 18 U/L (ref 10–40)
BASOPHILS # BLD AUTO: 0 K/UL (ref 0–0.2)
BASOPHILS NFR BLD: 0 % (ref 0–1.9)
BILIRUB SERPL-MCNC: 0.2 MG/DL (ref 0.1–1)
BUN SERPL-MCNC: 13 MG/DL (ref 6–20)
CALCIUM SERPL-MCNC: 8.8 MG/DL (ref 8.7–10.5)
CHLORIDE SERPL-SCNC: 108 MMOL/L (ref 95–110)
CO2 SERPL-SCNC: 23 MMOL/L (ref 23–29)
CREAT SERPL-MCNC: 0.8 MG/DL (ref 0.5–1.4)
DIFFERENTIAL METHOD: ABNORMAL
EOSINOPHIL # BLD AUTO: 0 K/UL (ref 0–0.5)
EOSINOPHIL NFR BLD: 0.1 % (ref 0–8)
ERYTHROCYTE [DISTWIDTH] IN BLOOD BY AUTOMATED COUNT: 11.4 % (ref 11.5–14.5)
EST. GFR  (NO RACE VARIABLE): >60 ML/MIN/1.73 M^2
GLUCOSE SERPL-MCNC: 110 MG/DL (ref 70–110)
HCT VFR BLD AUTO: 39.9 % (ref 37–48.5)
HGB BLD-MCNC: 13.8 G/DL (ref 12–16)
IMM GRANULOCYTES # BLD AUTO: 0.03 K/UL (ref 0–0.04)
IMM GRANULOCYTES NFR BLD AUTO: 0.3 % (ref 0–0.5)
LYMPHOCYTES # BLD AUTO: 1.1 K/UL (ref 1–4.8)
LYMPHOCYTES NFR BLD: 9.8 % (ref 18–48)
MAGNESIUM SERPL-MCNC: 1.6 MG/DL (ref 1.6–2.6)
MCH RBC QN AUTO: 31.2 PG (ref 27–31)
MCHC RBC AUTO-ENTMCNC: 34.6 G/DL (ref 32–36)
MCV RBC AUTO: 90 FL (ref 82–98)
MONOCYTES # BLD AUTO: 0.3 K/UL (ref 0.3–1)
MONOCYTES NFR BLD: 2.3 % (ref 4–15)
NEUTROPHILS # BLD AUTO: 9.4 K/UL (ref 1.8–7.7)
NEUTROPHILS NFR BLD: 87.5 % (ref 38–73)
NRBC BLD-RTO: 0 /100 WBC
PHOSPHATE SERPL-MCNC: 3.4 MG/DL (ref 2.7–4.5)
PLATELET # BLD AUTO: 196 K/UL (ref 150–450)
PMV BLD AUTO: 10 FL (ref 9.2–12.9)
POTASSIUM SERPL-SCNC: 4.6 MMOL/L (ref 3.5–5.1)
PROT SERPL-MCNC: 6.2 G/DL (ref 6–8.4)
RBC # BLD AUTO: 4.43 M/UL (ref 4–5.4)
SODIUM SERPL-SCNC: 140 MMOL/L (ref 136–145)
WBC # BLD AUTO: 10.74 K/UL (ref 3.9–12.7)

## 2022-11-07 PROCEDURE — 27200677 HC TRANSDUCER MONITOR KIT SINGLE: Performed by: ANESTHESIOLOGY

## 2022-11-07 PROCEDURE — 63600175 PHARM REV CODE 636 W HCPCS: Performed by: NURSE ANESTHETIST, CERTIFIED REGISTERED

## 2022-11-07 PROCEDURE — 37000008 HC ANESTHESIA 1ST 15 MINUTES: Performed by: NEUROLOGICAL SURGERY

## 2022-11-07 PROCEDURE — 99223 PR INITIAL HOSPITAL CARE,LEVL III: ICD-10-PCS | Mod: FS,,, | Performed by: PHYSICIAN ASSISTANT

## 2022-11-07 PROCEDURE — 37000009 HC ANESTHESIA EA ADD 15 MINS: Performed by: NEUROLOGICAL SURGERY

## 2022-11-07 PROCEDURE — 25000003 PHARM REV CODE 250: Performed by: NURSE PRACTITIONER

## 2022-11-07 PROCEDURE — 63600175 PHARM REV CODE 636 W HCPCS: Performed by: STUDENT IN AN ORGANIZED HEALTH CARE EDUCATION/TRAINING PROGRAM

## 2022-11-07 PROCEDURE — D9220A PRA ANESTHESIA: Mod: ANES,,, | Performed by: ANESTHESIOLOGY

## 2022-11-07 PROCEDURE — 80053 COMPREHEN METABOLIC PANEL: CPT | Performed by: NURSE PRACTITIONER

## 2022-11-07 PROCEDURE — 99223 1ST HOSP IP/OBS HIGH 75: CPT | Mod: ,,, | Performed by: NURSE PRACTITIONER

## 2022-11-07 PROCEDURE — D9220A PRA ANESTHESIA: ICD-10-PCS | Mod: ANES,,, | Performed by: ANESTHESIOLOGY

## 2022-11-07 PROCEDURE — 25000003 PHARM REV CODE 250: Performed by: NURSE ANESTHETIST, CERTIFIED REGISTERED

## 2022-11-07 PROCEDURE — C1751 CATH, INF, PER/CENT/MIDLINE: HCPCS | Performed by: ANESTHESIOLOGY

## 2022-11-07 PROCEDURE — 25000003 PHARM REV CODE 250: Performed by: NEUROLOGICAL SURGERY

## 2022-11-07 PROCEDURE — 20000000 HC ICU ROOM

## 2022-11-07 PROCEDURE — 27100025 HC TUBING, SET FLUID WARMER: Performed by: ANESTHESIOLOGY

## 2022-11-07 PROCEDURE — 99223 PR INITIAL HOSPITAL CARE,LEVL III: ICD-10-PCS | Mod: ,,, | Performed by: NURSE PRACTITIONER

## 2022-11-07 PROCEDURE — 25000003 PHARM REV CODE 250: Performed by: STUDENT IN AN ORGANIZED HEALTH CARE EDUCATION/TRAINING PROGRAM

## 2022-11-07 PROCEDURE — 95720 EEG PHY/QHP EA INCR W/VEEG: CPT | Mod: ,,, | Performed by: PSYCHIATRY & NEUROLOGY

## 2022-11-07 PROCEDURE — 85025 COMPLETE CBC W/AUTO DIFF WBC: CPT | Performed by: NURSE PRACTITIONER

## 2022-11-07 PROCEDURE — 36000711: Performed by: NEUROLOGICAL SURGERY

## 2022-11-07 PROCEDURE — A4648 IMPLANTABLE TISSUE MARKER: HCPCS | Performed by: NEUROLOGICAL SURGERY

## 2022-11-07 PROCEDURE — 36620 INSERTION CATHETER ARTERY: CPT | Mod: 59,,, | Performed by: ANESTHESIOLOGY

## 2022-11-07 PROCEDURE — 61760 IMPLANT BRAIN ELECTRODES: CPT | Mod: ,,, | Performed by: NEUROLOGICAL SURGERY

## 2022-11-07 PROCEDURE — 84100 ASSAY OF PHOSPHORUS: CPT | Performed by: NURSE PRACTITIONER

## 2022-11-07 PROCEDURE — D9220A PRA ANESTHESIA: Mod: CRNA,,, | Performed by: NURSE ANESTHETIST, CERTIFIED REGISTERED

## 2022-11-07 PROCEDURE — 95720 PR EEG, W/VIDEO, CONT RECORD, I&R, >12<26 HRS: ICD-10-PCS | Mod: ,,, | Performed by: PSYCHIATRY & NEUROLOGY

## 2022-11-07 PROCEDURE — 95714 VEEG EA 12-26 HR UNMNTR: CPT

## 2022-11-07 PROCEDURE — 27201423 OPTIME MED/SURG SUP & DEVICES STERILE SUPPLY: Performed by: NEUROLOGICAL SURGERY

## 2022-11-07 PROCEDURE — C1778 LEAD, NEUROSTIMULATOR: HCPCS | Performed by: NEUROLOGICAL SURGERY

## 2022-11-07 PROCEDURE — 99223 PR INITIAL HOSPITAL CARE,LEVL III: ICD-10-PCS | Mod: ,,, | Performed by: PSYCHIATRY & NEUROLOGY

## 2022-11-07 PROCEDURE — 36620 PR INSERT CATH,ART,PERCUT,SHORTTERM: ICD-10-PCS | Mod: 59,,, | Performed by: ANESTHESIOLOGY

## 2022-11-07 PROCEDURE — 27201037 HC PRESSURE MONITORING SET UP

## 2022-11-07 PROCEDURE — 83735 ASSAY OF MAGNESIUM: CPT | Performed by: NURSE PRACTITIONER

## 2022-11-07 PROCEDURE — 99223 1ST HOSP IP/OBS HIGH 75: CPT | Mod: FS,,, | Performed by: PHYSICIAN ASSISTANT

## 2022-11-07 PROCEDURE — 36000710: Performed by: NEUROLOGICAL SURGERY

## 2022-11-07 PROCEDURE — 99223 1ST HOSP IP/OBS HIGH 75: CPT | Mod: ,,, | Performed by: PSYCHIATRY & NEUROLOGY

## 2022-11-07 PROCEDURE — 94761 N-INVAS EAR/PLS OXIMETRY MLT: CPT

## 2022-11-07 PROCEDURE — C1713 ANCHOR/SCREW BN/BN,TIS/BN: HCPCS | Performed by: NEUROLOGICAL SURGERY

## 2022-11-07 PROCEDURE — D9220A PRA ANESTHESIA: ICD-10-PCS | Mod: CRNA,,, | Performed by: NURSE ANESTHETIST, CERTIFIED REGISTERED

## 2022-11-07 PROCEDURE — 63600175 PHARM REV CODE 636 W HCPCS: Performed by: NEUROLOGICAL SURGERY

## 2022-11-07 PROCEDURE — 61760 PR STEREO IMPLANT BRAIN ELECTRODES, SEIZURE MONITOR: ICD-10-PCS | Mod: ,,, | Performed by: NEUROLOGICAL SURGERY

## 2022-11-07 DEVICE — FIDUCIAL KIT UNI STEREO 10 MM: Type: IMPLANTABLE DEVICE | Site: CRANIAL | Status: FUNCTIONAL

## 2022-11-07 DEVICE — ANCHOR SEEG BOLT 30X2.1MM: Type: IMPLANTABLE DEVICE | Site: CRANIAL | Status: FUNCTIONAL

## 2022-11-07 DEVICE — IMPLANTABLE DEVICE: Type: IMPLANTABLE DEVICE | Site: CRANIAL | Status: FUNCTIONAL

## 2022-11-07 RX ORDER — LAMOTRIGINE 150 MG/1
300 TABLET ORAL NIGHTLY
Status: DISCONTINUED | OUTPATIENT
Start: 2022-11-07 | End: 2022-11-08

## 2022-11-07 RX ORDER — HYDRALAZINE HYDROCHLORIDE 20 MG/ML
10 INJECTION INTRAMUSCULAR; INTRAVENOUS EVERY 6 HOURS PRN
Status: DISCONTINUED | OUTPATIENT
Start: 2022-11-07 | End: 2022-11-17 | Stop reason: HOSPADM

## 2022-11-07 RX ORDER — MUPIROCIN 20 MG/G
OINTMENT TOPICAL 2 TIMES DAILY
Status: DISCONTINUED | OUTPATIENT
Start: 2022-11-07 | End: 2022-11-08

## 2022-11-07 RX ORDER — CEFTRIAXONE 1 G/1
INJECTION, POWDER, FOR SOLUTION INTRAMUSCULAR; INTRAVENOUS
Status: DISCONTINUED | OUTPATIENT
Start: 2022-11-07 | End: 2022-11-07 | Stop reason: HOSPADM

## 2022-11-07 RX ORDER — HYDROMORPHONE HYDROCHLORIDE 1 MG/ML
1 INJECTION, SOLUTION INTRAMUSCULAR; INTRAVENOUS; SUBCUTANEOUS EVERY 6 HOURS PRN
Status: DISCONTINUED | OUTPATIENT
Start: 2022-11-07 | End: 2022-11-17 | Stop reason: HOSPADM

## 2022-11-07 RX ORDER — ACETAMINOPHEN 10 MG/ML
INJECTION, SOLUTION INTRAVENOUS
Status: DISCONTINUED | OUTPATIENT
Start: 2022-11-07 | End: 2022-11-07

## 2022-11-07 RX ORDER — SERTRALINE HYDROCHLORIDE 25 MG/1
25 TABLET, FILM COATED ORAL DAILY
Status: DISCONTINUED | OUTPATIENT
Start: 2022-11-07 | End: 2022-11-17 | Stop reason: HOSPADM

## 2022-11-07 RX ORDER — ONDANSETRON 2 MG/ML
INJECTION INTRAMUSCULAR; INTRAVENOUS
Status: DISCONTINUED | OUTPATIENT
Start: 2022-11-07 | End: 2022-11-07

## 2022-11-07 RX ORDER — ACETAMINOPHEN 325 MG/1
650 TABLET ORAL EVERY 6 HOURS PRN
Status: DISCONTINUED | OUTPATIENT
Start: 2022-11-07 | End: 2022-11-07

## 2022-11-07 RX ORDER — CEFAZOLIN SODIUM/WATER 2 G/20 ML
2 SYRINGE (ML) INTRAVENOUS
Status: DISCONTINUED | OUTPATIENT
Start: 2022-11-07 | End: 2022-11-07 | Stop reason: HOSPADM

## 2022-11-07 RX ORDER — LIDOCAINE HYDROCHLORIDE AND EPINEPHRINE 10; 10 MG/ML; UG/ML
INJECTION, SOLUTION INFILTRATION; PERINEURAL
Status: DISCONTINUED | OUTPATIENT
Start: 2022-11-07 | End: 2022-11-07 | Stop reason: HOSPADM

## 2022-11-07 RX ORDER — POLYETHYLENE GLYCOL 3350 17 G/17G
17 POWDER, FOR SOLUTION ORAL DAILY
Status: DISCONTINUED | OUTPATIENT
Start: 2022-11-07 | End: 2022-11-10

## 2022-11-07 RX ORDER — PHENYLEPHRINE HYDROCHLORIDE 10 MG/ML
INJECTION INTRAVENOUS
Status: DISCONTINUED | OUTPATIENT
Start: 2022-11-07 | End: 2022-11-07

## 2022-11-07 RX ORDER — DEXAMETHASONE SODIUM PHOSPHATE 4 MG/ML
INJECTION, SOLUTION INTRA-ARTICULAR; INTRALESIONAL; INTRAMUSCULAR; INTRAVENOUS; SOFT TISSUE
Status: DISCONTINUED | OUTPATIENT
Start: 2022-11-07 | End: 2022-11-07

## 2022-11-07 RX ORDER — MIDAZOLAM HYDROCHLORIDE 1 MG/ML
INJECTION, SOLUTION INTRAMUSCULAR; INTRAVENOUS
Status: DISCONTINUED | OUTPATIENT
Start: 2022-11-07 | End: 2022-11-07

## 2022-11-07 RX ORDER — MUPIROCIN 20 MG/G
OINTMENT TOPICAL
Status: DISCONTINUED | OUTPATIENT
Start: 2022-11-07 | End: 2022-11-07 | Stop reason: HOSPADM

## 2022-11-07 RX ORDER — METOCLOPRAMIDE HYDROCHLORIDE 5 MG/ML
5 INJECTION INTRAMUSCULAR; INTRAVENOUS EVERY 6 HOURS PRN
Status: DISCONTINUED | OUTPATIENT
Start: 2022-11-07 | End: 2022-11-07

## 2022-11-07 RX ORDER — LIDOCAINE HYDROCHLORIDE 20 MG/ML
INJECTION, SOLUTION EPIDURAL; INFILTRATION; INTRACAUDAL; PERINEURAL
Status: DISCONTINUED | OUTPATIENT
Start: 2022-11-07 | End: 2022-11-07

## 2022-11-07 RX ORDER — ACETAMINOPHEN 500 MG
1000 TABLET ORAL EVERY 8 HOURS PRN
Status: DISCONTINUED | OUTPATIENT
Start: 2022-11-07 | End: 2022-11-17 | Stop reason: HOSPADM

## 2022-11-07 RX ORDER — EPHEDRINE SULFATE 50 MG/ML
INJECTION, SOLUTION INTRAVENOUS
Status: DISCONTINUED | OUTPATIENT
Start: 2022-11-07 | End: 2022-11-07

## 2022-11-07 RX ORDER — AMOXICILLIN 250 MG
2 CAPSULE ORAL DAILY
Status: DISCONTINUED | OUTPATIENT
Start: 2022-11-07 | End: 2022-11-10

## 2022-11-07 RX ORDER — FENTANYL CITRATE 50 UG/ML
INJECTION, SOLUTION INTRAMUSCULAR; INTRAVENOUS
Status: DISCONTINUED | OUTPATIENT
Start: 2022-11-07 | End: 2022-11-07

## 2022-11-07 RX ORDER — MUPIROCIN 20 MG/G
1 OINTMENT TOPICAL 2 TIMES DAILY
Status: DISCONTINUED | OUTPATIENT
Start: 2022-11-07 | End: 2022-11-07 | Stop reason: HOSPADM

## 2022-11-07 RX ORDER — HEPARIN SODIUM 5000 [USP'U]/ML
5000 INJECTION, SOLUTION INTRAVENOUS; SUBCUTANEOUS EVERY 8 HOURS
Status: DISCONTINUED | OUTPATIENT
Start: 2022-11-08 | End: 2022-11-15

## 2022-11-07 RX ORDER — FAMOTIDINE 20 MG/1
20 TABLET, FILM COATED ORAL 2 TIMES DAILY
Status: DISCONTINUED | OUTPATIENT
Start: 2022-11-07 | End: 2022-11-07

## 2022-11-07 RX ORDER — LIDOCAINE HYDROCHLORIDE 10 MG/ML
1 INJECTION, SOLUTION EPIDURAL; INFILTRATION; INTRACAUDAL; PERINEURAL ONCE AS NEEDED
Status: DISCONTINUED | OUTPATIENT
Start: 2022-11-07 | End: 2022-11-07 | Stop reason: HOSPADM

## 2022-11-07 RX ORDER — PROPOFOL 10 MG/ML
VIAL (ML) INTRAVENOUS
Status: DISCONTINUED | OUTPATIENT
Start: 2022-11-07 | End: 2022-11-07

## 2022-11-07 RX ORDER — SODIUM CHLORIDE 9 MG/ML
INJECTION, SOLUTION INTRAVENOUS CONTINUOUS
Status: DISCONTINUED | OUTPATIENT
Start: 2022-11-07 | End: 2022-11-07

## 2022-11-07 RX ORDER — PROPOFOL 10 MG/ML
VIAL (ML) INTRAVENOUS CONTINUOUS PRN
Status: DISCONTINUED | OUTPATIENT
Start: 2022-11-07 | End: 2022-11-07

## 2022-11-07 RX ORDER — BACITRACIN ZINC 500 UNIT/G
OINTMENT (GRAM) TOPICAL
Status: DISCONTINUED | OUTPATIENT
Start: 2022-11-07 | End: 2022-11-07 | Stop reason: HOSPADM

## 2022-11-07 RX ORDER — BUPIVACAINE HYDROCHLORIDE AND EPINEPHRINE 5; 5 MG/ML; UG/ML
INJECTION, SOLUTION EPIDURAL; INTRACAUDAL; PERINEURAL
Status: DISCONTINUED | OUTPATIENT
Start: 2022-11-07 | End: 2022-11-07 | Stop reason: HOSPADM

## 2022-11-07 RX ORDER — ONDANSETRON 2 MG/ML
4 INJECTION INTRAMUSCULAR; INTRAVENOUS EVERY 12 HOURS PRN
Status: DISCONTINUED | OUTPATIENT
Start: 2022-11-07 | End: 2022-11-17 | Stop reason: HOSPADM

## 2022-11-07 RX ORDER — OXYCODONE HYDROCHLORIDE 5 MG/1
5 TABLET ORAL EVERY 6 HOURS PRN
Status: DISCONTINUED | OUTPATIENT
Start: 2022-11-07 | End: 2022-11-17 | Stop reason: HOSPADM

## 2022-11-07 RX ORDER — ROCURONIUM BROMIDE 10 MG/ML
INJECTION, SOLUTION INTRAVENOUS
Status: DISCONTINUED | OUTPATIENT
Start: 2022-11-07 | End: 2022-11-07

## 2022-11-07 RX ADMIN — DEXAMETHASONE SODIUM PHOSPHATE 4 MG: 4 INJECTION, SOLUTION INTRAMUSCULAR; INTRAVENOUS at 07:11

## 2022-11-07 RX ADMIN — PROPOFOL 150 MG: 10 INJECTION, EMULSION INTRAVENOUS at 07:11

## 2022-11-07 RX ADMIN — ROCURONIUM BROMIDE 50 MG: 10 INJECTION INTRAVENOUS at 08:11

## 2022-11-07 RX ADMIN — Medication 100 MCG/KG/MIN: at 08:11

## 2022-11-07 RX ADMIN — HYDROMORPHONE HYDROCHLORIDE 1 MG: 1 INJECTION, SOLUTION INTRAMUSCULAR; INTRAVENOUS; SUBCUTANEOUS at 12:11

## 2022-11-07 RX ADMIN — CEFTRIAXONE 2 G: 2 INJECTION, SOLUTION INTRAVENOUS at 09:11

## 2022-11-07 RX ADMIN — PROPOFOL 50 MG: 10 INJECTION, EMULSION INTRAVENOUS at 08:11

## 2022-11-07 RX ADMIN — ROCURONIUM BROMIDE 50 MG: 10 INJECTION INTRAVENOUS at 10:11

## 2022-11-07 RX ADMIN — EPHEDRINE SULFATE 10 MG: 50 INJECTION INTRAVENOUS at 07:11

## 2022-11-07 RX ADMIN — FENTANYL CITRATE 50 MCG: 50 INJECTION, SOLUTION INTRAMUSCULAR; INTRAVENOUS at 07:11

## 2022-11-07 RX ADMIN — POLYETHYLENE GLYCOL 3350 17 G: 17 POWDER, FOR SOLUTION ORAL at 03:11

## 2022-11-07 RX ADMIN — Medication 2 G: at 07:11

## 2022-11-07 RX ADMIN — PROPOFOL 50 MG: 10 INJECTION, EMULSION INTRAVENOUS at 10:11

## 2022-11-07 RX ADMIN — CENOBAMATE 100 MG: 100 TABLET, FILM COATED ORAL at 09:11

## 2022-11-07 RX ADMIN — SUGAMMADEX 200 MG: 100 INJECTION, SOLUTION INTRAVENOUS at 11:11

## 2022-11-07 RX ADMIN — ACETAMINOPHEN 1000 MG: 10 INJECTION INTRAVENOUS at 09:11

## 2022-11-07 RX ADMIN — SODIUM CHLORIDE: 0.9 INJECTION, SOLUTION INTRAVENOUS at 07:11

## 2022-11-07 RX ADMIN — SENNOSIDES AND DOCUSATE SODIUM 2 TABLET: 50; 8.6 TABLET ORAL at 03:11

## 2022-11-07 RX ADMIN — LAMOTRIGINE 300 MG: 150 TABLET ORAL at 09:11

## 2022-11-07 RX ADMIN — ACETAMINOPHEN 1000 MG: 500 TABLET ORAL at 09:11

## 2022-11-07 RX ADMIN — MUPIROCIN: 20 OINTMENT TOPICAL at 06:11

## 2022-11-07 RX ADMIN — SODIUM CHLORIDE, SODIUM GLUCONATE, SODIUM ACETATE, POTASSIUM CHLORIDE, MAGNESIUM CHLORIDE, SODIUM PHOSPHATE, DIBASIC, AND POTASSIUM PHOSPHATE: .53; .5; .37; .037; .03; .012; .00082 INJECTION, SOLUTION INTRAVENOUS at 08:11

## 2022-11-07 RX ADMIN — LIDOCAINE HYDROCHLORIDE 80 MG: 20 INJECTION, SOLUTION EPIDURAL; INFILTRATION; INTRACAUDAL; PERINEURAL at 07:11

## 2022-11-07 RX ADMIN — ONDANSETRON 4 MG: 2 INJECTION INTRAMUSCULAR; INTRAVENOUS at 11:11

## 2022-11-07 RX ADMIN — CEFTRIAXONE 2 G: 1 INJECTION, POWDER, FOR SOLUTION INTRAMUSCULAR; INTRAVENOUS at 08:11

## 2022-11-07 RX ADMIN — MUPIROCIN: 20 OINTMENT TOPICAL at 09:11

## 2022-11-07 RX ADMIN — ROCURONIUM BROMIDE 50 MG: 10 INJECTION INTRAVENOUS at 07:11

## 2022-11-07 RX ADMIN — PHENYLEPHRINE HYDROCHLORIDE 100 MCG: 10 INJECTION INTRAVENOUS at 07:11

## 2022-11-07 RX ADMIN — MIDAZOLAM HYDROCHLORIDE 4 MG: 1 INJECTION, SOLUTION INTRAMUSCULAR; INTRAVENOUS at 07:11

## 2022-11-07 NOTE — OP NOTE
[facility] - Neuro Critical Care  Neurosurgery  Operative Note    SUMMARY      Date of Procedure: 11/7/2022     Procedure: Procedure(s) (LRB):  1.) PLACEMENT OF THE FIDUCIAL SCREWS 2.) PLACEMENT OF THE BILATERAL SEEG ELECTRODES WITH BRANDEE ROBOT (Bilateral)     Surgeon(s) and Role:     * Ava Valdez MD - Primary     * Mo Kincaid MD - Resident - Assisting    Pre-Operative Diagnosis: Complex partial epilepsy, intractable [G40.219]    Post-Operative Diagnosis: Post-Op Diagnosis Codes:     * Complex partial epilepsy, intractable [G40.219]    Anesthesia: General    Description of Technical Procedures:   1. Placement of fiducial screws   2. Placement of six right-sided sEEG depth electrodes and six left (PMT)   3. Use of BRANDEE robot neuro-navigation   4. Use of intraoperative LoopX       Indications:   Liza Arrieta is a 51 y.o. female who presents as a referral from Dr. Mark to discuss possible intracranial seizure surgery. She has already had left temporal lobectomy. She will need neuropsychological evaluation and MRI with contrast STEALTH protocol for operative planning purposes.       She requests that we postpone sEEG until after the Epilepsy Walk she organizes in the first weekend in November      I had a lengthy, detailed discussion with the patient and her  about the risks, benefits, and alternatives to intracranial localization for seizures.      We discussed that monitoring typically takes 1-2 weeks but may be longer or shorter depending on how long it takes for her to have concordant seizures.  We discussed that she will not be able to get out of bed while the electrodes are in her head, and that she will remain in the ICU during that time. She may require mittens (she pulled out her grids in 2018 while postictal). We also discussed that this is a diagnostic, not therapeutic, procedure, and that the definitive surgery would potentially be performed 4-8 weeks after the time of sEEG localization.       We discussed that there is approximately 20% rate of a symptomatic hemorrhage and approximately 1% rate of symptomatic hemorrhage from placement of sEEG electrode per review of the international literature.  They expressed understanding of this.      We also discussed the specific risks of the localization surgery. Risks include, but are not limited to, bleeding, pain, infection, scarring, need for further/repeat procedure, death, paralysis, stroke (including venous infarct)/damage to major blood vessels, leak of cerebrospinal fluid, and hardware-related complications. There is a chance that we would fail to localize the seizures with the initial electrode plan, which would require placement of additional electrodes, or may not lead to definitive surgery. Informed consent was obtained of the patient with her  present.      Description of the Procedure:  The patient was brought back to the operating room and was carefully positioned on the stretcher in anticipation of skull fiducial placement. General endotracheal anesthesia was induced by the anesthesia team. The patient received 2 grams of IV Ancef within 30 minutes of skin incision. The patient was prepped and draped in the usual sterile fashion. Two ccs of 1% lidocaine with epinephrine was used to infiltrate the skin, and a stab incision was performed with a number 15 behind the hairline. The pericranium was elevated, and a skull fiducial was placed using the power screwdriver and checked with the hand screwdriver. This process was repeated an additional four times. The patient was then taken to the CT scanner for a volumetric study with the anesthesia team before returning to the OR and being transferred to the OR table. Again, all pressure points were carefully padded.     At this point, the patient's head was affixed using the Frontifytage headframe to the BRANDEE robot, onto which we loaded the previously completed sEEG plan and merged to the CT scan  with fiducials. As soon as the head was affixed to the BRANDEE, the bed was unplugged from the wall. Skull fiducial registration was then completed with an appropriate projected accuracy of 0.31 mm. The patient was then again prepped and draped in the usual sterile fashion and given 2g of IV Rocephin. Instructions were given to the anesthesia team to keep SBP <140 at all times, and they were further instructed that [id number] is upregulated due to being on chronic AEDs. LAURA hose and SCDs were applied.     Attention was turned to the first trajectory, right amygdala. The skin was marked at the site of incision and infiltrated with 1% lidocaine with epinephrine. Using the BRANDEE as a trajectory guide, the hand drill was used to drill through the skull. Care was taken not to plunge. The dura was cauterized with application of the Bovie cautery to a blunt Seth pin. The reducing tube was removed, and the bone anchor was secured to the skull with two-finger tightness. After being carefully measured and marked, a rigid stylet followed by the the electrode was then passed to depth, and secured to the bone anchor. The stylet was then removed, and the electrode number was read and confirmed with the EEG technician.     This exact procedure was repeated 11 more times, for the right hippocampus, right anterior insula, right anterior cingulate, right thalamus, right posterior temporal margin, left orbitofrontal, left thalamus, left anterior cingulate, left anterior insula, left posterior temporal margin, and left resection margin. There was concern for bleeding with the anterior insula target, so a mini-open approach was taken, but the bleeding happily resolved appropriately.      We then confirmed the trajectory placements intraoperatively with LoopX, which appeared to demonstrate adequate accuracy. We also tested each of the electrodes to ensure they were in parenchyma and functioning, which were confirmed by Cyndy Mark and Alia.       The patient tolerated the procedure well without apparent complication. All counts were correct x2. A retention stitch was placed around the electrodes, taking care not to kink any. The patient was removed from the headframe. A large, protective, sterile headwrap was applied. The patient was then extubated by the anesthesia team with disposition to the neuro ICU via CT scan. CT did not demonstrate any large-volume hemorrhage.      Complications: No    Estimated Blood Loss (EBL): 20 mL           Specimens:   Specimen (24h ago, onward)      None             Implants:   Implant Name Type Inv. Item Serial No.  Lot No. LRB No. Used Action   FIDUCIAL KIT UNI STEREO 10 MM - [id number]  FIDUCIAL KIT UNI STEREO 10 MM  [id number] [facility] [id number] N/A 5 Implanted   ANCHOR BOLT 25X2.1MM - [id number]  ANCHOR BOLT 25X2.1MM  PMT [employer] [id number] Right 1 Implanted   sEEG Depth electrode   40290 PMT [employer] [id number] Right 1 Implanted   ANCHOR BOLT 25X2.1MM - [id number]  ANCHOR BOLT 25X2.1MM  PMT [employer] [id number] Right 1 Implanted   sEEG Depth electrode   30486 PMT [employer] [id number] Right 1 Implanted   ANCHOR SEEG BOLT 30X2.1MM - [id number]  ANCHOR SEEG BOLT 30X2.1MM  PMT [employer] [id number] Right 1 Implanted   sEEG Depth electrode   70379 PMT [employer] [id number] Right 1 Implanted   ANCHOR SEEG BOLT 30X2.1MM - [id number]  ANCHOR SEEG BOLT 30X2.1MM  PMT [employer] [id number] Right 1 Implanted   sEEG Depth electrode   45840 PMT [employer] [id number] Right 1 Implanted   [place]    PMT [employer] [id number] Right 1 Implanted   sEEG Depth electrode   35676 PMT [employer] [id number] Right 1 Implanted   ANCHOR BOLT 25X2.1MM - [id number]  ANCHOR BOLT 25X2.1MM  PMT [employer] [id number] Right 1 Implanted   sEEG Depth electrode   28342 PMT [employer] [id number] Right 1 Wasted   sEEG Depth electrode   81322 PMT [employer] [id number] Right 1 Implanted   [place]    PMT [employer] [id number] Left 1 Implanted   sEEG Depth electrode   48634 PMT  [employer] [id number] Left 1 Implanted   ANCHOR BOLT 25X2.1MM - [id number]  ANCHOR BOLT 25X2.1MM  [employer] [id number] Left 1 Implanted   sEEG Depth electrode   80547 [employer] [id number] Left 1 Implanted   [place]    [employer] [id number] Left 1 Implanted   sEEG Depth electrode   72771 [employer] [id number] Left 1 Implanted   [place]    [employer] [id number] Left 1 Implanted   ANCHOR SEEG BOLT 30X2.1MM - [id number]  ANCHOR SEEG BOLT 30X2.1MM  [employer] [id number] Left 1 Implanted   sEEG Depth electrode   67859 [employer] [id number] Left 1 Implanted   ANCHOR BOLT 25X2.1MM - [id number]  ANCHOR BOLT 25X2.1MM  [employer] [id number] Left 1 Implanted   sEEG Depth electrode   04003 [employer] [id number] Left 1 Implanted   ANCHOR BOLT 25X2.1MM - [id number]  ANCHOR BOLT 25X2.1MM  [employer] [id number] Left 1 Implanted   sEEG Depth electrode   [id number] [employer] [id number] Left 1 Implanted              Condition: Stable    Disposition: ICU - extubated and stable.    Attestation: I was present and scrubbed for the entire procedure.

## 2022-11-07 NOTE — ANESTHESIA PROCEDURE NOTES
Arterial    Diagnosis: seizures  Doctor requesting consult: angie    Patient location during procedure: done in OR    Staffing  Authorizing Provider: Osmany Dobbs Jr., MD  Performing Provider: Osmany Dobbs Jr., MD    Anesthesiologist was present at the time of the procedure.    Preanesthetic Checklist  Completed: patient identified, IV checked, site marked, risks and benefits discussed, surgical consent, monitors and equipment checked, pre-op evaluation, timeout performed and anesthesia consent givenArterial  Skin Prep: chlorhexidine gluconate and isopropyl alcohol  Local Infiltration: none  Orientation: right  Location: radial    Catheter Size: 20 G  Catheter placement by Ultrasound guidance. Heme positive aspiration all ports.   Vessel Caliber: medium, patent, compressibility normal  Vascular Doppler:  not done  Needle advanced into vessel with real time Ultrasound guidance.  Guidewire confirmed in vessel.Insertion Attempts: 1  Assessment  Dressing: secured with tape and tegaderm  Patient: Tolerated well

## 2022-11-07 NOTE — ASSESSMENT & PLAN NOTE
Ms. Arrieta is a 50 yo woman with intractable epilepsy since age 18, s/p partial left anterior temporal lobectomy in 11/2018 admitted to NICU s/p placement of 6 right sided and 6 left sided sEEG leads for further localization of seizures as part of repeat surgical workup. Patient is currently maintained on lamotrigine 200 mg qAM/300 mg qPM and Cenobamate 150 mg qHS with continued events 7-8 times per month of lapse of awareness, staring.    Recommendations:  - s/p placement of 6 left-sided and 6 right-sided sEEG electrodes 11/7 by NSGY  - Decrease Cenobamate to 100 mg qHS  - Continue home Lamotrigine 200 mg qAM/300 mg qPM  - Seizure precautions  - Please call epilepsy on call prior to administration of IV benzodiazepines for prolonged seizures  - Post-operative imaging timing, pain control per NCC/NSGY    Plan of care discussed with NCC team, patient and  at bedside. Will continue to follow, please call with any questions.       Discussed plan of care with NCC team. Will follow, please call with questions.

## 2022-11-07 NOTE — BRIEF OP NOTE
[facility] - Neuro Critical Care  Brief Operative Note    SUMMARY     Surgery Date: 11/7/2022     Surgeon(s) and Role:     * Ava Valdez MD - Primary     * Mo Kincaid MD - Resident - Assisting        Pre-op Diagnosis:  Complex partial epilepsy, intractable [G40.219]    Post-op Diagnosis:  Post-Op Diagnosis Codes:     * Complex partial epilepsy, intractable [G40.219]    Procedure(s) (LRB):  1.) PLACEMENT OF THE FIDUCIAL SCREWS 2.) PLACEMENT OF THE BILATERAL SEEG ELECTRODES WITH BRANDEE ROBOT (Bilateral)    Anesthesia: General    Operative Findings: 12 sEEG leads placed without issue; postop fiducial CTH satisfactory; patient transported to ICU in stable condition    Estimated Blood Loss: 20 mL    Estimated Blood Loss has been documented.         Specimens:   Specimen (24h ago, onward)      None            [id number]

## 2022-11-07 NOTE — ANESTHESIA POSTPROCEDURE EVALUATION
Anesthesia Post Evaluation    Patient: Liza Arrieta    Procedure(s) Performed: Procedure(s) (LRB):  1.) PLACEMENT OF THE FIDUCIAL SCREWS 2.) PLACEMENT OF THE BILATERAL SEEG ELECTRODES WITH BRANDEE ROBOT (Bilateral)    Final Anesthesia Type: general      Patient location during evaluation: ICU  Patient participation: Yes- Able to Participate  Level of consciousness: awake and alert and oriented  Post-procedure vital signs: reviewed and stable  Pain management: adequate  Airway patency: patent    PONV status at discharge: No PONV  Anesthetic complications: no      Cardiovascular status: blood pressure returned to baseline, hemodynamically stable and stable  Respiratory status: unassisted, room air and spontaneous ventilation  Hydration status: euvolemic  Follow-up not needed.          Vitals Value Taken Time   /62 11/07/22 1234   Temp 36.6 °C (97.8 °F) 11/07/22 1234   Pulse 71 11/07/22 1417   Resp 19 11/07/22 1417   SpO2 98 % 11/07/22 1417   Vitals shown include unvalidated device data.      No case tracking events are documented in the log.      Pain/Allie Score: Pain Rating Prior to Med Admin: 8 (11/7/2022 12:41 PM)  Pain Rating Post Med Admin: 1 (11/7/2022  1:11 PM)

## 2022-11-07 NOTE — PLAN OF CARE
Pt prepared for surgery per orders.  at bedside and to take belongings while pt is in surgery. Anesthesia consent needed for procedure today.

## 2022-11-07 NOTE — NURSING
Patient arrived to [facility] from OR (RN met them in CT) then to [id number] via bed    Report received from: Jagdish ALBRECHT    Type of stroke/diagnosis:  temporal lobe epilepsy, sEEG    Current symptoms: headache     Skin assessment done: Y  Wounds noted: none    Bazzi Completed? pending    Patient Belongings on Admit: none    NCC notified: MD Cynthia with [facility]

## 2022-11-07 NOTE — H&P
"I have reviewed the below note, and I concur with the findings. No changes have occurred since the note was written. The indications, risks, benefits, and alternatives of surgery were reviewed with the patient. She voices understanding and elects to proceed to OR for sEEG placement. NPO since midnight. Coags wnl. T&S within 2 weeks.     Mo Kincaid MD  Neurosurgery  [facility]    Ava Valdez MD   Physician  Specialty:  Neurosurgery  Progress Notes     Signed  Encounter Date:  8/25/2022  Creation Time:  8/25/2022  6:55 AM     Expand All Collapse All                                                                                                                                                                                                                                                                                                                                                                          Neurosurgery  History & Physical     SUBJECTIVE:      Chief Complaint: intractable epilepsy      History of Present Illness:  Liza Arrieta is a 51 y.o. right-handed female referred by Dr. Mark for evaluation of intractable epilepsy. The patient underwent left anterior temporal lobectomy after subdural strip and grid monitoring on 11/19/18. After surgery, she reports that her personality improved. She is "more open" and "got some of her life back." Petit mal seizures resolved postoperatively, but she began having complex partial seizures (self-resolved after 30 seconds-1 minute) while jogging about 3-4 months ago.      Neymar, her  who accompanies her today, states that she has always had seizures despite the surgery. He states that she had the typical auras and small focal events; lately, the seizure frequency and severity has been increasing (now complex partial). She keeps track of her seizures on an sam; triggers include exercise and stress.       She works as a . She graduated " [employer] and has her [employer]. She lives with her  and 3 of her 4 children: Harshal, Jackie, and Amy.      The patient's seizures began when she was 19. She has previously been a patient of Cyndy Huerta and [facility].      She has previously failed: phenobarbital, dilantin, topamax, Keppra, among others.      The patient denies any bleeding, infectious, or anesthetic complications with any previous surgery. No AC/AP agents; she does take over-the-counter iron supplementation.      Review of patient's allergies indicates:  No Known Allergies     Current Medications          Current Outpatient Medications   Medication Sig Dispense Refill    cenobamate (XCOPRI) 150 mg Tab Take one tablet by mouth once daily. 30 tablet 5    cenobamate (XCOPRI) 200 mg Tab Take one tablet by mouth once daily at 6am. 30 tablet 5    lamoTRIgine (LAMICTAL) 200 MG tablet Take 2.5 tablets (500 mg total) by mouth once daily. 405 tablet 3    sertraline (ZOLOFT) 25 MG tablet Take 1 tablet (25 mg total) by mouth once daily. 30 tablet 11      No current facility-administered medications for this visit.                 Past Medical History:   Diagnosis Date    Convulsions      Epilepsy      Seizures              Past Surgical History:   Procedure Laterality Date    APPENDECTOMY         SECTION         4    CHOLECYSTECTOMY        CRANIOTOMY N/A 2018     Procedure: CRANIOTOMY for strip and grid;  Surgeon: Ruben Senior MD;  Location: [facility] OR [address] [place] [address];  Service: Neurosurgery;  Laterality: N/A;  toronto II, asa 2, type and screen, regular bed, [place], supine     CRANIOTOMY Left 2018     Procedure: CRANIOTOMY for grids and strips;  Surgeon: Ruben Senior MD;  Location: [facility] OR [address] [place] [address];  Service: Neurosurgery;  Laterality: Left;    HYSTERECTOMY         around 2016 for pain ful periods     INSERTION OF SUBDURAL GRID AND STRIP ELECTRODES BY CRANIOTOMY Left 2018     Procedure: CRANIOTOMY, FOR SUBDURAL GRID AND STRIP  ELECTRODE LEAD Removal.;  Surgeon: Ruben Senior MD;  Location: [facility] OR [address] [place] [address];  Service: Neurosurgery;  Laterality: Left;  needs microscope and stealth-    SURGICAL REMOVAL OF LOBE OF BRAIN Left 11/19/2018     Procedure: LOBECTOMY, BRAIN left temporal lobe.;  Surgeon: Ruben Senior MD;  Location: [facility] OR [address] [place] [address];  Service: Neurosurgery;  Laterality: Left;           Family History      Problem Relation (Age of Onset)     Heart disease Father     No Known Problems Mother          Social History            Socioeconomic History    Marital status:    Tobacco Use    Smoking status: Never Smoker    Smokeless tobacco: Never Used   Substance and Sexual Activity    Alcohol use: No       Alcohol/week: 0.0 standard drinks    Drug use: No    Sexual activity: Never      Social Determinants of Health          Financial Resource Strain: Unknown    Difficulty of Paying Living Expenses: Patient refused   Food Insecurity: Unknown    Worried About Running Out of Food in the Last Year: Patient refused    Ran Out of Food in the Last Year: Patient refused   Transportation Needs: Unknown    Lack of Transportation (Medical): Patient refused    Lack of Transportation (Non-Medical): Patient refused   Physical Activity: Sufficiently Active    Days of Exercise per Week: 4 days    Minutes of Exercise per Session: 90 min   Stress: No Stress Concern Present    Feeling of Stress : Not at all   Social Connections: Unknown    Frequency of Communication with Friends and Family: More than three times a week    Frequency of Social Gatherings with Friends and Family: Once a week    Active Member of Clubs or Organizations: No    Attends Club or Organization Meetings: Patient refused    Marital Status:    Housing Stability: Unknown    Unable to Pay for Housing in the Last Year: Patient refused    Number of Places Lived in the Last Year: 1    Unstable Housing in the Last Year: Patient refused         Review of Systems   Constitutional:  Negative for fever.   HENT: Negative for nosebleeds.    Eyes: Negative for visual disturbance.   Respiratory: Negative for shortness of breath.    Cardiovascular: Negative for chest pain.   Gastrointestinal: Negative for vomiting.   Endocrine: Negative for cold intolerance.   Genitourinary: Negative for difficulty urinating.   Musculoskeletal: Negative for neck pain.   Skin: Negative for color change.   Neurological: Positive for seizures.   Hematological: Does not bruise/bleed easily.   Psychiatric/Behavioral: Positive for dysphoric mood.         OBJECTIVE:      Vital Signs  There is no height or weight on file to calculate BMI.        Physical Exam:     Constitutional: She appears well-developed and well-nourished. No distress.      Eyes: EOM are normal.      Abdominal: Soft.      Skin: Skin displays no rash on extremities. Skin displays no lesions on extremities.   Well healed left temporal incision   Some temporalis atrophy      Psych/Behavior: She is alert. She is oriented to person, place, and time.     Musculoskeletal:      Comments: No focal weakness     Neurological:        Coordination: She has normal finger to nose coordination.      Pulmonary: nonlabored respirations      Hematologic: no bruising noted      Diagnostic Results:  MRI personally reviewed      ASSESSMENT/PLAN:      Liza Arrieta is a 51 y.o. female who presents as a referral from Dr. Mark to discuss possible intracranial seizure surgery. She has already had left temporal lobectomy. She will need neuropsychological evaluation and MRI with contrast STEALTH protocol for operative planning purposes.       She requests that we postpone sEEG until after the Epilepsy Walk she organizes in the first weekend in November      I had a lengthy, detailed discussion with the patient and her  about the risks, benefits, and alternatives to intracranial localization for seizures.      We discussed that monitoring typically takes 1-2 weeks but may be  longer or shorter depending on how long it takes for her to have concordant seizures.  We discussed that she will not be able to get out of bed while the electrodes are in her head, and that she will remain in the ICU during that time. She may require mittens (she pulled out her grids in 2018 while postictal). We also discussed that this is a diagnostic, not therapeutic, procedure, and that the definitive surgery would potentially be performed 4-8 weeks after the time of sEEG localization.      We discussed that there is approximately 20% rate of a symptomatic hemorrhage and approximately 1% rate of symptomatic hemorrhage from placement of sEEG electrode per review of the international literature.  They expressed understanding of this.      We also discussed the specific risks of the localization surgery. Risks include, but are not limited to, bleeding, pain, infection, scarring, need for further/repeat procedure, death, paralysis, stroke (including venous infarct)/damage to major blood vessels, leak of cerebrospinal fluid, and hardware-related complications. There is a chance that we would fail to localize the seizures with the initial electrode plan, which would require placement of additional electrodes, or may not lead to definitive surgery. Informed consent was obtained of the patient with her  present.      She will need MRI stealth for operative planning purposes.      We discussed that further recommendations will depend on where, exactly, her seizure onset zone or zones are found to be. They expressed understanding.       I have encouraged them to contact the clinic in interim with any questions, concerns, or adverse clinical change.      I spent over an hour on this encounter, more than half in direct consultation.             Electronically signed by Ava Valdez MD at 8/26/2022  5:43 PM

## 2022-11-07 NOTE — H&P
[facility]  Neurology-Epilepsy  History & Physical    Patient Name: Liza Arrieta  MRN: [id number]   Admission Date: 11/7/2022  Code Status: Prior   Attending Provider: Ava Valdez MD   Primary Care Physician: Rc Rodriguez MD  Principal Problem:Temporal lobe epilepsy, intractable    Subjective:     Chief Complaint:  seizures     HPI:   Ms. Arrieta is a 52 yo woman with intractable epilepsy since age 18, s/p partial left anterior temporal lobectomy in 11/2018 admitted to NICU s/p placement of sEEG leads for further localization of seizures as part of repeat surgical workup. After left anterior temporal lobectomy, patient reports brief period of seizure freedom, before beginning to have seizures again approximately 3 months afterwards. She reports current seizures are different than her typical semiology prior to surgery, notably she believes she now loses awareness and stares off. After her events, she is quickly back to baseline and is able to report that she had a seizure. No associated tongue biting, bowel/bladder incontinence. She is currently maintained on Lamotrigine 200 mg qAM/300 mg qPM and Cenobamate 150 mg nightly. She reports current seizure frequency of 7-8 per month, with last seizure on 10/31. Unable to identify triggers for seizures other than missing medication. MRI brain completed 2018 showed evidence of left frontotemporal crani for partial left anterior temporal resection. MRI brain epilepsy protocol in 10/2022 with post-operative changes with left temporal partial lobectomy, interval development of susceptibility in the right superior frontal sulcus compatible with component of superficial siderosis. EEG completed 5/30/22 noted mild regional or subcortical dysfunction in bilateral temporal lobes with possible seizure focus in right anterior temporal region. During EMU admission 7/5/22>7/10/22, patient noted to have multiple right temporal lobe seizures, regional cortical  dysfunction from left temporal region and bilateral independent seizure foci in left and right temporal lobes. Patient discussed in multidisciplinary epilepsy surgery conference, with recommendation for phase 2 monitoring for further localization. Patient elected to proceed, admitted to NICU after placement of 6 right sided sEEG electrodes and 6 left sided sEEG electrodes. Neurology Epilepsy following for assistance in management.       Past Medical History:   Diagnosis Date    Convulsions     Epilepsy     Seizures        Past Surgical History:   Procedure Laterality Date    APPENDECTOMY       SECTION      4    CHOLECYSTECTOMY      CRANIOTOMY N/A 2018    Procedure: CRANIOTOMY for strip and grid;  Surgeon: Ruben Senior MD;  Location: [facility] OR [address] [address];  Service: Neurosurgery;  Laterality: N/A;  toronto II, asa 2, type and screen, regular bed, [place], supine     CRANIOTOMY Left 2018    Procedure: CRANIOTOMY for grids and strips;  Surgeon: Ruben Senior MD;  Location: [facility] OR [address] [place] [address];  Service: Neurosurgery;  Laterality: Left;    HYSTERECTOMY      around 2016 for pain ful periods     INSERTION OF SUBDURAL GRID AND STRIP ELECTRODES BY CRANIOTOMY Left 2018    Procedure: CRANIOTOMY, FOR SUBDURAL GRID AND STRIP ELECTRODE LEAD Removal.;  Surgeon: Ruben Senior MD;  Location: [facility] OR [address] [place] [address];  Service: Neurosurgery;  Laterality: Left;  needs microscope and stealth-cg    SURGICAL REMOVAL OF LOBE OF BRAIN Left 2018    Procedure: LOBECTOMY, BRAIN left temporal lobe.;  Surgeon: Ruben Senior MD;  Location: [facility] OR [address] [place] [address];  Service: Neurosurgery;  Laterality: Left;       Review of patient's allergies indicates:  No Known Allergies    No current facility-administered medications on file prior to encounter.     Current Outpatient Medications on File Prior to Encounter   Medication Sig    cenobamate (XCOPRI) 150 mg Tab Take one tablet by mouth once daily. (Patient taking differently: Take  150 mg by mouth nightly.)    lamoTRIgine (LAMICTAL) 200 MG tablet Take 2.5 tablets (500 mg total) by mouth once daily. (Patient taking differently: Take 500 mg by mouth once daily. 200 mg in am and 300 mg in the pm)    sertraline (ZOLOFT) 25 MG tablet Take 1 tablet (25 mg total) by mouth once daily.    cenobamate (XCOPRI) 200 mg Tab Take one tablet by mouth once daily at 6am.     Continuous Infusions:   sodium chloride 0.9%         Family History       Problem Relation (Age of Onset)    Heart disease Father          Tobacco Use    Smoking status: Never    Smokeless tobacco: Never   Substance and Sexual Activity    Alcohol use: Yes     Comment: One drink per month    Drug use: No    Sexual activity: Yes     Partners: Male     Birth control/protection: See Surgical Hx     Review of Systems   Eyes:  Negative for visual disturbance.   Neurological:  Positive for seizures and headaches (mild). Negative for speech difficulty and weakness.   All other systems reviewed and are negative.  Objective:     Vital Signs (Most Recent):  Temp: 97.8 °F (36.6 °C) (11/07/22 1234)  Pulse: 62 (11/07/22 1315)  Resp: 18 (11/07/22 1315)  BP: 112/62 (11/07/22 1234)  SpO2: 97 % (11/07/22 1315) Vital Signs (24h Range):  Temp:  [97.8 °F (36.6 °C)] 97.8 °F (36.6 °C)  Pulse:  [50-76] 62  Resp:  [12-18] 18  SpO2:  [94 %-99 %] 97 %  BP: (112-115)/(62) 112/62  Arterial Line BP: (112-126)/(48-55) 112/48     Weight: 99.5 kg (219 lb 5.7 oz)  Body mass index is 35.41 kg/m².    Physical Exam  Vitals and nursing note reviewed.   Constitutional:       General: She is not in acute distress.     Appearance: She is not diaphoretic.   HENT:      Head: Normocephalic and atraumatic.      Comments: sEEG leads with headwrap in place  Eyes:      General: No scleral icterus.     Extraocular Movements: Extraocular movements intact.      Conjunctiva/sclera: Conjunctivae normal.      Pupils: Pupils are equal, round, and reactive to light.   Cardiovascular:       Rate and Rhythm: Normal rate.   Pulmonary:      Effort: Pulmonary effort is normal. No respiratory distress.   Abdominal:      General: There is no distension.      Palpations: Abdomen is soft.      Tenderness: There is no abdominal tenderness.   Musculoskeletal:         General: No swelling, tenderness or deformity. Normal range of motion.      Cervical back: Neck supple. No rigidity.   Skin:     General: Skin is warm and dry.   Neurological:      General: No focal deficit present.      Mental Status: She is alert and oriented to person, place, and time. Mental status is at baseline.   Psychiatric:         Mood and Affect: Mood normal.         Speech: Speech normal.         Behavior: Behavior normal.       NEUROLOGICAL EXAMINATION:     MENTAL STATUS   Oriented to person, place, and time.   Attention: normal. Concentration: normal.   Speech: speech is normal   Level of consciousness: alert    CRANIAL NERVES     CN III, IV, VI   Pupils are equal, round, and reactive to light.    CN VII   Facial expression full, symmetric.     CN VIII   Hearing: intact    CN IX, X   CN IX normal.     CN XI   CN XI normal.     CN XII   CN XII normal.     MOTOR EXAM   Muscle bulk: normal  Overall muscle tone: normal       Moves all extremities spontaneously and symmetrically, follows commands  No focal deficits noted     Significant Labs: All pertinent lab results from the past 24 hours have been reviewed.    Significant Studies: I have reviewed all pertinent imaging results/findings within the past 24 hours.    Assessment and Plan:     * Temporal lobe epilepsy, intractable  Ms. Arrieta is a 52 yo woman with intractable epilepsy since age 18, s/p partial left anterior temporal lobectomy in 11/2018 admitted to NICU s/p placement of 6 right sided and 6 left sided sEEG leads for further localization of seizures as part of repeat surgical workup. Patient is currently maintained on lamotrigine 200 mg qAM/300 mg qPM and Cenobamate 150 mg  qHS with continued events 7-8 times per month of lapse of awareness, staring.    Recommendations:  - s/p placement of 6 left-sided and 6 right-sided sEEG electrodes 11/7 by NSGY  - Decrease Cenobamate to 100 mg qHS  - Continue home Lamotrigine 200 mg qAM/300 mg qPM  - Seizure precautions  - Please call epilepsy on call prior to administration of IV benzodiazepines for prolonged seizures  - Post-operative imaging timing, pain control per NCC/NSGY    Plan of care discussed with NCC team, patient and  at bedside. Will continue to follow, please call with any questions.       Discussed plan of care with NCC team. Will follow, please call with questions.    Depression  Chronic  - Continue home Sertraline        VTE Risk Mitigation (From admission, onward)         Ordered     heparin (porcine) injection 5,000 Units  Every 8 hours         11/07/22 1232     IP VTE HIGH RISK PATIENT  Once         11/07/22 1232     Place sequential compression device  Until discontinued         11/07/22 1232     Place sequential compression device  Until discontinued         11/07/22 0628     Place LAURA hose  Until discontinued         11/07/22 0628                Philly Foy PA-C  Neurology-Epilepsy  [facility]  Staff: Dr. Rojo

## 2022-11-07 NOTE — H&P
[facility]  Neurocritical Care  History & Physical    Admit Date: 11/7/2022  Service Date: 11/07/2022  Length of Stay: 0    Subjective:     Chief Complaint: Temporal lobe epilepsy, intractable    History of Present Illness: Ms. Arrieta is a 53 yo woman with intractable epilepsy since age 18, s/p partial left anterior temporal lobectomy in 11/2018 admitted to NICU s/p placement of sEEG leads for further localization of seizures as part of repeat surgical workup. After left anterior temporal lobectomy, patient reports brief period of seizure freedom, before beginning to have seizures again approximately 3 months afterwards. She reports current seizures are different than her typical semiology prior to surgery, notably she believes she now loses awareness and stares off. After her events, she is quickly back to baseline and is able to report that she had a seizure. No associated tongue biting, bowel/bladder incontinence. She is currently maintained on Lamotrigine 200 mg qAM/300 mg qPM and Cenobamate 150 mg nightly. She reports current seizure frequency of 7-8 per month, with last seizure on 10/31. Unable to identify triggers for seizures other than missing medication. MRI brain completed 2018 showed evidence of left frontotemporal crani for partial left anterior temporal resection. MRI brain epilepsy protocol in 10/2022 with post-operative changes with left temporal partial lobectomy, interval development of susceptibility in the right superior frontal sulcus compatible with component of superficial siderosis. EEG completed 5/30/22 noted mild regional or subcortical dysfunction in bilateral temporal lobes with possible seizure focus in right anterior temporal region. During EMU admission 7/5/22>7/10/22, patient noted to have multiple right temporal lobe seizures, regional cortical dysfunction from left temporal region and bilateral independent seizure foci in left and right temporal lobes. Patient  discussed in multidisciplinary epilepsy surgery conference, with recommendation for phase 2 monitoring for further localization. Patient elected to proceed, admitted to NICU after placement of 6 right sided sEEG electrodes and 6 left sided sEEG electrodes. Patient admitted to [facility] for close  monitoring and higher level of care.        Past Medical History:   Diagnosis Date    Convulsions     Epilepsy     Seizures      Past Surgical History:   Procedure Laterality Date    APPENDECTOMY       SECTION      4    CHOLECYSTECTOMY      CRANIOTOMY N/A 2018    Procedure: CRANIOTOMY for strip and grid;  Surgeon: Ruben Senior MD;  Location: [facility] OR [address] [place] [address];  Service: Neurosurgery;  Laterality: N/A;  toronto II, asa 2, type and screen, regular bed, [place], supine     CRANIOTOMY Left 2018    Procedure: CRANIOTOMY for grids and strips;  Surgeon: Ruben Senior MD;  Location: [facility] OR [address] [place] [address];  Service: Neurosurgery;  Laterality: Left;    HYSTERECTOMY      around  for pain ful periods     INSERTION OF SUBDURAL GRID AND STRIP ELECTRODES BY CRANIOTOMY Left 2018    Procedure: CRANIOTOMY, FOR SUBDURAL GRID AND STRIP ELECTRODE LEAD Removal.;  Surgeon: Ruben Senior MD;  Location: [facility] OR [address] [place] [address];  Service: Neurosurgery;  Laterality: Left;  needs microscope and stealth-cg    SURGICAL REMOVAL OF LOBE OF BRAIN Left 2018    Procedure: LOBECTOMY, BRAIN left temporal lobe.;  Surgeon: Ruben Senior MD;  Location: [facility] OR [address] [place] [address];  Service: Neurosurgery;  Laterality: Left;      No current facility-administered medications on file prior to encounter.     Current Outpatient Medications on File Prior to Encounter   Medication Sig Dispense Refill    cenobamate (XCOPRI) 150 mg Tab Take one tablet by mouth once daily. (Patient taking differently: Take 150 mg by mouth nightly.) 30 tablet 5    lamoTRIgine (LAMICTAL) 200 MG tablet Take 2.5 tablets (500 mg total) by mouth once daily. (Patient taking differently: Take 500  mg by mouth once daily. 200 mg in am and 300 mg in the pm) 405 tablet 3    sertraline (ZOLOFT) 25 MG tablet Take 1 tablet (25 mg total) by mouth once daily. 30 tablet 11    cenobamate (XCOPRI) 200 mg Tab Take one tablet by mouth once daily at 6am. 30 tablet 5      Allergies: Patient has no known allergies.    Family History   Problem Relation Age of Onset    Heart disease Father         over 50 years     Social History     Tobacco Use    Smoking status: Never    Smokeless tobacco: Never   Substance Use Topics    Alcohol use: Yes     Comment: One drink per month    Drug use: No     Review of Systems  Constitutional: Denies fevers, weight loss, chills, or weakness.  Eyes: Denies changes in vision.  ENT: Denies dysphagia, nasal discharge, ear pain or discharge.  Cardiovascular: Denies chest pain, palpitations, orthopnea, or claudication.  Respiratory: Denies shortness of breath, cough, hemoptysis, or wheezing.  GI: Denies nausea/vomitting, hematochezia, melena, abd pain, or changes in appetite.  : Denies dysuria, incontinence, or hematuria.  Musculoskeletal: Denies joint pain or myalgias.  Skin/breast: Denies rashes, lumps, lesions, or discharge.  Neurologic: Denies dizziness, vertigo, or paresthesias.  Psychiatric: Denies changes in mood or hallucinations.  Endocrine: Denies polyuria, polydipsia, heat/cold intolerance.  Hematologic/Lymph: Denies lymphadenopathy, easy bruising or easy bleeding.  Allergic/Immunologic: Denies rash, rhinitis.    Objective:     Vitals:    Temp: 97.8 °F (36.6 °C)  Pulse: (!) 54  Rhythm: normal sinus rhythm  BP: (!) 111/56  MAP (mmHg): 77  Resp: 14  SpO2: 95 %    Temp  Min: 97.8 °F (36.6 °C)  Max: 97.8 °F (36.6 °C)  Pulse  Min: 50  Max: 76  BP  Min: 111/56  Max: 115/62  MAP (mmHg)  Min: 77  Max: 82  Resp  Min: 12  Max: 21  SpO2  Min: 93 %  Max: 99 %    No intake/output data recorded.           Physical Exam  GA: Alert, comfortable, no acute distress.   HEENT: No scleral icterus or JVD.    Pulmonary: Clear to auscultation A/L. Cardiac: RRR S1 & S2 w/o rubs/murmurs/gallops.   Abdominal: Bowel sounds present x 4. No appreciable hepatosplenomegaly.  Skin: No jaundice, rashes, or visible lesions.  Neuro:  --GCS: E4 V5 M6  --Mental Status: oriented X4, follows commands   --CN II-XII grossly intact.   --Pupils 3mm, PERRL.   --Corneal reflex, gag, cough intact.  --PUGA spont    Today I personally reviewed pertinent medications, lines/drains/airways, imaging, cardiology results, laboratory results, microbiology results,       Assessment/Plan:     Neuro  * Temporal lobe epilepsy, intractable  Ms. Arrieta is a 50 yo woman with intractable epilepsy since age 18, s/p partial left anterior temporal lobectomy in 11/2018 admitted to NICU s/p placement of sEEG leads for further localization of seizures as part of repeat surgical workup    --SBP <180  -- epilepsy and NSGY team following  -- continue home Lamotrigine  -- continue Cenobamate 100 mg qHs  -- Neuro checks q 1 hr            The patient is being Prophylaxed for:  Venous Thromboembolism with: Chemical  Stress Ulcer with: None  Ventilator Pneumonia with: not applicable    Activity Orders            Bed rest starting at 11/07 1228    Diet Adult Regular (IDDSI Level 7): Regular starting at 11/07 1228          Full Code    Lisset Arboleda NP  Neurocritical Care  [facility]

## 2022-11-07 NOTE — HPI
Ms. Arrieta is a 52 yo woman with intractable epilepsy since age 18, s/p partial left anterior temporal lobectomy in 11/2018 admitted to NICU s/p placement of sEEG leads for further localization of seizures as part of repeat surgical workup. After left anterior temporal lobectomy, patient reports brief period of seizure freedom, before beginning to have seizures again approximately 3 months afterwards. She reports current seizures are different than her typical semiology prior to surgery, notably she believes she now loses awareness and stares off. After her events, she is quickly back to baseline and is able to report that she had a seizure. No associated tongue biting, bowel/bladder incontinence. She is currently maintained on Lamotrigine 200 mg qAM/300 mg qPM and Cenobamate 150 mg nightly. She reports current seizure frequency of 7-8 per month, with last seizure on 10/31. Unable to identify triggers for seizures other than missing medication. MRI brain completed 2018 showed evidence of left frontotemporal crani for partial left anterior temporal resection. MRI brain epilepsy protocol in 10/2022 with post-operative changes with left temporal partial lobectomy, interval development of susceptibility in the right superior frontal sulcus compatible with component of superficial siderosis. EEG completed 5/30/22 noted mild regional or subcortical dysfunction in bilateral temporal lobes with possible seizure focus in right anterior temporal region. During EMU admission 7/5/22>7/10/22, patient noted to have multiple right temporal lobe seizures, regional cortical dysfunction from left temporal region and bilateral independent seizure foci in left and right temporal lobes. Patient discussed in multidisciplinary epilepsy surgery conference, with recommendation for phase 2 monitoring for further localization. Patient elected to proceed, admitted to NICU after placement of 6 right sided sEEG electrodes and 6 left sided sEEG  electrodes. Patient admitted to [facility] for close  monitoring and higher level of care.

## 2022-11-07 NOTE — SUBJECTIVE & OBJECTIVE
Past Medical History:   Diagnosis Date    Convulsions     Epilepsy     Seizures        Past Surgical History:   Procedure Laterality Date    APPENDECTOMY       SECTION      4    CHOLECYSTECTOMY      CRANIOTOMY N/A 2018    Procedure: CRANIOTOMY for strip and grid;  Surgeon: Ruben Senior MD;  Location: [facility] OR [address] [place] [address];  Service: Neurosurgery;  Laterality: N/A;  toronto II, asa 2, type and screen, regular bed, [place], supine     CRANIOTOMY Left 2018    Procedure: CRANIOTOMY for grids and strips;  Surgeon: Ruben Senior MD;  Location: [facility] OR [address] [place] [address];  Service: Neurosurgery;  Laterality: Left;    HYSTERECTOMY      around  for pain ful periods     INSERTION OF SUBDURAL GRID AND STRIP ELECTRODES BY CRANIOTOMY Left 2018    Procedure: CRANIOTOMY, FOR SUBDURAL GRID AND STRIP ELECTRODE LEAD Removal.;  Surgeon: Ruben Senior MD;  Location: [facility] OR [address] [place] [address];  Service: Neurosurgery;  Laterality: Left;  needs microscope and stealth-cg    SURGICAL REMOVAL OF LOBE OF BRAIN Left 2018    Procedure: LOBECTOMY, BRAIN left temporal lobe.;  Surgeon: Ruben Senior MD;  Location: [facility] OR [address] [place] [address];  Service: Neurosurgery;  Laterality: Left;       Review of patient's allergies indicates:  No Known Allergies    No current facility-administered medications on file prior to encounter.     Current Outpatient Medications on File Prior to Encounter   Medication Sig    cenobamate (XCOPRI) 150 mg Tab Take one tablet by mouth once daily. (Patient taking differently: Take 150 mg by mouth nightly.)    lamoTRIgine (LAMICTAL) 200 MG tablet Take 2.5 tablets (500 mg total) by mouth once daily. (Patient taking differently: Take 500 mg by mouth once daily. 200 mg in am and 300 mg in the pm)    sertraline (ZOLOFT) 25 MG tablet Take 1 tablet (25 mg total) by mouth once daily.    cenobamate (XCOPRI) 200 mg Tab Take one tablet by mouth once daily at 6am.     Continuous Infusions:   sodium chloride 0.9%         Family History        Problem Relation (Age of Onset)    Heart disease Father          Tobacco Use    Smoking status: Never    Smokeless tobacco: Never   Substance and Sexual Activity    Alcohol use: Yes     Comment: One drink per month    Drug use: No    Sexual activity: Yes     Partners: Male     Birth control/protection: See Surgical Hx     Review of Systems   Eyes:  Negative for visual disturbance.   Neurological:  Positive for seizures and headaches (mild). Negative for speech difficulty and weakness.   All other systems reviewed and are negative.  Objective:     Vital Signs (Most Recent):  Temp: 97.8 °F (36.6 °C) (11/07/22 1234)  Pulse: 62 (11/07/22 1315)  Resp: 18 (11/07/22 1315)  BP: 112/62 (11/07/22 1234)  SpO2: 97 % (11/07/22 1315) Vital Signs (24h Range):  Temp:  [97.8 °F (36.6 °C)] 97.8 °F (36.6 °C)  Pulse:  [50-76] 62  Resp:  [12-18] 18  SpO2:  [94 %-99 %] 97 %  BP: (112-115)/(62) 112/62  Arterial Line BP: (112-126)/(48-55) 112/48     Weight: 99.5 kg (219 lb 5.7 oz)  Body mass index is 35.41 kg/m².    Physical Exam  Vitals and nursing note reviewed.   Constitutional:       General: She is not in acute distress.     Appearance: She is not diaphoretic.   HENT:      Head: Normocephalic and atraumatic.      Comments: sEEG leads with headwrap in place  Eyes:      General: No scleral icterus.     Extraocular Movements: Extraocular movements intact.      Conjunctiva/sclera: Conjunctivae normal.      Pupils: Pupils are equal, round, and reactive to light.   Cardiovascular:      Rate and Rhythm: Normal rate.   Pulmonary:      Effort: Pulmonary effort is normal. No respiratory distress.   Abdominal:      General: There is no distension.      Palpations: Abdomen is soft.      Tenderness: There is no abdominal tenderness.   Musculoskeletal:         General: No swelling, tenderness or deformity. Normal range of motion.      Cervical back: Neck supple. No rigidity.   Skin:     General: Skin is warm and dry.   Neurological:       General: No focal deficit present.      Mental Status: She is alert and oriented to person, place, and time. Mental status is at baseline.   Psychiatric:         Mood and Affect: Mood normal.         Speech: Speech normal.         Behavior: Behavior normal.       NEUROLOGICAL EXAMINATION:     MENTAL STATUS   Oriented to person, place, and time.   Attention: normal. Concentration: normal.   Speech: speech is normal   Level of consciousness: alert    CRANIAL NERVES     CN III, IV, VI   Pupils are equal, round, and reactive to light.    CN VII   Facial expression full, symmetric.     CN VIII   Hearing: intact    CN IX, X   CN IX normal.     CN XI   CN XI normal.     CN XII   CN XII normal.     MOTOR EXAM   Muscle bulk: normal  Overall muscle tone: normal       Moves all extremities spontaneously and symmetrically, follows commands  No focal deficits noted     Significant Labs: All pertinent lab results from the past 24 hours have been reviewed.    Significant Studies: I have reviewed all pertinent imaging results/findings within the past 24 hours.

## 2022-11-07 NOTE — SUBJECTIVE & OBJECTIVE
Past Medical History:   Diagnosis Date    Convulsions     Epilepsy     Seizures      Past Surgical History:   Procedure Laterality Date    APPENDECTOMY       SECTION      4    CHOLECYSTECTOMY      CRANIOTOMY N/A 2018    Procedure: CRANIOTOMY for strip and grid;  Surgeon: Ruben Senior MD;  Location: [facility] OR [address] [place] [address];  Service: Neurosurgery;  Laterality: N/A;  toronto II, asa 2, type and screen, regular bed, muñoz, supine     CRANIOTOMY Left 2018    Procedure: CRANIOTOMY for grids and strips;  Surgeon: Ruben Senior MD;  Location: [facility] OR [address] [place] [address];  Service: Neurosurgery;  Laterality: Left;    HYSTERECTOMY      around  for pain ful periods     INSERTION OF SUBDURAL GRID AND STRIP ELECTRODES BY CRANIOTOMY Left 2018    Procedure: CRANIOTOMY, FOR SUBDURAL GRID AND STRIP ELECTRODE LEAD Removal.;  Surgeon: Ruben Senior MD;  Location: [facility] OR [address] [place] [address];  Service: Neurosurgery;  Laterality: Left;  needs microscope and stealth-cg    SURGICAL REMOVAL OF LOBE OF BRAIN Left 2018    Procedure: LOBECTOMY, BRAIN left temporal lobe.;  Surgeon: Ruben Senior MD;  Location: [facility] OR [address] [place] [address];  Service: Neurosurgery;  Laterality: Left;      No current facility-administered medications on file prior to encounter.     Current Outpatient Medications on File Prior to Encounter   Medication Sig Dispense Refill    cenobamate (XCOPRI) 150 mg Tab Take one tablet by mouth once daily. (Patient taking differently: Take 150 mg by mouth nightly.) 30 tablet 5    lamoTRIgine (LAMICTAL) 200 MG tablet Take 2.5 tablets (500 mg total) by mouth once daily. (Patient taking differently: Take 500 mg by mouth once daily. 200 mg in am and 300 mg in the pm) 405 tablet 3    sertraline (ZOLOFT) 25 MG tablet Take 1 tablet (25 mg total) by mouth once daily. 30 tablet 11    cenobamate (XCOPRI) 200 mg Tab Take one tablet by mouth once daily at 6am. 30 tablet 5      Allergies: Patient has no known allergies.    Family History   Problem  Relation Age of Onset    Heart disease Father         over 50 years     Social History     Tobacco Use    Smoking status: Never    Smokeless tobacco: Never   Substance Use Topics    Alcohol use: Yes     Comment: One drink per month    Drug use: No     Review of Systems  Constitutional: Denies fevers, weight loss, chills, or weakness.  Eyes: Denies changes in vision.  ENT: Denies dysphagia, nasal discharge, ear pain or discharge.  Cardiovascular: Denies chest pain, palpitations, orthopnea, or claudication.  Respiratory: Denies shortness of breath, cough, hemoptysis, or wheezing.  GI: Denies nausea/vomitting, hematochezia, melena, abd pain, or changes in appetite.  : Denies dysuria, incontinence, or hematuria.  Musculoskeletal: Denies joint pain or myalgias.  Skin/breast: Denies rashes, lumps, lesions, or discharge.  Neurologic: Denies dizziness, vertigo, or paresthesias.  Psychiatric: Denies changes in mood or hallucinations.  Endocrine: Denies polyuria, polydipsia, heat/cold intolerance.  Hematologic/Lymph: Denies lymphadenopathy, easy bruising or easy bleeding.  Allergic/Immunologic: Denies rash, rhinitis.    Objective:     Vitals:    Temp: 97.8 °F (36.6 °C)  Pulse: (!) 54  Rhythm: normal sinus rhythm  BP: (!) 111/56  MAP (mmHg): 77  Resp: 14  SpO2: 95 %    Temp  Min: 97.8 °F (36.6 °C)  Max: 97.8 °F (36.6 °C)  Pulse  Min: 50  Max: 76  BP  Min: 111/56  Max: 115/62  MAP (mmHg)  Min: 77  Max: 82  Resp  Min: 12  Max: 21  SpO2  Min: 93 %  Max: 99 %    No intake/output data recorded.           Physical Exam  GA: Alert, comfortable, no acute distress.   HEENT: No scleral icterus or JVD.   Pulmonary: Clear to auscultation A/L. Cardiac: RRR S1 & S2 w/o rubs/murmurs/gallops.   Abdominal: Bowel sounds present x 4. No appreciable hepatosplenomegaly.  Skin: No jaundice, rashes, or visible lesions.  Neuro:  --GCS: E4 V5 M6  --Mental Status: oriented X4, follows commands   --CN II-XII grossly intact.   --Pupils 3mm, PERRL.    --Corneal reflex, gag, cough intact.  --VIVIEN james    Today I personally reviewed pertinent medications, lines/drains/airways, imaging, cardiology results, laboratory results, microbiology results,

## 2022-11-07 NOTE — HPI
Ms. Arrieta is a 50 yo woman with intractable epilepsy since age 18, s/p partial left anterior temporal lobectomy in 11/2018 admitted to NICU s/p placement of sEEG leads for further localization of seizures as part of repeat surgical workup. After left anterior temporal lobectomy, patient reports of brief period of seizure freedom, before beginning to have seizures again approximately 3 months afterwards. She reports current seizures are different than her typical semiology prior to surgery, notably she believes she now loses awareness and stares off. After her events, she is quickly back to baseline and is able to report that she had a seizure. No associated tongue biting, bowel/bladder incontinence. She is currently maintained on Lamotrigine 200 mg qAM/300 mg qPM and Cenobamate 150 mg nightly. She reports current seizure frequency of 7-8 per month, with last seizure on 10/31. Unable to identify triggers for seizures other than missing medication. MRI brain completed 2018 showed evidence of left frontotemporal crani for partial left anterior temporal resection. EEG completed 5/30/22 noted mild regional or subcortical dysfunction in bilateral temporal lobes with possible seizure focus in right anterior temporal region. During EMU admission 7/5/22>7/10/22, patient noted to have multiple right temporal lobe seizures, regional cortical dysfunction from left temporal region and bilateral independent seizure foci in left and right temporal lobes. Patient discussed in multidisciplinary epilepsy surgery conference, with recommendation for phase 2 monitoring for further localization. Patient elected to proceed, admitted to NICU after placement of 6 right sided sEEG electrodes and 6 left sided sEEG electrodes. Neurology Epilepsy following for assistance in management.

## 2022-11-07 NOTE — ANESTHESIA PROCEDURE NOTES
Intubation    Date/Time: 11/7/2022 7:15 AM  Performed by: Olman Dubon CRNA  Authorized by: Osmany Dobbs Jr., MD     Intubation:     Induction:  Intravenous    Intubated:  Postinduction    Mask Ventilation:  Easy mask    Attempts:  1    Attempted By:  CRNA    Method of Intubation:  Video laryngoscopy    Blade:  Gutiérrez 3    Laryngeal View Grade: Grade I - full view of cords      Difficult Airway Encountered?: No      Complications:  None    Airway Device:  Oral endotracheal tube    Airway Device Size:  7.0    Style/Cuff Inflation:  Cuffed (inflated to minimal occlusive pressure)    Tube secured:  21    Secured at:  The lips    Placement Verified By:  Capnometry    Complicating Factors:  None    Findings Post-Intubation:  BS equal bilateral and atraumatic/condition of teeth unchanged

## 2022-11-07 NOTE — ANESTHESIA PREPROCEDURE EVALUATION
2022  Pre-operative evaluation for Procedure(s) (LRB):  1.) PLACEMENT OF THE FIDUCIAL SCREWS 2.) PLACEMENT OF THE BILATERAL SEEG ELECTRODES WITH BRANDEE ROBOT (Bilateral)    Liza Arrieta is a 52 y.o. female hx of intractable seizures    Patient Active Problem List   Diagnosis    Convulsions/seizures    Seizure    Temporal lobe epilepsy, intractable    Complex partial epilepsy with generalization and with intractable epilepsy    Mild neurocognitive disorder due to another medical condition    Obesity    Snoring    Abnormal EKG    Iron deficiency anemia    Epilepsy    Essential hypertension    Adverse effect of antiepileptic    Depression    Edema       Review of patient's allergies indicates:  No Known Allergies    No current facility-administered medications on file prior to encounter.     Current Outpatient Medications on File Prior to Encounter   Medication Sig Dispense Refill    cenobamate (XCOPRI) 150 mg Tab Take one tablet by mouth once daily. (Patient taking differently: Take 150 mg by mouth nightly.) 30 tablet 5    lamoTRIgine (LAMICTAL) 200 MG tablet Take 2.5 tablets (500 mg total) by mouth once daily. (Patient taking differently: Take 500 mg by mouth once daily. 200 mg in am and 300 mg in the pm) 405 tablet 3    sertraline (ZOLOFT) 25 MG tablet Take 1 tablet (25 mg total) by mouth once daily. 30 tablet 11    cenobamate (XCOPRI) 200 mg Tab Take one tablet by mouth once daily at 6am. 30 tablet 5       Past Surgical History:   Procedure Laterality Date    APPENDECTOMY       SECTION      4    CHOLECYSTECTOMY      CRANIOTOMY N/A 2018    Procedure: CRANIOTOMY for strip and grid;  Surgeon: Ruben Senior MD;  Location: [facility] OR [address] [place] [address];  Service: Neurosurgery;  Laterality: N/A;  toronto II, asa 2, type and screen, regular bed, [place], supine     CRANIOTOMY Left  11/8/2018    Procedure: CRANIOTOMY for grids and strips;  Surgeon: Ruben Senior MD;  Location: [facility] OR 2ND FLR;  Service: Neurosurgery;  Laterality: Left;    HYSTERECTOMY      around 2016 for pain ful periods     INSERTION OF SUBDURAL GRID AND STRIP ELECTRODES BY CRANIOTOMY Left 11/19/2018    Procedure: CRANIOTOMY, FOR SUBDURAL GRID AND STRIP ELECTRODE LEAD Removal.;  Surgeon: Ruben Senior MD;  Location: [facility] OR 2ND FLR;  Service: Neurosurgery;  Laterality: Left;  needs microscope and stealth-cg    SURGICAL REMOVAL OF LOBE OF BRAIN Left 11/19/2018    Procedure: LOBECTOMY, BRAIN left temporal lobe.;  Surgeon: Ruben Senior MD;  [facility];  Service: Neurosurgery;  Laterality: Left;       Social History     Socioeconomic History    Marital status:    Tobacco Use    Smoking status: Never    Smokeless tobacco: Never   Substance and Sexual Activity    Alcohol use: Yes     Comment: One drink per month    Drug use: No    Sexual activity: Yes     Partners: Male     Birth control/protection: See Surgical Hx     Social Determinants of Health     Financial Resource Strain: Unknown    Difficulty of Paying Living Expenses: Patient refused   Food Insecurity: Unknown    Worried About Running Out of Food in the Last Year: Patient refused    Ran Out of Food in the Last Year: Patient refused   Transportation Needs: Unknown    Lack of Transportation (Medical): Patient refused    Lack of Transportation (Non-Medical): Patient refused   Physical Activity: Sufficiently Active    Days of Exercise per Week: 4 days    Minutes of Exercise per Session: 90 min   Stress: No Stress Concern Present    Feeling of Stress : Not at all   Social Connections: Unknown    Frequency of Communication with Friends and Family: More than three times a week    Frequency of Social Gatherings with Friends and Family: Once a week    Active Member of Clubs or Organizations: No    Attends Club or Organization Meetings:  Patient refused    Marital Status:    Housing Stability: Unknown    Unable to Pay for Housing in the Last Year: Patient refused    Number of Places Lived in the Last Year: 1    Unstable Housing in the Last Year: Patient refused         CBC: No results for input(s): WBC, RBC, HGB, HCT, PLT, MCV, MCH, MCHC in the last 72 hours.    CMP: No results for input(s): NA, K, CL, CO2, BUN, CREATININE, GLU, MG, PHOS, CALCIUM, ALBUMIN, PROT, ALKPHOS, ALT, AST, BILITOT in the last 72 hours.    INR  No results for input(s): PT, INR, PROTIME, APTT in the last 72 hours.        Diagnostic Studies:      EKD Echo:  No results found for this or any previous visit.        Pre-op Assessment    I have reviewed the Patient Summary Reports.     I have reviewed the Nursing Notes. I have reviewed the NPO Status.   I have reviewed the Medications.     Review of Systems  Anesthesia Hx:  No problems with previous Anesthesia  History of prior surgery of interest to airway management or planning: Denies Family Hx of Anesthesia complications.   Denies Personal Hx of Anesthesia complications.   Hematology/Oncology:         -- Denies Anemia:   Cardiovascular:   Exercise tolerance: good Hypertension    Pulmonary:   Denies COPD.  Denies Asthma.  Denies Sleep Apnea.    Renal/:   Denies Chronic Renal Disease.     Hepatic/GI:   Denies GERD. Denies Liver Disease.    Neurological:   Seizures, poorly controlled    Endocrine:   Denies Diabetes.        Physical Exam  General: Well nourished and Cooperative    Airway:  Mallampati: III / II  Mouth Opening: Normal  TM Distance: Normal  Tongue: Normal  Neck ROM: Normal ROM    Dental:  Intact    Chest/Lungs:  Clear to auscultation, Normal Respiratory Rate    Heart:  Rate: Normal  Rhythm: Regular Rhythm  Sounds: Normal        Anesthesia Plan  Type of Anesthesia, risks & benefits discussed:    Anesthesia Type: Gen ETT  Intra-op Monitoring Plan: Standard ASA Monitors and Art Line  Post Op Pain  Control Plan: multimodal analgesia  Induction:  IV  Airway Plan: Direct, Post-Induction  Informed Consent: Informed consent signed with the Patient and all parties understand the risks and agree with anesthesia plan.  All questions answered. Patient consented to blood products? Yes  ASA Score: 3  Day of Surgery Review of History & Physical: H&P Update referred to the surgeon/provider.    Ready For Surgery From Anesthesia Perspective.     .

## 2022-11-07 NOTE — ASSESSMENT & PLAN NOTE
Ms. Arrieta is a 50 yo woman with intractable epilepsy since age 18, s/p partial left anterior temporal lobectomy in 11/2018 admitted to NICU s/p placement of sEEG leads for further localization of seizures as part of repeat surgical workup    --SBP <180  -- epilepsy and NSGY team following  -- continue home Lamotrigine  -- continue Cenobamate 100 mg qHs  -- Neuro checks q 1 hr

## 2022-11-07 NOTE — TRANSFER OF CARE
Anesthesia Transfer of Care Note    Patient: Liza Arrieta    Procedure(s) Performed: Procedure(s) (LRB):  1.) PLACEMENT OF THE FIDUCIAL SCREWS 2.) PLACEMENT OF THE BILATERAL SEEG ELECTRODES WITH BRANDEE ROBOT (Bilateral)    Patient location: Other: (CT the to ICU)    Anesthesia Type: general    Transport from OR: Transported from OR on 6-10 L/min O2 by face mask with adequate spontaneous ventilation. Continuous ECG monitoring in transport. Continuous SpO2 monitoring in transport    Post pain: adequate analgesia    Post assessment: no apparent anesthetic complications    Post vital signs: stable    Level of consciousness: awake, alert and oriented    Nausea/Vomiting: no nausea/vomiting    Complications: none    Transfer of care protocol was followed      Last vitals:   Visit Vitals  /62 (BP Location: Left arm, Patient Position: Lying)   Pulse (!) 50   Temp 36.6 °C (97.8 °F) (Oral)   Resp 18   SpO2 99%   Breastfeeding No

## 2022-11-08 ENCOUNTER — SOCIAL WORK (OUTPATIENT)
Dept: NEUROLOGY | Facility: CLINIC | Age: 52
End: 2022-11-08
Payer: COMMERCIAL

## 2022-11-08 LAB
ALBUMIN SERPL BCP-MCNC: 3 G/DL (ref 3.5–5.2)
ALP SERPL-CCNC: 97 U/L (ref 55–135)
ALT SERPL W/O P-5'-P-CCNC: 16 U/L (ref 10–44)
ANION GAP SERPL CALC-SCNC: 9 MMOL/L (ref 8–16)
AST SERPL-CCNC: 21 U/L (ref 10–40)
BASOPHILS # BLD AUTO: 0.01 K/UL (ref 0–0.2)
BASOPHILS NFR BLD: 0.1 % (ref 0–1.9)
BILIRUB SERPL-MCNC: 0.3 MG/DL (ref 0.1–1)
BUN SERPL-MCNC: 12 MG/DL (ref 6–20)
CALCIUM SERPL-MCNC: 8.8 MG/DL (ref 8.7–10.5)
CHLORIDE SERPL-SCNC: 109 MMOL/L (ref 95–110)
CO2 SERPL-SCNC: 25 MMOL/L (ref 23–29)
CREAT SERPL-MCNC: 0.7 MG/DL (ref 0.5–1.4)
DIFFERENTIAL METHOD: NORMAL
EOSINOPHIL # BLD AUTO: 0 K/UL (ref 0–0.5)
EOSINOPHIL NFR BLD: 0.5 % (ref 0–8)
ERYTHROCYTE [DISTWIDTH] IN BLOOD BY AUTOMATED COUNT: 11.6 % (ref 11.5–14.5)
EST. GFR  (NO RACE VARIABLE): >60 ML/MIN/1.73 M^2
GLUCOSE SERPL-MCNC: 98 MG/DL (ref 70–110)
HCT VFR BLD AUTO: 38.7 % (ref 37–48.5)
HGB BLD-MCNC: 13 G/DL (ref 12–16)
IMM GRANULOCYTES # BLD AUTO: 0.02 K/UL (ref 0–0.04)
IMM GRANULOCYTES NFR BLD AUTO: 0.3 % (ref 0–0.5)
LYMPHOCYTES # BLD AUTO: 2.4 K/UL (ref 1–4.8)
LYMPHOCYTES NFR BLD: 31.2 % (ref 18–48)
MAGNESIUM SERPL-MCNC: 1.8 MG/DL (ref 1.6–2.6)
MCH RBC QN AUTO: 30.8 PG (ref 27–31)
MCHC RBC AUTO-ENTMCNC: 33.6 G/DL (ref 32–36)
MCV RBC AUTO: 92 FL (ref 82–98)
MONOCYTES # BLD AUTO: 0.6 K/UL (ref 0.3–1)
MONOCYTES NFR BLD: 7.2 % (ref 4–15)
NEUTROPHILS # BLD AUTO: 4.6 K/UL (ref 1.8–7.7)
NEUTROPHILS NFR BLD: 60.7 % (ref 38–73)
NRBC BLD-RTO: 0 /100 WBC
PHOSPHATE SERPL-MCNC: 3.2 MG/DL (ref 2.7–4.5)
PLATELET # BLD AUTO: 172 K/UL (ref 150–450)
PMV BLD AUTO: 10.5 FL (ref 9.2–12.9)
POTASSIUM SERPL-SCNC: 4.1 MMOL/L (ref 3.5–5.1)
PROT SERPL-MCNC: 5.6 G/DL (ref 6–8.4)
RBC # BLD AUTO: 4.22 M/UL (ref 4–5.4)
SODIUM SERPL-SCNC: 143 MMOL/L (ref 136–145)
WBC # BLD AUTO: 7.64 K/UL (ref 3.9–12.7)

## 2022-11-08 PROCEDURE — 99233 SBSQ HOSP IP/OBS HIGH 50: CPT | Mod: ,,, | Performed by: PSYCHIATRY & NEUROLOGY

## 2022-11-08 PROCEDURE — 99233 PR SUBSEQUENT HOSPITAL CARE,LEVL III: ICD-10-PCS | Mod: ,,, | Performed by: PSYCHIATRY & NEUROLOGY

## 2022-11-08 PROCEDURE — 95714 VEEG EA 12-26 HR UNMNTR: CPT

## 2022-11-08 PROCEDURE — 63600175 PHARM REV CODE 636 W HCPCS: Performed by: STUDENT IN AN ORGANIZED HEALTH CARE EDUCATION/TRAINING PROGRAM

## 2022-11-08 PROCEDURE — 83735 ASSAY OF MAGNESIUM: CPT

## 2022-11-08 PROCEDURE — 84100 ASSAY OF PHOSPHORUS: CPT

## 2022-11-08 PROCEDURE — 99233 PR SUBSEQUENT HOSPITAL CARE,LEVL III: ICD-10-PCS | Mod: FS,,, | Performed by: PHYSICIAN ASSISTANT

## 2022-11-08 PROCEDURE — 85025 COMPLETE CBC W/AUTO DIFF WBC: CPT

## 2022-11-08 PROCEDURE — 93005 ELECTROCARDIOGRAM TRACING: CPT

## 2022-11-08 PROCEDURE — 95720 EEG PHY/QHP EA INCR W/VEEG: CPT | Mod: ,,, | Performed by: PSYCHIATRY & NEUROLOGY

## 2022-11-08 PROCEDURE — 93010 EKG 12-LEAD: ICD-10-PCS | Mod: ,,, | Performed by: INTERNAL MEDICINE

## 2022-11-08 PROCEDURE — 95720 PR EEG, W/VIDEO, CONT RECORD, I&R, >12<26 HRS: ICD-10-PCS | Mod: ,,, | Performed by: PSYCHIATRY & NEUROLOGY

## 2022-11-08 PROCEDURE — 25000003 PHARM REV CODE 250: Performed by: STUDENT IN AN ORGANIZED HEALTH CARE EDUCATION/TRAINING PROGRAM

## 2022-11-08 PROCEDURE — 93010 ELECTROCARDIOGRAM REPORT: CPT | Mod: ,,, | Performed by: INTERNAL MEDICINE

## 2022-11-08 PROCEDURE — 25000003 PHARM REV CODE 250: Performed by: NURSE PRACTITIONER

## 2022-11-08 PROCEDURE — 20000000 HC ICU ROOM

## 2022-11-08 PROCEDURE — 80053 COMPREHEN METABOLIC PANEL: CPT

## 2022-11-08 PROCEDURE — 99233 SBSQ HOSP IP/OBS HIGH 50: CPT | Mod: FS,,, | Performed by: PHYSICIAN ASSISTANT

## 2022-11-08 PROCEDURE — 94761 N-INVAS EAR/PLS OXIMETRY MLT: CPT

## 2022-11-08 RX ORDER — SODIUM,POTASSIUM PHOSPHATES 280-250MG
2 POWDER IN PACKET (EA) ORAL
Status: DISCONTINUED | OUTPATIENT
Start: 2022-11-08 | End: 2022-11-17 | Stop reason: HOSPADM

## 2022-11-08 RX ORDER — LANOLIN ALCOHOL/MO/W.PET/CERES
800 CREAM (GRAM) TOPICAL
Status: DISCONTINUED | OUTPATIENT
Start: 2022-11-08 | End: 2022-11-08

## 2022-11-08 RX ORDER — LANOLIN ALCOHOL/MO/W.PET/CERES
800 CREAM (GRAM) TOPICAL
Status: DISCONTINUED | OUTPATIENT
Start: 2022-11-08 | End: 2022-11-17 | Stop reason: HOSPADM

## 2022-11-08 RX ORDER — SODIUM,POTASSIUM PHOSPHATES 280-250MG
2 POWDER IN PACKET (EA) ORAL
Status: DISCONTINUED | OUTPATIENT
Start: 2022-11-08 | End: 2022-11-08

## 2022-11-08 RX ADMIN — CEFTRIAXONE SODIUM 2 G: 2 INJECTION, SOLUTION INTRAVENOUS at 01:11

## 2022-11-08 RX ADMIN — OXYCODONE 5 MG: 5 TABLET ORAL at 07:11

## 2022-11-08 RX ADMIN — POLYETHYLENE GLYCOL 3350 17 G: 17 POWDER, FOR SOLUTION ORAL at 09:11

## 2022-11-08 RX ADMIN — ACETAMINOPHEN 1000 MG: 500 TABLET ORAL at 11:11

## 2022-11-08 RX ADMIN — HEPARIN SODIUM 5000 UNITS: 5000 INJECTION INTRAVENOUS; SUBCUTANEOUS at 11:11

## 2022-11-08 RX ADMIN — HEPARIN SODIUM 5000 UNITS: 5000 INJECTION INTRAVENOUS; SUBCUTANEOUS at 10:11

## 2022-11-08 RX ADMIN — ACETAMINOPHEN 1000 MG: 500 TABLET ORAL at 02:11

## 2022-11-08 RX ADMIN — ACETAMINOPHEN 1000 MG: 500 TABLET ORAL at 06:11

## 2022-11-08 RX ADMIN — LAMOTRIGINE 200 MG: 25 TABLET ORAL at 08:11

## 2022-11-08 RX ADMIN — SENNOSIDES AND DOCUSATE SODIUM 2 TABLET: 50; 8.6 TABLET ORAL at 08:11

## 2022-11-08 RX ADMIN — SERTRALINE HYDROCHLORIDE 25 MG: 25 TABLET ORAL at 08:11

## 2022-11-08 NOTE — SUBJECTIVE & OBJECTIVE
Interval History:  NAEON. No sz. Off AEDs.     Medications:  Continuous Infusions:  Scheduled Meds:   heparin (porcine)  5,000 Units Subcutaneous Q8H    polyethylene glycol  17 g Oral Daily    senna-docusate 8.6-50 mg  2 tablet Oral Daily    sertraline  25 mg Oral Daily     PRN Meds:acetaminophen, hydrALAZINE, HYDROmorphone, magnesium oxide, magnesium oxide, ondansetron, oxyCODONE, potassium bicarbonate, potassium bicarbonate, potassium bicarbonate, potassium, sodium phosphates, potassium, sodium phosphates, potassium, sodium phosphates     Review of Systems  ROS negative ow  Objective:     Weight: 99.5 kg (219 lb 5.7 oz)  Body mass index is 35.41 kg/m².  Vital Signs (Most Recent):  Temp: 98.2 °F (36.8 °C) (11/08/22 1105)  Pulse: (!) 53 (11/08/22 1200)  Resp: 15 (11/08/22 1200)  BP: (!) 115/57 (11/08/22 1200)  SpO2: 95 % (11/08/22 1200)   Vital Signs (24h Range):  Temp:  [97.8 °F (36.6 °C)-98.3 °F (36.8 °C)] 98.2 °F (36.8 °C)  Pulse:  [48-76] 53  Resp:  [10-21] 15  SpO2:  [93 %-98 %] 95 %  BP: ()/(48-62) 115/57  Arterial Line BP: ()/(47-60) 110/56     Date 11/08/22 0700 - 11/09/22 0659   Shift 5767-4468 2956-2973 8681-2719 24 Hour Total   INTAKE   P.O. 350   350   Shift Total(mL/kg) 350(3.5)   350(3.5)   OUTPUT   Urine(mL/kg/hr) 200   200   Shift Total(mL/kg) 200(2)   200(2)   Weight (kg) 99.5 99.5 99.5 99.5                   Female External Urinary Catheter 11/08/22 0605 (Active)   Skin no redness;no breakdown;perineum cleansed w/ soap and water;female external urine collection device repositioned 11/08/22 1105   Tolerance no signs/symptoms of discomfort 11/08/22 1105   Suction Continuous suction at 50 mmHg 11/08/22 1105   Date of last wick change 11/08/22 11/08/22 1105   Time of last wick change 0605 11/08/22 1105   Output (mL) 200 mL 11/08/22 1106       Physical Exam  General: appears well-developed and well-nourished, no distress  Eyes: EOMI  Head: head wrap and EEG in place  Cardiovascular:  RRR  Pulm: NWOB, no distress   Abdominal: soft, ND, NT, +BS  MSK: moves all extremities spontaneously with good tone.  Neurological: AAOX3, no drift, GCS E4V5M6, CN II-XII grossly intact and face symm, normal speech, following simple commands, PUGA, full strength throughout  Psych: normal affect     Significant Labs:  Recent Labs   Lab 11/07/22  1606 11/08/22 0411    98    143   K 4.6 4.1    109   CO2 23 25   BUN 13 12   CREATININE 0.8 0.7   CALCIUM 8.8 8.8   MG 1.6 1.8     Recent Labs   Lab 11/07/22  1606 11/08/22 0411   WBC 10.74 7.64   HGB 13.8 13.0   HCT 39.9 38.7    172     No results for input(s): LABPT, INR, APTT in the last 48 hours.  Microbiology Results (last 7 days)       ** No results found for the last 168 hours. **          Recent Lab Results         11/08/22 0411 11/07/22 1606        Albumin 3.0   3.2       Alkaline Phosphatase 97   106       ALT 16   17       Anion Gap 9   9       AST 21   18       Baso # 0.01   0.00       Basophil % 0.1   0.0       BILIRUBIN TOTAL 0.3  Comment: For infants and newborns, interpretation of results should be based  on gestational age, weight and in agreement with clinical  observations.    Premature Infant recommended reference ranges:  Up to 24 hours.............<8.0 mg/dL  Up to 48 hours............<12.0 mg/dL  3-5 days..................<15.0 mg/dL  6-29 days.................<15.0 mg/dL     0.2  Comment: For infants and newborns, interpretation of results should be based  on gestational age, weight and in agreement with clinical  observations.    Premature Infant recommended reference ranges:  Up to 24 hours.............<8.0 mg/dL  Up to 48 hours............<12.0 mg/dL  3-5 days..................<15.0 mg/dL  6-29 days.................<15.0 mg/dL         BUN 12   13       Calcium 8.8   8.8       Chloride 109   108       CO2 25   23       Creatinine 0.7   0.8       Differential Method Automated   Automated       eGFR >60.0   >60.0        Eos # 0.0   0.0       Eosinophil % 0.5   0.1       Glucose 98   110       Gran # (ANC) 4.6   9.4       Gran % 60.7   87.5       Hematocrit 38.7   39.9       Hemoglobin 13.0   13.8       Immature Grans (Abs) 0.02  Comment: Mild elevation in immature granulocytes is non specific and   can be seen in a variety of conditions including stress response,   acute inflammation, trauma and pregnancy. Correlation with other   laboratory and clinical findings is essential.     0.03  Comment: Mild elevation in immature granulocytes is non specific and   can be seen in a variety of conditions including stress response,   acute inflammation, trauma and pregnancy. Correlation with other   laboratory and clinical findings is essential.         Immature Granulocytes 0.3   0.3       Lymph # 2.4   1.1       Lymph % 31.2   9.8       Magnesium 1.8   1.6       MCH 30.8   31.2       MCHC 33.6   34.6       MCV 92   90       Mono # 0.6   0.3       Mono % 7.2   2.3       MPV 10.5   10.0       nRBC 0   0       Phosphorus 3.2   3.4       Platelets 172   196       Potassium 4.1   4.6       PROTEIN TOTAL 5.6   6.2       RBC 4.22   4.43       RDW 11.6   11.4       Sodium 143   140       WBC 7.64   10.74               Significant Diagnostics:  CT: No results found in the last 24 hours.

## 2022-11-08 NOTE — PLAN OF CARE
[facility] Care Plan    POC reviewed with Liza Arrieta and family at 1400. Pt verbalized understanding. Questions and concerns addressed.  Pt progressing toward goals. Will continue to monitor. See below and flowsheets for full assessment and VS info.   -Seizure precautions, sEEG   -R head/jaw pain control with tylenol  -Pt biked for 20min today  -Stopped AEDs tonight per epilepsy     Is this a stroke patient? no    Neuro:  Denio Coma Scale  Best Eye Response: 4-->(E4) spontaneous  Best Motor Response: 6-->(M6) obeys commands  Best Verbal Response: 5-->(V5) oriented  Antwan Coma Scale Score: 15  Assessment Qualifiers: no eye obstruction present, patient not sedated/intubated  Pupil PERRLA: yes     24 hr Temp:  [98.1 °F (36.7 °C)-98.6 °F (37 °C)]     CV:   Rhythm: sinus bradycardia  BP goals:   SBP < 160  MAP > 65    Resp:   O2 Device (Oxygen Therapy): room air       Plan: N/A    GI/:     Diet/Nutrition Received: regular  Last Bowel Movement: 11/06/22  Voiding Characteristics: external catheter, due to void    Intake/Output Summary (Last 24 hours) at 11/8/2022 1514  Last data filed at 11/8/2022 1501  Gross per 24 hour   Intake 1754.82 ml   Output 2575 ml   Net -820.18 ml     Unmeasured Output  Stool Occurrence: 0    Labs/Accuchecks:  Recent Labs   Lab 11/08/22  0411   WBC 7.64   RBC 4.22   HGB 13.0   HCT 38.7         Recent Labs   Lab 11/08/22  0411      K 4.1   CO2 25      BUN 12   CREATININE 0.7   ALKPHOS 97   ALT 16   AST 21   BILITOT 0.3      Recent Labs   Lab 11/02/22  1740   INR 1.0   APTT 25.3    No results for input(s): CPK, CPKMB, TROPONINI, MB in the last 168 hours.    Electrolytes: N/A - electrolytes WDL  Accuchecks: none    Gtts:      LDA/Wounds:  Lines/Drains/Airways       Drain  Duration             Female External Urinary Catheter 11/08/22 0605 <1 day              Arterial Line  Duration             Arterial Line 11/07/22 0720 Right Radial 1 day              Peripheral Intravenous  Line  Duration                  Peripheral IV - Single Lumen 11/07/22 0628 20 G Posterior;Right Forearm 1 day         Peripheral IV - Single Lumen 11/07/22 0725 20 G Right Hand 1 day                  Wounds: No  Wound care consulted: No

## 2022-11-08 NOTE — PROGRESS NOTES
[facility]  Neurology-Epilepsy  Progress Note    Patient Name: Liza Arrieta  MRN: [id number]  Admission Date: 11/7/2022  Hospital Length of Stay: 1 days  Code Status: Full Code   Attending Provider: Any Ruiz MD  Primary Care Physician: Rc Rodriguez MD   Principal Problem:Temporal lobe epilepsy, intractable    Subjective:     Hospital Course:   Ms. Arrieta is a 51 year old woman with intractable epilepsy since age 18 s/p partial left anterior temporal lobectomy in 11/2018 admitted to NICU s/p placement of 6 right-sided and 6 left-sided sEEG leads for further localization of seizures as part of repeat surgical workup. Patient is currently maintained on Lamotrigine 200 mg qAM/300 mg qPM and Cenobamate 150 mg qHS with continued events 7-8 times per month of lapse of awareness, staring.   11/7>11/8: Home Cenobamate decreased to 100 mg qHS, Lamotrigine 200 mg qAM/300 mg qPM continued on admission. No seizures overnight. On 11/8 pm, begin to hold Cenobomate and Lamotrigine.      Interval History: No seizures captured since admission. Patient and  eager for discontinuation of AEDs to provoke seizures. EMU admission reviewed, electroclinical seizures noted after discontinuation of both Cenobamate and Lamotrigine.     Current Facility-Administered Medications   Medication Dose Route Frequency Provider Last Rate Last Admin    acetaminophen tablet 1,000 mg  1,000 mg Oral Q8H PRN Mo Kincaid MD   1,000 mg at 11/08/22 0645    heparin (porcine) injection 5,000 Units  5,000 Units Subcutaneous Q8H Mo Kincaid MD        hydrALAZINE injection 10 mg  10 mg Intravenous Q6H PRN Ann Cohen PA-C        HYDROmorphone injection 1 mg  1 mg Intravenous Q6H PRN Mo Kincaid MD   1 mg at 11/07/22 1241    magnesium oxide tablet 800 mg  800 mg Oral PRN Ann Cohen PA-C        magnesium oxide tablet 800 mg  800 mg Oral PRN Ann Cohen PA-C        ondansetron injection  4 mg  4 mg Intravenous Q12H PRN Mo Kincaid MD        oxyCODONE immediate release tablet 5 mg  5 mg Oral Q6H PRN Mo Kincaid MD        polyethylene glycol packet 17 g  17 g Oral Daily Mo Kincaid MD   17 g at 11/08/22 0934    potassium bicarbonate disintegrating tablet 35 mEq  35 mEq Oral PRN Ann S. Nicaud, PA-C        potassium bicarbonate disintegrating tablet 50 mEq  50 mEq Oral PRN Ann S. Nicaud, PA-C        potassium bicarbonate disintegrating tablet 60 mEq  60 mEq Oral PRN Ann S. Nicaud, PA-C        potassium, sodium phosphates 280-160-250 mg packet 2 packet  2 packet Oral PRN Ann S. Nicaud, PA-C        potassium, sodium phosphates 280-160-250 mg packet 2 packet  2 packet Oral PRN Ann S. Nicaud, PA-C        potassium, sodium phosphates 280-160-250 mg packet 2 packet  2 packet Oral PRN Ann S. Nicaud, PA-C        senna-docusate 8.6-50 mg per tablet 2 tablet  2 tablet Oral Daily Mo Kincaid MD   2 tablet at 11/08/22 0807    sertraline tablet 25 mg  25 mg Oral Daily Lisset Miinea, NP   25 mg at 11/08/22 0806     Continuous Infusions:    Review of Systems   HENT:          + jaw pain, mild headache   Eyes:  Negative for visual disturbance.   Neurological:  Positive for seizures. Negative for speech difficulty and weakness.   All other systems reviewed and are negative.  Objective:     Vital Signs (Most Recent):  Temp: 98.2 °F (36.8 °C) (11/08/22 1105)  Pulse: (!) 54 (11/08/22 1105)  Resp: 14 (11/08/22 1105)  BP: (!) 104/52 (11/08/22 1105)  SpO2: 96 % (11/08/22 1105)   Vital Signs (24h Range):  Temp:  [97.8 °F (36.6 °C)-98.3 °F (36.8 °C)] 98.2 °F (36.8 °C)  Pulse:  [48-76] 54  Resp:  [10-21] 14  SpO2:  [93 %-98 %] 96 %  BP: ()/(48-62) 104/52  Arterial Line BP: ()/(47-60) 103/47     Weight: 99.5 kg (219 lb 5.7 oz)  Body mass index is 35.41 kg/m².    Physical Exam  Vitals and nursing note reviewed.   Constitutional:       General: She is not in  acute distress.     Appearance: She is not diaphoretic.   HENT:      Head: Normocephalic and atraumatic.      Comments: sEEG leads with headwrap in place  Eyes:      General: No scleral icterus.     Extraocular Movements: Extraocular movements intact.      Conjunctiva/sclera: Conjunctivae normal.      Pupils: Pupils are equal, round, and reactive to light.   Cardiovascular:      Rate and Rhythm: Normal rate.   Pulmonary:      Effort: Pulmonary effort is normal. No respiratory distress.   Abdominal:      General: There is no distension.      Palpations: Abdomen is soft.      Tenderness: There is no abdominal tenderness.   Musculoskeletal:         General: No swelling, tenderness or deformity. Normal range of motion.      Cervical back: Neck supple. No rigidity.   Skin:     General: Skin is warm and dry.   Neurological:      General: No focal deficit present.      Mental Status: She is alert and oriented to person, place, and time. Mental status is at baseline.   Psychiatric:         Mood and Affect: Mood normal.         Speech: Speech normal.         Behavior: Behavior normal.       NEUROLOGICAL EXAMINATION:     MENTAL STATUS   Oriented to person, place, and time.   Attention: normal. Concentration: normal.   Speech: speech is normal   Level of consciousness: alert    CRANIAL NERVES     CN III, IV, VI   Pupils are equal, round, and reactive to light.    CN VII   Facial expression full, symmetric.     CN VIII   Hearing: intact    CN IX, X   CN IX normal.     CN XI   CN XI normal.     CN XII   CN XII normal.     MOTOR EXAM   Muscle bulk: normal  Overall muscle tone: normal       Moves all extremities spontaneously and symmetrically, follows commands  No focal deficits noted     Significant Labs: All pertinent lab results from the past 24 hours have been reviewed.    Significant Studies: I have reviewed all pertinent imaging results/findings within the past 24 hours.    Assessment and Plan:     * Temporal lobe epilepsy,  intractable  Ms. Arrieta is a 50 yo woman with intractable epilepsy since age 18, s/p partial left anterior temporal lobectomy in 11/2018 admitted to NICU s/p placement of 6 right sided and 6 left sided sEEG leads for further localization of seizures as part of repeat surgical workup. Patient is currently maintained on lamotrigine 200 mg qAM/300 mg qPM and Cenobamate 150 mg qHS with continued events 7-8 times per month of lapse of awareness, staring.    Recommendations:  - s/p placement of 6 left-sided and 6 right-sided sEEG electrodes 11/7 by NSGY  - On admission, decreased Cenobamate to 100 mg qHS, continued home Lamotrigine 200 mg qAM/300 mg qPM   - Beginning 11/8 pm, hold Cenobamate and Lamotrigine  - Seizure precautions  - Please call epilepsy on call prior to administration of IV benzodiazepines for prolonged seizures  - Post-operative imaging timing, pain control per NCC/NSGY    Plan of care discussed with NCC team, patient and  at bedside. Will continue to follow, please call with any questions.     Depression  Chronic  - Continue home Sertraline        VTE Risk Mitigation (From admission, onward)         Ordered     heparin (porcine) injection 5,000 Units  Every 8 hours         11/07/22 1232     IP VTE HIGH RISK PATIENT  Once         11/07/22 1232     Place sequential compression device  Until discontinued         11/07/22 1232     Place sequential compression device  Until discontinued         11/07/22 0628     Place LAURA hose  Until discontinued         11/07/22 0628                Philly Foy PA-C  Neurology-Epilepsy  [facility]  Staff: Dr. Rojo

## 2022-11-08 NOTE — SUBJECTIVE & OBJECTIVE
Review of Systems  Review of Symptoms:  Constitutional: Denies fevers, weight loss, chills, or weakness.  Eyes: Denies changes in vision.  ENT: Denies dysphagia, nasal discharge, ear pain or discharge.  Cardiovascular: Denies chest pain, palpitations, orthopnea, or claudication.  Respiratory: Denies shortness of breath, cough, hemoptysis, or wheezing.  GI: Denies nausea/vomitting, hematochezia, melena, abd pain, or changes in appetite.  : Denies dysuria, incontinence, or hematuria.  Musculoskeletal: Denies joint pain or myalgias.  Skin/breast: Denies rashes, lumps, lesions, or discharge.  Neurologic: Denies headache, dizziness, vertigo, or paresthesias.  Psychiatric: Denies changes in mood or hallucinations.  Endocrine: Denies polyuria, polydipsia, heat/cold intolerance.  Hematologic/Lymph: Denies lymphadenopathy, easy bruising or easy bleeding.  Allergic/Immunologic: Denies rash, rhinitis.    Objective:     Vitals:  Temp: 98.3 °F (36.8 °C)  Pulse: (!) 51  Rhythm: sinus bradycardia  BP: (!) 101/55  MAP (mmHg): 76  Resp: 12  SpO2: (!) 94 %  O2 Device (Oxygen Therapy): room air    Temp  Min: 97.8 °F (36.6 °C)  Max: 98.3 °F (36.8 °C)  Pulse  Min: 48  Max: 76  BP  Min: 92/54  Max: 112/56  MAP (mmHg)  Min: 67  Max: 80  Resp  Min: 10  Max: 21  SpO2  Min: 93 %  Max: 98 %    11/07 0701 - 11/08 0700  In: 3279.8 [P.O.:1150; I.V.:80]  Out: 2345 [Urine:2325]   Unmeasured Output  Stool Occurrence: 0       Physical Exam  GA: Alert, comfortable, no acute distress.   HEENT: No scleral icterus or JVD.   Pulmonary: Clear to auscultation A/L.   Cardiac: RRR S1 & S2 w/o rubs/murmurs/gallops.   Abdominal: Bowel sounds present x 4. No appreciable hepatosplenomegaly.  Skin: No jaundice, rashes, or visible lesions.  Neuro:  --GCS: E4 V5 M6  --Mental Status:  \awake, oriented X4, follows commands  --CN II-XII grossly intact.   --Pupils 3mm, PERRL.   --Corneal reflex, gag, cough intact.  --PUGA spont    Medications:  Continuous  Scheduledheparin (porcine), 5,000 Units, Q8H  polyethylene glycol, 17 g, Daily  senna-docusate 8.6-50 mg, 2 tablet, Daily  sertraline, 25 mg, Daily    PRNacetaminophen, 1,000 mg, Q8H PRN  hydrALAZINE, 10 mg, Q6H PRN  HYDROmorphone, 1 mg, Q6H PRN  magnesium oxide, 800 mg, PRN  magnesium oxide, 800 mg, PRN  ondansetron, 4 mg, Q12H PRN  oxyCODONE, 5 mg, Q6H PRN  potassium bicarbonate, 35 mEq, PRN  potassium bicarbonate, 50 mEq, PRN  potassium bicarbonate, 60 mEq, PRN  potassium, sodium phosphates, 2 packet, PRN  potassium, sodium phosphates, 2 packet, PRN  potassium, sodium phosphates, 2 packet, PRN      Today I personally reviewed pertinent medications, lines/drains/airways, imaging, cardiology results, laboratory results, microbiology results,     Diet  Diet Adult Regular (IDDSI Level 7)

## 2022-11-08 NOTE — PLAN OF CARE
[facility] Care Plan    POC reviewed with Liza Arrieta and pt's , Neymar, at 0500. Pt and pt's  verbalized understanding. Questions and concerns addressed. No acute events overnight. Pt progressing toward goals. Will continue to monitor. See below and flowsheets for full assessment and VS info.     - sEEG in place -- maintain seizure precautions  - no seizure activity witnessed overnight   - mild R sided HA/jaw pain overnight. Prn tylenol administered x2  - pt to use stationary bike while awake per NSGY's verbal order          Is this a stroke patient? no    Neuro:  Antwan Coma Scale  Best Eye Response: 4-->(E4) spontaneous  Best Motor Response: 6-->(M6) obeys commands  Best Verbal Response: 5-->(V5) oriented  Antwan Coma Scale Score: 15  Assessment Qualifiers: patient not sedated/intubated, no eye obstruction present  Pupil PERRLA: yes     24hr Temp:  [97.8 °F (36.6 °C)-98.3 °F (36.8 °C)]     CV:   Rhythm: sinus bradycardia  BP goals:   SBP < 160  MAP > 65    Resp:   O2 Device (Oxygen Therapy): room air       Plan: N/A    GI/:     Diet/Nutrition Received: regular  Last Bowel Movement: 11/06/22  Voiding Characteristics: external catheter    Intake/Output Summary (Last 24 hours) at 11/8/2022 0647  Last data filed at 11/8/2022 0605  Gross per 24 hour   Intake 3279.82 ml   Output 2345 ml   Net 934.82 ml     Unmeasured Output  Stool Occurrence: 0    Labs/Accuchecks:  Recent Labs   Lab 11/08/22  0411   WBC 7.64   RBC 4.22   HGB 13.0   HCT 38.7         Recent Labs   Lab 11/08/22  0411      K 4.1   CO2 25      BUN 12   CREATININE 0.7   ALKPHOS 97   ALT 16   AST 21   BILITOT 0.3      Recent Labs   Lab 11/02/22  1740   INR 1.0   APTT 25.3    No results for input(s): CPK, CPKMB, TROPONINI, MB in the last 168 hours.    Electrolytes: N/A - electrolytes WDL  Accuchecks: none    Gtts:      LDA/Wounds:  Lines/Drains/Airways       Drain  Duration             Female External Urinary Catheter 11/08/22  0605 <1 day              Arterial Line  Duration             Arterial Line 11/07/22 0720 Right Radial <1 day              Peripheral Intravenous Line  Duration                  Peripheral IV - Single Lumen 11/07/22 0628 20 G Posterior;Right Forearm 1 day         Peripheral IV - Single Lumen 11/07/22 0725 20 G Right Hand <1 day                  Wounds: No  Wound care consulted: No

## 2022-11-08 NOTE — CONSULTS
[facility]  Neurology-Epilepsy  Consult Note    Patient Name: Liza Arrieta  MRN: [id number]  Admission Date: 11/7/2022  Hospital Length of Stay: 1 days  Code Status: Full Code   Attending Provider: Any Ruiz MD   Consulting Provider: Philly Foy PA-C  Primary Care Physician: Rc Rodriguez MD  Principal Problem:Temporal lobe epilepsy, intractable    Inpatient consult to Neurology  Consult performed by: Philly Foy PA-C  Consult ordered by: Mo Kincaid MD         Subjective:     HPI:   Ms. Arrieta is a 52 yo woman with intractable epilepsy since age 18, s/p partial left anterior temporal lobectomy in 11/2018 admitted to NICU s/p placement of sEEG leads for further localization of seizures as part of repeat surgical workup. After left anterior temporal lobectomy, patient reports of brief period of seizure freedom, before beginning to have seizures again approximately 3 months afterwards. She reports current seizures are different than her typical semiology prior to surgery, notably she believes she now loses awareness and stares off. After her events, she is quickly back to baseline and is able to report that she had a seizure. No associated tongue biting, bowel/bladder incontinence. She is currently maintained on Lamotrigine 200 mg qAM/300 mg qPM and Cenobamate 150 mg nightly. She reports current seizure frequency of 7-8 per month, with last seizure on 10/31. Unable to identify triggers for seizures other than missing medication. MRI brain completed 2018 showed evidence of left frontotemporal crani for partial left anterior temporal resection. EEG completed 5/30/22 noted mild regional or subcortical dysfunction in bilateral temporal lobes with possible seizure focus in right anterior temporal region. During EMU admission 7/5/22>7/10/22, patient noted to have multiple right temporal lobe seizures, regional cortical dysfunction from left temporal region and bilateral independent seizure  foci in left and right temporal lobes. Patient discussed in multidisciplinary epilepsy surgery conference, with recommendation for phase 2 monitoring for further localization. Patient elected to proceed, admitted to NICU after placement of 6 right sided sEEG electrodes and 6 left sided sEEG electrodes. Neurology Epilepsy following for assistance in management.       Hospital Course:   No notes on file     Past Medical History:   Diagnosis Date    Convulsions     Epilepsy     Seizures        Past Surgical History:   Procedure Laterality Date    APPENDECTOMY       SECTION      4    CHOLECYSTECTOMY      CRANIOTOMY N/A 2018    Procedure: CRANIOTOMY for strip and grid;  Surgeon: Ruben Senior MD;  Location: [facility] OR [address] [place] [address];  Service: Neurosurgery;  Laterality: N/A;  toronto II, asa 2, type and screen, regular bed, Booker, supine     CRANIOTOMY Left 2018    Procedure: CRANIOTOMY for grids and strips;  Surgeon: Ruben Senior MD;  Location: [facility] OR [address] [place] [address];  Service: Neurosurgery;  Laterality: Left;    HYSTERECTOMY      around  for pain ful periods     INSERTION OF SUBDURAL GRID AND STRIP ELECTRODES BY CRANIOTOMY Left 2018    Procedure: CRANIOTOMY, FOR SUBDURAL GRID AND STRIP ELECTRODE LEAD Removal.;  Surgeon: Ruben Senior MD;  Location: [facility] OR [address] [place] [address];  Service: Neurosurgery;  Laterality: Left;  needs microscope and stealth-cg    SURGICAL REMOVAL OF LOBE OF BRAIN Left 2018    Procedure: LOBECTOMY, BRAIN left temporal lobe.;  Surgeon: Ruben Senior MD;  Location: [facility] OR [address] [place] [address];  Service: Neurosurgery;  Laterality: Left;       Review of patient's allergies indicates:  No Known Allergies    No current facility-administered medications on file prior to encounter.     Current Outpatient Medications on File Prior to Encounter   Medication Sig    cenobamate (XCOPRI) 150 mg Tab Take one tablet by mouth once daily. (Patient taking differently: Take 150 mg by mouth nightly.)     lamoTRIgine (LAMICTAL) 200 MG tablet Take 2.5 tablets (500 mg total) by mouth once daily. (Patient taking differently: Take 500 mg by mouth once daily. 200 mg in am and 300 mg in the pm)    sertraline (ZOLOFT) 25 MG tablet Take 1 tablet (25 mg total) by mouth once daily.    cenobamate (XCOPRI) 200 mg Tab Take one tablet by mouth once daily at 6am.     Continuous Infusions:   sodium chloride 0.9%         Family History       Problem Relation (Age of Onset)    Heart disease Father          Tobacco Use    Smoking status: Never    Smokeless tobacco: Never   Substance and Sexual Activity    Alcohol use: Yes     Comment: One drink per month    Drug use: No    Sexual activity: Yes     Partners: Male     Birth control/protection: See Surgical Hx     Review of Systems   Eyes:  Negative for visual disturbance.   Neurological:  Positive for seizures and headaches (mild). Negative for speech difficulty and weakness.   All other systems reviewed and are negative.  Objective:     Vital Signs (Most Recent):  Temp: 97.8 °F (36.6 °C) (11/07/22 1234)  Pulse: 62 (11/07/22 1315)  Resp: 18 (11/07/22 1315)  BP: 112/62 (11/07/22 1234)  SpO2: 97 % (11/07/22 1315) Vital Signs (24h Range):  Temp:  [97.8 °F (36.6 °C)] 97.8 °F (36.6 °C)  Pulse:  [50-76] 62  Resp:  [12-18] 18  SpO2:  [94 %-99 %] 97 %  BP: (112-115)/(62) 112/62  Arterial Line BP: (112-126)/(48-55) 112/48     Weight: 99.5 kg (219 lb 5.7 oz)  Body mass index is 35.41 kg/m².    Physical Exam  Vitals and nursing note reviewed.   Constitutional:       General: She is not in acute distress.     Appearance: She is not diaphoretic.   HENT:      Head: Normocephalic and atraumatic.      Comments: sEEG leads with headwrap in place  Eyes:      General: No scleral icterus.     Extraocular Movements: Extraocular movements intact.      Conjunctiva/sclera: Conjunctivae normal.      Pupils: Pupils are equal, round, and reactive to light.   Cardiovascular:      Rate and Rhythm: Normal  rate.   Pulmonary:      Effort: Pulmonary effort is normal. No respiratory distress.   Abdominal:      General: There is no distension.      Palpations: Abdomen is soft.      Tenderness: There is no abdominal tenderness.   Musculoskeletal:         General: No swelling, tenderness or deformity. Normal range of motion.      Cervical back: Neck supple. No rigidity.   Skin:     General: Skin is warm and dry.   Neurological:      General: No focal deficit present.      Mental Status: She is alert and oriented to person, place, and time. Mental status is at baseline.   Psychiatric:         Mood and Affect: Mood normal.         Speech: Speech normal.         Behavior: Behavior normal.       NEUROLOGICAL EXAMINATION:     MENTAL STATUS   Oriented to person, place, and time.   Attention: normal. Concentration: normal.   Speech: speech is normal   Level of consciousness: alert    CRANIAL NERVES     CN III, IV, VI   Pupils are equal, round, and reactive to light.    CN VII   Facial expression full, symmetric.     CN VIII   Hearing: intact    CN IX, X   CN IX normal.     CN XI   CN XI normal.     CN XII   CN XII normal.     MOTOR EXAM   Muscle bulk: normal  Overall muscle tone: normal       Moves all extremities spontaneously and symmetrically, follows commands  No focal deficits noted     Significant Labs: All pertinent lab results from the past 24 hours have been reviewed.    Significant Studies: I have reviewed all pertinent imaging results/findings within the past 24 hours.    Assessment and Plan:     * Temporal lobe epilepsy, intractable  Ms. Arrieta is a 50 yo woman with intractable epilepsy since age 18, s/p partial left anterior temporal lobectomy in 11/2018 admitted to NICU s/p placement of 6 right sided and 6 left sided sEEG leads for further localization of seizures as part of repeat surgical workup. Patient is currently maintained on lamotrigine 200 mg qAM/300 mg qPM and Cenobamate 150 mg qHS with continued events  7-8 times per month of lapse of awareness, staring.    Recommendations:  - s/p placement of 6 left-sided and 6 right-sided sEEG electrodes 11/7 by NSGY  - Decrease Cenobamate to 100 mg qHS  - Continue home Lamotrigine 200 mg qAM/300 mg qPM  - Seizure precautions  - Please call epilepsy on call prior to administration of IV benzodiazepines for prolonged seizures  - Post-operative imaging timing, pain control per NCC/NSGY    Plan of care discussed with NCC team, patient and  at bedside. Will continue to follow, please call with any questions.       Discussed plan of care with NCC team. Will follow, please call with questions.    Depression  Chronic  - Continue home Sertraline        VTE Risk Mitigation (From admission, onward)         Ordered     heparin (porcine) injection 5,000 Units  Every 8 hours         11/07/22 1232     IP VTE HIGH RISK PATIENT  Once         11/07/22 1232     Place sequential compression device  Until discontinued         11/07/22 1232     Place sequential compression device  Until discontinued         11/07/22 0628     Place LAURA hose  Until discontinued         11/07/22 0628                Thank you for your consult. I will follow-up with patient. Please contact us if you have any additional questions.    Philly Foy PA-C  Neurology-Epilepsy  [facility]  Staff: Dr. Rojo

## 2022-11-08 NOTE — PROGRESS NOTES
[facility]  Neurocritical Care  Progress Note    Admit Date: 11/7/2022  Service Date: 11/08/2022  Length of Stay: 1    Subjective:     Chief Complaint: Temporal lobe epilepsy, intractable    History of Present Illness: Ms. Arrieta is a 50 yo woman with intractable epilepsy since age 18, s/p partial left anterior temporal lobectomy in 11/2018 admitted to NICU s/p placement of sEEG leads for further localization of seizures as part of repeat surgical workup. After left anterior temporal lobectomy, patient reports brief period of seizure freedom, before beginning to have seizures again approximately 3 months afterwards. She reports current seizures are different than her typical semiology prior to surgery, notably she believes she now loses awareness and stares off. After her events, she is quickly back to baseline and is able to report that she had a seizure. No associated tongue biting, bowel/bladder incontinence. She is currently maintained on Lamotrigine 200 mg qAM/300 mg qPM and Cenobamate 150 mg nightly. She reports current seizure frequency of 7-8 per month, with last seizure on 10/31. Unable to identify triggers for seizures other than missing medication. MRI brain completed 2018 showed evidence of left frontotemporal crani for partial left anterior temporal resection. MRI brain epilepsy protocol in 10/2022 with post-operative changes with left temporal partial lobectomy, interval development of susceptibility in the right superior frontal sulcus compatible with component of superficial siderosis. EEG completed 5/30/22 noted mild regional or subcortical dysfunction in bilateral temporal lobes with possible seizure focus in right anterior temporal region. During EMU admission 7/5/22>7/10/22, patient noted to have multiple right temporal lobe seizures, regional cortical dysfunction from left temporal region and bilateral independent seizure foci in left and right temporal lobes. Patient  discussed in multidisciplinary epilepsy surgery conference, with recommendation for phase 2 monitoring for further localization. Patient elected to proceed, admitted to NICU after placement of 6 right sided sEEG electrodes and 6 left sided sEEG electrodes. Patient admitted to [facility] for close  monitoring and higher level of care.        Hospital Course: 11/8/2022: all AED's discontinued today per epilepsy             Review of Systems  Review of Symptoms:  Constitutional: Denies fevers, weight loss, chills, or weakness.  Eyes: Denies changes in vision.  ENT: Denies dysphagia, nasal discharge, ear pain or discharge.  Cardiovascular: Denies chest pain, palpitations, orthopnea, or claudication.  Respiratory: Denies shortness of breath, cough, hemoptysis, or wheezing.  GI: Denies nausea/vomitting, hematochezia, melena, abd pain, or changes in appetite.  : Denies dysuria, incontinence, or hematuria.  Musculoskeletal: Denies joint pain or myalgias.  Skin/breast: Denies rashes, lumps, lesions, or discharge.  Neurologic: Denies headache, dizziness, vertigo, or paresthesias.  Psychiatric: Denies changes in mood or hallucinations.  Endocrine: Denies polyuria, polydipsia, heat/cold intolerance.  Hematologic/Lymph: Denies lymphadenopathy, easy bruising or easy bleeding.  Allergic/Immunologic: Denies rash, rhinitis.    Objective:     Vitals:  Temp: 98.3 °F (36.8 °C)  Pulse: (!) 51  Rhythm: sinus bradycardia  BP: (!) 101/55  MAP (mmHg): 76  Resp: 12  SpO2: (!) 94 %  O2 Device (Oxygen Therapy): room air    Temp  Min: 97.8 °F (36.6 °C)  Max: 98.3 °F (36.8 °C)  Pulse  Min: 48  Max: 76  BP  Min: 92/54  Max: 112/56  MAP (mmHg)  Min: 67  Max: 80  Resp  Min: 10  Max: 21  SpO2  Min: 93 %  Max: 98 %    11/07 0701 - 11/08 0700  In: 3279.8 [P.O.:1150; I.V.:80]  Out: 2345 [Urine:2325]   Unmeasured Output  Stool Occurrence: 0       Physical Exam  GA: Alert, comfortable, no acute distress.   HEENT: No scleral icterus or JVD.   Pulmonary: Clear  to auscultation A/L.   Cardiac: RRR S1 & S2 w/o rubs/murmurs/gallops.   Abdominal: Bowel sounds present x 4. No appreciable hepatosplenomegaly.  Skin: No jaundice, rashes, or visible lesions.  Neuro:  --GCS: E4 V5 M6  --Mental Status:  \awake, oriented X4, follows commands  --CN II-XII grossly intact.   --Pupils 3mm, PERRL.   --Corneal reflex, gag, cough intact.  --PUGA spont    Medications:  Continuous Scheduledheparin (porcine), 5,000 Units, Q8H  polyethylene glycol, 17 g, Daily  senna-docusate 8.6-50 mg, 2 tablet, Daily  sertraline, 25 mg, Daily    PRNacetaminophen, 1,000 mg, Q8H PRN  hydrALAZINE, 10 mg, Q6H PRN  HYDROmorphone, 1 mg, Q6H PRN  magnesium oxide, 800 mg, PRN  magnesium oxide, 800 mg, PRN  ondansetron, 4 mg, Q12H PRN  oxyCODONE, 5 mg, Q6H PRN  potassium bicarbonate, 35 mEq, PRN  potassium bicarbonate, 50 mEq, PRN  potassium bicarbonate, 60 mEq, PRN  potassium, sodium phosphates, 2 packet, PRN  potassium, sodium phosphates, 2 packet, PRN  potassium, sodium phosphates, 2 packet, PRN      Today I personally reviewed pertinent medications, lines/drains/airways, imaging, cardiology results, laboratory results, microbiology results,     Diet  Diet Adult Regular (IDDSI Level 7)        Assessment/Plan:     Neuro  * Temporal lobe epilepsy, intractable  Ms. Arrieta is a 52 yo woman with intractable epilepsy since age 18, s/p partial left anterior temporal lobectomy in 11/2018 admitted to NICU s/p placement of sEEG leads for further localization of seizures as part of repeat surgical workup    --SBP <180  -- epilepsy and NSGY team following  -- Cenobamate and Lamotrigine discontinued  -- Neuro checks q 1 hr      Psychiatric  Depression  Continue home sertraline          The patient is being Prophylaxed for:  Venous Thromboembolism with: Chemical  Stress Ulcer with: None  Ventilator Pneumonia with: not applicable    Activity Orders          Turn patient every 2 hours starting at 11/08 0000    Bed rest starting at  11/07 1228    Diet Adult Regular (IDDSI Level 7): Regular starting at 11/07 1228        Full Code    Lisset Arbloeda NP  Neurocritical Care  [facility]

## 2022-11-08 NOTE — HPI
Liza Arrieta is a 51 y.o. female who presents as a referral from Dr. Mark to discuss possible intracranial seizure surgery. She has already had left temporal lobectomy. She will need neuropsychological evaluation and MRI with contrast STEALTH protocol for operative planning purposes.       She requests that we postpone sEEG until after the Epilepsy Walk she organizes in the first weekend in November      I had a lengthy, detailed discussion with the patient and her  about the risks, benefits, and alternatives to intracranial localization for seizures.      We discussed that monitoring typically takes 1-2 weeks but may be longer or shorter depending on how long it takes for her to have concordant seizures.  We discussed that she will not be able to get out of bed while the electrodes are in her head, and that she will remain in the ICU during that time. She may require mittens (she pulled out her grids in 2018 while postictal). We also discussed that this is a diagnostic, not therapeutic, procedure, and that the definitive surgery would potentially be performed 4-8 weeks after the time of sEEG localization.      We discussed that there is approximately 20% rate of a symptomatic hemorrhage and approximately 1% rate of symptomatic hemorrhage from placement of sEEG electrode per review of the international literature.  They expressed understanding of this.      We also discussed the specific risks of the localization surgery. Risks include, but are not limited to, bleeding, pain, infection, scarring, need for further/repeat procedure, death, paralysis, stroke (including venous infarct)/damage to major blood vessels, leak of cerebrospinal fluid, and hardware-related complications. There is a chance that we would fail to localize the seizures with the initial electrode plan, which would require placement of additional electrodes, or may not lead to definitive surgery. Informed consent was obtained of the patient  with her  present.

## 2022-11-08 NOTE — ASSESSMENT & PLAN NOTE
Ms. Arrieta is a 52 yo woman with intractable epilepsy since age 18, s/p partial left anterior temporal lobectomy in 11/2018 admitted to NICU s/p placement of 6 right sided and 6 left sided sEEG leads for further localization of seizures as part of repeat surgical workup. Patient is currently maintained on lamotrigine 200 mg qAM/300 mg qPM and Cenobamate 150 mg qHS with continued events 7-8 times per month of lapse of awareness, staring.    Recommendations:  - s/p placement of 6 left-sided and 6 right-sided sEEG electrodes 11/7 by NSGY  - On admission, decreased Cenobamate to 100 mg qHS, continued home Lamotrigine 200 mg qAM/300 mg qPM   - Beginning 11/8 pm, hold Cenobamate and Lamotrigine  - Seizure precautions  - Please call epilepsy on call prior to administration of IV benzodiazepines for prolonged seizures  - Post-operative imaging timing, pain control per NCC/NSGY    Plan of care discussed with NCC team, patient and  at bedside. Will continue to follow, please call with any questions.

## 2022-11-08 NOTE — ASSESSMENT & PLAN NOTE
A 52 y.o. female with hx of prior L temporal lobectomy and intractable seizure who admitted for sEEG 11/7    POD#1      Continue ICU care  Neurocheck Q1hr  Pain control   CT head satisfactory   Continue EEG and monitoring: No sz  Continue abx ppax while leads in place; ceftriaxone   Off AEDs and epilepsy following   Aggressive bowel regimes    Daily bike   Sating well at room   Keep SBP <150  ADAT  SQH for DVT ppx  Further care per NCC  NSGY will follow

## 2022-11-08 NOTE — PROGRESS NOTES
[facility]  Neurosurgery  Progress Note    Subjective:     History of Present Illness: Liza Arrieta is a 52 y.o. female who presents as a referral from Dr. Mark to discuss possible intracranial seizure surgery. She has already had left temporal lobectomy. She will need neuropsychological evaluation and MRI with contrast STEALTH protocol for operative planning purposes.       She requests that we postpone sEEG until after the Epilepsy Walk she organizes in the first weekend in November      I had a lengthy, detailed discussion with the patient and her  about the risks, benefits, and alternatives to intracranial localization for seizures.      We discussed that monitoring typically takes 1-2 weeks but may be longer or shorter depending on how long it takes for her to have concordant seizures.  We discussed that she will not be able to get out of bed while the electrodes are in her head, and that she will remain in the ICU during that time. She may require mittens (she pulled out her grids in 2018 while postictal). We also discussed that this is a diagnostic, not therapeutic, procedure, and that the definitive surgery would potentially be performed 4-8 weeks after the time of sEEG localization.      We discussed that there is approximately 20% rate of a symptomatic hemorrhage and approximately 1% rate of symptomatic hemorrhage from placement of sEEG electrode per review of the international literature.  They expressed understanding of this.      We also discussed the specific risks of the localization surgery. Risks include, but are not limited to, bleeding, pain, infection, scarring, need for further/repeat procedure, death, paralysis, stroke (including venous infarct)/damage to major blood vessels, leak of cerebrospinal fluid, and hardware-related complications. There is a chance that we would fail to localize the seizures with the initial electrode plan, which would require placement of  additional electrodes, or may not lead to definitive surgery. Informed consent was obtained of the patient with her  present.       Post-Op Info:  Procedure(s) (LRB):  1.) PLACEMENT OF THE FIDUCIAL SCREWS 2.) PLACEMENT OF THE BILATERAL SEEG ELECTRODES WITH BRANDEE ROBOT (Bilateral)   1 Day Post-Op     Interval History:  NAEON. No sz. Off AEDs.     Medications:  Continuous Infusions:  Scheduled Meds:   heparin (porcine)  5,000 Units Subcutaneous Q8H    polyethylene glycol  17 g Oral Daily    senna-docusate 8.6-50 mg  2 tablet Oral Daily    sertraline  25 mg Oral Daily     PRN Meds:acetaminophen, hydrALAZINE, HYDROmorphone, magnesium oxide, magnesium oxide, ondansetron, oxyCODONE, potassium bicarbonate, potassium bicarbonate, potassium bicarbonate, potassium, sodium phosphates, potassium, sodium phosphates, potassium, sodium phosphates     Review of Systems  ROS negative ow  Objective:     Weight: 99.5 kg (219 lb 5.7 oz)  Body mass index is 35.41 kg/m².  Vital Signs (Most Recent):  Temp: 98.2 °F (36.8 °C) (11/08/22 1105)  Pulse: (!) 53 (11/08/22 1200)  Resp: 15 (11/08/22 1200)  BP: (!) 115/57 (11/08/22 1200)  SpO2: 95 % (11/08/22 1200)   Vital Signs (24h Range):  Temp:  [97.8 °F (36.6 °C)-98.3 °F (36.8 °C)] 98.2 °F (36.8 °C)  Pulse:  [48-76] 53  Resp:  [10-21] 15  SpO2:  [93 %-98 %] 95 %  BP: ()/(48-62) 115/57  Arterial Line BP: ()/(47-60) 110/56     Date 11/08/22 0700 - 11/09/22 0659   Shift 0845-3538 2663-7535 1204-3282 24 Hour Total   INTAKE   P.O. 350   350   Shift Total(mL/kg) 350(3.5)   350(3.5)   OUTPUT   Urine(mL/kg/hr) 200   200   Shift Total(mL/kg) 200(2)   200(2)   Weight (kg) 99.5 99.5 99.5 99.5                   Female External Urinary Catheter 11/08/22 0605 (Active)   Skin no redness;no breakdown;perineum cleansed w/ soap and water;female external urine collection device repositioned 11/08/22 1105   Tolerance no signs/symptoms of discomfort 11/08/22 1105   Suction Continuous suction  at 50 mmHg 11/08/22 1105   Date of last wick change 11/08/22 11/08/22 1105   Time of last wick change 0605 11/08/22 1105   Output (mL) 200 mL 11/08/22 1106       Physical Exam  General: appears well-developed and well-nourished, no distress  Eyes: EOMI  Head: head wrap and EEG in place  Cardiovascular: RRR  Pulm: NWOB, no distress   Abdominal: soft, ND, NT, +BS  MSK: moves all extremities spontaneously with good tone.  Neurological: AAOX3, no drift, GCS E4V5M6, CN II-XII grossly intact and face symm, normal speech, following simple commands, PUGA, full strength throughout  Psych: normal affect     Significant Labs:  Recent Labs   Lab 11/07/22  1606 11/08/22 0411    98    143   K 4.6 4.1    109   CO2 23 25   BUN 13 12   CREATININE 0.8 0.7   CALCIUM 8.8 8.8   MG 1.6 1.8     Recent Labs   Lab 11/07/22  1606 11/08/22 0411   WBC 10.74 7.64   HGB 13.8 13.0   HCT 39.9 38.7    172     No results for input(s): LABPT, INR, APTT in the last 48 hours.  Microbiology Results (last 7 days)       ** No results found for the last 168 hours. **          Recent Lab Results         11/08/22 0411 11/07/22 1606        Albumin 3.0   3.2       Alkaline Phosphatase 97   106       ALT 16   17       Anion Gap 9   9       AST 21   18       Baso # 0.01   0.00       Basophil % 0.1   0.0       BILIRUBIN TOTAL 0.3  Comment: For infants and newborns, interpretation of results should be based  on gestational age, weight and in agreement with clinical  observations.    Premature Infant recommended reference ranges:  Up to 24 hours.............<8.0 mg/dL  Up to 48 hours............<12.0 mg/dL  3-5 days..................<15.0 mg/dL  6-29 days.................<15.0 mg/dL     0.2  Comment: For infants and newborns, interpretation of results should be based  on gestational age, weight and in agreement with clinical  observations.    Premature Infant recommended reference ranges:  Up to 24 hours.............<8.0 mg/dL  Up to  48 hours............<12.0 mg/dL  3-5 days..................<15.0 mg/dL  6-29 days.................<15.0 mg/dL         BUN 12   13       Calcium 8.8   8.8       Chloride 109   108       CO2 25   23       Creatinine 0.7   0.8       Differential Method Automated   Automated       eGFR >60.0   >60.0       Eos # 0.0   0.0       Eosinophil % 0.5   0.1       Glucose 98   110       Gran # (ANC) 4.6   9.4       Gran % 60.7   87.5       Hematocrit 38.7   39.9       Hemoglobin 13.0   13.8       Immature Grans (Abs) 0.02  Comment: Mild elevation in immature granulocytes is non specific and   can be seen in a variety of conditions including stress response,   acute inflammation, trauma and pregnancy. Correlation with other   laboratory and clinical findings is essential.     0.03  Comment: Mild elevation in immature granulocytes is non specific and   can be seen in a variety of conditions including stress response,   acute inflammation, trauma and pregnancy. Correlation with other   laboratory and clinical findings is essential.         Immature Granulocytes 0.3   0.3       Lymph # 2.4   1.1       Lymph % 31.2   9.8       Magnesium 1.8   1.6       MCH 30.8   31.2       MCHC 33.6   34.6       MCV 92   90       Mono # 0.6   0.3       Mono % 7.2   2.3       MPV 10.5   10.0       nRBC 0   0       Phosphorus 3.2   3.4       Platelets 172   196       Potassium 4.1   4.6       PROTEIN TOTAL 5.6   6.2       RBC 4.22   4.43       RDW 11.6   11.4       Sodium 143   140       WBC 7.64   10.74               Significant Diagnostics:  CT: No results found in the last 24 hours.    Assessment/Plan:     * Temporal lobe epilepsy, intractable  A 52 y.o. female with hx of prior L temporal lobectomy and intractable seizure who admitted for sEEG 11/7    POD#1      Continue ICU care  Neurocheck Q1hr  Pain control   CT head satisfactory   Continue EEG and monitoring: No sz  Continue abx ppax while leads in place; ceftriaxone   Off AEDs and epilepsy  following   Aggressive bowel regimes    Daily bike   Sating well at room   Keep SBP <150  ADAT  SQH for DVT ppx  Further care per NCC  NSGY will follow           Ramona Downs MD  Neurosurgery  [facility]

## 2022-11-08 NOTE — ASSESSMENT & PLAN NOTE
Ms. Arrieta is a 50 yo woman with intractable epilepsy since age 18, s/p partial left anterior temporal lobectomy in 11/2018 admitted to NICU s/p placement of sEEG leads for further localization of seizures as part of repeat surgical workup    --SBP <180  -- epilepsy and NSGY team following  -- Cenobamate and Lamotrigine discontinued  -- Neuro checks q 1 hr

## 2022-11-08 NOTE — PROGRESS NOTES
Epilepsy  met with patient briefly in their room in the ICU.    Patient currently resting and accompanied by family member also resting on the couch.   Cherylet alert and oriented and appears to be doing well following her SEEG surgery yesterday.   Patient reports no complaints other than feeling restless and wanting to walk around.  She did confirm with nurse that she will be utilizing the exercise bed bike shortly.     She continues to express support for epilepsy surgery and continues to want to support others with epilepsy.   She provided verbal consent to use her picture from this past weekend's walk and SW noted they would have to have her sign a form.     No concerns at this time.       Demar Dalal [facility]    Clinical  -ALS, Epilepsy, Headache  [email or web address]  Phone# [phone]     [phone]  Fax # [phone]

## 2022-11-08 NOTE — HOSPITAL COURSE
Ms. Arrieta is a 51 year old woman with intractable epilepsy since age 18 s/p partial left anterior temporal lobectomy in 11/2018 admitted to NICU s/p placement of 6 right-sided and 6 left-sided sEEG leads for further localization of seizures as part of repeat surgical workup. Patient is currently maintained on Lamotrigine 200 mg qAM/300 mg qPM and Cenobamate 150 mg qHS with continued events 7-8 times per month of lapse of awareness, staring.   11/7>11/8: Home Cenobamate decreased to 100 mg qHS, Lamotrigine 200 mg qAM/300 mg qPM continued on admission. No seizures overnight. On 11/8 pm, begin to hold Cenobomate and Lamotrigine.  11/8>11/9: Seizure 11/9 approximately 0200 with right hemispheric origin, patient reported she thought she had a seizure at approximately 0230. Additional seizures 0745 and 0852. Will continue close vEEG, attempt to capture additional seizures for further localization and concordance.   11/9>11/10: No further seizures overnight, patient reports possible event 11/10 approximately 10:40, additional seizure noted on EEG around this time. Please see EEG report for full details. Continue holding home Cenobamate, Lamotrigine to capture additional seizures.   11/10>11/11: No electrographic seizures noted overnight, continue to hold home Cenobamate and Lamotrigine, attempt to capture additional seizures.  11/11>11/12: No electrographic seizures noted overnight outside mapping.  Holding AEDs.  11/12>11/13: Continued holding AEDs, no clinical seizures noted overnight.  11/13>11/14: No clinical seizures overnight, doing well this morning. Plan to give Cenobamate 50 mg, Lamotrigine 150 mg 11/14 pm, resume home doses 11/15 am (Lamotrigine 200 mg qAM/300 mg qPM, Cenobamate 150 mg qHS).  11/14>11/15: No discrete seizures overnight, frequent interictal epileptiform discharges noted in the right hippocampal, right amygdala, right posterior temporal and left temporal contacts. Please see EEG report for full  details. Resume home AED doses 11/15 in anticipation of sEEG lead removal 11/16.   11/15>11/16: No discrete seizures noted overnight, frequent interictal epileptiform discharges in the right hippocampal, right amygdala, right posterior temporal and left temporal contacts. Continue home AEDs, to OR 11/16 for sEEG lead explantation.   11/16>11/17: sEEG leads removed 11/16 pm, patient doing well and reports no noted seizures overnight. Pending PT evaluation/OT evaluation prior to discharge. Continue Lamotrigine 200 mg qAM/300 mg qPM, Cenobamate 150 mg qHS on discharge. Outpatient follow up for review of sEEG data and further discussion with NSGY, Neurology Epilepsy.

## 2022-11-08 NOTE — SUBJECTIVE & OBJECTIVE
Interval History: No seizures captured since admission. Patient and  eager for discontinuation of AEDs to provoke seizures. EMU admission reviewed, electroclinical seizures noted after discontinuation of both Cenobamate and Lamotrigine.     Current Facility-Administered Medications   Medication Dose Route Frequency Provider Last Rate Last Admin    acetaminophen tablet 1,000 mg  1,000 mg Oral Q8H PRN Mo Kincaid MD   1,000 mg at 11/08/22 0645    heparin (porcine) injection 5,000 Units  5,000 Units Subcutaneous Q8H Mo Kincaid MD        hydrALAZINE injection 10 mg  10 mg Intravenous Q6H PRN Ann Cohen PA-C        HYDROmorphone injection 1 mg  1 mg Intravenous Q6H PRN Mo Kincaid MD   1 mg at 11/07/22 1241    magnesium oxide tablet 800 mg  800 mg Oral PRN Ann Cohen PA-C        magnesium oxide tablet 800 mg  800 mg Oral PRN Ann Cohen PA-C        ondansetron injection 4 mg  4 mg Intravenous Q12H PRN Mo Kincaid MD        oxyCODONE immediate release tablet 5 mg  5 mg Oral Q6H PRN Mo Kincaid MD        polyethylene glycol packet 17 g  17 g Oral Daily Mo Kincaid MD   17 g at 11/08/22 0934    potassium bicarbonate disintegrating tablet 35 mEq  35 mEq Oral PRN Ann Cohen PA-C        potassium bicarbonate disintegrating tablet 50 mEq  50 mEq Oral PRN MARCIO Nina-KEISHA        potassium bicarbonate disintegrating tablet 60 mEq  60 mEq Oral PRN Ann Cohen PA-C        potassium, sodium phosphates 280-160-250 mg packet 2 packet  2 packet Oral PRN MARCIO Nina-KEISHA        potassium, sodium phosphates 280-160-250 mg packet 2 packet  2 packet Oral PRN Ann Cohen PA-C        potassium, sodium phosphates 280-160-250 mg packet 2 packet  2 packet Oral PRN Ann Cohen PA-C        senna-docusate 8.6-50 mg per tablet 2 tablet  2 tablet Oral Daily Mo Kincaid MD   2 tablet at 11/08/22 0807    sertraline tablet 25 mg   25 mg Oral Daily Lisset Nkechi, NP   25 mg at 11/08/22 0806     Continuous Infusions:    Review of Systems   HENT:          + jaw pain   Eyes:  Negative for visual disturbance.   Neurological:  Positive for seizures. Negative for speech difficulty and weakness.   All other systems reviewed and are negative.  Objective:     Vital Signs (Most Recent):  Temp: 98.2 °F (36.8 °C) (11/08/22 1105)  Pulse: (!) 54 (11/08/22 1105)  Resp: 14 (11/08/22 1105)  BP: (!) 104/52 (11/08/22 1105)  SpO2: 96 % (11/08/22 1105)   Vital Signs (24h Range):  Temp:  [97.8 °F (36.6 °C)-98.3 °F (36.8 °C)] 98.2 °F (36.8 °C)  Pulse:  [48-76] 54  Resp:  [10-21] 14  SpO2:  [93 %-98 %] 96 %  BP: ()/(48-62) 104/52  Arterial Line BP: ()/(47-60) 103/47     Weight: 99.5 kg (219 lb 5.7 oz)  Body mass index is 35.41 kg/m².    Physical Exam  Vitals and nursing note reviewed.   Constitutional:       General: She is not in acute distress.     Appearance: She is not diaphoretic.   HENT:      Head: Normocephalic and atraumatic.      Comments: sEEG leads with headwrap in place  Eyes:      General: No scleral icterus.     Extraocular Movements: Extraocular movements intact.      Conjunctiva/sclera: Conjunctivae normal.      Pupils: Pupils are equal, round, and reactive to light.   Cardiovascular:      Rate and Rhythm: Normal rate.   Pulmonary:      Effort: Pulmonary effort is normal. No respiratory distress.   Abdominal:      General: There is no distension.      Palpations: Abdomen is soft.      Tenderness: There is no abdominal tenderness.   Musculoskeletal:         General: No swelling, tenderness or deformity. Normal range of motion.      Cervical back: Neck supple. No rigidity.   Skin:     General: Skin is warm and dry.   Neurological:      General: No focal deficit present.      Mental Status: She is alert and oriented to person, place, and time. Mental status is at baseline.   Psychiatric:         Mood and Affect: Mood normal.         Speech:  Speech normal.         Behavior: Behavior normal.       NEUROLOGICAL EXAMINATION:     MENTAL STATUS   Oriented to person, place, and time.   Attention: normal. Concentration: normal.   Speech: speech is normal   Level of consciousness: alert    CRANIAL NERVES     CN III, IV, VI   Pupils are equal, round, and reactive to light.    CN VII   Facial expression full, symmetric.     CN VIII   Hearing: intact    CN IX, X   CN IX normal.     CN XI   CN XI normal.     CN XII   CN XII normal.     MOTOR EXAM   Muscle bulk: normal  Overall muscle tone: normal       Moves all extremities spontaneously and symmetrically, follows commands  No focal deficits noted     Significant Labs: All pertinent lab results from the past 24 hours have been reviewed.    Significant Studies: I have reviewed all pertinent imaging results/findings within the past 24 hours.

## 2022-11-08 NOTE — HOSPITAL COURSE
11/8/2022: all AED's discontinued today per epilepsy   11/09/2022 three electrographic seizures overnight  11/10/22: NAEON  11/11/22: two seizures today  11/12/22: NAEON  11/13/22: No seizure activity noted overnight. SBP<160  11/14/22: No seizure activity overnight, pt with no complaints other than boredom, denies depressed mood, thoughts of self harm, or hallucinations. Plan for OR 11/16 for sEEG lead removal.   11/15/22: AEDs restarted per epilepsy on evening of 11/14, increase to home doses today. OR tomorrow.   11/16/22: OR today for sEEG lead removal.   11/17/22: NAEON. POD1 from sEEG lead removal, post op CTH no hemorrhage. PT/OT cleared for discharge home. Keflex x5 days per NSGY. No changes to prior AEDs per epilepsy.

## 2022-11-09 ENCOUNTER — DOCUMENTATION ONLY (OUTPATIENT)
Dept: NEUROLOGY | Facility: CLINIC | Age: 52
End: 2022-11-09

## 2022-11-09 ENCOUNTER — TELEPHONE (OUTPATIENT)
Dept: NEUROSURGERY | Facility: CLINIC | Age: 52
End: 2022-11-09
Payer: COMMERCIAL

## 2022-11-09 DIAGNOSIS — G40.119 TEMPORAL LOBE EPILEPSY, INTRACTABLE: Primary | ICD-10-CM

## 2022-11-09 LAB
ALBUMIN SERPL BCP-MCNC: 3.1 G/DL (ref 3.5–5.2)
ALP SERPL-CCNC: 110 U/L (ref 55–135)
ALT SERPL W/O P-5'-P-CCNC: 17 U/L (ref 10–44)
ANION GAP SERPL CALC-SCNC: 9 MMOL/L (ref 8–16)
AST SERPL-CCNC: 19 U/L (ref 10–40)
BASOPHILS # BLD AUTO: 0.01 K/UL (ref 0–0.2)
BASOPHILS NFR BLD: 0.1 % (ref 0–1.9)
BILIRUB SERPL-MCNC: 0.3 MG/DL (ref 0.1–1)
BUN SERPL-MCNC: 12 MG/DL (ref 6–20)
CALCIUM SERPL-MCNC: 8.9 MG/DL (ref 8.7–10.5)
CHLORIDE SERPL-SCNC: 106 MMOL/L (ref 95–110)
CO2 SERPL-SCNC: 22 MMOL/L (ref 23–29)
CREAT SERPL-MCNC: 0.7 MG/DL (ref 0.5–1.4)
DIFFERENTIAL METHOD: NORMAL
EOSINOPHIL # BLD AUTO: 0.1 K/UL (ref 0–0.5)
EOSINOPHIL NFR BLD: 0.7 % (ref 0–8)
ERYTHROCYTE [DISTWIDTH] IN BLOOD BY AUTOMATED COUNT: 11.6 % (ref 11.5–14.5)
EST. GFR  (NO RACE VARIABLE): >60 ML/MIN/1.73 M^2
GLUCOSE SERPL-MCNC: 88 MG/DL (ref 70–110)
HCT VFR BLD AUTO: 41.1 % (ref 37–48.5)
HGB BLD-MCNC: 13.6 G/DL (ref 12–16)
IMM GRANULOCYTES # BLD AUTO: 0.01 K/UL (ref 0–0.04)
IMM GRANULOCYTES NFR BLD AUTO: 0.1 % (ref 0–0.5)
LYMPHOCYTES # BLD AUTO: 2.5 K/UL (ref 1–4.8)
LYMPHOCYTES NFR BLD: 36.5 % (ref 18–48)
MAGNESIUM SERPL-MCNC: 1.6 MG/DL (ref 1.6–2.6)
MCH RBC QN AUTO: 30.6 PG (ref 27–31)
MCHC RBC AUTO-ENTMCNC: 33.1 G/DL (ref 32–36)
MCV RBC AUTO: 92 FL (ref 82–98)
MONOCYTES # BLD AUTO: 0.5 K/UL (ref 0.3–1)
MONOCYTES NFR BLD: 7.2 % (ref 4–15)
NEUTROPHILS # BLD AUTO: 3.8 K/UL (ref 1.8–7.7)
NEUTROPHILS NFR BLD: 55.4 % (ref 38–73)
NRBC BLD-RTO: 0 /100 WBC
PHOSPHATE SERPL-MCNC: 3.4 MG/DL (ref 2.7–4.5)
PLATELET # BLD AUTO: 162 K/UL (ref 150–450)
PMV BLD AUTO: 10.1 FL (ref 9.2–12.9)
POTASSIUM SERPL-SCNC: 4.1 MMOL/L (ref 3.5–5.1)
PROT SERPL-MCNC: 6.3 G/DL (ref 6–8.4)
RBC # BLD AUTO: 4.45 M/UL (ref 4–5.4)
SODIUM SERPL-SCNC: 137 MMOL/L (ref 136–145)
WBC # BLD AUTO: 6.91 K/UL (ref 3.9–12.7)

## 2022-11-09 PROCEDURE — 25000003 PHARM REV CODE 250: Performed by: STUDENT IN AN ORGANIZED HEALTH CARE EDUCATION/TRAINING PROGRAM

## 2022-11-09 PROCEDURE — 95714 VEEG EA 12-26 HR UNMNTR: CPT

## 2022-11-09 PROCEDURE — 85025 COMPLETE CBC W/AUTO DIFF WBC: CPT

## 2022-11-09 PROCEDURE — 20000000 HC ICU ROOM

## 2022-11-09 PROCEDURE — 25000003 PHARM REV CODE 250

## 2022-11-09 PROCEDURE — 95720 PR EEG, W/VIDEO, CONT RECORD, I&R, >12<26 HRS: ICD-10-PCS | Mod: ,,, | Performed by: PSYCHIATRY & NEUROLOGY

## 2022-11-09 PROCEDURE — 99233 PR SUBSEQUENT HOSPITAL CARE,LEVL III: ICD-10-PCS | Mod: ,,, | Performed by: PSYCHIATRY & NEUROLOGY

## 2022-11-09 PROCEDURE — 94761 N-INVAS EAR/PLS OXIMETRY MLT: CPT

## 2022-11-09 PROCEDURE — 80053 COMPREHEN METABOLIC PANEL: CPT

## 2022-11-09 PROCEDURE — 83735 ASSAY OF MAGNESIUM: CPT

## 2022-11-09 PROCEDURE — 99233 SBSQ HOSP IP/OBS HIGH 50: CPT | Mod: FS,,, | Performed by: PHYSICIAN ASSISTANT

## 2022-11-09 PROCEDURE — 99233 PR SUBSEQUENT HOSPITAL CARE,LEVL III: ICD-10-PCS | Mod: FS,,, | Performed by: PHYSICIAN ASSISTANT

## 2022-11-09 PROCEDURE — 84100 ASSAY OF PHOSPHORUS: CPT

## 2022-11-09 PROCEDURE — 99233 SBSQ HOSP IP/OBS HIGH 50: CPT | Mod: ,,, | Performed by: PSYCHIATRY & NEUROLOGY

## 2022-11-09 PROCEDURE — 25000003 PHARM REV CODE 250: Performed by: NURSE PRACTITIONER

## 2022-11-09 PROCEDURE — 63600175 PHARM REV CODE 636 W HCPCS: Performed by: STUDENT IN AN ORGANIZED HEALTH CARE EDUCATION/TRAINING PROGRAM

## 2022-11-09 PROCEDURE — 95720 EEG PHY/QHP EA INCR W/VEEG: CPT | Mod: ,,, | Performed by: PSYCHIATRY & NEUROLOGY

## 2022-11-09 RX ORDER — ADHESIVE BANDAGE
30 BANDAGE TOPICAL DAILY PRN
Status: DISCONTINUED | OUTPATIENT
Start: 2022-11-09 | End: 2022-11-17 | Stop reason: HOSPADM

## 2022-11-09 RX ADMIN — CEFTRIAXONE SODIUM 2 G: 2 INJECTION, SOLUTION INTRAVENOUS at 02:11

## 2022-11-09 RX ADMIN — SERTRALINE HYDROCHLORIDE 25 MG: 25 TABLET ORAL at 08:11

## 2022-11-09 RX ADMIN — POLYETHYLENE GLYCOL 3350 17 G: 17 POWDER, FOR SOLUTION ORAL at 08:11

## 2022-11-09 RX ADMIN — HEPARIN SODIUM 5000 UNITS: 5000 INJECTION INTRAVENOUS; SUBCUTANEOUS at 01:11

## 2022-11-09 RX ADMIN — HEPARIN SODIUM 5000 UNITS: 5000 INJECTION INTRAVENOUS; SUBCUTANEOUS at 06:11

## 2022-11-09 RX ADMIN — MAGNESIUM OXIDE TAB 400 MG (241.3 MG ELEMENTAL MG) 800 MG: 400 (241.3 MG) TAB at 01:11

## 2022-11-09 RX ADMIN — HEPARIN SODIUM 5000 UNITS: 5000 INJECTION INTRAVENOUS; SUBCUTANEOUS at 10:11

## 2022-11-09 RX ADMIN — ACETAMINOPHEN 1000 MG: 500 TABLET ORAL at 02:11

## 2022-11-09 RX ADMIN — MAGNESIUM OXIDE TAB 400 MG (241.3 MG ELEMENTAL MG) 800 MG: 400 (241.3 MG) TAB at 09:11

## 2022-11-09 RX ADMIN — SENNOSIDES AND DOCUSATE SODIUM 2 TABLET: 50; 8.6 TABLET ORAL at 08:11

## 2022-11-09 RX ADMIN — CEFTRIAXONE SODIUM 2 G: 2 INJECTION, SOLUTION INTRAVENOUS at 01:11

## 2022-11-09 NOTE — PROGRESS NOTES
"Dr. Springer and MARCIO Fraga at bedside reported that a little before 8am patient reported that while she was on phone with  he noticed that she had a seizure, event button was not pushed as patient showed no outward signs of seizure and told RN upon me returning in room a little after 8am. Only difference observed is pts affect seemed more " flat" and states " see now I kind of can't really remember why I'm here except I know it has something to do with my seizures". Pt quickly able to answer all orientation questions and back to her " baseline".   "

## 2022-11-09 NOTE — PROGRESS NOTES
[facility]  Neurosurgery  Progress Note      Subjective:     History of Present Illness: Liza Arrieta is a 51 y.o. female who presents as a referral from Dr. Mark to discuss possible intracranial seizure surgery. She has already had left temporal lobectomy. She will need neuropsychological evaluation and MRI with contrast STEALTH protocol for operative planning purposes.       She requests that we postpone sEEG until after the Epilepsy Walk she organizes in the first weekend in November      I had a lengthy, detailed discussion with the patient and her  about the risks, benefits, and alternatives to intracranial localization for seizures.      We discussed that monitoring typically takes 1-2 weeks but may be longer or shorter depending on how long it takes for her to have concordant seizures.  We discussed that she will not be able to get out of bed while the electrodes are in her head, and that she will remain in the ICU during that time. She may require mittens (she pulled out her grids in 2018 while postictal). We also discussed that this is a diagnostic, not therapeutic, procedure, and that the definitive surgery would potentially be performed 4-8 weeks after the time of sEEG localization.      We discussed that there is approximately 20% rate of a symptomatic hemorrhage and approximately 1% rate of symptomatic hemorrhage from placement of sEEG electrode per review of the international literature.  They expressed understanding of this.      We also discussed the specific risks of the localization surgery. Risks include, but are not limited to, bleeding, pain, infection, scarring, need for further/repeat procedure, death, paralysis, stroke (including venous infarct)/damage to major blood vessels, leak of cerebrospinal fluid, and hardware-related complications. There is a chance that we would fail to localize the seizures with the initial electrode plan, which would require placement of  additional electrodes, or may not lead to definitive surgery. Informed consent was obtained of the patient with her  present.       Post-Op Info:  Procedure(s) (LRB):  1.) PLACEMENT OF THE FIDUCIAL SCREWS 2.) PLACEMENT OF THE BILATERAL SEEG ELECTRODES WITH BRANDEE ROBOT (Bilateral)   2 Days Post-Op     Interval History:   11/9: Possibly one clinical seizure overnight, biked 20 minutes yesterday, pending BM    Medications:  Continuous Infusions:  Scheduled Meds:   cefTRIAXone (ROCEPHIN) IVPB  2 g Intravenous Q12H    heparin (porcine)  5,000 Units Subcutaneous Q8H    polyethylene glycol  17 g Oral Daily    senna-docusate 8.6-50 mg  2 tablet Oral Daily    sertraline  25 mg Oral Daily     PRN Meds:acetaminophen, hydrALAZINE, HYDROmorphone, magnesium oxide, magnesium oxide, ondansetron, oxyCODONE, potassium bicarbonate, potassium bicarbonate, potassium bicarbonate, potassium, sodium phosphates, potassium, sodium phosphates, potassium, sodium phosphates     Review of Systems  Objective:     Weight: 99.5 kg (219 lb 5.7 oz)  Body mass index is 35.41 kg/m².  Vital Signs (Most Recent):  Temp: 98.6 °F (37 °C) (11/09/22 0305)  Pulse: (!) 52 (11/09/22 0605)  Resp: 12 (11/09/22 0605)  BP: 123/67 (11/09/22 0605)  SpO2: (!) 94 % (11/09/22 0605)   Vital Signs (24h Range):  Temp:  [98.2 °F (36.8 °C)-98.6 °F (37 °C)] 98.6 °F (37 °C)  Pulse:  [49-66] 52  Resp:  [7-44] 12  SpO2:  [93 %-98 %] 94 %  BP: (101-152)/(51-72) 123/67  Arterial Line BP: (103-134)/(47-73) 120/58                     Female External Urinary Catheter 11/08/22 0605 (Active)   Skin no redness;no breakdown 11/09/22 0305   Tolerance no signs/symptoms of discomfort 11/09/22 0305   Suction Continuous suction at 60 mmHg 11/08/22 2305   Date of last wick change 11/08/22 11/08/22 1905   Time of last wick change 1400 11/08/22 1505   Output (mL) 350 mL 11/09/22 0230       Physical Exam    Neurosurgery Physical Exam  General: well developed, well nourished, no  distress.   Head: normocephalic, atraumatic  Neurologic:   GCS: Eyes: 4/ Verbal: 5/ Motor: 6  Mental Status: Awake, Alert, Oriented x 3  Cranial nerves: PERRL, EOMI, face symmetric, tongue midline, shoulder shrug equal.  No pronator drift, no dysmetria.  Sensory: intact to light touch throughout  Motor Strength:Moves all extremities antigravity    Gen: well nourished, normocephalic, atraumatic  CV: skin warm and dry, distal pulses present  Pulm: chest rise symmetric, no increased work of breathing  GI: abdomen soft, non-distended, non-tender  : patient voiding independently  MSK: no bony abnormalities noted  Psych: patient interacting with appropriate mood and affect                        Significant Labs:  Recent Labs   Lab 11/07/22  1606 11/08/22  0411 11/09/22  0003    98 88    143 137   K 4.6 4.1 4.1    109 106   CO2 23 25 22*   BUN 13 12 12   CREATININE 0.8 0.7 0.7   CALCIUM 8.8 8.8 8.9   MG 1.6 1.8 1.6     Recent Labs   Lab 11/07/22  1606 11/08/22  0411 11/09/22  0003   WBC 10.74 7.64 6.91   HGB 13.8 13.0 13.6   HCT 39.9 38.7 41.1    172 162     No results for input(s): LABPT, INR, APTT in the last 48 hours.  Microbiology Results (last 7 days)       ** No results found for the last 168 hours. **          All pertinent labs from the last 24 hours have been reviewed.    Significant Diagnostics:  I have reviewed all pertinent imaging results/findings within the past 24 hours.    Assessment/Plan:     * Temporal lobe epilepsy, intractable  A 52 y.o. female with hx of prior L temporal lobectomy and intractable seizure who admitted for sEEG 11/7    POD#2    Continue ICU care  Neurocheck Q1hr  Pain control   CT head satisfactory   Continue EEG and monitoring: No sz  Continue abx ppax while leads in place; ceftriaxone   Off AEDs and epilepsy following   Aggressive bowel regimes; increase regimen today  Daily bike   Sating well at room   Keep SBP <150  ADAT  SQH for DVT ppx  Further care per  [facility]  NSGY will follow           Josafat Arevalo MD  Neurosurgery  [facility]

## 2022-11-09 NOTE — SUBJECTIVE & OBJECTIVE
Interval History: Seizure overnight at approximately 0200, patient and  reported they thought patient had had a seizure around 0230. Additional event this morning approximately 0750 where  suspected patient had recently had a seizure. Continue close vEEG monitoring.     Current Facility-Administered Medications   Medication Dose Route Frequency Provider Last Rate Last Admin    acetaminophen tablet 1,000 mg  1,000 mg Oral Q8H PRN Mo Kincaid MD   1,000 mg at 11/08/22 2359    cefTRIAXone (ROCEPHIN) 2 g/50 mL D5W IVPB  2 g Intravenous Q12H [facility] Diya Downs MD   Stopped at 11/09/22 0300    heparin (porcine) injection 5,000 Units  5,000 Units Subcutaneous Q8H Mo Kincaid MD   5,000 Units at 11/09/22 0614    hydrALAZINE injection 10 mg  10 mg Intravenous Q6H PRN Ann Cohen PA-C        HYDROmorphone injection 1 mg  1 mg Intravenous Q6H PRN Mo Kincaid MD   1 mg at 11/07/22 1241    magnesium oxide tablet 800 mg  800 mg Oral PRN Ann Cohen PA-C   800 mg at 11/09/22 0922    magnesium oxide tablet 800 mg  800 mg Oral PRN Ann Cohen PA-C        ondansetron injection 4 mg  4 mg Intravenous Q12H PRN Mo Kincaid MD        oxyCODONE immediate release tablet 5 mg  5 mg Oral Q6H PRN Mo Kincaid MD   5 mg at 11/08/22 1928    polyethylene glycol packet 17 g  17 g Oral Daily Mo Kincaid MD   17 g at 11/09/22 0834    potassium bicarbonate disintegrating tablet 35 mEq  35 mEq Oral PRN Ann Cohen PA-C        potassium bicarbonate disintegrating tablet 50 mEq  50 mEq Oral PRN Ann Cohen PA-C        potassium bicarbonate disintegrating tablet 60 mEq  60 mEq Oral PRN Ann Cohen PA-C        potassium, sodium phosphates 280-160-250 mg packet 2 packet  2 packet Oral PRN Ann Cohen PA-C        potassium, sodium phosphates 280-160-250 mg packet 2 packet  2 packet Oral PRN Ann Cohen PA-C        potassium, sodium  phosphates 280-160-250 mg packet 2 packet  2 packet Oral PRN Ann Cohen PA-C        senna-docusate 8.6-50 mg per tablet 2 tablet  2 tablet Oral Daily Mo Kincaid MD   2 tablet at 11/09/22 0833    sertraline tablet 25 mg  25 mg Oral Daily Lisset Arboleda NP   25 mg at 11/09/22 0834     Continuous Infusions:    Review of Systems   HENT:          + jaw pain   Neurological:  Positive for seizures. Negative for speech difficulty and weakness.   All other systems reviewed and are negative.  Objective:     Vital Signs (Most Recent):  Temp: 98.4 °F (36.9 °C) (11/09/22 0701)  Pulse: (!) 54 (11/09/22 1100)  Resp: (!) 24 (11/09/22 1100)  BP: 123/64 (11/09/22 1100)  SpO2: 95 % (11/09/22 1100)   Vital Signs (24h Range):  Temp:  [98.4 °F (36.9 °C)-98.6 °F (37 °C)] 98.4 °F (36.9 °C)  Pulse:  [48-66] 54  Resp:  [7-44] 24  SpO2:  [94 %-98 %] 95 %  BP: (104-152)/(54-72) 123/64  Arterial Line BP: (106-137)/(51-84) 115/63     Weight: 99.5 kg (219 lb 5.7 oz)  Body mass index is 35.41 kg/m².    Physical Exam  Vitals and nursing note reviewed.   Constitutional:       General: She is not in acute distress.     Appearance: She is not diaphoretic.   HENT:      Head: Normocephalic and atraumatic.      Comments: sEEG leads with headwrap in place  Eyes:      General: No scleral icterus.     Extraocular Movements: Extraocular movements intact.      Conjunctiva/sclera: Conjunctivae normal.      Pupils: Pupils are equal, round, and reactive to light.   Cardiovascular:      Rate and Rhythm: Normal rate.   Pulmonary:      Effort: Pulmonary effort is normal. No respiratory distress.   Abdominal:      General: There is no distension.      Palpations: Abdomen is soft.      Tenderness: There is no abdominal tenderness.   Musculoskeletal:         General: No swelling, tenderness or deformity. Normal range of motion.      Cervical back: Neck supple. No rigidity.   Skin:     General: Skin is warm and dry.   Neurological:      General: No focal  deficit present.      Mental Status: She is alert and oriented to person, place, and time. Mental status is at baseline.   Psychiatric:         Mood and Affect: Mood normal.         Speech: Speech normal.         Behavior: Behavior normal.       NEUROLOGICAL EXAMINATION:     MENTAL STATUS   Oriented to person, place, and time.   Attention: normal. Concentration: normal.   Speech: speech is normal   Level of consciousness: alert    CRANIAL NERVES     CN III, IV, VI   Pupils are equal, round, and reactive to light.    CN VII   Facial expression full, symmetric.     CN VIII   Hearing: intact    CN IX, X   CN IX normal.     CN XI   CN XI normal.     CN XII   CN XII normal.     MOTOR EXAM   Muscle bulk: normal  Overall muscle tone: normal       Moves all extremities spontaneously and symmetrically, follows commands  No focal deficits noted     Significant Labs: All pertinent lab results from the past 24 hours have been reviewed.    Significant Studies: I have reviewed all pertinent imaging results/findings within the past 24 hours.

## 2022-11-09 NOTE — ASSESSMENT & PLAN NOTE
Ms. Arrieta is a 50 yo woman with intractable epilepsy since age 18, s/p partial left anterior temporal lobectomy in 11/2018 admitted to NICU s/p placement of sEEG leads for further localization of seizures as part of repeat surgical workup    --SBP <180  -- epilepsy and NSGY team following  -- all AEDs discontinued   -- Neuro checks q 1 hr

## 2022-11-09 NOTE — SUBJECTIVE & OBJECTIVE
Interval History:   11/9: Possibly one clinical seizure overnight, biked 20 minutes yesterday, pending BM    Medications:  Continuous Infusions:  Scheduled Meds:   cefTRIAXone (ROCEPHIN) IVPB  2 g Intravenous Q12H    heparin (porcine)  5,000 Units Subcutaneous Q8H    polyethylene glycol  17 g Oral Daily    senna-docusate 8.6-50 mg  2 tablet Oral Daily    sertraline  25 mg Oral Daily     PRN Meds:acetaminophen, hydrALAZINE, HYDROmorphone, magnesium oxide, magnesium oxide, ondansetron, oxyCODONE, potassium bicarbonate, potassium bicarbonate, potassium bicarbonate, potassium, sodium phosphates, potassium, sodium phosphates, potassium, sodium phosphates     Review of Systems  Objective:     Weight: 99.5 kg (219 lb 5.7 oz)  Body mass index is 35.41 kg/m².  Vital Signs (Most Recent):  Temp: 98.6 °F (37 °C) (11/09/22 0305)  Pulse: (!) 52 (11/09/22 0605)  Resp: 12 (11/09/22 0605)  BP: 123/67 (11/09/22 0605)  SpO2: (!) 94 % (11/09/22 0605)   Vital Signs (24h Range):  Temp:  [98.2 °F (36.8 °C)-98.6 °F (37 °C)] 98.6 °F (37 °C)  Pulse:  [49-66] 52  Resp:  [7-44] 12  SpO2:  [93 %-98 %] 94 %  BP: (101-152)/(51-72) 123/67  Arterial Line BP: (103-134)/(47-73) 120/58                     Female External Urinary Catheter 11/08/22 0605 (Active)   Skin no redness;no breakdown 11/09/22 0305   Tolerance no signs/symptoms of discomfort 11/09/22 0305   Suction Continuous suction at 60 mmHg 11/08/22 2305   Date of last wick change 11/08/22 11/08/22 1905   Time of last wick change 1400 11/08/22 1505   Output (mL) 350 mL 11/09/22 0230       Physical Exam    Neurosurgery Physical Exam  General: well developed, well nourished, no distress.   Head: normocephalic, atraumatic  Neurologic:   GCS: Eyes: 4/ Verbal: 5/ Motor: 6  Mental Status: Awake, Alert, Oriented x 3  Cranial nerves: PERRL, EOMI, face symmetric, tongue midline, shoulder shrug equal.  No pronator drift, no dysmetria.  Sensory: intact to light touch throughout  Motor Strength:Moves  all extremities antigravity    Gen: well nourished, normocephalic, atraumatic  CV: skin warm and dry, distal pulses present  Pulm: chest rise symmetric, no increased work of breathing  GI: abdomen soft, non-distended, non-tender  : patient voiding independently  MSK: no bony abnormalities noted  Psych: patient interacting with appropriate mood and affect                        Significant Labs:  Recent Labs   Lab 11/07/22  1606 11/08/22 0411 11/09/22  0003    98 88    143 137   K 4.6 4.1 4.1    109 106   CO2 23 25 22*   BUN 13 12 12   CREATININE 0.8 0.7 0.7   CALCIUM 8.8 8.8 8.9   MG 1.6 1.8 1.6     Recent Labs   Lab 11/07/22  1606 11/08/22 0411 11/09/22  0003   WBC 10.74 7.64 6.91   HGB 13.8 13.0 13.6   HCT 39.9 38.7 41.1    172 162     No results for input(s): LABPT, INR, APTT in the last 48 hours.  Microbiology Results (last 7 days)       ** No results found for the last 168 hours. **          All pertinent labs from the last 24 hours have been reviewed.    Significant Diagnostics:  I have reviewed all pertinent imaging results/findings within the past 24 hours.

## 2022-11-09 NOTE — PROGRESS NOTES
[facility]  Neurology-Epilepsy  Progress Note    Patient Name: Liza Arrieta  MRN: [id number]  Admission Date: 11/7/2022  Hospital Length of Stay: 2 days  Code Status: Full Code   Attending Provider: Any Ruiz MD  Primary Care Physician: Rc Rodriguez MD   Principal Problem:Temporal lobe epilepsy, intractable    Subjective:     Hospital Course:   Ms. Arrieta is a 51 year old woman with intractable epilepsy since age 18 s/p partial left anterior temporal lobectomy in 11/2018 admitted to [facility] s/p placement of 6 right-sided and 6 left-sided sEEG leads for further localization of seizures as part of repeat surgical workup. Patient is currently maintained on Lamotrigine 200 mg qAM/300 mg qPM and Cenobamate 150 mg qHS with continued events 7-8 times per month of lapse of awareness, staring.   11/7>11/8: Home Cenobamate decreased to 100 mg qHS, Lamotrigine 200 mg qAM/300 mg qPM continued on admission. No seizures overnight. On 11/8 pm, begin to hold Cenobomate and Lamotrigine.  11/8>11/9: Seizure 11/9 approximately 0200 with right hemispheric origin, patient reported she thought she had a seizure at approximately 0230. Patient and  endorse additional event approximately 0750 on 11/9. Will continue close vEEG, attempt to capture additional seizures for further localization and concordance.       Interval History: Seizure overnight at approximately 0200, patient and  reported they thought patient had had a seizure around 0230. Additional event this morning approximately 0750 where  suspected patient had recently had a seizure. Continue close vEEG monitoring.     Current Facility-Administered Medications   Medication Dose Route Frequency Provider Last Rate Last Admin    acetaminophen tablet 1,000 mg  1,000 mg Oral Q8H PRN Mo Kincaid MD   1,000 mg at 11/08/22 8933    cefTRIAXone (ROCEPHIN) 2 g/50 mL D5W IVPB  2 g Intravenous Q12H Ramona Downs MD   Stopped at  11/09/22 0300    heparin (porcine) injection 5,000 Units  5,000 Units Subcutaneous Q8H Mo Kincaid MD   5,000 Units at 11/09/22 0614    hydrALAZINE injection 10 mg  10 mg Intravenous Q6H PRN Ann Cohen PA-C        HYDROmorphone injection 1 mg  1 mg Intravenous Q6H PRN Mo Kincaid MD   1 mg at 11/07/22 1241    magnesium oxide tablet 800 mg  800 mg Oral PRN MARCIO Nina-C   800 mg at 11/09/22 0922    magnesium oxide tablet 800 mg  800 mg Oral PRN Ann Cohen PA-C        ondansetron injection 4 mg  4 mg Intravenous Q12H PRN Mo Kincaid MD        oxyCODONE immediate release tablet 5 mg  5 mg Oral Q6H PRN Mo Kincaid MD   5 mg at 11/08/22 1928    polyethylene glycol packet 17 g  17 g Oral Daily Mo Kincaid MD   17 g at 11/09/22 0834    potassium bicarbonate disintegrating tablet 35 mEq  35 mEq Oral PRN MARCIO Nina-C        potassium bicarbonate disintegrating tablet 50 mEq  50 mEq Oral PRN Ann Cohen, PA-C        potassium bicarbonate disintegrating tablet 60 mEq  60 mEq Oral PRN MARCIO Nina-C        potassium, sodium phosphates 280-160-250 mg packet 2 packet  2 packet Oral PRN Ann Cohen, PA-C        potassium, sodium phosphates 280-160-250 mg packet 2 packet  2 packet Oral PRN Ann Cohen, PA-C        potassium, sodium phosphates 280-160-250 mg packet 2 packet  2 packet Oral PRN MARCIO Nina-KEISHA        senna-docusate 8.6-50 mg per tablet 2 tablet  2 tablet Oral Daily Mo Kincaid MD   2 tablet at 11/09/22 0833    sertraline tablet 25 mg  25 mg Oral Daily Lisset Arboleda, NP   25 mg at 11/09/22 0834     Continuous Infusions:    Review of Systems   HENT:          + jaw pain   Neurological:  Positive for seizures. Negative for speech difficulty and weakness.   All other systems reviewed and are negative.  Objective:     Vital Signs (Most Recent):  Temp: 98.4 °F (36.9 °C) (11/09/22 0701)  Pulse: (!)  54 (11/09/22 1100)  Resp: (!) 24 (11/09/22 1100)  BP: 123/64 (11/09/22 1100)  SpO2: 95 % (11/09/22 1100)   Vital Signs (24h Range):  Temp:  [98.4 °F (36.9 °C)-98.6 °F (37 °C)] 98.4 °F (36.9 °C)  Pulse:  [48-66] 54  Resp:  [7-44] 24  SpO2:  [94 %-98 %] 95 %  BP: (104-152)/(54-72) 123/64  Arterial Line BP: (106-137)/(51-84) 115/63     Weight: 99.5 kg (219 lb 5.7 oz)  Body mass index is 35.41 kg/m².    Physical Exam  Vitals and nursing note reviewed.   Constitutional:       General: She is not in acute distress.     Appearance: She is not diaphoretic.   HENT:      Head: Normocephalic and atraumatic.      Comments: sEEG leads with headwrap in place  Eyes:      General: No scleral icterus.     Extraocular Movements: Extraocular movements intact.      Conjunctiva/sclera: Conjunctivae normal.      Pupils: Pupils are equal, round, and reactive to light.   Cardiovascular:      Rate and Rhythm: Normal rate.   Pulmonary:      Effort: Pulmonary effort is normal. No respiratory distress.   Abdominal:      General: There is no distension.      Palpations: Abdomen is soft.      Tenderness: There is no abdominal tenderness.   Musculoskeletal:         General: No swelling, tenderness or deformity. Normal range of motion.      Cervical back: Neck supple. No rigidity.   Skin:     General: Skin is warm and dry.   Neurological:      General: No focal deficit present.      Mental Status: She is alert and oriented to person, place, and time. Mental status is at baseline.   Psychiatric:         Mood and Affect: Mood normal.         Speech: Speech normal.         Behavior: Behavior normal.       NEUROLOGICAL EXAMINATION:     MENTAL STATUS   Oriented to person, place, and time.   Attention: normal. Concentration: normal.   Speech: speech is normal   Level of consciousness: alert    CRANIAL NERVES     CN III, IV, VI   Pupils are equal, round, and reactive to light.    CN VII   Facial expression full, symmetric.     CN VIII   Hearing:  intact    CN IX, X   CN IX normal.     CN XI   CN XI normal.     CN XII   CN XII normal.     MOTOR EXAM   Muscle bulk: normal  Overall muscle tone: normal       Moves all extremities spontaneously and symmetrically, follows commands  No focal deficits noted     Significant Labs: All pertinent lab results from the past 24 hours have been reviewed.    Significant Studies: I have reviewed all pertinent imaging results/findings within the past 24 hours.    Assessment and Plan:     * Temporal lobe epilepsy, intractable  Ms. Arrieta is a 50 yo woman with intractable epilepsy since age 18, s/p partial left anterior temporal lobectomy in 11/2018 admitted to NICU s/p placement of 6 right sided and 6 left sided sEEG leads for further localization of seizures as part of repeat surgical workup. Patient is currently maintained on lamotrigine 200 mg qAM/300 mg qPM and Cenobamate 150 mg qHS with continued events 7-8 times per month of lapse of awareness, staring.    Recommendations:  - s/p placement of 6 left-sided and 6 right-sided sEEG electrodes 11/7 by NSGY  - Seizure 11/9 approximately 0200 with right hemispheric onset, 2 additional seizures 0745 and 0852 11/10 am. Please see EEG report for full details.  - On admission, decreased Cenobamate to 100 mg qHS, continued home Lamotrigine 200 mg qAM/300 mg qPM   - Continue to hold Cenobamate and Lamotrigine (beginning 11/8 pm)  - Seizure precautions  - Please call epilepsy on call prior to administration of IV benzodiazepines for prolonged seizures  - Post-operative imaging timing, pain control per NCC/NSGY    Plan of care discussed with NCC team, patient and  at bedside. Will continue to follow, please call with any questions.     Depression  Chronic  - Continue home Sertraline        VTE Risk Mitigation (From admission, onward)         Ordered     heparin (porcine) injection 5,000 Units  Every 8 hours         11/07/22 1232     IP VTE HIGH RISK PATIENT  Once          11/07/22 1232     Place sequential compression device  Until discontinued         11/07/22 1232     Place sequential compression device  Until discontinued         11/07/22 0628     Place LAURA hose  Until discontinued         11/07/22 0628                Philly Foy PA-C  Neurology-Epilepsy  [facility]  Staff: Dr. Rojo

## 2022-11-09 NOTE — NURSING
"0231: Pt reports she had a possible seizure. Said she woke up thinking she needed to up to urinate. She did not recognize where she was.  Called her  over to help her. The  informed the RN to assist his wife. After the RN left the room, pt called RN back into the room and report "I think I had a seizure." Says she doesn't remember calling  over to help her, but does remember the RN assisting her to urinate. RN pressed red button. Pt now Ox4 and back at baseline. BHAVIK Georges of [facility] team notified. MD Molly of Epilepsy paged and notified. MD Molly says she will report event to day team. No further orders at this time. VSS. WCTM.   "

## 2022-11-09 NOTE — ASSESSMENT & PLAN NOTE
A 52 y.o. female with hx of prior L temporal lobectomy and intractable seizure who admitted for sEEG 11/7    POD#2    Continue ICU care  Neurocheck Q1hr  Pain control   CT head satisfactory   Continue EEG and monitoring: No sz  Continue abx ppax while leads in place; ceftriaxone   Off AEDs and epilepsy following   Aggressive bowel regimes; increase regimen today  Daily bike   Sating well at room   Keep SBP <150  ADAT  SQH for DVT ppx  Further care per NCC  NSGY will follow

## 2022-11-09 NOTE — PROGRESS NOTES
[facility]  Neurocritical Care  Progress Note    Admit Date: 11/7/2022  Service Date: 11/09/2022  Length of Stay: 2    Subjective:     Chief Complaint: Temporal lobe epilepsy, intractable    History of Present Illness: Ms. Arrieta is a 50 yo woman with intractable epilepsy since age 18, s/p partial left anterior temporal lobectomy in 11/2018 admitted to NICU s/p placement of sEEG leads for further localization of seizures as part of repeat surgical workup. After left anterior temporal lobectomy, patient reports brief period of seizure freedom, before beginning to have seizures again approximately 3 months afterwards. She reports current seizures are different than her typical semiology prior to surgery, notably she believes she now loses awareness and stares off. After her events, she is quickly back to baseline and is able to report that she had a seizure. No associated tongue biting, bowel/bladder incontinence. She is currently maintained on Lamotrigine 200 mg qAM/300 mg qPM and Cenobamate 150 mg nightly. She reports current seizure frequency of 7-8 per month, with last seizure on 10/31. Unable to identify triggers for seizures other than missing medication. MRI brain completed 2018 showed evidence of left frontotemporal crani for partial left anterior temporal resection. MRI brain epilepsy protocol in 10/2022 with post-operative changes with left temporal partial lobectomy, interval development of susceptibility in the right superior frontal sulcus compatible with component of superficial siderosis. EEG completed 5/30/22 noted mild regional or subcortical dysfunction in bilateral temporal lobes with possible seizure focus in right anterior temporal region. During EMU admission 7/5/22>7/10/22, patient noted to have multiple right temporal lobe seizures, regional cortical dysfunction from left temporal region and bilateral independent seizure foci in left and right temporal lobes. Patient  discussed in multidisciplinary epilepsy surgery conference, with recommendation for phase 2 monitoring for further localization. Patient elected to proceed, admitted to NICU after placement of 6 right sided sEEG electrodes and 6 left sided sEEG electrodes. Patient admitted to [facility] for close  monitoring and higher level of care.        Hospital Course: 11/8/2022: all AED's discontinued today per epilepsy   11/09/2022 three electrographic seizures overnight        Interval History: Patient with three seizures overnight.  Continue to hold all AEDs. Discussed with epilepsy team.     Review of Systems: Review of Systems   Eyes: Negative for blurred vision and double vision.   Respiratory: Negative for cough and shortness of breath.    Cardiovascular: Negative for chest pain and palpitations.   Gastrointestinal: Negative for nausea and vomiting.   Genitourinary: Negative for frequency and urgency.   Musculoskeletal: Negative for back pain and neck pain.   Neurological: Positive for seizures. Negative for headaches.   Psychiatric/Behavioral: Positive for depression. Negative for hallucinations and substance abuse.       Vitals:   Temp: 97.7 °F (36.5 °C)  Pulse: (!) 52  Rhythm: sinus bradycardia  BP: 132/69  MAP (mmHg): 93  Resp: 12  SpO2: 95 %  O2 Device (Oxygen Therapy): room air    Temp  Min: 97.7 °F (36.5 °C)  Max: 98.6 °F (37 °C)  Pulse  Min: 48  Max: 66  BP  Min: 104/55  Max: 152/66  MAP (mmHg)  Min: 75  Max: 95  Resp  Min: 7  Max: 44  SpO2  Min: 94 %  Max: 98 %    11/08 0701 - 11/09 0700  In: 725 [P.O.:625]  Out: 950 [Urine:950]   Unmeasured Output  Urine Occurrence: 1  Stool Occurrence: 0  Emesis Occurrence: 0  Pad Count: 2     Examination:   Constitutional: Well-nourished and -developed. No apparent distress.   Eyes: Conjunctiva clear, anicteric. Lids no lesions.  Head/Ears/Nose/Mouth/Throat/Neck: Moist mucous membranes. External ears, nose atraumatic. sEEG ib place.   Cardiovascular: Regular rhythm. No murmurs. No  leg edema.  Respiratory: Comfortable respirations. Clear to auscultation.  Gastrointestinal: No hernia. Soft, nondistended, nontender. + bowel sounds.    Neurologic:  -GCS [id number]  -Alert. Oriented to person, place, and time. Speech fluent. Follows commands.  -Cranial nerves II-XIIintact, particularly   -Motor PUGA, equal strength throughout   -Sensation intact    Medications:   Continuous ScheduledcefTRIAXone (ROCEPHIN) IVPB, 2 g, Q12H  heparin (porcine), 5,000 Units, Q8H  polyethylene glycol, 17 g, Daily  senna-docusate 8.6-50 mg, 2 tablet, Daily  sertraline, 25 mg, Daily    PRNacetaminophen, 1,000 mg, Q8H PRN  hydrALAZINE, 10 mg, Q6H PRN  HYDROmorphone, 1 mg, Q6H PRN  magnesium oxide, 800 mg, PRN  magnesium oxide, 800 mg, PRN  ondansetron, 4 mg, Q12H PRN  oxyCODONE, 5 mg, Q6H PRN  potassium bicarbonate, 35 mEq, PRN  potassium bicarbonate, 50 mEq, PRN  potassium bicarbonate, 60 mEq, PRN  potassium, sodium phosphates, 2 packet, PRN  potassium, sodium phosphates, 2 packet, PRN  potassium, sodium phosphates, 2 packet, PRN       Today I independently reviewed pertinent medications, lines/drains/airways, imaging, cardiology results, laboratory results, microbiology results, notably:     ISTAT: No results for input(s): PH, PCO2, PO2, POCSATURATED, HCO3, BE, POCNA, POCK, POCTCO2, POCGLU, POCICA, POCLAC, SAMPLE in the last 24 hours.   Chem:   Recent Labs   Lab 11/09/22  0003      K 4.1      CO2 22*   GLU 88   BUN 12   CREATININE 0.7   CALCIUM 8.9   MG 1.6   PHOS 3.4   ANIONGAP 9   PROT 6.3   ALBUMIN 3.1*   BILITOT 0.3   ALKPHOS 110   AST 19   ALT 17     Heme:   Recent Labs   Lab 11/09/22  0003   WBC 6.91   HGB 13.6   HCT 41.1        Endo: No results for input(s): POCTGLUCOSE in the last 24 hours.       Assessment/Plan:     Neuro  * Temporal lobe epilepsy, intractable  Ms. Arrieta is a 50 yo woman with intractable epilepsy since age 18, s/p partial left anterior temporal lobectomy in 11/2018 admitted to  NICU s/p placement of sEEG leads for further localization of seizures as part of repeat surgical workup    --SBP <180  -- epilepsy and NSGY team following  -- all AEDs discontinued   -- Neuro checks q 1 hr      Psychiatric  Depression  Continue home sertraline          The patient is being Prophylaxed for:  Venous Thromboembolism with: Mechanical  Stress Ulcer with: Not Applicable   Ventilator Pneumonia with: not applicable    Activity Orders          Turn patient every 2 hours starting at 11/08 0000    Diet Adult Regular (IDDSI Level 7): Regular starting at 11/07 1228        Full Code    MILDRED ArroyoC  Neurocritical Care  [facility]

## 2022-11-09 NOTE — HOSPITAL COURSE
11/9: Possibly one clinical seizure overnight, biked 20 minutes yesterday, pending BM  11/10: No seizure activity overnight. CTM for BM  11/11: NAEON. AFVSS. Exam stable. Patient reports possible sz event yesterday. No BM since admission. Used bike.   11/12: NAEON. AFVSS. Exam stable. 2 reported sz episodes yesterday per pt. 3BMs.   11/13: Seizure overnight. Back to baseline today, using stationary bike. Had 1 BM yesterday  11/15: NAEON. AFVSS. Exam stable. No sz yesterday. Biked and had BM. OR for lead removal tomorrow.   11/16: Biked yesterday, had BM 2d ago, sEEG removal today  11/17: sEEG leads removed. Post scan stable. In good spirits this morning, PT/OT eval today

## 2022-11-09 NOTE — PLAN OF CARE
[facility] Care Plan    POC reviewed with Liza Arrieta and family at 1000. Pt and  verbalized understanding. Questions and concerns addressed. No acute events today. Pt progressing toward goals. Will continue to monitor. See below and flowsheets for full assessment and VS info. Per epilepsy, seizures were seen on EEG both last night and 2 this am around 8am. Stationary bike x 20 min and tolerates very well, low grade headache, jaw pain slightly improved, passing flatus, pt is not uncomfortable and reports she does not have BM's every day even normally. Appetite improving and drinking adequate fluids. WCTCM, event button to be pressed even after pt reports she thinks she has had a seizure as instructed.             Is this a stroke patient? no    Neuro:  [place] Coma Scale  Best Eye Response: 4-->(E4) spontaneous  Best Motor Response: 6-->(M6) obeys commands  Best Verbal Response: 5-->(V5) oriented  [place] Coma Scale Score: 15  Assessment Qualifiers: patient not sedated/intubated  Pupil PERRLA: yes     24 hr Temp:  [97.7 °F (36.5 °C)-98.6 °F (37 °C)]     CV:   Rhythm: sinus bradycardia  BP goals:   SBP < 160  MAP > 65    Resp:   O2 Device (Oxygen Therapy): room air       Plan: N/A    GI/:     Diet/Nutrition Received: regular  Last Bowel Movement: 11/06/22  Voiding Characteristics: external catheter    Intake/Output Summary (Last 24 hours) at 11/9/2022 1623  Last data filed at 11/9/2022 1500  Gross per 24 hour   Intake 639.84 ml   Output 1700 ml   Net -1060.16 ml     Unmeasured Output  Urine Occurrence: 1  Stool Occurrence: 0  Emesis Occurrence: 0  Pad Count: 2    Labs/Accuchecks:  Recent Labs   Lab 11/09/22  0003   WBC 6.91   RBC 4.45   HGB 13.6   HCT 41.1         Recent Labs   Lab 11/09/22  0003      K 4.1   CO2 22*      BUN 12   CREATININE 0.7   ALKPHOS 110   ALT 17   AST 19   BILITOT 0.3      Recent Labs   Lab 11/02/22  1740   INR 1.0   APTT 25.3    No results for input(s): CPK, CPKMB,  TROPONINI, MB in the last 168 hours.    Electrolytes: Electrolytes replaced  Accuchecks: none    Gtts:      LDA/Wounds:  Lines/Drains/Airways       Drain  Duration             Female External Urinary Catheter 11/08/22 0605 1 day              Arterial Line  Duration             Arterial Line 11/07/22 0720 Right Radial 2 days              Peripheral Intravenous Line  Duration                  Peripheral IV - Single Lumen 11/07/22 0628 20 G Posterior;Right Forearm 2 days         Peripheral IV - Single Lumen 11/07/22 0725 20 G Right Hand 2 days                  Wounds: No  Wound care consulted: No

## 2022-11-09 NOTE — ASSESSMENT & PLAN NOTE
Ms. Arrieta is a 50 yo woman with intractable epilepsy since age 18, s/p partial left anterior temporal lobectomy in 11/2018 admitted to NICU s/p placement of 6 right sided and 6 left sided sEEG leads for further localization of seizures as part of repeat surgical workup. Patient is currently maintained on lamotrigine 200 mg qAM/300 mg qPM and Cenobamate 150 mg qHS with continued events 7-8 times per month of lapse of awareness, staring.    Recommendations:  - s/p placement of 6 left-sided and 6 right-sided sEEG electrodes 11/7 by NSGY  - Seizure 11/9 approximately 0200 with right hemispheric onset, 2 additional seizures 0745 and 0852 11/10 am. Please see EEG report for full details.  - On admission, decreased Cenobamate to 100 mg qHS, continued home Lamotrigine 200 mg qAM/300 mg qPM   - Continue to hold Cenobamate and Lamotrigine (beginning 11/8 pm)  - Seizure precautions  - Please call epilepsy on call prior to administration of IV benzodiazepines for prolonged seizures  - Post-operative imaging timing, pain control per NCC/NSGY    Plan of care discussed with NCC team, patient and  at bedside. Will continue to follow, please call with any questions.

## 2022-11-09 NOTE — PROGRESS NOTES
EEG Hook up  AM Check Electrodes had to be fixed.No      Tahir Rodriguez  sEEG pt, ground and reference electrodes checked.

## 2022-11-09 NOTE — PLAN OF CARE
[facility] Care Plan    POC reviewed with Liza Arrieta and family at 0300. Pt verbalized understanding. Questions and concerns addressed. No acute events overnight. Pt progressing toward goals. Will continue to monitor. See below and flowsheets for full assessment and VS info.     -1 seizure took place overnight. Red button pressed. [facility] team and Epilepsy informed. No new orders. See Nursing Note for details.   -Neuro exam stable and unchanged.    Is this a stroke patient? no    Neuro:  [place] Coma Scale  Best Eye Response: 4-->(E4) spontaneous  Best Motor Response: 6-->(M6) obeys commands  Best Verbal Response: 5-->(V5) oriented  [place] Coma Scale Score: 15  Assessment Qualifiers: patient not sedated/intubated, no eye obstruction present  Pupil PERRLA: yes     24hr Temp:  [98.2 °F (36.8 °C)-98.6 °F (37 °C)]     CV:   Rhythm: normal sinus rhythm  BP goals:   SBP < 160  MAP > 65    Resp:   O2 Device (Oxygen Therapy): room air       Plan: N/A    GI/:     Diet/Nutrition Received: regular  Last Bowel Movement: 11/06/22  Voiding Characteristics: external catheter    Intake/Output Summary (Last 24 hours) at 11/9/2022 0501  Last data filed at 11/9/2022 0305  Gross per 24 hour   Intake 744.97 ml   Output 1025 ml   Net -280.03 ml     Unmeasured Output  Urine Occurrence: 1  Stool Occurrence: 0  Emesis Occurrence: 0  Pad Count: 1    Labs/Accuchecks:  Recent Labs   Lab 11/09/22  0003   WBC 6.91   RBC 4.45   HGB 13.6   HCT 41.1         Recent Labs   Lab 11/09/22  0003      K 4.1   CO2 22*      BUN 12   CREATININE 0.7   ALKPHOS 110   ALT 17   AST 19   BILITOT 0.3      Recent Labs   Lab 11/02/22  1740   INR 1.0   APTT 25.3    No results for input(s): CPK, CPKMB, TROPONINI, MB in the last 168 hours.    Electrolytes: No replacement orders  Accuchecks: none    Gtts:      LDA/Wounds:  Lines/Drains/Airways       Drain  Duration             Female External Urinary Catheter 11/08/22 0605 <1 day              Arterial  Line  Duration             Arterial Line 11/07/22 0720 Right Radial 1 day              Peripheral Intravenous Line  Duration                  Peripheral IV - Single Lumen 11/07/22 0628 20 G Posterior;Right Forearm 1 day         Peripheral IV - Single Lumen 11/07/22 0725 20 G Right Hand 1 day                  Wounds: No  Wound care consulted: No

## 2022-11-10 ENCOUNTER — SOCIAL WORK (OUTPATIENT)
Dept: NEUROLOGY | Facility: CLINIC | Age: 52
End: 2022-11-10
Payer: COMMERCIAL

## 2022-11-10 LAB
ALBUMIN SERPL BCP-MCNC: 3.2 G/DL (ref 3.5–5.2)
ALP SERPL-CCNC: 114 U/L (ref 55–135)
ALT SERPL W/O P-5'-P-CCNC: 16 U/L (ref 10–44)
ANION GAP SERPL CALC-SCNC: 11 MMOL/L (ref 8–16)
AST SERPL-CCNC: 18 U/L (ref 10–40)
BASOPHILS # BLD AUTO: 0.01 K/UL (ref 0–0.2)
BASOPHILS NFR BLD: 0.2 % (ref 0–1.9)
BILIRUB SERPL-MCNC: 0.2 MG/DL (ref 0.1–1)
BUN SERPL-MCNC: 13 MG/DL (ref 6–20)
CALCIUM SERPL-MCNC: 9.6 MG/DL (ref 8.7–10.5)
CHLORIDE SERPL-SCNC: 106 MMOL/L (ref 95–110)
CO2 SERPL-SCNC: 24 MMOL/L (ref 23–29)
CREAT SERPL-MCNC: 0.7 MG/DL (ref 0.5–1.4)
DIFFERENTIAL METHOD: NORMAL
EOSINOPHIL # BLD AUTO: 0.1 K/UL (ref 0–0.5)
EOSINOPHIL NFR BLD: 1.3 % (ref 0–8)
ERYTHROCYTE [DISTWIDTH] IN BLOOD BY AUTOMATED COUNT: 11.5 % (ref 11.5–14.5)
EST. GFR  (NO RACE VARIABLE): >60 ML/MIN/1.73 M^2
GLUCOSE SERPL-MCNC: 108 MG/DL (ref 70–110)
HCT VFR BLD AUTO: 43.3 % (ref 37–48.5)
HGB BLD-MCNC: 14.4 G/DL (ref 12–16)
IMM GRANULOCYTES # BLD AUTO: 0.01 K/UL (ref 0–0.04)
IMM GRANULOCYTES NFR BLD AUTO: 0.2 % (ref 0–0.5)
LYMPHOCYTES # BLD AUTO: 2.2 K/UL (ref 1–4.8)
LYMPHOCYTES NFR BLD: 35.6 % (ref 18–48)
MAGNESIUM SERPL-MCNC: 1.7 MG/DL (ref 1.6–2.6)
MAGNESIUM SERPL-MCNC: 1.7 MG/DL (ref 1.6–2.6)
MCH RBC QN AUTO: 30.5 PG (ref 27–31)
MCHC RBC AUTO-ENTMCNC: 33.3 G/DL (ref 32–36)
MCV RBC AUTO: 92 FL (ref 82–98)
MONOCYTES # BLD AUTO: 0.5 K/UL (ref 0.3–1)
MONOCYTES NFR BLD: 7.6 % (ref 4–15)
NEUTROPHILS # BLD AUTO: 3.5 K/UL (ref 1.8–7.7)
NEUTROPHILS NFR BLD: 55.1 % (ref 38–73)
NRBC BLD-RTO: 0 /100 WBC
PHOSPHATE SERPL-MCNC: 3.7 MG/DL (ref 2.7–4.5)
PLATELET # BLD AUTO: 174 K/UL (ref 150–450)
PMV BLD AUTO: 9.9 FL (ref 9.2–12.9)
POTASSIUM SERPL-SCNC: 4.3 MMOL/L (ref 3.5–5.1)
PROT SERPL-MCNC: 6.7 G/DL (ref 6–8.4)
RBC # BLD AUTO: 4.72 M/UL (ref 4–5.4)
SODIUM SERPL-SCNC: 141 MMOL/L (ref 136–145)
WBC # BLD AUTO: 6.29 K/UL (ref 3.9–12.7)

## 2022-11-10 PROCEDURE — 83735 ASSAY OF MAGNESIUM: CPT | Mod: 91 | Performed by: PSYCHIATRY & NEUROLOGY

## 2022-11-10 PROCEDURE — 25000003 PHARM REV CODE 250

## 2022-11-10 PROCEDURE — 80053 COMPREHEN METABOLIC PANEL: CPT

## 2022-11-10 PROCEDURE — 83735 ASSAY OF MAGNESIUM: CPT

## 2022-11-10 PROCEDURE — 84100 ASSAY OF PHOSPHORUS: CPT

## 2022-11-10 PROCEDURE — 94761 N-INVAS EAR/PLS OXIMETRY MLT: CPT

## 2022-11-10 PROCEDURE — 20000000 HC ICU ROOM

## 2022-11-10 PROCEDURE — 99233 PR SUBSEQUENT HOSPITAL CARE,LEVL III: ICD-10-PCS | Mod: ,,, | Performed by: NURSE PRACTITIONER

## 2022-11-10 PROCEDURE — 99233 SBSQ HOSP IP/OBS HIGH 50: CPT | Mod: ,,, | Performed by: NURSE PRACTITIONER

## 2022-11-10 PROCEDURE — 63600175 PHARM REV CODE 636 W HCPCS: Performed by: STUDENT IN AN ORGANIZED HEALTH CARE EDUCATION/TRAINING PROGRAM

## 2022-11-10 PROCEDURE — 95720 EEG PHY/QHP EA INCR W/VEEG: CPT | Mod: ,,, | Performed by: PSYCHIATRY & NEUROLOGY

## 2022-11-10 PROCEDURE — 99233 SBSQ HOSP IP/OBS HIGH 50: CPT | Mod: ,,, | Performed by: PSYCHIATRY & NEUROLOGY

## 2022-11-10 PROCEDURE — 95714 VEEG EA 12-26 HR UNMNTR: CPT

## 2022-11-10 PROCEDURE — 25000003 PHARM REV CODE 250: Performed by: NURSE PRACTITIONER

## 2022-11-10 PROCEDURE — 99233 SBSQ HOSP IP/OBS HIGH 50: CPT | Mod: FS,,, | Performed by: PHYSICIAN ASSISTANT

## 2022-11-10 PROCEDURE — 95720 PR EEG, W/VIDEO, CONT RECORD, I&R, >12<26 HRS: ICD-10-PCS | Mod: ,,, | Performed by: PSYCHIATRY & NEUROLOGY

## 2022-11-10 PROCEDURE — 85025 COMPLETE CBC W/AUTO DIFF WBC: CPT

## 2022-11-10 PROCEDURE — 99233 PR SUBSEQUENT HOSPITAL CARE,LEVL III: ICD-10-PCS | Mod: FS,,, | Performed by: PHYSICIAN ASSISTANT

## 2022-11-10 PROCEDURE — 99233 PR SUBSEQUENT HOSPITAL CARE,LEVL III: ICD-10-PCS | Mod: ,,, | Performed by: PSYCHIATRY & NEUROLOGY

## 2022-11-10 RX ORDER — POLYETHYLENE GLYCOL 3350 17 G/17G
17 POWDER, FOR SOLUTION ORAL 2 TIMES DAILY
Status: DISCONTINUED | OUTPATIENT
Start: 2022-11-10 | End: 2022-11-17 | Stop reason: HOSPADM

## 2022-11-10 RX ORDER — AMOXICILLIN 250 MG
2 CAPSULE ORAL 2 TIMES DAILY
Status: DISCONTINUED | OUTPATIENT
Start: 2022-11-10 | End: 2022-11-17 | Stop reason: HOSPADM

## 2022-11-10 RX ADMIN — CEFTRIAXONE SODIUM 2 G: 2 INJECTION, SOLUTION INTRAVENOUS at 12:11

## 2022-11-10 RX ADMIN — POLYETHYLENE GLYCOL 3350 17 G: 17 POWDER, FOR SOLUTION ORAL at 09:11

## 2022-11-10 RX ADMIN — MAGNESIUM OXIDE TAB 400 MG (241.3 MG ELEMENTAL MG) 800 MG: 400 (241.3 MG) TAB at 09:11

## 2022-11-10 RX ADMIN — SERTRALINE HYDROCHLORIDE 25 MG: 25 TABLET ORAL at 08:11

## 2022-11-10 RX ADMIN — CEFTRIAXONE SODIUM 2 G: 2 INJECTION, SOLUTION INTRAVENOUS at 02:11

## 2022-11-10 RX ADMIN — MAGNESIUM OXIDE TAB 400 MG (241.3 MG ELEMENTAL MG) 800 MG: 400 (241.3 MG) TAB at 10:11

## 2022-11-10 RX ADMIN — POLYETHYLENE GLYCOL 3350 17 G: 17 POWDER, FOR SOLUTION ORAL at 08:11

## 2022-11-10 RX ADMIN — MAGNESIUM OXIDE TAB 400 MG (241.3 MG ELEMENTAL MG) 800 MG: 400 (241.3 MG) TAB at 06:11

## 2022-11-10 RX ADMIN — HEPARIN SODIUM 5000 UNITS: 5000 INJECTION INTRAVENOUS; SUBCUTANEOUS at 02:11

## 2022-11-10 RX ADMIN — SENNOSIDES AND DOCUSATE SODIUM 2 TABLET: 50; 8.6 TABLET ORAL at 08:11

## 2022-11-10 RX ADMIN — HEPARIN SODIUM 5000 UNITS: 5000 INJECTION INTRAVENOUS; SUBCUTANEOUS at 06:11

## 2022-11-10 RX ADMIN — HEPARIN SODIUM 5000 UNITS: 5000 INJECTION INTRAVENOUS; SUBCUTANEOUS at 09:11

## 2022-11-10 RX ADMIN — SENNOSIDES AND DOCUSATE SODIUM 2 TABLET: 50; 8.6 TABLET ORAL at 09:11

## 2022-11-10 NOTE — PROGRESS NOTES
Epilepsy SW visited patient in the ICU this afternoon.   Patient reports doing well with no complaints other than being bored.   She reports some feelings of mild depression and attributes this to not having much to do in the hospital.     SW encouraged patient to start making a lsit in regards to improvements in regards to next year's epilepsy walk.  Patient reported she may start working on that.     SW reported he would visit tomorrow if possible.

## 2022-11-10 NOTE — ASSESSMENT & PLAN NOTE
A 52 y.o. female with hx of prior L temporal lobectomy and intractable seizure who admitted for sEEG 11/7    POD#3    Continue ICU care  Neurocheck Q1hr  Pain control   CT head satisfactory   Continue EEG and monitoring: sz 11/9 approximately 0200 with right hemispheric origin, ctm  Continue abx ppax while leads in place; ceftriaxone   Off AEDs and epilepsy following   Aggressive bowel regimes; increased regimen  Daily bike   Sating well at room   Keep SBP <150  ADAT  SQH for DVT ppx  Further care per NCC  NSGY will follow

## 2022-11-10 NOTE — PROGRESS NOTES
[facility]  Neurocritical Care  Progress Note    Admit Date: 11/7/2022  Service Date: 11/10/2022  Length of Stay: 3    Subjective:     Chief Complaint: Temporal lobe epilepsy, intractable    History of Present Illness: Ms. Arrieta is a 50 yo woman with intractable epilepsy since age 18, s/p partial left anterior temporal lobectomy in 11/2018 admitted to NICU s/p placement of sEEG leads for further localization of seizures as part of repeat surgical workup. After left anterior temporal lobectomy, patient reports brief period of seizure freedom, before beginning to have seizures again approximately 3 months afterwards. She reports current seizures are different than her typical semiology prior to surgery, notably she believes she now loses awareness and stares off. After her events, she is quickly back to baseline and is able to report that she had a seizure. No associated tongue biting, bowel/bladder incontinence. She is currently maintained on Lamotrigine 200 mg qAM/300 mg qPM and Cenobamate 150 mg nightly. She reports current seizure frequency of 7-8 per month, with last seizure on 10/31. Unable to identify triggers for seizures other than missing medication. MRI brain completed 2018 showed evidence of left frontotemporal crani for partial left anterior temporal resection. MRI brain epilepsy protocol in 10/2022 with post-operative changes with left temporal partial lobectomy, interval development of susceptibility in the right superior frontal sulcus compatible with component of superficial siderosis. EEG completed 5/30/22 noted mild regional or subcortical dysfunction in bilateral temporal lobes with possible seizure focus in right anterior temporal region. During EMU admission 7/5/22>7/10/22, patient noted to have multiple right temporal lobe seizures, regional cortical dysfunction from left temporal region and bilateral independent seizure foci in left and right temporal lobes. Patient  discussed in multidisciplinary epilepsy surgery conference, with recommendation for phase 2 monitoring for further localization. Patient elected to proceed, admitted to NICU after placement of 6 right sided sEEG electrodes and 6 left sided sEEG electrodes. Patient admitted to [facility] for close  monitoring and higher level of care.        Hospital Course: 11/8/2022: all AED's discontinued today per epilepsy   11/09/2022 three electrographic seizures overnight  11/10/22: NAEON      Interval History:  NAEON  Review of Systems   Constitutional: Negative.    HENT: Negative.     Eyes: Negative.    Respiratory: Negative.     Cardiovascular: Negative.    Gastrointestinal: Negative.    Endocrine: Negative.    Genitourinary: Negative.    Musculoskeletal: Negative.    Skin: Negative.    Neurological: Negative.      Objective:     Vitals:  Temp: 98.3 °F (36.8 °C)  Pulse: (!) 56  Rhythm: sinus bradycardia  BP: 135/78  MAP (mmHg): 101  Resp: 14  SpO2: 95 %  O2 Device (Oxygen Therapy): room air    Temp  Min: 97.5 °F (36.4 °C)  Max: 98.3 °F (36.8 °C)  Pulse  Min: 46  Max: 65  BP  Min: 102/59  Max: 155/65  MAP (mmHg)  Min: 73  Max: 101  Resp  Min: 7  Max: 26  SpO2  Min: 92 %  Max: 96 %    11/09 0701 - 11/10 0700  In: 679.7 [P.O.:580]  Out: 1850 [Urine:1850]   Unmeasured Output  Urine Occurrence: 1  Stool Occurrence: 0  Emesis Occurrence: 0  Pad Count: 1       Physical Exam  Vitals and nursing note reviewed.   Constitutional:       Appearance: Normal appearance.   HENT:      Head: Normocephalic.      Nose: Nose normal.      Mouth/Throat:      Mouth: Mucous membranes are moist.      Pharynx: Oropharynx is clear.   Cardiovascular:      Rate and Rhythm: Normal rate and regular rhythm.      Pulses: Normal pulses.      Heart sounds: Normal heart sounds.   Pulmonary:      Effort: Pulmonary effort is normal.      Breath sounds: Normal breath sounds.   Abdominal:      General: Bowel sounds are normal.      Palpations: Abdomen is soft.    Musculoskeletal:         General: Normal range of motion.   Skin:     General: Skin is warm and dry.      Capillary Refill: Capillary refill takes less than 2 seconds.      Coloration: Skin is not pale.   Neurological:      Mental Status: She is alert.      Comments: GCS 15  AAOX 4  PERRL  PUGA to command and spontaneously  Sensation intact         Medications:  Continuous ScheduledcefTRIAXone (ROCEPHIN) IVPB, 2 g, Q12H  heparin (porcine), 5,000 Units, Q8H  polyethylene glycol, 17 g, BID  senna-docusate 8.6-50 mg, 2 tablet, BID  sertraline, 25 mg, Daily    PRNacetaminophen, 1,000 mg, Q8H PRN  hydrALAZINE, 10 mg, Q6H PRN  HYDROmorphone, 1 mg, Q6H PRN  magnesium hydroxide 400 mg/5 ml, 30 mL, Daily PRN  magnesium oxide, 800 mg, PRN  magnesium oxide, 800 mg, PRN  ondansetron, 4 mg, Q12H PRN  oxyCODONE, 5 mg, Q6H PRN  potassium bicarbonate, 35 mEq, PRN  potassium bicarbonate, 50 mEq, PRN  potassium bicarbonate, 60 mEq, PRN  potassium, sodium phosphates, 2 packet, PRN  potassium, sodium phosphates, 2 packet, PRN  potassium, sodium phosphates, 2 packet, PRN      Today I personally reviewed pertinent medications, lines/drains/airways, imaging, cardiology results, laboratory results, microbiology results, notably:    Diet  Diet Adult Regular (IDDSI Level 7)  Diet Adult Regular (IDDSI Level 7)          Assessment/Plan:     Neuro  * Temporal lobe epilepsy, intractable  Ms. Arrieta is a 50 yo woman with intractable epilepsy since age 18, s/p partial left anterior temporal lobectomy in 11/2018 admitted to NICU s/p placement of sEEG leads for further localization of seizures as part of repeat surgical workup    --SBP <180  -- epilepsy and NSGY team following  -- all AEDs discontinued   -- Neuro checks q 1 hr      Psychiatric  Depression  Continue home sertraline          The patient is being Prophylaxed for:  Venous Thromboembolism with: Mechanical or Chemical  Stress Ulcer with: Not Applicable   Ventilator Pneumonia with: not  applicable    Activity Orders          Turn patient every 2 hours starting at 11/08 0000    Diet Adult Regular (IDDSI Level 7): Regular starting at 11/07 1228        Full Code  Level 3  Tiffanie Irwin NP  Neurocritical Care  [facility] Pending sale to [facility] - Neuro Critical Care

## 2022-11-10 NOTE — SUBJECTIVE & OBJECTIVE
Interval History:  NAEON  Review of Systems   Constitutional: Negative.    HENT: Negative.     Eyes: Negative.    Respiratory: Negative.     Cardiovascular: Negative.    Gastrointestinal: Negative.    Endocrine: Negative.    Genitourinary: Negative.    Musculoskeletal: Negative.    Skin: Negative.    Neurological: Negative.      Objective:     Vitals:  Temp: 98.3 °F (36.8 °C)  Pulse: (!) 56  Rhythm: sinus bradycardia  BP: 135/78  MAP (mmHg): 101  Resp: 14  SpO2: 95 %  O2 Device (Oxygen Therapy): room air    Temp  Min: 97.5 °F (36.4 °C)  Max: 98.3 °F (36.8 °C)  Pulse  Min: 46  Max: 65  BP  Min: 102/59  Max: 155/65  MAP (mmHg)  Min: 73  Max: 101  Resp  Min: 7  Max: 26  SpO2  Min: 92 %  Max: 96 %    11/09 0701 - 11/10 0700  In: 679.7 [P.O.:580]  Out: 1850 [Urine:1850]   Unmeasured Output  Urine Occurrence: 1  Stool Occurrence: 0  Emesis Occurrence: 0  Pad Count: 1       Physical Exam  Vitals and nursing note reviewed.   Constitutional:       Appearance: Normal appearance.   HENT:      Head: Normocephalic.      Nose: Nose normal.      Mouth/Throat:      Mouth: Mucous membranes are moist.      Pharynx: Oropharynx is clear.   Cardiovascular:      Rate and Rhythm: Normal rate and regular rhythm.      Pulses: Normal pulses.      Heart sounds: Normal heart sounds.   Pulmonary:      Effort: Pulmonary effort is normal.      Breath sounds: Normal breath sounds.   Abdominal:      General: Bowel sounds are normal.      Palpations: Abdomen is soft.   Musculoskeletal:         General: Normal range of motion.   Skin:     General: Skin is warm and dry.      Capillary Refill: Capillary refill takes less than 2 seconds.      Coloration: Skin is not pale.   Neurological:      Mental Status: She is alert.      Comments: GCS 15  AAOX 4  PERRL  PUGA to command and spontaneously  Sensation intact         Medications:  Continuous ScheduledcefTRIAXone (ROCEPHIN) IVPB, 2 g, Q12H  heparin (porcine), 5,000 Units, Q8H  polyethylene glycol, 17 g,  BID  senna-docusate 8.6-50 mg, 2 tablet, BID  sertraline, 25 mg, Daily    PRNacetaminophen, 1,000 mg, Q8H PRN  hydrALAZINE, 10 mg, Q6H PRN  HYDROmorphone, 1 mg, Q6H PRN  magnesium hydroxide 400 mg/5 ml, 30 mL, Daily PRN  magnesium oxide, 800 mg, PRN  magnesium oxide, 800 mg, PRN  ondansetron, 4 mg, Q12H PRN  oxyCODONE, 5 mg, Q6H PRN  potassium bicarbonate, 35 mEq, PRN  potassium bicarbonate, 50 mEq, PRN  potassium bicarbonate, 60 mEq, PRN  potassium, sodium phosphates, 2 packet, PRN  potassium, sodium phosphates, 2 packet, PRN  potassium, sodium phosphates, 2 packet, PRN      Today I personally reviewed pertinent medications, lines/drains/airways, imaging, cardiology results, laboratory results, microbiology results, notably:    Diet  Diet Adult Regular (IDDSI Level 7)  Diet Adult Regular (IDDSI Level 7)

## 2022-11-10 NOTE — PROGRESS NOTES
"At approx 10:40am pts son came to RN who is sitting outside room to report that patient " feels " like she may just have had a seizure, event button pressed and epilepsy team, MARCIO Fraga notified . RN observed no change in pts affect with this event, no obvious staring and no other symptoms reported.WCTCM.  "

## 2022-11-10 NOTE — SUBJECTIVE & OBJECTIVE
Interval History: Event this morning approximately 10:40, EEG reviewed by Dr. Rojo and confirmed additional seizure. Patient at baseline, slept well overnight. Continue close vEEG and attempt to capture additional seizures.     Current Facility-Administered Medications   Medication Dose Route Frequency Provider Last Rate Last Admin    acetaminophen tablet 1,000 mg  1,000 mg Oral Q8H PRN Mo Kincaid MD   1,000 mg at 11/09/22 1430    cefTRIAXone (ROCEPHIN) 2 g/50 mL D5W IVPB  2 g Intravenous Q12H [facility] Diya Downs MD   Stopped at 11/10/22 1358    heparin (porcine) injection 5,000 Units  5,000 Units Subcutaneous Q8H Mo Kincaid MD   5,000 Units at 11/10/22 1429    hydrALAZINE injection 10 mg  10 mg Intravenous Q6H PRN Ann Cohen PA-C        HYDROmorphone injection 1 mg  1 mg Intravenous Q6H PRN Mo Kincaid MD   1 mg at 11/07/22 1241    magnesium hydroxide 400 mg/5 ml suspension 2,400 mg  30 mL Oral Daily PRN Josafat Arevalo MD        magnesium oxide tablet 800 mg  800 mg Oral PRN Ann Cohen PA-C   800 mg at 11/10/22 1008    magnesium oxide tablet 800 mg  800 mg Oral PRN Ann Cohen PA-C        ondansetron injection 4 mg  4 mg Intravenous Q12H PRN Mo Kincaid MD        oxyCODONE immediate release tablet 5 mg  5 mg Oral Q6H PRN Mo Kincaid MD   5 mg at 11/08/22 1928    polyethylene glycol packet 17 g  17 g Oral BID Tiffanie Irwin NP   17 g at 11/10/22 0836    potassium bicarbonate disintegrating tablet 35 mEq  35 mEq Oral PRN Ann Cohen PA-C        potassium bicarbonate disintegrating tablet 50 mEq  50 mEq Oral PRN Ann Cohen PA-C        potassium bicarbonate disintegrating tablet 60 mEq  60 mEq Oral PRN Ann Cohen PA-C        potassium, sodium phosphates 280-160-250 mg packet 2 packet  2 packet Oral PRN Ann Cohen PA-C        potassium, sodium phosphates 280-160-250 mg packet 2 packet  2 packet Oral PRN Ann Cohen PA-C         potassium, sodium phosphates 280-160-250 mg packet 2 packet  2 packet Oral PRN Ann Cohen PA-C        senna-docusate 8.6-50 mg per tablet 2 tablet  2 tablet Oral BID Tiffanie Irwin NP   2 tablet at 11/10/22 0836    sertraline tablet 25 mg  25 mg Oral Daily Lisset Arboleda NP   25 mg at 11/10/22 0836     Continuous Infusions:    Review of Systems   HENT:          Jaw pain improving   Neurological:  Positive for seizures. Negative for speech difficulty and weakness.   All other systems reviewed and are negative.  Objective:     Vital Signs (Most Recent):  Temp: 98.3 °F (36.8 °C) (11/10/22 1200)  Pulse: (!) 56 (11/10/22 1500)  Resp: 14 (11/10/22 1500)  BP: 135/78 (11/10/22 1500)  SpO2: 95 % (11/10/22 1500)   Vital Signs (24h Range):  Temp:  [97.5 °F (36.4 °C)-98.3 °F (36.8 °C)] 98.3 °F (36.8 °C)  Pulse:  [46-65] 56  Resp:  [7-26] 14  SpO2:  [92 %-96 %] 95 %  BP: (102-155)/(53-81) 135/78  Arterial Line BP: ()/(40-75) 47/40     Weight: 99.5 kg (219 lb 5.7 oz)  Body mass index is 35.41 kg/m².    Physical Exam  Vitals and nursing note reviewed.   Constitutional:       General: She is not in acute distress.     Appearance: She is not diaphoretic.   HENT:      Head: Normocephalic and atraumatic.      Comments: sEEG leads with headwrap in place  Eyes:      General: No scleral icterus.     Extraocular Movements: Extraocular movements intact.      Conjunctiva/sclera: Conjunctivae normal.      Pupils: Pupils are equal, round, and reactive to light.   Cardiovascular:      Rate and Rhythm: Normal rate.   Pulmonary:      Effort: Pulmonary effort is normal. No respiratory distress.   Abdominal:      General: There is no distension.      Palpations: Abdomen is soft.      Tenderness: There is no abdominal tenderness.   Musculoskeletal:         General: No swelling, tenderness or deformity. Normal range of motion.      Cervical back: Neck supple. No rigidity.   Skin:     General: Skin is warm and dry.   Neurological:       General: No focal deficit present.      Mental Status: She is alert and oriented to person, place, and time. Mental status is at baseline.   Psychiatric:         Mood and Affect: Mood normal.         Speech: Speech normal.         Behavior: Behavior normal.       NEUROLOGICAL EXAMINATION:     MENTAL STATUS   Oriented to person, place, and time.   Attention: normal. Concentration: normal.   Speech: speech is normal   Level of consciousness: alert    CRANIAL NERVES     CN III, IV, VI   Pupils are equal, round, and reactive to light.    CN VII   Facial expression full, symmetric.     CN VIII   Hearing: intact    CN IX, X   CN IX normal.     CN XI   CN XI normal.     CN XII   CN XII normal.     MOTOR EXAM   Muscle bulk: normal  Overall muscle tone: normal       Moves all extremities spontaneously and symmetrically, follows commands  No focal deficits noted     Significant Labs: All pertinent lab results from the past 24 hours have been reviewed.    Significant Studies: I have reviewed all pertinent imaging results/findings within the past 24 hours.

## 2022-11-10 NOTE — SUBJECTIVE & OBJECTIVE
Interval History:   11/10: No seizure activity overnight. CTM for BM    Medications:  Continuous Infusions:  Scheduled Meds:   cefTRIAXone (ROCEPHIN) IVPB  2 g Intravenous Q12H    heparin (porcine)  5,000 Units Subcutaneous Q8H    polyethylene glycol  17 g Oral BID    senna-docusate 8.6-50 mg  2 tablet Oral BID    sertraline  25 mg Oral Daily     PRN Meds:acetaminophen, hydrALAZINE, HYDROmorphone, magnesium hydroxide 400 mg/5 ml, magnesium oxide, magnesium oxide, ondansetron, oxyCODONE, potassium bicarbonate, potassium bicarbonate, potassium bicarbonate, potassium, sodium phosphates, potassium, sodium phosphates, potassium, sodium phosphates     Review of Systems  Objective:     Weight: 99.5 kg (219 lb 5.7 oz)  Body mass index is 35.41 kg/m².  Vital Signs (Most Recent):  Temp: 98.2 °F (36.8 °C) (11/10/22 0701)  Pulse: (!) 56 (11/10/22 1130)  Resp: 12 (11/10/22 1130)  BP: 117/64 (11/10/22 1100)  SpO2: 95 % (11/10/22 1130)   Vital Signs (24h Range):  Temp:  [97.5 °F (36.4 °C)-98.2 °F (36.8 °C)] 98.2 °F (36.8 °C)  Pulse:  [46-59] 56  Resp:  [7-54] 12  SpO2:  [92 %-97 %] 95 %  BP: (102-155)/(53-76) 117/64  Arterial Line BP: ()/() 47/40     Date 11/10/22 0700 - 11/11/22 0659   Shift 7698-5282 7104-3359 8860-0442 24 Hour Total   INTAKE   P.O. 120   120   Shift Total(mL/kg) 120(1.2)   120(1.2)   OUTPUT   Urine(mL/kg/hr) 400   400   Shift Total(mL/kg) 400(4)   400(4)   Weight (kg) 99.5 99.5 99.5 99.5                   Female External Urinary Catheter 11/08/22 0605 (Active)   Skin no redness;no breakdown 11/09/22 0305   Tolerance no signs/symptoms of discomfort 11/09/22 0305   Suction Continuous suction at 60 mmHg 11/08/22 2305   Date of last wick change 11/08/22 11/08/22 1905   Time of last wick change 1400 11/08/22 1505   Output (mL) 350 mL 11/09/22 0230       Physical Exam    Neurosurgery Physical Exam    General: well developed, well nourished, no distress.   Head: normocephalic, atraumatic  Neurologic:    GCS: Eyes: 4/ Verbal: 5/ Motor: 6  Mental Status: Awake, Alert, Oriented x 3  Cranial nerves: PERRL, EOMI, face symmetric, tongue midline, shoulder shrug equal.  No pronator drift, no dysmetria.  Sensory: intact to light touch throughout  Motor Strength:Moves all extremities antigravity    Gen: well nourished, normocephalic, atraumatic  CV: skin warm and dry, distal pulses present  Pulm: chest rise symmetric, no increased work of breathing  GI: abdomen soft, non-distended, non-tender  : patient voiding independently  MSK: no bony abnormalities noted  Psych: patient interacting with appropriate mood and affect      Significant Labs:  Recent Labs   Lab 11/09/22  0003 11/10/22  0023   GLU 88 108    141   K 4.1 4.3    106   CO2 22* 24   BUN 12 13   CREATININE 0.7 0.7   CALCIUM 8.9 9.6   MG 1.6 1.7       Recent Labs   Lab 11/09/22  0003 11/10/22  0023   WBC 6.91 6.29   HGB 13.6 14.4   HCT 41.1 43.3    174       No results for input(s): LABPT, INR, APTT in the last 48 hours.  Microbiology Results (last 7 days)       ** No results found for the last 168 hours. **          All pertinent labs from the last 24 hours have been reviewed.    Significant Diagnostics:  I have reviewed all pertinent imaging results/findings within the past 24 hours.

## 2022-11-10 NOTE — PROGRESS NOTES
[facility]  Neurosurgery  Progress Note    Subjective:     History of Present Illness: Liza Arrieta is a 51 y.o. female who presents as a referral from Dr. Mark to discuss possible intracranial seizure surgery. She has already had left temporal lobectomy. She will need neuropsychological evaluation and MRI with contrast STEALTH protocol for operative planning purposes.       She requests that we postpone sEEG until after the Epilepsy Walk she organizes in the first weekend in November      I had a lengthy, detailed discussion with the patient and her  about the risks, benefits, and alternatives to intracranial localization for seizures.      We discussed that monitoring typically takes 1-2 weeks but may be longer or shorter depending on how long it takes for her to have concordant seizures.  We discussed that she will not be able to get out of bed while the electrodes are in her head, and that she will remain in the ICU during that time. She may require mittens (she pulled out her grids in 2018 while postictal). We also discussed that this is a diagnostic, not therapeutic, procedure, and that the definitive surgery would potentially be performed 4-8 weeks after the time of sEEG localization.      We discussed that there is approximately 20% rate of a symptomatic hemorrhage and approximately 1% rate of symptomatic hemorrhage from placement of sEEG electrode per review of the international literature.  They expressed understanding of this.      We also discussed the specific risks of the localization surgery. Risks include, but are not limited to, bleeding, pain, infection, scarring, need for further/repeat procedure, death, paralysis, stroke (including venous infarct)/damage to major blood vessels, leak of cerebrospinal fluid, and hardware-related complications. There is a chance that we would fail to localize the seizures with the initial electrode plan, which would require placement of  additional electrodes, or may not lead to definitive surgery. Informed consent was obtained of the patient with her  present.       Post-Op Info:  Procedure(s) (LRB):  1.) PLACEMENT OF THE FIDUCIAL SCREWS 2.) PLACEMENT OF THE BILATERAL SEEG ELECTRODES WITH BRANDEE ROBOT (Bilateral)   3 Days Post-Op     Interval History:   11/10: No seizure activity overnight. CTM for BM    Medications:  Continuous Infusions:  Scheduled Meds:   cefTRIAXone (ROCEPHIN) IVPB  2 g Intravenous Q12H    heparin (porcine)  5,000 Units Subcutaneous Q8H    polyethylene glycol  17 g Oral BID    senna-docusate 8.6-50 mg  2 tablet Oral BID    sertraline  25 mg Oral Daily     PRN Meds:acetaminophen, hydrALAZINE, HYDROmorphone, magnesium hydroxide 400 mg/5 ml, magnesium oxide, magnesium oxide, ondansetron, oxyCODONE, potassium bicarbonate, potassium bicarbonate, potassium bicarbonate, potassium, sodium phosphates, potassium, sodium phosphates, potassium, sodium phosphates     Review of Systems  Objective:     Weight: 99.5 kg (219 lb 5.7 oz)  Body mass index is 35.41 kg/m².  Vital Signs (Most Recent):  Temp: 98.2 °F (36.8 °C) (11/10/22 0701)  Pulse: (!) 56 (11/10/22 1130)  Resp: 12 (11/10/22 1130)  BP: 117/64 (11/10/22 1100)  SpO2: 95 % (11/10/22 1130)   Vital Signs (24h Range):  Temp:  [97.5 °F (36.4 °C)-98.2 °F (36.8 °C)] 98.2 °F (36.8 °C)  Pulse:  [46-59] 56  Resp:  [7-54] 12  SpO2:  [92 %-97 %] 95 %  BP: (102-155)/(53-76) 117/64  Arterial Line BP: ()/() 47/40     Date 11/10/22 0700 - 11/11/22 0659   Shift 3359-9391 1550-7737 9719-4216 24 Hour Total   INTAKE   P.O. 120   120   Shift Total(mL/kg) 120(1.2)   120(1.2)   OUTPUT   Urine(mL/kg/hr) 400   400   Shift Total(mL/kg) 400(4)   400(4)   Weight (kg) 99.5 99.5 99.5 99.5                   Female External Urinary Catheter 11/08/22 0605 (Active)   Skin no redness;no breakdown 11/09/22 0305   Tolerance no signs/symptoms of discomfort 11/09/22 0305   Suction Continuous suction at  60 mmHg 11/08/22 2305   Date of last wick change 11/08/22 11/08/22 1905   Time of last wick change 1400 11/08/22 1505   Output (mL) 350 mL 11/09/22 0230       Physical Exam    Neurosurgery Physical Exam    General: well developed, well nourished, no distress.   Head: normocephalic, atraumatic  Neurologic:   GCS: Eyes: 4/ Verbal: 5/ Motor: 6  Mental Status: Awake, Alert, Oriented x 3  Cranial nerves: PERRL, EOMI, face symmetric, tongue midline, shoulder shrug equal.  No pronator drift, no dysmetria.  Sensory: intact to light touch throughout  Motor Strength:Moves all extremities antigravity    Gen: well nourished, normocephalic, atraumatic  CV: skin warm and dry, distal pulses present  Pulm: chest rise symmetric, no increased work of breathing  GI: abdomen soft, non-distended, non-tender  : patient voiding independently  MSK: no bony abnormalities noted  Psych: patient interacting with appropriate mood and affect      Significant Labs:  Recent Labs   Lab 11/09/22  0003 11/10/22  0023   GLU 88 108    141   K 4.1 4.3    106   CO2 22* 24   BUN 12 13   CREATININE 0.7 0.7   CALCIUM 8.9 9.6   MG 1.6 1.7       Recent Labs   Lab 11/09/22  0003 11/10/22  0023   WBC 6.91 6.29   HGB 13.6 14.4   HCT 41.1 43.3    174       No results for input(s): LABPT, INR, APTT in the last 48 hours.  Microbiology Results (last 7 days)       ** No results found for the last 168 hours. **          All pertinent labs from the last 24 hours have been reviewed.    Significant Diagnostics:  I have reviewed all pertinent imaging results/findings within the past 24 hours.    Assessment/Plan:     * Temporal lobe epilepsy, intractable  A 52 y.o. female with hx of prior L temporal lobectomy and intractable seizure who admitted for sEEG 11/7    POD#3    Continue ICU care  Neurocheck Q1hr  Pain control   CT head satisfactory   Continue EEG and monitoring: sz 11/9 approximately 0200 with right hemispheric origin, ctm  Continue abx ppax  while leads in place; ceftriaxone   Off AEDs and epilepsy following   Aggressive bowel regimes; increased regimen  Daily bike   Sating well at room   Keep SBP <150  ADAT  SQH for DVT ppx  Further care per NCC  NSGY will follow           Rcah Valdes MD  Neurosurgery  [facility]

## 2022-11-10 NOTE — PROGRESS NOTES
[facility]  Neurology-Epilepsy  Progress Note    Patient Name: Liza Arrieta  MRN: [id number]  Admission Date: 11/7/2022  Hospital Length of Stay: 3 days  Code Status: Full Code   Attending Provider: Any Ruiz MD  Primary Care Physician: Rc Rodriguez MD   Principal Problem:Temporal lobe epilepsy, intractable    Subjective:     Hospital Course:   Ms. Arrieta is a 51 year old woman with intractable epilepsy since age 18 s/p partial left anterior temporal lobectomy in 11/2018 admitted to NICU s/p placement of 6 right-sided and 6 left-sided sEEG leads for further localization of seizures as part of repeat surgical workup. Patient is currently maintained on Lamotrigine 200 mg qAM/300 mg qPM and Cenobamate 150 mg qHS with continued events 7-8 times per month of lapse of awareness, staring.   11/7>11/8: Home Cenobamate decreased to 100 mg qHS, Lamotrigine 200 mg qAM/300 mg qPM continued on admission. No seizures overnight. On 11/8 pm, begin to hold Cenobomate and Lamotrigine.  11/8>11/9: Seizure 11/9 approximately 0200 with right hemispheric origin, patient reported she thought she had a seizure at approximately 0230. Patient and  endorse additional event approximately 0750 on 11/9. Will continue close vEEG, attempt to capture additional seizures for further localization and concordance.   11/9>11/10: No further seizures overnight, patient reports possible event 11/10 approximately 10:40, additional seizure noted on EEG around this time. Please see EEG report for full details. Continue holding home Cenobamate, Lamotrigine to capture additional seizures.       Interval History: Event this morning approximately 10:40, EEG reviewed by Dr. Rojo and confirmed additional seizure. Patient at baseline, slept well overnight. Continue close vEEG and attempt to capture additional seizures.     Current Facility-Administered Medications   Medication Dose Route Frequency Provider Last Rate Last Admin     acetaminophen tablet 1,000 mg  1,000 mg Oral Q8H PRN Mo Kincaid MD   1,000 mg at 11/09/22 1430    cefTRIAXone (ROCEPHIN) 2 g/50 mL D5W IVPB  2 g Intravenous [facility]12H [facility]   Stopped at 11/10/22 1358    heparin (porcine) injection 5,000 Units  5,000 Units Subcutaneous Q8H Mo Kincaid MD   5,000 Units at 11/10/22 1429    hydrALAZINE injection 10 mg  10 mg Intravenous Q6H PRN Ann Cohen PA-C        HYDROmorphone injection 1 mg  1 mg Intravenous Q6H PRN Mo Kincaid MD   1 mg at 11/07/22 1241    magnesium hydroxide 400 mg/5 ml suspension 2,400 mg  30 mL Oral Daily PRN Josafat Arevalo MD        magnesium oxide tablet 800 mg  800 mg Oral PRN Ann Cohen PA-C   800 mg at 11/10/22 1008    magnesium oxide tablet 800 mg  800 mg Oral PRN Ann Cohen PA-C        ondansetron injection 4 mg  4 mg Intravenous Q12H PRN Mo Kincaid MD        oxyCODONE immediate release tablet 5 mg  5 mg Oral Q6H PRN Mo Kincaid MD   5 mg at 11/08/22 1928    polyethylene glycol packet 17 g  17 g Oral BID Tiffanie L Love, NP   17 g at 11/10/22 0836    potassium bicarbonate disintegrating tablet 35 mEq  35 mEq Oral PRN Ann Cohen PA-C        potassium bicarbonate disintegrating tablet 50 mEq  50 mEq Oral PRN Ann Cohen PA-C        potassium bicarbonate disintegrating tablet 60 mEq  60 mEq Oral PRN Ann Cohen PA-C        potassium, sodium phosphates 280-160-250 mg packet 2 packet  2 packet Oral PRN Ann Cohen PA-C        potassium, sodium phosphates 280-160-250 mg packet 2 packet  2 packet Oral PRN Ann Cohen PA-C        potassium, sodium phosphates 280-160-250 mg packet 2 packet  2 packet Oral PRN Ann Cohen PA-C        senna-docusate 8.6-50 mg per tablet 2 tablet  2 tablet Oral BID Tiffanie L Love, NP   2 tablet at 11/10/22 0836    sertraline tablet 25 mg  25 mg Oral Daily Lisset Arboleda NP   25 mg at 11/10/22 0836      Continuous Infusions:    Review of Systems   HENT:          Jaw pain improving   Neurological:  Positive for seizures. Negative for speech difficulty and weakness.   All other systems reviewed and are negative.  Objective:     Vital Signs (Most Recent):  Temp: 98.3 °F (36.8 °C) (11/10/22 1200)  Pulse: (!) 56 (11/10/22 1500)  Resp: 14 (11/10/22 1500)  BP: 135/78 (11/10/22 1500)  SpO2: 95 % (11/10/22 1500)   Vital Signs (24h Range):  Temp:  [97.5 °F (36.4 °C)-98.3 °F (36.8 °C)] 98.3 °F (36.8 °C)  Pulse:  [46-65] 56  Resp:  [7-26] 14  SpO2:  [92 %-96 %] 95 %  BP: (102-155)/(53-81) 135/78  Arterial Line BP: ()/(40-75) 47/40     Weight: 99.5 kg (219 lb 5.7 oz)  Body mass index is 35.41 kg/m².    Physical Exam  Vitals and nursing note reviewed.   Constitutional:       General: She is not in acute distress.     Appearance: She is not diaphoretic.   HENT:      Head: Normocephalic and atraumatic.      Comments: sEEG leads with headwrap in place  Eyes:      General: No scleral icterus.     Extraocular Movements: Extraocular movements intact.      Conjunctiva/sclera: Conjunctivae normal.      Pupils: Pupils are equal, round, and reactive to light.   Cardiovascular:      Rate and Rhythm: Normal rate.   Pulmonary:      Effort: Pulmonary effort is normal. No respiratory distress.   Abdominal:      General: There is no distension.      Palpations: Abdomen is soft.      Tenderness: There is no abdominal tenderness.   Musculoskeletal:         General: No swelling, tenderness or deformity. Normal range of motion.      Cervical back: Neck supple. No rigidity.   Skin:     General: Skin is warm and dry.   Neurological:      General: No focal deficit present.      Mental Status: She is alert and oriented to person, place, and time. Mental status is at baseline.   Psychiatric:         Mood and Affect: Mood normal.         Speech: Speech normal.         Behavior: Behavior normal.       NEUROLOGICAL EXAMINATION:     MENTAL STATUS    Oriented to person, place, and time.   Attention: normal. Concentration: normal.   Speech: speech is normal   Level of consciousness: alert    CRANIAL NERVES     CN III, IV, VI   Pupils are equal, round, and reactive to light.    CN VII   Facial expression full, symmetric.     CN VIII   Hearing: intact    CN IX, X   CN IX normal.     CN XI   CN XI normal.     CN XII   CN XII normal.     MOTOR EXAM   Muscle bulk: normal  Overall muscle tone: normal       Moves all extremities spontaneously and symmetrically, follows commands  No focal deficits noted     Significant Labs: All pertinent lab results from the past 24 hours have been reviewed.    Significant Studies: I have reviewed all pertinent imaging results/findings within the past 24 hours.    Assessment and Plan:     * Temporal lobe epilepsy, intractable  Ms. Arrieta is a 52 yo woman with intractable epilepsy since age 18, s/p partial left anterior temporal lobectomy in 11/2018 admitted to NICU s/p placement of 6 right sided and 6 left sided sEEG leads for further localization of seizures as part of repeat surgical workup. Patient is currently maintained on lamotrigine 200 mg qAM/300 mg qPM and Cenobamate 150 mg qHS with continued events 7-8 times per month of lapse of awareness, staring.    Recommendations:  - s/p placement of 6 left-sided and 6 right-sided sEEG electrodes 11/7 by NSGY  - Seizure 11/9 approximately 0200 with right hemispheric onset, 2 additional seizures 0745 and 0852 11/10 am. Seizure 11/10 approximately 10:40. Please see EEG report for full details.  - On admission, decreased Cenobamate to 100 mg qHS, continued home Lamotrigine 200 mg qAM/300 mg qPM   - Continue to hold Cenobamate and Lamotrigine (beginning 11/8 pm)  - Seizure precautions  - Please call epilepsy on call prior to administration of IV benzodiazepines for prolonged seizures  - Post-operative imaging timing, pain control per NCC/NSGY    Plan of care discussed with NCC team,  patient at bedside. Will continue to follow, please call with any questions.     Depression  Chronic  - Continue home Sertraline        VTE Risk Mitigation (From admission, onward)         Ordered     heparin (porcine) injection 5,000 Units  Every 8 hours         11/07/22 1232     IP VTE HIGH RISK PATIENT  Once         11/07/22 1232     Place sequential compression device  Until discontinued         11/07/22 1232     Place sequential compression device  Until discontinued         11/07/22 0628     Place LAURA hose  Until discontinued         11/07/22 0628                Philly Foy PA-C  Neurology-Epilepsy  [facility] - Neuro Critical Care  Staff: Dr. Rojo

## 2022-11-10 NOTE — ASSESSMENT & PLAN NOTE
Ms. Arrieta is a 52 yo woman with intractable epilepsy since age 18, s/p partial left anterior temporal lobectomy in 11/2018 admitted to NICU s/p placement of sEEG leads for further localization of seizures as part of repeat surgical workup    --SBP <180  -- epilepsy and NSGY team following  -- all AEDs discontinued   -- Neuro checks q 1 hr

## 2022-11-10 NOTE — ASSESSMENT & PLAN NOTE
Ms. Arrieta is a 50 yo woman with intractable epilepsy since age 18, s/p partial left anterior temporal lobectomy in 11/2018 admitted to NICU s/p placement of 6 right sided and 6 left sided sEEG leads for further localization of seizures as part of repeat surgical workup. Patient is currently maintained on lamotrigine 200 mg qAM/300 mg qPM and Cenobamate 150 mg qHS with continued events 7-8 times per month of lapse of awareness, staring.    Recommendations:  - s/p placement of 6 left-sided and 6 right-sided sEEG electrodes 11/7 by NSGY  - Seizure 11/9 approximately 0200 with right hemispheric onset, 2 additional seizures 0745 and 0852 11/10 am. Seizure 11/10 approximately 10:40. Please see EEG report for full details.  - On admission, decreased Cenobamate to 100 mg qHS, continued home Lamotrigine 200 mg qAM/300 mg qPM   - Continue to hold Cenobamate and Lamotrigine (beginning 11/8 pm)  - Seizure precautions  - Please call epilepsy on call prior to administration of IV benzodiazepines for prolonged seizures  - Post-operative imaging timing, pain control per NCC/NSGY    Plan of care discussed with NCC team, patient at bedside. Will continue to follow, please call with any questions.

## 2022-11-10 NOTE — PLAN OF CARE
[facility] Care Plan    POC reviewed with Liza Arrieta and family at 1000. Pt verbalized understanding. Questions and concerns addressed. No acute events today. Pt progressing toward goals. Will continue to monitor. See below and flowsheets for full assessment and VS info. Only one suspected seizure this am, epilepsy aware , possible stimulation tomorrow. Rode stationary bike x 25 min and mood is elevated significantly after, encouraged to color, play games , etc to decrease boredom.            Is this a stroke patient? no    Neuro:  [place] Coma Scale  Best Eye Response: 4-->(E4) spontaneous  Best Motor Response: 6-->(M6) obeys commands  Best Verbal Response: 5-->(V5) oriented  [place] Coma Scale Score: 15  Assessment Qualifiers: patient not sedated/intubated  Pupil PERRLA: yes     24 hr Temp:  [97.5 °F (36.4 °C)-98.5 °F (36.9 °C)]     CV:   Rhythm: sinus bradycardia  BP goals:   SBP < 160  MAP > 65    Resp:   O2 Device (Oxygen Therapy): room air       Plan: N/A    GI/:     Diet/Nutrition Received: regular  Last Bowel Movement: 11/06/22  Voiding Characteristics: external catheter    Intake/Output Summary (Last 24 hours) at 11/10/2022 1723  Last data filed at 11/10/2022 1500  Gross per 24 hour   Intake 879.42 ml   Output 1200 ml   Net -320.58 ml     Unmeasured Output  Urine Occurrence: 1  Stool Occurrence: 0  Emesis Occurrence: 0  Pad Count: 1    Labs/Accuchecks:  Recent Labs   Lab 11/10/22  0023   WBC 6.29   RBC 4.72   HGB 14.4   HCT 43.3         Recent Labs   Lab 11/10/22  0023      K 4.3   CO2 24      BUN 13   CREATININE 0.7   ALKPHOS 114   ALT 16   AST 18   BILITOT 0.2    No results for input(s): PROTIME, INR, APTT, HEPANTIXA in the last 168 hours. No results for input(s): CPK, CPKMB, TROPONINI, MB in the last 168 hours.    Electrolytes: Electrolytes replaced  Accuchecks: none    Gtts:      LDA/Wounds:  Lines/Drains/Airways       Drain  Duration             Female External Urinary Catheter  11/08/22 0605 2 days              Peripheral Intravenous Line  Duration                  Peripheral IV - Single Lumen 11/07/22 0628 20 G Posterior;Right Forearm 3 days         Peripheral IV - Single Lumen 11/07/22 0725 20 G Right Hand 3 days                  Wounds: No  Wound care consulted: No

## 2022-11-10 NOTE — PLAN OF CARE
[facility] Care Plan    POC reviewed with Liza Arrieta and family at 0300. Pt verbalized understanding. Questions and concerns addressed. No acute events overnight. Pt progressing toward goals. Will continue to monitor. See below and flowsheets for full assessment and VS info.     -No seizure activity observed overnight. Neuro exam remains stable and unchanged  -A Line removed     Is this a stroke patient? no    Neuro:  [place] Coma Scale  Best Eye Response: 4-->(E4) spontaneous  Best Motor Response: 6-->(M6) obeys commands  Best Verbal Response: 5-->(V5) oriented  [place] Coma Scale Score: 15  Assessment Qualifiers: patient not sedated/intubated, no eye obstruction present  Pupil PERRLA: yes     24hr Temp:  [97.5 °F (36.4 °C)-98.4 °F (36.9 °C)]     CV:   Rhythm: normal sinus rhythm  BP goals:   SBP < 160  MAP > 65    Resp:   O2 Device (Oxygen Therapy): room air       Plan: N/A    GI/:     Diet/Nutrition Received: regular  Last Bowel Movement: 11/06/22  Voiding Characteristics: external catheter    Intake/Output Summary (Last 24 hours) at 11/10/2022 0319  Last data filed at 11/9/2022 2305  Gross per 24 hour   Intake 629.87 ml   Output 1550 ml   Net -920.13 ml     Unmeasured Output  Urine Occurrence: 1  Stool Occurrence: 0  Emesis Occurrence: 0  Pad Count: 2    Labs/Accuchecks:  Recent Labs   Lab 11/10/22  0023   WBC 6.29   RBC 4.72   HGB 14.4   HCT 43.3         Recent Labs   Lab 11/10/22  0023      K 4.3   CO2 24      BUN 13   CREATININE 0.7   ALKPHOS 114   ALT 16   AST 18   BILITOT 0.2    No results for input(s): PROTIME, INR, APTT, HEPANTIXA in the last 168 hours. No results for input(s): CPK, CPKMB, TROPONINI, MB in the last 168 hours.    Electrolytes: Electrolytes replaced  Accuchecks: none    Gtts:      LDA/Wounds:  Lines/Drains/Airways       Drain  Duration             Female External Urinary Catheter 11/08/22 0605 1 day              Peripheral Intravenous Line  Duration                   Peripheral IV - Single Lumen 11/07/22 0628 20 G Posterior;Right Forearm 2 days         Peripheral IV - Single Lumen 11/07/22 0725 20 G Right Hand 2 days                  Wounds: No  Wound care consulted: No

## 2022-11-11 ENCOUNTER — DOCUMENTATION ONLY (OUTPATIENT)
Dept: NEUROLOGY | Facility: CLINIC | Age: 52
End: 2022-11-11
Payer: COMMERCIAL

## 2022-11-11 ENCOUNTER — SOCIAL WORK (OUTPATIENT)
Dept: NEUROLOGY | Facility: CLINIC | Age: 52
End: 2022-11-11
Payer: COMMERCIAL

## 2022-11-11 LAB
ALBUMIN SERPL BCP-MCNC: 3.2 G/DL (ref 3.5–5.2)
ALP SERPL-CCNC: 116 U/L (ref 55–135)
ALT SERPL W/O P-5'-P-CCNC: 22 U/L (ref 10–44)
ANION GAP SERPL CALC-SCNC: 8 MMOL/L (ref 8–16)
AST SERPL-CCNC: 19 U/L (ref 10–40)
BASOPHILS # BLD AUTO: 0.01 K/UL (ref 0–0.2)
BASOPHILS NFR BLD: 0.1 % (ref 0–1.9)
BILIRUB SERPL-MCNC: 0.3 MG/DL (ref 0.1–1)
BUN SERPL-MCNC: 15 MG/DL (ref 6–20)
CALCIUM SERPL-MCNC: 9.9 MG/DL (ref 8.7–10.5)
CHLORIDE SERPL-SCNC: 105 MMOL/L (ref 95–110)
CO2 SERPL-SCNC: 28 MMOL/L (ref 23–29)
CREAT SERPL-MCNC: 0.7 MG/DL (ref 0.5–1.4)
DIFFERENTIAL METHOD: ABNORMAL
EOSINOPHIL # BLD AUTO: 0.1 K/UL (ref 0–0.5)
EOSINOPHIL NFR BLD: 1.2 % (ref 0–8)
ERYTHROCYTE [DISTWIDTH] IN BLOOD BY AUTOMATED COUNT: 11.3 % (ref 11.5–14.5)
EST. GFR  (NO RACE VARIABLE): >60 ML/MIN/1.73 M^2
GLUCOSE SERPL-MCNC: 105 MG/DL (ref 70–110)
HCT VFR BLD AUTO: 44.8 % (ref 37–48.5)
HGB BLD-MCNC: 15.3 G/DL (ref 12–16)
IMM GRANULOCYTES # BLD AUTO: 0.03 K/UL (ref 0–0.04)
IMM GRANULOCYTES NFR BLD AUTO: 0.4 % (ref 0–0.5)
LYMPHOCYTES # BLD AUTO: 2.5 K/UL (ref 1–4.8)
LYMPHOCYTES NFR BLD: 33.6 % (ref 18–48)
MAGNESIUM SERPL-MCNC: 1.8 MG/DL (ref 1.6–2.6)
MCH RBC QN AUTO: 31.1 PG (ref 27–31)
MCHC RBC AUTO-ENTMCNC: 34.2 G/DL (ref 32–36)
MCV RBC AUTO: 91 FL (ref 82–98)
MONOCYTES # BLD AUTO: 0.6 K/UL (ref 0.3–1)
MONOCYTES NFR BLD: 7.3 % (ref 4–15)
NEUTROPHILS # BLD AUTO: 4.3 K/UL (ref 1.8–7.7)
NEUTROPHILS NFR BLD: 57.4 % (ref 38–73)
NRBC BLD-RTO: 0 /100 WBC
PHOSPHATE SERPL-MCNC: 3.4 MG/DL (ref 2.7–4.5)
PLATELET # BLD AUTO: 189 K/UL (ref 150–450)
PMV BLD AUTO: 9.8 FL (ref 9.2–12.9)
POTASSIUM SERPL-SCNC: 4.4 MMOL/L (ref 3.5–5.1)
PROT SERPL-MCNC: 7 G/DL (ref 6–8.4)
RBC # BLD AUTO: 4.92 M/UL (ref 4–5.4)
SODIUM SERPL-SCNC: 141 MMOL/L (ref 136–145)
WBC # BLD AUTO: 7.53 K/UL (ref 3.9–12.7)

## 2022-11-11 PROCEDURE — 95720 PR EEG, W/VIDEO, CONT RECORD, I&R, >12<26 HRS: ICD-10-PCS | Mod: ,,, | Performed by: PSYCHIATRY & NEUROLOGY

## 2022-11-11 PROCEDURE — 99233 SBSQ HOSP IP/OBS HIGH 50: CPT | Mod: ,,, | Performed by: PSYCHIATRY & NEUROLOGY

## 2022-11-11 PROCEDURE — 80053 COMPREHEN METABOLIC PANEL: CPT

## 2022-11-11 PROCEDURE — 95961 PR ELECTRODE STIM,BRAIN,MD 1ST HR: ICD-10-PCS | Mod: 26,,, | Performed by: PSYCHIATRY & NEUROLOGY

## 2022-11-11 PROCEDURE — 99233 PR SUBSEQUENT HOSPITAL CARE,LEVL III: ICD-10-PCS | Mod: ,,, | Performed by: PSYCHIATRY & NEUROLOGY

## 2022-11-11 PROCEDURE — 63600175 PHARM REV CODE 636 W HCPCS: Performed by: STUDENT IN AN ORGANIZED HEALTH CARE EDUCATION/TRAINING PROGRAM

## 2022-11-11 PROCEDURE — 25000003 PHARM REV CODE 250: Performed by: NURSE PRACTITIONER

## 2022-11-11 PROCEDURE — 99233 SBSQ HOSP IP/OBS HIGH 50: CPT | Mod: ,,, | Performed by: NURSE PRACTITIONER

## 2022-11-11 PROCEDURE — 85025 COMPLETE CBC W/AUTO DIFF WBC: CPT

## 2022-11-11 PROCEDURE — 99233 SBSQ HOSP IP/OBS HIGH 50: CPT | Mod: FS,,, | Performed by: PHYSICIAN ASSISTANT

## 2022-11-11 PROCEDURE — 83735 ASSAY OF MAGNESIUM: CPT

## 2022-11-11 PROCEDURE — 95961 ELECTRODE STIMULATION BRAIN: CPT | Mod: 26,,, | Performed by: PSYCHIATRY & NEUROLOGY

## 2022-11-11 PROCEDURE — 99233 PR SUBSEQUENT HOSPITAL CARE,LEVL III: ICD-10-PCS | Mod: ,,, | Performed by: NURSE PRACTITIONER

## 2022-11-11 PROCEDURE — 25000003 PHARM REV CODE 250

## 2022-11-11 PROCEDURE — 84100 ASSAY OF PHOSPHORUS: CPT

## 2022-11-11 PROCEDURE — 20000000 HC ICU ROOM

## 2022-11-11 PROCEDURE — 99233 PR SUBSEQUENT HOSPITAL CARE,LEVL III: ICD-10-PCS | Mod: FS,,, | Performed by: PHYSICIAN ASSISTANT

## 2022-11-11 PROCEDURE — 95720 EEG PHY/QHP EA INCR W/VEEG: CPT | Mod: ,,, | Performed by: PSYCHIATRY & NEUROLOGY

## 2022-11-11 PROCEDURE — 95714 VEEG EA 12-26 HR UNMNTR: CPT

## 2022-11-11 RX ORDER — BISACODYL 10 MG
10 SUPPOSITORY, RECTAL RECTAL DAILY PRN
Status: DISCONTINUED | OUTPATIENT
Start: 2022-11-11 | End: 2022-11-17 | Stop reason: HOSPADM

## 2022-11-11 RX ORDER — LORAZEPAM 2 MG/ML
INJECTION INTRAMUSCULAR
Status: DISPENSED
Start: 2022-11-11 | End: 2022-11-12

## 2022-11-11 RX ORDER — BISACODYL 10 MG
10 SUPPOSITORY, RECTAL RECTAL DAILY
Status: DISCONTINUED | OUTPATIENT
Start: 2022-11-11 | End: 2022-11-11

## 2022-11-11 RX ADMIN — HEPARIN SODIUM 5000 UNITS: 5000 INJECTION INTRAVENOUS; SUBCUTANEOUS at 10:11

## 2022-11-11 RX ADMIN — SENNOSIDES AND DOCUSATE SODIUM 2 TABLET: 50; 8.6 TABLET ORAL at 08:11

## 2022-11-11 RX ADMIN — HEPARIN SODIUM 5000 UNITS: 5000 INJECTION INTRAVENOUS; SUBCUTANEOUS at 06:11

## 2022-11-11 RX ADMIN — POLYETHYLENE GLYCOL 3350 17 G: 17 POWDER, FOR SOLUTION ORAL at 08:11

## 2022-11-11 RX ADMIN — CEFTRIAXONE SODIUM 2 G: 2 INJECTION, SOLUTION INTRAVENOUS at 01:11

## 2022-11-11 RX ADMIN — CEFTRIAXONE SODIUM 2 G: 2 INJECTION, SOLUTION INTRAVENOUS at 02:11

## 2022-11-11 RX ADMIN — SERTRALINE HYDROCHLORIDE 25 MG: 25 TABLET ORAL at 08:11

## 2022-11-11 RX ADMIN — MAGNESIUM OXIDE TAB 400 MG (241.3 MG ELEMENTAL MG) 800 MG: 400 (241.3 MG) TAB at 01:11

## 2022-11-11 RX ADMIN — HEPARIN SODIUM 5000 UNITS: 5000 INJECTION INTRAVENOUS; SUBCUTANEOUS at 02:11

## 2022-11-11 NOTE — PROGRESS NOTES
Epilepsy SW visited patient in the ICU this morning.   Patient reports doing well with no complaints other than being bored.   SW provided patient with a novel , work search book and playing cards provided by [facility] [place].     Patient reported she may start reading and working on ideas for next year's walk.      SW reported he would visit next week.       Demar Dalal LCSW

## 2022-11-11 NOTE — SUBJECTIVE & OBJECTIVE
Interval History:  NAEON. Two seizures today, epilepsy aware  Review of Systems   Constitutional: Negative.    HENT: Negative.     Eyes: Negative.    Respiratory: Negative.     Cardiovascular: Negative.    Gastrointestinal: Negative.    Endocrine: Negative.    Genitourinary: Negative.    Musculoskeletal: Negative.    Skin: Negative.    Neurological: Negative.      Objective:     Vitals:  Temp: 98.3 °F (36.8 °C)  Pulse: 61  Rhythm: sinus bradycardia  BP: 110/67  MAP (mmHg): 84  Resp: 11  SpO2: 96 %  O2 Device (Oxygen Therapy): room air    Temp  Min: 98.2 °F (36.8 °C)  Max: 98.5 °F (36.9 °C)  Pulse  Min: 49  Max: 66  BP  Min: 105/68  Max: 136/65  MAP (mmHg)  Min: 81  Max: 101  Resp  Min: 8  Max: 53  SpO2  Min: 92 %  Max: 97 %    11/10 0701 - 11/11 0700  In: 759.4 [P.O.:660]  Out: 850 [Urine:850]   Unmeasured Output  Urine Occurrence: 1  Stool Occurrence: 1  Emesis Occurrence: 0  Pad Count: 1       Physical Exam  Vitals and nursing note reviewed.   Constitutional:       Appearance: Normal appearance.   HENT:      Head: Normocephalic.      Nose: Nose normal.      Mouth/Throat:      Mouth: Mucous membranes are moist.      Pharynx: Oropharynx is clear.   Cardiovascular:      Rate and Rhythm: Normal rate and regular rhythm.      Pulses: Normal pulses.      Heart sounds: Normal heart sounds.   Pulmonary:      Effort: Pulmonary effort is normal.      Breath sounds: Normal breath sounds.   Abdominal:      General: Bowel sounds are normal.      Palpations: Abdomen is soft.   Musculoskeletal:         General: Normal range of motion.   Skin:     General: Skin is warm and dry.      Capillary Refill: Capillary refill takes less than 2 seconds.      Coloration: Skin is not pale.   Neurological:      Mental Status: She is alert.      Comments: GCS 15  AAOX 4  PERRL  PUGA to command and spontaneously  Sensation intact         Medications:  Continuous ScheduledcefTRIAXone (ROCEPHIN) IVPB, 2 g, Q12H  heparin (porcine), 5,000 Units,  Q8H  polyethylene glycol, 17 g, BID  senna-docusate 8.6-50 mg, 2 tablet, BID  sertraline, 25 mg, Daily  PRNacetaminophen, 1,000 mg, Q8H PRN  bisacodyL, 10 mg, Daily PRN  hydrALAZINE, 10 mg, Q6H PRN  HYDROmorphone, 1 mg, Q6H PRN  magnesium hydroxide 400 mg/5 ml, 30 mL, Daily PRN  magnesium oxide, 800 mg, PRN  magnesium oxide, 800 mg, PRN  ondansetron, 4 mg, Q12H PRN  oxyCODONE, 5 mg, Q6H PRN  potassium bicarbonate, 35 mEq, PRN  potassium bicarbonate, 50 mEq, PRN  potassium bicarbonate, 60 mEq, PRN  potassium, sodium phosphates, 2 packet, PRN  potassium, sodium phosphates, 2 packet, PRN  potassium, sodium phosphates, 2 packet, PRN    Today I personally reviewed pertinent medications, lines/drains/airways, imaging, cardiology results, laboratory results, microbiology results, notably:    Diet  Diet Adult Regular (IDDSI Level 7)  Diet Adult Regular (IDDSI Level 7)

## 2022-11-11 NOTE — PROGRESS NOTES
1250 Pt was placed on stationary bike. Pt was on the bike for 20 minutes. Pt mood seems to have improved some after riding bike. Pt cheerful, talkative, and oriented.

## 2022-11-11 NOTE — ASSESSMENT & PLAN NOTE
Ms. Arrieta is a 52 yo woman with intractable epilepsy since age 18, s/p partial left anterior temporal lobectomy in 11/2018 admitted to NICU s/p placement of 6 right sided and 6 left sided sEEG leads for further localization of seizures as part of repeat surgical workup. Patient is currently maintained on lamotrigine 200 mg qAM/300 mg qPM and Cenobamate 150 mg qHS with continued events 7-8 times per month of lapse of awareness, staring.    Recommendations:  - s/p placement of 6 left-sided and 6 right-sided sEEG electrodes 11/7 by NSGY  - No further electrographic seizures noted overnight 11/10>11/11  - Seizure 11/9 approximately 0200 with right hemispheric onset, 2 additional seizures 0745 and 0852 11/10 am. Seizure 11/10 approximately 10:40. Please see EEG report for full details.   - On admission, decreased Cenobamate to 100 mg qHS, continued home Lamotrigine 200 mg qAM/300 mg qPM   - Continue to hold Cenobamate and Lamotrigine (beginning 11/8 pm)  - Seizure precautions  - Please call epilepsy on call prior to administration of IV benzodiazepines for prolonged seizures  - Post-operative imaging timing, pain control per NCC/NSGY    Plan of care discussed with NCC team, patient at bedside. Will continue to follow, please call with any questions.

## 2022-11-11 NOTE — SUBJECTIVE & OBJECTIVE
Interval History: No electrographic seizures noted overnight. Patient doing well and at baseline this morning. Continue to hold home AEDs, attempt to capture additional seizures.     Current Facility-Administered Medications   Medication Dose Route Frequency Provider Last Rate Last Admin    acetaminophen tablet 1,000 mg  1,000 mg Oral Q8H PRN Mo Kincaid MD   1,000 mg at 11/09/22 1430    bisacodyL suppository 10 mg  10 mg Rectal Daily PRN Any Ruiz MD        cefTRIAXone (ROCEPHIN) 2 g/50 mL D5W IVPB  2 g Intravenous Q12H Mansour Diya Downs MD   Stopped at 11/11/22 0217    heparin (porcine) injection 5,000 Units  5,000 Units Subcutaneous Q8H Mo Kincaid MD   5,000 Units at 11/11/22 0600    hydrALAZINE injection 10 mg  10 mg Intravenous Q6H PRN Ann Cohen PA-C        HYDROmorphone injection 1 mg  1 mg Intravenous Q6H PRN Mo Kincaid MD   1 mg at 11/07/22 1241    magnesium hydroxide 400 mg/5 ml suspension 2,400 mg  30 mL Oral Daily PRN Josafat Arevalo MD        magnesium oxide tablet 800 mg  800 mg Oral PRN Ann Cohen PA-C   800 mg at 11/11/22 0149    magnesium oxide tablet 800 mg  800 mg Oral PRN Ann Cohen PA-C        ondansetron injection 4 mg  4 mg Intravenous Q12H PRN Mo Kincaid MD        oxyCODONE immediate release tablet 5 mg  5 mg Oral Q6H PRN Mo Kincaid MD   5 mg at 11/08/22 1928    polyethylene glycol packet 17 g  17 g Oral BID Tiffanie Irwin NP   17 g at 11/11/22 0801    potassium bicarbonate disintegrating tablet 35 mEq  35 mEq Oral PRN Ann Cohen PA-C        potassium bicarbonate disintegrating tablet 50 mEq  50 mEq Oral PRN Ann Cohen PA-C        potassium bicarbonate disintegrating tablet 60 mEq  60 mEq Oral PRN Ann Cohen PA-C        potassium, sodium phosphates 280-160-250 mg packet 2 packet  2 packet Oral PRN Ann Cohen PA-C        potassium, sodium phosphates 280-160-250 mg packet 2 packet  2 packet  Oral PRN Ann Cohen PA-C        potassium, sodium phosphates 280-160-250 mg packet 2 packet  2 packet Oral PRN Ann Cohen PA-C        senna-docusate 8.6-50 mg per tablet 2 tablet  2 tablet Oral BID Tiffanie Irwin NP   2 tablet at 11/11/22 0800    sertraline tablet 25 mg  25 mg Oral Daily Lisset Arboleda NP   25 mg at 11/11/22 0800     Continuous Infusions:    Review of Systems   HENT:          Jaw pain improving   Neurological:  Positive for seizures. Negative for speech difficulty and weakness.   All other systems reviewed and are negative.  Objective:     Vital Signs (Most Recent):  Temp: 98.3 °F (36.8 °C) (11/11/22 1101)  Pulse: (!) 54 (11/11/22 1201)  Resp: 14 (11/11/22 1201)  BP: 112/65 (11/11/22 1201)  SpO2: (!) 94 % (11/11/22 1201)   Vital Signs (24h Range):  Temp:  [98.2 °F (36.8 °C)-98.5 °F (36.9 °C)] 98.3 °F (36.8 °C)  Pulse:  [49-65] 54  Resp:  [8-50] 14  SpO2:  [92 %-97 %] 94 %  BP: (104-136)/(56-81) 112/65     Weight: 99.5 kg (219 lb 5.7 oz)  Body mass index is 35.41 kg/m².    Physical Exam  Vitals and nursing note reviewed.   Constitutional:       General: She is not in acute distress.     Appearance: She is not diaphoretic.   HENT:      Head: Normocephalic and atraumatic.      Comments: sEEG leads with headwrap in place  Eyes:      General: No scleral icterus.     Extraocular Movements: Extraocular movements intact.      Conjunctiva/sclera: Conjunctivae normal.      Pupils: Pupils are equal, round, and reactive to light.   Cardiovascular:      Rate and Rhythm: Normal rate.   Pulmonary:      Effort: Pulmonary effort is normal. No respiratory distress.   Abdominal:      General: There is no distension.      Palpations: Abdomen is soft.      Tenderness: There is no abdominal tenderness.   Musculoskeletal:         General: No swelling, tenderness or deformity. Normal range of motion.      Cervical back: Neck supple. No rigidity.   Skin:     General: Skin is warm and dry.   Neurological:       General: No focal deficit present.      Mental Status: She is alert and oriented to person, place, and time. Mental status is at baseline.   Psychiatric:         Mood and Affect: Mood normal.         Speech: Speech normal.         Behavior: Behavior normal.       NEUROLOGICAL EXAMINATION:     MENTAL STATUS   Oriented to person, place, and time.   Attention: normal. Concentration: normal.   Speech: speech is normal   Level of consciousness: alert    CRANIAL NERVES     CN III, IV, VI   Pupils are equal, round, and reactive to light.    CN VII   Facial expression full, symmetric.     CN VIII   Hearing: intact    CN IX, X   CN IX normal.     CN XI   CN XI normal.     CN XII   CN XII normal.     MOTOR EXAM   Muscle bulk: normal  Overall muscle tone: normal       Moves all extremities spontaneously and symmetrically, follows commands  No focal deficits noted     Significant Labs: All pertinent lab results from the past 24 hours have been reviewed.    Significant Studies: I have reviewed all pertinent imaging results/findings within the past 24 hours.

## 2022-11-11 NOTE — PROGRESS NOTES
During pt hourly check pt stated she felt like she had a seizure. She did not recall Dr. Mark leaving her room and the a period of time to follow. She recalled having the electrode stimulation with Dr. Mark when reminded. Pt is oriented and was also answering orientation questions correctly with Dr. Mark at bedside. She reports the anxiety she felt during the electrode stimulation has passed.

## 2022-11-11 NOTE — PROGRESS NOTES
SEEG case  Liza Pablo   MRN [id number]    Side  Serial # Name on Marcy  Name on N[place]  Medial target site Lateral sites Recording  Length   in brain                Right             1 R amygdala   RAmyg Amygdala  Ant Mid temp gyrus   49.2  mm     2 R hippo Rhippo Hippocampus  Mid temp gyrus   46.8 mm     3 R ant ins RAnins Ant insular  Inf frontal gyrus/pars triangularis 30.2 mm     4 R Thalamus RThalm CM mid frontal gyrus/pre motor  58 mm     5 R ant cing  RAncin Ant mid cingulate (caudal anterior) mid frontal gyrus/dlPFC 39.6 mm     6 R post temp sarah RpostT Post hippocampus  Post mid temp gyrus  45.2 mm    Left            7 L orbitofrontal   LOcbFr Orbitofrontal   Inf frontal gyrus   46.5 mm     8 L Thalamus LThalm CM mid frontal gyrus/pre motor  61.4 mm     9 L ant cing  LAncin Ant mid cingulate (caudal anterior) mid frontal gyrus/dlPFC 34.1 mm     10 L ant ins  LAnins Ant insular  Inf frontal gyrus/pars triangularis 30.2 mm     11 L post temp sarah LpostT Post hippocampus  Post mid temp gyrus  45.5 mm     12 L resection margin  LResect Resection margin  Post mid temp gyrus 32 mm

## 2022-11-11 NOTE — PROGRESS NOTES
[facility]  Neurology-Epilepsy  Progress Note    Patient Name: Liza Arrieta  MRN: [id number]  Admission Date: 11/7/2022  Hospital Length of Stay: 4 days  Code Status: Full Code   Attending Provider: Any Ruiz MD  Primary Care Physician: Rc Rodriguez MD   Principal Problem:Temporal lobe epilepsy, intractable    Subjective:     Hospital Course:   Ms. Arrieta is a 51 year old woman with intractable epilepsy since age 18 s/p partial left anterior temporal lobectomy in 11/2018 admitted to NICU s/p placement of 6 right-sided and 6 left-sided sEEG leads for further localization of seizures as part of repeat surgical workup. Patient is currently maintained on Lamotrigine 200 mg qAM/300 mg qPM and Cenobamate 150 mg qHS with continued events 7-8 times per month of lapse of awareness, staring.   11/7>11/8: Home Cenobamate decreased to 100 mg qHS, Lamotrigine 200 mg qAM/300 mg qPM continued on admission. No seizures overnight. On 11/8 pm, begin to hold Cenobomate and Lamotrigine.  11/8>11/9: Seizure 11/9 approximately 0200 with right hemispheric origin, patient reported she thought she had a seizure at approximately 0230. Patient and  endorse additional event approximately 0750 on 11/9. Additional seizure around 0845. Will continue close vEEG, attempt to capture additional seizures for further localization and concordance.   11/9>11/10: No further seizures overnight, patient reports possible event 11/10 approximately 10:40, additional seizure noted on EEG around this time. Please see EEG report for full details. Continue holding home Cenobamate, Lamotrigine to capture additional seizures.   11/10>11/11: No electrographic seizures noted overnight, continue to hold home Cenobamate and Lamotrigine, attempt to capture additional seizures.      Interval History: No electrographic seizures noted overnight. Patient doing well and at baseline this morning. Continue to hold home AEDs, attempt to capture  additional seizures.     Current Facility-Administered Medications   Medication Dose Route Frequency Provider Last Rate Last Admin    acetaminophen tablet 1,000 mg  1,000 mg Oral Q8H PRN Mo Kincaid MD   1,000 mg at 11/09/22 1430    bisacodyL suppository 10 mg  10 mg Rectal Daily PRN Any Ruiz MD        cefTRIAXone (ROCEPHIN) 2 g/50 mL D5W IVPB  2 g Intravenous Q12H Devinour Diya Downs MD   Stopped at 11/11/22 0217    heparin (porcine) injection 5,000 Units  5,000 Units Subcutaneous Q8H Mo Kincaid MD   5,000 Units at 11/11/22 0600    hydrALAZINE injection 10 mg  10 mg Intravenous Q6H PRN Ann Cohen PA-C        HYDROmorphone injection 1 mg  1 mg Intravenous Q6H PRN Mo Kincaid MD   1 mg at 11/07/22 1241    magnesium hydroxide 400 mg/5 ml suspension 2,400 mg  30 mL Oral Daily PRN Josafat Arevalo MD        magnesium oxide tablet 800 mg  800 mg Oral PRN Ann Cohen PA-C   800 mg at 11/11/22 0149    magnesium oxide tablet 800 mg  800 mg Oral PRN Ann Cohen PA-C        ondansetron injection 4 mg  4 mg Intravenous Q12H PRN Mo Kincaid MD        oxyCODONE immediate release tablet 5 mg  5 mg Oral Q6H PRN Mo Kincaid MD   5 mg at 11/08/22 1928    polyethylene glycol packet 17 g  17 g Oral BID Tiffanie Irwin, NP   17 g at 11/11/22 0801    potassium bicarbonate disintegrating tablet 35 mEq  35 mEq Oral PRN Ann Cohen PA-C        potassium bicarbonate disintegrating tablet 50 mEq  50 mEq Oral PRN Ann Cohen PA-C        potassium bicarbonate disintegrating tablet 60 mEq  60 mEq Oral PRN Ann Cohen PA-C        potassium, sodium phosphates 280-160-250 mg packet 2 packet  2 packet Oral PRN Ann Cohen PA-C        potassium, sodium phosphates 280-160-250 mg packet 2 packet  2 packet Oral PRN Ann Cohen PA-C        potassium, sodium phosphates 280-160-250 mg packet 2 packet  2 packet Oral PRN Ann Cohen PA-C         senna-docusate 8.6-50 mg per tablet 2 tablet  2 tablet Oral BID Tiffanie Irwin NP   2 tablet at 11/11/22 0800    sertraline tablet 25 mg  25 mg Oral Daily Lisset Arboleda NP   25 mg at 11/11/22 0800     Continuous Infusions:    Review of Systems   HENT:          Jaw pain improving   Neurological:  Positive for seizures. Negative for speech difficulty and weakness.   All other systems reviewed and are negative.  Objective:     Vital Signs (Most Recent):  Temp: 98.3 °F (36.8 °C) (11/11/22 1101)  Pulse: (!) 54 (11/11/22 1201)  Resp: 14 (11/11/22 1201)  BP: 112/65 (11/11/22 1201)  SpO2: (!) 94 % (11/11/22 1201)   Vital Signs (24h Range):  Temp:  [98.2 °F (36.8 °C)-98.5 °F (36.9 °C)] 98.3 °F (36.8 °C)  Pulse:  [49-65] 54  Resp:  [8-50] 14  SpO2:  [92 %-97 %] 94 %  BP: (104-136)/(56-81) 112/65     Weight: 99.5 kg (219 lb 5.7 oz)  Body mass index is 35.41 kg/m².    Physical Exam  Vitals and nursing note reviewed.   Constitutional:       General: She is not in acute distress.     Appearance: She is not diaphoretic.   HENT:      Head: Normocephalic and atraumatic.      Comments: sEEG leads with headwrap in place  Eyes:      General: No scleral icterus.     Extraocular Movements: Extraocular movements intact.      Conjunctiva/sclera: Conjunctivae normal.      Pupils: Pupils are equal, round, and reactive to light.   Cardiovascular:      Rate and Rhythm: Normal rate.   Pulmonary:      Effort: Pulmonary effort is normal. No respiratory distress.   Abdominal:      General: There is no distension.      Palpations: Abdomen is soft.      Tenderness: There is no abdominal tenderness.   Musculoskeletal:         General: No swelling, tenderness or deformity. Normal range of motion.      Cervical back: Neck supple. No rigidity.   Skin:     General: Skin is warm and dry.   Neurological:      General: No focal deficit present.      Mental Status: She is alert and oriented to person, place, and time. Mental status is at baseline.    Psychiatric:         Mood and Affect: Mood normal.         Speech: Speech normal.         Behavior: Behavior normal.       NEUROLOGICAL EXAMINATION:     MENTAL STATUS   Oriented to person, place, and time.   Attention: normal. Concentration: normal.   Speech: speech is normal   Level of consciousness: alert    CRANIAL NERVES     CN III, IV, VI   Pupils are equal, round, and reactive to light.    CN VII   Facial expression full, symmetric.     CN VIII   Hearing: intact    CN IX, X   CN IX normal.     CN XI   CN XI normal.     CN XII   CN XII normal.     MOTOR EXAM   Muscle bulk: normal  Overall muscle tone: normal       Moves all extremities spontaneously and symmetrically, follows commands  No focal deficits noted     Significant Labs: All pertinent lab results from the past 24 hours have been reviewed.    Significant Studies: I have reviewed all pertinent imaging results/findings within the past 24 hours.    Assessment and Plan:     * Temporal lobe epilepsy, intractable  Ms. Arrieta is a 50 yo woman with intractable epilepsy since age 18, s/p partial left anterior temporal lobectomy in 11/2018 admitted to NICU s/p placement of 6 right sided and 6 left sided sEEG leads for further localization of seizures as part of repeat surgical workup. Patient is currently maintained on lamotrigine 200 mg qAM/300 mg qPM and Cenobamate 150 mg qHS with continued events 7-8 times per month of lapse of awareness, staring.    Recommendations:  - s/p placement of 6 left-sided and 6 right-sided sEEG electrodes 11/7 by ROSIE  - No further electrographic seizures noted overnight 11/10>11/11  - Seizure 11/9 approximately 0200 with right hemispheric onset, 2 additional seizures 0745 and 0852 11/10 am. Seizure 11/10 approximately 10:40. Please see EEG report for full details.   - On admission, decreased Cenobamate to 100 mg qHS, continued home Lamotrigine 200 mg qAM/300 mg qPM   - Continue to hold Cenobamate and Lamotrigine (beginning 11/8  pm)  - Seizure precautions  - Please call epilepsy on call prior to administration of IV benzodiazepines for prolonged seizures  - Post-operative imaging timing, pain control per NCC/NSGY    Plan of care discussed with NCC team, patient at bedside. Will continue to follow, please call with any questions.     Depression  Chronic  - Continue home Sertraline        VTE Risk Mitigation (From admission, onward)           Ordered     heparin (porcine) injection 5,000 Units  Every 8 hours         11/07/22 1232     IP VTE HIGH RISK PATIENT  Once         11/07/22 1232     Place sequential compression device  Until discontinued         11/07/22 1232     Place sequential compression device  Until discontinued         11/07/22 0628     Place LAURA hose  Until discontinued         11/07/22 0628                    Philly Foy PA-C  Neurology-Epilepsy  [facility]  Staff: Dr. Rojo

## 2022-11-11 NOTE — SUBJECTIVE & OBJECTIVE
Interval History: 11/11: NAEON. AFVSS. Exam stable. Patient reports possible sz event yesterday. No BM since admission. Used bike.     Medications:  Continuous Infusions:  Scheduled Meds:   cefTRIAXone (ROCEPHIN) IVPB  2 g Intravenous Q12H    heparin (porcine)  5,000 Units Subcutaneous Q8H    polyethylene glycol  17 g Oral BID    senna-docusate 8.6-50 mg  2 tablet Oral BID    sertraline  25 mg Oral Daily     PRN Meds:acetaminophen, hydrALAZINE, HYDROmorphone, magnesium hydroxide 400 mg/5 ml, magnesium oxide, magnesium oxide, ondansetron, oxyCODONE, potassium bicarbonate, potassium bicarbonate, potassium bicarbonate, potassium, sodium phosphates, potassium, sodium phosphates, potassium, sodium phosphates     Review of Systems  Objective:     Weight: 99.5 kg (219 lb 5.7 oz)  Body mass index is 35.41 kg/m².  Vital Signs (Most Recent):  Temp: 98.3 °F (36.8 °C) (11/11/22 0701)  Pulse: (!) 49 (11/11/22 0701)  Resp: 16 (11/11/22 0701)  BP: 108/65 (11/11/22 0701)  SpO2: 95 % (11/11/22 0701)   Vital Signs (24h Range):  Temp:  [98.2 °F (36.8 °C)-98.5 °F (36.9 °C)] 98.3 °F (36.8 °C)  Pulse:  [49-65] 49  Resp:  [8-50] 16  SpO2:  [92 %-97 %] 95 %  BP: (104-136)/(56-81) 108/65     Date 11/11/22 0700 - 11/12/22 0659   Shift 0656-6662 1735-6669 1422-4688 24 Hour Total   INTAKE   I.V.(mL/kg) 5(0.1)   5(0.1)   Shift Total(mL/kg) 5(0.1)   5(0.1)   OUTPUT   Urine(mL/kg/hr) 350   350   Shift Total(mL/kg) 350(3.5)   350(3.5)   Weight (kg) 99.5 99.5 99.5 99.5                     Female External Urinary Catheter 11/08/22 0605 (Active)   Skin no redness;no breakdown 11/09/22 0305   Tolerance no signs/symptoms of discomfort 11/09/22 0305   Suction Continuous suction at 60 mmHg 11/08/22 2305   Date of last wick change 11/08/22 11/08/22 1905   Time of last wick change 1400 11/08/22 1505   Output (mL) 350 mL 11/09/22 0230       Physical Exam    Neurosurgery Physical Exam    General: well developed, well nourished, no distress.   Head:  normocephalic, atraumatic  Neurologic:   GCS: Eyes: 4/ Verbal: 5/ Motor: 6  Mental Status: Awake, Alert, Oriented x 3  Cranial nerves: PERRL, EOMI, face symmetric, tongue midline, shoulder shrug equal.  No pronator drift, no dysmetria.  Sensory: intact to light touch throughout  Motor Strength:Moves all extremities antigravity    Gen: well nourished, normocephalic, atraumatic  CV: skin warm and dry, distal pulses present  Pulm: chest rise symmetric, no increased work of breathing  GI: abdomen soft, non-distended, non-tender  : patient voiding independently  MSK: no bony abnormalities noted  Psych: patient interacting with appropriate mood and affect      Significant Labs:  Recent Labs   Lab 11/10/22  0023 11/10/22  1812 11/11/22  0149     --  105     --  141   K 4.3  --  4.4     --  105   CO2 24  --  28   BUN 13  --  15   CREATININE 0.7  --  0.7   CALCIUM 9.6  --  9.9   MG 1.7 1.7 1.8       Recent Labs   Lab 11/10/22  0023 11/11/22  0149   WBC 6.29 7.53   HGB 14.4 15.3   HCT 43.3 44.8    189       No results for input(s): LABPT, INR, APTT in the last 48 hours.  Microbiology Results (last 7 days)       ** No results found for the last 168 hours. **          All pertinent labs from the last 24 hours have been reviewed.    Significant Diagnostics:  I have reviewed all pertinent imaging results/findings within the past 24 hours.  No results found in the last 24 hours.

## 2022-11-11 NOTE — NURSING
"Pt reports 1 seizure "might have" taken place around 6:10AM. Red button pressed now. NSGY and Epilepsy notified. Neuro exam stable and unchanged. VSS.  "

## 2022-11-11 NOTE — PLAN OF CARE
[facility] Care Plan    POC reviewed with Liza Arrieta and family at 0300. Pt verbalized understanding. Questions and concerns addressed. No acute events overnight. Pt progressing toward goals. Will continue to monitor. See below and flowsheets for full assessment and VS info.     -No seizure activity witnessed overnight   -Possible stimulation 11/11    Is this a stroke patient? no    Neuro:  [place] Coma Scale  Best Eye Response: 4-->(E4) spontaneous  Best Motor Response: 6-->(M6) obeys commands  Best Verbal Response: 5-->(V5) oriented  [place] Coma Scale Score: 15  Assessment Qualifiers: patient not sedated/intubated, no eye obstruction present  Pupil PERRLA: yes     24hr Temp:  [98.2 °F (36.8 °C)-98.5 °F (36.9 °C)]     CV:   Rhythm: normal sinus rhythm  BP goals:   SBP < 160  MAP > 65    Resp:   O2 Device (Oxygen Therapy): room air       Plan: N/A    GI/:     Diet/Nutrition Received: regular  Last Bowel Movement: 11/06/22  Voiding Characteristics: external catheter    Intake/Output Summary (Last 24 hours) at 11/11/2022 0352  Last data filed at 11/11/2022 0305  Gross per 24 hour   Intake 763.59 ml   Output 1150 ml   Net -386.41 ml     Unmeasured Output  Urine Occurrence: 1  Stool Occurrence: 0  Emesis Occurrence: 0  Pad Count: 1    Labs/Accuchecks:  Recent Labs   Lab 11/11/22  0149   WBC 7.53   RBC 4.92   HGB 15.3   HCT 44.8         Recent Labs   Lab 11/11/22  0149      K 4.4   CO2 28      BUN 15   CREATININE 0.7   ALKPHOS 116   ALT 22   AST 19   BILITOT 0.3    No results for input(s): PROTIME, INR, APTT, HEPANTIXA in the last 168 hours. No results for input(s): CPK, CPKMB, TROPONINI, MB in the last 168 hours.    Electrolytes: Electrolytes replaced  Accuchecks: none    Gtts:      LDA/Wounds:  Lines/Drains/Airways       Drain  Duration             Female External Urinary Catheter 11/08/22 0605 2 days              Peripheral Intravenous Line  Duration                  Peripheral IV - Single Lumen  11/07/22 0628 20 G Posterior;Right Forearm 3 days         Peripheral IV - Single Lumen 11/07/22 0725 20 G Right Hand 3 days                  Wounds: No  Wound care consulted: No

## 2022-11-11 NOTE — PLAN OF CARE
[facility] Care Plan    POC reviewed with Liza Arrieta and family at 1400. Pt verbalized understanding. Questions and concerns addressed. No acute events today. Pt progressing toward goals. Will continue to monitor. See below and flowsheets for full assessment and VS info.     -pt had BM x2 today  -pt reported having 2 seizures today   -sEEG electrode stimulation at bedside with Dr. Mark  -site change PIV x2  -pt appetite minimal    Is this a stroke patient? no    Neuro:  [place] Coma Scale  Best Eye Response: 4-->(E4) spontaneous  Best Motor Response: 6-->(M6) obeys commands  Best Verbal Response: 5-->(V5) oriented  [place] Coma Scale Score: 15  Assessment Qualifiers: patient not sedated/intubated  Pupil PERRLA: yes     24 hr Temp:  [98.2 °F (36.8 °C)-98.5 °F (36.9 °C)]     CV:   Rhythm: sinus bradycardia  BP goals:   SBP < 160  MAP > 65    Resp:   O2 Device (Oxygen Therapy): room air       Plan: N/A    GI/:     Diet/Nutrition Received: regular  Last Bowel Movement: 11/11/22  Voiding Characteristics: external catheter    Intake/Output Summary (Last 24 hours) at 11/11/2022 1732  Last data filed at 11/11/2022 1701  Gross per 24 hour   Intake 559.84 ml   Output 1050 ml   Net -490.16 ml     Unmeasured Output  Urine Occurrence: 1  Stool Occurrence: 2  Emesis Occurrence: 0  Pad Count: 1    Labs/Accuchecks:  Recent Labs   Lab 11/11/22  0149   WBC 7.53   RBC 4.92   HGB 15.3   HCT 44.8         Recent Labs   Lab 11/11/22  0149      K 4.4   CO2 28      BUN 15   CREATININE 0.7   ALKPHOS 116   ALT 22   AST 19   BILITOT 0.3    No results for input(s): PROTIME, INR, APTT, HEPANTIXA in the last 168 hours. No results for input(s): CPK, CPKMB, TROPONINI, MB in the last 168 hours.    Electrolytes: Electrolytes replaced  Accuchecks: none    Gtts:      LDA/Wounds:  Lines/Drains/Airways       Drain  Duration             Female External Urinary Catheter 11/08/22 0605 3 days              Peripheral Intravenous Line   Duration                  Peripheral IV - Single Lumen 11/11/22 1721 20 G Anterior;Left Forearm <1 day         Peripheral IV - Single Lumen 11/11/22 1722 20 G Anterior;Right Forearm <1 day                  Wounds: No  Wound care consulted: No

## 2022-11-11 NOTE — PROGRESS NOTES
[facility]  Neurocritical Care  Progress Note    Admit Date: 11/7/2022  Service Date: 11/11/2022  Length of Stay: 4    Subjective:     Chief Complaint: Temporal lobe epilepsy, intractable    History of Present Illness: Ms. Arrieta is a 50 yo woman with intractable epilepsy since age 18, s/p partial left anterior temporal lobectomy in 11/2018 admitted to NICU s/p placement of sEEG leads for further localization of seizures as part of repeat surgical workup. After left anterior temporal lobectomy, patient reports brief period of seizure freedom, before beginning to have seizures again approximately 3 months afterwards. She reports current seizures are different than her typical semiology prior to surgery, notably she believes she now loses awareness and stares off. After her events, she is quickly back to baseline and is able to report that she had a seizure. No associated tongue biting, bowel/bladder incontinence. She is currently maintained on Lamotrigine 200 mg qAM/300 mg qPM and Cenobamate 150 mg nightly. She reports current seizure frequency of 7-8 per month, with last seizure on 10/31. Unable to identify triggers for seizures other than missing medication. MRI brain completed 2018 showed evidence of left frontotemporal crani for partial left anterior temporal resection. MRI brain epilepsy protocol in 10/2022 with post-operative changes with left temporal partial lobectomy, interval development of susceptibility in the right superior frontal sulcus compatible with component of superficial siderosis. EEG completed 5/30/22 noted mild regional or subcortical dysfunction in bilateral temporal lobes with possible seizure focus in right anterior temporal region. During EMU admission 7/5/22>7/10/22, patient noted to have multiple right temporal lobe seizures, regional cortical dysfunction from left temporal region and bilateral independent seizure foci in left and right temporal lobes. Patient  discussed in multidisciplinary epilepsy surgery conference, with recommendation for phase 2 monitoring for further localization. Patient elected to proceed, admitted to NICU after placement of 6 right sided sEEG electrodes and 6 left sided sEEG electrodes. Patient admitted to [facility] for close  monitoring and higher level of care.        Hospital Course: 11/8/2022: all AED's discontinued today per epilepsy   11/09/2022 three electrographic seizures overnight  11/10/22: NAEON  11/11/22: two seizures today      Interval History:  NAEON. Two seizures today, epilepsy aware  Review of Systems   Constitutional: Negative.    HENT: Negative.     Eyes: Negative.    Respiratory: Negative.     Cardiovascular: Negative.    Gastrointestinal: Negative.    Endocrine: Negative.    Genitourinary: Negative.    Musculoskeletal: Negative.    Skin: Negative.    Neurological: Negative.      Objective:     Vitals:  Temp: 98.3 °F (36.8 °C)  Pulse: 61  Rhythm: sinus bradycardia  BP: 110/67  MAP (mmHg): 84  Resp: 11  SpO2: 96 %  O2 Device (Oxygen Therapy): room air    Temp  Min: 98.2 °F (36.8 °C)  Max: 98.5 °F (36.9 °C)  Pulse  Min: 49  Max: 66  BP  Min: 105/68  Max: 136/65  MAP (mmHg)  Min: 81  Max: 101  Resp  Min: 8  Max: 53  SpO2  Min: 92 %  Max: 97 %    11/10 0701 - 11/11 0700  In: 759.4 [P.O.:660]  Out: 850 [Urine:850]   Unmeasured Output  Urine Occurrence: 1  Stool Occurrence: 1  Emesis Occurrence: 0  Pad Count: 1       Physical Exam  Vitals and nursing note reviewed.   Constitutional:       Appearance: Normal appearance.   HENT:      Head: Normocephalic.      Nose: Nose normal.      Mouth/Throat:      Mouth: Mucous membranes are moist.      Pharynx: Oropharynx is clear.   Cardiovascular:      Rate and Rhythm: Normal rate and regular rhythm.      Pulses: Normal pulses.      Heart sounds: Normal heart sounds.   Pulmonary:      Effort: Pulmonary effort is normal.      Breath sounds: Normal breath sounds.   Abdominal:      General: Bowel  sounds are normal.      Palpations: Abdomen is soft.   Musculoskeletal:         General: Normal range of motion.   Skin:     General: Skin is warm and dry.      Capillary Refill: Capillary refill takes less than 2 seconds.      Coloration: Skin is not pale.   Neurological:      Mental Status: She is alert.      Comments: GCS 15  AAOX 4  PERRL  PUGA to command and spontaneously  Sensation intact         Medications:  Continuous ScheduledcefTRIAXone (ROCEPHIN) IVPB, 2 g, Q12H  heparin (porcine), 5,000 Units, Q8H  polyethylene glycol, 17 g, BID  senna-docusate 8.6-50 mg, 2 tablet, BID  sertraline, 25 mg, Daily  PRNacetaminophen, 1,000 mg, Q8H PRN  bisacodyL, 10 mg, Daily PRN  hydrALAZINE, 10 mg, Q6H PRN  HYDROmorphone, 1 mg, Q6H PRN  magnesium hydroxide 400 mg/5 ml, 30 mL, Daily PRN  magnesium oxide, 800 mg, PRN  magnesium oxide, 800 mg, PRN  ondansetron, 4 mg, Q12H PRN  oxyCODONE, 5 mg, Q6H PRN  potassium bicarbonate, 35 mEq, PRN  potassium bicarbonate, 50 mEq, PRN  potassium bicarbonate, 60 mEq, PRN  potassium, sodium phosphates, 2 packet, PRN  potassium, sodium phosphates, 2 packet, PRN  potassium, sodium phosphates, 2 packet, PRN    Today I personally reviewed pertinent medications, lines/drains/airways, imaging, cardiology results, laboratory results, microbiology results, notably:    Diet  Diet Adult Regular (IDDSI Level 7)  Diet Adult Regular (IDDSI Level 7)          Assessment/Plan:     Neuro  * Temporal lobe epilepsy, intractable  Ms. Arrieta is a 50 yo woman with intractable epilepsy since age 18, s/p partial left anterior temporal lobectomy in 11/2018 admitted to NICU s/p placement of sEEG leads for further localization of seizures as part of repeat surgical workup    --SBP <180  -- epilepsy and NSGY team following  -- all AEDs discontinued   -- Neuro checks q 1 hr      Psychiatric  Depression  Continue home sertraline          The patient is being Prophylaxed for:  Venous Thromboembolism with: Mechanical or  Chemical  Stress Ulcer with: Not Applicable   Ventilator Pneumonia with: not applicable    Activity Orders          Turn patient every 2 hours starting at 11/08 0000    Diet Adult Regular (IDDSI Level 7): Regular starting at 11/07 1228        Full Code  Level 3  Tiffanie Irwin NP  Neurocritical Care  [facility] - Neuro Critical Care

## 2022-11-11 NOTE — PROGRESS NOTES
[facility] Neuro Critical Care  Neurosurgery  Progress Note    Subjective:     History of Present Illness: Liza Arrieta is a 51 y.o. female who presents as a referral from Dr. Mark to discuss possible intracranial seizure surgery. She has already had left temporal lobectomy. She will need neuropsychological evaluation and MRI with contrast STEALTH protocol for operative planning purposes.       She requests that we postpone sEEG until after the Epilepsy Walk she organizes in the first weekend in November      I had a lengthy, detailed discussion with the patient and her  about the risks, benefits, and alternatives to intracranial localization for seizures.      We discussed that monitoring typically takes 1-2 weeks but may be longer or shorter depending on how long it takes for her to have concordant seizures.  We discussed that she will not be able to get out of bed while the electrodes are in her head, and that she will remain in the ICU during that time. She may require mittens (she pulled out her grids in 2018 while postictal). We also discussed that this is a diagnostic, not therapeutic, procedure, and that the definitive surgery would potentially be performed 4-8 weeks after the time of sEEG localization.      We discussed that there is approximately 20% rate of a symptomatic hemorrhage and approximately 1% rate of symptomatic hemorrhage from placement of sEEG electrode per review of the international literature.  They expressed understanding of this.      We also discussed the specific risks of the localization surgery. Risks include, but are not limited to, bleeding, pain, infection, scarring, need for further/repeat procedure, death, paralysis, stroke (including venous infarct)/damage to major blood vessels, leak of cerebrospinal fluid, and hardware-related complications. There is a chance that we would fail to localize the seizures with the initial electrode plan, which would require placement of  additional electrodes, or may not lead to definitive surgery. Informed consent was obtained of the patient with her  present.       Post-Op Info:  Procedure(s) (LRB):  1.) PLACEMENT OF THE FIDUCIAL SCREWS 2.) PLACEMENT OF THE BILATERAL SEEG ELECTRODES WITH BRANDEE ROBOT (Bilateral)   4 Days Post-Op     Interval History: 11/11: NAEON. AFVSS. Exam stable. Patient reports possible sz event yesterday. No BM since admission. Used bike.     Medications:  Continuous Infusions:  Scheduled Meds:   cefTRIAXone (ROCEPHIN) IVPB  2 g Intravenous Q12H    heparin (porcine)  5,000 Units Subcutaneous Q8H    polyethylene glycol  17 g Oral BID    senna-docusate 8.6-50 mg  2 tablet Oral BID    sertraline  25 mg Oral Daily     PRN Meds:acetaminophen, hydrALAZINE, HYDROmorphone, magnesium hydroxide 400 mg/5 ml, magnesium oxide, magnesium oxide, ondansetron, oxyCODONE, potassium bicarbonate, potassium bicarbonate, potassium bicarbonate, potassium, sodium phosphates, potassium, sodium phosphates, potassium, sodium phosphates     Review of Systems  Objective:     Weight: 99.5 kg (219 lb 5.7 oz)  Body mass index is 35.41 kg/m².  Vital Signs (Most Recent):  Temp: 98.3 °F (36.8 °C) (11/11/22 0701)  Pulse: (!) 49 (11/11/22 0701)  Resp: 16 (11/11/22 0701)  BP: 108/65 (11/11/22 0701)  SpO2: 95 % (11/11/22 0701)   Vital Signs (24h Range):  Temp:  [98.2 °F (36.8 °C)-98.5 °F (36.9 °C)] 98.3 °F (36.8 °C)  Pulse:  [49-65] 49  Resp:  [8-50] 16  SpO2:  [92 %-97 %] 95 %  BP: (104-136)/(56-81) 108/65     Date 11/11/22 0700 - 11/12/22 0659   Shift 6001-9869 7459-5675 9751-4906 24 Hour Total   INTAKE   I.V.(mL/kg) 5(0.1)   5(0.1)   Shift Total(mL/kg) 5(0.1)   5(0.1)   OUTPUT   Urine(mL/kg/hr) 350   350   Shift Total(mL/kg) 350(3.5)   350(3.5)   Weight (kg) 99.5 99.5 99.5 99.5                     Female External Urinary Catheter 11/08/22 0605 (Active)   Skin no redness;no breakdown 11/09/22 0305   Tolerance no signs/symptoms of discomfort 11/09/22  0305   Suction Continuous suction at 60 mmHg 11/08/22 2305   Date of last wick change 11/08/22 11/08/22 1905   Time of last wick change 1400 11/08/22 1505   Output (mL) 350 mL 11/09/22 0230       Physical Exam    Neurosurgery Physical Exam    General: well developed, well nourished, no distress.   Head: normocephalic, atraumatic  Neurologic:   GCS: Eyes: 4/ Verbal: 5/ Motor: 6  Mental Status: Awake, Alert, Oriented x 3  Cranial nerves: PERRL, EOMI, face symmetric, tongue midline, shoulder shrug equal.  No pronator drift, no dysmetria.  Sensory: intact to light touch throughout  Motor Strength:Moves all extremities antigravity    Gen: well nourished, normocephalic, atraumatic  CV: skin warm and dry, distal pulses present  Pulm: chest rise symmetric, no increased work of breathing  GI: abdomen soft, non-distended, non-tender  : patient voiding independently  MSK: no bony abnormalities noted  Psych: patient interacting with appropriate mood and affect      Significant Labs:  Recent Labs   Lab 11/10/22  0023 11/10/22  1812 11/11/22  0149     --  105     --  141   K 4.3  --  4.4     --  105   CO2 24  --  28   BUN 13  --  15   CREATININE 0.7  --  0.7   CALCIUM 9.6  --  9.9   MG 1.7 1.7 1.8       Recent Labs   Lab 11/10/22  0023 11/11/22  0149   WBC 6.29 7.53   HGB 14.4 15.3   HCT 43.3 44.8    189       No results for input(s): LABPT, INR, APTT in the last 48 hours.  Microbiology Results (last 7 days)       ** No results found for the last 168 hours. **          All pertinent labs from the last 24 hours have been reviewed.    Significant Diagnostics:  I have reviewed all pertinent imaging results/findings within the past 24 hours.  No results found in the last 24 hours.    Assessment/Plan:     * Temporal lobe epilepsy, intractable  A 52 y.o. female with hx of prior L temporal lobectomy and intractable seizure who admitted for sEEG 11/7    POD#4    Continue ICU care  Neurocheck Q1hr  Pain control    CT head satisfactory   Continue EEG and monitoring: sz 11/9 approximately 0200 with right hemispheric origin, ctm  Continue abx ppax while leads in place; ceftriaxone   Off AEDs and epilepsy following   Aggressive bowel regimes; increased regimen  Daily bike   Sating well at room   Keep SBP <150  ADAT  SQH for DVT ppx  Further care per NCC  NSGY will follow           Mo Kincaid MD  Neurosurgery  [facility] - Neuro Critical Care

## 2022-11-11 NOTE — ASSESSMENT & PLAN NOTE
A 52 y.o. female with hx of prior L temporal lobectomy and intractable seizure who admitted for sEEG 11/7    POD#4    Continue ICU care  Neurocheck Q1hr  Pain control   CT head satisfactory   Continue EEG and monitoring: sz 11/9 approximately 0200 with right hemispheric origin, ctm  Continue abx ppax while leads in place; ceftriaxone   Off AEDs and epilepsy following   Aggressive bowel regimes; increased regimen  Daily bike   Sating well at room   Keep SBP <150  ADAT  SQH for DVT ppx  Further care per NCC  NSGY will follow

## 2022-11-11 NOTE — PLAN OF CARE
[facility] - Neuro Critical Care  Discharge Reassessment    Primary Care Provider: Rc Rodriguez MD    Expected Discharge Date: 11/15/2022    Pt post PLACEMENT OF THE FIDUCIAL SCREWS 2.) PLACEMENT OF THE BILATERAL SEEG ELECTRODES WITH BRANDEE ROBOT (Bilateral) procedure.     Not medically stable to dc.    DC plan: TBD removal of above and therapy eval.     Reassessment (most recent)       Discharge Reassessment - 11/11/22 1240          Discharge Reassessment    Assessment Type Discharge Planning Reassessment     Did the patient's condition or plan change since previous assessment? No     Discharge Plan discussed with: Spouse/sig other     Name(s) and Number(s) Neymar Arrieta () [phone]     Communicated RYAN with patient/caregiver Date not available/Unable to determine     Discharge Plan A Home with family     Discharge Plan B Home Health     DME Needed Upon Discharge  none     Discharge Barriers Identified None     Why the patient remains in the hospital Requires continued medical care        Post-Acute Status    Discharge Delays None known at this time                     Brandie Stinson LCSW  Neurocritical Care   [facility]  [id number]

## 2022-11-12 LAB
ALBUMIN SERPL BCP-MCNC: 3.3 G/DL (ref 3.5–5.2)
ALP SERPL-CCNC: 133 U/L (ref 55–135)
ALT SERPL W/O P-5'-P-CCNC: 24 U/L (ref 10–44)
ANION GAP SERPL CALC-SCNC: 10 MMOL/L (ref 8–16)
AST SERPL-CCNC: 17 U/L (ref 10–40)
BASOPHILS # BLD AUTO: 0.01 K/UL (ref 0–0.2)
BASOPHILS NFR BLD: 0.1 % (ref 0–1.9)
BILIRUB SERPL-MCNC: 0.4 MG/DL (ref 0.1–1)
BUN SERPL-MCNC: 16 MG/DL (ref 6–20)
CALCIUM SERPL-MCNC: 10 MG/DL (ref 8.7–10.5)
CHLORIDE SERPL-SCNC: 106 MMOL/L (ref 95–110)
CO2 SERPL-SCNC: 25 MMOL/L (ref 23–29)
CREAT SERPL-MCNC: 0.7 MG/DL (ref 0.5–1.4)
DIFFERENTIAL METHOD: ABNORMAL
EOSINOPHIL # BLD AUTO: 0.1 K/UL (ref 0–0.5)
EOSINOPHIL NFR BLD: 1.3 % (ref 0–8)
ERYTHROCYTE [DISTWIDTH] IN BLOOD BY AUTOMATED COUNT: 11.4 % (ref 11.5–14.5)
EST. GFR  (NO RACE VARIABLE): >60 ML/MIN/1.73 M^2
GLUCOSE SERPL-MCNC: 103 MG/DL (ref 70–110)
HCT VFR BLD AUTO: 46.4 % (ref 37–48.5)
HGB BLD-MCNC: 15.8 G/DL (ref 12–16)
IMM GRANULOCYTES # BLD AUTO: 0.01 K/UL (ref 0–0.04)
IMM GRANULOCYTES NFR BLD AUTO: 0.1 % (ref 0–0.5)
LYMPHOCYTES # BLD AUTO: 2 K/UL (ref 1–4.8)
LYMPHOCYTES NFR BLD: 28.4 % (ref 18–48)
MAGNESIUM SERPL-MCNC: 1.9 MG/DL (ref 1.6–2.6)
MCH RBC QN AUTO: 31 PG (ref 27–31)
MCHC RBC AUTO-ENTMCNC: 34.1 G/DL (ref 32–36)
MCV RBC AUTO: 91 FL (ref 82–98)
MONOCYTES # BLD AUTO: 0.5 K/UL (ref 0.3–1)
MONOCYTES NFR BLD: 7.5 % (ref 4–15)
NEUTROPHILS # BLD AUTO: 4.3 K/UL (ref 1.8–7.7)
NEUTROPHILS NFR BLD: 62.6 % (ref 38–73)
NRBC BLD-RTO: 0 /100 WBC
PHOSPHATE SERPL-MCNC: 4.3 MG/DL (ref 2.7–4.5)
PLATELET # BLD AUTO: 198 K/UL (ref 150–450)
PMV BLD AUTO: 9.5 FL (ref 9.2–12.9)
POTASSIUM SERPL-SCNC: 4.5 MMOL/L (ref 3.5–5.1)
PROT SERPL-MCNC: 7.1 G/DL (ref 6–8.4)
RBC # BLD AUTO: 5.09 M/UL (ref 4–5.4)
SODIUM SERPL-SCNC: 141 MMOL/L (ref 136–145)
WBC # BLD AUTO: 6.91 K/UL (ref 3.9–12.7)

## 2022-11-12 PROCEDURE — 99233 PR SUBSEQUENT HOSPITAL CARE,LEVL III: ICD-10-PCS | Mod: ,,, | Performed by: PSYCHIATRY & NEUROLOGY

## 2022-11-12 PROCEDURE — 80053 COMPREHEN METABOLIC PANEL: CPT | Performed by: PSYCHIATRY & NEUROLOGY

## 2022-11-12 PROCEDURE — 94761 N-INVAS EAR/PLS OXIMETRY MLT: CPT

## 2022-11-12 PROCEDURE — 84100 ASSAY OF PHOSPHORUS: CPT | Performed by: PSYCHIATRY & NEUROLOGY

## 2022-11-12 PROCEDURE — 25000003 PHARM REV CODE 250: Performed by: NURSE PRACTITIONER

## 2022-11-12 PROCEDURE — 85025 COMPLETE CBC W/AUTO DIFF WBC: CPT | Performed by: PSYCHIATRY & NEUROLOGY

## 2022-11-12 PROCEDURE — 63600175 PHARM REV CODE 636 W HCPCS: Performed by: STUDENT IN AN ORGANIZED HEALTH CARE EDUCATION/TRAINING PROGRAM

## 2022-11-12 PROCEDURE — 95720 PR EEG, W/VIDEO, CONT RECORD, I&R, >12<26 HRS: ICD-10-PCS | Mod: ,,, | Performed by: PSYCHIATRY & NEUROLOGY

## 2022-11-12 PROCEDURE — 83735 ASSAY OF MAGNESIUM: CPT | Performed by: PSYCHIATRY & NEUROLOGY

## 2022-11-12 PROCEDURE — 95714 VEEG EA 12-26 HR UNMNTR: CPT

## 2022-11-12 PROCEDURE — 95720 EEG PHY/QHP EA INCR W/VEEG: CPT | Mod: ,,, | Performed by: PSYCHIATRY & NEUROLOGY

## 2022-11-12 PROCEDURE — 99233 SBSQ HOSP IP/OBS HIGH 50: CPT | Mod: ,,, | Performed by: PSYCHIATRY & NEUROLOGY

## 2022-11-12 PROCEDURE — 20000000 HC ICU ROOM

## 2022-11-12 RX ADMIN — HEPARIN SODIUM 5000 UNITS: 5000 INJECTION INTRAVENOUS; SUBCUTANEOUS at 06:11

## 2022-11-12 RX ADMIN — CEFTRIAXONE SODIUM 2 G: 2 INJECTION, SOLUTION INTRAVENOUS at 02:11

## 2022-11-12 RX ADMIN — HEPARIN SODIUM 5000 UNITS: 5000 INJECTION INTRAVENOUS; SUBCUTANEOUS at 09:11

## 2022-11-12 RX ADMIN — SERTRALINE HYDROCHLORIDE 25 MG: 25 TABLET ORAL at 09:11

## 2022-11-12 RX ADMIN — HEPARIN SODIUM 5000 UNITS: 5000 INJECTION INTRAVENOUS; SUBCUTANEOUS at 02:11

## 2022-11-12 NOTE — PLAN OF CARE
[facility] Care Plan    POC reviewed with Liza Arrieta and family at 0300. Pt verbalized understanding. Questions and concerns addressed. No acute events overnight. Pt progressing toward goals. Will continue to monitor. See below and flowsheets for full assessment and VS info.     -No seizures witnessed overnight   -Neuro exam remains stable and unchanged    Is this a stroke patient? no    Neuro:  Antwan Coma Scale  Best Eye Response: 4-->(E4) spontaneous  Best Motor Response: 6-->(M6) obeys commands  Best Verbal Response: 5-->(V5) oriented  Antwan Coma Scale Score: 15  Assessment Qualifiers: patient not sedated/intubated, no eye obstruction present  Pupil PERRLA: yes     24hr Temp:  [98.2 °F (36.8 °C)-98.4 °F (36.9 °C)]     CV:   Rhythm: normal sinus rhythm  BP goals:   SBP < 160  MAP > 65    Resp:   O2 Device (Oxygen Therapy): room air       Plan: N/A    GI/:     Diet/Nutrition Received: regular  Last Bowel Movement: 11/11/22  Voiding Characteristics: external catheter    Intake/Output Summary (Last 24 hours) at 11/12/2022 0539  Last data filed at 11/12/2022 0405  Gross per 24 hour   Intake 571.87 ml   Output 900 ml   Net -328.13 ml     Unmeasured Output  Urine Occurrence: 1  Stool Occurrence: 0  Emesis Occurrence: 0  Pad Count: 1    Labs/Accuchecks:  Recent Labs   Lab 11/12/22 0219   WBC 6.91   RBC 5.09   HGB 15.8   HCT 46.4         Recent Labs   Lab 11/12/22 0219      K 4.5   CO2 25      BUN 16   CREATININE 0.7   ALKPHOS 133   ALT 24   AST 17   BILITOT 0.4    No results for input(s): PROTIME, INR, APTT, HEPANTIXA in the last 168 hours. No results for input(s): CPK, CPKMB, TROPONINI, MB in the last 168 hours.    Electrolytes: N/A - electrolytes WDL  Accuchecks: none    Gtts:      LDA/Wounds:  Lines/Drains/Airways       Drain  Duration             Female External Urinary Catheter 11/08/22 0605 3 days              Peripheral Intravenous Line  Duration                  Peripheral IV - Single  Lumen 11/11/22 1721 20 G Anterior;Left Forearm <1 day         Peripheral IV - Single Lumen 11/11/22 1722 20 G Anterior;Right Forearm <1 day                  Wounds: No  Wound care consulted: No

## 2022-11-12 NOTE — ASSESSMENT & PLAN NOTE
A 52 y.o. female with hx of prior L temporal lobectomy and intractable seizure who admitted for sEEG 11/7    Continue ICU care  Neurocheck Q1hr  Pain control   CT head satisfactory   Continue EEG and monitoring: sz 11/9 approximately 0200 with right hemispheric origin, ctm  Continue abx ppax while leads in place; ceftriaxone   Off AEDs and epilepsy following   Aggressive bowel regimen  Daily bike   Sating well at room   Keep SBP <150  ADAT  SQH for DVT ppx  Further care per NCC  NSGY will follow     Dispo: tentative lead removal Wednesday, 11/16

## 2022-11-12 NOTE — PROGRESS NOTES
[facility]  Neurology-Epilepsy  Progress Note    Patient Name: Liza Arrieta  MRN: [id number]  Admission Date: 11/7/2022  Hospital Length of Stay: 5 days  Code Status: Full Code   Attending Provider: Any Ruiz MD  Primary Care Physician: Rc Rodriguez MD   Principal Problem:Temporal lobe epilepsy, intractable    Subjective:     Hospital Course:   Ms. Arrieta is a 51 year old woman with intractable epilepsy since age 18 s/p partial left anterior temporal lobectomy in 11/2018 admitted to NICU s/p placement of 6 right-sided and 6 left-sided sEEG leads for further localization of seizures as part of repeat surgical workup. Patient is currently maintained on Lamotrigine 200 mg qAM/300 mg qPM and Cenobamate 150 mg qHS with continued events 7-8 times per month of lapse of awareness, staring.   11/7>11/8: Home Cenobamate decreased to 100 mg qHS, Lamotrigine 200 mg qAM/300 mg qPM continued on admission. No seizures overnight. On 11/8 pm, begin to hold Cenobomate and Lamotrigine.  11/8>11/9: Seizure 11/9 approximately 0200 with right hemispheric origin, patient reported she thought she had a seizure at approximately 0230. Additional seizures 0745 and 0852. Will continue close vEEG, attempt to capture additional seizures for further localization and concordance.   11/9>11/10: No further seizures overnight, patient reports possible event 11/10 approximately 10:40, additional seizure noted on EEG around this time. Please see EEG report for full details. Continue holding home Cenobamate, Lamotrigine to capture additional seizures.   11/10>11/11: No electrographic seizures noted overnight, continue to hold home Cenobamate and Lamotrigine, attempt to capture additional seizures.  11/11>11/12: No electrographic seizures noted overnight outside mapping.  Holding AEDs.      Interval History: No electrographic seizures noted overnight. Patient doing well and at baseline this morning. Continue to hold home AEDs,  attempt to capture additional seizures.     Current Facility-Administered Medications   Medication Dose Route Frequency Provider Last Rate Last Admin    acetaminophen tablet 1,000 mg  1,000 mg Oral Q8H PRN Mo Kincaid MD   1,000 mg at 11/09/22 1430    bisacodyL suppository 10 mg  10 mg Rectal Daily PRN Any Ruiz MD        cefTRIAXone (ROCEPHIN) 2 g/50 mL D5W IVPB  2 g Intravenous Q12H [facility] Diya Downs MD 50 mL/hr at 11/12/22 1431 2 g at 11/12/22 1431    heparin (porcine) injection 5,000 Units  5,000 Units Subcutaneous Q8H Mo Kincaid MD   5,000 Units at 11/12/22 1431    hydrALAZINE injection 10 mg  10 mg Intravenous Q6H PRN Ann Cohen PA-C        HYDROmorphone injection 1 mg  1 mg Intravenous Q6H PRN Mo Kincaid MD   1 mg at 11/07/22 1241    magnesium hydroxide 400 mg/5 ml suspension 2,400 mg  30 mL Oral Daily PRN Josafat Arevalo MD        magnesium oxide tablet 800 mg  800 mg Oral PRN Ann Cohen PA-C   800 mg at 11/11/22 0149    magnesium oxide tablet 800 mg  800 mg Oral PRN Ann Cohen PA-C        ondansetron injection 4 mg  4 mg Intravenous Q12H PRN Mo Kincaid MD        oxyCODONE immediate release tablet 5 mg  5 mg Oral Q6H PRN Mo Kincaid MD   5 mg at 11/08/22 1928    polyethylene glycol packet 17 g  17 g Oral BID Tiffanie Irwin NP   17 g at 11/11/22 2038    potassium bicarbonate disintegrating tablet 35 mEq  35 mEq Oral PRN Ann Cohen PA-C        potassium bicarbonate disintegrating tablet 50 mEq  50 mEq Oral PRN Ann Cohen PA-C        potassium bicarbonate disintegrating tablet 60 mEq  60 mEq Oral PRN Ann Cohen PA-C        potassium, sodium phosphates 280-160-250 mg packet 2 packet  2 packet Oral PRN Ann Cohen PA-C        potassium, sodium phosphates 280-160-250 mg packet 2 packet  2 packet Oral PRN Ann Cohen PA-C        potassium, sodium phosphates 280-160-250 mg packet 2 packet   2 packet Oral PRN Ann Cohen PA-C        senna-docusate 8.6-50 mg per tablet 2 tablet  2 tablet Oral BID Tiffanie Irwin, NP   2 tablet at 11/11/22 2038    sertraline tablet 25 mg  25 mg Oral Daily Lisset Arboleda NP   25 mg at 11/12/22 0947     Continuous Infusions:    Review of Systems   HENT:          Jaw pain improving   Neurological:  Positive for seizures. Negative for speech difficulty and weakness.   All other systems reviewed and are negative.  Objective:     Vital Signs (Most Recent):  Temp: 98.5 °F (36.9 °C) (11/12/22 1100)  Pulse: (!) 52 (11/12/22 1400)  Resp: (!) 22 (11/12/22 1400)  BP: 129/60 (11/12/22 1400)  SpO2: 95 % (11/12/22 1400)   Vital Signs (24h Range):  Temp:  [98.2 °F (36.8 °C)-98.5 °F (36.9 °C)] 98.5 °F (36.9 °C)  Pulse:  [47-65] 52  Resp:  [5-30] 22  SpO2:  [89 %-97 %] 95 %  BP: (102-155)/(55-79) 129/60     Weight: 99.5 kg (219 lb 5.7 oz)  Body mass index is 35.41 kg/m².    Physical Exam  Vitals and nursing note reviewed.   Constitutional:       General: She is not in acute distress.     Appearance: She is not diaphoretic.   HENT:      Head: Normocephalic and atraumatic.      Comments: sEEG leads with headwrap in place  Eyes:      General: No scleral icterus.     Extraocular Movements: Extraocular movements intact.      Conjunctiva/sclera: Conjunctivae normal.      Pupils: Pupils are equal, round, and reactive to light.   Cardiovascular:      Rate and Rhythm: Normal rate.   Pulmonary:      Effort: Pulmonary effort is normal. No respiratory distress.   Abdominal:      General: There is no distension.      Palpations: Abdomen is soft.      Tenderness: There is no abdominal tenderness.   Musculoskeletal:         General: No swelling, tenderness or deformity. Normal range of motion.      Cervical back: Neck supple. No rigidity.   Skin:     General: Skin is warm and dry.   Neurological:      General: No focal deficit present.      Mental Status: She is alert and oriented to person, place,  and time. Mental status is at baseline.   Psychiatric:         Mood and Affect: Mood normal.         Speech: Speech normal.         Behavior: Behavior normal.       NEUROLOGICAL EXAMINATION:     MENTAL STATUS   Oriented to person, place, and time.   Attention: normal. Concentration: normal.   Speech: speech is normal   Level of consciousness: alert    CRANIAL NERVES     CN III, IV, VI   Pupils are equal, round, and reactive to light.    CN VII   Facial expression full, symmetric.     CN VIII   Hearing: intact    CN IX, X   CN IX normal.     CN XI   CN XI normal.     CN XII   CN XII normal.     MOTOR EXAM   Muscle bulk: normal  Overall muscle tone: normal       Moves all extremities spontaneously and symmetrically, follows commands  No focal deficits noted     Significant Labs: All pertinent lab results from the past 24 hours have been reviewed.    Significant Studies: I have reviewed all pertinent imaging results/findings within the past 24 hours.    Assessment and Plan:     * Temporal lobe epilepsy, intractable  Ms. Arrieta is a 50 yo woman with intractable epilepsy since age 18, s/p partial left anterior temporal lobectomy in 11/2018 admitted to NICU s/p placement of 6 right sided and 6 left sided sEEG leads for further localization of seizures as part of repeat surgical workup. Patient is currently maintained on lamotrigine 200 mg qAM/300 mg qPM and Cenobamate 150 mg qHS with continued events 7-8 times per month of lapse of awareness, staring.    Recommendations:  - s/p placement of 6 left-sided and 6 right-sided sEEG electrodes 11/7 by NSUMBERTO  - No further electrographic seizures noted overnight 11/10>11/11  - Seizure 11/9 approximately 0200 with right hemispheric onset, 2 additional seizures 0745 and 0852 11/10 am. Seizure 11/10 approximately 10:40. Please see EEG report for full details.   - On admission, decreased Cenobamate to 100 mg qHS, continued home Lamotrigine 200 mg qAM/300 mg qPM   - Continue to hold  Cenobamate and Lamotrigine (beginning 11/8 pm)  - Seizure precautions  - Please call epilepsy on call prior to administration of IV benzodiazepines for prolonged seizures  - Post-operative imaging timing, pain control per NCC/NSGY    Plan of care discussed with NCC team, patient at bedside. Will continue to follow, please call with any questions.     Depression  Chronic  - Continue home Sertraline        VTE Risk Mitigation (From admission, onward)         Ordered     heparin (porcine) injection 5,000 Units  Every 8 hours         11/07/22 1232     IP VTE HIGH RISK PATIENT  Once         11/07/22 1232     Place sequential compression device  Until discontinued         11/07/22 1232     Place sequential compression device  Until discontinued         11/07/22 0628     Place LAURA hose  Until discontinued         11/07/22 0628              The patient's AED regimen is being held/reduced/changed and/or the patient is undergoing provocative interventions in an effort to capture clinical events, making inpatient admission medically necessary for patient safety.  This patient is felt to be high risk due to the likelihood of seizures during this admission.      Edvin Rojo Jr, MD  Neurology-Epilepsy  [facility]

## 2022-11-12 NOTE — PROGRESS NOTES
"Patient states that she just had a seizure.  and RN at bedside. sEEG button pushed at this time. Seizure lasted approximately 5 seconds and patient remained at baseline. Patient and  state that this is a "regular small seizure" that occurs. After episode, Dr. Rojo (epilepsy) called and notified. Patient at baseline at this time. Will continue to monitor.   "

## 2022-11-12 NOTE — PROGRESS NOTES
Patient rode stationary bicycle in bed for 23:20 minutes with a distance of 5.09 miles. Patient tolerated well. Vitals stable during exercise. Will continue to monitor.

## 2022-11-12 NOTE — SUBJECTIVE & OBJECTIVE
Interval History:  NAEON. Possible lead removal 11/15/22    Review of Systems   Constitutional: Negative.  Negative for chills and fever.   HENT: Negative.  Negative for congestion.    Eyes: Negative.  Negative for visual disturbance.   Respiratory: Negative.  Negative for cough and shortness of breath.    Cardiovascular: Negative.  Negative for chest pain, palpitations and leg swelling.   Gastrointestinal: Negative.  Negative for abdominal distention and abdominal pain.   Endocrine: Negative.    Genitourinary: Negative.    Musculoskeletal: Negative.  Negative for arthralgias.   Skin: Negative.    Neurological:  Positive for seizures (no activity overnight). Negative for dizziness, tremors, syncope, facial asymmetry, speech difficulty, weakness, light-headedness, numbness and headaches.   Psychiatric/Behavioral:  Negative for agitation and behavioral problems.      Objective:     Vitals:  Temp: 98.2 °F (36.8 °C)  Pulse: (!) 51  Rhythm: normal sinus rhythm, sinus bradycardia  BP: (!) 120/59  MAP (mmHg): 85  Resp: 10  SpO2: 95 %  O2 Device (Oxygen Therapy): room air    Temp  Min: 98.2 °F (36.8 °C)  Max: 98.4 °F (36.9 °C)  Pulse  Min: 49  Max: 65  BP  Min: 107/56  Max: 129/75  MAP (mmHg)  Min: 77  Max: 98  Resp  Min: 5  Max: 30  SpO2  Min: 89 %  Max: 97 %    11/11 0701 - 11/12 0700  In: 571.9 [P.O.:460; I.V.:55]  Out: 900 [Urine:900]   Unmeasured Output  Urine Occurrence: 1  Stool Occurrence: 0  Emesis Occurrence: 0  Pad Count: 1       Physical Exam  Vitals and nursing note reviewed.   Constitutional:       Appearance: Normal appearance.   HENT:      Head: Normocephalic.      Nose: Nose normal.      Mouth/Throat:      Mouth: Mucous membranes are moist.      Pharynx: Oropharynx is clear.   Cardiovascular:      Rate and Rhythm: Normal rate and regular rhythm.      Pulses: Normal pulses.      Heart sounds: Normal heart sounds.   Pulmonary:      Effort: Pulmonary effort is normal.      Breath sounds: Normal breath sounds.    Abdominal:      General: Bowel sounds are normal.      Palpations: Abdomen is soft.   Musculoskeletal:         General: Normal range of motion.   Skin:     General: Skin is warm and dry.      Capillary Refill: Capillary refill takes less than 2 seconds.   Neurological:      General: No focal deficit present.      Mental Status: She is alert and oriented to person, place, and time.      Comments: GCS 15  AAOX 4  PERRL  PUGA to command and spontaneously  Sensation intact             Medications:  Continuous ScheduledcefTRIAXone (ROCEPHIN) IVPB, 2 g, Q12H  heparin (porcine), 5,000 Units, Q8H  polyethylene glycol, 17 g, BID  senna-docusate 8.6-50 mg, 2 tablet, BID  sertraline, 25 mg, Daily    PRNacetaminophen, 1,000 mg, Q8H PRN  bisacodyL, 10 mg, Daily PRN  hydrALAZINE, 10 mg, Q6H PRN  HYDROmorphone, 1 mg, Q6H PRN  magnesium hydroxide 400 mg/5 ml, 30 mL, Daily PRN  magnesium oxide, 800 mg, PRN  magnesium oxide, 800 mg, PRN  ondansetron, 4 mg, Q12H PRN  oxyCODONE, 5 mg, Q6H PRN  potassium bicarbonate, 35 mEq, PRN  potassium bicarbonate, 50 mEq, PRN  potassium bicarbonate, 60 mEq, PRN  potassium, sodium phosphates, 2 packet, PRN  potassium, sodium phosphates, 2 packet, PRN  potassium, sodium phosphates, 2 packet, PRN        Diet  Diet Adult Regular (IDDSI Level 7)  Diet Adult Regular (IDDSI Level 7)

## 2022-11-12 NOTE — SUBJECTIVE & OBJECTIVE
Interval History: No electrographic seizures noted overnight. Patient doing well and at baseline this morning. Continue to hold home AEDs, attempt to capture additional seizures.     Current Facility-Administered Medications   Medication Dose Route Frequency Provider Last Rate Last Admin    acetaminophen tablet 1,000 mg  1,000 mg Oral Q8H PRN Mo Kincaid MD   1,000 mg at 11/09/22 1430    bisacodyL suppository 10 mg  10 mg Rectal Daily PRN Any Ruiz MD        cefTRIAXone (ROCEPHIN) 2 g/50 mL D5W IVPB  [facility] 50 mL/hr at 11/12/22 1431 2 g at 11/12/22 1431    heparin (porcine) injection 5,000 Units  5,000 Units Subcutaneous Q8H Mo Kincaid MD   5,000 Units at 11/12/22 1431    hydrALAZINE injection 10 mg  10 mg Intravenous Q6H PRN Ann Cohen PA-C        HYDROmorphone injection 1 mg  1 mg Intravenous Q6H PRN Mo Kincaid MD   1 mg at 11/07/22 1241    magnesium hydroxide 400 mg/5 ml suspension 2,400 mg  30 mL Oral Daily PRN Josafat Arevalo MD        magnesium oxide tablet 800 mg  800 mg Oral PRN Ann Cohen PA-C   800 mg at 11/11/22 0149    magnesium oxide tablet 800 mg  800 mg Oral PRN Ann Cohen PA-C        ondansetron injection 4 mg  4 mg Intravenous Q12H PRN Mo Kincaid MD        oxyCODONE immediate release tablet 5 mg  5 mg Oral Q6H PRN Mo Kincaid MD   5 mg at 11/08/22 1928    polyethylene glycol packet 17 g  17 g Oral BID Tiffanie Irwin NP   17 g at 11/11/22 2038    potassium bicarbonate disintegrating tablet 35 mEq  35 mEq Oral PRN Ann Cohen PA-C        potassium bicarbonate disintegrating tablet 50 mEq  50 mEq Oral PRN Ann Cohen PA-C        potassium bicarbonate disintegrating tablet 60 mEq  60 mEq Oral PRN Ann Cohen PA-C        potassium, sodium phosphates 280-160-250 mg packet 2 packet  2 packet Oral PRN Ann Cohen PA-C        potassium, sodium phosphates 280-160-250 mg  packet 2 packet  2 packet Oral PRN Ann Cohen PA-C        potassium, sodium phosphates 280-160-250 mg packet 2 packet  2 packet Oral PRN Ann Cohen PA-C        senna-docusate 8.6-50 mg per tablet 2 tablet  2 tablet Oral BID Tiffanie Irwin NP   2 tablet at 11/11/22 2038    sertraline tablet 25 mg  25 mg Oral Daily Lisset Arboleda NP   25 mg at 11/12/22 0947     Continuous Infusions:    Review of Systems   HENT:          Jaw pain improving   Neurological:  Positive for seizures. Negative for speech difficulty and weakness.   All other systems reviewed and are negative.  Objective:     Vital Signs (Most Recent):  Temp: 98.5 °F (36.9 °C) (11/12/22 1100)  Pulse: (!) 52 (11/12/22 1400)  Resp: (!) 22 (11/12/22 1400)  BP: 129/60 (11/12/22 1400)  SpO2: 95 % (11/12/22 1400)   Vital Signs (24h Range):  Temp:  [98.2 °F (36.8 °C)-98.5 °F (36.9 °C)] 98.5 °F (36.9 °C)  Pulse:  [47-65] 52  Resp:  [5-30] 22  SpO2:  [89 %-97 %] 95 %  BP: (102-155)/(55-79) 129/60     Weight: 99.5 kg (219 lb 5.7 oz)  Body mass index is 35.41 kg/m².    Physical Exam  Vitals and nursing note reviewed.   Constitutional:       General: She is not in acute distress.     Appearance: She is not diaphoretic.   HENT:      Head: Normocephalic and atraumatic.      Comments: sEEG leads with headwrap in place  Eyes:      General: No scleral icterus.     Extraocular Movements: Extraocular movements intact.      Conjunctiva/sclera: Conjunctivae normal.      Pupils: Pupils are equal, round, and reactive to light.   Cardiovascular:      Rate and Rhythm: Normal rate.   Pulmonary:      Effort: Pulmonary effort is normal. No respiratory distress.   Abdominal:      General: There is no distension.      Palpations: Abdomen is soft.      Tenderness: There is no abdominal tenderness.   Musculoskeletal:         General: No swelling, tenderness or deformity. Normal range of motion.      Cervical back: Neck supple. No rigidity.   Skin:     General: Skin is warm and  dry.   Neurological:      General: No focal deficit present.      Mental Status: She is alert and oriented to person, place, and time. Mental status is at baseline.   Psychiatric:         Mood and Affect: Mood normal.         Speech: Speech normal.         Behavior: Behavior normal.       NEUROLOGICAL EXAMINATION:     MENTAL STATUS   Oriented to person, place, and time.   Attention: normal. Concentration: normal.   Speech: speech is normal   Level of consciousness: alert    CRANIAL NERVES     CN III, IV, VI   Pupils are equal, round, and reactive to light.    CN VII   Facial expression full, symmetric.     CN VIII   Hearing: intact    CN IX, X   CN IX normal.     CN XI   CN XI normal.     CN XII   CN XII normal.     MOTOR EXAM   Muscle bulk: normal  Overall muscle tone: normal       Moves all extremities spontaneously and symmetrically, follows commands  No focal deficits noted     Significant Labs: All pertinent lab results from the past 24 hours have been reviewed.    Significant Studies: I have reviewed all pertinent imaging results/findings within the past 24 hours.

## 2022-11-12 NOTE — PROGRESS NOTES
[facility]  Neurosurgery  Progress Note    Subjective:     History of Present Illness: Liza Arrieta is a 51 y.o. female who presents as a referral from Dr. Mark to discuss possible intracranial seizure surgery. She has already had left temporal lobectomy. She will need neuropsychological evaluation and MRI with contrast STEALTH protocol for operative planning purposes.       She requests that we postpone sEEG until after the Epilepsy Walk she organizes in the first weekend in November      I had a lengthy, detailed discussion with the patient and her  about the risks, benefits, and alternatives to intracranial localization for seizures.      We discussed that monitoring typically takes 1-2 weeks but may be longer or shorter depending on how long it takes for her to have concordant seizures.  We discussed that she will not be able to get out of bed while the electrodes are in her head, and that she will remain in the ICU during that time. She may require mittens (she pulled out her grids in 2018 while postictal). We also discussed that this is a diagnostic, not therapeutic, procedure, and that the definitive surgery would potentially be performed 4-8 weeks after the time of sEEG localization.      We discussed that there is approximately 20% rate of a symptomatic hemorrhage and approximately 1% rate of symptomatic hemorrhage from placement of sEEG electrode per review of the international literature.  They expressed understanding of this.      We also discussed the specific risks of the localization surgery. Risks include, but are not limited to, bleeding, pain, infection, scarring, need for further/repeat procedure, death, paralysis, stroke (including venous infarct)/damage to major blood vessels, leak of cerebrospinal fluid, and hardware-related complications. There is a chance that we would fail to localize the seizures with the initial electrode plan, which would require placement of  additional electrodes, or may not lead to definitive surgery. Informed consent was obtained of the patient with her  present.       Post-Op Info:  Procedure(s) (LRB):  1.) PLACEMENT OF THE FIDUCIAL SCREWS 2.) PLACEMENT OF THE BILATERAL SEEG ELECTRODES WITH BRANDEE ROBOT (Bilateral)   5 Days Post-Op     Interval History: 11/12: NAEON. AFVSS. Exam stable. 2 reported sz episodes yesterday per pt. 3BMs.      Medications:  Continuous Infusions:  Scheduled Meds:   cefTRIAXone (ROCEPHIN) IVPB  2 g Intravenous Q12H    heparin (porcine)  5,000 Units Subcutaneous Q8H    polyethylene glycol  17 g Oral BID    senna-docusate 8.6-50 mg  2 tablet Oral BID    sertraline  25 mg Oral Daily     PRN Meds:acetaminophen, bisacodyL, hydrALAZINE, HYDROmorphone, magnesium hydroxide 400 mg/5 ml, magnesium oxide, magnesium oxide, ondansetron, oxyCODONE, potassium bicarbonate, potassium bicarbonate, potassium bicarbonate, potassium, sodium phosphates, potassium, sodium phosphates, potassium, sodium phosphates     Review of Systems  Objective:     Weight: 99.5 kg (219 lb 5.7 oz)  Body mass index is 35.41 kg/m².  Vital Signs (Most Recent):  Temp: 98.2 °F (36.8 °C) (11/12/22 0701)  Pulse: (!) 51 (11/12/22 1000)  Resp: 10 (11/12/22 1000)  BP: (!) 120/59 (11/12/22 1000)  SpO2: 95 % (11/12/22 1000)   Vital Signs (24h Range):  Temp:  [98.2 °F (36.8 °C)-98.4 °F (36.9 °C)] 98.2 °F (36.8 °C)  Pulse:  [49-66] 51  Resp:  [5-53] 10  SpO2:  [89 %-97 %] 95 %  BP: (107-129)/(56-75) 120/59     Date 11/12/22 0700 - 11/13/22 0659   Shift 5519-5872 0742-7770 2814-9625 24 Hour Total   INTAKE   I.V.(mL/kg) 20(0.2)   20(0.2)   Shift Total(mL/kg) 20(0.2)   20(0.2)   OUTPUT   Shift Total(mL/kg)       Weight (kg) 99.5 99.5 99.5 99.5                     Female External Urinary Catheter 11/08/22 0605 (Active)   Skin no redness;no breakdown 11/09/22 0305   Tolerance no signs/symptoms of discomfort 11/09/22 0305   Suction Continuous suction at 60 mmHg 11/08/22  2305   Date of last wick change 11/08/22 11/08/22 1905   Time of last wick change 1400 11/08/22 1505   Output (mL) 350 mL 11/09/22 0230       Physical Exam    Neurosurgery Physical Exam    General: well developed, well nourished, no distress.   Head: normocephalic, atraumatic  Neurologic:   GCS: Eyes: 4/ Verbal: 5/ Motor: 6  Mental Status: Awake, Alert, Oriented x 3  Cranial nerves: PERRL, EOMI, face symmetric, tongue midline, shoulder shrug equal.  No pronator drift, no dysmetria.  Sensory: intact to light touch throughout  Motor Strength:Moves all extremities antigravity    Gen: well nourished, normocephalic, atraumatic  CV: skin warm and dry, distal pulses present  Pulm: chest rise symmetric, no increased work of breathing  GI: abdomen soft, non-distended, non-tender  : patient voiding independently  MSK: no bony abnormalities noted  Psych: patient interacting with appropriate mood and affect      Significant Labs:  Recent Labs   Lab 11/10/22  1812 11/11/22 0149 11/12/22  0219   GLU  --  105 103   NA  --  141 141   K  --  4.4 4.5   CL  --  105 106   CO2  --  28 25   BUN  --  15 16   CREATININE  --  0.7 0.7   CALCIUM  --  9.9 10.0   MG 1.7 1.8 1.9       Recent Labs   Lab 11/11/22 0149 11/12/22 0219   WBC 7.53 6.91   HGB 15.3 15.8   HCT 44.8 46.4    198       No results for input(s): LABPT, INR, APTT in the last 48 hours.  Microbiology Results (last 7 days)       ** No results found for the last 168 hours. **          All pertinent labs from the last 24 hours have been reviewed.    Significant Diagnostics:  I have reviewed all pertinent imaging results/findings within the past 24 hours.  No results found in the last 24 hours.    Assessment/Plan:     * Temporal lobe epilepsy, intractable  A 52 y.o. female with hx of prior L temporal lobectomy and intractable seizure who admitted for sEEG 11/7    Continue ICU care  Neurocheck Q1hr  Pain control   CT head satisfactory   Continue EEG and monitoring: sz 11/9  approximately 0200 with right hemispheric origin, ctm  Continue abx ppax while leads in place; ceftriaxone   Off AEDs and epilepsy following   Aggressive bowel regimen  Daily bike   Sating well at room   Keep SBP <150  ADAT  SQH for DVT ppx  Further care per NCC  NSGY will follow     Dispo: tentative lead removal Wednesday, 11/16        Mo Kincaid MD  Neurosurgery  [facility]

## 2022-11-12 NOTE — PLAN OF CARE
[facility] Care Plan    POC reviewed with Liza Arrieta and spouse at 1400. Patient verbalized understanding. Questions and concerns addressed. Patient had one seizure episode today lasting approximately 5 seconds with quick return to baseline. OR scheduled for sEEG removal on Tuesday 11/16. Patient updated on plan of care and progressing toward goals. Will continue to monitor. See below and flowsheets for full assessment and VS info.       Is this a stroke patient? no    Neuro:  Potter Coma Scale  Best Eye Response: 4-->(E4) spontaneous  Best Motor Response: 6-->(M6) obeys commands  Best Verbal Response: 5-->(V5) oriented  Potter Coma Scale Score: 15  Assessment Qualifiers: patient not sedated/intubated  Pupil PERRLA: yes     24 hr Temp:  [98.2 °F (36.8 °C)-98.5 °F (36.9 °C)]     CV:   Rhythm: normal sinus rhythm, sinus bradycardia  BP goals:   SBP < 180  MAP > 65    Resp:   O2 Device (Oxygen Therapy): room air       Plan: N/A    GI/:     Diet/Nutrition Received: regular  Last Bowel Movement: 11/11/22  Voiding Characteristics: external catheter    Intake/Output Summary (Last 24 hours) at 11/12/2022 1619  Last data filed at 11/12/2022 1400  Gross per 24 hour   Intake 106.87 ml   Output 100 ml   Net 6.87 ml     Unmeasured Output  Urine Occurrence: 1  Stool Occurrence: 0  Emesis Occurrence: 0  Pad Count: 1    Labs/Accuchecks:  Recent Labs   Lab 11/12/22 0219   WBC 6.91   RBC 5.09   HGB 15.8   HCT 46.4         Recent Labs   Lab 11/12/22 0219      K 4.5   CO2 25      BUN 16   CREATININE 0.7   ALKPHOS 133   ALT 24   AST 17   BILITOT 0.4    No results for input(s): PROTIME, INR, APTT, HEPANTIXA in the last 168 hours. No results for input(s): CPK, CPKMB, TROPONINI, MB in the last 168 hours.    Electrolytes: N/A - electrolytes WDL  Accuchecks: none    Gtts:      LDA/Wounds:  Lines/Drains/Airways       Drain  Duration             Female External Urinary Catheter 11/08/22 0605 4 days              Peripheral  Intravenous Line  Duration                  Peripheral IV - Single Lumen 11/11/22 1721 20 G Anterior;Left Forearm <1 day         Peripheral IV - Single Lumen 11/11/22 1722 20 G Anterior;Right Forearm <1 day                  Wounds: Yes  Wound care consulted: No

## 2022-11-12 NOTE — SUBJECTIVE & OBJECTIVE
Interval History: 11/12: NAEON. AFVSS. Exam stable. 2 reported sz episodes yesterday per pt. 3BMs.      Medications:  Continuous Infusions:  Scheduled Meds:   cefTRIAXone (ROCEPHIN) IVPB  2 g Intravenous Q12H    heparin (porcine)  5,000 Units Subcutaneous Q8H    polyethylene glycol  17 g Oral BID    senna-docusate 8.6-50 mg  2 tablet Oral BID    sertraline  25 mg Oral Daily     PRN Meds:acetaminophen, bisacodyL, hydrALAZINE, HYDROmorphone, magnesium hydroxide 400 mg/5 ml, magnesium oxide, magnesium oxide, ondansetron, oxyCODONE, potassium bicarbonate, potassium bicarbonate, potassium bicarbonate, potassium, sodium phosphates, potassium, sodium phosphates, potassium, sodium phosphates     Review of Systems  Objective:     Weight: 99.5 kg (219 lb 5.7 oz)  Body mass index is 35.41 kg/m².  Vital Signs (Most Recent):  Temp: 98.2 °F (36.8 °C) (11/12/22 0701)  Pulse: (!) 51 (11/12/22 1000)  Resp: 10 (11/12/22 1000)  BP: (!) 120/59 (11/12/22 1000)  SpO2: 95 % (11/12/22 1000)   Vital Signs (24h Range):  Temp:  [98.2 °F (36.8 °C)-98.4 °F (36.9 °C)] 98.2 °F (36.8 °C)  Pulse:  [49-66] 51  Resp:  [5-53] 10  SpO2:  [89 %-97 %] 95 %  BP: (107-129)/(56-75) 120/59     Date 11/12/22 0700 - 11/13/22 0659   Shift 0453-7082 6276-2020 4863-1319 24 Hour Total   INTAKE   I.V.(mL/kg) 20(0.2)   20(0.2)   Shift Total(mL/kg) 20(0.2)   20(0.2)   OUTPUT   Shift Total(mL/kg)       Weight (kg) 99.5 99.5 99.5 99.5                     Female External Urinary Catheter 11/08/22 0605 (Active)   Skin no redness;no breakdown 11/09/22 0305   Tolerance no signs/symptoms of discomfort 11/09/22 0305   Suction Continuous suction at 60 mmHg 11/08/22 2305   Date of last wick change 11/08/22 11/08/22 1905   Time of last wick change 1400 11/08/22 1505   Output (mL) 350 mL 11/09/22 0230       Physical Exam    Neurosurgery Physical Exam    General: well developed, well nourished, no distress.   Head: normocephalic, atraumatic  Neurologic:   GCS: Eyes: 4/ Verbal: 5/  Motor: 6  Mental Status: Awake, Alert, Oriented x 3  Cranial nerves: PERRL, EOMI, face symmetric, tongue midline, shoulder shrug equal.  No pronator drift, no dysmetria.  Sensory: intact to light touch throughout  Motor Strength:Moves all extremities antigravity    Gen: well nourished, normocephalic, atraumatic  CV: skin warm and dry, distal pulses present  Pulm: chest rise symmetric, no increased work of breathing  GI: abdomen soft, non-distended, non-tender  : patient voiding independently  MSK: no bony abnormalities noted  Psych: patient interacting with appropriate mood and affect      Significant Labs:  Recent Labs   Lab 11/10/22  1812 11/11/22 0149 11/12/22 0219   GLU  --  105 103   NA  --  141 141   K  --  4.4 4.5   CL  --  105 106   CO2  --  28 25   BUN  --  15 16   CREATININE  --  0.7 0.7   CALCIUM  --  9.9 10.0   MG 1.7 1.8 1.9       Recent Labs   Lab 11/11/22 0149 11/12/22 0219   WBC 7.53 6.91   HGB 15.3 15.8   HCT 44.8 46.4    198       No results for input(s): LABPT, INR, APTT in the last 48 hours.  Microbiology Results (last 7 days)       ** No results found for the last 168 hours. **          All pertinent labs from the last 24 hours have been reviewed.    Significant Diagnostics:  I have reviewed all pertinent imaging results/findings within the past 24 hours.  No results found in the last 24 hours.

## 2022-11-13 LAB
ALBUMIN SERPL BCP-MCNC: 3.1 G/DL (ref 3.5–5.2)
ALP SERPL-CCNC: 118 U/L (ref 55–135)
ALT SERPL W/O P-5'-P-CCNC: 28 U/L (ref 10–44)
ANION GAP SERPL CALC-SCNC: 11 MMOL/L (ref 8–16)
AST SERPL-CCNC: 18 U/L (ref 10–40)
BASOPHILS # BLD AUTO: 0.01 K/UL (ref 0–0.2)
BASOPHILS NFR BLD: 0.1 % (ref 0–1.9)
BILIRUB SERPL-MCNC: 0.3 MG/DL (ref 0.1–1)
BUN SERPL-MCNC: 17 MG/DL (ref 6–20)
CALCIUM SERPL-MCNC: 9.6 MG/DL (ref 8.7–10.5)
CHLORIDE SERPL-SCNC: 107 MMOL/L (ref 95–110)
CO2 SERPL-SCNC: 21 MMOL/L (ref 23–29)
CREAT SERPL-MCNC: 0.7 MG/DL (ref 0.5–1.4)
DIFFERENTIAL METHOD: ABNORMAL
EOSINOPHIL # BLD AUTO: 0.1 K/UL (ref 0–0.5)
EOSINOPHIL NFR BLD: 1.1 % (ref 0–8)
ERYTHROCYTE [DISTWIDTH] IN BLOOD BY AUTOMATED COUNT: 11.4 % (ref 11.5–14.5)
EST. GFR  (NO RACE VARIABLE): >60 ML/MIN/1.73 M^2
GLUCOSE SERPL-MCNC: 102 MG/DL (ref 70–110)
HCT VFR BLD AUTO: 45.1 % (ref 37–48.5)
HGB BLD-MCNC: 15.1 G/DL (ref 12–16)
IMM GRANULOCYTES # BLD AUTO: 0.02 K/UL (ref 0–0.04)
IMM GRANULOCYTES NFR BLD AUTO: 0.3 % (ref 0–0.5)
LYMPHOCYTES # BLD AUTO: 2.2 K/UL (ref 1–4.8)
LYMPHOCYTES NFR BLD: 31.6 % (ref 18–48)
MAGNESIUM SERPL-MCNC: 1.7 MG/DL (ref 1.6–2.6)
MCH RBC QN AUTO: 30.8 PG (ref 27–31)
MCHC RBC AUTO-ENTMCNC: 33.5 G/DL (ref 32–36)
MCV RBC AUTO: 92 FL (ref 82–98)
MONOCYTES # BLD AUTO: 0.6 K/UL (ref 0.3–1)
MONOCYTES NFR BLD: 7.8 % (ref 4–15)
NEUTROPHILS # BLD AUTO: 4.2 K/UL (ref 1.8–7.7)
NEUTROPHILS NFR BLD: 59.1 % (ref 38–73)
NRBC BLD-RTO: 0 /100 WBC
PHOSPHATE SERPL-MCNC: 4.1 MG/DL (ref 2.7–4.5)
PLATELET # BLD AUTO: 195 K/UL (ref 150–450)
PMV BLD AUTO: 9.5 FL (ref 9.2–12.9)
POTASSIUM SERPL-SCNC: 4.3 MMOL/L (ref 3.5–5.1)
PROT SERPL-MCNC: 6.7 G/DL (ref 6–8.4)
RBC # BLD AUTO: 4.91 M/UL (ref 4–5.4)
SODIUM SERPL-SCNC: 139 MMOL/L (ref 136–145)
WBC # BLD AUTO: 7.03 K/UL (ref 3.9–12.7)

## 2022-11-13 PROCEDURE — 84100 ASSAY OF PHOSPHORUS: CPT | Performed by: PSYCHIATRY & NEUROLOGY

## 2022-11-13 PROCEDURE — 95720 PR EEG, W/VIDEO, CONT RECORD, I&R, >12<26 HRS: ICD-10-PCS | Mod: ,,, | Performed by: PSYCHIATRY & NEUROLOGY

## 2022-11-13 PROCEDURE — 83735 ASSAY OF MAGNESIUM: CPT | Performed by: PSYCHIATRY & NEUROLOGY

## 2022-11-13 PROCEDURE — 63600175 PHARM REV CODE 636 W HCPCS: Performed by: STUDENT IN AN ORGANIZED HEALTH CARE EDUCATION/TRAINING PROGRAM

## 2022-11-13 PROCEDURE — 85025 COMPLETE CBC W/AUTO DIFF WBC: CPT | Performed by: PSYCHIATRY & NEUROLOGY

## 2022-11-13 PROCEDURE — 25000003 PHARM REV CODE 250: Performed by: NURSE PRACTITIONER

## 2022-11-13 PROCEDURE — 95714 VEEG EA 12-26 HR UNMNTR: CPT

## 2022-11-13 PROCEDURE — 20000000 HC ICU ROOM

## 2022-11-13 PROCEDURE — 94761 N-INVAS EAR/PLS OXIMETRY MLT: CPT

## 2022-11-13 PROCEDURE — 25000003 PHARM REV CODE 250

## 2022-11-13 PROCEDURE — 95720 EEG PHY/QHP EA INCR W/VEEG: CPT | Mod: ,,, | Performed by: PSYCHIATRY & NEUROLOGY

## 2022-11-13 PROCEDURE — 80053 COMPREHEN METABOLIC PANEL: CPT | Performed by: PSYCHIATRY & NEUROLOGY

## 2022-11-13 RX ADMIN — SERTRALINE HYDROCHLORIDE 25 MG: 25 TABLET ORAL at 09:11

## 2022-11-13 RX ADMIN — HEPARIN SODIUM 5000 UNITS: 5000 INJECTION INTRAVENOUS; SUBCUTANEOUS at 02:11

## 2022-11-13 RX ADMIN — HEPARIN SODIUM 5000 UNITS: 5000 INJECTION INTRAVENOUS; SUBCUTANEOUS at 05:11

## 2022-11-13 RX ADMIN — CEFTRIAXONE SODIUM 2 G: 2 INJECTION, SOLUTION INTRAVENOUS at 12:11

## 2022-11-13 RX ADMIN — MAGNESIUM OXIDE TAB 400 MG (241.3 MG ELEMENTAL MG) 800 MG: 400 (241.3 MG) TAB at 05:11

## 2022-11-13 RX ADMIN — CEFTRIAXONE SODIUM 2 G: 2 INJECTION, SOLUTION INTRAVENOUS at 02:11

## 2022-11-13 RX ADMIN — HEPARIN SODIUM 5000 UNITS: 5000 INJECTION INTRAVENOUS; SUBCUTANEOUS at 09:11

## 2022-11-13 RX ADMIN — MAGNESIUM OXIDE TAB 400 MG (241.3 MG ELEMENTAL MG) 800 MG: 400 (241.3 MG) TAB at 09:11

## 2022-11-13 RX ADMIN — SENNOSIDES AND DOCUSATE SODIUM 2 TABLET: 50; 8.6 TABLET ORAL at 09:11

## 2022-11-13 NOTE — PROCEDURES
Patient: Liza Arrieta   Date: 11/11/22  High Hz stim 50Hz,  300 ms, 1-5 sec duration, 1-5 mA  Electrode  Contact A Contact B mA Duration  Physiological response  Elicited discharges/After discharges  Hz, duration    Stim induced seizure (details)    LpostT 1 2 1 5         2 5         3 5         5 5       3 4 1 5         3 5  3 sec        4 5  19 sec      5 6 3 5  14 sec        4 5       7 8 3 5  14 sec        5 5  27 sec      9 10 3 5         5 5                R Hippo  12 13 3 5         5 5       10 11 3 5         5        8 9 3          4          5   3 sec        5        6 7 3  L visual field 'shaking'         3  L visual field 'shaking'        4  L visual field 'shaking'      4 5 3  L visual field 'shaking'  30 sec - aborted with disruptive stim        3  L visual field 'shaking' 5 sec        4  L visual field 'shaking' 7 sec      2 3 3   19 sec       4   15 sec - aborted with disruptive stim              R Amygd 2 3 3   25 sec (includes insular) Feels like a sz aura (sweating of hands, feeling hot)   Able to follow commands      4   3 sec  (includes insular) Feels like a sz aura (sweating of hands, feeling hot)  Able to follow commands       5   6 sec with LVFA (includes insular) Blank stare + unable to respond-> feeling hot/uncomfortable                Total time 35 min   2:30-3:10 pm

## 2022-11-13 NOTE — ASSESSMENT & PLAN NOTE
A 52 y.o. female with hx of prior L temporal lobectomy and intractable seizure who admitted for sEEG 11/7    Continue ICU care  Neurocheck Q1hr  Pain control   CT head satisfactory   Continue EEG and monitoring  Continue abx ppax while leads in place; ceftriaxone   Off AEDs and epilepsy following   Aggressive bowel regimen  Daily bike   Sating well at room   Keep SBP <150  ADAT  SQH for DVT ppx  Further care per NCC  NSGY will follow     Dispo: tentative lead removal Wednesday, 11/16

## 2022-11-13 NOTE — SUBJECTIVE & OBJECTIVE
Interval History: 11/13: Seizure overnight. Back to baseline today, using stationary bike. Had 1 BM yesterday    Medications:  Continuous Infusions:  Scheduled Meds:   cefTRIAXone (ROCEPHIN) IVPB  2 g Intravenous Q12H    heparin (porcine)  5,000 Units Subcutaneous Q8H    polyethylene glycol  17 g Oral BID    senna-docusate 8.6-50 mg  2 tablet Oral BID    sertraline  25 mg Oral Daily     PRN Meds:acetaminophen, bisacodyL, hydrALAZINE, HYDROmorphone, magnesium hydroxide 400 mg/5 ml, magnesium oxide, magnesium oxide, ondansetron, oxyCODONE, potassium bicarbonate, potassium bicarbonate, potassium bicarbonate, potassium, sodium phosphates, potassium, sodium phosphates, potassium, sodium phosphates     Review of Systems  Objective:     Weight: 99.5 kg (219 lb 5.7 oz)  Body mass index is 35.41 kg/m².  Vital Signs (Most Recent):  Temp: 98.6 °F (37 °C) (11/13/22 0305)  Pulse: (!) 52 (11/13/22 0800)  Resp: 14 (11/13/22 0800)  BP: 108/65 (11/13/22 0800)  SpO2: 96 % (11/13/22 0800)   Vital Signs (24h Range):  Temp:  [98.2 °F (36.8 °C)-98.6 °F (37 °C)] 98.6 °F (37 °C)  Pulse:  [47-63] 52  Resp:  [5-24] 14  SpO2:  [94 %-98 %] 96 %  BP: (102-155)/(53-85) 108/65     Date 11/13/22 0700 - 11/14/22 0659   Shift 0958-8683 6753-6775 9124-7024 24 Hour Total   INTAKE   I.V.(mL/kg) 10(0.1)   10(0.1)   Shift Total(mL/kg) 10(0.1)   10(0.1)   OUTPUT   Shift Total(mL/kg)       Weight (kg) 99.5 99.5 99.5 99.5                     Female External Urinary Catheter 11/08/22 0605 (Active)   Skin no redness;no breakdown 11/09/22 0305   Tolerance no signs/symptoms of discomfort 11/09/22 0305   Suction Continuous suction at 60 mmHg 11/08/22 2305   Date of last wick change 11/08/22 11/08/22 1905   Time of last wick change 1400 11/08/22 1505   Output (mL) 350 mL 11/09/22 0230       Physical Exam    Neurosurgery Physical Exam    General: well developed, well nourished, no distress.   Head: normocephalic, atraumatic  Neurologic:   GCS: Eyes: 4/ Verbal: 5/  Motor: 6  Mental Status: Awake, Alert, Oriented x 3  Cranial nerves: PERRL, EOMI, face symmetric, tongue midline, shoulder shrug equal.  No pronator drift, no dysmetria.  Sensory: intact to light touch throughout  Motor Strength:Moves all extremities antigravity    Gen: well nourished, normocephalic, atraumatic  CV: skin warm and dry, distal pulses present  Pulm: chest rise symmetric, no increased work of breathing  GI: abdomen soft, non-distended, non-tender  : patient voiding independently  MSK: no bony abnormalities noted  Psych: patient interacting with appropriate mood and affect      Significant Labs:  Recent Labs   Lab 11/12/22 0219 11/13/22  0142    102    139   K 4.5 4.3    107   CO2 25 21*   BUN 16 17   CREATININE 0.7 0.7   CALCIUM 10.0 9.6   MG 1.9 1.7       Recent Labs   Lab 11/12/22 0219 11/13/22  0142   WBC 6.91 7.03   HGB 15.8 15.1   HCT 46.4 45.1    195       No results for input(s): LABPT, INR, APTT in the last 48 hours.  Microbiology Results (last 7 days)       ** No results found for the last 168 hours. **          All pertinent labs from the last 24 hours have been reviewed.    Significant Diagnostics:  I have reviewed all pertinent imaging results/findings within the past 24 hours.  No results found in the last 24 hours.

## 2022-11-13 NOTE — PROGRESS NOTES
[facility]  Neurocritical Care  Progress Note    Admit Date: 11/7/2022  Service Date: 11/13/2022  Length of Stay: 6    Subjective:     Chief Complaint: Temporal lobe epilepsy, intractable    History of Present Illness: Ms. Arrieta is a 50 yo woman with intractable epilepsy since age 18, s/p partial left anterior temporal lobectomy in 11/2018 admitted to NICU s/p placement of sEEG leads for further localization of seizures as part of repeat surgical workup. After left anterior temporal lobectomy, patient reports brief period of seizure freedom, before beginning to have seizures again approximately 3 months afterwards. She reports current seizures are different than her typical semiology prior to surgery, notably she believes she now loses awareness and stares off. After her events, she is quickly back to baseline and is able to report that she had a seizure. No associated tongue biting, bowel/bladder incontinence. She is currently maintained on Lamotrigine 200 mg qAM/300 mg qPM and Cenobamate 150 mg nightly. She reports current seizure frequency of 7-8 per month, with last seizure on 10/31. Unable to identify triggers for seizures other than missing medication. MRI brain completed 2018 showed evidence of left frontotemporal crani for partial left anterior temporal resection. MRI brain epilepsy protocol in 10/2022 with post-operative changes with left temporal partial lobectomy, interval development of susceptibility in the right superior frontal sulcus compatible with component of superficial siderosis. EEG completed 5/30/22 noted mild regional or subcortical dysfunction in bilateral temporal lobes with possible seizure focus in right anterior temporal region. During EMU admission 7/5/22>7/10/22, patient noted to have multiple right temporal lobe seizures, regional cortical dysfunction from left temporal region and bilateral independent seizure foci in left and right temporal lobes. Patient  discussed in multidisciplinary epilepsy surgery conference, with recommendation for phase 2 monitoring for further localization. Patient elected to proceed, admitted to NICU after placement of 6 right sided sEEG electrodes and 6 left sided sEEG electrodes. Patient admitted to [facility] for close  monitoring and higher level of care.        Hospital Course: 11/8/2022: all AED's discontinued today per epilepsy   11/09/2022 three electrographic seizures overnight  11/10/22: NAEON  11/11/22: two seizures today  11/12/22: NAEON  11/13/22: No seizure activity noted overnight. SBP<160      Interval History:  NAEON. Possible lead removal 11/15/22    Review of Systems   Constitutional: Negative.  Negative for chills and fever.   HENT: Negative.  Negative for congestion.    Eyes: Negative.  Negative for visual disturbance.   Respiratory: Negative.  Negative for cough and shortness of breath.    Cardiovascular: Negative.  Negative for chest pain, palpitations and leg swelling.   Gastrointestinal: Negative.  Negative for abdominal distention and abdominal pain.   Endocrine: Negative.    Genitourinary: Negative.    Musculoskeletal: Negative.  Negative for arthralgias.   Skin: Negative.    Neurological:  Positive for seizures (no activity overnight). Negative for dizziness, tremors, syncope, facial asymmetry, speech difficulty, weakness, light-headedness, numbness and headaches.   Psychiatric/Behavioral:  Negative for agitation and behavioral problems.      Objective:     Vitals:  Temp: 98.2 °F (36.8 °C)  Pulse: (!) 51  Rhythm: normal sinus rhythm, sinus bradycardia  BP: (!) 120/59  MAP (mmHg): 85  Resp: 10  SpO2: 95 %  O2 Device (Oxygen Therapy): room air    Temp  Min: 98.2 °F (36.8 °C)  Max: 98.4 °F (36.9 °C)  Pulse  Min: 49  Max: 65  BP  Min: 107/56  Max: 129/75  MAP (mmHg)  Min: 77  Max: 98  Resp  Min: 5  Max: 30  SpO2  Min: 89 %  Max: 97 %    11/11 0701 - 11/12 0700  In: 571.9 [P.O.:460; I.V.:55]  Out: 900 [Urine:900]   Unmeasured  Output  Urine Occurrence: 1  Stool Occurrence: 0  Emesis Occurrence: 0  Pad Count: 1       Physical Exam  Vitals and nursing note reviewed.   Constitutional:       Appearance: Normal appearance.   HENT:      Head: Normocephalic.      Nose: Nose normal.      Mouth/Throat:      Mouth: Mucous membranes are moist.      Pharynx: Oropharynx is clear.   Cardiovascular:      Rate and Rhythm: Normal rate and regular rhythm.      Pulses: Normal pulses.      Heart sounds: Normal heart sounds.   Pulmonary:      Effort: Pulmonary effort is normal.      Breath sounds: Normal breath sounds.   Abdominal:      General: Bowel sounds are normal.      Palpations: Abdomen is soft.   Musculoskeletal:         General: Normal range of motion.   Skin:     General: Skin is warm and dry.      Capillary Refill: Capillary refill takes less than 2 seconds.   Neurological:      General: No focal deficit present.      Mental Status: She is alert and oriented to person, place, and time.      Comments: GCS 15  AAOX 4  PERRL  PUGA to command and spontaneously  Sensation intact             Medications:  Continuous ScheduledcefTRIAXone (ROCEPHIN) IVPB, 2 g, Q12H  heparin (porcine), 5,000 Units, Q8H  polyethylene glycol, 17 g, BID  senna-docusate 8.6-50 mg, 2 tablet, BID  sertraline, 25 mg, Daily    PRNacetaminophen, 1,000 mg, Q8H PRN  bisacodyL, 10 mg, Daily PRN  hydrALAZINE, 10 mg, Q6H PRN  HYDROmorphone, 1 mg, Q6H PRN  magnesium hydroxide 400 mg/5 ml, 30 mL, Daily PRN  magnesium oxide, 800 mg, PRN  magnesium oxide, 800 mg, PRN  ondansetron, 4 mg, Q12H PRN  oxyCODONE, 5 mg, Q6H PRN  potassium bicarbonate, 35 mEq, PRN  potassium bicarbonate, 50 mEq, PRN  potassium bicarbonate, 60 mEq, PRN  potassium, sodium phosphates, 2 packet, PRN  potassium, sodium phosphates, 2 packet, PRN  potassium, sodium phosphates, 2 packet, PRN        Diet  Diet Adult Regular (IDDSI Level 7)  Diet Adult Regular (IDDSI Level 7)          Assessment/Plan:     Neuro  * Temporal  lobe epilepsy, intractable  Ms. Arrieta is a 50 yo woman with intractable epilepsy since age 18, s/p partial left anterior temporal lobectomy in 11/2018 admitted to NICU s/p placement of sEEG leads for further localization of seizures as part of repeat surgical workup    --SBP <160  -- epilepsy and NSGY team following  -- all AEDs discontinued   -- Neuro checks q 1 hr      Psychiatric  Depression  Continue home sertraline          The patient is being Prophylaxed for:  Venous Thromboembolism with: Chemical  Stress Ulcer with: None  Ventilator Pneumonia with: not applicable    Activity Orders          Turn patient every 2 hours starting at 11/08 0000    Diet Adult Regular (IDDSI Level 7): Regular starting at 11/07 1228        Full Code    Cynthia Richardson MD  Neurocritical Care  [facility]

## 2022-11-13 NOTE — PROGRESS NOTES
[facility]  Neurosurgery  Progress Note    Subjective:     History of Present Illness: Liza Arrieta is a 51 y.o. female who presents as a referral from Dr. Mark to discuss possible intracranial seizure surgery. She has already had left temporal lobectomy. She will need neuropsychological evaluation and MRI with contrast STEALTH protocol for operative planning purposes.       She requests that we postpone sEEG until after the Epilepsy Walk she organizes in the first weekend in November      I had a lengthy, detailed discussion with the patient and her  about the risks, benefits, and alternatives to intracranial localization for seizures.      We discussed that monitoring typically takes 1-2 weeks but may be longer or shorter depending on how long it takes for her to have concordant seizures.  We discussed that she will not be able to get out of bed while the electrodes are in her head, and that she will remain in the ICU during that time. She may require mittens (she pulled out her grids in 2018 while postictal). We also discussed that this is a diagnostic, not therapeutic, procedure, and that the definitive surgery would potentially be performed 4-8 weeks after the time of sEEG localization.      We discussed that there is approximately 20% rate of a symptomatic hemorrhage and approximately 1% rate of symptomatic hemorrhage from placement of sEEG electrode per review of the international literature.  They expressed understanding of this.      We also discussed the specific risks of the localization surgery. Risks include, but are not limited to, bleeding, pain, infection, scarring, need for further/repeat procedure, death, paralysis, stroke (including venous infarct)/damage to major blood vessels, leak of cerebrospinal fluid, and hardware-related complications. There is a chance that we would fail to localize the seizures with the initial electrode plan, which would require placement of  additional electrodes, or may not lead to definitive surgery. Informed consent was obtained of the patient with her  present.       Post-Op Info:  Procedure(s) (LRB):  1.) PLACEMENT OF THE FIDUCIAL SCREWS 2.) PLACEMENT OF THE BILATERAL SEEG ELECTRODES WITH BRANDEE ROBOT (Bilateral)   6 Days Post-Op     Interval History: 11/13: Seizure overnight. Back to baseline today, using stationary bike. Had 1 BM yesterday    Medications:  Continuous Infusions:  Scheduled Meds:   cefTRIAXone (ROCEPHIN) IVPB  2 g Intravenous Q12H    heparin (porcine)  5,000 Units Subcutaneous Q8H    polyethylene glycol  17 g Oral BID    senna-docusate 8.6-50 mg  2 tablet Oral BID    sertraline  25 mg Oral Daily     PRN Meds:acetaminophen, bisacodyL, hydrALAZINE, HYDROmorphone, magnesium hydroxide 400 mg/5 ml, magnesium oxide, magnesium oxide, ondansetron, oxyCODONE, potassium bicarbonate, potassium bicarbonate, potassium bicarbonate, potassium, sodium phosphates, potassium, sodium phosphates, potassium, sodium phosphates     Review of Systems  Objective:     Weight: 99.5 kg (219 lb 5.7 oz)  Body mass index is 35.41 kg/m².  Vital Signs (Most Recent):  Temp: 98.6 °F (37 °C) (11/13/22 0305)  Pulse: (!) 52 (11/13/22 0800)  Resp: 14 (11/13/22 0800)  BP: 108/65 (11/13/22 0800)  SpO2: 96 % (11/13/22 0800)   Vital Signs (24h Range):  Temp:  [98.2 °F (36.8 °C)-98.6 °F (37 °C)] 98.6 °F (37 °C)  Pulse:  [47-63] 52  Resp:  [5-24] 14  SpO2:  [94 %-98 %] 96 %  BP: (102-155)/(53-85) 108/65     Date 11/13/22 0700 - 11/14/22 0659   Shift [id number] 24 Hour Total   INTAKE   I.V.(mL/kg) 10(0.1)   10(0.1)   Shift Total(mL/kg) 10(0.1)   10(0.1)   OUTPUT   Shift Total(mL/kg)       Weight (kg) 99.5 99.5 99.5 99.5                     Female External Urinary Catheter 11/08/22 0605 (Active)   Skin no redness;no breakdown 11/09/22 0305   Tolerance no signs/symptoms of discomfort 11/09/22 0305   Suction Continuous suction at 60 mmHg 11/08/22 7345    Date of last wick change 11/08/22 11/08/22 1905   Time of last wick change 1400 11/08/22 1505   Output (mL) 350 mL 11/09/22 0230       Physical Exam    Neurosurgery Physical Exam    General: well developed, well nourished, no distress.   Head: normocephalic, atraumatic  Neurologic:   GCS: Eyes: 4/ Verbal: 5/ Motor: 6  Mental Status: Awake, Alert, Oriented x 3  Cranial nerves: PERRL, EOMI, face symmetric, tongue midline, shoulder shrug equal.  No pronator drift, no dysmetria.  Sensory: intact to light touch throughout  Motor Strength:Moves all extremities antigravity    Gen: well nourished, normocephalic, atraumatic  CV: skin warm and dry, distal pulses present  Pulm: chest rise symmetric, no increased work of breathing  GI: abdomen soft, non-distended, non-tender  : patient voiding independently  MSK: no bony abnormalities noted  Psych: patient interacting with appropriate mood and affect      Significant Labs:  Recent Labs   Lab 11/12/22 0219 11/13/22 0142    102    139   K 4.5 4.3    107   CO2 25 21*   BUN 16 17   CREATININE 0.7 0.7   CALCIUM 10.0 9.6   MG 1.9 1.7       Recent Labs   Lab 11/12/22 0219 11/13/22 0142   WBC 6.91 7.03   HGB 15.8 15.1   HCT 46.4 45.1    195       No results for input(s): LABPT, INR, APTT in the last 48 hours.  Microbiology Results (last 7 days)       ** No results found for the last 168 hours. **          All pertinent labs from the last 24 hours have been reviewed.    Significant Diagnostics:  I have reviewed all pertinent imaging results/findings within the past 24 hours.  No results found in the last 24 hours.    Assessment/Plan:     * Temporal lobe epilepsy, intractable  A 52 y.o. female with hx of prior L temporal lobectomy and intractable seizure who admitted for sEEG 11/7    Continue ICU care  Neurocheck Q1hr  Pain control   CT head satisfactory   Continue EEG and monitoring  Continue abx ppax while leads in place; ceftriaxone   Off AEDs and  epilepsy following   Aggressive bowel regimen  Daily bike   Sating well at room   Keep SBP <150  ADAT  SQH for DVT ppx  Further care per NCC  NSGY will follow     Dispo: tentative lead removal Wednesday, 11/16        Cait Mackenzie MD  Neurosurgery  [facility]

## 2022-11-13 NOTE — ASSESSMENT & PLAN NOTE
Ms. Arrieta is a 52 yo woman with intractable epilepsy since age 18, s/p partial left anterior temporal lobectomy in 11/2018 admitted to NICU s/p placement of sEEG leads for further localization of seizures as part of repeat surgical workup    --SBP <160  -- epilepsy and NSGY team following  -- all AEDs discontinued   -- Neuro checks q 1 hr

## 2022-11-14 ENCOUNTER — SOCIAL WORK (OUTPATIENT)
Dept: NEUROLOGY | Facility: CLINIC | Age: 52
End: 2022-11-14
Payer: COMMERCIAL

## 2022-11-14 LAB
ALBUMIN SERPL BCP-MCNC: 3.3 G/DL (ref 3.5–5.2)
ALP SERPL-CCNC: 131 U/L (ref 55–135)
ALT SERPL W/O P-5'-P-CCNC: 39 U/L (ref 10–44)
ANION GAP SERPL CALC-SCNC: 11 MMOL/L (ref 8–16)
AST SERPL-CCNC: 23 U/L (ref 10–40)
BASOPHILS # BLD AUTO: 0.01 K/UL (ref 0–0.2)
BASOPHILS NFR BLD: 0.1 % (ref 0–1.9)
BILIRUB SERPL-MCNC: 0.4 MG/DL (ref 0.1–1)
BUN SERPL-MCNC: 16 MG/DL (ref 6–20)
CALCIUM SERPL-MCNC: 9.8 MG/DL (ref 8.7–10.5)
CHLORIDE SERPL-SCNC: 103 MMOL/L (ref 95–110)
CO2 SERPL-SCNC: 26 MMOL/L (ref 23–29)
CREAT SERPL-MCNC: 0.7 MG/DL (ref 0.5–1.4)
DIFFERENTIAL METHOD: ABNORMAL
EOSINOPHIL # BLD AUTO: 0.1 K/UL (ref 0–0.5)
EOSINOPHIL NFR BLD: 1.3 % (ref 0–8)
ERYTHROCYTE [DISTWIDTH] IN BLOOD BY AUTOMATED COUNT: 11.2 % (ref 11.5–14.5)
EST. GFR  (NO RACE VARIABLE): >60 ML/MIN/1.73 M^2
GLUCOSE SERPL-MCNC: 98 MG/DL (ref 70–110)
HCT VFR BLD AUTO: 45.3 % (ref 37–48.5)
HGB BLD-MCNC: 15.6 G/DL (ref 12–16)
IMM GRANULOCYTES # BLD AUTO: 0.02 K/UL (ref 0–0.04)
IMM GRANULOCYTES NFR BLD AUTO: 0.3 % (ref 0–0.5)
LYMPHOCYTES # BLD AUTO: 2.6 K/UL (ref 1–4.8)
LYMPHOCYTES NFR BLD: 33.1 % (ref 18–48)
MAGNESIUM SERPL-MCNC: 1.8 MG/DL (ref 1.6–2.6)
MCH RBC QN AUTO: 30.7 PG (ref 27–31)
MCHC RBC AUTO-ENTMCNC: 34.4 G/DL (ref 32–36)
MCV RBC AUTO: 89 FL (ref 82–98)
MONOCYTES # BLD AUTO: 0.6 K/UL (ref 0.3–1)
MONOCYTES NFR BLD: 7.8 % (ref 4–15)
NEUTROPHILS # BLD AUTO: 4.4 K/UL (ref 1.8–7.7)
NEUTROPHILS NFR BLD: 57.4 % (ref 38–73)
NRBC BLD-RTO: 0 /100 WBC
PHOSPHATE SERPL-MCNC: 4 MG/DL (ref 2.7–4.5)
PLATELET # BLD AUTO: 218 K/UL (ref 150–450)
PMV BLD AUTO: 9.6 FL (ref 9.2–12.9)
POTASSIUM SERPL-SCNC: 4.4 MMOL/L (ref 3.5–5.1)
PROT SERPL-MCNC: 7.1 G/DL (ref 6–8.4)
RBC # BLD AUTO: 5.08 M/UL (ref 4–5.4)
SODIUM SERPL-SCNC: 140 MMOL/L (ref 136–145)
WBC # BLD AUTO: 7.7 K/UL (ref 3.9–12.7)

## 2022-11-14 PROCEDURE — 95720 EEG PHY/QHP EA INCR W/VEEG: CPT | Mod: ,,, | Performed by: PSYCHIATRY & NEUROLOGY

## 2022-11-14 PROCEDURE — 95720 PR EEG, W/VIDEO, CONT RECORD, I&R, >12<26 HRS: ICD-10-PCS | Mod: ,,, | Performed by: PSYCHIATRY & NEUROLOGY

## 2022-11-14 PROCEDURE — 99233 SBSQ HOSP IP/OBS HIGH 50: CPT | Mod: ,,, | Performed by: PSYCHIATRY & NEUROLOGY

## 2022-11-14 PROCEDURE — 20000000 HC ICU ROOM

## 2022-11-14 PROCEDURE — 99233 PR SUBSEQUENT HOSPITAL CARE,LEVL III: ICD-10-PCS | Mod: ,,, | Performed by: PHYSICIAN ASSISTANT

## 2022-11-14 PROCEDURE — 25000003 PHARM REV CODE 250: Performed by: NURSE PRACTITIONER

## 2022-11-14 PROCEDURE — 25000003 PHARM REV CODE 250: Performed by: PSYCHIATRY & NEUROLOGY

## 2022-11-14 PROCEDURE — 80053 COMPREHEN METABOLIC PANEL: CPT | Performed by: PSYCHIATRY & NEUROLOGY

## 2022-11-14 PROCEDURE — 95714 VEEG EA 12-26 HR UNMNTR: CPT

## 2022-11-14 PROCEDURE — 94761 N-INVAS EAR/PLS OXIMETRY MLT: CPT

## 2022-11-14 PROCEDURE — 85025 COMPLETE CBC W/AUTO DIFF WBC: CPT | Performed by: PSYCHIATRY & NEUROLOGY

## 2022-11-14 PROCEDURE — 63600175 PHARM REV CODE 636 W HCPCS: Performed by: STUDENT IN AN ORGANIZED HEALTH CARE EDUCATION/TRAINING PROGRAM

## 2022-11-14 PROCEDURE — 25000003 PHARM REV CODE 250

## 2022-11-14 PROCEDURE — 63600175 PHARM REV CODE 636 W HCPCS

## 2022-11-14 PROCEDURE — 84100 ASSAY OF PHOSPHORUS: CPT | Performed by: PSYCHIATRY & NEUROLOGY

## 2022-11-14 PROCEDURE — 83735 ASSAY OF MAGNESIUM: CPT | Performed by: PSYCHIATRY & NEUROLOGY

## 2022-11-14 PROCEDURE — 99233 SBSQ HOSP IP/OBS HIGH 50: CPT | Mod: ,,, | Performed by: PHYSICIAN ASSISTANT

## 2022-11-14 PROCEDURE — 99233 PR SUBSEQUENT HOSPITAL CARE,LEVL III: ICD-10-PCS | Mod: ,,, | Performed by: PSYCHIATRY & NEUROLOGY

## 2022-11-14 RX ORDER — LAMOTRIGINE 150 MG/1
300 TABLET ORAL NIGHTLY
Status: DISCONTINUED | OUTPATIENT
Start: 2022-11-15 | End: 2022-11-17 | Stop reason: HOSPADM

## 2022-11-14 RX ORDER — LAMOTRIGINE 150 MG/1
150 TABLET ORAL ONCE
Status: COMPLETED | OUTPATIENT
Start: 2022-11-14 | End: 2022-11-14

## 2022-11-14 RX ADMIN — LAMOTRIGINE 150 MG: 150 TABLET ORAL at 09:11

## 2022-11-14 RX ADMIN — HEPARIN SODIUM 5000 UNITS: 5000 INJECTION INTRAVENOUS; SUBCUTANEOUS at 05:11

## 2022-11-14 RX ADMIN — SERTRALINE HYDROCHLORIDE 25 MG: 25 TABLET ORAL at 09:11

## 2022-11-14 RX ADMIN — HEPARIN SODIUM 5000 UNITS: 5000 INJECTION INTRAVENOUS; SUBCUTANEOUS at 09:11

## 2022-11-14 RX ADMIN — POLYETHYLENE GLYCOL 3350 17 G: 17 POWDER, FOR SOLUTION ORAL at 09:11

## 2022-11-14 RX ADMIN — CEFTRIAXONE SODIUM 2 G: 2 INJECTION, SOLUTION INTRAVENOUS at 02:11

## 2022-11-14 RX ADMIN — CENOBAMATE 100 MG: 100 TABLET, FILM COATED ORAL at 10:11

## 2022-11-14 RX ADMIN — HEPARIN SODIUM 5000 UNITS: 5000 INJECTION INTRAVENOUS; SUBCUTANEOUS at 04:11

## 2022-11-14 RX ADMIN — CEFTRIAXONE SODIUM 2 G: 2 INJECTION, SOLUTION INTRAVENOUS at 01:11

## 2022-11-14 RX ADMIN — SENNOSIDES AND DOCUSATE SODIUM 2 TABLET: 50; 8.6 TABLET ORAL at 09:11

## 2022-11-14 NOTE — SUBJECTIVE & OBJECTIVE
Interval History: No seizures noted overnight, daughter visiting this morning. Will resume lower dose AEDs tonight, home doses 11/15 in anticipation of sEEG lead removal 11/16.    Current Facility-Administered Medications   Medication Dose Route Frequency Provider Last Rate Last Admin    acetaminophen tablet 1,000 mg  1,000 mg Oral Q8H PRN Mo Kincaid MD   1,000 mg at 11/09/22 1430    bisacodyL suppository 10 mg  10 mg Rectal Daily PRN Any Ruiz MD        cefTRIAXone (ROCEPHIN) 2 g/50 mL D5W IVPB  2 [facility]H [facility]   Stopped at 11/14/22 0259    heparin (porcine) injection 5,000 Units  5,000 Units Subcutaneous Q8H Ava Valdez MD   5,000 Units at 11/14/22 0515    hydrALAZINE injection 10 mg  10 mg Intravenous Q6H PRN Ann Cohen PA-C        HYDROmorphone injection 1 mg  1 mg Intravenous Q6H PRN Mo Kincaid MD   1 mg at 11/07/22 1241    magnesium hydroxide 400 mg/5 ml suspension 2,400 mg  30 mL Oral Daily PRN Josafat Arevalo MD        magnesium oxide tablet 800 mg  800 mg Oral PRN Ann Cohen PA-C   800 mg at 11/13/22 0955    magnesium oxide tablet 800 mg  800 mg Oral PRN Ann Cohen PA-C        ondansetron injection 4 mg  4 mg Intravenous Q12H PRN Mo Kincaid MD        oxyCODONE immediate release tablet 5 mg  5 mg Oral Q6H PRN Mo Kincaid MD   5 mg at 11/08/22 1928    polyethylene glycol packet 17 g  17 g Oral BID Tiffanie Irwin NP   17 g at 11/14/22 0952    potassium bicarbonate disintegrating tablet 35 mEq  35 mEq Oral PRN Ann Cohen PA-C        potassium bicarbonate disintegrating tablet 50 mEq  50 mEq Oral PRN Ann Cohen PA-C        potassium bicarbonate disintegrating tablet 60 mEq  60 mEq Oral PRN Ann Cohen PA-C        potassium, sodium phosphates 280-160-250 mg packet 2 packet  2 packet Oral PRN Ann Cohen PA-C        potassium, sodium phosphates 280-160-250 mg packet 2 packet  2 packet Oral  PRN Ann Cohen PA-C        potassium, sodium phosphates 280-160-250 mg packet 2 packet  2 packet Oral PRN Ann Cohen PA-C        senna-docusate 8.6-50 mg per tablet 2 tablet  2 tablet Oral BID Tiffanie Irwin NP   2 tablet at 11/14/22 0952    sertraline tablet 25 mg  25 mg Oral Daily Lisset Arboleda NP   25 mg at 11/14/22 0952     Continuous Infusions:    Review of Systems   HENT:          Jaw pain improving   Neurological:  Positive for seizures. Negative for speech difficulty and weakness.   All other systems reviewed and are negative.  Objective:     Vital Signs (Most Recent):  Temp: 97.5 °F (36.4 °C) (11/14/22 0705)  Pulse: (!) 51 (11/14/22 1100)  Resp: 10 (11/14/22 1100)  BP: 124/70 (11/14/22 1100)  SpO2: 95 % (11/14/22 1100)   Vital Signs (24h Range):  Temp:  [97.5 °F (36.4 °C)-98.6 °F (37 °C)] 97.5 °F (36.4 °C)  Pulse:  [48-69] 51  Resp:  [9-103] 10  SpO2:  [94 %-98 %] 95 %  BP: (103-134)/(58-89) 124/70     Weight: 99.5 kg (219 lb 5.7 oz)  Body mass index is 35.41 kg/m².    Physical Exam  Vitals and nursing note reviewed.   Constitutional:       General: She is not in acute distress.     Appearance: She is not diaphoretic.   HENT:      Head: Normocephalic and atraumatic.      Comments: sEEG leads with headwrap in place  Eyes:      General: No scleral icterus.     Extraocular Movements: Extraocular movements intact.      Conjunctiva/sclera: Conjunctivae normal.      Pupils: Pupils are equal, round, and reactive to light.   Cardiovascular:      Rate and Rhythm: Normal rate.   Pulmonary:      Effort: Pulmonary effort is normal. No respiratory distress.   Abdominal:      General: There is no distension.      Palpations: Abdomen is soft.      Tenderness: There is no abdominal tenderness.   Musculoskeletal:         General: No swelling, tenderness or deformity. Normal range of motion.      Cervical back: Neck supple. No rigidity.   Skin:     General: Skin is warm and dry.   Neurological:      General:  No focal deficit present.      Mental Status: She is alert and oriented to person, place, and time. Mental status is at baseline.   Psychiatric:         Mood and Affect: Mood normal.         Speech: Speech normal.         Behavior: Behavior normal.       NEUROLOGICAL EXAMINATION:     MENTAL STATUS   Oriented to person, place, and time.   Attention: normal. Concentration: normal.   Speech: speech is normal   Level of consciousness: alert    CRANIAL NERVES     CN III, IV, VI   Pupils are equal, round, and reactive to light.    CN VII   Facial expression full, symmetric.     CN VIII   Hearing: intact    CN IX, X   CN IX normal.     CN XI   CN XI normal.     CN XII   CN XII normal.     MOTOR EXAM   Muscle bulk: normal  Overall muscle tone: normal       Moves all extremities spontaneously and symmetrically, follows commands  No focal deficits noted     Significant Labs: All pertinent lab results from the past 24 hours have been reviewed.    Significant Studies: I have reviewed all pertinent imaging results/findings within the past 24 hours.

## 2022-11-14 NOTE — PLAN OF CARE
[facility] Care Plan    POC reviewed with Liza Arrieta and family at 0300. Pt verbalized understanding. Questions and concerns addressed. No acute events overnight. Pt progressing toward goals. Will continue to monitor. See below and flowsheets for full assessment and VS info.         Is this a stroke patient? no    Neuro:  [place] Coma Scale  Best Eye Response: 4-->(E4) spontaneous  Best Motor Response: 6-->(M6) obeys commands  Best Verbal Response: 5-->(V5) oriented  [place] Coma Scale Score: 15  Assessment Qualifiers: patient not sedated/intubated  Pupil PERRLA: yes     24hr Temp:  [98.2 °F (36.8 °C)-98.6 °F (37 °C)]     CV:   Rhythm: normal sinus rhythm  BP goals:   SBP < 160  MAP > 65    Resp:   O2 Device (Oxygen Therapy): room air       Plan: N/A    GI/:     Diet/Nutrition Received: regular  Last Bowel Movement: 11/12/22  Voiding Characteristics: external catheter    Intake/Output Summary (Last 24 hours) at 11/14/2022 0503  Last data filed at 11/14/2022 0405  Gross per 24 hour   Intake 293.86 ml   Output 1200 ml   Net -906.14 ml     Unmeasured Output  Urine Occurrence: 1  Stool Occurrence: 0  Emesis Occurrence: 0  Pad Count: 1    Labs/Accuchecks:  Recent Labs   Lab 11/14/22  0157   WBC 7.70   RBC 5.08   HGB 15.6   HCT 45.3         Recent Labs   Lab 11/14/22  0157      K 4.4   CO2 26      BUN 16   CREATININE 0.7   ALKPHOS 131   ALT 39   AST 23   BILITOT 0.4    No results for input(s): PROTIME, INR, APTT, HEPANTIXA in the last 168 hours. No results for input(s): CPK, CPKMB, TROPONINI, MB in the last 168 hours.    Electrolytes: N/A - electrolytes WDL  Accuchecks: none    Gtts:      LDA/Wounds:  Lines/Drains/Airways       Drain  Duration             Female External Urinary Catheter 11/08/22 0605 5 days              Peripheral Intravenous Line  Duration                  Peripheral IV - Single Lumen 11/11/22 1721 20 G Anterior;Left Forearm 2 days         Peripheral IV - Single Lumen 11/11/22 1722 20  G Anterior;Right Forearm 2 days                  Wounds: Yes  Wound care consulted: No

## 2022-11-14 NOTE — PLAN OF CARE
[facility]  Discharge Reassessment    Primary Care Provider: Rc Rodriguez MD    Expected Discharge Date: 11/17/2022      Per MD: Plan for OR 11/16 for sEEG lead removal.  Not medically ready for discharge.      Reassessment (most recent)       Discharge Reassessment - 11/14/22 1219          Discharge Reassessment    Assessment Type Discharge Planning Reassessment     Did the patient's condition or plan change since previous assessment? No     Communicated RYAN with patient/caregiver Date not available/Unable to determine     Discharge Plan A Home with family     Discharge Plan B Home Health     DME Needed Upon Discharge  none     Discharge Barriers Identified None     Why the patient remains in the hospital Requires continued medical care                     Kenna Fong RN, CCRN-K, [facility]  Neuro-Critical Care   X [phone]

## 2022-11-14 NOTE — MEDICAL/APP STUDENT
"CONSULTATION LIAISON PSYCHIATRY INITIAL EVALUATION    Patient Name: Liza Arrieta  MRN: [id number]  Patient Class: IP- Inpatient  Admission Date: 11/7/2022  Attending Physician: Cassandra Woodall MD      HPI:   Liza Arrieta is a 52 y.o. female with past psychiatric history of post-partum depression & past pertinent medical history of intractable temporal lobe epilepsy presents to the ED/ admitted to the hospital for Temporal lobe epilepsy, intractable.    Psychiatry consulted for adjustment disorder/boredom. Not contacted by primary team.    On psych exam, patient is laying in bed complaining of boredom. She states she is "bored to tears." Patient states she has an active lifestyle where she works out daily after work and that being bed-ridden for the past week has been difficult. Patient says she will feel much better once she is out of the hospital on Wednesday. She denies past or present depression/anxiety, except for 2 episodes of postpartum depression in 1996 and 2003. She is prescribed Zoloft by her primary care provider due to experiencing some feelings of depression immediately after her seizures, however she denies long term or persistent feelings of depression. Patient has never had an outpatient psychiatrist, psychiatric hospitalization, or suicide attempt. She denies past or present SI. Patient does not think there is anything we can help her with. She was not feeling depressed prior to being hospitalized and states that she is feeling very hopeful for the future and motivated about overcoming her condition.    Collateral with pts permission:   Neymar Arrieta, Spouse. [phone]    No collateral needed at this time.    Medical Review of Systems:  Pertinent items are noted in HPI.    Psychiatric Review of Systems (is patient experiencing or having changes in):  sleep: Patient did not sleep well last night but has otherwise reported no changes in sleep.  appetite: yes, decreased  weight: " "yes  energy/anergy: yes  interest/pleasure/anhedonia: no  somatic symptoms: no  libido: no  anxiety/panic: no  guilty/hopelessness: no  concentration: no  Zina:no  Psychosis: no  Trauma: no  S.I.B.s/risky behavior: no    Past Psychiatric History:  Previous Medication Trials: Patient is taking Lamotrigine and Cenobamate for seizures and Zoloft due to short feelings of depression after seizures.   Previous Psychiatric Hospitalizations:no   Previous Suicide Attempts: no  History of Violence: no  Outpatient Psychiatrist: no  Family Psychiatric History: no    Substance Abuse History (with emphasis over the last 12 months):  Recreational Drugs:  denies  Use of Alcohol: minimal use  Tobacco Use:no  Rehab History:no    Social History:  Marital Status:   Children: 4  Employment Status/Info: currently employed  :no  Education: college graduate  Special Ed: no  Housing Status: with partner / significant other  Access to gun: no  Psychosocial Stressors: health  Functioning Relationships: good support system and good relationship with spouse or significant other    Legal History:  Past Charges/Incarcerations: no  Pending charges:no    Mental Status Exam:  Arousal: alert  Sensorium/Orientation: oriented to grossly intact  Behavior/Cooperation: normal, cooperative   Speech: normal tone, normal rate, normal pitch, normal volume  Language: grossly intact  Gait and Station: unable to assess - patient lying down or seated  Involuntary Movements and Motor Activity: no abnormal involuntary movements noted, no psychomotor agitation or retardation  Mood: " bored "   Affect: appropriate  Thought Process: normal and logical  Associations: intact, no loosening of associations  Thought Content: normal, no suicidality, no homicidality, delusions, or paranoia   Attention/Concentration:  intact  Memory:    Recent:  Intact   Remote: Intact   3/3 immediate, 3/3 at 5 minutes  Fund of Knowledge: Aware of current events   Abstract " reasoning: proverbs were abstract, similarities were abstract  Insight: intact  Judgment: behavior is adequate to circumstances     CAM ICU positive? no      ASSESSMENT & RECOMMENDATIONS     Adjustment disorder  Continue home Zoloft.  Doesn't warrant any med management in the inpt setting defer to outpt    Temporal Lobe Epilepsy, intractable      RISK ASSESSMENT  NO NEED FOR PEC     FOLLOW UP  Will sign off pt can follow up with primary care provider    DISPOSITION- Once medically cleared;   Defer to medical team    Please contact ON CALL psychiatry service () for any acute issues that may arise.    [facility]-4   Psychiatry  [facility]  2022 8:46 AM        --------------------------------------------------------------------------------------------------------------------------------------------------------------------------------------------------------------------------------------    CONTINUED HISTORY & OBJECTIVE clinical data & findings reviewed and noted for above decision making    Past Medical/Surgical History:   Past Medical History:   Diagnosis Date    Convulsions     Epilepsy     Seizures      Past Surgical History:   Procedure Laterality Date    APPENDECTOMY       SECTION      4    CHOLECYSTECTOMY      CRANIOTOMY N/A 2018    Procedure: CRANIOTOMY for strip and grid;  Surgeon: Ruben Senior MD;  Location: [facility] OR [address] [place] [address];  Service: Neurosurgery;  Laterality: N/A;  toronto II, asa 2, type and screen, regular bed, Marvell, supine     CRANIOTOMY Left 2018    Procedure: CRANIOTOMY for grids and strips;  Surgeon: Ruben Senior MD;  Location: [facility] OR [address] [place] [address];  Service: Neurosurgery;  Laterality: Left;    HYSTERECTOMY      around 2016 for pain ful periods     INSERTION OF SUBDURAL GRID AND STRIP ELECTRODES BY CRANIOTOMY Left 2018    Procedure: CRANIOTOMY, FOR SUBDURAL GRID AND STRIP ELECTRODE LEAD Removal.;  Surgeon: Ruben Senior MD;  Location:  [facility] OR 2ND FLR;  Service: Neurosurgery;  Laterality: Left;  needs microscope and stealth-cg    SURGICAL REMOVAL OF LOBE OF BRAIN Left 11/19/2018    Procedure: LOBECTOMY, BRAIN left temporal lobe.;  Surgeon: Ruben Senior MD;  Location: [facility] OR 2ND FLR;  Service: Neurosurgery;  Laterality: Left;       Current Medications:   Scheduled Meds:    cefTRIAXone (ROCEPHIN) IVPB  2 g Intravenous Q12H    heparin (porcine)  5,000 Units Subcutaneous Q8H    polyethylene glycol  17 g Oral BID    senna-docusate 8.6-50 mg  2 tablet Oral BID    sertraline  25 mg Oral Daily     PRN Meds: acetaminophen, bisacodyL, hydrALAZINE, HYDROmorphone, magnesium hydroxide 400 mg/5 ml, magnesium oxide, magnesium oxide, ondansetron, oxyCODONE, potassium bicarbonate, potassium bicarbonate, potassium bicarbonate, potassium, sodium phosphates, potassium, sodium phosphates, potassium, sodium phosphates  OTC Meds: ***    Allergies:   Review of patient's allergies indicates:  No Known Allergies    Vitals  Vitals:    11/14/22 0800   BP: (!) 118/59   Pulse: (!) 50   Resp: 14   Temp:        Labs/Imaging/Studies:  Recent Results (from the past 24 hour(s))   CBC auto differential    Collection Time: 11/14/22  1:57 AM   Result Value Ref Range    WBC 7.70 3.90 - 12.70 K/uL    RBC 5.08 4.00 - 5.40 M/uL    Hemoglobin 15.6 12.0 - 16.0 g/dL    Hematocrit 45.3 37.0 - 48.5 %    MCV 89 82 - 98 fL    MCH 30.7 27.0 - 31.0 pg    MCHC 34.4 32.0 - 36.0 g/dL    RDW 11.2 (L) 11.5 - 14.5 %    Platelets 218 150 - 450 K/uL    MPV 9.6 9.2 - 12.9 fL    Immature Granulocytes 0.3 0.0 - 0.5 %    Gran # (ANC) 4.4 1.8 - 7.7 K/uL    Immature Grans (Abs) 0.02 0.00 - 0.04 K/uL    Lymph # 2.6 1.0 - 4.8 K/uL    Mono # 0.6 0.3 - 1.0 K/uL    Eos # 0.1 0.0 - 0.5 K/uL    Baso # 0.01 0.00 - 0.20 K/uL    nRBC 0 0 /100 WBC    Gran % 57.4 38.0 - 73.0 %    Lymph % 33.1 18.0 - 48.0 %    Mono % 7.8 4.0 - 15.0 %    Eosinophil % 1.3 0.0 - 8.0 %    Basophil % 0.1 0.0 - 1.9 %    Differential Method  Automated    Comprehensive metabolic panel    Collection Time: 11/14/22  1:57 AM   Result Value Ref Range    Sodium 140 136 - 145 mmol/L    Potassium 4.4 3.5 - 5.1 mmol/L    Chloride 103 95 - 110 mmol/L    CO2 26 23 - 29 mmol/L    Glucose 98 70 - 110 mg/dL    BUN 16 6 - 20 mg/dL    Creatinine 0.7 0.5 - 1.4 mg/dL    Calcium 9.8 8.7 - 10.5 mg/dL    Total Protein 7.1 6.0 - 8.4 g/dL    Albumin 3.3 (L) 3.5 - 5.2 g/dL    Total Bilirubin 0.4 0.1 - 1.0 mg/dL    Alkaline Phosphatase 131 55 - 135 U/L    AST 23 10 - 40 U/L    ALT 39 10 - 44 U/L    Anion Gap 11 8 - 16 mmol/L    eGFR >60.0 >60 mL/min/1.73 m^2   Magnesium    Collection Time: 11/14/22  1:57 AM   Result Value Ref Range    Magnesium 1.8 1.6 - 2.6 mg/dL   Phosphorus    Collection Time: 11/14/22  1:57 AM   Result Value Ref Range    Phosphorus 4.0 2.7 - 4.5 mg/dL

## 2022-11-14 NOTE — PROGRESS NOTES
"2145: RN noted that EEG machine screen said "waiting for connection" RN noted a study had not been running. Rnx2 attempted to start study- unable to restart study due to multiple errors, Senia BUCKLEY notified, Charge RN notified, EEG tech paged    0806: RN spone with EEG tech, EEG tech stated she did not have the activation code to restart study, stated she will contact her supervisor    5615: EEG tech contacted RN and stated she is going to call IT to place a ticket for the EEG machine. RN requested that EEG tech notify IT to call RN at [phone] when issue has been resolved.     0023: IT notified RN that EEG machine has been fixed and study has been resumed        "

## 2022-11-14 NOTE — PROGRESS NOTES
[facility]  Neurology-Epilepsy  Progress Note    Patient Name: Liza Arrieta  MRN: [id number]  Admission Date: 11/7/2022  Hospital Length of Stay: 7 days  Code Status: Full Code   Attending Provider: Cassandra Woodall MD  Primary Care Physician: Rc Rodriguez MD   Principal Problem:Temporal lobe epilepsy, intractable    Subjective:     Hospital Course:   Ms. Arrieta is a 51 year old woman with intractable epilepsy since age 18 s/p partial left anterior temporal lobectomy in 11/2018 admitted to NICU s/p placement of 6 right-sided and 6 left-sided sEEG leads for further localization of seizures as part of repeat surgical workup. Patient is currently maintained on Lamotrigine 200 mg qAM/300 mg qPM and Cenobamate 150 mg qHS with continued events 7-8 times per month of lapse of awareness, staring.   11/7>11/8: Home Cenobamate decreased to 100 mg qHS, Lamotrigine 200 mg qAM/300 mg qPM continued on admission. No seizures overnight. On 11/8 pm, begin to hold Cenobomate and Lamotrigine.  11/8>11/9: Seizure 11/9 approximately 0200 with right hemispheric origin, patient reported she thought she had a seizure at approximately 0230. Additional seizures 0745 and 0852. Will continue close vEEG, attempt to capture additional seizures for further localization and concordance.   11/9>11/10: No further seizures overnight, patient reports possible event 11/10 approximately 10:40, additional seizure noted on EEG around this time. Please see EEG report for full details. Continue holding home Cenobamate, Lamotrigine to capture additional seizures.   11/10>11/11: No electrographic seizures noted overnight, continue to hold home Cenobamate and Lamotrigine, attempt to capture additional seizures.  11/11>11/12: No electrographic seizures noted overnight outside mapping.  Holding AEDs.  11/12>11/13: Continued holding AEDs, no clinical seizures noted overnight.  11/13>11/14: No clinical seizures overnight, doing well this  morning. Plan to give Cenobamate 50 mg, Lamotrigine 150 mg 11/14 pm, resume home doses 11/15 am (Lamotrigine 200 mg qAM/300 mg qPM, Cenobamate 150 mg qHS).      Interval History: No seizures noted overnight, daughter visiting this morning. Will resume lower dose AEDs tonight, home doses 11/15 in anticipation of sEEG lead removal 11/16.    Current Facility-Administered Medications   Medication Dose Route Frequency Provider Last Rate Last Admin    acetaminophen tablet 1,000 mg  1,000 mg Oral Q8H PRN Mo Kincaid MD   1,000 mg at 11/09/22 1430    bisacodyL suppository 10 mg  10 mg Rectal Daily PRN Any Ruiz MD        cefTRIAXone (ROCEPHIN) 2 g/50 mL D5W IVPB  2 g Intravenous Q12H Ramona Downs MD   Stopped at 11/14/22 0259    heparin (porcine) injection 5,000 Units  5,000 Units Subcutaneous Q8H Ava Valdez MD   5,000 Units at 11/14/22 0515    hydrALAZINE injection 10 mg  10 mg Intravenous Q6H PRN Ann Cohen PA-C        HYDROmorphone injection 1 mg  1 mg Intravenous Q6H PRN Mo Kincaid MD   1 mg at 11/07/22 1241    magnesium hydroxide 400 mg/5 ml suspension 2,400 mg  30 mL Oral Daily PRN Josafat Arevalo MD        magnesium oxide tablet 800 mg  800 mg Oral PRN Ann Cohen PA-C   800 mg at 11/13/22 0955    magnesium oxide tablet 800 mg  800 mg Oral PRN Ann Cohen PA-C        ondansetron injection 4 mg  4 mg Intravenous Q12H PRN Mo Kincaid MD        oxyCODONE immediate release tablet 5 mg  5 mg Oral Q6H PRN Mo Kincaid MD   5 mg at 11/08/22 1928    polyethylene glycol packet 17 g  17 g Oral BID Tiffanie Irwin NP   17 g at 11/14/22 0952    potassium bicarbonate disintegrating tablet 35 mEq  35 mEq Oral PRN Ann Cohen PA-C        potassium bicarbonate disintegrating tablet 50 mEq  50 mEq Oral PRN Ann Cohen PA-C        potassium bicarbonate disintegrating tablet 60 mEq  60 mEq Oral PRN Ann Cohen PA-C        potassium,  sodium phosphates 280-160-250 mg packet 2 packet  2 packet Oral PRN Ann Cohen PA-C        potassium, sodium phosphates 280-160-250 mg packet 2 packet  2 packet Oral PRN Ann Cohen PA-C        potassium, sodium phosphates 280-160-250 mg packet 2 packet  2 packet Oral PRN Ann Cohen PA-C        senna-docusate 8.6-50 mg per tablet 2 tablet  2 tablet Oral BID Tiffanie Irwin, NP   2 tablet at 11/14/22 0952    sertraline tablet 25 mg  25 mg Oral Daily Lisset Arboleda, NP   25 mg at 11/14/22 0952     Continuous Infusions:    Review of Systems   HENT:          Jaw pain improving   Neurological:  Positive for seizures. Negative for speech difficulty and weakness.   All other systems reviewed and are negative.  Objective:     Vital Signs (Most Recent):  Temp: 97.5 °F (36.4 °C) (11/14/22 0705)  Pulse: (!) 51 (11/14/22 1100)  Resp: 10 (11/14/22 1100)  BP: 124/70 (11/14/22 1100)  SpO2: 95 % (11/14/22 1100)   Vital Signs (24h Range):  Temp:  [97.5 °F (36.4 °C)-98.6 °F (37 °C)] 97.5 °F (36.4 °C)  Pulse:  [48-69] 51  Resp:  [9-103] 10  SpO2:  [94 %-98 %] 95 %  BP: (103-134)/(58-89) 124/70     Weight: 99.5 kg (219 lb 5.7 oz)  Body mass index is 35.41 kg/m².    Physical Exam  Vitals and nursing note reviewed.   Constitutional:       General: She is not in acute distress.     Appearance: She is not diaphoretic.   HENT:      Head: Normocephalic and atraumatic.      Comments: sEEG leads with headwrap in place  Eyes:      General: No scleral icterus.     Extraocular Movements: Extraocular movements intact.      Conjunctiva/sclera: Conjunctivae normal.      Pupils: Pupils are equal, round, and reactive to light.   Cardiovascular:      Rate and Rhythm: Normal rate.   Pulmonary:      Effort: Pulmonary effort is normal. No respiratory distress.   Abdominal:      General: There is no distension.      Palpations: Abdomen is soft.      Tenderness: There is no abdominal tenderness.   Musculoskeletal:         General: No  swelling, tenderness or deformity. Normal range of motion.      Cervical back: Neck supple. No rigidity.   Skin:     General: Skin is warm and dry.   Neurological:      General: No focal deficit present.      Mental Status: She is alert and oriented to person, place, and time. Mental status is at baseline.   Psychiatric:         Mood and Affect: Mood normal.         Speech: Speech normal.         Behavior: Behavior normal.       NEUROLOGICAL EXAMINATION:     MENTAL STATUS   Oriented to person, place, and time.   Attention: normal. Concentration: normal.   Speech: speech is normal   Level of consciousness: alert    CRANIAL NERVES     CN III, IV, VI   Pupils are equal, round, and reactive to light.    CN VII   Facial expression full, symmetric.     CN VIII   Hearing: intact    CN IX, X   CN IX normal.     CN XI   CN XI normal.     CN XII   CN XII normal.     MOTOR EXAM   Muscle bulk: normal  Overall muscle tone: normal       Moves all extremities spontaneously and symmetrically, follows commands  No focal deficits noted     Significant Labs: All pertinent lab results from the past 24 hours have been reviewed.    Significant Studies: I have reviewed all pertinent imaging results/findings within the past 24 hours.    Assessment and Plan:     * Temporal lobe epilepsy, intractable  Ms. Arrieta is a 50 yo woman with intractable epilepsy since age 18, s/p partial left anterior temporal lobectomy in 11/2018 admitted to NICU s/p placement of 6 right sided and 6 left sided sEEG leads for further localization of seizures as part of repeat surgical workup. Patient is currently maintained on lamotrigine 200 mg qAM/300 mg qPM and Cenobamate 150 mg qHS with continued events 7-8 times per month of lapse of awareness, staring.    Recommendations:  - s/p placement of 6 left-sided and 6 right-sided sEEG electrodes 11/7 by ROSIE  - No seizures noted overnight 11/13>11/14  - 11/14 pm will give Cenobamate 50 mg, Lamotrigine 150 mg   - On  11/15, resume home AED doses - Lamotrigine 200 mg qAM/300 mg qPM, Cenobamate 150 mg qHS in anticipation of sEEG lead removal 11/16  - Seizure precautions  - Please call epilepsy on call prior to administration of IV benzodiazepines for prolonged seizures  - Post-operative imaging timing, pain control per NCC/NSGY    Plan of care discussed with NCC team, patient at bedside. Will continue to follow, please call with any questions.     Depression  Chronic  - Continue home Sertraline        VTE Risk Mitigation (From admission, onward)         Ordered     heparin (porcine) injection 5,000 Units  Every 8 hours         11/07/22 1232     IP VTE HIGH RISK PATIENT  Once         11/07/22 1232     Place sequential compression device  Until discontinued         11/07/22 1232     Place sequential compression device  Until discontinued         11/07/22 0628     Place LAURA hose  Until discontinued         11/07/22 0628                Philly Foy PA-C  Neurology-Epilepsy  [facility]  Staff: Dr. Torres

## 2022-11-14 NOTE — PROGRESS NOTES
[facility]  Neurocritical Care  Progress Note    Admit Date: 11/7/2022  Service Date: 11/14/2022  Length of Stay: 7    Subjective:     Chief Complaint: Temporal lobe epilepsy, intractable    History of Present Illness: Ms. Arrieta is a 52 yo woman with intractable epilepsy since age 18, s/p partial left anterior temporal lobectomy in 11/2018 admitted to NICU s/p placement of sEEG leads for further localization of seizures as part of repeat surgical workup. After left anterior temporal lobectomy, patient reports brief period of seizure freedom, before beginning to have seizures again approximately 3 months afterwards. She reports current seizures are different than her typical semiology prior to surgery, notably she believes she now loses awareness and stares off. After her events, she is quickly back to baseline and is able to report that she had a seizure. No associated tongue biting, bowel/bladder incontinence. She is currently maintained on Lamotrigine 200 mg qAM/300 mg qPM and Cenobamate 150 mg nightly. She reports current seizure frequency of 7-8 per month, with last seizure on 10/31. Unable to identify triggers for seizures other than missing medication. MRI brain completed 2018 showed evidence of left frontotemporal crani for partial left anterior temporal resection. MRI brain epilepsy protocol in 10/2022 with post-operative changes with left temporal partial lobectomy, interval development of susceptibility in the right superior frontal sulcus compatible with component of superficial siderosis. EEG completed 5/30/22 noted mild regional or subcortical dysfunction in bilateral temporal lobes with possible seizure focus in right anterior temporal region. During EMU admission 7/5/22>7/10/22, patient noted to have multiple right temporal lobe seizures, regional cortical dysfunction from left temporal region and bilateral independent seizure foci in left and right temporal lobes. Patient  discussed in multidisciplinary epilepsy surgery conference, with recommendation for phase 2 monitoring for further localization. Patient elected to proceed, admitted to NICU after placement of 6 right sided sEEG electrodes and 6 left sided sEEG electrodes. Patient admitted to [facility] for close  monitoring and higher level of care.        Hospital Course: 11/8/2022: all AED's discontinued today per epilepsy   11/09/2022 three electrographic seizures overnight  11/10/22: NAEON  11/11/22: two seizures today  11/12/22: NAEON  11/13/22: No seizure activity noted overnight. SBP<160  11/14/22: No seizure activity overnight, pt with no complaints other than boredom, denies depressed mood, thoughts of self harm, or hallucinations. Plan for OR 11/16 for sEEG lead removal.         Review of Systems   Constitutional: Negative.  Negative for chills and fever.   HENT: Negative.  Negative for congestion.    Eyes: Negative.  Negative for visual disturbance.   Respiratory: Negative.  Negative for cough and shortness of breath.    Cardiovascular: Negative.  Negative for chest pain, palpitations and leg swelling.   Gastrointestinal: Negative.  Negative for abdominal distention and abdominal pain.   Endocrine: Negative.    Genitourinary: Negative.    Musculoskeletal: Negative.  Negative for arthralgias.   Skin: Negative.    Neurological:  Positive for seizures (no activity overnight). Negative for dizziness, tremors, syncope, facial asymmetry, speech difficulty, weakness, light-headedness, numbness and headaches.   Psychiatric/Behavioral:  Negative for agitation and behavioral problems.      Objective:     Vitals:  Temp: 97.5 °F (36.4 °C)  Pulse: (!) 49  Rhythm: normal sinus rhythm  BP: 121/62  MAP (mmHg): 83  Resp: 12  SpO2: 96 %  O2 Device (Oxygen Therapy): room air    Temp  Min: 97.5 °F (36.4 °C)  Max: 98.6 °F (37 °C)  Pulse  Min: 48  Max: 69  BP  Min: 103/66  Max: 134/69  MAP (mmHg)  Min: 79  Max: 100  Resp  Min: 9  Max: 103  SpO2   Min: 94 %  Max: 98 %    11/13 0701 - 11/14 0700  In: 303.9 [I.V.:110]  Out: 1650 [Urine:1650]   Unmeasured Output  Urine Occurrence: 1  Stool Occurrence: 0  Emesis Occurrence: 0  Pad Count: 1       Physical Exam    General: well developed, well nourished, no distress.   HEENT: normocephalic, atraumatic  CV: regular rate   Pulmonary: normal respirations, no signs of respiratory distress  Abdomen: soft, non-distended, not tender to palpation  Skin: Skin is warm, dry and intact.      Neurologic:   Mental Status: AO x4, no aphasia, no dysarthria   CN: PERRL, EOMI, sensation intact in V1-V3 distributions, eyebrow raise and grimace symmetric, tongue midline, SCM 5/5, trapezius 5/5  Motor: moves all extremities spontaneously, full strength throughout, no pronator drift   Sensory: intact to light touch throughout  Gait: deferred  Psych: denies SI, AH, VH     Medications:  Continuous ScheduledcefTRIAXone (ROCEPHIN) IVPB, 2 g, Q12H  heparin (porcine), 5,000 Units, Q8H  polyethylene glycol, 17 g, BID  senna-docusate 8.6-50 mg, 2 tablet, BID  sertraline, 25 mg, Daily    PRNacetaminophen, 1,000 mg, Q8H PRN  bisacodyL, 10 mg, Daily PRN  hydrALAZINE, 10 mg, Q6H PRN  HYDROmorphone, 1 mg, Q6H PRN  magnesium hydroxide 400 mg/5 ml, 30 mL, Daily PRN  magnesium oxide, 800 mg, PRN  magnesium oxide, 800 mg, PRN  ondansetron, 4 mg, Q12H PRN  oxyCODONE, 5 mg, Q6H PRN  potassium bicarbonate, 35 mEq, PRN  potassium bicarbonate, 50 mEq, PRN  potassium bicarbonate, 60 mEq, PRN  potassium, sodium phosphates, 2 packet, PRN  potassium, sodium phosphates, 2 packet, PRN  potassium, sodium phosphates, 2 packet, PRN      Today I personally reviewed pertinent medications, lines/drains/airways, imaging, laboratory results,       Diet  Diet Adult Regular (IDDSI Level 7)        Assessment/Plan:     Neuro  * Temporal lobe epilepsy, intractable  Ms. Arrieta is a 52 yo woman with intractable epilepsy since age 18, s/p partial left anterior temporal lobectomy  in 11/2018 admitted to NICU s/p placement of sEEG leads for further localization of seizures as part of repeat surgical workup    -- SBP <160  -- epilepsy and NSGY team following   -- all AEDs discontinued   -- Neuro checks q 1 hr  -- plan for OR 11/16 for sEEG lead removal, hold SQH 24h prior    Psychiatric  Depression  Continue home sertraline          The patient is being Prophylaxed for:  Venous Thromboembolism with: Mechanical or Chemical  Stress Ulcer with: None  Ventilator Pneumonia with: not applicable    Activity Orders          Turn patient every 2 hours starting at 11/08 0000    Diet Adult Regular (IDDSI Level 7): Regular starting at 11/07 1228        Full Code    Cassidy Moyer MD  Neurocritical Care  [facility] - Neuro Critical Care

## 2022-11-14 NOTE — PROCEDURES
Stereo EEG/Depth Electrode Monitoring Report  IMPLANTATION DATE: 11/7/2022  DATE OF SERVICE: 11/13/2022  EEG NUMBER: [id number]  LOCATION OF SERVICE: [facility] [place]    METHODOLOGY:  Depth electrodes consisted of thin wires with electrical contacts it fixed distances along the wire. These are then implanted using a stereotactic procedure targeting cortical and subcortical structures. The up to 256 electrode contact can be recorded simultaneously with this procedure. Recording band pass was 0.1 in the low past filters setting could be set at the 512, 1024, or 2048. Digital video recording of the patient is simultaneously recorded with the EEG. The patient is instructed report clinical symptoms which may occur during the recording session. EEG and video recording is stored and archived in digital format. Activation procedures which include photic stimulation, hyperventilation and instructing patients to perform simple task are done in selected patients.   Compresses spectral analysis (CSA) is also performed on the activity recorded from each individual channel. This is displayed as a power display of frequencies from 0 to 30 Hz over time. The CSA analysis is done and displayed continuously. This is reviewed for asymmetries in power between homologous areas of the scalp and for presence of changes in power which canbe seen when seizures occur. Sections of suspected abnormalities on the CSA is then compared with the original EEG recording.   The following is the details related to the depth electrodes implanted.                                                       Depth Elect Name SN # contacts Color 1 Color 2  Contacts                  1 RAmyg [id number] 16 Green Black  1-16                  2 RHippo 17752 14 Blue Green  17-30                  3 RAnIns 42803 8 Black Green  31-38                  4 RThalm 05959 16 Yellow Green  39-54                  5 RAncIn 76244 12 Red Blue  55-66                  6 RPosIT 39481 12 Yellow Black   67-78                  7 LOcbFr 48902 12 Red Green  79-90                  8 LThalm 96353 16 Yellow Blue                    9 LAncin 44969 10 Yellow Green  107-116                  10 RAnIns 15437 10 Blue Green  117-126                  11 LPostT 57912 14 Black Green  127-140                  12 LResec 28503 10 Blue  Red  141-150                                                                                                                                                                           RECORDING TIMES  Start on 11/13/2022 07:00  Stop on 11/14/2022 07:00  A total of 23 hr and 49 min of EEG was recorded.  Recording Quality: good    EEG FINDINGS  INTERICTAL: Frequent interictal discharges were seen in the following leads: RHippo 1-4, 8-13; RAmyg 1-6, OGmzeP62-85, and LPostT1-8.   ICTAL: None    IMPRESSION:  There are frequent interictal epileptiform discharges in the right hippocampal, right amygadala, right posterior temporal and left temporal contacts. No seizures were seen during this portion of the monitoring session.   Izzy Torres MD

## 2022-11-14 NOTE — ASSESSMENT & PLAN NOTE
Ms. Arrieta is a 52 yo woman with intractable epilepsy since age 18, s/p partial left anterior temporal lobectomy in 11/2018 admitted to NICU s/p placement of 6 right sided and 6 left sided sEEG leads for further localization of seizures as part of repeat surgical workup. Patient is currently maintained on lamotrigine 200 mg qAM/300 mg qPM and Cenobamate 150 mg qHS with continued events 7-8 times per month of lapse of awareness, staring.    Recommendations:  - s/p placement of 6 left-sided and 6 right-sided sEEG electrodes 11/7 by NSGY  - No seizures noted overnight 11/13>11/14  - 11/14 pm will give Cenobamate 50 mg, Lamotrigine 150 mg   - On 11/15, resume home AED doses - Lamotrigine 200 mg qAM/300 mg qPM, Cenobamate 150 mg qHS in anticipation of sEEG lead removal 11/16  - Seizure precautions  - Please call epilepsy on call prior to administration of IV benzodiazepines for prolonged seizures  - Post-operative imaging timing, pain control per NCC/NSGY    Plan of care discussed with NCC team, patient at bedside. Will continue to follow, please call with any questions.

## 2022-11-14 NOTE — PLAN OF CARE
[facility] Care Plan    POC reviewed with Liza Arrieta and family at 1400. Pt and spouse verbalized understanding. Questions and concerns addressed. No acute events today. Pt progressing toward goals. Will continue to monitor. See below and flowsheets for full assessment and VS info.     -sEEG remains in place  -Patient rode stationary bike for 25 minutes today      Is this a stroke patient? no    Neuro:  Antwan Coma Scale  Best Eye Response: 4-->(E4) spontaneous  Best Motor Response: 6-->(M6) obeys commands  Best Verbal Response: 5-->(V5) oriented  Page Coma Scale Score: 15  Assessment Qualifiers: patient not sedated/intubated  Pupil PERRLA: yes     24 hr Temp:  [98.2 °F (36.8 °C)-98.6 °F (37 °C)]     CV:   Rhythm: normal sinus rhythm, sinus bradycardia  BP goals:   SBP < 160  MAP > 65    Resp:   O2 Device (Oxygen Therapy): room air       Plan: N/A    GI/:     Diet/Nutrition Received: regular  Last Bowel Movement: 11/11/22  Voiding Characteristics: external catheter    Intake/Output Summary (Last 24 hours) at 11/13/2022 1813  Last data filed at 11/13/2022 1800  Gross per 24 hour   Intake 65 ml   Output 1750 ml   Net -1685 ml     Unmeasured Output  Urine Occurrence: 1  Stool Occurrence: 0  Emesis Occurrence: 0  Pad Count: 1    Labs/Accuchecks:  Recent Labs   Lab 11/13/22  0142   WBC 7.03   RBC 4.91   HGB 15.1   HCT 45.1         Recent Labs   Lab 11/13/22  0142      K 4.3   CO2 21*      BUN 17   CREATININE 0.7   ALKPHOS 118   ALT 28   AST 18   BILITOT 0.3    No results for input(s): PROTIME, INR, APTT, HEPANTIXA in the last 168 hours. No results for input(s): CPK, CPKMB, TROPONINI, MB in the last 168 hours.    Electrolytes: Electrolytes replaced  Accuchecks: none    Gtts:      LDA/Wounds:  Lines/Drains/Airways       Drain  Duration             Female External Urinary Catheter 11/08/22 0605 5 days              Peripheral Intravenous Line  Duration                  Peripheral IV - Single Lumen  11/11/22 1721 20 G Anterior;Left Forearm 2 days         Peripheral IV - Single Lumen 11/11/22 1722 20 G Anterior;Right Forearm 2 days                  Wounds: Yes  Wound care consulted: No

## 2022-11-14 NOTE — SUBJECTIVE & OBJECTIVE
Interval History:     No clinical seizure. 1 BM day before. Biked for 20 mins yday     Medications:  Continuous Infusions:  Scheduled Meds:   cefTRIAXone (ROCEPHIN) IVPB  2 g Intravenous Q12H    heparin (porcine)  5,000 Units Subcutaneous Q8H    polyethylene glycol  17 g Oral BID    senna-docusate 8.6-50 mg  2 tablet Oral BID    sertraline  25 mg Oral Daily     PRN Meds:acetaminophen, bisacodyL, hydrALAZINE, HYDROmorphone, magnesium hydroxide 400 mg/5 ml, magnesium oxide, magnesium oxide, ondansetron, oxyCODONE, potassium bicarbonate, potassium bicarbonate, potassium bicarbonate, potassium, sodium phosphates, potassium, sodium phosphates, potassium, sodium phosphates     Review of Systems  Objective:     Weight: 99.5 kg (219 lb 5.7 oz)  Body mass index is 35.41 kg/m².  Vital Signs (Most Recent):  Temp: 98.6 °F (37 °C) (11/14/22 0505)  Pulse: (!) 49 (11/14/22 0605)  Resp: 15 (11/14/22 0605)  BP: 113/60 (11/14/22 0605)  SpO2: 95 % (11/14/22 0605)   Vital Signs (24h Range):  Temp:  [98.2 °F (36.8 °C)-98.6 °F (37 °C)] 98.6 °F (37 °C)  Pulse:  [48-69] 49  Resp:  [9-103] 15  SpO2:  [94 %-98 %] 95 %  BP: (103-134)/(58-89) 113/60                       Female External Urinary Catheter 11/08/22 0605 (Active)   Skin no redness;no breakdown 11/09/22 0305   Tolerance no signs/symptoms of discomfort 11/09/22 0305   Suction Continuous suction at 60 mmHg 11/08/22 2305   Date of last wick change 11/08/22 11/08/22 1905   Time of last wick change 1400 11/08/22 1505   Output (mL) 350 mL 11/09/22 0230       Physical Exam  General: well developed, well nourished, no distress.   Head: normocephalic, atraumatic  Neurologic:   GCS: Eyes: 4/ Verbal: 5/ Motor: 6  Mental Status: Awake, Alert, Oriented x 3  Cranial nerves: PERRL, EOMI, face symmetric, tongue midline, shoulder shrug equal.  No pronator drift, no dysmetria.  Sensory: intact to light touch throughout  Motor Strength:Moves all extremities antigravity    Gen: well nourished,  normocephalic, atraumatic  CV: skin warm and dry, distal pulses present  Pulm: chest rise symmetric, no increased work of breathing  GI: abdomen soft  MSK: moving all with good tone  Psych: patient interacting appropriately      Significant Labs:  Recent Labs   Lab 11/13/22 0142 11/14/22 0157    98    140   K 4.3 4.4    103   CO2 21* 26   BUN 17 16   CREATININE 0.7 0.7   CALCIUM 9.6 9.8   MG 1.7 1.8       Recent Labs   Lab 11/13/22 0142 11/14/22 0157   WBC 7.03 7.70   HGB 15.1 15.6   HCT 45.1 45.3    218       No results for input(s): LABPT, INR, APTT in the last 48 hours.  Microbiology Results (last 7 days)       ** No results found for the last 168 hours. **          All pertinent labs from the last 24 hours have been reviewed.    Significant Diagnostics:  I have reviewed all pertinent imaging results/findings within the past 24 hours.  No results found in the last 24 hours.

## 2022-11-14 NOTE — SUBJECTIVE & OBJECTIVE
Review of Systems   Constitutional: Negative.  Negative for chills and fever.   HENT: Negative.  Negative for congestion.    Eyes: Negative.  Negative for visual disturbance.   Respiratory: Negative.  Negative for cough and shortness of breath.    Cardiovascular: Negative.  Negative for chest pain, palpitations and leg swelling.   Gastrointestinal: Negative.  Negative for abdominal distention and abdominal pain.   Endocrine: Negative.    Genitourinary: Negative.    Musculoskeletal: Negative.  Negative for arthralgias.   Skin: Negative.    Neurological:  Positive for seizures (no activity overnight). Negative for dizziness, tremors, syncope, facial asymmetry, speech difficulty, weakness, light-headedness, numbness and headaches.   Psychiatric/Behavioral:  Negative for agitation and behavioral problems.      Objective:     Vitals:  Temp: 97.5 °F (36.4 °C)  Pulse: (!) 49  Rhythm: normal sinus rhythm  BP: 121/62  MAP (mmHg): 83  Resp: 12  SpO2: 96 %  O2 Device (Oxygen Therapy): room air    Temp  Min: 97.5 °F (36.4 °C)  Max: 98.6 °F (37 °C)  Pulse  Min: 48  Max: 69  BP  Min: 103/66  Max: 134/69  MAP (mmHg)  Min: 79  Max: 100  Resp  Min: 9  Max: 103  SpO2  Min: 94 %  Max: 98 %    11/13 0701 - 11/14 0700  In: 303.9 [I.V.:110]  Out: 1650 [Urine:1650]   Unmeasured Output  Urine Occurrence: 1  Stool Occurrence: 0  Emesis Occurrence: 0  Pad Count: 1       Physical Exam    General: well developed, well nourished, no distress.   HEENT: normocephalic, atraumatic  CV: regular rate   Pulmonary: normal respirations, no signs of respiratory distress  Abdomen: soft, non-distended, not tender to palpation  Skin: Skin is warm, dry and intact.      Neurologic:   Mental Status: AO x4, no aphasia, no dysarthria   CN: PERRL, EOMI, sensation intact in V1-V3 distributions, eyebrow raise and grimace symmetric, tongue midline, SCM 5/5, trapezius 5/5  Motor: moves all extremities spontaneously, full strength throughout, no pronator drift    Sensory: intact to light touch throughout  Gait: deferred  Psych: denies SI, AH, VH     Medications:  Continuous ScheduledcefTRIAXone (ROCEPHIN) IVPB, 2 g, Q12H  heparin (porcine), 5,000 Units, Q8H  polyethylene glycol, 17 g, BID  senna-docusate 8.6-50 mg, 2 tablet, BID  sertraline, 25 mg, Daily    PRNacetaminophen, 1,000 mg, Q8H PRN  bisacodyL, 10 mg, Daily PRN  hydrALAZINE, 10 mg, Q6H PRN  HYDROmorphone, 1 mg, Q6H PRN  magnesium hydroxide 400 mg/5 ml, 30 mL, Daily PRN  magnesium oxide, 800 mg, PRN  magnesium oxide, 800 mg, PRN  ondansetron, 4 mg, Q12H PRN  oxyCODONE, 5 mg, Q6H PRN  potassium bicarbonate, 35 mEq, PRN  potassium bicarbonate, 50 mEq, PRN  potassium bicarbonate, 60 mEq, PRN  potassium, sodium phosphates, 2 packet, PRN  potassium, sodium phosphates, 2 packet, PRN  potassium, sodium phosphates, 2 packet, PRN      Today I personally reviewed pertinent medications, lines/drains/airways, imaging, laboratory results,       Diet  Diet Adult Regular (IDDSI Level 7)

## 2022-11-14 NOTE — ASSESSMENT & PLAN NOTE
Ms. Arrieta is a 50 yo woman with intractable epilepsy since age 18, s/p partial left anterior temporal lobectomy in 11/2018 admitted to NICU s/p placement of sEEG leads for further localization of seizures as part of repeat surgical workup    -- SBP <160  -- epilepsy and NSGY team following   -- all AEDs discontinued   -- Neuro checks q 1 hr  -- plan for OR 11/16 for sEEG lead removal, hold SQH 24h prior

## 2022-11-14 NOTE — ASSESSMENT & PLAN NOTE
A 52 y.o. female with hx of prior L temporal lobectomy and intractable seizure who admitted for sEEG 11/7    Continue ICU care  Neurocheck Q1hr  Pain control   Postop CT head satisfactory   Continue EEG and monitoring  Continue abx ppax while leads in place; ceftriaxone   AEDs per epilepsy   Aggressive bowel regimen  Daily bike   Sating well at room   Keep SBP <150  ADAT  SQH for DVT ppx  Further care per NCC  NSGY will follow     Dispo: Tentative lead removal Wednesday, 11/16. Keep the course

## 2022-11-14 NOTE — PROGRESS NOTES
[facility]  Neurosurgery  Progress Note    Subjective:     History of Present Illness: Liza Arrieta is a 51 y.o. female who presents as a referral from Dr. Mark to discuss possible intracranial seizure surgery. She has already had left temporal lobectomy. She will need neuropsychological evaluation and MRI with contrast STEALTH protocol for operative planning purposes.       She requests that we postpone sEEG until after the Epilepsy Walk she organizes in the first weekend in November      I had a lengthy, detailed discussion with the patient and her  about the risks, benefits, and alternatives to intracranial localization for seizures.      We discussed that monitoring typically takes 1-2 weeks but may be longer or shorter depending on how long it takes for her to have concordant seizures.  We discussed that she will not be able to get out of bed while the electrodes are in her head, and that she will remain in the ICU during that time. She may require mittens (she pulled out her grids in 2018 while postictal). We also discussed that this is a diagnostic, not therapeutic, procedure, and that the definitive surgery would potentially be performed 4-8 weeks after the time of sEEG localization.      We discussed that there is approximately 20% rate of a symptomatic hemorrhage and approximately 1% rate of symptomatic hemorrhage from placement of sEEG electrode per review of the international literature.  They expressed understanding of this.      We also discussed the specific risks of the localization surgery. Risks include, but are not limited to, bleeding, pain, infection, scarring, need for further/repeat procedure, death, paralysis, stroke (including venous infarct)/damage to major blood vessels, leak of cerebrospinal fluid, and hardware-related complications. There is a chance that we would fail to localize the seizures with the initial electrode plan, which would require placement of  additional electrodes, or may not lead to definitive surgery. Informed consent was obtained of the patient with her  present.       Post-Op Info:  Procedure(s) (LRB):  1.) PLACEMENT OF THE FIDUCIAL SCREWS 2.) PLACEMENT OF THE BILATERAL SEEG ELECTRODES WITH BRANDEE ROBOT (Bilateral)   7 Days Post-Op     Interval History:     No clinical seizure. 1 BM day before. Biked for 20 mins yday     Medications:  Continuous Infusions:  Scheduled Meds:   cefTRIAXone (ROCEPHIN) IVPB  2 g Intravenous Q12H    heparin (porcine)  5,000 Units Subcutaneous Q8H    polyethylene glycol  17 g Oral BID    senna-docusate 8.6-50 mg  2 tablet Oral BID    sertraline  25 mg Oral Daily     PRN Meds:acetaminophen, bisacodyL, hydrALAZINE, HYDROmorphone, magnesium hydroxide 400 mg/5 ml, magnesium oxide, magnesium oxide, ondansetron, oxyCODONE, potassium bicarbonate, potassium bicarbonate, potassium bicarbonate, potassium, sodium phosphates, potassium, sodium phosphates, potassium, sodium phosphates     Review of Systems  ROS negative otherwise     Objective:     Weight: 99.5 kg (219 lb 5.7 oz)  Body mass index is 35.41 kg/m².  Vital Signs (Most Recent):  Temp: 98.6 °F (37 °C) (11/14/22 0505)  Pulse: (!) 49 (11/14/22 0605)  Resp: 15 (11/14/22 0605)  BP: 113/60 (11/14/22 0605)  SpO2: 95 % (11/14/22 0605)   Vital Signs (24h Range):  Temp:  [98.2 °F (36.8 °C)-98.6 °F (37 °C)] 98.6 °F (37 °C)  Pulse:  [48-69] 49  Resp:  [9-103] 15  SpO2:  [94 %-98 %] 95 %  BP: (103-134)/(58-89) 113/60                       Female External Urinary Catheter 11/08/22 0605 (Active)   Skin no redness;no breakdown 11/09/22 0305   Tolerance no signs/symptoms of discomfort 11/09/22 0305   Suction Continuous suction at 60 mmHg 11/08/22 2305   Date of last wick change 11/08/22 11/08/22 1905   Time of last wick change 1400 11/08/22 1505   Output (mL) 350 mL 11/09/22 0230       Physical Exam  General: well developed, well nourished, no distress.   Head: normocephalic,  atraumatic  Neurologic:   GCS: Eyes: 4/ Verbal: 5/ Motor: 6  Mental Status: Awake, Alert, Oriented x 3  Cranial nerves: PERRL, EOMI, face symmetric, tongue midline, shoulder shrug equal.  No pronator drift, no dysmetria.  Sensory: intact to light touch throughout  Motor Strength:Moves all extremities antigravity    Gen: well nourished, normocephalic, atraumatic  CV: skin warm and dry, distal pulses present  Pulm: chest rise symmetric, no increased work of breathing  GI: abdomen soft  MSK: moving all with good tone  Psych: patient interacting appropriately      Significant Labs:  Recent Labs   Lab 11/13/22  0142 11/14/22  0157    98    140   K 4.3 4.4    103   CO2 21* 26   BUN 17 16   CREATININE 0.7 0.7   CALCIUM 9.6 9.8   MG 1.7 1.8       Recent Labs   Lab 11/13/22  0142 11/14/22  0157   WBC 7.03 7.70   HGB 15.1 15.6   HCT 45.1 45.3    218       No results for input(s): LABPT, INR, APTT in the last 48 hours.  Microbiology Results (last 7 days)       ** No results found for the last 168 hours. **          All pertinent labs from the last 24 hours have been reviewed.    Significant Diagnostics:  I have reviewed all pertinent imaging results/findings within the past 24 hours.  No results found in the last 24 hours.    Assessment/Plan:     * Temporal lobe epilepsy, intractable  A 52 y.o. female with hx of prior L temporal lobectomy and intractable seizure who admitted for sEEG 11/7    Continue ICU care  Neurocheck Q1hr  Pain control   Postop CT head satisfactory   Continue EEG and monitoring  Continue abx ppax while leads in place; ceftriaxone   AEDs per epilepsy   Aggressive bowel regimen  Daily bike   Sating well at room   Keep SBP <150  ADAT, on regular diet   SQH for DVT ppx  Further care per NCC  NSGY will follow     Dispo: Tentative lead removal Wednesday, 11/16. Keep the course         Ramona Downs MD  Neurosurgery  [facility] - Neuro Critical Care

## 2022-11-15 ENCOUNTER — DOCUMENTATION ONLY (OUTPATIENT)
Dept: NEUROLOGY | Facility: CLINIC | Age: 52
End: 2022-11-15
Payer: COMMERCIAL

## 2022-11-15 ENCOUNTER — ANESTHESIA EVENT (OUTPATIENT)
Dept: SURGERY | Facility: HOSPITAL | Age: 52
DRG: 027 | End: 2022-11-15
Payer: COMMERCIAL

## 2022-11-15 ENCOUNTER — SOCIAL WORK (OUTPATIENT)
Dept: NEUROLOGY | Facility: CLINIC | Age: 52
End: 2022-11-15
Payer: COMMERCIAL

## 2022-11-15 LAB
ABO + RH BLD: NORMAL
ALBUMIN SERPL BCP-MCNC: 3.3 G/DL (ref 3.5–5.2)
ALP SERPL-CCNC: 126 U/L (ref 55–135)
ALT SERPL W/O P-5'-P-CCNC: 36 U/L (ref 10–44)
ANION GAP SERPL CALC-SCNC: 9 MMOL/L (ref 8–16)
AST SERPL-CCNC: 18 U/L (ref 10–40)
BASOPHILS # BLD AUTO: 0.02 K/UL (ref 0–0.2)
BASOPHILS NFR BLD: 0.3 % (ref 0–1.9)
BILIRUB SERPL-MCNC: 0.3 MG/DL (ref 0.1–1)
BLD GP AB SCN CELLS X3 SERPL QL: NORMAL
BUN SERPL-MCNC: 16 MG/DL (ref 6–20)
CALCIUM SERPL-MCNC: 9.4 MG/DL (ref 8.7–10.5)
CHLORIDE SERPL-SCNC: 105 MMOL/L (ref 95–110)
CO2 SERPL-SCNC: 24 MMOL/L (ref 23–29)
CREAT SERPL-MCNC: 0.7 MG/DL (ref 0.5–1.4)
DIFFERENTIAL METHOD: ABNORMAL
EOSINOPHIL # BLD AUTO: 0.1 K/UL (ref 0–0.5)
EOSINOPHIL NFR BLD: 1.5 % (ref 0–8)
ERYTHROCYTE [DISTWIDTH] IN BLOOD BY AUTOMATED COUNT: 11.4 % (ref 11.5–14.5)
EST. GFR  (NO RACE VARIABLE): >60 ML/MIN/1.73 M^2
GLUCOSE SERPL-MCNC: 105 MG/DL (ref 70–110)
HCT VFR BLD AUTO: 44.1 % (ref 37–48.5)
HGB BLD-MCNC: 15.2 G/DL (ref 12–16)
IMM GRANULOCYTES # BLD AUTO: 0.02 K/UL (ref 0–0.04)
IMM GRANULOCYTES NFR BLD AUTO: 0.3 % (ref 0–0.5)
LYMPHOCYTES # BLD AUTO: 2.4 K/UL (ref 1–4.8)
LYMPHOCYTES NFR BLD: 32 % (ref 18–48)
MAGNESIUM SERPL-MCNC: 1.9 MG/DL (ref 1.6–2.6)
MCH RBC QN AUTO: 30.9 PG (ref 27–31)
MCHC RBC AUTO-ENTMCNC: 34.5 G/DL (ref 32–36)
MCV RBC AUTO: 90 FL (ref 82–98)
MONOCYTES # BLD AUTO: 0.5 K/UL (ref 0.3–1)
MONOCYTES NFR BLD: 7.2 % (ref 4–15)
NEUTROPHILS # BLD AUTO: 4.4 K/UL (ref 1.8–7.7)
NEUTROPHILS NFR BLD: 58.7 % (ref 38–73)
NRBC BLD-RTO: 0 /100 WBC
PHOSPHATE SERPL-MCNC: 3.8 MG/DL (ref 2.7–4.5)
PLATELET # BLD AUTO: 212 K/UL (ref 150–450)
PMV BLD AUTO: 9.4 FL (ref 9.2–12.9)
POTASSIUM SERPL-SCNC: 4 MMOL/L (ref 3.5–5.1)
PROT SERPL-MCNC: 6.8 G/DL (ref 6–8.4)
RBC # BLD AUTO: 4.92 M/UL (ref 4–5.4)
SODIUM SERPL-SCNC: 138 MMOL/L (ref 136–145)
WBC # BLD AUTO: 7.53 K/UL (ref 3.9–12.7)

## 2022-11-15 PROCEDURE — 99233 SBSQ HOSP IP/OBS HIGH 50: CPT | Mod: ,,, | Performed by: PSYCHIATRY & NEUROLOGY

## 2022-11-15 PROCEDURE — 95720 PR EEG, W/VIDEO, CONT RECORD, I&R, >12<26 HRS: ICD-10-PCS | Mod: ,,, | Performed by: PSYCHIATRY & NEUROLOGY

## 2022-11-15 PROCEDURE — 99233 PR SUBSEQUENT HOSPITAL CARE,LEVL III: ICD-10-PCS | Mod: ,,, | Performed by: PHYSICIAN ASSISTANT

## 2022-11-15 PROCEDURE — 25000003 PHARM REV CODE 250

## 2022-11-15 PROCEDURE — 85025 COMPLETE CBC W/AUTO DIFF WBC: CPT | Performed by: PSYCHIATRY & NEUROLOGY

## 2022-11-15 PROCEDURE — 95714 VEEG EA 12-26 HR UNMNTR: CPT

## 2022-11-15 PROCEDURE — 83735 ASSAY OF MAGNESIUM: CPT | Performed by: PSYCHIATRY & NEUROLOGY

## 2022-11-15 PROCEDURE — 86901 BLOOD TYPING SEROLOGIC RH(D): CPT

## 2022-11-15 PROCEDURE — 99233 SBSQ HOSP IP/OBS HIGH 50: CPT | Mod: ,,, | Performed by: PHYSICIAN ASSISTANT

## 2022-11-15 PROCEDURE — 84100 ASSAY OF PHOSPHORUS: CPT | Performed by: PSYCHIATRY & NEUROLOGY

## 2022-11-15 PROCEDURE — 25000003 PHARM REV CODE 250: Performed by: NURSE PRACTITIONER

## 2022-11-15 PROCEDURE — 20000000 HC ICU ROOM

## 2022-11-15 PROCEDURE — 63600175 PHARM REV CODE 636 W HCPCS: Performed by: PHYSICIAN ASSISTANT

## 2022-11-15 PROCEDURE — 63600175 PHARM REV CODE 636 W HCPCS: Performed by: STUDENT IN AN ORGANIZED HEALTH CARE EDUCATION/TRAINING PROGRAM

## 2022-11-15 PROCEDURE — 94761 N-INVAS EAR/PLS OXIMETRY MLT: CPT

## 2022-11-15 PROCEDURE — 95720 EEG PHY/QHP EA INCR W/VEEG: CPT | Mod: ,,, | Performed by: PSYCHIATRY & NEUROLOGY

## 2022-11-15 PROCEDURE — 80053 COMPREHEN METABOLIC PANEL: CPT | Performed by: PSYCHIATRY & NEUROLOGY

## 2022-11-15 PROCEDURE — 99233 PR SUBSEQUENT HOSPITAL CARE,LEVL III: ICD-10-PCS | Mod: ,,, | Performed by: PSYCHIATRY & NEUROLOGY

## 2022-11-15 RX ORDER — HEPARIN SODIUM 5000 [USP'U]/ML
5000 INJECTION, SOLUTION INTRAVENOUS; SUBCUTANEOUS EVERY 8 HOURS
Status: DISCONTINUED | OUTPATIENT
Start: 2022-11-15 | End: 2022-11-15

## 2022-11-15 RX ADMIN — CEFTRIAXONE SODIUM 2 G: 2 INJECTION, SOLUTION INTRAVENOUS at 01:11

## 2022-11-15 RX ADMIN — LAMOTRIGINE 200 MG: 150 TABLET ORAL at 07:11

## 2022-11-15 RX ADMIN — SERTRALINE HYDROCHLORIDE 25 MG: 25 TABLET ORAL at 09:11

## 2022-11-15 RX ADMIN — CEFTRIAXONE SODIUM 2 G: 2 INJECTION, SOLUTION INTRAVENOUS at 02:11

## 2022-11-15 RX ADMIN — LAMOTRIGINE 300 MG: 150 TABLET ORAL at 09:11

## 2022-11-15 RX ADMIN — HEPARIN SODIUM 5000 UNITS: 5000 INJECTION, SOLUTION INTRAVENOUS; SUBCUTANEOUS at 06:11

## 2022-11-15 NOTE — SUBJECTIVE & OBJECTIVE
Interval History: 11/15: NAEON. AFVSS. Exam stable. No sz yesterday. Biked and had BM. OR for lead removal tomorrow.     Medications:  Continuous Infusions:  Scheduled Meds:   cefTRIAXone (ROCEPHIN) IVPB  2 g Intravenous Q12H    lamoTRIgine  200 mg Oral QAM    lamoTRIgine  300 mg Oral Nightly    NON FORMULARY MEDICATION 150 mg  150 mg Oral QHS    polyethylene glycol  17 g Oral BID    senna-docusate 8.6-50 mg  2 tablet Oral BID    sertraline  25 mg Oral Daily     PRN Meds:acetaminophen, bisacodyL, hydrALAZINE, HYDROmorphone, magnesium hydroxide 400 mg/5 ml, magnesium oxide, magnesium oxide, ondansetron, oxyCODONE, potassium bicarbonate, potassium bicarbonate, potassium bicarbonate, potassium, sodium phosphates, potassium, sodium phosphates, potassium, sodium phosphates     Review of Systems  Objective:     Weight: 99.5 kg (219 lb 5.7 oz)  Body mass index is 35.41 kg/m².  Vital Signs (Most Recent):  Temp: 98.1 °F (36.7 °C) (11/15/22 0705)  Pulse: (!) 57 (11/15/22 1000)  Resp: 16 (11/15/22 1000)  BP: 115/68 (11/15/22 1000)  SpO2: 96 % (11/15/22 1000)   Vital Signs (24h Range):  Temp:  [97.8 °F (36.6 °C)-98.2 °F (36.8 °C)] 98.1 °F (36.7 °C)  Pulse:  [49-65] 57  Resp:  [10-28] 16  SpO2:  [93 %-98 %] 96 %  BP: ()/(57-75) 115/68     Date 11/15/22 0700 - 11/16/22 0659   Shift 3304-1675 3504-7529 3638-0042 24 Hour Total   INTAKE   P.O. 100   100   I.V.(mL/kg) 20(0.2)   20(0.2)   Shift Total(mL/kg) 120(1.2)   120(1.2)   OUTPUT   Urine(mL/kg/hr) 350   350   Shift Total(mL/kg) 350(3.5)   350(3.5)   Weight (kg) 99.5 99.5 99.5 99.5                     Female External Urinary Catheter 11/08/22 0605 (Active)   Skin no redness;no breakdown 11/09/22 0305   Tolerance no signs/symptoms of discomfort 11/09/22 0305   Suction Continuous suction at 60 mmHg 11/08/22 2305   Date of last wick change 11/08/22 11/08/22 1905   Time of last wick change 1400 11/08/22 1505   Output (mL) 350 mL 11/09/22 0230       Physical Exam  General: well  developed, well nourished, no distress.   Head: normocephalic, atraumatic  Neurologic:   GCS: Eyes: 4/ Verbal: 5/ Motor: 6  Mental Status: Awake, Alert, Oriented x 3  Cranial nerves: PERRL, EOMI, face symmetric, tongue midline, shoulder shrug equal.  No pronator drift, no dysmetria.  Sensory: intact to light touch throughout  Motor Strength:Moves all extremities antigravity    Gen: well nourished, normocephalic, atraumatic  CV: skin warm and dry, distal pulses present  Pulm: chest rise symmetric, no increased work of breathing  GI: abdomen soft  MSK: moving all with good tone  Psych: patient interacting appropriately      Significant Labs:  Recent Labs   Lab 11/14/22  0157 11/15/22  0224   GLU 98 105    138   K 4.4 4.0    105   CO2 26 24   BUN 16 16   CREATININE 0.7 0.7   CALCIUM 9.8 9.4   MG 1.8 1.9       Recent Labs   Lab 11/14/22  0157 11/15/22  0224   WBC 7.70 7.53   HGB 15.6 15.2   HCT 45.3 44.1    212       No results for input(s): LABPT, INR, APTT in the last 48 hours.  Microbiology Results (last 7 days)       ** No results found for the last 168 hours. **          All pertinent labs from the last 24 hours have been reviewed.    Significant Diagnostics:  I have reviewed all pertinent imaging results/findings within the past 24 hours.  No results found in the last 24 hours.

## 2022-11-15 NOTE — PROCEDURES
Stereo EEG/Depth Electrode Monitoring Report  IMPLANTATION DATE: 11/7/2022  DATE OF SERVICE: 11/14/2022  EEG NUMBER: [id number]  [place] [facility] [place]:  Depth electrodes consisted of thin wires with electrical contacts it fixed distances along the wire. These are then implanted using a stereotactic procedure targeting cortical and subcortical structures. The up to 256 electrode contact can be recorded simultaneously with this procedure. Recording band pass was 0.1 in the low past filters setting could be set at the 512, 1024, or 2048. Digital video recording of the patient is simultaneously recorded with the EEG. The patient is instructed report clinical symptoms which may occur during the recording session. EEG and video recording is stored and archived in digital format. Activation procedures which include photic stimulation, hyperventilation and instructing patients to perform simple task are done in selected patients.   Compresses spectral analysis (CSA) is also performed on the activity recorded from each individual channel. This is displayed as a power display of frequencies from 0 to 30 Hz over time. The CSA analysis is done and displayed continuously. This is reviewed for asymmetries in power between homologous areas of the scalp and for presence of changes in power which canbe seen when seizures occur. Sections of suspected abnormalities on the CSA is then compared with the original EEG recording.   The following is the details related to the depth electrodes implanted.                         Depth Elect Name SN # contacts Color 1 Color 2  Contacts    1 RAmyg 97407 16 Green Black  1-16    2 RHippo 24436 14 Blue Green  17-30    3 RAnIns 14339 8 Black Green  31-38    4 RThalm 59134 16 Yellow Green  39-54    5 RAncIn 97946 12 Red Blue  55-66    6 RPosIT 20196 12 Yellow Black  67-78    7 LOcbFr 93672 12 Red Green  79-90    8 LThalm 20086 16 Yellow Blue      9 LAncin 98611 10 Yellow  Green  107-116    10 RAnIns 61162 10 Blue Green  117-126    11 LPostT 85253 14 Black Green  127-140    12 LResec 67576 10 Blue  Red  141-150      RECORDING TIMES  Start on 11/14/2022 07:00  Stop on 11/15/2022 07:00  A total of 23 hr and 49 min of EEG was recorded.  Recording Quality: good    EEG FINDINGS  INTERICTAL: Frequent interictal discharges were seen in the following leads: RHippo 1-4, 8-13; RAmyg 1-6, PJmtiB76-71, and LPostT1-8.     ICTAL: None    IMPRESSION:  There are frequent interictal epileptiform discharges in the right hippocampal, right amygadala, right posterior temporal and left temporal contacts. No seizures were seen during this portion of the monitoring session.     Izzy Torres MD

## 2022-11-15 NOTE — ASSESSMENT & PLAN NOTE
A 52 y.o. female with hx of prior L temporal lobectomy and intractable seizure who admitted for sEEG 11/7    Continue ICU care  Neurocheck Q1hr  Pain control   Postop CT head satisfactory   Continue EEG and monitoring  Continue abx ppax while leads in place; ceftriaxone   AEDs per epilepsy   Aggressive bowel regimen  Daily bike   Sating well at room   Keep SBP <150  NPO at midnight  Hold SQH  Further care per NCC  NSGY will follow     Dispo: Tentative lead removal Wednesday, 11/16. Keep the course

## 2022-11-15 NOTE — ASSESSMENT & PLAN NOTE
Ms. Arrieta is a 52 yo woman with intractable epilepsy since age 18, s/p partial left anterior temporal lobectomy in 11/2018 admitted to NICU s/p placement of sEEG leads for further localization of seizures as part of repeat surgical workup    -- SBP <160  -- epilepsy and NSGY team following   -- AEDs restarted per epilepsy, back to home doses of lamotrigine and cenobamate today  -- Neuro checks q 1 hr  -- plan for OR 11/16 for sEEG lead removal, hold SQH 24h prior, f/u coags, npo at midnight

## 2022-11-15 NOTE — PROGRESS NOTES
Epilepsy SW visited patient in the ICU this morning.   Patient reports doing well with no complaints other than being bored.   SW provided patient with some magazines.    Patient reported she may discharge soon and she initially thought her explanation surgery was tomorrow, but she was informed it is Wednesday.   SW confirmed he was told that was the plan.      SW reported he would visit tomorrow.         Demar Dalal LCSW

## 2022-11-15 NOTE — PROGRESS NOTES
[facility]  Neurology-Epilepsy  Progress Note    Patient Name: Liza Arrieta  MRN: [id number]  Admission Date: 11/7/2022  Hospital Length of Stay: 8 days  Code Status: Full Code   Attending Provider: Cassandra Woodall MD  Primary Care Physician: Rc Rodriguez MD   Principal Problem:Temporal lobe epilepsy, intractable    Subjective:     Hospital Course:   Ms. Arrieta is a 51 year old woman with intractable epilepsy since age 18 s/p partial left anterior temporal lobectomy in 11/2018 admitted to NICU s/p placement of 6 right-sided and 6 left-sided sEEG leads for further localization of seizures as part of repeat surgical workup. Patient is currently maintained on Lamotrigine 200 mg qAM/300 mg qPM and Cenobamate 150 mg qHS with continued events 7-8 times per month of lapse of awareness, staring.   11/7>11/8: Home Cenobamate decreased to 100 mg qHS, Lamotrigine 200 mg qAM/300 mg qPM continued on admission. No seizures overnight. On 11/8 pm, begin to hold Cenobomate and Lamotrigine.  11/8>11/9: Seizure 11/9 approximately 0200 with right hemispheric origin, patient reported she thought she had a seizure at approximately 0230. Additional seizures 0745 and 0852. Will continue close vEEG, attempt to capture additional seizures for further localization and concordance.   11/9>11/10: No further seizures overnight, patient reports possible event 11/10 approximately 10:40, additional seizure noted on EEG around this time. Please see EEG report for full details. Continue holding home Cenobamate, Lamotrigine to capture additional seizures.   11/10>11/11: No electrographic seizures noted overnight, continue to hold home Cenobamate and Lamotrigine, attempt to capture additional seizures.  11/11>11/12: No electrographic seizures noted overnight outside mapping.  Holding AEDs.  11/12>11/13: Continued holding AEDs, no clinical seizures noted overnight.  11/13>11/14: No clinical seizures overnight, doing well this  morning. Plan to give Cenobamate 50 mg, Lamotrigine 150 mg 11/14 pm, resume home doses 11/15 am (Lamotrigine 200 mg qAM/300 mg qPM, Cenobamate 150 mg qHS).  11/14>11/15: No discrete seizures overnight, frequent interictal epileptiform discharges noted in the right hippocampal, right amygdala, right posterior temporal and left temporal contacts. Please see EEG report for full details. Resume home AED doses 11/15 in anticipation of sEEG lead removal 11/16.       Interval History: Patient doing well overnight, in good spirits this morning and reports no seizures overnight. Home AEDs resumed in anticipation of sEEG lead removal tomorrow.     Current Facility-Administered Medications   Medication Dose Route Frequency Provider Last Rate Last Admin    acetaminophen tablet 1,000 mg  1,000 mg Oral Q8H PRN Mo Kincaid MD   1,000 mg at 11/09/22 1430    bisacodyL suppository 10 mg  10 mg Rectal Daily PRN Any Ruiz MD        cefTRIAXone (ROCEPHIN) 2 g/50 mL D5W IVPB  2 g Intravenous Q12H Mansour Diya Downs MD 50 mL/hr at 11/15/22 1400 Rate Verify at 11/15/22 1400    hydrALAZINE injection 10 mg  10 mg Intravenous Q6H PRN Ann Cohen PA-C        HYDROmorphone injection 1 mg  1 mg Intravenous Q6H PRN Mo Kincaid MD   1 mg at 11/07/22 1241    lamoTRIgine tablet 200 mg  200 mg Oral QAM Cassidy Moyer MD   200 mg at 11/15/22 0700    lamoTRIgine tablet 300 mg  300 mg Oral Nightly Cassidy Moyer MD        magnesium hydroxide 400 mg/5 ml suspension 2,400 mg  30 mL Oral Daily PRN Josafat Arevalo MD        magnesium oxide tablet 800 mg  800 mg Oral PRN Ann Cohen PA-C   800 mg at 11/13/22 0955    magnesium oxide tablet 800 mg  800 mg Oral PRN Ann Cohen PA-C        NON FORMULARY MEDICATION 150 mg  150 mg Oral QHS Senia Fuentes PA-C        ondansetron injection 4 mg  4 mg Intravenous Q12H PRN Mo Kincaid MD        oxyCODONE immediate release tablet 5 mg  5 mg Oral  Q6H PRN Mo Kincaid MD   5 mg at 11/08/22 1928    polyethylene glycol packet 17 g  17 g Oral BID Tiffanie WILLIS Irwin, NP   17 g at 11/14/22 0952    potassium bicarbonate disintegrating tablet 35 mEq  35 mEq Oral PRN Ann S. Nicaud, PA-C        potassium bicarbonate disintegrating tablet 50 mEq  50 mEq Oral PRN Ann S. Nicaud, PA-C        potassium bicarbonate disintegrating tablet 60 mEq  60 mEq Oral PRN Ann S. Nicaud, PA-C        potassium, sodium phosphates 280-160-250 mg packet 2 packet  2 packet Oral PRN Ann S. Nicaud, PA-C        potassium, sodium phosphates 280-160-250 mg packet 2 packet  2 packet Oral PRN Ann S. Nicaud, PA-C        potassium, sodium phosphates 280-160-250 mg packet 2 packet  2 packet Oral PRN Ann S. Nicaud, PA-C        senna-docusate 8.6-50 mg per tablet 2 tablet  2 tablet Oral BID Tiffanie L Alida, NP   2 tablet at 11/14/22 0952    sertraline tablet 25 mg  25 mg Oral Daily Lisset Arboleda NP   25 mg at 11/15/22 0944     Continuous Infusions:    Review of Systems   Neurological:  Positive for seizures (none overnight). Negative for speech difficulty and weakness.   All other systems reviewed and are negative.  Objective:     Vital Signs (Most Recent):  Temp: 98.1 °F (36.7 °C) (11/15/22 0705)  Pulse: (!) 53 (11/15/22 1400)  Resp: 14 (11/15/22 1400)  BP: 98/64 (11/15/22 1400)  SpO2: 96 % (11/15/22 1400)   Vital Signs (24h Range):  Temp:  [97.8 °F (36.6 °C)-98.2 °F (36.8 °C)] 98.1 °F (36.7 °C)  Pulse:  [50-63] 53  Resp:  [10-28] 14  SpO2:  [93 %-98 %] 96 %  BP: ()/(55-75) 98/64     Weight: 99.5 kg (219 lb 5.7 oz)  Body mass index is 35.41 kg/m².    Physical Exam  Vitals and nursing note reviewed.   Constitutional:       General: She is not in acute distress.     Appearance: She is not diaphoretic.   HENT:      Head: Normocephalic and atraumatic.      Comments: sEEG leads with headwrap in place  Eyes:      General: No scleral icterus.     Extraocular Movements:  Extraocular movements intact.      Conjunctiva/sclera: Conjunctivae normal.      Pupils: Pupils are equal, round, and reactive to light.   Cardiovascular:      Rate and Rhythm: Normal rate.   Pulmonary:      Effort: Pulmonary effort is normal. No respiratory distress.   Abdominal:      General: There is no distension.      Palpations: Abdomen is soft.      Tenderness: There is no abdominal tenderness.   Musculoskeletal:         General: No swelling, tenderness or deformity. Normal range of motion.      Cervical back: Neck supple. No rigidity.   Skin:     General: Skin is warm and dry.   Neurological:      General: No focal deficit present.      Mental Status: She is alert and oriented to person, place, and time. Mental status is at baseline.   Psychiatric:         Mood and Affect: Mood normal.         Speech: Speech normal.         Behavior: Behavior normal.       NEUROLOGICAL EXAMINATION:     MENTAL STATUS   Oriented to person, place, and time.   Attention: normal. Concentration: normal.   Speech: speech is normal   Level of consciousness: alert    CRANIAL NERVES     CN III, IV, VI   Pupils are equal, round, and reactive to light.    CN VII   Facial expression full, symmetric.     CN VIII   Hearing: intact    CN IX, X   CN IX normal.     CN XI   CN XI normal.     CN XII   CN XII normal.     MOTOR EXAM   Muscle bulk: normal  Overall muscle tone: normal       Moves all extremities spontaneously and symmetrically, follows commands  No focal deficits noted     Significant Labs: All pertinent lab results from the past 24 hours have been reviewed.    Significant Studies: I have reviewed all pertinent imaging results/findings within the past 24 hours.    Assessment and Plan:     * Temporal lobe epilepsy, intractable  Ms. Arrieta is a 50 yo woman with intractable epilepsy since age 18, s/p partial left anterior temporal lobectomy in 11/2018 admitted to NICU s/p placement of 6 right sided and 6 left sided sEEG leads for  further localization of seizures as part of repeat surgical workup. Patient is currently maintained on lamotrigine 200 mg qAM/300 mg qPM and Cenobamate 150 mg qHS with continued events 7-8 times per month of lapse of awareness, staring.    Recommendations:  - s/p placement of 6 left-sided and 6 right-sided sEEG electrodes 11/7 by NSGY  - No seizures noted overnight 11/14>11/15  - Resumed home AEDs 11/15 am - Lamotrigine 200 mg qAM/300 mg qPM, Cenobamate 150 mg qHS in anticipation of sEEG lead removal 11/16  - Seizure precautions  - Please call epilepsy on call prior to administration of IV benzodiazepines for prolonged seizures  - Post-operative imaging timing, pain control per NCC/NSGY    Plan of care discussed with NCC team, patient at bedside. Will continue to follow, please call with any questions.     Depression  Chronic  - Continue home Sertraline        VTE Risk Mitigation (From admission, onward)         Ordered     IP VTE HIGH RISK PATIENT  Once         11/07/22 1232     Place sequential compression device  Until discontinued         11/07/22 1232     Place sequential compression device  Until discontinued         11/07/22 0628     Place LAURA hose  Until discontinued         11/07/22 0628                Philly Foy PA-C  Neurology-Epilepsy  [facility] - Neuro Critical Care  Staff: Dr. Torres

## 2022-11-15 NOTE — PROGRESS NOTES
Epilepsy SW visited patient in the ICU this morning.   Patient reports doing well with no complaints other than being bored.   SW came with another visitor, nurse navigator Becki. SW reported that Becki was present last Friday and met patient previously.     Patient reported she is excited she will discharge soon and confirmed her explantation surgery on Wednesday.          Demar Dalal LCSW

## 2022-11-15 NOTE — PROGRESS NOTES
[facility]  Neurocritical Care  Progress Note    Admit Date: 11/7/2022  Service Date: 11/15/2022  Length of Stay: 8    Subjective:     Chief Complaint: Temporal lobe epilepsy, intractable    History of Present Illness: Ms. Arrieta is a 52 yo woman with intractable epilepsy since age 18, s/p partial left anterior temporal lobectomy in 11/2018 admitted to NICU s/p placement of sEEG leads for further localization of seizures as part of repeat surgical workup. After left anterior temporal lobectomy, patient reports brief period of seizure freedom, before beginning to have seizures again approximately 3 months afterwards. She reports current seizures are different than her typical semiology prior to surgery, notably she believes she now loses awareness and stares off. After her events, she is quickly back to baseline and is able to report that she had a seizure. No associated tongue biting, bowel/bladder incontinence. She is currently maintained on Lamotrigine 200 mg qAM/300 mg qPM and Cenobamate 150 mg nightly. She reports current seizure frequency of 7-8 per month, with last seizure on 10/31. Unable to identify triggers for seizures other than missing medication. MRI brain completed 2018 showed evidence of left frontotemporal crani for partial left anterior temporal resection. MRI brain epilepsy protocol in 10/2022 with post-operative changes with left temporal partial lobectomy, interval development of susceptibility in the right superior frontal sulcus compatible with component of superficial siderosis. EEG completed 5/30/22 noted mild regional or subcortical dysfunction in bilateral temporal lobes with possible seizure focus in right anterior temporal region. During EMU admission 7/5/22>7/10/22, patient noted to have multiple right temporal lobe seizures, regional cortical dysfunction from left temporal region and bilateral independent seizure foci in left and right temporal lobes. Patient  discussed in multidisciplinary epilepsy surgery conference, with recommendation for phase 2 monitoring for further localization. Patient elected to proceed, admitted to NICU after placement of 6 right sided sEEG electrodes and 6 left sided sEEG electrodes. Patient admitted to [facility] for close  monitoring and higher level of care.        Hospital Course: 11/8/2022: all AED's discontinued today per epilepsy   11/09/2022 three electrographic seizures overnight  11/10/22: NAEON  11/11/22: two seizures today  11/12/22: NAEON  11/13/22: No seizure activity noted overnight. SBP<160  11/14/22: No seizure activity overnight, pt with no complaints other than boredom, denies depressed mood, thoughts of self harm, or hallucinations. Plan for OR 11/16 for sEEG lead removal.   11/15/22: AEDs restarted per epilepsy on evening of 11/14, increase to home doses today. OR tomorrow.         Review of Systems  Review of Systems   Constitutional: Negative.  Negative for chills and fever.   HENT: Negative.  Negative for congestion.    Eyes: Negative.  Negative for visual disturbance.   Respiratory: Negative.  Negative for cough and shortness of breath.    Cardiovascular: Negative.  Negative for chest pain, palpitations and leg swelling.   Gastrointestinal: Negative.  Negative for abdominal distention and abdominal pain.   Endocrine: Negative.    Genitourinary: Negative.    Musculoskeletal: Negative.  Negative for arthralgias.   Skin: Negative.    Neurological:  Positive for seizures (no activity overnight). Negative for dizziness, tremors, syncope, facial asymmetry, speech difficulty, weakness, light-headedness, numbness and headaches.   Psychiatric/Behavioral:  Negative for agitation and behavioral problems.  Objective:     Vitals:  Temp: 98.1 °F (36.7 °C)  Pulse: (!) 51  Rhythm: normal sinus rhythm  BP: (!) 125/57  MAP (mmHg): 82  Resp: 17  SpO2: 95 %  O2 Device (Oxygen Therapy): room air    Temp  Min: 97.8 °F (36.6 °C)  Max: 98.2 °F (36.8  °C)  Pulse  Min: 49  Max: 65  BP  Min: 99/60  Max: 127/75  MAP (mmHg)  Min: 74  Max: 95  Resp  Min: 9  Max: 28  SpO2  Min: 93 %  Max: 98 %    11/14 0701 - 11/15 0700  In: 659.6 [P.O.:500; I.V.:60]  Out: 900 [Urine:900]   Unmeasured Output  Urine Occurrence: 1  Stool Occurrence: 1  Emesis Occurrence: 0  Pad Count: 1       Physical Exam     General: well developed, well nourished, no distress.   HEENT: normocephalic, atraumatic  CV: regular rate  Pulmonary: normal respirations, no signs of respiratory distress  Abdomen: soft, non-distended, not tender to palpation  Skin: Skin is warm, dry and intact.     Neurologic:   Mental Status: AO x4, no aphasia, no dysarthria   CN: PERRL, EOMI, sensation intact in V1-V3 distributions, eyebrow raise and grimace symmetric, tongue midline, SCM 5/5, trapezius 5/5  Motor: moves all extremities spontaneously, full strength throughout, no pronator drift   Sensory: intact to light touch throughout  Gait: deferred      Medications:  Continuous ScheduledcefTRIAXone (ROCEPHIN) IVPB, 2 g, Q12H  heparin (porcine), 5,000 Units, Q8H  lamoTRIgine, 200 mg, QAM  lamoTRIgine, 300 mg, Nightly  NON FORMULARY MEDICATION 150 mg, 150 mg, QHS  polyethylene glycol, 17 g, BID  senna-docusate 8.6-50 mg, 2 tablet, BID  sertraline, 25 mg, Daily    PRNacetaminophen, 1,000 mg, Q8H PRN  bisacodyL, 10 mg, Daily PRN  hydrALAZINE, 10 mg, Q6H PRN  HYDROmorphone, 1 mg, Q6H PRN  magnesium hydroxide 400 mg/5 ml, 30 mL, Daily PRN  magnesium oxide, 800 mg, PRN  magnesium oxide, 800 mg, PRN  ondansetron, 4 mg, Q12H PRN  oxyCODONE, 5 mg, Q6H PRN  potassium bicarbonate, 35 mEq, PRN  potassium bicarbonate, 50 mEq, PRN  potassium bicarbonate, 60 mEq, PRN  potassium, sodium phosphates, 2 packet, PRN  potassium, sodium phosphates, 2 packet, PRN  potassium, sodium phosphates, 2 packet, PRN      Today I personally reviewed pertinent medications, lines/drains/airways, imaging, laboratory results,     Diet  Diet Adult Regular  (IDDSI Level 7)  Diet NPO Except for: Medication        Assessment/Plan:     Neuro  * Temporal lobe epilepsy, intractable  Ms. Arrieta is a 50 yo woman with intractable epilepsy since age 18, s/p partial left anterior temporal lobectomy in 11/2018 admitted to NICU s/p placement of sEEG leads for further localization of seizures as part of repeat surgical workup    -- SBP <160  -- epilepsy and NSGY team following   -- AEDs restarted per epilepsy, back to home doses of lamotrigine and cenobamate today  -- Neuro checks q 1 hr  -- plan for OR 11/16 for sEEG lead removal, hold SQH 24h prior, f/u coags, npo at midnight    Psychiatric  Depression  Continue home sertraline          The patient is being Prophylaxed for:  Venous Thromboembolism with: Mechanical  Stress Ulcer with: None  Ventilator Pneumonia with: not applicable    Activity Orders          Diet NPO Except for: Medication: NPO starting at 11/16 0001    Turn patient every 2 hours starting at 11/08 0000    Diet Adult Regular (IDDSI Level 7): Regular starting at 11/07 1228        Full Code    Cassidy Moyer MD  Neurocritical Care  [facility]

## 2022-11-15 NOTE — ASSESSMENT & PLAN NOTE
Ms. Arrieta is a 50 yo woman with intractable epilepsy since age 18, s/p partial left anterior temporal lobectomy in 11/2018 admitted to NICU s/p placement of 6 right sided and 6 left sided sEEG leads for further localization of seizures as part of repeat surgical workup. Patient is currently maintained on lamotrigine 200 mg qAM/300 mg qPM and Cenobamate 150 mg qHS with continued events 7-8 times per month of lapse of awareness, staring.    Recommendations:  - s/p placement of 6 left-sided and 6 right-sided sEEG electrodes 11/7 by NSGY  - No seizures noted overnight 11/14>11/15  - Resumed home AEDs 11/15 am - Lamotrigine 200 mg qAM/300 mg qPM, Cenobamate 150 mg qHS in anticipation of sEEG lead removal 11/16  - Seizure precautions  - Please call epilepsy on call prior to administration of IV benzodiazepines for prolonged seizures  - Post-operative imaging timing, pain control per NCC/NSGY    Plan of care discussed with NCC team, patient at bedside. Will continue to follow, please call with any questions.

## 2022-11-15 NOTE — PLAN OF CARE
[facility] Care Plan    POC reviewed with Liza Arrieta and family at 0300. Pt verbalized understanding. Questions and concerns addressed. No acute events overnight. Pt progressing toward goals. Will continue to monitor. See below and flowsheets for full assessment and VS info.       Is this a stroke patient? no    Neuro:  Antwan Coma Scale  Best Eye Response: 4-->(E4) spontaneous  Best Motor Response: 6-->(M6) obeys commands  Best Verbal Response: 5-->(V5) oriented  Celina Coma Scale Score: 15  Assessment Qualifiers: patient not sedated/intubated  Pupil PERRLA: yes     24hr Temp:  [97.5 °F (36.4 °C)-98.2 °F (36.8 °C)]     CV:   Rhythm: normal sinus rhythm  BP goals:   SBP < 160  MAP > 65    Resp:   O2 Device (Oxygen Therapy): room air       Plan:  OR tomorrow    GI/:     Diet/Nutrition Received: regular  Last Bowel Movement: 11/12/22  Voiding Characteristics: external catheter    Intake/Output Summary (Last 24 hours) at 11/15/2022 0613  Last data filed at 11/14/2022 1714  Gross per 24 hour   Intake 489.96 ml   Output 900 ml   Net -410.04 ml     Unmeasured Output  Urine Occurrence: 1  Stool Occurrence: 1  Emesis Occurrence: 0  Pad Count: 1    Labs/Accuchecks:  Recent Labs   Lab 11/15/22  0224   WBC 7.53   RBC 4.92   HGB 15.2   HCT 44.1         Recent Labs   Lab 11/15/22  0224      K 4.0   CO2 24      BUN 16   CREATININE 0.7   ALKPHOS 126   ALT 36   AST 18   BILITOT 0.3    No results for input(s): PROTIME, INR, APTT, HEPANTIXA in the last 168 hours. No results for input(s): CPK, CPKMB, TROPONINI, MB in the last 168 hours.    Electrolytes: N/A - electrolytes WDL  Accuchecks: none    Gtts:      LDA/Wounds:  Lines/Drains/Airways       Drain  Duration             Female External Urinary Catheter 11/08/22 0605 7 days              Peripheral Intravenous Line  Duration                  Peripheral IV - Single Lumen 11/11/22 1721 20 G Anterior;Left Forearm 3 days         Peripheral IV - Single Lumen 11/15/22  0238 20 G Distal;Right;Medial Forearm <1 day                  Wounds: Yes  Wound care consulted: No

## 2022-11-15 NOTE — PROGRESS NOTES
[facility]  Neurosurgery  Progress Note    Subjective:     History of Present Illness: Liza Arrieta is a 51 y.o. female who presents as a referral from Dr. Mark to discuss possible intracranial seizure surgery. She has already had left temporal lobectomy. She will need neuropsychological evaluation and MRI with contrast STEALTH protocol for operative planning purposes.       She requests that we postpone sEEG until after the Epilepsy Walk she organizes in the first weekend in November      I had a lengthy, detailed discussion with the patient and her  about the risks, benefits, and alternatives to intracranial localization for seizures.      We discussed that monitoring typically takes 1-2 weeks but may be longer or shorter depending on how long it takes for her to have concordant seizures.  We discussed that she will not be able to get out of bed while the electrodes are in her head, and that she will remain in the ICU during that time. She may require mittens (she pulled out her grids in 2018 while postictal). We also discussed that this is a diagnostic, not therapeutic, procedure, and that the definitive surgery would potentially be performed 4-8 weeks after the time of sEEG localization.      We discussed that there is approximately 20% rate of a symptomatic hemorrhage and approximately 1% rate of symptomatic hemorrhage from placement of sEEG electrode per review of the international literature.  They expressed understanding of this.      We also discussed the specific risks of the localization surgery. Risks include, but are not limited to, bleeding, pain, infection, scarring, need for further/repeat procedure, death, paralysis, stroke (including venous infarct)/damage to major blood vessels, leak of cerebrospinal fluid, and hardware-related complications. There is a chance that we would fail to localize the seizures with the initial electrode plan, which would require placement of  additional electrodes, or may not lead to definitive surgery. Informed consent was obtained of the patient with her  present.       Post-Op Info:  Procedure(s) (LRB):  1.) PLACEMENT OF THE FIDUCIAL SCREWS 2.) PLACEMENT OF THE BILATERAL SEEG ELECTRODES WITH BRANDEE ROBOT (Bilateral)   8 Days Post-Op     Interval History: 11/15: NAEON. AFVSS. Exam stable. No sz yesterday. Biked and had BM. OR for lead removal tomorrow.     Medications:  Continuous Infusions:  Scheduled Meds:   cefTRIAXone (ROCEPHIN) IVPB  2 g Intravenous Q12H    lamoTRIgine  200 mg Oral QAM    lamoTRIgine  300 mg Oral Nightly    NON FORMULARY MEDICATION 150 mg  150 mg Oral QHS    polyethylene glycol  17 g Oral BID    senna-docusate 8.6-50 mg  2 tablet Oral BID    sertraline  25 mg Oral Daily     PRN Meds:acetaminophen, bisacodyL, hydrALAZINE, HYDROmorphone, magnesium hydroxide 400 mg/5 ml, magnesium oxide, magnesium oxide, ondansetron, oxyCODONE, potassium bicarbonate, potassium bicarbonate, potassium bicarbonate, potassium, sodium phosphates, potassium, sodium phosphates, potassium, sodium phosphates     Review of Systems  Objective:     Weight: 99.5 kg (219 lb 5.7 oz)  Body mass index is 35.41 kg/m².  Vital Signs (Most Recent):  Temp: 98.1 °F (36.7 °C) (11/15/22 0705)  Pulse: (!) 57 (11/15/22 1000)  Resp: 16 (11/15/22 1000)  BP: 115/68 (11/15/22 1000)  SpO2: 96 % (11/15/22 1000)   Vital Signs (24h Range):  Temp:  [97.8 °F (36.6 °C)-98.2 °F (36.8 °C)] 98.1 °F (36.7 °C)  Pulse:  [49-65] 57  Resp:  [10-28] 16  SpO2:  [93 %-98 %] 96 %  BP: ()/(57-75) 115/68     Date 11/15/22 0700 - 11/16/22 0659   Shift 6778-6238 9782-2628 5878-2301 24 Hour Total   INTAKE   P.O. 100   100   I.V.(mL/kg) 20(0.2)   20(0.2)   Shift Total(mL/kg) 120(1.2)   120(1.2)   OUTPUT   Urine(mL/kg/hr) 350   350   Shift Total(mL/kg) 350(3.5)   350(3.5)   Weight (kg) 99.5 99.5 99.5 99.5                     Female External Urinary Catheter 11/08/22 0605 (Active)   Skin  no redness;no breakdown 11/09/22 0305   Tolerance no signs/symptoms of discomfort 11/09/22 0305   Suction Continuous suction at 60 mmHg 11/08/22 2305   Date of last wick change 11/08/22 11/08/22 1905   Time of last wick change 1400 11/08/22 1505   Output (mL) 350 mL 11/09/22 0230       Physical Exam  General: well developed, well nourished, no distress.   Head: normocephalic, atraumatic  Neurologic:   GCS: Eyes: 4/ Verbal: 5/ Motor: 6  Mental Status: Awake, Alert, Oriented x 3  Cranial nerves: PERRL, EOMI, face symmetric, tongue midline, shoulder shrug equal.  No pronator drift, no dysmetria.  Sensory: intact to light touch throughout  Motor Strength:Moves all extremities antigravity    Gen: well nourished, normocephalic, atraumatic  CV: skin warm and dry, distal pulses present  Pulm: chest rise symmetric, no increased work of breathing  GI: abdomen soft  MSK: moving all with good tone  Psych: patient interacting appropriately      Significant Labs:  Recent Labs   Lab 11/14/22  0157 11/15/22  0224   GLU 98 105    138   K 4.4 4.0    105   CO2 26 24   BUN 16 16   CREATININE 0.7 0.7   CALCIUM 9.8 9.4   MG 1.8 1.9       Recent Labs   Lab 11/14/22  0157 11/15/22  0224   WBC 7.70 7.53   HGB 15.6 15.2   HCT 45.3 44.1    212       No results for input(s): LABPT, INR, APTT in the last 48 hours.  Microbiology Results (last 7 days)       ** No results found for the last 168 hours. **          All pertinent labs from the last 24 hours have been reviewed.    Significant Diagnostics:  I have reviewed all pertinent imaging results/findings within the past 24 hours.  No results found in the last 24 hours.    Assessment/Plan:     * Temporal lobe epilepsy, intractable  A 52 y.o. female with hx of prior L temporal lobectomy and intractable seizure who admitted for sEEG 11/7    Continue ICU care  Neurocheck Q1hr  Pain control   Postop CT head satisfactory   Continue EEG and monitoring  Continue abx ppax while leads  in place; ceftriaxone   AEDs per epilepsy   Aggressive bowel regimen  Daily bike   Sating well at room   Keep SBP <150  NPO at midnight  Hold SQH  Further care per NCC  NSGY will follow     Dispo: Tentative lead removal Wednesday, 11/16. Keep the course         Mo Kincaid MD  Neurosurgery  [facility]

## 2022-11-15 NOTE — PROGRESS NOTES
EEG Hook up  AM Check Electrodes had to be fixed.No    Moved part of study over to the       Idania Saab   11/15/2022 8:04 AM

## 2022-11-15 NOTE — ANESTHESIA PREPROCEDURE EVALUATION
[facility]  Anesthesia Pre-Operative Evaluation         Patient Name: Liza Arrieta  YOB: 1970  MRN: [id number]    SUBJECTIVE:     Pre-operative evaluation for Procedure(s) (LRB):  REMOVAL OF STEREO EEG LEADS (DEPTH ELECTRODES) (Bilateral)     11/15/2022    Liza Arrieta is a 52 y.o. female w/ a significant PMHx of HTN, Depression, Anemia, Obesity, Intractable epilepsy (s/p partial left anterior temporal lobectomy in 11/2018). Admitted to NICU with recurrent seizures s/p placement of 6 right-sided and 6 left-sided sEEG leads for further localization of seizures as part of repeat surgical workup.    Patient now presents for the above procedure(s).      LDA:       Peripheral IV - Single Lumen 11/11/22 1721 20 G Anterior;Left Forearm (Active)   Site Assessment Clean;Dry;Intact;No redness;No swelling 11/15/22 0705   Extremity Assessment Distal to IV No abnormal discoloration;No redness;No swelling;No warmth 11/15/22 0705   Line Status Flushed;Saline locked 11/15/22 0705   Dressing Status Clean;Dry;Intact 11/15/22 0705   Dressing Intervention Integrity maintained 11/15/22 0705   Dressing Change Due 11/15/22 11/15/22 0705   Site Change Due 11/15/22 11/15/22 0705   Reason Not Rotated Not due 11/15/22 0705   Number of days: 3            Peripheral IV - Single Lumen 11/15/22 0238 20 G Distal;Right;Medial Forearm (Active)   Site Assessment Clean;Dry;Intact;No redness;No swelling 11/15/22 0705   Extremity Assessment Distal to IV No abnormal discoloration;No redness;No swelling;No warmth 11/15/22 0705   Line Status Flushed;Saline locked 11/15/22 0705   Dressing Status Clean;Dry;Intact 11/15/22 0705   Dressing Intervention Integrity maintained 11/15/22 0705   Dressing Change Due 11/19/22 11/15/22 0705   Site Change Due 11/19/22 11/15/22 0705   Reason Not Rotated Not due 11/15/22 0705   Number of days: 0       Female External Urinary Catheter 11/08/22 0605 (Active)   Skin no redness;no breakdown  11/15/22 0705   Tolerance no signs/symptoms of discomfort 11/15/22 0705   Suction Continuous suction at 60 mmHg 11/15/22 0705   Date of last wick change 11/13/22 11/13/22 1905   Time of last wick change 1900 11/13/22 1905   Output (mL) 350 mL 11/15/22 0800   Number of days: 7       Prev airway: None documented.    Drips: None documented.      Patient Active Problem List   Diagnosis    Convulsions/seizures    Seizure    Temporal lobe epilepsy, intractable    Complex partial epilepsy with generalization and with intractable epilepsy    Mild neurocognitive disorder due to another medical condition    Obesity    Snoring    Abnormal EKG    Iron deficiency anemia    Epilepsy    Essential hypertension    Adverse effect of antiepileptic    Depression    Edema       Review of patient's allergies indicates:  No Known Allergies    Current Inpatient Medications:   cefTRIAXone (ROCEPHIN) IVPB  2 g Intravenous Q12H    lamoTRIgine  200 mg Oral QAM    lamoTRIgine  300 mg Oral Nightly    NON FORMULARY MEDICATION 150 mg  150 mg Oral QHS    polyethylene glycol  17 g Oral BID    senna-docusate 8.6-50 mg  2 tablet Oral BID    sertraline  25 mg Oral Daily       No current facility-administered medications on file prior to encounter.     Current Outpatient Medications on File Prior to Encounter   Medication Sig Dispense Refill    cenobamate (XCOPRI) 150 mg Tab Take one tablet by mouth once daily. (Patient taking differently: Take 150 mg by mouth nightly.) 30 tablet 5    lamoTRIgine (LAMICTAL) 200 MG tablet Take 2.5 tablets (500 mg total) by mouth once daily. (Patient taking differently: Take 500 mg by mouth once daily. 200 mg in am and 300 mg in the pm) 405 tablet 3    sertraline (ZOLOFT) 25 MG tablet Take 1 tablet (25 mg total) by mouth once daily. 30 tablet 11    cenobamate (XCOPRI) 200 mg Tab Take one tablet by mouth once daily at 6am. 30 tablet 5       Past Surgical History:   Procedure Laterality Date     APPENDECTOMY       SECTION      4    CHOLECYSTECTOMY      CRANIOTOMY N/A 2018    Procedure: CRANIOTOMY for strip and grid;  Surgeon: Ruben Senior MD;  [facility];  Service: Neurosurgery;  Laterality: N/A;  toronto II, asa 2, type and screen, regular bed, muñoz, supine     CRANIOTOMY Left 2018    Procedure: CRANIOTOMY for grids and strips;  Surgeon: Ruben Senior MD;  [facility];  Service: Neurosurgery;  Laterality: Left;    HYSTERECTOMY      around  for pain ful periods     INSERTION OF SUBDURAL GRID AND STRIP ELECTRODES BY CRANIOTOMY Left 2018    Procedure: CRANIOTOMY, FOR SUBDURAL GRID AND STRIP ELECTRODE LEAD Removal.;  Surgeon: Rbuen Senior MD;  [facility];  Service: Neurosurgery;  Laterality: Left;  needs microscope and stealth-cg    SURGICAL REMOVAL OF LOBE OF BRAIN Left 2018    Procedure: LOBECTOMY, BRAIN left temporal lobe.;  Surgeon: Ruben Senior MD;  Location: [facility] OR [address] [place] [address];  Service: Neurosurgery;  Laterality: Left;       Social History     Socioeconomic History    Marital status:    Tobacco Use    Smoking status: Never    Smokeless tobacco: Never   Substance and Sexual Activity    Alcohol use: Yes     Comment: One drink per month    Drug use: No    Sexual activity: Yes     Partners: Male     Birth control/protection: See Surgical Hx     Social Determinants of Health     Financial Resource Strain: Low Risk     Difficulty of Paying Living Expenses: Not hard at all   Food Insecurity: No Food Insecurity    Worried About Running Out of Food in the Last Year: Never true    Ran Out of Food in the Last Year: Never true   Transportation Needs: No Transportation Needs    Lack of Transportation (Medical): No    Lack of Transportation (Non-Medical): No   Physical Activity: Sufficiently Active    Days of Exercise per Week: 4 days    Minutes of Exercise per Session: 80 min   Stress: No Stress Concern  Present    Feeling of Stress : Only a little   Social Connections: Unknown    Frequency of Communication with Friends and Family: Three times a week    Frequency of Social Gatherings with Friends and Family: Once a week    Active Member of Clubs or Organizations: Yes    Attends Club or Organization Meetings: More than 4 times per year    Marital Status:    Housing Stability: Low Risk     Unable to Pay for Housing in the Last Year: No    Number of Places Lived in the Last Year: 1    Unstable Housing in the Last Year: No       OBJECTIVE:     Vital Signs Range (Last 24H):  Temp:  [36.6 °C (97.8 °F)-36.8 °C (98.2 °F)]   Pulse:  [49-65]   Resp:  [10-28]   BP: ()/(57-80)   SpO2:  [93 %-98 %]       Significant Labs:  Lab Results   Component Value Date    WBC 7.53 11/15/2022    HGB 15.2 11/15/2022    HCT 44.1 11/15/2022     11/15/2022    CHOL 182 11/05/2018    TRIG 114 11/05/2018    HDL 39 (L) 11/05/2018    ALT 36 11/15/2022    AST 18 11/15/2022     11/15/2022    K 4.0 11/15/2022     11/15/2022    CREATININE 0.7 11/15/2022    BUN 16 11/15/2022    CO2 24 11/15/2022    TSH 1.657 01/14/2016    INR 1.0 11/02/2022    HGBA1C 5.3 10/22/2018       Diagnostic Studies: No relevant studies.    EKG:   Results for orders placed or performed during the hospital encounter of 11/07/22   EKG 12-lead    Collection Time: 11/08/22 12:09 AM    Narrative    Test Reason : R56.9,    Vent. Rate : 050 BPM     Atrial Rate : 050 BPM     P-R Int : 150 ms          QRS Dur : 086 ms      QT Int : 456 ms       P-R-T Axes : 035 015 -51 degrees     QTc Int : 415 ms    Sinus bradycardia  T wave abnormality, consider anterolateral ischemia  Abnormal ECG  When compared with ECG of 05-JUL-2022 12:59,  T wave inversion more evident in Anterior leads  Confirmed by Felipa WATERMAN, Hansa ([id number]) on 11/8/2022 3:43:18 PM    Referred By: SHAUNNA DUKE           Confirmed By:Hansa Leo MD       2D ECHO:  TTE:  No results found for  this or any previous visit.    SALOMON:  No results found for this or any previous visit.    ASSESSMENT/PLAN:                                                                                                                  11/15/2022  Liza Arrieta is a 52 y.o., female.      Pre-op Assessment    I have reviewed the Patient Summary Reports.     I have reviewed the Nursing Notes. I have reviewed the NPO Status.   I have reviewed the Medications.     Review of Systems  Anesthesia Hx:  No problems with previous Anesthesia Denies Hx of Anesthetic complications  History of prior surgery of interest to airway management or planning: Denies Family Hx of Anesthesia complications.   Denies Personal Hx of Anesthesia complications.   Hematology/Oncology:         -- Anemia:   Cardiovascular:   Exercise tolerance: good Hypertension Denies CAD.    Denies CABG/stent.   Denies CHF. no hyperlipidemia    Pulmonary:   Denies COPD.  Denies Asthma.  Denies Sleep Apnea.    Renal/:   Denies Chronic Renal Disease.     Hepatic/GI:   Denies GERD. Denies Liver Disease.    Neurological:   Denies CVA.  Denies Headaches. Seizures, poorly controlled s/p partial left anterior temporal lobectomy in 11/2018    s/p placement of 6 right-sided and 6 left-sided sEEG leads   Endocrine:   Denies Diabetes.  Obesity / BMI > 30  Psych:   Denies Psychiatric History. depression          Physical Exam  General: Well nourished, Cooperative, Alert and Oriented    Airway:  Mallampati: IV   Mouth Opening: < 3 cm  TM Distance: Normal  Tongue: Normal  Neck ROM: Extension Decreased, Flexion Decreased    Dental:  Intact        Anesthesia Plan  Type of Anesthesia, risks & benefits discussed:    Anesthesia Type: Gen ETT  Intra-op Monitoring Plan: Standard ASA Monitors  Post Op Pain Control Plan: multimodal analgesia and IV/PO Opioids PRN  Induction:  IV  Airway Plan: Direct and Video, Post-Induction  ASA Score: 3  Day of Surgery Review of History & Physical: H&P Update  referred to the surgeon/provider.    Ready For Surgery From Anesthesia Perspective.     .

## 2022-11-15 NOTE — SUBJECTIVE & OBJECTIVE
Review of Systems  Review of Systems   Constitutional: Negative.  Negative for chills and fever.   HENT: Negative.  Negative for congestion.    Eyes: Negative.  Negative for visual disturbance.   Respiratory: Negative.  Negative for cough and shortness of breath.    Cardiovascular: Negative.  Negative for chest pain, palpitations and leg swelling.   Gastrointestinal: Negative.  Negative for abdominal distention and abdominal pain.   Endocrine: Negative.    Genitourinary: Negative.    Musculoskeletal: Negative.  Negative for arthralgias.   Skin: Negative.    Neurological:  Positive for seizures (no activity overnight). Negative for dizziness, tremors, syncope, facial asymmetry, speech difficulty, weakness, light-headedness, numbness and headaches.   Psychiatric/Behavioral:  Negative for agitation and behavioral problems.  Objective:     Vitals:  Temp: 98.1 °F (36.7 °C)  Pulse: (!) 51  Rhythm: normal sinus rhythm  BP: (!) 125/57  MAP (mmHg): 82  Resp: 17  SpO2: 95 %  O2 Device (Oxygen Therapy): room air    Temp  Min: 97.8 °F (36.6 °C)  Max: 98.2 °F (36.8 °C)  Pulse  Min: 49  Max: 65  BP  Min: 99/60  Max: 127/75  MAP (mmHg)  Min: 74  Max: 95  Resp  Min: 9  Max: 28  SpO2  Min: 93 %  Max: 98 %    11/14 0701 - 11/15 0700  In: 659.6 [P.O.:500; I.V.:60]  Out: 900 [Urine:900]   Unmeasured Output  Urine Occurrence: 1  Stool Occurrence: 1  Emesis Occurrence: 0  Pad Count: 1       Physical Exam     General: well developed, well nourished, no distress.   HEENT: normocephalic, atraumatic  CV: regular rate  Pulmonary: normal respirations, no signs of respiratory distress  Abdomen: soft, non-distended, not tender to palpation  Skin: Skin is warm, dry and intact.     Neurologic:   Mental Status: AO x4, no aphasia, no dysarthria   CN: PERRL, EOMI, sensation intact in V1-V3 distributions, eyebrow raise and grimace symmetric, tongue midline, SCM 5/5, trapezius 5/5  Motor: moves all extremities spontaneously, full strength throughout,  no pronator drift   Sensory: intact to light touch throughout  Gait: deferred      Medications:  Continuous ScheduledcefTRIAXone (ROCEPHIN) IVPB, 2 g, Q12H  heparin (porcine), 5,000 Units, Q8H  lamoTRIgine, 200 mg, QAM  lamoTRIgine, 300 mg, Nightly  NON FORMULARY MEDICATION 150 mg, 150 mg, QHS  polyethylene glycol, 17 g, BID  senna-docusate 8.6-50 mg, 2 tablet, BID  sertraline, 25 mg, Daily    PRNacetaminophen, 1,000 mg, Q8H PRN  bisacodyL, 10 mg, Daily PRN  hydrALAZINE, 10 mg, Q6H PRN  HYDROmorphone, 1 mg, Q6H PRN  magnesium hydroxide 400 mg/5 ml, 30 mL, Daily PRN  magnesium oxide, 800 mg, PRN  magnesium oxide, 800 mg, PRN  ondansetron, 4 mg, Q12H PRN  oxyCODONE, 5 mg, Q6H PRN  potassium bicarbonate, 35 mEq, PRN  potassium bicarbonate, 50 mEq, PRN  potassium bicarbonate, 60 mEq, PRN  potassium, sodium phosphates, 2 packet, PRN  potassium, sodium phosphates, 2 packet, PRN  potassium, sodium phosphates, 2 packet, PRN      Today I personally reviewed pertinent medications, lines/drains/airways, imaging, laboratory results,     Diet  Diet Adult Regular (IDDSI Level 7)  Diet NPO Except for: Medication

## 2022-11-15 NOTE — PROGRESS NOTES
EEG Hook up  AM Check Electrodes had to be fixed.Yes    Tech did a reprep omn the ground and ref electrodes      Idania Saab   11/15/2022 8:25 AM

## 2022-11-15 NOTE — PLAN OF CARE
[facility] Care Plan    POC reviewed with Liza Arrieta at 1000. Pt verbalized understanding. Questions and concerns addressed. No acute events today. Pt progressing toward goals. Will continue to monitor. See below and flowsheets for full assessment and VS info.     -no push-button events noted this shift  -pt rode stationary bike for 27 minutes, 7 miles  -complete bath given with linen change  -BM X 1  -plan for lead removal surgery on Wednesday        Is this a stroke patient? no    Neuro:  Antwan Coma Scale  Best Eye Response: 4-->(E4) spontaneous  Best Motor Response: 6-->(M6) obeys commands  Best Verbal Response: 5-->(V5) oriented  Antwan Coma Scale Score: 15  Assessment Qualifiers: no eye obstruction present, patient not sedated/intubated  Pupil PERRLA: yes     24 hr Temp:  [97.5 °F (36.4 °C)-98.6 °F (37 °C)]     CV:   Rhythm: sinus bradycardia  BP goals:   SBP < 160  MAP > 65    Resp:   O2 Device (Oxygen Therapy): room air       Plan: N/A    GI/:     Diet/Nutrition Received: regular  Last Bowel Movement: 11/14/22  Voiding Characteristics: external catheter    Intake/Output Summary (Last 24 hours) at 11/14/2022 1805  Last data filed at 11/14/2022 1714  Gross per 24 hour   Intake 733.82 ml   Output 1350 ml   Net -616.18 ml     Unmeasured Output  Urine Occurrence: 1  Stool Occurrence: 1  Emesis Occurrence: 0  Pad Count: 1    Labs/Accuchecks:  Recent Labs   Lab 11/14/22  0157   WBC 7.70   RBC 5.08   HGB 15.6   HCT 45.3         Recent Labs   Lab 11/14/22  0157      K 4.4   CO2 26      BUN 16   CREATININE 0.7   ALKPHOS 131   ALT 39   AST 23   BILITOT 0.4    No results for input(s): PROTIME, INR, APTT, HEPANTIXA in the last 168 hours. No results for input(s): CPK, CPKMB, TROPONINI, MB in the last 168 hours.    Electrolytes: N/A - electrolytes WDL  Accuchecks: none    Gtts:      LDA/Wounds:  Lines/Drains/Airways       Drain  Duration             Female External Urinary Catheter 11/08/22 0605 6 days               Peripheral Intravenous Line  Duration                  Peripheral IV - Single Lumen 11/11/22 1721 20 G Anterior;Left Forearm 3 days         Peripheral IV - Single Lumen 11/11/22 1722 20 G Anterior;Right Forearm 3 days                  Wounds: Yes  Wound care consulted: No

## 2022-11-15 NOTE — PLAN OF CARE
[facility] Care Plan    POC reviewed with Liza Arrieta and at 1000. Pt verbalized understanding. Questions and concerns addressed. No acute events today. Pt progressing toward goals. Will continue to monitor. See below and flowsheets for full assessment and VS info.     -no push button events during this shift  -sEEG monitoring maintained  -plan for lead removal with NSGY tomorrow  -NPO midnight        Is this a stroke patient? no    Neuro:  Antwan Coma Scale  Best Eye Response: 4-->(E4) spontaneous  Best Motor Response: 6-->(M6) obeys commands  Best Verbal Response: 5-->(V5) oriented  Antwan Coma Scale Score: 15  Assessment Qualifiers: no eye obstruction present, patient not sedated/intubated  Pupil PERRLA: yes     24 hr Temp:  [98 °F (36.7 °C)-98.2 °F (36.8 °C)]     CV:   Rhythm: sinus bradycardia  BP goals:   SBP < 160  MAP > 65    Resp:   O2 Device (Oxygen Therapy): room air       Plan: N/A    GI/:     Diet/Nutrition Received: regular  Last Bowel Movement: 11/14/22  Voiding Characteristics: external catheter    Intake/Output Summary (Last 24 hours) at 11/15/2022 1731  Last data filed at 11/15/2022 1700  Gross per 24 hour   Intake 464.65 ml   Output 350 ml   Net 114.65 ml     Unmeasured Output  Urine Occurrence: 1  Stool Occurrence: 1  Emesis Occurrence: 0  Pad Count: 1    Labs/Accuchecks:  Recent Labs   Lab 11/15/22  0224   WBC 7.53   RBC 4.92   HGB 15.2   HCT 44.1         Recent Labs   Lab 11/15/22  0224      K 4.0   CO2 24      BUN 16   CREATININE 0.7   ALKPHOS 126   ALT 36   AST 18   BILITOT 0.3    No results for input(s): PROTIME, INR, APTT, HEPANTIXA in the last 168 hours. No results for input(s): CPK, CPKMB, TROPONINI, MB in the last 168 hours.    Electrolytes: N/A - electrolytes WDL  Accuchecks: none    Gtts:      LDA/Wounds:  Lines/Drains/Airways       Drain  Duration             Female External Urinary Catheter 11/08/22 0605 7 days              Peripheral Intravenous Line  Duration                   Peripheral IV - Single Lumen 11/11/22 1721 20 G Anterior;Left Forearm 4 days         Peripheral IV - Single Lumen 11/15/22 0238 20 G Distal;Right;Medial Forearm <1 day                  Wounds: Yes  Wound care consulted: No

## 2022-11-15 NOTE — SUBJECTIVE & OBJECTIVE
Interval History: Patient doing well overnight, in good spirits this morning and reports no seizures overnight. Home AEDs resumed in anticipation of sEEG lead removal tomorrow.     Current Facility-Administered Medications   Medication Dose Route Frequency Provider Last Rate Last Admin    acetaminophen tablet 1,000 mg  1,000 mg Oral Q8H PRN Mo Kincaid MD   1,000 mg at 11/09/22 1430    bisacodyL suppository 10 mg  10 mg Rectal Daily PRN Any Ruiz MD        cefTRIAXone (ROCEPHIN) 2 g/50 mL D5W IVPB  2 g Intravenous Q12H Mansour Diya Downs MD 50 mL/hr at 11/15/22 1400 Rate Verify at 11/15/22 1400    hydrALAZINE injection 10 mg  10 mg Intravenous Q6H PRN Ann Cohen PA-C        HYDROmorphone injection 1 mg  1 mg Intravenous Q6H PRN Mo Kincaid MD   1 mg at 11/07/22 1241    lamoTRIgine tablet 200 mg  200 mg Oral QAM Cassidy Moyer MD   200 mg at 11/15/22 0700    lamoTRIgine tablet 300 mg  300 mg Oral Nightly Cassdiy Moyer MD        magnesium hydroxide 400 mg/5 ml suspension 2,400 mg  30 mL Oral Daily PRN Josafat Arevalo MD        magnesium oxide tablet 800 mg  800 mg Oral PRN Ann Cohen PA-C   800 mg at 11/13/22 0955    magnesium oxide tablet 800 mg  800 mg Oral PRN Ann Cohen PA-C        NON FORMULARY MEDICATION 150 mg  150 mg Oral QHS Senia Fuentes PA-C        ondansetron injection 4 mg  4 mg Intravenous Q12H PRN Mo Kincaid MD        oxyCODONE immediate release tablet 5 mg  5 mg Oral Q6H PRN Mo Kincaid MD   5 mg at 11/08/22 1928    polyethylene glycol packet 17 g  17 g Oral BID Tiffanie Irwin NP   17 g at 11/14/22 0952    potassium bicarbonate disintegrating tablet 35 mEq  35 mEq Oral PRN Ann Cohen PA-C        potassium bicarbonate disintegrating tablet 50 mEq  50 mEq Oral PRN Ann Cohen PA-C        potassium bicarbonate disintegrating tablet 60 mEq  60 mEq Oral PRN Ann Cohen PA-C        potassium, sodium phosphates  280-160-250 mg packet 2 packet  2 packet Oral PRN Ann Cohen PA-C        potassium, sodium phosphates 280-160-250 mg packet 2 packet  2 packet Oral PRN Ann oChen PA-C        potassium, sodium phosphates 280-160-250 mg packet 2 packet  2 packet Oral PRN Ann Cohen PA-C        senna-docusate 8.6-50 mg per tablet 2 tablet  2 tablet Oral BID Tiffanie Irwin, NP   2 tablet at 11/14/22 0952    sertraline tablet 25 mg  25 mg Oral Daily Lisset Arboleda NP   25 mg at 11/15/22 0944     Continuous Infusions:    Review of Systems   Neurological:  Positive for seizures (none overnight). Negative for speech difficulty and weakness.   All other systems reviewed and are negative.  Objective:     Vital Signs (Most Recent):  Temp: 98.1 °F (36.7 °C) (11/15/22 0705)  Pulse: (!) 53 (11/15/22 1400)  Resp: 14 (11/15/22 1400)  BP: 98/64 (11/15/22 1400)  SpO2: 96 % (11/15/22 1400)   Vital Signs (24h Range):  Temp:  [97.8 °F (36.6 °C)-98.2 °F (36.8 °C)] 98.1 °F (36.7 °C)  Pulse:  [50-63] 53  Resp:  [10-28] 14  SpO2:  [93 %-98 %] 96 %  BP: ()/(55-75) 98/64     Weight: 99.5 kg (219 lb 5.7 oz)  Body mass index is 35.41 kg/m².    Physical Exam  Vitals and nursing note reviewed.   Constitutional:       General: She is not in acute distress.     Appearance: She is not diaphoretic.   HENT:      Head: Normocephalic and atraumatic.      Comments: sEEG leads with headwrap in place  Eyes:      General: No scleral icterus.     Extraocular Movements: Extraocular movements intact.      Conjunctiva/sclera: Conjunctivae normal.      Pupils: Pupils are equal, round, and reactive to light.   Cardiovascular:      Rate and Rhythm: Normal rate.   Pulmonary:      Effort: Pulmonary effort is normal. No respiratory distress.   Abdominal:      General: There is no distension.      Palpations: Abdomen is soft.      Tenderness: There is no abdominal tenderness.   Musculoskeletal:         General: No swelling, tenderness or deformity. Normal  range of motion.      Cervical back: Neck supple. No rigidity.   Skin:     General: Skin is warm and dry.   Neurological:      General: No focal deficit present.      Mental Status: She is alert and oriented to person, place, and time. Mental status is at baseline.   Psychiatric:         Mood and Affect: Mood normal.         Speech: Speech normal.         Behavior: Behavior normal.       NEUROLOGICAL EXAMINATION:     MENTAL STATUS   Oriented to person, place, and time.   Attention: normal. Concentration: normal.   Speech: speech is normal   Level of consciousness: alert    CRANIAL NERVES     CN III, IV, VI   Pupils are equal, round, and reactive to light.    CN VII   Facial expression full, symmetric.     CN VIII   Hearing: intact    CN IX, X   CN IX normal.     CN XI   CN XI normal.     CN XII   CN XII normal.     MOTOR EXAM   Muscle bulk: normal  Overall muscle tone: normal       Moves all extremities spontaneously and symmetrically, follows commands  No focal deficits noted     Significant Labs: All pertinent lab results from the past 24 hours have been reviewed.    Significant Studies: I have reviewed all pertinent imaging results/findings within the past 24 hours.

## 2022-11-16 ENCOUNTER — ANESTHESIA (OUTPATIENT)
Dept: SURGERY | Facility: HOSPITAL | Age: 52
DRG: 027 | End: 2022-11-16
Payer: COMMERCIAL

## 2022-11-16 LAB
ALBUMIN SERPL BCP-MCNC: 3.2 G/DL (ref 3.5–5.2)
ALP SERPL-CCNC: 125 U/L (ref 55–135)
ALT SERPL W/O P-5'-P-CCNC: 31 U/L (ref 10–44)
ANION GAP SERPL CALC-SCNC: 8 MMOL/L (ref 8–16)
APTT BLDCRRT: 24 SEC (ref 21–32)
AST SERPL-CCNC: 15 U/L (ref 10–40)
BASOPHILS # BLD AUTO: 0.02 K/UL (ref 0–0.2)
BASOPHILS NFR BLD: 0.3 % (ref 0–1.9)
BILIRUB SERPL-MCNC: 0.2 MG/DL (ref 0.1–1)
BUN SERPL-MCNC: 17 MG/DL (ref 6–20)
CALCIUM SERPL-MCNC: 9.5 MG/DL (ref 8.7–10.5)
CHLORIDE SERPL-SCNC: 106 MMOL/L (ref 95–110)
CO2 SERPL-SCNC: 25 MMOL/L (ref 23–29)
CREAT SERPL-MCNC: 0.8 MG/DL (ref 0.5–1.4)
DIFFERENTIAL METHOD: ABNORMAL
EOSINOPHIL # BLD AUTO: 0.1 K/UL (ref 0–0.5)
EOSINOPHIL NFR BLD: 1.3 % (ref 0–8)
ERYTHROCYTE [DISTWIDTH] IN BLOOD BY AUTOMATED COUNT: 11.5 % (ref 11.5–14.5)
EST. GFR  (NO RACE VARIABLE): >60 ML/MIN/1.73 M^2
GLUCOSE SERPL-MCNC: 107 MG/DL (ref 70–110)
HCT VFR BLD AUTO: 44.4 % (ref 37–48.5)
HGB BLD-MCNC: 15.3 G/DL (ref 12–16)
IMM GRANULOCYTES # BLD AUTO: 0.02 K/UL (ref 0–0.04)
IMM GRANULOCYTES NFR BLD AUTO: 0.3 % (ref 0–0.5)
INR PPP: 1 (ref 0.8–1.2)
LYMPHOCYTES # BLD AUTO: 2.2 K/UL (ref 1–4.8)
LYMPHOCYTES NFR BLD: 29.2 % (ref 18–48)
MAGNESIUM SERPL-MCNC: 1.8 MG/DL (ref 1.6–2.6)
MCH RBC QN AUTO: 31.2 PG (ref 27–31)
MCHC RBC AUTO-ENTMCNC: 34.5 G/DL (ref 32–36)
MCV RBC AUTO: 90 FL (ref 82–98)
MONOCYTES # BLD AUTO: 0.7 K/UL (ref 0.3–1)
MONOCYTES NFR BLD: 8.8 % (ref 4–15)
NEUTROPHILS # BLD AUTO: 4.5 K/UL (ref 1.8–7.7)
NEUTROPHILS NFR BLD: 60.1 % (ref 38–73)
NRBC BLD-RTO: 0 /100 WBC
PHOSPHATE SERPL-MCNC: 3.3 MG/DL (ref 2.7–4.5)
PLATELET # BLD AUTO: 206 K/UL (ref 150–450)
PMV BLD AUTO: 9.5 FL (ref 9.2–12.9)
POTASSIUM SERPL-SCNC: 3.6 MMOL/L (ref 3.5–5.1)
PROT SERPL-MCNC: 6.9 G/DL (ref 6–8.4)
PROTHROMBIN TIME: 10.7 SEC (ref 9–12.5)
RBC # BLD AUTO: 4.91 M/UL (ref 4–5.4)
SODIUM SERPL-SCNC: 139 MMOL/L (ref 136–145)
WBC # BLD AUTO: 7.53 K/UL (ref 3.9–12.7)

## 2022-11-16 PROCEDURE — 25000003 PHARM REV CODE 250: Performed by: NURSE ANESTHETIST, CERTIFIED REGISTERED

## 2022-11-16 PROCEDURE — 84100 ASSAY OF PHOSPHORUS: CPT | Performed by: PSYCHIATRY & NEUROLOGY

## 2022-11-16 PROCEDURE — D9220A PRA ANESTHESIA: Mod: CRNA,,, | Performed by: NURSE ANESTHETIST, CERTIFIED REGISTERED

## 2022-11-16 PROCEDURE — 85730 THROMBOPLASTIN TIME PARTIAL: CPT | Performed by: PSYCHIATRY & NEUROLOGY

## 2022-11-16 PROCEDURE — 63600175 PHARM REV CODE 636 W HCPCS: Performed by: NURSE ANESTHETIST, CERTIFIED REGISTERED

## 2022-11-16 PROCEDURE — 63600175 PHARM REV CODE 636 W HCPCS: Performed by: STUDENT IN AN ORGANIZED HEALTH CARE EDUCATION/TRAINING PROGRAM

## 2022-11-16 PROCEDURE — 85610 PROTHROMBIN TIME: CPT | Performed by: PSYCHIATRY & NEUROLOGY

## 2022-11-16 PROCEDURE — 99233 PR SUBSEQUENT HOSPITAL CARE,LEVL III: ICD-10-PCS | Mod: ,,, | Performed by: PSYCHIATRY & NEUROLOGY

## 2022-11-16 PROCEDURE — 37000009 HC ANESTHESIA EA ADD 15 MINS: Performed by: NEUROLOGICAL SURGERY

## 2022-11-16 PROCEDURE — 99233 SBSQ HOSP IP/OBS HIGH 50: CPT | Mod: ,,, | Performed by: PHYSICIAN ASSISTANT

## 2022-11-16 PROCEDURE — 25000003 PHARM REV CODE 250: Performed by: STUDENT IN AN ORGANIZED HEALTH CARE EDUCATION/TRAINING PROGRAM

## 2022-11-16 PROCEDURE — 64999 UNLISTED PX NERVOUS SYSTEM: CPT | Mod: ,,, | Performed by: NEUROLOGICAL SURGERY

## 2022-11-16 PROCEDURE — C1713 ANCHOR/SCREW BN/BN,TIS/BN: HCPCS | Performed by: NEUROLOGICAL SURGERY

## 2022-11-16 PROCEDURE — 20000000 HC ICU ROOM

## 2022-11-16 PROCEDURE — 25000003 PHARM REV CODE 250: Performed by: NEUROLOGICAL SURGERY

## 2022-11-16 PROCEDURE — D9220A PRA ANESTHESIA: ICD-10-PCS | Mod: ANES,,, | Performed by: STUDENT IN AN ORGANIZED HEALTH CARE EDUCATION/TRAINING PROGRAM

## 2022-11-16 PROCEDURE — 99233 SBSQ HOSP IP/OBS HIGH 50: CPT | Mod: ,,, | Performed by: PSYCHIATRY & NEUROLOGY

## 2022-11-16 PROCEDURE — D9220A PRA ANESTHESIA: ICD-10-PCS | Mod: CRNA,,, | Performed by: NURSE ANESTHETIST, CERTIFIED REGISTERED

## 2022-11-16 PROCEDURE — 36000707: Performed by: NEUROLOGICAL SURGERY

## 2022-11-16 PROCEDURE — 80053 COMPREHEN METABOLIC PANEL: CPT | Performed by: PSYCHIATRY & NEUROLOGY

## 2022-11-16 PROCEDURE — 25000003 PHARM REV CODE 250

## 2022-11-16 PROCEDURE — 64999 PR REMOVAL OF EEG ELECTRODES: ICD-10-PCS | Mod: ,,, | Performed by: NEUROLOGICAL SURGERY

## 2022-11-16 PROCEDURE — 94761 N-INVAS EAR/PLS OXIMETRY MLT: CPT

## 2022-11-16 PROCEDURE — D9220A PRA ANESTHESIA: Mod: ANES,,, | Performed by: STUDENT IN AN ORGANIZED HEALTH CARE EDUCATION/TRAINING PROGRAM

## 2022-11-16 PROCEDURE — 99233 PR SUBSEQUENT HOSPITAL CARE,LEVL III: ICD-10-PCS | Mod: ,,, | Performed by: PHYSICIAN ASSISTANT

## 2022-11-16 PROCEDURE — 37000008 HC ANESTHESIA 1ST 15 MINUTES: Performed by: NEUROLOGICAL SURGERY

## 2022-11-16 PROCEDURE — 95718 EEG PHYS/QHP 2-12 HR W/VEEG: CPT | Mod: ,,, | Performed by: PSYCHIATRY & NEUROLOGY

## 2022-11-16 PROCEDURE — 36000706: Performed by: NEUROLOGICAL SURGERY

## 2022-11-16 PROCEDURE — 83735 ASSAY OF MAGNESIUM: CPT | Performed by: PSYCHIATRY & NEUROLOGY

## 2022-11-16 PROCEDURE — 85025 COMPLETE CBC W/AUTO DIFF WBC: CPT | Performed by: PSYCHIATRY & NEUROLOGY

## 2022-11-16 PROCEDURE — 95718 PR EEG, W/VIDEO, CONT RECORD, I&R, 2-12 HRS: ICD-10-PCS | Mod: ,,, | Performed by: PSYCHIATRY & NEUROLOGY

## 2022-11-16 RX ORDER — MIDAZOLAM HYDROCHLORIDE 1 MG/ML
INJECTION, SOLUTION INTRAMUSCULAR; INTRAVENOUS
Status: DISCONTINUED | OUTPATIENT
Start: 2022-11-16 | End: 2022-11-16

## 2022-11-16 RX ORDER — FENTANYL CITRATE 50 UG/ML
25 INJECTION, SOLUTION INTRAMUSCULAR; INTRAVENOUS EVERY 5 MIN PRN
Status: CANCELLED | OUTPATIENT
Start: 2022-11-16

## 2022-11-16 RX ORDER — LIDOCAINE HYDROCHLORIDE 20 MG/ML
INJECTION INTRAVENOUS
Status: DISCONTINUED | OUTPATIENT
Start: 2022-11-16 | End: 2022-11-16

## 2022-11-16 RX ORDER — FENTANYL CITRATE 50 UG/ML
INJECTION, SOLUTION INTRAMUSCULAR; INTRAVENOUS
Status: DISCONTINUED | OUTPATIENT
Start: 2022-11-16 | End: 2022-11-16

## 2022-11-16 RX ORDER — ACETAMINOPHEN 10 MG/ML
INJECTION, SOLUTION INTRAVENOUS
Status: DISCONTINUED | OUTPATIENT
Start: 2022-11-16 | End: 2022-11-16

## 2022-11-16 RX ORDER — ONDANSETRON 2 MG/ML
INJECTION INTRAMUSCULAR; INTRAVENOUS
Status: DISCONTINUED | OUTPATIENT
Start: 2022-11-16 | End: 2022-11-16

## 2022-11-16 RX ORDER — DEXAMETHASONE SODIUM PHOSPHATE 4 MG/ML
INJECTION, SOLUTION INTRA-ARTICULAR; INTRALESIONAL; INTRAMUSCULAR; INTRAVENOUS; SOFT TISSUE
Status: DISCONTINUED | OUTPATIENT
Start: 2022-11-16 | End: 2022-11-16

## 2022-11-16 RX ORDER — LIDOCAINE HYDROCHLORIDE AND EPINEPHRINE 10; 10 MG/ML; UG/ML
INJECTION, SOLUTION INFILTRATION; PERINEURAL
Status: DISCONTINUED | OUTPATIENT
Start: 2022-11-16 | End: 2022-11-16 | Stop reason: HOSPADM

## 2022-11-16 RX ORDER — HYDROMORPHONE HYDROCHLORIDE 1 MG/ML
0.2 INJECTION, SOLUTION INTRAMUSCULAR; INTRAVENOUS; SUBCUTANEOUS EVERY 5 MIN PRN
Status: CANCELLED | OUTPATIENT
Start: 2022-11-16

## 2022-11-16 RX ORDER — ONDANSETRON 2 MG/ML
4 INJECTION INTRAMUSCULAR; INTRAVENOUS DAILY PRN
Status: CANCELLED | OUTPATIENT
Start: 2022-11-16

## 2022-11-16 RX ORDER — PROPOFOL 10 MG/ML
VIAL (ML) INTRAVENOUS
Status: DISCONTINUED | OUTPATIENT
Start: 2022-11-16 | End: 2022-11-16

## 2022-11-16 RX ORDER — HALOPERIDOL 5 MG/ML
0.5 INJECTION INTRAMUSCULAR EVERY 10 MIN PRN
Status: CANCELLED | OUTPATIENT
Start: 2022-11-16

## 2022-11-16 RX ORDER — ROCURONIUM BROMIDE 10 MG/ML
INJECTION, SOLUTION INTRAVENOUS
Status: DISCONTINUED | OUTPATIENT
Start: 2022-11-16 | End: 2022-11-16

## 2022-11-16 RX ORDER — OXYCODONE HYDROCHLORIDE 5 MG/1
5 TABLET ORAL
Status: CANCELLED | OUTPATIENT
Start: 2022-11-16

## 2022-11-16 RX ADMIN — SODIUM CHLORIDE, SODIUM GLUCONATE, SODIUM ACETATE, POTASSIUM CHLORIDE, MAGNESIUM CHLORIDE, SODIUM PHOSPHATE, DIBASIC, AND POTASSIUM PHOSPHATE: .53; .5; .37; .037; .03; .012; .00082 INJECTION, SOLUTION INTRAVENOUS at 02:11

## 2022-11-16 RX ADMIN — DEXAMETHASONE SODIUM PHOSPHATE 4 MG: 4 INJECTION, SOLUTION INTRAMUSCULAR; INTRAVENOUS at 02:11

## 2022-11-16 RX ADMIN — FENTANYL CITRATE 50 MCG: 50 INJECTION, SOLUTION INTRAMUSCULAR; INTRAVENOUS at 03:11

## 2022-11-16 RX ADMIN — LAMOTRIGINE 200 MG: 150 TABLET ORAL at 06:11

## 2022-11-16 RX ADMIN — SUGAMMADEX 200 MG: 100 INJECTION, SOLUTION INTRAVENOUS at 04:11

## 2022-11-16 RX ADMIN — LIDOCAINE HYDROCHLORIDE 100 MG: 20 INJECTION INTRAVENOUS at 02:11

## 2022-11-16 RX ADMIN — CEFTRIAXONE SODIUM 2 G: 2 INJECTION, SOLUTION INTRAVENOUS at 02:11

## 2022-11-16 RX ADMIN — FENTANYL CITRATE 50 MCG: 50 INJECTION, SOLUTION INTRAMUSCULAR; INTRAVENOUS at 04:11

## 2022-11-16 RX ADMIN — ROCURONIUM BROMIDE 50 MG: 10 INJECTION INTRAVENOUS at 02:11

## 2022-11-16 RX ADMIN — MIDAZOLAM HYDROCHLORIDE 2 MG: 1 INJECTION, SOLUTION INTRAMUSCULAR; INTRAVENOUS at 02:11

## 2022-11-16 RX ADMIN — LAMOTRIGINE 300 MG: 150 TABLET ORAL at 09:11

## 2022-11-16 RX ADMIN — ACETAMINOPHEN 1000 MG: 10 INJECTION, SOLUTION INTRAVENOUS at 03:11

## 2022-11-16 RX ADMIN — HYDROMORPHONE HYDROCHLORIDE 1 MG: 1 INJECTION, SOLUTION INTRAMUSCULAR; INTRAVENOUS; SUBCUTANEOUS at 04:11

## 2022-11-16 RX ADMIN — FENTANYL CITRATE 100 MCG: 50 INJECTION, SOLUTION INTRAMUSCULAR; INTRAVENOUS at 02:11

## 2022-11-16 RX ADMIN — PROPOFOL 140 MG: 10 INJECTION, EMULSION INTRAVENOUS at 02:11

## 2022-11-16 RX ADMIN — OXYCODONE 5 MG: 5 TABLET ORAL at 05:11

## 2022-11-16 RX ADMIN — ONDANSETRON 4 MG: 2 INJECTION INTRAMUSCULAR; INTRAVENOUS at 03:11

## 2022-11-16 NOTE — PROCEDURES
Stereo EEG/Depth Electrode Monitoring Report  IMPLANTATION DATE: 11/7/2022  DATE OF SERVICE: 11/16/2022  EEG NUMBER: [id number]  LOCATION OF SERVICE: [facility] [place]     METHODOLOGY:  Depth electrodes consisted of thin wires with electrical contacts it fixed distances along the wire. These are then implanted using a stereotactic procedure targeting cortical and subcortical structures. The up to 256 electrode contact can be recorded simultaneously with this procedure. Recording band pass was 0.1 in the low past filters setting could be set at the 512, 1024, or 2048. Digital video recording of the patient is simultaneously recorded with the EEG. The patient is instructed report clinical symptoms which may occur during the recording session. EEG and video recording is stored and archived in digital format. Activation procedures which include photic stimulation, hyperventilation and instructing patients to perform simple task are done in selected patients.   Compresses spectral analysis (CSA) is also performed on the activity recorded from each individual channel. This is displayed as a power display of frequencies from 0 to 30 Hz over time. The CSA analysis is done and displayed continuously. This is reviewed for asymmetries in power between homologous areas of the scalp and for presence of changes in power which canbe seen when seizures occur. Sections of suspected abnormalities on the CSA is then compared with the original EEG recording.   The following is the details related to the depth electrodes implanted.                                           Depth Elect Name SN # contacts Color 1 Color 2   Contacts     1 RAmyg [id number] 16 Green Black   1-16     2 RHippo 95840 14 Blue Green   17-30     3 RAnIns 76185 8 Black Green   31-38     4 RThalm 22503 16 Yellow Green   39-54     5 RAncIn 86041 12 Red Blue   55-66     6 RPosIT 32605 12 Yellow Black   67-78     7 LOcbFr 12322 12 Red Green   79-90     8 LThalm 97621 16 Yellow Blue         9 LAncin 00604 10 Yellow Green   107-116     10 RAnIns 64214 10 Blue Green   117-126     11 LPostT 55919 14 Black Green   127-140     12 LResec 04792 10 Blue  Red   141-150        RECORDING TIMES  11/16/2022 07:00 - 11:07  A total of 4 hr and 6 min of EEG was recorded.  Recording Quality: good     EEG FINDINGS  INTERICTAL: Frequent interictal discharges were seen in the following leads: RHippo 1-4, 8-13; RAmyg 1-6, ZGjhxN00-59, and LPostT1-8.      ICTAL: None     IMPRESSION:  There are frequent interictal epileptiform discharges in the right hippocampal, right amygadala, right posterior temporal and left temporal contacts. No seizures were seen during this portion of the monitoring session.      Please see other EEG reports for details of the seizures captured.       Izzy Torres MD

## 2022-11-16 NOTE — PROGRESS NOTES
[facility]  Neurosurgery  Progress Note    Subjective:     History of Present Illness: Liza Arrieta is a 51 y.o. female who presents as a referral from Dr. Mark to discuss possible intracranial seizure surgery. She has already had left temporal lobectomy. She will need neuropsychological evaluation and MRI with contrast STEALTH protocol for operative planning purposes.       She requests that we postpone sEEG until after the Epilepsy Walk she organizes in the first weekend in November      I had a lengthy, detailed discussion with the patient and her  about the risks, benefits, and alternatives to intracranial localization for seizures.      We discussed that monitoring typically takes 1-2 weeks but may be longer or shorter depending on how long it takes for her to have concordant seizures.  We discussed that she will not be able to get out of bed while the electrodes are in her head, and that she will remain in the ICU during that time. She may require mittens (she pulled out her grids in 2018 while postictal). We also discussed that this is a diagnostic, not therapeutic, procedure, and that the definitive surgery would potentially be performed 4-8 weeks after the time of sEEG localization.      We discussed that there is approximately 20% rate of a symptomatic hemorrhage and approximately 1% rate of symptomatic hemorrhage from placement of sEEG electrode per review of the international literature.  They expressed understanding of this.      We also discussed the specific risks of the localization surgery. Risks include, but are not limited to, bleeding, pain, infection, scarring, need for further/repeat procedure, death, paralysis, stroke (including venous infarct)/damage to major blood vessels, leak of cerebrospinal fluid, and hardware-related complications. There is a chance that we would fail to localize the seizures with the initial electrode plan, which would require placement of  additional electrodes, or may not lead to definitive surgery. Informed consent was obtained of the patient with her  present.       Post-Op Info:  Procedure(s) (LRB):  1.) PLACEMENT OF THE FIDUCIAL SCREWS 2.) PLACEMENT OF THE BILATERAL SEEG ELECTRODES WITH BRANDEE ROBOT (Bilateral)   9 Days Post-Op     Interval History:   11/16: Biked yesterday, had BM 2d ago, sEEG removal today    Medications:  Continuous Infusions:  Scheduled Meds:   cefTRIAXone (ROCEPHIN) IVPB  2 g Intravenous Q12H    cenobamate  150 mg Oral QHS    lamoTRIgine  200 mg Oral QAM    lamoTRIgine  300 mg Oral Nightly    polyethylene glycol  17 g Oral BID    senna-docusate 8.6-50 mg  2 tablet Oral BID    sertraline  25 mg Oral Daily     PRN Meds:acetaminophen, bisacodyL, hydrALAZINE, HYDROmorphone, magnesium hydroxide 400 mg/5 ml, magnesium oxide, magnesium oxide, ondansetron, oxyCODONE, potassium bicarbonate, potassium bicarbonate, potassium bicarbonate, potassium, sodium phosphates, potassium, sodium phosphates, potassium, sodium phosphates     Review of Systems  Objective:     Weight: 99.5 kg (219 lb 5.7 oz)  Body mass index is 35.41 kg/m².  Vital Signs (Most Recent):  Temp: 98.2 °F (36.8 °C) (11/16/22 0305)  Pulse: (!) 59 (11/16/22 0743)  Resp: 14 (11/16/22 0743)  BP: (!) 113/56 (11/16/22 0705)  SpO2: 96 % (11/16/22 0743)   Vital Signs (24h Range):  Temp:  [98 °F (36.7 °C)-98.5 °F (36.9 °C)] 98.2 °F (36.8 °C)  Pulse:  [47-60] 59  Resp:  [10-23] 14  SpO2:  [93 %-97 %] 96 %  BP: ()/(53-72) 113/56                     Female External Urinary Catheter 11/08/22 0605 (Active)   Skin no redness;no breakdown 11/16/22 0705   Tolerance no signs/symptoms of discomfort 11/16/22 0705   Suction Continuous suction at 60 mmHg 11/16/22 0705   Date of last wick change 11/16/22 11/16/22 0705   Time of last wick change 0305 11/16/22 0305   Output (mL) 350 mL 11/15/22 0800       Physical Exam    Neurosurgery Physical Exam  General: well developed, well  nourished, no distress.   Head: normocephalic, atraumatic  Neurologic:   GCS: Eyes: 4/ Verbal: 5/ Motor: 6  Mental Status: Awake, Alert, Oriented x 3  Cranial nerves: PERRL, EOMI, face symmetric, tongue midline, shoulder shrug equal.  No pronator drift, no dysmetria.  Sensory: intact to light touch throughout  Motor Strength:Moves all extremities antigravity     Gen: well nourished, normocephalic, atraumatic  CV: skin warm and dry, distal pulses present  Pulm: chest rise symmetric, no increased work of breathing  GI: abdomen soft  MSK: moving all with good tone  Psych: patient interacting appropriately    Significant Labs:  Recent Labs   Lab 11/15/22  0224 11/16/22  0217    107    139   K 4.0 3.6    106   CO2 24 25   BUN 16 17   CREATININE 0.7 0.8   CALCIUM 9.4 9.5   MG 1.9 1.8     Recent Labs   Lab 11/15/22  0224 11/16/22  0217   WBC 7.53 7.53   HGB 15.2 15.3   HCT 44.1 44.4    206     Recent Labs   Lab 11/16/22 0217   INR 1.0   APTT 24.0     Microbiology Results (last 7 days)       ** No results found for the last 168 hours. **          All pertinent labs from the last 24 hours have been reviewed.    Significant Diagnostics:  I have reviewed all pertinent imaging results/findings within the past 24 hours.    Assessment/Plan:     * Temporal lobe epilepsy, intractable  A 52 y.o. female with hx of prior L temporal lobectomy and intractable seizure who admitted for sEEG 11/7    Continue ICU care  Neurocheck Q1hr  Pain control   Postop CT head satisfactory   Continue EEG and monitoring  sEEG removal today  Continue abx ppax while leads in place; ceftriaxone   AEDs per epilepsy   Aggressive bowel regimen  Daily bike   Sating well at room   Keep SBP <150  NPO  Hold SQH  Further care per NCC  NSGY will follow     Dispo: Tentative lead removal today. Keep the course         Josafat Arevalo MD  Neurosurgery  [facility] - Neuro Critical Care

## 2022-11-16 NOTE — PROGRESS NOTES
RN met pt in CT for postop scan with Dr. Mark. RN returned to room 9094 with pt, continuous monitoring maintained.

## 2022-11-16 NOTE — PROGRESS NOTES
[facility]  Neurocritical Care  Progress Note    Admit Date: 11/7/2022  Service Date: 11/16/2022  Length of Stay: 9    Subjective:     Chief Complaint: Temporal lobe epilepsy, intractable    History of Present Illness: Ms. Arrieta is a 52 yo woman with intractable epilepsy since age 18, s/p partial left anterior temporal lobectomy in 11/2018 admitted to NICU s/p placement of sEEG leads for further localization of seizures as part of repeat surgical workup. After left anterior temporal lobectomy, patient reports brief period of seizure freedom, before beginning to have seizures again approximately 3 months afterwards. She reports current seizures are different than her typical semiology prior to surgery, notably she believes she now loses awareness and stares off. After her events, she is quickly back to baseline and is able to report that she had a seizure. No associated tongue biting, bowel/bladder incontinence. She is currently maintained on Lamotrigine 200 mg qAM/300 mg qPM and Cenobamate 150 mg nightly. She reports current seizure frequency of 7-8 per month, with last seizure on 10/31. Unable to identify triggers for seizures other than missing medication. MRI brain completed 2018 showed evidence of left frontotemporal crani for partial left anterior temporal resection. MRI brain epilepsy protocol in 10/2022 with post-operative changes with left temporal partial lobectomy, interval development of susceptibility in the right superior frontal sulcus compatible with component of superficial siderosis. EEG completed 5/30/22 noted mild regional or subcortical dysfunction in bilateral temporal lobes with possible seizure focus in right anterior temporal region. During EMU admission 7/5/22>7/10/22, patient noted to have multiple right temporal lobe seizures, regional cortical dysfunction from left temporal region and bilateral independent seizure foci in left and right temporal lobes. Patient  discussed in multidisciplinary epilepsy surgery conference, with recommendation for phase 2 monitoring for further localization. Patient elected to proceed, admitted to NICU after placement of 6 right sided sEEG electrodes and 6 left sided sEEG electrodes. Patient admitted to [facility] for close  monitoring and higher level of care.        Hospital Course: 11/8/2022: all AED's discontinued today per epilepsy   11/09/2022 three electrographic seizures overnight  11/10/22: NAEON  11/11/22: two seizures today  11/12/22: NAEON  11/13/22: No seizure activity noted overnight. SBP<160  11/14/22: No seizure activity overnight, pt with no complaints other than boredom, denies depressed mood, thoughts of self harm, or hallucinations. Plan for OR 11/16 for sEEG lead removal.   11/15/22: AEDs restarted per epilepsy on evening of 11/14, increase to home doses today. OR tomorrow.   11/16/22: OR today for sEEG lead removal.         Review of Systems   Constitutional:  Negative for chills, fever and unexpected weight change.   HENT:  Negative for sore throat.    Eyes:  Negative for visual disturbance.   Respiratory:  Negative for cough and shortness of breath.    Cardiovascular:  Negative for chest pain and palpitations.   Gastrointestinal:  Negative for abdominal pain, blood in stool, constipation, diarrhea, nausea and vomiting.   Genitourinary:  Negative for difficulty urinating, dysuria and hematuria.   Musculoskeletal:  Negative for back pain and myalgias.   Skin:  Negative for pallor and rash.   Neurological:  Negative for seizures, facial asymmetry, speech difficulty, weakness, numbness and headaches.     Objective:     Vitals:  Temp: 98.6 °F (37 °C)  Pulse: (!) 50  Rhythm: sinus bradycardia  BP: (!) 117/56  MAP (mmHg): 81  Resp: 14  SpO2: 95 %  O2 Device (Oxygen Therapy): room air    Temp  Min: 98.2 °F (36.8 °C)  Max: 98.6 °F (37 °C)  Pulse  Min: 47  Max: 60  BP  Min: 95/62  Max: 119/63  MAP (mmHg)  Min: 72  Max: 85  Resp  Min: 10   Max: 23  SpO2  Min: 93 %  Max: 97 %    11/15 0701 - 11/16 0700  In: 1149.6 [P.O.:1000; I.V.:50]  Out: 1300 [Urine:1300]   Unmeasured Output  Urine Occurrence: 1  Stool Occurrence: 0  Emesis Occurrence: 0  Pad Count: 1     Physical Exam     General: well developed, well nourished, no distress.   HEENT: normocephalic, atraumatic  CV: regular rate  Pulmonary: normal respirations, no signs of respiratory distress  Abdomen: soft, non-distended, not tender to palpation  Skin: Skin is warm, dry and intact.     Neurologic:   Mental Status: AO x4, no aphasia, no dysarthria   CN: PERRL, EOMI, sensation intact in V1-V3 distributions, eyebrow raise and grimace symmetric, tongue midline, SCM 5/5, trapezius 5/5  Motor: moves all extremities spontaneously, full strength throughout, no pronator drift   Sensory: intact to light touch throughout  Gait: deferred       Medications:  Continuous ScheduledcefTRIAXone (ROCEPHIN) IVPB, 2 g, Q12H  cenobamate, 150 mg, QHS  lamoTRIgine, 200 mg, QAM  lamoTRIgine, 300 mg, Nightly  polyethylene glycol, 17 g, BID  senna-docusate 8.6-50 mg, 2 tablet, BID  sertraline, 25 mg, Daily    PRNacetaminophen, 1,000 mg, Q8H PRN  bisacodyL, 10 mg, Daily PRN  hydrALAZINE, 10 mg, Q6H PRN  HYDROmorphone, 1 mg, Q6H PRN  magnesium hydroxide 400 mg/5 ml, 30 mL, Daily PRN  magnesium oxide, 800 mg, PRN  magnesium oxide, 800 mg, PRN  ondansetron, 4 mg, Q12H PRN  oxyCODONE, 5 mg, Q6H PRN  potassium bicarbonate, 35 mEq, PRN  potassium bicarbonate, 50 mEq, PRN  potassium bicarbonate, 60 mEq, PRN  potassium, sodium phosphates, 2 packet, PRN  potassium, sodium phosphates, 2 packet, PRN  potassium, sodium phosphates, 2 packet, PRN      Today I personally reviewed pertinent medications, lines/drains/airways, laboratory results,     Diet  Diet NPO Except for: Medication          Assessment/Plan:     Neuro  * Temporal lobe epilepsy, intractable  Ms. Arrieta is a 50 yo woman with intractable epilepsy since age 18, s/p partial  left anterior temporal lobectomy in 11/2018 admitted to NICU s/p placement of sEEG leads for further localization of seizures as part of repeat surgical workup    -- SBP <160  -- epilepsy and NSGY team following   -- AEDs per epilepsy, back to home doses of lamotrigine and cenobamate   -- Neuro checks q 1 hr  -- OR today for sEEG lead removal    Psychiatric  Depression  Continue home sertraline          The patient is being Prophylaxed for:  Venous Thromboembolism with: Mechanical  Stress Ulcer with: Not Applicable   Ventilator Pneumonia with: not applicable    Activity Orders          Diet NPO Except for: Medication: NPO starting at 11/16 0001    Turn patient every 2 hours starting at 11/08 0000        Full Code    Cassidy Moyer MD  Neurocritical Care  [facility] - Neuro Critical Care

## 2022-11-16 NOTE — ASSESSMENT & PLAN NOTE
Ms. Arrieta is a 50 yo woman with intractable epilepsy since age 18, s/p partial left anterior temporal lobectomy in 11/2018 admitted to NICU s/p placement of 6 right sided and 6 left sided sEEG leads for further localization of seizures as part of repeat surgical workup. Patient is currently maintained on lamotrigine 200 mg qAM/300 mg qPM and Cenobamate 150 mg qHS with continued events 7-8 times per month of lapse of awareness, staring.    Recommendations:  - s/p placement of 6 left-sided and 6 right-sided sEEG electrodes 11/7 by NSGY, patient pending OR today for sEEG lead explantation  - No seizures noted overnight 11/15>11/16  - Continue home AEDs resumed 11/15 am - Lamotrigine 200 mg qAM/300 mg qPM, Cenobamate 150 mg qHS in anticipation of sEEG lead removal 11/16  - Seizure precautions  - Please call epilepsy on call prior to administration of IV benzodiazepines for prolonged seizures  - Post-operative imaging timing, pain control per NCC/NSGY    Plan of care discussed with NCC team, patient at bedside. Will continue to follow, please call with any questions.

## 2022-11-16 NOTE — SUBJECTIVE & OBJECTIVE
Interval History: No seizures overnight, patient looking forward to lead explanation today. Continue home AED regimen.     Current Facility-Administered Medications   Medication Dose Route Frequency Provider Last Rate Last Admin    acetaminophen tablet 1,000 mg  1,000 mg Oral Q8H PRN Mo Kincaid MD   1,000 mg at 11/09/22 1430    bisacodyL suppository 10 mg  10 mg Rectal Daily PRN Any Ruiz MD        cefTRIAXone (ROCEPHIN) 2 g/50 mL D5W IVPB  2 g Intravenous Q12H [facility] Diya Downs MD   Stopped at 11/16/22 0319    cenobamate Tab 150 mg  150 mg Oral QHS Senia Fuentes PA-C   150 mg at 11/15/22 2101    hydrALAZINE injection 10 mg  10 mg Intravenous Q6H PRN Ann Cohen PA-C        HYDROmorphone injection 1 mg  1 mg Intravenous Q6H PRN Mo Kincaid MD   1 mg at 11/07/22 1241    lamoTRIgine tablet 200 mg  200 mg Oral QAM Cassidy Moyer MD   200 mg at 11/16/22 0635    lamoTRIgine tablet 300 mg  300 mg Oral Nightly Cassidy Moyer MD   300 mg at 11/15/22 2101    magnesium hydroxide 400 mg/5 ml suspension 2,400 mg  30 mL Oral Daily PRN Josafat Arevalo MD        magnesium oxide tablet 800 mg  800 mg Oral PRN Ann Cohen PA-C   800 mg at 11/13/22 0955    magnesium oxide tablet 800 mg  800 mg Oral PRN Ann Cohen PA-C        ondansetron injection 4 mg  4 mg Intravenous Q12H PRN Mo Kincaid MD        oxyCODONE immediate release tablet 5 mg  5 mg Oral Q6H PRN Mo Kincaid MD   5 mg at 11/08/22 1928    polyethylene glycol packet 17 g  17 g Oral BID Tiffanie Irwin NP   17 g at 11/14/22 0952    potassium bicarbonate disintegrating tablet 35 mEq  35 mEq Oral PRN Ann Cohen PA-C        potassium bicarbonate disintegrating tablet 50 mEq  50 mEq Oral PRN Ann Cohen PA-C        potassium bicarbonate disintegrating tablet 60 mEq  60 mEq Oral PRN Ann Cohen PA-C        potassium, sodium phosphates 280-160-250 mg packet 2 packet  2 packet Oral PRN Ann  KAYLEE Cohen PA-C        potassium, sodium phosphates 280-160-250 mg packet 2 packet  2 packet Oral PRN Ann Cohen PA-C        potassium, sodium phosphates 280-160-250 mg packet 2 packet  2 packet Oral PRN Ann Cohen PA-C        senna-docusate 8.6-50 mg per tablet 2 tablet  2 tablet Oral BID Tiffanie Irwin, NP   2 tablet at 11/14/22 0952    sertraline tablet 25 mg  25 mg Oral Daily Lisset Arboleda, NP   25 mg at 11/15/22 0944     Facility-Administered Medications Ordered in Other Encounters   Medication Dose Route Frequency Provider Last Rate Last Admin    fentaNYL 50 mcg/mL injection   Intravenous PRN Mj Metzger, CRNA   100 mcg at 11/16/22 1415    LIDOcaine (cardiac) injection   Intravenous PRN Mj Victoriat, CRNA   100 mg at 11/16/22 1416    propofol (DIPRIVAN) 10 mg/mL infusion   Intravenous PRN Mj Metzger, CRNA   140 mg at 11/16/22 1416    rocuronium injection   Intravenous PRN Mj Metzger, CRNA   50 mg at 11/16/22 1417     Continuous Infusions:    Review of Systems   Neurological:  Positive for seizures (none overnight). Negative for speech difficulty and weakness.   All other systems reviewed and are negative.  Objective:     Vital Signs (Most Recent):  Temp: 98.6 °F (37 °C) (11/16/22 0705)  Pulse: (!) 49 (11/16/22 1300)  Resp: 12 (11/16/22 1300)  BP: (!) 121/57 (11/16/22 1300)  SpO2: 95 % (11/16/22 1300)   Vital Signs (24h Range):  Temp:  [98.2 °F (36.8 °C)-98.6 °F (37 °C)] 98.6 °F (37 °C)  Pulse:  [47-60] 49  Resp:  [10-23] 12  SpO2:  [93 %-97 %] 95 %  BP: ()/(53-72) 121/57     Weight: 99.5 kg (219 lb 5.7 oz)  Body mass index is 35.41 kg/m².    Physical Exam  Vitals and nursing note reviewed.   Constitutional:       General: She is not in acute distress.     Appearance: She is not diaphoretic.   HENT:      Head: Normocephalic and atraumatic.      Comments: sEEG leads with headwrap in place  Eyes:      General: No scleral icterus.     Extraocular Movements: Extraocular  movements intact.      Conjunctiva/sclera: Conjunctivae normal.      Pupils: Pupils are equal, round, and reactive to light.   Cardiovascular:      Rate and Rhythm: Normal rate.   Pulmonary:      Effort: Pulmonary effort is normal. No respiratory distress.   Abdominal:      General: There is no distension.      Palpations: Abdomen is soft.      Tenderness: There is no abdominal tenderness.   Musculoskeletal:         General: No swelling, tenderness or deformity. Normal range of motion.      Cervical back: Neck supple. No rigidity.   Skin:     General: Skin is warm and dry.   Neurological:      General: No focal deficit present.      Mental Status: She is alert and oriented to person, place, and time. Mental status is at baseline.   Psychiatric:         Mood and Affect: Mood normal.         Speech: Speech normal.         Behavior: Behavior normal.       NEUROLOGICAL EXAMINATION:     MENTAL STATUS   Oriented to person, place, and time.   Attention: normal. Concentration: normal.   Speech: speech is normal   Level of consciousness: alert    CRANIAL NERVES     CN III, IV, VI   Pupils are equal, round, and reactive to light.    CN VII   Facial expression full, symmetric.     CN VIII   Hearing: intact    CN IX, X   CN IX normal.     CN XI   CN XI normal.     CN XII   CN XII normal.     MOTOR EXAM   Muscle bulk: normal  Overall muscle tone: normal       Moves all extremities spontaneously and symmetrically, follows commands  No focal deficits noted     Significant Labs: All pertinent lab results from the past 24 hours have been reviewed.    Significant Studies: I have reviewed all pertinent imaging results/findings within the past 24 hours.

## 2022-11-16 NOTE — PLAN OF CARE
[facility] Care Plan    POC reviewed with Liza Arrieta and her  at 1200. Pt verbalized understanding. Questions and concerns addressed. No acute events today. Pt progressing toward goals. Will continue to monitor. See below and flowsheets for full assessment and VS info.     -lead removal in OR this afternoon, postop CT completed  -pain control          Is this a stroke patient? no    Neuro:  [place] Coma Scale  Best Eye Response: 4-->(E4) spontaneous  Best Motor Response: 6-->(M6) obeys commands  Best Verbal Response: 5-->(V5) oriented  [place] Coma Scale Score: 15  Assessment Qualifiers: patient chemically sedated or paralyzed  Pupil PERRLA: yes     24 hr Temp:  [98.2 °F (36.8 °C)-98.6 °F (37 °C)]     CV:   Rhythm: sinus bradycardia  BP goals:   SBP < 160  MAP > 65    Resp:   O2 Device (Oxygen Therapy): room air       Plan: N/A    GI/:     Diet/Nutrition Received: NPO  Last Bowel Movement: 11/14/22  Voiding Characteristics: external catheter    Intake/Output Summary (Last 24 hours) at 11/16/2022 1707  Last data filed at 11/16/2022 1635  Gross per 24 hour   Intake 1604.64 ml   Output 1600 ml   Net 4.64 ml     Unmeasured Output  Urine Occurrence: 1  Stool Occurrence: 0  Emesis Occurrence: 0  Pad Count: 1    Labs/Accuchecks:  Recent Labs   Lab 11/16/22 0217   WBC 7.53   RBC 4.91   HGB 15.3   HCT 44.4         Recent Labs   Lab 11/16/22 0217      K 3.6   CO2 25      BUN 17   CREATININE 0.8   ALKPHOS 125   ALT 31   AST 15   BILITOT 0.2      Recent Labs   Lab 11/16/22 0217   INR 1.0   APTT 24.0    No results for input(s): CPK, CPKMB, TROPONINI, MB in the last 168 hours.    Electrolytes: N/A - electrolytes WDL  Accuchecks: none    Gtts:      LDA/Wounds:  Lines/Drains/Airways       Drain  Duration             Female External Urinary Catheter 11/08/22 0605 8 days              Peripheral Intravenous Line  Duration                  Peripheral IV - Single Lumen 11/15/22 0238 20 G Distal;Right;Medial  Forearm 1 day         Peripheral IV - Single Lumen 11/16/22 0221 20 G Anterior;Left Forearm <1 day                  Wounds: Yes  Wound care consulted: No

## 2022-11-16 NOTE — ANESTHESIA PROCEDURE NOTES
Intubation    Date/Time: 11/16/2022 2:20 PM  Performed by: Litzy Cotton  Authorized by: Amelia Babcock MD     Intubation:     Induction:  Intravenous    Intubated:  Postinduction    Mask Ventilation:  Easy with oral airway    Attempts:  1    Attempted By:  Student    Method of Intubation:  Video laryngoscopy    Blade:  Gutiérrez 3    Laryngeal View Grade: Grade I - full view of cords      Difficult Airway Encountered?: No      Complications:  None    Airway Device:  Oral endotracheal tube    Airway Device Size:  7.0    Style/Cuff Inflation:  Cuffed    Inflation Amount (mL):  5    Tube secured:  22    Secured at:  The lips    Placement Verified By:  Capnometry    Complicating Factors:  None    Findings Post-Intubation:  BS equal bilateral and atraumatic/condition of teeth unchanged    
Stable

## 2022-11-16 NOTE — SUBJECTIVE & OBJECTIVE
Review of Systems   Constitutional:  Negative for chills, fever and unexpected weight change.   HENT:  Negative for sore throat.    Eyes:  Negative for visual disturbance.   Respiratory:  Negative for cough and shortness of breath.    Cardiovascular:  Negative for chest pain and palpitations.   Gastrointestinal:  Negative for abdominal pain, blood in stool, constipation, diarrhea, nausea and vomiting.   Genitourinary:  Negative for difficulty urinating, dysuria and hematuria.   Musculoskeletal:  Negative for back pain and myalgias.   Skin:  Negative for pallor and rash.   Neurological:  Negative for seizures, facial asymmetry, speech difficulty, weakness, numbness and headaches.     Objective:     Vitals:  Temp: 98.6 °F (37 °C)  Pulse: (!) 50  Rhythm: sinus bradycardia  BP: (!) 117/56  MAP (mmHg): 81  Resp: 14  SpO2: 95 %  O2 Device (Oxygen Therapy): room air    Temp  Min: 98.2 °F (36.8 °C)  Max: 98.6 °F (37 °C)  Pulse  Min: 47  Max: 60  BP  Min: 95/62  Max: 119/63  MAP (mmHg)  Min: 72  Max: 85  Resp  Min: 10  Max: 23  SpO2  Min: 93 %  Max: 97 %    11/15 0701 - 11/16 0700  In: 1149.6 [P.O.:1000; I.V.:50]  Out: 1300 [Urine:1300]   Unmeasured Output  Urine Occurrence: 1  Stool Occurrence: 0  Emesis Occurrence: 0  Pad Count: 1     Physical Exam     General: well developed, well nourished, no distress.   HEENT: normocephalic, atraumatic  CV: regular rate  Pulmonary: normal respirations, no signs of respiratory distress  Abdomen: soft, non-distended, not tender to palpation  Skin: Skin is warm, dry and intact.     Neurologic:   Mental Status: AO x4, no aphasia, no dysarthria   CN: PERRL, EOMI, sensation intact in V1-V3 distributions, eyebrow raise and grimace symmetric, tongue midline, SCM 5/5, trapezius 5/5  Motor: moves all extremities spontaneously, full strength throughout, no pronator drift   Sensory: intact to light touch throughout  Gait: deferred       Medications:  Continuous ScheduledcefTRIAXone (ROCEPHIN)  IVPB, 2 g, Q12H  cenobamate, 150 mg, QHS  lamoTRIgine, 200 mg, QAM  lamoTRIgine, 300 mg, Nightly  polyethylene glycol, 17 g, BID  senna-docusate 8.6-50 mg, 2 tablet, BID  sertraline, 25 mg, Daily    PRNacetaminophen, 1,000 mg, Q8H PRN  bisacodyL, 10 mg, Daily PRN  hydrALAZINE, 10 mg, Q6H PRN  HYDROmorphone, 1 mg, Q6H PRN  magnesium hydroxide 400 mg/5 ml, 30 mL, Daily PRN  magnesium oxide, 800 mg, PRN  magnesium oxide, 800 mg, PRN  ondansetron, 4 mg, Q12H PRN  oxyCODONE, 5 mg, Q6H PRN  potassium bicarbonate, 35 mEq, PRN  potassium bicarbonate, 50 mEq, PRN  potassium bicarbonate, 60 mEq, PRN  potassium, sodium phosphates, 2 packet, PRN  potassium, sodium phosphates, 2 packet, PRN  potassium, sodium phosphates, 2 packet, PRN      Today I personally reviewed pertinent medications, lines/drains/airways, laboratory results,     Diet  Diet NPO Except for: Medication

## 2022-11-16 NOTE — PROGRESS NOTES
Pt transported to OR for sEEG lead removal with anesthesia.  Chart sent with pt.  Anesthesia consent in epic, procedure consent in media tab.

## 2022-11-16 NOTE — OP NOTE
[facility]  Neurosurgery  Operative Note    SUMMARY      Date of Procedure: 11/16/2022     Procedure: Procedure(s) (LRB):  REMOVAL OF STEREO EEG LEADS (DEPTH ELECTRODES) (Bilateral)     Surgeon(s) and Role:     * Ava Valdez MD - Primary    Assisting Surgeon: Rach Valdes MD - Pre-residency fellow     Pre-Operative Diagnosis: Intractable epilepsy [G40.919]    Post-Operative Diagnosis: Post-Op Diagnosis Codes:     * Intractable epilepsy [G40.919]    Anesthesia: General    Description of Technical Procedures:   Removal of bilateral sEEG leads      Indications:   Liza Arrieta is a 52 y.o. female who previously underwent sEEG placement. She was deemed appropriate for removal by the interdisciplinary epilepsy conference. Risks, benefits, and alternatives were reviewed with the patient and family, and informed consent was obtained.      Description of the Procedure:   The patient was brought to the operating room, and general anesthesia was induced by the anesthesia team. She was carefully positioned with all pressure points padded. SCDs were applied. The patient received two grams of Rocephin for prophylaxis. She was prepped and draped in the usual sterile fashion. Anesthesia was instructed to keep SBP <140 and that [id number] is upregulated due to chronic AED use.      A time-out was performed. All electrodes were irrigated with rocephin-containing irrigation. Attention was then turned to each of the electrodes in turn. The cap was unscrewed from the anterior-most bone anchor bolt, and the electrode carefully withdrawn, taking care to remove it slowly and ensure the entire piece was intact. The anchor bolt was then removed using the appropriate bolt . After more irrigation, a 3.0 Nylon suture was used to fashion a figure-of-eight stitch.      This exact procedure was repeated another 10 times (dermabond rather than Nylon was used for those incisions near the hairline), except the last electrode  was found to be stuck. The bone anchor bolt was easily removed with a Sperry; however, the lead was still stuck. A two-centimeter incision was created to visualize the scar tissue holding the lead in place. The lead would not budge with the assistance of a hemostat. The bone was very hyperostotic in that region, so an anchor bolt was used to protect the lead while the bone was drilled out with an M8. Ultimately, the lead was removed in its entirety without apparent damage to the cortical surface. That small incision was closed with inverted 4.0 Vicryl in the temporalis fascia and running nylon in the skin. All five fiducial screws were removed and staples placed in the scalp.       All electrodes were counted at the end of the procedure and found to be correct in count and in continuity (including that which had been stuck). A sterile dressing of bacitracin ointment, Telfa, and Kerlix head wrap was applied. All counts were correct x2. Antibiotic-containing solution was used throughout the procedure.      The patient was then awakened by the anesthesia team and returned to the neuro ICU via CT in satisfactory condition. No large volume hemorrhage was appreciated on review of axial or coronal films.     Complications: No     Estimated Blood Loss (EBL): nil            Specimens:   Specimen (24h ago, onward)      None             Implants:   Implant Name Type Inv. Item Serial No.  Lot No. LRB No. Used Action   ANCHOR BOLT 25X2.1MM - [id number]  ANCHOR BOLT 25X2.1MM  [facility] [employer] [id number]  1 Implanted and Explanted              Condition: Stable    Disposition: ICU - extubated and stable.    Attestation: I was present and scrubbed for the entire procedure.

## 2022-11-16 NOTE — ASSESSMENT & PLAN NOTE
Ms. Arrieta is a 50 yo woman with intractable epilepsy since age 18, s/p partial left anterior temporal lobectomy in 11/2018 admitted to NICU s/p placement of sEEG leads for further localization of seizures as part of repeat surgical workup    -- SBP <160  -- epilepsy and NSGY team following   -- AEDs per epilepsy, back to home doses of lamotrigine and cenobamate   -- Neuro checks q 1 hr  -- OR today for sEEG lead removal

## 2022-11-16 NOTE — ANESTHESIA POSTPROCEDURE EVALUATION
Anesthesia Post Evaluation    Patient: Liza Arrieta    Procedure(s) Performed: Procedure(s) (LRB):  REMOVAL OF STEREO EEG LEADS (DEPTH ELECTRODES) (Bilateral)    Final Anesthesia Type: general      Patient location during evaluation: ICU  Patient participation: Yes- Able to Participate  Level of consciousness: awake and alert  Post-procedure vital signs: reviewed and stable  Pain management: adequate  Airway patency: patent  PEDRO mitigation strategies: Multimodal analgesia  PONV status at discharge: No PONV  Anesthetic complications: no      Cardiovascular status: blood pressure returned to baseline and hemodynamically stable  Respiratory status: unassisted  Hydration status: euvolemic  Follow-up not needed.          Vitals Value Taken Time   /64 11/16/22 1647   Temp 37 11/16/22 1701   Pulse 57 11/16/22 1700   Resp 15 11/16/22 1700   SpO2 96 % 11/16/22 1700   Vitals shown include unvalidated device data.      No case tracking events are documented in the log.      Pain/Allie Score: Pain Rating Prior to Med Admin: 8 (11/16/2022  4:51 PM)

## 2022-11-16 NOTE — NURSING
[facility] Care Plan    POC reviewed with Liza Arrieta and family at 0300. Pt verbalized understanding. Questions and concerns addressed. No acute events overnight. Pt progressing toward goals. Will continue to monitor. See below and flowsheets for full assessment and VS info.     - no push button events overnight  - sEEG monitoring maintained  - Plan to go to OR today to have leads removed  - Pt. Has been NPO since midnight      Is this a stroke patient? no    Neuro:  [place] Coma Scale  Best Eye Response: 4-->(E4) spontaneous  Best Motor Response: 6-->(M6) obeys commands  Best Verbal Response: 5-->(V5) oriented  [place] Coma Scale Score: 15  Assessment Qualifiers: no eye obstruction present, patient not sedated/intubated  Pupil PERRLA: yes     24hr Temp:  [98 °F (36.7 °C)-98.5 °F (36.9 °C)]     CV:   Rhythm: sinus bradycardia  BP goals:   SBP < 160  MAP > 65    Resp:   O2 Device (Oxygen Therapy): room air       Plan: N/A    GI/:     Diet/Nutrition Received: NPO  Last Bowel Movement: 11/14/22  Voiding Characteristics: external catheter    Intake/Output Summary (Last 24 hours) at 11/16/2022 0623  Last data filed at 11/16/2022 0405  Gross per 24 hour   Intake 1149.61 ml   Output 1300 ml   Net -150.39 ml     Unmeasured Output  Urine Occurrence: 1  Stool Occurrence: 0  Emesis Occurrence: 0  Pad Count: 1    Labs/Accuchecks:  Recent Labs   Lab 11/16/22 0217   WBC 7.53   RBC 4.91   HGB 15.3   HCT 44.4         Recent Labs   Lab 11/16/22 0217      K 3.6   CO2 25      BUN 17   CREATININE 0.8   ALKPHOS 125   ALT 31   AST 15   BILITOT 0.2      Recent Labs   Lab 11/16/22 0217   INR 1.0   APTT 24.0    No results for input(s): CPK, CPKMB, TROPONINI, MB in the last 168 hours.    Electrolytes: N/A - electrolytes WDL  Accuchecks: none    Gtts:      LDA/Wounds:  Lines/Drains/Airways       Drain  Duration             Female External Urinary Catheter 11/08/22 0605 8 days              Peripheral Intravenous Line   Duration                  Peripheral IV - Single Lumen 11/15/22 0238 20 G Distal;Right;Medial Forearm 1 day         Peripheral IV - Single Lumen 11/16/22 0221 20 G Anterior;Left Forearm <1 day                  Wounds: No  Wound care consulted: No

## 2022-11-16 NOTE — ASSESSMENT & PLAN NOTE
A 52 y.o. female with hx of prior L temporal lobectomy and intractable seizure who admitted for sEEG 11/7    Continue ICU care  Neurocheck Q1hr  Pain control   Postop CT head satisfactory   Continue EEG and monitoring  sEEG removal today  Continue abx ppax while leads in place; ceftriaxone   AEDs per epilepsy   Aggressive bowel regimen  Daily bike   Sating well at room   Keep SBP <150  NPO  Hold SQH  Further care per NCC  NSGY will follow     Dispo: Tentative lead removal today. Keep the course

## 2022-11-16 NOTE — PROGRESS NOTES
[facility]  Neurology-Epilepsy  Progress Note    Patient Name: Liza Arrieta  MRN: [id number]  Admission Date: 11/7/2022  Hospital Length of Stay: 9 days  Code Status: Full Code   Attending Provider: Mac Russell DO  Primary Care Physician: Rc Rodriguez MD   Principal Problem:Temporal lobe epilepsy, intractable    Subjective:     Hospital Course:   Ms. Arreita is a 51 year old woman with intractable epilepsy since age 18 s/p partial left anterior temporal lobectomy in 11/2018 admitted to NICU s/p placement of 6 right-sided and 6 left-sided sEEG leads for further localization of seizures as part of repeat surgical workup. Patient is currently maintained on Lamotrigine 200 mg qAM/300 mg qPM and Cenobamate 150 mg qHS with continued events 7-8 times per month of lapse of awareness, staring.   11/7>11/8: Home Cenobamate decreased to 100 mg qHS, Lamotrigine 200 mg qAM/300 mg qPM continued on admission. No seizures overnight. On 11/8 pm, begin to hold Cenobomate and Lamotrigine.  11/8>11/9: Seizure 11/9 approximately 0200 with right hemispheric origin, patient reported she thought she had a seizure at approximately 0230. Additional seizures 0745 and 0852. Will continue close vEEG, attempt to capture additional seizures for further localization and concordance.   11/9>11/10: No further seizures overnight, patient reports possible event 11/10 approximately 10:40, additional seizure noted on EEG around this time. Please see EEG report for full details. Continue holding home Cenobamate, Lamotrigine to capture additional seizures.   11/10>11/11: No electrographic seizures noted overnight, continue to hold home Cenobamate and Lamotrigine, attempt to capture additional seizures.  11/11>11/12: No electrographic seizures noted overnight outside mapping.  Holding AEDs.  11/12>11/13: Continued holding AEDs, no clinical seizures noted overnight.  11/13>11/14: No clinical seizures overnight, doing well this  morning. Plan to give Cenobamate 50 mg, Lamotrigine 150 mg 11/14 pm, resume home doses 11/15 am (Lamotrigine 200 mg qAM/300 mg qPM, Cenobamate 150 mg qHS).  11/14>11/15: No discrete seizures overnight, frequent interictal epileptiform discharges noted in the right hippocampal, right amygdala, right posterior temporal and left temporal contacts. Please see EEG report for full details. Resume home AED doses 11/15 in anticipation of sEEG lead removal 11/16.   11/15>11/16: No discrete seizures noted overnight, frequent interictal epileptiform discharges in the right hippocampal, right amygdala, right posterior temporal and left temporal contacts. Continue home AEDs, to OR 11/16 for sEEG lead explantation.       Interval History: No seizures overnight, patient looking forward to lead explanation today. Continue home AED regimen.     Current Facility-Administered Medications   Medication Dose Route Frequency Provider Last Rate Last Admin    acetaminophen tablet 1,000 mg  1,000 mg Oral Q8H PRN Mo Kincaid MD   1,000 mg at 11/09/22 1430    bisacodyL suppository 10 mg  10 mg Rectal Daily PRN Any Ruiz MD        cefTRIAXone (ROCEPHIN) 2 g/50 mL D5W IVPB  2 g Intravenous Q12H [facility] Diya Downs MD   Stopped at 11/16/22 0319    cenobamate Tab 150 mg  150 mg Oral QHS Senia Fuentes PA-C   150 mg at 11/15/22 2101    hydrALAZINE injection 10 mg  10 mg Intravenous Q6H PRN Ann Cohen PA-C        HYDROmorphone injection 1 mg  1 mg Intravenous Q6H PRN Mo Kincaid MD   1 mg at 11/07/22 1241    lamoTRIgine tablet 200 mg  200 mg Oral QAM Cassidy Moyer MD   200 mg at 11/16/22 0635    lamoTRIgine tablet 300 mg  300 mg Oral Nightly Cassidy Moyer MD   300 mg at 11/15/22 2101    magnesium hydroxide 400 mg/5 ml suspension 2,400 mg  30 mL Oral Daily PRN Josafat Arevalo MD        magnesium oxide tablet 800 mg  800 mg Oral PRN Ann Cohen PA-C   800 mg at 11/13/22 0955    magnesium oxide  tablet 800 mg  800 mg Oral PRN Ann Cohen PA-C        ondansetron injection 4 mg  4 mg Intravenous Q12H PRN Mo Kincaid MD        oxyCODONE immediate release tablet 5 mg  5 mg Oral Q6H PRN Mo Kincaid MD   5 mg at 11/08/22 1928    polyethylene glycol packet 17 g  17 g Oral BID Tiffanie WILLIS Irwin, NP   17 g at 11/14/22 0952    potassium bicarbonate disintegrating tablet 35 mEq  35 mEq Oral PRN MARCIO Nina-KEISHA        potassium bicarbonate disintegrating tablet 50 mEq  50 mEq Oral PRN Ann Cohen, PA-KEISHA        potassium bicarbonate disintegrating tablet 60 mEq  60 mEq Oral PRN Ann Cohen PA-C        potassium, sodium phosphates 280-160-250 mg packet 2 packet  2 packet Oral PRN MARCIO Nina-KEISHA        potassium, sodium phosphates 280-160-250 mg packet 2 packet  2 packet Oral PRN Ann Cohen PA-C        potassium, sodium phosphates 280-160-250 mg packet 2 packet  2 packet Oral PRN Ann Cohen PA-C        senna-docusate 8.6-50 mg per tablet 2 tablet  2 tablet Oral BID Tiffanie WILLIS Irwin, NP   2 tablet at 11/14/22 0952    sertraline tablet 25 mg  25 mg Oral Daily Lisset Arboleda NP   25 mg at 11/15/22 0944     Facility-Administered Medications Ordered in Other Encounters   Medication Dose Route Frequency Provider Last Rate Last Admin    fentaNYL 50 mcg/mL injection   Intravenous PRN Mj Metzger CRNA   100 mcg at 11/16/22 1415    LIDOcaine (cardiac) injection   Intravenous PRN Mj Metzger CRNA   100 mg at 11/16/22 1416    propofol (DIPRIVAN) 10 mg/mL infusion   Intravenous PRN Mj Metzger, CRNA   140 mg at 11/16/22 1416    rocuronium injection   Intravenous PRN Mj Metzger, CRNA   50 mg at 11/16/22 1417     Continuous Infusions:    Review of Systems   Neurological:  Positive for seizures (none overnight). Negative for speech difficulty and weakness.   All other systems reviewed and are negative.  Objective:     Vital Signs (Most  Recent):  Temp: 98.6 °F (37 °C) (11/16/22 0705)  Pulse: (!) 49 (11/16/22 1300)  Resp: 12 (11/16/22 1300)  BP: (!) 121/57 (11/16/22 1300)  SpO2: 95 % (11/16/22 1300)   Vital Signs (24h Range):  Temp:  [98.2 °F (36.8 °C)-98.6 °F (37 °C)] 98.6 °F (37 °C)  Pulse:  [47-60] 49  Resp:  [10-23] 12  SpO2:  [93 %-97 %] 95 %  BP: ()/(53-72) 121/57     Weight: 99.5 kg (219 lb 5.7 oz)  Body mass index is 35.41 kg/m².    Physical Exam  Vitals and nursing note reviewed.   Constitutional:       General: She is not in acute distress.     Appearance: She is not diaphoretic.   HENT:      Head: Normocephalic and atraumatic.      Comments: sEEG leads with headwrap in place  Eyes:      General: No scleral icterus.     Extraocular Movements: Extraocular movements intact.      Conjunctiva/sclera: Conjunctivae normal.      Pupils: Pupils are equal, round, and reactive to light.   Cardiovascular:      Rate and Rhythm: Normal rate.   Pulmonary:      Effort: Pulmonary effort is normal. No respiratory distress.   Abdominal:      General: There is no distension.      Palpations: Abdomen is soft.      Tenderness: There is no abdominal tenderness.   Musculoskeletal:         General: No swelling, tenderness or deformity. Normal range of motion.      Cervical back: Neck supple. No rigidity.   Skin:     General: Skin is warm and dry.   Neurological:      General: No focal deficit present.      Mental Status: She is alert and oriented to person, place, and time. Mental status is at baseline.   Psychiatric:         Mood and Affect: Mood normal.         Speech: Speech normal.         Behavior: Behavior normal.       NEUROLOGICAL EXAMINATION:     MENTAL STATUS   Oriented to person, place, and time.   Attention: normal. Concentration: normal.   Speech: speech is normal   Level of consciousness: alert    CRANIAL NERVES     CN III, IV, VI   Pupils are equal, round, and reactive to light.    CN VII   Facial expression full, symmetric.     CN VIII    Hearing: intact    CN IX, X   CN IX normal.     CN XI   CN XI normal.     CN XII   CN XII normal.     MOTOR EXAM   Muscle bulk: normal  Overall muscle tone: normal       Moves all extremities spontaneously and symmetrically, follows commands  No focal deficits noted     Significant Labs: All pertinent lab results from the past 24 hours have been reviewed.    Significant Studies: I have reviewed all pertinent imaging results/findings within the past 24 hours.    Assessment and Plan:     * Temporal lobe epilepsy, intractable  Ms. Arrieta is a 50 yo woman with intractable epilepsy since age 18, s/p partial left anterior temporal lobectomy in 11/2018 admitted to [facility] s/p placement of 6 right sided and 6 left sided sEEG leads for further localization of seizures as part of repeat surgical workup. Patient is currently maintained on lamotrigine 200 mg qAM/300 mg qPM and Cenobamate 150 mg qHS with continued events 7-8 times per month of lapse of awareness, staring.    Recommendations:  - s/p placement of 6 left-sided and 6 right-sided sEEG electrodes 11/7 by NSGY, patient pending OR today for sEEG lead explantation  - No seizures noted overnight 11/15>11/16  - Continue home AEDs resumed 11/15 am - Lamotrigine 200 mg qAM/300 mg qPM, Cenobamate 150 mg qHS in anticipation of sEEG lead removal 11/16  - Seizure precautions  - Please call epilepsy on call prior to administration of IV benzodiazepines for prolonged seizures  - Post-operative imaging timing, pain control per NCC/NSGY    Plan of care discussed with NCC team, patient at bedside. Will continue to follow, please call with any questions.     Depression  Chronic  - Continue home Sertraline        VTE Risk Mitigation (From admission, onward)         Ordered     IP VTE HIGH RISK PATIENT  Once         11/07/22 1232     Place sequential compression device  Until discontinued         11/07/22 1232     Place sequential compression device  Until discontinued         11/07/22  0628     Place LAURA hose  Until discontinued         11/07/22 0628                Philly Foy PA-C  Neurology-Epilepsy  [facility] - Neuro Critical Care  Staff: Dr. Torres

## 2022-11-16 NOTE — SUBJECTIVE & OBJECTIVE
Interval History:   11/16: Biked yesterday, had BM 2d ago, sEEG removal today    Medications:  Continuous Infusions:  Scheduled Meds:   cefTRIAXone (ROCEPHIN) IVPB  2 g Intravenous Q12H    cenobamate  150 mg Oral QHS    lamoTRIgine  200 mg Oral QAM    lamoTRIgine  300 mg Oral Nightly    polyethylene glycol  17 g Oral BID    senna-docusate 8.6-50 mg  2 tablet Oral BID    sertraline  25 mg Oral Daily     PRN Meds:acetaminophen, bisacodyL, hydrALAZINE, HYDROmorphone, magnesium hydroxide 400 mg/5 ml, magnesium oxide, magnesium oxide, ondansetron, oxyCODONE, potassium bicarbonate, potassium bicarbonate, potassium bicarbonate, potassium, sodium phosphates, potassium, sodium phosphates, potassium, sodium phosphates     Review of Systems  Objective:     Weight: 99.5 kg (219 lb 5.7 oz)  Body mass index is 35.41 kg/m².  Vital Signs (Most Recent):  Temp: 98.2 °F (36.8 °C) (11/16/22 0305)  Pulse: (!) 59 (11/16/22 0743)  Resp: 14 (11/16/22 0743)  BP: (!) 113/56 (11/16/22 0705)  SpO2: 96 % (11/16/22 0743)   Vital Signs (24h Range):  Temp:  [98 °F (36.7 °C)-98.5 °F (36.9 °C)] 98.2 °F (36.8 °C)  Pulse:  [47-60] 59  Resp:  [10-23] 14  SpO2:  [93 %-97 %] 96 %  BP: ()/(53-72) 113/56                     Female External Urinary Catheter 11/08/22 0605 (Active)   Skin no redness;no breakdown 11/16/22 0705   Tolerance no signs/symptoms of discomfort 11/16/22 0705   Suction Continuous suction at 60 mmHg 11/16/22 0705   Date of last wick change 11/16/22 11/16/22 0705   Time of last wick change 0305 11/16/22 0305   Output (mL) 350 mL 11/15/22 0800       Physical Exam    Neurosurgery Physical Exam  General: well developed, well nourished, no distress.   Head: normocephalic, atraumatic  Neurologic:   GCS: Eyes: 4/ Verbal: 5/ Motor: 6  Mental Status: Awake, Alert, Oriented x 3  Cranial nerves: PERRL, EOMI, face symmetric, tongue midline, shoulder shrug equal.  No pronator drift, no dysmetria.  Sensory: intact to light touch  throughout  Motor Strength:Moves all extremities antigravity     Gen: well nourished, normocephalic, atraumatic  CV: skin warm and dry, distal pulses present  Pulm: chest rise symmetric, no increased work of breathing  GI: abdomen soft  MSK: moving all with good tone  Psych: patient interacting appropriately    Significant Labs:  Recent Labs   Lab 11/15/22  0224 11/16/22  0217    107    139   K 4.0 3.6    106   CO2 24 25   BUN 16 17   CREATININE 0.7 0.8   CALCIUM 9.4 9.5   MG 1.9 1.8     Recent Labs   Lab 11/15/22  0224 11/16/22  0217   WBC 7.53 7.53   HGB 15.2 15.3   HCT 44.1 44.4    206     Recent Labs   Lab 11/16/22 0217   INR 1.0   APTT 24.0     Microbiology Results (last 7 days)       ** No results found for the last 168 hours. **          All pertinent labs from the last 24 hours have been reviewed.    Significant Diagnostics:  I have reviewed all pertinent imaging results/findings within the past 24 hours.

## 2022-11-16 NOTE — PROCEDURES
Stereo EEG/Depth Electrode Monitoring Report  IMPLANTATION DATE: 11/7/2022  DATE OF SERVICE: 11/15/2022  EEG NUMBER: [id number]  LOCATION OF SERVICE: [facility]     METHODOLOGY:  Depth electrodes consisted of thin wires with electrical contacts it fixed distances along the wire. These are then implanted using a stereotactic procedure targeting cortical and subcortical structures. The up to 256 electrode contact can be recorded simultaneously with this procedure. Recording band pass was 0.1 in the low past filters setting could be set at the 512, 1024, or 2048. Digital video recording of the patient is simultaneously recorded with the EEG. The patient is instructed report clinical symptoms which may occur during the recording session. EEG and video recording is stored and archived in digital format. Activation procedures which include photic stimulation, hyperventilation and instructing patients to perform simple task are done in selected patients.   Compresses spectral analysis (CSA) is also performed on the activity recorded from each individual channel. This is displayed as a power display of frequencies from 0 to 30 Hz over time. The CSA analysis is done and displayed continuously. This is reviewed for asymmetries in power between homologous areas of the scalp and for presence of changes in power which canbe seen when seizures occur. Sections of suspected abnormalities on the CSA is then compared with the original EEG recording.   The following is the details related to the depth electrodes implanted.                                           Depth Elect Name SN # contacts Color 1 Color 2   Contacts     1 RAmyg [id number] 16 Green Black   1-16     2 RHippo 85339 14 Blue Green   17-30     3 RAnIns 04318 8 Black Green   31-38     4 RThalm 63466 16 Yellow Green   39-54     5 RAncIn 51922 12 Red Blue   55-66     6 RPosIT 52544 12 Yellow Black   67-78     7 LOcbFr 22705 12 Red Green   79-90     8 LThalm 93434 16 Yellow Blue         9 LAncin 53628 10 Yellow Green   107-116     10 RAnIns 02014 10 Blue Green   117-126     11 LPostT 72678 14 Black Green   127-140     12 LResec 06834 10 Blue  Red   141-150        RECORDING TIMES  Start on 11/15/2022 07:00  Stop on 11/16/2022 07:00  A total of 23 hr and 59 min of EEG was recorded.  Recording Quality: good     EEG FINDINGS  INTERICTAL: Frequent interictal discharges were seen in the following leads: RHippo 1-4, 8-13; RAmyg 1-6, EJeluH58-23, and LPostT1-8.      ICTAL: None     IMPRESSION:  There are frequent interictal epileptiform discharges in the right hippocampal, right amygadala, right posterior temporal and left temporal contacts. No seizures were seen during this portion of the monitoring session.     Please see other EEG reports for details of the seizures captured.       Izzy Torres MD

## 2022-11-16 NOTE — TRANSFER OF CARE
"Anesthesia Transfer of Care Note    Patient: Liza Arrieta    Procedure(s) Performed: Procedure(s) (LRB):  REMOVAL OF STEREO EEG LEADS (DEPTH ELECTRODES) (Bilateral)    Patient location: Other: CT - report to LAVELL Millard from [facility]    Anesthesia Type: general    Transport from OR: Transported from OR on 6-10 L/min O2 by face mask with adequate spontaneous ventilation    Post pain: adequate analgesia    Post assessment: no apparent anesthetic complications    Post vital signs: stable    Level of consciousness: awake    Nausea/Vomiting: no nausea/vomiting    Complications: none    Transfer of care protocol was followed      Last vitals:   Visit Vitals  BP (!) 121/57   Pulse (!) 49   Temp 37 °C (98.6 °F) (Oral)   Resp 12   Ht 5' 6" (1.676 m)   Wt 99.5 kg (219 lb 5.7 oz)   SpO2 95%   Breastfeeding No   BMI 35.41 kg/m²     "

## 2022-11-17 VITALS
DIASTOLIC BLOOD PRESSURE: 68 MMHG | HEIGHT: 66 IN | RESPIRATION RATE: 21 BRPM | TEMPERATURE: 98 F | SYSTOLIC BLOOD PRESSURE: 117 MMHG | HEART RATE: 57 BPM | OXYGEN SATURATION: 97 % | BODY MASS INDEX: 35.26 KG/M2 | WEIGHT: 219.38 LBS

## 2022-11-17 LAB
ANION GAP SERPL CALC-SCNC: 9 MMOL/L (ref 8–16)
BASOPHILS # BLD AUTO: 0.01 K/UL (ref 0–0.2)
BASOPHILS NFR BLD: 0.1 % (ref 0–1.9)
BUN SERPL-MCNC: 16 MG/DL (ref 6–20)
CALCIUM SERPL-MCNC: 9.1 MG/DL (ref 8.7–10.5)
CHLORIDE SERPL-SCNC: 101 MMOL/L (ref 95–110)
CO2 SERPL-SCNC: 25 MMOL/L (ref 23–29)
CREAT SERPL-MCNC: 0.7 MG/DL (ref 0.5–1.4)
DIFFERENTIAL METHOD: NORMAL
EOSINOPHIL # BLD AUTO: 0 K/UL (ref 0–0.5)
EOSINOPHIL NFR BLD: 0.4 % (ref 0–8)
ERYTHROCYTE [DISTWIDTH] IN BLOOD BY AUTOMATED COUNT: 11.5 % (ref 11.5–14.5)
EST. GFR  (NO RACE VARIABLE): >60 ML/MIN/1.73 M^2
GLUCOSE SERPL-MCNC: 115 MG/DL (ref 70–110)
HCT VFR BLD AUTO: 41 % (ref 37–48.5)
HGB BLD-MCNC: 14.4 G/DL (ref 12–16)
IMM GRANULOCYTES # BLD AUTO: 0.03 K/UL (ref 0–0.04)
IMM GRANULOCYTES NFR BLD AUTO: 0.3 % (ref 0–0.5)
LYMPHOCYTES # BLD AUTO: 2.5 K/UL (ref 1–4.8)
LYMPHOCYTES NFR BLD: 24.7 % (ref 18–48)
MCH RBC QN AUTO: 30.8 PG (ref 27–31)
MCHC RBC AUTO-ENTMCNC: 35.1 G/DL (ref 32–36)
MCV RBC AUTO: 88 FL (ref 82–98)
MONOCYTES # BLD AUTO: 0.6 K/UL (ref 0.3–1)
MONOCYTES NFR BLD: 5.8 % (ref 4–15)
NEUTROPHILS # BLD AUTO: 6.8 K/UL (ref 1.8–7.7)
NEUTROPHILS NFR BLD: 68.7 % (ref 38–73)
NRBC BLD-RTO: 0 /100 WBC
PLATELET # BLD AUTO: 231 K/UL (ref 150–450)
PMV BLD AUTO: 9.5 FL (ref 9.2–12.9)
POTASSIUM SERPL-SCNC: 3.9 MMOL/L (ref 3.5–5.1)
RBC # BLD AUTO: 4.67 M/UL (ref 4–5.4)
SODIUM SERPL-SCNC: 135 MMOL/L (ref 136–145)
WBC # BLD AUTO: 9.95 K/UL (ref 3.9–12.7)

## 2022-11-17 PROCEDURE — 97530 THERAPEUTIC ACTIVITIES: CPT

## 2022-11-17 PROCEDURE — 25000003 PHARM REV CODE 250: Performed by: NURSE PRACTITIONER

## 2022-11-17 PROCEDURE — 99232 PR SUBSEQUENT HOSPITAL CARE,LEVL II: ICD-10-PCS | Mod: ,,, | Performed by: PSYCHIATRY & NEUROLOGY

## 2022-11-17 PROCEDURE — 99233 PR SUBSEQUENT HOSPITAL CARE,LEVL III: ICD-10-PCS | Mod: ,,, | Performed by: PHYSICIAN ASSISTANT

## 2022-11-17 PROCEDURE — 63600175 PHARM REV CODE 636 W HCPCS

## 2022-11-17 PROCEDURE — 97535 SELF CARE MNGMENT TRAINING: CPT

## 2022-11-17 PROCEDURE — 99233 SBSQ HOSP IP/OBS HIGH 50: CPT | Mod: ,,, | Performed by: PSYCHIATRY & NEUROLOGY

## 2022-11-17 PROCEDURE — 99233 PR SUBSEQUENT HOSPITAL CARE,LEVL III: ICD-10-PCS | Mod: ,,, | Performed by: PSYCHIATRY & NEUROLOGY

## 2022-11-17 PROCEDURE — 85025 COMPLETE CBC W/AUTO DIFF WBC: CPT

## 2022-11-17 PROCEDURE — 25000003 PHARM REV CODE 250: Performed by: STUDENT IN AN ORGANIZED HEALTH CARE EDUCATION/TRAINING PROGRAM

## 2022-11-17 PROCEDURE — 80048 BASIC METABOLIC PNL TOTAL CA: CPT

## 2022-11-17 PROCEDURE — 94761 N-INVAS EAR/PLS OXIMETRY MLT: CPT

## 2022-11-17 PROCEDURE — 25000003 PHARM REV CODE 250

## 2022-11-17 PROCEDURE — 99232 SBSQ HOSP IP/OBS MODERATE 35: CPT | Mod: ,,, | Performed by: PSYCHIATRY & NEUROLOGY

## 2022-11-17 PROCEDURE — 97161 PT EVAL LOW COMPLEX 20 MIN: CPT

## 2022-11-17 PROCEDURE — 99233 SBSQ HOSP IP/OBS HIGH 50: CPT | Mod: ,,, | Performed by: PHYSICIAN ASSISTANT

## 2022-11-17 PROCEDURE — 97165 OT EVAL LOW COMPLEX 30 MIN: CPT

## 2022-11-17 RX ORDER — CEPHALEXIN 500 MG/1
500 CAPSULE ORAL EVERY 6 HOURS
Qty: 20 CAPSULE | Refills: 0 | Status: SHIPPED | OUTPATIENT
Start: 2022-11-17 | End: 2022-11-22

## 2022-11-17 RX ORDER — CEPHALEXIN 500 MG/1
500 CAPSULE ORAL EVERY 6 HOURS
Qty: 20 CAPSULE | Refills: 0 | Status: SHIPPED | OUTPATIENT
Start: 2022-11-17 | End: 2022-11-17 | Stop reason: HOSPADM

## 2022-11-17 RX ADMIN — SENNOSIDES AND DOCUSATE SODIUM 2 TABLET: 50; 8.6 TABLET ORAL at 09:11

## 2022-11-17 RX ADMIN — SERTRALINE HYDROCHLORIDE 25 MG: 25 TABLET ORAL at 09:11

## 2022-11-17 RX ADMIN — ACETAMINOPHEN 1000 MG: 500 TABLET ORAL at 02:11

## 2022-11-17 RX ADMIN — OXYCODONE 5 MG: 5 TABLET ORAL at 01:11

## 2022-11-17 RX ADMIN — LAMOTRIGINE 200 MG: 150 TABLET ORAL at 06:11

## 2022-11-17 RX ADMIN — CEFTRIAXONE SODIUM 2 G: 2 INJECTION, SOLUTION INTRAVENOUS at 03:11

## 2022-11-17 RX ADMIN — ACETAMINOPHEN 1000 MG: 500 TABLET ORAL at 05:11

## 2022-11-17 RX ADMIN — POLYETHYLENE GLYCOL 3350 17 G: 17 POWDER, FOR SOLUTION ORAL at 09:11

## 2022-11-17 NOTE — SUBJECTIVE & OBJECTIVE
Interval History:     11/17: sEEG leads removed. Post scan stable. In good spirits this morning, PT/OT eval today and anticipated DC    Medications:  Continuous Infusions:  Scheduled Meds:   cefTRIAXone (ROCEPHIN) IVPB  2 g Intravenous Q12H    cenobamate  150 mg Oral QHS    lamoTRIgine  200 mg Oral QAM    lamoTRIgine  300 mg Oral Nightly    polyethylene glycol  17 g Oral BID    senna-docusate 8.6-50 mg  2 tablet Oral BID    sertraline  25 mg Oral Daily     PRN Meds:acetaminophen, bisacodyL, hydrALAZINE, HYDROmorphone, magnesium hydroxide 400 mg/5 ml, magnesium oxide, magnesium oxide, ondansetron, oxyCODONE, potassium bicarbonate, potassium bicarbonate, potassium bicarbonate, potassium, sodium phosphates, potassium, sodium phosphates, potassium, sodium phosphates     Review of Systems  Objective:     Weight: 99.5 kg (219 lb 5.7 oz)  Body mass index is 35.41 kg/m².  Vital Signs (Most Recent):  Temp: 98.7 °F (37.1 °C) (11/17/22 0701)  Pulse: 61 (11/17/22 0827)  Resp: 18 (11/17/22 0827)  BP: (!) 96/55 (11/17/22 0801)  SpO2: 96 % (11/17/22 0827)   Vital Signs (24h Range):  Temp:  [97.9 °F (36.6 °C)-98.7 °F (37.1 °C)] 98.7 °F (37.1 °C)  Pulse:  [49-66] 61  Resp:  [11-20] 18  SpO2:  [92 %-100 %] 96 %  BP: ()/(55-84) 96/55                     Female External Urinary Catheter 11/08/22 0605 (Active)   Skin no breakdown;no redness 11/17/22 0305   Tolerance no signs/symptoms of discomfort 11/17/22 0305   Suction Continuous suction at 70 mmHg 11/16/22 1905   Date of last wick change 11/16/22 11/16/22 1905   Time of last wick change 0305 11/16/22 0305   Output (mL) 300 mL 11/16/22 1100       Physical Exam    Neurosurgery Physical Exam  Incision c/d/I  GCS 15  CN intact   FC x 4  No drift or dysmetria    Gen: well nourished, normocephalic, atraumatic  CV: skin warm and dry, distal pulses present  Pulm: chest rise symmetric, no increased work of breathing  GI: abdomen soft, non-distended, non-tender  : patient voiding  independently  MSK: no bony abnormalities noted  Psych: patient interacting with appropriate mood and affect           Significant Labs:  Recent Labs   Lab 11/16/22 0217 11/17/22  0533    115*    135*   K 3.6 3.9    101   CO2 25 25   BUN 17 16   CREATININE 0.8 0.7   CALCIUM 9.5 9.1   MG 1.8  --      Recent Labs   Lab 11/16/22 0217 11/17/22 0533   WBC 7.53 9.95   HGB 15.3 14.4   HCT 44.4 41.0    231     Recent Labs   Lab 11/16/22 0217   INR 1.0   APTT 24.0     Microbiology Results (last 7 days)       ** No results found for the last 168 hours. **          All pertinent labs from the last 24 hours have been reviewed.    Significant Diagnostics:  I have reviewed all pertinent imaging results/findings within the past 24 hours.

## 2022-11-17 NOTE — ASSESSMENT & PLAN NOTE
Ms. Arrieta is a 52 yo woman with intractable epilepsy since age 18, s/p partial left anterior temporal lobectomy in 11/2018 admitted to NICU s/p placement of sEEG leads for further localization of seizures as part of repeat surgical workup. POD1 sEED lead removal, post op CTH no hemorrhage.     -- epilepsy and NSGY team following   -- AEDs per epilepsy, continue prior home doses of lamotrigine and cenobamate. Has f/u scheduled for Dec 1st to discuss RNS vs other options   -- Keflex 500 q6h x5 days per NSGY - sent to pharmacy   -- PT/OT cleared for discharge home

## 2022-11-17 NOTE — ASSESSMENT & PLAN NOTE
Ms. Arrieta is a 50 yo woman with intractable epilepsy since age 18, s/p partial left anterior temporal lobectomy in 11/2018 admitted to NICU s/p placement of 6 right sided and 6 left sided sEEG leads for further localization of seizures as part of repeat surgical workup. Patient is currently maintained on lamotrigine 200 mg qAM/300 mg qPM and Cenobamate 150 mg qHS with continued events 7-8 times per month of lapse of awareness, staring.    Recommendations:  - s/p placement of 6 left-sided and 6 right-sided sEEG electrodes 11/7 by NSGY with leads explanted 11/17  - No clinical  seizures noted overnight 11/16>11/17  - Continue home AEDs resumed 11/15 am - Lamotrigine 200 mg qAM/300 mg qPM, Cenobamate 150 mg qHS, plan to continue this regimen on discharge  - Seizure precautions  - PT/OT, discharge disposition pending per NCC, NSGY    Plan of care discussed with NCC team, patient at bedside. Will sign off, please call with any questions.

## 2022-11-17 NOTE — ASSESSMENT & PLAN NOTE
Ms. Arrieta is a 50 yo woman with intractable epilepsy since age 18, s/p partial left anterior temporal lobectomy in 11/2018 admitted to NICU s/p placement of sEEG leads for further localization of seizures as part of repeat surgical workup. POD1 sEED lead removal, post op CTH no hemorrhage.     -- SBP <160  -- epilepsy and NSGY team following   -- AEDs per epilepsy, will verify discharge doses of lamotrigine and cenobamate. Has f/u scheduled for Dec 1st to discuss RNS vs other options   -- Neuro checks q 1 hr  -- PT/OT prior to discharge

## 2022-11-17 NOTE — PLAN OF CARE
[facility] - Neuro Critical Care  Discharge Reassessment    Primary Care Provider: Rc Rodriguez MD    Expected Discharge Date: 11/17/2022    Per MD: PT/OT eval today and anticipated DC       Reassessment (most recent)       Discharge Reassessment - 11/17/22 1245          Discharge Reassessment    Assessment Type Discharge Planning Reassessment     Did the patient's condition or plan change since previous assessment? No     Communicated RYAN with patient/caregiver Yes     Discharge Plan A Home with family     Discharge Plan B Home Health     DME Needed Upon Discharge  none     Discharge Barriers Identified None     Why the patient remains in the hospital Requires continued medical care                   Kenna Fong RN, CCRN-K, [facility]  Neuro-Critical Care   [phone]

## 2022-11-17 NOTE — PT/OT/SLP EVAL
Physical Therapy Co-Evaluation   With OT and Discharge Note    Patient Name:  Liza Arrieta   MRN:  [id number]    Recommendations:     Discharge Recommendations:  home, no needs   Discharge Equipment Recommendations: none   Barriers to discharge: None    Assessment:     Liza Arrieta is a 52 y.o. female admitted with a medical diagnosis of Temporal lobe epilepsy, intractable.  At this time, patient is functioning at their prior level of function and does not require further acute PT services.     Recent Surgery: Procedure(s) (LRB):  REMOVAL OF STEREO EEG LEADS (DEPTH ELECTRODES) (Bilateral) 1 Day Post-Op    Plan:     During this hospitalization, patient does not require further acute PT services.  Please re-consult if situation changes.      Subjective     Chief Complaint: none; s/p placement of sEEG leads for further localization of seizures as part of repeat surgical workup.   Patient/Family Comments/goals: to return home today   Pain/Comfort:  Pain Rating 1: 1/10  Location 1: head  Pain Addressed 1: Reposition, Distraction  Pain Rating Post-Intervention 1: 1/10    Patients cultural, spiritual, Spiritism conflicts given the current situation: no    Living Environment:  Pt reported living with spouse and 3 adult children in [facility] with 2 SHERITA.   Prior to admission, patients level of function was (I) with mobility and ADLs.  Equipment used at home: shower chair. DME owned (not currently used): none.  Upon discharge, patient will have assistance from spouse.    Objective:   Co-tx performed with OT due to pt's upcoming discharge and request from MD team.     Communicated with RN prior to session.  Patient found HOB elevated with blood pressure cuff, pulse ox (continuous), telemetry upon PT entry to room.    General Precautions: Standard, fall, seizure   Orthopedic Precautions:N/A   Braces: N/A   Respiratory Status: Room air    Exams:  Cognitive Exam:  Patient is oriented to Person, Place, Time, and Situation  Gross Motor  Coordination:  WFL  Postural Exam:  Patient presented with the following abnormalities:    -       Rounded shoulders  Sensation:    -       Intact  light/touch BLEs  Skin Integrity/Edema:      -       Skin integrity: Visible skin intact  RLE ROM: WFL  RLE Strength: 5/5  LLE ROM: WFL  LLE Strength: 5/5    Functional Mobility:    Bed Mobility:   Supine > Sit: independence  Sit > Supine:  N/T    Transfers:   Sit <> Stand Transfer: independence from EOB and toilet without AD    Balance:   Sitting balance: NORMAL: No deviations seen in posture held dynamically  Standing balance:   NORMAL: No deviations seen in posture held statically  NORMAL: No deviations seen in posture held dynamically                 Gait:  Distance: ~20 ft.    Assistive Device: no AD    Assistance Level: supervision provided for purpose of gait assessment, but pt able to ambulate independently with no AD   Gait Assessment: reciprocal gait pattern with no acute deficits identified; no LOB       AM-PAC 6 CLICK MOBILITY  Total Score:24       Treatment and Education:  Pt educated on:  - Role of PT and therapy recommendations   - Safety with mobility  - Instructed to call nursing staff for assistance with mobility as needed 2/2 fall risk     Pt verbalized understanding and expressed no further concerns/questions.      Patient left up in chair with all lines intact, call button in reach, chair alarm on, and RN notified.    GOALS:   Multidisciplinary Problems       Physical Therapy Goals       Not on file              Multidisciplinary Problems (Resolved)          Problem: Physical Therapy    Goal Priority Disciplines Outcome Goal Variances Interventions   Physical Therapy Goal   (Resolved)     PT, PT/OT Met                         History:     Past Medical History:   Diagnosis Date    Convulsions     Epilepsy     Seizures        Past Surgical History:   Procedure Laterality Date    APPENDECTOMY       SECTION      4    CHOLECYSTECTOMY       CRANIOTOMY N/A 11/5/2018    Procedure: CRANIOTOMY for strip and grid;  Surgeon: Ruben Senior MD;  Location: [facility] OR [address] [place] [address];  Service: Neurosurgery;  Laterality: N/A;  toronto II, asa 2, type and screen, regular bed, Duff, supine     CRANIOTOMY Left 11/8/2018    Procedure: CRANIOTOMY for grids and strips;  Surgeon: Ruben Senior MD;  Location: [facility] OR [address] [place] [address];  Service: Neurosurgery;  Laterality: Left;    HYSTERECTOMY      around 2016 for pain ful periods     INSERTION OF SUBDURAL GRID AND STRIP ELECTRODES BY CRANIOTOMY Left 11/19/2018    Procedure: CRANIOTOMY, FOR SUBDURAL GRID AND STRIP ELECTRODE LEAD Removal.;  Surgeon: Ruben Senior MD;  Location: [facility] OR [address] [place] [address];  Service: Neurosurgery;  Laterality: Left;  needs microscope and stealth-cg    REMOVAL OF STEREO EEG LEADS (DEPTH ELECTRODES) Bilateral 11/16/2022    Procedure: REMOVAL OF STEREO EEG LEADS (DEPTH ELECTRODES);  Surgeon: Ava Valdez MD;  Location: [facility] OR [address] [place] [address];  Service: Neurosurgery;  Laterality: Bilateral;    SURGICAL REMOVAL OF LOBE OF BRAIN Left 11/19/2018    Procedure: LOBECTOMY, BRAIN left temporal lobe.;  Surgeon: Ruben Senior MD;  Location: [facility] OR [address] [place] [address];  Service: Neurosurgery;  Laterality: Left;       Time Tracking:     PT Received On: 11/17/22  PT Start Time: 1340     PT Stop Time: 1358  PT Total Time (min): 18 min     Billable Minutes: Evaluation 10 min and Therapeutic Activity 8 min      11/17/2022

## 2022-11-17 NOTE — BRIEF OP NOTE
[facility] Neuro Critical [facility]  Brief Operative Note    SUMMARY     Surgery Date: 11/16/2022     Surgeon(s) and Role:     * Ava Valdez MD - Primary     * Rach Valdes MD - Assisting, pre-residency fellow    Pre-op Diagnosis:  Intractable epilepsy [G40.919]    Post-op Diagnosis:  Post-Op Diagnosis Codes:     * Intractable epilepsy [G40.919]    Procedure(s) (LRB):  REMOVAL OF STEREO EEG LEADS (DEPTH ELECTRODES) (Bilateral)    Anesthesia: General    Operative Findings: sEEG lead removal - 12 removed. 5 fiducial screws removed.     Estimated Blood Loss: * No values recorded between 11/16/2022  2:45 PM and 11/16/2022  4:23 PM *    Estimated Blood Loss has been documented.         Specimens:   Specimen (24h ago, onward)      None            [id number]

## 2022-11-17 NOTE — PLAN OF CARE
11/17/22 1450   Post-Acute Status   Post-Acute Authorization Other   Other Status No Post-Acute Service Needs     Brandie Stinson LCSW  Neurocritical Care   [facility]  [id number]

## 2022-11-17 NOTE — PROGRESS NOTES
[facility]  Neurosurgery  Progress Note    Subjective:     History of Present Illness: Liza Arrieta is a 51 y.o. female who presents as a referral from Dr. Mark to discuss possible intracranial seizure surgery. She has already had left temporal lobectomy. She will need neuropsychological evaluation and MRI with contrast STEALTH protocol for operative planning purposes.       She requests that we postpone sEEG until after the Epilepsy Walk she organizes in the first weekend in November      I had a lengthy, detailed discussion with the patient and her  about the risks, benefits, and alternatives to intracranial localization for seizures.      We discussed that monitoring typically takes 1-2 weeks but may be longer or shorter depending on how long it takes for her to have concordant seizures.  We discussed that she will not be able to get out of bed while the electrodes are in her head, and that she will remain in the ICU during that time. She may require mittens (she pulled out her grids in 2018 while postictal). We also discussed that this is a diagnostic, not therapeutic, procedure, and that the definitive surgery would potentially be performed 4-8 weeks after the time of sEEG localization.      We discussed that there is approximately 20% rate of a symptomatic hemorrhage and approximately 1% rate of symptomatic hemorrhage from placement of sEEG electrode per review of the international literature.  They expressed understanding of this.      We also discussed the specific risks of the localization surgery. Risks include, but are not limited to, bleeding, pain, infection, scarring, need for further/repeat procedure, death, paralysis, stroke (including venous infarct)/damage to major blood vessels, leak of cerebrospinal fluid, and hardware-related complications. There is a chance that we would fail to localize the seizures with the initial electrode plan, which would require placement of  additional electrodes, or may not lead to definitive surgery. Informed consent was obtained of the patient with her  present.       Post-Op Info:  Procedure(s) (LRB):  REMOVAL OF STEREO EEG LEADS (DEPTH ELECTRODES) (Bilateral)   1 Day Post-Op     Interval History:     11/17: sEEG leads removed. Post scan stable. In good spirits this morning, PT/OT eval today and anticipated DC    Medications:  Continuous Infusions:  Scheduled Meds:   cefTRIAXone (ROCEPHIN) IVPB  2 g Intravenous Q12H    cenobamate  150 mg Oral QHS    lamoTRIgine  200 mg Oral QAM    lamoTRIgine  300 mg Oral Nightly    polyethylene glycol  17 g Oral BID    senna-docusate 8.6-50 mg  2 tablet Oral BID    sertraline  25 mg Oral Daily     PRN Meds:acetaminophen, bisacodyL, hydrALAZINE, HYDROmorphone, magnesium hydroxide 400 mg/5 ml, magnesium oxide, magnesium oxide, ondansetron, oxyCODONE, potassium bicarbonate, potassium bicarbonate, potassium bicarbonate, potassium, sodium phosphates, potassium, sodium phosphates, potassium, sodium phosphates     Review of Systems  Objective:     Weight: 99.5 kg (219 lb 5.7 oz)  Body mass index is 35.41 kg/m².  Vital Signs (Most Recent):  Temp: 98.7 °F (37.1 °C) (11/17/22 0701)  Pulse: 61 (11/17/22 0827)  Resp: 18 (11/17/22 0827)  BP: (!) 96/55 (11/17/22 0801)  SpO2: 96 % (11/17/22 0827)   Vital Signs (24h Range):  Temp:  [97.9 °F (36.6 °C)-98.7 °F (37.1 °C)] 98.7 °F (37.1 °C)  Pulse:  [49-66] 61  Resp:  [11-20] 18  SpO2:  [92 %-100 %] 96 %  BP: ()/(55-84) 96/55                     Female External Urinary Catheter 11/08/22 0605 (Active)   Skin no breakdown;no redness 11/17/22 0305   Tolerance no signs/symptoms of discomfort 11/17/22 0305   Suction Continuous suction at 70 mmHg 11/16/22 1905   Date of last wick change 11/16/22 11/16/22 1905   Time of last wick change 0305 11/16/22 0305   Output (mL) 300 mL 11/16/22 1100       Physical Exam    Neurosurgery Physical Exam  Incision c/d/I  GCS 15  CN  intact   FC x 4  No drift or dysmetria    Gen: well nourished, normocephalic, atraumatic  CV: skin warm and dry, distal pulses present  Pulm: chest rise symmetric, no increased work of breathing  GI: abdomen soft, non-distended, non-tender  : patient voiding independently  MSK: no bony abnormalities noted  Psych: patient interacting with appropriate mood and affect           Significant Labs:  Recent Labs   Lab 11/16/22 0217 11/17/22  0533    115*    135*   K 3.6 3.9    101   CO2 25 25   BUN 17 16   CREATININE 0.8 0.7   CALCIUM 9.5 9.1   MG 1.8  --      Recent Labs   Lab 11/16/22 0217 11/17/22  0533   WBC 7.53 9.95   HGB 15.3 14.4   HCT 44.4 41.0    231     Recent Labs   Lab 11/16/22 0217   INR 1.0   APTT 24.0     Microbiology Results (last 7 days)       ** No results found for the last 168 hours. **          All pertinent labs from the last 24 hours have been reviewed.    Significant Diagnostics:  I have reviewed all pertinent imaging results/findings within the past 24 hours.    Assessment/Plan:     * Temporal lobe epilepsy, intractable  A 52 y.o. female with hx of prior L temporal lobectomy and intractable seizure who admitted for sEEG 11/7    Continue ICU care  Neurocheck Q1hr  Pain control   Postop CT head satisfactory   Continue EEG and monitoring  sEEG removal on 11/16   CT head post op stable without hemorrhage  PT/OT today  ADAT  Anticipate discharge on Home AEDs with 5 days of kefflex for antibiotic prophylaxis.   Further care per NCC    Dispo: anticipated PT/OT and discharge home with 5d of antibiotics and home AED regimen.         Josafat Arevalo MD  Neurosurgery  [facility] - Neuro Critical Care

## 2022-11-17 NOTE — PLAN OF CARE
Pt and  Neymar reviewed discharge instructions and medication administration. Neurosurgery ok with removing head dressing today and showering tomorrow; removed before discharge and pt ok with showering tomorrow. All follow up appointments reviewed with pt and . Iv's removed, pt dressed independently. Pt transported in wheelchair to car for transport home.

## 2022-11-17 NOTE — PLAN OF CARE
PT evaluation completed- see note for details. No acute functional deficits identified upon evaluation. Once medically stable, recommending pt discharge home with no further physical therapy needs anticipated.     11/17/2022

## 2022-11-17 NOTE — PROGRESS NOTES
[facility] Neuro Critical [facility]  Neurology-Epilepsy  Progress Note    Patient Name: Liza Arrieta  MRN: [id number]  Admission Date: 11/7/2022  Hospital Length of Stay: 10 days  Code Status: Full Code   Attending Provider: Mac Russell DO  Primary Care Physician: Rc Rodriguez MD   Principal Problem:Temporal lobe epilepsy, intractable    Subjective:     Hospital Course:   Ms. Arrieta is a 51 year old woman with intractable epilepsy since age 18 s/p partial left anterior temporal lobectomy in 11/2018 admitted to NICU s/p placement of 6 right-sided and 6 left-sided sEEG leads for further localization of seizures as part of repeat surgical workup. Patient is currently maintained on Lamotrigine 200 mg qAM/300 mg qPM and Cenobamate 150 mg qHS with continued events 7-8 times per month of lapse of awareness, staring.   11/7>11/8: Home Cenobamate decreased to 100 mg qHS, Lamotrigine 200 mg qAM/300 mg qPM continued on admission. No seizures overnight. On 11/8 pm, begin to hold Cenobomate and Lamotrigine.  11/8>11/9: Seizure 11/9 approximately 0200 with right hemispheric origin, patient reported she thought she had a seizure at approximately 0230. Additional seizures 0745 and 0852. Will continue close vEEG, attempt to capture additional seizures for further localization and concordance.   11/9>11/10: No further seizures overnight, patient reports possible event 11/10 approximately 10:40, additional seizure noted on EEG around this time. Please see EEG report for full details. Continue holding home Cenobamate, Lamotrigine to capture additional seizures.   11/10>11/11: No electrographic seizures noted overnight, continue to hold home Cenobamate and Lamotrigine, attempt to capture additional seizures.  11/11>11/12: No electrographic seizures noted overnight outside mapping.  Holding AEDs.  11/12>11/13: Continued holding AEDs, no clinical seizures noted overnight.  11/13>11/14: No clinical seizures overnight, doing well this  morning. Plan to give Cenobamate 50 mg, Lamotrigine 150 mg 11/14 pm, resume home doses 11/15 am (Lamotrigine 200 mg qAM/300 mg qPM, Cenobamate 150 mg qHS).  11/14>11/15: No discrete seizures overnight, frequent interictal epileptiform discharges noted in the right hippocampal, right amygdala, right posterior temporal and left temporal contacts. Please see EEG report for full details. Resume home AED doses 11/15 in anticipation of sEEG lead removal 11/16.   11/15>11/16: No discrete seizures noted overnight, frequent interictal epileptiform discharges in the right hippocampal, right amygdala, right posterior temporal and left temporal contacts. Continue home AEDs, to OR 11/16 for sEEG lead explantation.   11/16>11/17: sEEG leads removed 11/16 pm, patient doing well and reports no noted seizures overnight. Pending PT evaluation/OT evaluation prior to discharge. Continue Lamotrigine 200 mg qAM/300 mg qPM, Cenobamate 150 mg qHS on discharge. Outpatient follow up for review of sEEG data and further discussion with NSGY, Neurology Epilepsy.       Interval History: Patient sitting in chair at bedside, looking forward to working with PT/OT and possible discharge home. No clinical seizures noted overnight.     Current Facility-Administered Medications   Medication Dose Route Frequency Provider Last Rate Last Admin    acetaminophen tablet 1,000 mg  1,000 mg Oral Q8H PRN Mo Kincaid MD   1,000 mg at 11/17/22 0541    bisacodyL suppository 10 mg  10 mg Rectal Daily PRN Any Ruiz MD        cenobamate Tab 150 mg  150 mg Oral QHS Senia Fuentes PA-C   150 mg at 11/16/22 2112    hydrALAZINE injection 10 mg  10 mg Intravenous Q6H PRN Ann Cohen PA-C        HYDROmorphone injection 1 mg  1 mg Intravenous Q6H PRN Mo Kincaid MD   1 mg at 11/16/22 1651    lamoTRIgine tablet 200 mg  200 mg Oral QAM Cassidy Moyer MD   200 mg at 11/17/22 0642    lamoTRIgine tablet 300 mg  300 mg Oral Nightly Cassidy  MD João   300 mg at 11/16/22 2113    magnesium hydroxide 400 mg/5 ml suspension 2,400 mg  30 mL Oral Daily PRN Josafat Arevalo MD        magnesium oxide tablet 800 mg  800 mg Oral PRN Ann Cohen PA-C   800 mg at 11/13/22 0955    magnesium oxide tablet 800 mg  800 mg Oral PRN nAn Cohen PA-C        ondansetron injection 4 mg  4 mg Intravenous Q12H PRN Mo Kincaid MD        oxyCODONE immediate release tablet 5 mg  5 mg Oral Q6H PRN Mo Kincaid MD   5 mg at 11/17/22 0105    polyethylene glycol packet 17 g  17 g Oral BID Tiffanie L Love, NP   17 g at 11/17/22 0928    potassium bicarbonate disintegrating tablet 35 mEq  35 mEq Oral PRN MARCIO Nina-KEISHA        potassium bicarbonate disintegrating tablet 50 mEq  50 mEq Oral PRN Ann Cohen, PA-C        potassium bicarbonate disintegrating tablet 60 mEq  60 mEq Oral PRN MARCIO Nina-C        potassium, sodium phosphates 280-160-250 mg packet 2 packet  2 packet Oral PRN Ann Cohen, PA-C        potassium, sodium phosphates 280-160-250 mg packet 2 packet  2 packet Oral PRN Ann Cohen, PA-C        potassium, sodium phosphates 280-160-250 mg packet 2 packet  2 packet Oral PRN Ann Cohen PA-C        senna-docusate 8.6-50 mg per tablet 2 tablet  2 tablet Oral BID Tiffanie L Love, NP   2 tablet at 11/17/22 0928    sertraline tablet 25 mg  25 mg Oral Daily Lisset Arboleda NP   25 mg at 11/17/22 0928     Continuous Infusions:    Review of Systems   Neurological:  Positive for seizures (none overnight). Negative for speech difficulty and weakness.   All other systems reviewed and are negative.  Objective:     Vital Signs (Most Recent):  Temp: 98.3 °F (36.8 °C) (11/17/22 1101)  Pulse: (!) 52 (11/17/22 1301)  Resp: 13 (11/17/22 1301)  BP: (!) 98/53 (11/17/22 1301)  SpO2: 95 % (11/17/22 1301)   Vital Signs (24h Range):  Temp:  [97.9 °F (36.6 °C)-98.7 °F (37.1 °C)] 98.3 °F (36.8 °C)  Pulse:  [50-66] 52  Resp:   [11-20] 13  SpO2:  [92 %-100 %] 95 %  BP: ()/(53-84) 98/53     Weight: 99.5 kg (219 lb 5.7 oz)  Body mass index is 35.41 kg/m².    Physical Exam  Vitals and nursing note reviewed.   Constitutional:       General: She is not in acute distress.     Appearance: She is not diaphoretic.   HENT:      Head: Normocephalic and atraumatic.   Eyes:      General: No scleral icterus.     Extraocular Movements: Extraocular movements intact.      Conjunctiva/sclera: Conjunctivae normal.      Pupils: Pupils are equal, round, and reactive to light.   Cardiovascular:      Rate and Rhythm: Normal rate.   Pulmonary:      Effort: Pulmonary effort is normal. No respiratory distress.   Abdominal:      General: There is no distension.      Palpations: Abdomen is soft.   Musculoskeletal:         General: No swelling, tenderness or deformity. Normal range of motion.      Cervical back: Neck supple. No rigidity.   Skin:     General: Skin is warm and dry.   Neurological:      General: No focal deficit present.      Mental Status: She is alert and oriented to person, place, and time. Mental status is at baseline.   Psychiatric:         Mood and Affect: Mood normal.         Speech: Speech normal.         Behavior: Behavior normal.       NEUROLOGICAL EXAMINATION:     MENTAL STATUS   Oriented to person, place, and time.   Attention: normal. Concentration: normal.   Speech: speech is normal   Level of consciousness: alert    CRANIAL NERVES     CN III, IV, VI   Pupils are equal, round, and reactive to light.    CN VII   Facial expression full, symmetric.     CN VIII   Hearing: intact    CN IX, X   CN IX normal.     CN XI   CN XI normal.     CN XII   CN XII normal.     MOTOR EXAM   Muscle bulk: normal  Overall muscle tone: normal       Moves all extremities spontaneously and symmetrically, follows commands  No focal deficits noted     Significant Labs: All pertinent lab results from the past 24 hours have been reviewed.    Significant Studies:  I have reviewed all pertinent imaging results/findings within the past 24 hours.    Assessment and Plan:     * Temporal lobe epilepsy, intractable  Ms. Arrieta is a 52 yo woman with intractable epilepsy since age 18, s/p partial left anterior temporal lobectomy in 11/2018 admitted to NICU s/p placement of 6 right sided and 6 left sided sEEG leads for further localization of seizures as part of repeat surgical workup. Patient is currently maintained on lamotrigine 200 mg qAM/300 mg qPM and Cenobamate 150 mg qHS with continued events 7-8 times per month of lapse of awareness, staring.    Recommendations:  - s/p placement of 6 left-sided and 6 right-sided sEEG electrodes 11/7 by NSGY with leads explanted 11/17  - No clinical  seizures noted overnight 11/16>11/17  - Continue home AEDs resumed 11/15 am - Lamotrigine 200 mg qAM/300 mg qPM, Cenobamate 150 mg qHS, plan to continue this regimen on discharge  - Seizure precautions  - PT/OT, discharge disposition pending per NCC, NSGY    Plan of care discussed with NCC team, patient at bedside. Will sign off, please call with any questions.     Depression  Chronic  - Continue home Sertraline        VTE Risk Mitigation (From admission, onward)         Ordered     IP VTE HIGH RISK PATIENT  Once         11/07/22 1232     Place sequential compression device  Until discontinued         11/07/22 1232     Place sequential compression device  Until discontinued         11/07/22 0628     Place LAURA hose  Until discontinued         11/07/22 0628                Philly Foy PA-C  Neurology-Epilepsy  [facility] - Neuro Critical Care  Staff: Dr. Torres

## 2022-11-17 NOTE — DISCHARGE SUMMARY
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 Arrieta is a 52 yo woman with intractable epilepsy since age 18, s/p partial left anterior temporal lobectomy in 11/2018 admitted to NICU s/p placement of sEEG leads for further localization of seizures as part of repeat surgical workup. After left anterior temporal lobectomy, patient reports brief period of seizure freedom, before beginning to have seizures again approximately 3 months afterwards. She reports current seizures are different than her typical semiology prior to surgery, notably she believes she now loses awareness and stares off. After her events, she is quickly back to baseline and is able to report that she had a seizure. No associated tongue biting, bowel/bladder incontinence. She is currently maintained on Lamotrigine 200 mg qAM/300 mg qPM and Cenobamate 150 mg nightly. She reports current seizure frequency of 7-8 per month, with last seizure on 10/31. Unable to identify triggers for seizures other than missing medication. MRI brain completed 2018 showed evidence of left frontotemporal crani for partial left anterior temporal resection. MRI brain epilepsy protocol in 10/2022 with post-operative changes with left temporal partial lobectomy, interval development of susceptibility in the right superior frontal sulcus compatible with component of superficial siderosis. EEG completed 5/30/22 noted mild regional or subcortical dysfunction in bilateral temporal lobes with possible seizure focus in right anterior temporal region. During EMU admission 7/5/22>7/10/22, patient noted to have  multiple right temporal lobe seizures, regional cortical dysfunction from left temporal region and bilateral independent seizure foci in left and right temporal lobes. Patient discussed in multidisciplinary epilepsy surgery conference, with recommendation for phase 2 monitoring for further localization. Patient elected to proceed, admitted to NICU after placement of 6 right sided sEEG electrodes and 6 left sided sEEG electrodes. Patient admitted to [facility] for close  monitoring and higher level of care.        Hospital Course by Event: 11/8/2022: all AED's discontinued today per epilepsy   11/09/2022 three electrographic seizures overnight  11/10/22: NAEON  11/11/22: two seizures today  11/12/22: NAEON  11/13/22: No seizure activity noted overnight. SBP<160  11/14/22: No seizure activity overnight, pt with no complaints other than boredom, denies depressed mood, thoughts of self harm, or hallucinations. Plan for OR 11/16 for sEEG lead removal.   11/15/22: AEDs restarted per epilepsy on evening of 11/14, increase to home doses today. OR tomorrow.   11/16/22: OR today for sEEG lead removal.   11/17/22: NAEON. POD1 from sEEG lead removal, post op CTH no hemorrhage. PT/OT cleared for discharge home. Keflex x5 days per NSGY. No changes to prior AEDs per epilepsy.       Hospital Course by Problem:   * Temporal lobe epilepsy, intractable  Ms. Arrieta is a 50 yo woman with intractable epilepsy since age 18, s/p partial left anterior temporal lobectomy in 11/2018 admitted to NICU s/p placement of sEEG leads for further localization of seizures as part of repeat surgical workup. POD1 sEED lead removal, post op CTH no hemorrhage.     -- epilepsy and NSGY team following   -- AEDs per epilepsy, continue prior home doses of lamotrigine and cenobamate. Has f/u scheduled for Dec 1st to discuss RNS vs other options   -- Keflex 500 q6h x5 days per NSGY - sent to pharmacy   -- PT/OT cleared for discharge home    Depression  Continue home  sertraline        Goals of Care Treatment Preferences:  Code Status: Full Code      Significant Results:  Imaging:  CTH post op lead removal with no hemorrhage       Laboratory:  Lab Results   Component Value Date    HGBA1C 5.3 10/22/2018    CHOL 182 11/05/2018    HDL 39 (L) 11/05/2018    LDLCALC 120.2 11/05/2018    TRIG 114 11/05/2018    TSH 1.657 01/14/2016       Consultations:  Neurosurgery  Epilepsy    Procedures:   Procedure(s) (LRB):  Placement and   REMOVAL OF STEREO EEG LEADS (DEPTH ELECTRODES) (Bilateral) by Ava Valdez MD.    Medications:   Continue home doses of lamotrigine, cenobamate  Continue home sertraline  Keflex 500 QID for 5 days    Diet: regular    Activity: As tolerated.     Disposition: Discharged to home in stable condition.    Follow Up Plan:  Epilepsy follow up Dec 1  NSGY follow up arranged per nsgy     This discharge took 30 minutes to complete.    Cassidy Moyer MD  Neurocritical Care  [facility] - Neuro Critical Care

## 2022-11-17 NOTE — PT/OT/SLP EVAL
"Occupational Therapy   Co-Evaluation/Treatment and Discharge Note    Name: Liza Arrieta  MRN: [id number]  Admitting Diagnosis:  Temporal lobe epilepsy, intractable   Recent Surgery: Procedure(s) (LRB):  REMOVAL OF STEREO EEG LEADS (DEPTH ELECTRODES) (Bilateral) 1 Day Post-Op    Recommendations:     Discharge Recommendations: home  Discharge Equipment Recommendations:  none  Barriers to discharge:  None    Assessment:     Liza Arrieta is a 52 y.o. female with a medical diagnosis of Temporal lobe epilepsy, intractable. At this time, patient is functioning at their prior level of function and does not require further acute OT services.     Plan:     During this hospitalization, patient does not require further acute OT services.  Please re-consult if situation changes.    Plan of Care Reviewed with: patient    Subjective     "Just ready to get out of here"     Chief Complaint: None stated    Patient/Family Comments/goals: To return home     Occupational Profile:  Living Environment: Pt lives with her  and 4 kids in a [facility] with 2 SHERITA and a WIS.   Previous level of function: Independent with ADLs and functional mobility   Roles and Routines: Pt drives short distances and works as a    Equipment Used at home:  shower chair (owns, does not use)  Assistance upon Discharge: Pt will have assistance from her  and adult kids     Pain/Comfort:  Pain Rating 1: 1/10  Location - Side 1: Bilateral  Location - Orientation 1: generalized  Location 1: head  Pain Addressed 1: Reposition, Distraction  Pain Rating Post-Intervention 1: 1/10    Patients cultural, spiritual, Denominational conflicts given the current situation: no    Objective:     Co-evaluation/treatment performed due to patient's multiple deficits requiring two skilled therapists to appropriately and safely assess patient's strength and endurance while facilitating functional tasks in addition to accommodating for patient's activity tolerance. "     Communicated with: RN prior to session.  Patient found HOB elevated with blood pressure cuff, pulse ox (continuous), telemetry upon OT entry to room.    General Precautions: Standard, fall   Orthopedic Precautions:N/A   Braces: N/A  Respiratory Status: Room air     Occupational Performance:    Bed Mobility:    Patient completed Rolling/Turning to Right with independence  Patient completed Scooting/Bridging with independence  Patient completed Supine to Sit with independence    Functional Mobility/Transfers:  Patient completed Sit <> Stand Transfer with independence  with  no assistive device   Patient completed Toilet Transfer Step Transfer technique with independence with  no AD  Functional Mobility: Pt engaging in functional mobility to simulate household/community distances with Supervision for line management and utilizing no AD in order to maximize functional activity tolerance and standing balance required for engagement in occupations of choice.    Activities of Daily Living:  Grooming: supervision : to brush her teeth in standing at the sink  Toileting: supervision : For a void on the toilet     Cognitive/Visual Perceptual:  Cognitive/Psychosocial Skills:     -       Oriented to: Person, Place, Time, and Situation   -       Follows Commands/attention:Follows multistep  commands  -       Communication: clear/fluent  -       Memory: No Deficits noted  -       Safety awareness/insight to disability: intact   -       Mood/Affect/Coping skills/emotional control: Appropriate to situation  Visual/Perceptual:      -Intact visual field, wears contacts at baseline     Physical Exam:     Balance:  Static Sitting   independence   Dynamic Sitting   independence   Static Standing   supervision   Dynamic Standing   supervision     Upper Extremity Function:   Dominance: Right  Left UE Right UE   UE Edema None noted None noted   UE ROM WFL WFL   UE Strength WFL WFL    Strength WFL WFL   Sensation    -       Intact   light/touch good    -       Intact  light/touch good   Fine Motor Skills:     -       Intact  Left hand, finger to nose, Left hand thumb/finger opposition skills, and Left hand, manipulation of objects    -       Intact  Right hand, finger to nose, Right hand thumb/finger opposition skills, and Right hand, manipulation of objects   Gross Motor Skills:   WFL   WFL     AMPAC 6 Click ADL:  AMPAC Total Score: 24    Treatment & Education:  Therapist provided facilitation and instruction of proper body mechanics and fall prevention strategies during tasks listed above.  Instructed patient to sit in bedside chair daily to increase OOB/activity tolerance.  Instructed patient to use call light to have nursing staff assist with needs/transfers.  Discussed OT POC and answered all questions within OT scope of practice.  Whiteboard updated       Patient left up in chair with all lines intact, call button in reach, and chair alarm on    GOALS:   Multidisciplinary Problems       Occupational Therapy Goals       Not on file              Multidisciplinary Problems (Resolved)          Problem: Occupational Therapy    Goal Priority Disciplines Outcome Interventions   Occupational Therapy Goal   (Resolved)     OT, PT/OT Met    Description: Pt does not require skilled OT services at this time. Pt is performing all ADLs and functional mobility at [facility]. Please re-consult if any changes.                          History:     Past Medical History:   Diagnosis Date    Convulsions     Epilepsy     Seizures          Past Surgical History:   Procedure Laterality Date    APPENDECTOMY       SECTION      4    CHOLECYSTECTOMY      CRANIOTOMY N/A 2018    Procedure: CRANIOTOMY for strip and grid;  Surgeon: Ruben Senior MD;  Location: [facility] OR [address] [place] [address];  Service: Neurosurgery;  Laterality: N/A;  toronto II, asa 2, type and screen, regular bed, [place], supine     CRANIOTOMY Left 2018    Procedure: CRANIOTOMY for grids and strips;   Surgeon: Ruben Senior MD;  Location: [facility] OR [address] [place] [address];  Service: Neurosurgery;  Laterality: Left;    HYSTERECTOMY      around 2016 for pain ful periods     INSERTION OF SUBDURAL GRID AND STRIP ELECTRODES BY CRANIOTOMY Left 11/19/2018    Procedure: CRANIOTOMY, FOR SUBDURAL GRID AND STRIP ELECTRODE LEAD Removal.;  Surgeon: Ruben Senior MD;  Location: [facility] OR [address] [place] [address];  Service: Neurosurgery;  Laterality: Left;  needs microscope and stealth-cg    REMOVAL OF STEREO EEG LEADS (DEPTH ELECTRODES) Bilateral 11/16/2022    Procedure: REMOVAL OF STEREO EEG LEADS (DEPTH ELECTRODES);  Surgeon: Ava Valdez MD;  Location: [address];  Service: Neurosurgery;  Laterality: Bilateral;    SURGICAL REMOVAL OF LOBE OF BRAIN Left 11/19/2018    Procedure: LOBECTOMY, BRAIN left temporal lobe.;  Surgeon: Ruben Senior MD;  Location: [address];  Service: Neurosurgery;  Laterality: Left;       Time Tracking:     OT Date of Treatment: 11/17/22  OT Start Time: 1339  OT Stop Time: 1359  OT Total Time (min): 20 min    Billable Minutes:Evaluation 10  Self Care/Home Management 10    11/17/2022

## 2022-11-17 NOTE — PLAN OF CARE
Problem: Occupational Therapy  Goal: Occupational Therapy Goal    Description: Pt does not require skilled OT services at this time. Pt is performing all ADLs and functional mobility at [facility]. Please re-consult if any changes.     Outcome: Met

## 2022-11-17 NOTE — PROGRESS NOTES
[facility] - Neuro Critical Care  Neurocritical Care  Progress Note    Admit Date: 11/7/2022  Service Date: 11/17/2022  Length of Stay: 10    Subjective:     Chief Complaint: Temporal lobe epilepsy, intractable    History of Present Illness: Ms. Arrieat is a 52 yo woman with intractable epilepsy since age 18, s/p partial left anterior temporal lobectomy in 11/2018 admitted to NICU s/p placement of sEEG leads for further localization of seizures as part of repeat surgical workup. After left anterior temporal lobectomy, patient reports brief period of seizure freedom, before beginning to have seizures again approximately 3 months afterwards. She reports current seizures are different than her typical semiology prior to surgery, notably she believes she now loses awareness and stares off. After her events, she is quickly back to baseline and is able to report that she had a seizure. No associated tongue biting, bowel/bladder incontinence. She is currently maintained on Lamotrigine 200 mg qAM/300 mg qPM and Cenobamate 150 mg nightly. She reports current seizure frequency of 7-8 per month, with last seizure on 10/31. Unable to identify triggers for seizures other than missing medication. MRI brain completed 2018 showed evidence of left frontotemporal crani for partial left anterior temporal resection. MRI brain epilepsy protocol in 10/2022 with post-operative changes with left temporal partial lobectomy, interval development of susceptibility in the right superior frontal sulcus compatible with component of superficial siderosis. EEG completed 5/30/22 noted mild regional or subcortical dysfunction in bilateral temporal lobes with possible seizure focus in right anterior temporal region. During EMU admission 7/5/22>7/10/22, patient noted to have multiple right temporal lobe seizures, regional cortical dysfunction from left temporal region and bilateral independent seizure foci in left and right temporal lobes. Patient  discussed in multidisciplinary epilepsy surgery conference, with recommendation for phase 2 monitoring for further localization. Patient elected to proceed, admitted to NICU after placement of 6 right sided sEEG electrodes and 6 left sided sEEG electrodes. Patient admitted to [facility] for close  monitoring and higher level of care.        Hospital Course: 11/8/2022: all AED's discontinued today per epilepsy   11/09/2022 three electrographic seizures overnight  11/10/22: NAEON  11/11/22: two seizures today  11/12/22: NAEON  11/13/22: No seizure activity noted overnight. SBP<160  11/14/22: No seizure activity overnight, pt with no complaints other than boredom, denies depressed mood, thoughts of self harm, or hallucinations. Plan for OR 11/16 for sEEG lead removal.   11/15/22: AEDs restarted per epilepsy on evening of 11/14, increase to home doses today. OR tomorrow.   11/16/22: OR today for sEEG lead removal.   11/17/22: NAEON. POD1 from sEEG lead removal, post op CTH no hemorrhage. PT/OT too see prior to discharge.         Review of Systems   Constitutional:  Negative for chills, fever and unexpected weight change.   HENT:  Negative for sore throat.    Eyes:  Negative for visual disturbance.   Respiratory:  Negative for cough and shortness of breath.    Cardiovascular:  Negative for chest pain and palpitations.   Gastrointestinal:  Negative for abdominal pain, blood in stool, constipation, diarrhea, nausea and vomiting.   Genitourinary:  Negative for difficulty urinating, dysuria and hematuria.   Musculoskeletal:  Negative for back pain and myalgias.   Skin:  Negative for pallor and rash.   Neurological:  Negative for seizures, facial asymmetry, speech difficulty, weakness, numbness and headaches.     Objective:     Vitals:  Temp: 98.7 °F (37.1 °C)  Pulse: (!) 56  Rhythm: sinus bradycardia  BP: 97/60  MAP (mmHg): 74  Resp: 19  SpO2: 96 %  O2 Device (Oxygen Therapy): room air    Temp  Min: 97.9 °F (36.6 °C)  Max: 98.7 °F  (37.1 °C)  Pulse  Min: 49  Max: 66  BP  Min: 88/60  Max: 144/64  MAP (mmHg)  Min: 66  Max: 99  Resp  Min: 11  Max: 20  SpO2  Min: 92 %  Max: 100 %    11/16 0701 - 11/17 0700  In: 1500 [P.O.:750]  Out: 2000 [Urine:2000]   Unmeasured Output  Urine Occurrence: 1  Stool Occurrence: 0  Emesis Occurrence: 0  Pad Count: 1       Physical Exam     General: well developed, well nourished, no distress.   HEENT: normocephalic, atraumatic  CV: regular rate  Pulmonary: normal respirations, no signs of respiratory distress  Abdomen: soft, non-distended, not tender to palpation  Skin: Skin is warm, dry and intact.     Neurologic:   Mental Status: AO x4, no aphasia, no dysarthria   CN: PERRL, EOMI, sensation intact in V1-V3 distributions, eyebrow raise and grimace symmetric, tongue midline, SCM 5/5, trapezius 5/5  Motor: moves all extremities spontaneously, full strength throughout, no pronator drift   Sensory: intact to light touch throughout  Gait: deferred    Medications:  Continuous ScheduledcefTRIAXone (ROCEPHIN) IVPB, 2 g, Q12H  cenobamate, 150 mg, QHS  lamoTRIgine, 200 mg, QAM  lamoTRIgine, 300 mg, Nightly  polyethylene glycol, 17 g, BID  senna-docusate 8.6-50 mg, 2 tablet, BID  sertraline, 25 mg, Daily    PRNacetaminophen, 1,000 mg, Q8H PRN  bisacodyL, 10 mg, Daily PRN  hydrALAZINE, 10 mg, Q6H PRN  HYDROmorphone, 1 mg, Q6H PRN  magnesium hydroxide 400 mg/5 ml, 30 mL, Daily PRN  magnesium oxide, 800 mg, PRN  magnesium oxide, 800 mg, PRN  ondansetron, 4 mg, Q12H PRN  oxyCODONE, 5 mg, Q6H PRN  potassium bicarbonate, 35 mEq, PRN  potassium bicarbonate, 50 mEq, PRN  potassium bicarbonate, 60 mEq, PRN  potassium, sodium phosphates, 2 packet, PRN  potassium, sodium phosphates, 2 packet, PRN  potassium, sodium phosphates, 2 packet, PRN      Today I personally reviewed pertinent medications, lines/drains/airways, imaging, laboratory results, notably:    Diet  Diet Adult Regular (IDDSI Level 7)      Assessment/Plan:     Neuro  *  Temporal lobe epilepsy, intractable  Ms. Arrieta is a 50 yo woman with intractable epilepsy since age 18, s/p partial left anterior temporal lobectomy in 11/2018 admitted to NICU s/p placement of sEEG leads for further localization of seizures as part of repeat surgical workup. POD1 sEED lead removal, post op CTH no hemorrhage.     -- SBP <160  -- epilepsy and NSGY team following   -- AEDs per epilepsy, will verify discharge doses of lamotrigine and cenobamate. Has f/u scheduled for Dec 1st to discuss RNS vs other options   -- Neuro checks q 1 hr  -- PT/OT prior to discharge    Psychiatric  Depression  Continue home sertraline          The patient is being Prophylaxed for:  Venous Thromboembolism with: Mechanical  Stress Ulcer with: Not Applicable   Ventilator Pneumonia with: not applicable    Activity Orders          Ambulate OOB with activity starting at 11/16 1800    Diet Adult Regular (IDDSI Level 7): Regular starting at 11/16 1746    Turn patient every 2 hours starting at 11/08 0000        Full Code    Cassidy Moyer MD  Neurocritical Care  [facility] - Neuro Critical Care

## 2022-11-17 NOTE — SUBJECTIVE & OBJECTIVE
Interval History: Patient sitting in chair at bedside, looking forward to working with PT/OT and possible discharge home. No clinical seizures noted overnight.     Current Facility-Administered Medications   Medication Dose Route Frequency Provider Last Rate Last Admin    acetaminophen tablet 1,000 mg  1,000 mg Oral Q8H PRN Mo Kincaid MD   1,000 mg at 11/17/22 0541    bisacodyL suppository 10 mg  10 mg Rectal Daily PRN Any Ruiz MD        cenobamate Tab 150 mg  150 mg Oral QHS Senia Fuentes PA-C   150 mg at 11/16/22 2112    hydrALAZINE injection 10 mg  10 mg Intravenous Q6H PRN Ann Cohen PA-C        HYDROmorphone injection 1 mg  1 mg Intravenous Q6H PRN Mo Kincaid MD   1 mg at 11/16/22 1651    lamoTRIgine tablet 200 mg  200 mg Oral QAM Cassidy Moyer MD   200 mg at 11/17/22 0642    lamoTRIgine tablet 300 mg  300 mg Oral Nightly Cassidy Moyer MD   300 mg at 11/16/22 2113    magnesium hydroxide 400 mg/5 ml suspension 2,400 mg  30 mL Oral Daily PRN Josafat Arevalo MD        magnesium oxide tablet 800 mg  800 mg Oral PRN Ann Cohen PA-C   800 mg at 11/13/22 0955    magnesium oxide tablet 800 mg  800 mg Oral PRN Ann Cohen PA-C        ondansetron injection 4 mg  4 mg Intravenous Q12H PRN Mo Kincaid MD        oxyCODONE immediate release tablet 5 mg  5 mg Oral Q6H PRN Mo Kincaid MD   5 mg at 11/17/22 0105    polyethylene glycol packet 17 g  17 g Oral BID Tiffanie Irwin NP   17 g at 11/17/22 0928    potassium bicarbonate disintegrating tablet 35 mEq  35 mEq Oral PRN Ann Cohen PA-C        potassium bicarbonate disintegrating tablet 50 mEq  50 mEq Oral PRN Ann Cohen PA-C        potassium bicarbonate disintegrating tablet 60 mEq  60 mEq Oral PRN Ann Cohen PA-C        potassium, sodium phosphates 280-160-250 mg packet 2 packet  2 packet Oral PRN Ann Cohen PA-C        potassium, sodium phosphates 280-160-250 mg packet 2  packet  2 packet Oral PRN Ann Cohen PA-C        potassium, sodium phosphates 280-160-250 mg packet 2 packet  2 packet Oral PRN Ann Cohen PA-C        senna-docusate 8.6-50 mg per tablet 2 tablet  2 tablet Oral BID Tiffanie Irwin NP   2 tablet at 11/17/22 0928    sertraline tablet 25 mg  25 mg Oral Daily Lisset Arboleda NP   25 mg at 11/17/22 0928     Continuous Infusions:    Review of Systems   Neurological:  Positive for seizures (none overnight). Negative for speech difficulty and weakness.   All other systems reviewed and are negative.  Objective:     Vital Signs (Most Recent):  Temp: 98.3 °F (36.8 °C) (11/17/22 1101)  Pulse: (!) 52 (11/17/22 1301)  Resp: 13 (11/17/22 1301)  BP: (!) 98/53 (11/17/22 1301)  SpO2: 95 % (11/17/22 1301)   Vital Signs (24h Range):  Temp:  [97.9 °F (36.6 °C)-98.7 °F (37.1 °C)] 98.3 °F (36.8 °C)  Pulse:  [50-66] 52  Resp:  [11-20] 13  SpO2:  [92 %-100 %] 95 %  BP: ()/(53-84) 98/53     Weight: 99.5 kg (219 lb 5.7 oz)  Body mass index is 35.41 kg/m².    Physical Exam  Vitals and nursing note reviewed.   Constitutional:       General: She is not in acute distress.     Appearance: She is not diaphoretic.   HENT:      Head: Normocephalic and atraumatic.   Eyes:      General: No scleral icterus.     Extraocular Movements: Extraocular movements intact.      Conjunctiva/sclera: Conjunctivae normal.      Pupils: Pupils are equal, round, and reactive to light.   Cardiovascular:      Rate and Rhythm: Normal rate.   Pulmonary:      Effort: Pulmonary effort is normal. No respiratory distress.   Abdominal:      General: There is no distension.      Palpations: Abdomen is soft.   Musculoskeletal:         General: No swelling, tenderness or deformity. Normal range of motion.      Cervical back: Neck supple. No rigidity.   Skin:     General: Skin is warm and dry.   Neurological:      General: No focal deficit present.      Mental Status: She is alert and oriented to person, place, and  time. Mental status is at baseline.   Psychiatric:         Mood and Affect: Mood normal.         Speech: Speech normal.         Behavior: Behavior normal.       NEUROLOGICAL EXAMINATION:     MENTAL STATUS   Oriented to person, place, and time.   Attention: normal. Concentration: normal.   Speech: speech is normal   Level of consciousness: alert    CRANIAL NERVES     CN III, IV, VI   Pupils are equal, round, and reactive to light.    CN VII   Facial expression full, symmetric.     CN VIII   Hearing: intact    CN IX, X   CN IX normal.     CN XI   CN XI normal.     CN XII   CN XII normal.     MOTOR EXAM   Muscle bulk: normal  Overall muscle tone: normal       Moves all extremities spontaneously and symmetrically, follows commands  No focal deficits noted     Significant Labs: All pertinent lab results from the past 24 hours have been reviewed.    Significant Studies: I have reviewed all pertinent imaging results/findings within the past 24 hours.

## 2022-11-17 NOTE — SUBJECTIVE & OBJECTIVE
Review of Systems   Constitutional:  Negative for chills, fever and unexpected weight change.   HENT:  Negative for sore throat.    Eyes:  Negative for visual disturbance.   Respiratory:  Negative for cough and shortness of breath.    Cardiovascular:  Negative for chest pain and palpitations.   Gastrointestinal:  Negative for abdominal pain, blood in stool, constipation, diarrhea, nausea and vomiting.   Genitourinary:  Negative for difficulty urinating, dysuria and hematuria.   Musculoskeletal:  Negative for back pain and myalgias.   Skin:  Negative for pallor and rash.   Neurological:  Negative for seizures, facial asymmetry, speech difficulty, weakness, numbness and headaches.     Objective:     Vitals:  Temp: 98.7 °F (37.1 °C)  Pulse: (!) 56  Rhythm: sinus bradycardia  BP: 97/60  MAP (mmHg): 74  Resp: 19  SpO2: 96 %  O2 Device (Oxygen Therapy): room air    Temp  Min: 97.9 °F (36.6 °C)  Max: 98.7 °F (37.1 °C)  Pulse  Min: 49  Max: 66  BP  Min: 88/60  Max: 144/64  MAP (mmHg)  Min: 66  Max: 99  Resp  Min: 11  Max: 20  SpO2  Min: 92 %  Max: 100 %    11/16 0701 - 11/17 0700  In: 1500 [P.O.:750]  Out: 2000 [Urine:2000]   Unmeasured Output  Urine Occurrence: 1  Stool Occurrence: 0  Emesis Occurrence: 0  Pad Count: 1       Physical Exam     General: well developed, well nourished, no distress.   HEENT: normocephalic, atraumatic  CV: regular rate  Pulmonary: normal respirations, no signs of respiratory distress  Abdomen: soft, non-distended, not tender to palpation  Skin: Skin is warm, dry and intact.     Neurologic:   Mental Status: AO x4, no aphasia, no dysarthria   CN: PERRL, EOMI, sensation intact in V1-V3 distributions, eyebrow raise and grimace symmetric, tongue midline, SCM 5/5, trapezius 5/5  Motor: moves all extremities spontaneously, full strength throughout, no pronator drift   Sensory: intact to light touch throughout  Gait: deferred    Medications:  Continuous ScheduledcefTRIAXone (ROCEPHIN) IVPB, 2 g,  Q12H  cenobamate, 150 mg, QHS  lamoTRIgine, 200 mg, QAM  lamoTRIgine, 300 mg, Nightly  polyethylene glycol, 17 g, BID  senna-docusate 8.6-50 mg, 2 tablet, BID  sertraline, 25 mg, Daily    PRNacetaminophen, 1,000 mg, Q8H PRN  bisacodyL, 10 mg, Daily PRN  hydrALAZINE, 10 mg, Q6H PRN  HYDROmorphone, 1 mg, Q6H PRN  magnesium hydroxide 400 mg/5 ml, 30 mL, Daily PRN  magnesium oxide, 800 mg, PRN  magnesium oxide, 800 mg, PRN  ondansetron, 4 mg, Q12H PRN  oxyCODONE, 5 mg, Q6H PRN  potassium bicarbonate, 35 mEq, PRN  potassium bicarbonate, 50 mEq, PRN  potassium bicarbonate, 60 mEq, PRN  potassium, sodium phosphates, 2 packet, PRN  potassium, sodium phosphates, 2 packet, PRN  potassium, sodium phosphates, 2 packet, PRN      Today I personally reviewed pertinent medications, lines/drains/airways, imaging, laboratory results, notably:    Diet  Diet Adult Regular (IDDSI Level 7)

## 2022-11-17 NOTE — ASSESSMENT & PLAN NOTE
A 52 y.o. female with hx of prior L temporal lobectomy and intractable seizure who admitted for sEEG 11/7    Continue ICU care  Neurocheck Q1hr  Pain control   Postop CT head satisfactory   Continue EEG and monitoring  sEEG removal on 11/16   CT head post op stable without hemorrhage  PT/OT today  ADAT  Anticipate discharge on Home AEDs with 5 days of kefflex for antibiotic prophylaxis.   Further care per NCC    Dispo: anticipated PT/OT and discharge home with 5d of antibiotics and home AED regimen.

## 2022-11-17 NOTE — PLAN OF CARE
[facility] Care Plan    POC reviewed with Liza Arrieta and family at 0300. Pt verbalized understanding. Questions and concerns addressed. No acute events overnight. Pt progressing toward goals. Will continue to monitor. See below and flowsheets for full assessment and VS info.           Is this a stroke patient? no    Neuro:  [place] Coma Scale  Best Eye Response: 4-->(E4) spontaneous  Best Motor Response: 6-->(M6) obeys commands  Best Verbal Response: 5-->(V5) oriented  [place] Coma Scale Score: 15  Assessment Qualifiers: patient not sedated/intubated  Pupil PERRLA: yes     24hr Temp:  [97.9 °F (36.6 °C)-98.6 °F (37 °C)]     CV:   Rhythm: sinus bradycardia  BP goals:   SBP < 160  MAP > 65    Resp:   O2 Device (Oxygen Therapy): room air       Plan:  Continuing to monitor    GI/:     Diet/Nutrition Received: regular  Last Bowel Movement: 11/14/22  Voiding Characteristics: external catheter    Intake/Output Summary (Last 24 hours) at 11/17/2022 0420  Last data filed at 11/17/2022 0105  Gross per 24 hour   Intake 1250 ml   Output 1650 ml   Net -400 ml     Unmeasured Output  Urine Occurrence: 1  Stool Occurrence: 0  Emesis Occurrence: 0  Pad Count: 1    Labs/Accuchecks:  Recent Labs   Lab 11/16/22 0217   WBC 7.53   RBC 4.91   HGB 15.3   HCT 44.4         Recent Labs   Lab 11/16/22 0217      K 3.6   CO2 25      BUN 17   CREATININE 0.8   ALKPHOS 125   ALT 31   AST 15   BILITOT 0.2      Recent Labs   Lab 11/16/22 0217   INR 1.0   APTT 24.0    No results for input(s): CPK, CPKMB, TROPONINI, MB in the last 168 hours.    Electrolytes: No replacement orders  Accuchecks: none    Gtts:      LDA/Wounds:  Lines/Drains/Airways       Drain  Duration             Female External Urinary Catheter 11/08/22 0605 8 days              Peripheral Intravenous Line  Duration                  Peripheral IV - Single Lumen 11/15/22 0238 20 G Distal;Right;Medial Forearm 2 days         Peripheral IV - Single Lumen 11/16/22 0221 20  G Anterior;Left Forearm 1 day                  Wounds: No  Wound care consulted: No

## 2022-11-18 RX ORDER — BACITRACIN 500 [USP'U]/G
OINTMENT TOPICAL 2 TIMES DAILY
Qty: 14 G | Refills: 0 | Status: ON HOLD | OUTPATIENT
Start: 2022-11-18 | End: 2022-12-19

## 2022-11-18 NOTE — PLAN OF CARE
[facility] Critical [facility]  Discharge Final Note    Primary Care Provider: Rc Rodriguez MD    Expected Discharge Date: 11/17/2022      Patient discharged to home with no post acute needs.     Final Discharge Note (most recent)       Final Note - 11/17/22 1900          Final Note    Assessment Type Final Discharge Note     Anticipated Discharge Disposition Home or Self Care                     Important Message from Medicare  Payor: [employer] / Plan: [employer] CHOICE / Product Type: Commercial /              Contact Info       Rc Rodriguez MD   Specialty: Family Medicine   Relationship: PCP - General    [address] [place] [id number]   Phone: [phone]       Next Steps: Go on 12/1/2022    Instructions: Hospital follow up at 11:00 AM          Kenna Fong RN, CCRN-K, [facility]  Neuro-Critical Care   [phone]

## 2022-11-19 ENCOUNTER — PATIENT MESSAGE (OUTPATIENT)
Dept: NEUROSURGERY | Facility: CLINIC | Age: 52
End: 2022-11-19
Payer: COMMERCIAL

## 2022-11-30 ENCOUNTER — TELEPHONE (OUTPATIENT)
Dept: PHARMACY | Facility: CLINIC | Age: 52
End: 2022-11-30
Payer: COMMERCIAL

## 2022-12-01 ENCOUNTER — OFFICE VISIT (OUTPATIENT)
Dept: NEUROSURGERY | Facility: CLINIC | Age: 52
End: 2022-12-01
Payer: COMMERCIAL

## 2022-12-01 VITALS — TEMPERATURE: 98 F | HEART RATE: 55 BPM | DIASTOLIC BLOOD PRESSURE: 73 MMHG | SYSTOLIC BLOOD PRESSURE: 113 MMHG

## 2022-12-01 DIAGNOSIS — G40.219 COMPLEX PARTIAL EPILEPSY WITH GENERALIZATION AND WITH INTRACTABLE EPILEPSY: Primary | ICD-10-CM

## 2022-12-01 PROCEDURE — 99999 PR PBB SHADOW E&M-EST. PATIENT-LVL III: CPT | Mod: PBBFAC,,, | Performed by: PHYSICIAN ASSISTANT

## 2022-12-01 PROCEDURE — 3074F SYST BP LT 130 MM HG: CPT | Mod: CPTII,S$GLB,, | Performed by: PHYSICIAN ASSISTANT

## 2022-12-01 PROCEDURE — 99999 PR PBB SHADOW E&M-EST. PATIENT-LVL III: ICD-10-PCS | Mod: PBBFAC,,, | Performed by: PHYSICIAN ASSISTANT

## 2022-12-01 PROCEDURE — 99024 PR POST-OP FOLLOW-UP VISIT: ICD-10-PCS | Mod: S$GLB,,, | Performed by: PHYSICIAN ASSISTANT

## 2022-12-01 PROCEDURE — 1159F MED LIST DOCD IN RCRD: CPT | Mod: CPTII,S$GLB,, | Performed by: PHYSICIAN ASSISTANT

## 2022-12-01 PROCEDURE — 3078F DIAST BP <80 MM HG: CPT | Mod: CPTII,S$GLB,, | Performed by: PHYSICIAN ASSISTANT

## 2022-12-01 PROCEDURE — 3078F PR MOST RECENT DIASTOLIC BLOOD PRESSURE < 80 MM HG: ICD-10-PCS | Mod: CPTII,S$GLB,, | Performed by: PHYSICIAN ASSISTANT

## 2022-12-01 PROCEDURE — 99024 POSTOP FOLLOW-UP VISIT: CPT | Mod: S$GLB,,, | Performed by: PHYSICIAN ASSISTANT

## 2022-12-01 PROCEDURE — 1159F PR MEDICATION LIST DOCUMENTED IN MEDICAL RECORD: ICD-10-PCS | Mod: CPTII,S$GLB,, | Performed by: PHYSICIAN ASSISTANT

## 2022-12-01 PROCEDURE — 3074F PR MOST RECENT SYSTOLIC BLOOD PRESSURE < 130 MM HG: ICD-10-PCS | Mod: CPTII,S$GLB,, | Performed by: PHYSICIAN ASSISTANT

## 2022-12-01 NOTE — PROGRESS NOTES
Neurosurgery  Established Patient    SUBJECTIVE:     History of Present Illness: Liza Arrieta is a 52 y.o. female with history of intractable epilepsy s/p left temporal lobectomy  2018 with Dr. Senior and now s/p sEEG lead placement on 22 and lead removal on 22 by Dr. Valdez. She presents today for 2 week follow up for staple and suture removal. She reports she has been doing well since discharge from the hospital. Denies any new headaches or vision changes. She denies any drainage from incisions.       Review of patient's allergies indicates:  No Known Allergies    Current Outpatient Medications   Medication Sig Dispense Refill    cenobamate (XCOPRI) 150 mg Tab Take one tablet by mouth once daily. (Patient taking differently: Take 150 mg by mouth nightly.) 30 tablet 5    ferrous sulfate (FEOSOL) 325 mg (65 mg iron) Tab tablet Take 325 mg by mouth once daily.      lamoTRIgine (LAMICTAL) 200 MG tablet Take 2.5 tablets (500 mg total) by mouth once daily. (Patient taking differently: Take 500 mg by mouth once daily. 200 mg in am and 300 mg in the pm) 405 tablet 3    sertraline (ZOLOFT) 25 MG tablet Take 1 tablet (25 mg total) by mouth once daily. 30 tablet 11    bacitracin 500 unit/gram ointment Apply topically 2 (two) times daily. 14 g 0    cenobamate (XCOPRI) 200 mg Tab Take one tablet by mouth once daily at 6am. 30 tablet 5     No current facility-administered medications for this visit.       Past Medical History:   Diagnosis Date    Convulsions     Epilepsy     Seizures      Past Surgical History:   Procedure Laterality Date    APPENDECTOMY       SECTION      4    CHOLECYSTECTOMY      CRANIOTOMY N/A 2018    Procedure: CRANIOTOMY for strip and grid;  Surgeon: Ruben Senior MD;  Location: [facility] OR [address] [place] [address];  Service: Neurosurgery;  Laterality: N/A;  toronto II, asa 2, type and screen, regular bed, [place], supine     CRANIOTOMY Left 2018    Procedure: CRANIOTOMY for grids and strips;   Surgeon: Ruben Senior MD;  Location: [facility] OR [address] [place] [address];  Service: Neurosurgery;  Laterality: Left;    HYSTERECTOMY      around 2016 for pain ful periods     INSERTION OF SUBDURAL GRID AND STRIP ELECTRODES BY CRANIOTOMY Left 11/19/2018    Procedure: CRANIOTOMY, FOR SUBDURAL GRID AND STRIP ELECTRODE LEAD Removal.;  Surgeon: Ruben Senior MD;  Location: [facility] OR [address] [place] [address];  Service: Neurosurgery;  Laterality: Left;  needs microscope and stealth-cg    REMOVAL OF STEREO EEG LEADS (DEPTH ELECTRODES) Bilateral 11/16/2022    Procedure: REMOVAL OF STEREO EEG LEADS (DEPTH ELECTRODES);  Surgeon: Ava Valdez MD;  Location: [facility] OR [address] [place] [address];  Service: Neurosurgery;  Laterality: Bilateral;    SURGICAL REMOVAL OF LOBE OF BRAIN Left 11/19/2018    Procedure: LOBECTOMY, BRAIN left temporal lobe.;  Surgeon: Ruben Senior MD;  Location: [facility] OR [address] [place] [address];  Service: Neurosurgery;  Laterality: Left;     Family History       Problem Relation (Age of Onset)    Heart disease Father          Social History     Socioeconomic History    Marital status:    Tobacco Use    Smoking status: Never    Smokeless tobacco: Never   Substance and Sexual Activity    Alcohol use: Yes     Comment: One drink per month    Drug use: No    Sexual activity: Yes     Partners: Male     Birth control/protection: See Surgical Hx     Social Determinants of Health     Financial Resource Strain: Low Risk     Difficulty of Paying Living Expenses: Not hard at all   Food Insecurity: No Food Insecurity    Worried About Running Out of Food in the Last Year: Never true    Ran Out of Food in the Last Year: Never true   Transportation Needs: No Transportation Needs    Lack of Transportation (Medical): No    Lack of Transportation (Non-Medical): No   Physical Activity: Sufficiently Active    Days of Exercise per Week: 4 days    Minutes of Exercise per Session: 80 min   Stress: No Stress Concern Present    Feeling of Stress : Only a little   Social Connections: Unknown     Frequency of Communication with Friends and Family: Three times a week    Frequency of Social Gatherings with Friends and Family: Once a week    Active Member of Clubs or Organizations: Yes    Attends Club or Organization Meetings: More than 4 times per year    Marital Status:    Housing Stability: Low Risk     Unable to Pay for Housing in the Last Year: No    Number of Places Lived in the Last Year: 1    Unstable Housing in the Last Year: No       Review of Systems    OBJECTIVE:     Vital Signs  Temp: 98.1 °F (36.7 °C)  Pulse: (!) 55  BP: 113/73  Pain Score: 0-No pain  There is no height or weight on file to calculate BMI.    Neurosurgery Physical Exam  General: well developed, well nourished, no distress.   Head: normocephalic, atraumatic  Neurologic: Alert and oriented. Thought content appropriate.  GCS: Motor: 6/Verbal: 5/Eyes: 4 GCS Total: 15  Mental Status: Awake, Alert, Oriented x 4  Language: No aphasia  Speech: No dysarthria  Cranial nerves: face symmetric, tongue midline, CN II-XII grossly intact.   Eyes: pupils equal, round, reactive to light with accommodation, EOMI.   Pulmonary: normal respirations, no signs of respiratory distress  Abdomen: soft, non-distended, not tender to palpation  Skin: Skin is warm, dry and intact.  Sensory: intact to light touch throughout    Motor Strength:Moves all extremities spontaneously with good tone.  Full strength upper and lower extremities. No abnormal movements seen.       Cerebellar:   Finger-to-nose: intact bilaterally   Pronator drift: absent bilaterally    Cranial incision: well healed without dehiscence, drainage, erythema, or edema.     Diagnostic Results:  No relevant imaging available for review    ASSESSMENT/PLAN:     Liza Arrieta is a 52 y.o. female with history of intractable epilepsy s/p left temporal lobectomy  11/19/2018 with Dr. Senior and now s/p sEEG lead placement on 11/7/22 and lead removal on 11/16/22 by Dr. Valdez. She is 2 weeks post op and  doing well. Her incisions have healed nicely and all staples and sutures were removed. She has follow up scheduled next week with Dr. Seth Mark to discuss the results of her sEEG. Further surgical planning to be determined based on results.

## 2022-12-06 ENCOUNTER — HOSPITAL ENCOUNTER (OUTPATIENT)
Dept: RADIOLOGY | Facility: HOSPITAL | Age: 52
Discharge: HOME | End: 2022-12-06
Attending: NEUROLOGICAL SURGERY
Payer: COMMERCIAL

## 2022-12-06 DIAGNOSIS — G40.119 TEMPORAL LOBE EPILEPSY, INTRACTABLE: ICD-10-CM

## 2022-12-06 PROCEDURE — A9585 GADOBUTROL INJECTION: HCPCS | Performed by: NEUROLOGICAL SURGERY

## 2022-12-06 PROCEDURE — 25500020 PHARM REV CODE 255: Performed by: NEUROLOGICAL SURGERY

## 2022-12-06 PROCEDURE — 70552 MRI BRAIN STEM W/DYE: CPT | Mod: 26,,, | Performed by: RADIOLOGY

## 2022-12-06 PROCEDURE — 70552 MRI BRAIN STEM W/DYE: CPT | Mod: TC

## 2022-12-06 PROCEDURE — 70552 MRI BRAIN STEALTH W/O FIDUCIALS WITH CONTRAST: ICD-10-PCS | Mod: 26,,, | Performed by: RADIOLOGY

## 2022-12-06 RX ORDER — GADOBUTROL 604.72 MG/ML
10 INJECTION INTRAVENOUS
Status: COMPLETED | OUTPATIENT
Start: 2022-12-06 | End: 2022-12-06

## 2022-12-06 RX ADMIN — GADOBUTROL 10 ML: 604.72 INJECTION INTRAVENOUS at 06:12

## 2022-12-07 ENCOUNTER — TELEPHONE (OUTPATIENT)
Dept: NEUROSURGERY | Facility: CLINIC | Age: 52
End: 2022-12-07
Payer: COMMERCIAL

## 2022-12-07 ENCOUNTER — OFFICE VISIT (OUTPATIENT)
Dept: NEUROSURGERY | Facility: CLINIC | Age: 52
End: 2022-12-07
Payer: COMMERCIAL

## 2022-12-07 DIAGNOSIS — G40.119 TEMPORAL LOBE EPILEPSY, INTRACTABLE: Primary | ICD-10-CM

## 2022-12-07 DIAGNOSIS — G40.219 COMPLEX PARTIAL EPILEPSY WITH GENERALIZATION AND WITH INTRACTABLE EPILEPSY: Primary | ICD-10-CM

## 2022-12-07 PROCEDURE — 99358 PR PROLONGED SERV,NO CONTACT,1ST HR: ICD-10-PCS | Mod: S$GLB,,, | Performed by: PSYCHIATRY & NEUROLOGY

## 2022-12-07 PROCEDURE — 99358 PROLONG SERVICE W/O CONTACT: CPT | Mod: S$GLB,,, | Performed by: PSYCHIATRY & NEUROLOGY

## 2022-12-07 PROCEDURE — 99024 PR POST-OP FOLLOW-UP VISIT: ICD-10-PCS | Mod: S$GLB,,, | Performed by: NEUROLOGICAL SURGERY

## 2022-12-07 PROCEDURE — 99024 POSTOP FOLLOW-UP VISIT: CPT | Mod: S$GLB,,, | Performed by: NEUROLOGICAL SURGERY

## 2022-12-07 NOTE — PROGRESS NOTES
"Neurosurgery  History & Physical    SUBJECTIVE:     Chief Complaint: intractable epilepsy     History of Present Illness:  Liza Arrieta is a 52 y.o. right-handed female referred by Dr. Mark for evaluation of intractable epilepsy. The patient underwent left anterior temporal lobectomy after subdural strip and grid monitoring on 11/19/18. After surgery, she reports that her personality improved. She is "more open" and "got some of her life back." Petit mal seizures resolved postoperatively, but she began having complex partial seizures (self-resolved after 30 seconds-1 minute) while jogging about 3-4 months ago.     Neymar, her  who accompanies her today, states that she has always had seizures despite the surgery. He states that she had the typical auras and small focal events; lately, the seizure frequency and severity has been increasing (now complex partial). She keeps track of her seizures on an sam; triggers include exercise and stress.      She works as a . She graduated college and has her . She lives with her  and 3 of her 4 children: Harsahl, Jackie, and Amy.     The patient's seizures began when she was 19. She has previously been a patient of Cyndy Huerta and [facility].     She has previously failed: phenobarbital, dilantin, topamax, Keppra, among others.     The patient denies any bleeding, infectious, or anesthetic complications with any previous surgery. No AC/AP agents; she does take over-the-counter iron supplementation.     As of 12/7/22, the patient returns in postoperative follow up for data review. She denies fever, chills, or drainage from the incision. She has had two noticeable seizures since she's been discharged. One staring spell at the grocery store; another while eating dinner with her family. She hates when she loses a little bit of time.     She had sutures and staples removed with Ashley last week.     Review of patient's allergies " indicates:  No Known Allergies    Current Outpatient Medications   Medication Sig Dispense Refill    bacitracin 500 unit/gram ointment Apply topically 2 (two) times daily. 14 g 0    cenobamate (XCOPRI) 150 mg Tab Take one tablet by mouth once daily. (Patient taking differently: Take 150 mg by mouth nightly.) 30 tablet 5    cenobamate (XCOPRI) 200 mg Tab Take one tablet by mouth once daily at 6am. 30 tablet 5    ferrous sulfate (FEOSOL) 325 mg (65 mg iron) Tab tablet Take 325 mg by mouth once daily.      lamoTRIgine (LAMICTAL) 200 MG tablet Take 2.5 tablets (500 mg total) by mouth once daily. (Patient taking differently: Take 500 mg by mouth once daily. 200 mg in am and 300 mg in the pm) 405 tablet 3    sertraline (ZOLOFT) 25 MG tablet Take 1 tablet (25 mg total) by mouth once daily. 30 tablet 11     No current facility-administered medications for this visit.       Past Medical History:   Diagnosis Date    Convulsions     Epilepsy     Seizures      Past Surgical History:   Procedure Laterality Date    APPENDECTOMY       SECTION      4    CHOLECYSTECTOMY      CRANIOTOMY N/A 2018    Procedure: CRANIOTOMY for strip and grid;  Surgeon: Ruben Senior MD;  Location: [facility] OR [address] [place] [address];  Service: Neurosurgery;  Laterality: N/A;  toronto II, asa 2, type and screen, regular bed, [place], supine     CRANIOTOMY Left 2018    Procedure: CRANIOTOMY for grids and strips;  Surgeon: Ruben Senior MD;  Location: [facility] OR [address] [place] [address];  Service: Neurosurgery;  Laterality: Left;    HYSTERECTOMY      around 2016 for pain ful periods     INSERTION OF SUBDURAL GRID AND STRIP ELECTRODES BY CRANIOTOMY Left 2018    Procedure: CRANIOTOMY, FOR SUBDURAL GRID AND STRIP ELECTRODE LEAD Removal.;  Surgeon: Ruben Senior MD;  Location: [facility] OR [address] [place] [address];  Service: Neurosurgery;  Laterality: Left;  needs microscope and stealth-cg    REMOVAL OF STEREO EEG LEADS (DEPTH ELECTRODES) Bilateral 2022    Procedure: REMOVAL OF STEREO EEG  LEADS (DEPTH ELECTRODES);  Surgeon: Ava aVldez MD;  Location: [facility] OR [address] [place] [address];  Service: Neurosurgery;  Laterality: Bilateral;    SURGICAL REMOVAL OF LOBE OF BRAIN Left 11/19/2018    Procedure: LOBECTOMY, BRAIN left temporal lobe.;  Surgeon: Ruben Senior MD;  Location: [facility] OR [address] [place] [address];  Service: Neurosurgery;  Laterality: Left;     Family History       Problem Relation (Age of Onset)    Heart disease Father          Social History     Socioeconomic History    Marital status:    Tobacco Use    Smoking status: Never    Smokeless tobacco: Never   Substance and Sexual Activity    Alcohol use: Yes     Comment: One drink per month    Drug use: No    Sexual activity: Yes     Partners: Male     Birth control/protection: See Surgical Hx     Social Determinants of Health     Financial Resource Strain: Low Risk     Difficulty of Paying Living Expenses: Not hard at all   Food Insecurity: No Food Insecurity    Worried About Running Out of Food in the Last Year: Never true    Ran Out of Food in the Last Year: Never true   Transportation Needs: No Transportation Needs    Lack of Transportation (Medical): No    Lack of Transportation (Non-Medical): No   Physical Activity: Sufficiently Active    Days of Exercise per Week: 4 days    Minutes of Exercise per Session: 80 min   Stress: No Stress Concern Present    Feeling of Stress : Only a little   Social Connections: Unknown    Frequency of Communication with Friends and Family: Three times a week    Frequency of Social Gatherings with Friends and Family: Once a week    Active Member of Clubs or Organizations: Yes    Attends Club or Organization Meetings: More than 4 times per year    Marital Status:    Housing Stability: Low Risk     Unable to Pay for Housing in the Last Year: No    Number of Places Lived in the Last Year: 1    Unstable Housing in the Last Year: No       Review of Systems   Constitutional:  Negative for fever.   HENT:  Negative for nosebleeds.     Eyes:  Negative for visual disturbance.   Respiratory:  Negative for shortness of breath.    Cardiovascular:  Negative for chest pain.   Gastrointestinal:  Negative for vomiting.   Endocrine: Negative for cold intolerance.   Genitourinary:  Negative for difficulty urinating.   Musculoskeletal:  Negative for neck pain.   Skin:  Negative for color change.   Neurological:  Positive for seizures.   Hematological:  Does not bruise/bleed easily.   Psychiatric/Behavioral:  Positive for dysphoric mood.      OBJECTIVE:     Vital Signs     There is no height or weight on file to calculate BMI.      Physical Exam:    Constitutional: She appears well-developed and well-nourished. No distress.     Eyes: EOM are normal.     Abdominal: Soft.     Skin: Skin displays no rash on extremities. Skin displays no lesions on extremities.   Well healed left temporal incision   Some temporalis atrophy      Psych/Behavior: She is alert. She is oriented to person, place, and time.     Musculoskeletal:      Comments: No focal weakness     Neurological:        Coordination: She has normal finger to nose coordination.   Pulmonary: nonlabored respirations     Hematologic: no bruising noted     Diagnostic Results:  MRI personally reviewed   EEG data personally reviewed with patient, her , and Dr. Mark    ASSESSMENT/PLAN:     Liza Arrieta is a 52 y.o. female who presents as a referral from Dr. Mark to discuss possible intracranial seizure surgery. She has already had left temporal lobectomy. She is now s/p placement of sEEG leads on 11/7/22 with removal on 11/16/22.     Based on interdisciplinary data review, we are recommending right temporal RNS placement (mesial temporal depth + middle temporal gyrus strip).     We had a pleasant discussion about the risks, benefits, and alternatives to RNS placement. Risks include, but are not limited to, bleeding, pain, infection, scarring, need for further/repeat procedure, death, paralysis, stroke  (including venous infarct)/damage to major blood vessels, leak of cerebrospinal fluid, and hardware-related complications. We discussed that more haircut will be required. We discussed that we cannot guarantee seizure freedom. We discussed that the generator is expected to last approximately 8-11 years and will require replacement on the skull. We discussed that multiple-times-per-week download of data is recommended. We discussed that further surgical intervention may be recommended in future. Informed consent was obtained.      She will need MRI stealth for operative planning purposes.      I have encouraged them to contact the clinic in interim with any questions, concerns, or adverse clinical change.     I spent over an hour on this encounter, more than half in direct consultation.

## 2022-12-07 NOTE — PROCEDURES
Stereo EEG/Depth Electrode Monitoring Report  IMPLANTATION DATE: 11/7/2022  DATE OF SERVICE: 11/8/2022-11/9/2022  LOCATION OF SERVICE: [facility] [place]     METHODOLOGY:  Depth electrodes consisted of thin wires with electrical contacts it fixed distances along the wire. These are then implanted using a stereotactic procedure targeting cortical and subcortical structures. The up to 256 electrode contact can be recorded simultaneously with this procedure. Recording band pass was 0.1 in the low past filters setting could be set at the 512, 1024, or 2048. Digital video recording of the patient is simultaneously recorded with the EEG. The patient is instructed report clinical symptoms which may occur during the recording session. EEG and video recording is stored and archived in digital format. Activation procedures which include photic stimulation, hyperventilation and instructing patients to perform simple task are done in selected patients.   Compresses spectral analysis (CSA) is also performed on the activity recorded from each individual channel. This is displayed as a power display of frequencies from 0 to 30 Hz over time. The CSA analysis is done and displayed continuously. This is reviewed for asymmetries in power between homologous areas of the scalp and for presence of changes in power which canbe seen when seizures occur. Sections of suspected abnormalities on the CSA is then compared with the original EEG recording.   The following is the details related to the depth electrodes implanted.                                           Depth Elect Name SN # contacts Color 1 Color 2   Contacts     1 RAmyg 18356 16 Green Black   1-16     2 RHippo 35031 14 Blue Green   17-30     3 RAnIns 51950 8 Black Green   31-38     4 RThalm 41121 16 Yellow Green   39-54     5 RAncIn 21844 12 Red Blue   55-66     6 RPosIT 48926 12 Yellow Black   67-78     7 LOcbFr 76848 12 Red Green   79-90     8 LThalm 49473 16 Yellow Blue        9  LAncin 88796 10 Yellow Green   107-116     10 RAnIns 50095 10 Blue Green   117-126     11 LPostT 90779 14 Black Green   127-140     12 LResec 81564 10 Blue  Red   141-150        RECORDING TIMES  Start on 11/8/22 at 0701  Stop on 11/9/22 at 0700  A total of 24 hours video/SEEG telemetry was recorded.  Recording Quality: good     EEG FINDINGS  INTERICTAL: Frequent interictal discharges were seen in the following leads: RHippo 1-4, 8-13; RAmyg 1-6, [id number], and LPostT1-8.      ICTAL:  Seizure #1:  Start on 11/9/22 at 02:02:20, stop at 02:03:05  Electrographically, the seizure begins with slowing and intermittent epileptiform discharges best appreciated in RHippo 1-4 which after approximately 20 seconds are replaced with rhythmic high frequency activity in a similar distribution with subsequent spread.  Clinically, the patient appears briefly confused before exhibiting what appears to be brief oral automatisms.  She then reaches for a cup with her right hand and drinks.  She then resumes resting in bed.    Seizure #2:  Start on 11/9/22 at 05:02:17, stop at 05:02:52  From an electrographic perspective, the patient demonstrates slowing with intermittent epileptiform discharges best seen in RHippo 1-4.  Subsequently, this evolves into 3-4 Hz spikes in a similar distribution with some degree of spread.    Clinically, the event itself is rather bland.  During the seizure the patient stretches and repositions in bed.       IMPRESSION:  There are frequent interictal epileptiform discharges in the right hippocampal, right amygadala, right posterior temporal and left temporal contacts. 2 seizures were captured which appeared to have an onset best appreciated in leads RHippo 1-4.

## 2022-12-07 NOTE — PROCEDURES
Stereo EEG/Depth Electrode Monitoring Report  IMPLANTATION DATE: 11/7/2022  DATE OF SERVICE: 11/10/2022-11/11/2022  [place] [facility] [place]Y:  Depth electrodes consisted of thin wires with electrical contacts it fixed distances along the wire. These are then implanted using a stereotactic procedure targeting cortical and subcortical structures. The up to 256 electrode contact can be recorded simultaneously with this procedure. Recording band pass was 0.1 in the low past filters setting could be set at the 512, 1024, or 2048. Digital video recording of the patient is simultaneously recorded with the EEG. The patient is instructed report clinical symptoms which may occur during the recording session. EEG and video recording is stored and archived in digital format. Activation procedures which include photic stimulation, hyperventilation and instructing patients to perform simple task are done in selected patients.   Compresses spectral analysis (CSA) is also performed on the activity recorded from each individual channel. This is displayed as a power display of frequencies from 0 to 30 Hz over time. The CSA analysis is done and displayed continuously. This is reviewed for asymmetries in power between homologous areas of the scalp and for presence of changes in power which canbe seen when seizures occur. Sections of suspected abnormalities on the CSA is then compared with the original EEG recording.   The following is the details related to the depth electrodes implanted.                                           Depth Elect Name SN # contacts Color 1 Color 2   Contacts     1 RAmyg 16439 16 Green Black   1-16     2 RHippo 32586 14 Blue Green   17-30     3 RAnIns 18984 8 Black Green   31-38     4 RThalm 36573 16 Yellow Green   39-54     5 RAncIn 88408 12 Red Blue   55-66     6 RPosIT 13805 12 Yellow Black   67-78     7 LOcbFr 72114 12 Red Green   79-90     8 LThalm 66192 16 Yellow Blue        9  LAncin 20563 10 Yellow Green   107-116     10 RAnIns 36622 10 Blue Green   117-126     11 LPostT 41917 14 Black Green   127-140     12 LResec 78455 10 Blue  Red   141-150        RECORDING TIMES  Start on 11/10/22 at 0701  Stop on 11/11/22 at 0700  A total of 24 hours video/SEEG telemetry was recorded.  Recording Quality: good     EEG FINDINGS  INTERICTAL: Frequent interictal discharges were seen in the following leads: RHippo 1-4, 8-13; RAmyg 1-6, [id number], and LPostT1-8.      ICTAL:   Seizure #5:  Start on 11/10/22 at 11:34:22, stop at 11:35:00  From an electrographic perspective, the seizure begins with poorly organized slowing and intermittent epileptiform transients best appreciated at RHippo 1-4.  The slowing becomes more rhythimic/well organized and sharply contoured, demonstrating some degree of spread.  Clinically, the patient is initially interacting with her phone.  She stops and then briefly closes her eyes.     IMPRESSION:  There are frequent interictal epileptiform discharges in the right hippocampal, right amygadala, right posterior temporal and left temporal contacts. 1 additional seizure was captured as described above.  It was best appreciated in the channels associated with RHippo 1-4.

## 2022-12-07 NOTE — PROCEDURES
Stereo EEG/Depth Electrode Monitoring Report  IMPLANTATION DATE: 11/7/2022  DATE OF SERVICE: 11/12/2022-11/13/2022  LOCATION OF SERVICE: [facility] [place]     METHODOLOGY:  Depth electrodes consisted of thin wires with electrical contacts it fixed distances along the wire. These are then implanted using a stereotactic procedure targeting cortical and subcortical structures. The up to 256 electrode contact can be recorded simultaneously with this procedure. Recording band pass was 0.1 in the low past filters setting could be set at the 512, 1024, or 2048. Digital video recording of the patient is simultaneously recorded with the EEG. The patient is instructed report clinical symptoms which may occur during the recording session. EEG and video recording is stored and archived in digital format. Activation procedures which include photic stimulation, hyperventilation and instructing patients to perform simple task are done in selected patients.   Compresses spectral analysis (CSA) is also performed on the activity recorded from each individual channel. This is displayed as a power display of frequencies from 0 to 30 Hz over time. The CSA analysis is done and displayed continuously. This is reviewed for asymmetries in power between homologous areas of the scalp and for presence of changes in power which canbe seen when seizures occur. Sections of suspected abnormalities on the CSA is then compared with the original EEG recording.   The following is the details related to the depth electrodes implanted.                                           Depth Elect Name SN # contacts Color 1 Color 2   Contacts     1 RAmyg 26240 16 Green Black   1-16     2 RHippo 02471 14 Blue Green   17-30     3 RAnIns 42519 8 Black Green   31-38     4 RThalm 61411 16 Yellow Green   39-54     5 RAncIn 89498 12 Red Blue   55-66     6 RPosIT 90458 12 Yellow Black   67-78     7 LOcbFr 79002 12 Red Green   79-90     8 LThalm 05747 16 Yellow Blue        9  LAncin 55261 10 Yellow Green   107-116     10 RAnIns 02710 10 Blue Green   117-126     11 LPostT 02762 14 Black Green   127-140     12 LResec 09182 10 Blue  Red   141-150        RECORDING TIMES  Start on 11/12/22 at 0701  Stop on 11/13/22 at 0700  A total of 24 hours video/SEEG telemetry was recorded.  Recording Quality: good     EEG FINDINGS  INTERICTAL: Frequent interictal discharges were seen in the following leads: RHippo 1-4, 8-13; RAmyg 1-6, [id number], and LPostT1-8.      ICTAL: None     IMPRESSION:  There are frequent interictal epileptiform discharges in the right hippocampal, right amygadala, right posterior temporal and left temporal contacts. No seizures were seen during this portion of the monitoring session.      Please see other EEG reports for details of the seizures captured.

## 2022-12-07 NOTE — H&P (VIEW-ONLY)
"Neurosurgery  History & Physical    SUBJECTIVE:     Chief Complaint: intractable epilepsy     History of Present Illness:  Liza Arrieta is a 52 y.o. right-handed female referred by Dr. Mark for evaluation of intractable epilepsy. The patient underwent left anterior temporal lobectomy after subdural strip and grid monitoring on 11/19/18. After surgery, she reports that her personality improved. She is "more open" and "got some of her life back." Petit mal seizures resolved postoperatively, but she began having complex partial seizures (self-resolved after 30 seconds-1 minute) while jogging about 3-4 months ago.     Neymar, her  who accompanies her today, states that she has always had seizures despite the surgery. He states that she had the typical auras and small focal events; lately, the seizure frequency and severity has been increasing (now complex partial). She keeps track of her seizures on an sam; triggers include exercise and stress.      She works as a . She graduated college and has her . She lives with her  and 3 of her 4 children: Harshal, Jackie, and Amy.     The patient's seizures began when she was 19. She has previously been a patient of [facility] and [facility].     She has previously failed: phenobarbital, dilantin, topamax, Keppra, among others.     The patient denies any bleeding, infectious, or anesthetic complications with any previous surgery. No AC/AP agents; she does take over-the-counter iron supplementation.     As of 12/7/22, the patient returns in postoperative follow up for data review. She denies fever, chills, or drainage from the incision. She has had two noticeable seizures since she's been discharged. One staring spell at the grocery store; another while eating dinner with her family. She hates when she loses a little bit of time.     She had sutures and staples removed with Ashley last week.     Review of patient's allergies " indicates:  No Known Allergies    Current Outpatient Medications   Medication Sig Dispense Refill    bacitracin 500 unit/gram ointment Apply topically 2 (two) times daily. 14 g 0    cenobamate (XCOPRI) 150 mg Tab Take one tablet by mouth once daily. (Patient taking differently: Take 150 mg by mouth nightly.) 30 tablet 5    cenobamate (XCOPRI) 200 mg Tab Take one tablet by mouth once daily at 6am. 30 tablet 5    ferrous sulfate (FEOSOL) 325 mg (65 mg iron) Tab tablet Take 325 mg by mouth once daily.      lamoTRIgine (LAMICTAL) 200 MG tablet Take 2.5 tablets (500 mg total) by mouth once daily. (Patient taking differently: Take 500 mg by mouth once daily. 200 mg in am and 300 mg in the pm) 405 tablet 3    sertraline (ZOLOFT) 25 MG tablet Take 1 tablet (25 mg total) by mouth once daily. 30 tablet 11     No current facility-administered medications for this visit.       Past Medical History:   Diagnosis Date    Convulsions     Epilepsy     Seizures      Past Surgical History:   Procedure Laterality Date    APPENDECTOMY       SECTION      4    CHOLECYSTECTOMY      CRANIOTOMY N/A 2018    Procedure: CRANIOTOMY for strip and grid;  Surgeon: Ruben Senior MD;  Location: [facility] OR [address] [place] [address];  Service: Neurosurgery;  Laterality: N/A;  toronto II, asa 2, type and screen, regular bed, [place], supine     CRANIOTOMY Left 2018    Procedure: CRANIOTOMY for grids and strips;  Surgeon: Ruben Senior MD;  Location: [facility] OR [address] [place] [address];  Service: Neurosurgery;  Laterality: Left;    HYSTERECTOMY      around 2016 for pain ful periods     INSERTION OF SUBDURAL GRID AND STRIP ELECTRODES BY CRANIOTOMY Left 2018    Procedure: CRANIOTOMY, FOR SUBDURAL GRID AND STRIP ELECTRODE LEAD Removal.;  Surgeon: Ruben Senior MD;  Location: [facility] OR [address] [place] [address];  Service: Neurosurgery;  Laterality: Left;  needs microscope and stealth-cg    REMOVAL OF STEREO EEG LEADS (DEPTH ELECTRODES) Bilateral 2022    Procedure: REMOVAL OF STEREO EEG  LEADS (DEPTH ELECTRODES);  Surgeon: Ava Valdez MD;  Location: [facility] OR [address] [place] [address];  Service: Neurosurgery;  Laterality: Bilateral;    SURGICAL REMOVAL OF LOBE OF BRAIN Left 11/19/2018    Procedure: LOBECTOMY, BRAIN left temporal lobe.;  Surgeon: Ruben Senior MD;  Location: [facility] OR [address] [place] [address];  Service: Neurosurgery;  Laterality: Left;     Family History       Problem Relation (Age of Onset)    Heart disease Father          Social History     Socioeconomic History    Marital status:    Tobacco Use    Smoking status: Never    Smokeless tobacco: Never   Substance and Sexual Activity    Alcohol use: Yes     Comment: One drink per month    Drug use: No    Sexual activity: Yes     Partners: Male     Birth control/protection: See Surgical Hx     Social Determinants of Health     Financial Resource Strain: Low Risk     Difficulty of Paying Living Expenses: Not hard at all   Food Insecurity: No Food Insecurity    Worried About Running Out of Food in the Last Year: Never true    Ran Out of Food in the Last Year: Never true   Transportation Needs: No Transportation Needs    Lack of Transportation (Medical): No    Lack of Transportation (Non-Medical): No   Physical Activity: Sufficiently Active    Days of Exercise per Week: 4 days    Minutes of Exercise per Session: 80 min   Stress: No Stress Concern Present    Feeling of Stress : Only a little   Social Connections: Unknown    Frequency of Communication with Friends and Family: Three times a week    Frequency of Social Gatherings with Friends and Family: Once a week    Active Member of Clubs or Organizations: Yes    Attends Club or Organization Meetings: More than 4 times per year    Marital Status:    Housing Stability: Low Risk     Unable to Pay for Housing in the Last Year: No    Number of Places Lived in the Last Year: 1    Unstable Housing in the Last Year: No       Review of Systems   Constitutional:  Negative for fever.   HENT:  Negative for nosebleeds.     Eyes:  Negative for visual disturbance.   Respiratory:  Negative for shortness of breath.    Cardiovascular:  Negative for chest pain.   Gastrointestinal:  Negative for vomiting.   Endocrine: Negative for cold intolerance.   Genitourinary:  Negative for difficulty urinating.   Musculoskeletal:  Negative for neck pain.   Skin:  Negative for color change.   Neurological:  Positive for seizures.   Hematological:  Does not bruise/bleed easily.   Psychiatric/Behavioral:  Positive for dysphoric mood.      OBJECTIVE:     Vital Signs     There is no height or weight on file to calculate BMI.      Physical Exam:    Constitutional: She appears well-developed and well-nourished. No distress.     Eyes: EOM are normal.     Abdominal: Soft.     Skin: Skin displays no rash on extremities. Skin displays no lesions on extremities.   Well healed left temporal incision   Some temporalis atrophy      Psych/Behavior: She is alert. She is oriented to person, place, and time.     Musculoskeletal:      Comments: No focal weakness     Neurological:        Coordination: She has normal finger to nose coordination.   Pulmonary: nonlabored respirations     Hematologic: no bruising noted     Diagnostic Results:  MRI personally reviewed   EEG data personally reviewed with patient, her , and Dr. Mark    ASSESSMENT/PLAN:     Liza Arrieta is a 52 y.o. female who presents as a referral from Dr. Mark to discuss possible intracranial seizure surgery. She has already had left temporal lobectomy. She is now s/p placement of sEEG leads on 11/7/22 with removal on 11/16/22.     Based on interdisciplinary data review, we are recommending right temporal RNS placement (mesial temporal depth + middle temporal gyrus strip).     We had a pleasant discussion about the risks, benefits, and alternatives to RNS placement. Risks include, but are not limited to, bleeding, pain, infection, scarring, need for further/repeat procedure, death, paralysis, stroke  (including venous infarct)/damage to major blood vessels, leak of cerebrospinal fluid, and hardware-related complications. We discussed that more haircut will be required. We discussed that we cannot guarantee seizure freedom. We discussed that the generator is expected to last approximately 8-11 years and will require replacement on the skull. We discussed that multiple-times-per-week download of data is recommended. We discussed that further surgical intervention may be recommended in future. Informed consent was obtained.      She will need MRI stealth for operative planning purposes.      I have encouraged them to contact the clinic in interim with any questions, concerns, or adverse clinical change.     I spent over an hour on this encounter, more than half in direct consultation.

## 2022-12-07 NOTE — PROCEDURES
Stereo EEG/Depth Electrode Monitoring Report  IMPLANTATION DATE: 11/7/2022  DATE OF SERVICE: 11/7/2022-11/8/2022  LOCATION OF SERVICE: [facility] [place]     METHODOLOGY:  Depth electrodes consisted of thin wires with electrical contacts it fixed distances along the wire. These are then implanted using a stereotactic procedure targeting cortical and subcortical structures. The up to 256 electrode contact can be recorded simultaneously with this procedure. Recording band pass was 0.1 in the low past filters setting could be set at the 512, 1024, or 2048. Digital video recording of the patient is simultaneously recorded with the EEG. The patient is instructed report clinical symptoms which may occur during the recording session. EEG and video recording is stored and archived in digital format. Activation procedures which include photic stimulation, hyperventilation and instructing patients to perform simple task are done in selected patients.   Compresses spectral analysis (CSA) is also performed on the activity recorded from each individual channel. This is displayed as a power display of frequencies from 0 to 30 Hz over time. The CSA analysis is done and displayed continuously. This is reviewed for asymmetries in power between homologous areas of the scalp and for presence of changes in power which canbe seen when seizures occur. Sections of suspected abnormalities on the CSA is then compared with the original EEG recording.   The following is the details related to the depth electrodes implanted.                                             Depth Elect Name SN # contacts Color 1 Color 2   Contacts     1 RAmyg [id number] 16 Green Black   1-16     2 RHippo 43525 14 Blue Green   17-30     3 RAnIns 98358 8 Black Green   31-38     4 RThalm 03934 16 Yellow Green   39-54     5 RAncIn 59801 12 Red Blue   55-66     6 RPosIT 37603 12 Yellow Black   67-78     7 LOcbFr 74417 12 Red Green   79-90     8 LThalm 32524 16 Yellow Blue        9  LAncin 52450 10 Yellow Green   107-116     10 RAnIns 88837 10 Blue Green   117-126     11 LPostT 38831 14 Black Green   127-140     12 LResec 13653 10 Blue  Red   141-150        RECORDING TIMES  Start on 11/7/22 at 1456  Stop on 11/8/22 at 0700  A total of 16 hours video/SEEG telemetry was recorded.  Recording Quality: good     EEG FINDINGS  INTERICTAL: Frequent interictal discharges were seen most commonly in the following leads: RHippo 1-4, 8-13; RAmyg 1-6, YBpwfP26-75, and LPostT1-8.      ICTAL: None     IMPRESSION:  There are frequent interictal epileptiform discharges in the right hippocampal, right amygadala, right posterior temporal and left temporal contacts. No seizures were seen during this portion of the monitoring session.

## 2022-12-07 NOTE — PROCEDURES
Stereo EEG/Depth Electrode Monitoring Report  IMPLANTATION DATE: 11/7/2022  DATE OF SERVICE: 11/11/2022-11/12/2022  LOCATION OF SERVICE: [facility] [place]     METHODOLOGY:  Depth electrodes consisted of thin wires with electrical contacts it fixed distances along the wire. These are then implanted using a stereotactic procedure targeting cortical and subcortical structures. The up to 256 electrode contact can be recorded simultaneously with this procedure. Recording band pass was 0.1 in the low past filters setting could be set at the 512, 1024, or 2048. Digital video recording of the patient is simultaneously recorded with the EEG. The patient is instructed report clinical symptoms which may occur during the recording session. EEG and video recording is stored and archived in digital format. Activation procedures which include photic stimulation, hyperventilation and instructing patients to perform simple task are done in selected patients.   Compresses spectral analysis (CSA) is also performed on the activity recorded from each individual channel. This is displayed as a power display of frequencies from 0 to 30 Hz over time. The CSA analysis is done and displayed continuously. This is reviewed for asymmetries in power between homologous areas of the scalp and for presence of changes in power which canbe seen when seizures occur. Sections of suspected abnormalities on the CSA is then compared with the original EEG recording.   The following is the details related to the depth electrodes implanted.                                           Depth Elect Name SN # contacts Color 1 Color 2   Contacts     1 RAmyg 10778 16 Green Black   1-16     2 RHippo 75388 14 Blue Green   17-30     3 RAnIns 87786 8 Black Green   31-38     4 RThalm 63263 16 Yellow Green   39-54     5 RAncIn 57670 12 Red Blue   55-66     6 RPosIT 37318 12 Yellow Black   67-78     7 LOcbFr 90015 12 Red Green   79-90     8 LThalm 93005 16 Yellow Blue        9  LAncin 70521 10 Yellow Green   107-116     10 RAnIns 37416 10 Blue Green   117-126     11 LPostT 83317 14 Black Green   127-140     12 LResec 60511 10 Blue  Red   141-150        RECORDING TIMES  Start on 11/11/22 at 0701  Stop on 11/12/22 at 0700  A total of 24 hours video/SEEG telemetry was recorded.  Recording Quality: good     EEG FINDINGS  INTERICTAL: Frequent interictal discharges were seen in the following leads: RHippo 1-4, 8-13; RAmyg 1-6, VPzomP26-11, and LPostT1-8.      ICTAL:   Seizure #6  Start on 11/12/22 at 00:45:53, stop at 00:46:37  Electrographically, the patient initially demonstrates poorly formed slowing with occasional epileptiform discharges best seen at RHippo 1-4.  This becomes better organized, more rhythmic, and more sharply contoured.  It does demonstrate a degree of spread.  Clinically, the patient has her hands held together as she alternately extends the arms outwards and then lifts them above her head.     IMPRESSION:  There are frequent interictal epileptiform discharges in the right hippocampal, right amygadala, right posterior temporal and left temporal contacts. An additional seizure as discussed above was noted.  This seizure was best appreciated in RHippo 1-4.

## 2022-12-07 NOTE — PROCEDURES
Stereo EEG/Depth Electrode Monitoring Report  IMPLANTATION DATE: 11/7/2022  DATE OF SERVICE: 11/9/2022-11/10/2022  LOCATION OF [place]: [facility] [place]     METHODOLOGY:  Depth electrodes consisted of thin wires with electrical contacts it fixed distances along the wire. These are then implanted using a stereotactic procedure targeting cortical and subcortical structures. The up to 256 electrode contact can be recorded simultaneously with this procedure. Recording band pass was 0.1 in the low past filters setting could be set at the 512, 1024, or 2048. Digital video recording of the patient is simultaneously recorded with the EEG. The patient is instructed report clinical symptoms which may occur during the recording session. EEG and video recording is stored and archived in digital format. Activation procedures which include photic stimulation, hyperventilation and instructing patients to perform simple task are done in selected patients.   Compresses spectral analysis (CSA) is also performed on the activity recorded from each individual channel. This is displayed as a power display of frequencies from 0 to 30 Hz over time. The CSA analysis is done and displayed continuously. This is reviewed for asymmetries in power between homologous areas of the scalp and for presence of changes in power which canbe seen when seizures occur. Sections of suspected abnormalities on the CSA is then compared with the original EEG recording.   The following is the details related to the depth electrodes implanted.                                           Depth Elect Name SN # contacts Color 1 Color 2   Contacts     1 RAmyg 24113 16 Green Black   1-16     2 RHippo 39287 14 Blue Green   17-30     3 RAnIns 40351 8 Black Green   31-38     4 RThalm 52673 16 Yellow Green   39-54     5 RAncIn 28399 12 Red Blue   55-66     6 RPosIT 41388 12 Yellow Black   67-78     7 LOcbFr 59099 12 Red Green   79-90     8 LThalm 16403 16 Yellow Blue        9  LAncin 57934 10 Yellow Green   107-116     10 RAnIns 18912 10 Blue Green   117-126     11 LPostT 80790 14 Black Green   127-140     12 LResec 96308 10 Blue  Red   141-150        RECORDING TIMES  Start on 11/9/22 at 0701  Stop on 11/10/22 at 0700  A total of 24 hours video/SEEG telemetry was recorded.  Recording Quality: good     EEG FINDINGS  INTERICTAL: Frequent interictal discharges were seen in the following leads: RHippo 1-4, 8-13; RAmyg 1-6, EAkoaT11-11, and LPostT1-8.      ICTAL:   Seizure #3:  Start on 11/9/2022 at 07:43:56, stop at 07:45:12  Electrographically, the seizure begins with slowing and intermittent epileptiform transients best seen at RHippo 1-4.  This becomes better organized and more clearly rhythmic, with a frequency of 5 Hz noted before its frequency increases with spread being seen.  There is no obvious overt clinical symptomatology with this event.    Seizure #4:  Start on 11/9/2022 at 08:51:40, stop at 0008:52:18  From an electrographic perspective, there is initially disorganized slowing with intermittent epileptiform discharges, most apparent in RHippo 1-4.  The slowing becomes more rhythmic and better organized, demonstrating a sharply contoured 4-5 Hz theta in the above leads.  Clinically, the patient repeatedly presses the event button during the seizure; however, there are otherwise no clear clinical manifestations of the seizure.     IMPRESSION:  There are frequent interictal epileptiform discharges in the right hippocampal, right amygadala, right posterior temporal and left temporal contacts. 2 additional seizures were captured and are as described above.  These seizures are best appreciated in the channels for RHippo 1-4.

## 2022-12-07 NOTE — PROCEDURES
Patient: Liza Pablo   Date: 11/11/22  High Hz stim 50Hz,  300 ms, 1-5 sec duration, 1-5 mA  Electrode  Contact A Contact B mA Duration  Physiological response  Elicited discharges/After discharges  Hz, duration    Stim induced seizure (details)    LpostT 1 2 1 5         2 5         3 5         5 5       3 4 1 5         3 5  3 sec        4 5  19 sec      5 6 3 5  14 sec        4 5       7 8 3 5  14 sec        5 5  27 sec      9 10 3 5         5 5                R Hippo  12 13 3 5         5 5       10 11 3 5         5        8 9 3          4          5   3 sec        5        6 7 3  L visual field 'shaking'         3  L visual field 'shaking'        4  L visual field 'shaking'      4 5 3  L visual field 'shaking'  30 sec - aborted with disruptive stim        3  L visual field 'shaking' 5 sec        4  L visual field 'shaking' 7 sec      2 3 3   19 sec       4   15 sec - aborted with disruptive stim                          R Amygd 2 3 3   25 sec (includes insular) Feels like a sz aura (sweating of hands, feeling hot)   Able to follow commands      4   3 sec  (includes insular) Feels like a sz aura (sweating of hands, feeling hot)  Able to follow commands       5   6 sec with LVFA (includes insular) Blank stare + unable to respond-> feeling hot/uncomfortable

## 2022-12-08 ENCOUNTER — PATIENT MESSAGE (OUTPATIENT)
Dept: NEUROSURGERY | Facility: CLINIC | Age: 52
End: 2022-12-08
Payer: COMMERCIAL

## 2022-12-08 ENCOUNTER — PATIENT MESSAGE (OUTPATIENT)
Dept: NEUROLOGY | Facility: CLINIC | Age: 52
End: 2022-12-08
Payer: COMMERCIAL

## 2022-12-08 NOTE — TELEPHONE ENCOUNTER
Pre-op instructions reviewed including decolonization protocol. Pt states she has Hibiclens at home. Mupirocin ointment provided. Pt verbalizes understanding of all instructions. Encouraged to call or message with any questions.

## 2022-12-09 DIAGNOSIS — Z01.818 PREOPERATIVE TESTING: Primary | ICD-10-CM

## 2022-12-11 ENCOUNTER — PATIENT OUTREACH (OUTPATIENT)
Dept: NEUROLOGY | Facility: CLINIC | Age: 52
End: 2022-12-11
Payer: COMMERCIAL

## 2022-12-11 DIAGNOSIS — G40.219 COMPLEX PARTIAL EPILEPSY WITH GENERALIZATION AND WITH INTRACTABLE EPILEPSY: Primary | ICD-10-CM

## 2022-12-16 ENCOUNTER — TELEPHONE (OUTPATIENT)
Dept: NEUROSURGERY | Facility: CLINIC | Age: 52
End: 2022-12-16
Payer: COMMERCIAL

## 2022-12-16 ENCOUNTER — PATIENT MESSAGE (OUTPATIENT)
Dept: SURGERY | Facility: HOSPITAL | Age: 52
End: 2022-12-16
Payer: COMMERCIAL

## 2022-12-16 NOTE — TELEPHONE ENCOUNTER
Patient contacted. Advised to arrive for surgery at 5am for7am start,2nd floor  DOSC, NPO after midnight the night before, shower x 2 with Hibiclens or Dial antibacterial soap. Understanding verbalized by patient.

## 2022-12-19 ENCOUNTER — HOSPITAL ENCOUNTER (INPATIENT)
Facility: HOSPITAL | Age: 52
LOS: 2 days | Discharge: HOME | DRG: 023 | End: 2022-12-21
Attending: NEUROLOGICAL SURGERY | Admitting: NEUROLOGICAL SURGERY
Payer: COMMERCIAL

## 2022-12-19 ENCOUNTER — ANESTHESIA EVENT (OUTPATIENT)
Dept: SURGERY | Facility: HOSPITAL | Age: 52
DRG: 023 | End: 2022-12-19
Payer: COMMERCIAL

## 2022-12-19 ENCOUNTER — ANESTHESIA (OUTPATIENT)
Dept: SURGERY | Facility: HOSPITAL | Age: 52
DRG: 023 | End: 2022-12-19
Payer: COMMERCIAL

## 2022-12-19 DIAGNOSIS — G40.919 INTRACTABLE EPILEPSY: Primary | ICD-10-CM

## 2022-12-19 LAB
ABO + RH BLD: NORMAL
APTT BLDCRRT: 23.1 SEC (ref 21–32)
BASOPHILS # BLD AUTO: 0.02 K/UL (ref 0–0.2)
BASOPHILS NFR BLD: 0.4 % (ref 0–1.9)
BILIRUB UR QL STRIP: NEGATIVE
BLD GP AB SCN CELLS X3 SERPL QL: NORMAL
CLARITY UR REFRACT.AUTO: CLEAR
COLOR UR AUTO: YELLOW
DIFFERENTIAL METHOD: NORMAL
EOSINOPHIL # BLD AUTO: 0.1 K/UL (ref 0–0.5)
EOSINOPHIL NFR BLD: 1.4 % (ref 0–8)
ERYTHROCYTE [DISTWIDTH] IN BLOOD BY AUTOMATED COUNT: 11.9 % (ref 11.5–14.5)
GLUCOSE UR QL STRIP: NEGATIVE
HCT VFR BLD AUTO: 41.9 % (ref 37–48.5)
HGB BLD-MCNC: 13.5 G/DL (ref 12–16)
HGB UR QL STRIP: NEGATIVE
IMM GRANULOCYTES # BLD AUTO: 0.01 K/UL (ref 0–0.04)
IMM GRANULOCYTES NFR BLD AUTO: 0.2 % (ref 0–0.5)
INR PPP: 1 (ref 0.8–1.2)
KETONES UR QL STRIP: NEGATIVE
LEUKOCYTE ESTERASE UR QL STRIP: NEGATIVE
LYMPHOCYTES # BLD AUTO: 1.5 K/UL (ref 1–4.8)
LYMPHOCYTES NFR BLD: 26.9 % (ref 18–48)
MCH RBC QN AUTO: 30.4 PG (ref 27–31)
MCHC RBC AUTO-ENTMCNC: 32.2 G/DL (ref 32–36)
MCV RBC AUTO: 94 FL (ref 82–98)
MONOCYTES # BLD AUTO: 0.4 K/UL (ref 0.3–1)
MONOCYTES NFR BLD: 7.9 % (ref 4–15)
NEUTROPHILS # BLD AUTO: 3.5 K/UL (ref 1.8–7.7)
NEUTROPHILS NFR BLD: 63.2 % (ref 38–73)
NITRITE UR QL STRIP: NEGATIVE
NRBC BLD-RTO: 0 /100 WBC
PH UR STRIP: 6 [PH] (ref 5–8)
PLATELET # BLD AUTO: 176 K/UL (ref 150–450)
PMV BLD AUTO: 10.2 FL (ref 9.2–12.9)
PROT UR QL STRIP: NEGATIVE
PROTHROMBIN TIME: 10.3 SEC (ref 9–12.5)
RBC # BLD AUTO: 4.44 M/UL (ref 4–5.4)
SP GR UR STRIP: 1.02 (ref 1–1.03)
URN SPEC COLLECT METH UR: NORMAL
WBC # BLD AUTO: 5.57 K/UL (ref 3.9–12.7)

## 2022-12-19 PROCEDURE — 25000003 PHARM REV CODE 250: Performed by: NURSE ANESTHETIST, CERTIFIED REGISTERED

## 2022-12-19 PROCEDURE — 36000710: Performed by: NEUROLOGICAL SURGERY

## 2022-12-19 PROCEDURE — 85730 THROMBOPLASTIN TIME PARTIAL: CPT | Performed by: STUDENT IN AN ORGANIZED HEALTH CARE EDUCATION/TRAINING PROGRAM

## 2022-12-19 PROCEDURE — 25000003 PHARM REV CODE 250: Performed by: STUDENT IN AN ORGANIZED HEALTH CARE EDUCATION/TRAINING PROGRAM

## 2022-12-19 PROCEDURE — 63600175 PHARM REV CODE 636 W HCPCS: Performed by: NURSE ANESTHETIST, CERTIFIED REGISTERED

## 2022-12-19 PROCEDURE — 85025 COMPLETE CBC W/AUTO DIFF WBC: CPT | Performed by: STUDENT IN AN ORGANIZED HEALTH CARE EDUCATION/TRAINING PROGRAM

## 2022-12-19 PROCEDURE — 99291 PR CRITICAL CARE, E/M 30-74 MINUTES: ICD-10-PCS | Mod: ,,, | Performed by: PSYCHIATRY & NEUROLOGY

## 2022-12-19 PROCEDURE — 61864 IMPLANT NEUROELECTRDE ADDL: CPT | Mod: ,,, | Performed by: NEUROLOGICAL SURGERY

## 2022-12-19 PROCEDURE — 63600175 PHARM REV CODE 636 W HCPCS

## 2022-12-19 PROCEDURE — 61863 IMPLANT NEUROELECTRODE: CPT | Mod: 58,22,, | Performed by: NEUROLOGICAL SURGERY

## 2022-12-19 PROCEDURE — 85610 PROTHROMBIN TIME: CPT | Performed by: STUDENT IN AN ORGANIZED HEALTH CARE EDUCATION/TRAINING PROGRAM

## 2022-12-19 PROCEDURE — 63600175 PHARM REV CODE 636 W HCPCS: Performed by: STUDENT IN AN ORGANIZED HEALTH CARE EDUCATION/TRAINING PROGRAM

## 2022-12-19 PROCEDURE — D9220A PRA ANESTHESIA: ICD-10-PCS | Mod: ANES,,, | Performed by: ANESTHESIOLOGY

## 2022-12-19 PROCEDURE — 25000003 PHARM REV CODE 250

## 2022-12-19 PROCEDURE — 61864 PR IMPLANT NEUROELECTRDE, ADD'L: ICD-10-PCS | Mod: ,,, | Performed by: NEUROLOGICAL SURGERY

## 2022-12-19 PROCEDURE — 61886 IMPLANT NEUROSTIM ARRAYS: CPT | Mod: 58,51,, | Performed by: NEUROLOGICAL SURGERY

## 2022-12-19 PROCEDURE — 36000711: Performed by: NEUROLOGICAL SURGERY

## 2022-12-19 PROCEDURE — 63600175 PHARM REV CODE 636 W HCPCS: Performed by: NEUROLOGICAL SURGERY

## 2022-12-19 PROCEDURE — 86901 BLOOD TYPING SEROLOGIC RH(D): CPT | Performed by: STUDENT IN AN ORGANIZED HEALTH CARE EDUCATION/TRAINING PROGRAM

## 2022-12-19 PROCEDURE — 27201423 OPTIME MED/SURG SUP & DEVICES STERILE SUPPLY: Performed by: NEUROLOGICAL SURGERY

## 2022-12-19 PROCEDURE — 20000000 HC ICU ROOM

## 2022-12-19 PROCEDURE — 27201037 HC PRESSURE MONITORING SET UP

## 2022-12-19 PROCEDURE — D9220A PRA ANESTHESIA: Mod: CRNA,,, | Performed by: NURSE ANESTHETIST, CERTIFIED REGISTERED

## 2022-12-19 PROCEDURE — 61863 PR IMPLANT NEUROELECTRODE W/O RECORDING: ICD-10-PCS | Mod: 58,22,, | Performed by: NEUROLOGICAL SURGERY

## 2022-12-19 PROCEDURE — 81003 URINALYSIS AUTO W/O SCOPE: CPT | Performed by: STUDENT IN AN ORGANIZED HEALTH CARE EDUCATION/TRAINING PROGRAM

## 2022-12-19 PROCEDURE — A4648 IMPLANTABLE TISSUE MARKER: HCPCS | Performed by: NEUROLOGICAL SURGERY

## 2022-12-19 PROCEDURE — 61886 PR IMP STIM,CRANIAL,SUBQ,>1 ARRAY: ICD-10-PCS | Mod: 58,51,, | Performed by: NEUROLOGICAL SURGERY

## 2022-12-19 PROCEDURE — 37000008 HC ANESTHESIA 1ST 15 MINUTES: Performed by: NEUROLOGICAL SURGERY

## 2022-12-19 PROCEDURE — C1767 GENERATOR, NEURO NON-RECHARG: HCPCS | Performed by: NEUROLOGICAL SURGERY

## 2022-12-19 PROCEDURE — 37000009 HC ANESTHESIA EA ADD 15 MINS: Performed by: NEUROLOGICAL SURGERY

## 2022-12-19 PROCEDURE — C1713 ANCHOR/SCREW BN/BN,TIS/BN: HCPCS | Performed by: NEUROLOGICAL SURGERY

## 2022-12-19 PROCEDURE — D9220A PRA ANESTHESIA: ICD-10-PCS | Mod: CRNA,,, | Performed by: NURSE ANESTHETIST, CERTIFIED REGISTERED

## 2022-12-19 PROCEDURE — C1778 LEAD, NEUROSTIMULATOR: HCPCS | Performed by: NEUROLOGICAL SURGERY

## 2022-12-19 PROCEDURE — 99291 CRITICAL CARE FIRST HOUR: CPT | Mod: ,,, | Performed by: PSYCHIATRY & NEUROLOGY

## 2022-12-19 PROCEDURE — 25000003 PHARM REV CODE 250: Performed by: NEUROLOGICAL SURGERY

## 2022-12-19 PROCEDURE — D9220A PRA ANESTHESIA: Mod: ANES,,, | Performed by: ANESTHESIOLOGY

## 2022-12-19 DEVICE — IMPLANTABLE DEVICE: Type: IMPLANTABLE DEVICE | Site: CRANIAL | Status: FUNCTIONAL

## 2022-12-19 DEVICE — PLATE BONE BUR HLE CVR 7MM TAB: Type: IMPLANTABLE DEVICE | Site: CRANIAL | Status: FUNCTIONAL

## 2022-12-19 DEVICE — PLATE BONE 2X2 HOLE SM BOX: Type: IMPLANTABLE DEVICE | Site: CRANIAL | Status: FUNCTIONAL

## 2022-12-19 DEVICE — SCREW UN3 AXS SD 1.5X4MM: Type: IMPLANTABLE DEVICE | Site: CRANIAL | Status: FUNCTIONAL

## 2022-12-19 DEVICE — PLATE BONE RIGID 2HOLE .6X12MM: Type: IMPLANTABLE DEVICE | Site: CRANIAL | Status: FUNCTIONAL

## 2022-12-19 RX ORDER — SODIUM,POTASSIUM PHOSPHATES 280-250MG
2 POWDER IN PACKET (EA) ORAL
Status: DISCONTINUED | OUTPATIENT
Start: 2022-12-19 | End: 2022-12-21 | Stop reason: HOSPADM

## 2022-12-19 RX ORDER — EPHEDRINE SULFATE 50 MG/ML
INJECTION, SOLUTION INTRAVENOUS
Status: DISCONTINUED | OUTPATIENT
Start: 2022-12-19 | End: 2022-12-19

## 2022-12-19 RX ORDER — ONDANSETRON 2 MG/ML
INJECTION INTRAMUSCULAR; INTRAVENOUS
Status: DISCONTINUED | OUTPATIENT
Start: 2022-12-19 | End: 2022-12-19

## 2022-12-19 RX ORDER — GABAPENTIN 300 MG/1
600 CAPSULE ORAL 3 TIMES DAILY
Status: DISCONTINUED | OUTPATIENT
Start: 2022-12-20 | End: 2022-12-21 | Stop reason: HOSPADM

## 2022-12-19 RX ORDER — LAMOTRIGINE 150 MG/1
300 TABLET ORAL NIGHTLY
Status: DISCONTINUED | OUTPATIENT
Start: 2022-12-19 | End: 2022-12-21 | Stop reason: HOSPADM

## 2022-12-19 RX ORDER — NICARDIPINE HYDROCHLORIDE 0.2 MG/ML
0-15 INJECTION INTRAVENOUS CONTINUOUS
Status: DISCONTINUED | OUTPATIENT
Start: 2022-12-19 | End: 2022-12-20

## 2022-12-19 RX ORDER — GABAPENTIN 300 MG/1
300 CAPSULE ORAL ONCE
Status: COMPLETED | OUTPATIENT
Start: 2022-12-19 | End: 2022-12-19

## 2022-12-19 RX ORDER — FENTANYL CITRATE 50 UG/ML
INJECTION, SOLUTION INTRAMUSCULAR; INTRAVENOUS
Status: DISCONTINUED | OUTPATIENT
Start: 2022-12-19 | End: 2022-12-19

## 2022-12-19 RX ORDER — MUPIROCIN 20 MG/G
OINTMENT TOPICAL
Status: DISCONTINUED | OUTPATIENT
Start: 2022-12-19 | End: 2022-12-19 | Stop reason: HOSPADM

## 2022-12-19 RX ORDER — POLYETHYLENE GLYCOL 3350 17 G/17G
17 POWDER, FOR SOLUTION ORAL DAILY
Status: DISCONTINUED | OUTPATIENT
Start: 2022-12-19 | End: 2022-12-21 | Stop reason: HOSPADM

## 2022-12-19 RX ORDER — OXYCODONE HYDROCHLORIDE 5 MG/1
5 TABLET ORAL ONCE
Status: COMPLETED | OUTPATIENT
Start: 2022-12-19 | End: 2022-12-19

## 2022-12-19 RX ORDER — GABAPENTIN 300 MG/1
300 CAPSULE ORAL 3 TIMES DAILY
Status: DISCONTINUED | OUTPATIENT
Start: 2022-12-19 | End: 2022-12-19

## 2022-12-19 RX ORDER — OXYCODONE HYDROCHLORIDE 5 MG/1
5 TABLET ORAL EVERY 6 HOURS PRN
Status: DISCONTINUED | OUTPATIENT
Start: 2022-12-19 | End: 2022-12-20

## 2022-12-19 RX ORDER — LABETALOL HCL 20 MG/4 ML
10 SYRINGE (ML) INTRAVENOUS EVERY 6 HOURS PRN
Status: DISCONTINUED | OUTPATIENT
Start: 2022-12-19 | End: 2022-12-21 | Stop reason: HOSPADM

## 2022-12-19 RX ORDER — LIDOCAINE HYDROCHLORIDE 10 MG/ML
INJECTION, SOLUTION INTRAVENOUS
Status: DISCONTINUED | OUTPATIENT
Start: 2022-12-19 | End: 2022-12-19

## 2022-12-19 RX ORDER — MIDAZOLAM HYDROCHLORIDE 1 MG/ML
INJECTION, SOLUTION INTRAMUSCULAR; INTRAVENOUS
Status: DISCONTINUED | OUTPATIENT
Start: 2022-12-19 | End: 2022-12-19

## 2022-12-19 RX ORDER — MORPHINE SULFATE 2 MG/ML
1 INJECTION, SOLUTION INTRAMUSCULAR; INTRAVENOUS ONCE AS NEEDED
Status: COMPLETED | OUTPATIENT
Start: 2022-12-19 | End: 2022-12-19

## 2022-12-19 RX ORDER — METHOCARBAMOL 500 MG/1
500 TABLET, FILM COATED ORAL 4 TIMES DAILY
Status: DISCONTINUED | OUTPATIENT
Start: 2022-12-19 | End: 2022-12-21 | Stop reason: HOSPADM

## 2022-12-19 RX ORDER — LIDOCAINE HYDROCHLORIDE AND EPINEPHRINE 10; 10 MG/ML; UG/ML
INJECTION, SOLUTION INFILTRATION; PERINEURAL
Status: DISCONTINUED | OUTPATIENT
Start: 2022-12-19 | End: 2022-12-19 | Stop reason: HOSPADM

## 2022-12-19 RX ORDER — CEFTRIAXONE 1 G/1
INJECTION, POWDER, FOR SOLUTION INTRAMUSCULAR; INTRAVENOUS
Status: DISCONTINUED | OUTPATIENT
Start: 2022-12-19 | End: 2022-12-19 | Stop reason: HOSPADM

## 2022-12-19 RX ORDER — BACITRACIN ZINC 500 UNIT/G
OINTMENT (GRAM) TOPICAL
Status: DISCONTINUED | OUTPATIENT
Start: 2022-12-19 | End: 2022-12-19 | Stop reason: HOSPADM

## 2022-12-19 RX ORDER — ROCURONIUM BROMIDE 10 MG/ML
INJECTION, SOLUTION INTRAVENOUS
Status: DISCONTINUED | OUTPATIENT
Start: 2022-12-19 | End: 2022-12-19

## 2022-12-19 RX ORDER — HYDRALAZINE HYDROCHLORIDE 20 MG/ML
10 INJECTION INTRAMUSCULAR; INTRAVENOUS EVERY 6 HOURS PRN
Status: DISCONTINUED | OUTPATIENT
Start: 2022-12-19 | End: 2022-12-21 | Stop reason: HOSPADM

## 2022-12-19 RX ORDER — ONDANSETRON 2 MG/ML
4 INJECTION INTRAMUSCULAR; INTRAVENOUS EVERY 6 HOURS PRN
Status: DISCONTINUED | OUTPATIENT
Start: 2022-12-19 | End: 2022-12-21 | Stop reason: HOSPADM

## 2022-12-19 RX ORDER — SODIUM CHLORIDE 9 MG/ML
INJECTION, SOLUTION INTRAVENOUS CONTINUOUS
Status: DISCONTINUED | OUTPATIENT
Start: 2022-12-19 | End: 2022-12-19

## 2022-12-19 RX ORDER — SERTRALINE HYDROCHLORIDE 25 MG/1
25 TABLET, FILM COATED ORAL DAILY
Status: DISCONTINUED | OUTPATIENT
Start: 2022-12-20 | End: 2022-12-21 | Stop reason: HOSPADM

## 2022-12-19 RX ORDER — LANOLIN ALCOHOL/MO/W.PET/CERES
800 CREAM (GRAM) TOPICAL
Status: DISCONTINUED | OUTPATIENT
Start: 2022-12-19 | End: 2022-12-21 | Stop reason: HOSPADM

## 2022-12-19 RX ORDER — DEXAMETHASONE SODIUM PHOSPHATE 4 MG/ML
INJECTION, SOLUTION INTRA-ARTICULAR; INTRALESIONAL; INTRAMUSCULAR; INTRAVENOUS; SOFT TISSUE
Status: DISCONTINUED | OUTPATIENT
Start: 2022-12-19 | End: 2022-12-19

## 2022-12-19 RX ORDER — MUPIROCIN 20 MG/G
1 OINTMENT TOPICAL 2 TIMES DAILY
Status: DISCONTINUED | OUTPATIENT
Start: 2022-12-19 | End: 2022-12-19 | Stop reason: HOSPADM

## 2022-12-19 RX ORDER — PROPOFOL 10 MG/ML
VIAL (ML) INTRAVENOUS
Status: DISCONTINUED | OUTPATIENT
Start: 2022-12-19 | End: 2022-12-19

## 2022-12-19 RX ORDER — BUPIVACAINE HYDROCHLORIDE AND EPINEPHRINE 5; 5 MG/ML; UG/ML
INJECTION, SOLUTION EPIDURAL; INTRACAUDAL; PERINEURAL
Status: DISCONTINUED | OUTPATIENT
Start: 2022-12-19 | End: 2022-12-19 | Stop reason: HOSPADM

## 2022-12-19 RX ORDER — SODIUM CHLORIDE 0.9 % (FLUSH) 0.9 %
10 SYRINGE (ML) INJECTION
Status: DISCONTINUED | OUTPATIENT
Start: 2022-12-19 | End: 2022-12-21 | Stop reason: HOSPADM

## 2022-12-19 RX ORDER — ACETAMINOPHEN 325 MG/1
650 TABLET ORAL EVERY 4 HOURS PRN
Status: DISCONTINUED | OUTPATIENT
Start: 2022-12-19 | End: 2022-12-21 | Stop reason: HOSPADM

## 2022-12-19 RX ORDER — NEOSTIGMINE METHYLSULFATE 0.5 MG/ML
INJECTION, SOLUTION INTRAVENOUS
Status: DISCONTINUED | OUTPATIENT
Start: 2022-12-19 | End: 2022-12-19

## 2022-12-19 RX ADMIN — PROPOFOL 50 MG: 10 INJECTION, EMULSION INTRAVENOUS at 08:12

## 2022-12-19 RX ADMIN — FENTANYL CITRATE 100 MCG: 50 INJECTION, SOLUTION INTRAMUSCULAR; INTRAVENOUS at 08:12

## 2022-12-19 RX ADMIN — MIDAZOLAM HYDROCHLORIDE 2 MG: 1 INJECTION, SOLUTION INTRAMUSCULAR; INTRAVENOUS at 07:12

## 2022-12-19 RX ADMIN — PROPOFOL 160 MG: 10 INJECTION, EMULSION INTRAVENOUS at 07:12

## 2022-12-19 RX ADMIN — PROPOFOL 50 MG: 10 INJECTION, EMULSION INTRAVENOUS at 10:12

## 2022-12-19 RX ADMIN — GABAPENTIN 300 MG: 300 CAPSULE ORAL at 10:12

## 2022-12-19 RX ADMIN — ROCURONIUM BROMIDE 50 MG: 10 INJECTION INTRAVENOUS at 07:12

## 2022-12-19 RX ADMIN — MUPIROCIN: 20 OINTMENT TOPICAL at 06:12

## 2022-12-19 RX ADMIN — GABAPENTIN 300 MG: 300 CAPSULE ORAL at 06:12

## 2022-12-19 RX ADMIN — DEXTROSE 2 G: 50 INJECTION, SOLUTION INTRAVENOUS at 08:12

## 2022-12-19 RX ADMIN — DEXAMETHASONE SODIUM PHOSPHATE 4 MG: 4 INJECTION, SOLUTION INTRAMUSCULAR; INTRAVENOUS at 08:12

## 2022-12-19 RX ADMIN — NEOSTIGMINE METHYLSULFATE 4 MG: 0.5 INJECTION, SOLUTION INTRAVENOUS at 01:12

## 2022-12-19 RX ADMIN — NICARDIPINE HYDROCHLORIDE 2.5 MG/HR: 0.2 INJECTION, SOLUTION INTRAVENOUS at 02:12

## 2022-12-19 RX ADMIN — SODIUM CHLORIDE: 0.9 INJECTION, SOLUTION INTRAVENOUS at 07:12

## 2022-12-19 RX ADMIN — OXYCODONE 5 MG: 5 TABLET ORAL at 03:12

## 2022-12-19 RX ADMIN — ROCURONIUM BROMIDE 20 MG: 10 INJECTION INTRAVENOUS at 08:12

## 2022-12-19 RX ADMIN — FENTANYL CITRATE 50 MCG: 50 INJECTION, SOLUTION INTRAMUSCULAR; INTRAVENOUS at 11:12

## 2022-12-19 RX ADMIN — FENTANYL CITRATE 50 MCG: 50 INJECTION, SOLUTION INTRAMUSCULAR; INTRAVENOUS at 10:12

## 2022-12-19 RX ADMIN — LAMOTRIGINE 300 MG: 150 TABLET ORAL at 08:12

## 2022-12-19 RX ADMIN — OXYCODONE 5 MG: 5 TABLET ORAL at 08:12

## 2022-12-19 RX ADMIN — METHOCARBAMOL 500 MG: 500 TABLET ORAL at 06:12

## 2022-12-19 RX ADMIN — LABETALOL HYDROCHLORIDE 10 MG: 5 INJECTION, SOLUTION INTRAVENOUS at 04:12

## 2022-12-19 RX ADMIN — ONDANSETRON 4 MG: 2 INJECTION INTRAMUSCULAR; INTRAVENOUS at 01:12

## 2022-12-19 RX ADMIN — GLYCOPYRROLATE 0.6 MG: 0.2 INJECTION, SOLUTION INTRAMUSCULAR; INTRAVENOUS at 01:12

## 2022-12-19 RX ADMIN — MORPHINE SULFATE 1 MG: 2 INJECTION, SOLUTION INTRAMUSCULAR; INTRAVENOUS at 04:12

## 2022-12-19 RX ADMIN — DEXTROSE MONOHYDRATE 2 G: 5 INJECTION INTRAVENOUS at 10:12

## 2022-12-19 RX ADMIN — SODIUM CHLORIDE, SODIUM GLUCONATE, SODIUM ACETATE, POTASSIUM CHLORIDE, MAGNESIUM CHLORIDE, SODIUM PHOSPHATE, DIBASIC, AND POTASSIUM PHOSPHATE: .53; .5; .37; .037; .03; .012; .00082 INJECTION, SOLUTION INTRAVENOUS at 08:12

## 2022-12-19 RX ADMIN — OXYCODONE 5 MG: 5 TABLET ORAL at 09:12

## 2022-12-19 RX ADMIN — FENTANYL CITRATE 100 MCG: 50 INJECTION, SOLUTION INTRAMUSCULAR; INTRAVENOUS at 07:12

## 2022-12-19 RX ADMIN — ROCURONIUM BROMIDE 20 MG: 10 INJECTION INTRAVENOUS at 10:12

## 2022-12-19 RX ADMIN — POLYETHYLENE GLYCOL 3350 17 G: 17 POWDER, FOR SOLUTION ORAL at 04:12

## 2022-12-19 RX ADMIN — METHOCARBAMOL 500 MG: 500 TABLET ORAL at 08:12

## 2022-12-19 RX ADMIN — LIDOCAINE HYDROCHLORIDE 80 MG: 10 INJECTION, SOLUTION INTRAVENOUS at 07:12

## 2022-12-19 RX ADMIN — EPHEDRINE SULFATE 10 MG: 50 INJECTION INTRAVENOUS at 08:12

## 2022-12-19 RX ADMIN — PROPOFOL 30 MG: 10 INJECTION, EMULSION INTRAVENOUS at 11:12

## 2022-12-19 RX ADMIN — NICARDIPINE HYDROCHLORIDE 5 MG/HR: 0.2 INJECTION, SOLUTION INTRAVENOUS at 09:12

## 2022-12-19 RX ADMIN — GABAPENTIN 300 MG: 300 CAPSULE ORAL at 08:12

## 2022-12-19 RX ADMIN — LAMOTRIGINE 200 MG: 150 TABLET ORAL at 03:12

## 2022-12-19 RX ADMIN — ACETAMINOPHEN 650 MG: 325 TABLET ORAL at 04:12

## 2022-12-19 RX ADMIN — ACETAMINOPHEN 650 MG: 325 TABLET ORAL at 10:12

## 2022-12-19 NOTE — BRIEF OP NOTE
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 Right Parietal placement of generator. Impedence appropriate from leads.    Operative Findings: see full op note    Estimated Blood Loss: 25cc         Specimens:   Specimen (24h ago, onward)      None

## 2022-12-19 NOTE — ASSESSMENT & PLAN NOTE
51 y/o with previous L anterior temporal lobectomy and now s/p R RNS.   -Admit NCC, NSGY and epilepsy following  -q1h neurochecks, vital checks  -SBP < 140 post-op  -Hold ACAP in acute post-op period  -Resume home AEDs per NSGY  -CT stealth pending per NSGY

## 2022-12-19 NOTE — TRANSFER OF CARE
"Anesthesia Transfer of Care Note    Patient: Liza Arrieta    Procedure(s) Performed: Procedure(s) (LRB):  PLACEMENT OF RESPONSIVE NEUROSTIMULATION DEVICE (NEUROPACE) (Right)    Patient location: PACU    Anesthesia Type: general    Transport from OR: Transported from OR on 6-10 L/min O2 by face mask with adequate spontaneous ventilation    Post pain: adequate analgesia    Post assessment: no apparent anesthetic complications    Post vital signs: stable    Level of consciousness: sedated    Nausea/Vomiting: no nausea/vomiting    Complications: none    Transfer of care protocol was followed      Last vitals:   Visit Vitals  BP (!) 114/56   Pulse (!) 52   Temp 36.7 °C (98.1 °F) (Oral)   Resp 16   Ht 5' 6" (1.676 m)   Wt 93 kg (205 lb)   SpO2 96%   Breastfeeding No   BMI 33.09 kg/m²     "

## 2022-12-19 NOTE — H&P
[facility] - Neuro Critical Care  Neurocritical Care  History & Physical    Admit Date: 2022  Service Date: 2022  Length of Stay: 0    Subjective:     Chief Complaint: Complex partial epilepsy with generalization and with intractable epilepsy    History of Present Illness: Liza Arrieta is a 52 y.o. right-handed female who presents to [facility] s/p R RNS placement. The patient is followed outpatient by Dr. Seth Mark and previously underwent left anterior temporal lobectomy after subdural strip and grid monitoring on 18. After surgery, she reports that her personality improved. Petit mal seizures resolved postoperatively, but she began having complex partial seizures (self-resolved after 30 seconds-1 minute) while jogging about 3-4 months ago and was referred to Dr. Ava Valdez for further operative management.     She will be admitted to [facility] for hourly neuromonitoring and a higher level of care.          Past Medical History:   Diagnosis Date    Convulsions     Epilepsy     Seizures      Past Surgical History:   Procedure Laterality Date    APPENDECTOMY       SECTION      4    CHOLECYSTECTOMY      CRANIOTOMY N/A 2018    Procedure: CRANIOTOMY for strip and grid;  Surgeon: Ruben Senior MD;  Location: [facility] OR [address] [place] [address];  Service: Neurosurgery;  Laterality: N/A;  toronto II, asa 2, type and screen, regular bed, [place], supine     CRANIOTOMY Left 2018    Procedure: CRANIOTOMY for grids and strips;  Surgeon: Ruben Senior MD;  Location: [facility] OR [address] [place] [address];  Service: Neurosurgery;  Laterality: Left;    HYSTERECTOMY      around  for pain ful periods     INSERTION OF SUBDURAL GRID AND STRIP ELECTRODES BY CRANIOTOMY Left 2018    Procedure: CRANIOTOMY, FOR SUBDURAL GRID AND STRIP ELECTRODE LEAD Removal.;  Surgeon: Ruben Senior MD;  Location: [facility] OR [address] [place] [address];  Service: Neurosurgery;  Laterality: Left;  needs microscope and stealth-cg    REMOVAL OF STEREO EEG LEADS (DEPTH  MS/RN NOTES



PT. IS LYING IN BED RESTING, BREATHING EVEN AND UNLABORED ON 2LPM O2 VIA NC. NO SOB, 
RESPIRATORY DISTRESS OR COMPLAINTS OF PAIN NOTED AT THIS TIME. PT. WITH IV SALINE LOCK 
PRESENT, PATENT AND INTACT. SEIZURE PRECAUTIONS IMPLEMENTED AND IN PLACE. ALL PT. NEEDS MET. 
BED LOCKED AND IN LOWEST POSITION, SIDE RAILS UP X3, BED ALARM ON, CALL LIGHT WITHIN REACH, 
WILL ENDORSE TO DAYSHIFT NURSE FOR CONTINUITY OF CARE. ELECTRODES) Bilateral 11/16/2022    Procedure: REMOVAL OF STEREO EEG LEADS (DEPTH ELECTRODES);  Surgeon: Ava Valdez MD;  Location: [facility] OR [address] [place] [address];  Service: Neurosurgery;  Laterality: Bilateral;    SURGICAL REMOVAL OF LOBE OF BRAIN Left 11/19/2018    Procedure: LOBECTOMY, BRAIN left temporal lobe.;  Surgeon: Ruben Senior MD;  Location: [facility] OR [address] [place] [address];  Service: Neurosurgery;  Laterality: Left;      No current facility-administered medications on file prior to encounter.     Current Outpatient Medications on File Prior to Encounter   Medication Sig Dispense Refill    ferrous sulfate (FEOSOL) 325 mg (65 mg iron) Tab tablet Take 325 mg by mouth every morning.      lamoTRIgine (LAMICTAL) 200 MG tablet Take 2.5 tablets (500 mg total) by mouth once daily. (Patient taking differently: Take 500 mg by mouth once daily. 200 mg in am and 300 mg in the pm) 405 tablet 3    sertraline (ZOLOFT) 25 MG tablet Take 1 tablet (25 mg total) by mouth once daily. 30 tablet 11    cenobamate (XCOPRI) 150 mg Tab Take one tablet by mouth once daily. (Patient taking differently: Take 150 mg by mouth nightly.) 30 tablet 5    cenobamate (XCOPRI) 200 mg Tab Take one tablet by mouth once daily at 6am. 30 tablet 5    [DISCONTINUED] bacitracin 500 unit/gram ointment Apply topically 2 (two) times daily. 14 g 0      Allergies: Patient has no known allergies.    Family History   Problem Relation Age of Onset    Heart disease Father         over 50 years     Social History     Tobacco Use    Smoking status: Never    Smokeless tobacco: Never   Substance Use Topics    Alcohol use: Yes     Comment: One drink per month    Drug use: No     Review of Systems   Constitutional: Negative.    HENT: Negative.     Eyes: Negative.    Respiratory: Negative.     Cardiovascular: Negative.    Gastrointestinal: Negative.    Endocrine: Negative.    Genitourinary: Negative.    Musculoskeletal: Negative.    Neurological:  Positive for headaches. Negative  for dizziness, seizures, facial asymmetry and weakness.   Objective:     Vitals:    Temp: 99.4 °F (37.4 °C)  Pulse: 69  BP: 136/70  MAP (mmHg): 97  Resp: 16  SpO2: 99 %    Temp  Min: 98.1 °F (36.7 °C)  Max: 99.4 °F (37.4 °C)  Pulse  Min: 52  Max: 73  BP  Min: 114/56  Max: 136/70  MAP (mmHg)  Min: 97  Max: 97  Resp  Min: 16  Max: 20  SpO2  Min: 96 %  Max: 99 %    No intake/output data recorded.           Physical Exam  Constitutional:       General: She is not in acute distress.     Appearance: Normal appearance. She is obese. She is not ill-appearing.   HENT:      Head: Normocephalic and atraumatic.      Comments: Incision c/d/I.      Right Ear: Tympanic membrane normal.      Left Ear: Tympanic membrane normal.      Nose: Nose normal.      Mouth/Throat:      Mouth: Mucous membranes are dry.   Eyes:      General:         Right eye: No discharge.         Left eye: No discharge.   Cardiovascular:      Rate and Rhythm: Normal rate and regular rhythm.      Pulses: Normal pulses.   Pulmonary:      Effort: No respiratory distress.      Breath sounds: Normal breath sounds.   Abdominal:      General: Abdomen is flat.      Palpations: Abdomen is soft.   Musculoskeletal:         General: Normal range of motion.      Cervical back: Normal range of motion and neck supple.   Skin:     General: Skin is warm and dry.      Capillary Refill: Capillary refill takes 2 to 3 seconds.   Neurological:      Comments: Sedation: None  E3V5M6  AAOx3  Follows all commands  PERRL, EOMI  Pupils brisk  SILT  EMETERIO and AG         Today I personally reviewed pertinent medications, lines/drains/airways, imaging, cardiology results, laboratory results, microbiology results, notably:        Assessment/Plan:     Neuro  * Complex partial epilepsy with generalization and with intractable epilepsy  53 y/o with previous L anterior temporal lobectomy and now s/p R RNS.   -Admit NCC, NSGY and epilepsy following  -q1h neurochecks, vital checks  -SBP < 140  post-op  -Hold ACAP in acute post-op period  -Resume home AEDs per NSGY  -CT stealth pending per NSGY    Psychiatric  Depression  Resume home epilepsy    Cardiac/Vascular  Essential hypertension  SBP < 140  Wean cardene as able  PRN labetalol, hydralazine        The patient is being Prophylaxed for:  Venous Thromboembolism with: Mechanical  Stress Ulcer with: None  Ventilator Pneumonia with: not applicable    Activity Orders          Turn patient starting at 12/19 1600    Diet Dysphagia Mechanical Soft (IDDSI Level 5): Dysphagia 2 (Mechanical Soft Ground) starting at 12/19 1527    Elevate HOB starting at 12/19 1514        Full Code     Critical condition in that Patient has a condition that poses threat to life and bodily function: acute post operative monitoring, hypertension requiring cardene gtt     35 minutes of Critical care time was spent personally by me on the following activities: development of treatment plan with patient or surrogate and bedside caregivers, discussions with consultants, evaluation of patient's response to treatment, examination of patient, ordering and performing treatments and interventions, ordering and review of laboratory studies, ordering and review of radiographic studies, pulse oximetry, antibiotic titration if applicable, vasopressor titration if applicable, re-evaluation of patient's condition. This critical care time did not overlap with that of any other provider or involve time for any procedures. There is high probability for acute neurological change leading to clinical and possibly life-threatening deterioration requiring highest level of physician preparedness for urgent intervention.    Ann Cohen PA-C  Neurocritical Care  [facility] - Neuro Critical Care

## 2022-12-19 NOTE — CARE UPDATE
Surgery checklist complete. Urinalysis ordered but patient unable to provide sample at this time. Per Courtney WATERMAN, OK to get urine sample from [place] in OR.

## 2022-12-19 NOTE — INTERVAL H&P NOTE
The patient has been examined and the H&P has been reviewed:    I concur with the findings and no changes have occurred since H&P was written.    Surgery risks, benefits and alternative options discussed and understood by patient/family.    --Patient evaluated prior to procedure  --All diagnostics and imaging reviewed  --Patient NPO since MN  --No anti-coag/plt medication in the last 72h  --Patient consented and all questions answered  --Further reccs to follow procedure      There are no hospital problems to display for this patient.

## 2022-12-19 NOTE — NURSING
Patient arrived to [facility] from OR    Report received from: Zainab ALBRECHT    Type of stroke/diagnosis:  RNS placement    Current symptoms: responsive to voice, oriented x 4, following commands, moves x 4    Skin assessment done: Y  Wounds noted: none; head bandage    *If wounds noted, was Wound Care consulted? N    [place] Completed? N, pending at this time    Patient Belongings on Admit: RNS equipment (laptop) in black messenger bag    NCC notified: MARCIO Vazquez

## 2022-12-19 NOTE — OP NOTE
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 Placement of fiducial screws   2. Placement of one right-sided depth electrode (Neuropace; 10 mm spacing) into the right amygdala (#1)  3. Placement of right-sided strip electrode via craniotomy into the subdural space over the right middle temporal gyrus (#2)  4. Right craniectomy   5. Placement of Neuropace cranial-mounted pulse generator   6. Connection of generator to 2 depth electrodes   7. Use of MONTAJ robot neuro-navigation   8. Use of intraoperative LoopX with 3D interpretation       Indications:   Liza Arrieta is a 52 y.o. female who presents as a referral from Dr. Mark to discuss possible intracranial seizure surgery. She has already had left temporal lobectomy. She is now s/p placement of sEEG leads on 11/7/22 with removal on 11/16/22.      Based on interdisciplinary data review, we are recommending right temporal RNS placement (mesial temporal depth + middle temporal gyrus strip).      We had a pleasant discussion about the risks, benefits, and alternatives to RNS placement. Risks include, but are not limited to, bleeding, pain, infection, scarring, need for further/repeat procedure, death, paralysis, stroke (including venous infarct)/damage to major blood vessels, leak of cerebrospinal fluid, and hardware-related complications. We discussed that more haircut will be required. We discussed that  we cannot guarantee seizure freedom. We discussed that the generator is expected to last approximately 8-11 years and will require replacement on the skull. We discussed that multiple-times-per-week download of data is recommended. We discussed that further surgical intervention may be recommended in future. Informed consent was obtained.      Description of the Procedure:  The patient was brought back to the operating room, where she was carefully positioned on the stretcher in anticipation of skull fiducial placement. General endotracheal anesthesia was induced by the anesthesia team. She received 2 grams of IV Ancef within 30 minutes of skin incision. The patient was prepped and draped in the usual sterile fashion, including clipping of hair as indicated. Two ccs of 1% lidocaine with epinephrine was used to infiltrate the skin, and a stab incision was performed with a number 15 over the right frontal region, behind the hairline. The pericranium was elevated, and a skull fiducial was placed using the power screwdriver and checked with the hand screwdriver. This process was repeated an additional four times. The patient was then transferred to the OR table. Again, all pressure points were carefully padded.     At this point, the patient's head was affixed using a radiolucent 3-point Smith head vidal to the BRANDEE robot, and a LoopX scan was performed for registration purposes. This was merged with the pre-existing navigation quality CT onto which we loaded the previously completed plan. As soon as the head was affixed to the BRANDEE, the bed was unplugged from the wall. Skull fiducial registration was then completed with an appropriate projected accuracy of 0.5 mm. The patient was then again prepped and draped in the usual sterile fashion. Instructions were given to the anesthesia team to keep SBP <140 at all times, and they were further instructed that her [id number] is upregulated due to her being on chronic AEDs. LAURA  hose and SCDs were applied. 2 grams of ceftriaxone were administered.      After we marked the entry point for our depth electrode, we designed an incision incorporating both while leaving space for the ferrule. The incision was injected with 1% lidocaine with epinephrine, the skin incised with a 10 blade, and dissection carried down with a bovie. Meticulous hemostasis was obtained.      Attention was turned to the first new trajectory, right mesial temporal (electrode 1). A linear incision was designed and infiltrated with 1% lidocaine with epinephrine. A stab incision was made, and using the BRANDEE as a trajectory guide, the M8 was used to drill through the skull. The bur hole was waxed liberally with bone wax. The dura was opened with 15 blade and bipolar cautery just at the site of the slotted cannula entry position. The slotted cannula was lowered to the target depth, and the depth electrode was placed. The stylet and the slotted cannula were withdrawn. The electrode was protected with a plastic reinforcer and secured to the skull with a Rosie dog bone.     Next we placed the strip electrode via craniotomy. We used the BRANDEE to indicate where the craniotomy should be performed and also where we should aim. A curvilinear incision was created just in front of the ear. The temporalis was split. Using the M8, a bur hole was performed. This proved significantly more difficult that usual because of the patient's hyperostosis, so several additional bur holes were created to perform the craniotomy safely with the craniotome. The dura was opened with Metzenbaum scissors and Geralds with teeth. The subdural space was gently explored with a cottonoid monica. We passed the electrode by gently by sliding from the craniotomy.      We then confirmed the trajectory placements intraoperatively with LoopX, which suggested good positioning of both implants. We secured the strip to the dura with a 4.0 Nurolon stitch.      Next, we  created a craniectomy over the right parietal region just large enough to accommodate the metal ferrule. Meticulous hemostasis was obtained with bone wax, bipolar cautery, and floseal. The ferrule was secured with 4mm screws. We tested the electrodes. Numbers 1 (depth) and 2 (strip) demonstrated reasonable ecog and had normal impedances. We then removed all cautery from the field and irrigated the wound. The bolts were both torqued off. The retention piece was secured over the 2 leads that were plugged in.      The wounds were all irrigated copiously. The incisions were closed with inverted 2.0 vicryl stitches through the galea. The skin was closed with running 4.0 monocryl. The fiducials were removed, irrigated, and closed with staples.      The patient was then disconnected from the robot and returned supine. The wounds were dressed with bacitracin ointment and Telfa. A head wrap was applied.      The patient tolerated the procedure well without apparent complication. All counts were correct x2. The patient was then extubated by the anesthesia team with disposition to the neuro ICU.      This case warrants a 22 modifier given the patient's BMI (35) and also the hyperostosis, which meant that multiple portions of the procedure (positioning, drilling/craniotomy) took longer than expected    Complications: No     Estimated Blood Loss (EBL): 100 cc            Specimens:   Specimen (24h ago, onward)      None             Implants:   Implant Name Type Inv. Item Serial No.  Lot No. LRB No. Used Action   PINS SKULL ADULT [place] - [id number]  PINS SKULL ADULT [facility]   1 Implanted and Explanted   FIDUCIAL KIT UNI STEREO 10 MM - [id number]  FIDUCIAL KIT UNI STEREO 10 MM  [employer] [facility] [id number]  5 Implanted and Explanted   NeuroPace Depth Lead Kit   [id number] NEUROPACE  Right 1 Implanted   CORTICAL STRIP LEAD KIT   [id number]   Right 1 Implanted   PLATE BONE RIGID 2HOLE .6X12MM - [id number]  PLATE BONE  RIGID 2HOLE .6X12MM  KARIME Anonymous You JEAN.  Right 2 Implanted   PLATE BONE BUR HLE CVR 7MM TAB - [id number]  PLATE BONE BUR HLE CVR 7MM TAB  KARIME Anonymous [employer] JEAN.  Right 1 Implanted   PLATE BONE 2X2 HOLE SM BOX - [id number]  PLATE BONE 2X2 HOLE SM BOX  KARIME [employer] JEAN.  Right 1 Implanted   RNS Neurostimulator Kit   [id number] NEUROPACE  Right 1 Implanted   SCREW UN3 AXS SD 1.5X4MM - [id number]  SCREW UN3 AXS SD 1.5X4MM  KARIME Anonymous You JEAN.  Right 16 Implanted              Condition: Stable    Disposition: ICU - extubated and stable.    Attestation: I was present and scrubbed for the entire procedure.

## 2022-12-19 NOTE — SUBJECTIVE & OBJECTIVE
Past Medical History:   Diagnosis Date    Convulsions     Epilepsy     Seizures      Past Surgical History:   Procedure Laterality Date    APPENDECTOMY       SECTION      4    CHOLECYSTECTOMY      CRANIOTOMY N/A 2018    Procedure: CRANIOTOMY for strip and grid;  Surgeon: Ruben Senior MD;  Location: [facility] OR [address] [place] [address];  Service: Neurosurgery;  Laterality: N/A;  toronto II, asa 2, type and screen, regular bed, [place], supine     CRANIOTOMY Left 2018    Procedure: CRANIOTOMY for grids and strips;  Surgeon: Ruben Senior MD;  Location: [facility] OR [address] [place] [address];  Service: Neurosurgery;  Laterality: Left;    HYSTERECTOMY      around  for pain ful periods     INSERTION OF SUBDURAL GRID AND STRIP ELECTRODES BY CRANIOTOMY Left 2018    Procedure: CRANIOTOMY, FOR SUBDURAL GRID AND STRIP ELECTRODE LEAD Removal.;  Surgeon: Ruben Senior MD;  Location: [facility] OR [address] [place] [address];  Service: Neurosurgery;  Laterality: Left;  needs microscope and stealth-cg    REMOVAL OF STEREO EEG LEADS (DEPTH ELECTRODES) Bilateral 2022    Procedure: REMOVAL OF STEREO EEG LEADS (DEPTH ELECTRODES);  Surgeon: Ava Valdez MD;  Location: [address];  Service: Neurosurgery;  Laterality: Bilateral;    SURGICAL REMOVAL OF LOBE OF BRAIN Left 2018    Procedure: LOBECTOMY, BRAIN left temporal lobe.;  Surgeon: Ruben Senior MD;  Location: [address];  Service: Neurosurgery;  Laterality: Left;      No current facility-administered medications on file prior to encounter.     Current Outpatient Medications on File Prior to Encounter   Medication Sig Dispense Refill    ferrous sulfate (FEOSOL) 325 mg (65 mg iron) Tab tablet Take 325 mg by mouth every morning.      lamoTRIgine (LAMICTAL) 200 MG tablet Take 2.5 tablets (500 mg total) by mouth once daily. (Patient taking differently: Take 500 mg by mouth once daily. 200 mg in am and 300 mg in the pm) 405 tablet 3    sertraline (ZOLOFT) 25 MG tablet Take 1 tablet (25 mg total) by mouth  once daily. 30 tablet 11    cenobamate (XCOPRI) 150 mg Tab Take one tablet by mouth once daily. (Patient taking differently: Take 150 mg by mouth nightly.) 30 tablet 5    cenobamate (XCOPRI) 200 mg Tab Take one tablet by mouth once daily at 6am. 30 tablet 5    [DISCONTINUED] bacitracin 500 unit/gram ointment Apply topically 2 (two) times daily. 14 g 0      Allergies: Patient has no known allergies.    Family History   Problem Relation Age of Onset    Heart disease Father         over 50 years     Social History     Tobacco Use    Smoking status: Never    Smokeless tobacco: Never   Substance Use Topics    Alcohol use: Yes     Comment: One drink per month    Drug use: No     Review of Systems   Constitutional: Negative.    HENT: Negative.     Eyes: Negative.    Respiratory: Negative.     Cardiovascular: Negative.    Gastrointestinal: Negative.    Endocrine: Negative.    Genitourinary: Negative.    Musculoskeletal: Negative.    Neurological:  Positive for headaches. Negative for dizziness, seizures, facial asymmetry and weakness.   Objective:     Vitals:    Temp: 99.4 °F (37.4 °C)  Pulse: 69  BP: 136/70  MAP (mmHg): 97  Resp: 16  SpO2: 99 %    Temp  Min: 98.1 °F (36.7 °C)  Max: 99.4 °F (37.4 °C)  Pulse  Min: 52  Max: 73  BP  Min: 114/56  Max: 136/70  MAP (mmHg)  Min: 97  Max: 97  Resp  Min: 16  Max: 20  SpO2  Min: 96 %  Max: 99 %    No intake/output data recorded.           Physical Exam  Constitutional:       General: She is not in acute distress.     Appearance: Normal appearance. She is obese. She is not ill-appearing.   HENT:      Head: Normocephalic and atraumatic.      Comments: Incision c/d/I.      Right Ear: Tympanic membrane normal.      Left Ear: Tympanic membrane normal.      Nose: Nose normal.      Mouth/Throat:      Mouth: Mucous membranes are dry.   Eyes:      General:         Right eye: No discharge.         Left eye: No discharge.   Cardiovascular:      Rate and Rhythm: Normal rate and regular rhythm.       Pulses: Normal pulses.   Pulmonary:      Effort: No respiratory distress.      Breath sounds: Normal breath sounds.   Abdominal:      General: Abdomen is flat.      Palpations: Abdomen is soft.   Musculoskeletal:         General: Normal range of motion.      Cervical back: Normal range of motion and neck supple.   Skin:     General: Skin is warm and dry.      Capillary Refill: Capillary refill takes 2 to 3 seconds.   Neurological:      Comments: Sedation: None  E3V5M6  AAOx3  Follows all commands  PERRL, EOMI  Pupils brisk  SILT  EMETERIO and AG         Today I personally reviewed pertinent medications, lines/drains/airways, imaging, cardiology results, laboratory results, microbiology results, notably:

## 2022-12-19 NOTE — ANESTHESIA PREPROCEDURE EVALUATION
2022  Liza Arrieta is a 52 y.o., female.      Pre-op Assessment    I have reviewed the Patient Summary Reports.     I have reviewed the Nursing Notes. I have reviewed the NPO Status.   I have reviewed the Medications.     Review of Systems  Anesthesia Hx:  No problems with previous Anesthesia  History of prior surgery of interest to airway management or planning: Previous anesthesia: General  Denies Personal Hx of Anesthesia complications.   Cardiovascular:   Hypertension ECG has been reviewed.    Pulmonary:  Pulmonary Normal    Renal/:  Renal/ Normal     Hepatic/GI:  Hepatic/GI Normal    Neurological:   Seizures    Endocrine:  Endocrine Normal    Psych:   Psychiatric History        Patient Active Problem List   Diagnosis    Convulsions/seizures    Seizure    Temporal lobe epilepsy, intractable    Complex partial epilepsy with generalization and with intractable epilepsy    Mild neurocognitive disorder due to another medical condition    Obesity    Snoring    Abnormal EKG    Iron deficiency anemia    Epilepsy    Essential hypertension    Adverse effect of antiepileptic    Depression    Edema     Past Medical History:   Diagnosis Date    Convulsions     Epilepsy     Seizures      Past Surgical History:   Procedure Laterality Date    APPENDECTOMY       SECTION      4    CHOLECYSTECTOMY      CRANIOTOMY N/A 2018    Procedure: CRANIOTOMY for strip and grid;  Surgeon: Ruben Senior MD;  Location: [facility] OR [address] [place] [address];  Service: Neurosurgery;  Laterality: N/A;  toronto II, asa 2, type and screen, regular bed, [place], supine     CRANIOTOMY Left 2018    Procedure: CRANIOTOMY for grids and strips;  Surgeon: Ruben Senior MD;  Location: [facility] OR [address] [place] [address];  Service: Neurosurgery;  Laterality: Left;    HYSTERECTOMY      around  for pain ful periods     INSERTION OF  SUBDURAL GRID AND STRIP ELECTRODES BY CRANIOTOMY Left 11/19/2018    Procedure: CRANIOTOMY, FOR SUBDURAL GRID AND STRIP ELECTRODE LEAD Removal.;  Surgeon: Ruben Senior MD;  Location: [facility] OR [address] [place] [address];  Service: Neurosurgery;  Laterality: Left;  needs microscope and stealth-cg    REMOVAL OF STEREO EEG LEADS (DEPTH ELECTRODES) Bilateral 11/16/2022    Procedure: REMOVAL OF STEREO EEG LEADS (DEPTH ELECTRODES);  Surgeon: Ava Valdez MD;  Location: [facility] OR [address] [place] [address];  Service: Neurosurgery;  Laterality: Bilateral;    SURGICAL REMOVAL OF LOBE OF BRAIN Left 11/19/2018    Procedure: LOBECTOMY, BRAIN left temporal lobe.;  Surgeon: Ruben Senior MD;  Location: [facility] OR [address] [place] [address];  Service: Neurosurgery;  Laterality: Left;         Physical Exam  General: Well nourished, Cooperative and Alert    Airway:  Mallampati: II   Mouth Opening: Normal  TM Distance: Normal  Tongue: Normal  Neck ROM: Normal ROM    Dental:  Intact    Chest/Lungs:  Clear to auscultation, Normal Respiratory Rate    Heart:  Rate: Normal  Rhythm: Regular Rhythm  Sounds: Normal      Lab Results   Component Value Date    WBC 9.95 11/17/2022    HGB 14.4 11/17/2022    HCT 41.0 11/17/2022    MCV 88 11/17/2022     11/17/2022       BMP  Lab Results   Component Value Date     (L) 11/17/2022    K 3.9 11/17/2022     11/17/2022    CO2 25 11/17/2022    BUN 16 11/17/2022    CREATININE 0.7 11/17/2022    CALCIUM 9.1 11/17/2022    ANIONGAP 9 11/17/2022    EGFRNORACEVR >60.0 11/17/2022         Anesthesia Plan  Type of Anesthesia, risks & benefits discussed:    Anesthesia Type: Gen Natural Airway, Gen ETT, MAC  Intra-op Monitoring Plan: Standard ASA Monitors  Post Op Pain Control Plan: multimodal analgesia  Induction:  IV  Airway Plan: Direct, Post-Induction  Informed Consent: Informed consent signed with the Patient and all parties understand the risks and agree with anesthesia plan.  All questions answered.   ASA Score: 3  Day of Surgery Review of History &  Physical: H&P Update referred to the surgeon/provider.    Ready For Surgery From Anesthesia Perspective.     .

## 2022-12-19 NOTE — HPI
Liza Arrieta is a 52 y.o. right-handed female who presents to [facility] s/p R RNS placement. The patient is followed outpatient by Dr. Seth Mark and previously underwent left anterior temporal lobectomy after subdural strip and grid monitoring on 11/19/18. After surgery, she reports that her personality improved. Petit mal seizures resolved postoperatively, but she began having complex partial seizures (self-resolved after 30 seconds-1 minute) while jogging about 3-4 months ago and was referred to Dr. Ava Valdez for further operative management.     She will be admitted to [facility] for hourly neuromonitoring and a higher level of care.

## 2022-12-19 NOTE — CARE UPDATE
NSG post op check note:     S/p R RNS Neuropace device placement    Vitals:    12/19/22 1445   BP: 136/70   Pulse: 69   Resp: 16   Temp:      CN intact; PERRL, EOMI  Moving all extremities full strength  No visual field deficits to gross exam  SILT  Headwrap in place with stockingette    CT head post op reviewed    ICU status  SBP < 140  Continue home AEDs  PT/OT  Antibiotics  ADAT  Pain control    Josafat Arevalo MD  NSGY PGY 5

## 2022-12-19 NOTE — ANESTHESIA PROCEDURE NOTES
Intubation    Date/Time: 12/19/2022 7:49 AM  Performed by: Zainab Allison CRNA  Authorized by: Leno Pesraud MD     Intubation:     Induction:  Intravenous    Intubated:  Postinduction    Mask Ventilation:  Easy mask    Attempts:  1    Attempted By:  CRNA    Method of Intubation:  Direct    Blade:  Moyer 2    Laryngeal View Grade: Grade I - full view of cords      Difficult Airway Encountered?: No      Complications:  None    Airway Device:  Oral endotracheal tube    Airway Device Size:  7.5    Style/Cuff Inflation:  Cuffed (inflated to minimal occlusive pressure)    Secured at:  The lips    Placement Verified By:  Capnometry    Complicating Factors:  None    Findings Post-Intubation:  BS equal bilateral

## 2022-12-20 LAB
ALBUMIN SERPL BCP-MCNC: 3.2 G/DL (ref 3.5–5.2)
ALP SERPL-CCNC: 108 U/L (ref 55–135)
ALT SERPL W/O P-5'-P-CCNC: 22 U/L (ref 10–44)
ANION GAP SERPL CALC-SCNC: 12 MMOL/L (ref 8–16)
APTT BLDCRRT: 22.3 SEC (ref 21–32)
AST SERPL-CCNC: 25 U/L (ref 10–40)
BASOPHILS # BLD AUTO: 0.01 K/UL (ref 0–0.2)
BASOPHILS NFR BLD: 0.1 % (ref 0–1.9)
BILIRUB SERPL-MCNC: 0.3 MG/DL (ref 0.1–1)
BUN SERPL-MCNC: 11 MG/DL (ref 6–20)
CALCIUM SERPL-MCNC: 8.7 MG/DL (ref 8.7–10.5)
CHLORIDE SERPL-SCNC: 104 MMOL/L (ref 95–110)
CO2 SERPL-SCNC: 24 MMOL/L (ref 23–29)
CREAT SERPL-MCNC: 0.7 MG/DL (ref 0.5–1.4)
DIFFERENTIAL METHOD: ABNORMAL
EOSINOPHIL # BLD AUTO: 0 K/UL (ref 0–0.5)
EOSINOPHIL NFR BLD: 0.1 % (ref 0–8)
ERYTHROCYTE [DISTWIDTH] IN BLOOD BY AUTOMATED COUNT: 12.1 % (ref 11.5–14.5)
EST. GFR  (NO RACE VARIABLE): >60 ML/MIN/1.73 M^2
GLUCOSE SERPL-MCNC: 122 MG/DL (ref 70–110)
HCT VFR BLD AUTO: 38.9 % (ref 37–48.5)
HGB BLD-MCNC: 13.3 G/DL (ref 12–16)
IMM GRANULOCYTES # BLD AUTO: 0.04 K/UL (ref 0–0.04)
IMM GRANULOCYTES NFR BLD AUTO: 0.4 % (ref 0–0.5)
INR PPP: 1 (ref 0.8–1.2)
LYMPHOCYTES # BLD AUTO: 1.5 K/UL (ref 1–4.8)
LYMPHOCYTES NFR BLD: 15.9 % (ref 18–48)
MAGNESIUM SERPL-MCNC: 1.5 MG/DL (ref 1.6–2.6)
MAGNESIUM SERPL-MCNC: 1.6 MG/DL (ref 1.6–2.6)
MCH RBC QN AUTO: 31.2 PG (ref 27–31)
MCHC RBC AUTO-ENTMCNC: 34.2 G/DL (ref 32–36)
MCV RBC AUTO: 91 FL (ref 82–98)
MONOCYTES # BLD AUTO: 0.8 K/UL (ref 0.3–1)
MONOCYTES NFR BLD: 8.6 % (ref 4–15)
NEUTROPHILS # BLD AUTO: 7.2 K/UL (ref 1.8–7.7)
NEUTROPHILS NFR BLD: 74.9 % (ref 38–73)
NRBC BLD-RTO: 0 /100 WBC
PHOSPHATE SERPL-MCNC: 3.4 MG/DL (ref 2.7–4.5)
PLATELET # BLD AUTO: 178 K/UL (ref 150–450)
PMV BLD AUTO: 9.7 FL (ref 9.2–12.9)
POTASSIUM SERPL-SCNC: 4 MMOL/L (ref 3.5–5.1)
PROT SERPL-MCNC: 6.2 G/DL (ref 6–8.4)
PROTHROMBIN TIME: 10.5 SEC (ref 9–12.5)
RBC # BLD AUTO: 4.26 M/UL (ref 4–5.4)
SODIUM SERPL-SCNC: 140 MMOL/L (ref 136–145)
WBC # BLD AUTO: 9.63 K/UL (ref 3.9–12.7)

## 2022-12-20 PROCEDURE — 83735 ASSAY OF MAGNESIUM: CPT | Mod: 91 | Performed by: PSYCHIATRY & NEUROLOGY

## 2022-12-20 PROCEDURE — 83735 ASSAY OF MAGNESIUM: CPT

## 2022-12-20 PROCEDURE — 99233 PR SUBSEQUENT HOSPITAL CARE,LEVL III: ICD-10-PCS | Mod: FS,,,

## 2022-12-20 PROCEDURE — 99233 SBSQ HOSP IP/OBS HIGH 50: CPT | Mod: FS,,,

## 2022-12-20 PROCEDURE — 84100 ASSAY OF PHOSPHORUS: CPT

## 2022-12-20 PROCEDURE — 20000000 HC ICU ROOM

## 2022-12-20 PROCEDURE — 25000003 PHARM REV CODE 250: Performed by: PSYCHIATRY & NEUROLOGY

## 2022-12-20 PROCEDURE — 85610 PROTHROMBIN TIME: CPT

## 2022-12-20 PROCEDURE — 25000003 PHARM REV CODE 250

## 2022-12-20 PROCEDURE — 94761 N-INVAS EAR/PLS OXIMETRY MLT: CPT

## 2022-12-20 PROCEDURE — 97535 SELF CARE MNGMENT TRAINING: CPT

## 2022-12-20 PROCEDURE — 85730 THROMBOPLASTIN TIME PARTIAL: CPT

## 2022-12-20 PROCEDURE — 63600175 PHARM REV CODE 636 W HCPCS: Performed by: STUDENT IN AN ORGANIZED HEALTH CARE EDUCATION/TRAINING PROGRAM

## 2022-12-20 PROCEDURE — 85025 COMPLETE CBC W/AUTO DIFF WBC: CPT

## 2022-12-20 PROCEDURE — 80053 COMPREHEN METABOLIC PANEL: CPT

## 2022-12-20 PROCEDURE — 97165 OT EVAL LOW COMPLEX 30 MIN: CPT

## 2022-12-20 PROCEDURE — 25000003 PHARM REV CODE 250: Performed by: STUDENT IN AN ORGANIZED HEALTH CARE EDUCATION/TRAINING PROGRAM

## 2022-12-20 PROCEDURE — 51798 US URINE CAPACITY MEASURE: CPT

## 2022-12-20 PROCEDURE — 51701 INSERT BLADDER CATHETER: CPT

## 2022-12-20 RX ORDER — HEPARIN SODIUM 5000 [USP'U]/ML
5000 INJECTION, SOLUTION INTRAVENOUS; SUBCUTANEOUS EVERY 8 HOURS
Status: DISCONTINUED | OUTPATIENT
Start: 2022-12-20 | End: 2022-12-21 | Stop reason: HOSPADM

## 2022-12-20 RX ORDER — OXYCODONE HYDROCHLORIDE 10 MG/1
10 TABLET ORAL EVERY 6 HOURS PRN
Status: DISCONTINUED | OUTPATIENT
Start: 2022-12-20 | End: 2022-12-21 | Stop reason: HOSPADM

## 2022-12-20 RX ORDER — OXYCODONE HYDROCHLORIDE 5 MG/1
5 TABLET ORAL EVERY 6 HOURS PRN
Status: DISCONTINUED | OUTPATIENT
Start: 2022-12-20 | End: 2022-12-21 | Stop reason: HOSPADM

## 2022-12-20 RX ORDER — AMOXICILLIN 250 MG
2 CAPSULE ORAL DAILY
Status: DISCONTINUED | OUTPATIENT
Start: 2022-12-20 | End: 2022-12-21 | Stop reason: HOSPADM

## 2022-12-20 RX ORDER — AMOXICILLIN 250 MG
1 CAPSULE ORAL DAILY PRN
Status: DISCONTINUED | OUTPATIENT
Start: 2022-12-20 | End: 2022-12-20

## 2022-12-20 RX ADMIN — HEPARIN SODIUM 5000 UNITS: 5000 INJECTION INTRAVENOUS; SUBCUTANEOUS at 10:12

## 2022-12-20 RX ADMIN — POLYETHYLENE GLYCOL 3350 17 G: 17 POWDER, FOR SOLUTION ORAL at 09:12

## 2022-12-20 RX ADMIN — LAMOTRIGINE 300 MG: 150 TABLET ORAL at 08:12

## 2022-12-20 RX ADMIN — OXYCODONE HYDROCHLORIDE 10 MG: 10 TABLET ORAL at 12:12

## 2022-12-20 RX ADMIN — LAMOTRIGINE 200 MG: 150 TABLET ORAL at 09:12

## 2022-12-20 RX ADMIN — OXYCODONE 5 MG: 5 TABLET ORAL at 09:12

## 2022-12-20 RX ADMIN — GABAPENTIN 600 MG: 300 CAPSULE ORAL at 03:12

## 2022-12-20 RX ADMIN — GABAPENTIN 600 MG: 300 CAPSULE ORAL at 09:12

## 2022-12-20 RX ADMIN — Medication 800 MG: at 09:12

## 2022-12-20 RX ADMIN — ACETAMINOPHEN 650 MG: 325 TABLET ORAL at 07:12

## 2022-12-20 RX ADMIN — GABAPENTIN 600 MG: 300 CAPSULE ORAL at 08:12

## 2022-12-20 RX ADMIN — SERTRALINE HYDROCHLORIDE 25 MG: 25 TABLET ORAL at 09:12

## 2022-12-20 RX ADMIN — Medication 800 MG: at 05:12

## 2022-12-20 RX ADMIN — SENNOSIDES AND DOCUSATE SODIUM 2 TABLET: 50; 8.6 TABLET ORAL at 09:12

## 2022-12-20 RX ADMIN — METHOCARBAMOL 500 MG: 500 TABLET ORAL at 09:12

## 2022-12-20 RX ADMIN — ACETAMINOPHEN 650 MG: 325 TABLET ORAL at 09:12

## 2022-12-20 RX ADMIN — ACETAMINOPHEN 650 MG: 325 TABLET ORAL at 05:12

## 2022-12-20 RX ADMIN — METHOCARBAMOL 500 MG: 500 TABLET ORAL at 03:12

## 2022-12-20 NOTE — CONSULTS
Inpatient consult to Physical Medicine Rehab  Consult performed by: Annel Nassar NP  Consult ordered by: Ann Cohen PA-C  Reason for consult: Assess rehab needs    Reviewed patient history and current admission.  Rehab team following.  Full consult to follow.    JESSICA Mitchell, FNP-C  Physical Medicine & Rehabilitation   12/20/2022

## 2022-12-20 NOTE — ASSESSMENT & PLAN NOTE
52 F now s/p R RNS placement on 12/19    -- ICU post op, OK for step down to floor to NSGY  -- q4 hour neurochecks  -- SBP < 160  -- Post op CT reviewed, expected post op changes.   -- SQH today  -- ADAT  -- PT/OT dispo    -- Dispo: pending PT.OT recs

## 2022-12-20 NOTE — PROGRESS NOTES
[facility]  Neurocritical Care  Progress Note    Admit Date: 12/19/2022  Service Date: 12/20/2022  Length of Stay: 1    Subjective:     Chief Complaint: Complex partial epilepsy with generalization and with intractable epilepsy    History of Present Illness: Liza Arrieta is a 52 y.o. right-handed female who presents to [facility] s/p R RNS placement. The patient is followed outpatient by Dr. Seth Mark and previously underwent left anterior temporal lobectomy after subdural strip and grid monitoring on 11/19/18. After surgery, she reports that her personality improved. Petit mal seizures resolved postoperatively, but she began having complex partial seizures (self-resolved after 30 seconds-1 minute) while jogging about 3-4 months ago and was referred to Dr. Ava Valdez for further operative management.     She will be admitted to [facility] for hourly neuromonitoring and a higher level of care.          Hospital Course: 12/20/2022 CTH with expected post op changes. Added senna. PT/OT. Stable to TTF with NSGY.      Review of Systems   Constitutional: Negative.    HENT: Negative.     Eyes: Negative.    Respiratory: Negative.     Cardiovascular: Negative.    Gastrointestinal: Negative.    Endocrine: Negative.    Genitourinary: Negative.    Musculoskeletal: Negative.    Neurological:  Positive for headaches. Negative for dizziness, seizures, facial asymmetry and weakness.   Objective:     Vitals:    Temp: 99.6 °F (37.6 °C)  Pulse: 77  Rhythm: normal sinus rhythm  BP: 136/73  MAP (mmHg): 98  Resp: 19  SpO2: 95 %    Temp  Min: 97.5 °F (36.4 °C)  Max: 99.6 °F (37.6 °C)  Pulse  Min: 63  Max: 83  BP  Min: 115/56  Max: 147/71  MAP (mmHg)  Min: 81  Max: 102  Resp  Min: 12  Max: 55  SpO2  Min: 92 %  Max: 98 %    12/19 0701 - 12/20 0700  In: 2801.7 [I.V.:251.7]  Out: 2700 [Urine:2700]           Physical Exam  Constitutional:       General: She is not in acute distress.     Appearance: Normal appearance. She is obese. She is  not ill-appearing.   HENT:      Head: Normocephalic and atraumatic.      Comments: Incision c/d/I.      Right Ear: Tympanic membrane normal.      Left Ear: Tympanic membrane normal.      Nose: Nose normal.      Mouth/Throat:      Mouth: Mucous membranes are dry.   Eyes:      General:         Right eye: No discharge.         Left eye: No discharge.   Cardiovascular:      Rate and Rhythm: Normal rate and regular rhythm.      Pulses: Normal pulses.   Pulmonary:      Effort: No respiratory distress.      Breath sounds: Normal breath sounds.   Abdominal:      General: Abdomen is flat.      Palpations: Abdomen is soft.   Musculoskeletal:         General: Normal range of motion.      Cervical back: Normal range of motion and neck supple.   Skin:     General: Skin is warm and dry.      Capillary Refill: Capillary refill takes 2 to 3 seconds.   Neurological:      Comments: Sedation: None  E3V5M6  AAOx3  Follows all commands  PERRL, EOMI  Pupils brisk  SILT  EMETERIO and AG         Today I personally reviewed pertinent medications, lines/drains/airways, imaging, cardiology results, laboratory results, microbiology results, notably:        Assessment/Plan:     Neuro  * Complex partial epilepsy with generalization and with intractable epilepsy  51 y/o with previous L anterior temporal lobectomy and now s/p R RNS.   -Admit NCC, NSGY and epilepsy following  -q1h neurochecks, vital checks  -SBP < 140 post-op  -Hold ACAP in acute post-op period  -Resume home AEDs per NSGY  -CT stealth with expected post op changes.  - PT/OT.   - Stable to TTF with NSGY.    Psychiatric  Depression  Resume home sertraline     Cardiac/Vascular  Essential hypertension  SBP < 140  Off cardene since 11pm last night  PRN labetalol, hydralazine        The patient is being Prophylaxed for:  Venous Thromboembolism with: Mechanical  Stress Ulcer with: Not Applicable   Ventilator Pneumonia with: not applicable    Activity Orders          Turn patient starting at  12/19 1600    Diet Dysphagia Mechanical Soft (IDDSI Level 5): Dysphagia 2 (Mechanical Soft Ground) starting at 12/19 1527    Elevate HOB starting at 12/19 1514        Full Code    Level III    Jone Del Rio PA-C  Neurocritical Care  [facility]

## 2022-12-20 NOTE — HOSPITAL COURSE
12/20/2022 CTH with expected post op changes. Added senna. PT/OT. Stable to TTF with NSGY.  12/21/2022 Leukocytosis 15.59 from 9.5 and Tmax 100.9. New onset non-productive cough. Ordered expectorated sputum cx for if cough becomes productive. CXR negative. Pt otherwise stable for floor.

## 2022-12-20 NOTE — PLAN OF CARE
Recommendations     1. Continue current mechanical soft diet and advance texure as tolerated per SLP.   2. Add Boost Plus BID.   3. RD following.     Goals: Will meet % EEN/EPN by next RD f/u.  Nutrition Goal Status: new  Communication of RD Recs:  (POC)    Idania Bojorquez, Registration Eligible, Provisional LDN

## 2022-12-20 NOTE — ASSESSMENT & PLAN NOTE
53 y/o with previous L anterior temporal lobectomy and now s/p R RNS.   -Admit NCC, NSGY and epilepsy following  -q1h neurochecks, vital checks  -SBP < 140 post-op  -Hold ACAP in acute post-op period  -Resume home AEDs per NSGY  -CT stealth with expected post op changes.  - PT/OT.   - Stable to TTF with NSGY.

## 2022-12-20 NOTE — PLAN OF CARE
[facility] Care Plan    POC reviewed with Liza Arrieta and family at 1800. Pt verbalized understanding. Questions and concerns addressed. No acute events today. Pt progressing toward goals. Will continue to monitor. See below and flowsheets for full assessment and VS info.     - pain management          Is this a stroke patient? no    Neuro:  [place] Coma Scale  Best Eye Response: 4-->(E4) spontaneous  Best Motor Response: 6-->(M6) obeys commands  Best Verbal Response: 5-->(V5) oriented  [place] Coma Scale Score: 15  Assessment Qualifiers: patient not sedated/intubated, no eye obstruction present  Pupil PERRLA: yes     24 hr Temp:  [98.1 °F (36.7 °C)-99.4 °F (37.4 °C)]     CV:   Rhythm: normal sinus rhythm  BP goals:   SBP < 140  MAP > 65    Resp:           Plan: N/A    GI/:     Diet/Nutrition Received: mechanical/dental soft          Intake/Output Summary (Last 24 hours) at 12/19/2022 1950  Last data filed at 12/19/2022 1802  Gross per 24 hour   Intake 2642.15 ml   Output 750 ml   Net 1892.15 ml          Labs/Accuchecks:  Recent Labs   Lab 12/19/22  0537   WBC 5.57   RBC 4.44   HGB 13.5   HCT 41.9       No results for input(s): NA, K, CO2, CL, BUN, CREATININE, ALKPHOS, ALT, AST, BILITOT in the last 168 hours.    Invalid input(s): 8853203   Recent Labs   Lab 12/19/22  0537   INR 1.0   APTT 23.1    No results for input(s): CPK, CPKMB, TROPONINI, MB in the last 168 hours.    Electrolytes: No replacement orders  Accuchecks: none    Gtts:   nicardipine 2.5 mg/hr (12/19/22 1802)       LDA/Wounds:  Lines/Drains/Airways       Drain  Duration                  Urethral Catheter 12/19/22 0750 Non-latex;Straight-tip;Silicone 16 Fr. <1 day              Peripheral Intravenous Line  Duration                  Peripheral IV - Single Lumen 12/19/22 0625 Anterior;Distal;Right Forearm <1 day         Peripheral IV - Single Lumen 12/19/22 0759 18 G Left Forearm <1 day                  Wounds: No  Wound care consulted: No

## 2022-12-20 NOTE — PLAN OF CARE
[facility] Neuro Critical [facility]  Initial Discharge Assessment       Primary Care Provider: Rc Rodriguez MD    Admission Diagnosis: Complex partial epilepsy with generalization and with intractable epilepsy [G40.219]  Intractable epilepsy [G40.919]    Admission Date: 12/19/2022  Expected Discharge Date: 12/23/2022    Discharge Barriers Identified: None    Payor: [employer] / Plan: [place] [employer]CARE CHOICE / Product Type: Commercial /     Extended Emergency Contact Information  Primary Emergency Contact: Neymar Arrieta Jr   [facility]  Home Phone: [phone]  Relation: Spouse  Secondary Emergency Contact: Valentín Arrieta  Address: [address] [place] [address] [facility]  Home Phone: [phone]  Mobile Phone: [phone]  Relation: Son    Discharge Plan A: Home with family  Discharge Plan B: Home with family      [id number] [place] - [address] AT [facility] [address]  [address] [facility] [address]  Phone: [phone] Fax: [phone]    [facility]  [address] [facility] [address]  Phone: [phone] Fax: [phone]      Transferred from:  home      Past Medical History:   Diagnosis Date    Convulsions     Epilepsy     Seizures          CM met with patient and Neymar Franklinmoe ()  [phone] in room for Discharge Planning Assessment.  Patient is sleeping and defers to  to answer questions.  Per , the patient lives with  ad 3 children in a single story house with 2 step(s) to enter.   Per , the patient was independent with ADLS and used no dme for ambulation.  Patient will have assistance from  upon discharge.   Discharge Planning Booklet given to patient/family and discussed.  All questions addressed.  CM will follow for needs.      Initial Assessment (most recent)       Adult Discharge Assessment - 12/20/22 4418           Discharge Assessment    Assessment Type Discharge Planning Assessment     Confirmed/corrected address, phone number and insurance Yes     Confirmed Demographics Correct on Facesheet     Source of Information family     Communicated RYAN with patient/caregiver Date not available/Unable to determine     Reason For Admission S/p R RNS Neuropace device placement     People in Home spouse;child(duncan), dependent;child(duncan), adult     Facility Arrived From: Home     Do you expect to return to your current living situation? Yes     Do you have help at home or someone to help you manage your care at home? Yes     Who are your caregiver(s) and their phone number(s)? Neymar Meenakshi ()  [phone]     Prior to hospitilization cognitive status: Alert/Oriented     Current cognitive status: Alert/Oriented     Walking or Climbing Stairs --   independent    Dressing/Bathing --   independent    Home Layout Able to live on 1st floor     Equipment Currently Used at Home shower chair     Readmission within 30 days? No     Patient currently being followed by outpatient case management? No     Do you currently have service(s) that help you manage your care at home? No     Do you take prescription medications? Yes     Do you have prescription coverage? Yes     Coverage [facility]     Do you have any problems affording any of your prescribed medications? No     Is the patient taking medications as prescribed? yes     Who is going to help you get home at discharge? Neymar Marmolejoevelin ()  [phone]     How do you get to doctors appointments? family or friend will provide     Are you on dialysis? No     Do you take coumadin? No     Discharge Plan A Home with family     Discharge Plan B Home with family     DME Needed Upon Discharge  none     Discharge Plan discussed with: Spouse/sig other     Name(s) and Number(s) Neymar Arrieta ()  [phone] (at bedside)     Discharge Barriers Identified None        OTHER    Name(s)  of People in Home Neymar Arrieta ()  [phone]                            Kenna Fong RN, CCRN-K, [facility]  Neuro-Critical Care   X [phone]

## 2022-12-20 NOTE — SUBJECTIVE & OBJECTIVE
Interval History: 12.20: OR yesterday for right RNS placement. Post op exam stable. CTH with expected post op change    Medications:  Continuous Infusions:  Scheduled Meds:   cenobamate  150 mg Oral Nightly    gabapentin  600 mg Oral TID    lamoTRIgine  200 mg Oral Daily    lamoTRIgine  300 mg Oral QHS    methocarbamoL  500 mg Oral QID    polyethylene glycol  17 g Oral Daily    senna-docusate 8.6-50 mg  2 tablet Oral Daily    sertraline  25 mg Oral Daily     PRN Meds:acetaminophen, hydrALAZINE, labetalol, magnesium oxide, magnesium oxide, ondansetron, oxyCODONE, oxyCODONE, potassium bicarbonate, potassium bicarbonate, potassium bicarbonate, potassium, sodium phosphates, potassium, sodium phosphates, potassium, sodium phosphates, sodium chloride 0.9%     Review of Systems  Objective:     Weight: 99.2 kg (218 lb 11.1 oz)  Body mass index is 35.3 kg/m².  Vital Signs (Most Recent):  Temp: 99.6 °F (37.6 °C) (12/20/22 1501)  Pulse: 77 (12/20/22 1601)  Resp: 19 (12/20/22 1601)  BP: 136/73 (12/20/22 1601)  SpO2: 95 % (12/20/22 1601) Vital Signs (24h Range):  Temp:  [97.5 °F (36.4 °C)-99.6 °F (37.6 °C)] 99.6 °F (37.6 °C)  Pulse:  [68-83] 77  Resp:  [12-55] 19  SpO2:  [92 %-98 %] 95 %  BP: (115-147)/(56-73) 136/73  Arterial Line BP: ()/(60-87) 103/69     Date 12/20/22 0700 - 12/21/22 0659   Shift 9094-3466 8968-3397 6231-8685 24 Hour Total   INTAKE   P.O. 240   240   Shift Total(mL/kg) 240(2.4)   240(2.4)   OUTPUT   Urine(mL/kg/hr) 1000(1.3) 0  1000   Shift Total(mL/kg) 1000(10.1) 0(0)  1000(10.1)   Weight (kg) 99.2 99.2 99.2 99.2                   Female External Urinary Catheter 12/20/22 0505 (Active)   Skin no redness;no breakdown 12/20/22 1501   Tolerance no signs/symptoms of discomfort 12/20/22 1501   Suction Continuous suction at 70 mmHg 12/20/22 0701   Date of last wick change 12/20/22 12/20/22 0507   Time of last wick change 0505 12/20/22 0507   Output (mL) 0 mL 12/20/22 1601       Physical Exam    Neurosurgery  Physical Exam    Physical Exam:    Constitutional: No distress.     HEENT: atraumatic/normocephalic    Cardiovascular: Regular rhythm.     Pulm: aerating well, saturating well    Abdominal: Soft.     Psych/Behavior: He is alert.     E4V5M6  AOx3  PERRL  EOMI  Face Symmetric  Tongue midline  BUE 5/5  BLE 5/5  No drift      Significant Labs:  Recent Labs   Lab 12/20/22  0055   *      K 4.0      CO2 24   BUN 11   CREATININE 0.7   CALCIUM 8.7   MG 1.5*     Recent Labs   Lab 12/19/22  0537 12/20/22  0055   WBC 5.57 9.63   HGB 13.5 13.3   HCT 41.9 38.9    178     Recent Labs   Lab 12/19/22  0537 12/20/22  0055   INR 1.0 1.0   APTT 23.1 22.3     Microbiology Results (last 7 days)       ** No results found for the last 168 hours. **          All pertinent labs from the last 24 hours have been reviewed.    Significant Diagnostics:  I have reviewed and interpreted all pertinent imaging results/findings within the past 24 hours.

## 2022-12-20 NOTE — CONSULTS
"[facility]  Adult Nutrition  Consult Note    SUMMARY     Recommendations    1. Continue current mechanical soft diet and advance texure as tolerated per SLP.   2. Add Boost Plus BID.   3. RD following.    Goals: Will meet % EEN/EPN by next RD f/u.  Nutrition Goal Status: new  Communication of RD Recs:  (POC)    Assessment and Plan    Nutrition Problem  Inadequate energy/protein intake    Related to (etiology):   Decreased appetite    Signs and Symptoms (as evidenced by):   PO intake 0-25%    Interventions/Recommendations (treatment strategy):  Collaboration of nutrition care with other providers  [facility] beverage     Nutrition Diagnosis Status:   New    Reason for Assessment    Reason For Assessment: consult  Diagnosis:  (Epilepsy)  Relevant Medical History: HTN  Interdisciplinary Rounds: did not attend  General Information Comments: RD consulted to assess dietary needs. Spoke with pt briefly at bedside 2/2 increased drowsiness. States intake PTA good. Intake of breakfast 0% and endorses no appetite. No issues with n/v/d/c. -210# per chart review. NFPE not warranted 2/2 appearing well-nourished. LBM 12/18.  Nutrition Discharge Planning: Regular diet    Nutrition Risk Screen    Nutrition Risk Screen: difficulty chewing/swallowing    Nutrition/Diet History    Patient Reported Diet/Restrictions/Preferences: general  Spiritual, Cultural Beliefs, Anglican Practices, Values that Affect Care: no  Food Allergies: NKFA  Factors Affecting Nutritional Intake: difficulty/impaired swallowing, decreased appetite    Anthropometrics    Temp: 98.4 °F (36.9 °C)  Height Method: Estimated  Height: 5' 6" (167.6 cm)  Height (inches): 66 in  Weight Method: Bed Scale  Weight: 99.2 kg (218 lb 11.1 oz)  Weight (lb): 218.7 lb  Ideal Body Weight (IBW), Female: 130 lb  % Ideal Body Weight, Female (lb): 168.23 %  BMI (Calculated): 35.3  BMI Grade: 35 - 39.9 - obesity - grade " II    Lab/Procedures/Meds    Pertinent Labs Reviewed: reviewed  Pertinent Labs Comments: Glucose 122, Albumin 3.2, Magnesium 1.5  Pertinent Medications Reviewed: reviewed  Pertinent Medications Comments: gabapentin    Estimated/Assessed Needs    Weight Used For Calorie Calculations: 99.2 kg (218 lb 11.1 oz)  Energy Calorie Requirements (kcal): 1610 kcals  Energy Need Method: [place]-St Jeor (MSJ x 1.0 PAL)  Protein Requirements: 59 g (1.0 g/kg IBW)  Weight Used For Protein Calculations: 59 kg (130 lb)  Fluid Requirements (mL): 1 ml or fluid per MD  Estimated Fluid Requirement Method: RDA Method  RDA Method (mL): 1610    Nutrition Prescription Ordered    Current Diet Order: Mechanical soft    Evaluation of Received Nutrient/Fluid Intake    I/O: +101.7 ml since admit  Energy Calories Required: not meeting needs  Protein Required: not meeting needs  Fluid Required: not meeting needs  Tolerance: tolerating  % Intake of Estimated Energy Needs: 0 - 25 %  % Meal Intake: 0 - 25 %    Nutrition Risk    Level of Risk/Frequency of Follow-up:  (1 time/week)     Monitor and Evaluation    Food and Nutrient Intake: energy intake, food and beverage intake  Food and Nutrient Adminstration: diet order  Knowledge/Beliefs/Attitudes: food and nutrition knowledge/skill, beliefs and attitudes  Physical Activity and Function: nutrition-related ADLs and IADLs  Anthropometric Measurements: height/length, weight, weight change, body mass index  Biochemical Data, Medical Tests and Procedures: electrolyte and renal panel, glucose/endocrine profile, gastrointestinal profile, inflammatory profile, lipid profile  Nutrition-Focused Physical Findings: overall appearance     Nutrition Follow-Up    RD Follow-up?: Yes    Idania Bojorquez, Registration Eligible, Provisional LDN

## 2022-12-20 NOTE — PLAN OF CARE
Eval and POC in place  Problem: Occupational Therapy  Goal: Occupational Therapy Goal  Description: Goals to be met by: 01/10/22     Patient will increase functional independence with ADLs by performing:    LE Dressing with [place].  Grooming while standing at sink with Supervision.  Toileting from toilet with [place] for hygiene and clothing management.   Toilet transfer to toilet with Supervision.    Outcome: Ongoing, Progressing

## 2022-12-20 NOTE — PT/OT/SLP EVAL
"Occupational Therapy   Evaluation    Name: Liza Arrieta  MRN: [id number]  Admitting Diagnosis: Complex partial epilepsy with generalization and with intractable epilepsy  Recent Surgery: Procedure(s) (LRB):  PLACEMENT OF RESPONSIVE NEUROSTIMULATION DEVICE (NEUROPACE) (Right) 1 Day Post-Op    Recommendations:     Discharge Recommendations: other (see comments)  Discharge Equipment Recommendations:  none  Barriers to discharge:  None    Assessment:     Liza Arrieta is a 52 y.o. female with a medical diagnosis of Complex partial epilepsy with generalization and with intractable epilepsy.  She presents with spouse in room. Performance deficits affecting function: weakness, impaired endurance, impaired balance, impaired self care skills, impaired functional mobility, pain.  C/o fatigue and head ache worsening during session, nurse to provide medications. Spouse provided home set up information. Patient A&O to four, following multiple step commands. Visual tracking and sensation normal. No reports of double vision nor vertigo expressed. Patient completed supine to sit with CGA, EOB CGA, sit to stand CGA, four steps for/back CGA HHA slow steps wide gait, possibly explained by head pain.     Rehab Prognosis: Good; patient would benefit from acute skilled OT services to address these deficits and reach maximum level of function.       Plan:     Patient to be seen 3 x/week to address the above listed problems via self-care/home management, therapeutic activities, therapeutic exercises, neuromuscular re-education  Plan of Care Expires: 01/20/23  Plan of Care Reviewed with: patient, spouse    Subjective     Chief Complaint: "My head hurts where they cut in."   Patient/Family Comments/goals: home with spouse and three adult children    Occupational Profile:  Living Environment: [facility], 2 [address] no railing, walk in shower with grab  bars and shower chair. She was independent in home and community , works as a . She does not " drive secondary to seizures.     Equipment Used at Home: shower chair  Assistance upon Discharge: spouse    Pain/Comfort:  Pain Rating 1: 5/10  Location 1: head  Pain Addressed 1: Distraction, Nurse notified  Pain Rating Post-Intervention 1: 10/10    Patients cultural, spiritual, Scientologist conflicts given the current situation: no    Objective:     Communicated with: Rn prior to session.  Patient found HOB elevated with arterial line, bed alarm, blood pressure cuff, pulse ox (continuous), telemetry, PureWick upon OT entry to room.    General Precautions: Standard, seizure, fall  Orthopedic Precautions: N/A  Braces: N/A  Respiratory Status: Room air    Occupational Performance:    Bed Mobility:    Patient completed Supine to Sit with contact guard assistance  Patient completed Sit to Supine with contact guard assistance    Functional Mobility/Transfers:  Patient completed Sit <> Stand Transfer with contact guard assistance  with  hand-held assist   Functional Mobility: wide gait, slow steps, quickly fatigued    Activities of Daily Living:  Feeding:  supervision    Grooming: supervision at EOB  Upper Body Dressing: supervision with gown  Lower Body Dressing: moderate assistance with socks with pain moving    Cognitive/Visual Perceptual:  Cognitive/Psychosocial Skills:     -       Oriented to: Person, Place, Time, and Situation   -       Follows Commands/attention:Follows multistep  commands  -       Memory: No Deficits noted  -       Safety awareness/insight to disability: intact     Physical Exam:  Upper Extremity Range of Motion:     -       Right Upper Extremity: WFL  -       Left Upper Extremity: WFL  Upper Extremity Strength:    -       Right Upper Extremity: WFL  -       Left Upper Extremity: WFL    AMPAC 6 Click ADL:  AMPAC Total Score: 18    Treatment & Education:  Pt educated on role of OT, POC, and goals for therapy.    POC was dicussed with patient/caregiver, who was included in its development and is in  agreement with the identified goals and treatment plan.   Patient and family aware of patient's deficits and therapy progression.   Time provided for therapeutic counseling and discussion of health disposition.   Educated on importance of EOB/OOB mobility, maintaining routine, sitting up in chair, and maximizing independence with ADLs during admission   Pt completed ADLs and functional mobility for treatment session as noted above   Pt/caregiver verbalized understanding and expressed no further concerns/questions.  Updated communication board with level of assist required (HHA x 1 person assistance & no device) & educated RN/patient that pt is appropriate for room, bathroom, chair with RN/PCT.       Patient left supine with all lines intact, call button in reach, bed alarm on, Rn notified, and spouse present    GOALS:   Multidisciplinary Problems       Occupational Therapy Goals          Problem: Occupational Therapy    Goal Priority Disciplines Outcome Interventions   Occupational Therapy Goal     OT, PT/OT Ongoing, Progressing    Description: Goals to be met by: 01/10/22     Patient will increase functional independence with ADLs by performing:    LE Dressing with [place].  Grooming while standing at sink with Supervision.  Toileting from toilet with [place] for hygiene and clothing management.   Toilet transfer to toilet with Supervision.                         History:     Past Medical History:   Diagnosis Date    Convulsions     Epilepsy     Seizures          Past Surgical History:   Procedure Laterality Date    APPENDECTOMY       SECTION      4    CHOLECYSTECTOMY      CRANIOTOMY N/A 2018    Procedure: CRANIOTOMY for strip and grid;  Surgeon: Ruben Senior MD;  Location: [facility] OR [address] [place] [address];  Service: Neurosurgery;  Laterality: N/A;  toronto II, asa 2, type and screen, regular bed, [place], supine     CRANIOTOMY Left 2018    Procedure: CRANIOTOMY for grids and strips;  Surgeon: Ruben  NARESH Senior MD;  Location: [address];  Service: Neurosurgery;  Laterality: Left;    HYSTERECTOMY      around 2016 for pain ful periods     INSERTION OF SUBDURAL GRID AND STRIP ELECTRODES BY CRANIOTOMY Left 11/19/2018    Procedure: CRANIOTOMY, FOR SUBDURAL GRID AND STRIP ELECTRODE LEAD Removal.;  Surgeon: Ruben Senior MD;  Location: [address];  Service: Neurosurgery;  Laterality: Left;  needs microscope and stealth-cg    REMOVAL OF STEREO EEG LEADS (DEPTH ELECTRODES) Bilateral 11/16/2022    Procedure: REMOVAL OF STEREO EEG LEADS (DEPTH ELECTRODES);  Surgeon: Ava Valdez MD;  Location: [address];  Service: Neurosurgery;  Laterality: Bilateral;    SURGICAL REMOVAL OF LOBE OF BRAIN Left 11/19/2018    Procedure: LOBECTOMY, BRAIN left temporal lobe.;  Surgeon: Ruben Senior MD;  Location: [address];  Service: Neurosurgery;  Laterality: Left;       Time Tracking:     OT Date of Treatment: 12/20/22  OT Start Time: 0919  OT Stop Time: 0937  OT Total Time (min): 18 min    Billable Minutes:Evaluation 3  Self Care/Home Management 15    12/20/2022

## 2022-12-20 NOTE — HOSPITAL COURSE
12.20: OR yesterday for right RNS placement. Post op exam stable. CT with expected post op change  12/21: more alert and active today, pending PT/OT today. TTF. Likely d/c later today    Patient was admitted for elective R RNS placement on 12/19/2022 and underwent procedure perioperative complications. The patient remained on abx for surgical prophylaxis and was kept on appropriate DVT prophylaxis during the course of admission. At the time of discharge, the patient was tolerating PO intake without N/V, dysphagia, denied bowel or bladder dysfunction, denied new neurological symptoms, and reported pain controlled with current regimen. The surgical site was without evidence of drainage, breakdown or infection and all staples/sutures were intact. Therapy recommendations of home health PT/OT have been arranged and the patient will follow up in clinic as indicated in discharge instructions. All questions were answered and continued treatment/wound care instructions were discussed in detail prior to discharge.

## 2022-12-20 NOTE — PLAN OF CARE
[facility] Care Plan    POC reviewed with Liza Arrieta and family at 0300. Pt verbalized understanding. Questions and concerns addressed with  at bedside. No acute events overnight. Pt progressing toward goals. Will continue to monitor. See below and flowsheets for full assessment and VS info.     - Titrated Cardene off.  - PRN Oxy given x3.  - Mg replaced.      Is this a stroke patient? No    Neuro:  [place] Coma Scale  Best Eye Response: 4-->(E4) spontaneous  Best Motor Response: 6-->(M6) obeys commands  Best Verbal Response: 5-->(V5) oriented  [place] Coma Scale Score: 15  Assessment Qualifiers: patient not sedated/intubated, no eye obstruction present  Pupil PERRLA: yes     24hr Temp:  [97.5 °F (36.4 °C)-99.4 °F (37.4 °C)]     CV:   Rhythm: normal sinus rhythm  BP goals:   SBP < 140  MAP > 65    Resp:           Plan: N/A    GI/:     Diet/Nutrition Received: mechanical/dental soft  Last Bowel Movement: 12/18/22       Intake/Output Summary (Last 24 hours) at 12/20/2022 0247  Last data filed at 12/20/2022 0102  Gross per 24 hour   Intake 2801.74 ml   Output 1950 ml   Net 851.74 ml          Labs/Accuchecks:  Recent Labs   Lab 12/20/22  0055   WBC 9.63   RBC 4.26   HGB 13.3   HCT 38.9         Recent Labs   Lab 12/20/22  0055      K 4.0   CO2 24      BUN 11   CREATININE 0.7   ALKPHOS 108   ALT 22   AST 25   BILITOT 0.3      Recent Labs   Lab 12/20/22  0055   INR 1.0   APTT 22.3    No results for input(s): CPK, CPKMB, TROPONINI, MB in the last 168 hours.    Electrolytes: Electrolytes replaced  Accuchecks: none    Gtts:   nicardipine Stopped (12/19/22 4258)       LDA/Wounds:  Lines/Drains/Airways       Drain  Duration                  Urethral Catheter 12/19/22 0750 Non-latex;Straight-tip;Silicone 16 Fr. <1 day              Peripheral Intravenous Line  Duration                  Peripheral IV - Single Lumen 12/19/22 0625 Anterior;Distal;Right Forearm <1 day         Peripheral IV - Single Lumen  12/19/22 0759 18 G Left Forearm <1 day                  Wounds: Yes  Wound care consulted: No

## 2022-12-20 NOTE — SUBJECTIVE & OBJECTIVE
Review of Systems   Constitutional: Negative.    HENT: Negative.     Eyes: Negative.    Respiratory: Negative.     Cardiovascular: Negative.    Gastrointestinal: Negative.    Endocrine: Negative.    Genitourinary: Negative.    Musculoskeletal: Negative.    Neurological:  Positive for headaches. Negative for dizziness, seizures, facial asymmetry and weakness.   Objective:     Vitals:    Temp: 99.6 °F (37.6 °C)  Pulse: 77  Rhythm: normal sinus rhythm  BP: 136/73  MAP (mmHg): 98  Resp: 19  SpO2: 95 %    Temp  Min: 97.5 °F (36.4 °C)  Max: 99.6 °F (37.6 °C)  Pulse  Min: 63  Max: 83  BP  Min: 115/56  Max: 147/71  MAP (mmHg)  Min: 81  Max: 102  Resp  Min: 12  Max: 55  SpO2  Min: 92 %  Max: 98 %    12/19 0701 - 12/20 0700  In: 2801.7 [I.V.:251.7]  Out: 2700 [Urine:2700]           Physical Exam  Constitutional:       General: She is not in acute distress.     Appearance: Normal appearance. She is obese. She is not ill-appearing.   HENT:      Head: Normocephalic and atraumatic.      Comments: Incision c/d/I.      Right Ear: Tympanic membrane normal.      Left Ear: Tympanic membrane normal.      Nose: Nose normal.      Mouth/Throat:      Mouth: Mucous membranes are dry.   Eyes:      General:         Right eye: No discharge.         Left eye: No discharge.   Cardiovascular:      Rate and Rhythm: Normal rate and regular rhythm.      Pulses: Normal pulses.   Pulmonary:      Effort: No respiratory distress.      Breath sounds: Normal breath sounds.   Abdominal:      General: Abdomen is flat.      Palpations: Abdomen is soft.   Musculoskeletal:         General: Normal range of motion.      Cervical back: Normal range of motion and neck supple.   Skin:     General: Skin is warm and dry.      Capillary Refill: Capillary refill takes 2 to 3 seconds.   Neurological:      Comments: Sedation: None  E3V5M6  AAOx3  Follows all commands  PERRL, EOMI  Pupils brisk  SILT  EMETERIO and AG         Today I personally reviewed pertinent medications,  lines/drains/airways, imaging, cardiology results, laboratory results, microbiology results, notably:

## 2022-12-20 NOTE — PROGRESS NOTES
[facility]  Neurosurgery  Progress Note    Subjective:     History of Present Illness: 52 F now s/p R RNS placement on 12/19      Post-Op Info:  Procedure(s) (LRB):  PLACEMENT OF RESPONSIVE NEUROSTIMULATION DEVICE (NEUROPACE) (Right)   1 Day Post-Op     Interval History: 12.20: OR yesterday for right RNS placement. Post op exam stable. CTH with expected post op change    Medications:  Continuous Infusions:  Scheduled Meds:   cenobamate  150 mg Oral Nightly    gabapentin  600 mg Oral TID    lamoTRIgine  200 mg Oral Daily    lamoTRIgine  300 mg Oral QHS    methocarbamoL  500 mg Oral QID    polyethylene glycol  17 g Oral Daily    senna-docusate 8.6-50 mg  2 tablet Oral Daily    sertraline  25 mg Oral Daily     PRN Meds:acetaminophen, hydrALAZINE, labetalol, magnesium oxide, magnesium oxide, ondansetron, oxyCODONE, oxyCODONE, potassium bicarbonate, potassium bicarbonate, potassium bicarbonate, potassium, sodium phosphates, potassium, sodium phosphates, potassium, sodium phosphates, sodium chloride 0.9%     Review of Systems  Objective:     Weight: 99.2 kg (218 lb 11.1 oz)  Body mass index is 35.3 kg/m².  Vital Signs (Most Recent):  Temp: 99.6 °F (37.6 °C) (12/20/22 1501)  Pulse: 77 (12/20/22 1601)  Resp: 19 (12/20/22 1601)  BP: 136/73 (12/20/22 1601)  SpO2: 95 % (12/20/22 1601) Vital Signs (24h Range):  Temp:  [97.5 °F (36.4 °C)-99.6 °F (37.6 °C)] 99.6 °F (37.6 °C)  Pulse:  [68-83] 77  Resp:  [12-55] 19  SpO2:  [92 %-98 %] 95 %  BP: (115-147)/(56-73) 136/73  Arterial Line BP: ()/(60-87) 103/69     Date 12/20/22 0700 - 12/21/22 0659   Shift 7865-7028 1379-0418 5260-0691 24 Hour Total   INTAKE   P.O. 240   240   Shift Total(mL/kg) 240(2.4)   240(2.4)   OUTPUT   Urine(mL/kg/hr) 1000(1.3) 0  1000   Shift Total(mL/kg) 1000(10.1) 0(0)  1000(10.1)   Weight (kg) 99.2 99.2 99.2 99.2                   Female External Urinary Catheter 12/20/22 0505 (Active)   Skin no redness;no breakdown 12/20/22  1501   Tolerance no signs/symptoms of discomfort 12/20/22 1501   Suction Continuous suction at 70 mmHg 12/20/22 0701   Date of last wick change 12/20/22 12/20/22 0507   Time of last wick change 0505 12/20/22 0507   Output (mL) 0 mL 12/20/22 1601       Physical Exam    Neurosurgery Physical Exam    Physical Exam:    Constitutional: No distress.     HEENT: atraumatic/normocephalic    Cardiovascular: Regular rhythm.     Pulm: aerating well, saturating well    Abdominal: Soft.     Psych/Behavior: He is alert.     E4V5M6  AOx3  PERRL  EOMI  Face Symmetric  Tongue midline  BUE 5/5  BLE 5/5  No drift      Significant Labs:  Recent Labs   Lab 12/20/22  0055   *      K 4.0      CO2 24   BUN 11   CREATININE 0.7   CALCIUM 8.7   MG 1.5*     Recent Labs   Lab 12/19/22  0537 12/20/22  0055   WBC 5.57 9.63   HGB 13.5 13.3   HCT 41.9 38.9    178     Recent Labs   Lab 12/19/22  0537 12/20/22  0055   INR 1.0 1.0   APTT 23.1 22.3     Microbiology Results (last 7 days)       ** No results found for the last 168 hours. **          All pertinent labs from the last 24 hours have been reviewed.    Significant Diagnostics:  I have reviewed and interpreted all pertinent imaging results/findings within the past 24 hours.    Assessment/Plan:     * Complex partial epilepsy with generalization and with intractable epilepsy  52 F now s/p R RNS placement on 12/19    -- ICU post op, OK for step down to floor to [facility]  -- q4 hour neurochecks  -- SBP < 160  -- Post op CT reviewed, expected post op changes.   -- SQH today  -- ADAT  -- PT/OT dispo    -- Dispo: pending PT.OT recs        Cait Mackenzie MD  Neurosurgery  [facility]

## 2022-12-21 VITALS
HEART RATE: 76 BPM | TEMPERATURE: 99 F | OXYGEN SATURATION: 93 % | SYSTOLIC BLOOD PRESSURE: 115 MMHG | DIASTOLIC BLOOD PRESSURE: 75 MMHG | HEIGHT: 66 IN | BODY MASS INDEX: 35.15 KG/M2 | RESPIRATION RATE: 31 BRPM | WEIGHT: 218.69 LBS

## 2022-12-21 PROBLEM — D72.829 LEUKOCYTOSIS: Status: ACTIVE | Noted: 2022-12-21

## 2022-12-21 LAB
ALBUMIN SERPL BCP-MCNC: 3.1 G/DL (ref 3.5–5.2)
ALP SERPL-CCNC: 104 U/L (ref 55–135)
ALT SERPL W/O P-5'-P-CCNC: 18 U/L (ref 10–44)
ANION GAP SERPL CALC-SCNC: 9 MMOL/L (ref 8–16)
AST SERPL-CCNC: 18 U/L (ref 10–40)
BASOPHILS # BLD AUTO: 0.02 K/UL (ref 0–0.2)
BASOPHILS NFR BLD: 0.1 % (ref 0–1.9)
BILIRUB SERPL-MCNC: 0.7 MG/DL (ref 0.1–1)
BUN SERPL-MCNC: 8 MG/DL (ref 6–20)
CALCIUM SERPL-MCNC: 9.5 MG/DL (ref 8.7–10.5)
CHLORIDE SERPL-SCNC: 101 MMOL/L (ref 95–110)
CO2 SERPL-SCNC: 24 MMOL/L (ref 23–29)
CREAT SERPL-MCNC: 0.7 MG/DL (ref 0.5–1.4)
DIFFERENTIAL METHOD: ABNORMAL
EOSINOPHIL # BLD AUTO: 0 K/UL (ref 0–0.5)
EOSINOPHIL NFR BLD: 0 % (ref 0–8)
ERYTHROCYTE [DISTWIDTH] IN BLOOD BY AUTOMATED COUNT: 11.8 % (ref 11.5–14.5)
EST. GFR  (NO RACE VARIABLE): >60 ML/MIN/1.73 M^2
GLUCOSE SERPL-MCNC: 141 MG/DL (ref 70–110)
HCT VFR BLD AUTO: 41.9 % (ref 37–48.5)
HGB BLD-MCNC: 14.4 G/DL (ref 12–16)
IMM GRANULOCYTES # BLD AUTO: 0.05 K/UL (ref 0–0.04)
IMM GRANULOCYTES NFR BLD AUTO: 0.3 % (ref 0–0.5)
LYMPHOCYTES # BLD AUTO: 1.8 K/UL (ref 1–4.8)
LYMPHOCYTES NFR BLD: 11.5 % (ref 18–48)
MAGNESIUM SERPL-MCNC: 1.7 MG/DL (ref 1.6–2.6)
MCH RBC QN AUTO: 31.1 PG (ref 27–31)
MCHC RBC AUTO-ENTMCNC: 34.4 G/DL (ref 32–36)
MCV RBC AUTO: 91 FL (ref 82–98)
MONOCYTES # BLD AUTO: 1.2 K/UL (ref 0.3–1)
MONOCYTES NFR BLD: 7.9 % (ref 4–15)
NEUTROPHILS # BLD AUTO: 12.5 K/UL (ref 1.8–7.7)
NEUTROPHILS NFR BLD: 80.2 % (ref 38–73)
NRBC BLD-RTO: 0 /100 WBC
PHOSPHATE SERPL-MCNC: 3.2 MG/DL (ref 2.7–4.5)
PLATELET # BLD AUTO: 173 K/UL (ref 150–450)
PMV BLD AUTO: 9 FL (ref 9.2–12.9)
POTASSIUM SERPL-SCNC: 4.1 MMOL/L (ref 3.5–5.1)
PROT SERPL-MCNC: 6.7 G/DL (ref 6–8.4)
RBC # BLD AUTO: 4.63 M/UL (ref 4–5.4)
SODIUM SERPL-SCNC: 134 MMOL/L (ref 136–145)
WBC # BLD AUTO: 15.59 K/UL (ref 3.9–12.7)

## 2022-12-21 PROCEDURE — 51701 INSERT BLADDER CATHETER: CPT

## 2022-12-21 PROCEDURE — 85025 COMPLETE CBC W/AUTO DIFF WBC: CPT

## 2022-12-21 PROCEDURE — 97530 THERAPEUTIC ACTIVITIES: CPT

## 2022-12-21 PROCEDURE — 51798 US URINE CAPACITY MEASURE: CPT

## 2022-12-21 PROCEDURE — 80053 COMPREHEN METABOLIC PANEL: CPT

## 2022-12-21 PROCEDURE — 97116 GAIT TRAINING THERAPY: CPT

## 2022-12-21 PROCEDURE — 99233 PR SUBSEQUENT HOSPITAL CARE,LEVL III: ICD-10-PCS | Mod: FS,,,

## 2022-12-21 PROCEDURE — 25000003 PHARM REV CODE 250

## 2022-12-21 PROCEDURE — 99233 SBSQ HOSP IP/OBS HIGH 50: CPT | Mod: FS,,,

## 2022-12-21 PROCEDURE — 94761 N-INVAS EAR/PLS OXIMETRY MLT: CPT

## 2022-12-21 PROCEDURE — 25000003 PHARM REV CODE 250: Performed by: STUDENT IN AN ORGANIZED HEALTH CARE EDUCATION/TRAINING PROGRAM

## 2022-12-21 PROCEDURE — 63600175 PHARM REV CODE 636 W HCPCS: Performed by: STUDENT IN AN ORGANIZED HEALTH CARE EDUCATION/TRAINING PROGRAM

## 2022-12-21 PROCEDURE — 25000003 PHARM REV CODE 250: Performed by: PSYCHIATRY & NEUROLOGY

## 2022-12-21 PROCEDURE — 83735 ASSAY OF MAGNESIUM: CPT

## 2022-12-21 PROCEDURE — 84100 ASSAY OF PHOSPHORUS: CPT

## 2022-12-21 PROCEDURE — 97535 SELF CARE MNGMENT TRAINING: CPT

## 2022-12-21 PROCEDURE — 97161 PT EVAL LOW COMPLEX 20 MIN: CPT

## 2022-12-21 RX ORDER — OXYCODONE HYDROCHLORIDE 5 MG/1
5 TABLET ORAL EVERY 6 HOURS PRN
Qty: 30 TABLET | Refills: 0 | Status: SHIPPED | OUTPATIENT
Start: 2022-12-21 | End: 2023-02-13

## 2022-12-21 RX ORDER — CEPHALEXIN 500 MG/1
500 CAPSULE ORAL EVERY 6 HOURS
Qty: 20 CAPSULE | Refills: 0 | Status: SHIPPED | OUTPATIENT
Start: 2022-12-21 | End: 2022-12-26

## 2022-12-21 RX ORDER — METHOCARBAMOL 500 MG/1
500 TABLET, FILM COATED ORAL 4 TIMES DAILY PRN
Qty: 40 TABLET | Refills: 0 | Status: SHIPPED | OUTPATIENT
Start: 2022-12-21 | End: 2022-12-31

## 2022-12-21 RX ADMIN — LAMOTRIGINE 200 MG: 150 TABLET ORAL at 08:12

## 2022-12-21 RX ADMIN — SERTRALINE HYDROCHLORIDE 25 MG: 25 TABLET ORAL at 08:12

## 2022-12-21 RX ADMIN — ACETAMINOPHEN 650 MG: 325 TABLET ORAL at 09:12

## 2022-12-21 RX ADMIN — METHOCARBAMOL 500 MG: 500 TABLET ORAL at 08:12

## 2022-12-21 RX ADMIN — GABAPENTIN 600 MG: 300 CAPSULE ORAL at 08:12

## 2022-12-21 RX ADMIN — HEPARIN SODIUM 5000 UNITS: 5000 INJECTION INTRAVENOUS; SUBCUTANEOUS at 05:12

## 2022-12-21 RX ADMIN — SENNOSIDES AND DOCUSATE SODIUM 2 TABLET: 50; 8.6 TABLET ORAL at 08:12

## 2022-12-21 NOTE — ASSESSMENT & PLAN NOTE
51 y/o with previous L anterior temporal lobectomy and now s/p R RNS.   -Admit NCC, NSGY and epilepsy following  -q1h neurochecks, vital checks  -SBP < 140 post-op  -Hold ACAP in acute post-op period  -home AEDs  -CT stealth with expected post op changes  - PT/OT  - Stable to TTF with NSGY. Awaiting bed.

## 2022-12-21 NOTE — PLAN OF CARE
Problem: Physical Therapy  Goal: Physical Therapy Goal  Outcome: Met   PT D/Pablo due to pt being D/Pablo to home with HHPT recommended and 's assist. Nena Burch PT 12/21/22

## 2022-12-21 NOTE — PLAN OF CARE
[facility] Care Plan    POC reviewed with Liza Arrieta and family at 1400. Pt verbalized understanding. Questions and concerns addressed. No acute events today. Pt progressing toward goals. Will continue to monitor. See below and flowsheets for full assessment and VS info.             Is this a stroke patient? no    Neuro:  [place] Coma Scale  Best Eye Response: 3-->(E3) to speech  Best Motor Response: 6-->(M6) obeys commands  Best Verbal Response: 5-->(V5) oriented  [place] Coma Scale Score: 14  Assessment Qualifiers: patient not sedated/intubated  Pupil PERRLA: yes     24 hr Temp:  [97.5 °F (36.4 °C)-99.6 °F (37.6 °C)]     CV:   Rhythm: normal sinus rhythm  BP goals:   SBP < 140  MAP > 65    Resp:           Plan: N/A    GI/:     Diet/Nutrition Received: mechanical/dental soft  Last Bowel Movement: 12/18/22  Voiding Characteristics: external catheter    Intake/Output Summary (Last 24 hours) at 12/20/2022 1847  Last data filed at 12/20/2022 1801  Gross per 24 hour   Intake 399.59 ml   Output 3550 ml   Net -3150.41 ml          Labs/Accuchecks:  Recent Labs   Lab 12/20/22  0055   WBC 9.63   RBC 4.26   HGB 13.3   HCT 38.9         Recent Labs   Lab 12/20/22  0055      K 4.0   CO2 24      BUN 11   CREATININE 0.7   ALKPHOS 108   ALT 22   AST 25   BILITOT 0.3      Recent Labs   Lab 12/20/22  0055   INR 1.0   APTT 22.3    No results for input(s): CPK, CPKMB, TROPONINI, MB in the last 168 hours.    Electrolytes: Electrolytes replaced  Accuchecks: none    Gtts:      LDA/Wounds:  Lines/Drains/Airways       Drain  Duration             Female External Urinary Catheter 12/20/22 0505 <1 day              Peripheral Intravenous Line  Duration                  Peripheral IV - Single Lumen 12/19/22 0625 Anterior;Distal;Right Forearm 1 day         Peripheral IV - Single Lumen 12/20/22 0600 20 G Right Antecubital <1 day                  Wounds: No  Wound care consulted: No

## 2022-12-21 NOTE — PT/OT/SLP PROGRESS
Occupational Therapy   Treatment    Name: Liza Arrieta  MRN: [id number]  Admitting Diagnosis:  Complex partial epilepsy with generalization and with intractable epilepsy 2 Days Post-Op  Length of Stay: 2 days    Recommendations:     Discharge Recommendations: other (see comments)  Discharge Equipment Recommendations:  none  Barriers to discharge:  None      Plan:     Patient to be seen 3 x/week to address the above listed problems via self-care/home management, therapeutic activities, therapeutic exercises, neuromuscular re-education  Plan of Care Expires: 01/20/23  Plan of Care Reviewed with: patient, spouse      Assessment:     Liza Arrieta is a 52 y.o. female with a medical diagnosis of Complex partial epilepsy with generalization and with intractable epilepsy.  She presents with the following performance deficits affecting function: weakness, impaired endurance, impaired balance, impaired self care skills, impaired functional mobility, pain.      Pt mainly limited by self-reported drowsiness and minor gait instability on this date. Pt requiring Sup-SBA for bed mobility, CGA for functional mobility (gait) with HHA, and Sup for ADLs (grooming, toileting) while at bathroom level. Pt continues to progress toward OT goals. Pt should be safe to discharge home with no further need for skilled OT services; will continue to monitor.    Rehab Prognosis: Good; patient would benefit from acute skilled OT services to address these deficits and reach maximum level of function.         Subjective     Communicated with: RN prior to session.  Patient found HOB elevated with blood pressure cuff, pulse ox (continuous), telemetry and  present upon OT entry to room.    Chief Complaint: No chief complaint on file.    Patient/Family Comments/goals: Pt reports drowsiness as her biggest barrier to occupational participation.    Pain/Comfort:  Pain Rating 1: 0/10  Pain Rating Post-Intervention 1: 0/10      Objective:     Patient  "found with: blood pressure cuff, pulse ox (continuous), telemetry   General Precautions: Standard, seizure, fall   Orthopedic Precautions:N/A   Braces: N/A   Oxygen Device: Room air  Vitals: /69 (BP Location: Left arm, Patient Position: Lying)   Pulse 65   Temp 99 °F (37.2 °C)   Resp (!) 32   Ht 5' 6" (1.676 m)   Wt 99.2 kg (218 lb 11.1 oz)   SpO2 (!) 93%   Breastfeeding No   BMI 35.30 kg/m²     Occupational Performance:  Bed Mobility:    Patient completed supine to sit with SBA on right side of bed  Scooting anteriorly to EOB to have both feet planted on floor: SBA  Patient completed sit to supine with Sup on right side of bed    Functional Mobility/Transfers:  Static Sitting EOB: Sup  Patient completed Sit <> Stand Transfer x3 trials with SBA with no AD  Static Standing Balance: SBA  Dynamic Standing Balance: SBA  Patient completed Bed <> Chair Transfer using step transfer technique with SBA with no AD  Patient completed Toilet Transfer step transfer technique with SBA with no AD  Functional Mobility: Pt ambulated bedside <> bathroom with CGA with HHA  LOB: No, minor instability noted  SOB: No  Dizziness/Lightheadedness: No, however pt reports persistent drowsiness    Activities of Daily Living:  Grooming: Sup to perform oral hygiene and facial hygiene while standing at sink  Toileting: Sup on toilet with pt performing pericare and clothing management    AMPAC 6 Click ADL:  AMPAC Total Score: 21    Treatment & Education:  Educated on role of OT, POC, and goals for this hospital stay  Emphasized importance of OOB ax and level of staff assistance required for transfers and functional mobility (CGA with HHA)  Encouraged pt to alert OT of personal self-care goals and/or comfort-related concerns during future OT sessions  Pt denies any further questions, concerns, or requests at this time      Patient left HOB elevated with all lines intact, call button in reach, and spouse present    GOALS: "   Multidisciplinary Problems       Occupational Therapy Goals          Problem: Occupational Therapy    Goal Priority Disciplines Outcome Interventions   Occupational Therapy Goal     OT, PT/OT Ongoing, Progressing    Description: Goals to be met by: 01/10/22     Patient will increase functional independence with ADLs by performing:    LE Dressing with [place].  Grooming while standing at sink with Supervision.  Toileting from toilet with Bayard for hygiene and clothing management.   Toilet transfer to toilet with Supervision.                           Time Tracking:     OT Date of Treatment: 12/21/22  OT Start Time: 1118  OT Stop Time: 1143  OT Total Time (min): 25 min  Additional staff present: N/A    Billable Minutes  Self Care/Home Management 15  Therapeutic Activity 10      12/21/2022

## 2022-12-21 NOTE — SUBJECTIVE & OBJECTIVE
Review of Systems   Constitutional: Negative.  Negative for chills, diaphoresis, fatigue and fever.   HENT: Negative.  Negative for congestion, ear pain, postnasal drip, rhinorrhea, sinus pressure, sinus pain, sneezing and sore throat.    Eyes: Negative.  Negative for visual disturbance.   Respiratory:  Positive for cough. Negative for chest tightness, shortness of breath, wheezing and stridor.    Cardiovascular:  Negative for chest pain and palpitations.   Gastrointestinal:  Negative for constipation, diarrhea, nausea and vomiting.   Endocrine: Negative.    Genitourinary: Negative.    Musculoskeletal: Negative.    Skin: Negative.    Neurological:  Positive for headaches. Negative for dizziness, seizures, facial asymmetry and weakness.   Objective:     Vitals:    Temp: 99 °F (37.2 °C)  Pulse: 65  Rhythm: normal sinus rhythm  BP: 121/69  MAP (mmHg): 91  Resp: (!) 32  SpO2: (!) 93 %    Temp  Min: 99 °F (37.2 °C)  Max: 100.9 °F (38.3 °C)  Pulse  Min: 64  Max: 82  BP  Min: 104/62  Max: 146/67  MAP (mmHg)  Min: 79  Max: 98  Resp  Min: 14  Max: 45  SpO2  Min: 92 %  Max: 95 %    12/20 0701 - 12/21 0700  In: 240 [P.O.:240]  Out: 2150 [Urine:2150]   Unmeasured Output  Urine Occurrence: 1       Physical Exam  Vitals and nursing note reviewed.   Constitutional:       General: She is not in acute distress.     Appearance: Normal appearance. She is obese. She is not ill-appearing.   HENT:      Head: Normocephalic and atraumatic.      Comments: dressing c/d/I.      Right Ear: Tympanic membrane normal.      Left Ear: Tympanic membrane normal.      Nose: Nose normal. No congestion or rhinorrhea.      Mouth/Throat:      Mouth: Mucous membranes are moist.      Pharynx: Oropharynx is clear.   Eyes:      General:         Right eye: No discharge.         Left eye: No discharge.      Extraocular Movements: Extraocular movements intact.      Pupils: Pupils are equal, round, and reactive to light.   Cardiovascular:      Rate and Rhythm:  Normal rate and regular rhythm.   Pulmonary:      Effort: No respiratory distress.      Breath sounds: Normal breath sounds. No stridor. No wheezing, rhonchi or rales.   Abdominal:      General: Abdomen is flat.      Palpations: Abdomen is soft.   Musculoskeletal:         General: Normal range of motion.      Cervical back: Normal range of motion and neck supple.   Skin:     General: Skin is warm and dry.      Capillary Refill: Capillary refill takes 2 to 3 seconds.   Neurological:      Mental Status: She is alert.      Comments: E4V5M6  AAOx3  Follows all commands  PERRL, EOMI  Pupils brisk  SILT  EMETERIO and AG   Psychiatric:         Mood and Affect: Mood normal.         Behavior: Behavior normal.         Thought Content: Thought content normal.         Today I personally reviewed pertinent medications, lines/drains/airways, imaging, cardiology results, laboratory results, microbiology results, notably:    CXR 12/21  No significant intrathoracic abnormality.  No significant detrimental interval change in the appearance of the chest since the examinations referenced above is appreciated.

## 2022-12-21 NOTE — PLAN OF CARE
[facility] - Neuro Critical Care  Discharge Final Note    Primary Care Provider: Rc Rodriguez MD    Expected Discharge Date: 12/21/2022    Patient discharged to home with spouse.  No post acute needs identified per MD.    Final Discharge Note (most recent)       Final Note - 12/21/22 1527          Final Note    Assessment Type Final Discharge Note     Anticipated Discharge Disposition Home or Self Care     What phone number can be called within the next 1-3 days to see how you are doing after discharge? [phone]     Hospital Resources/Appts/Education Provided Appointments scheduled and added to AVS                     Important Message from Medicare NA             Contact Info       Rc Rodriguez MD   Specialty: Family Medicine   Relationship: PCP - General    [address] [place] [address]  [address]  [facility] [address]   Phone: [phone]       Next Steps: Go on 12/28/2022    Instructions: Hospital follow up at 12:30    Ava Valdez MD   Specialty: Neurosurgery    [address] [facility] [address]   Phone: [phone]       Next Steps: Follow up in 2 week(s)    Instructions: For wound re-check          Kenna Fong RN, CCRN-K, [facility]  Neuro-Critical Care   [phone]

## 2022-12-21 NOTE — ASSESSMENT & PLAN NOTE
52 F now s/p R RNS placement on 12/19    -- ICU post op, OK for step down to floor to NSGY  -- q4 hour neurochecks  -- SBP < 160  -- Post op CT reviewed, expected post op changes.   -- SQH   -- ADAT  -- PT/OT pending recs today    -- likely d/c home later today    -- Dispo: pending PT.OT recs

## 2022-12-21 NOTE — PT/OT/SLP EVAL
"Physical Therapy Evaluation/D/C Summary    Patient Name:  Liza Arrieta   MRN:  [id number]    Recommendations:     Discharge Recommendations: home health PT   Discharge Equipment Recommendations: none   Barriers to discharge: None    Assessment:     Liza Arrieta is a 52 y.o. female admitted with a medical diagnosis of Complex partial epilepsy with generalization and with intractable epilepsy.  PT D/Pablo due to pt being D/Pablo to home with HHPT and 's assist. PT educated pt and her  in safety with mobility upon D/C. They expressed understanding.    Recent Surgery: Procedure(s) (LRB):  PLACEMENT OF RESPONSIVE NEUROSTIMULATION DEVICE (NEUROPACE) (Right) 2 Days Post-Op    Plan:      (PT D/Pablo due to pt able to perform functional mobility)   Plan of Care Expires:  01/20/23    Subjective   "I feel a little off balance" (Initially when ambulating)    Pain/Comfort:  Pain Rating 1: 0/10  Pain Rating Post-Intervention 1: 0/10    Patients cultural, spiritual, Confucianist conflicts given the current situation: no    Living Environment:  Pt lives with her  in a 1 story home with 2 SHERITA with no rails  Prior to admission, patients level of function was independent.  Equipment used at home: shower chair. Upon discharge, patient will have assistance from .    Objective:     Communicated with nurse prior to session.  Patient found HOB elevated with bed alarm, blood pressure cuff, pulse ox (continuous), telemetry  upon PT entry to room.    General Precautions: Standard, fall, seizure  Orthopedic Precautions:N/A   Braces: N/A  Respiratory Status: Room air    Exams:  Cognitive Exam:  Patient is oriented to Person, Place, and Time  Sensation:    -       Intact  light/touch B LE  RLE ROM: WFL  RLE Strength: WFL  LLE ROM: WFL  LLE Strength: WFL    Functional Mobility:  Bed Mobility:     Rolling Right: independence  Supine to Sit: independence  Transfers:     Sit to Stand:  independence with no AD  Gait: 125ft with no " AD with CGA initially due to instability to the L then progressed to requiring only SBA. Pt's  educated in guarding pt with mobility.  Stairs:  Pt ascended/descended 3 stair(s) with HHA  with Modified Independent. Pt educated in safe technique, to slow pace and take only one step at a time.    AM-PAC 6 CLICK MOBILITY  Total Score:20     Patient left sitting edge of bed with all lines intact, call button in reach, nurse notified, and nurse and  present.    GOALS:   Multidisciplinary Problems       Physical Therapy Goals       Not on file              Multidisciplinary Problems (Resolved)          Problem: Physical Therapy    Goal Priority Disciplines Outcome Goal Variances Interventions   Physical Therapy Goal   (Resolved)     PT, PT/OT Met                         History:     Past Medical History:   Diagnosis Date    Convulsions     Epilepsy     Seizures        Past Surgical History:   Procedure Laterality Date    APPENDECTOMY       SECTION      4    CHOLECYSTECTOMY      CRANIOTOMY N/A 2018    Procedure: CRANIOTOMY for strip and grid;  Surgeon: Ruben Senior MD;  Location: [facility] OR [address] [place] [address];  Service: Neurosurgery;  Laterality: N/A;  toronto II, asa 2, type and screen, regular bed, [place], supine     CRANIOTOMY Left 2018    Procedure: CRANIOTOMY for grids and strips;  Surgeon: Ruben Senior MD;  Location: [facility] OR [address] [place] [address];  Service: Neurosurgery;  Laterality: Left;    HYSTERECTOMY      around  for pain ful periods     INSERTION OF SUBDURAL GRID AND STRIP ELECTRODES BY CRANIOTOMY Left 2018    Procedure: CRANIOTOMY, FOR SUBDURAL GRID AND STRIP ELECTRODE LEAD Removal.;  Surgeon: Ruben Senior MD;  Location: [facility] OR [address] [place] [address];  Service: Neurosurgery;  Laterality: Left;  needs microscope and stealth-cg    PLACEMENT OF RESPONSIVE NEUROSTIMULATION DEVICE (NEUROPACE) Right 2022    Procedure: PLACEMENT OF RESPONSIVE NEUROSTIMULATION DEVICE (NEUROPACE);  Surgeon: Ava Valdez MD;   Location: [facility] OR 2ND FLR;  Service: Neurosurgery;  Laterality: Right;  Ane: Gen  [place]: III  ASA: III  Blood: T&Screen  Neuro Mon: None  Rad: LoopX  Bed: [facility] 180  Head: Smith  Pos: Supine  Spec Equip: Lakesha, RNS Rep  Mamta    REMOVAL OF STEREO EEG LEADS (DEPTH ELECTRODES) Bilateral 11/16/2022    Procedure: REMOVAL OF STEREO EEG LEADS (DEPTH ELECTRODES);  Surgeon: Ava Valdez MD;  Location: [facility] OR [address] [place] [address];  Service: Neurosurgery;  Laterality: Bilateral;    SURGICAL REMOVAL OF LOBE OF BRAIN Left 11/19/2018    Procedure: LOBECTOMY, BRAIN left temporal lobe.;  Surgeon: Ruben Senior MD;  Location: [facility] OR [address] [place] [address];  Service: Neurosurgery;  Laterality: Left;       Time Tracking:     PT Received On: 12/21/22  PT Start Time: 1353     PT Stop Time: 1414  PT Total Time (min): 21 min     Billable Minutes: Evaluation 11 and Gait Training 10      12/21/2022

## 2022-12-21 NOTE — PROGRESS NOTES
[facility] - Neuro Critical Care  Neurocritical Care  Progress Note    Admit Date: 12/19/2022  Service Date: 12/21/2022  Length of Stay: 2    Subjective:     Chief Complaint: Complex partial epilepsy with generalization and with intractable epilepsy    History of Present Illness: Liza Arrieta is a 52 y.o. right-handed female who presents to [facility] s/p R RNS placement. The patient is followed outpatient by Dr. Seth Mark and previously underwent left anterior temporal lobectomy after subdural strip and grid monitoring on 11/19/18. After surgery, she reports that her personality improved. Petit mal seizures resolved postoperatively, but she began having complex partial seizures (self-resolved after 30 seconds-1 minute) while jogging about 3-4 months ago and was referred to Dr. Ava Valdez for further operative management.     She will be admitted to [facility] for hourly neuromonitoring and a higher level of care.          Hospital Course: 12/20/2022 CTH with expected post op changes. Added senna. PT/OT. Stable to TTF with NSGY.  12/21/2022 Leukocytosis 15.59 from 9.5 and Tmax 100.9. New onset non-productive cough. Ordered expectorated sputum cx for if cough becomes productive. CXR negative. Pt otherwise stable for floor.       Review of Systems   Constitutional: Negative.  Negative for chills, diaphoresis, fatigue and fever.   HENT: Negative.  Negative for congestion, ear pain, postnasal drip, rhinorrhea, sinus pressure, sinus pain, sneezing and sore throat.    Eyes: Negative.  Negative for visual disturbance.   Respiratory:  Positive for cough. Negative for chest tightness, shortness of breath, wheezing and stridor.    Cardiovascular:  Negative for chest pain and palpitations.   Gastrointestinal:  Negative for constipation, diarrhea, nausea and vomiting.   Endocrine: Negative.    Genitourinary: Negative.    Musculoskeletal: Negative.    Skin: Negative.    Neurological:  Positive for headaches. Negative for dizziness, seizures,  facial asymmetry and weakness.   Objective:     Vitals:    Temp: 99 °F (37.2 °C)  Pulse: 65  Rhythm: normal sinus rhythm  BP: 121/69  MAP (mmHg): 91  Resp: (!) 32  SpO2: (!) 93 %    Temp  Min: 99 °F (37.2 °C)  Max: 100.9 °F (38.3 °C)  Pulse  Min: 64  Max: 82  BP  Min: 104/62  Max: 146/67  MAP (mmHg)  Min: 79  Max: 98  Resp  Min: 14  Max: 45  SpO2  Min: 92 %  Max: 95 %    12/20 0701 - 12/21 0700  In: 240 [P.O.:240]  Out: 2150 [Urine:2150]   Unmeasured Output  Urine Occurrence: 1       Physical Exam  Vitals and nursing note reviewed.   Constitutional:       General: She is not in acute distress.     Appearance: Normal appearance. She is obese. She is not ill-appearing.   HENT:      Head: Normocephalic and atraumatic.      Comments: dressing c/d/I.      Right Ear: Tympanic membrane normal.      Left Ear: Tympanic membrane normal.      Nose: Nose normal. No congestion or rhinorrhea.      Mouth/Throat:      Mouth: Mucous membranes are moist.      Pharynx: Oropharynx is clear.   Eyes:      General:         Right eye: No discharge.         Left eye: No discharge.      Extraocular Movements: Extraocular movements intact.      Pupils: Pupils are equal, round, and reactive to light.   Cardiovascular:      Rate and Rhythm: Normal rate and regular rhythm.   Pulmonary:      Effort: No respiratory distress.      Breath sounds: Normal breath sounds. No stridor. No wheezing, rhonchi or rales.   Abdominal:      General: Abdomen is flat.      Palpations: Abdomen is soft.   Musculoskeletal:         General: Normal range of motion.      Cervical back: Normal range of motion and neck supple.   Skin:     General: Skin is warm and dry.      Capillary Refill: Capillary refill takes 2 to 3 seconds.   Neurological:      Mental Status: She is alert.      Comments: E4V5M6  AAOx3  Follows all commands  PERRL, EOMI  Pupils brisk  SILT  EMETERIO and AG   Psychiatric:         Mood and Affect: Mood normal.         Behavior: Behavior normal.          Thought Content: Thought content normal.         Today I personally reviewed pertinent medications, lines/drains/airways, imaging, cardiology results, laboratory results, microbiology results, notably:    CXR 12/21  No significant intrathoracic abnormality.  No significant detrimental interval change in the appearance of the chest since the examinations referenced above is appreciated.    Assessment/Plan:     Neuro  * Complex partial epilepsy with generalization and with intractable epilepsy  51 y/o with previous L anterior temporal lobectomy and now s/p R RNS.   -Admit NCC, NSGY and epilepsy following  -q1h neurochecks, vital checks  -SBP < 140 post-op  -Hold ACAP in acute post-op period  -home AEDs  -CT stealth with expected post op changes  - PT/OT  - Stable to TTF with NSGY. Awaiting bed.    Psychiatric  Depression  Resume home sertraline     Cardiac/Vascular  Essential hypertension  SBP < 140  Off cardene for 2 days  PRN labetalol, hydralazine    Oncology  Leukocytosis  Leukocytosis 15.59 from 9.5 and Tmax 100.9  -  New onset non-productive cough  - Ordered expectorated sputum cx for if cough becomes productive  - CXR negative  - Pt otherwise stable for floor.         The patient is being Prophylaxed for:  Venous Thromboembolism with: Mechanical or Chemical  Stress Ulcer with: Not Applicable   Ventilator Pneumonia with: not applicable    Activity Orders          Turn patient starting at 12/19 1600    Diet Dysphagia Mechanical Soft (IDDSI Level 5): Dysphagia 2 (Mechanical Soft Ground) starting at 12/19 1527    Elevate HOB starting at 12/19 1514        Full Code     Level III    Jone Del Rio PA-C  Neurocritical Care  [facility] - Neuro Critical [facility]

## 2022-12-21 NOTE — ASSESSMENT & PLAN NOTE
Leukocytosis 15.59 from 9.5 and Tmax 100.9  -  New onset non-productive cough  - Ordered expectorated sputum cx for if cough becomes productive  - CXR negative  - Pt otherwise stable for floor.

## 2022-12-21 NOTE — SUBJECTIVE & OBJECTIVE
Interval History: 12/21: more alert and active today, pending PT/OT today. TTF. Likely d/c later today    Medications:  Continuous Infusions:  Scheduled Meds:   cenobamate  150 mg Oral Nightly    gabapentin  600 mg Oral TID    heparin (porcine)  5,000 Units Subcutaneous Q8H    lamoTRIgine  200 mg Oral Daily    lamoTRIgine  300 mg Oral QHS    methocarbamoL  500 mg Oral QID    polyethylene glycol  17 g Oral Daily    senna-docusate 8.6-50 mg  2 tablet Oral Daily    sertraline  25 mg Oral Daily     PRN Meds:acetaminophen, hydrALAZINE, labetalol, magnesium oxide, magnesium oxide, ondansetron, oxyCODONE, oxyCODONE, potassium bicarbonate, potassium bicarbonate, potassium bicarbonate, potassium, sodium phosphates, potassium, sodium phosphates, potassium, sodium phosphates, sodium chloride 0.9%     Review of Systems  Objective:     Weight: 99.2 kg (218 lb 11.1 oz)  Body mass index is 35.3 kg/m².  Vital Signs (Most Recent):  Temp: 99 °F (37.2 °C) (12/21/22 0701)  Pulse: 65 (12/21/22 0754)  Resp: (!) 32 (12/21/22 0754)  BP: 121/69 (12/21/22 0701)  SpO2: (!) 93 % (12/21/22 0754) Vital Signs (24h Range):  Temp:  [99 °F (37.2 °C)-100.9 °F (38.3 °C)] 99 °F (37.2 °C)  Pulse:  [64-83] 65  Resp:  [14-45] 32  SpO2:  [92 %-96 %] 93 %  BP: (104-146)/(59-73) 121/69     Date 12/21/22 0700 - 12/22/22 0659   Shift 9304-4642 1877-4330 1340-2040 24 Hour Total   INTAKE   Shift Total(mL/kg)       OUTPUT   Urine(mL/kg/hr) 0   0   Shift Total(mL/kg) 0(0)   0(0)   Weight (kg) 99.2 99.2 99.2 99.2                     Female External Urinary Catheter 12/20/22 0505 (Active)   Skin no redness;no breakdown 12/20/22 1501   Tolerance no signs/symptoms of discomfort 12/20/22 1501   Suction Continuous suction at 70 mmHg 12/20/22 0701   Date of last wick change 12/20/22 12/20/22 0507   Time of last wick change 0505 12/20/22 0507   Output (mL) 0 mL 12/20/22 1601       Physical Exam    Neurosurgery Physical Exam    Physical Exam:    Constitutional: No  distress.     HEENT: atraumatic/normocephalic    Cardiovascular: Regular rhythm.     Pulm: aerating well, saturating well    Abdominal: Soft.     Psych/Behavior: He is alert.     E4V5M6  AOx3  PERRL  EOMI  Face Symmetric  Tongue midline  BUE 5/5  BLE 5/5  No drift      Significant Labs:  Recent Labs   Lab 12/20/22  0055 12/20/22  1757 12/21/22  0102   *  --  141*     --  134*   K 4.0  --  4.1     --  101   CO2 24  --  24   BUN 11  --  8   CREATININE 0.7  --  0.7   CALCIUM 8.7  --  9.5   MG 1.5* 1.6 1.7       Recent Labs   Lab 12/20/22  0055 12/21/22  0102   WBC 9.63 15.59*   HGB 13.3 14.4   HCT 38.9 41.9    173       Recent Labs   Lab 12/20/22  0055   INR 1.0   APTT 22.3       Microbiology Results (last 7 days)       ** No results found for the last 168 hours. **          All pertinent labs from the last 24 hours have been reviewed.    Significant Diagnostics:  I have reviewed and interpreted all pertinent imaging results/findings within the past 24 hours.

## 2022-12-21 NOTE — PLAN OF CARE
[facility] Care Plan    POC reviewed with Liza Arrieta and family at 0300. Pt verbalized understanding. Questions and concerns addressed. No acute events overnight. Pt progressing toward goals. Will continue to monitor. See below and flowsheets for full assessment and VS info.     - PRN Tylenol given x1.  - Straight cath x 1.  - TTF.        Is this a stroke patient? no    Neuro:  Antwan Coma Scale  Best Eye Response: 4-->(E4) spontaneous  Best Motor Response: 6-->(M6) obeys commands  Best Verbal Response: 5-->(V5) oriented  Antwan Coma Scale Score: 15  Assessment Qualifiers: patient not sedated/intubated, no eye obstruction present  Pupil PERRLA: yes     24hr Temp:  [98.4 °F (36.9 °C)-100.9 °F (38.3 °C)]     CV:   Rhythm: normal sinus rhythm  BP goals:   SBP < 140  MAP > 65    Resp:           Plan: N/A    GI/:     Diet/Nutrition Received: mechanical/dental soft  Last Bowel Movement: 12/18/22  Voiding Characteristics: external catheter    Intake/Output Summary (Last 24 hours) at 12/21/2022 0514  Last data filed at 12/21/2022 0002  Gross per 24 hour   Intake 240 ml   Output 2025 ml   Net -1785 ml          Labs/Accuchecks:  Recent Labs   Lab 12/21/22  0102   WBC 15.59*   RBC 4.63   HGB 14.4   HCT 41.9         Recent Labs   Lab 12/21/22  0102   *   K 4.1   CO2 24      BUN 8   CREATININE 0.7   ALKPHOS 104   ALT 18   AST 18   BILITOT 0.7      Recent Labs   Lab 12/20/22  0055   INR 1.0   APTT 22.3    No results for input(s): CPK, CPKMB, TROPONINI, MB in the last 168 hours.    Electrolytes: Electrolytes replaced  Accuchecks: none    Gtts:      LDA/Wounds:  Lines/Drains/Airways       Drain  Duration             Female External Urinary Catheter 12/20/22 0505 1 day              Peripheral Intravenous Line  Duration                  Peripheral IV - Single Lumen 12/19/22 0625 Anterior;Distal;Right Forearm 1 day         Peripheral IV - Single Lumen 12/20/22 0600 20 G Right Antecubital <1 day                   Wounds: Yes  Wound care consulted: No

## 2022-12-21 NOTE — PROGRESS NOTES
[facility]  Neurosurgery  Progress Note    Subjective:     History of Present Illness: 52 F now s/p R RNS placement on 12/19      Post-Op Info:  Procedure(s) (LRB):  PLACEMENT OF RESPONSIVE NEUROSTIMULATION DEVICE (NEUROPACE) (Right)   2 Days Post-Op     Interval History: 12/21: more alert and active today, pending PT/OT today. TTF. Likely d/c later today    Medications:  Continuous Infusions:  Scheduled Meds:   cenobamate  150 mg Oral Nightly    gabapentin  600 mg Oral TID    heparin (porcine)  5,000 Units Subcutaneous Q8H    lamoTRIgine  200 mg Oral Daily    lamoTRIgine  300 mg Oral QHS    methocarbamoL  500 mg Oral QID    polyethylene glycol  17 g Oral Daily    senna-docusate 8.6-50 mg  2 tablet Oral Daily    sertraline  25 mg Oral Daily     PRN Meds:acetaminophen, hydrALAZINE, labetalol, magnesium oxide, magnesium oxide, ondansetron, oxyCODONE, oxyCODONE, potassium bicarbonate, potassium bicarbonate, potassium bicarbonate, potassium, sodium phosphates, potassium, sodium phosphates, potassium, sodium phosphates, sodium chloride 0.9%     Review of Systems  Objective:     Weight: 99.2 kg (218 lb 11.1 oz)  Body mass index is 35.3 kg/m².  Vital Signs (Most Recent):  Temp: 99 °F (37.2 °C) (12/21/22 0701)  Pulse: 65 (12/21/22 0754)  Resp: (!) 32 (12/21/22 0754)  BP: 121/69 (12/21/22 0701)  SpO2: (!) 93 % (12/21/22 0754) Vital Signs (24h Range):  Temp:  [99 °F (37.2 °C)-100.9 °F (38.3 °C)] 99 °F (37.2 °C)  Pulse:  [64-83] 65  Resp:  [14-45] 32  SpO2:  [92 %-96 %] 93 %  BP: (104-146)/(59-73) 121/69     Date 12/21/22 0700 - 12/22/22 0659   Shift 9208-1352 1130-2603 4515-5585 24 Hour Total   INTAKE   Shift Total(mL/kg)       OUTPUT   Urine(mL/kg/hr) 0   0   Shift Total(mL/kg) 0(0)   0(0)   Weight (kg) 99.2 99.2 99.2 99.2                     Female External Urinary Catheter 12/20/22 0505 (Active)   Skin no redness;no breakdown 12/20/22 1501   Tolerance no signs/symptoms of discomfort 12/20/22  1501   Suction Continuous suction at 70 mmHg 12/20/22 0701   Date of last wick change 12/20/22 12/20/22 0507   Time of last wick change 0505 12/20/22 0507   Output (mL) 0 mL 12/20/22 1601       Physical Exam    Neurosurgery Physical Exam    Physical Exam:    Constitutional: No distress.     HEENT: atraumatic/normocephalic    Cardiovascular: Regular rhythm.     Pulm: aerating well, saturating well    Abdominal: Soft.     Psych/Behavior: He is alert.     E4V5M6  AOx3  PERRL  EOMI  Face Symmetric  Tongue midline  BUE 5/5  BLE 5/5  No drift      Significant Labs:  Recent Labs   Lab 12/20/22  0055 12/20/22  1757 12/21/22  0102   *  --  141*     --  134*   K 4.0  --  4.1     --  101   CO2 24  --  24   BUN 11  --  8   CREATININE 0.7  --  0.7   CALCIUM 8.7  --  9.5   MG 1.5* 1.6 1.7       Recent Labs   Lab 12/20/22  0055 12/21/22  0102   WBC 9.63 15.59*   HGB 13.3 14.4   HCT 38.9 41.9    173       Recent Labs   Lab 12/20/22  0055   INR 1.0   APTT 22.3       Microbiology Results (last 7 days)       ** No results found for the last 168 hours. **          All pertinent labs from the last 24 hours have been reviewed.    Significant Diagnostics:  I have reviewed and interpreted all pertinent imaging results/findings within the past 24 hours.    Assessment/Plan:     * Complex partial epilepsy with generalization and with intractable epilepsy  52 F now s/p R RNS placement on 12/19    -- ICU post op, OK for step down to floor to NSGY  -- q4 hour neurochecks  -- SBP < 160  -- Post op CT reviewed, expected post op changes.   -- SQH   -- ADAT  -- PT/OT pending recs today    -- likely d/c home later today    -- Dispo: pending PT.OT recs        Cait Mackenzie MD  Neurosurgery  [facility]

## 2022-12-21 NOTE — DISCHARGE INSTRUCTIONS
--Patient stable for discharge to home    --Please take prescriptions as detailed in medication list  -Take keflex for 5 days for infection prophylaxix    --All questions/concerns addressed and answered    --Please followup with neurosurgery clinic in 2 weeks for wound check. to be arranged by Neurosurgery Clinic     --Please call immediately for any new onset nausea/vomiting/fever/chills, wound breakdown, numbness/tingling/weakness    Wound Care Instructions:  -If you have any dressings at discharge, please remove 48 hours after the surgery.  -If you have steri strips, do not remove, as they will fall off.  -If you have staples, do not remove, as they will be removed at clinic follow up.  -You may shower daily but do not soak or submerge wound in water.  -Scalp/head incisions, wash hair daily with baby shampoo and do not use hair products. Pat incision dry, do not rub.  -For head incisions, do not wear scarfs or hats.  -Keep all wounds clean, dry, and open to air.  -Do not apply creams or ointments to the wound.  -No driving while on narcotic pain medications  -Call Neurosurgery if the wound opens, drains, or becomes red

## 2022-12-21 NOTE — DISCHARGE SUMMARY
[facility]  Neurosurgery  Discharge Summary      Patient Name: Liza Arrieta  MRN: [id number]  Admission Date: 12/19/2022  Hospital Length of Stay: 2 days  Discharge Date and Time:  12/21/2022 1:47 PM  Attending Physician: Russell Clark MD   Discharging Provider: Cait Mackenzie MD  Primary Care Provider: Rc Rodriguez MD    HPI:   52 F now s/p R RNS placement on 12/19      Procedure(s) (LRB):  PLACEMENT OF RESPONSIVE NEUROSTIMULATION DEVICE (NEUROPACE) (Right)     Hospital Course: 12.20: OR yesterday for right RNS placement. Post op exam stable. CTH with expected post op change  12/21: more alert and active today, pending PT/OT today. TTF. Likely d/c later today    Patient was admitted for elective R RNS placement on 12/19/2022 and underwent procedure perioperative complications. The patient remained on abx for surgical prophylaxis and was kept on appropriate DVT prophylaxis during the course of admission. At the time of discharge, the patient was tolerating PO intake without N/V, dysphagia, denied bowel or bladder dysfunction, denied new neurological symptoms, and reported pain controlled with current regimen. The surgical site was without evidence of drainage, breakdown or infection and all staples/sutures were intact. Therapy recommendations of home health PT/OT have been arranged and the patient will follow up in clinic as indicated in discharge instructions. All questions were answered and continued treatment/wound care instructions were discussed in detail prior to discharge.      Goals of Care Treatment Preferences:  Code Status: Full Code      Consults:   Consults (From admission, onward)        Status Ordering Provider     Inpatient consult to Physical Medicine Rehab  Once        Provider:  (Not yet assigned)    Completed AUGUSTUS RAUSCH     Inpatient consult to Registered Dietitian/Nutritionist  Once        Provider:  (Not yet assigned)    Completed AUGUSTUS RAUSCH     IP  consult to case management/social work  Once        Provider:  (Not yet assigned)    Acknowledged AUGUSTUS RAUSCH            Pending Diagnostic Studies:     None        Final Active Diagnoses:    Diagnosis Date Noted POA    PRINCIPAL PROBLEM:  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 Go on 12/28/2022.    Specialty: Family Medicine  Why: Hospital follow up at 12:30  Contact information:  [address]  [address]  [facility] [id number] [phone]             Ava Valdez MD Follow up in 2 week(s).    Specialty: Neurosurgery  Why: For wound re-check  Contact information:  [address] [place] [id number]  [phone]                       Patient Instructions:      Notify your health care provider if you experience any of the following:  temperature >100.4     Notify your health care provider if you experience any of the following:  persistent nausea and vomiting or diarrhea     Notify your health care provider if you experience any of the following:  severe uncontrolled pain     Notify your health care provider if you experience any of the following:  redness, tenderness, or signs of infection (pain, swelling, redness, odor or green/yellow discharge around incision site)     Notify your health care provider if you experience any of the following:  difficulty breathing or increased cough     Notify your health care provider if you experience any of the following:  severe persistent headache     Notify your health care provider if you experience any of the following:  worsening rash     Notify your health care provider  if you experience any of the following:  persistent dizziness, light-headedness, or visual disturbances     Notify your health care provider if you experience any of the following:  increased confusion or weakness     Activity as tolerated     Medications:  Reconciled Home Medications:      Medication List      START taking these medications    cephALEXin 500 MG capsule  Commonly known as: KEFLEX  Take 1 capsule (500 mg total) by mouth every 6 (six) hours. for 5 days     methocarbamoL 500 MG Tab  Commonly known as: ROBAXIN  Take 1 tablet (500 mg total) by mouth 4 (four) times daily as needed (muscle spasms).     oxyCODONE 5 MG immediate release tablet  Commonly known as: ROXICODONE  Take 1 tablet (5 mg total) by mouth every 6 (six) hours as needed for Pain.        CHANGE how you take these medications    lamoTRIgine 200 MG tablet  Commonly known as: LAMICTAL  Take 2.5 tablets (500 mg total) by mouth once daily.  What changed: additional instructions     XCOPRI 150 mg Tab  Generic drug: cenobamate  Take one tablet by mouth once daily.  What changed:   · when to take this  · Another medication with the same name was removed. Continue taking this medication, and follow the directions you see here.        CONTINUE taking these medications    ferrous sulfate 325 mg (65 mg iron) Tab tablet  Commonly known as: FEOSOL  Take 325 mg by mouth every morning.     sertraline 25 MG tablet  Commonly known as: ZOLOFT  Take 1 tablet (25 mg total) by mouth once daily.            Cait Mackenzie MD  Neurosurgery  [facility] - Neuro Critical Care

## 2022-12-21 NOTE — PLAN OF CARE
[facility]      HOME HEALTH ORDERS  FACE TO FACE ENCOUNTER    Patient Name: Liza Arrieta  YOB: 1970    PCP: Rc Rodriguez MD   PCP Address: [address] [facility]  PCP Phone Number: [phone]  PCP Fax: [phone]    Encounter Date: 12/8/22    Admit to Home Health    Diagnoses:  Active Hospital Problems    Diagnosis  POA    *Complex partial epilepsy with generalization and with intractable epilepsy [G40.219]  Yes    Leukocytosis [D72.829]  No    Depression [F32.A]  Yes    Essential hypertension [I10]  Yes      Resolved Hospital Problems   No resolved problems to display.       Follow Up Appointments:  No future appointments.    Allergies:Review of patient's allergies indicates:  No Known Allergies    Medications: Review discharge medications with patient and family and provide education.    Current Facility-Administered Medications   Medication Dose Route Frequency Provider Last Rate Last Admin    acetaminophen tablet 650 mg  650 mg Oral Q4H PRN Josafat Arevalo MD   650 mg at 12/21/22 0935    cenobamate Tab 150 mg  150 mg Oral Nightly Josafat Arevalo MD   150 mg at 12/20/22 2054    gabapentin capsule 600 mg  600 mg Oral TID Lynsey Louie PA-C   600 mg at 12/21/22 0843    heparin (porcine) injection 5,000 Units  5,000 Units Subcutaneous Q8H Cait Mackenzie MD   5,000 Units at 12/21/22 0534    hydrALAZINE injection 10 mg  10 mg Intravenous Q6H PRN Ann Cohen PA-C        labetalol 20 mg/4 mL (5 mg/mL) IV syring  10 mg Intravenous Q6H PRN Ann Cohen PA-C   10 mg at 12/19/22 1600    lamoTRIgine tablet 200 mg  200 mg Oral Daily Josafat Arevalo MD   200 mg at 12/21/22 0844    lamoTRIgine tablet 300 mg  300 mg Oral QHS Josafat Arevalo MD   300 mg at 12/20/22 2054    magnesium oxide tablet 800 mg  800 mg Oral PRN Ann Cohen PA-C   800 mg at 12/20/22 0920    magnesium oxide tablet 800 mg  800 mg Oral PRN Ann Cohen  CHACHO        methocarbamoL tablet 500 mg  500 mg Oral QID Ann Cohen PA-C   500 mg at 12/21/22 0843    ondansetron injection 4 mg  4 mg Intravenous Q6H PRN Ann Cohen PA-C        oxyCODONE immediate release tablet 5 mg  5 mg Oral Q6H PRN Lynsey Louie PA-C   5 mg at 12/20/22 0944    oxyCODONE immediate release tablet Tab 10 mg  10 mg Oral Q6H PRN Lynsey Louie PA-C   10 mg at 12/20/22 0055    polyethylene glycol packet 17 g  17 g Oral Daily Josafat Arevalo MD   17 g at 12/20/22 0919    potassium bicarbonate disintegrating tablet 35 mEq  35 mEq Oral PRN Ann Cohen PA-C        potassium bicarbonate disintegrating tablet 50 mEq  50 mEq Oral PRN Ann Cohen PA-C        potassium bicarbonate disintegrating tablet 60 mEq  60 mEq Oral PRN Ann Cohen PA-C        potassium, sodium phosphates 280-160-250 mg packet 2 packet  2 packet Oral PRN Ann Cohen PA-C        potassium, sodium phosphates 280-160-250 mg packet 2 packet  2 packet Oral PRN Ann Cohen PA-C        potassium, sodium phosphates 280-160-250 mg packet 2 packet  2 packet Oral PRN Ann Cohen PA-C        senna-docusate 8.6-50 mg per tablet 2 tablet  2 tablet Oral Daily Jeronimo Banda MD   2 tablet at 12/21/22 0844    sertraline tablet 25 mg  25 mg Oral Daily Josafat Arevalo MD   25 mg at 12/21/22 0843    sodium chloride 0.9% flush 10 mL  10 mL Intravenous PRN Ann Cohen PA-C         Current Discharge Medication List        START taking these medications    Details   cephALEXin (KEFLEX) 500 MG capsule Take 1 capsule (500 mg total) by mouth every 6 (six) hours. for 5 days  Qty: 20 capsule, Refills: 0      methocarbamoL (ROBAXIN) 500 MG Tab Take 1 tablet (500 mg total) by mouth 4 (four) times daily as needed (muscle spasms).  Qty: 40 tablet, Refills: 0      oxyCODONE (ROXICODONE) 5 MG immediate release tablet Take 1 tablet (5 mg total) by mouth every 6 (six) hours as needed for Pain.  Qty:  30 tablet, Refills: 0    Comments: Quantity prescribed more than 7 day supply? Yes, quantity medically necessary           CONTINUE these medications which have NOT CHANGED    Details   ferrous sulfate (FEOSOL) 325 mg (65 mg iron) Tab tablet Take 325 mg by mouth every morning.      lamoTRIgine (LAMICTAL) 200 MG tablet Take 2.5 tablets (500 mg total) by mouth once daily.  Qty: 405 tablet, Refills: 3    Comments: **Patient requests 90 days supply**      sertraline (ZOLOFT) 25 MG tablet Take 1 tablet (25 mg total) by mouth once daily.  Qty: 30 tablet, Refills: 11    Associated Diagnoses: Anxiety      cenobamate (XCOPRI) 150 mg Tab Take one tablet by mouth once daily.  Qty: 30 tablet, Refills: 5    Associated Diagnoses: Temporal lobe epilepsy, intractable               I have seen and examined this patient within the last 30 days. My clinical findings that support the need for the home health skilled services and home bound status are the following:no   Weakness/numbness causing balance and gait disturbance due to Surgery making it taxing to leave home.     Diet:   regular diet    Labs:  none    Referrals/ Consults  Physical Therapy to evaluate and treat. Evaluate for home safety and equipment needs; Establish/upgrade home exercise program. Perform / instruct on therapeutic exercises, gait training, transfer training, and Range of Motion.  Occupational Therapy to evaluate and treat. Evaluate home environment for safety and equipment needs. Perform/Instruct on transfers, ADL training, ROM, and therapeutic exercises.    Activities:   activity as tolerated    Nursing:   Agency to admit patient within 24 hours of hospital discharge unless specified on physician order or at patient request    SN to complete comprehensive assessment including routine vital signs. Instruct on disease process and s/s of complications to report to MD. Review/verify medication list sent home with the patient at time of discharge  and instruct  patient/caregiver as needed. Frequency may be adjusted depending on start of care date.     Skilled nurse to perform up to 3 visits PRN for symptoms related to diagnosis    Notify MD if SBP > 160 or < 90; DBP > 90 or < 50; HR > 120 or < 50; Temp > 101; O2 < 88%; Other:       Ok to schedule additional visits based on staff availability and patient request on consecutive days within the home health episode.    When multiple disciplines ordered:    Start of Care occurs on Sunday - Wednesday schedule remaining discipline evaluations as ordered on separate consecutive days following the start of care.    Thursday SOC -schedule subsequent evaluations Friday and Monday the following week.     Friday - Saturday SOC - schedule subsequent discipline evaluations on consecutive days starting Monday of the following week.    For all post-discharge communication and subsequent orders please contact patient's primary care physician. If unable to reach primary care physician or do not receive response within 30 minutes, please contact neurosurgery clinic for clinical staff order clarification      Home Health Aide:  Physical Therapy Three times weekly and Occupational Therapy Three times weekly    Wound Care Orders  no    I certify that this patient is confined to her home and needs physical therapy and occupational therapy.

## 2022-12-21 NOTE — NURSING
Discharge instructions reviewed with pt, medications called into home pharmacy, pain med written prescription given to pt and . Follow-up appointments in 2 weeks with neurology and PCP. Vitals as per flow sheet, pain well controled with tylenol, all questions answered. Pt brought to the main entrance at [facility] via wheelchair by RN. Pt discharged home with .

## 2022-12-22 ENCOUNTER — PATIENT MESSAGE (OUTPATIENT)
Dept: NEUROSURGERY | Facility: CLINIC | Age: 52
End: 2022-12-22
Payer: COMMERCIAL

## 2022-12-23 ENCOUNTER — PATIENT OUTREACH (OUTPATIENT)
Dept: ADMINISTRATIVE | Facility: CLINIC | Age: 52
End: 2022-12-23
Payer: COMMERCIAL

## 2022-12-23 NOTE — ANESTHESIA POSTPROCEDURE EVALUATION
Anesthesia Post Evaluation    Patient: Liza Arrieta    Procedure(s) Performed: Procedure(s) (LRB):  PLACEMENT OF RESPONSIVE NEUROSTIMULATION DEVICE (NEUROPACE) (Right)    Final Anesthesia Type: general      Patient location during evaluation: PACU  Patient participation: Yes- Able to Participate  Level of consciousness: awake and alert and oriented  Post-procedure vital signs: reviewed and stable  Pain management: adequate  Airway patency: patent    PONV status at discharge: No PONV  Anesthetic complications: no      Cardiovascular status: hemodynamically stable  Respiratory status: unassisted, spontaneous ventilation and room air  Hydration status: euvolemic  Follow-up not needed.          Vitals Value Taken Time   /85 12/21/22 1412   Temp 37.2 °C (99 °F) 12/21/22 1101   Pulse 87 12/21/22 1419   Resp 18 12/21/22 1419   SpO2 94 % 12/21/22 1418   Vitals shown include unvalidated device data.      No case tracking events are documented in the log.      Pain/Allie Score: No data recorded

## 2022-12-23 NOTE — PROGRESS NOTES
C3 nurse spoke with Liza Arrieta for a TCC post hospital discharge follow up call. The patient has a scheduled [facility] appointment with Rc Rodriguez MD on 12/28/22 @ 7327.

## 2022-12-29 ENCOUNTER — PATIENT MESSAGE (OUTPATIENT)
Dept: NEUROSURGERY | Facility: CLINIC | Age: 52
End: 2022-12-29
Payer: COMMERCIAL

## 2022-12-30 NOTE — PROGRESS NOTES
"Entered pt room to give morning medications at 0800 and to do hourly checks. Pt was eating breakfast. Pt stated "What meds? I haven't been getting any meds." RN informed the pt of what medications: bowel regimen and Zoloft. RN reviewed MAR documentation with pt. Medicaitons have been given daily from 0800 to 0830. Pt still unable to recall getting medications while here. Pt able to answer orientation questions correctly. Notified Tiffanie Irwin NP of [facility] team. Tiffanie to notify epilepsy team.   " Topical Clindamycin Counseling: Patient counseled that this medication may cause skin irritation or allergic reactions.  In the event of skin irritation, the patient was advised to reduce the amount of the drug applied or use it less frequently.   The patient verbalized understanding of the proper use and possible adverse effects of clindamycin.  All of the patient's questions and concerns were addressed.

## 2023-01-05 ENCOUNTER — PATIENT MESSAGE (OUTPATIENT)
Dept: NEUROLOGY | Facility: CLINIC | Age: 53
End: 2023-01-05
Payer: COMMERCIAL

## 2023-01-05 ENCOUNTER — OFFICE VISIT (OUTPATIENT)
Dept: NEUROSURGERY | Facility: CLINIC | Age: 53
End: 2023-01-05
Payer: COMMERCIAL

## 2023-01-05 VITALS
BODY MASS INDEX: 32.6 KG/M2 | HEIGHT: 66 IN | SYSTOLIC BLOOD PRESSURE: 115 MMHG | HEART RATE: 53 BPM | DIASTOLIC BLOOD PRESSURE: 79 MMHG | TEMPERATURE: 98 F | WEIGHT: 202.88 LBS

## 2023-01-05 DIAGNOSIS — G40.119 TEMPORAL LOBE EPILEPSY, INTRACTABLE: ICD-10-CM

## 2023-01-05 DIAGNOSIS — Z96.82 S/P INSERTION OF BRAIN-RESPONSIVE NEUROSTIMULATION DEVICE: Primary | ICD-10-CM

## 2023-01-05 PROCEDURE — 3008F BODY MASS INDEX DOCD: CPT | Mod: CPTII,S$GLB,, | Performed by: NEUROLOGICAL SURGERY

## 2023-01-05 PROCEDURE — 3008F PR BODY MASS INDEX (BMI) DOCUMENTED: ICD-10-PCS | Mod: CPTII,S$GLB,, | Performed by: NEUROLOGICAL SURGERY

## 2023-01-05 PROCEDURE — 1159F MED LIST DOCD IN RCRD: CPT | Mod: CPTII,S$GLB,, | Performed by: NEUROLOGICAL SURGERY

## 2023-01-05 PROCEDURE — 3078F DIAST BP <80 MM HG: CPT | Mod: CPTII,S$GLB,, | Performed by: NEUROLOGICAL SURGERY

## 2023-01-05 PROCEDURE — 99024 POSTOP FOLLOW-UP VISIT: CPT | Mod: S$GLB,,, | Performed by: NEUROLOGICAL SURGERY

## 2023-01-05 PROCEDURE — 3074F SYST BP LT 130 MM HG: CPT | Mod: CPTII,S$GLB,, | Performed by: NEUROLOGICAL SURGERY

## 2023-01-05 PROCEDURE — 99024 PR POST-OP FOLLOW-UP VISIT: ICD-10-PCS | Mod: S$GLB,,, | Performed by: NEUROLOGICAL SURGERY

## 2023-01-05 PROCEDURE — 1159F PR MEDICATION LIST DOCUMENTED IN MEDICAL RECORD: ICD-10-PCS | Mod: CPTII,S$GLB,, | Performed by: NEUROLOGICAL SURGERY

## 2023-01-05 PROCEDURE — 3074F PR MOST RECENT SYSTOLIC BLOOD PRESSURE < 130 MM HG: ICD-10-PCS | Mod: CPTII,S$GLB,, | Performed by: NEUROLOGICAL SURGERY

## 2023-01-05 PROCEDURE — 99999 PR PBB SHADOW E&M-EST. PATIENT-LVL III: ICD-10-PCS | Mod: PBBFAC,,, | Performed by: NEUROLOGICAL SURGERY

## 2023-01-05 PROCEDURE — 3078F PR MOST RECENT DIASTOLIC BLOOD PRESSURE < 80 MM HG: ICD-10-PCS | Mod: CPTII,S$GLB,, | Performed by: NEUROLOGICAL SURGERY

## 2023-01-05 PROCEDURE — 99999 PR PBB SHADOW E&M-EST. PATIENT-LVL III: CPT | Mod: PBBFAC,,, | Performed by: NEUROLOGICAL SURGERY

## 2023-01-05 NOTE — LETTER
January 5, 2023    Liza Arrieta  [address] [address]             [facility] - [facility] 8th Fl  [address]  [facility] [address]  Phone: [phone]  Fax: [phone] Dear Ms. Arrieta:    You are now cleared to return to work part-time (20 hours a week). I will see you back in about a month to assess [id number] full time.       If you have any questions or concerns, please don't hesitate to call.    Sincerely,          Ava Valdez MD

## 2023-01-05 NOTE — PROGRESS NOTES
"Neurosurgery  Follow up    SUBJECTIVE:     Chief Complaint: intractable epilepsy     History of Present Illness:  Liza Arrieta is a 52 y.o. right-handed female referred by Dr. Mark for evaluation of intractable epilepsy. The patient underwent left anterior temporal lobectomy after subdural strip and grid monitoring on 11/19/18. After surgery, she reports that her personality improved. She is "more open" and "got some of her life back." Petit mal seizures resolved postoperatively, but she began having complex partial seizures (self-resolved after 30 seconds-1 minute) while jogging about 3-4 months ago.     Neymar, her  who accompanies her today, states that she has always had seizures despite the surgery. He states that she had the typical auras and small focal events; lately, the seizure frequency and severity has been increasing (now complex partial). She keeps track of her seizures on an sam; triggers include exercise and stress.      She works as a . She graduated college and has her . She lives with her  and 3 of her 4 children: Harshal, Jackie, and Amy.     The patient's seizures began when she was 19. She has previously been a patient of [facility] and [facility].     She has previously failed: phenobarbital, dilantin, topamax, Keppra, among others.     The patient denies any bleeding, infectious, or anesthetic complications with any previous surgery. No AC/AP agents; she does take over-the-counter iron supplementation.     As of 12/7/22, the patient returns in postoperative follow up for data review. She denies fever, chills, or drainage from the incision. She has had two noticeable seizures since she's been discharged. One staring spell at the grocery store; another while eating dinner with her family. She hates when she loses a little bit of time.     She had sutures and staples removed with Ashley last week.     As of 1/5/23, the patient returns in postoperative " follow up after having undergone RNS placement on 22. She reports that she has been well overall; she denies fever, chills, or drainage from the incision. She does report that she has been more tired than usual. Upon further discussion, Neymar notes that she has had decreased appetite ever since the surgery (in part due to pain with chewing, which is now improving).     Neymar has been downloading her data Q3 days to the cloud. I have messaged Dr. Mark to confirm that the data are visible appropriately to him.     Ms. Arrieta reports that she would like to return to work.     Review of patient's allergies indicates:  No Known Allergies    Current Outpatient Medications   Medication Sig Dispense Refill    cenobamate (XCOPRI) 150 mg Tab Take one tablet by mouth once daily. (Patient taking differently: Take 150 mg by mouth nightly.) 30 tablet 5    ferrous sulfate (FEOSOL) 325 mg (65 mg iron) Tab tablet Take 325 mg by mouth every morning.      lamoTRIgine (LAMICTAL) 200 MG tablet Take 2.5 tablets (500 mg total) by mouth once daily. (Patient taking differently: Take 500 mg by mouth once daily. 200 mg in am and 300 mg in the pm) 405 tablet 3    oxyCODONE (ROXICODONE) 5 MG immediate release tablet Take 1 tablet (5 mg total) by mouth every 6 (six) hours as needed for Pain. (Patient not taking: Reported on 2022) 30 tablet 0    sertraline (ZOLOFT) 25 MG tablet Take 1 tablet (25 mg total) by mouth once daily. 30 tablet 11     No current facility-administered medications for this visit.       Past Medical History:   Diagnosis Date    Convulsions     Epilepsy     Seizures      Past Surgical History:   Procedure Laterality Date    APPENDECTOMY       SECTION      4    CHOLECYSTECTOMY      CRANIOTOMY N/A 2018    Procedure: CRANIOTOMY for strip and grid;  Surgeon: Ruben Senior MD;  Location: [facility] OR [address] [place] [address];  Service: Neurosurgery;  Laterality: N/A;  toronto II, asa 2, type and screen, regular bed,  muñoz, supine     CRANIOTOMY Left 11/8/2018    Procedure: CRANIOTOMY for grids and strips;  Surgeon: Ruben Senior MD;  Location: [facility] OR [address] [place] [address];  Service: Neurosurgery;  Laterality: Left;    HYSTERECTOMY      around 2016 for pain ful periods     INSERTION OF SUBDURAL GRID AND STRIP ELECTRODES BY CRANIOTOMY Left 11/19/2018    Procedure: CRANIOTOMY, FOR SUBDURAL GRID AND STRIP ELECTRODE LEAD Removal.;  Surgeon: Ruben Senior MD;  [facility];  Service: Neurosurgery;  Laterality: Left;  needs microscope and stealth-cg    PLACEMENT OF RESPONSIVE NEUROSTIMULATION DEVICE (NEUROPACE) Right 12/19/2022    Procedure: PLACEMENT OF RESPONSIVE NEUROSTIMULATION DEVICE (NEUROPACE);  Surgeon: Ava Valdez MD;  [facility];  Service: Neurosurgery;  Laterality: Right;  Ane: Gen  Rindge: III  ASA: III  Blood: T&Screen  Neuro Mon: None  Rad: LoopX  Bed: [facility] 180  Head: [place]  Pos: Supine  Spec Equip: Lakesha, RNS Rep  Oleary    REMOVAL OF STEREO EEG LEADS (DEPTH ELECTRODES) Bilateral 11/16/2022    Procedure: REMOVAL OF STEREO EEG LEADS (DEPTH ELECTRODES);  Surgeon: Ava Valdez MD;  [facility];  Service: Neurosurgery;  Laterality: Bilateral;    SURGICAL REMOVAL OF LOBE OF BRAIN Left 11/19/2018    Procedure: LOBECTOMY, BRAIN left temporal lobe.;  Surgeon: Ruben Senior MD;  Location: [facility] OR [address] [place] [address];  Service: Neurosurgery;  Laterality: Left;     Family History       Problem Relation (Age of Onset)    Heart disease Father          Social History     Socioeconomic History    Marital status:    Tobacco Use    Smoking status: Never    Smokeless tobacco: Never   Substance and Sexual Activity    Alcohol use: Yes     Comment: One drink per month    Drug use: No    Sexual activity: Yes     Partners: Male     Birth control/protection: See Surgical Hx     Social Determinants of Health     Financial Resource Strain: Low Risk     Difficulty of Paying Living Expenses: Not hard at all   Food  Insecurity: No Food Insecurity    Worried About Running Out of Food in the Last Year: Never true    Ran Out of Food in the Last Year: Never true   Transportation Needs: No Transportation Needs    Lack of Transportation (Medical): No    Lack of Transportation (Non-Medical): No   Physical Activity: Sufficiently Active    Days of Exercise per Week: 4 days    Minutes of Exercise per Session: 80 min   Stress: No Stress Concern Present    Feeling of Stress : Only a little   Social Connections: Unknown    Frequency of Communication with Friends and Family: Three times a week    Frequency of Social Gatherings with Friends and Family: Once a week    Active Member of Clubs or Organizations: Yes    Attends Club or Organization Meetings: More than 4 times per year    Marital Status:    Housing Stability: Low Risk     Unable to Pay for Housing in the Last Year: No    Number of Places Lived in the Last Year: 1    Unstable Housing in the Last Year: No       Review of Systems   Constitutional:  Negative for fever.   HENT:  Negative for nosebleeds.    Eyes:  Negative for visual disturbance.   Respiratory:  Negative for shortness of breath.    Cardiovascular:  Negative for chest pain.   Gastrointestinal:  Negative for vomiting.   Endocrine: Negative for cold intolerance.   Genitourinary:  Negative for difficulty urinating.   Musculoskeletal:  Negative for neck pain.   Skin:  Negative for color change.   Neurological:  Positive for seizures.   Hematological:  Does not bruise/bleed easily.   Psychiatric/Behavioral:  Positive for dysphoric mood.      OBJECTIVE:     Vital Signs     There is no height or weight on file to calculate BMI.      Physical Exam:    Constitutional: She appears well-developed and well-nourished. No distress.     Eyes: EOM are normal.     Abdominal: Soft.     Skin: Skin displays no rash on extremities. Skin displays no lesions on extremities.   Well healed left temporal incision   Some temporalis atrophy    Right temporal and frontoparietal incisions healing appropriately      Psych/Behavior: She is alert. She is oriented to person, place, and time.     Musculoskeletal:      Comments: No focal weakness     Neurological:        Coordination: She has normal finger to nose coordination.   Pulmonary: nonlabored respirations     Hematologic: no bruising noted     Diagnostic Results:  MRI personally reviewed   EEG data personally reviewed with patient, her , and Dr. Mark    ASSESSMENT/PLAN:     Liza Arrieta is a 52 y.o. female who presents as a referral from Dr. Mark to discuss possible intracranial seizure surgery. She has already had left temporal lobectomy. She is now s/p placement of sEEG leads on 11/7/22 with removal on 11/16/22. After interdisciplinary conference review, she underwent placement of right RNS (amygdala depth electrode + middle temporal gyrus strip electrode) 12/19/22.     She reports that overall she is doing well postop with no complaints of fever, chills, drainage from incision, or focal neurologic change. Staples were removed without difficulty.     I would like to see her back in about 4 weeks to ensure continued good wound healing. I will clear her to return to work part-time until then. If she feels ready to work more hours in interim, I am willing to clear her to do so. I do NOT want her lifting more than 10lbs, however. Wound care was discussed with the patient and Neymar.      I have encouraged them to contact the clinic in interim with any questions, concerns, or adverse clinical change.

## 2023-01-12 ENCOUNTER — PATIENT MESSAGE (OUTPATIENT)
Dept: NEUROSURGERY | Facility: CLINIC | Age: 53
End: 2023-01-12
Payer: COMMERCIAL

## 2023-01-25 ENCOUNTER — PATIENT MESSAGE (OUTPATIENT)
Dept: NEUROSURGERY | Facility: CLINIC | Age: 53
End: 2023-01-25
Payer: COMMERCIAL

## 2023-01-26 DIAGNOSIS — G40.119 TEMPORAL LOBE EPILEPSY, INTRACTABLE: ICD-10-CM

## 2023-01-26 RX ORDER — CENOBAMATE 150 MG/1
150 TABLET, FILM COATED ORAL DAILY
Qty: 30 TABLET | Refills: 5 | Status: CANCELLED | OUTPATIENT
Start: 2023-01-26

## 2023-01-27 ENCOUNTER — PATIENT MESSAGE (OUTPATIENT)
Dept: NEUROSURGERY | Facility: CLINIC | Age: 53
End: 2023-01-27
Payer: COMMERCIAL

## 2023-01-31 DIAGNOSIS — G40.119 TEMPORAL LOBE EPILEPSY, INTRACTABLE: ICD-10-CM

## 2023-01-31 RX ORDER — CENOBAMATE 150 MG/1
150 TABLET, FILM COATED ORAL DAILY
Qty: 30 TABLET | Refills: 5 | Status: SHIPPED | OUTPATIENT
Start: 2023-01-31 | End: 2023-07-29 | Stop reason: SDUPTHER

## 2023-02-02 ENCOUNTER — OFFICE VISIT (OUTPATIENT)
Dept: NEUROSURGERY | Facility: CLINIC | Age: 53
End: 2023-02-02
Payer: COMMERCIAL

## 2023-02-02 ENCOUNTER — PATIENT MESSAGE (OUTPATIENT)
Dept: NEUROSURGERY | Facility: CLINIC | Age: 53
End: 2023-02-02

## 2023-02-02 DIAGNOSIS — Z96.82 S/P INSERTION OF BRAIN-RESPONSIVE NEUROSTIMULATION DEVICE: Primary | ICD-10-CM

## 2023-02-02 PROCEDURE — 99024 PR POST-OP FOLLOW-UP VISIT: ICD-10-PCS | Mod: S$GLB,,, | Performed by: NEUROLOGICAL SURGERY

## 2023-02-02 PROCEDURE — 1159F PR MEDICATION LIST DOCUMENTED IN MEDICAL RECORD: ICD-10-PCS | Mod: CPTII,S$GLB,, | Performed by: NEUROLOGICAL SURGERY

## 2023-02-02 PROCEDURE — 99999 PR PBB SHADOW E&M-EST. PATIENT-LVL II: ICD-10-PCS | Mod: PBBFAC,,, | Performed by: NEUROLOGICAL SURGERY

## 2023-02-02 PROCEDURE — 99024 POSTOP FOLLOW-UP VISIT: CPT | Mod: S$GLB,,, | Performed by: NEUROLOGICAL SURGERY

## 2023-02-02 PROCEDURE — 99999 PR PBB SHADOW E&M-EST. PATIENT-LVL II: CPT | Mod: PBBFAC,,, | Performed by: NEUROLOGICAL SURGERY

## 2023-02-02 PROCEDURE — 1159F MED LIST DOCD IN RCRD: CPT | Mod: CPTII,S$GLB,, | Performed by: NEUROLOGICAL SURGERY

## 2023-02-02 NOTE — PROGRESS NOTES
"Neurosurgery  Follow up    SUBJECTIVE:     Chief Complaint: intractable epilepsy     History of Present Illness:  Liza Arrieta is a 52 y.o. right-handed female referred by Dr. Mark for evaluation of intractable epilepsy. The patient underwent left anterior temporal lobectomy after subdural strip and grid monitoring on 11/19/18. After surgery, she reports that her personality improved. She is "more open" and "got some of her life back." Petit mal seizures resolved postoperatively, but she began having complex partial seizures (self-resolved after 30 seconds-1 minute) while jogging about 3-4 months ago.     Neymar, her  who accompanies her today, states that she has always had seizures despite the surgery. He states that she had the typical auras and small focal events; lately, the seizure frequency and severity has been increasing (now complex partial). She keeps track of her seizures on an sam; triggers include exercise and stress.      She works as a . She graduated college and has her . She lives with her  and 3 of her 4 children: Harshal, Jackie, and Amy.     The patient's seizures began when she was 19. She has previously been a patient of [facility] and [facility].     She has previously failed: phenobarbital, dilantin, topamax, Keppra, among others.     The patient denies any bleeding, infectious, or anesthetic complications with any previous surgery. No AC/AP agents; she does take over-the-counter iron supplementation.     As of 12/7/22, the patient returns in postoperative follow up for data review. She denies fever, chills, or drainage from the incision. She has had two noticeable seizures since she's been discharged. One staring spell at the grocery store; another while eating dinner with her family. She hates when she loses a little bit of time.     She had sutures and staples removed with Ashley last week.     As of 1/5/23, the patient returns in postoperative " follow up after having undergone RNS placement on 12/19/22. She reports that she has been well overall; she denies fever, chills, or drainage from the incision. She does report that she has been more tired than usual. Upon further discussion, Neymar notes that she has had decreased appetite ever since the surgery (in part due to pain with chewing, which is now improving).     Neymar has been downloading her data Q3 days to the cloud. I have messaged Dr. Mark to confirm that the data are visible appropriately to him.     Ms. Arrieta reports that she would like to return to work.     As of 2/2/23, the patient reports that she has been well. No fever, chills, or drainage from the incisions. She reports some short amnestic episodes. She also feels more emotional. She has only had one seizure. She is still in the monitoring phase and has not yet had stimulation turned on.     She is back at work, which she reports is going well. She would like to return to full-time.     Review of patient's allergies indicates:  No Known Allergies    Current Outpatient Medications   Medication Sig Dispense Refill    cenobamate (XCOPRI) 150 mg Tab Take one tablet by mouth once daily. 30 tablet 5    ferrous sulfate (FEOSOL) 325 mg (65 mg iron) Tab tablet Take 325 mg by mouth every morning.      sertraline (ZOLOFT) 25 MG tablet Take 1 tablet (25 mg total) by mouth once daily. 30 tablet 11    lamoTRIgine (LAMICTAL) 200 MG tablet Take 2.5 tablets (500 mg total) by mouth once daily. (Patient taking differently: Take 500 mg by mouth once daily. 200 mg in am and 300 mg in the pm) 405 tablet 3    oxyCODONE (ROXICODONE) 5 MG immediate release tablet Take 1 tablet (5 mg total) by mouth every 6 (six) hours as needed for Pain. (Patient not taking: Reported on 2/2/2023) 30 tablet 0     No current facility-administered medications for this visit.       Past Medical History:   Diagnosis Date    Convulsions     Epilepsy     Seizures      Past Surgical  History:   Procedure Laterality Date    APPENDECTOMY       SECTION      4    CHOLECYSTECTOMY      CRANIOTOMY N/A 2018    Procedure: CRANIOTOMY for strip and grid;  Surgeon: Ruben Senior MD;  [facility];  Service: Neurosurgery;  Laterality: N/A;  toronto II, asa 2, type and screen, regular bed, [place], supine     CRANIOTOMY Left 2018    Procedure: CRANIOTOMY for grids and strips;  Surgeon: Ruben Senior MD;  [facility];  Service: Neurosurgery;  Laterality: Left;    HYSTERECTOMY      around  for pain ful periods     INSERTION OF SUBDURAL GRID AND STRIP ELECTRODES BY CRANIOTOMY Left 2018    Procedure: CRANIOTOMY, FOR SUBDURAL GRID AND STRIP ELECTRODE LEAD Removal.;  Surgeon: Ruben Senior MD;  [facility];  Service: Neurosurgery;  Laterality: Left;  needs microscope and stealth-cg    PLACEMENT OF RESPONSIVE NEUROSTIMULATION DEVICE (NEUROPACE) Right 2022    Procedure: PLACEMENT OF RESPONSIVE NEUROSTIMULATION DEVICE (NEUROPACE);  Surgeon: Ava Valdez MD;  [facility];  Service: Neurosurgery;  Laterality: Right;  Ane: Gen  Kegley: III  ASA: III  Blood: T&Screen  Neuro Mon: None  Rad: LoopX  Bed: [facility] 180  Head: [place]  Pos: Supine  Spec Equip: KIRSTY Crowder    REMOVAL OF STEREO EEG LEADS (DEPTH ELECTRODES) Bilateral 2022    Procedure: REMOVAL OF STEREO EEG LEADS (DEPTH ELECTRODES);  Surgeon: Ava Valdez MD;  Location: [facility] OR [address] [place] [address];  Service: Neurosurgery;  Laterality: Bilateral;    SURGICAL REMOVAL OF LOBE OF BRAIN Left 2018    Procedure: LOBECTOMY, BRAIN left temporal lobe.;  Surgeon: Ruben Senior MD;  Location: [facility] OR [address] [place] [address];  Service: Neurosurgery;  Laterality: Left;     Family History       Problem Relation (Age of Onset)    Heart disease Father          Social History     Socioeconomic History    Marital status:    Tobacco Use    Smoking status: Never    Smokeless tobacco: Never    Substance and Sexual Activity    Alcohol use: Yes     Comment: One drink per month    Drug use: No    Sexual activity: Yes     Partners: Male     Birth control/protection: See Surgical Hx     Social Determinants of Health     Financial Resource Strain: Low Risk     Difficulty of Paying Living Expenses: Not hard at all   Food Insecurity: No Food Insecurity    Worried About Running Out of Food in the Last Year: Never true    Ran Out of Food in the Last Year: Never true   Transportation Needs: No Transportation Needs    Lack of Transportation (Medical): No    Lack of Transportation (Non-Medical): No   Physical Activity: Sufficiently Active    Days of Exercise per Week: 4 days    Minutes of Exercise per Session: 80 min   Stress: No Stress Concern Present    Feeling of Stress : Only a little   Social Connections: Unknown    Frequency of Communication with Friends and Family: Three times a week    Frequency of Social Gatherings with Friends and Family: Once a week    Active Member of Clubs or Organizations: Yes    Attends Club or Organization Meetings: More than 4 times per year    Marital Status:    Housing Stability: Low Risk     Unable to Pay for Housing in the Last Year: No    Number of Places Lived in the Last Year: 1    Unstable Housing in the Last Year: No       Review of Systems   Constitutional:  Negative for fever.   HENT:  Negative for nosebleeds.    Eyes:  Negative for visual disturbance.   Respiratory:  Negative for shortness of breath.    Cardiovascular:  Negative for chest pain.   Gastrointestinal:  Negative for vomiting.   Endocrine: Negative for cold intolerance.   Genitourinary:  Negative for difficulty urinating.   Musculoskeletal:  Negative for neck pain.   Skin:  Negative for color change.   Neurological:  Positive for seizures.   Hematological:  Does not bruise/bleed easily.   Psychiatric/Behavioral:  Positive for dysphoric mood.      OBJECTIVE:     Vital Signs  Pain Score: 0-No pain  There  is no height or weight on file to calculate BMI.      Physical Exam:    Constitutional: She appears well-developed and well-nourished. No distress.     Eyes: EOM are normal.     Abdominal: Soft.     Skin: Skin displays no rash on extremities. Skin displays no lesions on extremities.   Well healed left temporal incision   Some temporalis atrophy   Right temporal and frontoparietal incisions healing appropriately      Psych/Behavior: She is alert. She is oriented to person, place, and time.     Musculoskeletal:      Comments: No focal weakness     Neurological:        Coordination: She has normal finger to nose coordination.   Pulmonary: nonlabored respirations     Hematologic: no bruising noted     Diagnostic Results:  No new     ASSESSMENT/PLAN:     Liza Arrieta is a 52 y.o. female who presents as a referral from Dr. Mark to discuss possible intracranial seizure surgery. She has already had left temporal lobectomy. She is now s/p placement of sEEG leads on 11/7/22 with removal on 11/16/22. After interdisciplinary conference review, she underwent placement of right RNS (amygdala depth electrode + middle temporal gyrus strip electrode) 12/19/22.     She reports that overall she is doing well postop with no complaints of fever, chills, drainage from incision, or focal neurologic change.      I will clear her to return to work full-time. I do NOT want her lifting more than 10lbs, however, for another 6 weeks. Wound care was discussed with the patient and Neymar.      I will not schedule formal follow-up with her, but I am happy to see her back at any point if I can be of assistance. She will need the generator changed in approximately 8-10 years.     I have encouraged them to contact the clinic in interim with any questions, concerns, or adverse clinical change.

## 2023-02-04 ENCOUNTER — PATIENT MESSAGE (OUTPATIENT)
Dept: NEUROLOGY | Facility: CLINIC | Age: 53
End: 2023-02-04
Payer: COMMERCIAL

## 2023-02-13 ENCOUNTER — OFFICE VISIT (OUTPATIENT)
Dept: NEUROLOGY | Facility: CLINIC | Age: 53
End: 2023-02-13
Payer: COMMERCIAL

## 2023-02-13 VITALS
HEIGHT: 66 IN | WEIGHT: 208.31 LBS | BODY MASS INDEX: 33.48 KG/M2 | SYSTOLIC BLOOD PRESSURE: 111 MMHG | DIASTOLIC BLOOD PRESSURE: 74 MMHG | HEART RATE: 59 BPM

## 2023-02-13 DIAGNOSIS — Z96.82 S/P INSERTION OF BRAIN-RESPONSIVE NEUROSTIMULATION DEVICE: ICD-10-CM

## 2023-02-13 DIAGNOSIS — G40.219 COMPLEX PARTIAL EPILEPSY WITH GENERALIZATION AND WITH INTRACTABLE EPILEPSY: ICD-10-CM

## 2023-02-13 DIAGNOSIS — E11.621 TYPE 2 DIABETES MELLITUS WITH FOOT ULCER (CODE): Primary | ICD-10-CM

## 2023-02-13 PROCEDURE — 3008F BODY MASS INDEX DOCD: CPT | Mod: CPTII,S$GLB,, | Performed by: PSYCHIATRY & NEUROLOGY

## 2023-02-13 PROCEDURE — 99999 PR PBB SHADOW E&M-EST. PATIENT-LVL III: CPT | Mod: PBBFAC,,, | Performed by: PSYCHIATRY & NEUROLOGY

## 2023-02-13 PROCEDURE — 1159F MED LIST DOCD IN RCRD: CPT | Mod: CPTII,S$GLB,, | Performed by: PSYCHIATRY & NEUROLOGY

## 2023-02-13 PROCEDURE — 99214 OFFICE O/P EST MOD 30 MIN: CPT | Mod: 25,S$GLB,, | Performed by: PSYCHIATRY & NEUROLOGY

## 2023-02-13 PROCEDURE — 3078F DIAST BP <80 MM HG: CPT | Mod: CPTII,S$GLB,, | Performed by: PSYCHIATRY & NEUROLOGY

## 2023-02-13 PROCEDURE — 3074F SYST BP LT 130 MM HG: CPT | Mod: CPTII,S$GLB,, | Performed by: PSYCHIATRY & NEUROLOGY

## 2023-02-13 PROCEDURE — 3074F PR MOST RECENT SYSTOLIC BLOOD PRESSURE < 130 MM HG: ICD-10-PCS | Mod: CPTII,S$GLB,, | Performed by: PSYCHIATRY & NEUROLOGY

## 2023-02-13 PROCEDURE — 99214 PR OFFICE/OUTPT VISIT, EST, LEVL IV, 30-39 MIN: ICD-10-PCS | Mod: 25,S$GLB,, | Performed by: PSYCHIATRY & NEUROLOGY

## 2023-02-13 PROCEDURE — 1160F PR REVIEW ALL MEDS BY PRESCRIBER/CLIN PHARMACIST DOCUMENTED: ICD-10-PCS | Mod: CPTII,S$GLB,, | Performed by: PSYCHIATRY & NEUROLOGY

## 2023-02-13 PROCEDURE — 1159F PR MEDICATION LIST DOCUMENTED IN MEDICAL RECORD: ICD-10-PCS | Mod: CPTII,S$GLB,, | Performed by: PSYCHIATRY & NEUROLOGY

## 2023-02-13 PROCEDURE — 95836 PR ELECTRCORTICOGRAM, IMPL BRAIN NEUROSTIM PULSE GEN, < 30 DAYS: ICD-10-PCS | Mod: S$GLB,,, | Performed by: PSYCHIATRY & NEUROLOGY

## 2023-02-13 PROCEDURE — 3078F PR MOST RECENT DIASTOLIC BLOOD PRESSURE < 80 MM HG: ICD-10-PCS | Mod: CPTII,S$GLB,, | Performed by: PSYCHIATRY & NEUROLOGY

## 2023-02-13 PROCEDURE — 3008F PR BODY MASS INDEX (BMI) DOCUMENTED: ICD-10-PCS | Mod: CPTII,S$GLB,, | Performed by: PSYCHIATRY & NEUROLOGY

## 2023-02-13 PROCEDURE — 1160F RVW MEDS BY RX/DR IN RCRD: CPT | Mod: CPTII,S$GLB,, | Performed by: PSYCHIATRY & NEUROLOGY

## 2023-02-13 PROCEDURE — 95836 ECOG IMPLTD BRN NPGT <30 D: CPT | Mod: S$GLB,,, | Performed by: PSYCHIATRY & NEUROLOGY

## 2023-02-13 PROCEDURE — 95983 ALYS BRN NPGT PRGRMG 15 MIN: CPT | Mod: S$GLB,,, | Performed by: PSYCHIATRY & NEUROLOGY

## 2023-02-13 PROCEDURE — 95983 PR ELEC ANALYSIS, IMPLT NEURO PULSE GEN, W/PRGRM, BRAIN, 1ST 15 MINS: ICD-10-PCS | Mod: S$GLB,,, | Performed by: PSYCHIATRY & NEUROLOGY

## 2023-02-13 PROCEDURE — 99999 PR PBB SHADOW E&M-EST. PATIENT-LVL III: ICD-10-PCS | Mod: PBBFAC,,, | Performed by: PSYCHIATRY & NEUROLOGY

## 2023-02-13 RX ORDER — SERTRALINE HYDROCHLORIDE 25 MG/1
25 TABLET, FILM COATED ORAL DAILY
COMMUNITY
End: 2023-03-16

## 2023-02-13 NOTE — PROGRESS NOTES
Follow up:   Reports 1 sz this afternoon   Semiology - felt strange feeling   Sz Hz 1/week     Prior note:   Trigger - jogging   Semiology - loosing time   July - 8 sz   Aug - 6 sz     Prior note:   Still has recurrent seizures   Semiology - loosing time   She is very frustrated   May - had 5 seizures   Trigger - emotional distress     Prior note:   Episodes of loosing time   No aura   Triggered by stress and physical work out (jogging)     sz Hz - can go 3 wks sz free, up to 1 per day for 3 days      Semiology prior to surgery: fear -> strange feeling -> no MARAH      Prior note:   3-4 sz/week   Triggered by stress and physical work out (jogging)     Prior note:   Sz 2/month -> 4-8 per week (since last 2 months)   Aura - dejavu x 10 sec   10 min later - uncomfortable feeling x 30 sec      Prior note:   Last dose of briviact - last night   One hour later an hour episode of fear and whole body shaking   vimpat 100 mg BID x 7 days   No further sz      Prior note:   3 month Angry outburst, crying spells - breviact   Last sz - last week   semiology - weird feeling of fear'   Previous sz - loss of awareness      Pt of Dr. Elam      2020/08/25  The plan last visit was to hold perampanel for one week and the reduce dosing at one half the previous dose.  The emotionality has improved but still is present.  She may of had only one sz since the last visit.     2020/04/07  Since starting perampanel she has been emotion with crying spells.  Emotional outburst did not occur in the 1st 2-3 weeks after starting pyramidal.  She is on 2 mg per day.  The ER becoming more intense and is somewhat disruptive to the family.  Seizures are much better.  She will often have a very brief sensation and actually question whether she had a seizure or something else.  Clinically that is a good response but the side effects are not acceptable.  She has had no other significant interim medical problems.  She continues to take lamotrigine and the  "doses not been changed.     2019/11/22  The patient continues to do well.  However she does have brief sensation of fear but nothing else.  She does not have      2019/08/07  The patient is doing well but has an occasional aura which now a feeling of fear which lasts 30 sec.  Last was on Aug 1.  She is averaging 3 per months.  Surgery was Nov 4, 2018.  The first aura occurred on Dec 19, 2018.  Number by month were Jan - 4, Feb - 2, Mar - 2, Apr - 2, May - 4, Jun - 5, Jul - 3.       Results for GLORIA GURROLA (MRN [id number]) as of 8/7/2019 15:38    Ref. Range 12/14/2018 12:10 1/14/2019 14:58 3/26/2019 13:30   Lamotrigine Lvl Latest Ref Range: 2.0 - 15.0 ug/mL 13.0 15.7 (H) 19.3 (H)         2019/03/26  In February and March, she has had four episodes that she is concerned represent seizure. These are different than any prior episodes or sensation that she has experienced. She describes a feeling of intense fear, during which she tells herself "you're just having a seizure".  reports lasting about 15 seconds, during the events he reports patient seems to be staring off in a daydream. She will respond to questions, but slowly. Typically occurring in the evening, prior to taking Lamictal. She recalls these episodes afterwards. Currently taking Lamictal 500 mg qHS, reports she noticed these episodes after switching from BID to daily lamictal dosing. Lamictal level at prior clinic visit 15.7. Denies any episodes of visual motor apraxia or any of her prior typical seizures.      2019/01/14  The patient underwent a L temporal lobectomy 2 months ago.  She has experience none of her typical seizures.  She did experience Judi Vu twice since surgery.  In the past she had reported Judi Vu as an aura.  She had not experienced any episodes of visual motor apraxia (unable to move eyes in any direction). She takes lamictal 200 mg in the AM and 300 mg in the PM.  She will be converted to once a day dosing of 500 mg which she " "prefers to do in the evening.       She feels so much better now.  She reports feeling as if she is 19 yo.     2018/05/29  Patient had another day of almost continuous seizures which was on May 09,2018.  She has failed several AEDs mainly due to side effects.  She experienced pharmacodynamic interaction and toxicity with Lamictal - Lacosamide combo.  She is tolerating the lamictal well         2018/04/09  The patient had 9 seizures recorded in last EMU visit all coming from L anterior temporal area.   MRI was normal but PET showed L mesial hypometabolism.  She is currently have almost daily seizures.  She has failed four AEDs is on Lamictal monotherapy.  She reports her verbal memory is terrible.  Review of labs show she was B12 deficient 4 yrs ago and she does not take any supplements.     Results for GLORIA GURROLA (MRN [id number]) as of 4/9/2018 16:23    Ref. Range 3/1/2016 14:45 12/19/2017 12:56 2/8/2018 18:12   Lamotrigine Lvl Latest Ref Range: 2.0 - 15.0 ug/mL 12.0 10.5 7.8      2018/01/29  EMU evaluation was completed in Dec and L temporal interictal spikes were recorded and one electrographic seizure was recorded arising from the L anterior temporal area.  Besides frequent seizures her major complaint is progressive memory loss.       HISTORY OF PRESENT ILLNESS (HPI)  Date: The   New Patient  Pt has been seeing Dr Huerta at [facility] for "complex partial sezures." First sz, in school, age 19. Was put on Dilantin and she did well for approx 15 years, until the seizures became active again. Thinks she may have had 2 classic "Grand Mal" seizures in her 20's, but since then, seizure types are those described below.     Currently, she is experiencing more than one event per week. Event type is described below. She has been failing her current treatment regimen as described below. May have tried other meds, but can't remember them now. Did not bring records yet.      Seizure Seminology  Seizure Type 1   Classification: " Pass-out  Aura - Occasional auditory мария vu  Pt loses consciousness and falls or loses tone 1-2 in  Post-ictal  Brief  Age of onset 19  Current Seizure Frequency - Several per week      Seizure Type 2  Classification: Complex Partial  Wanders around. Doesn't remember after.   Post-ictal  Brief  Current Seizure Frequency - Several per week     Seizure Triggers  Sleep Deprivation - None  Other medications - None  Psych/stress - None  Photic stimulation - None  Hyperventilation - None  Medical Problems - None  Menses - 3 days before period they get worse  Sensory Stimulation (light, sound, etc) - None  Missed dose of meds - None     AED Treatments  Present regimen   tabs 1 in AM and 1½ in PM      Prior treatments  clobazam (Onfi or Frizium, CLB) 20 mg QD  eslicarbazine (Aptiom, ESL) - seizure intensity worsened after 2 weeks Rx  lacosamide (Vimpat, LCS)   oxcarbazepine (Trileptal OXC)  3 month Angry outburst, crying spells - breviact    crying spells - Fycompa    BID  TPM 10ID  valproic acid (Depakote, VPA)   zonisamide (Zonegran, ZNA)  Vimpat  200 mg BID - kaplan      Not tried  acetazolamide (Diamox, AZM)  amantadine  carbamazepine (Tegretol, CBZ)  ethosuximide (Zarontin, ESM)  felbamate (felbatol, FBM)  gabapentin (Neurontin, GPN)  levetiracetam (Keppra, LEV)  methsuximide (Celontin, MSM)   phenobarbital (Pb)  pregabalin (Lyrica, PGB)  primidone (Mysoline, PRM)  retigabine (Potiga, RTG)  rufinamide (Banzel, RUF)  tiagabine (Gabatril, TGB)  viagabatrin, (Sabril, VGB)  vagal nerve stimulator (VNS)     Benzodiazepines  diazepam - rectal (Diastatl)  diazepam - oral (Valium, DZ)  clonazepam (Klonopin, CZP)  clorazepate (Tranxene, CLZ)  Ativan  Brain Stimulation  Vagal Nerve Stimulation-n/a  DBS- n/a     Compliance method  Memory - yes  Mom or Spouse - Yes  Pill Box - no  Dewayne calendar - no  Turn over medication bottle - no  Phone alarm - no     Seizure Evaluation  EEG Routine - Dont have  MRI/MRA - In past-  she doesn't know results  EMU eval  2017/12/19-12/20- Patient with no events overnight. EEG shows L anterior temporal spikes, most recorded at F7. None on the other side. At times, almost continuous L temporal interictal discharges at times.   2017/12/20- 11:56:23- clinical seizure, starting from L side on EEG. No epileptiform discharges noted. L temporal slowing and spikes noted. Consisted of brief moment of unresponsiveness.   2017/12/21- EEG showing L interical anterior temporal slowing with L temporal spikes. No clinical seizures since yesterday.   2017/12/21-12/22- Event on 12/21 at 18:55 showing patient talking on the phone and suddenly stopping, unable to speak and confused. EEG shows there is a rhythmic buildup in the L temporal chains. Patient is confused and is drowsy following event. No tonic/clonic activity present.      Admission Date: 7/5/2022  Hospital Length of Stay: 5 days  Discharge Date and Time:  07/10/2022 10:01 AM  HPI:   Ms. Arrieta is a 52 yo female with intractable epilepsy since age 18, s/p partial left anterior temporal lobectomy in 11/2018 admitted to EMU for further seizure localization as part of repeat surgical workup. After epilepsy surgery, patient reports of brief period of seizure freedom, before beginning to have seizures again approximately 3 months afterwards. She reports current seizures are different than her typical semiology prior to surgery. She describes current seizures as loss of awareness, and staring off. After her events, she is quickly back to baseline and is able to report that she had a seizure. No associated tongue biting, bowel/bladder incontinence. She endorses 6 seizures in March, 8 in April, 5 in May, and 4 in June. She is currently maintained on Lamotrigine 200 mg qAM/300 mg qPM and Cenobamate 150 mg daily.  Hospital Course:   52 yo female with intractable epilepsy since age 18, s/p partial left anterior temporal lobe resection in 11/2018 with recurrence of  seizures approximately 3 months after admitted to EMU for seizure capture and localization in discussion of repeat surgical workup. Patient is currently having approximately 4-5 events per month of loss of awareness and staring. Currently maintained on Lamotrigine 200 mg qAM/300 mg qPM, Cenobamate 150 mg daily.   7/5>7/6: Home Lamotrigine and Cenobamate held on admission. EEG with regional cortical dysfunction in the left temporal region as well as bilateral independent seizure foci in the left and right temporal lobes. Typical event 7/6 approximately 0957,  reported he noted patient with change in speech/confusion similar to after an event, no clear behavioral change during event.   7/6>7/7: EEG with regional cortical dysfunction in the left temporal region as well as bilateral independent seizure foci in the left and right temporal lobes. Three right temporal lobe seizures at 09:57, 13:55, and 20:21 on 7/6. Continue close vEEG monitoring and attempt to capture additional seizures to confirm localization for pre-surgical planning.  7/7>7/8: EEG with regional cortical dysfunction left temporal region, bilateral independent seizure foci in the left and right temporal lobes. No discrete seizures. Continue vEEG off all AEDs and attempt to capture additional seizures for pre-surgical planning. Plan for sleep deprivation 7/8>7/9 with provoking medications tomorrow morning.  7/8>7/9: She was sleep deprived and received benadryl and tramadol. EEG continues to show bilateral independent epileptiform activity in the right and left temporal lobes. A right onset seizure was captured at 14:04. The only clinical manifestation was confusion and the button was pressed about 30 minutes after the seizure occurred. Patient and  state they need to leave tomorrow for transportation reasons.   7/9>7/10: No further seizures. Patient was restarted on home medications. We discussed increasing cenobamate but the pharmacy did  not have it in stock and patient would like to discuss with Dr. Mark before increasing outpatient (and she just got the 150 mg dose filled).        CT/CTA Scan -   PET Scan -   Neuropsychological evaluation -   DEXA Scan     Potential Epilepsy Risk Factors:   Pregnancy/Labor/Delivery - full term uncomplicated pregnancy labor and vaginal delivery  Febrile seizures - none  Head injury - none  CNS infection - none   Stroke - none  Family Hx of Sz - none     PAST MEDICAL HISTORY:             Active Ambulatory Problems        Diagnosis  Date Noted        No Active Ambulatory Problems      Resolved Ambulatory Problems        Diagnosis  Date Noted        No Resolved Ambulatory Problems      No Additional Past Medical History          PAST SURGICAL HISTORY: No past surgical history on file.      FAMILY HISTORY: No family history on file.       SOCIAL HISTORY:                                                    Social History                             History    Social History      Marital Status:          Spouse Name:  N/A        Number of Children:  N/A      Years of Education:  N/A    Occupational History        Not on file.      Social History Main Topics      Smoking status:  Never Smoker      Smokeless tobacco:  Not on file      Alcohol Use:  No      Drug Use:  No      Sexual Activity:  Not on file    Other Topics  Concern          Not on file        Social History Narrative      No narrative on file             SUBSTANCE USE:          Social History    Social History Main Topics      Smoking status:  Never Smoker      Smokeless tobacco:  Not on file      Alcohol Use:  No      Drug Use:  No      Sexual Activity:  Not on file               History    Substance Use Topics      Smoking status:  Never Smoker      Smokeless tobacco:  Not on file      Alcohol Use:  No          ALLERGIES: Review of patient's allergies indicates no known allergies.       Review of Systems   Constitutional: Negative for fever, chills,  weight loss, malaise/fatigue and diaphoresis.   HENT: Negative for ear pain, hearing loss, nosebleeds and tinnitus.   Eyes: Negative for blurred vision, double vision, photophobia and pain.   Respiratory: Negative for cough, hemoptysis and shortness of breath.   Cardiovascular: Negative for chest pain, palpitations, orthopnea and leg swelling.   Gastrointestinal: Negative for heartburn, nausea, vomiting, abdominal pain, diarrhea, constipation and blood in stool.   Genitourinary: Negative for dysuria and hematuria.   Musculoskeletal: Negative for myalgias, joint pain and falls.   Skin: Negative for itching and rash.   Neurological: Positive for seizures. Negative for dizziness, tremors, speech change, focal weakness, loss of consciousness, weakness and headaches.   Endo/Heme/Allergies: Negative for environmental allergies. Does not bruise/bleed easily.   Psychiatric/Behavioral: Positive for memory loss. Negative for depression, hallucinations and substance abuse.  The patient is not nervous/anxious.      Neurologic Exam   Higher Cortical Function:   Patient is a well developed, pleasant, well groomed individual appearing their stated age  Oriented - intact to person, place and time and followed two step instruction correctly.   Fund of knowledge was appropriate.   Language - Speech was fluent without evidence for an aphasia.     IMPRESSION  1.         Emotional lability - AED vs refractory drugs   Impacts works   Works as    2.         Focal Epilepsy   S/p L temporal lobectomy 11/2018, sz free for 3 months only   Presurgery - semiology - staring spells   Post surgery Aura - dejavu x 10 sec; 10 min later - uncomfortable feeling x 30 sec   [facility] DIAGNOSIS:  BRAIN, LEFT ANTERIOR TEMPORAL LOBE, EXCISION ([id number]; [facility], [place], [place]; COLLECTED 11/19/2018):-Cortex with moderate cortical and subpial gliosis, and scattered thrombosed vessels and leptomeningeal  mixed  inflammation.  BRAIN, LEFT HIPPOCAMPUS, EXCISION ([id number]; [facility], [place], [place];  COLLECTED 11/19/2018):-Fragments of hippocampus with focal neuronal loss and gliosis.  s/p placement of sEEG leads on 11/7/22 with removal on 11/16/22. After interdisciplinary conference review, she underwent placement of right RNS (amygdala depth electrode + middle temporal gyrus strip electrode) 12/19/22.     3.         Obesity  4,         F/h MS, ET     DISPOSITION:   1. Continue LTG 200mg 1 am and 1½ in pm  2,Xcopri 150 mg   Options - Lyrica   3, cont zoloft 25 mg QHS      RNS    Electrodes: amygdala depth electrode (RHip1,2,3,4) + middle temporal gyrus strip electrode (RTG1,2,3,4)               Reviewed data from implantation on 12/19/2022 to 2/13/2023  -Counted 5 clearly evolved saturation events. There were also a good number of runs that don't seem to evolve  Observations  Patient is seems to be having some clear epileptic events, the saturation events are her most clear activity. Detection can be improved and a full BP detection set can be implemented. Additionally, stimulation can be turned on at 0.5uC bipolar for the hippocampal depth and monopolar cathodal for the strip.

## 2023-02-14 ENCOUNTER — PATIENT MESSAGE (OUTPATIENT)
Dept: NEUROLOGY | Facility: CLINIC | Age: 53
End: 2023-02-14
Payer: COMMERCIAL

## 2023-02-24 NOTE — PROGRESS NOTES
RN called CT for the second time to see when patient could go down for her scan. Angel told RN that there are still 4 patients ahead of her. They will contact RN when they are ready. Reminded tech that patient had orders since yesterday for the CT. EEG tech paged to reconnect patient while pending CT scan.    Topical Sulfur Applications Counseling: Topical Sulfur Counseling: Patient counseled that this medication may cause skin irritation or allergic reactions.  In the event of skin irritation, the patient was advised to reduce the amount of the drug applied or use it less frequently.   The patient verbalized understanding of the proper use and possible adverse effects of topical sulfur application.  All of the patient's questions and concerns were addressed.

## 2023-03-10 ENCOUNTER — PATIENT MESSAGE (OUTPATIENT)
Dept: NEUROLOGY | Facility: CLINIC | Age: 53
End: 2023-03-10
Payer: COMMERCIAL

## 2023-03-13 ENCOUNTER — PATIENT MESSAGE (OUTPATIENT)
Dept: NEUROLOGY | Facility: CLINIC | Age: 53
End: 2023-03-13
Payer: COMMERCIAL

## 2023-03-27 ENCOUNTER — PATIENT MESSAGE (OUTPATIENT)
Dept: OBSTETRICS AND GYNECOLOGY | Facility: CLINIC | Age: 53
End: 2023-03-27
Payer: COMMERCIAL

## 2023-04-04 ENCOUNTER — OFFICE VISIT (OUTPATIENT)
Dept: NEUROLOGY | Facility: CLINIC | Age: 53
End: 2023-04-04
Payer: COMMERCIAL

## 2023-04-04 VITALS
WEIGHT: 209.44 LBS | HEIGHT: 66 IN | DIASTOLIC BLOOD PRESSURE: 71 MMHG | HEART RATE: 60 BPM | SYSTOLIC BLOOD PRESSURE: 119 MMHG | BODY MASS INDEX: 33.66 KG/M2

## 2023-04-04 DIAGNOSIS — Z96.82 S/P INSERTION OF BRAIN-RESPONSIVE NEUROSTIMULATION DEVICE: ICD-10-CM

## 2023-04-04 DIAGNOSIS — G40.219 COMPLEX PARTIAL EPILEPSY WITH GENERALIZATION AND WITH INTRACTABLE EPILEPSY: Primary | ICD-10-CM

## 2023-04-04 PROCEDURE — 3078F DIAST BP <80 MM HG: CPT | Mod: CPTII,S$GLB,, | Performed by: PSYCHIATRY & NEUROLOGY

## 2023-04-04 PROCEDURE — 3008F PR BODY MASS INDEX (BMI) DOCUMENTED: ICD-10-PCS | Mod: CPTII,S$GLB,, | Performed by: PSYCHIATRY & NEUROLOGY

## 2023-04-04 PROCEDURE — 99999 PR PBB SHADOW E&M-EST. PATIENT-LVL III: CPT | Mod: PBBFAC,,, | Performed by: PSYCHIATRY & NEUROLOGY

## 2023-04-04 PROCEDURE — 99215 OFFICE O/P EST HI 40 MIN: CPT | Mod: 25,S$GLB,, | Performed by: PSYCHIATRY & NEUROLOGY

## 2023-04-04 PROCEDURE — 1159F PR MEDICATION LIST DOCUMENTED IN MEDICAL RECORD: ICD-10-PCS | Mod: CPTII,S$GLB,, | Performed by: PSYCHIATRY & NEUROLOGY

## 2023-04-04 PROCEDURE — 3074F PR MOST RECENT SYSTOLIC BLOOD PRESSURE < 130 MM HG: ICD-10-PCS | Mod: CPTII,S$GLB,, | Performed by: PSYCHIATRY & NEUROLOGY

## 2023-04-04 PROCEDURE — 3008F BODY MASS INDEX DOCD: CPT | Mod: CPTII,S$GLB,, | Performed by: PSYCHIATRY & NEUROLOGY

## 2023-04-04 PROCEDURE — 95983 PR ELEC ANALYSIS, IMPLT NEURO PULSE GEN, W/PRGRM, BRAIN, 1ST 15 MINS: ICD-10-PCS | Mod: S$GLB,,, | Performed by: PSYCHIATRY & NEUROLOGY

## 2023-04-04 PROCEDURE — 95836 PR ELECTRCORTICOGRAM, IMPL BRAIN NEUROSTIM PULSE GEN, < 30 DAYS: ICD-10-PCS | Mod: S$GLB,,, | Performed by: PSYCHIATRY & NEUROLOGY

## 2023-04-04 PROCEDURE — 1159F MED LIST DOCD IN RCRD: CPT | Mod: CPTII,S$GLB,, | Performed by: PSYCHIATRY & NEUROLOGY

## 2023-04-04 PROCEDURE — 95983 ALYS BRN NPGT PRGRMG 15 MIN: CPT | Mod: S$GLB,,, | Performed by: PSYCHIATRY & NEUROLOGY

## 2023-04-04 PROCEDURE — 99999 PR PBB SHADOW E&M-EST. PATIENT-LVL III: ICD-10-PCS | Mod: PBBFAC,,, | Performed by: PSYCHIATRY & NEUROLOGY

## 2023-04-04 PROCEDURE — 95836 ECOG IMPLTD BRN NPGT <30 D: CPT | Mod: S$GLB,,, | Performed by: PSYCHIATRY & NEUROLOGY

## 2023-04-04 PROCEDURE — 99215 PR OFFICE/OUTPT VISIT, EST, LEVL V, 40-54 MIN: ICD-10-PCS | Mod: 25,S$GLB,, | Performed by: PSYCHIATRY & NEUROLOGY

## 2023-04-04 PROCEDURE — 3074F SYST BP LT 130 MM HG: CPT | Mod: CPTII,S$GLB,, | Performed by: PSYCHIATRY & NEUROLOGY

## 2023-04-04 PROCEDURE — 3078F PR MOST RECENT DIASTOLIC BLOOD PRESSURE < 80 MM HG: ICD-10-PCS | Mod: CPTII,S$GLB,, | Performed by: PSYCHIATRY & NEUROLOGY

## 2023-04-04 NOTE — PROGRESS NOTES
Follow up:   Last sz - 3/26   Semiology - tired -> strange feeling     3/25 - trigger was stress and exercise, semiology - strange feeling     Gained 20 lbs in last 3 months     Prior note:   Reports 1 sz this afternoon   Semiology - felt strange feeling   Sz Hz 1/week     Prior note:   Trigger - jogging   Semiology - loosing time   July - 8 sz   Aug - 6 sz     Prior note:   Still has recurrent seizures   Semiology - loosing time   She is very frustrated   May - had 5 seizures   Trigger - emotional distress     Prior note:   Episodes of loosing time   No aura   Triggered by stress and physical work out (jogging)     sz Hz - can go 3 wks sz free, up to 1 per day for 3 days      Semiology prior to surgery: fear -> strange feeling -> no MARAH      Prior note:   3-4 sz/week   Triggered by stress and physical work out (jogging)     Prior note:   Sz 2/month -> 4-8 per week (since last 2 months)   Aura - dejavu x 10 sec   10 min later - uncomfortable feeling x 30 sec      Prior note:   Last dose of briviact - last night   One hour later an hour episode of fear and whole body shaking   vimpat 100 mg BID x 7 days   No further sz      Prior note:   3 month Angry outburst, crying spells - breviact   Last sz - last week   semiology - weird feeling of fear'   Previous sz - loss of awareness      Pt of Dr. Elam      2020/08/25  The plan last visit was to hold perampanel for one week and the reduce dosing at one half the previous dose.  The emotionality has improved but still is present.  She may of had only one sz since the last visit.     2020/04/07  Since starting perampanel she has been emotion with crying spells.  Emotional outburst did not occur in the 1st 2-3 weeks after starting pyramidal.  She is on 2 mg per day.  The ER becoming more intense and is somewhat disruptive to the family.  Seizures are much better.  She will often have a very brief sensation and actually question whether she had a seizure or something else.   "Clinically that is a good response but the side effects are not acceptable.  She has had no other significant interim medical problems.  She continues to take lamotrigine and the doses not been changed.     2019/11/22  The patient continues to do well.  However she does have brief sensation of fear but nothing else.  She does not have      2019/08/07  The patient is doing well but has an occasional aura which now a feeling of fear which lasts 30 sec.  Last was on Aug 1.  She is averaging 3 per months.  Surgery was Nov 4, 2018.  The first aura occurred on Dec 19, 2018.  Number by month were Jan - 4, Feb - 2, Mar - 2, Apr - 2, May - 4, Jun - 5, Jul - 3.       Results for GLORIA GURROLA (MRN [id number]) as of 8/7/2019 15:38    Ref. Range 12/14/2018 12:10 1/14/2019 14:58 3/26/2019 13:30   Lamotrigine Lvl Latest Ref Range: 2.0 - 15.0 ug/mL 13.0 15.7 (H) 19.3 (H)         2019/03/26  In February and March, she has had four episodes that she is concerned represent seizure. These are different than any prior episodes or sensation that she has experienced. She describes a feeling of intense fear, during which she tells herself "you're just having a seizure".  reports lasting about 15 seconds, during the events he reports patient seems to be staring off in a daydream. She will respond to questions, but slowly. Typically occurring in the evening, prior to taking Lamictal. She recalls these episodes afterwards. Currently taking Lamictal 500 mg qHS, reports she noticed these episodes after switching from BID to daily lamictal dosing. Lamictal level at prior clinic visit 15.7. Denies any episodes of visual motor apraxia or any of her prior typical seizures.      2019/01/14  The patient underwent a L temporal lobectomy 2 months ago.  She has experience none of her typical seizures.  She did experience Judi Vu twice since surgery.  In the past she had reported Judi Vu as an aura.  She had not experienced any episodes of visual " "motor apraxia (unable to move eyes in any direction). She takes lamictal 200 mg in the AM and 300 mg in the PM.  She will be converted to once a day dosing of 500 mg which she prefers to do in the evening.       She feels so much better now.  She reports feeling as if she is 17 yo.     2018/05/29  Patient had another day of almost continuous seizures which was on May 09,2018.  She has failed several AEDs mainly due to side effects.  She experienced pharmacodynamic interaction and toxicity with Lamictal - Lacosamide combo.  She is tolerating the lamictal well         2018/04/09  The patient had 9 seizures recorded in last EMU visit all coming from L anterior temporal area.   MRI was normal but PET showed L mesial hypometabolism.  She is currently have almost daily seizures.  She has failed four AEDs is on Lamictal monotherapy.  She reports her verbal memory is terrible.  Review of labs show she was B12 deficient 4 yrs ago and she does not take any supplements.     Results for GLORIA GURROLA (MRN [id number]) as of 4/9/2018 16:23    Ref. Range 3/1/2016 14:45 12/19/2017 12:56 2/8/2018 18:12   Lamotrigine Lvl Latest Ref Range: 2.0 - 15.0 ug/mL 12.0 10.5 7.8      2018/01/29  EMU evaluation was completed in Dec and L temporal interictal spikes were recorded and one electrographic seizure was recorded arising from the L anterior temporal area.  Besides frequent seizures her major complaint is progressive memory loss.       HISTORY OF PRESENT ILLNESS (HPI)  Date: The   New Patient  Pt has been seeing Dr Huerta at [facility] for "complex partial sezures." First sz, in school, age 19. Was put on Dilantin and she did well for approx 15 years, until the seizures became active again. Thinks she may have had 2 classic "Grand Mal" seizures in her 20's, but since then, seizure types are those described below.     Currently, she is experiencing more than one event per week. Event type is described below. She has been failing her current " treatment regimen as described below. May have tried other meds, but can't remember them now. Did not bring records yet.      Seizure Seminology  Seizure Type 1   Classification: Pass-out  Aura - Occasional auditory мария vu  Pt loses consciousness and falls or loses tone 1-2 in  Post-ictal  Brief  Age of onset 19  Current Seizure Frequency - Several per week      Seizure Type 2  Classification: Complex Partial  Wanders around. Doesn't remember after.   Post-ictal  Brief  Current Seizure Frequency - Several per week     Seizure Triggers  Sleep Deprivation - None  Other medications - None  Psych/stress - None  Photic stimulation - None  Hyperventilation - None  Medical Problems - None  Menses - 3 days before period they get worse  Sensory Stimulation (light, sound, etc) - None  Missed dose of meds - None     AED Treatments  Present regimen   tabs 1 in AM and 1½ in PM      Prior treatments  clobazam (Onfi or Frizium, CLB) 20 mg QD  eslicarbazine (Aptiom, ESL) - seizure intensity worsened after 2 weeks Rx  lacosamide (Vimpat, LCS)   oxcarbazepine (Trileptal OXC)  3 month Angry outburst, crying spells - breviact    crying spells - Fycompa    BID  TPM 10ID  valproic acid (Depakote, VPA)   zonisamide (Zonegran, ZNA)  Vimpat  200 mg BID - kaplan      Not tried  acetazolamide (Diamox, AZM)  amantadine  carbamazepine (Tegretol, CBZ)  ethosuximide (Zarontin, ESM)  felbamate (felbatol, FBM)  gabapentin (Neurontin, GPN)  levetiracetam (Keppra, LEV)  methsuximide (Celontin, MSM)   phenobarbital (Pb)  pregabalin (Lyrica, PGB)  primidone (Mysoline, PRM)  retigabine (Potiga, RTG)  rufinamide (Banzel, RUF)  tiagabine (Gabatril, TGB)  viagabatrin, (Sabril, VGB)  vagal nerve stimulator (VNS)     Benzodiazepines  diazepam - rectal (Diastatl)  diazepam - oral (Valium, DZ)  clonazepam (Klonopin, CZP)  clorazepate (Tranxene, CLZ)  Ativan  Brain Stimulation  Vagal Nerve Stimulation-n/a  DBS- n/a     Compliance method  Memory -  yes  Mom or Spouse - Yes  Pill Box - no  Dewayne calendar - no  Turn over medication bottle - no  Phone alarm - no     Seizure Evaluation  EEG Routine - Dont have  MRI/MRA - In past- she doesn't know results  EMU eval  2017/12/19-12/20- Patient with no events overnight. EEG shows L anterior temporal spikes, most recorded at F7. None on the other side. At times, almost continuous L temporal interictal discharges at times.   2017/12/20- 11:56:23- clinical seizure, starting from L side on EEG. No epileptiform discharges noted. L temporal slowing and spikes noted. Consisted of brief moment of unresponsiveness.   2017/12/21- EEG showing L interical anterior temporal slowing with L temporal spikes. No clinical seizures since yesterday.   2017/12/21-12/22- Event on 12/21 at 18:55 showing patient talking on the phone and suddenly stopping, unable to speak and confused. EEG shows there is a rhythmic buildup in the L temporal chains. Patient is confused and is drowsy following event. No tonic/clonic activity present.      Admission Date: 7/5/2022  Hospital Length of Stay: 5 days  Discharge Date and Time:  07/10/2022 10:01 AM  HPI:   Ms. Arrieta is a 50 yo female with intractable epilepsy since age 18, s/p partial left anterior temporal lobectomy in 11/2018 admitted to EMU for further seizure localization as part of repeat surgical workup. After epilepsy surgery, patient reports of brief period of seizure freedom, before beginning to have seizures again approximately 3 months afterwards. She reports current seizures are different than her typical semiology prior to surgery. She describes current seizures as loss of awareness, and staring off. After her events, she is quickly back to baseline and is able to report that she had a seizure. No associated tongue biting, bowel/bladder incontinence. She endorses 6 seizures in March, 8 in April, 5 in May, and 4 in June. She is currently maintained on Lamotrigine 200 mg qAM/300 mg qPM and  Cenobamate 150 mg daily.  Hospital Course:   50 yo female with intractable epilepsy since age 18, s/p partial left anterior temporal lobe resection in 11/2018 with recurrence of seizures approximately 3 months after admitted to EMU for seizure capture and localization in discussion of repeat surgical workup. Patient is currently having approximately 4-5 events per month of loss of awareness and staring. Currently maintained on Lamotrigine 200 mg qAM/300 mg qPM, Cenobamate 150 mg daily.   7/5>7/6: Home Lamotrigine and Cenobamate held on admission. EEG with regional cortical dysfunction in the left temporal region as well as bilateral independent seizure foci in the left and right temporal lobes. Typical event 7/6 approximately 0957,  reported he noted patient with change in speech/confusion similar to after an event, no clear behavioral change during event.   7/6>7/7: EEG with regional cortical dysfunction in the left temporal region as well as bilateral independent seizure foci in the left and right temporal lobes. Three right temporal lobe seizures at 09:57, 13:55, and 20:21 on 7/6. Continue close vEEG monitoring and attempt to capture additional seizures to confirm localization for pre-surgical planning.  7/7>7/8: EEG with regional cortical dysfunction left temporal region, bilateral independent seizure foci in the left and right temporal lobes. No discrete seizures. Continue vEEG off all AEDs and attempt to capture additional seizures for pre-surgical planning. Plan for sleep deprivation 7/8>7/9 with provoking medications tomorrow morning.  7/8>7/9: She was sleep deprived and received benadryl and tramadol. EEG continues to show bilateral independent epileptiform activity in the right and left temporal lobes. A right onset seizure was captured at 14:04. The only clinical manifestation was confusion and the button was pressed about 30 minutes after the seizure occurred. Patient and  state they need  to leave tomorrow for transportation reasons.   7/9>7/10: No further seizures. Patient was restarted on home medications. We discussed increasing cenobamate but the pharmacy did not have it in stock and patient would like to discuss with Dr. Mark before increasing outpatient (and she just got the 150 mg dose filled).        CT/CTA Scan -   PET Scan -   Neuropsychological evaluation -   DEXA Scan     Potential Epilepsy Risk Factors:   Pregnancy/Labor/Delivery - full term uncomplicated pregnancy labor and vaginal delivery  Febrile seizures - none  Head injury - none  CNS infection - none   Stroke - none  Family Hx of Sz - none     PAST MEDICAL HISTORY:             Active Ambulatory Problems        Diagnosis  Date Noted        No Active Ambulatory Problems      Resolved Ambulatory Problems        Diagnosis  Date Noted        No Resolved Ambulatory Problems      No Additional Past Medical History          PAST SURGICAL HISTORY: No past surgical history on file.      FAMILY HISTORY: No family history on file.       SOCIAL HISTORY:                                                    Social History                             History    Social History      Marital Status:          Spouse Name:  N/A        Number of Children:  N/A      Years of Education:  N/A    Occupational History        Not on file.      Social History Main Topics      Smoking status:  Never Smoker      Smokeless tobacco:  Not on file      Alcohol Use:  No      Drug Use:  No      Sexual Activity:  Not on file    Other Topics  Concern          Not on file        Social History Narrative      No narrative on file             SUBSTANCE USE:          Social History    Social History Main Topics      Smoking status:  Never Smoker      Smokeless tobacco:  Not on file      Alcohol Use:  No      Drug Use:  No      Sexual Activity:  Not on file               History    Substance Use Topics      Smoking status:  Never Smoker      Smokeless tobacco:  Not on  file      Alcohol Use:  No          ALLERGIES: Review of patient's allergies indicates no known allergies.       Review of Systems   Constitutional: fatigue   HENT: Negative for ear pain, hearing loss, nosebleeds and tinnitus.   Eyes: Negative for blurred vision, double vision, photophobia and pain.   Respiratory: Negative for cough, hemoptysis and shortness of breath.   Cardiovascular: Negative for chest pain, palpitations, orthopnea and leg swelling.   Gastrointestinal: Negative for heartburn, nausea, vomiting, abdominal pain, diarrhea, constipation and blood in stool.   Genitourinary: Negative for dysuria and hematuria.   Musculoskeletal: Negative for myalgias, joint pain and falls.   Skin: Negative for itching and rash.   Neurological: Positive for seizures. Negative for dizziness, tremors, speech change, focal weakness, loss of consciousness, weakness and headaches.   Endo/Heme/Allergies: Negative for environmental allergies. Does not bruise/bleed easily.   Psychiatric/Behavioral: Positive for memory loss. Negative for depression, hallucinations and substance abuse.  The patient is not nervous/anxious.      Neurologic Exam   Higher Cortical Function:   Patient is a well developed, pleasant, well groomed individual appearing their stated age  Oriented - intact to person, place and time and followed two step instruction correctly.   Fund of knowledge was appropriate.   Language - Speech was fluent without evidence for an aphasia.     IMPRESSION  1.         Emotional lability - AED vs refractory drugs   Impacts works   Works as    2.         Focal Epilepsy   S/p L temporal lobectomy 11/2018, sz free for 3 months only   Presurgery - semiology - staring spells   Post surgery Aura - dejavu x 10 sec; 10 min later - uncomfortable feeling x 30 sec   [facility] DIAGNOSIS:  BRAIN, LEFT ANTERIOR TEMPORAL LOBE, EXCISION ([id number]; [facility], [place], [place]; COLLECTED  "11/19/2018):-Cortex with moderate cortical and subpial gliosis, and scattered thrombosed vessels and leptomeningeal mixed  inflammation.  BRAIN, LEFT HIPPOCAMPUS, EXCISION ([id number]; [facility], [place], [place];  COLLECTED 11/19/2018):-Fragments of hippocampus with focal neuronal loss and gliosis.  s/p placement of sEEG leads on 11/7/22 with removal on 11/16/22. After interdisciplinary conference review, she underwent placement of right RNS (amygdala depth electrode + middle temporal gyrus strip electrode) 12/19/22.     3.         Obesity  4,         F/h MS, ET     DISPOSITION:   1. Continue LTG 200mg 1 am and 1½ in pm  2,Xcopri 150 mg PM  Options - Lyrica   3, cont zoloft 25 mg QHS   4, see PCP      RNS   Electrodes: amygdala depth electrode (RHip1,2,3,4) + middle temporal gyrus strip electrode (RTG1,2,3,4)             Magnet swipes - no EEG correlate   She did not perceive any of the long episodes     Reviewed data from 2/13/2023 to 4/4/2023  -Counted 10 of her clear more evolved electrographic events  Observations  We are picking up the onsets and treating the events well! However, we are recording a lot of activity and likely overwriting a lot of ECoGs so she may have had more events than this. The A1 spike detector is quite active and the decreased long episode length are causing this. Most all of the actual recorded seizures are seeing a lead in on the strip lead (B2 detector). As such we can adjust these factors today and titrate up.  Recommendations  Implement programing set: "[id number]"  0.3 years implanted, battery and impedances look good                          "

## 2023-04-18 ENCOUNTER — PATIENT MESSAGE (OUTPATIENT)
Dept: NEUROSURGERY | Facility: CLINIC | Age: 53
End: 2023-04-18
Payer: COMMERCIAL

## 2023-04-21 ENCOUNTER — PATIENT MESSAGE (OUTPATIENT)
Dept: NEUROLOGY | Facility: CLINIC | Age: 53
End: 2023-04-21
Payer: COMMERCIAL

## 2023-05-09 ENCOUNTER — SOCIAL WORK (OUTPATIENT)
Dept: NEUROLOGY | Facility: CLINIC | Age: 53
End: 2023-05-09
Payer: COMMERCIAL

## 2023-05-09 NOTE — PROGRESS NOTES
LCSW placed call to patient as per request re: follow up for Epilepsy AA Huma.     She is working with monique kohler and on commercials / adds.   They will do a registration fee and they will get a bag.   She would like [facility] to sponsor and then provide any pens or other items available.     She notes that she is laly since her seizures are stop and stare.   She feels lonely since other people do not recognize the impact of seizures on people and the family.     LCSW reported he would forward information to manager and director and would provide support related to the walk as needed.

## 2023-05-16 NOTE — PROGRESS NOTES
[facility]  Neurocritical Care  Progress Note    Admit Date: 11/5/2018  Service Date: 11/09/2018  Length of Stay: 4    Subjective:     Chief Complaint: Temporal lobe epilepsy, intractable    History of Present Illness: Liza Arrieta is a 49 yo female with PMHx of partial intractable epilepsy who is being admitted to [facility] after L crani with subdural grid placement. Per chart review, she had her first seizure at age 19. At that time, she was treated with dilantin and she did well for approximately 15 years. Reports that she typically has absence seizures (5-6/day), but states she may have had two grand mal seizures decades ago.  The patient was recently admitted to EMU on 2/9/2018 where seizures were captured on EEG.  She has failed multiple medications and is now being admitted for post op management, continuous EEG.             Hospital Course: 11/5: Pt admitted to [facility] s/p L crani with subdural grid placement, continuous EEG  11/6: cEEG  11/7 cortical mapping per epilepsy. Seizures x3 non induced. Pulled one of her leads today. Sent for stat CTH showed electrodes have been repositioned. posterior infratemporal strips are no longer in place  11/8 NPO. Waiting to go to OR for repeat grid placement. No seizures over night  11/9 S/P repeat crani for grid placement. Neuro intact. One abscence seizure lasting 5 seconds. Epilepsy plans to due cortical mapping this afternoon.    Interval History: S/P repeat crani for grid placement. Neuro intact. One abscence seizure lasting 5 seconds. Epilepsy plans to due cortical mapping this afternoon.    Review of Systems:   Constitutional: Denies fevers or chills.  Pulmonary: Denies shortness of breath or cough.  Cardiology: Denies chest pain or palpitations.  GI: Denies abdominal pain or constipation.  Neurologic: Denies new weakness,  headache, or paresthesias.    Vitals:   Temp: 98.9 °F (37.2 °C)  Pulse: 67  Rhythm: normal sinus rhythm  BP: (!) 140/65  MAP  Ok thanks (mmHg): 93  Resp: 14  SpO2: 97 %  O2 Device (Oxygen Therapy): room air    Temp  Min: 98.1 °F (36.7 °C)  Max: 99.4 °F (37.4 °C)  Pulse  Min: 62  Max: 90  BP  Min: 99/58  Max: 150/75  MAP (mmHg)  Min: 75  Max: 106  Resp  Min: 11  Max: 21  SpO2  Min: 93 %  Max: 100 %    11/08 0701 - 11/09 0700  In: 2289.2 [P.O.:40; I.V.:2249.2]  Out: 1735 [Urine:1650; Drains:85]   Unmeasured Output  Urine Occurrence: 1  Stool Occurrence: 0     Examination:   Constitutional: Well-nourished and -developed. No apparent distress.   Eyes: Conjunctiva clear, anicteric. Lids no lesions.  Head/Ears/Nose/Mouth/Throat/Neck: Moist mucous membranes. External ears, nose atraumatic.   Cardiovascular: Regular rhythm. No murmurs. No leg edema.  Respiratory: Comfortable respirations. Clear to auscultation.  Gastrointestinal: No hernia. Soft, nondistended, nontender. + bowel sounds.  Skin:  EEG leads to head    Neurologic:  -GCS E4V6  -Alert. Oriented to person, place, and time. Speech fluent. Follows commands.  -Cranial nerves grossly intact   -Motor PUGA spontaneously 5/5 strength  -Sensation bilat intact        Medications:   Continuous Scheduled  ceFAZolin (ANCEF) IVPB 2 g Q8H   heparin (porcine) 5,000 Units Q8H   lamoTRIgine 100 mg BID   mupirocin 1 g BID   pantoprazole 40 mg Daily   senna-docusate 8.6-50 mg 1 tablet Daily   PRN  acetaminophen 650 mg Q6H PRN   acetaminophen 650 mg Q6H PRN   acetaminophen 650 mg Q6H PRN   acetaminophen 650 mg Q6H PRN   HYDROcodone-acetaminophen 1 tablet Q4H PRN   HYDROcodone-acetaminophen 1 tablet Q4H PRN   HYDROmorphone 0.5 mg Q1H PRN   metoclopramide HCl 5 mg Q6H PRN   ondansetron 8 mg Q6H PRN   sodium chloride 0.9% 3 mL PRN      Today I independently reviewed pertinent medications, lines/drains/airways, imaging, laboratory results, microbiology results, notably:     ISTAT: No results for input(s): PH, PCO2, PO2, POCSATURATED, HCO3, BE, POCNA, POCK, POCTCO2, POCGLU, POCICA, POCLAC, SAMPLE in the last 24 hours.    Chem: Recent Labs   Lab 11/09/18  0304      K 3.9      CO2 26   *   BUN 10   CREATININE 0.7   ESTGFRAFRICA >60.0   EGFRNONAA >60.0   CALCIUM 9.2   MG 1.8   PHOS 3.4   ANIONGAP 8     Heme: Recent Labs   Lab 11/08/18  1744  11/09/18  0304   WBC  --    < > 12.34   HGB  --    < > 10.7*   HCT  --    < > 34.7*   PLT  --    < > 258   INR 0.9  --   --     < > = values in this interval not displayed.     Endo: No results for input(s): POCTGLUCOSE in the last 24 hours.   Assessment/Plan:     Neuro   * Temporal lobe epilepsy, intractable    -POD# 1Repeat s/p craniotomy for strip and grid placement  -cEEG  -continue lamotrigine 100 mg BID   -ativan 2 mg for seizures lasting greater than 5 minutes  -notify epilepsy of clinical seizures > 5 minutes and administration of ativan              Cardiac/Vascular   Essential hypertension    --SBP goal <180   EKG  SR  --Closely monitor BP and HR     Endocrine   Class 2 obesity with body mass index (BMI) of 38.0 to 38.9 in adult    Body mass index is 39.11 kg/m².             The patient is being Prophylaxed for:  Venous Thromboembolism with: Mechanical or Chemical  Stress Ulcer with: None  Ventilator Pneumonia with: none    Activity Orders          None        Full Code     I have spent 35 min with this patient, with over 50% of this time spent coordinating care and speaking with the family    Elsa George NP  Neurocritical Care  [facility]

## 2023-05-17 ENCOUNTER — TELEPHONE (OUTPATIENT)
Dept: NEUROLOGY | Facility: CLINIC | Age: 53
End: 2023-05-17
Payer: COMMERCIAL

## 2023-05-17 NOTE — TELEPHONE ENCOUNTER
----- Message from Miryam Champion sent at 5/17/2023 12:41 PM CDT -----  Regarding: Appt  Contact: Pt @ [phone]  Pt is calling to get appt. Asking for a call back

## 2023-05-17 NOTE — TELEPHONE ENCOUNTER
Called patient to schedule appointment with Dr. Mark. Held next available July 11th at 2:40 pm. Patient wants to be added on wait list incase anything is June opens up

## 2023-06-29 ENCOUNTER — PATIENT MESSAGE (OUTPATIENT)
Dept: NEUROLOGY | Facility: CLINIC | Age: 53
End: 2023-06-29
Payer: COMMERCIAL

## 2023-07-06 ENCOUNTER — PATIENT MESSAGE (OUTPATIENT)
Dept: NEUROLOGY | Facility: CLINIC | Age: 53
End: 2023-07-06
Payer: COMMERCIAL

## 2023-07-11 ENCOUNTER — OFFICE VISIT (OUTPATIENT)
Dept: NEUROLOGY | Facility: CLINIC | Age: 53
End: 2023-07-11
Payer: COMMERCIAL

## 2023-07-11 VITALS
DIASTOLIC BLOOD PRESSURE: 72 MMHG | BODY MASS INDEX: 33.25 KG/M2 | WEIGHT: 206.88 LBS | HEART RATE: 54 BPM | SYSTOLIC BLOOD PRESSURE: 126 MMHG | HEIGHT: 66 IN

## 2023-07-11 DIAGNOSIS — G40.219 COMPLEX PARTIAL EPILEPSY WITH GENERALIZATION AND WITH INTRACTABLE EPILEPSY: Primary | ICD-10-CM

## 2023-07-11 DIAGNOSIS — Z96.82 S/P INSERTION OF BRAIN-RESPONSIVE NEUROSTIMULATION DEVICE: ICD-10-CM

## 2023-07-11 PROCEDURE — 99999 PR PBB SHADOW E&M-EST. PATIENT-LVL III: ICD-10-PCS | Mod: PBBFAC,,, | Performed by: PSYCHIATRY & NEUROLOGY

## 2023-07-11 PROCEDURE — 3074F PR MOST RECENT SYSTOLIC BLOOD PRESSURE < 130 MM HG: ICD-10-PCS | Mod: CPTII,S$GLB,, | Performed by: PSYCHIATRY & NEUROLOGY

## 2023-07-11 PROCEDURE — 3078F DIAST BP <80 MM HG: CPT | Mod: CPTII,S$GLB,, | Performed by: PSYCHIATRY & NEUROLOGY

## 2023-07-11 PROCEDURE — 95836 PR ELECTRCORTICOGRAM, IMPL BRAIN NEUROSTIM PULSE GEN, < 30 DAYS: ICD-10-PCS | Mod: S$GLB,,, | Performed by: PSYCHIATRY & NEUROLOGY

## 2023-07-11 PROCEDURE — 3008F BODY MASS INDEX DOCD: CPT | Mod: CPTII,S$GLB,, | Performed by: PSYCHIATRY & NEUROLOGY

## 2023-07-11 PROCEDURE — 3078F PR MOST RECENT DIASTOLIC BLOOD PRESSURE < 80 MM HG: ICD-10-PCS | Mod: CPTII,S$GLB,, | Performed by: PSYCHIATRY & NEUROLOGY

## 2023-07-11 PROCEDURE — 3074F SYST BP LT 130 MM HG: CPT | Mod: CPTII,S$GLB,, | Performed by: PSYCHIATRY & NEUROLOGY

## 2023-07-11 PROCEDURE — 1160F RVW MEDS BY RX/DR IN RCRD: CPT | Mod: CPTII,S$GLB,, | Performed by: PSYCHIATRY & NEUROLOGY

## 2023-07-11 PROCEDURE — 1159F PR MEDICATION LIST DOCUMENTED IN MEDICAL RECORD: ICD-10-PCS | Mod: CPTII,S$GLB,, | Performed by: PSYCHIATRY & NEUROLOGY

## 2023-07-11 PROCEDURE — 95836 ECOG IMPLTD BRN NPGT <30 D: CPT | Mod: S$GLB,,, | Performed by: PSYCHIATRY & NEUROLOGY

## 2023-07-11 PROCEDURE — 99215 PR OFFICE/OUTPT VISIT, EST, LEVL V, 40-54 MIN: ICD-10-PCS | Mod: 25,S$GLB,, | Performed by: PSYCHIATRY & NEUROLOGY

## 2023-07-11 PROCEDURE — 99999 PR PBB SHADOW E&M-EST. PATIENT-LVL III: CPT | Mod: PBBFAC,,, | Performed by: PSYCHIATRY & NEUROLOGY

## 2023-07-11 PROCEDURE — 95983 PR ELEC ANALYSIS, IMPLT NEURO PULSE GEN, W/PRGRM, BRAIN, 1ST 15 MINS: ICD-10-PCS | Mod: S$GLB,,, | Performed by: PSYCHIATRY & NEUROLOGY

## 2023-07-11 PROCEDURE — 1160F PR REVIEW ALL MEDS BY PRESCRIBER/CLIN PHARMACIST DOCUMENTED: ICD-10-PCS | Mod: CPTII,S$GLB,, | Performed by: PSYCHIATRY & NEUROLOGY

## 2023-07-11 PROCEDURE — 95983 ALYS BRN NPGT PRGRMG 15 MIN: CPT | Mod: S$GLB,,, | Performed by: PSYCHIATRY & NEUROLOGY

## 2023-07-11 PROCEDURE — 1159F MED LIST DOCD IN RCRD: CPT | Mod: CPTII,S$GLB,, | Performed by: PSYCHIATRY & NEUROLOGY

## 2023-07-11 PROCEDURE — 3008F PR BODY MASS INDEX (BMI) DOCUMENTED: ICD-10-PCS | Mod: CPTII,S$GLB,, | Performed by: PSYCHIATRY & NEUROLOGY

## 2023-07-11 PROCEDURE — 99215 OFFICE O/P EST HI 40 MIN: CPT | Mod: 25,S$GLB,, | Performed by: PSYCHIATRY & NEUROLOGY

## 2023-07-11 NOTE — PROGRESS NOTES
Follow up:     Last sz July 8 - MARAH   No GTC   Compliance - good   Friday late afternoon - [employer] classes - she reports fatigue afterwards - possible sz      Prior note:    Last sz - 3/26   Semiology - tired -> strange feeling     3/25 - trigger was stress and exercise, semiology - strange feeling     Gained 20 lbs in last 3 months     Prior note:   Reports 1 sz this afternoon   Semiology - felt strange feeling   Sz Hz 1/week     Prior note:   Trigger - jogging   Semiology - loosing time   July - 8 sz   Aug - 6 sz     Prior note:   Still has recurrent seizures   Semiology - loosing time   She is very frustrated   May - had 5 seizures   Trigger - emotional distress     Prior note:   Episodes of loosing time   No aura   Triggered by stress and physical work out (jogging)     sz Hz - can go 3 wks sz free, up to 1 per day for 3 days      Semiology prior to surgery: fear -> strange feeling -> no MARAH      Prior note:   3-4 sz/week   Triggered by stress and physical work out (jogging)     Prior note:   Sz 2/month -> 4-8 per week (since last 2 months)   Aura - dejavu x 10 sec   10 min later - uncomfortable feeling x 30 sec      Prior note:   Last dose of briviact - last night   One hour later an hour episode of fear and whole body shaking   vimpat 100 mg BID x 7 days   No further sz      Prior note:   3 month Angry outburst, crying spells - breviact   Last sz - last week   semiology - weird feeling of fear'   Previous sz - loss of awareness      Pt of Dr. Elam      2020/08/25  The plan last visit was to hold perampanel for one week and the reduce dosing at one half the previous dose.  The emotionality has improved but still is present.  She may of had only one sz since the last visit.     2020/04/07  Since starting perampanel she has been emotion with crying spells.  Emotional outburst did not occur in the 1st 2-3 weeks after starting pyramidal.  She is on 2 mg per day.  The ER becoming more intense and is somewhat  "disruptive to the family.  Seizures are much better.  She will often have a very brief sensation and actually question whether she had a seizure or something else.  Clinically that is a good response but the side effects are not acceptable.  She has had no other significant interim medical problems.  She continues to take lamotrigine and the doses not been changed.     2019/11/22  The patient continues to do well.  However she does have brief sensation of fear but nothing else.  She does not have      2019/08/07  The patient is doing well but has an occasional aura which now a feeling of fear which lasts 30 sec.  Last was on Aug 1.  She is averaging 3 per months.  Surgery was Nov 4, 2018.  The first aura occurred on Dec 19, 2018.  Number by month were Jan - 4, Feb - 2, Mar - 2, Apr - 2, May - 4, Jun - 5, Jul - 3.       Results for GLORIA GURROLA (MRN [id number]) as of 8/7/2019 15:38    Ref. Range 12/14/2018 12:10 1/14/2019 14:58 3/26/2019 13:30   Lamotrigine Lvl Latest Ref Range: 2.0 - 15.0 ug/mL 13.0 15.7 (H) 19.3 (H)         2019/03/26  In February and March, she has had four episodes that she is concerned represent seizure. These are different than any prior episodes or sensation that she has experienced. She describes a feeling of intense fear, during which she tells herself "you're just having a seizure".  reports lasting about 15 seconds, during the events he reports patient seems to be staring off in a daydream. She will respond to questions, but slowly. Typically occurring in the evening, prior to taking Lamictal. She recalls these episodes afterwards. Currently taking Lamictal 500 mg qHS, reports she noticed these episodes after switching from BID to daily lamictal dosing. Lamictal level at prior clinic visit 15.7. Denies any episodes of visual motor apraxia or any of her prior typical seizures.      2019/01/14  The patient underwent a L temporal lobectomy 2 months ago.  She has experience none of her " "typical seizures.  She did experience Judi Vu twice since surgery.  In the past she had reported Judi Vu as an aura.  She had not experienced any episodes of visual motor apraxia (unable to move eyes in any direction). She takes lamictal 200 mg in the AM and 300 mg in the PM.  She will be converted to once a day dosing of 500 mg which she prefers to do in the evening.       She feels so much better now.  She reports feeling as if she is 19 yo.     2018/05/29  Patient had another day of almost continuous seizures which was on May 09,2018.  She has failed several AEDs mainly due to side effects.  She experienced pharmacodynamic interaction and toxicity with Lamictal - Lacosamide combo.  She is tolerating the lamictal well         2018/04/09  The patient had 9 seizures recorded in last EMU visit all coming from L anterior temporal area.   MRI was normal but PET showed L mesial hypometabolism.  She is currently have almost daily seizures.  She has failed four AEDs is on Lamictal monotherapy.  She reports her verbal memory is terrible.  Review of labs show she was B12 deficient 4 yrs ago and she does not take any supplements.     Results for GLORIA GURROLA (MRN [id number]) as of 4/9/2018 16:23    Ref. Range 3/1/2016 14:45 12/19/2017 12:56 2/8/2018 18:12   Lamotrigine Lvl Latest Ref Range: 2.0 - 15.0 ug/mL 12.0 10.5 7.8      2018/01/29  EMU evaluation was completed in Dec and L temporal interictal spikes were recorded and one electrographic seizure was recorded arising from the L anterior temporal area.  Besides frequent seizures her major complaint is progressive memory loss.       HISTORY OF PRESENT ILLNESS (HPI)  Date: The   New Patient  Pt has been seeing Dr Huerta at [facility] for "complex partial sezures." First sz, in school, age 19. Was put on Dilantin and she did well for approx 15 years, until the seizures became active again. Thinks she may have had 2 classic "Grand Mal" seizures in her 20's, but since then, seizure " types are those described below.     Currently, she is experiencing more than one event per week. Event type is described below. She has been failing her current treatment regimen as described below. May have tried other meds, but can't remember them now. Did not bring records yet.      Seizure Seminology  Seizure Type 1   Classification: Pass-out  Aura - Occasional auditory мария vu  Pt loses consciousness and falls or loses tone 1-2 in  Post-ictal  Brief  Age of onset 19  Current Seizure Frequency - Several per week      Seizure Type 2  Classification: Complex Partial  Wanders around. Doesn't remember after.   Post-ictal  Brief  Current Seizure Frequency - Several per week     Seizure Triggers  Sleep Deprivation - None  Other medications - None  Psych/stress - None  Photic stimulation - None  Hyperventilation - None  Medical Problems - None  Menses - 3 days before period they get worse  Sensory Stimulation (light, sound, etc) - None  Missed dose of meds - None     AED Treatments  Present regimen   tabs 1 in AM and 1½ in PM      Prior treatments  clobazam (Onfi or Frizium, CLB) 20 mg QD  eslicarbazine (Aptiom, ESL) - seizure intensity worsened after 2 weeks Rx  lacosamide (Vimpat, LCS)   oxcarbazepine (Trileptal OXC)  3 month Angry outburst, crying spells - breviact    crying spells - Fycompa    BID  TPM 10ID  valproic acid (Depakote, VPA)   zonisamide (Zonegran, ZNA)  Vimpat  200 mg BID - kaplan      Not tried  acetazolamide (Diamox, AZM)  amantadine  carbamazepine (Tegretol, CBZ)  ethosuximide (Zarontin, ESM)  felbamate (felbatol, FBM)  gabapentin (Neurontin, GPN)  levetiracetam (Keppra, LEV)  methsuximide (Celontin, MSM)   phenobarbital (Pb)  pregabalin (Lyrica, PGB)  primidone (Mysoline, PRM)  retigabine (Potiga, RTG)  rufinamide (Banzel, RUF)  tiagabine (Gabatril, TGB)  viagabatrin, (Sabril, VGB)  vagal nerve stimulator (VNS)     Benzodiazepines  diazepam - rectal (Diastatl)  diazepam - oral  (Valium, DZ)  clonazepam (Klonopin, CZP)  clorazepate (Tranxene, CLZ)  Ativan  Brain Stimulation  Vagal Nerve Stimulation-n/a  DBS- n/a     Compliance method  Memory - yes  Mom or Spouse - Yes  Pill Box - no  Dewayne calendar - no  Turn over medication bottle - no  Phone alarm - no     Seizure Evaluation  EEG Routine - Dont have  MRI/MRA - In past- she doesn't know results  EMU eval  2017/12/19-12/20- Patient with no events overnight. EEG shows L anterior temporal spikes, most recorded at F7. None on the other side. At times, almost continuous L temporal interictal discharges at times.   2017/12/20- 11:56:23- clinical seizure, starting from L side on EEG. No epileptiform discharges noted. L temporal slowing and spikes noted. Consisted of brief moment of unresponsiveness.   2017/12/21- EEG showing L interical anterior temporal slowing with L temporal spikes. No clinical seizures since yesterday.   2017/12/21-12/22- Event on 12/21 at 18:55 showing patient talking on the phone and suddenly stopping, unable to speak and confused. EEG shows there is a rhythmic buildup in the L temporal chains. Patient is confused and is drowsy following event. No tonic/clonic activity present.      Admission Date: 7/5/2022  Hospital Length of Stay: 5 days  Discharge Date and Time:  07/10/2022 10:01 AM  HPI:   Ms. Arrieta is a 52 yo female with intractable epilepsy since age 18, s/p partial left anterior temporal lobectomy in 11/2018 admitted to EMU for further seizure localization as part of repeat surgical workup. After epilepsy surgery, patient reports of brief period of seizure freedom, before beginning to have seizures again approximately 3 months afterwards. She reports current seizures are different than her typical semiology prior to surgery. She describes current seizures as loss of awareness, and staring off. After her events, she is quickly back to baseline and is able to report that she had a seizure. No associated tongue biting,  bowel/bladder incontinence. She endorses 6 seizures in March, 8 in April, 5 in May, and 4 in June. She is currently maintained on Lamotrigine 200 mg qAM/300 mg qPM and Cenobamate 150 mg daily.  Hospital Course:   52 yo female with intractable epilepsy since age 18, s/p partial left anterior temporal lobe resection in 11/2018 with recurrence of seizures approximately 3 months after admitted to EMU for seizure capture and localization in discussion of repeat surgical workup. Patient is currently having approximately 4-5 events per month of loss of awareness and staring. Currently maintained on Lamotrigine 200 mg qAM/300 mg qPM, Cenobamate 150 mg daily.   7/5>7/6: Home Lamotrigine and Cenobamate held on admission. EEG with regional cortical dysfunction in the left temporal region as well as bilateral independent seizure foci in the left and right temporal lobes. Typical event 7/6 approximately 0957,  reported he noted patient with change in speech/confusion similar to after an event, no clear behavioral change during event.   7/6>7/7: EEG with regional cortical dysfunction in the left temporal region as well as bilateral independent seizure foci in the left and right temporal lobes. Three right temporal lobe seizures at 09:57, 13:55, and 20:21 on 7/6. Continue close vEEG monitoring and attempt to capture additional seizures to confirm localization for pre-surgical planning.  7/7>7/8: EEG with regional cortical dysfunction left temporal region, bilateral independent seizure foci in the left and right temporal lobes. No discrete seizures. Continue vEEG off all AEDs and attempt to capture additional seizures for pre-surgical planning. Plan for sleep deprivation 7/8>7/9 with provoking medications tomorrow morning.  7/8>7/9: She was sleep deprived and received benadryl and tramadol. EEG continues to show bilateral independent epileptiform activity in the right and left temporal lobes. A right onset seizure was  captured at 14:04. The only clinical manifestation was confusion and the button was pressed about 30 minutes after the seizure occurred. Patient and  state they need to leave tomorrow for transportation reasons.   7/9>7/10: No further seizures. Patient was restarted on home medications. We discussed increasing cenobamate but the pharmacy did not have it in stock and patient would like to discuss with Dr. Mark before increasing outpatient (and she just got the 150 mg dose filled).        CT/CTA Scan -   PET Scan -   Neuropsychological evaluation -   DEXA Scan     Potential Epilepsy Risk Factors:   Pregnancy/Labor/Delivery - full term uncomplicated pregnancy labor and vaginal delivery  Febrile seizures - none  Head injury - none  CNS infection - none   Stroke - none  Family Hx of Sz - none     PAST MEDICAL HISTORY:             Active Ambulatory Problems        Diagnosis  Date Noted        No Active Ambulatory Problems      Resolved Ambulatory Problems        Diagnosis  Date Noted        No Resolved Ambulatory Problems      No Additional Past Medical History          PAST SURGICAL HISTORY: No past surgical history on file.      FAMILY HISTORY: No family history on file.       SOCIAL HISTORY:                                                    Social History                             History    Social History      Marital Status:          Spouse Name:  N/A        Number of Children:  N/A      Years of Education:  N/A    Occupational History        Not on file.      Social History Main Topics      Smoking status:  Never Smoker      Smokeless tobacco:  Not on file      Alcohol Use:  No      Drug Use:  No      Sexual Activity:  Not on file    Other Topics  Concern          Not on file        Social History Narrative      No narrative on file             SUBSTANCE USE:          Social History    Social History Main Topics      Smoking status:  Never Smoker      Smokeless tobacco:  Not on file      Alcohol  Use:  No      Drug Use:  No      Sexual Activity:  Not on file               History    Substance Use Topics      Smoking status:  Never Smoker      Smokeless tobacco:  Not on file      Alcohol Use:  No          ALLERGIES: Review of patient's allergies indicates no known allergies.       Review of Systems   Constitutional: fatigue   HENT: Negative for ear pain, hearing loss, nosebleeds and tinnitus.   Eyes: Negative for blurred vision, double vision, photophobia and pain.   Respiratory: Negative for cough, hemoptysis and shortness of breath.   Cardiovascular: Negative for chest pain, palpitations, orthopnea and leg swelling.   Gastrointestinal: Negative for heartburn, nausea, vomiting, abdominal pain, diarrhea, constipation and blood in stool.   Genitourinary: Negative for dysuria and hematuria.   Musculoskeletal: Negative for myalgias, joint pain and falls.   Skin: Negative for itching and rash.   Neurological: Positive for seizures. Negative for dizziness, tremors, speech change, focal weakness, loss of consciousness, weakness and headaches.   Endo/Heme/Allergies: Negative for environmental allergies. Does not bruise/bleed easily.   Psychiatric/Behavioral: Positive for memory loss. Negative for depression, hallucinations and substance abuse.  The patient is not nervous/anxious.      Neurologic Exam   Higher Cortical Function:   Patient is a well developed, pleasant, well groomed individual appearing their stated age  Oriented - intact to person, place and time and followed two step instruction correctly.   Fund of knowledge was appropriate.   Language - Speech was fluent without evidence for an aphasia.     IMPRESSION  1.         Emotional lability - AED vs refractory drugs   Impacts works   Works as    2.         Focal Epilepsy   S/p L temporal lobectomy 11/2018, sz free for 3 months only   Presurgery - semiology - staring spells   Post surgery Aura - dejavu x 10 sec; 10 min later - uncomfortable feeling  "x 30 sec   [facility] DIAGNOSIS:  BRAIN, LEFT ANTERIOR TEMPORAL LOBE, EXCISION ([id number]; [facility], [place], [place]; COLLECTED 11/19/2018):-Cortex with moderate cortical and subpial gliosis, and scattered thrombosed vessels and leptomeningeal mixed  inflammation.  BRAIN, LEFT HIPPOCAMPUS, EXCISION ([id number]; [facility], [place], [place];  COLLECTED 11/19/2018):-Fragments of hippocampus with focal neuronal loss and gliosis.  s/p placement of sEEG leads on 11/7/22 with removal on 11/16/22. After interdisciplinary conference review, she underwent placement of right RNS (amygdala depth electrode + middle temporal gyrus strip electrode) 12/19/22.     3.         Obesity  4,         F/h MS, ET     DISPOSITION:   1. Continue LTG 200mg 1 am and 1½ in pm  2,Xcopri 150 mg PM  Options - Lyrica   3, cont zoloft 25 mg QHS   4, see PCP      RNS   Electrodes: amygdala depth electrode (RHip1,2,3,4) + middle temporal gyrus strip electrode (RTG1,2,3,4)             Magnet swipes - no EEG correlate   She did not perceive any of the long episodes     4:20PM -  -- [id number]  Reviewed data from 4/4/2023 to 7/10/2023  -Counted about 30 of her more clearly evolved electrographic events (May 26th seems to have been the longest by far)  Observations  A1 detector is still very active. In general there seems to be large amounts of spiking overnight on channel 1. Does she tend to have symptomatic overnight activity or is this just sleep? Her clear electrographic events tend to be starting as LVF activity so I can adjust this spike detector to not pickup as much of this spiking activity if we don't think this is electrographic. We can move from 2hz to 4hz on A1 in order to better treat the electrographic activity additionally we can titrate up.  Recommendations  Implement programing set: "[id number]"                            "

## 2023-07-29 DIAGNOSIS — G40.119 TEMPORAL LOBE EPILEPSY, INTRACTABLE: ICD-10-CM

## 2023-07-31 RX ORDER — CENOBAMATE 150 MG/1
150 TABLET, FILM COATED ORAL DAILY
Qty: 30 TABLET | Refills: 5 | Status: SHIPPED | OUTPATIENT
Start: 2023-07-31 | End: 2024-02-07

## 2023-09-05 ENCOUNTER — OFFICE VISIT (OUTPATIENT)
Dept: NEUROLOGY | Facility: CLINIC | Age: 53
End: 2023-09-05
Payer: COMMERCIAL

## 2023-09-05 VITALS
HEIGHT: 66 IN | SYSTOLIC BLOOD PRESSURE: 121 MMHG | WEIGHT: 206.25 LBS | DIASTOLIC BLOOD PRESSURE: 79 MMHG | HEART RATE: 58 BPM | BODY MASS INDEX: 33.15 KG/M2

## 2023-09-05 DIAGNOSIS — G40.119 TEMPORAL LOBE EPILEPSY, INTRACTABLE: ICD-10-CM

## 2023-09-05 DIAGNOSIS — Z96.82 S/P INSERTION OF BRAIN-RESPONSIVE NEUROSTIMULATION DEVICE: Primary | ICD-10-CM

## 2023-09-05 DIAGNOSIS — G40.219 COMPLEX PARTIAL EPILEPSY WITH GENERALIZATION AND WITH INTRACTABLE EPILEPSY: ICD-10-CM

## 2023-09-05 PROCEDURE — 99999 PR PBB SHADOW E&M-EST. PATIENT-LVL III: ICD-10-PCS | Mod: PBBFAC,,, | Performed by: PSYCHIATRY & NEUROLOGY

## 2023-09-05 PROCEDURE — 95983 PR ELEC ANALYSIS, IMPLT NEURO PULSE GEN, W/PRGRM, BRAIN, 1ST 15 MINS: ICD-10-PCS | Mod: S$GLB,,, | Performed by: PSYCHIATRY & NEUROLOGY

## 2023-09-05 PROCEDURE — 3008F BODY MASS INDEX DOCD: CPT | Mod: CPTII,S$GLB,, | Performed by: PSYCHIATRY & NEUROLOGY

## 2023-09-05 PROCEDURE — 3078F PR MOST RECENT DIASTOLIC BLOOD PRESSURE < 80 MM HG: ICD-10-PCS | Mod: CPTII,S$GLB,, | Performed by: PSYCHIATRY & NEUROLOGY

## 2023-09-05 PROCEDURE — 99215 PR OFFICE/OUTPT VISIT, EST, LEVL V, 40-54 MIN: ICD-10-PCS | Mod: 25,S$GLB,, | Performed by: PSYCHIATRY & NEUROLOGY

## 2023-09-05 PROCEDURE — 3074F SYST BP LT 130 MM HG: CPT | Mod: CPTII,S$GLB,, | Performed by: PSYCHIATRY & NEUROLOGY

## 2023-09-05 PROCEDURE — 95836 ECOG IMPLTD BRN NPGT <30 D: CPT | Mod: S$GLB,,, | Performed by: PSYCHIATRY & NEUROLOGY

## 2023-09-05 PROCEDURE — 95983 ALYS BRN NPGT PRGRMG 15 MIN: CPT | Mod: S$GLB,,, | Performed by: PSYCHIATRY & NEUROLOGY

## 2023-09-05 PROCEDURE — 3008F PR BODY MASS INDEX (BMI) DOCUMENTED: ICD-10-PCS | Mod: CPTII,S$GLB,, | Performed by: PSYCHIATRY & NEUROLOGY

## 2023-09-05 PROCEDURE — 3074F PR MOST RECENT SYSTOLIC BLOOD PRESSURE < 130 MM HG: ICD-10-PCS | Mod: CPTII,S$GLB,, | Performed by: PSYCHIATRY & NEUROLOGY

## 2023-09-05 PROCEDURE — 99215 OFFICE O/P EST HI 40 MIN: CPT | Mod: 25,S$GLB,, | Performed by: PSYCHIATRY & NEUROLOGY

## 2023-09-05 PROCEDURE — 95836 PR ELECTRCORTICOGRAM, IMPL BRAIN NEUROSTIM PULSE GEN, < 30 DAYS: ICD-10-PCS | Mod: S$GLB,,, | Performed by: PSYCHIATRY & NEUROLOGY

## 2023-09-05 PROCEDURE — 99999 PR PBB SHADOW E&M-EST. PATIENT-LVL III: CPT | Mod: PBBFAC,,, | Performed by: PSYCHIATRY & NEUROLOGY

## 2023-09-05 PROCEDURE — 3078F DIAST BP <80 MM HG: CPT | Mod: CPTII,S$GLB,, | Performed by: PSYCHIATRY & NEUROLOGY

## 2023-09-05 NOTE — PROGRESS NOTES
Follow up:     Staring spells / loose time - upon awakening   July 26   July 27   Aug 23 8:30 am   Aug 25   Sept 5     Last sz July 8 - MARAH   No GTC   Compliance - good   Friday late afternoon - [employer] classes - she reports fatigue afterwards - possible sz      Prior note:    Last sz - 3/26   Semiology - tired -> strange feeling     3/25 - trigger was stress and exercise, semiology - strange feeling     Gained 20 lbs in last 3 months     Prior note:   Reports 1 sz this afternoon   Semiology - felt strange feeling   Sz Hz 1/week     Prior note:   Trigger - jogging   Semiology - loosing time   July - 8 sz   Aug - 6 sz     Prior note:   Still has recurrent seizures   Semiology - loosing time   She is very frustrated   May - had 5 seizures   Trigger - emotional distress     Prior note:   Episodes of loosing time   No aura   Triggered by stress and physical work out (jogging)     sz Hz - can go 3 wks sz free, up to 1 per day for 3 days      Semiology prior to surgery: fear -> strange feeling -> no MARAH      Prior note:   3-4 sz/week   Triggered by stress and physical work out (jogging)     Prior note:   Sz 2/month -> 4-8 per week (since last 2 months)   Aura - dejavu x 10 sec   10 min later - uncomfortable feeling x 30 sec      Prior note:   Last dose of briviact - last night   One hour later an hour episode of fear and whole body shaking   vimpat 100 mg BID x 7 days   No further sz      Prior note:   3 month Angry outburst, crying spells - breviact   Last sz - last week   semiology - weird feeling of fear'   Previous sz - loss of awareness      Pt of Dr. Elam      2020/08/25  The plan last visit was to hold perampanel for one week and the reduce dosing at one half the previous dose.  The emotionality has improved but still is present.  She may of had only one sz since the last visit.     2020/04/07  Since starting perampanel she has been emotion with crying spells.  Emotional outburst did not occur in the 1st 2-3 weeks  "after starting pyramidal.  She is on 2 mg per day.  The ER becoming more intense and is somewhat disruptive to the family.  Seizures are much better.  She will often have a very brief sensation and actually question whether she had a seizure or something else.  Clinically that is a good response but the side effects are not acceptable.  She has had no other significant interim medical problems.  She continues to take lamotrigine and the doses not been changed.     2019/11/22  The patient continues to do well.  However she does have brief sensation of fear but nothing else.  She does not have      2019/08/07  The patient is doing well but has an occasional aura which now a feeling of fear which lasts 30 sec.  Last was on Aug 1.  She is averaging 3 per months.  Surgery was Nov 4, 2018.  The first aura occurred on Dec 19, 2018.  Number by month were Jan - 4, Feb - 2, Mar - 2, Apr - 2, May - 4, Jun - 5, Jul - 3.       Results for GLORIA GURROLA (MRN [id number]) as of 8/7/2019 15:38    Ref. Range 12/14/2018 12:10 1/14/2019 14:58 3/26/2019 13:30   Lamotrigine Lvl Latest Ref Range: 2.0 - 15.0 ug/mL 13.0 15.7 (H) 19.3 (H)         2019/03/26  In February and March, she has had four episodes that she is concerned represent seizure. These are different than any prior episodes or sensation that she has experienced. She describes a feeling of intense fear, during which she tells herself "you're just having a seizure".  reports lasting about 15 seconds, during the events he reports patient seems to be staring off in a daydream. She will respond to questions, but slowly. Typically occurring in the evening, prior to taking Lamictal. She recalls these episodes afterwards. Currently taking Lamictal 500 mg qHS, reports she noticed these episodes after switching from BID to daily lamictal dosing. Lamictal level at prior clinic visit 15.7. Denies any episodes of visual motor apraxia or any of her prior typical seizures.    " "  2019/01/14  The patient underwent a L temporal lobectomy 2 months ago.  She has experience none of her typical seizures.  She did experience Judi Vu twice since surgery.  In the past she had reported Judi Vu as an aura.  She had not experienced any episodes of visual motor apraxia (unable to move eyes in any direction). She takes lamictal 200 mg in the AM and 300 mg in the PM.  She will be converted to once a day dosing of 500 mg which she prefers to do in the evening.       She feels so much better now.  She reports feeling as if she is 19 yo.     2018/05/29  Patient had another day of almost continuous seizures which was on May 09,2018.  She has failed several AEDs mainly due to side effects.  She experienced pharmacodynamic interaction and toxicity with Lamictal - Lacosamide combo.  She is tolerating the lamictal well         2018/04/09  The patient had 9 seizures recorded in last EMU visit all coming from L anterior temporal area.   MRI was normal but PET showed L mesial hypometabolism.  She is currently have almost daily seizures.  She has failed four AEDs is on Lamictal monotherapy.  She reports her verbal memory is terrible.  Review of labs show she was B12 deficient 4 yrs ago and she does not take any supplements.     Results for GLORIA GURROLA (MRN [id number]) as of 4/9/2018 16:23    Ref. Range 3/1/2016 14:45 12/19/2017 12:56 2/8/2018 18:12   Lamotrigine Lvl Latest Ref Range: 2.0 - 15.0 ug/mL 12.0 10.5 7.8      2018/01/29  EMU evaluation was completed in Dec and L temporal interictal spikes were recorded and one electrographic seizure was recorded arising from the L anterior temporal area.  Besides frequent seizures her major complaint is progressive memory loss.       HISTORY OF PRESENT ILLNESS (HPI)  Date: The   New Patient  Pt has been seeing Dr Huerta at [facility] for "complex partial sezures." First sz, in school, age 19. Was put on Dilantin and she did well for approx 15 years, until the seizures became " "active again. Thinks she may have had 2 classic "Grand Mal" seizures in her 20's, but since then, seizure types are those described below.     Currently, she is experiencing more than one event per week. Event type is described below. She has been failing her current treatment regimen as described below. May have tried other meds, but can't remember them now. Did not bring records yet.      Seizure Seminology  Seizure Type 1   Classification: Pass-out  Aura - Occasional auditory мария vu  Pt loses consciousness and falls or loses tone 1-2 in  Post-ictal  Brief  Age of onset 19  Current Seizure Frequency - Several per week      Seizure Type 2  Classification: Complex Partial  Wanders around. Doesn't remember after.   Post-ictal  Brief  Current Seizure Frequency - Several per week     Seizure Triggers  Sleep Deprivation - None  Other medications - None  Psych/stress - None  Photic stimulation - None  Hyperventilation - None  Medical Problems - None  Menses - 3 days before period they get worse  Sensory Stimulation (light, sound, etc) - None  Missed dose of meds - None     AED Treatments  Present regimen   tabs 1 in AM and 1½ in PM      Prior treatments  clobazam (Onfi or Frizium, CLB) 20 mg QD  eslicarbazine (Aptiom, ESL) - seizure intensity worsened after 2 weeks Rx  lacosamide (Vimpat, LCS)   oxcarbazepine (Trileptal OXC)  3 month Angry outburst, crying spells - breviact    crying spells - Fycompa    BID  TPM 10ID  valproic acid (Depakote, VPA)   zonisamide (Zonegran, ZNA)  Vimpat  200 mg BID - kaplan      Not tried  acetazolamide (Diamox, AZM)  amantadine  carbamazepine (Tegretol, CBZ)  ethosuximide (Zarontin, ESM)  felbamate (felbatol, FBM)  gabapentin (Neurontin, GPN)  levetiracetam (Keppra, LEV)  methsuximide (Celontin, MSM)   phenobarbital (Pb)  pregabalin (Lyrica, PGB)  primidone (Mysoline, PRM)  retigabine (Potiga, RTG)  rufinamide (Banzel, RUF)  tiagabine (Gabatril, TGB)  viagabatrin, (Sabril, " VGB)  vagal nerve stimulator (VNS)     Benzodiazepines  diazepam - rectal (Diastatl)  diazepam - oral (Valium, DZ)  clonazepam (Klonopin, CZP)  clorazepate (Tranxene, CLZ)  Ativan  Brain Stimulation  Vagal Nerve Stimulation-n/a  DBS- n/a     Compliance method  Memory - yes  Mom or Spouse - Yes  Pill Box - no  Dewayne calendar - no  Turn over medication bottle - no  Phone alarm - no     Seizure Evaluation  EEG Routine - Dont have  MRI/MRA - In past- she doesn't know results  EMU eval  2017/12/19-12/20- Patient with no events overnight. EEG shows L anterior temporal spikes, most recorded at F7. None on the other side. At times, almost continuous L temporal interictal discharges at times.   2017/12/20- 11:56:23- clinical seizure, starting from L side on EEG. No epileptiform discharges noted. L temporal slowing and spikes noted. Consisted of brief moment of unresponsiveness.   2017/12/21- EEG showing L interical anterior temporal slowing with L temporal spikes. No clinical seizures since yesterday.   2017/12/21-12/22- Event on 12/21 at 18:55 showing patient talking on the phone and suddenly stopping, unable to speak and confused. EEG shows there is a rhythmic buildup in the L temporal chains. Patient is confused and is drowsy following event. No tonic/clonic activity present.      Admission Date: 7/5/2022  Hospital Length of Stay: 5 days  Discharge Date and Time:  07/10/2022 10:01 AM  HPI:   Ms. Arrieta is a 52 yo female with intractable epilepsy since age 18, s/p partial left anterior temporal lobectomy in 11/2018 admitted to EMU for further seizure localization as part of repeat surgical workup. After epilepsy surgery, patient reports of brief period of seizure freedom, before beginning to have seizures again approximately 3 months afterwards. She reports current seizures are different than her typical semiology prior to surgery. She describes current seizures as loss of awareness, and staring off. After her events, she  is quickly back to baseline and is able to report that she had a seizure. No associated tongue biting, bowel/bladder incontinence. She endorses 6 seizures in March, 8 in April, 5 in May, and 4 in June. She is currently maintained on Lamotrigine 200 mg qAM/300 mg qPM and Cenobamate 150 mg daily.  Hospital Course:   52 yo female with intractable epilepsy since age 18, s/p partial left anterior temporal lobe resection in 11/2018 with recurrence of seizures approximately 3 months after admitted to EMU for seizure capture and localization in discussion of repeat surgical workup. Patient is currently having approximately 4-5 events per month of loss of awareness and staring. Currently maintained on Lamotrigine 200 mg qAM/300 mg qPM, Cenobamate 150 mg daily.   7/5>7/6: Home Lamotrigine and Cenobamate held on admission. EEG with regional cortical dysfunction in the left temporal region as well as bilateral independent seizure foci in the left and right temporal lobes. Typical event 7/6 approximately 0957,  reported he noted patient with change in speech/confusion similar to after an event, no clear behavioral change during event.   7/6>7/7: EEG with regional cortical dysfunction in the left temporal region as well as bilateral independent seizure foci in the left and right temporal lobes. Three right temporal lobe seizures at 09:57, 13:55, and 20:21 on 7/6. Continue close vEEG monitoring and attempt to capture additional seizures to confirm localization for pre-surgical planning.  7/7>7/8: EEG with regional cortical dysfunction left temporal region, bilateral independent seizure foci in the left and right temporal lobes. No discrete seizures. Continue vEEG off all AEDs and attempt to capture additional seizures for pre-surgical planning. Plan for sleep deprivation 7/8>7/9 with provoking medications tomorrow morning.  7/8>7/9: She was sleep deprived and received benadryl and tramadol. EEG continues to show bilateral  independent epileptiform activity in the right and left temporal lobes. A right onset seizure was captured at 14:04. The only clinical manifestation was confusion and the button was pressed about 30 minutes after the seizure occurred. Patient and  state they need to leave tomorrow for transportation reasons.   7/9>7/10: No further seizures. Patient was restarted on home medications. We discussed increasing cenobamate but the pharmacy did not have it in stock and patient would like to discuss with Dr. Mark before increasing outpatient (and she just got the 150 mg dose filled).        CT/CTA Scan -   PET Scan -   Neuropsychological evaluation -   DEXA Scan     Potential Epilepsy Risk Factors:   Pregnancy/Labor/Delivery - full term uncomplicated pregnancy labor and vaginal delivery  Febrile seizures - none  Head injury - none  CNS infection - none   Stroke - none  Family Hx of Sz - none     PAST MEDICAL HISTORY:             Active Ambulatory Problems        Diagnosis  Date Noted        No Active Ambulatory Problems      Resolved Ambulatory Problems        Diagnosis  Date Noted        No Resolved Ambulatory Problems      No Additional Past Medical History          PAST SURGICAL HISTORY: No past surgical history on file.      FAMILY HISTORY: No family history on file.       SOCIAL HISTORY:                                                    Social History                             History    Social History      Marital Status:          Spouse Name:  N/A        Number of Children:  N/A      Years of Education:  N/A    Occupational History        Not on file.      Social History Main Topics      Smoking status:  Never Smoker      Smokeless tobacco:  Not on file      Alcohol Use:  No      Drug Use:  No      Sexual Activity:  Not on file    Other Topics  Concern          Not on file        Social History Narrative      No narrative on file             SUBSTANCE USE:          Social History    Social History  Main Topics      Smoking status:  Never Smoker      Smokeless tobacco:  Not on file      Alcohol Use:  No      Drug Use:  No      Sexual Activity:  Not on file               History    Substance Use Topics      Smoking status:  Never Smoker      Smokeless tobacco:  Not on file      Alcohol Use:  No          ALLERGIES: Review of patient's allergies indicates no known allergies.       Review of Systems   Constitutional: fatigue   HENT: Negative for ear pain, hearing loss, nosebleeds and tinnitus.   Eyes: Negative for blurred vision, double vision, photophobia and pain.   Respiratory: Negative for cough, hemoptysis and shortness of breath.   Cardiovascular: Negative for chest pain, palpitations, orthopnea and leg swelling.   Gastrointestinal: Negative for heartburn, nausea, vomiting, abdominal pain, diarrhea, constipation and blood in stool.   Genitourinary: Negative for dysuria and hematuria.   Musculoskeletal: Negative for myalgias, joint pain and falls.   Skin: Negative for itching and rash.   Neurological: Positive for seizures. Negative for dizziness, tremors, speech change, focal weakness, loss of consciousness, weakness and headaches.   Endo/Heme/Allergies: Negative for environmental allergies. Does not bruise/bleed easily.   Psychiatric/Behavioral: Positive for memory loss. Negative for depression, hallucinations and substance abuse.  The patient is not nervous/anxious.      Neurologic Exam   Higher Cortical Function:   Patient is a well developed, pleasant, well groomed individual appearing their stated age  Oriented - intact to person, place and time and followed two step instruction correctly.   Fund of knowledge was appropriate.   Language - Speech was fluent without evidence for an aphasia.     IMPRESSION  1.         Emotional lability - AED vs refractory drugs   Impacts works   Works as    2.         Focal Epilepsy   S/p L temporal lobectomy 11/2018, sz free for 3 months only   Presurgery -  semiology - staring spells   Post surgery Aura - dejavu x 10 sec; 10 min later - uncomfortable feeling x 30 sec   [facility] DIAGNOSIS:  BRAIN, LEFT ANTERIOR TEMPORAL LOBE, EXCISION ([id number]; [facility], [place], [place]; COLLECTED 11/19/2018):-Cortex with moderate cortical and subpial gliosis, and scattered thrombosed vessels and leptomeningeal mixed  inflammation.  BRAIN, LEFT HIPPOCAMPUS, EXCISION ([id number]; [facility], [place], [place];  COLLECTED 11/19/2018):-Fragments of hippocampus with focal neuronal loss and gliosis.  s/p placement of sEEG leads on 11/7/22 with removal on 11/16/22. After interdisciplinary conference review, she underwent placement of right RNS (amygdala depth electrode + middle temporal gyrus strip electrode) 12/19/22.     3.         Obesity  4,         F/h MS, ET     DISPOSITION:   1. Continue LTG 200mg 1 am and 1½ in pm  2,Xcopri 150 mg PM  Options - Lyrica   3, cont zoloft 25 mg QHS   4, see PCP      RNS   Electrodes: amygdala depth electrode (RHip1,2,3,4) + middle temporal gyrus strip electrode (RTG1,2,3,4)             Magnet swipes - no EEG correlate   She did not perceive any of the long episodes     Onset - LVFA on strip   Stim: Depth bipolr then strip bipolar     Impedence       ECOG

## 2023-09-14 ENCOUNTER — PATIENT MESSAGE (OUTPATIENT)
Dept: NEUROLOGY | Facility: CLINIC | Age: 53
End: 2023-09-14
Payer: COMMERCIAL

## 2023-09-17 DIAGNOSIS — G40.119 TEMPORAL LOBE EPILEPSY, INTRACTABLE: Primary | ICD-10-CM

## 2023-09-18 RX ORDER — LAMOTRIGINE 200 MG/1
500 TABLET ORAL DAILY
Qty: 225 TABLET | Refills: 3 | Status: SHIPPED | OUTPATIENT
Start: 2023-09-18

## 2023-09-26 ENCOUNTER — PATIENT MESSAGE (OUTPATIENT)
Dept: NEUROLOGY | Facility: CLINIC | Age: 53
End: 2023-09-26
Payer: COMMERCIAL

## 2023-10-03 ENCOUNTER — PATIENT MESSAGE (OUTPATIENT)
Dept: NEUROSURGERY | Facility: CLINIC | Age: 53
End: 2023-10-03
Payer: COMMERCIAL

## 2023-10-05 ENCOUNTER — PATIENT MESSAGE (OUTPATIENT)
Dept: NEUROLOGY | Facility: CLINIC | Age: 53
End: 2023-10-05
Payer: COMMERCIAL

## 2023-10-09 ENCOUNTER — PATIENT MESSAGE (OUTPATIENT)
Dept: NEUROLOGY | Facility: CLINIC | Age: 53
End: 2023-10-09
Payer: COMMERCIAL

## 2023-10-30 ENCOUNTER — PATIENT MESSAGE (OUTPATIENT)
Dept: NEUROLOGY | Facility: CLINIC | Age: 53
End: 2023-10-30
Payer: COMMERCIAL

## 2023-11-06 ENCOUNTER — OFFICE VISIT (OUTPATIENT)
Dept: NEUROLOGY | Facility: CLINIC | Age: 53
End: 2023-11-06
Payer: COMMERCIAL

## 2023-11-06 VITALS
HEART RATE: 54 BPM | HEIGHT: 66 IN | DIASTOLIC BLOOD PRESSURE: 64 MMHG | WEIGHT: 204.13 LBS | SYSTOLIC BLOOD PRESSURE: 114 MMHG | BODY MASS INDEX: 32.81 KG/M2

## 2023-11-06 DIAGNOSIS — Z96.82 S/P INSERTION OF BRAIN-RESPONSIVE NEUROSTIMULATION DEVICE: Primary | ICD-10-CM

## 2023-11-06 DIAGNOSIS — G40.219 COMPLEX PARTIAL EPILEPSY WITH GENERALIZATION AND WITH INTRACTABLE EPILEPSY: ICD-10-CM

## 2023-11-06 PROCEDURE — 3008F BODY MASS INDEX DOCD: CPT | Mod: CPTII,S$GLB,, | Performed by: PSYCHIATRY & NEUROLOGY

## 2023-11-06 PROCEDURE — 3078F DIAST BP <80 MM HG: CPT | Mod: CPTII,S$GLB,, | Performed by: PSYCHIATRY & NEUROLOGY

## 2023-11-06 PROCEDURE — 99215 OFFICE O/P EST HI 40 MIN: CPT | Mod: 25,S$GLB,, | Performed by: PSYCHIATRY & NEUROLOGY

## 2023-11-06 PROCEDURE — 99999 PR PBB SHADOW E&M-EST. PATIENT-LVL III: CPT | Mod: PBBFAC,,, | Performed by: PSYCHIATRY & NEUROLOGY

## 2023-11-06 PROCEDURE — 3074F SYST BP LT 130 MM HG: CPT | Mod: CPTII,S$GLB,, | Performed by: PSYCHIATRY & NEUROLOGY

## 2023-11-06 PROCEDURE — 95836 ECOG IMPLTD BRN NPGT <30 D: CPT | Mod: S$GLB,,, | Performed by: PSYCHIATRY & NEUROLOGY

## 2023-11-06 PROCEDURE — 1159F MED LIST DOCD IN RCRD: CPT | Mod: CPTII,S$GLB,, | Performed by: PSYCHIATRY & NEUROLOGY

## 2023-11-06 PROCEDURE — 95983 ALYS BRN NPGT PRGRMG 15 MIN: CPT | Mod: S$GLB,,, | Performed by: PSYCHIATRY & NEUROLOGY

## 2023-11-06 PROCEDURE — 95836 PR ELECTRCORTICOGRAM, IMPL BRAIN NEUROSTIM PULSE GEN, < 30 DAYS: ICD-10-PCS | Mod: S$GLB,,, | Performed by: PSYCHIATRY & NEUROLOGY

## 2023-11-06 PROCEDURE — 3074F PR MOST RECENT SYSTOLIC BLOOD PRESSURE < 130 MM HG: ICD-10-PCS | Mod: CPTII,S$GLB,, | Performed by: PSYCHIATRY & NEUROLOGY

## 2023-11-06 PROCEDURE — 1159F PR MEDICATION LIST DOCUMENTED IN MEDICAL RECORD: ICD-10-PCS | Mod: CPTII,S$GLB,, | Performed by: PSYCHIATRY & NEUROLOGY

## 2023-11-06 PROCEDURE — 95983 PR ELEC ANALYSIS, IMPLT NEURO PULSE GEN, W/PRGRM, BRAIN, 1ST 15 MINS: ICD-10-PCS | Mod: S$GLB,,, | Performed by: PSYCHIATRY & NEUROLOGY

## 2023-11-06 PROCEDURE — 3008F PR BODY MASS INDEX (BMI) DOCUMENTED: ICD-10-PCS | Mod: CPTII,S$GLB,, | Performed by: PSYCHIATRY & NEUROLOGY

## 2023-11-06 PROCEDURE — 3078F PR MOST RECENT DIASTOLIC BLOOD PRESSURE < 80 MM HG: ICD-10-PCS | Mod: CPTII,S$GLB,, | Performed by: PSYCHIATRY & NEUROLOGY

## 2023-11-06 PROCEDURE — 99215 PR OFFICE/OUTPT VISIT, EST, LEVL V, 40-54 MIN: ICD-10-PCS | Mod: 25,S$GLB,, | Performed by: PSYCHIATRY & NEUROLOGY

## 2023-11-06 PROCEDURE — 99999 PR PBB SHADOW E&M-EST. PATIENT-LVL III: ICD-10-PCS | Mod: PBBFAC,,, | Performed by: PSYCHIATRY & NEUROLOGY

## 2023-11-06 NOTE — PROGRESS NOTES
Follow up:   Reports no new symptoms  Only sensed 1 sz   No magnet   Compliance - good     Prior note:   Staring spells / loose time - upon awakening   July 26   July 27   Aug 23 8:30 am   Aug 25   Sept 5     Last sz July 8 - MARAH   No GTC   Compliance - good   Friday late afternoon - [employer] classes - she reports fatigue afterwards - possible sz      Prior note:    Last sz - 3/26   Semiology - tired -> strange feeling     3/25 - trigger was stress and exercise, semiology - strange feeling     Gained 20 lbs in last 3 months     Prior note:   Reports 1 sz this afternoon   Semiology - felt strange feeling   Sz Hz 1/week     Prior note:   Trigger - jogging   Semiology - loosing time   July - 8 sz   Aug - 6 sz     Prior note:   Still has recurrent seizures   Semiology - loosing time   She is very frustrated   May - had 5 seizures   Trigger - emotional distress     Prior note:   Episodes of loosing time   No aura   Triggered by stress and physical work out (jogging)     sz Hz - can go 3 wks sz free, up to 1 per day for 3 days      Semiology prior to surgery: fear -> strange feeling -> no MARAH      Prior note:   3-4 sz/week   Triggered by stress and physical work out (jogging)     Prior note:   Sz 2/month -> 4-8 per week (since last 2 months)   Aura - dejavu x 10 sec   10 min later - uncomfortable feeling x 30 sec      Prior note:   Last dose of briviact - last night   One hour later an hour episode of fear and whole body shaking   vimpat 100 mg BID x 7 days   No further sz      Prior note:   3 month Angry outburst, crying spells - breviact   Last sz - last week   semiology - weird feeling of fear'   Previous sz - loss of awareness      Pt of Dr. Elam      2020/08/25  The plan last visit was to hold perampanel for one week and the reduce dosing at one half the previous dose.  The emotionality has improved but still is present.  She may of had only one sz since the last visit.     2020/04/07  Since starting perampanel she  "has been emotion with crying spells.  Emotional outburst did not occur in the 1st 2-3 weeks after starting pyramidal.  She is on 2 mg per day.  The ER becoming more intense and is somewhat disruptive to the family.  Seizures are much better.  She will often have a very brief sensation and actually question whether she had a seizure or something else.  Clinically that is a good response but the side effects are not acceptable.  She has had no other significant interim medical problems.  She continues to take lamotrigine and the doses not been changed.     2019/11/22  The patient continues to do well.  However she does have brief sensation of fear but nothing else.  She does not have      2019/08/07  The patient is doing well but has an occasional aura which now a feeling of fear which lasts 30 sec.  Last was on Aug 1.  She is averaging 3 per months.  Surgery was Nov 4, 2018.  The first aura occurred on Dec 19, 2018.  Number by month were Jan - 4, Feb - 2, Mar - 2, Apr - 2, May - 4, Jun - 5, Jul - 3.       Results for GLORIA GURROLA (MRN [id number]) as of 8/7/2019 15:38    Ref. Range 12/14/2018 12:10 1/14/2019 14:58 3/26/2019 13:30   Lamotrigine Lvl Latest Ref Range: 2.0 - 15.0 ug/mL 13.0 15.7 (H) 19.3 (H)         2019/03/26  In February and March, she has had four episodes that she is concerned represent seizure. These are different than any prior episodes or sensation that she has experienced. She describes a feeling of intense fear, during which she tells herself "you're just having a seizure".  reports lasting about 15 seconds, during the events he reports patient seems to be staring off in a daydream. She will respond to questions, but slowly. Typically occurring in the evening, prior to taking Lamictal. She recalls these episodes afterwards. Currently taking Lamictal 500 mg qHS, reports she noticed these episodes after switching from BID to daily lamictal dosing. Lamictal level at prior clinic visit 15.7. " "Denies any episodes of visual motor apraxia or any of her prior typical seizures.      2019/01/14  The patient underwent a L temporal lobectomy 2 months ago.  She has experience none of her typical seizures.  She did experience Judi Vu twice since surgery.  In the past she had reported Judi Vu as an aura.  She had not experienced any episodes of visual motor apraxia (unable to move eyes in any direction). She takes lamictal 200 mg in the AM and 300 mg in the PM.  She will be converted to once a day dosing of 500 mg which she prefers to do in the evening.       She feels so much better now.  She reports feeling as if she is 17 yo.     2018/05/29  Patient had another day of almost continuous seizures which was on May 09,2018.  She has failed several AEDs mainly due to side effects.  She experienced pharmacodynamic interaction and toxicity with Lamictal - Lacosamide combo.  She is tolerating the lamictal well         2018/04/09  The patient had 9 seizures recorded in last EMU visit all coming from L anterior temporal area.   MRI was normal but PET showed L mesial hypometabolism.  She is currently have almost daily seizures.  She has failed four AEDs is on Lamictal monotherapy.  She reports her verbal memory is terrible.  Review of labs show she was B12 deficient 4 yrs ago and she does not take any supplements.     Results for GLORIA GURROLA (MRN [id number]) as of 4/9/2018 16:23    Ref. Range 3/1/2016 14:45 12/19/2017 12:56 2/8/2018 18:12   Lamotrigine Lvl Latest Ref Range: 2.0 - 15.0 ug/mL 12.0 10.5 7.8      2018/01/29  EMU evaluation was completed in Dec and L temporal interictal spikes were recorded and one electrographic seizure was recorded arising from the L anterior temporal area.  Besides frequent seizures her major complaint is progressive memory loss.       HISTORY OF PRESENT ILLNESS (HPI)  Date: The   New Patient  Pt has been seeing Dr Hu[facility] for "complex partial sezures." First sz, in school, age 19. " "Was put on Dilantin and she did well for approx 15 years, until the seizures became active again. Thinks she may have had 2 classic "Grand Mal" seizures in her 20's, but since then, seizure types are those described below.     Currently, she is experiencing more than one event per week. Event type is described below. She has been failing her current treatment regimen as described below. May have tried other meds, but can't remember them now. Did not bring records yet.      Seizure Seminology  Seizure Type 1   Classification: Pass-out  Aura - Occasional auditory мария vu  Pt loses consciousness and falls or loses tone 1-2 in  Post-ictal  Brief  Age of onset 19  Current Seizure Frequency - Several per week      Seizure Type 2  Classification: Complex Partial  Wanders around. Doesn't remember after.   Post-ictal  Brief  Current Seizure Frequency - Several per week     Seizure Triggers  Sleep Deprivation - None  Other medications - None  Psych/stress - None  Photic stimulation - None  Hyperventilation - None  Medical Problems - None  Menses - 3 days before period they get worse  Sensory Stimulation (light, sound, etc) - None  Missed dose of meds - None     AED Treatments  Present regimen   tabs 1 in AM and 1½ in PM      Prior treatments  clobazam (Onfi or Frizium, CLB) 20 mg QD  eslicarbazine (Aptiom, ESL) - seizure intensity worsened after 2 weeks Rx  lacosamide (Vimpat, LCS)   oxcarbazepine (Trileptal OXC)  3 month Angry outburst, crying spells - breviact    crying spells - Fycompa    BID  TPM 10ID  valproic acid (Depakote, VPA)   zonisamide (Zonegran, ZNA)  Vimpat  200 mg BID - akplan      Not tried  acetazolamide (Diamox, AZM)  amantadine  carbamazepine (Tegretol, CBZ)  ethosuximide (Zarontin, ESM)  felbamate (felbatol, FBM)  gabapentin (Neurontin, GPN)  levetiracetam (Keppra, LEV)  methsuximide (Celontin, MSM)   phenobarbital (Pb)  pregabalin (Lyrica, PGB)  primidone (Mysoline, PRM)  retigabine (Potiga, " RTG)  rufinamide (Banzel, RUF)  tiagabine (Gabatril, TGB)  viagabatrin, (Sabril, VGB)  vagal nerve stimulator (VNS)     Benzodiazepines  diazepam - rectal (Diastatl)  diazepam - oral (Valium, DZ)  clonazepam (Klonopin, CZP)  clorazepate (Tranxene, CLZ)  Ativan  Brain Stimulation  Vagal Nerve Stimulation-n/a  DBS- n/a     Compliance method  Memory - yes  Mom or Spouse - Yes  Pill Box - no  Dewayne calendar - no  Turn over medication bottle - no  Phone alarm - no     Seizure Evaluation  EEG Routine - Dont have  MRI/MRA - In past- she doesn't know results  EMU eval  2017/12/19-12/20- Patient with no events overnight. EEG shows L anterior temporal spikes, most recorded at F7. None on the other side. At times, almost continuous L temporal interictal discharges at times.   2017/12/20- 11:56:23- clinical seizure, starting from L side on EEG. No epileptiform discharges noted. L temporal slowing and spikes noted. Consisted of brief moment of unresponsiveness.   2017/12/21- EEG showing L interical anterior temporal slowing with L temporal spikes. No clinical seizures since yesterday.   2017/12/21-12/22- Event on 12/21 at 18:55 showing patient talking on the phone and suddenly stopping, unable to speak and confused. EEG shows there is a rhythmic buildup in the L temporal chains. Patient is confused and is drowsy following event. No tonic/clonic activity present.      Admission Date: 7/5/2022  Hospital Length of Stay: 5 days  Discharge Date and Time:  07/10/2022 10:01 AM  HPI:   Ms. Arrieta is a 50 yo female with intractable epilepsy since age 18, s/p partial left anterior temporal lobectomy in 11/2018 admitted to EMU for further seizure localization as part of repeat surgical workup. After epilepsy surgery, patient reports of brief period of seizure freedom, before beginning to have seizures again approximately 3 months afterwards. She reports current seizures are different than her typical semiology prior to surgery. She  describes current seizures as loss of awareness, and staring off. After her events, she is quickly back to baseline and is able to report that she had a seizure. No associated tongue biting, bowel/bladder incontinence. She endorses 6 seizures in March, 8 in April, 5 in May, and 4 in June. She is currently maintained on Lamotrigine 200 mg qAM/300 mg qPM and Cenobamate 150 mg daily.  Hospital Course:   50 yo female with intractable epilepsy since age 18, s/p partial left anterior temporal lobe resection in 11/2018 with recurrence of seizures approximately 3 months after admitted to EMU for seizure capture and localization in discussion of repeat surgical workup. Patient is currently having approximately 4-5 events per month of loss of awareness and staring. Currently maintained on Lamotrigine 200 mg qAM/300 mg qPM, Cenobamate 150 mg daily.   7/5>7/6: Home Lamotrigine and Cenobamate held on admission. EEG with regional cortical dysfunction in the left temporal region as well as bilateral independent seizure foci in the left and right temporal lobes. Typical event 7/6 approximately 0957,  reported he noted patient with change in speech/confusion similar to after an event, no clear behavioral change during event.   7/6>7/7: EEG with regional cortical dysfunction in the left temporal region as well as bilateral independent seizure foci in the left and right temporal lobes. Three right temporal lobe seizures at 09:57, 13:55, and 20:21 on 7/6. Continue close vEEG monitoring and attempt to capture additional seizures to confirm localization for pre-surgical planning.  7/7>7/8: EEG with regional cortical dysfunction left temporal region, bilateral independent seizure foci in the left and right temporal lobes. No discrete seizures. Continue vEEG off all AEDs and attempt to capture additional seizures for pre-surgical planning. Plan for sleep deprivation 7/8>7/9 with provoking medications tomorrow morning.  7/8>7/9: She  was sleep deprived and received benadryl and tramadol. EEG continues to show bilateral independent epileptiform activity in the right and left temporal lobes. A right onset seizure was captured at 14:04. The only clinical manifestation was confusion and the button was pressed about 30 minutes after the seizure occurred. Patient and  state they need to leave tomorrow for transportation reasons.   7/9>7/10: No further seizures. Patient was restarted on home medications. We discussed increasing cenobamate but the pharmacy did not have it in stock and patient would like to discuss with Dr. Mark before increasing outpatient (and she just got the 150 mg dose filled).        CT/CTA Scan -   PET Scan -   Neuropsychological evaluation -   DEXA Scan     Potential Epilepsy Risk Factors:   Pregnancy/Labor/Delivery - full term uncomplicated pregnancy labor and vaginal delivery  Febrile seizures - none  Head injury - none  CNS infection - none   Stroke - none  Family Hx of Sz - none     PAST MEDICAL HISTORY:             Active Ambulatory Problems        Diagnosis  Date Noted        No Active Ambulatory Problems      Resolved Ambulatory Problems        Diagnosis  Date Noted        No Resolved Ambulatory Problems      No Additional Past Medical History          PAST SURGICAL HISTORY: No past surgical history on file.      FAMILY HISTORY: No family history on file.       SOCIAL HISTORY:                                                    Social History                             History    Social History      Marital Status:          Spouse Name:  N/A        Number of Children:  N/A      Years of Education:  N/A    Occupational History        Not on file.      Social History Main Topics      Smoking status:  Never Smoker      Smokeless tobacco:  Not on file      Alcohol Use:  No      Drug Use:  No      Sexual Activity:  Not on file    Other Topics  Concern          Not on file        Social History Narrative      No  narrative on file             SUBSTANCE USE:          Social History    Social History Main Topics      Smoking status:  Never Smoker      Smokeless tobacco:  Not on file      Alcohol Use:  No      Drug Use:  No      Sexual Activity:  Not on file               History    Substance Use Topics      Smoking status:  Never Smoker      Smokeless tobacco:  Not on file      Alcohol Use:  No          ALLERGIES: Review of patient's allergies indicates no known allergies.       Review of Systems   Constitutional: fatigue   HENT: Negative for ear pain, hearing loss, nosebleeds and tinnitus.   Eyes: Negative for blurred vision, double vision, photophobia and pain.   Respiratory: Negative for cough, hemoptysis and shortness of breath.   Cardiovascular: Negative for chest pain, palpitations, orthopnea and leg swelling.   Gastrointestinal: Negative for heartburn, nausea, vomiting, abdominal pain, diarrhea, constipation and blood in stool.   Genitourinary: Negative for dysuria and hematuria.   Musculoskeletal: Negative for myalgias, joint pain and falls.   Skin: Negative for itching and rash.   Neurological: Positive for seizures. Negative for dizziness, tremors, speech change, focal weakness, loss of consciousness, weakness and headaches.   Endo/Heme/Allergies: Negative for environmental allergies. Does not bruise/bleed easily.   Psychiatric/Behavioral: Positive for memory loss. Negative for depression, hallucinations and substance abuse.  The patient is not nervous/anxious.      Neurologic Exam   Higher Cortical Function:   Patient is a well developed, pleasant, well groomed individual appearing their stated age  Oriented - intact to person, place and time and followed two step instruction correctly.   Fund of knowledge was appropriate.   Language - Speech was fluent without evidence for an aphasia.     IMPRESSION  1.         Emotional lability - h/o lobectomy likely the cause   Impacts works   Works as    2.          Focal Epilepsy   S/p L temporal lobectomy 11/2018, sz free for 3 months only   Presurgery - semiology - staring spells   Post surgery Aura - dejavu x 10 sec; 10 min later - uncomfortable feeling x 30 sec   [facility] DIAGNOSIS:  BRAIN, LEFT ANTERIOR TEMPORAL LOBE, EXCISION ([id number]; [facility], [place], [place]; COLLECTED 11/19/2018):-Cortex with moderate cortical and subpial gliosis, and scattered thrombosed vessels and leptomeningeal mixed  inflammation.  BRAIN, LEFT HIPPOCAMPUS, EXCISION ([id number]; [facility], [place], [place];  COLLECTED 11/19/2018):-Fragments of hippocampus with focal neuronal loss and gliosis.  s/p placement of sEEG leads on 11/7/22 with removal on 11/16/22. After interdisciplinary conference review, she underwent placement of right RNS (amygdala depth electrode + middle temporal gyrus strip electrode) 12/19/22.     3.         Obesity  4,         F/h MS, ET     DISPOSITION:   1. Continue LTG 200mg 1 am and 1½ in pm  2,Xcopri 150 mg PM  Options - Lyrica   3, cont zoloft 25 mg QHS   4, see PCP      RNS   Electrodes: amygdala depth electrode (RHip1,2,3,4) + middle temporal gyrus strip electrode (RTG1,2,3,4)             Onset - LVFA on strip   Stim: Depth bipolr then strip bipolar       ECOG with popping   9/5/23              Oct 1   Semiology - sudden onset fatigue   Nap for 1 hr   Felt fresh afterwards   19 sec sz recorded     Saturation examples        Sample sz - Sept 29 - 16:44      Most sz are short duration    Reports - Word finding difficulty likely related to post ictal phenomenon      Dev review the data for next 3 months   Detection only depth, stim is both

## 2023-12-06 ENCOUNTER — PATIENT MESSAGE (OUTPATIENT)
Dept: NEUROLOGY | Facility: CLINIC | Age: 53
End: 2023-12-06
Payer: COMMERCIAL

## 2024-01-10 ENCOUNTER — TELEPHONE (OUTPATIENT)
Dept: NEUROLOGY | Facility: CLINIC | Age: 54
End: 2024-01-10
Payer: COMMERCIAL

## 2024-01-11 NOTE — TELEPHONE ENCOUNTER
----- Message from Alyson Delcid sent at 1/10/2024 11:00 AM CST -----  Regarding: Appt  Contact: Pt @[phone]  Pt requesting a call back to schedule follow up appt please call Pt @[phone]

## 2024-01-31 DIAGNOSIS — G40.119 TEMPORAL LOBE EPILEPSY, INTRACTABLE: ICD-10-CM

## 2024-02-01 NOTE — TELEPHONE ENCOUNTER
Patient is requesting a refill for Xcopri. I requested the refill and routed to Dr. Mark for approval.

## 2024-02-03 ENCOUNTER — PATIENT MESSAGE (OUTPATIENT)
Dept: NEUROLOGY | Facility: CLINIC | Age: 54
End: 2024-02-03
Payer: COMMERCIAL

## 2024-02-06 DIAGNOSIS — G40.119 TEMPORAL LOBE EPILEPSY, INTRACTABLE: ICD-10-CM

## 2024-02-07 ENCOUNTER — PATIENT MESSAGE (OUTPATIENT)
Dept: NEUROLOGY | Facility: CLINIC | Age: 54
End: 2024-02-07
Payer: COMMERCIAL

## 2024-02-07 RX ORDER — CENOBAMATE 150 MG/1
150 TABLET, FILM COATED ORAL DAILY
Qty: 30 TABLET | Refills: 5 | Status: SHIPPED | OUTPATIENT
Start: 2024-02-07 | End: 2024-04-19

## 2024-02-07 RX ORDER — CENOBAMATE 150 MG/1
1 TABLET, FILM COATED ORAL
Qty: 30 TABLET | Refills: 4 | Status: SHIPPED | OUTPATIENT
Start: 2024-02-07 | End: 2024-04-19

## 2024-02-19 ENCOUNTER — OFFICE VISIT (OUTPATIENT)
Dept: NEUROLOGY | Facility: CLINIC | Age: 54
End: 2024-02-19
Payer: COMMERCIAL

## 2024-02-19 VITALS
WEIGHT: 209.44 LBS | HEIGHT: 66 IN | DIASTOLIC BLOOD PRESSURE: 83 MMHG | SYSTOLIC BLOOD PRESSURE: 116 MMHG | BODY MASS INDEX: 33.66 KG/M2 | HEART RATE: 56 BPM

## 2024-02-19 DIAGNOSIS — Z96.82 S/P INSERTION OF BRAIN-RESPONSIVE NEUROSTIMULATION DEVICE: Primary | ICD-10-CM

## 2024-02-19 DIAGNOSIS — G40.119 TEMPORAL LOBE EPILEPSY, INTRACTABLE: ICD-10-CM

## 2024-02-19 PROCEDURE — 95983 ALYS BRN NPGT PRGRMG 15 MIN: CPT | Mod: S$GLB,,, | Performed by: PSYCHIATRY & NEUROLOGY

## 2024-02-19 PROCEDURE — 95836 ECOG IMPLTD BRN NPGT <30 D: CPT | Mod: S$GLB,,, | Performed by: PSYCHIATRY & NEUROLOGY

## 2024-02-19 PROCEDURE — 3079F DIAST BP 80-89 MM HG: CPT | Mod: CPTII,S$GLB,, | Performed by: PSYCHIATRY & NEUROLOGY

## 2024-02-19 PROCEDURE — 99999 PR PBB SHADOW E&M-EST. PATIENT-LVL III: CPT | Mod: PBBFAC,,, | Performed by: PSYCHIATRY & NEUROLOGY

## 2024-02-19 PROCEDURE — 3008F BODY MASS INDEX DOCD: CPT | Mod: CPTII,S$GLB,, | Performed by: PSYCHIATRY & NEUROLOGY

## 2024-02-19 PROCEDURE — 3074F SYST BP LT 130 MM HG: CPT | Mod: CPTII,S$GLB,, | Performed by: PSYCHIATRY & NEUROLOGY

## 2024-02-19 PROCEDURE — 99215 OFFICE O/P EST HI 40 MIN: CPT | Mod: 25,S$GLB,, | Performed by: PSYCHIATRY & NEUROLOGY

## 2024-02-19 RX ORDER — CENOBAMATE 200 MG/1
200 TABLET, FILM COATED ORAL DAILY
Qty: 30 TABLET | Refills: 3 | Status: SHIPPED | OUTPATIENT
Start: 2024-02-19

## 2024-02-19 NOTE — PROGRESS NOTES
Follow up:     Feb 14 - few hrs after flight to [place] had a sz   Feb 17 - 30 min after flight had a sz   Semiology - post ictal fatigue (recovery period - about 2 hrs)     Self trial of Xcpri 200 mg in Feb x 3 days     Prior note:   Reports no new symptoms  Only sensed 1 sz   No magnet   Compliance - good     Prior note:   Staring spells / loose time - upon awakening   July 26   July 27   Aug 23 8:30 am   Aug 25   Sept 5     Last sz July 8 - MARAH   No GTC   Compliance - good   Friday late afternoon - [employer] classes - she reports fatigue afterwards - possible sz      Prior note:    Last sz - 3/26   Semiology - tired -> strange feeling     3/25 - trigger was stress and exercise, semiology - strange feeling     Gained 20 lbs in last 3 months     Prior note:   Reports 1 sz this afternoon   Semiology - felt strange feeling   Sz Hz 1/week     Prior note:   Trigger - jogging   Semiology - loosing time   July - 8 sz   Aug - 6 sz     Prior note:   Still has recurrent seizures   Semiology - loosing time   She is very frustrated   May - had 5 seizures   Trigger - emotional distress     Prior note:   Episodes of loosing time   No aura   Triggered by stress and physical work out (jogging)     sz Hz - can go 3 wks sz free, up to 1 per day for 3 days      Semiology prior to surgery: fear -> strange feeling -> no MARAH      Prior note:   3-4 sz/week   Triggered by stress and physical work out (jogging)     Prior note:   Sz 2/month -> 4-8 per week (since last 2 months)   Aura - dejavu x 10 sec   10 min later - uncomfortable feeling x 30 sec      Prior note:   Last dose of briviact - last night   One hour later an hour episode of fear and whole body shaking   vimpat 100 mg BID x 7 days   No further sz      Prior note:   3 month Angry outburst, crying spells - breviact   Last sz - last week   semiology - weird feeling of fear'   Previous sz - loss of awareness      Pt of Dr. Elam      2020/08/25  The plan last visit was to hold  "perampanel for one week and the reduce dosing at one half the previous dose.  The emotionality has improved but still is present.  She may of had only one sz since the last visit.     2020/04/07  Since starting perampanel she has been emotion with crying spells.  Emotional outburst did not occur in the 1st 2-3 weeks after starting pyramidal.  She is on 2 mg per day.  The ER becoming more intense and is somewhat disruptive to the family.  Seizures are much better.  She will often have a very brief sensation and actually question whether she had a seizure or something else.  Clinically that is a good response but the side effects are not acceptable.  She has had no other significant interim medical problems.  She continues to take lamotrigine and the doses not been changed.     2019/11/22  The patient continues to do well.  However she does have brief sensation of fear but nothing else.  She does not have      2019/08/07  The patient is doing well but has an occasional aura which now a feeling of fear which lasts 30 sec.  Last was on Aug 1.  She is averaging 3 per months.  Surgery was Nov 4, 2018.  The first aura occurred on Dec 19, 2018.  Number by month were Jan - 4, Feb - 2, Mar - 2, Apr - 2, May - 4, Jun - 5, Jul - 3.       Results for GLORIA GURROLA (MRN [id number]) as of 8/7/2019 15:38    Ref. Range 12/14/2018 12:10 1/14/2019 14:58 3/26/2019 13:30   Lamotrigine Lvl Latest Ref Range: 2.0 - 15.0 ug/mL 13.0 15.7 (H) 19.3 (H)         2019/03/26  In February and March, she has had four episodes that she is concerned represent seizure. These are different than any prior episodes or sensation that she has experienced. She describes a feeling of intense fear, during which she tells herself "you're just having a seizure".  reports lasting about 15 seconds, during the events he reports patient seems to be staring off in a daydream. She will respond to questions, but slowly. Typically occurring in the evening, prior to " taking Lamictal. She recalls these episodes afterwards. Currently taking Lamictal 500 mg qHS, reports she noticed these episodes after switching from BID to daily lamictal dosing. Lamictal level at prior clinic visit 15.7. Denies any episodes of visual motor apraxia or any of her prior typical seizures.      2019/01/14  The patient underwent a L temporal lobectomy 2 months ago.  She has experience none of her typical seizures.  She did experience Judi Vu twice since surgery.  In the past she had reported Judi Vu as an aura.  She had not experienced any episodes of visual motor apraxia (unable to move eyes in any direction). She takes lamictal 200 mg in the AM and 300 mg in the PM.  She will be converted to once a day dosing of 500 mg which she prefers to do in the evening.       She feels so much better now.  She reports feeling as if she is 17 yo.     2018/05/29  Patient had another day of almost continuous seizures which was on May 09,2018.  She has failed several AEDs mainly due to side effects.  She experienced pharmacodynamic interaction and toxicity with Lamictal - Lacosamide combo.  She is tolerating the lamictal well         2018/04/09  The patient had 9 seizures recorded in last EMU visit all coming from L anterior temporal area.   MRI was normal but PET showed L mesial hypometabolism.  She is currently have almost daily seizures.  She has failed four AEDs is on Lamictal monotherapy.  She reports her verbal memory is terrible.  Review of labs show she was B12 deficient 4 yrs ago and she does not take any supplements.     Results for GALILEOGLORIA (MRN [id number]) as of 4/9/2018 16:23    Ref. Range 3/1/2016 14:45 12/19/2017 12:56 2/8/2018 18:12   Lamotrigine Lvl Latest Ref Range: 2.0 - 15.0 ug/mL 12.0 10.5 7.8      2018/01/29  EMU evaluation was completed in Dec and L temporal interictal spikes were recorded and one electrographic seizure was recorded arising from the L anterior temporal area.  Besides  "frequent seizures her major complaint is progressive memory loss.       HISTORY OF PRESENT ILLNESS (HPI)  Date: The   New Patient  Pt has been seeing Dr Huerta at [facility] for "complex partial sezures." First sz, in school, age 19. Was put on Dilantin and she did well for approx 15 years, until the seizures became active again. Thinks she may have had 2 classic "Grand Mal" seizures in her 20's, but since then, seizure types are those described below.     Currently, she is experiencing more than one event per week. Event type is described below. She has been failing her current treatment regimen as described below. May have tried other meds, but can't remember them now. Did not bring records yet.      Seizure Seminology  Seizure Type 1   Classification: Pass-out  Aura - Occasional auditory мария vu  Pt loses consciousness and falls or loses tone 1-2 in  Post-ictal  Brief  Age of onset 19  Current Seizure Frequency - Several per week      Seizure Type 2  Classification: Complex Partial  Wanders around. Doesn't remember after.   Post-ictal  Brief  Current Seizure Frequency - Several per week     Seizure Triggers  Sleep Deprivation - None  Other medications - None  Psych/stress - None  Photic stimulation - None  Hyperventilation - None  Medical Problems - None  Menses - 3 days before period they get worse  Sensory Stimulation (light, sound, etc) - None  Missed dose of meds - None     AED Treatments  Present regimen   tabs 1 in AM and 1½ in PM      Prior treatments  clobazam (Onfi or Frizium, CLB) 20 mg QD  eslicarbazine (Aptiom, ESL) - seizure intensity worsened after 2 weeks Rx  lacosamide (Vimpat, LCS)   oxcarbazepine (Trileptal OXC)  3 month Angry outburst, crying spells - breviact    crying spells - Fycompa    BID  TPM 10ID  valproic acid (Depakote, VPA)   zonisamide (Zonegran, ZNA)  Vimpat  200 mg BID - kaplan      Not tried  acetazolamide (Diamox, AZM)  amantadine  carbamazepine (Tegretol, " CBZ)  ethosuximide (Zarontin, ESM)  felbamate (felbatol, FBM)  gabapentin (Neurontin, GPN)  levetiracetam (Keppra, LEV)  methsuximide (Celontin, MSM)   phenobarbital (Pb)  pregabalin (Lyrica, PGB)  primidone (Mysoline, PRM)  retigabine (Potiga, RTG)  rufinamide (Banzel, RUF)  tiagabine (Gabatril, TGB)  viagabatrin, (Sabril, VGB)  vagal nerve stimulator (VNS)     Benzodiazepines  diazepam - rectal (Diastatl)  diazepam - oral (Valium, DZ)  clonazepam (Klonopin, CZP)  clorazepate (Tranxene, CLZ)  Ativan  Brain Stimulation  Vagal Nerve Stimulation-n/a  DBS- n/a     Compliance method  Memory - yes  Mom or Spouse - Yes  Pill Box - no  [place]ewayne calendar - no  Turn over medication bottle - no  Phone alarm - no     Seizure Evaluation  EEG Routine - Dont have  MRI/MRA - In past- she doesn't know results  EMU eval  2017/12/19-12/20- Patient with no events overnight. EEG shows L anterior temporal spikes, most recorded at F7. None on the other side. At times, almost continuous L temporal interictal discharges at times.   2017/12/20- 11:56:23- clinical seizure, starting from L side on EEG. No epileptiform discharges noted. L temporal slowing and spikes noted. Consisted of brief moment of unresponsiveness.   2017/12/21- EEG showing L interical anterior temporal slowing with L temporal spikes. No clinical seizures since yesterday.   2017/12/21-12/22- Event on 12/21 at 18:55 showing patient talking on the phone and suddenly stopping, unable to speak and confused. EEG shows there is a rhythmic buildup in the L temporal chains. Patient is confused and is drowsy following event. No tonic/clonic activity present.      Admission Date: 7/5/2022  Hospital Length of Stay: 5 days  Discharge Date and Time:  07/10/2022 10:01 AM  HPI:   Ms. Arrieta is a 52 yo female with intractable epilepsy since age 18, s/p partial left anterior temporal lobectomy in 11/2018 admitted to EMU for further seizure localization as part of repeat surgical workup. After  epilepsy surgery, patient reports of brief period of seizure freedom, before beginning to have seizures again approximately 3 months afterwards. She reports current seizures are different than her typical semiology prior to surgery. She describes current seizures as loss of awareness, and staring off. After her events, she is quickly back to baseline and is able to report that she had a seizure. No associated tongue biting, bowel/bladder incontinence. She endorses 6 seizures in March, 8 in April, 5 in May, and 4 in June. She is currently maintained on Lamotrigine 200 mg qAM/300 mg qPM and Cenobamate 150 mg daily.  Hospital Course:   50 yo female with intractable epilepsy since age 18, s/p partial left anterior temporal lobe resection in 11/2018 with recurrence of seizures approximately 3 months after admitted to EMU for seizure capture and localization in discussion of repeat surgical workup. Patient is currently having approximately 4-5 events per month of loss of awareness and staring. Currently maintained on Lamotrigine 200 mg qAM/300 mg qPM, Cenobamate 150 mg daily.   7/5>7/6: Home Lamotrigine and Cenobamate held on admission. EEG with regional cortical dysfunction in the left temporal region as well as bilateral independent seizure foci in the left and right temporal lobes. Typical event 7/6 approximately 0957,  reported he noted patient with change in speech/confusion similar to after an event, no clear behavioral change during event.   7/6>7/7: EEG with regional cortical dysfunction in the left temporal region as well as bilateral independent seizure foci in the left and right temporal lobes. Three right temporal lobe seizures at 09:57, 13:55, and 20:21 on 7/6. Continue close vEEG monitoring and attempt to capture additional seizures to confirm localization for pre-surgical planning.  7/7>7/8: EEG with regional cortical dysfunction left temporal region, bilateral independent seizure foci in the left  and right temporal lobes. No discrete seizures. Continue vEEG off all AEDs and attempt to capture additional seizures for pre-surgical planning. Plan for sleep deprivation 7/8>7/9 with provoking medications tomorrow morning.  7/8>7/9: She was sleep deprived and received benadryl and tramadol. EEG continues to show bilateral independent epileptiform activity in the right and left temporal lobes. A right onset seizure was captured at 14:04. The only clinical manifestation was confusion and the button was pressed about 30 minutes after the seizure occurred. Patient and  state they need to leave tomorrow for transportation reasons.   7/9>7/10: No further seizures. Patient was restarted on home medications. We discussed increasing cenobamate but the pharmacy did not have it in stock and patient would like to discuss with Dr. Mark before increasing outpatient (and she just got the 150 mg dose filled).        CT/CTA Scan -   PET Scan -   Neuropsychological evaluation -   DEXA Scan     Potential Epilepsy Risk Factors:   Pregnancy/Labor/Delivery - full term uncomplicated pregnancy labor and vaginal delivery  Febrile seizures - none  Head injury - none  CNS infection - none   Stroke - none  Family Hx of Sz - none     PAST MEDICAL HISTORY:             Active Ambulatory Problems        Diagnosis  Date Noted        No Active Ambulatory Problems      Resolved Ambulatory Problems        Diagnosis  Date Noted        No Resolved Ambulatory Problems      No Additional Past Medical History          PAST SURGICAL HISTORY: No past surgical history on file.      FAMILY HISTORY: No family history on file.       SOCIAL HISTORY:                                                    Social History                             History    Social History      Marital Status:          Spouse Name:  N/A        Number of Children:  N/A      Years of Education:  N/A    Occupational History        Not on file.      Social History Main  Topics      Smoking status:  Never Smoker      Smokeless tobacco:  Not on file      Alcohol Use:  No      Drug Use:  No      Sexual Activity:  Not on file    Other Topics  Concern          Not on file        Social History Narrative      No narrative on file             SUBSTANCE USE:          Social History    Social History Main Topics      Smoking status:  Never Smoker      Smokeless tobacco:  Not on file      Alcohol Use:  No      Drug Use:  No      Sexual Activity:  Not on file               History    Substance Use Topics      Smoking status:  Never Smoker      Smokeless tobacco:  Not on file      Alcohol Use:  No          ALLERGIES: Review of patient's allergies indicates no known allergies.       Review of Systems   Constitutional: fatigue   HENT: Negative for ear pain, hearing loss, nosebleeds and tinnitus.   Eyes: Negative for blurred vision, double vision, photophobia and pain.   Respiratory: Negative for cough, hemoptysis and shortness of breath.   Cardiovascular: Negative for chest pain, palpitations, orthopnea and leg swelling.   Gastrointestinal: Negative for heartburn, nausea, vomiting, abdominal pain, diarrhea, constipation and blood in stool.   Genitourinary: Negative for dysuria and hematuria.   Musculoskeletal: Negative for myalgias, joint pain and falls.   Skin: Negative for itching and rash.   Neurological: Positive for seizures. Negative for dizziness, tremors, speech change, focal weakness, loss of consciousness, weakness and headaches.   Endo/Heme/Allergies: Negative for environmental allergies. Does not bruise/bleed easily.   Psychiatric/Behavioral: Positive for memory loss. Negative for depression, hallucinations and substance abuse.  The patient is not nervous/anxious.      Neurologic Exam   Higher Cortical Function:   Patient is a well developed, pleasant, well groomed individual appearing their stated age  Oriented - intact to person, place and time and followed two step instruction  correctly.   Fund of knowledge was appropriate.   Language - Speech was fluent without evidence for an aphasia.     IMPRESSION  1.         Emotional lability - h/o lobectomy likely the cause   Impacts works   Works as    2.         Focal Epilepsy   Likely onset is age = 10  S/p L temporal lobectomy 11/2018, sz free for 3 months only   Presurgery - semiology - staring spells   Post surgery Aura - dejavu x 10 sec; 10 min later - uncomfortable feeling x 30 sec   [facility] DIAGNOSIS:  BRAIN, LEFT ANTERIOR TEMPORAL LOBE, EXCISION ([id number]; [facility], [place], [place]; COLLECTED 11/19/2018):-Cortex with moderate cortical and subpial gliosis, and scattered thrombosed vessels and leptomeningeal mixed  inflammation.  BRAIN, LEFT HIPPOCAMPUS, EXCISION ([id number]; [facility], [place], [place];  COLLECTED 11/19/2018):-Fragments of hippocampus with focal neuronal loss and gliosis.  s/p placement of sEEG leads on 11/7/22 with removal on 11/16/22. After interdisciplinary conference review, she underwent placement of right RNS (amygdala depth electrode + middle temporal gyrus strip electrode) 12/19/22.     3.         Obesity  4,         F/h MS, ET     DISPOSITION:   1. Continue LTG 200mg 1 am and 1½ in pm  2,Xcopri 150 mg AM  Options - Lyrica   3, cont zoloft 25 mg QHS      RNS   Electrodes: amygdala depth electrode (RHip1,2,3,4) + middle temporal gyrus strip electrode (RTG1,2,3,4)             Onset - LVFA on strip   Stim: Depth bipolr then strip bipolar       ECOG with popping   9/5/23              Oct 1   Semiology - sudden onset fatigue   Nap for 1 hr   Felt fresh afterwards   19 sec sz recorded     Saturation examples        Sample sz - Sept 29 - 16:44      Most sz are short duration    Reports - Word finding difficulty likely related to post ictal phenomenon      Dev review the data for next 3 months   Detection only depth, stim is both      =============================================  Theory - short sz are not disruptive, longer sz are associated with significant post ictal fatigue     Reviewed examples. Feb 18. Sz aborted by RNS. Onset hippo. Post ictal suppression in strip for 12 sec. Post ictal slowing at the end of epoch.     Feb 11 AM felt fatigued - no sz. Maybe a stress reaction (girls arguing)      Ex of Sz on Jan 31, Karlos 15 - strip onset which are also prolonged and not responding to stimulations     Plan to early detection (from hippo lead) and stimulation of all sz     Increase Xcopri to 200 mg daily

## 2024-03-06 DIAGNOSIS — F32.A DEPRESSION, UNSPECIFIED DEPRESSION TYPE: ICD-10-CM

## 2024-03-06 DIAGNOSIS — F41.9 ANXIETY: ICD-10-CM

## 2024-03-06 RX ORDER — SERTRALINE HYDROCHLORIDE 25 MG/1
TABLET, FILM COATED ORAL
Qty: 90 TABLET | Refills: 3 | Status: SHIPPED | OUTPATIENT
Start: 2024-03-06 | End: 2024-06-11

## 2024-04-19 ENCOUNTER — LAB VISIT (OUTPATIENT)
Dept: LAB | Facility: HOSPITAL | Age: 54
End: 2024-04-19
Payer: COMMERCIAL

## 2024-04-19 ENCOUNTER — OFFICE VISIT (OUTPATIENT)
Dept: NEUROLOGY | Facility: CLINIC | Age: 54
End: 2024-04-19
Payer: COMMERCIAL

## 2024-04-19 VITALS
SYSTOLIC BLOOD PRESSURE: 118 MMHG | HEIGHT: 66 IN | BODY MASS INDEX: 33.54 KG/M2 | HEART RATE: 53 BPM | WEIGHT: 208.69 LBS | DIASTOLIC BLOOD PRESSURE: 78 MMHG

## 2024-04-19 DIAGNOSIS — Z51.81 ENCOUNTER FOR MONITORING ANTICONVULSANT THERAPY: ICD-10-CM

## 2024-04-19 DIAGNOSIS — Z79.899 ENCOUNTER FOR MONITORING ANTICONVULSANT THERAPY: Primary | ICD-10-CM

## 2024-04-19 DIAGNOSIS — G40.219 COMPLEX PARTIAL EPILEPSY WITH GENERALIZATION AND WITH INTRACTABLE EPILEPSY: ICD-10-CM

## 2024-04-19 DIAGNOSIS — Z79.899 ENCOUNTER FOR MONITORING ANTICONVULSANT THERAPY: ICD-10-CM

## 2024-04-19 DIAGNOSIS — Z51.81 ENCOUNTER FOR MONITORING ANTICONVULSANT THERAPY: Primary | ICD-10-CM

## 2024-04-19 DIAGNOSIS — Z96.82 S/P INSERTION OF BRAIN-RESPONSIVE NEUROSTIMULATION DEVICE: ICD-10-CM

## 2024-04-19 PROCEDURE — 95836 ECOG IMPLTD BRN NPGT <30 D: CPT | Mod: S$GLB,,, | Performed by: PSYCHIATRY & NEUROLOGY

## 2024-04-19 PROCEDURE — 3078F DIAST BP <80 MM HG: CPT | Mod: CPTII,S$GLB,, | Performed by: PSYCHIATRY & NEUROLOGY

## 2024-04-19 PROCEDURE — 3008F BODY MASS INDEX DOCD: CPT | Mod: CPTII,S$GLB,, | Performed by: PSYCHIATRY & NEUROLOGY

## 2024-04-19 PROCEDURE — 99999 PR PBB SHADOW E&M-EST. PATIENT-LVL III: CPT | Mod: PBBFAC,,, | Performed by: PSYCHIATRY & NEUROLOGY

## 2024-04-19 PROCEDURE — 3074F SYST BP LT 130 MM HG: CPT | Mod: CPTII,S$GLB,, | Performed by: PSYCHIATRY & NEUROLOGY

## 2024-04-19 PROCEDURE — 80175 DRUG SCREEN QUAN LAMOTRIGINE: CPT | Performed by: PSYCHIATRY & NEUROLOGY

## 2024-04-19 PROCEDURE — 80299 QUANTITATIVE ASSAY DRUG: CPT | Performed by: PSYCHIATRY & NEUROLOGY

## 2024-04-19 PROCEDURE — 1159F MED LIST DOCD IN RCRD: CPT | Mod: CPTII,S$GLB,, | Performed by: PSYCHIATRY & NEUROLOGY

## 2024-04-19 PROCEDURE — 95983 ALYS BRN NPGT PRGRMG 15 MIN: CPT | Mod: S$GLB,,, | Performed by: PSYCHIATRY & NEUROLOGY

## 2024-04-19 PROCEDURE — 99215 OFFICE O/P EST HI 40 MIN: CPT | Mod: 25,S$GLB,, | Performed by: PSYCHIATRY & NEUROLOGY

## 2024-04-19 NOTE — PROGRESS NOTES
Follow up:     Overall doing better   Some fatigue during the day     Prior note:   Feb 14 - few hrs after flight to [place] had a sz   Feb 17 - 30 min after flight had a sz   Semiology - post ictal fatigue (recovery period - about 2 hrs)     Self trial of Xcpri 200 mg in Feb x 3 days     Prior note:   Reports no new symptoms  Only sensed 1 sz   No magnet   Compliance - good     Prior note:   Staring spells / loose time - upon awakening   July 26   July 27   Aug 23 8:30 am   Aug 25   Sept 5     Last sz July 8 - MARAH   No GTC   Compliance - good   Friday late afternoon - [employer] classes - she reports fatigue afterwards - possible sz      Prior note:    Last sz - 3/26   Semiology - tired -> strange feeling     3/25 - trigger was stress and exercise, semiology - strange feeling     Gained 20 lbs in last 3 months     Prior note:   Reports 1 sz this afternoon   Semiology - felt strange feeling   Sz Hz 1/week     Prior note:   Trigger - jogging   Semiology - loosing time   July - 8 sz   Aug - 6 sz     Prior note:   Still has recurrent seizures   Semiology - loosing time   She is very frustrated   May - had 5 seizures   Trigger - emotional distress     Prior note:   Episodes of loosing time   No aura   Triggered by stress and physical work out (jogging)     sz Hz - can go 3 wks sz free, up to 1 per day for 3 days      Semiology prior to surgery: fear -> strange feeling -> no MARAH      Prior note:   3-4 sz/week   Triggered by stress and physical work out (jogging)     Prior note:   Sz 2/month -> 4-8 per week (since last 2 months)   Aura - dejavu x 10 sec   10 min later - uncomfortable feeling x 30 sec      Prior note:   Last dose of briviact - last night   One hour later an hour episode of fear and whole body shaking   vimpat 100 mg BID x 7 days   No further sz      Prior note:   3 month Angry outburst, crying spells - breviact   Last sz - last week   semiology - weird feeling of fear'   Previous sz - loss of awareness      Pt  "of Dr. Elam      2020/08/25  The plan last visit was to hold perampanel for one week and the reduce dosing at one half the previous dose.  The emotionality has improved but still is present.  She may of had only one sz since the last visit.     2020/04/07  Since starting perampanel she has been emotion with crying spells.  Emotional outburst did not occur in the 1st 2-3 weeks after starting pyramidal.  She is on 2 mg per day.  The ER becoming more intense and is somewhat disruptive to the family.  Seizures are much better.  She will often have a very brief sensation and actually question whether she had a seizure or something else.  Clinically that is a good response but the side effects are not acceptable.  She has had no other significant interim medical problems.  She continues to take lamotrigine and the doses not been changed.     2019/11/22  The patient continues to do well.  However she does have brief sensation of fear but nothing else.  She does not have      2019/08/07  The patient is doing well but has an occasional aura which now a feeling of fear which lasts 30 sec.  Last was on Aug 1.  She is averaging 3 per months.  Surgery was Nov 4, 2018.  The first aura occurred on Dec 19, 2018.  Number by month were Jan - 4, Feb - 2, Mar - 2, Apr - 2, May - 4, Jun - 5, Jul - 3.       Results for GLORIA GURROLA (MRN [id number]) as of 8/7/2019 15:38    Ref. Range 12/14/2018 12:10 1/14/2019 14:58 3/26/2019 13:30   Lamotrigine Lvl Latest Ref Range: 2.0 - 15.0 ug/mL 13.0 15.7 (H) 19.3 (H)         2019/03/26  In February and March, she has had four episodes that she is concerned represent seizure. These are different than any prior episodes or sensation that she has experienced. She describes a feeling of intense fear, during which she tells herself "you're just having a seizure".  reports lasting about 15 seconds, during the events he reports patient seems to be staring off in a daydream. She will respond to " questions, but slowly. Typically occurring in the evening, prior to taking Lamictal. She recalls these episodes afterwards. Currently taking Lamictal 500 mg qHS, reports she noticed these episodes after switching from BID to daily lamictal dosing. Lamictal level at prior clinic visit 15.7. Denies any episodes of visual motor apraxia or any of her prior typical seizures.      2019/01/14  The patient underwent a L temporal lobectomy 2 months ago.  She has experience none of her typical seizures.  She did experience Judi Vu twice since surgery.  In the past she had reported Judi Vu as an aura.  She had not experienced any episodes of visual motor apraxia (unable to move eyes in any direction). She takes lamictal 200 mg in the AM and 300 mg in the PM.  She will be converted to once a day dosing of 500 mg which she prefers to do in the evening.       She feels so much better now.  She reports feeling as if she is 19 yo.     2018/05/29  Patient had another day of almost continuous seizures which was on May 09,2018.  She has failed several AEDs mainly due to side effects.  She experienced pharmacodynamic interaction and toxicity with Lamictal - Lacosamide combo.  She is tolerating the lamictal well         2018/04/09  The patient had 9 seizures recorded in last EMU visit all coming from L anterior temporal area.   MRI was normal but PET showed L mesial hypometabolism.  She is currently have almost daily seizures.  She has failed four AEDs is on Lamictal monotherapy.  She reports her verbal memory is terrible.  Review of labs show she was B12 deficient 4 yrs ago and she does not take any supplements.     Results for GLORIA GURROLA (MRN [id number]) as of 4/9/2018 16:23    Ref. Range 3/1/2016 14:45 12/19/2017 12:56 2/8/2018 18:12   Lamotrigine Lvl Latest Ref Range: 2.0 - 15.0 ug/mL 12.0 10.5 7.8      2018/01/29  EMU evaluation was completed in Dec and L temporal interictal spikes were recorded and one electrographic seizure was  "recorded arising from the L anterior temporal area.  Besides frequent seizures her major complaint is progressive memory loss.       HISTORY OF PRESENT ILLNESS (HPI)  Date: The   New Patient  Pt has been seeing Dr Huerta at [facility] for "complex partial sezures." First sz, in school, age 19. Was put on Dilantin and she did well for approx 15 years, until the seizures became active again. Thinks she may have had 2 classic "Grand Mal" seizures in her 20's, but since then, seizure types are those described below.     Currently, she is experiencing more than one event per week. Event type is described below. She has been failing her current treatment regimen as described below. May have tried other meds, but can't remember them now. Did not bring records yet.      Seizure Seminology  Seizure Type 1   Classification: Pass-out  Aura - Occasional auditory мария vu  Pt loses consciousness and falls or loses tone 1-2 in  Post-ictal  Brief  Age of onset 19  Current Seizure Frequency - Several per week      Seizure Type 2  Classification: Complex Partial  Wanders around. Doesn't remember after.   Post-ictal  Brief  Current Seizure Frequency - Several per week     Seizure Triggers  Sleep Deprivation - None  Other medications - None  Psych/stress - None  Photic stimulation - None  Hyperventilation - None  Medical Problems - None  Menses - 3 days before period they get worse  Sensory Stimulation (light, sound, etc) - None  Missed dose of meds - None     AED Treatments  Present regimen   tabs 1 in AM and 1½ in PM      Prior treatments  clobazam (Onfi or Frizium, CLB) 20 mg QD  eslicarbazine (Aptiom, ESL) - seizure intensity worsened after 2 weeks Rx  lacosamide (Vimpat, LCS)   oxcarbazepine (Trileptal OXC)  3 month Angry outburst, crying spells - breviact    crying spells - Fycompa    BID  TPM 10ID  valproic acid (Depakote, VPA)   zonisamide (Zonegran, ZNA)  Vimpat  200 mg BID - kaplan      Not tried  acetazolamide " (Diamox, AZM)  amantadine  carbamazepine (Tegretol, CBZ)  ethosuximide (Zarontin, ESM)  felbamate (felbatol, FBM)  gabapentin (Neurontin, GPN)  levetiracetam (Keppra, LEV)  methsuximide (Celontin, MSM)   phenobarbital (Pb)  pregabalin (Lyrica, PGB)  primidone (Mysoline, PRM)  retigabine (Potiga, RTG)  rufinamide (Banzel, RUF)  tiagabine (Gabatril, TGB)  viagabatrin, (Sabril, VGB)  vagal nerve stimulator (VNS)     Benzodiazepines  diazepam - rectal (Diastatl)  diazepam - oral (Valium, DZ)  clonazepam (Klonopin, CZP)  clorazepate (Tranxene, CLZ)  Ativan  Brain Stimulation  Vagal Nerve Stimulation-n/a  DBS- n/a     Compliance method  Memory - yes  Mom or Spouse - Yes  Pill Box - no  Dewayne calendar - no  Turn over medication bottle - no  Phone alarm - no     Seizure Evaluation  EEG Routine - Dont have  MRI/MRA - In past- she doesn't know results  U eval  2017/12/19-12/20- Patient with no events overnight. EEG shows L anterior temporal spikes, most recorded at F7. None on the other side. At times, almost continuous L temporal interictal discharges at times.   2017/12/20- 11:56:23- clinical seizure, starting from L side on EEG. No epileptiform discharges noted. L temporal slowing and spikes noted. Consisted of brief moment of unresponsiveness.   2017/12/21- EEG showing L interical anterior temporal slowing with L temporal spikes. No clinical seizures since yesterday.   2017/12/21-12/22- Event on 12/21 at 18:55 showing patient talking on the phone and suddenly stopping, unable to speak and confused. EEG shows there is a rhythmic buildup in the L temporal chains. Patient is confused and is drowsy following event. No tonic/clonic activity present.      Admission Date: 7/5/2022  Hospital Length of Stay: 5 days  Discharge Date and Time:  07/10/2022 10:01 AM  HPI:   Ms. Arrieta is a 52 yo female with intractable epilepsy since age 18, s/p partial left anterior temporal lobectomy in 11/2018 admitted to EMU for further seizure  localization as part of repeat surgical workup. After epilepsy surgery, patient reports of brief period of seizure freedom, before beginning to have seizures again approximately 3 months afterwards. She reports current seizures are different than her typical semiology prior to surgery. She describes current seizures as loss of awareness, and staring off. After her events, she is quickly back to baseline and is able to report that she had a seizure. No associated tongue biting, bowel/bladder incontinence. She endorses 6 seizures in March, 8 in April, 5 in May, and 4 in June. She is currently maintained on Lamotrigine 200 mg qAM/300 mg qPM and Cenobamate 150 mg daily.  Hospital Course:   52 yo female with intractable epilepsy since age 18, s/p partial left anterior temporal lobe resection in 11/2018 with recurrence of seizures approximately 3 months after admitted to EMU for seizure capture and localization in discussion of repeat surgical workup. Patient is currently having approximately 4-5 events per month of loss of awareness and staring. Currently maintained on Lamotrigine 200 mg qAM/300 mg qPM, Cenobamate 150 mg daily.   7/5>7/6: Home Lamotrigine and Cenobamate held on admission. EEG with regional cortical dysfunction in the left temporal region as well as bilateral independent seizure foci in the left and right temporal lobes. Typical event 7/6 approximately 0957,  reported he noted patient with change in speech/confusion similar to after an event, no clear behavioral change during event.   7/6>7/7: EEG with regional cortical dysfunction in the left temporal region as well as bilateral independent seizure foci in the left and right temporal lobes. Three right temporal lobe seizures at 09:57, 13:55, and 20:21 on 7/6. Continue close vEEG monitoring and attempt to capture additional seizures to confirm localization for pre-surgical planning.  7/7>7/8: EEG with regional cortical dysfunction left temporal  region, bilateral independent seizure foci in the left and right temporal lobes. No discrete seizures. Continue vEEG off all AEDs and attempt to capture additional seizures for pre-surgical planning. Plan for sleep deprivation 7/8>7/9 with provoking medications tomorrow morning.  7/8>7/9: She was sleep deprived and received benadryl and tramadol. EEG continues to show bilateral independent epileptiform activity in the right and left temporal lobes. A right onset seizure was captured at 14:04. The only clinical manifestation was confusion and the button was pressed about 30 minutes after the seizure occurred. Patient and  state they need to leave tomorrow for transportation reasons.   7/9>7/10: No further seizures. Patient was restarted on home medications. We discussed increasing cenobamate but the pharmacy did not have it in stock and patient would like to discuss with Dr. Mark before increasing outpatient (and she just got the 150 mg dose filled).        CT/CTA Scan -   PET Scan -   Neuropsychological evaluation -   DEXA Scan     Potential Epilepsy Risk Factors:   Pregnancy/Labor/Delivery - full term uncomplicated pregnancy labor and vaginal delivery  Febrile seizures - none  Head injury - none  CNS infection - none   Stroke - none  Family Hx of Sz - none     PAST MEDICAL HISTORY:             Active Ambulatory Problems        Diagnosis  Date Noted        No Active Ambulatory Problems      Resolved Ambulatory Problems        Diagnosis  Date Noted        No Resolved Ambulatory Problems      No Additional Past Medical History          PAST SURGICAL HISTORY: No past surgical history on file.      FAMILY HISTORY: No family history on file.       SOCIAL HISTORY:                                                    Social History                             History    Social History      Marital Status:          Spouse Name:  N/A        Number of Children:  N/A      Years of Education:  N/A    Occupational  History        Not on file.      Social History Main Topics      Smoking status:  Never Smoker      Smokeless tobacco:  Not on file      Alcohol Use:  No      Drug Use:  No      Sexual Activity:  Not on file    Other Topics  Concern          Not on file        Social History Narrative      No narrative on file             SUBSTANCE USE:          Social History    Social History Main Topics      Smoking status:  Never Smoker      Smokeless tobacco:  Not on file      Alcohol Use:  No      Drug Use:  No      Sexual Activity:  Not on file               History    Substance Use Topics      Smoking status:  Never Smoker      Smokeless tobacco:  Not on file      Alcohol Use:  No          ALLERGIES: Review of patient's allergies indicates no known allergies.       Review of Systems   Constitutional: fatigue   HENT: Negative for ear pain, hearing loss, nosebleeds and tinnitus.   Eyes: Negative for blurred vision, double vision, photophobia and pain.   Respiratory: Negative for cough, hemoptysis and shortness of breath.   Cardiovascular: Negative for chest pain, palpitations, orthopnea and leg swelling.   Gastrointestinal: Negative for heartburn, nausea, vomiting, abdominal pain, diarrhea, constipation and blood in stool.   Genitourinary: Negative for dysuria and hematuria.   Musculoskeletal: Negative for myalgias, joint pain and falls.   Skin: Negative for itching and rash.   Neurological: Positive for seizures. Negative for dizziness, tremors, speech change, focal weakness, loss of consciousness, weakness and headaches.   Endo/Heme/Allergies: Negative for environmental allergies. Does not bruise/bleed easily.   Psychiatric/Behavioral: Positive for memory loss. Negative for depression, hallucinations and substance abuse.  The patient is not nervous/anxious.      Neurologic Exam   Higher Cortical Function:   Patient is a well developed, pleasant, well groomed individual appearing their stated age  Oriented - intact to person,  place and time and followed two step instruction correctly.   Fund of knowledge was appropriate.   Language - Speech was fluent without evidence for an aphasia.     IMPRESSION  1.         Emotional lability - h/o lobectomy likely the cause   Impacts works   Works as    2.         Focal Epilepsy   Likely onset is age = 10  S/p L temporal lobectomy 11/2018, sz free for 3 months only   Presurgery - semiology - staring spells   Post surgery Aura - dejavu x 10 sec; 10 min later - uncomfortable feeling x 30 sec   [facility] DIAGNOSIS:  BRAIN, LEFT ANTERIOR TEMPORAL LOBE, EXCISION ([id number]; [facility], [place], [place]; COLLECTED 11/19/2018):-Cortex with moderate cortical and subpial gliosis, and scattered thrombosed vessels and leptomeningeal mixed  inflammation.  BRAIN, LEFT HIPPOCAMPUS, EXCISION ([id number]; [facility], [place], [place];  COLLECTED 11/19/2018):-Fragments of hippocampus with focal neuronal loss and gliosis.  s/p placement of sEEG leads on 11/7/22 with removal on 11/16/22. After interdisciplinary conference review, she underwent placement of right RNS (amygdala depth electrode + middle temporal gyrus strip electrode) 12/19/22.     3.         Obesity  4,         F/h MS, ET     DISPOSITION:   1. Continue LTG 200mg 1 am and 1½ in pm  2,Xcopri 200 mg AM  Options - Lyrica   3, cont zoloft 25 mg QHS      RNS   Electrodes: amygdala depth electrode (RHip1,2,3,4) + middle temporal gyrus strip electrode (RTG1,2,3,4)             Onset - LVFA on strip   Stim: Depth bipolr then strip bipolar       ECOG with popping - stable                 Symptomatic magnet         Labs today

## 2024-04-23 LAB — LAMOTRIGINE SERPL-MCNC: 3.6 UG/ML (ref 2–15)

## 2024-04-29 LAB — CENOBAMATE (XCOPRI), PLASMA: 16.3 UG/ML

## 2024-05-14 ENCOUNTER — TELEPHONE (OUTPATIENT)
Dept: NEUROLOGY | Facility: CLINIC | Age: 54
End: 2024-05-14
Payer: COMMERCIAL

## 2024-06-10 ENCOUNTER — TELEPHONE (OUTPATIENT)
Dept: NEUROLOGY | Facility: CLINIC | Age: 54
End: 2024-06-10
Payer: COMMERCIAL

## 2024-06-10 NOTE — TELEPHONE ENCOUNTER
Called the pt to let her know dr. Mark needs to see her tomorrow. Pt said she would call to confirm if she can make it tomorrow at 11 or not.  
Other Specify

## 2024-06-11 ENCOUNTER — OFFICE VISIT (OUTPATIENT)
Dept: NEUROLOGY | Facility: CLINIC | Age: 54
End: 2024-06-11
Payer: COMMERCIAL

## 2024-06-11 ENCOUNTER — PATIENT MESSAGE (OUTPATIENT)
Dept: NEUROLOGY | Facility: CLINIC | Age: 54
End: 2024-06-11

## 2024-06-11 VITALS
DIASTOLIC BLOOD PRESSURE: 73 MMHG | SYSTOLIC BLOOD PRESSURE: 127 MMHG | BODY MASS INDEX: 32.73 KG/M2 | HEART RATE: 49 BPM | HEIGHT: 66 IN | WEIGHT: 203.69 LBS

## 2024-06-11 DIAGNOSIS — G40.219 COMPLEX PARTIAL EPILEPSY WITH GENERALIZATION AND WITH INTRACTABLE EPILEPSY: ICD-10-CM

## 2024-06-11 DIAGNOSIS — Z96.82 S/P INSERTION OF BRAIN-RESPONSIVE NEUROSTIMULATION DEVICE: Primary | ICD-10-CM

## 2024-06-11 DIAGNOSIS — Z51.81 ENCOUNTER FOR MONITORING ANTICONVULSANT THERAPY: ICD-10-CM

## 2024-06-11 DIAGNOSIS — R56.9 SEIZURE: ICD-10-CM

## 2024-06-11 DIAGNOSIS — Z79.899 ENCOUNTER FOR MONITORING ANTICONVULSANT THERAPY: ICD-10-CM

## 2024-06-11 PROCEDURE — 3008F BODY MASS INDEX DOCD: CPT | Mod: CPTII,S$GLB,, | Performed by: PSYCHIATRY & NEUROLOGY

## 2024-06-11 PROCEDURE — 3074F SYST BP LT 130 MM HG: CPT | Mod: CPTII,S$GLB,, | Performed by: PSYCHIATRY & NEUROLOGY

## 2024-06-11 PROCEDURE — 99215 OFFICE O/P EST HI 40 MIN: CPT | Mod: 25,S$GLB,, | Performed by: PSYCHIATRY & NEUROLOGY

## 2024-06-11 PROCEDURE — 99999 PR PBB SHADOW E&M-EST. PATIENT-LVL III: CPT | Mod: PBBFAC,,, | Performed by: PSYCHIATRY & NEUROLOGY

## 2024-06-11 PROCEDURE — 95983 ALYS BRN NPGT PRGRMG 15 MIN: CPT | Mod: S$GLB,,, | Performed by: PSYCHIATRY & NEUROLOGY

## 2024-06-11 PROCEDURE — 3078F DIAST BP <80 MM HG: CPT | Mod: CPTII,S$GLB,, | Performed by: PSYCHIATRY & NEUROLOGY

## 2024-06-11 PROCEDURE — 1159F MED LIST DOCD IN RCRD: CPT | Mod: CPTII,S$GLB,, | Performed by: PSYCHIATRY & NEUROLOGY

## 2024-06-11 PROCEDURE — 95836 ECOG IMPLTD BRN NPGT <30 D: CPT | Mod: S$GLB,,, | Performed by: PSYCHIATRY & NEUROLOGY

## 2024-06-11 PROCEDURE — 1160F RVW MEDS BY RX/DR IN RCRD: CPT | Mod: CPTII,S$GLB,, | Performed by: PSYCHIATRY & NEUROLOGY

## 2024-06-11 RX ORDER — SERTRALINE HYDROCHLORIDE 50 MG/1
50 TABLET, FILM COATED ORAL DAILY
Qty: 30 TABLET | Refills: 11 | Status: SHIPPED | OUTPATIENT
Start: 2024-06-11 | End: 2025-06-11

## 2024-06-11 NOTE — PROGRESS NOTES
Follow up:   Reports excess fatigue   Feels more sad and irritable - emotional lability     Prior note:   Overall doing better   Some fatigue during the day     Prior note:   Feb 14 - few hrs after flight to [place] had a sz   Feb 17 - 30 min after flight had a sz   Semiology - post ictal fatigue (recovery period - about 2 hrs)     Self trial of Xcpri 200 mg in Feb x 3 days     Prior note:   Reports no new symptoms  Only sensed 1 sz   No magnet   Compliance - good     Prior note:   Staring spells / loose time - upon awakening   July 26   July 27   Aug 23 8:30 am   Aug 25   Sept 5     Last sz July 8 - MARAH   No GTC   Compliance - good   Friday late afternoon - [employer] classes - she reports fatigue afterwards - possible sz      Prior note:    Last sz - 3/26   Semiology - tired -> strange feeling     3/25 - trigger was stress and exercise, semiology - strange feeling     Gained 20 lbs in last 3 months     Prior note:   Reports 1 sz this afternoon   Semiology - felt strange feeling   Sz Hz 1/week     Prior note:   Trigger - jogging   Semiology - loosing time   July - 8 sz   Aug - 6 sz     Prior note:   Still has recurrent seizures   Semiology - loosing time   She is very frustrated   May - had 5 seizures   Trigger - emotional distress     Prior note:   Episodes of loosing time   No aura   Triggered by stress and physical work out (jogging)     sz Hz - can go 3 wks sz free, up to 1 per day for 3 days      Semiology prior to surgery: fear -> strange feeling -> no MARAH      Prior note:   3-4 sz/week   Triggered by stress and physical work out (jogging)     Prior note:   Sz 2/month -> 4-8 per week (since last 2 months)   Aura - dejavu x 10 sec   10 min later - uncomfortable feeling x 30 sec      Prior note:   Last dose of briviact - last night   One hour later an hour episode of fear and whole body shaking   vimpat 100 mg BID x 7 days   No further sz      Prior note:   3 month Angry outburst, crying spells - breviact   Last sz  "- last week   semiology - weird feeling of fear'   Previous sz - loss of awareness      Pt of Dr. Elam      2020/08/25  The plan last visit was to hold perampanel for one week and the reduce dosing at one half the previous dose.  The emotionality has improved but still is present.  She may of had only one sz since the last visit.     2020/04/07  Since starting perampanel she has been emotion with crying spells.  Emotional outburst did not occur in the 1st 2-3 weeks after starting pyramidal.  She is on 2 mg per day.  The ER becoming more intense and is somewhat disruptive to the family.  Seizures are much better.  She will often have a very brief sensation and actually question whether she had a seizure or something else.  Clinically that is a good response but the side effects are not acceptable.  She has had no other significant interim medical problems.  She continues to take lamotrigine and the doses not been changed.     2019/11/22  The patient continues to do well.  However she does have brief sensation of fear but nothing else.  She does not have      2019/08/07  The patient is doing well but has an occasional aura which now a feeling of fear which lasts 30 sec.  Last was on Aug 1.  She is averaging 3 per months.  Surgery was Nov 4, 2018.  The first aura occurred on Dec 19, 2018.  Number by month were Jan - 4, Feb - 2, Mar - 2, Apr - 2, May - 4, Jun - 5, Jul - 3.       Results for GLORIA GURROLA (MRN [id number]) as of 8/7/2019 15:38    Ref. Range 12/14/2018 12:10 1/14/2019 14:58 3/26/2019 13:30   Lamotrigine Lvl Latest Ref Range: 2.0 - 15.0 ug/mL 13.0 15.7 (H) 19.3 (H)         2019/03/26  In February and March, she has had four episodes that she is concerned represent seizure. These are different than any prior episodes or sensation that she has experienced. She describes a feeling of intense fear, during which she tells herself "you're just having a seizure".  reports lasting about 15 seconds, during " the events he reports patient seems to be staring off in a daydream. She will respond to questions, but slowly. Typically occurring in the evening, prior to taking Lamictal. She recalls these episodes afterwards. Currently taking Lamictal 500 mg qHS, reports she noticed these episodes after switching from BID to daily lamictal dosing. Lamictal level at prior clinic visit 15.7. Denies any episodes of visual motor apraxia or any of her prior typical seizures.      2019/01/14  The patient underwent a L temporal lobectomy 2 months ago.  She has experience none of her typical seizures.  She did experience Judi Vu twice since surgery.  In the past she had reported Judi Vu as an aura.  She had not experienced any episodes of visual motor apraxia (unable to move eyes in any direction). She takes lamictal 200 mg in the AM and 300 mg in the PM.  She will be converted to once a day dosing of 500 mg which she prefers to do in the evening.       She feels so much better now.  She reports feeling as if she is 19 yo.     2018/05/29  Patient had another day of almost continuous seizures which was on May 09,2018.  She has failed several AEDs mainly due to side effects.  She experienced pharmacodynamic interaction and toxicity with Lamictal - Lacosamide combo.  She is tolerating the lamictal well         2018/04/09  The patient had 9 seizures recorded in last EMU visit all coming from L anterior temporal area.   MRI was normal but PET showed L mesial hypometabolism.  She is currently have almost daily seizures.  She has failed four AEDs is on Lamictal monotherapy.  She reports her verbal memory is terrible.  Review of labs show she was B12 deficient 4 yrs ago and she does not take any supplements.     Results for GLORIA GURROLA (MRN [id number]) as of 4/9/2018 16:23    Ref. Range 3/1/2016 14:45 12/19/2017 12:56 2/8/2018 18:12   Lamotrigine Lvl Latest Ref Range: 2.0 - 15.0 ug/mL 12.0 10.5 7.8      2018/01/29  EMU evaluation was completed  "in Dec and L temporal interictal spikes were recorded and one electrographic seizure was recorded arising from the L anterior temporal area.  Besides frequent seizures her major complaint is progressive memory loss.       HISTORY OF PRESENT ILLNESS (HPI)  Date: The   New Patient  Pt has been seeing Dr Huerta at [facility] for "complex partial sezures." First sz, in school, age 19. Was put on Dilantin and she did well for approx 15 years, until the seizures became active again. Thinks she may have had 2 classic "Grand Mal" seizures in her 20's, but since then, seizure types are those described below.     Currently, she is experiencing more than one event per week. Event type is described below. She has been failing her current treatment regimen as described below. May have tried other meds, but can't remember them now. Did not bring records yet.      Seizure Seminology  Seizure Type 1   Classification: Pass-out  Aura - Occasional auditory мария vu  Pt loses consciousness and falls or loses tone 1-2 in  Post-ictal  Brief  Age of onset 19  Current Seizure Frequency - Several per week      Seizure Type 2  Classification: Complex Partial  Wanders around. Doesn't remember after.   Post-ictal  Brief  Current Seizure Frequency - Several per week     Seizure Triggers  Sleep Deprivation - None  Other medications - None  Psych/stress - None  Photic stimulation - None  Hyperventilation - None  Medical Problems - None  Menses - 3 days before period they get worse  Sensory Stimulation (light, sound, etc) - None  Missed dose of meds - None     AED Treatments  Present regimen   tabs 1 in AM and 1½ in PM      Prior treatments  clobazam (Onfi or Frizium, CLB) 20 mg QD  eslicarbazine (Aptiom, ESL) - seizure intensity worsened after 2 weeks Rx  lacosamide (Vimpat, LCS)   oxcarbazepine (Trileptal OXC)  3 month Angry outburst, crying spells - breviact    crying spells - Fycompa    BID  TPM 10ID  valproic acid (Depakote, VPA) "   zonisamide (Zonegran, ZNA)  Vimpat  200 mg BID - kaplan      Not tried  acetazolamide (Diamox, AZM)  amantadine  carbamazepine (Tegretol, CBZ)  ethosuximide (Zarontin, ESM)  felbamate (felbatol, FBM)  gabapentin (Neurontin, GPN)  levetiracetam (Keppra, LEV)  methsuximide (Celontin, MSM)   phenobarbital (Pb)  pregabalin (Lyrica, PGB)  primidone (Mysoline, PRM)  retigabine (Potiga, RTG)  rufinamide (Banzel, RUF)  tiagabine (Gabatril, TGB)  viagabatrin, (Sabril, VGB)  vagal nerve stimulator (VNS)     Benzodiazepines  diazepam - rectal (Diastatl)  diazepam - oral (Valium, DZ)  clonazepam (Klonopin, CZP)  clorazepate (Tranxene, CLZ)  Ativan  Brain Stimulation  Vagal Nerve Stimulation-n/a  DBS- n/a     Compliance method  Memory - yes  Mom or Spouse - Yes  Pill Box - no  Dewayne calendar - no  Turn over medication bottle - no  Phone alarm - no     Seizure Evaluation  EEG Routine - Dont have  MRI/MRA - In past- she doesn't know results  EMU eval  2017/12/19-12/20- Patient with no events overnight. EEG shows L anterior temporal spikes, most recorded at F7. None on the other side. At times, almost continuous L temporal interictal discharges at times.   2017/12/20- 11:56:23- clinical seizure, starting from L side on EEG. No epileptiform discharges noted. L temporal slowing and spikes noted. Consisted of brief moment of unresponsiveness.   2017/12/21- EEG showing L interical anterior temporal slowing with L temporal spikes. No clinical seizures since yesterday.   2017/12/21-12/22- Event on 12/21 at 18:55 showing patient talking on the phone and suddenly stopping, unable to speak and confused. EEG shows there is a rhythmic buildup in the L temporal chains. Patient is confused and is drowsy following event. No tonic/clonic activity present.      Admission Date: 7/5/2022  Hospital Length of Stay: 5 days  Discharge Date and Time:  07/10/2022 10:01 AM  HPI:   Ms. Arrieta is a 52 yo female with intractable epilepsy since age 18, s/p  partial left anterior temporal lobectomy in 11/2018 admitted to EMU for further seizure localization as part of repeat surgical workup. After epilepsy surgery, patient reports of brief period of seizure freedom, before beginning to have seizures again approximately 3 months afterwards. She reports current seizures are different than her typical semiology prior to surgery. She describes current seizures as loss of awareness, and staring off. After her events, she is quickly back to baseline and is able to report that she had a seizure. No associated tongue biting, bowel/bladder incontinence. She endorses 6 seizures in March, 8 in April, 5 in May, and 4 in June. She is currently maintained on Lamotrigine 200 mg qAM/300 mg qPM and Cenobamate 150 mg daily.  Hospital Course:   52 yo female with intractable epilepsy since age 18, s/p partial left anterior temporal lobe resection in 11/2018 with recurrence of seizures approximately 3 months after admitted to EMU for seizure capture and localization in discussion of repeat surgical workup. Patient is currently having approximately 4-5 events per month of loss of awareness and staring. Currently maintained on Lamotrigine 200 mg qAM/300 mg qPM, Cenobamate 150 mg daily.   7/5>7/6: Home Lamotrigine and Cenobamate held on admission. EEG with regional cortical dysfunction in the left temporal region as well as bilateral independent seizure foci in the left and right temporal lobes. Typical event 7/6 approximately 0957,  reported he noted patient with change in speech/confusion similar to after an event, no clear behavioral change during event.   7/6>7/7: EEG with regional cortical dysfunction in the left temporal region as well as bilateral independent seizure foci in the left and right temporal lobes. Three right temporal lobe seizures at 09:57, 13:55, and 20:21 on 7/6. Continue close vEEG monitoring and attempt to capture additional seizures to confirm localization for  pre-surgical planning.  7/7>7/8: EEG with regional cortical dysfunction left temporal region, bilateral independent seizure foci in the left and right temporal lobes. No discrete seizures. Continue vEEG off all AEDs and attempt to capture additional seizures for pre-surgical planning. Plan for sleep deprivation 7/8>7/9 with provoking medications tomorrow morning.  7/8>7/9: She was sleep deprived and received benadryl and tramadol. EEG continues to show bilateral independent epileptiform activity in the right and left temporal lobes. A right onset seizure was captured at 14:04. The only clinical manifestation was confusion and the button was pressed about 30 minutes after the seizure occurred. Patient and  state they need to leave tomorrow for transportation reasons.   7/9>7/10: No further seizures. Patient was restarted on home medications. We discussed increasing cenobamate but the pharmacy did not have it in stock and patient would like to discuss with Dr. Mark before increasing outpatient (and she just got the 150 mg dose filled).        CT/CTA Scan -   PET Scan -   Neuropsychological evaluation -   DEXA Scan     Potential Epilepsy Risk Factors:   Pregnancy/Labor/Delivery - full term uncomplicated pregnancy labor and vaginal delivery  Febrile seizures - none  Head injury - none  CNS infection - none   Stroke - none  Family Hx of Sz - none     PAST MEDICAL HISTORY:             Active Ambulatory Problems        Diagnosis  Date Noted        No Active Ambulatory Problems      Resolved Ambulatory Problems        Diagnosis  Date Noted        No Resolved Ambulatory Problems      No Additional Past Medical History          PAST SURGICAL HISTORY: No past surgical history on file.      FAMILY HISTORY: No family history on file.       SOCIAL HISTORY:                                                    Social History                             History    Social History      Marital Status:          Spouse Name:   N/A        Number of Children:  N/A      Years of Education:  N/A    Occupational History        Not on file.      Social History Main Topics      Smoking status:  Never Smoker      Smokeless tobacco:  Not on file      Alcohol Use:  No      Drug Use:  No      Sexual Activity:  Not on file    Other Topics  Concern          Not on file        Social History Narrative      No narrative on file             SUBSTANCE USE:          Social History    Social History Main Topics      Smoking status:  Never Smoker      Smokeless tobacco:  Not on file      Alcohol Use:  No      Drug Use:  No      Sexual Activity:  Not on file               History    Substance Use Topics      Smoking status:  Never Smoker      Smokeless tobacco:  Not on file      Alcohol Use:  No          ALLERGIES: Review of patient's allergies indicates no known allergies.       Review of Systems   Constitutional: fatigue   HENT: Negative for ear pain, hearing loss, nosebleeds and tinnitus.   Eyes: Negative for blurred vision, double vision, photophobia and pain.   Respiratory: Negative for cough, hemoptysis and shortness of breath.   Cardiovascular: Negative for chest pain, palpitations, orthopnea and leg swelling.   Gastrointestinal: Negative for heartburn, nausea, vomiting, abdominal pain, diarrhea, constipation and blood in stool.   Genitourinary: Negative for dysuria and hematuria.   Musculoskeletal: Negative for myalgias, joint pain and falls.   Skin: Negative for itching and rash.   Neurological: Positive for seizures. Negative for dizziness, tremors, speech change, focal weakness, loss of consciousness, weakness and headaches.   Endo/Heme/Allergies: Negative for environmental allergies. Does not bruise/bleed easily.   Psychiatric/Behavioral: Positive for memory loss. Negative for depression, hallucinations and substance abuse.  The patient is not nervous/anxious.      Neurologic Exam   Higher Cortical Function:   Patient is a well developed,  pleasant, well groomed individual appearing their stated age  Oriented - intact to person, place and time and followed two step instruction correctly.   Fund of knowledge was appropriate.   Language - Speech was fluent without evidence for an aphasia.     IMPRESSION  1.         Emotional lability - h/o lobectomy likely the cause   Impacts works   Works as    2.         Focal Epilepsy   Likely onset is age = 10  S/p L temporal lobectomy 11/2018, sz free for 3 months only   Presurgery - semiology - staring spells   Post surgery Aura - dejavu x 10 sec; 10 min later - uncomfortable feeling x 30 sec   [facility] DIAGNOSIS:  BRAIN, LEFT ANTERIOR TEMPORAL LOBE, EXCISION ([id number]; [facility], [place], [place]; COLLECTED 11/19/2018):-Cortex with moderate cortical and subpial gliosis, and scattered thrombosed vessels and leptomeningeal mixed  inflammation.  BRAIN, LEFT HIPPOCAMPUS, EXCISION ([id number]; [facility], [place], [place];  COLLECTED 11/19/2018):-Fragments of hippocampus with focal neuronal loss and gliosis.  s/p placement of sEEG leads on 11/7/22 with removal on 11/16/22. After interdisciplinary conference review, she underwent placement of right RNS (amygdala depth electrode + middle temporal gyrus strip electrode) 12/19/22.     3.         Obesity  4,         F/h MS, ET     DISPOSITION:   1. Continue LTG 200mg 1 am and 1½ in pm  2,Xcopri 200 mg AM  Options - Lyrica   3, cont zoloft 25-> 50 mg QHS      RNS   Electrodes: amygdala depth electrode (RHip1,2,3,4) + middle temporal gyrus strip electrode (RTG1,2,3,4)             Onset - LVFA on strip   Stim: Depth bipolr then strip bipolar       ECOG with popping - stable       Prior Visit Overview: Patient was last seen on April 19, 2024. At that visit no changes were made.    Activity Trends: Overall trends seem mostly stable since moving the detectors. We are recording a few more nonspecific long episodes, adjusting A1  and B2 slightly should help clean this up a bit. We have only recorded 5 of her clear electrographic events since last visit and none of them were her longer events! However, we could have overwritten some events with some of the less specific activity.    ECoGs and Detection: Having all 4 detectors on hippocampal lead, the A1 and B2 detectors may be slightly too active, we can very slightly adjust both of those detectors to make them a bit more specific.    Stimulation: If changes are desired today we can titrated up, but patient appears to be continuing to improve.                        Labs today   Discussed wt loss     6/8/24 Magnet for fatigue symptoms - no sz   5/2/24 - very tired, word finding trouble - devise did not  sz   Ex of sz: 5/9/24   Semiology - forgot to clock out and MARAH

## 2024-07-07 ENCOUNTER — PATIENT MESSAGE (OUTPATIENT)
Dept: NEUROLOGY | Facility: CLINIC | Age: 54
End: 2024-07-07
Payer: COMMERCIAL

## 2024-07-09 ENCOUNTER — TELEPHONE (OUTPATIENT)
Dept: NEUROLOGY | Facility: CLINIC | Age: 54
End: 2024-07-09
Payer: COMMERCIAL

## 2024-07-18 ENCOUNTER — PATIENT MESSAGE (OUTPATIENT)
Dept: NEUROLOGY | Facility: CLINIC | Age: 54
End: 2024-07-18
Payer: COMMERCIAL

## 2024-07-19 DIAGNOSIS — G40.119 TEMPORAL LOBE EPILEPSY, INTRACTABLE: ICD-10-CM

## 2024-07-19 RX ORDER — CENOBAMATE 200 MG/1
200 TABLET, FILM COATED ORAL DAILY
Qty: 30 TABLET | Refills: 3 | Status: SHIPPED | OUTPATIENT
Start: 2024-07-19

## 2024-08-02 ENCOUNTER — OFFICE VISIT (OUTPATIENT)
Dept: NEUROLOGY | Facility: CLINIC | Age: 54
End: 2024-08-02
Payer: COMMERCIAL

## 2024-08-02 VITALS
WEIGHT: 203.69 LBS | BODY MASS INDEX: 32.73 KG/M2 | SYSTOLIC BLOOD PRESSURE: 127 MMHG | DIASTOLIC BLOOD PRESSURE: 73 MMHG | HEIGHT: 66 IN | HEART RATE: 62 BPM

## 2024-08-02 DIAGNOSIS — G40.219 COMPLEX PARTIAL EPILEPSY WITH GENERALIZATION AND WITH INTRACTABLE EPILEPSY: Primary | ICD-10-CM

## 2024-08-02 DIAGNOSIS — Z96.82 S/P INSERTION OF BRAIN-RESPONSIVE NEUROSTIMULATION DEVICE: ICD-10-CM

## 2024-08-02 PROCEDURE — 99999 PR PBB SHADOW E&M-EST. PATIENT-LVL III: CPT | Mod: PBBFAC,,, | Performed by: PSYCHIATRY & NEUROLOGY

## 2024-08-02 PROCEDURE — 3074F SYST BP LT 130 MM HG: CPT | Mod: CPTII,S$GLB,, | Performed by: PSYCHIATRY & NEUROLOGY

## 2024-08-02 PROCEDURE — 99215 OFFICE O/P EST HI 40 MIN: CPT | Mod: 25,S$GLB,, | Performed by: PSYCHIATRY & NEUROLOGY

## 2024-08-02 PROCEDURE — 95836 ECOG IMPLTD BRN NPGT <30 D: CPT | Mod: S$GLB,,, | Performed by: PSYCHIATRY & NEUROLOGY

## 2024-08-02 PROCEDURE — 3008F BODY MASS INDEX DOCD: CPT | Mod: CPTII,S$GLB,, | Performed by: PSYCHIATRY & NEUROLOGY

## 2024-08-02 PROCEDURE — 3078F DIAST BP <80 MM HG: CPT | Mod: CPTII,S$GLB,, | Performed by: PSYCHIATRY & NEUROLOGY

## 2024-08-02 PROCEDURE — 95983 ALYS BRN NPGT PRGRMG 15 MIN: CPT | Mod: S$GLB,,, | Performed by: PSYCHIATRY & NEUROLOGY

## 2024-08-02 RX ORDER — LAMOTRIGINE 200 MG/1
200 TABLET ORAL DAILY
COMMUNITY

## 2024-08-02 NOTE — PROGRESS NOTES
Follow up:   Overall doing well   Had a sz right after physical work out     Prior note  Reports excess fatigue   Feels more sad and irritable - emotional lability     Prior note:   Overall doing better   Some fatigue during the day     Prior note:   Feb 14 - few hrs after flight to [place] had a sz   Feb 17 - 30 min after flight had a sz   Semiology - post ictal fatigue (recovery period - about 2 hrs)     Self trial of Xcpri 200 mg in Feb x 3 days     Prior note:   Reports no new symptoms  Only sensed 1 sz   No magnet   Compliance - good     Prior note:   Staring spells / loose time - upon awakening   July 26   July 27   Aug 23 8:30 am   Aug 25   Sept 5     Last sz July 8 - MARAH   No GTC   Compliance - good   Friday late afternoon - [employer] classes - she reports fatigue afterwards - possible sz      Prior note:    Last sz - 3/26   Semiology - tired -> strange feeling     3/25 - trigger was stress and exercise, semiology - strange feeling     Gained 20 lbs in last 3 months     Prior note:   Reports 1 sz this afternoon   Semiology - felt strange feeling   Sz Hz 1/week     Prior note:   Trigger - jogging   Semiology - loosing time   July - 8 sz   Aug - 6 sz     Prior note:   Still has recurrent seizures   Semiology - loosing time   She is very frustrated   May - had 5 seizures   Trigger - emotional distress     Prior note:   Episodes of loosing time   No aura   Triggered by stress and physical work out (jogging)     sz Hz - can go 3 wks sz free, up to 1 per day for 3 days      Semiology prior to surgery: fear -> strange feeling -> no MARAH      Prior note:   3-4 sz/week   Triggered by stress and physical work out (jogging)     Prior note:   Sz 2/month -> 4-8 per week (since last 2 months)   Aura - dejavu x 10 sec   10 min later - uncomfortable feeling x 30 sec      Prior note:   Last dose of briviact - last night   One hour later an hour episode of fear and whole body shaking   vimpat 100 mg BID x 7 days   No further sz     "  Prior note:   3 month Angry outburst, crying spells - breviact   Last sz - last week   semiology - weird feeling of fear'   Previous sz - loss of awareness      Pt of Dr. Elam      2020/08/25  The plan last visit was to hold perampanel for one week and the reduce dosing at one half the previous dose.  The emotionality has improved but still is present.  She may of had only one sz since the last visit.     2020/04/07  Since starting perampanel she has been emotion with crying spells.  Emotional outburst did not occur in the 1st 2-3 weeks after starting pyramidal.  She is on 2 mg per day.  The ER becoming more intense and is somewhat disruptive to the family.  Seizures are much better.  She will often have a very brief sensation and actually question whether she had a seizure or something else.  Clinically that is a good response but the side effects are not acceptable.  She has had no other significant interim medical problems.  She continues to take lamotrigine and the doses not been changed.     2019/11/22  The patient continues to do well.  However she does have brief sensation of fear but nothing else.  She does not have      2019/08/07  The patient is doing well but has an occasional aura which now a feeling of fear which lasts 30 sec.  Last was on Aug 1.  She is averaging 3 per months.  Surgery was Nov 4, 2018.  The first aura occurred on Dec 19, 2018.  Number by month were Jan - 4, Feb - 2, Mar - 2, Apr - 2, May - 4, Jun - 5, Jul - 3.       Results for GLORIA GURROLA (MRN [id number]) as of 8/7/2019 15:38    Ref. Range 12/14/2018 12:10 1/14/2019 14:58 3/26/2019 13:30   Lamotrigine Lvl Latest Ref Range: 2.0 - 15.0 ug/mL 13.0 15.7 (H) 19.3 (H)         2019/03/26  In February and March, she has had four episodes that she is concerned represent seizure. These are different than any prior episodes or sensation that she has experienced. She describes a feeling of intense fear, during which she tells herself "you're " "just having a seizure".  reports lasting about 15 seconds, during the events he reports patient seems to be staring off in a daydream. She will respond to questions, but slowly. Typically occurring in the evening, prior to taking Lamictal. She recalls these episodes afterwards. Currently taking Lamictal 500 mg qHS, reports she noticed these episodes after switching from BID to daily lamictal dosing. Lamictal level at prior clinic visit 15.7. Denies any episodes of visual motor apraxia or any of her prior typical seizures.      2019/01/14  The patient underwent a L temporal lobectomy 2 months ago.  She has experience none of her typical seizures.  She did experience Judi Vu twice since surgery.  In the past she had reported Judi Vu as an aura.  She had not experienced any episodes of visual motor apraxia (unable to move eyes in any direction). She takes lamictal 200 mg in the AM and 300 mg in the PM.  She will be converted to once a day dosing of 500 mg which she prefers to do in the evening.       She feels so much better now.  She reports feeling as if she is 19 yo.     2018/05/29  Patient had another day of almost continuous seizures which was on May 09,2018.  She has failed several AEDs mainly due to side effects.  She experienced pharmacodynamic interaction and toxicity with Lamictal - Lacosamide combo.  She is tolerating the lamictal well         2018/04/09  The patient had 9 seizures recorded in last EMU visit all coming from L anterior temporal area.   MRI was normal but PET showed L mesial hypometabolism.  She is currently have almost daily seizures.  She has failed four AEDs is on Lamictal monotherapy.  She reports her verbal memory is terrible.  Review of labs show she was B12 deficient 4 yrs ago and she does not take any supplements.     Results for GLORIA GURROLA (MRN [id number]) as of 4/9/2018 16:23    Ref. Range 3/1/2016 14:45 12/19/2017 12:56 2/8/2018 18:12   Lamotrigine Lvl Latest Ref Range: 2.0 " "- 15.0 ug/mL 12.0 10.5 7.8      2018/01/29  EMU evaluation was completed in Dec and L temporal interictal spikes were recorded and one electrographic seizure was recorded arising from the L anterior temporal area.  Besides frequent seizures her major complaint is progressive memory loss.       HISTORY OF PRESENT ILLNESS (HPI)  Date: The   New Patient  Pt has been seeing Dr Huerta at [facility] for "complex partial sezures." First sz, in school, age 19. Was put on Dilantin and she did well for approx 15 years, until the seizures became active again. Thinks she may have had 2 classic "Grand Mal" seizures in her 20's, but since then, seizure types are those described below.     Currently, she is experiencing more than one event per week. Event type is described below. She has been failing her current treatment regimen as described below. May have tried other meds, but can't remember them now. Did not bring records yet.      Seizure Seminology  Seizure Type 1   Classification: Pass-out  Aura - Occasional auditory мария vu  Pt loses consciousness and falls or loses tone 1-2 in  Post-ictal  Brief  Age of onset 19  Current Seizure Frequency - Several per week      Seizure Type 2  Classification: Complex Partial  Wanders around. Doesn't remember after.   Post-ictal  Brief  Current Seizure Frequency - Several per week     Seizure Triggers  Sleep Deprivation - None  Other medications - None  Psych/stress - None  Photic stimulation - None  Hyperventilation - None  Medical Problems - None  Menses - 3 days before period they get worse  Sensory Stimulation (light, sound, etc) - None  Missed dose of meds - None     AED Treatments  Present regimen   tabs 1 in AM and 1½ in PM      Prior treatments  clobazam (Onfi or Frizium, CLB) 20 mg QD  eslicarbazine (Aptiom, ESL) - seizure intensity worsened after 2 weeks Rx  lacosamide (Vimpat, LCS)   oxcarbazepine (Trileptal OXC)  3 month Angry outburst, crying spells - breviact    crying " spells - Fycompa    BID  TPM 10ID  valproic acid (Depakote, VPA)   zonisamide (Zonegran, ZNA)  Vimpat  200 mg BID - kaplan      Not tried  acetazolamide (Diamox, AZM)  amantadine  carbamazepine (Tegretol, CBZ)  ethosuximide (Zarontin, ESM)  felbamate (felbatol, FBM)  gabapentin (Neurontin, GPN)  levetiracetam (Keppra, LEV)  methsuximide (Celontin, MSM)   phenobarbital (Pb)  pregabalin (Lyrica, PGB)  primidone (Mysoline, PRM)  retigabine (Potiga, RTG)  rufinamide (Banzel, RUF)  tiagabine (Gabatril, TGB)  viagabatrin, (Sabril, VGB)  vagal nerve stimulator (VNS)     Benzodiazepines  diazepam - rectal (Diastatl)  diazepam - oral (Valium, DZ)  clonazepam (Klonopin, CZP)  clorazepate (Tranxene, CLZ)  Ativan  Brain Stimulation  Vagal Nerve Stimulation-n/a  DBS- n/a     Compliance method  Memory - yes  Mom or Spouse - Yes  Pill Box - no  Dewayne calendar - no  Turn over medication bottle - no  Phone alarm - no     Seizure Evaluation  EEG Routine - Dont have  MRI/MRA - In past- she doesn't know results  [facility]  2017/12/19-12/20- Patient with no events overnight. EEG shows L anterior temporal spikes, most recorded at F7. None on the other side. At times, almost continuous L temporal interictal discharges at times.   2017/12/20- 11:56:23- clinical seizure, starting from L side on EEG. No epileptiform discharges noted. L temporal slowing and spikes noted. Consisted of brief moment of unresponsiveness.   2017/12/21- EEG showing L interical anterior temporal slowing with L temporal spikes. No clinical seizures since yesterday.   2017/12/21-12/22- Event on 12/21 at 18:55 showing patient talking on the phone and suddenly stopping, unable to speak and confused. EEG shows there is a rhythmic buildup in the L temporal chains. Patient is confused and is drowsy following event. No tonic/clonic activity present.      Admission Date: 7/5/2022  Hospital Length of Stay: 5 days  Discharge Date and Time:  07/10/2022 10:01 AM  HPI:   Ms.  Meenakshi is a 52 yo female with intractable epilepsy since age 18, s/p partial left anterior temporal lobectomy in 11/2018 admitted to EMU for further seizure localization as part of repeat surgical workup. After epilepsy surgery, patient reports of brief period of seizure freedom, before beginning to have seizures again approximately 3 months afterwards. She reports current seizures are different than her typical semiology prior to surgery. She describes current seizures as loss of awareness, and staring off. After her events, she is quickly back to baseline and is able to report that she had a seizure. No associated tongue biting, bowel/bladder incontinence. She endorses 6 seizures in March, 8 in April, 5 in May, and 4 in June. She is currently maintained on Lamotrigine 200 mg qAM/300 mg qPM and Cenobamate 150 mg daily.  Hospital Course:   52 yo female with intractable epilepsy since age 18, s/p partial left anterior temporal lobe resection in 11/2018 with recurrence of seizures approximately 3 months after admitted to EMU for seizure capture and localization in discussion of repeat surgical workup. Patient is currently having approximately 4-5 events per month of loss of awareness and staring. Currently maintained on Lamotrigine 200 mg qAM/300 mg qPM, Cenobamate 150 mg daily.   7/5>7/6: Home Lamotrigine and Cenobamate held on admission. EEG with regional cortical dysfunction in the left temporal region as well as bilateral independent seizure foci in the left and right temporal lobes. Typical event 7/6 approximately 0957,  reported he noted patient with change in speech/confusion similar to after an event, no clear behavioral change during event.   7/6>7/7: EEG with regional cortical dysfunction in the left temporal region as well as bilateral independent seizure foci in the left and right temporal lobes. Three right temporal lobe seizures at 09:57, 13:55, and 20:21 on 7/6. Continue close vEEG monitoring  and attempt to capture additional seizures to confirm localization for pre-surgical planning.  7/7>7/8: EEG with regional cortical dysfunction left temporal region, bilateral independent seizure foci in the left and right temporal lobes. No discrete seizures. Continue vEEG off all AEDs and attempt to capture additional seizures for pre-surgical planning. Plan for sleep deprivation 7/8>7/9 with provoking medications tomorrow morning.  7/8>7/9: She was sleep deprived and received benadryl and tramadol. EEG continues to show bilateral independent epileptiform activity in the right and left temporal lobes. A right onset seizure was captured at 14:04. The only clinical manifestation was confusion and the button was pressed about 30 minutes after the seizure occurred. Patient and  state they need to leave tomorrow for transportation reasons.   7/9>7/10: No further seizures. Patient was restarted on home medications. We discussed increasing cenobamate but the pharmacy did not have it in stock and patient would like to discuss with Dr. Mark before increasing outpatient (and she just got the 150 mg dose filled).        CT/CTA Scan -   PET Scan -   Neuropsychological evaluation -   DEXA Scan     Potential Epilepsy Risk Factors:   Pregnancy/Labor/Delivery - full term uncomplicated pregnancy labor and vaginal delivery  Febrile seizures - none  Head injury - none  CNS infection - none   Stroke - none  Family Hx of Sz - none     PAST MEDICAL HISTORY:             Active Ambulatory Problems        Diagnosis  Date Noted        No Active Ambulatory Problems      Resolved Ambulatory Problems        Diagnosis  Date Noted        No Resolved Ambulatory Problems      No Additional Past Medical History          PAST SURGICAL HISTORY: No past surgical history on file.      FAMILY HISTORY: No family history on file.       SOCIAL HISTORY:                                                    Social History                              History    Social History      Marital Status:          Spouse Name:  N/A        Number of Children:  N/A      Years of Education:  N/A    Occupational History        Not on file.      Social History Main Topics      Smoking status:  Never Smoker      Smokeless tobacco:  Not on file      Alcohol Use:  No      Drug Use:  No      Sexual Activity:  Not on file    Other Topics  Concern          Not on file        Social History Narrative      No narrative on file             SUBSTANCE USE:          Social History    Social History Main Topics      Smoking status:  Never Smoker      Smokeless tobacco:  Not on file      Alcohol Use:  No      Drug Use:  No      Sexual Activity:  Not on file               History    Substance Use Topics      Smoking status:  Never Smoker      Smokeless tobacco:  Not on file      Alcohol Use:  No          ALLERGIES: Review of patient's allergies indicates no known allergies.       Review of Systems   Constitutional: fatigue   HENT: Negative for ear pain, hearing loss, nosebleeds and tinnitus.   Eyes: Negative for blurred vision, double vision, photophobia and pain.   Respiratory: Negative for cough, hemoptysis and shortness of breath.   Cardiovascular: Negative for chest pain, palpitations, orthopnea and leg swelling.   Gastrointestinal: Negative for heartburn, nausea, vomiting, abdominal pain, diarrhea, constipation and blood in stool.   Genitourinary: Negative for dysuria and hematuria.   Musculoskeletal: Negative for myalgias, joint pain and falls.   Skin: Negative for itching and rash.   Neurological: Positive for seizures. Negative for dizziness, tremors, speech change, focal weakness, loss of consciousness, weakness and headaches.   Endo/Heme/Allergies: Negative for environmental allergies. Does not bruise/bleed easily.   Psychiatric/Behavioral: Positive for memory loss. Negative for depression, hallucinations and substance abuse.  The patient is not nervous/anxious.       Neurologic Exam   Higher Cortical Function:   Patient is a well developed, pleasant, well groomed individual appearing their stated age  Oriented - intact to person, place and time and followed two step instruction correctly.   Fund of knowledge was appropriate.   Language - Speech was fluent without evidence for an aphasia.     IMPRESSION  1.         Emotional lability - h/o lobectomy likely the cause   Impacts works   Works as    2.         Focal Epilepsy   Likely onset is age = 10  S/p L temporal lobectomy 11/2018, sz free for 3 months only   Presurgery - semiology - staring spells   Post surgery Aura - dejavu x 10 sec; 10 min later - uncomfortable feeling x 30 sec   [facility] DIAGNOSIS:  BRAIN, LEFT ANTERIOR TEMPORAL LOBE, EXCISION ([id number]; [facility], [place], [place]; COLLECTED 11/19/2018):-Cortex with moderate cortical and subpial gliosis, and scattered thrombosed vessels and leptomeningeal mixed  inflammation.  BRAIN, LEFT HIPPOCAMPUS, EXCISION ([id number]; [facility], [place], [place];  COLLECTED 11/19/2018):-Fragments of hippocampus with focal neuronal loss and gliosis.  s/p placement of sEEG leads on 11/7/22 with removal on 11/16/22. After interdisciplinary conference review, she underwent placement of right RNS (amygdala depth electrode + middle temporal gyrus strip electrode) 12/19/22.     3.         Obesity  4,         F/h MS, ET     DISPOSITION:   1. Continue LTG 200mg 1 am and 1½ in pm  2,Xcopri 200 mg AM  Options - Lyrica   3, cont zoloft 50 mg QHS      RNS   Electrodes: amygdala depth electrode (RHip1,2,3,4) + middle temporal gyrus strip electrode (RTG1,2,3,4)             Onset - LVFA on strip   Stim: Depth bipolr then strip bipolar       ECOG with popping - stable     Impedence for strip 1498, 1021, 1230, 981            Labs   4/19 Lamictal 3.6   4/19 Xcopri 16.3     Plans to switch Xcopri from 10 am to 7 pm on 8/3/24   Discussed wt loss

## 2024-08-07 ENCOUNTER — PATIENT MESSAGE (OUTPATIENT)
Dept: NEUROLOGY | Facility: CLINIC | Age: 54
End: 2024-08-07
Payer: COMMERCIAL

## 2024-10-10 ENCOUNTER — TELEPHONE (OUTPATIENT)
Dept: NEUROLOGY | Facility: CLINIC | Age: 54
End: 2024-10-10
Payer: COMMERCIAL

## 2024-10-15 ENCOUNTER — OFFICE VISIT (OUTPATIENT)
Dept: NEUROLOGY | Facility: CLINIC | Age: 54
End: 2024-10-15
Payer: COMMERCIAL

## 2024-10-15 VITALS
BODY MASS INDEX: 32.73 KG/M2 | WEIGHT: 203.69 LBS | HEART RATE: 61 BPM | HEIGHT: 66 IN | DIASTOLIC BLOOD PRESSURE: 76 MMHG | SYSTOLIC BLOOD PRESSURE: 139 MMHG

## 2024-10-15 DIAGNOSIS — G40.219 COMPLEX PARTIAL EPILEPSY WITH GENERALIZATION AND WITH INTRACTABLE EPILEPSY: ICD-10-CM

## 2024-10-15 DIAGNOSIS — Z96.82 S/P INSERTION OF BRAIN-RESPONSIVE NEUROSTIMULATION DEVICE: Primary | ICD-10-CM

## 2024-10-15 DIAGNOSIS — G40.119 TEMPORAL LOBE EPILEPSY, INTRACTABLE: ICD-10-CM

## 2024-10-15 PROCEDURE — 99999 PR PBB SHADOW E&M-EST. PATIENT-LVL III: CPT | Mod: PBBFAC,,, | Performed by: PSYCHIATRY & NEUROLOGY

## 2024-10-15 PROCEDURE — 1159F MED LIST DOCD IN RCRD: CPT | Mod: CPTII,S$GLB,, | Performed by: PSYCHIATRY & NEUROLOGY

## 2024-10-15 PROCEDURE — 3078F DIAST BP <80 MM HG: CPT | Mod: CPTII,S$GLB,, | Performed by: PSYCHIATRY & NEUROLOGY

## 2024-10-15 PROCEDURE — 99214 OFFICE O/P EST MOD 30 MIN: CPT | Mod: S$GLB,,, | Performed by: PSYCHIATRY & NEUROLOGY

## 2024-10-15 PROCEDURE — 3008F BODY MASS INDEX DOCD: CPT | Mod: CPTII,S$GLB,, | Performed by: PSYCHIATRY & NEUROLOGY

## 2024-10-15 PROCEDURE — 1160F RVW MEDS BY RX/DR IN RCRD: CPT | Mod: CPTII,S$GLB,, | Performed by: PSYCHIATRY & NEUROLOGY

## 2024-10-15 PROCEDURE — 3075F SYST BP GE 130 - 139MM HG: CPT | Mod: CPTII,S$GLB,, | Performed by: PSYCHIATRY & NEUROLOGY

## 2024-10-15 NOTE — PROGRESS NOTES
Follow up:   Patient ID: Liza Arrieta is a 54 y.o. female.    Chief Complaint: Consult    History of Present Illness    CHIEF COMPLAINT:  Patient presents today for follow up.    SEIZURES AND NEUROPACE DEVICE:  She reports experiencing a seizure on Sunday while walking home from a store, describing confusion and disorientation during and after the event. She expresses concern about the increased frequency of seizures in recent months, wondering if they could be related to family stress or her body becoming accustomed to her current Lamictal dosage. Her Neuropace device is currently effective in managing seizures, allowing her to maintain full cognitive function even during brief periods of unconsciousness lasting approximately 30 seconds. She desires to increase the device settings if possible. She notes occasional episodes of staring, which she actively tries to stop, unsure if these represent petit mal seizures. She acknowledges that her brain function has changed since device implantation, potentially affecting her mood and energy levels.    FATIGUE:  She reports increased fatigue over the last 3-4 months, particularly after eating. To combat this, she has increased her coffee consumption to up to 6 cups daily. She believes the fatigue may be related to the increased dosage of Ex-COPRI, which persists despite changes in medication timing. She experiences fatigue around 7:00 AM after taking Ex-COPRI at night.    MEDICATIONS:  She is currently taking Lamictal, Zoloft 50 mg, and Ex-COPRI. She takes her medications generally twice daily, with Ex-COPRI specifically at night. She reports no issues with her current Zoloft dosage. She expresses a desire to avoid increasing medication dosages at this time.    GASTROINTESTINAL ISSUES:  She has a history of cholecystectomy and experiences diarrhea after consuming certain foods, particularly those that are fried or cooked with a lot of oil. Deep-fried foods, such as  deep-fried pickles, trigger this symptom. She acknowledges that this digestive issue seems to be related to her gallbladder removal and affects her food choices when dining out.    Prior note:   Overall doing well   Had a sz right after physical work out     Prior note  Reports excess fatigue   Feels more sad and irritable - emotional lability     Prior note:   Overall doing better   Some fatigue during the day     Prior note:   Feb 14 - few hrs after flight to [place] had a sz   Feb 17 - 30 min after flight had a sz   Semiology - post ictal fatigue (recovery period - about 2 hrs)     Self trial of Xcpri 200 mg in Feb x 3 days     Prior note:   Reports no new symptoms  Only sensed 1 sz   No magnet   Compliance - good     Prior note:   Staring spells / loose time - upon awakening   July 26   July 27   Aug 23 8:30 am   Aug 25   Sept 5     Last sz July 8 - MARAH   No GTC   Compliance - good   Friday late afternoon - [employer] classes - she reports fatigue afterwards - possible sz      Prior note:    Last sz - 3/26   Semiology - tired -> strange feeling     3/25 - trigger was stress and exercise, semiology - strange feeling     Gained 20 lbs in last 3 months     Prior note:   Reports 1 sz this afternoon   Semiology - felt strange feeling   Sz Hz 1/week     Prior note:   Trigger - jogging   Semiology - loosing time   July - 8 sz   Aug - 6 sz     Prior note:   Still has recurrent seizures   Semiology - loosing time   She is very frustrated   May - had 5 seizures   Trigger - emotional distress     Prior note:   Episodes of loosing time   No aura   Triggered by stress and physical work out (jogging)     sz Hz - can go 3 wks sz free, up to 1 per day for 3 days      Semiology prior to surgery: fear -> strange feeling -> no MARAH      Prior note:   3-4 sz/week   Triggered by stress and physical work out (jogging)     Prior note:   Sz 2/month -> 4-8 per week (since last 2 months)   Aura - dejavu x 10 sec   10 min later - uncomfortable  feeling x 30 sec      Prior note:   Last dose of briviact - last night   One hour later an hour episode of fear and whole body shaking   vimpat 100 mg BID x 7 days   No further sz      Prior note:   3 month Angry outburst, crying spells - breviact   Last sz - last week   semiology - weird feeling of fear'   Previous sz - loss of awareness      Pt of Dr. Elam      2020/08/25  The plan last visit was to hold perampanel for one week and the reduce dosing at one half the previous dose.  The emotionality has improved but still is present.  She may of had only one sz since the last visit.     2020/04/07  Since starting perampanel she has been emotion with crying spells.  Emotional outburst did not occur in the 1st 2-3 weeks after starting pyramidal.  She is on 2 mg per day.  The ER becoming more intense and is somewhat disruptive to the family.  Seizures are much better.  She will often have a very brief sensation and actually question whether she had a seizure or something else.  Clinically that is a good response but the side effects are not acceptable.  She has had no other significant interim medical problems.  She continues to take lamotrigine and the doses not been changed.     2019/11/22  The patient continues to do well.  However she does have brief sensation of fear but nothing else.  She does not have      2019/08/07  The patient is doing well but has an occasional aura which now a feeling of fear which lasts 30 sec.  Last was on Aug 1.  She is averaging 3 per months.  Surgery was Nov 4, 2018.  The first aura occurred on Dec 19, 2018.  Number by month were Jan - 4, Feb - 2, Mar - 2, Apr - 2, May - 4, Jun - 5, Jul - 3.       Results for GLORIA GURROLA (MRN [id number]) as of 8/7/2019 15:38    Ref. Range 12/14/2018 12:10 1/14/2019 14:58 3/26/2019 13:30   Lamotrigine Lvl Latest Ref Range: 2.0 - 15.0 ug/mL 13.0 15.7 (H) 19.3 (H)         2019/03/26  In February and March, she has had four episodes that she is concerned  "represent seizure. These are different than any prior episodes or sensation that she has experienced. She describes a feeling of intense fear, during which she tells herself "you're just having a seizure".  reports lasting about 15 seconds, during the events he reports patient seems to be staring off in a daydream. She will respond to questions, but slowly. Typically occurring in the evening, prior to taking Lamictal. She recalls these episodes afterwards. Currently taking Lamictal 500 mg qHS, reports she noticed these episodes after switching from BID to daily lamictal dosing. Lamictal level at prior clinic visit 15.7. Denies any episodes of visual motor apraxia or any of her prior typical seizures.      2019/01/14  The patient underwent a L temporal lobectomy 2 months ago.  She has experience none of her typical seizures.  She did experience Judi Vu twice since surgery.  In the past she had reported Judi Vu as an aura.  She had not experienced any episodes of visual motor apraxia (unable to move eyes in any direction). She takes lamictal 200 mg in the AM and 300 mg in the PM.  She will be converted to once a day dosing of 500 mg which she prefers to do in the evening.       She feels so much better now.  She reports feeling as if she is 17 yo.     2018/05/29  Patient had another day of almost continuous seizures which was on May 09,2018.  She has failed several AEDs mainly due to side effects.  She experienced pharmacodynamic interaction and toxicity with Lamictal - Lacosamide combo.  She is tolerating the lamictal well         2018/04/09  The patient had 9 seizures recorded in last EMU visit all coming from L anterior temporal area.   MRI was normal but PET showed L mesial hypometabolism.  She is currently have almost daily seizures.  She has failed four AEDs is on Lamictal monotherapy.  She reports her verbal memory is terrible.  Review of labs show she was B12 deficient 4 yrs ago and she does not take " "any supplements.     Results for GLORIA GURROLA (MRN [id number]) as of 4/9/2018 16:23    Ref. Range 3/1/2016 14:45 12/19/2017 12:56 2/8/2018 18:12   Lamotrigine Lvl Latest Ref Range: 2.0 - 15.0 ug/mL 12.0 10.5 7.8      2018/01/29  EMU evaluation was completed in Dec and L temporal interictal spikes were recorded and one electrographic seizure was recorded arising from the L anterior temporal area.  Besides frequent seizures her major complaint is progressive memory loss.       HISTORY OF PRESENT ILLNESS (HPI)  Date: The   New Patient  Pt has been seeing Dr Huerta at [facility] for "complex partial sezures." First sz, in school, age 19. Was put on Dilantin and she did well for approx 15 years, until the seizures became active again. Thinks she may have had 2 classic "Grand Mal" seizures in her 20's, but since then, seizure types are those described below.     Currently, she is experiencing more than one event per week. Event type is described below. She has been failing her current treatment regimen as described below. May have tried other meds, but can't remember them now. Did not bring records yet.      Seizure Seminology  Seizure Type 1   Classification: Pass-out  Aura - Occasional auditory мария vu  Pt loses consciousness and falls or loses tone 1-2 in  Post-ictal  Brief  Age of onset 19  Current Seizure Frequency - Several per week      Seizure Type 2  Classification: Complex Partial  Wanders around. Doesn't remember after.   Post-ictal  Brief  Current Seizure Frequency - Several per week     Seizure Triggers  Sleep Deprivation - None  Other medications - None  Psych/stress - None  Photic stimulation - None  Hyperventilation - None  Medical Problems - None  Menses - 3 days before period they get worse  Sensory Stimulation (light, sound, etc) - None  Missed dose of meds - None     AED Treatments  Present regimen   tabs 1 in AM and 1½ in PM      Prior treatments  clobazam (Onfi or Frizium, CLB) 20 mg " QD  eslicarbazine (Aptiom, ESL) - seizure intensity worsened after 2 weeks Rx  oxcarbazepine (Trileptal OXC)  3 month Angry outburst, crying spells - breviact    crying spells - Fycompa    BID  TPM 10ID  valproic acid (Depakote, VPA)   zonisamide (Zonegran, ZNA)  Vimpat  200 mg BID - kaplan      Not tried  acetazolamide (Diamox, AZM)  amantadine  carbamazepine (Tegretol, CBZ)  ethosuximide (Zarontin, ESM)  felbamate (felbatol, FBM)  gabapentin (Neurontin, GPN)  levetiracetam (Keppra, LEV)  methsuximide (Celontin, MSM)   phenobarbital (Pb)  pregabalin (Lyrica, PGB)  primidone (Mysoline, PRM)  retigabine (Potiga, RTG)  rufinamide (Banzel, RUF)  tiagabine (Gabatril, TGB)  viagabatrin, (Sabril, VGB)  vagal nerve stimulator (VNS)     Benzodiazepines  diazepam - rectal (Diastatl)  diazepam - oral (Valium, DZ)  clonazepam (Klonopin, CZP)  clorazepate (Tranxene, CLZ)  Ativan  Brain Stimulation  Vagal Nerve Stimulation-n/a  DBS- n/a     Compliance method  Memory - yes  Mom or Spouse - Yes  Pill Box - no  Dewayne calendar - no  Turn over medication bottle - no  Phone alarm - no     Seizure Evaluation  EEG Routine - Dont have  MRI/MRA - In past- she doesn't know results  [facility]  2017/12/19-12/20- Patient with no events overnight. EEG shows L anterior temporal spikes, most recorded at F7. None on the other side. At times, almost continuous L temporal interictal discharges at times.   2017/12/20- 11:56:23- clinical seizure, starting from L side on EEG. No epileptiform discharges noted. L temporal slowing and spikes noted. Consisted of brief moment of unresponsiveness.   2017/12/21- EEG showing L interical anterior temporal slowing with L temporal spikes. No clinical seizures since yesterday.   2017/12/21-12/22- Event on 12/21 at 18:55 showing patient talking on the phone and suddenly stopping, unable to speak and confused. EEG shows there is a rhythmic buildup in the L temporal chains. Patient is confused and is drowsy  following event. No tonic/clonic activity present.      Admission Date: 7/5/2022  Hospital Length of Stay: 5 days  Discharge Date and Time:  07/10/2022 10:01 AM  HPI:   Ms. Arrieta is a 52 yo female with intractable epilepsy since age 18, s/p partial left anterior temporal lobectomy in 11/2018 admitted to EMU for further seizure localization as part of repeat surgical workup. After epilepsy surgery, patient reports of brief period of seizure freedom, before beginning to have seizures again approximately 3 months afterwards. She reports current seizures are different than her typical semiology prior to surgery. She describes current seizures as loss of awareness, and staring off. After her events, she is quickly back to baseline and is able to report that she had a seizure. No associated tongue biting, bowel/bladder incontinence. She endorses 6 seizures in March, 8 in April, 5 in May, and 4 in June. She is currently maintained on Lamotrigine 200 mg qAM/300 mg qPM and Cenobamate 150 mg daily.  Hospital Course:   52 yo female with intractable epilepsy since age 18, s/p partial left anterior temporal lobe resection in 11/2018 with recurrence of seizures approximately 3 months after admitted to EMU for seizure capture and localization in discussion of repeat surgical workup. Patient is currently having approximately 4-5 events per month of loss of awareness and staring. Currently maintained on Lamotrigine 200 mg qAM/300 mg qPM, Cenobamate 150 mg daily.   7/5>7/6: Home Lamotrigine and Cenobamate held on admission. EEG with regional cortical dysfunction in the left temporal region as well as bilateral independent seizure foci in the left and right temporal lobes. Typical event 7/6 approximately 0957,  reported he noted patient with change in speech/confusion similar to after an event, no clear behavioral change during event.   7/6>7/7: EEG with regional cortical dysfunction in the left temporal region as well as  bilateral independent seizure foci in the left and right temporal lobes. Three right temporal lobe seizures at 09:57, 13:55, and 20:21 on 7/6. Continue close vEEG monitoring and attempt to capture additional seizures to confirm localization for pre-surgical planning.  7/7>7/8: EEG with regional cortical dysfunction left temporal region, bilateral independent seizure foci in the left and right temporal lobes. No discrete seizures. Continue vEEG off all AEDs and attempt to capture additional seizures for pre-surgical planning. Plan for sleep deprivation 7/8>7/9 with provoking medications tomorrow morning.  7/8>7/9: She was sleep deprived and received benadryl and tramadol. EEG continues to show bilateral independent epileptiform activity in the right and left temporal lobes. A right onset seizure was captured at 14:04. The only clinical manifestation was confusion and the button was pressed about 30 minutes after the seizure occurred. Patient and  state they need to leave tomorrow for transportation reasons.   7/9>7/10: No further seizures. Patient was restarted on home medications. We discussed increasing cenobamate but the pharmacy did not have it in stock and patient would like to discuss with Dr. Mark before increasing outpatient (and she just got the 150 mg dose filled).        CT/CTA Scan -   PET Scan -   Neuropsychological evaluation -   DEXA Scan     Potential Epilepsy Risk Factors:   Pregnancy/Labor/Delivery - full term uncomplicated pregnancy labor and vaginal delivery  Febrile seizures - none  Head injury - none  CNS infection - none   Stroke - none  Family Hx of Sz - none     PAST MEDICAL HISTORY:             Active Ambulatory Problems        Diagnosis  Date Noted        No Active Ambulatory Problems      Resolved Ambulatory Problems        Diagnosis  Date Noted        No Resolved Ambulatory Problems      No Additional Past Medical History          PAST SURGICAL HISTORY: No past surgical history on  file.      FAMILY HISTORY: No family history on file.       SOCIAL HISTORY:                                                    Social History                             History    Social History      Marital Status:          Spouse Name:  N/A        Number of Children:  N/A      Years of Education:  N/A    Occupational History        Not on file.      Social History Main Topics      Smoking status:  Never Smoker      Smokeless tobacco:  Not on file      Alcohol Use:  No      Drug Use:  No      Sexual Activity:  Not on file    Other Topics  Concern          Not on file        Social History Narrative      No narrative on file             SUBSTANCE USE:          Social History    Social History Main Topics      Smoking status:  Never Smoker      Smokeless tobacco:  Not on file      Alcohol Use:  No      Drug Use:  No      Sexual Activity:  Not on file               History    Substance Use Topics      Smoking status:  Never Smoker      Smokeless tobacco:  Not on file      Alcohol Use:  No          ALLERGIES: Review of patient's allergies indicates no known allergies.       Review of Systems   Constitutional: fatigue   HENT: Negative for ear pain, hearing loss, nosebleeds and tinnitus.   Eyes: Negative for blurred vision, double vision, photophobia and pain.   Respiratory: Negative for cough, hemoptysis and shortness of breath.   Cardiovascular: Negative for chest pain, palpitations, orthopnea and leg swelling.   Gastrointestinal: Negative for heartburn, nausea, vomiting, abdominal pain, diarrhea, constipation and blood in stool.   Genitourinary: Negative for dysuria and hematuria.   Musculoskeletal: Negative for myalgias, joint pain and falls.   Skin: Negative for itching and rash.   Neurological: Positive for seizures. Negative for dizziness, tremors, speech change, focal weakness, loss of consciousness, weakness and headaches.   Endo/Heme/Allergies: Negative for environmental allergies. Does not bruise/bleed  easily.   Psychiatric/Behavioral: Positive for memory loss. Negative for depression, hallucinations and substance abuse.  The patient is not nervous/anxious.      Neurologic Exam   Higher Cortical Function:   Patient is a well developed, pleasant, well groomed individual appearing their stated age  Oriented - intact to person, place and time and followed two step instruction correctly.   Fund of knowledge was appropriate.   Language - Speech was fluent without evidence for an aphasia.     IMPRESSION  1.         Emotional lability - h/o lobectomy likely the cause   Impacts works   Works as    2.         Focal Epilepsy   Likely onset is age = 10  S/p L temporal lobectomy 11/2018, sz free for 3 months only   Presurgery - semiology - staring spells   Post surgery Aura - dejavu x 10 sec; 10 min later - uncomfortable feeling x 30 sec   [facility] DIAGNOSIS:  BRAIN, LEFT ANTERIOR TEMPORAL LOBE, EXCISION ([id number]; [facility], [place], [place]; COLLECTED 11/19/2018):-Cortex with moderate cortical and subpial gliosis, and scattered thrombosed vessels and leptomeningeal mixed  inflammation.  BRAIN, LEFT HIPPOCAMPUS, EXCISION ([id number]; [facility], [place], [place];  COLLECTED 11/19/2018):-Fragments of hippocampus with focal neuronal loss and gliosis.  s/p placement of sEEG leads on 11/7/22 with removal on 11/16/22. After interdisciplinary conference review, she underwent placement of right RNS (amygdala depth electrode + middle temporal gyrus strip electrode) 12/19/22.     3.         Obesity  4,         F/h MS, ET     DISPOSITION:   1. Continue LTG 200mg 1 am and 1½ in pm  2,Xcopri 200 mg night   Options - Lyrica, retry Aptoim  3, cont zoloft 50 mg QHS   4, Stress management   5, Review labs   Consider Thamine, vit B6 suppl        RNS   Electrodes: amygdala depth electrode (RHip1,2,3,4) + middle temporal gyrus strip electrode (RTG1,2,3,4)             Labs   4/19 Lamictal 3.6    4/19 Xcopri 16.3

## 2024-10-18 DIAGNOSIS — G40.119 TEMPORAL LOBE EPILEPSY, INTRACTABLE: ICD-10-CM

## 2024-10-21 DIAGNOSIS — G40.119 TEMPORAL LOBE EPILEPSY, INTRACTABLE: ICD-10-CM

## 2024-10-22 ENCOUNTER — PATIENT MESSAGE (OUTPATIENT)
Dept: NEUROLOGY | Facility: CLINIC | Age: 54
End: 2024-10-22
Payer: COMMERCIAL

## 2024-10-22 DIAGNOSIS — G40.119 TEMPORAL LOBE EPILEPSY, INTRACTABLE: ICD-10-CM

## 2024-10-22 RX ORDER — LAMOTRIGINE 200 MG/1
TABLET ORAL
Qty: 225 TABLET | Refills: 3 | Status: SHIPPED | OUTPATIENT
Start: 2024-10-22

## 2024-10-22 RX ORDER — LAMOTRIGINE 200 MG/1
500 TABLET ORAL DAILY
Qty: 225 TABLET | Refills: 12 | Status: SHIPPED | OUTPATIENT
Start: 2024-10-22

## 2024-10-22 RX ORDER — LAMOTRIGINE 200 MG/1
500 TABLET ORAL DAILY
Qty: 225 TABLET | Refills: 3 | Status: SHIPPED | OUTPATIENT
Start: 2024-10-22 | End: 2024-10-22 | Stop reason: SDUPTHER

## 2024-10-31 ENCOUNTER — PATIENT MESSAGE (OUTPATIENT)
Dept: NEUROLOGY | Facility: CLINIC | Age: 54
End: 2024-10-31
Payer: COMMERCIAL

## 2024-11-18 DIAGNOSIS — G40.119 TEMPORAL LOBE EPILEPSY, INTRACTABLE: ICD-10-CM

## 2024-11-19 RX ORDER — CENOBAMATE 200 MG/1
1 TABLET, FILM COATED ORAL
Qty: 30 TABLET | Refills: 5 | Status: SHIPPED | OUTPATIENT
Start: 2024-11-19

## 2025-01-08 ENCOUNTER — PATIENT MESSAGE (OUTPATIENT)
Dept: NEUROLOGY | Facility: CLINIC | Age: 55
End: 2025-01-08
Payer: COMMERCIAL

## 2025-01-28 DIAGNOSIS — G40.119 TEMPORAL LOBE EPILEPSY, INTRACTABLE: ICD-10-CM

## 2025-01-28 RX ORDER — LAMOTRIGINE 200 MG/1
TABLET ORAL
Qty: 225 TABLET | Refills: 3 | Status: SHIPPED | OUTPATIENT
Start: 2025-01-28

## 2025-03-17 ENCOUNTER — TELEPHONE (OUTPATIENT)
Dept: NEUROLOGY | Facility: CLINIC | Age: 55
End: 2025-03-17

## 2025-03-17 ENCOUNTER — OFFICE VISIT (OUTPATIENT)
Dept: NEUROLOGY | Facility: CLINIC | Age: 55
End: 2025-03-17
Payer: COMMERCIAL

## 2025-03-17 VITALS
HEIGHT: 66 IN | DIASTOLIC BLOOD PRESSURE: 76 MMHG | SYSTOLIC BLOOD PRESSURE: 140 MMHG | WEIGHT: 207.56 LBS | BODY MASS INDEX: 33.36 KG/M2 | HEART RATE: 52 BPM

## 2025-03-17 DIAGNOSIS — G40.219 COMPLEX PARTIAL EPILEPSY WITH GENERALIZATION AND WITH INTRACTABLE EPILEPSY: ICD-10-CM

## 2025-03-17 DIAGNOSIS — Z96.82 S/P INSERTION OF BRAIN-RESPONSIVE NEUROSTIMULATION DEVICE: Primary | ICD-10-CM

## 2025-03-17 PROCEDURE — 99215 OFFICE O/P EST HI 40 MIN: CPT | Mod: 25,S$GLB,, | Performed by: PSYCHIATRY & NEUROLOGY

## 2025-03-17 PROCEDURE — 99999 PR PBB SHADOW E&M-EST. PATIENT-LVL III: CPT | Mod: PBBFAC,,, | Performed by: PSYCHIATRY & NEUROLOGY

## 2025-03-17 PROCEDURE — 1159F MED LIST DOCD IN RCRD: CPT | Mod: CPTII,S$GLB,, | Performed by: PSYCHIATRY & NEUROLOGY

## 2025-03-17 PROCEDURE — 3078F DIAST BP <80 MM HG: CPT | Mod: CPTII,S$GLB,, | Performed by: PSYCHIATRY & NEUROLOGY

## 2025-03-17 PROCEDURE — 1160F RVW MEDS BY RX/DR IN RCRD: CPT | Mod: CPTII,S$GLB,, | Performed by: PSYCHIATRY & NEUROLOGY

## 2025-03-17 PROCEDURE — 3077F SYST BP >= 140 MM HG: CPT | Mod: CPTII,S$GLB,, | Performed by: PSYCHIATRY & NEUROLOGY

## 2025-03-17 PROCEDURE — 3008F BODY MASS INDEX DOCD: CPT | Mod: CPTII,S$GLB,, | Performed by: PSYCHIATRY & NEUROLOGY

## 2025-03-17 PROCEDURE — 95983 ALYS BRN NPGT PRGRMG 15 MIN: CPT | Mod: S$GLB,,, | Performed by: PSYCHIATRY & NEUROLOGY

## 2025-03-17 PROCEDURE — 95836 ECOG IMPLTD BRN NPGT <30 D: CPT | Mod: S$GLB,,, | Performed by: PSYCHIATRY & NEUROLOGY

## 2025-03-17 NOTE — PROGRESS NOTES
"Follow up:   Patient ID: Liza Arrieta is a 54 y.o. female.      Prior note:   CHIEF COMPLAINT:  Patient presents today for follow up of seizures with recent episode on Friday.    SEIZURE HISTORY:  She experienced a seizure on Friday after Mirlande class, which she describes as "severe." She has had two seizures in the past month, showing improved frequency compared to numerous seizures during August and September. A seizure occurred on February 13th at [employer] following emotional distress about clothing fit. Her seizures often occur after physical stress, particularly following Mirlande class. She denies injuries during seizures. She experiences memory loss during episodes, noting gaps in memory between locations and difficulty recalling details leading up to events, though she can recognize having had a seizure afterward.    EXERCISE AND HYDRATION:  She maintains hydration during workouts with 32 ounces of Propel with electrolytes.    Prior note:   Patient presents today for follow up.    SEIZURES AND NEUROPACE DEVICE:  She reports experiencing a seizure on Sunday while walking home from a store, describing confusion and disorientation during and after the event. She expresses concern about the increased frequency of seizures in recent months, wondering if they could be related to family stress or her body becoming accustomed to her current Lamictal dosage. Her Neuropace device is currently effective in managing seizures, allowing her to maintain full cognitive function even during brief periods of unconsciousness lasting approximately 30 seconds. She desires to increase the device settings if possible. She notes occasional episodes of staring, which she actively tries to stop, unsure if these represent petit mal seizures. She acknowledges that her brain function has changed since device implantation, potentially affecting her mood and energy levels.    FATIGUE:  She reports increased fatigue over the last 3-4 " months, particularly after eating. To combat this, she has increased her coffee consumption to up to 6 cups daily. She believes the fatigue may be related to the increased dosage of Ex-COPRI, which persists despite changes in medication timing. She experiences fatigue around 7:00 AM after taking Ex-COPRI at night.    MEDICATIONS:  She is currently taking Lamictal, Zoloft 50 mg, and Ex-COPRI. She takes her medications generally twice daily, with Ex-COPRI specifically at night. She reports no issues with her current Zoloft dosage. She expresses a desire to avoid increasing medication dosages at this time.    GASTROINTESTINAL ISSUES:  She has a history of cholecystectomy and experiences diarrhea after consuming certain foods, particularly those that are fried or cooked with a lot of oil. Deep-fried foods, such as deep-fried pickles, trigger this symptom. She acknowledges that this digestive issue seems to be related to her gallbladder removal and affects her food choices when dining out.    Prior note:   Overall doing well   Had a sz right after physical work out     Prior note  Reports excess fatigue   Feels more sad and irritable - emotional lability     Prior note:   Overall doing better   Some fatigue during the day     Prior note:   Feb 14 - few hrs after flight to [place] had a sz   Feb 17 - 30 min after flight had a sz   Semiology - post ictal fatigue (recovery period - about 2 hrs)     Self trial of Xcpri 200 mg in Feb x 3 days     Prior note:   Reports no new symptoms  Only sensed 1 sz   No magnet   Compliance - good     Prior note:   Staring spells / loose time - upon awakening   July 26   July 27   Aug 23 8:30 am   Aug 25   Sept 5     Last sz July 8 - MARAH   No GTC   Compliance - good   Friday late afternoon - [employer]e classes - she reports fatigue afterwards - possible sz      Prior note:    Last sz - 3/26   Semiology - tired -> strange feeling     3/25 - trigger was stress and exercise, semiology - strange  feeling     Gained 20 lbs in last 3 months     Prior note:   Reports 1 sz this afternoon   Semiology - felt strange feeling   Sz Hz 1/week     Prior note:   Trigger - jogging   Semiology - loosing time   July - 8 sz   Aug - 6 sz     Prior note:   Still has recurrent seizures   Semiology - loosing time   She is very frustrated   May - had 5 seizures   Trigger - emotional distress     Prior note:   Episodes of loosing time   No aura   Triggered by stress and physical work out (jogging)     sz Hz - can go 3 wks sz free, up to 1 per day for 3 days      Semiology prior to surgery: fear -> strange feeling -> no MARAH      Prior note:   3-4 sz/week   Triggered by stress and physical work out (jogging)     Prior note:   Sz 2/month -> 4-8 per week (since last 2 months)   Aura - dejavu x 10 sec   10 min later - uncomfortable feeling x 30 sec      Prior note:   Last dose of briviact - last night   One hour later an hour episode of fear and whole body shaking   vimpat 100 mg BID x 7 days   No further sz      Prior note:   3 month Angry outburst, crying spells - breviact   Last sz - last week   semiology - weird feeling of fear'   Previous sz - loss of awareness      Pt of Dr. Elam      2020/08/25  The plan last visit was to hold perampanel for one week and the reduce dosing at one half the previous dose.  The emotionality has improved but still is present.  She may of had only one sz since the last visit.     2020/04/07  Since starting perampanel she has been emotion with crying spells.  Emotional outburst did not occur in the 1st 2-3 weeks after starting pyramidal.  She is on 2 mg per day.  The ER becoming more intense and is somewhat disruptive to the family.  Seizures are much better.  She will often have a very brief sensation and actually question whether she had a seizure or something else.  Clinically that is a good response but the side effects are not acceptable.  She has had no other significant interim medical  "problems.  She continues to take lamotrigine and the doses not been changed.     2019/11/22  The patient continues to do well.  However she does have brief sensation of fear but nothing else.  She does not have      2019/08/07  The patient is doing well but has an occasional aura which now a feeling of fear which lasts 30 sec.  Last was on Aug 1.  She is averaging 3 per months.  Surgery was Nov 4, 2018.  The first aura occurred on Dec 19, 2018.  Number by month were Jan - 4, Feb - 2, Mar - 2, Apr - 2, May - 4, Jun - 5, Jul - 3.       Results for GLORIA GURROLA (MRN [id number]) as of 8/7/2019 15:38    Ref. Range 12/14/2018 12:10 1/14/2019 14:58 3/26/2019 13:30   Lamotrigine Lvl Latest Ref Range: 2.0 - 15.0 ug/mL 13.0 15.7 (H) 19.3 (H)         2019/03/26  In February and March, she has had four episodes that she is concerned represent seizure. These are different than any prior episodes or sensation that she has experienced. She describes a feeling of intense fear, during which she tells herself "you're just having a seizure".  reports lasting about 15 seconds, during the events he reports patient seems to be staring off in a daydream. She will respond to questions, but slowly. Typically occurring in the evening, prior to taking Lamictal. She recalls these episodes afterwards. Currently taking Lamictal 500 mg qHS, reports she noticed these episodes after switching from BID to daily lamictal dosing. Lamictal level at prior clinic visit 15.7. Denies any episodes of visual motor apraxia or any of her prior typical seizures.      2019/01/14  The patient underwent a L temporal lobectomy 2 months ago.  She has experience none of her typical seizures.  She did experience Judi Vu twice since surgery.  In the past she had reported Judi Vu as an aura.  She had not experienced any episodes of visual motor apraxia (unable to move eyes in any direction). She takes lamictal 200 mg in the AM and 300 mg in the PM.  She will be " "converted to once a day dosing of 500 mg which she prefers to do in the evening.       She feels so much better now.  She reports feeling as if she is 17 yo.     2018/05/29  Patient had another day of almost continuous seizures which was on May 09,2018.  She has failed several AEDs mainly due to side effects.  She experienced pharmacodynamic interaction and toxicity with Lamictal - Lacosamide combo.  She is tolerating the lamictal well         2018/04/09  The patient had 9 seizures recorded in last EMU visit all coming from L anterior temporal area.   MRI was normal but PET showed L mesial hypometabolism.  She is currently have almost daily seizures.  She has failed four AEDs is on Lamictal monotherapy.  She reports her verbal memory is terrible.  Review of labs show she was B12 deficient 4 yrs ago and she does not take any supplements.     Results for GLORIA GURROLA (MRN [id number]) as of 4/9/2018 16:23    Ref. Range 3/1/2016 14:45 12/19/2017 12:56 2/8/2018 18:12   Lamotrigine Lvl Latest Ref Range: 2.0 - 15.0 ug/mL 12.0 10.5 7.8      2018/01/29  EMU evaluation was completed in Dec and L temporal interictal spikes were recorded and one electrographic seizure was recorded arising from the L anterior temporal area.  Besides frequent seizures her major complaint is progressive memory loss.       HISTORY OF PRESENT ILLNESS (HPI)  Date: The   New Patient  Pt has been seeing Dr Huerta at hospitals for "complex partial sezures." First sz, in school, age 19. Was put on Dilantin and she did well for approx 15 years, until the seizures became active again. Thinks she may have had 2 classic "Grand Mal" seizures in her 20's, but since then, seizure types are those described below.     Currently, she is experiencing more than one event per week. Event type is described below. She has been failing her current treatment regimen as described below. May have tried other meds, but can't remember them now. Did not bring records yet.    "   Seizure Seminology  Seizure Type 1   Classification: Pass-out  Aura - Occasional auditory мария vu  Pt loses consciousness and falls or loses tone 1-2 in  Post-ictal  Brief  Age of onset 19  Current Seizure Frequency - Several per week      Seizure Type 2  Classification: Complex Partial  Wanders around. Doesn't remember after.   Post-ictal  Brief  Current Seizure Frequency - Several per week     Seizure Triggers  Sleep Deprivation - None  Other medications - None  Psych/stress - None  Photic stimulation - None  Hyperventilation - None  Medical Problems - None  Menses - 3 days before period they get worse  Sensory Stimulation (light, sound, etc) - None  Missed dose of meds - None     AED Treatments  Present regimen   tabs 1 in AM and 1½ in PM      Prior treatments  clobazam (Onfi or Frizium, CLB) 20 mg QD  eslicarbazine (Aptiom, ESL) - seizure intensity worsened after 2 weeks Rx  oxcarbazepine (Trileptal OXC)  3 month Angry outburst, crying spells - breviact    crying spells - Fycompa    BID  TPM 10ID  valproic acid (Depakote, VPA)   zonisamide (Zonegran, ZNA)  Vimpat  200 mg BID - kaplan      Not tried  acetazolamide (Diamox, AZM)  amantadine  carbamazepine (Tegretol, CBZ)  ethosuximide (Zarontin, ESM)  felbamate (felbatol, FBM)  gabapentin (Neurontin, GPN)  levetiracetam (Keppra, LEV)  methsuximide (Celontin, MSM)   phenobarbital (Pb)  pregabalin (Lyrica, PGB)  primidone (Mysoline, PRM)  retigabine (Potiga, RTG)  rufinamide (Banzel, RUF)  tiagabine (Gabatril, TGB)  viagabatrin, (Sabril, VGB)  vagal nerve stimulator (VNS)     Benzodiazepines  diazepam - rectal (Diastatl)  diazepam - oral (Valium, DZ)  clonazepam (Klonopin, CZP)  clorazepate (Tranxene, CLZ)  Ativan  Brain Stimulation  Vagal Nerve Stimulation-n/a  DBS- n/a     Compliance method  Memory - yes  Mom or Spouse - Yes  Pill Box - no  Dewayne calendar - no  Turn over medication bottle - no  Phone alarm - no     Seizure Evaluation  EEG Routine -  Dont have  MRI/MRA - In past- she doesn't know results  EMU eval  2017/12/19-12/20- Patient with no events overnight. EEG shows L anterior temporal spikes, most recorded at F7. None on the other side. At times, almost continuous L temporal interictal discharges at times.   2017/12/20- 11:56:23- clinical seizure, starting from L side on EEG. No epileptiform discharges noted. L temporal slowing and spikes noted. Consisted of brief moment of unresponsiveness.   2017/12/21- EEG showing L interical anterior temporal slowing with L temporal spikes. No clinical seizures since yesterday.   2017/12/21-12/22- Event on 12/21 at 18:55 showing patient talking on the phone and suddenly stopping, unable to speak and confused. EEG shows there is a rhythmic buildup in the L temporal chains. Patient is confused and is drowsy following event. No tonic/clonic activity present.      Admission Date: 7/5/2022  Hospital Length of Stay: 5 days  Discharge Date and Time:  07/10/2022 10:01 AM  HPI:   Ms. Arrieta is a 52 yo female with intractable epilepsy since age 18, s/p partial left anterior temporal lobectomy in 11/2018 admitted to EMU for further seizure localization as part of repeat surgical workup. After epilepsy surgery, patient reports of brief period of seizure freedom, before beginning to have seizures again approximately 3 months afterwards. She reports current seizures are different than her typical semiology prior to surgery. She describes current seizures as loss of awareness, and staring off. After her events, she is quickly back to baseline and is able to report that she had a seizure. No associated tongue biting, bowel/bladder incontinence. She endorses 6 seizures in March, 8 in April, 5 in May, and 4 in June. She is currently maintained on Lamotrigine 200 mg qAM/300 mg qPM and Cenobamate 150 mg daily.  Hospital Course:   52 yo female with intractable epilepsy since age 18, s/p partial left anterior temporal lobe resection  in 11/2018 with recurrence of seizures approximately 3 months after admitted to EMU for seizure capture and localization in discussion of repeat surgical workup. Patient is currently having approximately 4-5 events per month of loss of awareness and staring. Currently maintained on Lamotrigine 200 mg qAM/300 mg qPM, Cenobamate 150 mg daily.   7/5>7/6: Home Lamotrigine and Cenobamate held on admission. EEG with regional cortical dysfunction in the left temporal region as well as bilateral independent seizure foci in the left and right temporal lobes. Typical event 7/6 approximately 0957,  reported he noted patient with change in speech/confusion similar to after an event, no clear behavioral change during event.   7/6>7/7: EEG with regional cortical dysfunction in the left temporal region as well as bilateral independent seizure foci in the left and right temporal lobes. Three right temporal lobe seizures at 09:57, 13:55, and 20:21 on 7/6. Continue close vEEG monitoring and attempt to capture additional seizures to confirm localization for pre-surgical planning.  7/7>7/8: EEG with regional cortical dysfunction left temporal region, bilateral independent seizure foci in the left and right temporal lobes. No discrete seizures. Continue vEEG off all AEDs and attempt to capture additional seizures for pre-surgical planning. Plan for sleep deprivation 7/8>7/9 with provoking medications tomorrow morning.  7/8>7/9: She was sleep deprived and received benadryl and tramadol. EEG continues to show bilateral independent epileptiform activity in the right and left temporal lobes. A right onset seizure was captured at 14:04. The only clinical manifestation was confusion and the button was pressed about 30 minutes after the seizure occurred. Patient and  state they need to leave tomorrow for transportation reasons.   7/9>7/10: No further seizures. Patient was restarted on home medications. We discussed increasing  cenobamate but the pharmacy did not have it in stock and patient would like to discuss with Dr. Mark before increasing outpatient (and she just got the 150 mg dose filled).        CT/CTA Scan -   PET Scan -   Neuropsychological evaluation -   DEXA Scan     Potential Epilepsy Risk Factors:   Pregnancy/Labor/Delivery - full term uncomplicated pregnancy labor and vaginal delivery  Febrile seizures - none  Head injury - none  CNS infection - none   Stroke - none  Family Hx of Sz - none     PAST MEDICAL HISTORY:             Active Ambulatory Problems        Diagnosis  Date Noted        No Active Ambulatory Problems      Resolved Ambulatory Problems        Diagnosis  Date Noted        No Resolved Ambulatory Problems      No Additional Past Medical History          PAST SURGICAL HISTORY: No past surgical history on file.      FAMILY HISTORY: No family history on file.       SOCIAL HISTORY:                                                    Social History                             History    Social History      Marital Status:          Spouse Name:  N/A        Number of Children:  N/A      Years of Education:  N/A    Occupational History        Not on file.      Social History Main Topics      Smoking status:  Never Smoker      Smokeless tobacco:  Not on file      Alcohol Use:  No      Drug Use:  No      Sexual Activity:  Not on file    Other Topics  Concern          Not on file        Social History Narrative      No narrative on file             SUBSTANCE USE:          Social History    Social History Main Topics      Smoking status:  Never Smoker      Smokeless tobacco:  Not on file      Alcohol Use:  No      Drug Use:  No      Sexual Activity:  Not on file               History    Substance Use Topics      Smoking status:  Never Smoker      Smokeless tobacco:  Not on file      Alcohol Use:  No          ALLERGIES: Review of patient's allergies indicates no known allergies.       Review of Systems    Constitutional: fatigue   HENT: Negative for ear pain, hearing loss, nosebleeds and tinnitus.   Eyes: Negative for blurred vision, double vision, photophobia and pain.   Respiratory: Negative for cough, hemoptysis and shortness of breath.   Cardiovascular: Negative for chest pain, palpitations, orthopnea and leg swelling.   Gastrointestinal: Negative for heartburn, nausea, vomiting, abdominal pain, diarrhea, constipation and blood in stool.   Genitourinary: Negative for dysuria and hematuria.   Musculoskeletal: Negative for myalgias, joint pain and falls.   Skin: Negative for itching and rash.   Neurological: Positive for seizures. Negative for dizziness, tremors, speech change, focal weakness, loss of consciousness, weakness and headaches.   Endo/Heme/Allergies: Negative for environmental allergies. Does not bruise/bleed easily.   Psychiatric/Behavioral: Positive for memory loss. Negative for depression, hallucinations and substance abuse.  The patient is not nervous/anxious.      Neurologic Exam   Higher Cortical Function:   Patient is a well developed, pleasant, well groomed individual appearing their stated age  Oriented - intact to person, place and time and followed two step instruction correctly.   Fund of knowledge was appropriate.   Language - Speech was fluent without evidence for an aphasia.     IMPRESSION  1.         Emotional lability - h/o lobectomy likely the cause   Impacts works   Works as    2.         Focal Epilepsy   Likely onset is age = 10  S/p L temporal lobectomy 11/2018, sz free for 3 months only   Presurgery - semiology - staring spells   Post surgery Aura - dejavu x 10 sec; 10 min later - uncomfortable feeling x 30 sec   [facility] DIAGNOSIS:  BRAIN, LEFT ANTERIOR TEMPORAL LOBE, EXCISION ([id number]; [facility], [place], [place]; COLLECTED 11/19/2018):-Cortex with moderate cortical and subpial gliosis, and scattered thrombosed vessels and leptomeningeal  mixed  inflammation.  BRAIN, LEFT HIPPOCAMPUS, EXCISION ([id number]; [facility], [place], [place];  COLLECTED 11/19/2018):-Fragments of hippocampus with focal neuronal loss and gliosis.  s/p placement of sEEG leads on 11/7/22 with removal on 11/16/22. After interdisciplinary conference review, she underwent placement of right RNS (amygdala depth electrode + middle temporal gyrus strip electrode) 12/19/22.     3.         Obesity  4,         F/h MS, ET     DISPOSITION:   1. Continue LTG 200mg 1 am and 1½ in pm  2,Xcopri 200 mg night   Options - Lyrica, retry Aptoim  3, cont zoloft 50 mg QHS   4, Stress management   5, Consider Thamine, vit B6 suppl        RNS   Electrodes: amygdala depth electrode (RHip1,2,3,4) + middle temporal gyrus strip electrode (RTG1,2,3,4)             Labs   4/19 Lamictal 3.6   4/19 Xcopri 16.3     High impedence RTG4 (4671), RTG3 (4639)

## 2025-03-18 ENCOUNTER — PATIENT MESSAGE (OUTPATIENT)
Dept: NEUROLOGY | Facility: CLINIC | Age: 55
End: 2025-03-18
Payer: COMMERCIAL

## 2025-03-25 NOTE — ASSESSMENT & PLAN NOTE
- s/p craniotomy x2 for strip and grid placement  - tentatively booked for OR on Monday for removal of grids and potential focectomy  - keep NPO and hold SQH Sunday night  - Continuous vEEG monitoring with subdural grid electrodes in place  - Home lamotrigine and onfi restarted per Epilepsy  - For any seizures: please push event button on EEG and call epileptologist on call prior to administration of abortive medication  - Continue midazolam 2mg IV q5min for motor seizures lasting greater than 5 minutes  - Continue Ancef 2g IV q8h  - Sleep deprive patient until 0400 11/14  - Benadryl 50mg po x1 this evening and in am 11/14  - Cortical mapping today  - Ok for patient to eat.   Quality 47: Advance Care Plan: Advance Care Planning discussed and documented in the medical record; patient did not wish or was not able to name a surrogate decision maker or provide an advance care plan. Quality 226: Preventive Care And Screening: Tobacco Use: Screening And Cessation Intervention: Patient screened for tobacco use and is an ex/non-smoker Quality 130: Documentation Of Current Medications In The Medical Record: Current Medications Documented Detail Level: Detailed

## 2025-03-28 ENCOUNTER — PATIENT MESSAGE (OUTPATIENT)
Dept: NEUROLOGY | Facility: CLINIC | Age: 55
End: 2025-03-28
Payer: COMMERCIAL

## 2025-04-03 ENCOUNTER — OFFICE VISIT (OUTPATIENT)
Dept: GASTROENTEROLOGY | Facility: CLINIC | Age: 55
End: 2025-04-03
Payer: COMMERCIAL

## 2025-04-03 ENCOUNTER — LAB VISIT (OUTPATIENT)
Dept: LAB | Facility: HOSPITAL | Age: 55
End: 2025-04-03
Payer: COMMERCIAL

## 2025-04-03 VITALS
DIASTOLIC BLOOD PRESSURE: 87 MMHG | WEIGHT: 204.38 LBS | HEART RATE: 54 BPM | SYSTOLIC BLOOD PRESSURE: 139 MMHG | BODY MASS INDEX: 32.85 KG/M2 | HEIGHT: 66 IN

## 2025-04-03 DIAGNOSIS — E66.811 OBESITY (BMI 30.0-34.9): ICD-10-CM

## 2025-04-03 DIAGNOSIS — R19.7 DIARRHEA, UNSPECIFIED TYPE: ICD-10-CM

## 2025-04-03 DIAGNOSIS — R15.2 FECAL URGENCY: ICD-10-CM

## 2025-04-03 DIAGNOSIS — R19.4 BOWEL HABIT CHANGES: Primary | ICD-10-CM

## 2025-04-03 DIAGNOSIS — Z12.11 COLON CANCER SCREENING: ICD-10-CM

## 2025-04-03 DIAGNOSIS — R14.3 FLATULENCE: ICD-10-CM

## 2025-04-03 LAB
ABSOLUTE EOSINOPHIL (OHS): 0.1 K/UL
ABSOLUTE MONOCYTE (OHS): 0.34 K/UL (ref 0.3–1)
ABSOLUTE NEUTROPHIL COUNT (OHS): 3.81 K/UL (ref 1.8–7.7)
ALBUMIN SERPL BCP-MCNC: 3.9 G/DL (ref 3.5–5.2)
ALP SERPL-CCNC: 127 UNIT/L (ref 40–150)
ALT SERPL W/O P-5'-P-CCNC: 52 UNIT/L (ref 10–44)
ANION GAP (OHS): 9 MMOL/L (ref 8–16)
AST SERPL-CCNC: 45 UNIT/L (ref 11–45)
BASOPHILS # BLD AUTO: 0.01 K/UL
BASOPHILS NFR BLD AUTO: 0.2 %
BILIRUB SERPL-MCNC: 0.3 MG/DL (ref 0.1–1)
BUN SERPL-MCNC: 16 MG/DL (ref 6–20)
CALCIUM SERPL-MCNC: 10 MG/DL (ref 8.7–10.5)
CHLORIDE SERPL-SCNC: 103 MMOL/L (ref 95–110)
CO2 SERPL-SCNC: 28 MMOL/L (ref 23–29)
CREAT SERPL-MCNC: 0.8 MG/DL (ref 0.5–1.4)
ERYTHROCYTE [DISTWIDTH] IN BLOOD BY AUTOMATED COUNT: 11.8 % (ref 11.5–14.5)
GFR SERPLBLD CREATININE-BSD FMLA CKD-EPI: >60 ML/MIN/1.73/M2
GLUCOSE SERPL-MCNC: 91 MG/DL (ref 70–110)
HCT VFR BLD AUTO: 46.6 % (ref 37–48.5)
HGB BLD-MCNC: 15.3 GM/DL (ref 12–16)
IGA SERPL-MCNC: 452 MG/DL (ref 40–350)
IMM GRANULOCYTES # BLD AUTO: 0.03 K/UL (ref 0–0.04)
IMM GRANULOCYTES NFR BLD AUTO: 0.5 % (ref 0–0.5)
LYMPHOCYTES # BLD AUTO: 2.05 K/UL (ref 1–4.8)
MCH RBC QN AUTO: 30.6 PG (ref 27–31)
MCHC RBC AUTO-ENTMCNC: 32.8 G/DL (ref 32–36)
MCV RBC AUTO: 93 FL (ref 82–98)
NUCLEATED RBC (/100WBC) (OHS): 0 /100 WBC
PLATELET # BLD AUTO: 230 K/UL (ref 150–450)
PMV BLD AUTO: 10.3 FL (ref 9.2–12.9)
POTASSIUM SERPL-SCNC: 4.8 MMOL/L (ref 3.5–5.1)
PROT SERPL-MCNC: 7.8 GM/DL (ref 6–8.4)
RBC # BLD AUTO: 5 M/UL (ref 4–5.4)
RELATIVE EOSINOPHIL (OHS): 1.6 %
RELATIVE LYMPHOCYTE (OHS): 32.3 % (ref 18–48)
RELATIVE MONOCYTE (OHS): 5.4 % (ref 4–15)
RELATIVE NEUTROPHIL (OHS): 60 % (ref 38–73)
SODIUM SERPL-SCNC: 140 MMOL/L (ref 136–145)
T4 FREE SERPL-MCNC: 0.85 NG/DL (ref 0.71–1.51)
TSH SERPL-ACNC: 2.71 UIU/ML (ref 0.4–4)
WBC # BLD AUTO: 6.34 K/UL (ref 3.9–12.7)

## 2025-04-03 PROCEDURE — 84439 ASSAY OF FREE THYROXINE: CPT

## 2025-04-03 PROCEDURE — 1159F MED LIST DOCD IN RCRD: CPT | Mod: CPTII,S$GLB,,

## 2025-04-03 PROCEDURE — 99999 PR PBB SHADOW E&M-EST. PATIENT-LVL III: CPT | Mod: PBBFAC,,,

## 2025-04-03 PROCEDURE — 3079F DIAST BP 80-89 MM HG: CPT | Mod: CPTII,S$GLB,,

## 2025-04-03 PROCEDURE — 99204 OFFICE O/P NEW MOD 45 MIN: CPT | Mod: S$GLB,,,

## 2025-04-03 PROCEDURE — 3044F HG A1C LEVEL LT 7.0%: CPT | Mod: CPTII,S$GLB,,

## 2025-04-03 PROCEDURE — 83036 HEMOGLOBIN GLYCOSYLATED A1C: CPT

## 2025-04-03 PROCEDURE — 3075F SYST BP GE 130 - 139MM HG: CPT | Mod: CPTII,S$GLB,,

## 2025-04-03 PROCEDURE — 36415 COLL VENOUS BLD VENIPUNCTURE: CPT

## 2025-04-03 PROCEDURE — 80053 COMPREHEN METABOLIC PANEL: CPT

## 2025-04-03 PROCEDURE — 86364 TISS TRNSGLTMNASE EA IG CLAS: CPT

## 2025-04-03 PROCEDURE — 85025 COMPLETE CBC W/AUTO DIFF WBC: CPT

## 2025-04-03 PROCEDURE — 82784 ASSAY IGA/IGD/IGG/IGM EACH: CPT

## 2025-04-03 PROCEDURE — 84443 ASSAY THYROID STIM HORMONE: CPT

## 2025-04-03 PROCEDURE — 3008F BODY MASS INDEX DOCD: CPT | Mod: CPTII,S$GLB,,

## 2025-04-03 RX ORDER — CHOLESTYRAMINE 4 G/9G
4 POWDER, FOR SUSPENSION ORAL DAILY
Qty: 30 PACKET | Refills: 3 | Status: SHIPPED | OUTPATIENT
Start: 2025-04-03 | End: 2025-08-01

## 2025-04-03 RX ORDER — DICYCLOMINE HYDROCHLORIDE 10 MG/1
10 CAPSULE ORAL
Qty: 120 CAPSULE | Refills: 0 | Status: SHIPPED | OUTPATIENT
Start: 2025-04-03 | End: 2025-05-03

## 2025-04-03 NOTE — PROGRESS NOTES
"Gastroenterology Clinic Consultation Note    Reason for Visit:  The primary encounter diagnosis was Bowel habit changes. Diagnoses of Colon cancer screening, Flatulence, Fecal urgency, and Obesity (BMI 30.0-34.9) were also pertinent to this visit.    PCP:   Rc Rodriguez.       Initial HPI   This is a 54 y.o. female with past medical history of Convulsions, Epilepsy, Seizures, and Type 2 diabetes mellitus with foot ulcer (CODE) (2/13/2023) presenting for recent changes in bowel patterns.   Most recently started to experience fluctuations in bowel patterns. Feels that most of her GI issues are related to a food sensitivity. She keeps a food diary in her phone when these issues arise and finds that a lot of dairy products cause her GI upset. However, she does also experience issues with other food products including roldan and dark roast coffee. Often experiences fecal urgency after consumption of these foods.   Describes her normal bowel patterns are typically every 2-3 days. Describes stool as formed but then turns looser throughout the day mostly dependant on what she eats.   She does also find that she is more gassy than usual. Denies significant abdominal pains with this. No n/v. Denies blood in her stool. Denies unintentional weight loss.    Takes iron for anemia. Denies issues of constipation.   Started on weight watchers, trying to be mindful of what she eats.     Mom- diverticulitis  Daughter- IBS     Sadly, she reports short term memory loss with her seizure history. Says she does not recall many things. Uncertain to removal of gallbladder. Does not remember her kids growing up. Recalls certain major events in the past.   Operates an epilepsy awareness program called "[facility]." Upcoming crawfish boil and epilepsy run.      Denies any dysphagia, odynophagia, nausea, vomiting, heartburn or acid regurgitation. No abdominal pains, blood/ mucus in stool or unintentional weight loss. Nocturnal " symptoms. No melena or maroon stools. No recent changes in diet or medications. No family history of IBD, Celiac disease, or GI malignancy. No regular NSAIDs. No alcohol or tobacco use. No recent antibiotic use, travels or sick contacts. No prior history of C.diff.    Abdominal Surgeries: Cholecystectomy, c/s, hysterectomy, appendectomy     ROS:  Review of Systems   Constitutional:  Negative for chills, fever, malaise/fatigue and weight loss.   Respiratory:  Negative for cough, hemoptysis, sputum production, shortness of breath and wheezing.    Cardiovascular:  Negative for chest pain, palpitations, orthopnea, claudication, leg swelling and PND.   Gastrointestinal:  Positive for diarrhea. Negative for abdominal pain, blood in stool, constipation, heartburn, melena, nausea and vomiting.        Increased flatulence    Genitourinary:  Negative for dysuria, flank pain, frequency, hematuria and urgency.   Musculoskeletal:  Negative for back pain, falls, joint pain, myalgias and neck pain.   Skin:  Negative for itching and rash.   Neurological:  Negative for dizziness, seizures, loss of consciousness, weakness and headaches.   Psychiatric/Behavioral:  The patient is not nervous/anxious and does not have insomnia.       Medical History:  has a past medical history of Convulsions, Epilepsy, Seizures, and Type 2 diabetes mellitus with foot ulcer (CODE) (2023).    Surgical History:  has a past surgical history that includes Appendectomy; Cholecystectomy;  section; Hysterectomy; Craniotomy (N/A, 2018); Craniotomy (Left, 2018); Insertion of subdural grid and strip electrodes by craniotomy (Left, 2018); Surgical removal of lobe of brain (Left, 2018); Removal of stereo EEG leads (depth electrodes) (Bilateral, 2022); and PLACEMENT OF RESPONSIVE NEUROSTIMULATION DEVICE (NEUROPACE) (Right, 2022).    Family History: family history includes Heart disease in her father..     Review of  "patient's allergies indicates:  No Known Allergies    Medications Ordered Prior to Encounter[1]    Objective Findings:    Vital Signs:  /87 (BP Location: Left arm, Patient Position: Sitting)   Pulse (!) 54   Ht 5' 6" (1.676 m)   Wt 92.7 kg (204 lb 5.9 oz)   BMI 32.99 kg/m²   Body mass index is 32.99 kg/m².    Physical Exam  Constitutional:       Appearance: Normal appearance. She is obese.   HENT:      Head: Normocephalic and atraumatic.      Nose: Nose normal.      Mouth/Throat:      Mouth: Mucous membranes are moist.      Pharynx: Oropharynx is clear.   Cardiovascular:      Rate and Rhythm: Normal rate and regular rhythm.      Pulses: Normal pulses.      Heart sounds: Normal heart sounds.   Pulmonary:      Effort: Pulmonary effort is normal.      Breath sounds: Normal breath sounds.   Abdominal:      General: Bowel sounds are normal.      Palpations: Abdomen is soft.   Musculoskeletal:         General: Normal range of motion.      Cervical back: Normal range of motion and neck supple.   Skin:     General: Skin is warm and dry.   Neurological:      General: No focal deficit present.      Mental Status: She is alert and oriented to person, place, and time.   Psychiatric:         Mood and Affect: Mood normal.         Behavior: Behavior normal.         Thought Content: Thought content normal.         Judgment: Judgment normal.       Labs:  Lab Results   Component Value Date    WBC 6.34 04/03/2025    HGB 15.3 04/03/2025    HCT 46.6 04/03/2025     04/03/2025    CHOL 182 11/05/2018    TRIG 114 11/05/2018    HDL 39 (L) 11/05/2018    ALKPHOS 127 04/03/2025    ALT 52 (H) 04/03/2025    AST 45 04/03/2025     04/03/2025    K 4.8 04/03/2025     04/03/2025    CREATININE 0.8 04/03/2025    BUN 16 04/03/2025    CO2 28 04/03/2025    TSH 2.710 04/03/2025    INR 1.0 12/20/2022    HGBA1C 4.8 04/03/2025     Imaging reviewed:   No recent/relevant GI imaging performed for review.     Endoscopy reviewed: "   Never done    Assessment:  1. Bowel habit changes    2. Colon cancer screening    3. Flatulence    4. Fecal urgency    5. Obesity (BMI 30.0-34.9)      Orders Placed This Encounter    Cologuard Screening (Multitarget Stool DNA)    Tissue transglutaminase, IgA    IgA    TSH    T4, Free    CBC Auto Differential    Hemoglobin A1C    Comprehensive Metabolic Panel    cholestyramine (QUESTRAN) 4 gram packet    dicyclomine (BENTYL) 10 MG capsule     Fluctuations in bowel patterns with fecal urgency- There is some suspicion this could be related to removal of gallbladder; however, I would like to proceed with additional testing given timing of symptom onset with food intake. Can try cholestyramine every other day with prn bentyl for now. I would however advise not to continue with this if she starts with constipation as this could likely occur. Will also test for celiac today for gluten sensitivity. I do advise patient continue with food diary to determine patterns of foods that may be causing sensitivity. Can also consider lactaid enzymes prior to consumption of dairy products given pattern noticed on food diary presented at today's visit.     Screening for colon cancer- Given patient PMH of seizures, can proceed with cologuard testing for now.     Plan:  CBC, CMP, HgbA1c, TSH, T4, Celiac panel   2. Cologuard ordered for colon cancer screening  3. Pending lab work up, advise she start cholestyramine (Questran) 4 gm packet once daily or every other day for suspicion of bile acid diarrhea. I would however advise not to continue with this if she starts with constipation. Can also take prn bentyl four times daily before meals and at bedtime for fecal urgency.   4. If these options do not seem to improve symptoms with normal lab workup, can also try lactaid chewables up to 4 tablets prior to consumption of dairy for suspicion of lactose sensitivity.   5. I do advise she continue with food diary for now to help further  determine any correlations.   6. F/u with GI in 3 months       Thank you for allowing me to participate in this patient's care.    Sincerely,     BABAK CULP  Gastroenterology Department  [facility]          [1]   Current Outpatient Medications on File Prior to Visit   Medication Sig Dispense Refill    ferrous sulfate (FEOSOL) 325 mg (65 mg iron) Tab tablet Take 325 mg by mouth every morning.      lamoTRIgine (LAMICTAL) 200 MG tablet 1 tab (200mg) every morning & 1 1/2 tabs (300mg) every night 225 tablet 3    sertraline (ZOLOFT) 50 MG tablet Take 1 tablet (50 mg total) by mouth once daily. 30 tablet 11    XCOPRI 200 mg Tab TAKE 1 TABLET BY MOUTH DAILY 30 tablet 5     No current facility-administered medications on file prior to visit.

## 2025-04-04 LAB
EAG (OHS): 91 MG/DL (ref 68–131)
HBA1C MFR BLD: 4.8 % (ref 4–5.6)

## 2025-04-07 LAB — W TISSUE TRANSGLUTAMINASE IGA AB: 1.3 U/ML

## 2025-04-09 ENCOUNTER — PATIENT MESSAGE (OUTPATIENT)
Dept: GASTROENTEROLOGY | Facility: CLINIC | Age: 55
End: 2025-04-09
Payer: COMMERCIAL

## 2025-04-11 ENCOUNTER — PATIENT MESSAGE (OUTPATIENT)
Dept: GASTROENTEROLOGY | Facility: CLINIC | Age: 55
End: 2025-04-11
Payer: COMMERCIAL

## 2025-04-11 ENCOUNTER — RESULTS FOLLOW-UP (OUTPATIENT)
Dept: GASTROENTEROLOGY | Facility: CLINIC | Age: 55
End: 2025-04-11

## 2025-04-11 DIAGNOSIS — R74.8 ELEVATED LIVER ENZYMES: Primary | ICD-10-CM

## 2025-04-17 ENCOUNTER — TELEPHONE (OUTPATIENT)
Dept: NEUROLOGY | Facility: CLINIC | Age: 55
End: 2025-04-17
Payer: COMMERCIAL

## 2025-04-17 NOTE — TELEPHONE ENCOUNTER
Called pt to let them know Dr. Mark said he is ok with pt considering the study.  Patient verbalized agreement. Pt said there was nothing else they needed help with.

## 2025-04-25 ENCOUNTER — HOSPITAL ENCOUNTER (OUTPATIENT)
Dept: RADIOLOGY | Facility: HOSPITAL | Age: 55
Discharge: HOME | End: 2025-04-25
Payer: COMMERCIAL

## 2025-04-25 DIAGNOSIS — R74.8 ELEVATED LIVER ENZYMES: ICD-10-CM

## 2025-04-25 PROCEDURE — 76700 US EXAM ABDOM COMPLETE: CPT | Mod: 26,,, | Performed by: RADIOLOGY

## 2025-04-25 PROCEDURE — 76700 US EXAM ABDOM COMPLETE: CPT | Mod: TC

## 2025-04-29 ENCOUNTER — RESULTS FOLLOW-UP (OUTPATIENT)
Dept: GASTROENTEROLOGY | Facility: CLINIC | Age: 55
End: 2025-04-29
Payer: COMMERCIAL

## 2025-05-13 ENCOUNTER — PATIENT MESSAGE (OUTPATIENT)
Dept: NEUROLOGY | Facility: CLINIC | Age: 55
End: 2025-05-13
Payer: COMMERCIAL

## 2025-05-13 DIAGNOSIS — G40.119 TEMPORAL LOBE EPILEPSY, INTRACTABLE: ICD-10-CM

## 2025-05-13 RX ORDER — CENOBAMATE 200 MG/1
1 TABLET, FILM COATED ORAL DAILY
Qty: 30 TABLET | Refills: 5 | Status: SHIPPED | OUTPATIENT
Start: 2025-05-13 | End: 2025-11-09

## 2025-06-11 DIAGNOSIS — G40.219 COMPLEX PARTIAL EPILEPSY WITH GENERALIZATION AND WITH INTRACTABLE EPILEPSY: ICD-10-CM

## 2025-06-11 RX ORDER — SERTRALINE HYDROCHLORIDE 50 MG/1
TABLET, FILM COATED ORAL
Qty: 90 TABLET | Refills: 3 | Status: SHIPPED | OUTPATIENT
Start: 2025-06-11

## 2025-06-19 ENCOUNTER — TELEPHONE (OUTPATIENT)
Dept: NEUROLOGY | Facility: CLINIC | Age: 55
End: 2025-06-19
Payer: COMMERCIAL

## 2025-06-19 NOTE — TELEPHONE ENCOUNTER
Called patient to reschedule f/u appt to same day (6/23) but at 3pm. Also informed pt their appt would take up to 1 hour. Patient verbalized agreement

## 2025-06-23 ENCOUNTER — OFFICE VISIT (OUTPATIENT)
Dept: NEUROLOGY | Facility: CLINIC | Age: 55
End: 2025-06-23
Payer: COMMERCIAL

## 2025-06-23 VITALS
SYSTOLIC BLOOD PRESSURE: 103 MMHG | HEART RATE: 56 BPM | HEIGHT: 66 IN | DIASTOLIC BLOOD PRESSURE: 66 MMHG | WEIGHT: 196.75 LBS | BODY MASS INDEX: 31.62 KG/M2

## 2025-06-23 DIAGNOSIS — G40.219 COMPLEX PARTIAL EPILEPSY WITH GENERALIZATION AND WITH INTRACTABLE EPILEPSY: ICD-10-CM

## 2025-06-23 DIAGNOSIS — Z96.82 S/P INSERTION OF BRAIN-RESPONSIVE NEUROSTIMULATION DEVICE: Primary | ICD-10-CM

## 2025-06-23 DIAGNOSIS — G40.119 TEMPORAL LOBE EPILEPSY, INTRACTABLE: ICD-10-CM

## 2025-06-23 PROCEDURE — 3078F DIAST BP <80 MM HG: CPT | Mod: CPTII,S$GLB,, | Performed by: PSYCHIATRY & NEUROLOGY

## 2025-06-23 PROCEDURE — 99213 OFFICE O/P EST LOW 20 MIN: CPT | Mod: 25,S$GLB,, | Performed by: PSYCHIATRY & NEUROLOGY

## 2025-06-23 PROCEDURE — 3044F HG A1C LEVEL LT 7.0%: CPT | Mod: CPTII,S$GLB,, | Performed by: PSYCHIATRY & NEUROLOGY

## 2025-06-23 PROCEDURE — 1159F MED LIST DOCD IN RCRD: CPT | Mod: CPTII,S$GLB,, | Performed by: PSYCHIATRY & NEUROLOGY

## 2025-06-23 PROCEDURE — 3074F SYST BP LT 130 MM HG: CPT | Mod: CPTII,S$GLB,, | Performed by: PSYCHIATRY & NEUROLOGY

## 2025-06-23 PROCEDURE — 95836 ECOG IMPLTD BRN NPGT <30 D: CPT | Mod: S$GLB,,, | Performed by: PSYCHIATRY & NEUROLOGY

## 2025-06-23 PROCEDURE — 3008F BODY MASS INDEX DOCD: CPT | Mod: CPTII,S$GLB,, | Performed by: PSYCHIATRY & NEUROLOGY

## 2025-06-23 PROCEDURE — 99999 PR PBB SHADOW E&M-EST. PATIENT-LVL III: CPT | Mod: PBBFAC,,, | Performed by: PSYCHIATRY & NEUROLOGY

## 2025-06-23 PROCEDURE — 95983 ALYS BRN NPGT PRGRMG 15 MIN: CPT | Mod: S$GLB,,, | Performed by: PSYCHIATRY & NEUROLOGY

## 2025-06-23 PROCEDURE — 1160F RVW MEDS BY RX/DR IN RCRD: CPT | Mod: CPTII,S$GLB,, | Performed by: PSYCHIATRY & NEUROLOGY

## 2025-06-23 RX ORDER — LAMOTRIGINE 200 MG/1
300 TABLET ORAL 2 TIMES DAILY
Qty: 270 TABLET | Refills: 4 | Status: SHIPPED | OUTPATIENT
Start: 2025-06-23

## 2025-06-25 ENCOUNTER — PATIENT MESSAGE (OUTPATIENT)
Dept: NEUROLOGY | Facility: CLINIC | Age: 55
End: 2025-06-25
Payer: COMMERCIAL

## 2025-07-08 ENCOUNTER — LAB VISIT (OUTPATIENT)
Dept: LAB | Facility: HOSPITAL | Age: 55
End: 2025-07-08
Attending: PSYCHIATRY & NEUROLOGY
Payer: COMMERCIAL

## 2025-07-08 ENCOUNTER — OFFICE VISIT (OUTPATIENT)
Dept: GASTROENTEROLOGY | Facility: CLINIC | Age: 55
End: 2025-07-08
Payer: COMMERCIAL

## 2025-07-08 VITALS
HEART RATE: 55 BPM | BODY MASS INDEX: 31.06 KG/M2 | SYSTOLIC BLOOD PRESSURE: 105 MMHG | DIASTOLIC BLOOD PRESSURE: 72 MMHG | WEIGHT: 192.44 LBS

## 2025-07-08 DIAGNOSIS — R19.7 DIARRHEA DUE TO MALABSORPTION: Primary | ICD-10-CM

## 2025-07-08 DIAGNOSIS — R15.2 FECAL URGENCY: ICD-10-CM

## 2025-07-08 DIAGNOSIS — Z79.899 ENCOUNTER FOR MONITORING ANTICONVULSANT THERAPY: ICD-10-CM

## 2025-07-08 DIAGNOSIS — K90.9 DIARRHEA DUE TO MALABSORPTION: Primary | ICD-10-CM

## 2025-07-08 DIAGNOSIS — Z51.81 ENCOUNTER FOR MONITORING ANTICONVULSANT THERAPY: ICD-10-CM

## 2025-07-08 LAB
ALBUMIN SERPL BCP-MCNC: 4.4 G/DL (ref 3.5–5.2)
ALP SERPL-CCNC: 121 UNIT/L (ref 40–150)
ALT SERPL W/O P-5'-P-CCNC: 30 UNIT/L (ref 10–44)
ANION GAP (OHS): 9 MMOL/L (ref 8–16)
AST SERPL-CCNC: 24 UNIT/L (ref 11–45)
BILIRUB SERPL-MCNC: 0.5 MG/DL (ref 0.1–1)
BUN SERPL-MCNC: 14 MG/DL (ref 6–20)
CALCIUM SERPL-MCNC: 10.1 MG/DL (ref 8.7–10.5)
CHLORIDE SERPL-SCNC: 106 MMOL/L (ref 95–110)
CO2 SERPL-SCNC: 27 MMOL/L (ref 23–29)
CREAT SERPL-MCNC: 0.9 MG/DL (ref 0.5–1.4)
GFR SERPLBLD CREATININE-BSD FMLA CKD-EPI: >60 ML/MIN/1.73/M2
GLUCOSE SERPL-MCNC: 92 MG/DL (ref 70–110)
POTASSIUM SERPL-SCNC: 4.2 MMOL/L (ref 3.5–5.1)
PROT SERPL-MCNC: 7.7 GM/DL (ref 6–8.4)
SODIUM SERPL-SCNC: 142 MMOL/L (ref 136–145)
TSH SERPL-ACNC: 3.19 UIU/ML (ref 0.4–4)
VIT B12 SERPL-MCNC: 764 PG/ML (ref 210–950)

## 2025-07-08 PROCEDURE — 84443 ASSAY THYROID STIM HORMONE: CPT

## 2025-07-08 PROCEDURE — 3078F DIAST BP <80 MM HG: CPT | Mod: CPTII,S$GLB,,

## 2025-07-08 PROCEDURE — 3074F SYST BP LT 130 MM HG: CPT | Mod: CPTII,S$GLB,,

## 2025-07-08 PROCEDURE — 80053 COMPREHEN METABOLIC PANEL: CPT

## 2025-07-08 PROCEDURE — 3044F HG A1C LEVEL LT 7.0%: CPT | Mod: CPTII,S$GLB,,

## 2025-07-08 PROCEDURE — 99214 OFFICE O/P EST MOD 30 MIN: CPT | Mod: S$GLB,,,

## 2025-07-08 PROCEDURE — 82607 VITAMIN B-12: CPT

## 2025-07-08 PROCEDURE — 99999 PR PBB SHADOW E&M-EST. PATIENT-LVL III: CPT | Mod: PBBFAC,,,

## 2025-07-08 PROCEDURE — 1159F MED LIST DOCD IN RCRD: CPT | Mod: CPTII,S$GLB,,

## 2025-07-08 PROCEDURE — 36415 COLL VENOUS BLD VENIPUNCTURE: CPT

## 2025-07-08 PROCEDURE — G2211 COMPLEX E/M VISIT ADD ON: HCPCS | Mod: S$GLB,,,

## 2025-07-08 PROCEDURE — 3008F BODY MASS INDEX DOCD: CPT | Mod: CPTII,S$GLB,,

## 2025-07-08 NOTE — PATIENT INSTRUCTIONS
Start daily fiber (Metamucil). Take 1 tsp of fiber powder (psyllium or other sugar-free powder).  Mix in 8 oz of water.  Take x 3-5 days.  Then, increase fiber by 1 tsp every 3-5 days until stool is easy to pass.  Stop and continue at that dose.   Do not exceed 6 tsps/day. GOAL:  More well-formed stool (one continuous well-formed piece vs. Pellets) and minimize straining with initiation. Can cause increased gas.    [facility]  High Fiber Diet    20-30 grams of fiber per day is recommended    Fiber cereal = 5 grams (Raisin Bran, Shredded Wheat, Grape Nuts)  Konsyl 1 teaspoon = 6 grams  Metamucil 1 tablespoon = 3 grams  Citrucel 1 tablespoon = 2 grams  Fiber Choice = 3-4 per day    Drink at least 4-5 glasses of liquids per day or the fiber can be constipating rather then stimulating to your gut.  Boil and bake potatoes in their skin. Eat the skin, too.  Include fresh fruits and raw vegetables in your daily diet. Raw fruits and vegetables have more useful fiber than those that have been peeled, cooked, pureed, or otherwise processed.  Eat a wide variety of fibrous foods in reasonable amounts. Increase fiber intake slowly especially if you have been on a low-fiber diet.  Eat more legumes-peas, beans, soybeans.  For snacks, try dried fruit, whole wheat and rye crackers.  Avoid instant-cook hot cereals. Use the longer cooking cereals.  Use bran whenever possible. Sprinkle it on top of cereal, mix it into mashed potatoes or hamburger meat, or use it in combination dishes such as meat loaf.   Substitute whole grain, whole wheat and bran products for white flour products.  Eat slowly and chew your food thoroughly.    Psyllium has been shown to improve both constipation and diarrhea. Fiber may increase bulk of stool and may also include alterations in the production of gaseous fermentation products and changes to the gut microbiome. As some patients may experience increased bloating and gas, we suggest a low  starting dose of psyllium that provides approximately 3 to 4 grams of soluble fiber per day. The soluble fiber content of psyllium products (ie, per packet, teaspoon, or pill) varies widely; refer to product-specific label to determine dose. The dose should then be slowly titrated up based on response to treatment.     Foods High in Fiber    This diet furnishes adequate amounts of all the essential nutrients needed by the body and a very liberal fiber or roughage content. Roughage is indigestible fiber found in fruits, vegetables and whole grain cereals. It provides bulk to the large intestine and, accompanied by an adequate fluid intake, is a stimulant to elimination. Regular eating and elimination habits are vital to good health.     Fruits:  Use all fruits and juices liberally; fresh, cooked, dried or canned. Eat fruit raw and with skins when possible. Have at least four servings of fruit daily including a citrus fruit and a stewed dried fruit. Hard seeds of fruits (berries, figs, Grapes, mangoes, tomatoes) etc. may be removed.    Vegetables: Use all vegetables liberally. Green leafy vegetables, such as cabbage, spinach, lettuce, broccoli, and other greens are particularly good.    Potato: As desired. Serve baked frequently and eat the skin. Other starchy foods such as rice, macaroni, etc., may be occasionally substituted. Chew popcorn well and do not eat hard kernels.    Meat, Fish, Poultry: One or two servings daily.    Eggs: One daily if you are not on a low cholesterol diet.    Milk: Include at least one-half pint daily. Whole milk or skimmed may be used.     Cereals: Use whole grain breads and cereals such as bran, bran flakes, grape nut flakes, puffed wheat, oatmeal, Wheaties, etc., as much as possible. Refined breaks and cereals are not restricted; however, they do not contain the bulk necessary for this diet.     Sugars, Sweets: Use very moderately. Depend on fruit as dessert.    Fats: Use butter or  margarine as desired. Oil or dressing on salads as desired. Fried foods may be used in moderation. Nuts may be eaten if you chew them well and may be ground or finely chopped for cooking.   Sample Menu                                                                                 Breakfast                          Lunch  Orange juice, 4 ounces                                                Vegetable soup                    Stewed fruit                                                                 Fresh fruit plate with cottage cheese  Shredded wheat                                                           Whole wheat toast  Scrambled eggs                                                           Butter  Whole wheat toast                                                       Coffee or tea  Dinner                                                                         Bedtime  Large glass tomato juice                                             1 glass milk  Roast beef                                                                   stewed fruit  Baked potato with skin  Buttered spinach  Raw vegetable salad  Baked apple with skin   Coffee or tea

## 2025-07-08 NOTE — PROGRESS NOTES
Patient ID: Liza Arrieta is a 54 y.o. female.    PCP:   Rc Rodriguez       Gastroenterology Clinic Consultation Note    Reason for Visit:  The primary encounter diagnosis was Diarrhea due to malabsorption. A diagnosis of Fecal urgency was also pertinent to this visit.  Chief Complaint: Follow up for postprandial diarrhea and fecal urgency     History of Present Illness    CHIEF COMPLAINT:  Patient presents today for follow-up of GI issues.    Past medical history of Convulsions, Epilepsy, Seizures, and Type 2 diabetes mellitus with foot ulcer (CODE) (2/13/2023)     GASTROINTESTINAL:  She reports bowel movements occurring approximately every other day. She experiences occasional urgency and loose stools after consuming fatty foods, particularly pizza with pepperoni. Exercise helps stimulate her bowel movements. She denies issues with dairy products. Coffee, especially flavored coffee with sugar, triggers GI symptoms. Since starting Weight Watchers three months ago, she has increased berry consumption (blueberries and blackberries), which she believes may be affecting her bowel movements, noting darker stool color. Denies melena or rectal bleeding. She has a family history of potential irritable bowel syndrome, as her daughter experiences similar symptoms.    Today, she denies any new GI related issues or concerns. Is doing well with her current regimen recommended at her last visit.     INTERVAL GI HISTORY:  Last seen in April for recent changes in bowel patterns.   Most recently started to experience fluctuations in bowel patterns. Feels that most of her GI issues are related to a food sensitivity. She keeps a food diary in her phone when these issues arise and finds that a lot of dairy products cause her GI upset. However, she does also experience issues with other food products including roldan and dark roast coffee. Often experiences fecal urgency after consumption of these foods.   Describes her normal bowel  "patterns are typically every 2-3 days. Describes stool as formed but then turns looser throughout the day mostly dependant on what she eats.   She does also find that she is more gassy than usual. Denies significant abdominal pains with this. No n/v. Denies blood in her stool. Denies unintentional weight loss.     Takes iron for anemia. Denies issues of constipation.   Started on weight watchers, trying to be mindful of what she eats. Drinks plenty of water.      Mom- diverticulitis  Daughter- IBS      Sadly, she reports short term memory loss with her seizure history. Says she does not recall many things. Uncertain to removal of gallbladder. Does not remember her kids growing up. Recalls certain major events in the past.   Operates an epilepsy awareness program called "[facility] [place]." Upcoming epilepsy run in November.    MEDICATIONS:  She takes cholestyramine with applesauce in the morning as recommended at her last visit with me, Bentyl one hour before meals as needed. Says overall she has noticed improvement of her symptoms with these medications with less diarrhea episodes.     NEUROLOGICAL:  She reports her device lead is not currently functioning. She has experienced improvement in seizure frequency with only two seizures in the last month.    PREVIOUS RESULTS:  ColoGuard test was negative. Previous elevated liver enzyme was noted, with follow-up ultrasound showing normal results. Has repeat labs, including CMP, ordered with her neurologist and would like to have those completed today.     CURRENT ILLNESS:  She reports current upper respiratory symptoms with congestion, likely transmitted from her granddaughter.    Denies any dysphagia, odynophagia, nausea, vomiting, heartburn or acid regurgitation. No abdominal pains, changes in bowel pattern, blood/ mucus in stool or unintentional weight loss. Nocturnal symptoms. No melena or maroon stools. No recent changes in medications. No family history " of IBD, Celiac disease or GI malignancy. No regular NSAIDs. No alcohol or tobacco use. No recent antibiotic use, travels or sick contacts. No prior history of C.diff.    Abdominal Surgeries: Cholecystectomy, c/s, hysterectomy, appendectomy     ROS:  General: -fever, -chills, -fatigue, -weight gain, -weight loss  Eyes: -vision changes, -redness, -discharge  ENT: -ear pain, +nasal congestion, -sore throat, +runny nose, +nasal discharge  Cardiovascular: -chest pain, -palpitations, -lower extremity edema  Respiratory: -cough, -shortness of breath  Gastrointestinal: -abdominal pain, -nausea, -vomiting, +diarrhea, -constipation, -blood in stool, +stool color changes, +change in bowel habits  Genitourinary: -dysuria, -hematuria, -frequency  Musculoskeletal: -joint pain, -muscle pain  Skin: -rash, -lesion  Neurological: -headache, -dizziness, -numbness, -tingling  Psychiatric: -anxiety, -depression, -sleep difficulty    Medical History:  has a past medical history of Convulsions, Epilepsy, Seizures, and Type 2 diabetes mellitus with foot ulcer (CODE) (2023).    Surgical History:  has a past surgical history that includes Appendectomy; Cholecystectomy;  section; Hysterectomy; Craniotomy (N/A, 2018); Craniotomy (Left, 2018); Insertion of subdural grid and strip electrodes by craniotomy (Left, 2018); Surgical removal of lobe of brain (Left, 2018); Removal of stereo EEG leads (depth electrodes) (Bilateral, 2022); and PLACEMENT OF RESPONSIVE NEUROSTIMULATION DEVICE (NEUROPACE) (Right, 2022).    Family History: family history includes Heart disease in her father..     Vital Signs:  /72   Pulse (!) 55   Wt 87.3 kg (192 lb 7.4 oz)   BMI 31.06 kg/m²   Body mass index is 31.06 kg/m².    Physical Exam    General: No acute distress. Well-developed. Well-nourished.  Eyes: EOMI. Sclerae anicteric.  HENT: Normocephalic. Atraumatic. Nares patent. Moist oral mucosa.  Ears: Bilateral  TMs clear. Bilateral EACs clear.  Cardiovascular: Regular rate. Regular rhythm. No murmurs. No rubs. No gallops. Normal S1, S2.  Respiratory: Normal respiratory effort. Clear to auscultation bilaterally. No rales. No rhonchi. No wheezing.  Abdomen: Soft. Non-tender. Non-distended. Normoactive bowel sounds.  Musculoskeletal: No  obvious deformity.  Extremities: No lower extremity edema.  Neurological: Alert & oriented x3. No slurred speech. Normal gait.  Psychiatric: Normal mood. Normal affect. Good insight. Good judgment.  Skin: Warm. Dry. No rash.         Review of patient's allergies indicates:  No Known Allergies    Labs:  Lab Results   Component Value Date    WBC 6.34 04/03/2025    HGB 15.3 04/03/2025    HCT 46.6 04/03/2025     04/03/2025    CHOL 182 11/05/2018    TRIG 114 11/05/2018    HDL 39 (L) 11/05/2018    ALKPHOS 127 04/03/2025    ALT 52 (H) 04/03/2025    AST 45 04/03/2025     04/03/2025    K 4.8 04/03/2025     04/03/2025    CREATININE 0.8 04/03/2025    BUN 16 04/03/2025    CO2 28 04/03/2025    TSH 2.710 04/03/2025    INR 1.0 12/20/2022    HGBA1C 4.8 04/03/2025     Imaging reviewed:   EXAMINATION:  US ABDOMEN COMPLETE 4/25/2025  CLINICAL HISTORY:  Abnormal levels of other serum enzymes  Impression:  No significant abnormality.    Endoscopy reviewed:   5/12/2025  Cologuard Result Negative     Assessment & Plan    G40.919 Epilepsy, unspecified, intractable, without status epilepticus  K91.2 Postsurgical malabsorption, not elsewhere classified  K58.2 Mixed irritable bowel syndrome  J06.9 Acute upper respiratory infection, unspecified  T85.110D Breakdown (mechanical) of implanted electronic neurostimulator of brain electrode (lead), subsequent encounter  R74.8 Abnormal levels of other serum enzymes  Z90.49 Acquired absence of other specified parts of digestive tract  Z84.89 Family history of other specified conditions    POSTSURGICAL MALABSORPTION (POST-CHOLECYSTECTOMY):  - Assessed response  to cholestyramine for suspected bile acid diarrhea post-cholecystectomy.  - Continued cholestyramine powder mixed with applesauce, to be taken in the morning given effectiveness in managing PP diarrhea.   - Discussed with patient that this could lead to constipation and if she begins to experience this more so, to stop taking.     IRRITABLE BOWEL SYNDROME:  - Continued Bentyl (dicyclomine) to be taken as needed for management of urgency and cramping associated with IBS-like symptoms, preferably 1 hour before meals that may cause digestive issues.  - Evaluated need for and started OTC Metamucil fiber gummies to be taken daily with plenty of water.  - Explained importance of adequate water intake when using fiber supplements to prevent constipation.  - Discussed how fiber can help regulate bowel habits for both diarrhea and constipation by promoting good bowel health.  - Patient to incorporate fiber supplement into daily routine.  - Patient to drink plenty of water, especially when taking fiber supplement.  - Patient to continue food diary to identify trigger foods and avoid these food items.    ABNORMAL LIVER ENZYMES:  - Has CMP ordered with neurologist. Will recheck liver enzymes due to previous elevated result (normal US)     Assessment:  1. Diarrhea due to malabsorption    2. Fecal urgency      Follow up in about 6 months (around 1/8/2026).    Thank you for allowing me to participate in this patient's care.     Sincerely,      BABAK CULP  Gastroenterology Department  [facility]     This note was generated with the assistance of ambient listening technology. Verbal consent was obtained by the patient and accompanying visitor(s) for the recording of patient appointment to facilitate this note. I attest to having reviewed and edited the generated note for accuracy, though some syntax or spelling errors may persist. Please contact the author of this note for any clarification.

## 2025-07-22 ENCOUNTER — TELEPHONE (OUTPATIENT)
Dept: NEUROLOGY | Facility: CLINIC | Age: 55
End: 2025-07-22
Payer: COMMERCIAL

## 2025-07-23 NOTE — TELEPHONE ENCOUNTER
Called pt about her broken Lead. Pt said she needed an appt with Dr. Ava Valdez to get her broken lead fixed. I talked to Dr. Ava Valdez's staff and they said they would call pt to get her an appt scheduled as soon as possible. I informed the pt that Dr. Ava Valdez's staff would be reaching out to the pt. Pt also mentioned they are currently taking Lamictal 200 mg in morning and 300mg at night. They tried to do 300 mg at night per what the doctor told them but they were having other symtoms and side effects so they stopped and went back to their original dosage.     Called patient to schedule 1 hour f/u appt on 9/26 at 3pm. Patient verbalized agreement

## 2025-07-24 DIAGNOSIS — G40.119 TEMPORAL LOBE EPILEPSY, INTRACTABLE: Primary | ICD-10-CM

## 2025-07-24 DIAGNOSIS — Z96.82 S/P INSERTION OF BRAIN-RESPONSIVE NEUROSTIMULATION DEVICE: ICD-10-CM

## 2025-08-02 ENCOUNTER — HOSPITAL ENCOUNTER (OUTPATIENT)
Dept: RADIOLOGY | Facility: HOSPITAL | Age: 55
Discharge: HOME | End: 2025-08-02
Attending: NEUROLOGICAL SURGERY
Payer: COMMERCIAL

## 2025-08-02 DIAGNOSIS — Z96.82 S/P INSERTION OF BRAIN-RESPONSIVE NEUROSTIMULATION DEVICE: ICD-10-CM

## 2025-08-02 DIAGNOSIS — G40.119 TEMPORAL LOBE EPILEPSY, INTRACTABLE: ICD-10-CM

## 2025-08-02 PROCEDURE — 70450 CT HEAD/BRAIN W/O DYE: CPT | Mod: TC

## 2025-08-02 PROCEDURE — 70450 CT HEAD/BRAIN W/O DYE: CPT | Mod: 26,,, | Performed by: RADIOLOGY

## 2025-08-13 DIAGNOSIS — R74.8 ELEVATED LIVER ENZYMES: Primary | ICD-10-CM

## 2025-08-14 ENCOUNTER — PROCEDURE VISIT (OUTPATIENT)
Dept: HEPATOLOGY | Facility: CLINIC | Age: 55
End: 2025-08-14
Payer: COMMERCIAL

## 2025-08-14 ENCOUNTER — OFFICE VISIT (OUTPATIENT)
Dept: HEPATOLOGY | Facility: CLINIC | Age: 55
End: 2025-08-14
Payer: COMMERCIAL

## 2025-08-14 VITALS — BODY MASS INDEX: 30.76 KG/M2 | WEIGHT: 191.38 LBS | HEIGHT: 66 IN

## 2025-08-14 DIAGNOSIS — K76.0 HEPATIC STEATOSIS: Primary | ICD-10-CM

## 2025-08-14 DIAGNOSIS — R74.8 ELEVATED LIVER ENZYMES: ICD-10-CM

## 2025-08-14 PROCEDURE — 99999 PR PBB SHADOW E&M-EST. PATIENT-LVL III: CPT | Mod: PBBFAC,,,

## 2025-08-23 DIAGNOSIS — R19.4 BOWEL HABIT CHANGES: ICD-10-CM

## 2025-08-26 RX ORDER — CHOLESTYRAMINE 4 G/9G
4 POWDER, FOR SUSPENSION ORAL DAILY
Qty: 30 PACKET | Refills: 3 | Status: SHIPPED | OUTPATIENT
Start: 2025-08-26 | End: 2025-12-24

## (undated) DEVICE — DRAPE STERI INSTRUMENT 1018

## (undated) DEVICE — DRESSING TELFA N ADH 3X8

## (undated) DEVICE — RUBBERBAND STERILE 3X1/8IN

## (undated) DEVICE — COTTON BALLS 1/2IN

## (undated) DEVICE — DRESSING TELFA STRL 4X3 LF

## (undated) DEVICE — SEE MEDLINE ITEM 157103

## (undated) DEVICE — DRAPE TOP 53X102IN

## (undated) DEVICE — CORD BIPOLAR 12 FOOT

## (undated) DEVICE — GAUZE FLUFF XXLG 36X36 2 PLY

## (undated) DEVICE — DRAPE INCISE IOBAN 2 23X17IN

## (undated) DEVICE — SUT MONOCRYL 4-0 PS-2

## (undated) DEVICE — TUBE FRAZIER 5MM 2FT SOFT TIP

## (undated) DEVICE — Device

## (undated) DEVICE — ROUTER STRAIGHT 1.1 X6.0MM

## (undated) DEVICE — MARKERS SPHERZ PASSIVE

## (undated) DEVICE — TOWEL OR DISP STRL BLUE 4/PK

## (undated) DEVICE — SPONGE PATTY SURGICAL .5X3IN

## (undated) DEVICE — KIT SURGIFLO HEMOSTATIC MATRIX

## (undated) DEVICE — SPONGE DERMACEA GAUZE 4X4

## (undated) DEVICE — ELECTRODE REM PLYHSV RETURN 9

## (undated) DEVICE — FIDUCIAL KIT UNI STEREO 10 MM
Type: IMPLANTABLE DEVICE | Site: CRANIAL | Status: NON-FUNCTIONAL
Removed: 2022-12-19

## (undated) DEVICE — TAPE SURG MEDIPORE 6X72IN

## (undated) DEVICE — DRESSING SURGICAL 1/2X1/2

## (undated) DEVICE — SPRAY MASTISOL

## (undated) DEVICE — DRESSING SURGICAL 3/4X3/4

## (undated) DEVICE — ROSA ROBOT ARM DRAPE

## (undated) DEVICE — STOCKINET 4INX48

## (undated) DEVICE — DRIVER AUTO DISP OSTEODRIVE

## (undated) DEVICE — SEE MEDLINE ITEM 152512

## (undated) DEVICE — ELECTRODE BLADE INSULATED 1 IN

## (undated) DEVICE — HEMOSTAT SURGICEL 4X8IN

## (undated) DEVICE — MARKER SKIN STND TIP BLUE BARR

## (undated) DEVICE — TRAY FOLEY 16FR INFECTION CONT

## (undated) DEVICE — GAUZE SPONGE 4X4 12PLY

## (undated) DEVICE — STAPLER SKIN PROXIMATE WIDE

## (undated) DEVICE — DRESSING ADH FILM TELFA 2X3IN

## (undated) DEVICE — ADHESIVE DERMABOND ADVANCED

## (undated) DEVICE — SUT ETHILON 3-0 PS2 18 BLK

## (undated) DEVICE — ROUTER TAPERED 2.3MM

## (undated) DEVICE — COVER PROXIMA MAYO STAND

## (undated) DEVICE — BUR BONE CUT MICRO TPS 3X3.8MM

## (undated) DEVICE — SUT 4/0 18IN NUROLON BLK B

## (undated) DEVICE — SEE MEDLINE ITEM 156905

## (undated) DEVICE — SPONGE GAUZE 16PLY 4X4

## (undated) DEVICE — SUT VICRYL PLUS 2-0 CT1 18

## (undated) DEVICE — STYLET GUIDING W DRL BIT 2.4MM

## (undated) DEVICE — BATTERY AUTODRIVE TURBO

## (undated) DEVICE — TUBING SUCTION CONNECTING 10FT

## (undated) DEVICE — DIFFUSER

## (undated) DEVICE — NDL HYPO REG 25G X 1 1/2

## (undated) DEVICE — BIT DRILL TWIST 2.1MM

## (undated) DEVICE — DURAPREP SURG SCRUB 26ML

## (undated) DEVICE — DRAPE THREE-QTR REINF 53X77IN

## (undated) DEVICE — ANCHOR BOLT 25X2.1MM
Type: IMPLANTABLE DEVICE | Site: CRANIAL | Status: NON-FUNCTIONAL
Removed: 2022-11-16

## (undated) DEVICE — DRAPE EENT SPLIT STERILE

## (undated) DEVICE — DRAPE THYROID WITH ARMBOARD

## (undated) DEVICE — DRESSING SURGICAL 1X1

## (undated) DEVICE — TRAY CATH FOL SIL URIMTR 16FR

## (undated) DEVICE — SEE MEDLINE ITEM 157131

## (undated) DEVICE — SEE MEDLINE ITEM 157216

## (undated) DEVICE — SUT VICRYL RAPIDE 4-0 18 P

## (undated) DEVICE — SPONGE NEURO 1/4X1/4

## (undated) DEVICE — SEE MEDLINE ITEM 146292

## (undated) DEVICE — SEE MEDLINE ITEM 152622

## (undated) DEVICE — PINS SKULL ADULT MAYFIELD
Type: IMPLANTABLE DEVICE | Site: CRANIAL | Status: NON-FUNCTIONAL
Removed: 2022-12-19

## (undated) DEVICE — CARTRIDGE OIL

## (undated) DEVICE — NDL N SERIES MICRO-DISSECTION

## (undated) DEVICE — PINS SKULL ADULT MAYFIELD
Type: IMPLANTABLE DEVICE | Site: CRANIAL | Status: NON-FUNCTIONAL
Removed: 2018-11-05

## (undated) DEVICE — DRESSING SURGICAL 1X3

## (undated) DEVICE — TEGADERM IV

## (undated) DEVICE — HOOK LONE STAR BLUNT 12MM

## (undated) DEVICE — BLADE CLIPPER NEURO / MDL 4411

## (undated) DEVICE — SUT D SPECIAL VICRYL 2-0

## (undated) DEVICE — KIT POWDER ABSORBABLE GELATIN

## (undated) DEVICE — WARMER DRAPE STERILE LF

## (undated) DEVICE — BLADE SURG CARBON STEEL SZ11

## (undated) DEVICE — SUT VICRYL PLUS 3-0 SH 18IN

## (undated) DEVICE — KIT EVACUATOR 3-SPRING 1/8 DRN